# Patient Record
Sex: FEMALE | Race: WHITE | NOT HISPANIC OR LATINO | Employment: OTHER | URBAN - METROPOLITAN AREA
[De-identification: names, ages, dates, MRNs, and addresses within clinical notes are randomized per-mention and may not be internally consistent; named-entity substitution may affect disease eponyms.]

---

## 2021-12-10 ENCOUNTER — HOSPITAL ENCOUNTER (EMERGENCY)
Facility: HOSPITAL | Age: 76
Discharge: HOME | End: 2021-12-10
Attending: EMERGENCY MEDICINE | Admitting: EMERGENCY MEDICINE
Payer: COMMERCIAL

## 2021-12-10 ENCOUNTER — APPOINTMENT (EMERGENCY)
Dept: RADIOLOGY | Facility: HOSPITAL | Age: 76
End: 2021-12-10
Payer: COMMERCIAL

## 2021-12-10 ENCOUNTER — APPOINTMENT (EMERGENCY)
Dept: CT IMAGING | Facility: HOSPITAL | Age: 76
End: 2021-12-10
Payer: COMMERCIAL

## 2021-12-10 VITALS
RESPIRATION RATE: 20 BRPM | HEART RATE: 76 BPM | DIASTOLIC BLOOD PRESSURE: 60 MMHG | TEMPERATURE: 98.4 F | SYSTOLIC BLOOD PRESSURE: 124 MMHG | WEIGHT: 182.98 LBS | OXYGEN SATURATION: 96 %

## 2021-12-10 DIAGNOSIS — R29.6 MULTIPLE FALLS: Primary | ICD-10-CM

## 2021-12-10 LAB
ALBUMIN SERPL BCP-MCNC: 3.8 G/DL (ref 3.5–5)
ALP SERPL-CCNC: 94 U/L (ref 34–104)
ALT SERPL W P-5'-P-CCNC: 25 U/L (ref 7–52)
ANION GAP SERPL CALCULATED.3IONS-SCNC: 6 MMOL/L (ref 4–13)
APTT PPP: 40 SECONDS (ref 23–37)
AST SERPL W P-5'-P-CCNC: 37 U/L (ref 13–39)
BASOPHILS # BLD AUTO: 0.02 THOUSANDS/ΜL (ref 0–0.1)
BASOPHILS NFR BLD AUTO: 0 % (ref 0–1)
BILIRUB SERPL-MCNC: 0.85 MG/DL (ref 0.2–1)
BUN SERPL-MCNC: 12 MG/DL (ref 5–25)
CALCIUM SERPL-MCNC: 9.2 MG/DL (ref 8.4–10.2)
CHLORIDE SERPL-SCNC: 105 MMOL/L (ref 96–108)
CO2 SERPL-SCNC: 28 MMOL/L (ref 21–32)
CREAT SERPL-MCNC: 1.31 MG/DL (ref 0.6–1.3)
EOSINOPHIL # BLD AUTO: 0.1 THOUSAND/ΜL (ref 0–0.61)
EOSINOPHIL NFR BLD AUTO: 2 % (ref 0–6)
ERYTHROCYTE [DISTWIDTH] IN BLOOD BY AUTOMATED COUNT: 15.1 % (ref 11.6–15.1)
GFR SERPL CREATININE-BSD FRML MDRD: 40 ML/MIN/1.73SQ M
GLUCOSE SERPL-MCNC: 101 MG/DL (ref 65–140)
HCT VFR BLD AUTO: 39.3 % (ref 34.8–46.1)
HGB BLD-MCNC: 12 G/DL (ref 11.5–15.4)
IMM GRANULOCYTES # BLD AUTO: 0.04 THOUSAND/UL (ref 0–0.2)
IMM GRANULOCYTES NFR BLD AUTO: 1 % (ref 0–2)
INR PPP: 1.33 (ref 0.84–1.19)
LYMPHOCYTES # BLD AUTO: 0.72 THOUSANDS/ΜL (ref 0.6–4.47)
LYMPHOCYTES NFR BLD AUTO: 12 % (ref 14–44)
MCH RBC QN AUTO: 31.3 PG (ref 26.8–34.3)
MCHC RBC AUTO-ENTMCNC: 30.5 G/DL (ref 31.4–37.4)
MCV RBC AUTO: 103 FL (ref 82–98)
MONOCYTES # BLD AUTO: 0.51 THOUSAND/ΜL (ref 0.17–1.22)
MONOCYTES NFR BLD AUTO: 8 % (ref 4–12)
NEUTROPHILS # BLD AUTO: 4.76 THOUSANDS/ΜL (ref 1.85–7.62)
NEUTS SEG NFR BLD AUTO: 77 % (ref 43–75)
NRBC BLD AUTO-RTO: 0 /100 WBCS
PLATELET # BLD AUTO: 200 THOUSANDS/UL (ref 149–390)
PMV BLD AUTO: 9.3 FL (ref 8.9–12.7)
POTASSIUM SERPL-SCNC: 5.2 MMOL/L (ref 3.5–5.3)
PROT SERPL-MCNC: 6.8 G/DL (ref 6.4–8.4)
PROTHROMBIN TIME: 16.3 SECONDS (ref 11.6–14.5)
RBC # BLD AUTO: 3.83 MILLION/UL (ref 3.81–5.12)
SODIUM SERPL-SCNC: 139 MMOL/L (ref 135–147)
WBC # BLD AUTO: 6.15 THOUSAND/UL (ref 4.31–10.16)

## 2021-12-10 PROCEDURE — 85025 COMPLETE CBC W/AUTO DIFF WBC: CPT | Performed by: EMERGENCY MEDICINE

## 2021-12-10 PROCEDURE — 97163 PT EVAL HIGH COMPLEX 45 MIN: CPT

## 2021-12-10 PROCEDURE — 71045 X-RAY EXAM CHEST 1 VIEW: CPT

## 2021-12-10 PROCEDURE — 85610 PROTHROMBIN TIME: CPT | Performed by: EMERGENCY MEDICINE

## 2021-12-10 PROCEDURE — 99282 EMERGENCY DEPT VISIT SF MDM: CPT | Performed by: EMERGENCY MEDICINE

## 2021-12-10 PROCEDURE — 36415 COLL VENOUS BLD VENIPUNCTURE: CPT | Performed by: EMERGENCY MEDICINE

## 2021-12-10 PROCEDURE — 70450 CT HEAD/BRAIN W/O DYE: CPT

## 2021-12-10 PROCEDURE — 80053 COMPREHEN METABOLIC PANEL: CPT | Performed by: EMERGENCY MEDICINE

## 2021-12-10 PROCEDURE — 85730 THROMBOPLASTIN TIME PARTIAL: CPT | Performed by: EMERGENCY MEDICINE

## 2021-12-10 PROCEDURE — 99285 EMERGENCY DEPT VISIT HI MDM: CPT

## 2021-12-10 PROCEDURE — 97166 OT EVAL MOD COMPLEX 45 MIN: CPT

## 2021-12-22 ENCOUNTER — APPOINTMENT (EMERGENCY)
Dept: CT IMAGING | Facility: HOSPITAL | Age: 76
DRG: 196 | End: 2021-12-22
Payer: COMMERCIAL

## 2021-12-22 ENCOUNTER — HOSPITAL ENCOUNTER (INPATIENT)
Facility: HOSPITAL | Age: 76
LOS: 5 days | Discharge: NURSING, REHAB OR LONG-TERM CARE | DRG: 196 | End: 2021-12-28
Attending: EMERGENCY MEDICINE | Admitting: INTERNAL MEDICINE
Payer: COMMERCIAL

## 2021-12-22 ENCOUNTER — APPOINTMENT (EMERGENCY)
Dept: RADIOLOGY | Facility: HOSPITAL | Age: 76
DRG: 196 | End: 2021-12-22
Payer: COMMERCIAL

## 2021-12-22 DIAGNOSIS — R09.02 HYPOXIA: ICD-10-CM

## 2021-12-22 DIAGNOSIS — R29.6 FREQUENT FALLS: Primary | ICD-10-CM

## 2021-12-22 PROBLEM — M20.42 ACQUIRED BILATERAL HAMMER TOES: Status: ACTIVE | Noted: 2018-07-27

## 2021-12-22 PROBLEM — I50.32 CHRONIC DIASTOLIC CHF (CONGESTIVE HEART FAILURE) (HCC): Status: ACTIVE | Noted: 2017-01-13

## 2021-12-22 PROBLEM — M20.41 ACQUIRED BILATERAL HAMMER TOES: Status: ACTIVE | Noted: 2018-07-27

## 2021-12-22 PROBLEM — E53.8 FOLATE DEFICIENCY: Status: ACTIVE | Noted: 2017-01-13

## 2021-12-22 PROBLEM — I34.0 NONRHEUMATIC MITRAL VALVE REGURGITATION: Status: ACTIVE | Noted: 2018-05-31

## 2021-12-22 PROBLEM — I87.2 VENOUS INSUFFICIENCY OF BOTH LOWER EXTREMITIES: Status: ACTIVE | Noted: 2021-12-17

## 2021-12-22 PROBLEM — E53.8 B12 DEFICIENCY: Status: ACTIVE | Noted: 2017-01-13

## 2021-12-22 PROBLEM — I48.20 ATRIAL FIBRILLATION, CHRONIC (HCC): Status: ACTIVE | Noted: 2018-05-01

## 2021-12-22 PROBLEM — I89.0 LYMPHEDEMA OF BOTH LOWER EXTREMITIES: Status: ACTIVE | Noted: 2018-07-02

## 2021-12-22 LAB
2HR DELTA HS TROPONIN: -1 NG/L
4HR DELTA HS TROPONIN: -1 NG/L
ANION GAP SERPL CALCULATED.3IONS-SCNC: 9 MMOL/L (ref 4–13)
APTT PPP: 36 SECONDS (ref 23–37)
BACTERIA UR QL AUTO: NORMAL /HPF
BASOPHILS # BLD AUTO: 0.04 THOUSANDS/ΜL (ref 0–0.1)
BASOPHILS NFR BLD AUTO: 1 % (ref 0–1)
BILIRUB UR QL STRIP: NEGATIVE
BNP SERPL-MCNC: 383 PG/ML (ref 1–100)
BUN SERPL-MCNC: 18 MG/DL (ref 5–25)
CALCIUM SERPL-MCNC: 9.6 MG/DL (ref 8.4–10.2)
CARDIAC TROPONIN I PNL SERPL HS: 4 NG/L
CARDIAC TROPONIN I PNL SERPL HS: 4 NG/L
CARDIAC TROPONIN I PNL SERPL HS: 5 NG/L
CHLORIDE SERPL-SCNC: 100 MMOL/L (ref 96–108)
CK SERPL-CCNC: 88 U/L (ref 26–192)
CLARITY UR: CLEAR
CO2 SERPL-SCNC: 28 MMOL/L (ref 21–32)
COLOR UR: YELLOW
CREAT SERPL-MCNC: 1.42 MG/DL (ref 0.6–1.3)
DIGOXIN SERPL-MCNC: 1.4 NG/ML (ref 0.8–2)
EOSINOPHIL # BLD AUTO: 0.16 THOUSAND/ΜL (ref 0–0.61)
EOSINOPHIL NFR BLD AUTO: 2 % (ref 0–6)
ERYTHROCYTE [DISTWIDTH] IN BLOOD BY AUTOMATED COUNT: 14.3 % (ref 11.6–15.1)
FLUAV RNA RESP QL NAA+PROBE: NEGATIVE
FLUBV RNA RESP QL NAA+PROBE: NEGATIVE
GFR SERPL CREATININE-BSD FRML MDRD: 35 ML/MIN/1.73SQ M
GLUCOSE SERPL-MCNC: 79 MG/DL (ref 65–140)
GLUCOSE UR STRIP-MCNC: NEGATIVE MG/DL
HCT VFR BLD AUTO: 37.8 % (ref 34.8–46.1)
HGB BLD-MCNC: 11.5 G/DL (ref 11.5–15.4)
HGB UR QL STRIP.AUTO: ABNORMAL
IMM GRANULOCYTES # BLD AUTO: 0.02 THOUSAND/UL (ref 0–0.2)
IMM GRANULOCYTES NFR BLD AUTO: 0 % (ref 0–2)
KETONES UR STRIP-MCNC: NEGATIVE MG/DL
LEUKOCYTE ESTERASE UR QL STRIP: NEGATIVE
LYMPHOCYTES # BLD AUTO: 0.88 THOUSANDS/ΜL (ref 0.6–4.47)
LYMPHOCYTES NFR BLD AUTO: 10 % (ref 14–44)
MAGNESIUM SERPL-MCNC: 2.6 MG/DL (ref 1.9–2.7)
MCH RBC QN AUTO: 30.8 PG (ref 26.8–34.3)
MCHC RBC AUTO-ENTMCNC: 30.4 G/DL (ref 31.4–37.4)
MCV RBC AUTO: 101 FL (ref 82–98)
MONOCYTES # BLD AUTO: 0.85 THOUSAND/ΜL (ref 0.17–1.22)
MONOCYTES NFR BLD AUTO: 10 % (ref 4–12)
NEUTROPHILS # BLD AUTO: 6.74 THOUSANDS/ΜL (ref 1.85–7.62)
NEUTS SEG NFR BLD AUTO: 77 % (ref 43–75)
NITRITE UR QL STRIP: NEGATIVE
NON-SQ EPI CELLS URNS QL MICRO: NORMAL /HPF
NRBC BLD AUTO-RTO: 0 /100 WBCS
PH UR STRIP.AUTO: 7 [PH]
PLATELET # BLD AUTO: 217 THOUSANDS/UL (ref 149–390)
PMV BLD AUTO: 9.5 FL (ref 8.9–12.7)
POTASSIUM SERPL-SCNC: 4.1 MMOL/L (ref 3.5–5.3)
PROT UR STRIP-MCNC: NEGATIVE MG/DL
RBC # BLD AUTO: 3.73 MILLION/UL (ref 3.81–5.12)
RBC #/AREA URNS AUTO: NORMAL /HPF
RSV RNA RESP QL NAA+PROBE: NEGATIVE
SARS-COV-2 RNA RESP QL NAA+PROBE: NEGATIVE
SODIUM SERPL-SCNC: 137 MMOL/L (ref 135–147)
SP GR UR STRIP.AUTO: <=1.005 (ref 1–1.03)
TSH SERPL DL<=0.05 MIU/L-ACNC: 3.1 UIU/ML (ref 0.45–5.33)
UROBILINOGEN UR QL STRIP.AUTO: 0.2 E.U./DL
WBC # BLD AUTO: 8.69 THOUSAND/UL (ref 4.31–10.16)
WBC #/AREA URNS AUTO: NORMAL /HPF

## 2021-12-22 PROCEDURE — 85730 THROMBOPLASTIN TIME PARTIAL: CPT | Performed by: EMERGENCY MEDICINE

## 2021-12-22 PROCEDURE — 99285 EMERGENCY DEPT VISIT HI MDM: CPT | Performed by: EMERGENCY MEDICINE

## 2021-12-22 PROCEDURE — 72170 X-RAY EXAM OF PELVIS: CPT

## 2021-12-22 PROCEDURE — 83880 ASSAY OF NATRIURETIC PEPTIDE: CPT | Performed by: EMERGENCY MEDICINE

## 2021-12-22 PROCEDURE — 85379 FIBRIN DEGRADATION QUANT: CPT | Performed by: PHYSICIAN ASSISTANT

## 2021-12-22 PROCEDURE — 85025 COMPLETE CBC W/AUTO DIFF WBC: CPT | Performed by: EMERGENCY MEDICINE

## 2021-12-22 PROCEDURE — 70450 CT HEAD/BRAIN W/O DYE: CPT

## 2021-12-22 PROCEDURE — 82550 ASSAY OF CK (CPK): CPT | Performed by: EMERGENCY MEDICINE

## 2021-12-22 PROCEDURE — 84443 ASSAY THYROID STIM HORMONE: CPT | Performed by: EMERGENCY MEDICINE

## 2021-12-22 PROCEDURE — 99285 EMERGENCY DEPT VISIT HI MDM: CPT

## 2021-12-22 PROCEDURE — 0241U HB NFCT DS VIR RESP RNA 4 TRGT: CPT | Performed by: EMERGENCY MEDICINE

## 2021-12-22 PROCEDURE — 80162 ASSAY OF DIGOXIN TOTAL: CPT | Performed by: EMERGENCY MEDICINE

## 2021-12-22 PROCEDURE — 81001 URINALYSIS AUTO W/SCOPE: CPT | Performed by: EMERGENCY MEDICINE

## 2021-12-22 PROCEDURE — 83735 ASSAY OF MAGNESIUM: CPT | Performed by: EMERGENCY MEDICINE

## 2021-12-22 PROCEDURE — 72125 CT NECK SPINE W/O DYE: CPT

## 2021-12-22 PROCEDURE — 84484 ASSAY OF TROPONIN QUANT: CPT | Performed by: EMERGENCY MEDICINE

## 2021-12-22 PROCEDURE — 71045 X-RAY EXAM CHEST 1 VIEW: CPT

## 2021-12-22 PROCEDURE — 71260 CT THORAX DX C+: CPT

## 2021-12-22 PROCEDURE — 36415 COLL VENOUS BLD VENIPUNCTURE: CPT | Performed by: EMERGENCY MEDICINE

## 2021-12-22 PROCEDURE — 74177 CT ABD & PELVIS W/CONTRAST: CPT

## 2021-12-22 PROCEDURE — 80048 BASIC METABOLIC PNL TOTAL CA: CPT | Performed by: EMERGENCY MEDICINE

## 2021-12-22 RX ORDER — TORSEMIDE 20 MG/1
TABLET ORAL
Status: ON HOLD | COMMUNITY
Start: 2021-11-18 | End: 2022-06-09 | Stop reason: SDUPTHER

## 2021-12-22 RX ORDER — LEVOTHYROXINE SODIUM 0.07 MG/1
150 TABLET ORAL
Status: DISCONTINUED | OUTPATIENT
Start: 2021-12-23 | End: 2021-12-28 | Stop reason: HOSPADM

## 2021-12-22 RX ORDER — FOLIC ACID 1 MG/1
1 TABLET ORAL DAILY
COMMUNITY
Start: 2021-12-21

## 2021-12-22 RX ORDER — SERTRALINE HYDROCHLORIDE 100 MG/1
1 TABLET, FILM COATED ORAL DAILY
COMMUNITY
Start: 2021-12-16

## 2021-12-22 RX ORDER — METOPROLOL TARTRATE 50 MG/1
50 TABLET, FILM COATED ORAL 2 TIMES DAILY
Status: DISCONTINUED | OUTPATIENT
Start: 2021-12-22 | End: 2021-12-28 | Stop reason: HOSPADM

## 2021-12-22 RX ORDER — TORSEMIDE 20 MG/1
40 TABLET ORAL 2 TIMES DAILY
Status: DISCONTINUED | OUTPATIENT
Start: 2021-12-22 | End: 2021-12-27

## 2021-12-22 RX ORDER — POTASSIUM CHLORIDE 20 MEQ/1
2 TABLET, EXTENDED RELEASE ORAL DAILY
COMMUNITY
Start: 2021-12-16

## 2021-12-22 RX ORDER — CYANOCOBALAMIN 1000 UG/ML
1 INJECTION, SOLUTION INTRAMUSCULAR; SUBCUTANEOUS
COMMUNITY
Start: 2021-12-07

## 2021-12-22 RX ORDER — MAGNESIUM GLUCONATE 30 MG(550)
30 TABLET ORAL 2 TIMES DAILY
COMMUNITY

## 2021-12-22 RX ORDER — ACETAMINOPHEN 325 MG/1
650 TABLET ORAL EVERY 6 HOURS PRN
Status: DISCONTINUED | OUTPATIENT
Start: 2021-12-22 | End: 2021-12-28 | Stop reason: HOSPADM

## 2021-12-22 RX ORDER — POTASSIUM CHLORIDE 20 MEQ/1
40 TABLET, EXTENDED RELEASE ORAL DAILY
Status: DISCONTINUED | OUTPATIENT
Start: 2021-12-23 | End: 2021-12-28 | Stop reason: HOSPADM

## 2021-12-22 RX ORDER — LEVOTHYROXINE SODIUM 0.15 MG/1
137 TABLET ORAL DAILY
COMMUNITY
Start: 2021-12-07 | End: 2022-10-12

## 2021-12-22 RX ORDER — METOPROLOL TARTRATE 50 MG/1
50 TABLET, FILM COATED ORAL 2 TIMES DAILY
COMMUNITY
Start: 2021-12-17 | End: 2022-06-09

## 2021-12-22 RX ORDER — ONDANSETRON 2 MG/ML
4 INJECTION INTRAMUSCULAR; INTRAVENOUS EVERY 6 HOURS PRN
Status: DISCONTINUED | OUTPATIENT
Start: 2021-12-22 | End: 2021-12-28 | Stop reason: HOSPADM

## 2021-12-22 RX ORDER — SERTRALINE HYDROCHLORIDE 100 MG/1
100 TABLET, FILM COATED ORAL DAILY
Status: DISCONTINUED | OUTPATIENT
Start: 2021-12-23 | End: 2021-12-28 | Stop reason: HOSPADM

## 2021-12-22 RX ORDER — FOLIC ACID 1 MG/1
1000 TABLET ORAL DAILY
Status: DISCONTINUED | OUTPATIENT
Start: 2021-12-23 | End: 2021-12-28 | Stop reason: HOSPADM

## 2021-12-22 RX ORDER — UREA 10 %
500 LOTION (ML) TOPICAL 2 TIMES DAILY
Status: DISCONTINUED | OUTPATIENT
Start: 2021-12-22 | End: 2021-12-28 | Stop reason: HOSPADM

## 2021-12-22 RX ADMIN — TORSEMIDE 40 MG: 20 TABLET ORAL at 20:48

## 2021-12-22 RX ADMIN — APIXABAN 5 MG: 5 TABLET, FILM COATED ORAL at 20:48

## 2021-12-22 RX ADMIN — IOHEXOL 100 ML: 350 INJECTION, SOLUTION INTRAVENOUS at 16:30

## 2021-12-22 RX ADMIN — Medication 500 MG: at 22:21

## 2021-12-22 NOTE — ED PROVIDER NOTES
History  Chief Complaint   Patient presents with    Multiple Falls     According to the patient she had akash falls last thursday and friday     HPI    This is a very pleasant, nontoxic, 71-year-old female presents the emergency department via [facility] to EMS secondary to a chief complaint of generalized weakness and orthostatic hypotension  There is been reports that the patient has fallen at least 3-4 times in the last calendar month with no falls in the last calendar week  Patient is on aspirin and Eliquis  Pre-hospital trauma evaluation was requested due to fall on anticoagulation medication  According to the  patient may have mixed up her medications and took too much of her Zoloft  Patient denies any chest pain any nausea, shortness of breath, abdominal pain, back pain, headache  Patient reports that her  is having significant amount of trouble year up after she falls  Prior to Admission Medications   Prescriptions Last Dose Informant Patient Reported? Taking? Magnesium Gluconate 550 MG TABS   Yes No   Sig: Take 30 mg by mouth 2 (two) times a day   apixaban (ELIQUIS) 5 mg   Yes Yes   Sig: Take 5 mg by mouth 2 (two) times a day   cyanocobalamin 1,000 mcg/mL   Yes Yes   Si mL   folic acid (FOLVITE) 1 mg tablet   Yes Yes   Sig: Take 1 tablet by mouth daily   levothyroxine 150 mcg tablet   Yes Yes   Sig: Take 150 mcg by mouth daily   metoprolol tartrate (LOPRESSOR) 50 mg tablet   Yes Yes   Sig: Take 50 mg by mouth 2 (two) times a day   potassium chloride (K-DUR,KLOR-CON) 20 mEq tablet   Yes Yes   Sig: Take 2 tablets by mouth daily   sertraline (ZOLOFT) 100 mg tablet   Yes Yes   Sig: Take 1 tablet by mouth daily   torsemide (DEMADEX) 20 mg tablet   Yes Yes   Sig: TAKE TWO TABLETS BY MOUTH TWICE A DAY (MORNING AND EVENING)   CAN TAKE ADDITIONAL ONE TABLET DAILY AS NEEDED FOR SWELLING      Facility-Administered Medications: None       Past Medical History:   Diagnosis Date    Disease of thyroid gland        Past Surgical History:   Procedure Laterality Date    HYSTERECTOMY         History reviewed  No pertinent family history  I have reviewed and agree with the history as documented  E-Cigarette/Vaping    E-Cigarette Use Never User      E-Cigarette/Vaping Substances     Social History     Tobacco Use    Smoking status: Never Smoker    Smokeless tobacco: Never Used   Vaping Use    Vaping Use: Never used   Substance Use Topics    Alcohol use: Not Currently     Comment: occassional     Drug use: Never       Review of Systems   Constitutional: Negative  HENT: Negative  Eyes: Negative  Respiratory: Negative  Negative for chest tightness and shortness of breath  Cardiovascular: Negative  Negative for chest pain  Gastrointestinal: Negative  Negative for abdominal pain  Endocrine: Negative  Genitourinary: Negative  Musculoskeletal: Negative  Allergic/Immunologic: Negative  Neurological: Negative  Hematological: Negative  Psychiatric/Behavioral: Negative  Physical Exam  Physical Exam  Vitals and nursing note reviewed  Constitutional:       Appearance: Normal appearance  She is normal weight  HENT:      Head: Normocephalic and atraumatic  Comments: AIRWAY: Patient maintaining airway maintaining secretions  No stridor  No brawniness under the tongue  Uvula midline without edema  Phonation normal, trachea midline, no palpable tenderness Ankur sternocleidomastoid area, no crepitus noted  Right Ear: External ear normal       Left Ear: External ear normal       Nose: Nose normal       Mouth/Throat:      Mouth: Mucous membranes are moist       Pharynx: Oropharynx is clear  Eyes:      Conjunctiva/sclera: Conjunctivae normal       Pupils: Pupils are equal, round, and reactive to light  Cardiovascular:      Rate and Rhythm: Normal rate and regular rhythm  Pulses: Normal pulses  Heart sounds: Normal heart sounds        Comments: CIRCULATION / CARDIOVASCULAR:  Peripheral pulses are noted the upper lower extremities  Pulmonary:      Effort: Tachypnea and respiratory distress present  Breath sounds: Normal breath sounds and air entry  No stridor, decreased air movement or transmitted upper airway sounds  Comments: BREATHING:  No flail segments noted, no tenderness in the anterior chest wall or posterior thoracic area  Patient does have a fungal rash in her along the sternum area in between the medial aspects of bilateral breasts  Abdominal:      General: Abdomen is flat  Bowel sounds are normal    Musculoskeletal:         General: No swelling, tenderness, deformity or signs of injury  Normal range of motion  Cervical back: Normal range of motion  Comments: DEFORMITY:  No obvious bony deformities in the upper and lower extremities  Skin:     General: Skin is warm  Capillary Refill: Capillary refill takes less than 2 seconds  Coloration: Skin is not jaundiced  Neurological:      General: No focal deficit present  Mental Status: She is alert and oriented to person, place, and time  Mental status is at baseline  Comments: EXPOSURE: GCS: 15, RAMIREZ x 3  Psychiatric:         Mood and Affect: Mood normal          Behavior: Behavior normal          Thought Content:  Thought content normal          Judgment: Judgment normal          Vital Signs  ED Triage Vitals   Temp Pulse Respirations Blood Pressure SpO2   -- 12/22/21 1558 12/22/21 1558 12/22/21 1558 12/22/21 1558    75 18 126/58 97 %      Temp src Heart Rate Source Patient Position - Orthostatic VS BP Location FiO2 (%)   -- 12/22/21 1558 12/22/21 1615 12/22/21 1615 --    Monitor Lying Right arm       Pain Score       12/22/21 1647       No Pain           Vitals:    12/22/21 1715 12/22/21 1800 12/22/21 2015 12/22/21 2030   BP: 108/54 116/58 123/62 146/68   Pulse:  74 78 79   Patient Position - Orthostatic VS: Lying Lying Lying Lying         Visual Acuity  Visual Acuity      Most Recent Value   L Pupil Size (mm) 3   R Pupil Size (mm) 3          ED Medications  Medications   apixaban (ELIQUIS) tablet 5 mg (has no administration in time range)   folic acid (FOLVITE) tablet 1,000 mcg (has no administration in time range)   levothyroxine tablet 150 mcg (has no administration in time range)   magnesium gluconate (MAGONATE) tablet 500 mg (has no administration in time range)   metoprolol tartrate (LOPRESSOR) tablet 50 mg (50 mg Oral Not Given 12/22/21 2027)   potassium chloride (K-DUR,KLOR-CON) CR tablet 40 mEq (has no administration in time range)   sertraline (ZOLOFT) tablet 100 mg (has no administration in time range)   torsemide (DEMADEX) tablet 40 mg (has no administration in time range)   acetaminophen (TYLENOL) tablet 650 mg (has no administration in time range)   ondansetron (ZOFRAN) injection 4 mg (has no administration in time range)   iohexol (OMNIPAQUE) 350 MG/ML injection (SINGLE-DOSE) 100 mL (100 mL Intravenous Given 12/22/21 1630)       Diagnostic Studies  Results Reviewed     Procedure Component Value Units Date/Time    COVID/FLU/RSV - 2 hour TAT [[id number]] Collected: 12/22/21 2026    Lab Status: In process Specimen: Nares from Nose Updated: 12/22/21 2041    UA w Reflex to Microscopic w Reflex to Culture [[id number]] Collected: 12/22/21 2032    Lab Status: In process Specimen: Urine, Clean Catch Updated: 12/22/21 2041    HS Troponin I 4hr [[id number]] Collected: 12/22/21 2026    Lab Status:  In process Specimen: Blood from Arm, Right Updated: 12/22/21 2041    HS Troponin I 2hr [[id number]] Collected: 12/22/21 1825    Lab Status: Final result Specimen: Blood from Arm, Right Updated: 12/22/21 1902     hs TnI 2hr 4 ng/L      Delta 2hr hsTnI -1 ng/L     TSH [[id number]]  (Normal) Collected: 12/22/21 1616    Lab Status: Final result Specimen: Blood from Arm, Right Updated: 12/22/21 1658     TSH 3RD GENERATON 3 097 uIU/mL     Narrative:      Patients undergoing fluorescein dye angiography may retain small amounts of fluorescein in the body for 48-72 hours post procedure  Samples containing fluorescein can produce falsely depressed TSH values  If the patient had this procedure,a specimen should be resubmitted post fluorescein clearance        HS Troponin 0hr (reflex protocol) [[id number]]  (Normal) Collected: 12/22/21 1616    Lab Status: Final result Specimen: Blood from Arm, Right Updated: 12/22/21 1652     hs TnI 0hr 5 ng/L     B-Type Natriuretic Peptide(BNP) CA, GH, [facility] Only [[id number]]  (Abnormal) Collected: 12/22/21 1616    Lab Status: Final result Specimen: Blood from Arm, Right Updated: 12/22/21 1652      pg/mL     Magnesium [[id number]]  (Normal) Collected: 12/22/21 1616    Lab Status: Final result Specimen: Blood from Arm, Right Updated: 12/22/21 1644     Magnesium 2 6 mg/dL     CK Total with Reflex CKMB [[id number]]  (Normal) Collected: 12/22/21 1616    Lab Status: Final result Specimen: Blood from Arm, Right Updated: 12/22/21 1644     Total CK 88 U/L     Digoxin level [[id number]]  (Normal) Collected: 12/22/21 1616    Lab Status: Final result Specimen: Blood from Arm, Right Updated: 12/22/21 1644     Digoxin Lvl 1 4 ng/mL     Basic metabolic panel [[id number]]  (Abnormal) Collected: 12/22/21 1616    Lab Status: Final result Specimen: Blood from Arm, Right Updated: 12/22/21 1643     Sodium 137 mmol/L      Potassium 4 1 mmol/L      Chloride 100 mmol/L      CO2 28 mmol/L      ANION GAP 9 mmol/L      BUN 18 mg/dL      Creatinine 1 42 mg/dL      Glucose 79 mg/dL      Calcium 9 6 mg/dL      eGFR 35 ml/min/1 73sq m     Narrative:      [place] guidelines for Chronic Kidney Disease (CKD):     Stage 1 with normal or high GFR (GFR > 90 mL/min/1 73 square meters)    Stage 2 Mild CKD (GFR = 60-89 mL/min/1 73 square meters)    Stage 3A Moderate CKD (GFR = 45-59 mL/min/1 73 square meters)    Stage 3B Moderate CKD (GFR = 30-44 mL/min/1 73 square meters)    Stage 4 Severe CKD (GFR = 15-29 mL/min/1 73 square meters)    Stage 5 End Stage CKD (GFR <15 mL/min/1 73 square meters)  Note: GFR calculation is accurate only with a steady state creatinine    APTT [[id number]]  (Normal) Collected: 12/22/21 1616    Lab Status: Final result Specimen: Blood from Arm, Right Updated: 12/22/21 1635     PTT 36 seconds     CBC and differential [[id number]]  (Abnormal) Collected: 12/22/21 1616    Lab Status: Final result Specimen: Blood from Arm, Right Updated: 12/22/21 1623     WBC 8 69 Thousand/uL      RBC 3 73 Million/uL      Hemoglobin 11 5 g/dL      Hematocrit 37 8 %       fL      MCH 30 8 pg      MCHC 30 4 g/dL      RDW 14 3 %      MPV 9 5 fL      Platelets 219 Thousands/uL      nRBC 0 /100 WBCs      Neutrophils Relative 77 %      Immat GRANS % 0 %      Lymphocytes Relative 10 %      Monocytes Relative 10 %      Eosinophils Relative 2 %      Basophils Relative 1 %      Neutrophils Absolute 6 74 Thousands/µL      Immature Grans Absolute 0 02 Thousand/uL      Lymphocytes Absolute 0 88 Thousands/µL      Monocytes Absolute 0 85 Thousand/µL      Eosinophils Absolute 0 16 Thousand/µL      Basophils Absolute 0 04 Thousands/µL                  TRAUMA - CT chest abdomen pelvis w contrast   Final Result by Don Bustos MD (12/22 1704)   Subcutaneous contusion changes in the left lateral gluteal region  No CT findings of trauma in the chest, abdomen or pelvis  Interstitial lung disease changes in the lung bases predominantly on the right  Scattered lung nodules  Based on current Fleischner Society 2017 Guidelines on incidental pulmonary nodule, no routine follow-up is needed if the patient is considered low    risk for lung cancer  If the patient is considered high risk for lung cancer, 12 month follow-up non-contrast chest CT is recommended              Workstation performed: [id number]         TRAUMA - CT head wo contrast   Final Result by Leda Richards MD (12/22 1704)      No acute intracranial abnormality  Workstation performed: [id number]         TRAUMA - CT spine cervical wo contrast   Final Result by Leda Richards MD (12/22 1704)      No cervical spine fracture or traumatic malalignment  Multilevel degenerative disc changes  Resolving occipital scalp hematoma  Workstation performed: [id number]         XR Trauma chest portable    (Results Pending)   XR Trauma pelvis ap only 1 or 2 vw    (Results Pending)              Procedures  ECG 12 Lead Documentation Only    Date/Time: 12/22/2021 6:30 PM  Performed by: Daisy Baxter DO  Authorized by: Liliana Oates III, DO     Indications / Diagnosis:  Frequent falls  ECG reviewed by me, the ED Provider: yes    Patient location:  ED  Comments:      I personally reviewed this EKG that was performed the patient of December 22, 2021  EKG was completed at 6:30 p m , interpreted by me at 6:30 p m , atrial fibrillation with no acute ST abnormalities, ventricular rate of 87 beats per minute  CriticalCare Time  Performed by: Daisy Baxter DO  Authorized by: Daisy Baxter DO     Critical care provider statement:     Critical care time (minutes):  35    Critical care start time:  12/22/2021 4:49 PM    Critical care end time:  12/22/2021 5:49 PM    Critical care time was exclusive of:  Separately billable procedures and treating other patients    Critical care was necessary to treat or prevent imminent or life-threatening deterioration of the following conditions:  Cardiac failure, circulatory failure, CNS failure or compromise, dehydration and respiratory failure  Comments:      Please see ED course, patient had a good waveform left which showed 8 O2 saturation 86-88%, 2 L nasal cannula applied  Patient's work of breathing decreased and her mental status improved               ED Course  ED Course as of 12/22/21 2046   Wed Dec 22, 2021   1558 TRAUMA EVALUATION REQUESTED    1604 FAST SCAN NEGATIVE  Noted on 12/10/21: Cr: 1 31   1649 Noted pulse ox of 87 %, good wave form pleth, I placed pt on 2 litters of NC, sat pt up: O2 sat: now 96 %  [address] with the  noted that the patient has had a history of hypoxia at home most recently down into the low 70s  Patient has had periods of oxygen dependency which required her to have home O2 and distant past  No hx of COPD, aware that the pt will be admitted to the hospital                                SBIRT 20yo+      Most Recent Value   SBIRT (25 yo +)    In order to provide better care to our patients, we are screening all of our patients for alcohol and drug use  Would it be okay to ask you these screening questions? Yes Filed at: 12/22/2021 1617   Initial Alcohol Screen: US AUDIT-C     1  How often do you have a drink containing alcohol? 0 Filed at: 12/22/2021 1617   2  How many drinks containing alcohol do you have on a typical day you are drinking? 0 Filed at: 12/22/2021 1617   3a  Male UNDER 65: How often do you have five or more drinks on one occasion? 0 Filed at: 12/22/2021 1617   3b  FEMALE Any Age, or MALE 65+: How often do you have 4 or more drinks on one occassion? 0 Filed at: 12/22/2021 1617   Audit-C Score 0 Filed at: 12/22/2021 1617   LUKE: How many times in the past year have you    Used an illegal drug or used a prescription medication for non-medical reasons? Never Filed at: 12/22/2021 1617                    MDM  Number of Diagnoses or Management Options  Frequent falls  Hypoxia  Diagnosis management comments: This is a very pleasant 66-year-old lady presents the emergency department via EMS after mom multiple falls recently,  is concerned that she may have mixed up some her medications specifically her Zoloft    Patient is on digoxin, digoxin level normal, during the ER evaluation of patient's O2 saturation dropped down to 88% with no history of COPD,  reports that this happens on a regular basis, O2 applied, O2 saturation came up to greater than 92 %, will admit the patient for the hypoxia and frequent falls  Portions of the record may have been created with voice recognition software  Occasional wrong word or "sound a like" substitutions may have occurred due to the inherent limitations of voice recognition software  Read the chart carefully and recognize, using context, where substitutions have occurred  Amount and/or Complexity of Data Reviewed  Clinical lab tests: ordered and reviewed  Tests in the radiology section of CPT®: ordered and reviewed  Tests in the medicine section of CPT®: ordered and reviewed        Disposition  Final diagnoses:   Frequent falls   Hypoxia     Time reflects when diagnosis was documented in both MDM as applicable and the Disposition within this note     Time User Action Codes Description Comment    12/22/2021  6:48 PM Kerry Wright [R29 6] Frequent falls     12/22/2021  6:48 PM Tito Vivar [R09 02] Hypoxia       ED Disposition     ED Disposition Condition Date/Time Comment    Admit Stable Wed Dec 22, 2021  6:48 PM Case was discussed with Dr Pamella Cordon and the patient's admission status was agreed to be Admission Status: observation status to the service of Dr Pamella Cordon   Follow-up Information    None         Patient's Medications   Discharge Prescriptions    No medications on file       No discharge procedures on file      PDMP Review     None          ED Provider  Electronically Signed by           Lazaro Viveros III, DO  12/22/21 2046

## 2021-12-23 PROBLEM — R79.89 ELEVATED D-DIMER: Status: ACTIVE | Noted: 2021-12-23

## 2021-12-23 PROBLEM — R29.6 FREQUENT FALLS: Status: ACTIVE | Noted: 2021-12-23

## 2021-12-23 PROBLEM — R09.02 HYPOXIA: Status: ACTIVE | Noted: 2021-12-23

## 2021-12-23 LAB
ANION GAP SERPL CALCULATED.3IONS-SCNC: 10 MMOL/L (ref 4–13)
BUN SERPL-MCNC: 16 MG/DL (ref 5–25)
CALCIUM SERPL-MCNC: 9.5 MG/DL (ref 8.4–10.2)
CHLORIDE SERPL-SCNC: 97 MMOL/L (ref 96–108)
CO2 SERPL-SCNC: 32 MMOL/L (ref 21–32)
CREAT SERPL-MCNC: 1.34 MG/DL (ref 0.6–1.3)
D DIMER PPP FEU-MCNC: 1.9 UG/ML FEU
ERYTHROCYTE [DISTWIDTH] IN BLOOD BY AUTOMATED COUNT: 13.9 % (ref 11.6–15.1)
GFR SERPL CREATININE-BSD FRML MDRD: 38 ML/MIN/1.73SQ M
GLUCOSE P FAST SERPL-MCNC: 135 MG/DL (ref 65–99)
GLUCOSE SERPL-MCNC: 135 MG/DL (ref 65–140)
HCT VFR BLD AUTO: 39.5 % (ref 34.8–46.1)
HGB BLD-MCNC: 12.3 G/DL (ref 11.5–15.4)
MCH RBC QN AUTO: 30.8 PG (ref 26.8–34.3)
MCHC RBC AUTO-ENTMCNC: 31.1 G/DL (ref 31.4–37.4)
MCV RBC AUTO: 99 FL (ref 82–98)
PLATELET # BLD AUTO: 233 THOUSANDS/UL (ref 149–390)
PMV BLD AUTO: 9.5 FL (ref 8.9–12.7)
POTASSIUM SERPL-SCNC: 3.6 MMOL/L (ref 3.5–5.3)
RBC # BLD AUTO: 3.99 MILLION/UL (ref 3.81–5.12)
SODIUM SERPL-SCNC: 139 MMOL/L (ref 135–147)
WBC # BLD AUTO: 11.6 THOUSAND/UL (ref 4.31–10.16)

## 2021-12-23 PROCEDURE — 97167 OT EVAL HIGH COMPLEX 60 MIN: CPT

## 2021-12-23 PROCEDURE — 36415 COLL VENOUS BLD VENIPUNCTURE: CPT | Performed by: PHYSICIAN ASSISTANT

## 2021-12-23 PROCEDURE — 99223 1ST HOSP IP/OBS HIGH 75: CPT | Performed by: INTERNAL MEDICINE

## 2021-12-23 PROCEDURE — 97163 PT EVAL HIGH COMPLEX 45 MIN: CPT

## 2021-12-23 PROCEDURE — 85027 COMPLETE CBC AUTOMATED: CPT | Performed by: PHYSICIAN ASSISTANT

## 2021-12-23 PROCEDURE — 99204 OFFICE O/P NEW MOD 45 MIN: CPT | Performed by: INTERNAL MEDICINE

## 2021-12-23 PROCEDURE — 80048 BASIC METABOLIC PNL TOTAL CA: CPT | Performed by: PHYSICIAN ASSISTANT

## 2021-12-23 RX ORDER — ALBUTEROL SULFATE 90 UG/1
2 AEROSOL, METERED RESPIRATORY (INHALATION) EVERY 4 HOURS PRN
Status: DISCONTINUED | OUTPATIENT
Start: 2021-12-23 | End: 2021-12-28 | Stop reason: HOSPADM

## 2021-12-23 RX ORDER — OLANZAPINE 10 MG/1
2.5 INJECTION, POWDER, LYOPHILIZED, FOR SOLUTION INTRAMUSCULAR ONCE
Status: COMPLETED | OUTPATIENT
Start: 2021-12-23 | End: 2021-12-23

## 2021-12-23 RX ADMIN — ONDANSETRON 4 MG: 2 INJECTION INTRAMUSCULAR; INTRAVENOUS at 00:52

## 2021-12-23 RX ADMIN — OLANZAPINE 2.5 MG: 10 INJECTION, POWDER, FOR SOLUTION INTRAMUSCULAR at 06:04

## 2021-12-23 RX ADMIN — ACETAMINOPHEN 650 MG: 325 TABLET ORAL at 00:52

## 2021-12-23 NOTE — ASSESSMENT & PLAN NOTE
· Placed in observation Medicine  · Unclear etiology  · Patient does have a history of congestive heart failure but does not appear to be in exacerbation at this time  · Interstitial lung disease demonstrated on CT  · Patient is not on any respiratory medications are O2 at home  · Will give albuterol 90 mcg 2 puffs q 6 hours p r n  for shortness of breath or wheeze  · Consult pulmonary in a m  · Patient has elevated D-dimer that was currently anticoagulated, will obtain CT angiogram if able to rule out PE if not able to secondary to renal function then V/Q scan in a m

## 2021-12-23 NOTE — ASSESSMENT & PLAN NOTE
Patient is rate controlled this time, will continue pre-hospital Lopressor 50 mg p o  b i d  and Eliquis 5 mg p o  b i d

## 2021-12-23 NOTE — PHYSICAL THERAPY NOTE
Physical Therapy Evaluation     Patient's Name: Kiersten León    Admitting Diagnosis  No admission diagnoses are documented for this encounter  Problem List  Patient Active Problem List   Diagnosis    Acquired bilateral hammer toes    Atrial fibrillation, chronic (HCC)    B12 deficiency    Chronic diastolic CHF (congestive heart failure) (HCC)    Depression    Essential hypertension    Hypothyroidism    Impaired fasting glucose    Lymphedema of both lower extremities    Mixed hyperlipidemia    Nonrheumatic mitral valve regurgitation    Posttraumatic stress disorder    Osteoarthritis of knee    Venous insufficiency of both lower extremities    Folate deficiency    Vitamin D deficiency    Hypoxia    Frequent falls    Elevated d-dimer       Past Medical History  Past Medical History:   Diagnosis Date    Disease of thyroid gland        Past Surgical History  Past Surgical History:   Procedure Laterality Date    HYSTERECTOMY          12/23/21 0910   PT Last Visit   PT Visit Date 12/23/21   Note Type   Note type Evaluation   Pain Assessment   Pain Assessment Tool FLACC   Pain Rating: FLACC (Rest) - Face 0   Pain Rating: FLACC (Rest) - Legs 1   Pain Rating: FLACC (Rest) - Activity 1   Pain Rating: FLACC (Rest) - Cry 1   Pain Rating: FLACC (Rest) - Consolability 2   Score: FLACC (Rest) 5   Pain Rating: FLACC (Activity) - Face 1   Pain Rating: FLACC (Activity) - Legs 1   Pain Rating: FLACC (Activity) - Activity 1   Pain Rating: FLACC (Activity) - Cry 1   Pain Rating: FLACC (Activity) - Consolability 2   Score: FLACC (Activity) 6   Restrictions/Precautions   Weight Bearing Precautions Per Order No   Other Precautions Combative;Agitated;Cognitive; Bed Alarm;Multiple lines; Fall Risk;Pain;Hard of hearing   Home Living   Type of [address] [place] [address] Two level;Stairs to enter with rails; Performs ADLs on one level; Able to live on main level with bedroom/bathroom  (3 CRISSY,full flight to 2nd floor) Bathroom Shower/Tub Walk-in shower   Bathroom Toilet Raised   Bathroom Equipment Built-in shower seat;Grab bars in shower;Commode   Bathroom Accessibility Accessible   Home Equipment Walker;Cane;Reacher  (baseline RW usage)   Additional Comments PLOF & home environment were obtained via chart review  Pt  could not provide due to cognitive impairment severity & decreased engagement  PT will follow-up w/CM to confirm information  Prior Function   Level of [place] Independent with ADLs and functional mobility; Needs assistance with IADLs   Lives With Spouse   Receives Help From Family   ADL Assistance Independent  (occasional assistance w/lower body)   IADLs Needs assistance   Falls in the last 6 months   (exact amount unknown, frequent falls diagnosis)   Vocational Retired   General   Additional Pertinent History Meggan OT present for co-assessment due to medical complexity including cognitive impairment severity & impulsivity  Family/Caregiver Present No   Cognition   Overall Cognitive Status Impaired   Arousal/Participation Uncooperative   Orientation Level Disoriented X4   Memory Decreased recall of precautions;Decreased recall of recent events;Decreased short term memory;Decreased recall of biographical information   Following Commands Unable to follow one step commands   Comments Pt  required consistent verbal & tactile facilitation for all tasks  Subjective   Subjective "you're the most irritating person I ever met"   RLE Assessment   RLE Assessment X  (3/5 gross musculature)   LLE Assessment   LLE Assessment X  (3/5 gross musculature)   Vision-Basic Assessment   Current Vision Wears glasses all the time   Vestibular   Spontaneous Nystagmus (-) no evidence of nystagmus at rest in room light   Coordination   Movements are Fluid and Coordinated 0   Coordination and Movement Description Impulsive, erratic mobility requiring consistent verbal cues for pace and safety awareness     Bed Mobility   Rolling R 4  Minimal assistance   Additional items Assist x 1;Bedrails; Impulsive;Verbal cues;LE management   Rolling L 4  Minimal assistance   Additional items Assist x 1;Bedrails; Impulsive;Verbal cues;LE management   Supine to Sit 5  Supervision   Additional items Assist x 1; Impulsive;Verbal cues  (repeated impulsive long sitting up in bed x 5)   Additional Comments Further mobility could not be safely assessed due to impulsivity,inability to further engage, and combative nature  Lakhwinder Hanson was appropriately and safely repositioned in bed upon conclusion w/bed alarm engaged  Transfers   Sit to Stand Unable to assess   Ambulation/Elevation   Gait pattern Not tested  (could not safely complete WB tasks at this time)   Balance   Static Sitting Poor   Endurance Deficit   Endurance Deficit Yes   Activity Tolerance   Activity Tolerance Treatment limited secondary to agitation; Other (cognitive impairment severity w/agitation & impulsivity)   Nurse Made Aware Yes, nursing staff informed of assessment outcome  Assessment   Prognosis Guarded   Problem List Decreased strength;Decreased endurance; Impaired balance;Decreased mobility; Decreased coordination;Decreased cognition; Impaired judgement;Decreased safety awareness; Impaired hearing;Pain   Assessment Pt is 68 y o  female seen for PT evaluation s/p admit to [facility] on 12/22/2021 w/ Hypoxia  PT consulted to assess pt's functional mobility and d/c needs  Order placed for PT eval and tx, w/ up and OOB as tolerated order  Comorbidities affecting pt's physical performance at time of assessment include: weakness,hypoxia, chronic diastolic CHF, depression, HTN, frequent falls   PTA, pt was independent w/ all functional mobility w/ AD usage   Personal factors affecting pt at time of IE include: communication issues, inability to ambulate household distances, inability to navigate community distances, unable to perform dynamic tasks in community, decreased cognition, positive fall history, hearing impairments, decreased initiation and engagement, impulsivity, unable to perform physical activity, limited insight into impairments and agitiation  Please find objective findings from PT assessment regarding body systems outlined above with impairments and limitations including weakness, impaired balance, decreased endurance, impaired coordination, pain, decreased activity tolerance, decreased functional mobility tolerance, decreased safety awareness, impaired judgement, fall risk and decreased cognition  From PT/mobility standpoint, recommendation at time of d/c would be post acute rehabilitation services pending progress in order to facilitate return to PLOF  Goals   Patient Goals pt  could not provide a desired goal re:cognitive impairment severity   LTG Expiration Date 01/02/22   Long Term Goal #1 1 )Patient will complete bed mobility with supervision of 1 for decrease need for caregiver assistance, decrease burden of care  2 ) Patient will complete transfers with supervision of 1 to decrease risk of falls, facilitate upright standing posture  3 ) BLE strength to greater than/equal to 4/5 gross musculature to increase ability to safely transfer, control descent to chair  4 ) Patient will exhibit increase static standing balance to Fair 30-45 seconds without LOB min A of 1 to improve activity tolerance  5 ) Patient will exhibit increase dynamic ambulatory balance to Fair min A of 1 w/AD prn 35-50 feet to improve ability to mobilize to toilet, chair and decrease risk for additional medical complications  PT Treatment Day 0   Plan   Treatment/Interventions Functional transfer training;LE strengthening/ROM; Therapeutic exercise; Endurance training;Cognitive reorientation;Patient/family training;Bed mobility;Gait training;Continued evaluation;Spoke to nursing;OT   PT Frequency 3-5x/wk   Recommendation   PT Discharge Recommendation Post acute rehabilitation services   Additional Comments Upon conclusion, pt  was resting safely in bed w/all needs within reach  Additional Comments 2 Pt's raw score on the AM-[facility] Basic Mobility inpatient short form is 12, standardized score is 32 23  Patients at this level are likely to benefit from DC to [address]  However, please refer to therapist recommendation for safe DC planning     AM-[facility] Basic Mobility Inpatient   Turning in Bed Without Bedrails 3   Lying on Back to Sitting on Edge of Flat Bed 3   Moving Bed to Chair 2   Standing Up From Chair 2   Walk in Room 1   Climb 3-5 Stairs 1   Basic Mobility Inpatient Raw Score 12   Basic Mobility Standardized Score 32 23   Highest Level Of Mobility   [facility] Goal 4: Move to chair/commode     History/Personal Factors/Comorbidities: weakness,hypoxia, chronic diastolic CHF, depression, HTN, frequent falls    # of body structures/limitations: muscle weakness, activity intolerance,decreased endurance, impaired balance,impaired coordination, impaired cognition    Clinical presentation: unstable as seen in cognitive impairment severity, impulsivity, agitation, pain, high fall risk with frequent falls    Initial Assessment Time: [id number]    Valerie Galvan, PT

## 2021-12-23 NOTE — PLAN OF CARE
Problem: OCCUPATIONAL THERAPY ADULT  Goal: Performs self-care activities at highest level of function for planned discharge setting  See evaluation for individualized goals  Description: Treatment Interventions: ADL retraining,Functional transfer training,UE strengthening/ROM,Cognitive reorientation,Patient/family training,Equipment evaluation/education,Energy conservation,Activityengagement          See flowsheet documentation for full assessment, interventions and recommendations  12/23/2021 1157 by Brandi Lindquist OT  Note: Limitation: Decreased ADL status,Decreased UE strength,Decreased Safe judgement during ADL,Decreased cognition,Decreased self-care trans,Decreased high-level ADLs  Prognosis: Good  Assessment: Patient is a 68 y o  female seen for OT evaluation at [address] [place] [address] following admission on 12/22/2021  s/p Hypoxia  Comorbidities impacting functional performance include: a-fib, CHF, HTN, Hx of depression, frequent falls, osteoarthritis of knee, and venous insufficiency of BLE  Patient presents with active orders for OT eval and treat and up and OOB as tolerated   Performed at least 2 patient identifiers during session including name and wristband  Patient reports prior level of function as needs assistance  with ADL/IADLs, ambulatory with RW x1 month d/t increased falls and decreased stability and lives with spouse in a 2 story house  with 3 CRISSY  Patient is retired  and does not  drive  Upon initial evaluation, patient requires min assist for UB ADLs, mod assist for LB ADLs, and min A for bed mobility; unable to functional mobility further at this time d/t cognitive status, combative / agitated status, and impulsivity  Based on functional eval, patient presents A&Ox2/4 with impaired  attention, safety awareness, and memory   Occupational performance is affected by the following deficits: endurance , muscular strength , balance , memory , attention , judgement , orientation , awareness , alertness  and (+) pain   Patient would benefit from OT services to maximize independence in self-care and transfers  Personal factors impacting performance include: (+) Hx of falls , CRISSY home , decreased ADL independence , decreased IADL independence and impaired interpersonal skills   Occupational areas to address include:bathing/showering, toileting, dressing , personal hygiene/grooming , bed mobility , functional mobility, activity engagement , safety awareness , fall prevention , energy conservation  and home management   Recommending post-acute rehabilitation  upon D/C; will continue to assess pending cognitive status improvement  OT Discharge Recommendation: Post acute rehabilitation services (will continue to assess following progression in cog status )  OT - OK to Discharge: Yes (once medically clear)    12/23/2021 1157 by Naomi Browning OT  Note: Limitation: Decreased ADL status,Decreased UE strength,Decreased Safe judgement during ADL,Decreased cognition,Decreased self-care trans,Decreased high-level ADLs  Prognosis: Good  Assessment: Patient is a 68 y o  female seen for OT evaluation at [place] [address] following admission on 12/22/2021  s/p Hypoxia  Comorbidities impacting functional performance include: a-fib, CHF, HTN, Hx of depression, frequent falls, osteoarthritis of knee, and venous insufficiency of BLE  Patient presents with active orders for OT eval and treat and up and OOB as tolerated   Performed at least 2 patient identifiers during session including name and wristband  Patient reports prior level of function as needs assistance  with ADL/IADLs, ambulatory with RW x1 month d/t increased falls and decreased stability and lives with spouse in a 2 story house  with 3 CRISSY  Patient is retired  and does not  drive   Upon initial evaluation, patient requires min assist for UB ADLs, mod assist for LB ADLs, and min A for bed mobility; unable to functional mobility further at this time d/t cognitive status, combative / agitated status, and impulsivity  Based on functional eval, patient presents A&Ox2/4 with impaired  attention, safety awareness, and memory  Occupational performance is affected by the following deficits: endurance , muscular strength , balance , memory , attention , judgement , orientation , awareness , alertness  and (+) pain   Patient would benefit from OT services to maximize independence in self-care and transfers  Personal factors impacting performance include: (+) Hx of falls , CRISSY home , decreased ADL independence , decreased IADL independence and impaired interpersonal skills   Occupational areas to address include:bathing/showering, toileting, dressing , personal hygiene/grooming , bed mobility , functional mobility, activity engagement , safety awareness , fall prevention , energy conservation  and home management   Recommending post-acute rehabilitation  upon D/C; will continue to assess pending cognitive status improvement        OT Discharge Recommendation: Post acute rehabilitation services (will continue to assess following progression in cog status )  OT - OK to Discharge: Yes (once medically clear)     Teodora Marcelino OT

## 2021-12-23 NOTE — CONSULTS
Consultation - Pulmonary Medicine   Jim Howell 68 y o  female MRN: [id number]  Unit/Bed#: ED 05 Encounter: [id number]      Physician Requesting Consult: CORWIN Daigle  Reason for Consult: ILD and hypoxia    Assessment/Plan:    1  Hypoxia  · Presented on room air at 96%  · Two episodes of desaturation noted in vital signs, improved with 2 L  · Low lowest recorded saturation was 85% on 2 L  Unclear if while sleeping or inaccurate plethysmography  · Will need formal home oxygen evaluation with ambulatory oximetry prior to discharge  · Patient is chronically anticoagulated on Eliquis  Ventilation perfusion imaging will likely not be beneficial   Low suspicion for pulmonary embolism  2  With abnormal CT scan with interstitial changes  · Minimal right basilar peripheral fibrotic change not clearly IPF nor other classifiable lung disease   · Could be secondary to aspiration or prior pneumonia  · Will likely need follow-up high-resolution CT imaging in 6-12 months and complete pulmonary function testing as an outpatient  3  Tiny pulmonary nodules,   · likely inflammatory and less than 6 mm in size  · No significant risk factors for malignancy, nodules independently did not require interval imaging follow-up however she will need imaging follow-up for the interstitial changes and these can be reassessed at that time    Pulmonary will see again 12/27  if she remains hospitalized  Please call if questions  ______________________________________________________________________    HPI:    Jim Howell is a 68 y o  female who presents with weakness and falls  Had trauma evaluation in the emergency department Patient arousable but would not engage with me for history or or evaluation  History reviewed in chart record        Review of Systems:  Patient not cooperative with attempts at history/ROS    Historical Information   Past Medical History:   Diagnosis Date    Disease of thyroid gland      Past Surgical History:   Procedure Laterality Date    HYSTERECTOMY       Social History   Social History     Substance and Sexual Activity   Alcohol Use Not Currently    Comment: occassional      Social History     Tobacco Use   Smoking Status Never Smoker   Smokeless Tobacco Never Used         Family History:   History reviewed  No pertinent family history  Medications: The patient's active and prehospital medications were reviewed  Current Facility-Administered Medications   Medication Dose Route Frequency Provider Last Rate    acetaminophen  650 mg Oral Q6H PRN Michela Ee, PA-C      albuterol  2 puff Inhalation Q4H PRN Michela Ee, PA-C      apixaban  5 mg Oral BID Michela Ee, PA-C      folic acid  9,277 mcg Oral Daily Michela Ee, PA-C      levothyroxine  150 mcg Oral Early Morning Michela Ee, PA-C      magnesium gluconate  500 mg Oral BID Michela Ee, PA-C      metoprolol tartrate  50 mg Oral BID Michela Ee, PA-C      ondansetron  4 mg Intravenous Q6H PRN Michela Ee, PA-C      potassium chloride  40 mEq Oral Daily Michela Ee, PA-C      sertraline  100 mg Oral Daily Michela Ee, PA-C      torsemide  40 mg Oral BID Michela Ee, PA-C           PhysicalExamination:  Vitals:   Vitals:    12/22/21 2230 12/22/21 2249 12/22/21 2335 12/23/21 0221   BP: 124/60  150/72 116/63   BP Location: Right arm  Right arm Right arm   Pulse: 80  78 82   Resp: 18  18 18   Temp:  98 2 °F (36 8 °C) 97 6 °F (36 4 °C) 97 6 °F (36 4 °C)   TempSrc:  Oral Oral Oral   SpO2: 99%  95% 94%   Weight:         There is no height or weight on file to calculate BMI  Temp  Min: 97 6 °F (36 4 °C)  Max: 98 2 °F (36 8 °C)       SpO2: 94 %,   SpO2 Activity: At Rest,   O2 Device: Nasal cannula    Gen: Sleepy arousable but not engaging for history  HEENT: No icterus  Dry mucus membranes  Neck: no JVD   No adenopathy  Chest: Clear anteriorly  Cardiac: regular  Abd: soft   Ext:no edema  Neuro: Not cooperative for neuro exam  Skin: dry no rash    Diagnostic Data:  CBC:   Results from last 7 days   Lab Units 12/23/21  0536 12/22/21  1616   WBC Thousand/uL 11 60* 8 69   HEMOGLOBIN g/dL 12 3 11 5   HEMATOCRIT % 39 5 37 8   PLATELETS Thousands/uL 233 217       CMP:   Results from last 7 days   Lab Units 12/23/21  0536 12/22/21  1616   SODIUM mmol/L 139 137   POTASSIUM mmol/L 3 6 4 1   CHLORIDE mmol/L 97 100   CO2 mmol/L 32 28   BUN mg/dL 16 18   CREATININE mg/dL 1 34* 1 42*   CALCIUM mg/dL 9 5 9 6         Microbiology:  Results from last 7 days   Lab Units 12/22/21 2026   INFLUENZA A PCR  Negative         Imaging:  CT scan of the chest abdomen and pelvis performed for trauma protocol was reviewed  Lung images specifically reviewed on the [facility] system  There are minor right basilar peripheral fibrotic changes  No traction bronchiectasis  No adenopathy  No ground-glass opacity  There are tiny pulmonary nodules that are scattered  These are 3 mm or less in size    No further imaging follow-up required for the nodules but will need imaging follow-up for interstitial changes      Shelba Meigs, MD

## 2021-12-23 NOTE — UTILIZATION REVIEW
OBSERVATION 12/22/21 @ 1848 CONVERTED TO INPATIENT 12/23/21 @1449 DUE TO HYPOXIA REQUIRING OXYGEN, PULMONARY EVALUATION AND RECHECK OF BLOOD WORK  Initial Clinical Review    Admission: Date/Time/Statement:   12/23/21 1450  Inpatient Admission  Once        Transfer Service: Hospitalist       Question Answer Comment   Level of Care Med Surg    Estimated length of stay More than 2 Midnights    Certification I certify that inpatient services are medically necessary for this patient for a duration of greater than two midnights  See H&P and MD Progress Notes for additional information about the patient's course of treatment  12/23/21 1449         ED Arrival Information     Expected Arrival Acuity    - 12/22/2021 15:49 Urgent         Means of arrival Escorted by Service Admission type    Ambulance [address]  [facility] Hospitalist Emergency         Arrival complaint    fall        Chief Complaint   Patient presents with    Multiple Falls     According to the patient she had mulitples falls last thursday and friday       Initial Presentation: 68year old female to the ED via EMS with complaints of multiple falls recently with generalized weakness  As per her , she may have taken too much of her zoloft  Admitted under observation then converted to inpatient  for hypoxia, elevated d-dimer  Unreliable historian  Arrives with abrasion to left cheek with mild ecchymosis  She is tachypneic, respiratory distress  CT head shows no acute abnormality  GCS 15  D-dimer elevated  Hypoxic with pulse ox 88%  Placed on [facility]  Unclear etiology  H/O CHF but does not appear to be in exacerbation  Update:  Becoming combative with staff, uncooperative  Wean oxygen as able  Continue eliquis  PT/OT eval recommends post acute rehabe  12/23 PUlmonary consult:  Lowest pulse ox recorded was 85% on 2LNC  Will need home o2 eval   Chronically on Eliquis  Low supsicion for pulmonary embolism    INterstitial changes could be due to aspiration or prior pneumonia  ED Triage Vitals   Temperature Pulse Respirations Blood Pressure SpO2   12/22/21 2249 12/22/21 1558 12/22/21 1558 12/22/21 1558 12/22/21 1558   98 2 °F (36 8 °C) 75 18 126/58 97 %      Temp Source Heart Rate Source Patient Position - Orthostatic VS BP Location FiO2 (%)   12/22/21 2249 12/22/21 1558 12/22/21 1615 12/22/21 1615 --   Oral Monitor Lying Right arm       Pain Score       12/22/21 1647       No Pain          Wt Readings from Last 1 Encounters:   12/22/21 82 6 kg (182 lb)     Additional Vital Signs:   Date/Time Temp Pulse Resp BP MAP (mmHg) SpO2 Calculated FIO2 (%) - Nasal Cannula Nasal Cannula O2 Flow Rate (L/min) O2 Device Patient Position - Orthostatic VS   12/23/21 0221 97 6 °F (36 4 °C) 82 18 116/63 81 94 % 28 2 L/min Nasal cannula Lying   12/22/21 2335 97 6 °F (36 4 °C) 78 18 150/72 -- 95 % 28 2 L/min Nasal cannula Lying   12/22/21 2249 98 2 °F (36 8 °C) -- -- -- -- -- -- -- -- --   12/22/21 2230 -- 80 18 124/60 83 99 % 28 2 L/min Nasal cannula Lying   12/22/21 2030 -- 79 18 146/68 88 93 % 28 2 L/min Nasal cannula Lying   12/22/21 2015 -- 78 18 123/62 84 85 % Abnormal   -- -- None (Room air) Lying   12/22/21 1800 -- 74 18 116/58 79 95 % 28 2 L/min Nasal cannula Lying   12/22/21 1715 -- -- 18 108/54 78 93 % 28 2 L/min Nasal cannula Lying   12/22/21 1700 -- 94 18 113/65 83 94 % 28 2 L/min Nasal cannula Lying   12/22/21 1647 -- -- -- -- -- 88 % Abnormal  -- -- -- --   12/22/21 1615 -- 86 18 118/58 80 96 % -- -- None (Room air) Lying   12/22/21 15:58:10 -- 75 18 126/58 -- 97 % -- -- None (Room air) --     Pertinent Labs/Diagnostic Test Results:   12/22  TRAUMA - CT chest abdomen pelvis w contrast   Subcutaneous contusion changes in the left lateral gluteal region  No CT findings of trauma in the chest, abdomen or pelvis        Interstitial lung disease changes in the lung bases predominantly on the right  Scattered lung nodules     TRAUMA - CT head wo contrast     No acute intracranial abnormality  TRAUMA - CT spine cervical wo contrast   No cervical spine fracture or traumatic malalignment  Multilevel degenerative disc changes  Resolving occipital scalp hematoma         Results from last 7 days   Lab Units 12/22/21 2026   SARS-COV-2  Negative     Results from last 7 days   Lab Units 12/23/21  0536 12/22/21  1616   WBC Thousand/uL 11 60* 8 69   HEMOGLOBIN g/dL 12 3 11 5   HEMATOCRIT % 39 5 37 8   PLATELETS Thousands/uL 233 217   NEUTROS ABS Thousands/µL  --  6 74         Results from last 7 days   Lab Units 12/23/21  0536 12/22/21  1616   SODIUM mmol/L 139 137   POTASSIUM mmol/L 3 6 4 1   CHLORIDE mmol/L 97 100   CO2 mmol/L 32 28   ANION GAP mmol/L 10 9   BUN mg/dL 16 18   CREATININE mg/dL 1 34* 1 42*   EGFR ml/min/1 73sq m 38 35   CALCIUM mg/dL 9 5 9 6   MAGNESIUM mg/dL  --  2 6       Results from last 7 days   Lab Units 12/23/21  0536 12/22/21  1616   GLUCOSE RANDOM mg/dL 135 79     Results from last 7 days   Lab Units 12/22/21  1616   CK TOTAL U/L 88     Results from last 7 days   Lab Units 12/22/21 2026 12/22/21  1825 12/22/21  1616   HS TNI 0HR ng/L  --   --  5   HS TNI 2HR ng/L  --  4  --    HSTNI D2 ng/L  --  -1  --    HS TNI 4HR ng/L 4  --   --    HSTNI D4 ng/L -1  --   --      Results from last 7 days   Lab Units 12/22/21  1616   D-DIMER QUANTITATIVE ug/ml FEU 1 90*     Results from last 7 days   Lab Units 12/22/21  1616   PTT seconds 36     Results from last 7 days   Lab Units 12/22/21  1616   TSH 3RD GENERATON uIU/mL 3 097       Results from last 7 days   Lab Units 12/22/21  1616   DIGOXIN LVL ng/mL 1 4     Results from last 7 days   Lab Units 12/22/21  1616   BNP pg/mL 383*     Results from last 7 days   Lab Units 12/22/21 2032   CLARITY UA  Clear   COLOR UA  Yellow   SPEC GRAV UA  <=1 005*   PH UA  7 0   GLUCOSE UA mg/dl Negative   KETONES UA mg/dl Negative   BLOOD UA  Trace-Intact*   PROTEIN UA mg/dl Negative   NITRITE UA  Negative   BILIRUBIN UA Negative   UROBILINOGEN UA E U /dl 0 2   LEUKOCYTES UA  Negative   WBC UA /hpf None Seen   RBC UA /hpf 2-4   BACTERIA UA /hpf Occasional   EPITHELIAL CELLS WET PREP /hpf Occasional     Results from last 7 days   Lab Units 12/22/21 2026   INFLUENZA A PCR  Negative   INFLUENZA B PCR  Negative   RSV PCR  Negative           ED Treatment:   Medication Administration from 12/22/2021 1549 to 12/23/2021 0910       Date/Time Order Dose Route Action     12/22/2021 2048 apixaban (ELIQUIS) tablet 5 mg 5 mg Oral Given     12/22/2021 2221 magnesium gluconate (MAGONATE) tablet 500 mg 500 mg Oral Given     12/22/2021 2048 torsemide (DEMADEX) tablet 40 mg 40 mg Oral Given     12/23/2021 0052 acetaminophen (TYLENOL) tablet 650 mg 650 mg Oral Given     12/23/2021 0052 ondansetron (ZOFRAN) injection 4 mg 4 mg Intravenous Given     12/23/2021 0604 OLANZapine (ZyPREXA) IM injection 2 5 mg 2 5 mg Intramuscular Given        Past Medical History:   Diagnosis Date    Disease of thyroid gland        Admitting Diagnosis: No admission diagnoses are documented for this encounter  Age/Sex: 68 y o  female  Admission Orders:  UP and OOB  Tele  Vitals    1800 ml fluid restriction  Scheduled Medications:  apixaban, 5 mg, Oral, BID  folic acid, 5,588 mcg, Oral, Daily  levothyroxine, 150 mcg, Oral, Early Morning  magnesium gluconate, 500 mg, Oral, BID  metoprolol tartrate, 50 mg, Oral, BID  potassium chloride, 40 mEq, Oral, Daily  sertraline, 100 mg, Oral, Daily  torsemide, 40 mg, Oral, BID      Continuous IV Infusions:     PRN Meds:  acetaminophen, 650 mg, Oral, Q6H PRN  albuterol, 2 puff, Inhalation, Q4H PRN  ondansetron, 4 mg, Intravenous, Q6H PRN        IP CONSULT TO PULMONOLOGY    Network Utilization Review Department  ATTENTION: Please call with any questions or concerns to [phone] and carefully listen to the prompts so that you are directed to the right person   All voicemails are confidential   [facility] all requests for admission clinical reviews, approved or denied determinations and any other requests to dedicated fax number below belonging to the campus where the patient is receiving treatment   List of dedicated fax numbers for the Facilities:  [address] [place] [address] DENIALS (Administrative/Medical Necessity) [phone]   [address] (Maternity/NICU/Pediatrics) [phone] [address] [place] [address] Medical [facility] [address] [address] [place] [address] Akiko Olsen [address] [facility] [place] [address] [phone]

## 2021-12-23 NOTE — ASSESSMENT & PLAN NOTE
· Likely secondary from ecchymosis from fall  · Patient is currently anticoagulated on Eliquis  · Will attempted obtain CT angiogram to rule out PE but if not able to secondary to kidney function will obtain V/Q in a m

## 2021-12-23 NOTE — OCCUPATIONAL THERAPY NOTE
Occupational Therapy Evaluation      Leeantonia Kole    12/23/2021    Patient Active Problem List   Diagnosis    Acquired bilateral hammer toes    Atrial fibrillation, chronic (HCC)    B12 deficiency    Chronic diastolic CHF (congestive heart failure) (HCC)    Depression    Essential hypertension    Hypothyroidism    Impaired fasting glucose    Lymphedema of both lower extremities    Mixed hyperlipidemia    Nonrheumatic mitral valve regurgitation    Posttraumatic stress disorder    Osteoarthritis of knee    Venous insufficiency of both lower extremities    Folate deficiency    Vitamin D deficiency    Hypoxia    Frequent falls    Elevated d-dimer       Past Medical History:   Diagnosis Date    Disease of thyroid gland        Past Surgical History:   Procedure Laterality Date    HYSTERECTOMY          12/23/21 0906   OT Last Visit   OT Visit Date 12/23/21   Note Type   Note type Evaluation   Restrictions/Precautions   Weight Bearing Precautions Per Order No   Other Precautions Combative;Agitated;Cognitive; Bed Alarm;Multiple lines; Fall Risk;Pain;Hard of hearing   Pain Assessment   Pain Assessment Tool FLACC   Pain Rating: FLACC (Rest) - Face 0   Pain Rating: FLACC (Rest) - Legs 1   Pain Rating: FLACC (Rest) - Activity 1   Pain Rating: FLACC (Rest) - Cry 1   Pain Rating: FLACC (Rest) - Consolability 2   Score: FLACC (Rest) 5   Pain Rating: FLACC (Activity) - Face 1   Pain Rating: FLACC (Activity) - Legs 1   Pain Rating: FLACC (Activity) - Activity 1   Pain Rating: FLACC (Activity) - Cry 1   Pain Rating: FLACC (Activity) - Consolability 2   Score: FLACC (Activity) 6   Home Living   Type of Home House   Home Layout Two level;Stairs to enter with rails; Performs ADLs on one level; Able to live on main level with bedroom/bathroom  (3 CRISSY, full flight of steps to 2nd floor )   Bathroom Shower/Tub Walk-in shower   Bathroom Toilet Raised   Bathroom Equipment Built-in shower seat; Shower chair;Commode Bathroom Accessibility Accessible   Home Equipment Walker;Cane;Reacher  (RW usage x 1 month )   Additional Comments Pt unable to provide home set up and PLOF d/t current cognitive status  Information supplimented via 12/10/21 OT eval  Per notes,  present during 12/10/21 eval  Will f/u with CM for confirmation of accuracy     Prior Function   Level of [place] Independent with ADLs and functional mobility; Needs assistance with IADLs   Lives With Spouse   Receives Help From Family   ADL Assistance Independent  (occasional assistance with LB ADLs )   IADLs Needs assistance  (shares IADL roles with  )   Falls in the last 6 months   (exact amount unknown, frequent falls diagnosis  )   Vocational Retired   Comments  Per chart review, pt reporting 3-4 falls in last month   ((-) driving,  assists with transportation )   Lifestyle   Autonomy Per chart review, pt receives occasional assistance with LB ADLs and assistasnce with IADLs  Pt lives c spouse in a 2 story home with 3 CRISSY, ambulates wiuth RW x 1 month d/t recent falls and decreased stability, and does not drive  Reciprocal Relationships     Service to Others retired    Subjective   Subjective "go away, get out of here" Pt agitated throughout session, persistent reorientation and calming methods attempted     ADL   Eating Assistance 4  Minimal Assistance   Grooming Assistance 4  Minimal [address] 4  Minimal Assistance   LB [address] 3  Moderate Assistance   [address] 3  Moderate [address]  3  Moderate Assistance  (pt incontinent with urine upon arrival )   Functional Assistance 4  Minimal Assistance   Additional Comments Pt limited by cognitive status   Bed Mobility   Rolling R 4  Minimal assistance   Additional items Assist x 1;Verbal cues; Impulsive;LE management   Rolling L 4  Minimal assistance   Additional items Assist x 1;Bedrails; Impulsive;Verbal cues;LE management   Supine to Sit 5  Supervision   Additional items Assist x 1; Impulsive;Verbal cues  (repeated impulsive long sitting up in bed x5 )   Additional Comments Further mobility could not be safely evaluated d/t impulsivity and pt's agitated and combative status and decreased ability to follow 1 step cues  Transfers   Sit to Stand Unable to assess  (d/t impulsivity, agitation, cognitive status )   Balance   Static Sitting Poor   Activity Tolerance   Activity Tolerance Treatment limited secondary to agitation; Other (Comment)  (cognitive status )   Medical Staff Made Aware co-eval with PT d/t pt's cognitive status and severity, medical complexity, and high fall risk      Nurse Made Aware Yes, RN staff informed of assessment outcomes    RUE Assessment   RUE Assessment WFL  (Full AROM, MMT ~4/5 based on functional evaluation )   LUE Assessment   LUE Assessment WFL  (Full AROM, MMT ~4/5 based on functional evaluation )   Hand Function   Gross Motor Coordination Functional   Fine Motor Coordination Functional   Sensation   Light Touch Not tested  (pt unable to provide accurate feedback for testing )   Vision-Basic Assessment   Current Vision Wears glasses all the time  (at bedside )   Vision - Complex Assessment   Acuity Able to read clock/calendar on wall without difficulty  (Pt read nurses name on board without glasses donned )   Cognition   Overall Cognitive Status Impaired   Arousal/Participation Uncooperative  (pt initially difficult to arouse, uncooperative once alert )   Attention Difficulty attending to directions  (unable to follow one step commands )   Orientation Level Disoriented X4   Memory Decreased recall of precautions;Decreased recall of recent events;Decreased short term memory;Decreased recall of biographical information   Following Commands Unable to follow one step commands   Comments Pt required consistent verbal and tactile facilitation for all tasks, persistent re-orientation and calming techniques throughout session  Assessment   Limitation Decreased ADL status; Decreased UE strength;Decreased Safe judgement during ADL;Decreased cognition;Decreased self-care trans;Decreased high-level ADLs   Prognosis Good   Assessment Patient is a 68 y o  female seen for OT evaluation at [address] [place] [address] following admission on 12/22/2021  s/p Hypoxia  Comorbidities impacting functional performance include: a-fib, CHF, HTN, Hx of depression, frequent falls, osteoarthritis of knee, and venous insufficiency of BLE  Patient presents with active orders for OT eval and treat and up and OOB as tolerated   Performed at least 2 patient identifiers during session including name and wristband  Patient reports prior level of function as needs assistance  with ADL/IADLs, ambulatory with RW x1 month d/t increased falls and decreased stability and lives with spouse in a 2 story house  with 3 CRISSY  Patient is retired  and does not  drive  Upon initial evaluation, patient requires min assist for UB ADLs, mod assist for LB ADLs, and min A for bed mobility; unable to functional mobility further at this time d/t cognitive status, combative / agitated status, and impulsivity  Based on functional eval, patient presents A&Ox2/4 with impaired  attention, safety awareness, and memory  Occupational performance is affected by the following deficits: endurance , muscular strength , balance , memory , attention , judgement , orientation , awareness , alertness  and (+) pain   Patient would benefit from OT services to maximize independence in self-care and transfers  Personal factors impacting performance include: (+) Hx of falls , CRISSY home , decreased ADL independence , decreased IADL independence and impaired interpersonal skills    Occupational areas to address include:bathing/showering, toileting, dressing , personal hygiene/grooming , bed mobility , functional mobility, activity engagement , safety awareness , fall prevention , energy conservation  and home management   Recommending post-acute rehabilitation  upon D/C; will continue to assess pending cognitive status improvement  Goals   Patient Goals Pt unable to provide during evaluation d/t cognitive status    Plan   Treatment Interventions ADL retraining;Functional transfer training;UE strengthening/ROM; Cognitive reorientation;Patient/family training;Equipment evaluation/education; Energy conservation; Activityengagement   Goal Expiration Date 01/02/22   OT Treatment Day 0   OT Frequency 3-5x/wk   Recommendation   OT Discharge Recommendation Post acute rehabilitation services  (will continue to assess following progression in cog status )   OT - OK to Discharge Yes  (once medically clear)   Additional Comments   Pt resting safely supine in bed at end of session with bed alarm activated, call button in reach    Additional Comments 2 The patient's raw score on the AM-PAC Daily Activity inpatient short form is 15, standardized score is 34 69, less than 39 4  Patients at this level are likely to benefit from discharge to post-acute rehabilitation services  Please refer to the recommendation of the Occupational Therapist for safe discharge planning  AM-PAC Daily Activity Inpatient   Lower Body Dressing 2   Bathing 2   Toileting 2   Upper Body Dressing 3   Grooming 3   Eating 3   Daily Activity Raw Score 15   Daily Activity Standardized Score (Calc for Raw Score >=11) 34 69   AM-PAC Applied Cognition Inpatient   Following a Speech/Presentation 1   Understanding Ordinary Conversation 2   Taking Medications 1   Remembering Where Things Are Placed or Put Away 2   Remembering List of 4-5 Errands 2   Taking Care of Complicated Tasks 1   Applied Cognition Raw Score 9   Applied Cognition Standardized Score 22 48     Pt will complete UB ADLs Supervision with use of AE as needed for increased ADL independence within 10 days       Pt will complete LB ADLs Min A  with use of AE as needed for increased ADL independence within 10 days  Pt will complete toileting Min A  with use of DME for increased ADL independence within 10 days  Pt will demonstrate proper body mechanics to complete transfers and functional mobility with Supervision and use of AD for increased safety and functional mobility within 10 days  Pt will demonstrate standing tolerance of 7 min with Supervision and use of AD for increased endurance and activity tolerance within 10 days  Pt will demonstrate OOB sitting tolerance of 2-4 hr/day for increased activity tolerance and engagement within 10 days  Pt will participate in 10 min of BUE therapeutic exercise to increase strength, ROM, and endurance during ADL/IADLs within 10 days  Pt will receive education on use of compensatory memory strategies for increased safety and independent with IADL tasks  Pt will attend to treatment task or activity for 5 minutes without need for redirection to improve activity engagement within 10 days  Pt will participate in ongoing cognitive assessments to assist with safe D/C planning and recommendations  Pt will demonstrate proper body mechanics and fall prevention strategies during 100% of tx sessions for increased safety awareness during ADL/IADLs    Pt will verbalize and demonstrate understanding of energy conservation/deep breathing techniques for increased activity tolerance and endurance during meaningful activities  Pt will complete Interest Leisure Checklist for improved social participation and activity engagement following D/C     Brenda Bright, OT      Brenda Bright, OT

## 2021-12-23 NOTE — ASSESSMENT & PLAN NOTE
Wt Readings from Last 3 Encounters:   12/22/21 82 6 kg (182 lb)   12/10/21 83 kg (182 lb 15 7 oz)     Place on no added salt diet, obtain daily weights and continue pre-hospital Demadex 40 mg p o  b i d

## 2021-12-23 NOTE — H&P
Lillie U  66   H&P- Marisa Dutton 1945, 68 y o  female MRN: [id number]  Unit/Bed#: ED 05 Encounter: [id number]  Primary Care Provider: Derian Fuller MD   Date and time admitted to hospital: 2021  3:50 PM    * Hypoxia  Assessment & Plan  · Placed in observation Medicine  · Unclear etiology  · Patient does have a history of congestive heart failure but does not appear to be in exacerbation at this time  · Interstitial lung disease demonstrated on CT  · Patient is not on any respiratory medications are O2 at home  · Will give albuterol 90 mcg 2 puffs q 6 hours p r n  for shortness of breath or wheeze  · Consult pulmonary in a m  · Patient has elevated D-dimer that was currently anticoagulated, will obtain CT angiogram if able to rule out PE if not able to secondary to renal function then V/Q scan in a m  Elevated d-dimer  Assessment & Plan  · Likely secondary from ecchymosis from fall  · Patient is currently anticoagulated on Eliquis  · Will attempted obtain CT angiogram to rule out PE but if not able to secondary to kidney function will obtain V/Q in a m  Frequent falls  Assessment & Plan  Will consult PT OT in a m  Hypothyroidism  Assessment & Plan  Continue pre-hospital levothyroxine 150 mcg p o  daily    Essential hypertension  Assessment & Plan  Continue pre-hospital Lopressor 50 mg p o  b i d  and Demadex 40 mg p o  b i d  Depression  Assessment & Plan  Continue pre-hospital Zoloft 100 mg p o  daily    Chronic diastolic CHF (congestive heart failure) (HCC)  Assessment & Plan  Wt Readings from Last 3 Encounters:   21 82 6 kg (182 lb)   12/10/21 83 kg (182 lb 15 7 oz)     Place on no added salt diet, obtain daily weights and continue pre-hospital Demadex 40 mg p o  b i d      Atrial fibrillation, chronic ([facility] [address] )  Assessment & Plan  Patient is rate controlled this time, will continue pre-hospital Lopressor 50 mg p o  b i d  and Eliquis 5 mg p o  b i d     VTE Prophylaxis: Apixaban (Eliquis)  Code Status:  Level 1  POLST: There is no POLST form on file for this patient (pre-hospital)  Discussion with family:  None present at bedside at time of exam    Anticipated Length of Stay:  Patient will be admitted on an Observation basis with an anticipated length of stay of  < 2 midnights  Justification for Hospital Stay:  Hypoxia of unknown etiology requiring O2 support and further evaluation, and elevated D-dimer rule out PE requiring further evaluation    Chief Complaint:   Multiple falls at home    History of Present Illness:    Jhonatan Car is a 68 y o  female who presents with multiple falls at home  Patient is an unreliable historian but states that she has fallen multiple times at home  Initially she states that she did not fall this evening that she lost her balance but was caught prior day falling but then later states that she fell when she lost her balance to turned around to look at her   She states she fell onto the floor striking the back of her head  Patient is currently anticoagulated on aspirin and Eliquis  Patient states that she has fallen multiple times over the preceding month and though she has the assist of a cane, walker and wheelchair at home continues to fall  While in the ER patient was noted to be hypoxic with O2 saturation 85% on room air  Patient denies any known respiratory history and is not on any respiratory medications and does not currently follow with Pulmonary  CT scan showed evidence of interstitial lung disease apparently patient's  had related that she had episodes of decreased O2 saturation in the past though patient seemed unaware of this and it is unclear how they became aware of it  Review of Systems:  Review of Systems   Constitutional: Negative for chills and fever  HENT: Negative for congestion and sore throat  Respiratory: Negative for cough, shortness of breath and wheezing  Cardiovascular: Negative for chest pain and palpitations  Gastrointestinal: Negative for abdominal pain, diarrhea, nausea and vomiting  Genitourinary: Negative for dysuria, frequency, hematuria and urgency  Musculoskeletal: Positive for gait problem  Negative for arthralgias and myalgias  Skin: Negative for wound  Neurological: Negative for dizziness, syncope, light-headedness and headaches  All other systems reviewed and are negative  Past Medical and Surgical History:   Past Medical History:   Diagnosis Date    Disease of thyroid gland        Past Surgical History:   Procedure Laterality Date    HYSTERECTOMY         Meds/Allergies:  Prior to Admission medications    Medication Sig Start Date End Date Taking? Authorizing Provider   apixaban (ELIQUIS) 5 mg Take 5 mg by mouth 2 (two) times a day 12/10/21  Yes Historical Provider, MD   cyanocobalamin 1,000 mcg/mL 1 mL 12/7/21  Yes Historical Provider, MD   folic acid (FOLVITE) 1 mg tablet Take 1 tablet by mouth daily 12/21/21  Yes Historical Provider, MD   levothyroxine 150 mcg tablet Take 150 mcg by mouth daily 12/7/21 12/7/22 Yes Historical Provider, MD   metoprolol tartrate (LOPRESSOR) 50 mg tablet Take 50 mg by mouth 2 (two) times a day 12/17/21 12/17/22 Yes Historical Provider, MD   potassium chloride (K-DUR,KLOR-CON) 20 mEq tablet Take 2 tablets by mouth daily 12/16/21  Yes Historical Provider, MD   sertraline (ZOLOFT) 100 mg tablet Take 1 tablet by mouth daily 12/16/21  Yes Historical Provider, MD   torsemide (DEMADEX) 20 mg tablet TAKE TWO TABLETS BY MOUTH TWICE A DAY (MORNING AND EVENING)  CAN TAKE ADDITIONAL ONE TABLET DAILY AS NEEDED FOR SWELLING 11/18/21  Yes Historical Provider, MD   Magnesium Gluconate 550 MG TABS Take 30 mg by mouth 2 (two) times a day    Historical Provider, MD     I have reviewed home medications using allscripts      Allergies: No Known Allergies    Social History:  Marital Status: /[employer] Occupation:  Retired  Patient Pre-hospital Living Situation:  Resides at home with   Patient Pre-hospital Level of Mobility:  Full with assist of a cane, walker and sometimes wheelchair  Patient Pre-hospital Diet Restrictions:  None      Social History     Substance and Sexual Activity   Alcohol Use Not Currently    Comment: occassional      Social History     Tobacco Use   Smoking Status Never Smoker   Smokeless Tobacco Never Used     Social History     Substance and Sexual Activity   Drug Use Never       Family History:  I have reviewed the patients family history    Physical Exam:   Vitals:   Blood Pressure: 150/72 (12/22/21 2335)  Pulse: 78 (12/22/21 2335)  Temperature: 97 6 °F (36 4 °C) (12/22/21 2335)  Temp Source: Oral (12/22/21 2335)  Respirations: 18 (12/22/21 2335)  Weight - Scale: 82 6 kg (182 lb) (12/22/21 1558)  SpO2: 95 % (12/22/21 2335)    Physical Exam  Vitals and nursing note reviewed  Constitutional:       General: She is not in acute distress  Appearance: Normal appearance  HENT:      Head: Normocephalic  Comments: Abrasion left cheek with mild ecchymosis present upon admission     Right Ear: Tympanic membrane normal       Left Ear: Tympanic membrane normal       Nose: Nose normal       Mouth/Throat:      Mouth: Mucous membranes are moist       Pharynx: Oropharynx is clear  No posterior oropharyngeal erythema  Eyes:      Extraocular Movements: Extraocular movements intact  Conjunctiva/sclera: Conjunctivae normal       Pupils: Pupils are equal, round, and reactive to light  Cardiovascular:      Rate and Rhythm: Normal rate and regular rhythm  Pulses: Normal pulses  Heart sounds: Normal heart sounds  No murmur heard  Pulmonary:      Effort: Pulmonary effort is normal  No respiratory distress  Breath sounds: Normal breath sounds  No rhonchi or rales  Abdominal:      General: Bowel sounds are normal       Palpations: Abdomen is soft  Tenderness: There is no abdominal tenderness  Musculoskeletal:      Cervical back: Normal range of motion and neck supple  No muscular tenderness  Left lower leg: No edema  Skin:     General: Skin is warm and dry  Capillary Refill: Capillary refill takes less than 2 seconds  Neurological:      General: No focal deficit present  Mental Status: She is alert and oriented to person, place, and time  Additional Data:   Lab Results: I have personally reviewed pertinent reports  Results from last 7 days   Lab Units 12/22/21  1616   WBC Thousand/uL 8 69   HEMOGLOBIN g/dL 11 5   HEMATOCRIT % 37 8   PLATELETS Thousands/uL 217   NEUTROS PCT % 77*   LYMPHS PCT % 10*   MONOS PCT % 10   EOS PCT % 2     Results from last 7 days   Lab Units 12/22/21  1616   SODIUM mmol/L 137   POTASSIUM mmol/L 4 1   CHLORIDE mmol/L 100   CO2 mmol/L 28   BUN mg/dL 18   CREATININE mg/dL 1 42*   CALCIUM mg/dL 9 6                   Imaging: I have personally reviewed pertinent reports  TRAUMA - CT chest abdomen pelvis w contrast   Final Result by Mian Alatorre MD (12/22 1704)   Subcutaneous contusion changes in the left lateral gluteal region  No CT findings of trauma in the chest, abdomen or pelvis  Interstitial lung disease changes in the lung bases predominantly on the right  Scattered lung nodules  Based on current Fleischner Society 2017 Guidelines on incidental pulmonary nodule, no routine follow-up is needed if the patient is considered low    risk for lung cancer  If the patient is considered high risk for lung cancer, 12 month follow-up non-contrast chest CT is recommended  Workstation performed: [id number]         TRAUMA - CT head wo contrast   Final Result by Mian Alatorre MD (12/22 1704)      No acute intracranial abnormality                    Workstation performed: [id number]         TRAUMA - CT spine cervical wo contrast   Final Result by Mian Alatorre MD (12/22 1704)      No cervical spine fracture or traumatic malalignment  Multilevel degenerative disc changes  Resolving occipital scalp hematoma  Workstation performed: [id number]         XR Trauma chest portable    (Results Pending)   XR Trauma pelvis ap only 1 or 2 vw    (Results Pending)   CTA chest pe study    (Results Pending)       EKG, Pathology, and Other Studies Reviewed on Admission:   · EKG: N/A    Epic Records Reviewed: Yes     ** Please Note: This note has been constructed using a voice recognition system   **

## 2021-12-23 NOTE — ED NOTES
Patient climbing out of bed stating she "needs to shit" RN offered bedside commode  Patient verbally refusing to cooperate  Patient throwing punches at staff  Attempts to redirect unsuccessful  Patient remains sitting upright  Staff members x3 at bedside to assist  Faith Carcamo PA-C made aware  New orders received  Will carry out  RN remains at bedside        Jyoti Baez RN  12/23/21 2632

## 2021-12-23 NOTE — PLAN OF CARE
Problem: PHYSICAL THERAPY ADULT  Goal: Performs mobility at highest level of function for planned discharge setting  See evaluation for individualized goals  Description: Treatment/Interventions: Functional transfer training,LE strengthening/ROM,Therapeutic exercise,Endurance training,Cognitive reorientation,Patient/family training,Bed mobility,Gait training,Continued evaluation,Spoke to nursing,OT          See flowsheet documentation for full assessment, interventions and recommendations  Note: Prognosis: Guarded  Problem List: Decreased strength,Decreased endurance,Impaired balance,Decreased mobility,Decreased coordination,Decreased cognition,Impaired judgement,Decreased safety awareness,Impaired hearing,Pain  Assessment: Pt is 68 y o  female seen for PT evaluation s/p admit to [facility] on 12/22/2021 w/ Hypoxia  PT consulted to assess pt's functional mobility and d/c needs  Order placed for PT eval and tx, w/ up and OOB as tolerated order  Comorbidities affecting pt's physical performance at time of assessment include: weakness,hypoxia, chronic diastolic CHF, depression, HTN, frequent falls   PTA, pt was independent w/ all functional mobility w/ AD usage  Personal factors affecting pt at time of IE include: communication issues, inability to ambulate household distances, inability to navigate community distances, unable to perform dynamic tasks in community, decreased cognition, positive fall history, hearing impairments, decreased initiation and engagement, impulsivity, unable to perform physical activity, limited insight into impairments and agitiation   Please find objective findings from PT assessment regarding body systems outlined above with impairments and limitations including weakness, impaired balance, decreased endurance, impaired coordination, pain, decreased activity tolerance, decreased functional mobility tolerance, decreased safety awareness, impaired judgement, fall risk and decreased cognition  From PT/mobility standpoint, recommendation at time of d/c would be post acute rehabilitation services pending progress in order to facilitate return to PLOF  PT Discharge Recommendation: (S) Post acute rehabilitation services          See flowsheet documentation for full assessment

## 2021-12-24 LAB
ANION GAP SERPL CALCULATED.3IONS-SCNC: 7 MMOL/L (ref 4–13)
BASOPHILS # BLD AUTO: 0.03 THOUSANDS/ΜL (ref 0–0.1)
BASOPHILS NFR BLD AUTO: 0 % (ref 0–1)
BUN SERPL-MCNC: 18 MG/DL (ref 5–25)
CALCIUM SERPL-MCNC: 9.4 MG/DL (ref 8.4–10.2)
CHLORIDE SERPL-SCNC: 100 MMOL/L (ref 96–108)
CO2 SERPL-SCNC: 36 MMOL/L (ref 21–32)
CREAT SERPL-MCNC: 1.34 MG/DL (ref 0.6–1.3)
D DIMER PPP FEU-MCNC: 2.41 UG/ML FEU
EOSINOPHIL # BLD AUTO: 0.15 THOUSAND/ΜL (ref 0–0.61)
EOSINOPHIL NFR BLD AUTO: 2 % (ref 0–6)
ERYTHROCYTE [DISTWIDTH] IN BLOOD BY AUTOMATED COUNT: 13.9 % (ref 11.6–15.1)
GFR SERPL CREATININE-BSD FRML MDRD: 38 ML/MIN/1.73SQ M
GLUCOSE SERPL-MCNC: 104 MG/DL (ref 65–140)
HCT VFR BLD AUTO: 40.1 % (ref 34.8–46.1)
HGB BLD-MCNC: 12.3 G/DL (ref 11.5–15.4)
IMM GRANULOCYTES # BLD AUTO: 0.03 THOUSAND/UL (ref 0–0.2)
IMM GRANULOCYTES NFR BLD AUTO: 0 % (ref 0–2)
LYMPHOCYTES # BLD AUTO: 0.62 THOUSANDS/ΜL (ref 0.6–4.47)
LYMPHOCYTES NFR BLD AUTO: 7 % (ref 14–44)
MAGNESIUM SERPL-MCNC: 2.6 MG/DL (ref 1.9–2.7)
MCH RBC QN AUTO: 30.6 PG (ref 26.8–34.3)
MCHC RBC AUTO-ENTMCNC: 30.7 G/DL (ref 31.4–37.4)
MCV RBC AUTO: 100 FL (ref 82–98)
MONOCYTES # BLD AUTO: 0.61 THOUSAND/ΜL (ref 0.17–1.22)
MONOCYTES NFR BLD AUTO: 7 % (ref 4–12)
NEUTROPHILS # BLD AUTO: 7 THOUSANDS/ΜL (ref 1.85–7.62)
NEUTS SEG NFR BLD AUTO: 84 % (ref 43–75)
NRBC BLD AUTO-RTO: 0 /100 WBCS
PHOSPHATE SERPL-MCNC: 4.8 MG/DL (ref 2.3–4.1)
PLATELET # BLD AUTO: 195 THOUSANDS/UL (ref 149–390)
PMV BLD AUTO: 9.4 FL (ref 8.9–12.7)
POTASSIUM SERPL-SCNC: 3.6 MMOL/L (ref 3.5–5.3)
RBC # BLD AUTO: 4.02 MILLION/UL (ref 3.81–5.12)
SODIUM SERPL-SCNC: 143 MMOL/L (ref 135–147)
WBC # BLD AUTO: 8.44 THOUSAND/UL (ref 4.31–10.16)

## 2021-12-24 PROCEDURE — 85025 COMPLETE CBC W/AUTO DIFF WBC: CPT | Performed by: INTERNAL MEDICINE

## 2021-12-24 PROCEDURE — 85379 FIBRIN DEGRADATION QUANT: CPT | Performed by: PHYSICIAN ASSISTANT

## 2021-12-24 PROCEDURE — 99232 SBSQ HOSP IP/OBS MODERATE 35: CPT | Performed by: INTERNAL MEDICINE

## 2021-12-24 PROCEDURE — 83735 ASSAY OF MAGNESIUM: CPT | Performed by: INTERNAL MEDICINE

## 2021-12-24 PROCEDURE — 80048 BASIC METABOLIC PNL TOTAL CA: CPT | Performed by: INTERNAL MEDICINE

## 2021-12-24 PROCEDURE — 84100 ASSAY OF PHOSPHORUS: CPT | Performed by: INTERNAL MEDICINE

## 2021-12-24 RX ORDER — ATORVASTATIN CALCIUM 40 MG/1
40 TABLET, FILM COATED ORAL
Status: DISCONTINUED | OUTPATIENT
Start: 2021-12-24 | End: 2021-12-28 | Stop reason: HOSPADM

## 2021-12-24 RX ORDER — DIAZEPAM 5 MG/1
5 TABLET ORAL EVERY 6 HOURS PRN
Status: DISCONTINUED | OUTPATIENT
Start: 2021-12-24 | End: 2021-12-28 | Stop reason: HOSPADM

## 2021-12-24 RX ADMIN — Medication 500 MG: at 09:57

## 2021-12-24 RX ADMIN — FOLIC ACID 1000 MCG: 1 TABLET ORAL at 09:57

## 2021-12-24 RX ADMIN — METOPROLOL TARTRATE 50 MG: 50 TABLET, FILM COATED ORAL at 17:32

## 2021-12-24 RX ADMIN — APIXABAN 5 MG: 5 TABLET, FILM COATED ORAL at 17:32

## 2021-12-24 RX ADMIN — POTASSIUM CHLORIDE 40 MEQ: 1500 TABLET, EXTENDED RELEASE ORAL at 09:56

## 2021-12-24 RX ADMIN — SERTRALINE 100 MG: 100 TABLET, FILM COATED ORAL at 09:56

## 2021-12-24 RX ADMIN — APIXABAN 5 MG: 5 TABLET, FILM COATED ORAL at 09:56

## 2021-12-24 RX ADMIN — Medication 500 MG: at 17:32

## 2021-12-24 RX ADMIN — ATORVASTATIN CALCIUM 40 MG: 40 TABLET, FILM COATED ORAL at 16:17

## 2021-12-24 NOTE — ASSESSMENT & PLAN NOTE
Wt Readings from Last 3 Encounters:   12/22/21 82 6 kg (182 lb)   12/10/21 83 kg (182 lb 15 7 oz)     Place on no added salt diet  Obtain daily weights  Continue pre-hospital Demadex 40 mg p o  b i d

## 2021-12-24 NOTE — PLAN OF CARE
Problem: PAIN - ADULT  Goal: Verbalizes/displays adequate comfort level or baseline comfort level  Description: Interventions:  - Encourage patient to monitor pain and request assistance  - Assess pain using appropriate pain scale  - Administer analgesics based on type and severity of pain and evaluate response  - Implement non-pharmacological measures as appropriate and evaluate response  - Consider cultural and social influences on pain and pain management  - Notify physician/advanced practitioner if interventions unsuccessful or patient reports new pain  12/23/2021 2331 by Madhavi Sanchez RN  Outcome: Progressing  12/23/2021 2330 by Madhavi Sanchez RN  Outcome: Progressing

## 2021-12-24 NOTE — ASSESSMENT & PLAN NOTE
"Called pt to ask why she needs a podiatry referral and she says Dr. Liu told her that her feet \"looked funny\" at her January visit and that she would need surgery.  I don't see anything documented about her feet at her last visit, so told her she would need to bring this up at her July visit, so that there is documentation, before we could do the referral.  She voiced understanding.  " Continue pre-hospital levothyroxine 150 mcg p o  daily

## 2021-12-24 NOTE — PLAN OF CARE
Problem: Potential for Falls  Goal: Patient will remain free of falls  Description: INTERVENTIONS:  - Educate patient/family on patient safety including physical limitations  - Instruct patient to call for assistance with activity   - Consult OT/PT to assist with strengthening/mobility   - Keep Call bell within reach  - Keep bed low and locked with side rails adjusted as appropriate  - Keep care items and personal belongings within reach  - Initiate and maintain comfort rounds  - Make Fall Risk Sign visible to staff  - Offer Toileting every 2 Hours, in advance of need  - Initiate/Maintain bedalarm  - Obtain necessary fall risk management equipment: side rails  - Apply yellow socks and bracelet for high fall risk patients  - Consider moving patient to room near nurses station  Outcome: Progressing

## 2021-12-24 NOTE — PROGRESS NOTES
[address]  Progress Note - Miguelina Dill 1945, 68 y o  female MRN: [id number]  Unit/Bed#: -01 Encounter: [id number]  Primary Care Provider: Tonya Meyers MD   Date and time admitted to hospital: 12/22/2021  3:50 PM    * Frequent falls  Assessment & Plan  PT/OT recommended short-term rehab  Case management consultation  Medically stable for discharge    Hypoxia  Assessment & Plan  Unclear etiology however patient saturates well on room air  Pulmonology following ; recommend follow-up CT chest in 6-12 months    Atrial fibrillation, chronic ([facility] [address] )  Assessment & Plan  Patient is rate controlled this time  Continue pre-hospital Lopressor 50 mg p o  b i d  and Eliquis 5 mg p o  b i d  Hypothyroidism  Assessment & Plan  Continue pre-hospital levothyroxine 150 mcg p o  daily    Essential hypertension  Assessment & Plan  Continue pre-hospital Lopressor 50 mg p o  b i d  and Demadex 40 mg p o  b i d  Depression  Assessment & Plan  Continue pre-hospital Zoloft 100 mg p o  daily    Chronic diastolic CHF (congestive heart failure) (HCC)  Assessment & Plan  Wt Readings from Last 3 Encounters:   12/22/21 82 6 kg (182 lb)   12/10/21 83 kg (182 lb 15 7 oz)     Place on no added salt diet  Obtain daily weights  Continue pre-hospital Demadex 40 mg p o  b i d       VTE Pharmacologic Prophylaxis:   Pharmacologic: Apixaban (Eliquis)  Mechanical VTE Prophylaxis in Place: Yes    Patient Centered Rounds: I have performed bedside rounds with nursing staff today  Discussions with Specialists or Other Care Team Provider:  Case Management    Education and Discussions with Family / Patient:  Spoke with patient's     Time Spent for Care: 45 minutes  More than 50% of total time spent on counseling and coordination of care as described above      Current Length of Stay: 1 day(s)    Current Patient Status: Inpatient   Certification Statement: The patient will continue to require additional inpatient hospital stay due to Medically stable for discharge to short-term rehab    Discharge Plan:  Medically stable for discharge to short-term rehab    Code Status: Level 1 - Full Code      Subjective:   Patient seen and examined at bedside  No acute events overnight  Denies chest pain, SOB, diaphoresis, nausea/vomiting/diarrhea, fevers/chills  Objective:     Vitals:   Temp (24hrs), Av 4 °F (36 9 °C), Min:98 1 °F (36 7 °C), Max:98 6 °F (37 °C)    Temp:  [98 1 °F (36 7 °C)-98 6 °F (37 °C)] 98 1 °F (36 7 °C)  HR:  [82-89] 89  Resp:  [15-20] 18  BP: ()/(55-87) 96/58  SpO2:  [97 %-100 %] 100 %  Body mass index is 32 24 kg/m²  Input and Output Summary (last 24 hours): Intake/Output Summary (Last 24 hours) at 2021 1020  Last data filed at 2021 0757  Gross per 24 hour   Intake --   Output 250 ml   Net -250 ml       Physical Exam:     Physical Exam  Vitals and nursing note reviewed  Constitutional:       General: She is not in acute distress  Appearance: She is well-developed  HENT:      Head: Normocephalic and atraumatic  Eyes:      Conjunctiva/sclera: Conjunctivae normal    Cardiovascular:      Rate and Rhythm: Normal rate and regular rhythm  Heart sounds: No murmur heard  Pulmonary:      Effort: Pulmonary effort is normal  No respiratory distress  Breath sounds: Normal breath sounds  Abdominal:      Palpations: Abdomen is soft  Tenderness: There is no abdominal tenderness  Musculoskeletal:      Cervical back: Neck supple  Skin:     General: Skin is warm and dry  Neurological:      Mental Status: She is alert  Additional Data:     Labs:  I have reviewed pertinent results     Results from last 7 days   Lab Units 21  0554   WBC Thousand/uL 8 44   HEMOGLOBIN g/dL 12 3   HEMATOCRIT % 40 1   PLATELETS Thousands/uL 195   NEUTROS PCT % 84*   LYMPHS PCT % 7*   MONOS PCT % 7   EOS PCT % 2     Results from last 7 days   Lab Units 12/24/21  0554   SODIUM mmol/L 143   POTASSIUM mmol/L 3 6   CHLORIDE mmol/L 100   CO2 mmol/L 36*   BUN mg/dL 18   CREATININE mg/dL 1 34*   ANION GAP mmol/L 7   CALCIUM mg/dL 9 4   GLUCOSE RANDOM mg/dL 104                         Imaging: I have reviewed pertinent imaging       Recent Cultures (last 7 days):           Last 24 Hours Medication List:   Current Facility-Administered Medications   Medication Dose Route Frequency Provider Last Rate    acetaminophen  650 mg Oral Q6H PRN Jose Avila PA-C      albuterol  2 puff Inhalation Q4H PRN Jose Avila PA-C      apixaban  5 mg Oral BID Jose Avila PA-C      folic acid  0,151 mcg Oral Daily Jose Avila PA-C      levothyroxine  150 mcg Oral Early Morning Jose Avila PA-C      magnesium gluconate  500 mg Oral BID Jose Avila PA-C      metoprolol tartrate  50 mg Oral BID Jose Avila PA-C      ondansetron  4 mg Intravenous Q6H PRN Jose Avila PA-C      potassium chloride  40 mEq Oral Daily Jose Avila PA-C      sertraline  100 mg Oral Daily Jose Avila PA-C      torsemide  40 mg Oral BID Jose Avila PA-C          Today, Patient Was Seen By: Jonny Julian DO    ** Please Note: Dictation voice to text software may have been used in the creation of this document   **

## 2021-12-24 NOTE — ASSESSMENT & PLAN NOTE
Patient is rate controlled this time  Continue pre-hospital Lopressor 50 mg p o  b i d  and Eliquis 5 mg p o  b i d

## 2021-12-24 NOTE — NURSING NOTE
Patient refusing to let staff get vital signs and assess patient at this time  Will continue to monitor

## 2021-12-24 NOTE — CASE MANAGEMENT
Case Management Assessment & Discharge Planning Note    Patient name Tim Brooks  Location [facility] [address] [phone] MRN [id number]  : 1945 Date 2021       Current Admission Date: 2021  Current Admission Diagnosis:Hypoxia   Patient Active Problem List    Diagnosis Date Noted    Hypoxia 2021    Frequent falls 2021    Elevated d-dimer 2021    Venous insufficiency of both lower extremities 2021    Acquired bilateral hammer toes 2018    Lymphedema of both lower extremities 2018    Nonrheumatic mitral valve regurgitation 2018    At[facility] fibrillation, chronic ([facility] ) 2018    B12 deficiency 2017    Chronic diastolic CHF (congestive heart failure) ([facility] ) 2017    Folate deficiency 2017    Vitamin D deficiency 2016    Depression 2015    Mixed hyperlipidemia 2015    Osteoarthritis of knee 10/23/2012    Impaired fasting glucose 2012    Essential hypertension 2010    Hypothyroidism 2010    Posttraumatic stress disorder 2010      LOS (days): 1  Geometric Mean LOS (GMLOS) (days):   Days to GMLOS:     OBJECTIVE:    Risk of Unplanned Readmission Score: 12         Current admission status: Inpatient  Referral Reason: Other (Discharge planning)    Preferred Pharmacy:   [facility] [address] [place] [address] [address] [place] [id number]  Phone: [phone] Fax: [phone]    Primary Care Provider: Ramiro Godfrey MD    Primary Insurance: [facility]  Secondary Insurance:     ASSESSMENT:  Active Health Care Agents    There are no active Health Care Agents on file         Advance Directives  Does patient have a [address]?: No  Was patient offered paperwork?: Yes  Does patient currently have a Health Care decision maker?: Yes, please see Health Care Proxy section  Does patient have Advance Directives?: No  Was patient offered paperwork?: Yes  Primary Contact: Wenceslao Vigil - spouse              Patient Information  Admitted from[de-identified] Home  Mental Status: Alert  During Assessment patient was accompanied by: Not accompanied during assessment  Assessment information provided by[de-identified] Patient  Primary Caregiver: Self  Support Systems: Spouse/significant other  [place] of Residence: [address] do you live in?: [address] entry access options   Select all that apply : Stairs  Number of steps to enter home : 3  Type of Current Residence: 2 story home (stays on 1st floor)  Upon entering residence, is there a bedroom on the main floor (no further steps)?: Yes  Upon entering residence, is there a bathroom on the main floor (no further steps)?: Yes  In the last 12 months, was there a time when you were not able to pay the mortgage or rent on time?: No  In the last 12 months, how many places have you lived?: 1  In the last 12 months, was there a time when you did not have a steady place to sleep or slept in a shelter (including now)?: No  Homeless/housing insecurity resource given?: N/A  Living Arrangements: Lives w/ Spouse/significant other  Is patient a ?: No    Activities of Daily Living Prior to Admission  Functional Status: Independent  Completes ADLs independently?: Yes  Ambulates independently?: No  Level of ambulatory dependence: Assistance  Does patient use assisted devices?: Yes  Assisted Devices (DME) used: [employer]  Does patient currently own DME?: Yes  Does patient have a history of Outpatient Therapy (PT/OT)?: No  Does the patient have a history of Short-Term Rehab?: Yes ([facility])  Does patient have a history of HHC?: Yes (pt gauthier snot recall name of agency)  Does patient currently have [address]?: No         Patient Information Continued  Income Source: Pension/correction  Does patient have prescription coverage?: Yes  Within the past 12 months, you worried that your food would run out before you got the money to buy more : Never true  Within the past 12 months, the food you bought just didnt last and you didnt have money to get more : Never true  Food insecurity resource given?: N/A  Does patient receive dialysis treatments?: No  Does patient have a history of substance abuse?: No  Does patient have a history of Mental Health Diagnosis?: No         Means of Transportation  Means of Transport to Appts[de-identified] Family transport (Spouse provides transport)  In the past 12 months, has lack of transportation kept you from medical appointments or from getting medications?: No  In the past 12 months, has lack of transportation kept you from meetings, work, or from getting things needed for daily living?: No  Was application for public transport provided?: N/A        DISCHARGE DETAILS:    Discharge planning discussed with[de-identified] Patient  Freedom of Choice: Yes  Comments - Freedom of Choice: STR recommended, pt aware  Pt very reluctant to agree to STR  CM expressed concerns for pt safety due to recent frequent falls at home  Pt stated she wanted to speak with her spouse before agreeing to STR   CM also left message for pt spouse Qing Hogan [phone] to discuss recommendation                     [facility]         Is the patient interested in [facility] at discharge?: No    DME Referral Provided  Referral made for DME?: No         Would you like to participate in our [address] service program?  : No - Declined    Treatment Team Recommendation: Short Term Rehab

## 2021-12-24 NOTE — ASSESSMENT & PLAN NOTE
Unclear etiology however patient saturates well on room air  Pulmonology following ; recommend follow-up CT chest in 6-12 months

## 2021-12-25 PROCEDURE — 99232 SBSQ HOSP IP/OBS MODERATE 35: CPT | Performed by: INTERNAL MEDICINE

## 2021-12-25 PROCEDURE — 97530 THERAPEUTIC ACTIVITIES: CPT

## 2021-12-25 PROCEDURE — 97116 GAIT TRAINING THERAPY: CPT

## 2021-12-25 RX ADMIN — Medication 500 MG: at 17:19

## 2021-12-25 RX ADMIN — SERTRALINE 100 MG: 100 TABLET, FILM COATED ORAL at 08:29

## 2021-12-25 RX ADMIN — APIXABAN 5 MG: 5 TABLET, FILM COATED ORAL at 17:19

## 2021-12-25 RX ADMIN — APIXABAN 5 MG: 5 TABLET, FILM COATED ORAL at 08:29

## 2021-12-25 RX ADMIN — POTASSIUM CHLORIDE 40 MEQ: 1500 TABLET, EXTENDED RELEASE ORAL at 08:29

## 2021-12-25 RX ADMIN — ATORVASTATIN CALCIUM 40 MG: 40 TABLET, FILM COATED ORAL at 17:19

## 2021-12-25 RX ADMIN — FOLIC ACID 1000 MCG: 1 TABLET ORAL at 08:30

## 2021-12-25 RX ADMIN — Medication 500 MG: at 08:29

## 2021-12-25 NOTE — PROGRESS NOTES
[address]  Progress Note - [employer] 1945, 68 y o  female MRN: [id number]  Unit/Bed#: -01 Encounter: [id number]  Primary Care Provider: Kentrell Yip MD   Date and time admitted to hospital: 12/22/2021  3:50 PM    * Frequent falls  Assessment & Plan  PT/OT recommended short-term rehab  Case management consultation  Medically stable for discharge to [facility]    Hypoxia  Assessment & Plan  Unclear etiology however patient saturates well on room air  Pulmonology following ; recommend follow-up CT chest in 6-12 months    Atrial fibrillation, chronic ([facility] )  Assessment & Plan  Patient is rate controlled this time  Continue pre-hospital Lopressor 50 mg p o  b i d  and Eliquis 5 mg p o  b i d  Hypothyroidism  Assessment & Plan  Continue pre-hospital levothyroxine 150 mcg p o  daily    Essential hypertension  Assessment & Plan  Continue pre-hospital Lopressor 50 mg p o  b i d  and Demadex 40 mg p o  b i d  Depression  Assessment & Plan  Continue pre-hospital Zoloft 100 mg p o  daily    Chronic diastolic CHF (congestive heart failure) (HCC)  Assessment & Plan  Wt Readings from Last 3 Encounters:   12/22/21 82 6 kg (182 lb)   12/10/21 83 kg (182 lb 15 7 oz)     Place on no added salt diet  Obtain daily weights  Continue pre-hospital Demadex 40 mg p o  b i d     VTE Pharmacologic Prophylaxis:   Pharmacologic: Apixaban (Eliquis)  Mechanical VTE Prophylaxis in Place: Yes    Patient Centered Rounds: I have performed bedside rounds with nursing staff today  Discussions with Specialists or Other Care Team Provider:  Case Management    Education and Discussions with Family / Patient:  Spoke with patient's     Time Spent for Care: 45 minutes  More than 50% of total time spent on counseling and coordination of care as described above      Current Length of Stay: 2 day(s)    Current Patient Status: Inpatient   Certification Statement: The patient will continue to require additional inpatient hospital stay due to Medically stable for discharge to short-term rehab    Discharge Plan:  Medically stable for discharge to short-term rehab    Code Status: Level 1 - Full Code      Subjective:   Patient seen and examined at bedside  No acute events overnight  Denies chest pain, SOB, diaphoresis, nausea/vomiting/diarrhea, fevers/chills  Objective:     Vitals:   Temp (24hrs), Av 7 °F (37 1 °C), Min:98 4 °F (36 9 °C), Max:99 1 °F (37 3 °C)    Temp:  [98 4 °F (36 9 °C)-99 1 °F (37 3 °C)] 98 7 °F (37 1 °C)  HR:  [82-90] 82  Resp:  [18-20] 18  BP: ()/(53-62) 101/53  SpO2:  [93 %-97 %] 93 %  Body mass index is 32 24 kg/m²  Input and Output Summary (last 24 hours): Intake/Output Summary (Last 24 hours) at 2021 1030  Last data filed at 2021 0837  Gross per 24 hour   Intake 240 ml   Output 100 ml   Net 140 ml       Physical Exam:     Physical Exam  Vitals and nursing note reviewed  Constitutional:       General: She is not in acute distress  Appearance: She is well-developed  HENT:      Head: Normocephalic and atraumatic  Eyes:      Conjunctiva/sclera: Conjunctivae normal    Cardiovascular:      Rate and Rhythm: Normal rate and regular rhythm  Heart sounds: No murmur heard  Pulmonary:      Effort: Pulmonary effort is normal  No respiratory distress  Breath sounds: Normal breath sounds  Abdominal:      Palpations: Abdomen is soft  Tenderness: There is no abdominal tenderness  Musculoskeletal:      Cervical back: Neck supple  Skin:     General: Skin is warm and dry  Neurological:      Mental Status: She is alert  Additional Data:     Labs:  I have reviewed pertinent results     Results from last 7 days   Lab Units 21  0554   WBC Thousand/uL 8 44   HEMOGLOBIN g/dL 12 3   HEMATOCRIT % 40 1   PLATELETS Thousands/uL 195   NEUTROS PCT % 84*   LYMPHS PCT % 7*   MONOS PCT % 7   EOS PCT % 2     Results from last 7 days   Lab Units 12/24/21  0554   SODIUM mmol/L 143   POTASSIUM mmol/L 3 6   CHLORIDE mmol/L 100   CO2 mmol/L 36*   BUN mg/dL 18   CREATININE mg/dL 1 34*   ANION GAP mmol/L 7   CALCIUM mg/dL 9 4   GLUCOSE RANDOM mg/dL 104                         Imaging: I have reviewed pertinent imaging       Recent Cultures (last 7 days):           Last 24 Hours Medication List:   Current Facility-Administered Medications   Medication Dose Route Frequency Provider Last Rate    acetaminophen  650 mg Oral Q6H PRN Veronica Mood, PA-C      albuterol  2 puff Inhalation Q4H PRN Veronica Mood, PA-C      apixaban  5 mg Oral BID Veronica Mood, PA-C      atorvastatin  40 mg Oral Daily With [employer], DO      diazepam  5 mg Oral Q6H PRN Belgica Addison DO      folic acid  5,685 mcg Oral Daily Veronica Mood, PA-C      levothyroxine  150 mcg Oral Early Morning Veronica Mood, PA-C      magnesium gluconate  500 mg Oral BID Veronica Mood, PA-C      metoprolol tartrate  50 mg Oral BID Veronica Mood, PA-C      ondansetron  4 mg Intravenous Q6H PRN Veronica Mood, PA-C      potassium chloride  40 mEq Oral Daily Veronica Mood, PA-C      sertraline  100 mg Oral Daily Veronica Mood, PA-C      torsemide  40 mg Oral BID Veronica Mood, PA-C          Today, Patient Was Seen By: Belgica Addison DO    ** Please Note: Dictation voice to text software may have been used in the creation of this document   **

## 2021-12-25 NOTE — ASSESSMENT & PLAN NOTE
· Patient is rate controlled this time  · Continue pre-hospital Lopressor 50 mg p o  b i d  and Eliquis 5 mg p o  b i d

## 2021-12-25 NOTE — ASSESSMENT & PLAN NOTE
Wt Readings from Last 3 Encounters:   12/22/21 82 6 kg (182 lb)   12/10/21 83 kg (182 lb 15 7 oz)     · Place on no added salt diet  · Continue pre-hospital Demadex 40 mg p o  b i d

## 2021-12-25 NOTE — ASSESSMENT & PLAN NOTE
· Unclear etiology however patient saturates well on room air  · Pulmonology following ; recommend follow-up CT chest in 6-12 months

## 2021-12-25 NOTE — ASSESSMENT & PLAN NOTE
PT/OT recommended short-term rehab  Case management consultation  Medically stable for discharge to STR

## 2021-12-25 NOTE — PHYSICAL THERAPY NOTE
Physical Therapy Treatment Session Note    Patient's Name: Demarcus Lemon    Admitting Diagnosis  Hypoxia [R09 02]  Frequent falls [R29 6]    Problem List  Patient Active Problem List   Diagnosis    Acquired bilateral hammer toes    Atrial fibrillation, chronic (HCC)    B12 deficiency    Chronic diastolic CH[facility]congestive heart failure) ([facility] )    Depression    Essential hypertension    Hypothyroidism    Impaired fasting glucose    Lymphedema of both lower extremities    Mixed hyperlipidemia    Nonrheumatic mitral valve regurgitation    Posttraumatic stress disorder    Osteoarthritis of knee    Venous insufficiency of both lower extremities    Folate deficiency    Vitamin D deficiency    Hypoxia    Frequent falls    Elevated d-dimer       Past Medical History  Past Medical History:   Diagnosis Date    Disease of thyroid gland        Past Surgical History  Past Surgical History:   Procedure Laterality Date    HYSTERECTOMY          12/25/21 1025   PT Last Visit   PT Visit Date 12/25/21   Note Type   Note Type Treatment   Pain Assessment   Pain Assessment Tool 0-10   Pain Score No Pain  (denies)   Restrictions/Precautions   Weight Bearing Precautions Per Order No   Other Precautions Chair Alarm; Bed Alarm; Fall Risk;O2;Telemetry  (1 L O2 via NC)   General   Chart Reviewed Yes   Response to Previous Treatment Patient unable to report, no changes reported from family or staff   Family/Caregiver Present No   Cognition   Overall Cognitive Status [facility]   Arousal/Participation Alert; Responsive; Cooperative   Attention Attends with cues to redirect   Orientation Level Oriented X4   Memory Decreased recall of precautions   Following Commands Follows all commands and directions without difficulty   Comments Pt  agreeable to PT tx session, meet Cruz Sender cognitive status and engagement is significantly improved vs 12/22 evaluation     Subjective   Subjective "Merry Trina"   Bed Mobility   Supine to Sit 5 Supervision   Additional items Assist x 1;HOB elevated; Bedrails; Increased time required;Verbal cues   Sit to Supine 5  Supervision   Additional items Assist x 1;HOB elevated; Bedrails; Increased time required;Verbal cues   Additional Comments Pt  denied lightheadedness/dizziness with positional changes  Transfers   Sit to Stand 3  Moderate assistance   Additional items Assist x 1;Bedrails; Increased time required;Verbal cues   Stand to Sit 4  Minimal assistance   Additional items Assist x 1;Bedrails; Increased time required;Verbal cues   Stand pivot 3  Moderate assistance   Additional items Assist x 1; Impulsive;Verbal cues   Toilet transfer 3  Moderate assistance   Additional items Assist x 1; Armrests; Increased time required;Verbal cues; Commode   Additional Comments Moderate assistance provided upon commode usage conclusion for POST pericare  Ambulation/Elevation   Gait pattern Improper Weight shift; Forward Flexion;Decreased foot clearance; Inconsistent anthony; Short stride   Gait Assistance 3  Moderate assist   Additional items Assist x 1;Verbal cues; Tactile cues   Assistive Device Rolling walker   Distance 5 feet x 2   Balance   Static Sitting Fair +   Dynamic Sitting Fair   Static Standing Fair   Dynamic Standing Fair -   Ambulatory Fair -   Endurance Deficit   Endurance Deficit Yes   Activity Tolerance   Activity Tolerance Patient limited by fatigue   Nurse Made Aware Yes, Toro Avalos RN aware of tx outcome  Assessment   Prognosis Good   Problem List Decreased strength;Decreased endurance; Impaired balance;Decreased mobility; Decreased coordination;Decreased safety awareness   Assessment Pt seen for PT treatment session this date with interventions consisting of gait training to reduce the risk of medical complications w/ emphasis on improving pt's ability to ambulate level surfaces x 5 feet x 2 with mod A provided by therapist with RW and therapeutic activity to reduce fall risk consisting of training: supine<>sit transfers, sit<>stand transfers, static sitting tolerance at EOB for 10 minutes w/ o UE support, static standing tolerance for 2 minutes w/ B UE support, vc and tactile cues for static sitting posture faciliation, vc and tactile cues for static standing posture faciliation, stand pivot transfers towards B direction and toileting w/increased time provided  Pt agreeable to PT treatment session upon arrival, pt found supine in bed w/ HOB elevated, A&O x 4  In comparison to previous session, pt with significant improvements in cognition, functional task advancement & tolerance  Post session: pt returned BTB, bed alarm engaged, all needs in reach and RN notified of session findings/recommendations  Continue to recommend post acute rehabilitation services at time of d/c in order to maximize pt's functional independence and safety w/ mobility  Pt continues to be functioning below baseline level, and remains limited 2* factors listed above and including weakness, impaired balance, positive fall history, gait deviations  PT will continue to see pt during current hospitalization in order to address the deficits listed above and provide interventions consistent w/ POC in effort to achieve LTGs  Goals   Patient Goals to watch true crime tv  (setup accordingly upon conclusion)   LTG Expiration Date 01/02/22   Long Term Goal #1 LTGs remain appropriate   PT Treatment Day 1   Plan   Treatment/Interventions Functional transfer training;LE strengthening/ROM; Therapeutic exercise; Endurance training;Patient/family training;Equipment eval/education; Bed mobility;Gait training;Spoke to nursing   Progress Progressing toward goals   PT Frequency 3-5x/wk   Recommendation   PT Discharge Recommendation Post acute rehabilitation services  (continued recommendation)   Equipment Recommended [facility] Recommended Wheeled walker   Change/add to [employer]?  No   Additional Comments Upon conclusion, pt  was resting in bed w/bed alarm engaged and all needs within reach  Additional Comments 2 Pt's raw score on the AM-PAC Basic Mobility inpatient short form is 15, standardized score is 36 97  Patients at this level are likely to benefit from DC to [facility]  However, please refer to therapist recommendation for safe DC planning     AM-PAC Basic Mobility Inpatient   Turning in Bed Without Bedrails 3   Lying on Back to Sitting on Edge of Flat Bed 3   Moving Bed to Chair 3   Standing Up From Chair 2   Walk in Room 2   Climb 3-5 Stairs 2   Basic Mobility Inpatient Raw Score 15   Basic Mobility Standardized Score 36 97   Highest Level Of Mobility   -[facility] Goal 4: Move to chair/commode     Treatment Time: [id number]    Ailyn Jalloh, PT

## 2021-12-25 NOTE — PLAN OF CARE
Problem: PAIN - ADULT  Goal: Verbalizes/displays adequate comfort level or baseline comfort level  Description: Interventions:  - Encourage patient to monitor pain and request assistance  - Assess pain using appropriate pain scale  - Administer analgesics based on type and severity of pain and evaluate response  - Implement non-pharmacological measures as appropriate and evaluate response  - Consider cultural and social influences on pain and pain management  - Notify physician/advanced practitioner if interventions unsuccessful or patient reports new pain  12/24/2021 2028 by Sophie Sawant RN  Outcome: Progressing  12/24/2021 2027 by Sophie Sawant RN  Outcome: Progressing

## 2021-12-25 NOTE — PLAN OF CARE
Problem: Potential for Falls  Goal: Patient will remain free of falls  Description: INTERVENTIONS:  - Educate patient/family on patient safety including physical limitations  - Instruct patient to call for assistance with activity   - Consult OT/PT to assist with strengthening/mobility   - Keep Call bell within reach  - Keep bed low and locked with side rails adjusted as appropriate  - Keep care items and personal belongings within reach  - Initiate and maintain comfort rounds  - Make Fall Risk Sign visible to staff  - Offer Toileting every 2 Hours, in advance of need  - Initiate/Maintain bed alarm  - Obtain necessary fall risk management equipment  - Apply yellow socks and bracelet for high fall risk patients  - Consider moving patient to room near nurses station  Outcome: Progressing     Problem: SAFETY ADULT  Goal: Patient will remain free of falls  Description: INTERVENTIONS:  - Educate patient/family on patient safety including physical limitations  - Instruct patient to call for assistance with activity   - Consult OT/PT to assist with strengthening/mobility   - Keep Call bell within reach  - Keep bed low and locked with side rails adjusted as appropriate  - Keep care items and personal belongings within reach  - Initiate and maintain comfort rounds  - Make Fall Risk Sign visible to staff  - Offer Toileting every 2 Hours, in advance of need  - Initiate/Maintain bed alarm  - Obtain necessary fall risk management equipment  - Apply yellow socks and bracelet for high fall risk patients  - Consider moving patient to room near nurses station  Outcome: Progressing

## 2021-12-25 NOTE — PLAN OF CARE
Problem: PHYSICAL THERAPY ADULT  Goal: Performs mobility at highest level of function for planned discharge setting  See evaluation for individualized goals  Description: Treatment/Interventions: Functional transfer training,LE strengthening/ROM,Therapeutic exercise,Endurance training,Patient/family training,Equipment eval/education,Bed mobility,Gait training,Spoke to nursing  Equipment Recommended: Justo Min       See flowsheet documentation for full assessment, interventions and recommendations  Outcome: Progressing  Note: Prognosis: Good  Problem List: Decreased strength,Decreased endurance,Impaired balance,Decreased mobility,Decreased coordination,Decreased safety awareness  Assessment: Pt seen for PT treatment session this date with interventions consisting of gait training to reduce the risk of medical complications w/ emphasis on improving pt's ability to ambulate level surfaces x 5 feet x 2 with mod A provided by therapist with RW and therapeutic activity to reduce fall risk consisting of training: supine<>sit transfers, sit<>stand transfers, static sitting tolerance at EOB for 10 minutes w/ o UE support, static standing tolerance for 2 minutes w/ B UE support, vc and tactile cues for static sitting posture faciliation, vc and tactile cues for static standing posture faciliation, stand pivot transfers towards B direction and toileting w/increased time provided  Pt agreeable to PT treatment session upon arrival, pt found supine in bed w/ HOB elevated, A&O x 4  In comparison to previous session, pt with significant improvements in cognition, functional task advancement & tolerance  Post session: pt returned BTB, bed alarm engaged, all needs in reach and RN notified of session findings/recommendations  Continue to recommend post acute rehabilitation services at time of d/c in order to maximize pt's functional independence and safety w/ mobility   Pt continues to be functioning below baseline level, and remains limited 2* factors listed above and including weakness, impaired balance, positive fall history, gait deviations  PT will continue to see pt during current hospitalization in order to address the deficits listed above and provide interventions consistent w/ POC in effort to achieve LTGs  PT Discharge Recommendation: Post acute rehabilitation services (continued recommendation)          See flowsheet documentation for full assessment

## 2021-12-26 PROCEDURE — 99232 SBSQ HOSP IP/OBS MODERATE 35: CPT | Performed by: INTERNAL MEDICINE

## 2021-12-26 RX ORDER — LIDOCAINE HYDROCHLORIDE 20 MG/ML
15 SOLUTION OROPHARYNGEAL 4 TIMES DAILY PRN
Status: DISCONTINUED | OUTPATIENT
Start: 2021-12-26 | End: 2021-12-28 | Stop reason: HOSPADM

## 2021-12-26 RX ADMIN — APIXABAN 5 MG: 5 TABLET, FILM COATED ORAL at 08:29

## 2021-12-26 RX ADMIN — ATORVASTATIN CALCIUM 40 MG: 40 TABLET, FILM COATED ORAL at 17:02

## 2021-12-26 RX ADMIN — SERTRALINE 100 MG: 100 TABLET, FILM COATED ORAL at 08:29

## 2021-12-26 RX ADMIN — POTASSIUM CHLORIDE 40 MEQ: 1500 TABLET, EXTENDED RELEASE ORAL at 08:29

## 2021-12-26 RX ADMIN — FOLIC ACID 1000 MCG: 1 TABLET ORAL at 08:29

## 2021-12-26 RX ADMIN — Medication 500 MG: at 17:02

## 2021-12-26 RX ADMIN — LEVOTHYROXINE SODIUM 150 MCG: 75 TABLET ORAL at 06:09

## 2021-12-26 RX ADMIN — APIXABAN 5 MG: 5 TABLET, FILM COATED ORAL at 17:02

## 2021-12-26 RX ADMIN — Medication 500 MG: at 08:29

## 2021-12-26 NOTE — UTILIZATION REVIEW
Continued Stay Review    Date: 12/24                       Current Patient Class: INpatient   Current Level of Care: Med/surg    HPI:76 y o  female initially admitted on 12/23     Assessment/Plan:  Afib with good rate control at this time  Needs to speak with spouse before agreeing to STR     Vital Signs:  Date/Time Temp Pulse Resp BP MAP (mmHg) SpO2 Calculated FIO2 (%) - Nasal Cannula Nasal Cannula O2 Flow Rate (L/min) O2 Device Patient Position - Orthostatic VS   12/24/21 22:34:32 99 1 °F (37 3 °C) 84 20 104/62 76 97 % -- -- -- --   12/24/21 15:07:28 98 4 °F (36 9 °C) 90 18 91/54 66 93 % -- -- None (Room air) Sitting   12/24/21 0958 -- -- -- 96/58 -- -- -- -- -- --   12/24/21 0748 -- -- -- -- -- 100 % 28 2 L/min Nasal cannula --   12/24/21 07:43:39 98 1 °F (36 7 °C) 89 18 80/55 Abnormal  63 100 % -- -- -- --   12/24/21 07:17:02 -- 82 15 122/63 83 98 % -- -- -- --       Pertinent Labs/Diagnostic Results:   Results from last 7 days   Lab Units 12/22/21 2026   SARS-COV-2  Negative     Results from last 7 days   Lab Units 12/24/21  0554 12/23/21  0536 12/22/21  1616   WBC Thousand/uL 8 44 11 60* 8 69   HEMOGLOBIN g/dL 12 3 12 3 11 5   HEMATOCRIT % 40 1 39 5 37 8   PLATELETS Thousands/uL 195 233 217   NEUTROS ABS Thousands/µL 7 00  --  6 74         Results from last 7 days   Lab Units 12/24/21  0554 12/23/21  0536 12/22/21  1616   SODIUM mmol/L 143 139 137   POTASSIUM mmol/L 3 6 3 6 4 1   CHLORIDE mmol/L 100 97 100   CO2 mmol/L 36* 32 28   ANION GAP mmol/L 7 10 9   BUN mg/dL 18 16 18   CREATININE mg/dL 1 34* 1 34* 1 42*   EGFR ml/min/1 73sq m 38 38 35   CALCIUM mg/dL 9 4 9 5 9 6   MAGNESIUM mg/dL 2 6  --  2 6   PHOSPHORUS mg/dL 4 8*  --   --              Results from last 7 days   Lab Units 12/24/21  0554 12/23/21  0536 12/22/21  1616   GLUCOSE RANDOM mg/dL 104 135 79     Results from last 7 days   Lab Units 12/22/21  1616   CK TOTAL U/L 88     Results from last 7 days   Lab Units 12/22/21 2026 12/22/21  1827 12/22/21  1616   HS TNI 0HR ng/L  --   --  5   HS TNI 2HR ng/L  --  4  --    HSTNI D2 ng/L  --  -1  --    HS TNI 4HR ng/L 4  --   --    HSTNI D4 ng/L -1  --   --      Results from last 7 days   Lab Units 12/24/21  1050 12/22/21  1616   D-DIMER QUANTITATIVE ug/ml FEU 2 41* 1 90*     Results from last 7 days   Lab Units 12/22/21  1616   PTT seconds 36     Results from last 7 days   Lab Units 12/22/21  1616   TSH 3RD GENERATON uIU/mL 3 097       Results from last 7 days   Lab Units 12/22/21  1616   DIGOXIN LVL ng/mL 1 4     Results from last 7 days   Lab Units 12/22/21  1616   BNP pg/mL 383*       Results from last 7 days   Lab Units 12/22/21 2032   CLARITY UA  Clear   COLOR UA  Yellow   SPEC GRAV UA  <=1 005*   PH UA  7 0   GLUCOSE UA mg/dl Negative   KETONES UA mg/dl Negative   BLOOD UA  Trace-Intact*   PROTEIN UA mg/dl Negative   NITRITE UA  Negative   BILIRUBIN UA  Negative   UROBILINOGEN UA E U /dl 0 2   LEUKOCYTES UA  Negative   WBC UA /hpf None Seen   RBC UA /hpf 2-4   BACTERIA UA /hpf Occasional   EPITHELIAL CELLS WET PREP /hpf Occasional     Results from last 7 days   Lab Units 12/22/21 2026   INFLUENZA A PCR  Negative   INFLUENZA B PCR  Negative   RSV PCR  Negative         Medications:   Scheduled Medications:  apixaban, 5 mg, Oral, BID  atorvastatin, 40 mg, Oral, Daily With Dinner  folic acid, 1,426 mcg, Oral, Daily  levothyroxine, 150 mcg, Oral, Early Morning  magnesium gluconate, 500 mg, Oral, BID  metoprolol tartrate, 50 mg, Oral, BID  potassium chloride, 40 mEq, Oral, Daily  sertraline, 100 mg, Oral, Daily  torsemide, 40 mg, Oral, BID      Continuous IV Infusions:     PRN Meds:  acetaminophen, 650 mg, Oral, Q6H PRN  albuterol, 2 puff, Inhalation, Q4H PRN  diazepam, 5 mg, Oral, Q6H PRN  ondansetron, 4 mg, Intravenous, Q6H PRN        Discharge Plan:D  Network Utilization Review Department  ATTENTION: Please call with any questions or concerns to [phone] and carefully listen to the prompts so that you are directed to the right person  All voicemails are confidential   Deepika Gant all requests for admission clinical reviews, approved or denied determinations and any other requests to dedicated fax number below belonging to the campus where the patient is receiving treatment   List of dedicated fax numbers for the Facilities:  [address] DENIALS (Administrative/Medical Necessity) [phone]   1000  [facility] (Maternity/N[facility]/Pediatrics) [phone]   [address]  [address] [facility] [place] [address] [id number] [address] [facility] [place] [address] [phone]

## 2021-12-26 NOTE — PROGRESS NOTES
[address]  Progress Note - Radha Espinosa 1945, 68 y o  female MRN: [id number]  Unit/Bed#: -01 Encounter: [id number]  Primary Care Provider: Clare Godfrey MD   Date and time admitted to hospital: 12/22/2021  3:50 PM    * Frequent falls  Assessment & Plan  PT/OT recommended short-term rehab  Case management consultation  Medically stable for discharge to [facility]    Hypoxia  Assessment & Plan  · Unclear etiology however patient saturates well on room air  · Pulmonology following ; recommend follow-up CT chest in 6-12 months    Atrial fibrillation, chronic ([facility] [address] )  Assessment & Plan  · Patient is rate controlled this time  · Continue pre-hospital Lopressor 50 mg p o  b i d  and Eliquis 5 mg p o  b i d  Chronic diastolic CHF (congestive heart failure) (HCC)  Assessment & Plan  Wt Readings from Last 3 Encounters:   12/22/21 82 6 kg (182 lb)   12/10/21 83 kg (182 lb 15 7 oz)     · Place on no added salt diet  · Continue pre-hospital Demadex 40 mg p o  b i d  Essential hypertension  Assessment & Plan  · Continue pre-hospital Lopressor 50 mg p o  b i d  and Demadex 40 mg p o  b i d  Hypothyroidism  Assessment & Plan  Continue pre-hospital levothyroxine 150 mcg p o  daily    Depression  Assessment & Plan  · Continue pre-hospital Zoloft 100 mg p o  daily        VTE Pharmacologic Prophylaxis: VTE Score: 7 High Risk (Score >/= 5) - Pharmacological DVT Prophylaxis Ordered: apixaban (Eliquis)  Sequential Compression Devices Ordered  Patient Centered Rounds: d/w nursing in [facility]    Education and Discussions with Family / Patient: Updated  () via phone  Time Spent for Care: 30 minutes  More than 50% of total time spent on counseling and coordination of care as described above      Current Length of Stay: 3 day(s)  Current Patient Status: Inpatient   Certification Statement: The patient will continue to require additional inpatient hospital stay due to need for SNF  Discharge Plan: Anticipate discharge tomorrow to rehab facility  Code Status: Level 1 - Full Code    Subjective:   Comfortable in NAD    Objective:     Vitals:   Temp (24hrs), Av 7 °F (37 1 °C), Min:98 4 °F (36 9 °C), Max:99 2 °F (37 3 °C)    Temp:  [98 4 °F (36 9 °C)-99 2 °F (37 3 °C)] 98 4 °F (36 9 °C)  HR:  [74-89] 74  Resp:  [15-18] 15  BP: (106-114)/(54-66) 108/66  SpO2:  [96 %-99 %] 98 %  Body mass index is 32 24 kg/m²  Input and Output Summary (last 24 hours):   No intake or output data in the 24 hours ending 21 0915    Physical Exam:   Physical Exam  Constitutional:       General: She is not in acute distress  Appearance: She is not diaphoretic  Eyes:      General: No scleral icterus  Cardiovascular:      Rate and Rhythm: Normal rate  Pulses: Normal pulses  Heart sounds: No murmur heard  No friction rub  No gallop  Pulmonary:      Effort: Pulmonary effort is normal  No respiratory distress  Breath sounds: Normal breath sounds  No wheezing, rhonchi or rales  Abdominal:      General: Abdomen is flat  There is no distension  Palpations: Abdomen is soft  Tenderness: There is no abdominal tenderness  There is no guarding or rebound  Musculoskeletal:      Right lower leg: Edema present  Left lower leg: Edema present        Comments: chronic b/l edema with venous stasis changes   Psychiatric:         Behavior: Behavior normal           Additional Data:     Labs:  Results from last 7 days   Lab Units 21  0554   WBC Thousand/uL 8 44   HEMOGLOBIN g/dL 12 3   HEMATOCRIT % 40 1   PLATELETS Thousands/uL 195   NEUTROS PCT % 84*   LYMPHS PCT % 7*   MONOS PCT % 7   EOS PCT % 2     Results from last 7 days   Lab Units 21  0554   SODIUM mmol/L 143   POTASSIUM mmol/L 3 6   CHLORIDE mmol/L 100   CO2 mmol/L 36*   BUN mg/dL 18   CREATININE mg/dL 1 34*   ANION GAP mmol/L 7   CALCIUM mg/dL 9 4   GLUCOSE RANDOM mg/dL 104 Lines/Drains:  Invasive Devices  Report    Peripheral Intravenous Line            Peripheral IV 12/23/21 Right Forearm 3 days                      Imaging: Reviewed radiology reports from this admission including: chest xray, chest CT scan and abdominal/pelvic CT    Recent Cultures (last 7 days):         Last 24 Hours Medication List:   Current Facility-Administered Medications   Medication Dose Route Frequency Provider Last Rate    acetaminophen  650 mg Oral Q6H PRN Dallie Gift, PA-C      albuterol  2 puff Inhalation Q4H PRN Dallie Gift, PA-C      apixaban  5 mg Oral BID Dallie Gift, PA-C      atorvastatin  40 mg Oral Daily With [employer], DO      diazepam  5 mg Oral Q6H PRN Renaldo Broaden, DO      folic acid  6,157 mcg Oral Daily Dallie Gift, PA-C      levothyroxine  150 mcg Oral Early Morning Dallie Gift, PA-C      magnesium gluconate  500 mg Oral BID Dallie Gift, PA-C      metoprolol tartrate  50 mg Oral BID Dallie Gift, PA-C      ondansetron  4 mg Intravenous Q6H PRN Dallie Gift, PA-C      potassium chloride  40 mEq Oral Daily Dallie Gift, PA-C      sertraline  100 mg Oral Daily Dallie Gift, PA-C      torsemide  40 mg Oral BID Dallie Gift, PA-C          Today, Patient Was Seen By: Santhosh Tai MD    **Please Note: This note may have been constructed using a voice recognition system  **

## 2021-12-26 NOTE — PLAN OF CARE
Problem: Potential for Falls  Goal: Patient will remain free of falls  Description: INTERVENTIONS:  - Educate patient/family on patient safety including physical limitations  - Instruct patient to call for assistance with activity   - Consult OT/PT to assist with strengthening/mobility   - Keep Call bell within reach  - Keep bed low and locked with side rails adjusted as appropriate  - Keep care items and personal belongings within reach  - Initiate and maintain comfort rounds  - Make Fall Risk Sign visible to staff  - Offer Toileting every 2 Hours, in advance of need  - Initiate/Maintain bed alarm  - Obtain necessary fall risk management equipment  - Apply yellow socks and bracelet for high fall risk patients  - Consider moving patient to room near nurses station  Outcome: Progressing     Problem: MOBILITY - ADULT  Goal: Maintain or return to baseline ADL function  Description: INTERVENTIONS:  -  Assess patient's ability to carry out ADLs; assess patient's baseline for ADL function and identify physical deficits which impact ability to perform ADLs (bathing, care of mouth/teeth, toileting, grooming, dressing, etc )  - Assess/evaluate cause of self-care deficits   - Assess range of motion  - Assess patient's mobility; develop plan if impaired  - Assess patient's need for assistive devices and provide as appropriate  - Encourage maximum independence but intervene and supervise when necessary  - Involve family in performance of ADLs  - Assess for home care needs following discharge   - Consider OT consult to assist with ADL evaluation and planning for discharge  - Provide patient education as appropriate  Outcome: Progressing  Goal: Maintains/Returns to pre admission functional level  Description: INTERVENTIONS:  - Perform BMAT or MOVE assessment daily    - Set and communicate daily mobility goal to care team and patient/family/caregiver     - Collaborate with rehabilitation services on mobility goals if consulted  - Out of bed for toileting  - Record patient progress and toleration of activity level   Outcome: Progressing     Problem: PAIN - ADULT  Goal: Verbalizes/displays adequate comfort level or baseline comfort level  Description: Interventions:  - Encourage patient to monitor pain and request assistance  - Assess pain using appropriate pain scale  - Administer analgesics based on type and severity of pain and evaluate response  - Implement non-pharmacological measures as appropriate and evaluate response  - Consider cultural and social influences on pain and pain management  - Notify physician/advanced practitioner if interventions unsuccessful or patient reports new pain  Outcome: Progressing     Problem: INFECTION - ADULT  Goal: Absence or prevention of progression during hospitalization  Description: INTERVENTIONS:  - Assess and monitor for signs and symptoms of infection  - Monitor lab/diagnostic results  - Monitor all insertion sites, i e  indwelling lines, tubes, and drains  - Monitor endotracheal if appropriate and nasal secretions for changes in amount and color  - [place] appropriate cooling/warming therapies per order  - Administer medications as ordered  - Instruct and encourage patient and family to use good hand hygiene technique  - Identify and instruct in appropriate isolation precautions for identified infection/condition  Outcome: Progressing  Goal: Absence of fever/infection during neutropenic period  Description: INTERVENTIONS:  - Monitor WBC    Outcome: Progressing     Problem: SAFETY ADULT  Goal: Patient will remain free of falls  Description: INTERVENTIONS:  - Educate patient/family on patient safety including physical limitations  - Instruct patient to call for assistance with activity   - Consult OT/PT to assist with strengthening/mobility   - Keep Call bell within reach  - Keep bed low and locked with side rails adjusted as appropriate  - Keep care items and personal belongings within reach  - Initiate and maintain comfort rounds  - Make Fall Risk Sign visible to staff  - Offer Toileting every 2 Hours, in advance of need  - Initiate/Maintain bed alarm  - Obtain necessary fall risk management equipment  - Apply yellow socks and bracelet for high fall risk patients  - Consider moving patient to room near nurses station  Outcome: Progressing  Goal: Maintain or return to baseline ADL function  Description: INTERVENTIONS:  -  Assess patient's ability to carry out ADLs; assess patient's baseline for ADL function and identify physical deficits which impact ability to perform ADLs (bathing, care of mouth/teeth, toileting, grooming, dressing, etc )  - Assess/evaluate cause of self-care deficits   - Assess range of motion  - Assess patient's mobility; develop plan if impaired  - Assess patient's need for assistive devices and provide as appropriate  - Encourage maximum independence but intervene and supervise when necessary  - Involve family in performance of ADLs  - Assess for home care needs following discharge   - Consider OT consult to assist with ADL evaluation and planning for discharge  - Provide patient education as appropriate  Outcome: Progressing  Goal: Maintains/Returns to pre admission functional level  Description: INTERVENTIONS:  - Perform BMAT or MOVE assessment daily    - Set and communicate daily mobility goal to care team and patient/family/caregiver     - Collaborate with rehabilitation services on mobility goals if consulted  - Record patient progress and toleration of activity level   Outcome: Progressing     Problem: DISCHARGE PLANNING  Goal: Discharge to home or other facility with appropriate resources  Description: INTERVENTIONS:  - Identify barriers to discharge w/patient and caregiver  - Arrange for needed discharge resources and transportation as appropriate  - Identify discharge learning needs (meds, wound care, etc )  - Arrange for interpretive services to assist at discharge as needed  - Refer to Case Management Department for coordinating discharge planning if the patient needs post-hospital services based on physician/advanced practitioner order or complex needs related to functional status, cognitive ability, or social support system  Outcome: Progressing     Problem: Knowledge Deficit  Goal: Patient/family/caregiver demonstrates understanding of disease process, treatment plan, medications, and discharge instructions  Description: Complete learning assessment and assess knowledge base    Interventions:  - Provide teaching at level of understanding  - Provide teaching via preferred learning methods  Outcome: Progressing     Problem: Prexisting or High Potential for Compromised Skin Integrity  Goal: Skin integrity is maintained or improved  Description: INTERVENTIONS:  - Identify patients at risk for skin breakdown  - Assess and monitor skin integrity  - Assess and monitor nutrition and hydration status  - Monitor labs   - Assess for incontinence   - Turn and reposition patient  - Assist with mobility/ambulation  - Relieve pressure over bony prominences  - Avoid friction and shearing  - Provide appropriate hygiene as needed including keeping skin clean and dry  - Evaluate need for skin moisturizer/barrier cream  - Collaborate with interdisciplinary team   - Patient/family teaching  - Consider wound care consult   Outcome: Progressing

## 2021-12-27 PROCEDURE — 97530 THERAPEUTIC ACTIVITIES: CPT

## 2021-12-27 PROCEDURE — 99232 SBSQ HOSP IP/OBS MODERATE 35: CPT | Performed by: INTERNAL MEDICINE

## 2021-12-27 PROCEDURE — 97110 THERAPEUTIC EXERCISES: CPT

## 2021-12-27 PROCEDURE — 97535 SELF CARE MNGMENT TRAINING: CPT

## 2021-12-27 PROCEDURE — 97116 GAIT TRAINING THERAPY: CPT

## 2021-12-27 RX ORDER — ATORVASTATIN CALCIUM 40 MG/1
40 TABLET, FILM COATED ORAL
Qty: 30 TABLET | Refills: 0 | Status: SHIPPED | OUTPATIENT
Start: 2021-12-27

## 2021-12-27 RX ORDER — TORSEMIDE 20 MG/1
40 TABLET ORAL 2 TIMES DAILY
Status: DISCONTINUED | OUTPATIENT
Start: 2021-12-28 | End: 2021-12-28 | Stop reason: HOSPADM

## 2021-12-27 RX ADMIN — Medication 500 MG: at 17:54

## 2021-12-27 RX ADMIN — APIXABAN 5 MG: 5 TABLET, FILM COATED ORAL at 08:31

## 2021-12-27 RX ADMIN — SERTRALINE 100 MG: 100 TABLET, FILM COATED ORAL at 08:31

## 2021-12-27 RX ADMIN — METOPROLOL TARTRATE 50 MG: 50 TABLET, FILM COATED ORAL at 08:30

## 2021-12-27 RX ADMIN — POTASSIUM CHLORIDE 40 MEQ: 1500 TABLET, EXTENDED RELEASE ORAL at 08:31

## 2021-12-27 RX ADMIN — Medication 500 MG: at 08:30

## 2021-12-27 RX ADMIN — FOLIC ACID 1000 MCG: 1 TABLET ORAL at 08:31

## 2021-12-27 RX ADMIN — APIXABAN 5 MG: 5 TABLET, FILM COATED ORAL at 17:54

## 2021-12-27 RX ADMIN — LEVOTHYROXINE SODIUM 150 MCG: 75 TABLET ORAL at 05:57

## 2021-12-27 RX ADMIN — TORSEMIDE 40 MG: 20 TABLET ORAL at 08:31

## 2021-12-27 RX ADMIN — ATORVASTATIN CALCIUM 40 MG: 40 TABLET, FILM COATED ORAL at 16:48

## 2021-12-27 NOTE — ASSESSMENT & PLAN NOTE
· Unclear etiology however patient saturates well on room air  · Pulmonology recommending outpatient follow-up and CT chest in 6-12 months

## 2021-12-27 NOTE — PHYSICAL THERAPY NOTE
12/27/21 0934   PT Last Visit   PT Visit Date 12/27/21   Note Type   Note Type Treatment   Pain Assessment   Pain Assessment Tool 0-10   Pain Score No Pain   Pain Location/Orientation Orientation: Bilateral   Restrictions/Precautions   Weight Bearing Precautions Per Order No   Other Precautions Chair Alarm; Fall Risk   General   Chart Reviewed Yes   Response to Previous Treatment Patient unable to report, no changes reported from family or staff   Family/Caregiver Present No   Cognition   Overall Cognitive Status [facility]   Arousal/Participation Alert; Responsive; Cooperative   Attention Attends with cues to redirect   Orientation Level Oriented X4   Memory Decreased recall of precautions;Decreased recall of recent events;Decreased short term memory   Following Commands Follows one step commands without difficulty   Comments pt agreeable to PT session   Bed Mobility   Supine to Sit 5  Supervision   Additional items Assist x 1; Impulsive;Verbal cues   Additional Comments pt OOB in recliner to end session   Transfers   Sit to Stand 4  Minimal assistance   Additional items Assist x 1; Armrests; Increased time required;Verbal cues   Stand to Sit 4  Minimal assistance   Additional items Assist x 1; Increased time required;Verbal cues   Toilet transfer 4  Minimal assistance   Additional items Assist x 1; Armrests; Increased time required;Verbal cues; Commode   Additional Comments pt utilizes RW for functional transfers   Ambulation/Elevation   Gait pattern Improper Weight shift;Decreased foot clearance; Short stride   Gait Assistance 4  Minimal assist   Additional items Assist x 1;Verbal cues; Tactile cues   Assistive Device Rolling walker   Distance 10 ft with 1 directional turns   Stair Management Assistance Not tested   Balance   Static Sitting Fair +   Dynamic Sitting Fair   Static Standing Fair -   Dynamic Standing Fair -   Ambulatory Poor +   Endurance Deficit   Endurance Deficit Yes   Activity Tolerance   Activity Tolerance Patient limited by fatigue   Nurse Made Aware yes   Exercises   Quad Sets Sitting;15 reps;AROM; Bilateral   Heelslides Sitting;15 reps;AROM; Bilateral   Glute Sets Sitting;15 reps;AROM; Bilateral   Hip Abduction Sitting;15 reps;AROM; Bilateral   Hip Adduction Sitting;15 reps;AROM; Bilateral   Ankle Pumps Sitting;15 reps;AROM; Bilateral   Marching Sitting;15 reps;AROM; Bilateral   Assessment   Prognosis Fair   Problem List Decreased strength;Decreased endurance; Impaired balance;Decreased mobility; Impaired judgement;Decreased safety awareness; Impaired hearing;Pain   Assessment Pt seen for PT treatment session this date with interventions consisting of gait training w/ emphasis on improving pt's ability to ambulate level surfaces x 10 ft with 1 directional turn with min A provided by therapist with RW, Therapeutic exercise consisting of: AROM 15 reps B LE in sitting position and therapeutic activity consisting of training: bed mobility, supine<>sit transfers, sit<>stand transfers, stand pivot transfers towards right direction and commode transfer  Pt agreeable to PT treatment session upon arrival, pt found supine in bed w/ HOB elevated, in no apparent distress and responsive  In comparison to previous session, pt with improvements in distance ambulated  Post session: pt returned back to recliner, chair alarm engaged, all needs in reach and RN notified of session findings/recommendations  Continue to recommend post acute rehabilitation services at time of d/c in order to maximize pt's functional independence and safety w/ mobility  Pt continues to be functioning below baseline level, and remains limited 2* factors listed above and including high risk for falls  PT will continue to see pt during current hospitalization in order to address the deficits listed above and provide interventions consistent w/ POC in effort to achieve STGs  Plan   Treatment/Interventions Functional transfer training;LE strengthening/ROM; Therapeutic exercise; Endurance training;Bed mobility;Gait training   Progress Progressing toward goals   PT Frequency 3-5x/wk   Recommendation   PT Discharge Recommendation Post acute rehabilitation services   Equipment Recommended Walker   Additional Comments upon conclusion, pt OOB in chair with all needs in reach   Jazmin 8 in Bed Without Bedrails 3   Lying on Back to Sitting on Edge of Flat Bed 3   Moving Bed to Chair 3   Standing Up From Chair 3   Walk in Room 3   Climb 3-5 Stairs 2   Basic Mobility Inpatient Raw Score 17   Basic Mobility Standardized Score 39 67   Highest Level Of Mobility   -[facility] Goal 5: Stand one or more mins   End of Consult   Patient Position at End of Consult Bedside chair; All needs within reach;Bed/Chair alarm activated

## 2021-12-27 NOTE — PROGRESS NOTES
[address]  Progress Note - En De Paz 1945, 68 y o  female MRN: [id number]  Unit/Bed#: -01 Encounter: [id number]  Primary Care Provider: Krystle Adan MD   Date and time admitted to hospital: 12/22/2021  3:50 PM    * Frequent falls  Assessment & Plan  · PT/OT recommended short-term rehab  · Patient was initially not agreeable to STR and wanted to go home with [facility], she is agreeable now to SNF placement  · Medically stable for discharge to STR    Hypoxia  Assessment & Plan  · Unclear etiology however patient saturates well on room air  · Pulmonology recommending outpatient follow-up and CT chest in 6-12 months    Atrial fibrillation, chronic ([address] )  Assessment & Plan  · Patient is rate controlled this time  · Continue pre-hospital Lopressor 50 mg p o  b i d  and Eliquis 5 mg p o  b i d  Chronic diastolic CHF (congestive heart failure) (HCC)  Assessment & Plan  Wt Readings from Last 3 Encounters:   12/22/21 82 6 kg (182 lb)   12/10/21 83 kg (182 lb 15 7 oz)     · Place on no added salt diet  · Continue pre-hospital Demadex 40 mg p o  b i d  Essential hypertension  Assessment & Plan  · Continue pre-hospital Lopressor 50 mg p o  b i d  and Demadex 40 mg p o  b i d  Hypothyroidism  Assessment & Plan  · Continue pre-hospital levothyroxine 150 mcg p o  daily    Depression  Assessment & Plan  · Continue pre-hospital Zoloft 100 mg p o  daily      VTE Pharmacologic Prophylaxis: VTE Score: 7 High Risk (Score >/= 5) - Pharmacological DVT Prophylaxis Ordered: apixaban (Eliquis)  Sequential Compression Devices Ordered  Patient Centered Rounds: I performed bedside rounds with nursing staff today  Discussions with Specialists or Other Care Team Provider: PT, CM, Nursing    Education and Discussions with Family / Patient: Updated  () at bedside  Time Spent for Care: 30 minutes   More than 50% of total time spent on counseling and coordination of care as described above  Current Length of Stay: 4 day(s)  Current Patient Status: Inpatient   Certification Statement: The patient will continue to require additional inpatient hospital stay due to awaiting STR placement  Discharge Plan: Anticipate discharge tomorrow to rehab facility  Code Status: Level 1 - Full Code    Subjective:   Patient resting comfortably in bed without any acute complaints  No over night events reported  Objective:     Vitals:   Temp (24hrs), Av 5 °F (36 9 °C), Min:97 8 °F (36 6 °C), Max:99 °F (37 2 °C)    Temp:  [97 8 °F (36 6 °C)-99 °F (37 2 °C)] 97 8 °F (36 6 °C)  HR:  [80-99] 97  Resp:  [18-24] 18  BP: (101-114)/(55-66) 114/65  SpO2:  [90 %-100 %] 90 %  Body mass index is 32 24 kg/m²  Input and Output Summary (last 24 hours):   No intake or output data in the 24 hours ending 21 1324    Physical Exam:   Physical Exam  Vitals and nursing note reviewed  Constitutional:       General: She is not in acute distress  Appearance: She is well-developed  She is obese  HENT:      Head: Normocephalic and atraumatic  Mouth/Throat:      Mouth: Mucous membranes are moist       Pharynx: Oropharynx is clear  Eyes:      Extraocular Movements: Extraocular movements intact  Conjunctiva/sclera: Conjunctivae normal    Cardiovascular:      Rate and Rhythm: Normal rate  Rhythm irregular  Pulses: Normal pulses  Heart sounds: Normal heart sounds  No murmur heard  Pulmonary:      Effort: Pulmonary effort is normal  No respiratory distress  Breath sounds: Normal breath sounds  No wheezing  Abdominal:      General: Bowel sounds are normal  There is no distension  Palpations: Abdomen is soft  Tenderness: There is no abdominal tenderness  Musculoskeletal:         General: Normal range of motion  Cervical back: Neck supple  Skin:     General: Skin is warm and dry  Neurological:      General: No focal deficit present        Mental Status: She is alert and oriented to person, place, and time  Mental status is at baseline     Psychiatric:         Mood and Affect: Mood normal          Behavior: Behavior normal          Judgment: Judgment normal        Labs:  Results from last 7 days   Lab Units 12/24/21  0554   WBC Thousand/uL 8 44   HEMOGLOBIN g/dL 12 3   HEMATOCRIT % 40 1   PLATELETS Thousands/uL 195   NEUTROS PCT % 84*   LYMPHS PCT % 7*   MONOS PCT % 7   EOS PCT % 2     Results from last 7 days   Lab Units 12/24/21  0554   SODIUM mmol/L 143   POTASSIUM mmol/L 3 6   CHLORIDE mmol/L 100   CO2 mmol/L 36*   BUN mg/dL 18   CREATININE mg/dL 1 34*   ANION GAP mmol/L 7   CALCIUM mg/dL 9 4   GLUCOSE RANDOM mg/dL 104                     Lines/Drains:  Invasive Devices  Report    Peripheral Intravenous Line            Peripheral IV 12/23/21 Right Forearm 4 days              Imaging: Reviewed radiology reports from this admission including: chest CT scan and abdominal/pelvic CT    Recent Cultures (last 7 days):         Last 24 Hours Medication List:   Current Facility-Administered Medications   Medication Dose Route Frequency Provider Last Rate    acetaminophen  650 mg Oral Q6H PRN Doraine Handler, PA-C      albuterol  2 puff Inhalation Q4H PRN Doraine Handler, PA-C      apixaban  5 mg Oral BID Doraine Handler, PA-C      atorvastatin  40 mg Oral Daily With [employer], DO      diazepam  5 mg Oral Q6H PRN Shantal Cervantes DO      folic acid  3,282 mcg Oral Daily Doraine Handler, PA-C      levothyroxine  150 mcg Oral Early Morning Doraine Handler, PA-C      Lidocaine Viscous HCl  15 mL Swish & Spit 4x Daily PRN JAQUELINE Aguilar      magnesium gluconate  500 mg Oral BID Doraine Handler, PA-C      metoprolol tartrate  50 mg Oral BID Doraine Handler, PA-C      ondansetron  4 mg Intravenous Q6H PRN Doraine Handler, PA-C      potassium chloride  40 mEq Oral Daily Doraine Handler, PA-C      sertraline  100 mg Oral Daily Doraine Handler, PA-C      torsemide  40 mg Oral BID Lisa Alvarez PA-C          Today, Patient Was Seen By: Purnima Pike, DO    **Please Note: This note may have been constructed using a voice recognition system  **

## 2021-12-27 NOTE — PLAN OF CARE
Problem: OCCUPATIONAL THERAPY ADULT  Goal: Performs self-care activities at highest level of function for planned discharge setting  See evaluation for individualized goals  Description: Treatment Interventions: ADL retraining,Functional transfer training,UE strengthening/ROM,Cognitive reorientation,Patient/family training,Equipment evaluation/education,Energy conservation,Activityengagement          See flowsheet documentation for full assessment, interventions and recommendations  Outcome: Progressing  Note: Limitation: Decreased ADL status,Decreased UE strength,Decreased Safe judgement during ADL,Decreased cognition,Decreased self-care trans,Decreased high-level ADLs  Prognosis: Good  Assessment: Patient participated in Skilled OT session this date with interventions consisting of ADL re training with the use of correct body mechnaics, Energy Conservation techniques, safety awareness and fall prevention techniques, therapeutic exercise to: increase functional use of BUEs, increase BUE muscle strength ,  therapeutic activities to: increase activity tolerance, increase dynamic sit/ stand balance during functional activity  and increase OOB/ sitting tolerance   Patient agreeable to OT treatment session, upon arrival patient was found seated OOB to Recliner-- she needed encouragement (X1) to stay in recliner until after lunch  Patient requiring verbal cues for safety, verbal cues for correct technique, verbal cues for pacing thru activity steps and one step directives and occasional rest periods  Patient continues to be functioning below baseline level, occupational performance remains limited secondary to factors listed above and increased risk for falls and injury  From OT standpoint, recommendation at time of d/c would be post-acute rehabilitation services    Patient to benefit from continued Occupational Therapy treatment while in the hospital to address deficits as defined above and maximize level of functional independence with ADLs and functional mobility        OT Discharge Recommendation: Post acute rehabilitation services (will continue to assess following progression in cog status )  OT - OK to Discharge: Yes (once medically clear)  MATT Haynes/ANAHI

## 2021-12-27 NOTE — ASSESSMENT & PLAN NOTE
· PT/OT recommended short-term rehab  · Patient was initially not agreeable to STR and wanted to go [facility] with [facility], she is agreeable now to SNF placement  · Medically stable for discharge to STR

## 2021-12-27 NOTE — PLAN OF CARE
Problem: PHYSICAL THERAPY ADULT  Goal: Performs mobility at highest level of function for planned discharge setting  See evaluation for individualized goals  Description: Treatment/Interventions: Functional transfer training,LE strengthening/ROM,Therapeutic exercise,Endurance training,Bed mobility,Gait training  Equipment Recommended: Edwin Ribeiro       See flowsheet documentation for full assessment, interventions and recommendations  Outcome: Progressing  Note: Prognosis: Fair  Problem List: Decreased strength,Decreased endurance,Impaired balance,Decreased mobility,Impaired judgement,Decreased safety awareness,Impaired hearing,Pain  Assessment: Pt seen for PT treatment session this date with interventions consisting of gait training w/ emphasis on improving pt's ability to ambulate level surfaces x 10 ft with 1 directional turn with min A provided by therapist with RW, Therapeutic exercise consisting of: AROM 15 reps B LE in sitting position and therapeutic activity consisting of training: bed mobility, supine<>sit transfers, sit<>stand transfers, stand pivot transfers towards right direction and commode transfer  Pt agreeable to PT treatment session upon arrival, pt found supine in bed w/ HOB elevated, in no apparent distress and responsive  In comparison to previous session, pt with improvements in distance ambulated  Post session: pt returned back to recliner, chair alarm engaged, all needs in reach and RN notified of session findings/recommendations  Continue to recommend post acute rehabilitation services at time of d/c in order to maximize pt's functional independence and safety w/ mobility  Pt continues to be functioning below baseline level, and remains limited 2* factors listed above and including high risk for falls  PT will continue to see pt during current hospitalization in order to address the deficits listed above and provide interventions consistent w/ POC in effort to achieve STGs             PT Discharge Recommendation: Post acute rehabilitation services          See flowsheet documentation for full assessment

## 2021-12-27 NOTE — PLAN OF CARE
Problem: PAIN - ADULT  Goal: Verbalizes/displays adequate comfort level or baseline comfort level  Description: Interventions:  - Encourage patient to monitor pain and request assistance  - Assess pain using appropriate pain scale  - Administer analgesics based on type and severity of pain and evaluate response  - Implement non-pharmacological measures as appropriate and evaluate response  - Consider cultural and social influences on pain and pain management  - Notify physician/advanced practitioner if interventions unsuccessful or patient reports new pain  Outcome: Progressing     Problem: INFECTION - ADULT  Goal: Absence or prevention of progression during hospitalization  Description: INTERVENTIONS:  - Assess and monitor for signs and symptoms of infection  - Monitor lab/diagnostic results  - Monitor all insertion sites, i e  indwelling lines, tubes, and drains  - Monitor endotracheal if appropriate and nasal secretions for changes in amount and color  - [place] appropriate cooling/warming therapies per order  - Administer medications as ordered  - Instruct and encourage patient and family to use good hand hygiene technique  - Identify and instruct in appropriate isolation precautions for identified infection/condition  Outcome: Progressing  Goal: Absence of fever/infection during neutropenic period  Description: INTERVENTIONS:  - Monitor WBC    Outcome: Progressing     Problem: SAFETY ADULT  Goal: Patient will remain free of falls  Description: INTERVENTIONS:  - Educate patient/family on patient safety including physical limitations  - Instruct patient to call for assistance with activity   - Consult OT/PT to assist with strengthening/mobility   - Keep Call bell within reach  - Keep bed low and locked with side rails adjusted as appropriate  - Keep care items and personal belongings within reach  - Initiate and maintain comfort rounds  - Make Fall Risk Sign visible to staff  - Offer Toileting every 2 Hours, in advance of need  - Initiate/Maintain bed alarm  - Obtain necessary fall risk management equipment: non slip  - Apply yellow socks and bracelet for high fall risk patients  - Consider moving patient to room near nurses station  Outcome: Progressing  Goal: Maintain or return to baseline ADL function  Description: INTERVENTIONS:  -  Assess patient's ability to carry out ADLs; assess patient's baseline for ADL function and identify physical deficits which impact ability to perform ADLs (bathing, care of mouth/teeth, toileting, grooming, dressing, etc )  - Assess/evaluate cause of self-care deficits   - Assess range of motion  - Assess patient's mobility; develop plan if impaired  - Assess patient's need for assistive devices and provide as appropriate  - Encourage maximum independence but intervene and supervise when necessary  - Involve family in performance of ADLs  - Assess for home care needs following discharge   - Consider OT consult to assist with ADL evaluation and planning for discharge  - Provide patient education as appropriate  Outcome: Progressing  Goal: Maintains/Returns to pre admission functional level  Description: INTERVENTIONS:  - Perform BMAT or MOVE assessment daily    - Set and communicate daily mobility goal to care team and patient/family/caregiver  - Collaborate with rehabilitation services on mobility goals if consulted  - Perform Range of Motion 2 times a day  - Reposition patient every 2 hours    - Dangle patient 2 times a day  - Stand patient 2 times a day  - Ambulate patient 2 times a day  - Out of bed to chair 2 times a day   - Out of bed for meals 2 times a day  - Out of bed for toileting  - Record patient progress and toleration of activity level   Outcome: Progressing     Problem: DISCHARGE PLANNING  Goal: Discharge to home or other facility with appropriate resources  Description: INTERVENTIONS:  - Identify barriers to discharge w/patient and caregiver  - Arrange for needed discharge resources and transportation as appropriate  - Identify discharge learning needs (meds, wound care, etc )  - Arrange for interpretive services to assist at discharge as needed  - Refer to Case Management Department for coordinating discharge planning if the patient needs post-hospital services based on physician/advanced practitioner order or complex needs related to functional status, cognitive ability, or social support system  Outcome: Progressing     Problem: Knowledge Deficit  Goal: Patient/family/caregiver demonstrates understanding of disease process, treatment plan, medications, and discharge instructions  Description: Complete learning assessment and assess knowledge base    Interventions:  - Provide teaching at level of understanding  - Provide teaching via preferred learning methods  Outcome: Progressing     Problem: Prexisting or High Potential for Compromised Skin Integrity  Goal: Skin integrity is maintained or improved  Description: INTERVENTIONS:  - Identify patients at risk for skin breakdown  - Assess and monitor skin integrity  - Assess and monitor nutrition and hydration status  - Monitor labs   - Assess for incontinence   - Turn and reposition patient  - Assist with mobility/ambulation  - Relieve pressure over bony prominences  - Avoid friction and shearing  - Provide appropriate hygiene as needed including keeping skin clean and dry  - Evaluate need for skin moisturizer/barrier cream  - Collaborate with interdisciplinary team   - Patient/family teaching  - Consider wound care consult   Outcome: Progressing

## 2021-12-27 NOTE — OCCUPATIONAL THERAPY NOTE
12/27/21 1048   OT Last Visit   OT Visit Date 12/27/21   Note Type   Note Type Treatment  (completed [id number] )   Restrictions/Precautions   Weight Bearing Precautions Per Order No   Other Precautions Chair Alarm; Fall Risk   General   Response to Previous Treatment Patient with no complaints from previous session   Family/Caregiver Present  entered to visit, approx  5 minutes before end of session    Lifestyle   Intrinsic Gratification enjoys jigsaw puzzles and word/book puzzles  (also does some light housework )   Pain Assessment   Pain Assessment Tool 0-10   Pain Score No Pain   ADL   Where Assessed Chair   Grooming Comments patient declined mouthcare at this time -- she sought out nurse to have "anbesol" ordered for her sore gums   UB Bathing Assistance 4  Minimal Assistance   UB Bathing Deficit Setup;Verbal cueing;Supervision/safety; Increased time to complete   LB Bathing Assistance 3  Moderate Assistance   LB Bathing Deficit Setup;Verbal cueing;Supervision/safety; Increased time to complete;Right lower leg including foot; Left lower leg including foot   UB Dressing Assistance 4  Minimal Assistance   UB Dressing Comments *assisted with unfamiliar aspects of hospital gown change    LB Dressing Comments dependent for doff/don socks today - she reports that at baseline she can do this on occasional days    Toileting Assistance  4  Minimal Assistance   Toileting Deficit Setup;Verbal cueing;Supervison/safety; Increased time to complete; Bedside commode   Toileting Comments *would require some Assist  with pants   Transfers   Sit to Stand 4  Minimal assistance  (made 1-2 attempts each of 2 times before getting up )   Additional items Assist x 1; Armrests; Increased time required;Verbal cues   Stand to Sit 4  Minimal assistance   Additional items Assist x 1;HOB elevated;Armrests; Increased time required;Verbal cues   Stand pivot 3  Moderate assistance   Additional items Assist x 1; Increased time required; Impulsive;Verbal cues   Toilet transfer 4  Minimal assistance   Additional items Assist x 1; Armrests; Increased time required;Verbal cues; Commode   Toilet Transfers   Toilet Transfer From Other (Comment)   Toilet Transfer Type To and from   Toilet Transfer to Standard bedside commode   Toilet Transfer Technique Stand pivot; To right   Toilet Transfers Minimal assistance   Therapeutic Excerise-Strength   UE Strength Yes   Right Upper Extremity- Strength   R Shoulder Flexion; Horizontal ABduction; Other (Comment)  (And pro/retraction )   R Elbow Elbow flexion;Elbow extension  (And supin/pron-ation )   R Weight/Reps/Sets 1 pound weight - 15 reps each   (flex/ext X 2 sets )   Left Upper Extremity-Strength   L Weights/Reps/Sets all exers  same as RUE above   Coordination   Gross Motor appears WFL    Dexterity appears WFL    Cognition   Overall Cognitive Status WFL   Arousal/Participation Alert; Responsive; Cooperative   Attention Attends with cues to redirect   Orientation Level Oriented X4   Following Commands Follows one step commands without difficulty   Comments pt  asked approp  questions of nurse to have needs met    Activity Tolerance   Activity Tolerance Patient limited by fatigue   Assessment   Assessment Patient participated in Skilled OT session this date with interventions consisting of ADL re training with the use of correct body mechnaics, Energy Conservation techniques, safety awareness and fall prevention techniques, therapeutic exercise to: increase functional use of BUEs, increase BUE muscle strength ,  therapeutic activities to: increase activity tolerance, increase dynamic sit/ stand balance during functional activity  and increase OOB/ sitting tolerance   Patient agreeable to OT treatment session, upon arrival patient was found seated OOB to Recliner-- she needed encouragement (X1) to stay in recliner until after lunch   Patient requiring verbal cues for safety, verbal cues for correct technique, verbal cues for pacing thru activity steps and one step directives and occasional rest periods  Patient continues to be functioning below baseline level, occupational performance remains limited secondary to factors listed above and increased risk for falls and injury  From OT standpoint, recommendation at time of d/c would be post-acute rehabilitation services  Patient to benefit from continued Occupational Therapy treatment while in the hospital to address deficits as defined above and maximize level of functional independence with ADLs and functional mobility  Plan   Treatment Interventions ADL retraining;Functional transfer training;UE strengthening/ROM; Patient/family training;Energy conservation; Activityengagement   Goal Expiration Date 01/02/22   OT Treatment Day 1   Recommendation   Additional Comments 2 The patient's raw score on the AM-PAC Daily Activity inpatient short form is 15, standardized score is 34 69, less than 39 4  Patients at this level are likely to benefit from discharge to post-acute rehabilitation services  Please refer to the recommendation of the Occupational Therapist for safe discharge planning     AM-PAC Daily Activity Inpatient   Lower Body Dressing 2   Bathing 2   Toileting 2   Upper Body Dressing 3   Grooming 3   Eating 3   Daily Activity Raw Score 15   Daily Activity Standardized Score (Calc for Raw Score >=11) 34 69   AM-[facility] Applied Cognition Inpatient   Following a Speech/Presentation 2   Understanding Ordinary Conversation 3   Taking Medications 2   Remembering Where Things Are Placed or Put Away 3   Remembering List of 4-5 Errands 2   Taking Care of Complicated Tasks 1   Applied Cognition Raw Score 13   Applied Cognition Standardized Score 30 46   Jairo Show, GUTIERREZ/L

## 2021-12-27 NOTE — PROGRESS NOTES
Progress Note - Pulmonary   Edbelen Oliva 68 y o  female MRN: [id number]  Unit/Bed#: -01 Encounter: [id number]    Assessment:  Acute hypoxemic respiratory failure- resolved  Abnormal CT- possible interstitial lung disease vs aspiration  Multiple small pulmonary nodules    Plan:  Now on room air with no respiratory complaints  Recommend referral to pulmonary as outpatient for PFTs and a repeat CT scan in 6-12 months to review the interstitial changes  The nodules do not specifically need follow-up  I will place our information for [facility] in discharge pathway  She can make an appt with us  We will sign off  Call with questions  Subjective:   Currently no shortness of breath  Minimal cough  On room air  Discharge planning in place    Objective:     Vitals: Blood pressure 114/65, pulse 97, temperature 97 8 °F (36 6 °C), resp  rate 18, height 5' 3" (1 6 m), weight 82 6 kg (182 lb), SpO2 90 %  ,Body mass index is 32 24 kg/m²  Intake/Output Summary (Last 24 hours) at 12/27/2021 1006  Last data filed at 12/26/2021 1300  Gross per 24 hour   Intake 240 ml   Output --   Net 240 ml       Invasive Devices  Report    Peripheral Intravenous Line            Peripheral IV 12/23/21 Right Forearm 4 days                Physical Exam  Vitals reviewed  Constitutional:       General: She is not in acute distress  Appearance: Normal appearance  She is obese  She is not ill-appearing  HENT:      Head: Normocephalic  Nose: Nose normal  No congestion  Mouth/Throat:      Mouth: Mucous membranes are moist       Pharynx: No oropharyngeal exudate  Eyes:      Conjunctiva/sclera: Conjunctivae normal    Cardiovascular:      Rate and Rhythm: Normal rate  Pulses: Normal pulses  Heart sounds: No murmur heard  Pulmonary:      Effort: Pulmonary effort is normal  No respiratory distress  Breath sounds: Normal breath sounds  No wheezing     Abdominal:      General: There is no distension  Palpations: Abdomen is soft  Musculoskeletal:      Right lower leg: Edema present  Left lower leg: Edema present  Skin:     General: Skin is warm and dry  Neurological:      General: No focal deficit present  Mental Status: She is alert  Psychiatric:         Mood and Affect: Mood normal          Behavior: Behavior normal            Labs: I have personally reviewed pertinent lab results  Imaging and other studies: I have personally reviewed pertinent reports  and I have personally reviewed pertinent films in PACS    CT Chest 12/22- Lungs with some interstitial changes at bases and scattered lung nodules  Minor R basilar peripheral fibrotic changes with no bronchiectasis  No GGO    Tiny scattered nodules

## 2021-12-27 NOTE — CASE MANAGEMENT
Case Management Discharge Planning Note    Patient name Eladio Whitehead  Location [facility] 202/-44 MRN [id number]  : 1945 Date 2021       Current Admission Date: 2021  Current Admission Diagnosis:Frequent falls   Patient Active Problem List    Diagnosis Date Noted    Hypoxia 2021    Frequent falls 2021    Elevated d-dimer 2021    Venous insufficiency of both lower extremities 2021    Acquired bilateral hammer toes 2018    Lymphedema of both lower extremities 2018    Nonrheumatic mitral valve regurgitation 2018    Atrial fibrillation, chronic ([facility] ) 2018    B12 deficiency 2017    Chronic diastolic CHF (congestive heart failure) ([facility] ) 2017    Folate deficiency 2017    Vitamin D deficiency 2016    Depression 2015    Mixed hyperlipidemia 2015    Osteoarthritis of knee 10/23/2012    Impaired fasting glucose 2012    Essential hypertension 2010    Hypothyroidism 2010    Posttraumatic stress disorder 2010      LOS (days): 4  Geometric Mean LOS (GMLOS) (days): 4 80  Days to GMLOS:0 9     OBJECTIVE:  Risk of Unplanned Readmission Score: 11         Current admission status: Inpatient   Preferred Pharmacy:   [address] [place] [address]  Via La[facility] [address] [place] [address]  Phone: [phone] Fax: [phone]    Primary Care Provider: Earl Grubbs MD    Primary Insurance: [address] [place] [address]  Secondary Insurance:     DISCHARGE DETAILS:    Discharge planning discussed with[de-identified] pt and spouse Shelbie Jesus at bedside  Freedom of Choice: Yes  Comments - Freedom of Choice: Pt was initially not leonides greement with STR  However, after discussion with spouse and CM pt is now in agreement with STR on discharge  CM discussed blanket referrals to all [facility] facilities in a 30 mile radius  Pt and spouse in agreement  Referrals sent   Pt will need w/c [facility] transport, requested we use [facility] for same  Contacts  Patient Contacts: Ethyl Liter - spouse  Relationship to Patient[de-identified] Family  Contact Method:  In Person  Reason/Outcome: Discharge Planning

## 2021-12-27 NOTE — UTILIZATION REVIEW
Inpatient Admission Authorization Request   NOTIFICATION OF INPATIENT ADMISSION/INPATIENT AUTHORIZATION REQUEST   SERVICING FACILITY:   [place] [address]  [facility] AFFILIATED WITH [facility], [address]  Tax ID: [id number]  NPI: [id number]  Place of Service: Inpatient [facility]  [address]  Place of Service Code: 24     ATTENDING PROVIDER:  Attending Name and NPI#: Xavier Loo Do [[id number]]  Address: [address] [place] [address] WITH [facility], [address]  Phone: [phone]     UTILIZATION REVIEW CONTACT:  Mary Jo Mccord, Utilization Review Supervisor  Network Utilization Review Department  Phone: [phone]  Fax [phone]  Email: [email or web address]     PHYSICIAN ADVISORY SERVICES:  FOR [facility] REVIEW - MEDICAL NECESSITY DENIAL  Phone: [phone]  Fax: [phone]  Email: [email or web address]     TYPE OF REQUEST:  Inpatient Status     ADMISSION INFORMATION:  ADMISSION DATE/TIME: 12/23/21  2:49 PM  PATIENT DIAGNOSIS CODE/DESCRIPTION:  Hypoxia [R09 02]  Frequent falls [R29 6]  DISCHARGE DATE/TIME: No discharge date for patient encounter  DISCHARGE DISPOSITION (IF DISCHARGED): Home/Self Care     IMPORTANT INFORMATION:  Please contact the Mary Jo Mccord directly with any questions or concerns regarding this request  Department voicemails are confidential     Send requests for admission clinical reviews, concurrent reviews, approvals, and administrative denials due to lack of clinical to fax [phone]

## 2021-12-27 NOTE — UTILIZATION REVIEW
Continued Stay Review    Date: 12/27/21                          Current Patient Class: inpatient  Current Level of Care: med surg    HPI:76 y o  female initially admitted on 12/23/21     Assessment/Plan: Pt now agreedable to SNF placement  Medically stable for dc to STR  Continue home meds  CM following and pt awaiting placement      Vital Signs:   Date/Time Temp Pulse Resp BP MAP (mmHg) SpO2 Calculated FIO2 (%) - Nasal Cannula Nasal Cannula O2 Flow Rate (L/min) O2 Device   12/27/21 07:16:13 -- 97 18 114/65 81 90 % -- -- --   12/27/21 05:58:52 97 8 °F (36 6 °C) 87 19 107/64 78 100 % -- -- --   12/26/21 20:11:31 98 7 °F (37 1 °C) 99 21 106/66 79 98 % -- -- --   12/26/21 17:04:24 -- 91 -- 101/55 70 90 % -- -- --   12/26/21 1704 -- 91 -- 101/55 -- -- -- -- --   12/26/21 15:09:46 99 °F (37 2 °C) 80 24 Abnormal  112/63 79 98 % 24 1 L/min Nasal cannula   12/26/21 1220 -- -- -- -- -- 93 % -- -- None (Room air)   12/26/21 0700 98 4 °F (36 9 °C) 74 15 108/66 80 98 % -- -- --   12/25/21 22:42:07 98 5 °F (36 9 °C) 77 18 114/54 74 99 % -- -- --   12/25/21 17:19:08 99 2 °F (37 3 °C) 89 18 106/55 72 96 % -- -- --   12/25/21 07:43:08 98 7 °F (37 1 °C) 82 18 101/53 69 93 % -- -- --       Pertinent Labs/Diagnostic Results:   Results from last 7 days   Lab Units 12/22/21 2026   SARS-COV-2  Negative     Results from last 7 days   Lab Units 12/24/21  0554 12/23/21  0536 12/22/21  1616   WBC Thousand/uL 8 44 11 60* 8 69   HEMOGLOBIN g/dL 12 3 12 3 11 5   HEMATOCRIT % 40 1 39 5 37 8   PLATELETS Thousands/uL 195 233 217   NEUTROS ABS Thousands/µL 7 00  --  6 74         Results from last 7 days   Lab Units 12/24/21  0554 12/23/21  0536 12/22/21  1616   SODIUM mmol/L 143 139 137   POTASSIUM mmol/L 3 6 3 6 4 1   CHLORIDE mmol/L 100 97 100   CO2 mmol/L 36* 32 28   ANION GAP mmol/L 7 10 9   BUN mg/dL 18 16 18   CREATININE mg/dL 1 34* 1 34* 1 42*   EGFR ml/min/1 73sq m 38 38 35   CALCIUM mg/dL 9 4 9 5 9 6   MAGNESIUM mg/dL 2 6  --  2 6 PHOSPHORUS mg/dL 4 8*  --   --      Results from last 7 days   Lab Units 12/24/21  0554 12/23/21  0536 12/22/21  1616   GLUCOSE RANDOM mg/dL 104 135 79     Results from last 7 days   Lab Units 12/22/21  1616   CK TOTAL U/L 88     Results from last 7 days   Lab Units 12/22/21 2026 12/22/21  1825 12/22/21  1616   HS TNI 0HR ng/L  --   --  5   HS TNI 2HR ng/L  --  4  --    HSTNI D2 ng/L  --  -1  --    HS TNI 4HR ng/L 4  --   --    HSTNI D4 ng/L -1  --   --      Results from last 7 days   Lab Units 12/24/21  1050 12/22/21  1616   D-DIMER QUANTITATIVE ug/ml FEU 2 41* 1 90*     Results from last 7 days   Lab Units 12/22/21  1616   PTT seconds 36     Results from last 7 days   Lab Units 12/22/21  1616   TSH 3RD GENERATON uIU/mL 3 097     Results from last 7 days   Lab Units 12/22/21  1616   DIGOXIN LVL ng/mL 1 4     Results from last 7 days   Lab Units 12/22/21  1616   BNP pg/mL 383*     Results from last 7 days   Lab Units 12/22/21 2032   CLARITY UA  Clear   COLOR UA  Yellow   SPEC GRAV UA  <=1 005*   PH UA  7 0   GLUCOSE UA mg/dl Negative   KETONES UA mg/dl Negative   BLOOD UA  Trace-Intact*   PROTEIN UA mg/dl Negative   NITRITE UA  Negative   BILIRUBIN UA  Negative   UROBILINOGEN UA E U /dl 0 2   LEUKOCYTES UA  Negative   WBC UA /hpf None Seen   RBC UA /hpf 2-4   BACTERIA UA /hpf Occasional   EPITHELIAL CELLS WET PREP /hpf Occasional     Results from last 7 days   Lab Units 12/22/21 2026   INFLUENZA A PCR  Negative   INFLUENZA B PCR  Negative   RSV PCR  Negative     Medications:   Scheduled Medications:  apixaban, 5 mg, Oral, BID  atorvastatin, 40 mg, Oral, Daily With Dinner  folic acid, 3,282 mcg, Oral, Daily  levothyroxine, 150 mcg, Oral, Early Morning  magnesium gluconate, 500 mg, Oral, BID  metoprolol tartrate, 50 mg, Oral, BID  potassium chloride, 40 mEq, Oral, Daily  sertraline, 100 mg, Oral, Daily  torsemide, 40 mg, Oral, BID      Continuous IV Infusions: none     PRN Meds:  acetaminophen, 650 mg, Oral, Q6H PRN  albuterol, 2 puff, Inhalation, Q4H PRN  diazepam, 5 mg, Oral, Q6H PRN  Lidocaine Viscous HCl, 15 mL, Swish & Spit, 4x Daily PRN  ondansetron, 4 mg, Intravenous, Q6H PRN        Discharge Plan: SNF    Network Utilization Review Department  ATTENTION: Please call with any questions or concerns to [phone] and carefully listen to the prompts so that you are directed to the right person  All voicemails are confidential   [facility] all requests for admission clinical reviews, approved or denied determinations and any other requests to dedicated fax number below belonging to the campus where the patient is receiving treatment   List of dedicated fax numbers for the Facilities:  [address] DENIALS (Administrative/Medical Necessity) [phone]   [address] (Maternity/NICU/Pediatrics) [phone]   [address] [place] [address]  [address] [address] [place] [address] [address] [phone]

## 2021-12-28 VITALS
SYSTOLIC BLOOD PRESSURE: 119 MMHG | DIASTOLIC BLOOD PRESSURE: 68 MMHG | HEIGHT: 63 IN | OXYGEN SATURATION: 97 % | HEART RATE: 93 BPM | WEIGHT: 182 LBS | TEMPERATURE: 98.8 F | BODY MASS INDEX: 32.25 KG/M2 | RESPIRATION RATE: 18 BRPM

## 2021-12-28 LAB
FLUAV RNA RESP QL NAA+PROBE: NEGATIVE
FLUBV RNA RESP QL NAA+PROBE: NEGATIVE
RSV RNA RESP QL NAA+PROBE: NEGATIVE
SARS-COV-2 RNA RESP QL NAA+PROBE: NEGATIVE

## 2021-12-28 PROCEDURE — 0241U HB NFCT DS VIR RESP RNA 4 TRGT: CPT | Performed by: INTERNAL MEDICINE

## 2021-12-28 RX ADMIN — SERTRALINE 100 MG: 100 TABLET, FILM COATED ORAL at 08:57

## 2021-12-28 RX ADMIN — Medication 500 MG: at 08:57

## 2021-12-28 RX ADMIN — LEVOTHYROXINE SODIUM 150 MCG: 75 TABLET ORAL at 05:00

## 2021-12-28 RX ADMIN — FOLIC ACID 1000 MCG: 1 TABLET ORAL at 08:57

## 2021-12-28 RX ADMIN — APIXABAN 5 MG: 5 TABLET, FILM COATED ORAL at 08:57

## 2021-12-28 RX ADMIN — POTASSIUM CHLORIDE 40 MEQ: 1500 TABLET, EXTENDED RELEASE ORAL at 08:57

## 2021-12-28 NOTE — DISCHARGE SUMMARY
[address]  Discharge- Erroll Stain 1945, 68 y o  female MRN: [id number]  Unit/Bed#: [facility] 202-01 Encounter: [id number]  Primary Care Provider: Paty Lorenzo MD   Date and time admitted to hospital: 12/28/2021  3:50 PM    * Frequent falls  Assessment & Plan  · PT/OT recommended short-term rehab  · Patient was initially not agreeable to STR and wanted to go home with [facility], she is agreeable now to SNF placement  · Medically stable for discharge to STR    Hypoxia  Assessment & Plan  · Unclear etiology however patient saturates well on room air  · Pulmonology recommending outpatient follow-up and CT chest in 6-12 months    Mixed hyperlipidemia  Assessment & Plan  · Stable, continue statin    Hypothyroidism  Assessment & Plan  · Stable, continue Synthroid    Essential hypertension  Assessment & Plan  · BP acceptable, continue pre-hospital Lopressor 50 mg p o  b i d  and Demadex 40 mg p o  b i d  Depression  Assessment & Plan  · Continue pre-hospital Zoloft    Chronic diastolic CHF (congestive heart failure) (HCC)  Assessment & Plan  Wt Readings from Last 3 Encounters:   12/22/21 82 6 kg (182 lb)   12/10/21 83 kg (182 lb 15 7 oz)         · Not in acute exacerbation, continue no added salt diet  · Continue pre-hospital Demadex 40 mg p o  b i d  Atrial fibrillation, chronic (HCC)  Assessment & Plan  · Rate is well controlled  · Continue pre-hospital Lopressor 50 mg p o  b i d  and Eliquis 5 mg p o  b i d  Discharging Physician / Practitioner: Melani Correa MD  PCP: Paty Lorenzo MD  Admission Date:   Discharge Date: 12/28/21    Medical Problems             Resolved Problems  Date Reviewed: 12/28/2021    None                Consultations During Hospital Stay:  Pulmonary    Procedures Performed:   · No    Significant Findings / Test Results:   CT chest abdomen pelvis with contrast showed Subcutaneous contusion changes in the left lateral gluteal region     No CT findings of trauma in the chest, abdomen or pelvis  Interstitial lung disease changes in the lung bases predominantly on the right   Scattered lung nodules  Based on current Fleischner Society 2017 Guidelines on incidental pulmonary nodule, no routine follow-up is needed if the patient is considered low   risk for lung cancer   If the patient is considered high risk for lung cancer, 12 month follow-up non-contrast chest CT is recommended  CT head without contrast showed no acute intracranial abnormality    CT cervical spine without contrast showed no cervical spine fracture or traumatic malalignment  Multilevel degenerative disc changes  Resolving occipital scalp hematoma  Incidental Findings:   · See above     Test Results Pending at Discharge (will require follow up):   · No     Outpatient Tests Requested:  · HRCT in [id number]    Complications:  No    Reason for Admission:  Multiple falls    Hospital Course:     Mila Dyson is a 68 y o  female patient with PMH of AFib on Eliquis, HTN, hypothyroidism, frequent falls who originally presented to the hospital on 12/28/2021 due to multiple falls at home  According to the patient, she had multiple falls over the last few months and she needs to use the assistive device such as cane, walker and wheelchair however she continues to experience falls  At the time of admission, patient was found to be 85% on room air  No known pulmonary disease and currently on home oxygen  Evidence of interstitial lung disease on CT chest   Pulmonary was consulted  Recommended to have a chest CT in 6-12 months  Patient was recommended to rehab by PT and OT during hospitalization  Before discharging from rehab, patient will need home oxygen evaluation  Patient is clinically hemodynamically stable to be discharged today  Please see above list of diagnoses and related plan for additional information       Condition at Discharge: stable     Discharge Day Visit / Exam: Subjective:  Patient was seen and examined at bedside  The patient denies any pain, headache, blurry vision, chest pain, palpitation, shortness of breath, N/V, abd pain  Vitals:    Exam:   Physical Exam  General: Elderly female patient lying in bed, breathing well on room air, no acute distress  HEENT: NC/AT, PERRL, EOM - normal  Neck: Supple  Pulm/Chest: Normal chest wall expansion, clear breath sounds on both side, no wheezing/rhonchi or crackles appreciated  CVS: RRR, normal S1&S2, no murmur appreciated, capillary refill <2s  Abd: soft, non tender, non distended, bowel sounds +  MSK: move all 4 extremities spontaneously  Skin: warm  CNS: no acute focal neuro deficit    Discussion with Family:  Discussed with spouse    Discharge instructions/Information to patient and family:   See after visit summary for information provided to patient and family  Provisions for Follow-Up Care:  See after visit summary for information related to follow-up care and any pertinent home health orders  Disposition:     Acute Rehab at [facility] to [facility]:   · Not Applicable to this Patient - Not Applicable to this Patient    Planned Readmission: No     Discharge Statement:  I spent 45 minutes discharging the patient  This time was spent on the day of discharge  I had direct contact with the patient on the day of discharge  Greater than 50% of the total time was spent examining patient, answering all patient questions, arranging and discussing plan of care with patient as well as directly providing post-discharge instructions  Additional time then spent on discharge activities  Discharge Medications:  See after visit summary for reconciled discharge medications provided to patient and family        ** Please Note: This note has been constructed using a voice recognition system **

## 2021-12-28 NOTE — NURSING NOTE
[facility]  Vss  Pt denies pain ,denies shortness of breath  IV removed without complications and tip intact  All belongings with pt  [facility] given to pineda bowling [facility]  Discharge instructions read and explained to pt an  all questions answered  Pt escorted by wheelchair

## 2021-12-28 NOTE — DISCHARGE INSTRUCTIONS
Discharge instructions from hospitalist  1  Follow-up with your primary care physician in 1 week in regards to recent hospitalization  2  Take medications regularly    Changes in medications    Take atorvastatin 40 mg daily  3  Come back to the ER if symptoms recur or worsen  4  Activity as tolerated  5  Diet :  Heart healthy diet; information packet has more detailed information          Chronic Hypertension   AMBULATORY CARE:   Hypertension  is high blood pressure  Your blood pressure is the force of your blood moving against the walls of your arteries  Hypertension causes your blood pressure to get so high that your heart has to work much harder than normal  This can damage your heart  Even if you have hypertension for years, lifestyle changes, medicines, or both can help bring your blood pressure to normal   Call your local emergency number (911 in the [address] [facility]) or have someone call if:   · You have chest pain  · You have any of the following signs of a heart attack:      ? Squeezing, pressure, or pain in your chest    ? You may  also have any of the following:     § Discomfort or pain in your back, neck, jaw, stomach, or arm    § Shortness of breath    § Nausea or vomiting    § Lightheadedness or a sudden cold sweat    · You become confused or have difficulty speaking  · You suddenly feel lightheaded or have trouble breathing  Seek care immediately if:   · You have a severe headache or vision loss  · You have weakness in an arm or leg  Call your doctor or cardiologist if:   · You feel faint, dizzy, confused, or drowsy  · You have been taking your blood pressure medicine but your pressure is higher than your provider says it should be  · You have questions or concerns about your condition or care  Treatment for chronic hypertension  may include medicine to lower your blood pressure and cholesterol levels   A low cholesterol level helps prevent heart disease and makes it easier to control your blood pressure  Heart disease can make your blood pressure harder to control  You may also need to make lifestyle changes  What you need to know about the stages of hypertension:       · Normal blood pressure is 119/79 or lower   Your healthcare provider may only check your blood pressure each year if it stays at a normal level  · Elevated blood pressure is 120/79 to 129/79   This is sometimes called prehypertension  Your healthcare provider may suggest lifestyle changes to help lower your blood pressure to a normal level  He or she may then check it again in 3 to 6 months  · Stage 1 hypertension is 130/80  to 139/89   Your provider may recommend lifestyle changes, medication, and checks every 3 to 6 months until your blood pressure is controlled  · Stage 2 hypertension is 140/90 or higher   Your provider will recommend lifestyle changes and have you take 2 kinds of hypertension medicines  You will also need to have your blood pressure checked monthly until it is controlled  Manage chronic hypertension:   · Check your blood pressure at home  Avoid smoking, caffeine, and exercise at least 30 minutes before checking your blood pressure  Sit and rest for 5 minutes before you take your blood pressure  Extend your arm and support it on a flat surface  Your arm should be at the same level as your heart  Follow the directions that came with your blood pressure monitor  Check your blood pressure 2 times, 1 minute apart, before you take your medicine in the morning  Also check your blood pressure before your evening meal  Keep a record of your readings and bring it to your follow-up visits  Ask your healthcare provider what your blood pressure should be  · Manage any other health conditions you have  Health conditions such as diabetes can increase your risk for hypertension  Follow your healthcare provider's instructions and take all your medicines as directed   Talk to your healthcare provider about any new health conditions you have recently developed  · Ask about all medicines  Certain medicines can increase your blood pressure  Examples include oral birth control pills, decongestants, herbal supplements, and NSAIDs, such as ibuprofen  Your healthcare provider can tell you which medicines are safe for you to take  This includes prescription and over-the-counter medicines  Lifestyle changes you can make to lower your blood pressure: Your provider may want you to make more lifestyle changes if you are having trouble controlling your blood pressure  This may feel difficult over time, especially if you think you are making good changes but your pressure is still high  It might help to focus on one new change at a time  For example, try to add 1 more day of exercise, or exercise for an extra 10 minutes on 2 days  Small changes can make a big difference  Your healthcare provider can also refer you to specialists such as a dietitian who can help you make small changes  Your family members may be included in helping you learn to create lifestyle changes, such as the following:     · Limit sodium (salt) as directed  Too much sodium can affect your fluid balance  Check labels to find low-sodium or no-salt-added foods  Some low-sodium foods use potassium salts for flavor  Too much potassium can also cause health problems  Your healthcare provider will tell you how much sodium and potassium are safe for you to have in a day  He or she may recommend that you limit sodium to 2,300 mg a day  · Follow the meal plan recommended by your healthcare provider  A dietitian or your provider can give you more information on low-sodium plans or the DASH (Dietary Approaches to Stop Hypertension) eating plan  The DASH plan is low in sodium, processed sugar, unhealthy fats, and total fat  It is high in potassium, calcium, and fiber  These can be found in vegetables, fruit, and whole-grain foods  · Be physically active throughout the day  Physical activity, such as exercise, can help control your blood pressure and your weight  Be physically active for at least 30 minutes per day, on most days of the week  Include aerobic activity, such as walking or riding a bicycle  Also include strength training at least 2 times each week  Your healthcare providers can help you create a physical activity plan  · Decrease stress  This may help lower your blood pressure  Learn ways to relax, such as deep breathing or listening to music  · Limit alcohol as directed  Alcohol can increase your blood pressure  A drink of alcohol is 12 ounces of beer, 5 ounces of wine, or 1½ ounces of liquor  · Do not smoke  Nicotine and other chemicals in cigarettes and cigars can increase your blood pressure and also cause lung damage  Ask your healthcare provider for information if you currently smoke and need help to quit  E-cigarettes or smokeless tobacco still contain nicotine  Talk to your healthcare provider before you use these products  Follow up with your doctor or cardiologist as directed: You will need to return to have your blood pressure checked and to have other lab tests done  Write down your questions so you remember to ask them during your visits  © Copyright [employer] 2021 Information is for End User's use only and may not be sold, redistributed or otherwise used for commercial purposes  All illustrations and images included in CareNotes® are the copyrighted property of [employer]  or Yu Emanuel  The above information is an  only  It is not intended as medical advice for individual conditions or treatments  Talk to your doctor, nurse or pharmacist before following any medical regimen to see if it is safe and effective for you

## 2021-12-28 NOTE — PLAN OF CARE
Problem: Potential for Falls  Goal: Patient will remain free of falls  Description: INTERVENTIONS:  - Educate patient/family on patient safety including physical limitations  - Instruct patient to call for assistance with activity   - Consult OT/PT to assist with strengthening/mobility   - Keep Call bell within reach  - Keep bed low and locked with side rails adjusted as appropriate  - Keep care items and personal belongings within reach  - Initiate and maintain comfort rounds  - Make Fall Risk Sign visible to staff  - Offer Toileting every 2 Hours, in advance of need  - Initiate/Maintain bed alarm  - Obtain necessary fall risk management equipment: bed alarm  - Apply yellow socks and bracelet for high fall risk patients  - Consider moving patient to room near nurses station  Outcome: Progressing     Problem: MOBILITY - ADULT  Goal: Maintain or return to baseline ADL function  Description: INTERVENTIONS:  -  Assess patient's ability to carry out ADLs; assess patient's baseline for ADL function and identify physical deficits which impact ability to perform ADLs (bathing, care of mouth/teeth, toileting, grooming, dressing, etc )  - Assess/evaluate cause of self-care deficits   - Assess range of motion  - Assess patient's mobility; develop plan if impaired  - Assess patient's need for assistive devices and provide as appropriate  - Encourage maximum independence but intervene and supervise when necessary  - Involve family in performance of ADLs  - Assess for home care needs following discharge   - Consider OT consult to assist with ADL evaluation and planning for discharge  - Provide patient education as appropriate  Outcome: Progressing  Goal: Maintains/Returns to pre admission functional level  Description: INTERVENTIONS:  - Perform BMAT or MOVE assessment daily    - Set and communicate daily mobility goal to care team and patient/family/caregiver     - Collaborate with rehabilitation services on mobility goals if consulted  - Perform Range of Motion 3 times a day  - Reposition patient every 2 hours    - Dangle patient 3 times a day  - Stand patient 3 times a day  - Ambulate patient 3 times a day  - Out of bed to chair 3 times a day   - Out of bed for meals 3 times a day  - Out of bed for toileting  - Record patient progress and toleration of activity level   Outcome: Progressing     Problem: PAIN - ADULT  Goal: Verbalizes/displays adequate comfort level or baseline comfort level  Description: Interventions:  - Encourage patient to monitor pain and request assistance  - Assess pain using appropriate pain scale  - Administer analgesics based on type and severity of pain and evaluate response  - Implement non-pharmacological measures as appropriate and evaluate response  - Consider cultural and social influences on pain and pain management  - Notify physician/advanced practitioner if interventions unsuccessful or patient reports new pain  Outcome: Progressing     Problem: INFECTION - ADULT  Goal: Absence or prevention of progression during hospitalization  Description: INTERVENTIONS:  - Assess and monitor for signs and symptoms of infection  - Monitor lab/diagnostic results  - Monitor all insertion sites, i e  indwelling lines, tubes, and drains  - Monitor endotracheal if appropriate and nasal secretions for changes in amount and color  - [place] appropriate cooling/warming therapies per order  - Administer medications as ordered  - Instruct and encourage patient and family to use good hand hygiene technique  - Identify and instruct in appropriate isolation precautions for identified infection/condition  Outcome: Progressing  Goal: Absence of fever/infection during neutropenic period  Description: INTERVENTIONS:  - Monitor WBC    Outcome: Progressing     Problem: SAFETY ADULT  Goal: Patient will remain free of falls  Description: INTERVENTIONS:  - Educate patient/family on patient safety including physical limitations  - Instruct patient to call for assistance with activity   - Consult OT/PT to assist with strengthening/mobility   - Keep Call bell within reach  - Keep bed low and locked with side rails adjusted as appropriate  - Keep care items and personal belongings within reach  - Initiate and maintain comfort rounds  - Make Fall Risk Sign visible to staff  - Offer Toileting every 2 Hours, in advance of need  - Initiate/Maintain bed alarm  - Obtain necessary fall risk management equipment: bed alarm  - Apply yellow socks and bracelet for high fall risk patients  - Consider moving patient to room near nurses station  Outcome: Progressing  Goal: Maintain or return to baseline ADL function  Description: INTERVENTIONS:  -  Assess patient's ability to carry out ADLs; assess patient's baseline for ADL function and identify physical deficits which impact ability to perform ADLs (bathing, care of mouth/teeth, toileting, grooming, dressing, etc )  - Assess/evaluate cause of self-care deficits   - Assess range of motion  - Assess patient's mobility; develop plan if impaired  - Assess patient's need for assistive devices and provide as appropriate  - Encourage maximum independence but intervene and supervise when necessary  - Involve family in performance of ADLs  - Assess for home care needs following discharge   - Consider OT consult to assist with ADL evaluation and planning for discharge  - Provide patient education as appropriate  Outcome: Progressing  Goal: Maintains/Returns to pre admission functional level  Description: INTERVENTIONS:  - Perform BMAT or MOVE assessment daily    - Set and communicate daily mobility goal to care team and patient/family/caregiver  - Collaborate with rehabilitation services on mobility goals if consulted  - Perform Range of Motion 3 times a day  - Reposition patient every 2 hours    - Dangle patient 3 times a day  - Stand patient 3 times a day  - Ambulate patient 3 times a day  - Out of bed to chair 3 times a day   - Out of bed for meals 3 times a day  - Out of bed for toileting  - Record patient progress and toleration of activity level   Outcome: Progressing     Problem: DISCHARGE PLANNING  Goal: Discharge to home or other facility with appropriate resources  Description: INTERVENTIONS:  - Identify barriers to discharge w/patient and caregiver  - Arrange for needed discharge resources and transportation as appropriate  - Identify discharge learning needs (meds, wound care, etc )  - Arrange for interpretive services to assist at discharge as needed  - Refer to Case Management Department for coordinating discharge planning if the patient needs post-hospital services based on physician/advanced practitioner order or complex needs related to functional status, cognitive ability, or social support system  Outcome: Progressing     Problem: Knowledge Deficit  Goal: Patient/family/caregiver demonstrates understanding of disease process, treatment plan, medications, and discharge instructions  Description: Complete learning assessment and assess knowledge base    Interventions:  - Provide teaching at level of understanding  - Provide teaching via preferred learning methods  Outcome: Progressing     Problem: Prexisting or High Potential for Compromised Skin Integrity  Goal: Skin integrity is maintained or improved  Description: INTERVENTIONS:  - Identify patients at risk for skin breakdown  - Assess and monitor skin integrity  - Assess and monitor nutrition and hydration status  - Monitor labs   - Assess for incontinence   - Turn and reposition patient  - Assist with mobility/ambulation  - Relieve pressure over bony prominences  - Avoid friction and shearing  - Provide appropriate hygiene as needed including keeping skin clean and dry  - Evaluate need for skin moisturizer/barrier cream  - Collaborate with interdisciplinary team   - Patient/family teaching  - Consider wound care consult   Outcome: Progressing

## 2021-12-28 NOTE — CASE MANAGEMENT
Case Management Discharge Planning Note    Patient name Geri Stoddard  Location [facility] 202/-66 MRN [id number]  : 1945 Date 2021       Current Admission Date: 2021  Current Admission Diagnosis:Frequent falls   Patient Active Problem List    Diagnosis Date Noted    Hypoxia 2021    Frequent falls 2021    Elevated d-dimer 2021    Venous insufficiency of both lower extremities 2021    Acquired bilateral hammer toes 2018    Lymphedema of both lower extremities 2018    Nonrheumatic mitral valve regurgitation 2018    Atrial fibrillation, chronic ([facility] ) 2018    B12 deficiency 2017    Chronic diastolic CHF (congestive heart failure) ([facility] ) 2017    Folate deficiency 2017    Vitamin D deficiency 2016    Depression 2015    Mixed hyperlipidemia 2015    Osteoarthritis of knee 10/23/2012    Impaired fasting glucose 2012    Essential hypertension 2010    Hypothyroidism 2010    Posttraumatic stress disorder 2010      LOS (days): 5  Geometric Mean LOS (GMLOS) (days): 4 80  Days to GMLOS:-0 1     OBJECTIVE:  Risk of Unplanned Readmission Score: 11         Current admission status: Inpatient   Preferred Pharmacy:   [address] [place] [id number]  Phone: [phone] Fax: [phone]    Primary Care Provider: Joselito Hsu MD    Primary Insurance: [facility]  Secondary Insurance:     DISCHARGE DETAILS:    Other Referral/Resources/Interventions Provided:  Interventions: Short Term Rehab  Referral Meli Valencia Authorization# [id number] Next Review Date: 21 - Fax updated clinicals to [phone]  CM forwarded same to Komal Ding at [facility] admissions  They are able to accept pt today  Transport set with [employer] at [employer]  pt and spouse aware and in agreement   Spouse aware of cost associated with transport and in agreement  Transport at Discharge : [address] by [address] and Unit #):  [address] [facility] - w/c Salvador Linton of Transport (Date): 12/28/21  ETA of Transport (Time): 2116     Transfer Mode: Wheelchair     Accepting Facility Name, [facility] : [address] Acute Rehab Unit  Receiving Facility/Agency Phone Number: [phone]

## 2021-12-28 NOTE — CASE MANAGEMENT
Case Management Discharge Planning Note    Patient name Mitra Bustos  Location [facility] 202/-40 MRN [id number]  : 1945 Date 2021       Current Admission Date: 2021  Current Admission Diagnosis:Frequent falls   Patient Active Problem List    Diagnosis Date Noted    Hypoxia 2021    Frequent falls 2021    Elevated d-dimer 2021    Venous insufficiency of both lower extremities 2021    Acquired bilateral hammer toes 2018    Lymphedema of both lower extremities 2018    Nonrheumatic mitral valve regurgitation 2018    At[facility] fibrillation, chronic ([facility] ) 2018    B12 deficiency 2017    Chronic diastolic CHF (congestive heart failure) ([facility] ) 2017    Folate deficiency 2017    Vitamin D deficiency 2016    Depression 2015    Mixed hyperlipidemia 2015    Osteoarthritis of knee 10/23/2012    Impaired fasting glucose 2012    Essential hypertension 2010    Hypothyroidism 2010    Posttraumatic stress disorder 2010      LOS (days): 5  Geometric Mean LOS (GMLOS) (days): 4 80  Days to GMLOS:-0 1     OBJECTIVE:  Risk of Unplanned Readmission Score: 11         Current admission status: Inpatient   Preferred Pharmacy:   [address] [place] [address]  [address]   [address] [place] [id number]  Phone: [phone] Fax: [phone]    Primary Care Provider: Elle Pate MD    Primary Insurance: [address] [place] [address]:     [address] [place] [address] Number: [address] Petrona Mccarthy Authorization# [id number] Next Review Date: 21 - Fax updated clinicals to [phone]

## 2021-12-28 NOTE — CASE MANAGEMENT
Case Management Discharge Planning Note    Patient name Ja Zhang  Location [facility] 202/-92 MRN [id number]  : 1945 Date 2021       Current Admission Date: 2021  Current Admission Diagnosis:Frequent falls   Patient Active Problem List    Diagnosis Date Noted    Hypoxia 2021    Frequent falls 2021    Elevated d-dimer 2021    Venous insufficiency of both lower extremities 2021    Acquired bilateral hammer toes 2018    Lymphedema of both lower extremities 2018    Nonrheumatic mitral valve regurgitation 2018    Atrial fibrillation, chronic ([facility] ) 2018    B12 deficiency 2017    Chronic diastolic CHF (congestive heart failure) ([facility] ) 2017    Folate deficiency 2017    Vitamin D deficiency 2016    Depression 2015    Mixed hyperlipidemia 2015    Osteoarthritis of knee 10/23/2012    Impaired fasting glucose 2012    Essential hypertension 2010    Hypothyroidism 2010    Posttraumatic stress disorder 2010      LOS (days): 5  Geometric Mean LOS (GMLOS) (days): 4 80  Days to GMLOS:0     OBJECTIVE:  Risk of Unplanned Readmission Score: 11         Current admission status: Inpatient   Preferred Pharmacy:   [address] [place] [address]  [address] [place], [place] [id number]  Phone: [phone] Fax: [phone]    Primary Care Provider: Simona Msat MD    Primary Insurance: [address] [place] [address]  Secondary Insurance:     DISCHARGE DETAILS:       Freedom of Choice: Yes     CM contacted family/caregiver?: Yes  Were Treatment Team discharge recommendations reviewed with patient/caregiver?: Yes  Did patient/caregiver verbalize understanding of patient care needs?: Yes  Were patient/caregiver advised of the risks associated with not following Treatment Team discharge recommendations?: Yes    Contacts  Patient Contacts: Jona Marmolejo - spouse  Relationship to Patient[de-identified] Family  Contact Method: Phone  Reason/Outcome: Discharge Planning              Other Referral/Resources/Interventions Provided:  Interventions: Short Term Rehab  Referral Comments: Per Prieto Gonsalez at [facility]  they are able to accept pt today for STR  They request COVID test and proof of vaccination status  CM uploaded proof of vaccination to [employer] as requested  CM updated pt and spouse and both are in agreement with STR at [address] [place] [address] as it is their first choice   CM tasked [employer] [facility] auth to  support staff, [facility] pending                                             IMM Given (Date):: 12/28/21  IMM Given to[de-identified] Family  Family notified[de-identified] Sujit Madrid

## 2021-12-28 NOTE — CASE MANAGEMENT
Case Management Discharge Planning Note    Patient name Demarcus Lemon  Location [facility] 202/-99 MRN [id number]  : 1945 Date 2021       Current Admission Date: 2021  Current Admission Diagnosis:Frequent falls   Patient Active Problem List    Diagnosis Date Noted    Hypoxia 2021    Frequent falls 2021    Elevated d-dimer 2021    Venous insufficiency of both lower extremities 2021    Acquired bilateral hammer toes 2018    Lymphedema of both lower extremities 2018    Nonrheumatic mitral valve regurgitation 2018    [facility] fibrillation, chronic ([facility] ) 2018    B12 deficiency 2017    Chronic diastolic CHF (congestive heart failure) ([facility] ) 2017    Folate deficiency 2017    Vitamin D deficiency 2016    Depression 2015    Mixed hyperlipidemia 2015    Osteoarthritis of knee 10/23/2012    Impaired fasting glucose 2012    Essential hypertension 2010    Hypothyroidism 2010    Posttraumatic stress disorder 2010      LOS (days): 5  Geometric Mean LOS (GMLOS) (days): 4 80  Days to GMLOS:0     OBJECTIVE:  Risk of Unplanned Readmission Score: 11         Current admission status: Inpatient   Preferred Pharmacy:   [address] [place] [address]  [address] [place] [address]  Phone: [phone] Fax: [phone]    Primary Care Provider: Veronica Cheema MD    Primary Insurance: [address] [place] [address]  Secondary Insurance:     [address] [place] [address] Number: Pending Authorization for [facility] - Request with clinicals faxed to Steven Mccarthy  at [phone]

## 2021-12-31 ENCOUNTER — LAB REQUISITION (OUTPATIENT)
Dept: LAB | Facility: HOSPITAL | Age: 76
End: 2021-12-31

## 2021-12-31 DIAGNOSIS — N17.9 ACUTE KIDNEY FAILURE, UNSPECIFIED (HCC): ICD-10-CM

## 2021-12-31 LAB
ANION GAP SERPL CALCULATED.3IONS-SCNC: 4 MMOL/L (ref 4–13)
BUN SERPL-MCNC: 21 MG/DL (ref 5–25)
CALCIUM SERPL-MCNC: 8.9 MG/DL (ref 8.3–10.1)
CHLORIDE SERPL-SCNC: 102 MMOL/L (ref 100–108)
CO2 SERPL-SCNC: 32 MMOL/L (ref 21–32)
CREAT SERPL-MCNC: 1.36 MG/DL (ref 0.6–1.3)
ERYTHROCYTE [DISTWIDTH] IN BLOOD BY AUTOMATED COUNT: 14.1 % (ref 11.6–15.1)
GFR SERPL CREATININE-BSD FRML MDRD: 37 ML/MIN/1.73SQ M
GLUCOSE SERPL-MCNC: 96 MG/DL (ref 65–140)
HCT VFR BLD AUTO: 31.8 % (ref 34.8–46.1)
HGB BLD-MCNC: 10 G/DL (ref 11.5–15.4)
MCH RBC QN AUTO: 30.7 PG (ref 26.8–34.3)
MCHC RBC AUTO-ENTMCNC: 31.4 G/DL (ref 31.4–37.4)
MCV RBC AUTO: 98 FL (ref 82–98)
PLATELET # BLD AUTO: 224 THOUSANDS/UL (ref 149–390)
PMV BLD AUTO: 9.6 FL (ref 8.9–12.7)
POTASSIUM SERPL-SCNC: 3.2 MMOL/L (ref 3.5–5.3)
RBC # BLD AUTO: 3.26 MILLION/UL (ref 3.81–5.12)
SODIUM SERPL-SCNC: 138 MMOL/L (ref 136–145)
WBC # BLD AUTO: 6.47 THOUSAND/UL (ref 4.31–10.16)

## 2021-12-31 PROCEDURE — 80048 BASIC METABOLIC PNL TOTAL CA: CPT | Performed by: INTERNAL MEDICINE

## 2021-12-31 PROCEDURE — 85027 COMPLETE CBC AUTOMATED: CPT | Performed by: INTERNAL MEDICINE

## 2022-01-05 ENCOUNTER — LAB REQUISITION (OUTPATIENT)
Dept: LAB | Facility: HOSPITAL | Age: 77
End: 2022-01-05

## 2022-01-05 DIAGNOSIS — Z11.59 ENCOUNTER FOR SCREENING FOR OTHER VIRAL DISEASES: ICD-10-CM

## 2022-01-05 PROCEDURE — U0003 INFECTIOUS AGENT DETECTION BY NUCLEIC ACID (DNA OR RNA); SEVERE ACUTE RESPIRATORY SYNDROME CORONAVIRUS 2 (SARS-COV-2) (CORONAVIRUS DISEASE [COVID-19]), AMPLIFIED PROBE TECHNIQUE, MAKING USE OF HIGH THROUGHPUT TECHNOLOGIES AS DESCRIBED BY CMS-2020-01-R: HCPCS | Performed by: INTERNAL MEDICINE

## 2022-01-05 PROCEDURE — U0005 INFEC AGEN DETEC AMPLI PROBE: HCPCS | Performed by: INTERNAL MEDICINE

## 2022-01-06 ENCOUNTER — LAB REQUISITION (OUTPATIENT)
Dept: LAB | Facility: HOSPITAL | Age: 77
End: 2022-01-06

## 2022-01-06 DIAGNOSIS — N17.9 ACUTE KIDNEY FAILURE, UNSPECIFIED (HCC): ICD-10-CM

## 2022-01-06 LAB
ANION GAP SERPL CALCULATED.3IONS-SCNC: 6 MMOL/L (ref 4–13)
BUN SERPL-MCNC: 21 MG/DL (ref 5–25)
CALCIUM SERPL-MCNC: 8.6 MG/DL (ref 8.3–10.1)
CHLORIDE SERPL-SCNC: 104 MMOL/L (ref 100–108)
CO2 SERPL-SCNC: 30 MMOL/L (ref 21–32)
CREAT SERPL-MCNC: 1.48 MG/DL (ref 0.6–1.3)
ERYTHROCYTE [DISTWIDTH] IN BLOOD BY AUTOMATED COUNT: 14.4 % (ref 11.6–15.1)
GFR SERPL CREATININE-BSD FRML MDRD: 34 ML/MIN/1.73SQ M
GLUCOSE SERPL-MCNC: 89 MG/DL (ref 65–140)
HCT VFR BLD AUTO: 33.4 % (ref 34.8–46.1)
HGB BLD-MCNC: 10 G/DL (ref 11.5–15.4)
MCH RBC QN AUTO: 30.2 PG (ref 26.8–34.3)
MCHC RBC AUTO-ENTMCNC: 29.9 G/DL (ref 31.4–37.4)
MCV RBC AUTO: 101 FL (ref 82–98)
PLATELET # BLD AUTO: 287 THOUSANDS/UL (ref 149–390)
PMV BLD AUTO: 9.3 FL (ref 8.9–12.7)
POTASSIUM SERPL-SCNC: 4 MMOL/L (ref 3.5–5.3)
RBC # BLD AUTO: 3.31 MILLION/UL (ref 3.81–5.12)
SARS-COV-2 RNA RESP QL NAA+PROBE: NEGATIVE
SODIUM SERPL-SCNC: 140 MMOL/L (ref 136–145)
WBC # BLD AUTO: 6.54 THOUSAND/UL (ref 4.31–10.16)

## 2022-01-06 PROCEDURE — 85027 COMPLETE CBC AUTOMATED: CPT | Performed by: INTERNAL MEDICINE

## 2022-01-06 PROCEDURE — 80048 BASIC METABOLIC PNL TOTAL CA: CPT | Performed by: INTERNAL MEDICINE

## 2022-06-02 ENCOUNTER — APPOINTMENT (EMERGENCY)
Dept: RADIOLOGY | Facility: HOSPITAL | Age: 77
DRG: 377 | End: 2022-06-02
Payer: COMMERCIAL

## 2022-06-02 ENCOUNTER — APPOINTMENT (INPATIENT)
Dept: NON INVASIVE DIAGNOSTICS | Facility: HOSPITAL | Age: 77
DRG: 377 | End: 2022-06-02
Payer: COMMERCIAL

## 2022-06-02 ENCOUNTER — HOSPITAL ENCOUNTER (INPATIENT)
Facility: HOSPITAL | Age: 77
LOS: 7 days | Discharge: HOME | DRG: 377 | End: 2022-06-09
Attending: EMERGENCY MEDICINE | Admitting: ANESTHESIOLOGY
Payer: COMMERCIAL

## 2022-06-02 DIAGNOSIS — K92.2 GI BLEED: Primary | ICD-10-CM

## 2022-06-02 DIAGNOSIS — I48.20 ATRIAL FIBRILLATION, CHRONIC (HCC): ICD-10-CM

## 2022-06-02 DIAGNOSIS — D64.9 SYMPTOMATIC ANEMIA: ICD-10-CM

## 2022-06-02 DIAGNOSIS — I34.0 NONRHEUMATIC MITRAL VALVE REGURGITATION: ICD-10-CM

## 2022-06-02 DIAGNOSIS — I95.9 HYPOTENSION: ICD-10-CM

## 2022-06-02 DIAGNOSIS — R53.1 WEAKNESS: ICD-10-CM

## 2022-06-02 PROBLEM — A41.9 SEVERE SEPSIS (HCC): Status: ACTIVE | Noted: 2022-06-02

## 2022-06-02 PROBLEM — R65.20 SEVERE SEPSIS (HCC): Status: RESOLVED | Noted: 2022-06-02 | Resolved: 2022-06-02

## 2022-06-02 PROBLEM — A41.9 SEVERE SEPSIS (HCC): Status: RESOLVED | Noted: 2022-06-02 | Resolved: 2022-06-02

## 2022-06-02 PROBLEM — N17.9 ACUTE RENAL FAILURE SUPERIMPOSED ON STAGE 3 CHRONIC KIDNEY DISEASE (HCC): Status: ACTIVE | Noted: 2022-06-02

## 2022-06-02 PROBLEM — R65.20 SEVERE SEPSIS (HCC): Status: ACTIVE | Noted: 2022-06-02

## 2022-06-02 PROBLEM — R79.1 SUPRATHERAPEUTIC INR: Status: ACTIVE | Noted: 2022-06-02

## 2022-06-02 PROBLEM — N18.30 ACUTE RENAL FAILURE SUPERIMPOSED ON STAGE 3 CHRONIC KIDNEY DISEASE (HCC): Status: ACTIVE | Noted: 2022-06-02

## 2022-06-02 PROBLEM — R79.89 ELEVATED TROPONIN: Status: ACTIVE | Noted: 2022-06-02

## 2022-06-02 PROBLEM — R94.31 EKG ABNORMALITIES: Status: ACTIVE | Noted: 2022-06-02

## 2022-06-02 PROBLEM — D62 ABLA (ACUTE BLOOD LOSS ANEMIA): Status: ACTIVE | Noted: 2022-06-02

## 2022-06-02 PROBLEM — R57.9 SHOCK (HCC): Status: ACTIVE | Noted: 2022-06-02

## 2022-06-02 PROBLEM — R77.8 ELEVATED TROPONIN: Status: ACTIVE | Noted: 2022-06-02

## 2022-06-02 LAB
2HR DELTA HS TROPONIN: -2 NG/L
4HR DELTA HS TROPONIN: 9 NG/L
ABO GROUP BLD: NORMAL
ABO GROUP BLD: NORMAL
ALBUMIN SERPL BCP-MCNC: 3.8 G/DL (ref 3.5–5)
ALP SERPL-CCNC: 66 U/L (ref 34–104)
ALT SERPL W P-5'-P-CCNC: 8 U/L (ref 7–52)
ANION GAP SERPL CALCULATED.3IONS-SCNC: 13 MMOL/L (ref 4–13)
AORTIC ROOT: 3.1 CM
APTT PPP: 44 SECONDS (ref 23–37)
AST SERPL W P-5'-P-CCNC: 27 U/L (ref 13–39)
ATRIAL RATE: 76 BPM
BASOPHILS # BLD AUTO: 0.03 THOUSANDS/ΜL (ref 0–0.1)
BASOPHILS NFR BLD AUTO: 0 % (ref 0–1)
BILIRUB SERPL-MCNC: 0.79 MG/DL (ref 0.2–1)
BLD GP AB SCN SERPL QL: NEGATIVE
BNP SERPL-MCNC: 853 PG/ML (ref 0–100)
BUN SERPL-MCNC: 41 MG/DL (ref 5–25)
CALCIUM SERPL-MCNC: 8.5 MG/DL (ref 8.4–10.2)
CARDIAC TROPONIN I PNL SERPL HS: 10 NG/L
CARDIAC TROPONIN I PNL SERPL HS: 19 NG/L
CARDIAC TROPONIN I PNL SERPL HS: 8 NG/L
CHLORIDE SERPL-SCNC: 100 MMOL/L (ref 96–108)
CO2 SERPL-SCNC: 18 MMOL/L (ref 21–32)
CREAT SERPL-MCNC: 2.32 MG/DL (ref 0.6–1.3)
E WAVE DECELERATION TIME: 98 MS
EOSINOPHIL # BLD AUTO: 0.01 THOUSAND/ΜL (ref 0–0.61)
EOSINOPHIL NFR BLD AUTO: 0 % (ref 0–6)
ERYTHROCYTE [DISTWIDTH] IN BLOOD BY AUTOMATED COUNT: 16.9 % (ref 11.6–15.1)
FRACTIONAL SHORTENING: 26 % (ref 28–44)
GFR SERPL CREATININE-BSD FRML MDRD: 19 ML/MIN/1.73SQ M
GLUCOSE SERPL-MCNC: 116 MG/DL (ref 65–140)
HCT VFR BLD AUTO: 18.8 % (ref 34.8–46.1)
HCT VFR BLD AUTO: 24.5 % (ref 34.8–46.1)
HGB BLD-MCNC: 5.4 G/DL (ref 11.5–15.4)
HGB BLD-MCNC: 7.3 G/DL (ref 11.5–15.4)
IMM GRANULOCYTES # BLD AUTO: 0.09 THOUSAND/UL (ref 0–0.2)
IMM GRANULOCYTES NFR BLD AUTO: 1 % (ref 0–2)
INR PPP: 2.64 (ref 0.84–1.19)
INTERVENTRICULAR SEPTUM IN DIASTOLE (PARASTERNAL SHORT AXIS VIEW): 0.9 CM
INTERVENTRICULAR SEPTUM: 0.9 CM (ref 0.6–1.1)
LACTATE SERPL-SCNC: 2 MMOL/L (ref 0.5–2)
LACTATE SERPL-SCNC: 3.4 MMOL/L (ref 0.5–2)
LACTATE SERPL-SCNC: 4 MMOL/L (ref 0.5–2)
LEFT ATRIUM SIZE: 4.8 CM
LEFT INTERNAL DIMENSION IN SYSTOLE: 3.7 CM (ref 2.1–4)
LEFT VENTRICULAR INTERNAL DIMENSION IN DIASTOLE: 5 CM (ref 3.5–6)
LEFT VENTRICULAR POSTERIOR WALL IN END DIASTOLE: 1 CM
LEFT VENTRICULAR STROKE VOLUME: 59 ML
LVSV (TEICH): 59 ML
LYMPHOCYTES # BLD AUTO: 1.08 THOUSANDS/ΜL (ref 0.6–4.47)
LYMPHOCYTES NFR BLD AUTO: 11 % (ref 14–44)
MCH RBC QN AUTO: 29.3 PG (ref 26.8–34.3)
MCHC RBC AUTO-ENTMCNC: 28.7 G/DL (ref 31.4–37.4)
MCV RBC AUTO: 102 FL (ref 82–98)
MONOCYTES # BLD AUTO: 0.92 THOUSAND/ΜL (ref 0.17–1.22)
MONOCYTES NFR BLD AUTO: 9 % (ref 4–12)
MV PEAK A VEL: 0.42 M/S
MV PEAK E VEL: 115 CM/S
MV STENOSIS PRESSURE HALF TIME: 28 MS
MV VALVE AREA P 1/2 METHOD: 7.86 CM2
NEUTROPHILS # BLD AUTO: 7.69 THOUSANDS/ΜL (ref 1.85–7.62)
NEUTS SEG NFR BLD AUTO: 79 % (ref 43–75)
NRBC BLD AUTO-RTO: 1 /100 WBCS
PA SYSTOLIC PRESSURE: 65 MMHG
PLATELET # BLD AUTO: 354 THOUSANDS/UL (ref 149–390)
PMV BLD AUTO: 9.6 FL (ref 8.9–12.7)
POTASSIUM SERPL-SCNC: 5.2 MMOL/L (ref 3.5–5.3)
PROCALCITONIN SERPL-MCNC: 0.08 NG/ML
PROT SERPL-MCNC: 6.4 G/DL (ref 6.4–8.4)
PROTHROMBIN TIME: 27.5 SECONDS (ref 11.6–14.5)
QRS AXIS: 77 DEGREES
QRSD INTERVAL: 98 MS
QT INTERVAL: 342 MS
QTC INTERVAL: 422 MS
RBC # BLD AUTO: 1.84 MILLION/UL (ref 3.81–5.12)
RH BLD: POSITIVE
RH BLD: POSITIVE
SL CV LV EF: 60
SL CV PED ECHO LEFT VENTRICLE DIASTOLIC VOLUME (MOD BIPLANE) 2D: 118 ML
SL CV PED ECHO LEFT VENTRICLE SYSTOLIC VOLUME (MOD BIPLANE) 2D: 59 ML
SODIUM SERPL-SCNC: 131 MMOL/L (ref 135–147)
SPECIMEN EXPIRATION DATE: NORMAL
T WAVE AXIS: 235 DEGREES
TR MAX PG: 49 MMHG
TR PEAK VELOCITY: 3.5 M/S
TRICUSPID VALVE PEAK REGURGITATION VELOCITY: 3.51 M/S
VENTRICULAR RATE: 92 BPM
WBC # BLD AUTO: 9.82 THOUSAND/UL (ref 4.31–10.16)

## 2022-06-02 PROCEDURE — 36415 COLL VENOUS BLD VENIPUNCTURE: CPT | Performed by: EMERGENCY MEDICINE

## 2022-06-02 PROCEDURE — 83880 ASSAY OF NATRIURETIC PEPTIDE: CPT | Performed by: EMERGENCY MEDICINE

## 2022-06-02 PROCEDURE — 84484 ASSAY OF TROPONIN QUANT: CPT | Performed by: EMERGENCY MEDICINE

## 2022-06-02 PROCEDURE — 86920 COMPATIBILITY TEST SPIN: CPT

## 2022-06-02 PROCEDURE — 99291 CRITICAL CARE FIRST HOUR: CPT | Performed by: EMERGENCY MEDICINE

## 2022-06-02 PROCEDURE — 85610 PROTHROMBIN TIME: CPT | Performed by: EMERGENCY MEDICINE

## 2022-06-02 PROCEDURE — 93010 ELECTROCARDIOGRAM REPORT: CPT | Performed by: INTERNAL MEDICINE

## 2022-06-02 PROCEDURE — 80053 COMPREHEN METABOLIC PANEL: CPT | Performed by: EMERGENCY MEDICINE

## 2022-06-02 PROCEDURE — 85018 HEMOGLOBIN: CPT | Performed by: NURSE PRACTITIONER

## 2022-06-02 PROCEDURE — P9016 RBC LEUKOCYTES REDUCED: HCPCS

## 2022-06-02 PROCEDURE — 30233N1 TRANSFUSION OF NONAUTOLOGOUS RED BLOOD CELLS INTO PERIPHERAL VEIN, PERCUTANEOUS APPROACH: ICD-10-PCS | Performed by: ANESTHESIOLOGY

## 2022-06-02 PROCEDURE — 99291 CRITICAL CARE FIRST HOUR: CPT | Performed by: NURSE PRACTITIONER

## 2022-06-02 PROCEDURE — 86850 RBC ANTIBODY SCREEN: CPT | Performed by: EMERGENCY MEDICINE

## 2022-06-02 PROCEDURE — 93005 ELECTROCARDIOGRAM TRACING: CPT

## 2022-06-02 PROCEDURE — 94760 N-INVAS EAR/PLS OXIMETRY 1: CPT

## 2022-06-02 PROCEDURE — 99223 1ST HOSP IP/OBS HIGH 75: CPT | Performed by: INTERNAL MEDICINE

## 2022-06-02 PROCEDURE — 96374 THER/PROPH/DIAG INJ IV PUSH: CPT

## 2022-06-02 PROCEDURE — 85025 COMPLETE CBC W/AUTO DIFF WBC: CPT | Performed by: EMERGENCY MEDICINE

## 2022-06-02 PROCEDURE — 71045 X-RAY EXAM CHEST 1 VIEW: CPT

## 2022-06-02 PROCEDURE — 83605 ASSAY OF LACTIC ACID: CPT | Performed by: NURSE PRACTITIONER

## 2022-06-02 PROCEDURE — 85014 HEMATOCRIT: CPT | Performed by: NURSE PRACTITIONER

## 2022-06-02 PROCEDURE — 87040 BLOOD CULTURE FOR BACTERIA: CPT | Performed by: EMERGENCY MEDICINE

## 2022-06-02 PROCEDURE — 99285 EMERGENCY DEPT VISIT HI MDM: CPT

## 2022-06-02 PROCEDURE — 94002 VENT MGMT INPAT INIT DAY: CPT

## 2022-06-02 PROCEDURE — 99292 CRITICAL CARE ADDL 30 MIN: CPT | Performed by: EMERGENCY MEDICINE

## 2022-06-02 PROCEDURE — 86900 BLOOD TYPING SEROLOGIC ABO: CPT | Performed by: EMERGENCY MEDICINE

## 2022-06-02 PROCEDURE — 86901 BLOOD TYPING SEROLOGIC RH(D): CPT | Performed by: EMERGENCY MEDICINE

## 2022-06-02 PROCEDURE — 93308 TTE F-UP OR LMTD: CPT | Performed by: ANESTHESIOLOGY

## 2022-06-02 PROCEDURE — 93306 TTE W/DOPPLER COMPLETE: CPT

## 2022-06-02 PROCEDURE — 83605 ASSAY OF LACTIC ACID: CPT | Performed by: EMERGENCY MEDICINE

## 2022-06-02 PROCEDURE — 85730 THROMBOPLASTIN TIME PARTIAL: CPT | Performed by: EMERGENCY MEDICINE

## 2022-06-02 PROCEDURE — C9113 INJ PANTOPRAZOLE SODIUM, VIA: HCPCS | Performed by: NURSE PRACTITIONER

## 2022-06-02 PROCEDURE — 99292 CRITICAL CARE ADDL 30 MIN: CPT | Performed by: ANESTHESIOLOGY

## 2022-06-02 PROCEDURE — 84145 PROCALCITONIN (PCT): CPT | Performed by: EMERGENCY MEDICINE

## 2022-06-02 PROCEDURE — 84484 ASSAY OF TROPONIN QUANT: CPT | Performed by: NURSE PRACTITIONER

## 2022-06-02 PROCEDURE — 93306 TTE W/DOPPLER COMPLETE: CPT | Performed by: INTERNAL MEDICINE

## 2022-06-02 RX ADMIN — PANTOPRAZOLE SODIUM 80 MG: 40 INJECTION, POWDER, FOR SOLUTION INTRAVENOUS at 14:37

## 2022-06-02 RX ADMIN — Medication 1500 UNITS: at 12:45

## 2022-06-02 RX ADMIN — SODIUM CHLORIDE 1000 ML: 0.9 INJECTION, SOLUTION INTRAVENOUS at 12:55

## 2022-06-02 RX ADMIN — Medication 8 MG/HR: at 18:47

## 2022-06-02 RX ADMIN — SODIUM CHLORIDE 1000 ML: 0.9 INJECTION, SOLUTION INTRAVENOUS at 11:40

## 2022-06-02 NOTE — CONSULTS
Consultation - [facility]  Sorin  68 y o  female MRN: [id number]  Unit/Bed#: YULISA Encounter: [id number]        Inpatient consult to gastroenterology  Consult performed by: Elizabeth Hurt DO  Consult ordered by: Amparo Higgins DO          Reason for Consult / Principal Problem:     Anemia, black stools      ASSESSMENT AND PLAN:      67 y/o female with A fib on Eliquis, diastolic heart failure, MVR, presenting with shortness of breath and chest pain with one week history of intermittent black stools, found to have Hb of 5 4 with associated hypotension  Patient should certainly have EGD for diagnostic purposes once clinically stable  Given dynamic EKG changes, appropriate resuscitation/evaluation of underlying cardiac abnormalities should be priority prior to EGD and administration of anesthesia  Per ICU, patient to be placed on BIPAP for ongoing shortness of breath and increased work of breathing  She will be medically optimized and will plan for EGD likely tomorrow 6/3/22  The bleeding history seems to be subacute, with intermittent black stools  Broad differential for this including but not limited to PUD, severe esophagitis/gastritis, angioectasias, or much less likely upper GI malignancy or right sided colon lesions  She is receiving 2 units PRBCs, Kcentra, and fluids  Recommendations:  Transfuse for goal Hb >7  Maintain 2 large bore IVs  PPI gtt  NPO  Plan tentatively for EGD tomorrow 6/3/22 after adequate resuscitation and cardiac evaluation    ______________________________________________________________________    HPI:  Ms Tanmay Luke is a 67 y/o female with A fib on Eliquis, CKD III, diastolic heart failure, mitral valve regurgitation, presenting to ED today due to shortness of breath and chest pain  Roughly 1 week ago she noted intermittent dark stools  Her last bowel movement was yesterday   She denies abdominal pain, nausea/vomiting/hematemesis  Upon arrival to ED she was hypotensive  EKG was concerning for dynamic changes concerning for ischemia  Blood work demonstrated anemia with Hb 5 4 (baseline 10 in January 2022)  Creatinine elevated at 2 32, BUN 41  Liver test were within normal limits  INR was 2 64  CXR demonstrated bilateral interstitial infiltrates  She last had an EGD/colonoscopy 10 years ago and does not recall the results  She denies chronic heartburn/regurgitation, dysphagia, diarrhea, constipation  There is no family history of GI disease to her knowledge  REVIEW OF SYSTEMS:    CONSTITUTIONAL: Denies any fever, chills, rigors, and weight loss  HEENT: No earache or tinnitus  Denies hearing loss or visual disturbances  CARDIOVASCULAR: No chest pain or palpitations  RESPIRATORY: Denies any cough, hemoptysis, shortness of breath or dyspnea on exertion  GASTROINTESTINAL: As noted in the History of Present Illness  GENITOURINARY: No problems with urination  Denies any hematuria or dysuria  NEUROLOGIC: No dizziness or vertigo, denies headaches  MUSCULOSKELETAL: Denies any muscle or joint pain  SKIN: Denies skin rashes or itching  ENDOCRINE: Denies excessive thirst  Denies intolerance to heat or cold  PSYCHOSOCIAL: Denies depression or anxiety  Denies any recent memory loss  Historical Information   Past Medical History:   Diagnosis Date    Disease of thyroid gland     Hypotension      Past Surgical History:   Procedure Laterality Date    HYSTERECTOMY       Social History   Social History     Substance and Sexual Activity   Alcohol Use Never    Comment: occassional      Social History     Substance and Sexual Activity   Drug Use Never     Social History     Tobacco Use   Smoking Status Never Smoker   Smokeless Tobacco Never Used     History reviewed  No pertinent family history      Meds/Allergies     (Not in a hospital admission)    Current Facility-Administered Medications Medication Dose Route Frequency    pantoprazole (PROTONIX) 80 mg in sodium chloride 0 9 % 100 mL IVPB  80 mg Intravenous Once       No Known Allergies        Objective     Blood pressure (!) 90/37, pulse 86, temperature (!) 97 4 °F (36 3 °C), resp  rate 18, height 5' 3" (1 6 m), weight 67 1 kg (148 lb), SpO2 98 %  Body mass index is 26 22 kg/m²  Intake/Output Summary (Last 24 hours) at 6/2/2022 1338  Last data filed at 6/2/2022 1317  Gross per 24 hour   Intake 1350 ml   Output --   Net 1350 ml         PHYSICAL EXAM:      General Appearance:   Alert, cooperative, mild distress with breathing   HEENT:   Normocephalic, atraumatic, anicteric      Neck:  Supple, symmetrical, trachea midline   Lungs:   Increased respiratory rate, decreased breath sounds b/l   Heart[de-identified]   Regular rate and rhythm; no murmur, rub, or gallop     Abdomen:   Soft, non-tender, non-distended; normal bowel sounds; no masses, no organomegaly    Genitalia:   Deferred    Rectal:   Deferred    Extremities:  No cyanosis, clubbing or edema    Pulses:  2+ and symmetric all extremities    Skin:  No jaundice, rashes, or lesions    Lymph nodes:  No palpable cervical lymphadenopathy        Lab Results:   Admission on 06/02/2022   Component Date Value    WBC 06/02/2022 9 82     RBC 06/02/2022 1 84 (A)    Hemoglobin 06/02/2022 5 4 (A)    Hematocrit 06/02/2022 18 8 (A)    MCV 06/02/2022 102 (A)    MCH 06/02/2022 29 3     MCHC 06/02/2022 28 7 (A)    RDW 06/02/2022 16 9 (A)    MPV 06/02/2022 9 6     Platelets 46/06/2965 354     nRBC 06/02/2022 1     Neutrophils Relative 06/02/2022 79 (A)    Immat GRANS % 06/02/2022 1     Lymphocytes Relative 06/02/2022 11 (A)    Monocytes Relative 06/02/2022 9     Eosinophils Relative 06/02/2022 0     Basophils Relative 06/02/2022 0     Neutrophils Absolute 06/02/2022 7 69 (A)    Immature Grans Absolute 06/02/2022 0 09     Lymphocytes Absolute 06/02/2022 1 08     Monocytes Absolute 06/02/2022 0 92     Eosinophils Absolute 06/02/2022 0 01     Basophils Absolute 06/02/2022 0 03     Sodium 06/02/2022 131 (A)    Potassium 06/02/2022 5 2     Chloride 06/02/2022 100     CO2 06/02/2022 18 (A)    ANION GAP 06/02/2022 13     BUN 06/02/2022 41 (A)    Creatinine 06/02/2022 2 32 (A)    Glucose 06/02/2022 116     Calcium 06/02/2022 8 5     AST 06/02/2022 27     ALT 06/02/2022 8     Alkaline Phosphatase 06/02/2022 66     Total Protein 06/02/2022 6 4     Albumin 06/02/2022 3 8     Total Bilirubin 06/02/2022 0 79     eGFR 06/02/2022 19     LACTIC ACID 06/02/2022 4 0 (A)    Procalcitonin 06/02/2022 0 08     Protime 06/02/2022 27 5 (A)    INR 06/02/2022 2 64 (A)    PTT 06/02/2022 44 (A)    hs TnI 0hr 06/02/2022 10     BNP 06/02/2022 853 (A)    Ventricular Rate 06/02/2022 92     Atrial Rate 06/02/2022 76     QRSD Interval 06/02/2022 98     QT Interval 06/02/2022 342     QTC Interval 06/02/2022 422     QRS Axis 06/02/2022 77     T Wave Axis 06/02/2022 235     ABO Grouping 06/02/2022 O     Rh Factor 06/02/2022 Positive     Antibody Screen 06/02/2022 Negative     Specimen Expiration Date 06/02/2022 [id number]     Unit Product Code 06/02/2022 [id number]     Unit Number 06/02/2022 [id number]     Unit ABO 06/02/2022 O     Unit RH 06/02/2022 POS     Crossmatch 06/02/2022 Compatible     Unit Dispense Status 06/02/2022 Issued     Unit Product Volume 06/02/2022 350     Unit Product Code 06/02/2022 [id number]     Unit Number 06/02/2022 [id number]     Unit ABO 06/02/2022 O     Unit RH 06/02/2022 POS     Crossmatch 06/02/2022 Compatible     Unit Dispense Status 06/02/2022 Crossmatched     Unit Product Volume 06/02/2022 350     ABO Grouping 06/02/2022 O     Rh Factor 06/02/2022 Positive        Imaging Studies: I have personally reviewed pertinent imaging studies

## 2022-06-02 NOTE — ASSESSMENT & PLAN NOTE
· Presented with hgb 5 4 and complaints of dark tarry stools/generalized weakness over the past couple days  · Given protonix bolus in ED, will start infusion  · 2 u pRBC being given   · Trend hemoglobin at least Q6H  · GI consult pending, appreciate input  · Transfuse for hgb < 7

## 2022-06-02 NOTE — ASSESSMENT & PLAN NOTE
Lab Results   Component Value Date    EGFR 19 06/02/2022    EGFR 34 01/06/2022    EGFR 36 01/03/2022    CREATININE 2 32 (H) 06/02/2022    CREATININE 1 48 (H) 01/06/2022    CREATININE 1 41 (H) 01/03/2022     · History of CKD 3, baseline creatinine appears to be 1 3-1 4, 2 32 on admission  · Likely prerenal in nature  · Continue with resuscitation  · Monitor I & O and renal indices  · Hold nephrotoxic medications

## 2022-06-02 NOTE — RESPIRATORY THERAPY NOTE
06/02/22 1407   Non-Invasive Information   O2 Interface Device Full face mask   Non-Invasive Ventilation Mode BiPAP   $ Continous NIV Initial   SpO2 95 %   $ Pulse Oximetry Spot Check Charge Completed   Non-Invasive Settings   IPAP (cm) 10 cm   EPAP (cm) 5 cm   Rate (Set) 10   FiO2 (%) 45   Pressure Support (cm H2O) 5   Rise Time 2   Inspiratory Time (Set) 1   Non-Invasive Readings   Skin Intervention Skin intact   Total Rate 27   Peak Pressure (Obs) 10   Spontaneous Vt (mL) 479   Leak (lpm) 4   Non-Invasive Alarms   Insp Pressure High (cm H20) 40   Insp Pressure Low (cm H20) 4   Low Insp Pressure Time (sec) 20 sec   MV Low (L/min) 3   Vt High (mL) 1300   Vt Low (mL) 200   High Resp Rate (BPM) 40 BPM   Low Resp Rate (BPM) 4 BPM   Apnea Interval (sec) 20

## 2022-06-02 NOTE — ASSESSMENT & PLAN NOTE
Wt Readings from Last 3 Encounters:   06/02/22 67 1 kg (148 lb)   12/22/21 82 6 kg (182 lb)   12/10/21 83 kg (182 lb 15 7 oz)     · Based on chart review from cardiology notes  · Will assess cardiac function with POC US   · Formal echo pending   · Takes torsemide as outpatient  · Hold diuretics in the setting of hypotension and DIEGO  · Daily weight  · Monitor respiratory status closely with volume resuscitation

## 2022-06-02 NOTE — NURSING NOTE
Patient received to ICU 12 dx acute blood loss anemia  Patient anxious with transfer from litter to bed  Respiratory rate 40's with minimal exertion on 3L nasal cannula O2  SaO2 dropped to 85%  Provider aware (at bedside)  Asked respiratory therapist to place on bipap  IV in right Ac had blood running site infiltrated  Hard stick per ED nurse  Critical care attendings will place ultrasound guided IV

## 2022-06-02 NOTE — ASSESSMENT & PLAN NOTE
· AEB hypotension, lactic acidosis and DIEGO  · Likely related to ABLA  · Lactic 4 on admission, given 2L IV fluid, continue to trend lactic  · No clear source of infection   · Blood cultures pending  · UA pending  · Procal WNL, will repeat in AM  · 2 u PRBC being given  · Continue to monitor hemoglobin and transfuse for hgb < 7  · Trend fever curve and WBC  · Monitor hemodynamics closely

## 2022-06-02 NOTE — ED NOTES
Repeat ECG completed  Patient found reaching and sitting forward  States her neck is uncomfortable  Repositioned  Patient reporting some difficulty breathing, NSS bolus infusing, near completion of first liter       Jonas Taylor RN  06/02/22 [id number]

## 2022-06-02 NOTE — RESPIRATORY THERAPY NOTE
06/02/22 1628   Respiratory Assessment   Resp Comments Bipap placed on stand by as per Critical care   Patient placed on 3 liters O2   Oxygen Therapy/Pulse Ox   O2 Device Nasal cannula   O2 Therapy Oxygen   Nasal Cannula O2 Flow Rate (L/min) 3 L/min   Calculated FIO2 (%) - Nasal Cannula 32   SpO2 95 %   SpO2 Activity At Rest   $ Pulse Oximetry Spot Check Charge Completed   NILDA velazquez

## 2022-06-02 NOTE — ASSESSMENT & PLAN NOTE
· Slightly elevated troponin with EKG changes notes  · Cardiology aware  · Possibly demand related although concerns for RWMA and RV dilation on bedside POCUS  · Will need to manage acute issues of ABLA prior to considering evaluation for ischemic workup  · Continue to trend troponin  · Patient denies CP   · Monitor EKG  · Formal echo pending

## 2022-06-02 NOTE — ASSESSMENT & PLAN NOTE
· History of afib on Eliquis, metoprolol and digoxin as outpatient  · Follows with cardiology at [facility]  · Holding rate control medications in the setting of hypotension   · Hold Eliquis in the setting of ABLA  · Remains rate controlled as of now  · Continue to monitor telemetry

## 2022-06-02 NOTE — ED PROVIDER NOTES
History  Chief Complaint   Patient presents with    Weakness - Generalized    Shortness of Breath     Weakness and SOB increasing over last week  HPI      This is a 70-year-old female presents the emergency department generalized weakness, shortness of breath, and substernal chest pain  Substernal chest pain lasting less than 15 minutes while she was in the emergency department being initially evaluated  Patient has a relevant past medical history of atrial fibrillation, chronic diastolic heart failure, non rheumatic mitral valve regurgitation, history of hypertension, mixed hyperlipidemia and chronic venous insufficiency of the lower extremity  By history patient reports that she has had some bloody bowel movements a week ago  Also has a history of chronic kidney disease stage 3  Followed by [facility]  According to the note as of 2021 from cardiology [facility], there is a 2D echo noted as of 2017 to be normal   Prior to Admission Medications   Prescriptions Last Dose Informant Patient Reported? Taking?    Magnesium Gluconate 550 MG TABS   Yes No   Sig: Take 30 mg by mouth 2 (two) times a day   apixaban (ELIQUIS) 5 mg   Yes No   Sig: Take 5 mg by mouth 2 (two) times a day   atorvastatin (LIPITOR) 40 mg tablet   No No   Sig: Take 1 tablet (40 mg total) by mouth daily with dinner   cyanocobalamin 1,000 mcg/mL   Yes No   Si mL   folic acid (FOLVITE) 1 mg tablet   Yes No   Sig: Take 1 tablet by mouth daily   levothyroxine 150 mcg tablet   Yes No   Sig: Take 150 mcg by mouth daily   metoprolol tartrate (LOPRESSOR) 50 mg tablet   Yes No   Sig: Take 50 mg by mouth 2 (two) times a day   potassium chloride (K-DUR,KLOR-CON) 20 mEq tablet   Yes No   Sig: Take 2 tablets by mouth daily   sertraline (ZOLOFT) 100 mg tablet   Yes No   Sig: Take 1 tablet by mouth daily   torsemide (DEMADEX) 20 mg tablet   Yes No   Sig: TAKE TWO TABLETS BY MOUTH TWICE A DAY (MORNING AND EVENING)  CAN TAKE ADDITIONAL ONE TABLET DAILY AS NEEDED FOR SWELLING      Facility-Administered Medications: None       Past Medical History:   Diagnosis Date    Disease of thyroid gland     Hypotension        Past Surgical History:   Procedure Laterality Date    HYSTERECTOMY         History reviewed  No pertinent family history  I have reviewed and agree with the history as documented  E-Cigarette/Vaping    E-Cigarette Use Never User      E-Cigarette/Vaping Substances    Nicotine No     Flavoring No      Social History     Tobacco Use    Smoking status: Never Smoker    Smokeless tobacco: Never Used   Vaping Use    Vaping Use: Never used   Substance Use Topics    Alcohol use: Never     Comment: occassional     Drug use: Never       Review of Systems   Constitutional: Positive for fatigue  Negative for chills and fever  Eyes: Negative  Respiratory: Positive for chest tightness and shortness of breath  Negative for apnea, cough and wheezing  Cardiovascular: Positive for chest pain and leg swelling  Endocrine: Negative  Genitourinary: Negative  Musculoskeletal: Negative  Skin: Negative  Allergic/Immunologic: Negative  Neurological: Negative  Hematological: Negative  Psychiatric/Behavioral: Negative  Physical Exam  Physical Exam  Vitals and nursing note reviewed  Constitutional:       General: She is in acute distress  Appearance: She is ill-appearing, toxic-appearing and diaphoretic  HENT:      Head: Normocephalic and atraumatic  Mouth/Throat:      Mouth: Mucous membranes are moist       Pharynx: Oropharynx is clear  Eyes:      Extraocular Movements: Extraocular movements intact  Pupils: Pupils are equal, round, and reactive to light  Neck:      Thyroid: No thyromegaly  Cardiovascular:      Rate and Rhythm: Normal rate  Rhythm irregular  Pulmonary:      Effort: Tachypnea, accessory muscle usage and respiratory distress present        Breath sounds: Examination of the right-middle field reveals rales  Examination of the left-middle field reveals rales  Examination of the right-lower field reveals rales  Examination of the left-lower field reveals rales  Rales present  Abdominal:      General: Bowel sounds are normal       Palpations: Abdomen is soft  Genitourinary:     Rectum: Guaiac result positive  Musculoskeletal:      Cervical back: Normal range of motion and neck supple  Right lower leg: Tenderness present  Edema present  Left lower leg: Tenderness present  Edema present  Skin:     General: Skin is warm  Capillary Refill: Capillary refill takes 2 to 3 seconds  Coloration: Skin is cyanotic and pale  Neurological:      General: No focal deficit present  Mental Status: She is alert and oriented to person, place, and time     Psychiatric:         Mood and Affect: Mood normal          Behavior: Behavior normal          Vital Signs  ED Triage Vitals [06/02/22 1046]   Temperature Pulse Respirations Blood Pressure SpO2   (!) 97 4 °F (36 3 °C) 90 20 (!) 83/53 100 %      Temp Source Heart Rate Source Patient Position - Orthostatic VS BP Location FiO2 (%)   Oral Monitor Lying Left arm --      Pain Score       No Pain           Vitals:    06/02/22 1230 06/02/22 1238 06/02/22 1245 06/02/22 1317   BP: (!) 76/52 (!) 85/50 (!) 86/48 (!) 90/37   Pulse: 91 95 93 86   Patient Position - Orthostatic VS:  Lying           Visual Acuity  Visual Acuity    Flowsheet Row Most Recent Value   L Pupil Size (mm) 3   R Pupil Size (mm) 3          ED Medications  Medications   pantoprazole (PROTONIX) 80 mg in sodium chloride 0 9 % 100 mL IVPB (has no administration in time range)   pantoprazole (PROTONIX) 80 mg in sodium chloride 0 9 % 100 mL infusion (has no administration in time range)   sodium chloride 0 9 % bolus 1,000 mL (0 mL Intravenous Stopped 6/2/22 1210)     Followed by   sodium chloride 0 9 % bolus 1,000 mL (1,000 mL Intravenous New Bag 6/2/22 1255)   prothrombin complex conc human (KCENTRA) 1,500 Units (1,500 Units Intravenous Given 6/2/22 1245)       Diagnostic Studies  Results Reviewed     Procedure Component Value Units Date/Time    Procalcitonin [[id number]]  (Normal) Collected: 06/02/22 1138    Lab Status: Final result Specimen: Blood from Arm, Left Updated: 06/02/22 1220     Procalcitonin 0 08 ng/ml     Lactic acid [[id number]]  (Abnormal) Collected: 06/02/22 1138    Lab Status: Final result Specimen: Blood from Arm, Left Updated: 06/02/22 1219     LACTIC ACID 4 0 mmol/L     Narrative:      Result may be elevated if tourniquet was used during collection      Lactic acid 2 Hours [[id number]]     Lab Status: No result Specimen: Blood     B-Type Natriuretic Peptide(BNP), AN, CA, EA Campuses Only [[id number]]  (Abnormal) Collected: 06/02/22 1138    Lab Status: Final result Specimen: Blood from Arm, Left Updated: 06/02/22 1217      pg/mL     HS Troponin I 2hr [[id number]]     Lab Status: No result Specimen: Blood     HS Troponin I 4hr [[id number]]     Lab Status: No result Specimen: Blood     HS Troponin 0hr (reflex protocol) [[id number]]  (Normal) Collected: 06/02/22 1138    Lab Status: Final result Specimen: Blood from Arm, Left Updated: 06/02/22 1217     hs TnI 0hr 10 ng/L     Comprehensive metabolic panel [[id number]]  (Abnormal) Collected: 06/02/22 1138    Lab Status: Final result Specimen: Blood from Arm, Left Updated: 06/02/22 1216     Sodium 131 mmol/L      Potassium 5 2 mmol/L      Chloride 100 mmol/L      CO2 18 mmol/L      ANION GAP 13 mmol/L      BUN 41 mg/dL      Creatinine 2 32 mg/dL      Glucose 116 mg/dL      Calcium 8 5 mg/dL      AST 27 U/L      ALT 8 U/L      Alkaline Phosphatase 66 U/L      Total Protein 6 4 g/dL      Albumin 3 8 g/dL      Total Bilirubin 0 79 mg/dL      eGFR 19 ml/min/1 73sq m     Narrative:      [place] guidelines for Chronic Kidney Disease (CKD):     Stage 1 with normal or high GFR (GFR > 90 mL/min/1 73 square meters)    Stage 2 Mild CKD (GFR = 60-89 mL/min/1 73 square meters)    Stage 3A Moderate CKD (GFR = 45-59 mL/min/1 73 square meters)    Stage 3B Moderate CKD (GFR = 30-44 mL/min/1 73 square meters)    Stage 4 Severe CKD (GFR = 15-29 mL/min/1 73 square meters)    Stage 5 End Stage CKD (GFR <15 mL/min/1 73 square meters)  Note: GFR calculation is accurate only with a steady state creatinine    Protime-INR [[id number]]  (Abnormal) Collected: 06/02/22 1138    Lab Status: Final result Specimen: Blood from Arm, Left Updated: 06/02/22 1214     Protime 27 5 seconds      INR 2 64    APTT [[id number]]  (Abnormal) Collected: 06/02/22 1138    Lab Status: Final result Specimen: Blood from Arm, Left Updated: 06/02/22 1214     PTT 44 seconds     CBC and differential [[id number]]  (Abnormal) Collected: 06/02/22 1138    Lab Status: Final result Specimen: Blood from Arm, Left Updated: 06/02/22 1210     WBC 9 82 Thousand/uL      RBC 1 84 Million/uL      Hemoglobin 5 4 g/dL      Hematocrit 18 8 %       fL      MCH 29 3 pg      MCHC 28 7 g/dL      RDW 16 9 %      MPV 9 6 fL      Platelets 490 Thousands/uL      nRBC 1 /100 WBCs      Neutrophils Relative 79 %      Immat GRANS % 1 %      Lymphocytes Relative 11 %      Monocytes Relative 9 %      Eosinophils Relative 0 %      Basophils Relative 0 %      Neutrophils Absolute 7 69 Thousands/µL      Immature Grans Absolute 0 09 Thousand/uL      Lymphocytes Absolute 1 08 Thousands/µL      Monocytes Absolute 0 92 Thousand/µL      Eosinophils Absolute 0 01 Thousand/µL      Basophils Absolute 0 03 Thousands/µL     Blood culture #2 [[id number]] Collected: 06/02/22 1138    Lab Status: In process Specimen: Blood from Arm, Right Updated: 06/02/22 1145    Blood culture #1 [[id number]] Collected: 06/02/22 1138    Lab Status:  In process Specimen: Blood from Arm, Left Updated: 06/02/22 1145    UA w Reflex to Microscopic w Reflex to Culture [[id number]]     Lab Status: No result Specimen: Urine                  XR chest 1 view portable   Final Result by Zaira Lawler MD (06/02 3459)      Bilateral reticular interstitial infiltrates  Findings could reflect developing interstitial pneumonia or other interstitial lung disease  Correlate with Covid 19 test          The study was marked in EPIC for immediate notification  Workstation performed: [id number]                    Procedures  ECG 12 Lead Documentation Only    Date/Time: 6/2/2022 10:52 AM  Performed by: Seven Salgado DO  Authorized by: Gulf Flash III, DO     Indications / Diagnosis:  Chest pain  ECG reviewed by me, the ED Provider: yes    Patient location:  ED  Comments:      I personally reviewed this EKG that was performed the patient June 2, 2022, EKG was completed at 10:50 a m  and interpreted by me at 10:52 a m , atrial fibrillation with a ventricular rate of 92 beats per minute, it appears the patient does have some flipped T-waves in V2, V3, V4, V5, V6 and lead 2, ST elevation 1 mm in AVR in V1 no prior EKGs to compare to  ECG 12 Lead Documentation Only    Date/Time: 6/2/2022 11:59 AM  Performed by: Seven Salgado DO  Authorized by: Gulf Flash III, DO     Indications / Diagnosis:  Chest pain  ECG reviewed by me, the ED Provider: yes    Patient location:  ED  Comments:      I personally reviewed this EKG is performed the patient June 2, 2022, EKG was completed 11:52 a m  and interpreted by me at 11:59 a m  atrial fibrillation with incomplete right bundle-branch block, patient known to have appears to be ST elevation in AVR and V1 1 mm each with reciprocal flipped T-waves with ST depression in V2, V3, V4, V5, V6, lead 1, lead to, lead 3 and AVF  Change from prior tracing that was done  at 10:50 a m  today    ECG 12 Lead Documentation Only    Date/Time: 6/2/2022 12:42 PM  Performed by: Seven Salgado DO  Authorized by: Sherlie Lesches Sanjeev Dang III, DO     Indications / Diagnosis:  Chest pain  ECG reviewed by me, the ED Provider: yes    Patient location:  ED  Comments:      I personally reviewed this EKG that was performed the patient June 2, 2022, EKG was completed at 12:41 p m , interpreted by me at 12:42 p m     Atrial fibrillation with again 1 mm ST elevation in AVR and V1, reciprocal changes noted in anterior lateral leads this as prior tracings that was done today at 11:59 a m  [facility] CriticalCare Time  Performed by: Amparo Higgins DO  Authorized by: Amparo Higgins DO     Critical care provider statement:     Critical care time (minutes):  120    Critical care start time:  6/2/2022 11:20 AM    Critical care end time:  6/2/2022 1:39 PM    Critical care time was exclusive of:  Separately billable procedures and treating other patients    Critical care was necessary to treat or prevent imminent or life-threatening deterioration of the following conditions:  Cardiac failure, circulatory failure, respiratory failure and CNS failure or compromise             ED Course  ED Course as of 06/02/22 1409   Thu Jun 02, 2022   1119 EKG was sent to Dr Zana Frey, concerning for acute dynamic changes, no prior EKGs to compare to    1124 D/w cards, Dr Benton Luis, reviewed presentation and interpretation of the EKG noted there is some ST elevation in lead V1 and AVR not consider diagnostic for STEMI alert, no prior EKGs to compare to, based on the presentation concerning for volume overload, advised a repeat EKG acute 15 minutes and to run the case by interventionalist    1133 EKG images sent to Dr Una Lamb, interventionalist on call at [facility]     1137 D/w Dr Jacob Vernon, reviewed EKG noted that the patient will require intervention not emergently, concern for ischemia on EKG, will trend out basic labs and serial EKGs   1204 Case reviewed with Dr Janna Locke, noted the repeat EKG showed significant dynamic EKG changes with ST depression anterolateral leads, patient's blood pressure dropped down to 74 systolic, rectal exam showed dark tarry black stools consistent with GI bleed, patient appears to be pale, 2nd IV was advised to be placed by the RN    1209 Noted patient's blood pressure now is 097 systolic, hemoglobin of 5 4, will emergently transfuse the patient  1218 Due the fact the patient is on Eliquis, will start the patient on Kcentra  1223 Consent obtained for blood  1226 Attempted to contact pt : Daniel Trimble @ [phone], no answer, left message  1234 Reviewed goals of care with the patient, noted that patient is on able to make a definitive decision in terms: aggressive management at this point, patient requesting her  VD consulted, I called personally left a message and the glover clerk left a message to attempt to contact him  Next kid her son recently  2 months ago  1236 Critical care consulted  [address] [facility] Dr Ivy Menchaca, tiger texted, will come down to assess, tiger text sent to Dr Usama Lua from GI    1256 Dr Ivy Menchaca at bedside  Initial Sepsis Screening     Row Name 22 1223                Is the patient's history suggestive of a new or worsening infection? No  Concern for GI bleed  -GC        Suspected source of infection --        Are two or more of the following signs & symptoms of infection both present and new to the patient? --        Indicate SIRS criteria --        If the answer is yes to both questions, suspicion of sepsis is present --        If severe sepsis is present AND tissue hypoperfusion perists in the hour after fluid resuscitation or lactate > 4, the patient meets criteria for SEPTIC SHOCK --        Are any of the following organ dysfunction criteria present within 6 hours of suspected infection and SIRS criteria that are NOT considered to be chronic conditions?  --        Organ dysfunction --        Date of presentation of severe sepsis --        Time of presentation of severe sepsis --        Tissue hypoperfusion persists in the hour after crystalloid fluid administration, evidenced, by either: --        Was hypotension present within one hour of the conclusion of crystalloid fluid administration? --        Date of presentation of septic shock --        Time of presentation of septic shock --              User Key  (r) = Recorded By, (t) = Taken By, (c) = Cosigned By    [address] Name Provider Type    [address] [place] [address] III, DO Physician                              MDM  Number of Diagnoses or Management Options  Atrial fibrillation, chronic ([facility] [address] )  GI bleed  Hypotension  Symptomatic anemia  Weakness  Diagnosis management comments: This is a critically ill 66-year-old female presents the emergency department with increased shortness of breath, chest pain, dark tarry stools over the last week, on Eliquis for AFib, presents the emergency department with chief complaint of chest pain  Initial EKG showed the patient had ischemic changes with hypotension of 83 systolic  Patient's blood pressure dropped down to the lowest point which was 74 systolic  Patient appeared to be pale, baseline hemoglobin is 11 8 and hematocrit is 36 4 as of February 2022, rectal heme positive dark black stool  Cardiology was initially consulted due to the shortness of breath and hypotension with abnormal EKG, initial impression was the patient may need to be sent out to [place] for cardiac catheterization, patient then had dynamic EKG changes with hypotension  Once hemoglobin came back below 6 in light of the dark black stool it became apparent that the patient need to be stabilized with blood products, and Neil Toth is the patient is on Eliquis for AFib  Critical care and GI was consulted emergently, will admit to the critical care further stabilization  Portions of the record may have been created with voice recognition software   Occasional wrong word or "sound a like" substitutions may have occurred due to the inherent limitations of voice recognition software  Read the chart carefully and recognize, using context, where substitutions have occurred  Amount and/or Complexity of Data Reviewed  Clinical lab tests: ordered and reviewed  Tests in the radiology section of CPT®: ordered and reviewed  Tests in the medicine section of CPT®: ordered and reviewed  Discussion of test results with the performing providers: yes  Decide to obtain previous medical records or to obtain history from someone other than the patient: yes  Obtain history from someone other than the patient: yes  Review and summarize past medical records: yes  Discuss the patient with other providers: yes        Disposition  Final diagnoses:   Weakness   Symptomatic anemia   Hypotension   GI bleed   Atrial fibrillation, chronic ([facility] [address] )     Time reflects when diagnosis was documented in both MDM as applicable and the Disposition within this note     Time User Action Codes Description Comment    6/2/2022 12:58 PM [place] [address] Add [R53 1] Weakness     6/2/2022 12:58 PM [place] [address] Add [D64 9] Symptomatic anemia     6/2/2022 12:59 PM Domnick Saris [I95 9] Hypotension     6/2/2022 12:59 PM [place] [address] Add [K92 2] GI bleed     6/2/2022 12:59 PM [place] [R53 1] Weakness     6/2/2022 12:59 PM [place] [K92 2] GI bleed     6/2/2022  1:37 PM [address] [address] Add [I48 20] Atrial fibrillation, chronic [facility])       ED Disposition     ED Disposition   Admit    Condition   Stable    Date/Time   Thu Jun 2, 2022 12:58 PM    Comment   Case was discussed with Sue Granda MD and the patient's admission status was agreed to be Admission Status: inpatient status to the service of Dr Sue Granda              Follow-up Information    None         Current Discharge Medication List      CONTINUE these medications which have NOT CHANGED    Details   apixaban (ELIQUIS) 5 mg Take 5 mg by mouth 2 (two) times a day      atorvastatin (LIPITOR) 40 mg tablet Take 1 tablet (40 mg total) by mouth daily with dinner  Qty: 30 tablet, Refills: 0    Associated Diagnoses: Frequent falls      cyanocobalamin 1,000 mcg/mL 1 mL      folic acid (FOLVITE) 1 mg tablet Take 1 tablet by mouth daily      levothyroxine 150 mcg tablet Take 150 mcg by mouth daily      Magnesium Gluconate 550 MG TABS Take 30 mg by mouth 2 (two) times a day      metoprolol tartrate (LOPRESSOR) 50 mg tablet Take 50 mg by mouth 2 (two) times a day      potassium chloride (K-DUR,KLOR-CON) 20 mEq tablet Take 2 tablets by mouth daily      sertraline (ZOLOFT) 100 mg tablet Take 1 tablet by mouth daily      torsemide (DEMADEX) 20 mg tablet TAKE TWO TABLETS BY MOUTH TWICE A DAY (MORNING AND EVENING)  CAN TAKE ADDITIONAL ONE TABLET DAILY AS NEEDED FOR SWELLING             No discharge procedures on file      PDMP Review     None          ED Provider  Electronically Signed by           Stone Garcia III, DO  06/02/22 2211

## 2022-06-02 NOTE — H&P
Isabelkarthik 45  H&P- Reg Phelps 1945, 68 y o  female MRN: [id number]  Unit/Bed#: ICU 12-01 Encounter: [id number]  Primary Care Provider: Radha Godfrey MD   Date and time admitted to hospital: 6/2/2022 10:34 AM    * ABLA (acute blood loss anemia)  Assessment & Plan  · Presented with hgb 5 4 and complaints of dark tarry stools/generalized weakness over the past couple days  · Given protonix bolus in ED, will start infusion  · 2 u pRBC being given   · Trend hemoglobin at least Q6H  · GI consult pending, appreciate input  · Transfuse for hgb < 7    [place]ock ([address] )  Assessment & Plan  · AEB hypotension, lactic acidosis and DIEGO  · Likely related to ABLA  · Lactic 4 on admission, given 2L IV fluid, continue to trend lactic  · No clear source of infection   · Blood cultures pending  · UA pending  · Procal WNL, will repeat in AM  · 2 u PRBC being given  · Continue to monitor hemoglobin and transfuse for hgb < 7  · Trend fever curve and WBC  · Monitor hemodynamics closely    Acute renal failure superimposed on stage 3 chronic kidney disease [facility])  Assessment & Plan  Lab Results   Component Value Date    EGFR 19 06/02/2022    EGFR 34 01/06/2022    EGFR 36 01/03/2022    CREATININE 2 32 (H) 06/02/2022    CREATININE 1 48 (H) 01/06/2022    CREATININE 1 41 (H) 01/03/2022     · History of CKD 3, baseline creatinine appears to be 1 3-1 4, 2 32 on admission  · Likely prerenal in nature  · Continue with resuscitation  · Monitor I & O and renal indices  · Hold nephrotoxic medications    EKG abnormalities  Assessment & Plan  · Slightly elevated troponin with EKG changes notes  · Cardiology aware  · Possibly demand related although concerns for RWMA and RV dilation on bedside POCUS  · Will need to manage acute issues of ABLA prior to considering evaluation for ischemic workup  · Continue to trend troponin  · Patient denies CP   · Monitor EKG  · Formal echo pending    Supratherapeutic INR  Assessment & Plan  · INR 2 64 on admission  · S/p KCentra  · Continue to monitor    Mixed hyperlipidemia  Assessment & Plan  · Resume statin therapy when tolerating PO intake    Hypothyroidism  Assessment & Plan  · Resume home synthroid when tolerating PO intake    Essential hypertension  Assessment & Plan  · Hold home antihypertensives in the setting of hypotension and ABLA    Depression  Assessment & Plan  · Resume home Zoloft when tolerating PO intake    Chronic diastolic CHF (congestive heart failure) (HCC)  Assessment & Plan  Wt Readings from Last 3 Encounters:   06/02/22 67 1 kg (148 lb)   12/22/21 82 6 kg (182 lb)   12/10/21 83 kg (182 lb 15 7 oz)     · Based on chart review from cardiology notes  · Will assess cardiac function with POC US   · Formal echo pending   · Takes torsemide as outpatient  · Hold diuretics in the setting of hypotension and DIEGO  · Daily weight  · Monitor respiratory status closely with volume resuscitation        Atrial fibrillation, chronic (HCC)  Assessment & Plan  · History of afib on Eliquis, metoprolol and digoxin as outpatient  · Follows with [facility]ology at [facility]  · Holding rate control medications in the setting of hypotension   · Hold Eliquis in the setting of ABLA  · Remains rate controlled as of now  · Continue to monitor telemetry     -------------------------------------------------------------------------------------------------------------  Chief Complaint: generalized weakness/dark tarry stools    History of Present Illness   HX and PE limited by: n/a  Sarah Hanley is a 68 y o  female with PMH significant for afib on Eliquis, CKD 3, CHF, hypothryoidism, HLD and depression who presents with complaints of progressive generalized weakness and dark tarry stools for approximately 1 week  Patient's  notes that she has been having to take frequent breaks when ambulating with walker to catch her breath  On presentation patient found to be hypotensive with SBP 70   Lab workup revealed hgb 5 4, lactic 4 and DIEGO with creatinine 2 32  INR also supratherapeutic at 2 6  EKG changes noted, patient with mildly elevated troponin, cardiology aware, formal echo pending  Patient given K Centra and 2 u pRBC  Referred to the ICU for further monitoring and management  History obtained from spouse, chart review and the patient   -------------------------------------------------------------------------------------------------------------  Dispo: Admit to Critical Care     Code Status: Prior  --------------------------------------------------------------------------------------------------------------  Review of Systems   Constitutional: Positive for fatigue  Negative for chills and fever  HENT: Negative  Eyes: Negative  Respiratory: Positive for shortness of breath  Cardiovascular: Positive for leg swelling  Negative for chest pain  Gastrointestinal: Positive for blood in stool  Negative for abdominal pain, nausea and vomiting  Endocrine: Negative  Genitourinary: Negative for difficulty urinating  Musculoskeletal: Negative  Skin: Positive for pallor  Allergic/Immunologic: Negative  Neurological: Negative for dizziness and light-headedness  Psychiatric/Behavioral: Negative  A 12-point, complete review of systems was reviewed and negative except as stated above     Physical Exam  Vitals and nursing note reviewed  Constitutional:       Appearance: She is ill-appearing  HENT:      Head: Normocephalic  Mouth/Throat:      Mouth: Mucous membranes are dry  Pharynx: Oropharynx is clear  Eyes:      Pupils: Pupils are equal, round, and reactive to light  Cardiovascular:      Rate and Rhythm: Normal rate  Rhythm irregular  Pulses: Normal pulses  Heart sounds: Normal heart sounds  Pulmonary:      Effort: Pulmonary effort is normal       Breath sounds: Normal breath sounds     Abdominal:      General: Bowel sounds are normal       Palpations: Abdomen is soft    Musculoskeletal:      Cervical back: Normal range of motion  Right lower leg: Edema present  Left lower leg: Edema present  Skin:     General: Skin is warm and dry  Coloration: Skin is pale  Neurological:      General: No focal deficit present  Mental Status: She is alert and oriented to person, place, and time  --------------------------------------------------------------------------------------------------------------  Vitals:   Vitals:    06/02/22 1239 06/02/22 1245 06/02/22 1317 06/02/22 1346   BP:  (!) 86/48 (!) 90/37 (!) 97/37   BP Location:       Pulse:  93 86 86   Resp:  18 18    Temp:   (!) 97 4 °F (36 3 °C)    TempSrc:       SpO2: 100% 98%     Weight:    67 1 kg (148 lb)   Height:    5' 3" (1 6 m)     Temp  Min: 97 4 °F (36 3 °C)  Max: 97 4 °F (36 3 °C)     Height: 5' 3" (160 cm)  Body mass index is 26 22 kg/m²  Laboratory and Diagnostics:  Results from last 7 days   Lab Units 06/02/22  1138   WBC Thousand/uL 9 82   HEMOGLOBIN g/dL 5 4*   HEMATOCRIT % 18 8*   PLATELETS Thousands/uL 354   NEUTROS PCT % 79*   MONOS PCT % 9     Results from last 7 days   Lab Units 06/02/22  1138   SODIUM mmol/L 131*   POTASSIUM mmol/L 5 2   CHLORIDE mmol/L 100   CO2 mmol/L 18*   ANION GAP mmol/L 13   BUN mg/dL 41*   CREATININE mg/dL 2 32*   CALCIUM mg/dL 8 5   GLUCOSE RANDOM mg/dL 116   ALT U/L 8   AST U/L 27   ALK PHOS U/L 66   ALBUMIN g/dL 3 8   TOTAL BILIRUBIN mg/dL 0 79          Results from last 7 days   Lab Units 06/02/22  1138   INR  2 64*   PTT seconds 44*          Results from last 7 days   Lab Units 06/02/22  1138   LACTIC ACID mmol/L 4 0*     ABG:    VBG:    Results from last 7 days   Lab Units 06/02/22  1138   PROCALCITONIN ng/ml 0 08       Micro:        EKG: atrial fibrillation  Imaging: I have personally reviewed pertinent reports     and I have personally reviewed pertinent films in PACS      Historical Information   Past Medical History:   Diagnosis Date    Disease of thyroid gland     Hypotension      Past Surgical History:   Procedure Laterality Date    HYSTERECTOMY       Social History   Social History     Substance and Sexual Activity   Alcohol Use Never    Comment: occassional      Social History     Substance and Sexual Activity   Drug Use Never     Social History     Tobacco Use   Smoking Status Never Smoker   Smokeless Tobacco Never Used     Exercise History: n/a  Family History:   History reviewed  No pertinent family history  Family history unknown      Medications:  Current Facility-Administered Medications   Medication Dose Route Frequency    pantoprazole (PROTONIX) 80 mg in sodium chloride 0 9 % 100 mL infusion  8 mg/hr Intravenous Continuous    pantoprazole (PROTONIX) 80 mg in sodium chloride 0 9 % 100 mL IVPB  80 mg Intravenous Once     Home medications:  Prior to Admission Medications   Prescriptions Last Dose Informant Patient Reported? Taking? Magnesium Gluconate 550 MG TABS   Yes No   Sig: Take 30 mg by mouth 2 (two) times a day   apixaban (ELIQUIS) 5 mg   Yes No   Sig: Take 5 mg by mouth 2 (two) times a day   atorvastatin (LIPITOR) 40 mg tablet   No No   Sig: Take 1 tablet (40 mg total) by mouth daily with dinner   cyanocobalamin 1,000 mcg/mL   Yes No   Si mL   folic acid (FOLVITE) 1 mg tablet   Yes No   Sig: Take 1 tablet by mouth daily   levothyroxine 150 mcg tablet   Yes No   Sig: Take 150 mcg by mouth daily   metoprolol tartrate (LOPRESSOR) 50 mg tablet   Yes No   Sig: Take 50 mg by mouth 2 (two) times a day   potassium chloride (K-DUR,KLOR-CON) 20 mEq tablet   Yes No   Sig: Take 2 tablets by mouth daily   sertraline (ZOLOFT) 100 mg tablet   Yes No   Sig: Take 1 tablet by mouth daily   torsemide (DEMADEX) 20 mg tablet   Yes No   Sig: TAKE TWO TABLETS BY MOUTH TWICE A DAY (MORNING AND EVENING)   CAN TAKE ADDITIONAL ONE TABLET DAILY AS NEEDED FOR SWELLING      Facility-Administered Medications: None     Allergies:  No Known Allergies    ------------------------------------------------------------------------------------------------------------  Advance Directive and Living Will:      Power of :    POLST:    ------------------------------------------------------------------------------------------------------------  Anticipated Length of Stay is > 2 midnights    Care Time Delivered:   Upon my evaluation, this patient had a high probability of imminent or life-threatening deterioration due to ABLA , which required my direct attention, intervention, and personal management  I have personally provided 35 minutes (1330 to 1405) of critical care time, exclusive of procedures, teaching, family meetings, and any prior time recorded by providers other than myself  JAQUELINE Garduno        Portions of the record may have been created with voice recognition software  Occasional wrong word or "sound a like" substitutions may have occurred due to the inherent limitations of voice recognition software    Read the chart carefully and recognize, using context, where substitutions have occurred

## 2022-06-02 NOTE — ASSESSMENT & PLAN NOTE
· Slightly elevated troponin with EKG changes notes  · Cardiology aware  · Possibly demand related? ?  · Will need to manage acute issues of ABLA prior to considering evaluation for ischemic workup  · Continue to trend troponin  · Patient denies CP   · Monitor EKG

## 2022-06-02 NOTE — PROCEDURES
POC Cardiac US    Date/Time: 6/2/2022 2:00 PM  Performed by: Kristie Orta MD  Authorized by: Kristie Orta MD     Patient location:  ED  Procedure details:     Exam Type:  Diagnostic    Indications: hypotension, suspected volume depletion, chest pain and dyspnea      Assessment / Evaluation for: cardiac function, pericardial effusion and intravascular volume status      Exam Type: initial exam      Image quality: diagnostic      Image availability:  Images available in PACS  Patient Details:     Cardiac Rhythm:  Irregular    Mechanical ventilation: No    Cardiac findings:     Echo technique: limited 2D and M-mode      Views obtained: parasternal long axis, parasternal short axis, subcostal and apical      Pericardial effusion: absent      Tamponade physiology: absent      Wall motion: normal      LV systolic function: normal      RV dilation: mild    Pulmonary findings:     Left Lung Findings: left lung sliding      Right lung findings: right lung sliding      B-lines: no B-lines present    IVC findings:     IVC Size: dilated      IVC Inspiratory Collapse: partial    Interpretation:      Upon my assessment LVEF was about 55-60%, no specific RWMA however IVS dysgenetic likely due to RV pressure/volume overload  RV appeared to have mildly reduced function and dilated  IVS was flat during both systole and diastole  IVC was dilated 2 6 cm with only about 20% collapse suggesting elevated filling pressures, however I suspect there maybe underlying component of RV pressure overload and this likely reflects chronically elevated filling pressure as opposed to actual intravascular fluid status   On lung US she did not have any significant B-lines, respiratory distress likely secondary to lactic acidosis

## 2022-06-03 ENCOUNTER — APPOINTMENT (INPATIENT)
Dept: GASTROENTEROLOGY | Facility: HOSPITAL | Age: 77
DRG: 377 | End: 2022-06-03
Payer: COMMERCIAL

## 2022-06-03 ENCOUNTER — ANESTHESIA (INPATIENT)
Dept: GASTROENTEROLOGY | Facility: HOSPITAL | Age: 77
DRG: 377 | End: 2022-06-03
Payer: COMMERCIAL

## 2022-06-03 ENCOUNTER — ANESTHESIA EVENT (INPATIENT)
Dept: GASTROENTEROLOGY | Facility: HOSPITAL | Age: 77
DRG: 377 | End: 2022-06-03
Payer: COMMERCIAL

## 2022-06-03 LAB
ABO GROUP BLD BPU: NORMAL
ABO GROUP BLD BPU: NORMAL
ALBUMIN SERPL BCP-MCNC: 3.3 G/DL (ref 3.5–5)
ALP SERPL-CCNC: 83 U/L (ref 34–104)
ALT SERPL W P-5'-P-CCNC: 12 U/L (ref 7–52)
ANION GAP SERPL CALCULATED.3IONS-SCNC: 9 MMOL/L (ref 4–13)
AST SERPL W P-5'-P-CCNC: 38 U/L (ref 13–39)
ATRIAL RATE: 86 BPM
ATRIAL RATE: 89 BPM
ATRIAL RATE: 91 BPM
ATRIAL RATE: 98 BPM
BASOPHILS # BLD AUTO: 0.03 THOUSANDS/ΜL (ref 0–0.1)
BASOPHILS NFR BLD AUTO: 0 % (ref 0–1)
BILIRUB SERPL-MCNC: 1.24 MG/DL (ref 0.2–1)
BPU ID: NORMAL
BPU ID: NORMAL
BUN SERPL-MCNC: 41 MG/DL (ref 5–25)
CA-I BLD-SCNC: 0.95 MMOL/L (ref 1.12–1.32)
CALCIUM ALBUM COR SERPL-MCNC: 8 MG/DL (ref 8.3–10.1)
CALCIUM SERPL-MCNC: 7.4 MG/DL (ref 8.4–10.2)
CHLORIDE SERPL-SCNC: 109 MMOL/L (ref 96–108)
CO2 SERPL-SCNC: 19 MMOL/L (ref 21–32)
CREAT SERPL-MCNC: 1.99 MG/DL (ref 0.6–1.3)
CROSSMATCH: NORMAL
CROSSMATCH: NORMAL
EOSINOPHIL # BLD AUTO: 0.01 THOUSAND/ΜL (ref 0–0.61)
EOSINOPHIL NFR BLD AUTO: 0 % (ref 0–6)
ERYTHROCYTE [DISTWIDTH] IN BLOOD BY AUTOMATED COUNT: 16.6 % (ref 11.6–15.1)
GFR SERPL CREATININE-BSD FRML MDRD: 23 ML/MIN/1.73SQ M
GLUCOSE SERPL-MCNC: 82 MG/DL (ref 65–140)
HCT VFR BLD AUTO: 22.2 % (ref 34.8–46.1)
HCT VFR BLD AUTO: 23.8 % (ref 34.8–46.1)
HCT VFR BLD AUTO: 24.4 % (ref 34.8–46.1)
HGB BLD-MCNC: 7 G/DL (ref 11.5–15.4)
HGB BLD-MCNC: 7.5 G/DL (ref 11.5–15.4)
HGB BLD-MCNC: 7.5 G/DL (ref 11.5–15.4)
IMM GRANULOCYTES # BLD AUTO: 0.04 THOUSAND/UL (ref 0–0.2)
IMM GRANULOCYTES NFR BLD AUTO: 0 % (ref 0–2)
INR PPP: 2.19 (ref 0.84–1.19)
LYMPHOCYTES # BLD AUTO: 0.9 THOUSANDS/ΜL (ref 0.6–4.47)
LYMPHOCYTES NFR BLD AUTO: 10 % (ref 14–44)
MCH RBC QN AUTO: 29.3 PG (ref 26.8–34.3)
MCHC RBC AUTO-ENTMCNC: 31.5 G/DL (ref 31.4–37.4)
MCV RBC AUTO: 93 FL (ref 82–98)
MONOCYTES # BLD AUTO: 0.66 THOUSAND/ΜL (ref 0.17–1.22)
MONOCYTES NFR BLD AUTO: 7 % (ref 4–12)
NEUTROPHILS # BLD AUTO: 7.61 THOUSANDS/ΜL (ref 1.85–7.62)
NEUTS SEG NFR BLD AUTO: 83 % (ref 43–75)
NRBC BLD AUTO-RTO: 1 /100 WBCS
PLATELET # BLD AUTO: 243 THOUSANDS/UL (ref 149–390)
PMV BLD AUTO: 9.4 FL (ref 8.9–12.7)
POTASSIUM SERPL-SCNC: 4.3 MMOL/L (ref 3.5–5.3)
PROCALCITONIN SERPL-MCNC: 0.09 NG/ML
PROT SERPL-MCNC: 5.4 G/DL (ref 6.4–8.4)
PROTHROMBIN TIME: 23.8 SECONDS (ref 11.6–14.5)
QRS AXIS: 75 DEGREES
QRS AXIS: 76 DEGREES
QRS AXIS: 86 DEGREES
QRS AXIS: 92 DEGREES
QRSD INTERVAL: 100 MS
QRSD INTERVAL: 104 MS
QRSD INTERVAL: 92 MS
QRSD INTERVAL: 96 MS
QT INTERVAL: 362 MS
QT INTERVAL: 370 MS
QT INTERVAL: 382 MS
QT INTERVAL: 390 MS
QTC INTERVAL: 433 MS
QTC INTERVAL: 448 MS
QTC INTERVAL: 449 MS
QTC INTERVAL: 472 MS
RBC # BLD AUTO: 2.56 MILLION/UL (ref 3.81–5.12)
SODIUM SERPL-SCNC: 137 MMOL/L (ref 135–147)
T WAVE AXIS: 229 DEGREES
T WAVE AXIS: 237 DEGREES
T WAVE AXIS: 243 DEGREES
T WAVE AXIS: 260 DEGREES
UNIT DISPENSE STATUS: NORMAL
UNIT DISPENSE STATUS: NORMAL
UNIT PRODUCT CODE: NORMAL
UNIT PRODUCT CODE: NORMAL
UNIT PRODUCT VOLUME: 350 ML
UNIT PRODUCT VOLUME: 350 ML
UNIT RH: NORMAL
UNIT RH: NORMAL
VENTRICULAR RATE: 80 BPM
VENTRICULAR RATE: 83 BPM
VENTRICULAR RATE: 86 BPM
VENTRICULAR RATE: 98 BPM
WBC # BLD AUTO: 9.25 THOUSAND/UL (ref 4.31–10.16)

## 2022-06-03 PROCEDURE — 85025 COMPLETE CBC W/AUTO DIFF WBC: CPT | Performed by: NURSE PRACTITIONER

## 2022-06-03 PROCEDURE — 85014 HEMATOCRIT: CPT | Performed by: NURSE PRACTITIONER

## 2022-06-03 PROCEDURE — 85014 HEMATOCRIT: CPT | Performed by: ANESTHESIOLOGY

## 2022-06-03 PROCEDURE — C9113 INJ PANTOPRAZOLE SODIUM, VIA: HCPCS | Performed by: NURSE PRACTITIONER

## 2022-06-03 PROCEDURE — 85018 HEMOGLOBIN: CPT | Performed by: NURSE PRACTITIONER

## 2022-06-03 PROCEDURE — 99222 1ST HOSP IP/OBS MODERATE 55: CPT | Performed by: INTERNAL MEDICINE

## 2022-06-03 PROCEDURE — 80053 COMPREHEN METABOLIC PANEL: CPT | Performed by: NURSE PRACTITIONER

## 2022-06-03 PROCEDURE — 93005 ELECTROCARDIOGRAM TRACING: CPT

## 2022-06-03 PROCEDURE — 0DJ08ZZ INSPECTION OF UPPER INTESTINAL TRACT, VIA NATURAL OR ARTIFICIAL OPENING ENDOSCOPIC: ICD-10-PCS | Performed by: INTERNAL MEDICINE

## 2022-06-03 PROCEDURE — 85610 PROTHROMBIN TIME: CPT | Performed by: NURSE PRACTITIONER

## 2022-06-03 PROCEDURE — 82330 ASSAY OF CALCIUM: CPT | Performed by: NURSE PRACTITIONER

## 2022-06-03 PROCEDURE — 84145 PROCALCITONIN (PCT): CPT | Performed by: NURSE PRACTITIONER

## 2022-06-03 PROCEDURE — 93010 ELECTROCARDIOGRAM REPORT: CPT | Performed by: INTERNAL MEDICINE

## 2022-06-03 PROCEDURE — 83036 HEMOGLOBIN GLYCOSYLATED A1C: CPT | Performed by: NURSE PRACTITIONER

## 2022-06-03 PROCEDURE — 99291 CRITICAL CARE FIRST HOUR: CPT | Performed by: NURSE PRACTITIONER

## 2022-06-03 PROCEDURE — 85018 HEMOGLOBIN: CPT | Performed by: ANESTHESIOLOGY

## 2022-06-03 PROCEDURE — 43235 EGD DIAGNOSTIC BRUSH WASH: CPT | Performed by: INTERNAL MEDICINE

## 2022-06-03 RX ORDER — LEVOTHYROXINE SODIUM 0.07 MG/1
150 TABLET ORAL DAILY
Status: DISCONTINUED | OUTPATIENT
Start: 2022-06-04 | End: 2022-06-09 | Stop reason: HOSPADM

## 2022-06-03 RX ORDER — ATORVASTATIN CALCIUM 40 MG/1
40 TABLET, FILM COATED ORAL
Status: DISCONTINUED | OUTPATIENT
Start: 2022-06-04 | End: 2022-06-09 | Stop reason: HOSPADM

## 2022-06-03 RX ORDER — ALBUMIN, HUMAN INJ 5% 5 %
12.5 SOLUTION INTRAVENOUS ONCE
Status: COMPLETED | OUTPATIENT
Start: 2022-06-03 | End: 2022-06-03

## 2022-06-03 RX ORDER — SODIUM CHLORIDE, SODIUM LACTATE, POTASSIUM CHLORIDE, CALCIUM CHLORIDE 600; 310; 30; 20 MG/100ML; MG/100ML; MG/100ML; MG/100ML
INJECTION, SOLUTION INTRAVENOUS CONTINUOUS PRN
Status: DISCONTINUED | OUTPATIENT
Start: 2022-06-03 | End: 2022-06-03

## 2022-06-03 RX ORDER — LIDOCAINE HYDROCHLORIDE 20 MG/ML
INJECTION, SOLUTION EPIDURAL; INFILTRATION; INTRACAUDAL; PERINEURAL AS NEEDED
Status: DISCONTINUED | OUTPATIENT
Start: 2022-06-03 | End: 2022-06-03

## 2022-06-03 RX ORDER — CALCIUM GLUCONATE 20 MG/ML
2 INJECTION, SOLUTION INTRAVENOUS ONCE
Status: COMPLETED | OUTPATIENT
Start: 2022-06-03 | End: 2022-06-03

## 2022-06-03 RX ORDER — FUROSEMIDE 10 MG/ML
40 INJECTION INTRAMUSCULAR; INTRAVENOUS ONCE
Status: COMPLETED | OUTPATIENT
Start: 2022-06-03 | End: 2022-06-03

## 2022-06-03 RX ORDER — PROPOFOL 10 MG/ML
INJECTION, EMULSION INTRAVENOUS AS NEEDED
Status: DISCONTINUED | OUTPATIENT
Start: 2022-06-03 | End: 2022-06-03

## 2022-06-03 RX ADMIN — PROPOFOL 50 MG: 10 INJECTION, EMULSION INTRAVENOUS at 14:56

## 2022-06-03 RX ADMIN — Medication 8 MG/HR: at 05:30

## 2022-06-03 RX ADMIN — LIDOCAINE HYDROCHLORIDE 100 MG: 20 INJECTION, SOLUTION EPIDURAL; INFILTRATION; INTRACAUDAL; PERINEURAL at 14:56

## 2022-06-03 RX ADMIN — PROPOFOL 50 MG: 10 INJECTION, EMULSION INTRAVENOUS at 14:59

## 2022-06-03 RX ADMIN — SODIUM CHLORIDE, SODIUM LACTATE, POTASSIUM CHLORIDE, AND CALCIUM CHLORIDE: .6; .31; .03; .02 INJECTION, SOLUTION INTRAVENOUS at 14:30

## 2022-06-03 RX ADMIN — CALCIUM GLUCONATE 2 G: 20 INJECTION, SOLUTION INTRAVENOUS at 06:37

## 2022-06-03 RX ADMIN — ALBUMIN (HUMAN) 12.5 G: 12.5 INJECTION, SOLUTION INTRAVENOUS at 16:21

## 2022-06-03 RX ADMIN — FUROSEMIDE 40 MG: 10 INJECTION, SOLUTION INTRAMUSCULAR; INTRAVENOUS at 10:02

## 2022-06-03 NOTE — ASSESSMENT & PLAN NOTE
-presented with hemoglobin 5 4 now improved s/p transfusion to 7 5  -plan for EGD for further evaluation  -would continue monitor  -continue to hold oral anticoagulation at this time  -agree with transfusion for hemoglobin less than 7

## 2022-06-03 NOTE — ASSESSMENT & PLAN NOTE
· Presented with hgb 5 4 and complaints of dark tarry stools/generalized weakness over the past couple days  · Continue protonix infusion   · S/p 2 U PRBC  · Trend hemoglobin at least Q6H  · Transfuse for hgb < 7  · Plan for EGD this AM

## 2022-06-03 NOTE — NURSING NOTE
Pt arrived to room via bed  Sedated  Responds but non verbal at this time  No focal deficit  Will monitor  See new orders

## 2022-06-03 NOTE — ASSESSMENT & PLAN NOTE
Wt Readings from Last 3 Encounters:   06/03/22 72 3 kg (159 lb 6 3 oz)   12/22/21 82 6 kg (182 lb)   12/10/21 83 kg (182 lb 15 7 oz)     -medical therapy currently on hold due to hypotension seen in the setting of significant blood loss anemia on admission  -would reinitiate medical therapy once medically stable slowly to avoid significant hypotension in patient  -would initiate diuretic regimen 1st in the setting of significant resuscitation due to hypotension

## 2022-06-03 NOTE — ANESTHESIA POSTPROCEDURE EVALUATION
Post-Op Assessment Note    CV Status:  Stable  Pain Score: 0    Pain management: adequate     Mental Status:  Arousable   Hydration Status:  Stable   PONV Controlled:  None   Airway Patency:  Patent      Post Op Vitals Reviewed: Yes      Staff: CRNA         No complications documented      BP   102/59   Temp      Pulse  110   Resp  19   SpO2   99

## 2022-06-03 NOTE — ASSESSMENT & PLAN NOTE
· Possibly in the setting of demand related to ABLA  · Troponin negative  · Denies CP  · Monitor EKG closely

## 2022-06-03 NOTE — ASSESSMENT & PLAN NOTE
Wt Readings from Last 3 Encounters:   06/02/22 73 4 kg (161 lb 13 1 oz)   12/22/21 82 6 kg (182 lb)   12/10/21 83 kg (182 lb 15 7 oz)     · Based on chart review from cardiology notes  · Follow up on formal ECHO    · Takes torsemide as outpatient  · Hold diuretics in the setting of hypotension and DIEGO, consider diuresis if patient becomes hypoxic   · Daily weight

## 2022-06-03 NOTE — PLAN OF CARE
Problem: MOBILITY - ADULT  Goal: Maintain or return to baseline ADL function  Description: INTERVENTIONS:  -  Assess patient's ability to carry out ADLs; assess patient's baseline for ADL function and identify physical deficits which impact ability to perform ADLs (bathing, care of mouth/teeth, toileting, grooming, dressing, etc )  - Assess/evaluate cause of self-care deficits   - Assess range of motion  - Assess patient's mobility; develop plan if impaired  - Assess patient's need for assistive devices and provide as appropriate  - Encourage maximum independence but intervene and supervise when necessary  - Involve family in performance of ADLs  - Assess for home care needs following discharge   - Consider OT consult to assist with ADL evaluation and planning for discharge  - Provide patient education as appropriate  Outcome: Progressing  Goal: Maintains/Returns to pre admission functional level  Description: INTERVENTIONS:  - Perform BMAT or MOVE assessment daily    - Set and communicate daily mobility goal to care team and patient/family/caregiver  - Collaborate with rehabilitation services on mobility goals if consulted  - Perform Range of Motion 3 times a day  - Reposition patient every 2 hours    - Dangle patient 2 times a day  - Stand patient 2 times a day  - Ambulate patient 2 times a day  - Out of bed to chair 2 times a day   - Out of bed for meals 2 times a day  - Out of bed for toileting  - Record patient progress and toleration of activity level   Outcome: Progressing     Problem: Prexisting or High Potential for Compromised Skin Integrity  Goal: Skin integrity is maintained or improved  Description: INTERVENTIONS:  - Identify patients at risk for skin breakdown  - Assess and monitor skin integrity  - Assess and monitor nutrition and hydration status  - Monitor labs   - Assess for incontinence   - Turn and reposition patient  - Assist with mobility/ambulation  - Relieve pressure over bony prominences  - Avoid friction and shearing  - Provide appropriate hygiene as needed including keeping skin clean and dry  - Evaluate need for skin moisturizer/barrier cream  - Collaborate with interdisciplinary team   - Patient/family teaching  - Consider wound care consult   Outcome: Progressing     Problem: PAIN - ADULT  Goal: Verbalizes/displays adequate comfort level or baseline comfort level  Description: Interventions:  - Encourage patient to monitor pain and request assistance  - Assess pain using appropriate pain scale  - Administer analgesics based on type and severity of pain and evaluate response  - Implement non-pharmacological measures as appropriate and evaluate response  - Consider cultural and social influences on pain and pain management  - Notify physician/advanced practitioner if interventions unsuccessful or patient reports new pain  Outcome: Progressing     Problem: INFECTION - ADULT  Goal: Absence or prevention of progression during hospitalization  Description: INTERVENTIONS:  - Assess and monitor for signs and symptoms of infection  - Monitor lab/diagnostic results  - Monitor all insertion sites, i e  indwelling lines, tubes, and drains  - Monitor endotracheal if appropriate and nasal secretions for changes in amount and color  - [place] appropriate cooling/warming therapies per order  - Administer medications as ordered  - Instruct and encourage patient and family to use good hand hygiene technique  - Identify and instruct in appropriate isolation precautions for identified infection/condition  Outcome: Progressing  Goal: Absence of fever/infection during neutropenic period  Description: INTERVENTIONS:  - Monitor WBC    Outcome: Progressing     Problem: SAFETY ADULT  Goal: Maintain or return to baseline ADL function  Description: INTERVENTIONS:  -  Assess patient's ability to carry out ADLs; assess patient's baseline for ADL function and identify physical deficits which impact ability to perform ADLs (bathing, care of mouth/teeth, toileting, grooming, dressing, etc )  - Assess/evaluate cause of self-care deficits   - Assess range of motion  - Assess patient's mobility; develop plan if impaired  - Assess patient's need for assistive devices and provide as appropriate  - Encourage maximum independence but intervene and supervise when necessary  - Involve family in performance of ADLs  - Assess for home care needs following discharge   - Consider OT consult to assist with ADL evaluation and planning for discharge  - Provide patient education as appropriate  Outcome: Progressing  Goal: Maintains/Returns to pre admission functional level  Description: INTERVENTIONS:  - Perform BMAT or MOVE assessment daily    - Set and communicate daily mobility goal to care team and patient/family/caregiver  - Collaborate with rehabilitation services on mobility goals if consulted  - Perform Range of Motion 3 times a day  - Reposition patient every 2 hours    - Dangle patient 2 times a day  - Stand patient 2 times a day  - Ambulate patient 2 times a day  - Out of bed to chair 2 times a day   - Out of bed for meals 2 times a day  - Out of bed for toileting  - Record patient progress and toleration of activity level   Outcome: Progressing  Goal: Patient will remain free of falls  Description: INTERVENTIONS:  - Educate patient/family on patient safety including physical limitations  - Instruct patient to call for assistance with activity   - Consult OT/PT to assist with strengthening/mobility   - Keep Call bell within reach  - Keep bed low and locked with side rails adjusted as appropriate  - Keep care items and personal belongings within reach  - Initiate and maintain comfort rounds  - Make Fall Risk Sign visible to staff  - Offer Toileting every 2 Hours, in advance of need  - Apply yellow socks and bracelet for high fall risk patients  - Consider moving patient to room near nurses station  Outcome: Progressing Problem: DISCHARGE PLANNING  Goal: Discharge to home or other facility with appropriate resources  Description: INTERVENTIONS:  - Identify barriers to discharge w/patient and caregiver  - Arrange for needed discharge resources and transportation as appropriate  - Identify discharge learning needs (meds, wound care, etc )  - Arrange for interpretive services to assist at discharge as needed  - Refer to Case Management Department for coordinating discharge planning if the patient needs post-hospital services based on physician/advanced practitioner order or complex needs related to functional status, cognitive ability, or social support system  Outcome: Progressing     Problem: Knowledge Deficit  Goal: Patient/family/caregiver demonstrates understanding of disease process, treatment plan, medications, and discharge instructions  Description: Complete learning assessment and assess knowledge base  Interventions:  - Provide teaching at level of understanding  - Provide teaching via preferred learning methods  Outcome: Progressing     Problem: Potential for Falls  Goal: Patient will remain free of falls  Description: INTERVENTIONS:  - Educate patient/family on patient safety including physical limitations  - Instruct patient to call for assistance with activity   - Consult OT/PT to assist with strengthening/mobility   - Keep Call bell within reach  - Keep bed low and locked with side rails adjusted as appropriate  - Keep care items and personal belongings within reach  - Initiate and maintain comfort rounds  - Make Fall Risk Sign visible to staff  - Apply yellow socks and bracelet for high fall risk patients  - Consider moving patient to room near nurses station  Outcome: Progressing     Problem: Nutrition/Hydration-ADULT  Goal: Nutrient/Hydration intake appropriate for improving, restoring or maintaining nutritional needs  Description: Monitor and assess patient's nutrition/hydration status for malnutrition  Collaborate with interdisciplinary team and initiate plan and interventions as ordered  Monitor patient's weight and dietary intake as ordered or per policy  Utilize nutrition screening tool and intervene as necessary  Determine patient's food preferences and provide high-protein, high-caloric foods as appropriate       INTERVENTIONS:  - Monitor oral intake, urinary output, labs, and treatment plans  - Assess nutrition and hydration status and recommend course of action  - Evaluate amount of meals eaten  - Assist patient with eating if necessary   - Allow adequate time for meals  - Recommend/ encourage appropriate diets, oral nutritional supplements, and vitamin/mineral supplements  - Order, calculate, and assess calorie counts as needed  - Recommend, monitor, and adjust tube feedings and TPN/PPN based on assessed needs  - Assess need for intravenous fluids  - Provide specific nutrition/hydration education as appropriate  - Include patient/family/caregiver in decisions related to nutrition  Outcome: Progressing

## 2022-06-03 NOTE — ASSESSMENT & PLAN NOTE
· AEB hypotension, lactic acidosis and DIEGO  · Likely related to ABLA  · S/p IVF and blood resuscitation - will transfuse for Hgb<7  · Lactic cleared  · Continue close HD monitoring, maintain MAP>65

## 2022-06-03 NOTE — UTILIZATION REVIEW
Initial Clinical Review    Admission: Date/Time/Statement:   Admission Orders (From admission, onward)     Ordered        06/02/22 1258  INPATIENT ADMISSION  Once                      Orders Placed This Encounter   Procedures    INPATIENT ADMISSION     Standing Status:   Standing     Number of Occurrences:   1     Order Specific Question:   Level of Care     Answer:   Critical Care [15]     Order Specific Question:   Estimated length of stay     Answer:   More than 2 Midnights     Order Specific Question:   Certification     Answer:   I certify that inpatient services are medically necessary for this patient for a duration of greater than two midnights  See H&P and MD Progress Notes for additional information about the patient's course of treatment  ED Arrival Information     Expected   -    Arrival   6/2/2022 10:34    Acuity   Urgent            Means of arrival   Ambulance    Escorted by   [facility]    Service   Anesthesiology    Admission type   Urgent            Arrival complaint   weakness           Chief Complaint   Patient presents with    Weakness - Generalized    Shortness of Breath     Weakness and SOB increasing over last week  Initial Presentation: 68 y o  female  Presented to ED from home via EMS as inpatient ICU admission   Patient c/o weakness substernal chest pain and SOB  Patient has a relevant past medical history of atrial fibrillation, chronic diastolic heart failure, non rheumatic mitral valve regurgitation, history of hypertension, mixed hyperlipidemia and chronic venous insufficiency of the lower extremity and chronic kidney disease stage 3  She has had some bloody bowel movements a week ago  On exam acute distress  ill-appearing, toxic-appearing and diaphoretic  Tachypnea, accessory muscle usage and respiratory distress rales,Guaiac result positive bilateral legTenderness present  Edema , cyanotic and pale  Patient placed on BiPAP     GI Consult   Patient should certainly have EGD for diagnostic purposes once clinically stable  Given dynamic EKG changes, appropriate resuscitation/evaluation of underlying cardiac abnormalities should be priority prior to EGD and administration of anesthesia  Per ICU, patient to be placed on BIPAP for ongoing shortness of breath and increased work of breathing  She will be medically optimized and will plan for EGD likely tomorrow 6/3/22  The bleeding history seems to be subacute, with intermittent black stools  Broad differential for this including but not limited to PUD, severe esophagitis/gastritis, angioectasias, or much less likely upper GI malignancy or right sided colon lesions  She is receiving 2 units PRBCs, Kcentra, and fluids  Impression:   Acute vs subacute anemia due to GI bleed with melena    Hemorrhagic shock volume responsive    ECG changes initially concerning for ACS, rather reflective of severe anemia    DIEGO on CKD   RV mild dysfunction and mild dilation likely chronic due to underlying at least moderate pulmonary HTN   Supratherapic INR and drug induced coagulopathy eliquis - reversed with Kcentra   HTN - has been holding PTA meds at home due to hypotension   Afib chronic       Plan:   Admit to critical care   Finish 2 u PRBC check endpoints and hbg after - goal Hbg >7   Apply BiPAP for respiratory distress and demands   Check troponin if negative can stop unless ECG changes occur or patient has CP   Patient may require EGD but should be resuscitated first and stabilized from a shock and cardiac perspective, start PPI - I discussed patient with Dr Artur Poe  For afib hold metoprolol, hold digoxin given DIEGO and high normal K  If develops afib RVR would load with amiodarone    Follow up formal TTE eval   Hold all AP/AC   NPO         Date: 06-03-22 patient weaned off BiPAP and placed on 3 L NC  Than 1 L NC stable more that EGD 06-03-22   BP medication on hold due to hypotension, loaded with amiodarone 06-02-22 for a fib Creatinine slowly improving down to 1 99 today  monitor renal function electrolytes along with urine output once patient hemodynamically able to tolerate would initiate diuretics as patient does appear to have some signs of volume overload with JVD and lower extremity edema present  Consult cardiology  dynamic ischemic changes that do appear slightly improved since transfusion  follow-up results of EGD from today  -as patient does appear after significant resuscitation for shock and acute blood loss anemia to be somewhat volume overload on physical exam would reinitiate medical therapy slowly beginning initially with diuretics as patient tolerates to avoid hypotension with than slow re-initiation of AV michael blocking agent to continue rate control strategy for AFib  -can continue to hold oral anticoagulation and can restart once cleared by Gastroenterology if patient is agreeable    ED Triage Vitals   Temperature Pulse Respirations Blood Pressure SpO2   06/02/22 1046 06/02/22 1046 06/02/22 1046 06/02/22 1046 06/02/22 1046   (!) 97 4 °F (36 3 °C) 90 20 (!) 83/53 100 %      Temp Source Heart Rate Source Patient Position - Orthostatic VS BP Location FiO2 (%)   06/02/22 1046 06/02/22 1046 06/02/22 1046 06/02/22 1046 06/02/22 1500   Oral Monitor Lying Left arm 45      Pain Score       06/02/22 1046       No Pain          Wt Readings from Last 1 Encounters:   06/03/22 72 3 kg (159 lb 6 3 oz)     Additional Vital Signs:     Date/Time Temp Pulse Resp BP MAP (mmHg) SpO2 FiO2 (%) Calculated FIO2 (%) - Nasal Cannula Nasal Cannula O2 Flow Rate (L/min) O2 Device O2 Interface Device Patient Position - Orthostatic VS   06/03/22 1300 -- 86 30 Abnormal  108/56 75 100 % -- -- -- -- -- --   06/03/22 1230 -- 91 30 Abnormal  90/65 74 100 % -- -- -- -- -- --   06/03/22 1130 -- 87 34 Abnormal  100/60 73 100 % -- -- -- -- -- --   06/03/22 1100 97 5 °F (36 4 °C) 89 30 Abnormal  111/63 78 99 % -- -- -- -- -- --   06/03/22 1030 -- 86 26 Abnormal  102/58 75 98 % -- -- -- -- -- --   06/03/22 1005 -- 77 34 Abnormal  90/51 65 100 % -- -- -- -- -- --   06/03/22 1000 -- 76 28 Abnormal  86/55 Abnormal  66 100 % -- -- -- -- -- --   06/03/22 0900 -- 82 36 Abnormal  93/55 68 90 % -- -- -- -- -- --   06/03/22 0800 -- 76 29 Abnormal  106/55 78 100 % -- -- -- -- -- --   06/03/22 0700 97 1 °F (36 2 °C) Abnormal  92 30 Abnormal  97/54 70 92 % -- -- -- -- -- --   06/03/22 0600 -- 85 17 94/57 70 98 % -- -- -- -- -- --   06/03/22 0500 -- 84 16 96/52 71 95 % -- -- -- -- -- --   06/03/22 0400 -- 83 20 100/57 76 100 % -- 24 1 L/min Nasal cannula -- Lying   06/03/22 0300 97 4 °F (36 3 °C) Abnormal  82 17 102/55 72 99 % -- -- -- -- -- --   06/03/22 0200 -- 84 20 96/52 71 100 % -- -- -- -- -- --   06/03/22 0100 -- 81 16 110/50 70 93 % -- -- -- -- -- --   06/03/22 0000 -- 76 18 97/52 72 100 % -- 32 3 L/min Nasal cannula -- Lying   06/02/22 2300 97 6 °F (36 4 °C) 79 20 95/54 70 94 % -- -- -- -- -- --   06/02/22 2258 -- -- -- -- -- 98 % -- -- -- Nasal cannula -- --   06/02/22 2200 -- 77 18 86/52 Abnormal  64 98 % -- -- -- -- -- --   06/02/22 2100 -- 76 18 90/51 64 94 % -- -- -- -- -- --   06/02/22 2000 -- 82 20 84/45 Abnormal  60 100 % -- 32 3 L/min Nasal cannula -- Lying   06/02/22 1900 97 7 °F (36 5 °C) 81 20 100/55 67 88 % Abnormal  -- -- -- -- -- --   06/02/22 1830 97 8 °F (36 6 °C) 80 38 Abnormal  103/60 67 98 % -- 32 3 L/min Nasal cannula -- --   06/02/22 1815 -- 81 28 Abnormal  99/64 -- 100 % -- -- -- -- -- --   06/02/22 1800 -- 85 13 94/55 70 100 % -- -- -- -- -- --   06/02/22 1740 -- 80 22 -- -- 100 % -- -- -- -- -- --   06/02/22 1730 -- 79 20 94/65 75 100 % -- -- -- -- -- --   06/02/22 1710 -- 85 32 Abnormal  -- -- 90 % -- -- -- -- -- --   06/02/22 1655 97 °F (36 1 °C) Abnormal  75 28 Abnormal  91/32 Abnormal  -- 100 % -- 32 3 L/min Nasal cannula -- --   06/02/22 1645 -- 73 31 Abnormal  91/32 Abnormal  -- 96 % -- -- -- -- -- --   06/02/22 1630 -- 82 29 Abnormal  82/51 Abnormal  61 94 % -- 32 3 L/min Nasal cannula -- --   06/02/22 1628 97 5 °F (36 4 °C) 84 29 Abnormal  82/51 Abnormal  -- 95 % -- 32 3 L/min Nasal cannula -- --   06/02/22 1545 -- 81 26 Abnormal  81/46 Abnormal  58 92 % 45 -- -- BiPAP -- --   06/02/22 1530 -- 82 26 Abnormal  85/47 Abnormal  57 96 % 45 -- -- BiPAP -- --   06/02/22 1515 -- 88 31 Abnormal  -- -- 85 % Abnormal  45 -- -- BiPAP -- --   06/02/22 1500 -- 84 25 Abnormal  90/54 69 89 % Abnormal  45 -- -- BiPAP -- --   06/02/22 1430 97 4 °F (36 3 °C) Abnormal  90 25 Abnormal  100/54 73 94 % -- 32 3 L/min Nasal cannula -- --   06/02/22 1407 -- -- -- -- -- 95 % -- -- -- -- Full face mask --   06/02/22 1346 -- 86 -- 97/37 Abnormal  -- -- -- -- -- -- -- --   06/02/22 1317 97 4 °F (36 3 °C) Abnormal  86 18 90/37 Abnormal  -- -- -- -- -- -- -- --   06/02/22 1245 -- 93 18 86/48 Abnormal  61 98 % -- -- -- -- -- --   06/02/22 1239 -- -- -- -- -- 100 % -- -- -- None (Room air) -- --   06/02/22 1238 -- 95 16 85/50 Abnormal  -- -- -- -- -- -- -- Lying   06/02/22 1230 -- 91 -- 76/52 Abnormal  60 99 % -- -- -- -- --        Pertinent Labs/Diagnostic Test Results:   XR chest 1 view portable    (06/02 1307)      Bilateral reticular interstitial infiltrates  Findings could reflect developing interstitial pneumonia or other interstitial lung disease    Correlate with Covid 19 test        EKG  06-02-22  @ 10:52  atrial fibrillation with a ventricular rate of 92 beats per minute, it appears the patient does have some flipped T-waves in V2, V3, V4, V5, V6 and lead 2, ST elevation 1 mm in AVR in V1     @ 1159  11:59 a m  atrial fibrillation with incomplete right bundle-branch block, patient known to have appears to be ST elevation in AVR and V1 1 mm each with reciprocal flipped T-waves with ST depression in V2, V3, V4, V5, V6, lead 1, lead to, lead 3 and AVF       @ 1242  Atrial fibrillation with again 1 mm ST elevation in AVR and V1, reciprocal changes noted in anterior lateral leads    US cardiac 06-02-22  LVEF was about 55-60%, no specific RWMA however IVS dysgenetic likely due to RV pressure/volume overload  RV appeared to have mildly reduced function and dilated  IVS was flat during both systole and diastole   IVC was dilated 2 6 cm with only about 20% collapse suggesting elevated filling pressures, however I suspect there maybe underlying component of RV pressure overload and this likely reflects chronically elevated filling pressure as opposed to actual intravascular fluid status    Results from last 7 days   Lab Units 06/03/22  0559 06/03/22  0018 06/02/22  1941 06/02/22  1138   WBC Thousand/uL 9 25  --   --  9 82   HEMOGLOBIN g/dL 7 5* 7 5* 7 3* 5 4*   HEMATOCRIT % 23 8* 24 4* 24 5* 18 8*   PLATELETS Thousands/uL 243  --   --  354   NEUTROS ABS Thousands/µL 7 61  --   --  7 69*         Results from last 7 days   Lab Units 06/03/22  0559 06/02/22  1138   SODIUM mmol/L 137 131*   POTASSIUM mmol/L 4 3 5 2   CHLORIDE mmol/L 109* 100   CO2 mmol/L 19* 18*   ANION GAP mmol/L 9 13   BUN mg/dL 41* 41*   CREATININE mg/dL 1 99* 2 32*   EGFR ml/min/1 73sq m 23 19   CALCIUM mg/dL 7 4* 8 5   CALCIUM, IONIZED mmol/L 0 95*  --      Results from last 7 days   Lab Units 06/03/22  0559 06/02/22  1138   AST U/L 38 27   ALT U/L 12 8   ALK PHOS U/L 83 66   TOTAL PROTEIN g/dL 5 4* 6 4   ALBUMIN g/dL 3 3* 3 8   TOTAL BILIRUBIN mg/dL 1 24* 0 79         Results from last 7 days   Lab Units 06/03/22  0559 06/02/22  1138   GLUCOSE RANDOM mg/dL 82 116       Results from last 7 days   Lab Units 06/02/22  1609 06/02/22  1445 06/02/22  1138   HS TNI 0HR ng/L  --   --  10   HS TNI 2HR ng/L  --  8  --    HSTNI D2 ng/L  --  -2  --    HS TNI 4HR ng/L 19  --   --    HSTNI D4 ng/L 9  --   --          Results from last 7 days   Lab Units 06/03/22  0559 06/02/22  1138   PROTIME seconds 23 8* 27 5*   INR  2 19* 2 64*   PTT seconds  --  44*         Results from last 7 days   Lab Units 06/03/22  0559 06/02/22  1138 PROCALCITONIN ng/ml 0 09 0 08     Results from last 7 days   Lab Units 06/02/22  1941 06/02/22  1607 06/02/22  1138   LACTIC ACID mmol/L 2 0 3 4* 4 0*       Results from last 7 days   Lab Units 06/02/22  1138   BNP pg/mL 853*       Results from last 7 days   Lab Units 06/02/22  1138   BLOOD CULTURE  Received in Microbiology Lab  Culture in Progress  Received in Microbiology Lab  Culture in Progress  ED Treatment:   Medication Administration from 06/02/2022 1034 to 06/02/2022 1351       Date/Time Order Dose Route Action     06/02/2022 1140 sodium chloride 0 9 % bolus 1,000 mL 1,000 mL Intravenous New Bag     06/02/2022 1255 sodium chloride 0 9 % bolus 1,000 mL 1,000 mL Intravenous New Bag     06/02/2022 1245 prothrombin complex conc human (KCENTRA) 1,500 Units 1,500 Units Intravenous Given        Past Medical History:   Diagnosis Date    Disease of thyroid gland     Hypotension      Present on Admission:   Depression   Atrial fibrillation, chronic (HCC)   Chronic diastolic CHF (congestive heart failure) ([facility] [address] )   Essential hypertension   Mixed hyperlipidemia   Hypothyroidism      Admitting Diagnosis: Weakness generalized [R53 1]  Weakness [R53 1]  Hypotension [I95 9]  GI bleed [K92 2]  Atrial fibrillation, chronic (HCC) [I48 20]  Symptomatic anemia [D64 9]  Age/Sex: 68 y o  female  Admission Orders:  Scheduled Medications:     Continuous IV Infusions:  pantoprozole (PROTONIX) infusion (Continuous), 8 mg/hr, Intravenous, Continuous      PRN Meds:       IP CONSULT TO CARDIOLOGY  IP CONSULT TO GASTROENTEROLOGY  IP CONSULT TO CASE MANAGEMENT    Network Utilization Review Department  ATTENTION: Please call with any questions or concerns to [phone] and carefully listen to the prompts so that you are directed to the right person   All voicemails are confidential   Leandro Lo all requests for admission clinical reviews, approved or denied determinations and any other requests to dedicated fax number below belonging to the campus where the patient is receiving treatment   List of dedicated fax numbers for the Facilities:  [address] DENIALS (Administrative/Medical Necessity) [phone]   [address] (Maternity/NICU/Pediatrics) [phone]   [address]  [address] [facility] [place] [address] [phone] [address] [facility] [place] [address] [phone]

## 2022-06-03 NOTE — ASSESSMENT & PLAN NOTE
-with softer blood pressures somewhat improved from significant hypotension present on admission  -once able to tolerate would reinitiate medical therapy beginning with diuretic regimen with slow up titration in guideline directed medical therapy thereafter

## 2022-06-03 NOTE — ASSESSMENT & PLAN NOTE
-currently rate controlled on telemetry  -oral anticoagulation on hold due to acute blood loss anemia  -home medical therapy on hold due to shock/hypotension secondary to acute blood loss anemia   -would reinitiate as patient tolerates slowly to avoid hypotension   -transthoracic echocardiogram demonstrating normal left ventricular function with severe mitral regurgitation, moderate tricuspid regurgitation, mild to moderate RV dysfunction and pulmonary hypertension  -home diuretics were held due to significant hypotension with blood loss an DIEGO on admission  -would recommend reinitiating diuretic regimen once patient more stable from acute blood loss standpoint however will need to be very careful with volume resuscitation for patient in the setting of significant valvular disease, artery dysfunction, pulmonary hypertension  Quita Chaidez

## 2022-06-03 NOTE — ASSESSMENT & PLAN NOTE
-seen in the setting of acute blood loss anemia present on admission  -for continue medical therapy in holding oral anticoagulation at this time

## 2022-06-03 NOTE — ASSESSMENT & PLAN NOTE
-dynamic ischemic changes that do appear slightly improved since transfusion  -patient denies any significant issue at this time  -troponins trended and were relatively unrevealing  -will continue monitor on telemetry at this time

## 2022-06-03 NOTE — PROGRESS NOTES
Tammie 45  Progress Note - Lavern Estrada 1945, 68 y o  female MRN: [id number]  Unit/Bed#: ICU 12-01 Encounter: [id number]  Primary Care Provider: Lino Magana MD   Date and time admitted to hospital: 6/2/2022 10:34 AM    * ABLA (acute blood loss anemia)  Assessment & Plan  · Presented with hgb 5 4 and complaints of dark tarry stools/generalized weakness over the past couple days  · Continue protonix infusion   · S/p 2 U PRBC  · Trend hemoglobin at least Q6H  · Transfuse for hgb < 7  · Plan for EGD this AM     Shock ([address] )  Assessment & Plan  · AEB hypotension, lactic acidosis and DIEGO  · Likely related to ABLA  · S/p IVF and blood resuscitation - will transfuse for Hgb<7  · Lactic cleared  · Continue close HD monitoring, maintain MAP>65    Acute renal failure superimposed on stage 3 chronic kidney disease [facility])  Assessment & Plan  Lab Results   Component Value Date    EGFR 19 06/02/2022    EGFR 34 01/06/2022    EGFR 36 01/03/2022    CREATININE 2 32 (H) 06/02/2022    CREATININE 1 48 (H) 01/06/2022    CREATININE 1 41 (H) 01/03/2022     · History of CKD 3, baseline creatinine appears to be 1 3-1 4, 2 32 on admission  · Likely prerenal in nature  · Ongoing blood resuscitation   · Monitor I & O and renal indices  · Hold nephrotoxic medications    EKG abnormalities  Assessment & Plan  · Possibly in the setting of demand related to ABLA  · Troponin negative  · Denies CP  · Monitor EKG closely    Supratherapeutic INR  Assessment & Plan  · INR 2 64 on admission  · S/p KCentra  · Continue to monitor    Mixed hyperlipidemia  Assessment & Plan  · Resume statin therapy when tolerating PO intake    Hypothyroidism  Assessment & Plan  · Resume home synthroid when tolerating PO intake    Essential hypertension  Assessment & Plan  · Hold home antihypertensives in the setting of hypotension and ABLA    Depression  Assessment & Plan  · Resume home Zoloft when tolerating PO intake    Chronic diastolic CHF (congestive heart failure) ([facility])  Assessment & Plan  Wt Readings from Last 3 Encounters:   06/02/22 73 4 kg (161 lb 13 1 oz)   12/22/21 82 6 kg (182 lb)   12/10/21 83 kg (182 lb 15 7 oz)     · Based on chart review from cardiology notes  · Follow up on formal ECHO    · Takes torsemide as outpatient  · Hold diuretics in the setting of hypotension and DIEGO, consider diuresis if patient becomes hypoxic   · Daily weight        Atrial fibrillation, chronic ([facility])  Assessment & Plan  · History of afib on Eliquis, metoprolol and digoxin as outpatient  · [facility] at [facility]  · Holding rate control medications in the setting of hypotension   · Hold Eliquis in the setting of ABLA  · Remains rate controlled as of now  · Continue to monitor telemetry         ----------------------------------------------------------------------------------------  HPI/24hr events: s/p 2 U PRBC with appropriate response in Hgb  No further melena  No other acute events  Patient appropriate for transfer out of the ICU today?: No  Disposition: Continue Critical Care   Code Status: Level 1 - Full Code  ---------------------------------------------------------------------------------------  SUBJECTIVE  "I feel better"    Review of Systems   Constitutional: Negative  HENT: Negative  Eyes: Negative  Respiratory: Negative  Cardiovascular: Negative  Gastrointestinal: Negative  Endocrine: Negative  Genitourinary: Negative  Musculoskeletal: Negative  Skin: Negative  Allergic/Immunologic: Negative  Neurological: Negative  Hematological: Negative  Psychiatric/Behavioral: Negative        Review of systems was reviewed and negative unless stated above in HPI/24-hour events   ---------------------------------------------------------------------------------------  OBJECTIVE    Vitals   Vitals:    06/02/22 2258 06/02/22 2300 06/03/22 0000 06/03/22 0100   BP:  95/54 97/52 110/50   BP Location:   Left arm Pulse:  79 76 81   Resp:  20 18 16   Temp:  97 6 °F (36 4 °C)     TempSrc:  Temporal     SpO2: 98% 94% 100% 93%   Weight:       Height:         Temp (24hrs), Av 5 °F (36 4 °C), Min:97 °F (36 1 °C), Max:97 8 °F (36 6 °C)  Current: Temperature: 97 6 °F (36 4 °C)          Respiratory:  SpO2: SpO2: 93 %, SpO2 Device: O2 Device: Nasal cannula  Nasal Cannula O2 Flow Rate (L/min): 3 L/min    Invasive/non-invasive ventilation settings   Respiratory  Report   Lab Data (Last 4 hours)    None         O2/Vent Data (Last 4 hours)    None                Physical Exam  Constitutional:       General: She is not in acute distress  Appearance: She is ill-appearing  HENT:      Head: Normocephalic and atraumatic  Mouth/Throat:      Mouth: Mucous membranes are moist    Eyes:      Pupils: Pupils are equal, round, and reactive to light  Cardiovascular:      Rate and Rhythm: Normal rate  Rhythm irregular  Pulses: Normal pulses  Heart sounds: Normal heart sounds  Pulmonary:      Effort: Pulmonary effort is normal  No respiratory distress  Breath sounds: Normal breath sounds  No wheezing, rhonchi or rales  Abdominal:      General: Bowel sounds are normal  There is no distension  Palpations: Abdomen is soft  Tenderness: There is no abdominal tenderness  Musculoskeletal:         General: No swelling  Normal range of motion  Cervical back: Neck supple  Skin:     General: Skin is warm and dry  Capillary Refill: Capillary refill takes less than 2 seconds  Coloration: Skin is pale  Neurological:      General: No focal deficit present  Mental Status: She is alert and oriented to person, place, and time  Cranial Nerves: No cranial nerve deficit  Sensory: No sensory deficit  Motor: No weakness               Laboratory and Diagnostics:  Results from last 7 days   Lab Units 22  0018 22  1941 22  1138   WBC Thousand/uL  --   --  9 82   HEMOGLOBIN g/dL 7 5* 7 3* 5 4*   HEMATOCRIT % 24 4* 24 5* 18 8*   PLATELETS Thousands/uL  --   --  354   NEUTROS PCT %  --   --  79*   MONOS PCT %  --   --  9     Results from last 7 days   Lab Units 06/02/22  1138   SODIUM mmol/L 131*   POTASSIUM mmol/L 5 2   CHLORIDE mmol/L 100   CO2 mmol/L 18*   ANION GAP mmol/L 13   BUN mg/dL 41*   CREATININE mg/dL 2 32*   CALCIUM mg/dL 8 5   GLUCOSE RANDOM mg/dL 116   ALT U/L 8   AST U/L 27   ALK PHOS U/L 66   ALBUMIN g/dL 3 8   TOTAL BILIRUBIN mg/dL 0 79          Results from last 7 days   Lab Units 06/02/22  1138   INR  2 64*   PTT seconds 44*          Results from last 7 days   Lab Units 06/02/22  1941 06/02/22  1607 06/02/22  1138   LACTIC ACID mmol/L 2 0 3 4* 4 0*     ABG:    VBG:    Results from last 7 days   Lab Units 06/02/22  1138   PROCALCITONIN ng/ml 0 08       Micro  Results from last 7 days   Lab Units 06/02/22  1138   BLOOD CULTURE  Received in Microbiology Lab  Culture in Progress  Received in Microbiology Lab  Culture in Progress  EKG: Afib rate 80  Imaging: I have personally reviewed pertinent reports  and I have personally reviewed pertinent films in PACS    Intake and Output  I/O       06/01 0701  06/02 0700 06/02 0701  06/03 0700    I V  (mL/kg)  62 2 (0 8)    Blood  1183 8    IV Piggyback  3160    Total Intake(mL/kg)  4405 9 (60)    Net  +4405 9                Height and Weights   Height: 5' 3" (160 cm)     Body mass index is 28 66 kg/m²  Weight (last 2 days)     Date/Time Weight    06/02/22 1830 73 4 (161 82)    06/02/22 1346 67 1 (148)    06/02/22 1046 67 1 (148)            Nutrition       Diet Orders   (From admission, onward)             Start     Ordered    06/02/22 1435  Diet NPO  Diet effective now        References:    Nutrtion Support Algorithm Enteral vs  Parenteral   Question Answer Comment   Diet Type NPO    RD to adjust diet per protocol?  No        06/02/22 1435                  Active Medications  Scheduled Meds:  Current Facility-Administered Medications   Medication Dose Route Frequency Provider Last Rate    pantoprozole (PROTONIX) infusion (Continuous)  8 mg/hr Intravenous Continuous JAQUELINE Villa 8 mg/hr (06/03/22 0100)     Continuous Infusions:  pantoprozole (PROTONIX) infusion (Continuous), 8 mg/hr, Last Rate: 8 mg/hr (06/03/22 0100)      PRN Meds:        Invasive Devices Review  Invasive Devices  Report    Peripheral Intravenous Line  Duration           Peripheral IV 06/02/22 Distal;Left;Upper;Ventral (anterior) Arm <1 day    Peripheral IV 06/02/22 Dorsal (posterior); Right Hand <1 day          Drain  Duration           External Urinary Catheter <1 day                ---------------------------------------------------------------------------------------  Advance Directive and Living Will:      Power of :    POLST:    ---------------------------------------------------------------------------------------  Care Time Delivered:   Upon my evaluation, this patient had a high probability of imminent or life-threatening deterioration due to ABLA, GIB, which required my direct attention, intervention, and personal management  I have personally provided 32 minutes (0030 to 0102) of critical care time, exclusive of procedures, teaching, family meetings, and any prior time recorded by providers other than myself  JAQUELINE Mcknight      Portions of the record may have been created with voice recognition software  Occasional wrong word or "sound a like" substitutions may have occurred due to the inherent limitations of voice recognition software    Read the chart carefully and recognize, using context, where substitutions have occurred

## 2022-06-03 NOTE — ASSESSMENT & PLAN NOTE
· History of afib on Eliquis, metoprolol and digoxin as outpatient  · Follows with cardiology at [facility]  · Holding rate control medications in the setting of hypotension   · Hold Eliquis in the setting of ABLA  · Remains rate controlled as of now  · Continue to monitor telemetry

## 2022-06-03 NOTE — UTILIZATION REVIEW
Inpatient Admission Authorization Request   NOTIFICATION OF INPATIENT ADMISSION/INPATIENT AUTHORIZATION REQUEST   [facility]AC[facility]   [facility]  [address] [facility], [address]  Tax ID: [id number]  NPI: [id number]  Place of Service: [facility]patient [address] [facility]  [address]  Place of Service Code: 24     ATTENDING PROVIDER:  Attending Name and NPI#: Tess AbranJoel Evertonas Yasir [[id number]]  Address: [address] [place] [address], [address]  Phone: [phone]     UTILIZATION REVIEW CONTACT:  Milana Reyes, Utilization   Network Utilization Review Department  Phone: [phone]  Fax [phone]  Email: [email or web address]     PHYSICIAN ADVISORY SERVICES:  FOR GHXY-UF-OPXA REVIEW - MEDICAL NECESSITY DENIAL  Phone: [phone]  Fax: [phone]  Email: [email or web address]     TYPE OF REQUEST:  Inpatient Status     ADMISSION INFORMATION:  ADMISSION DATE/TIME: 6/2/22 12:58 PM  PATIENT DIAGNOSIS CODE/DESCRIPTION:  Weakness generalized [R53 1]  Weakness [R53 1]  Hypotension [I95 9]  GI bleed [K92 2]  Atrial fibrillation, chronic (HCC) [I48 20]  Symptomatic anemia [D64 9]  DISCHARGE DATE/TIME: No discharge date for patient encounter  IMPORTANT INFORMATION:  Please contact the Milana Reyes directly with any questions or concerns regarding this request  Department voicemails are confidential     Send requests for admission clinical reviews, concurrent reviews, approvals, and administrative denials due to lack of clinical to fax [phone]

## 2022-06-03 NOTE — CASE MANAGEMENT
Case Management Assessment & Discharge Planning Note    Patient name Rafael López  Location ICU 12/ICU  MRN [id number]  : 1945 Date 6/3/2022       Current Admission Date: 2022  Current Admission Diagnosis:ABLA (acute blood loss anemia)   Patient Active Problem List    Diagnosis Date Noted    [facility] on [facility] 2022    ABLA (acute blood loss anemia) 2022    Supratherapeutic INR 2022    Elevated troponin 2022    [facility] 2022    EKG abnormalities 2022    Hypoxia 2021    Frequent falls 2021    Elevated d-dimer 2021    Venous insufficiency of both lower extremities 2021    Acquired bilateral hammer toes 2018    Lymphedema of both lower extremities 2018    Nonrheumatic mitral valve regurgitation 2018    Atrial fibrillation, chronic ([facility] ) 2018    B12 deficiency 2017    Chronic diastolic CHF (congestive heart failure) ([place] [address] ) 2017    Folate deficiency 2017    Vitamin D deficiency 2016    Depression 2015    Mixed hyperlipidemia 2015    Osteoarthritis of knee 10/23/2012    Impaired fasting glucose 2012    Essential hypertension 2010    Hypothyroidism 2010    Posttraumatic stress disorder 2010      LOS (days): 1  Geometric Mean LOS (GMLOS) (days): 4 40  Days to GMLOS:3 3     OBJECTIVE:    Risk of Unplanned Readmission Score: 20 36     Current admission status: Inpatient  Preferred [facility]acy:   [facility] [address] [address]  Phone: [phone] Fax: [phone] - [place] - [address]  Phone: [phone] Fax: [phone]    Primary Care Provider: Romeo Silva MD    Primary Insurance: Monica SOUSA  Secondary Insurance: ASSESSMENT:  Active Health Care Proxies    There are no active Health Care Proxies on file  Advance Directives  Does patient have a [address]?: No  Was patient offered paperwork?: Yes (Papers provided)  Does patient currently have a Health Care decision maker?: Yes, please see Health Care Proxy section  Does patient have Advance Directives?: No  Was patient offered paperwork?: Yes (Papers provided)  Primary Contact: Kirstin Nath spouse  Readmission Root Cause  30 Day Readmission: No    Patient Information  Admitted from[de-identified] Home  Mental Status: Alert  During Assessment patient was accompanied by: Not accompanied during assessment  Assessment information provided by[de-identified] Patient  Primary Caregiver: Self  Support Systems: Spouse/significant other  [place] of Residence: [address] do you live in?: [address] access options   Select all that apply : Stairs  Number of steps to enter home : 3  Do the steps have railings?: Yes  Type of Current Residence: [address] [place] home  Upon entering residence, is there a bedroom on the main floor (no further steps)?: Yes  Upon entering residence, is there a bathroom on the main floor (no further steps)?: Yes  In the last 12 months, was there a time when you were not able to pay the mortgage or rent on time?: No  In the last 12 months, how many places have you lived?: 1  In the last 12 months, was there a time when you did not have a steady place to sleep or slept in a shelter (including now)?: No  Homeless/housing insecurity resource given?: N/A  Living Arrangements: Lives w/ Spouse/significant other  Is patient a ?: No    Activities of Daily Living Prior to Admission  Functional Status: Independent  Completes ADLs independently?: Yes  Ambulates independently?: Yes  Does patient use assisted devices?: Yes  Assisted Devices (DME) used: Straight Cane  Does patient currently own DME?: Yes  What DME does the patient currently own?: Straight Cane  Does patient have a history of Outpatient Therapy (PT/OT)?: No  Does the patient have a history of Short-Term Rehab?: Yes ([facility], [place], [place] [facility])  Does patient have a history of HHC?: Yes  Does patient currently have Alanna ?: No    Patient Information Continued  Income Source: Pension/MC[employer]  Does patient have prescription coverage?: Yes  Within the past 12 months, you worried that your food would run out before you got the money to buy more : Never true  Within the past 12 months, the food you bought just didn't last and you didn't have money to get more : Never true  Food insecurity resource given?: N/A  Does patient receive dialysis treatments?: No  Does patient have a history of substance abuse?: No  Does patient have a history of Mental Health Diagnosis?: No    PHQ 2/9 Screening   Reviewed PHQ 2/9 Depression Screening Score?: No    Means of Transportation  Means of Transport to Appts[de-identified] Family transport  In the past 12 months, has lack of transportation kept you from medical appointments or from getting medications?: No  In the past 12 months, has lack of transportation kept you from meetings, work, or from getting things needed for daily living?: No  Was application for public transport provided?: N/A        DISCHARGE DETAILS:    Discharge planning discussed with[de-identified] Pt  Pt scheduled for endoscopy  Needs PT/OT evaluation

## 2022-06-03 NOTE — ASSESSMENT & PLAN NOTE
Lab Results   Component Value Date    EGFR 19 06/02/2022    EGFR 34 01/06/2022    EGFR 36 01/03/2022    CREATININE 2 32 (H) 06/02/2022    CREATININE 1 48 (H) 01/06/2022    CREATININE 1 41 (H) 01/03/2022     · History of CKD 3, baseline creatinine appears to be 1 3-1 4, 2 32 on admission  · Likely prerenal in nature  · Ongoing blood resuscitation   · Monitor I & O and renal indices  · Hold nephrotoxic medications

## 2022-06-03 NOTE — ANESTHESIA PREPROCEDURE EVALUATION
Procedure:  EGD    Relevant Problems   CARDIO   (+) Atrial fibrillation, chronic (HCC)   (+) Essential hypertension   (+) Mixed hyperlipidemia   (+) Nonrheumatic mitral valve regurgitation      ENDO   (+) Hypothyroidism      /RENAL   (+) Acute renal failure superimposed on stage 3 chronic kidney disease (HCC)      HEMATOLOGY   (+) ABLA (acute blood loss anemia)      NEURO/PSYCH   (+) Depression   (+) Posttraumatic stress disorder      Cardiovascular and Mediastinum   (+) Chronic diastolic CHF (congestive heart failure) ([address] )      Presented yesterday with progressive shortness of breath  Hemoglobin 5 4  Transfused two units with appropriate response  Dynamic ECG changes noted on admission in setting of anemia, RVR and hypotension  Physical Exam    Airway    Mallampati score: II  TM Distance: >3 FB  Neck ROM: full     Dental   Comment: Edentulous upper, poor remaining lower dentition,     Cardiovascular  Rhythm: irregular, Rate: normal,     Pulmonary      Other Findings        6/2/22 TTE:    Left Ventricle: Left ventricular cavity size is normal  There is mild to moderate concentric hypertrophy  Wall motion is normal     IVS: There is  flattening of the interventricular septum consistent with right ventricle overload    Right Ventricle: Right ventricular cavity size is dilated  Systolic function is mild-moderately reduced    Left Atrium: The atrium is dilated    Right Atrium: The atrium is dilated    Aortic Valve: There is aortic sclerosis    Mitral Valve: There is severe regurgitation    Tricuspid Valve: There is moderate regurgitation    Pulmonic Valve: There is mild regurgitation  Anesthesia Plan  ASA Score- 3 Emergent    Anesthesia Type- IV sedation with anesthesia with ASA Monitors           Additional Monitors:   Airway Plan:     Comment: I discussed the risks and benefits of IV sedation anesthesia including the possibility of the need to convert to general anesthesia and the potential risk of awareness  Plan Factors-Exercise tolerance (METS): <4 METS  Chart reviewed  EKG reviewed  Existing labs reviewed  Patient summary reviewed  Patient is not a current smoker  Patient did not smoke on day of surgery  Induction- intravenous  Postoperative Plan-     Informed Consent- Anesthetic plan and risks discussed with patient

## 2022-06-03 NOTE — CONSULTS
[id number] [facility] [address] 1945, 68 y o  female MRN: [id number]  Unit/Bed#: ICU 12-01 Encounter: [id number]  Primary Care Provider: Berhane Gimenez MD   Date and time admitted to hospital: 6/2/2022 10:34 AM    Consult to cardiology  Consult performed by: Geoffrey Rivera DO  Consult ordered by: JAQUELINE Faustin          EKG abnormalities  Assessment & Plan  -dynamic ischemic changes that do appear slightly improved since transfusion  -patient denies any significant issue at this time  -troponins trended and were relatively unrevealing  -will continue monitor on telemetry at this time    [facility])  Assessment & Plan  -seen in the setting of acute blood loss anemia present on admission  -for continue medical therapy in holding oral anticoagulation at this time  Acute renal failure superimposed on stage 3 chronic kidney disease [facility])  Assessment & Plan  Lab Results   Component Value Date    EGFR 23 06/03/2022    EGFR 19 06/02/2022    EGFR 34 01/06/2022    CREATININE 1 99 (H) 06/03/2022    CREATININE 2 32 (H) 06/02/2022    CREATININE 1 48 (H) 01/06/2022     -creatinine slowly improving down to 1 99 today  -would monitor renal function electrolytes along with urine output  -once patient hemodynamically able to tolerate would initiate diuretics as patient does appear to have some signs of volume overload with JVD and lower extremity edema present      Essential hypertension  Assessment & Plan  -with softer blood pressures somewhat improved from significant hypotension present on admission  -once able to tolerate would reinitiate medical therapy beginning with diuretic regimen with slow up titration in guideline directed medical therapy thereafter    Chronic diastolic CHF (congestive heart failure) (HCC)  Assessment & Plan  Wt Readings from Last 3 Encounters:   06/03/22 72 3 kg (159 lb 6 3 oz)   12/22/21 82 6 kg (182 lb)   12/10/21 83 kg (182 lb 15 7 oz)     -medical therapy currently on hold due to hypotension seen in the setting of significant blood loss anemia on admission  -would reinitiate medical therapy once medically stable slowly to avoid significant hypotension in patient  -would initiate diuretic regimen 1st in the setting of significant resuscitation due to hypotension  Atrial fibrillation, chronic (HCC)  Assessment & Plan  -currently rate controlled on telemetry  -oral anticoagulation on hold due to acute blood loss anemia  -home medical therapy on hold due to shock/hypotension secondary to acute blood loss anemia   -would reinitiate as patient tolerates slowly to avoid hypotension   -transthoracic echocardiogram demonstrating normal left ventricular function with severe mitral regurgitation, moderate tricuspid regurgitation, mild to moderate RV dysfunction and pulmonary hypertension  -home diuretics were held due to significant hypotension with blood loss an DIEGO on admission  -would recommend reinitiating diuretic regimen once patient more stable from acute blood loss standpoint however will need to be very careful with volume resuscitation for patient in the setting of significant valvular disease, artery dysfunction, pulmonary hypertension       * ABLA (acute blood loss anemia)  Assessment & Plan  -presented with hemoglobin 5 4 now improved s/p transfusion to 7 5  -plan for EGD for further evaluation  -would continue monitor  -continue to hold oral anticoagulation at this time  -agree with transfusion for hemoglobin less than 7       Other summary comments:   -follow-up results of EGD from today  -as patient does appear after significant resuscitation for shock and acute blood loss anemia to be somewhat volume overload on physical exam would reinitiate medical therapy slowly beginning initially with diuretics as patient tolerates to avoid hypotension with than slow re-initiation of AV michael blocking agent to continue rate control strategy for AFib  -can continue to hold oral anticoagulation and can restart once cleared by Gastroenterology if patient is agreeable  -continue monitor patient on telemetry at this time with monitoring strict I&Os along with daily weights  Outpatient Cardiologist: Dr Juan Vasquez    HPI: Melani Shni is a 68y o  year old female with atrial fibrillation on oral anticoagulation with Eliquis, CKD, CHF, hypothyroidism, hyperlipidemia and depression who initially presented to the hospital 06/02/2022 with complaints of progressive generalized weakness and shortness of along with concern possible chest discomfort   -after discussion with the patient she denies any chest discomfort but states that approximately a week or so ago she had bloody bowel movements that seem to improve from gross blood but did become dark over the remaining time frame  Since that time she had noticed increasing weakness and fatigue along with shortness of breath and worsening lower extremity edema  In that setting she presented to the hospital for further evaluation   -emergency department pain shortness from to have dynamic/ischemic ECG changes and I was contacted along with interventional who agreed with ischemic changes however prior to laboratory studies being performed  Once laboratory workup revealed hemoglobin 5 4 with lactic acid for on admission along with DIEGO recommendations were for ICU management and aggressive treatment of her newly worsening anemia with concern for acute blood loss anemia with possible GI bleed  -currently at this time patient denies chest pain, palpitations, lightheadedness or dizziness, loss of consciousness and notes that her fatigue and shortness of breath do seem to be improving after receiving transfusion          EKG:   Rate controlled atrial issue on telemetry    MOST  RECENT CARDIAC IMAGING:   -transthoracic echocardiogram 06/02/2022 showing left ventricular systolic function normal LVEF 60% with flattening of the interventricular septum and a dilated right ventricle with mild-moderately reduced function with severe mitral regurgitation and moderate tricuspid regurgitation  And pulmonary hypertension pressure in with estimated PASP 65 mmHg however may be underestimated due to RV dysfunction  -documentation on echocardiography [facility] states patient does have significant mitral regurgitation at baseline with recommendations for monitoring conservatively and possible MitraClip in future however I am not able to view overall report echocardiogram       Review of Systems:   Review of Systems   Constitutional: Positive for fatigue  Negative for chills, diaphoresis, fever and unexpected weight change  HENT: Negative for trouble swallowing and voice change  Respiratory: Negative for shortness of breath and wheezing  Cardiovascular: Positive for leg swelling  Negative for chest pain and palpitations  Gastrointestinal: Positive for blood in stool  Negative for constipation, diarrhea, nausea and vomiting  Genitourinary: Negative for dysuria  Musculoskeletal: Positive for arthralgias  Negative for neck pain and neck stiffness  Skin: Negative for rash  Neurological: Negative for dizziness, syncope, light-headedness and headaches  Psychiatric/Behavioral: Negative for agitation and confusion  All other systems reviewed and are negative          Historical Information   Past Medical History:   Diagnosis Date    Disease of thyroid gland     Hypotension      Past Surgical History:   Procedure Laterality Date    HYSTERECTOMY       Social History     Substance and Sexual Activity   Alcohol Use Never    Comment: occassional      Social History     Substance and Sexual Activity   Drug Use Never     Social History     Tobacco Use   Smoking Status Never Smoker   Smokeless Tobacco Never Used       Family History:   Family history significant for father with hyperlipidemia    Meds/Allergies   all current active meds have been reviewed  Medications Prior to Admission   Medication    apixaban (ELIQUIS) 5 mg    atorvastatin (LIPITOR) 40 mg tablet    cyanocobalamin 3,166 mcg/mL    folic acid (FOLVITE) 1 mg tablet    levothyroxine 150 mcg tablet    Magnesium Gluconate 550 MG TABS    metoprolol tartrate (LOPRESSOR) 50 mg tablet    potassium chloride (K-DUR,KLOR-CON) 20 mEq tablet    sertraline (ZOLOFT) 100 mg tablet    torsemide (DEMADEX) 20 mg tablet       No Known Allergies    Objective   Vitals: Blood pressure 106/55, pulse 76, temperature (!) 97 1 °F (36 2 °C), temperature source Tympanic, resp  rate (!) 29, height 5' 3" (1 6 m), weight 72 3 kg (159 lb 6 3 oz), SpO2 100 %  , Body mass index is 28 24 kg/m² ,   Orthostatic Blood Pressures    Flowsheet Row Most Recent Value   Blood Pressure 106/55 filed at 06/03/2022 0800   Patient Position - Orthostatic VS Lying filed at 06/03/2022 3723          Systolic (06NKE), OKT:04 , Min:76 , SIW:891     Diastolic (22HDB), UNB:44, Min:32, Max:65      Physical Exam:  Physical Exam  Vitals reviewed  Constitutional:       General: She is not in acute distress  Appearance: She is not diaphoretic  HENT:      Head: Normocephalic and atraumatic  Comments: Nasal cannula oxygen in place  Eyes:      General:         Right eye: No discharge  Left eye: No discharge  Neck:      Comments: Trachea midline, JVD present  Cardiovascular:      Heart sounds: Murmur heard  No friction rub  Comments: Irregularly irregular rate and rhythm  Pulmonary:      Effort: No respiratory distress  Breath sounds: No wheezing  Comments: Decreased breath sounds bilaterally with slight crackles present  Neurological:      Mental Status: She is alert               Lab Results:     Troponins:    Results from last 7 days   Lab Units 06/02/22  1609 06/02/22  1445 06/02/22  1138   HS TNI 0HR ng/L  --   --  10   HS TNI 2HR ng/L  --  8  --    HSTNI D2 ng/L  --  -2  --    HS TNI 4HR ng/L 19  --   --    HSTNI D4 ng/L 9  --   --      BNP:   Results from last 6 Months   Lab Units 06/02/22  1138 12/22/21  1616   BNP pg/mL 853* 383*       CBC :   Results from last 7 days   Lab Units 06/03/22  0559 06/03/22  0018 06/02/22  1941 06/02/22  1138   WBC Thousand/uL 9 25  --   --  9 82   HEMOGLOBIN g/dL 7 5* 7 5*   < > 5 4*   HEMATOCRIT % 23 8* 24 4*   < > 18 8*   MCV fL 93  --   --  102*   PLATELETS Thousands/uL 243  --   --  354    < > = values in this interval not displayed       TSH:     CMP:   Results from last 7 days   Lab Units 06/03/22  0559 06/02/22  1138   POTASSIUM mmol/L 4 3 5 2   CHLORIDE mmol/L 109* 100   CO2 mmol/L 19* 18*   BUN mg/dL 41* 41*   CREATININE mg/dL 1 99* 2 32*   AST U/L 38 27   ALT U/L 12 8   EGFR ml/min/1 73sq m 23 19     Lipid Profile:     Coags:   Results from last 7 days   Lab Units 06/03/22  0559 06/02/22  1138   INR  2 19* 2 64*

## 2022-06-03 NOTE — ASSESSMENT & PLAN NOTE
Lab Results   Component Value Date    EGFR 23 06/03/2022    EGFR 19 06/02/2022    EGFR 34 01/06/2022    CREATININE 1 99 (H) 06/03/2022    CREATININE 2 32 (H) 06/02/2022    CREATININE 1 48 (H) 01/06/2022     -creatinine slowly improving down to 1 99 today  -would monitor renal function electrolytes along with urine output  -once patient hemodynamically able to tolerate would initiate diuretics as patient does appear to have some signs of volume overload with JVD and lower extremity edema present

## 2022-06-03 NOTE — NURSING NOTE
Pt found to be sleeping and easy to rouse but initially confused to all but self  Now alert and oriented  Will monitor  Moves all extremities  No focal deficit  Heart tones clear with weak pulses  Color is pale  Trace edema to bilateral lower extremities  Lungs are decreased with fine scattered crackles  Respirations are mildly labored at rest with dyspnea on exertion  Abdomen is soft with hypoactive bowel sounds, non tender  No BM  Voided moderate amount of clear deep swain  IV site intact  Bilateral arms are very ecchymotic with hematomas to both anti cubital sites  Open ulceration noted to left cheek with apparent keratosis to adjacent site  Pt states she has had this for years and has been treating it at home  Denies seeing a physician for this problem  Encouraged to seek professional consultation as soon as is convenient  IV site intact

## 2022-06-04 LAB
ANION GAP SERPL CALCULATED.3IONS-SCNC: 13 MMOL/L (ref 4–13)
BUN SERPL-MCNC: 33 MG/DL (ref 5–25)
CA-I BLD-SCNC: 1.06 MMOL/L (ref 1.12–1.32)
CALCIUM SERPL-MCNC: 7.9 MG/DL (ref 8.4–10.2)
CHLORIDE SERPL-SCNC: 107 MMOL/L (ref 96–108)
CO2 SERPL-SCNC: 22 MMOL/L (ref 21–32)
CREAT SERPL-MCNC: 1.62 MG/DL (ref 0.6–1.3)
DIGOXIN SERPL-MCNC: 0.9 NG/ML (ref 0.8–2)
ERYTHROCYTE [DISTWIDTH] IN BLOOD BY AUTOMATED COUNT: 17 % (ref 11.6–15.1)
GFR SERPL CREATININE-BSD FRML MDRD: 30 ML/MIN/1.73SQ M
GLUCOSE SERPL-MCNC: 74 MG/DL (ref 65–140)
HCT VFR BLD AUTO: 19.2 % (ref 34.8–46.1)
HCT VFR BLD AUTO: 24.5 % (ref 34.8–46.1)
HGB BLD-MCNC: 5.9 G/DL (ref 11.5–15.4)
HGB BLD-MCNC: 7.6 G/DL (ref 11.5–15.4)
HGB BLD-MCNC: 7.8 G/DL (ref 11.5–15.4)
HGB BLD-MCNC: 8.2 G/DL (ref 11.5–15.4)
HGB BLD-MCNC: 8.4 G/DL (ref 11.5–15.4)
MCH RBC QN AUTO: 29.8 PG (ref 26.8–34.3)
MCHC RBC AUTO-ENTMCNC: 31 G/DL (ref 31.4–37.4)
MCV RBC AUTO: 96 FL (ref 82–98)
PLATELET # BLD AUTO: 178 THOUSANDS/UL (ref 149–390)
PMV BLD AUTO: 9.5 FL (ref 8.9–12.7)
POTASSIUM SERPL-SCNC: 3.2 MMOL/L (ref 3.5–5.3)
RBC # BLD AUTO: 2.55 MILLION/UL (ref 3.81–5.12)
SODIUM SERPL-SCNC: 142 MMOL/L (ref 135–147)
WBC # BLD AUTO: 6.6 THOUSAND/UL (ref 4.31–10.16)

## 2022-06-04 PROCEDURE — 80048 BASIC METABOLIC PNL TOTAL CA: CPT | Performed by: NURSE PRACTITIONER

## 2022-06-04 PROCEDURE — 85014 HEMATOCRIT: CPT | Performed by: NURSE PRACTITIONER

## 2022-06-04 PROCEDURE — 99232 SBSQ HOSP IP/OBS MODERATE 35: CPT | Performed by: INTERNAL MEDICINE

## 2022-06-04 PROCEDURE — 85027 COMPLETE CBC AUTOMATED: CPT | Performed by: NURSE PRACTITIONER

## 2022-06-04 PROCEDURE — C9113 INJ PANTOPRAZOLE SODIUM, VIA: HCPCS | Performed by: ANESTHESIOLOGY

## 2022-06-04 PROCEDURE — 85018 HEMOGLOBIN: CPT | Performed by: NURSE PRACTITIONER

## 2022-06-04 PROCEDURE — 80162 ASSAY OF DIGOXIN TOTAL: CPT | Performed by: ANESTHESIOLOGY

## 2022-06-04 PROCEDURE — P9016 RBC LEUKOCYTES REDUCED: HCPCS

## 2022-06-04 PROCEDURE — 99233 SBSQ HOSP IP/OBS HIGH 50: CPT | Performed by: ANESTHESIOLOGY

## 2022-06-04 PROCEDURE — 82330 ASSAY OF CALCIUM: CPT | Performed by: NURSE PRACTITIONER

## 2022-06-04 PROCEDURE — 30233N1 TRANSFUSION OF NONAUTOLOGOUS RED BLOOD CELLS INTO PERIPHERAL VEIN, PERCUTANEOUS APPROACH: ICD-10-PCS | Performed by: ANESTHESIOLOGY

## 2022-06-04 RX ORDER — POTASSIUM CHLORIDE 20MEQ/15ML
20 LIQUID (ML) ORAL ONCE
Status: COMPLETED | OUTPATIENT
Start: 2022-06-04 | End: 2022-06-04

## 2022-06-04 RX ORDER — POTASSIUM CHLORIDE 14.9 MG/ML
20 INJECTION INTRAVENOUS EVERY 4 HOURS
Status: DISCONTINUED | OUTPATIENT
Start: 2022-06-04 | End: 2022-06-04

## 2022-06-04 RX ORDER — POTASSIUM CHLORIDE 20 MEQ/1
20 TABLET, EXTENDED RELEASE ORAL ONCE
Status: DISCONTINUED | OUTPATIENT
Start: 2022-06-04 | End: 2022-06-04

## 2022-06-04 RX ORDER — PANTOPRAZOLE SODIUM 40 MG/1
40 INJECTION, POWDER, FOR SOLUTION INTRAVENOUS EVERY 12 HOURS SCHEDULED
Status: DISCONTINUED | OUTPATIENT
Start: 2022-06-04 | End: 2022-06-08

## 2022-06-04 RX ORDER — CALCIUM GLUCONATE 20 MG/ML
1 INJECTION, SOLUTION INTRAVENOUS ONCE
Status: COMPLETED | OUTPATIENT
Start: 2022-06-04 | End: 2022-06-04

## 2022-06-04 RX ORDER — FUROSEMIDE 10 MG/ML
40 INJECTION INTRAMUSCULAR; INTRAVENOUS ONCE
Status: DISCONTINUED | OUTPATIENT
Start: 2022-06-04 | End: 2022-06-04

## 2022-06-04 RX ORDER — SODIUM CHLORIDE, SODIUM GLUCONATE, SODIUM ACETATE, POTASSIUM CHLORIDE, MAGNESIUM CHLORIDE, SODIUM PHOSPHATE, DIBASIC, AND POTASSIUM PHOSPHATE .53; .5; .37; .037; .03; .012; .00082 G/100ML; G/100ML; G/100ML; G/100ML; G/100ML; G/100ML; G/100ML
500 INJECTION, SOLUTION INTRAVENOUS ONCE
Status: COMPLETED | OUTPATIENT
Start: 2022-06-04 | End: 2022-06-04

## 2022-06-04 RX ORDER — DIGOXIN 125 MCG
125 TABLET ORAL EVERY OTHER DAY
Status: DISCONTINUED | OUTPATIENT
Start: 2022-06-06 | End: 2022-06-06

## 2022-06-04 RX ADMIN — LEVOTHYROXINE SODIUM 150 MCG: 75 TABLET ORAL at 06:04

## 2022-06-04 RX ADMIN — SODIUM CHLORIDE, SODIUM GLUCONATE, SODIUM ACETATE, POTASSIUM CHLORIDE, MAGNESIUM CHLORIDE, SODIUM PHOSPHATE, DIBASIC, AND POTASSIUM PHOSPHATE 500 ML: .53; .5; .37; .037; .03; .012; .00082 INJECTION, SOLUTION INTRAVENOUS at 22:54

## 2022-06-04 RX ADMIN — POTASSIUM CHLORIDE 20 MEQ: 14.9 INJECTION, SOLUTION INTRAVENOUS at 07:21

## 2022-06-04 RX ADMIN — POTASSIUM CHLORIDE 20 MEQ: 20 SOLUTION ORAL at 12:58

## 2022-06-04 RX ADMIN — PANTOPRAZOLE SODIUM 40 MG: 40 INJECTION, POWDER, FOR SOLUTION INTRAVENOUS at 13:55

## 2022-06-04 RX ADMIN — PANTOPRAZOLE SODIUM 40 MG: 40 INJECTION, POWDER, FOR SOLUTION INTRAVENOUS at 21:03

## 2022-06-04 RX ADMIN — ATORVASTATIN CALCIUM 40 MG: 40 TABLET, FILM COATED ORAL at 17:30

## 2022-06-04 RX ADMIN — CALCIUM GLUCONATE 1 G: 20 INJECTION, SOLUTION INTRAVENOUS at 06:33

## 2022-06-04 RX ADMIN — SERTRALINE HYDROCHLORIDE 100 MG: 50 TABLET ORAL at 08:39

## 2022-06-04 NOTE — ASSESSMENT & PLAN NOTE
· Presented with hgb 5 4 and complaints of dark tarry stools/generalized weakness over the past couple days  · Continue protonix infusion   · S/p 2 U PRBC on 6/02/2022  · S/P 1 U PRBC for hgb 5 9-followed by 40 mg IV lasix  · Trend hemoglobin at least Q6H  · Transfuse for hgb < 7  · EGD-Normal  · Plan for colonoscopy on Monday for evaluation or earlier if Hgb continues to drop

## 2022-06-04 NOTE — ASSESSMENT & PLAN NOTE
-seen in the setting of acute blood loss anemia present on admission  -patient has now required transfusion on 2 separate occasions despite EGD without revealing source  -plan for colonoscopy Monday  -for now continue medical therapy with holding oral anticoagulation at this time

## 2022-06-04 NOTE — PROGRESS NOTES
Tammie 45  Progress Note - Romina Hough 1945, 68 y o  female MRN: [id number]  Unit/Bed#: ICU 12-01 Encounter: [id number]  Primary Care Provider: Mara Boo MD   Date and time admitted to hospital: 6/2/2022 10:34 AM    * ABLA (acute blood loss anemia)  Assessment & Plan  · Presented with hgb 5 4 and complaints of dark tarry stools/generalized weakness over the past couple days  · Continue protonix infusion   · S/p 2 U PRBC on 6/02/2022  · S/P 1 U PRBC for hgb 5 9-followed by 40 mg IV lasix  · Trend hemoglobin at least Q6H  · Transfuse for hgb < 7  · EGD-Normal  · Plan for colonoscopy on Monday for evaluation or earlier if Hgb continues to drop    [facility] ([facility] [address] )  Assessment & Plan  · AEB hypotension, lactic acidosis and DIEGO  · Likely related to ABLA  · S/p IVF and blood resuscitation - will transfuse for Hgb<7  · Lactic cleared  · Continue close HD monitoring, maintain MAP>65    Acute renal failure superimposed on stage 3 chronic kidney disease [facility])  Assessment & Plan  Lab Results   Component Value Date    EGFR 23 06/03/2022    EGFR 19 06/02/2022    EGFR 34 01/06/2022    CREATININE 1 99 (H) 06/03/2022    CREATININE 2 32 (H) 06/02/2022    CREATININE 1 48 (H) 01/06/2022     · History of CKD 3, baseline creatinine appears to be 1 3-1 4, 2 32 on admission  · Likely prerenal in nature  · Ongoing blood resuscitation   · Monitor I & O and renal indices  · Hold nephrotoxic medications    EKG abnormalities  Assessment & Plan  · Possibly in the setting of demand related to ABLA  · Troponin negative  · Denies CP  · Monitor EKG closely    Supratherapeutic INR  Assessment & Plan  · INR 2 64 on admission  · S/p KCentra  · Continue to monitor    Mixed hyperlipidemia  Assessment & Plan  · Continue home statin therapy     Hypothyroidism  Assessment & Plan  · Continue home synthroid     Essential hypertension  Assessment & Plan  · Hold home antihypertensives in the setting of hypotension and ABLA    Depression  Assessment & Plan  · Continue home Zoloft     Chronic diastolic CHF (congestive heart failure) (HCC)  Assessment & Plan  Wt Readings from Last 3 Encounters:   22 72 3 kg (159 lb 6 3 oz)   21 82 6 kg (182 lb)   12/10/21 83 kg (182 lb 15 7 oz)     · Based on chart review from cardiology notes  · Follow up on formal ECHO    · Takes torsemide as outpatient  · Received Lasix 40 mg x 1 6/3/2022  · Strict I and O  · Daily weight        Atrial fibrillation, chronic (HCC)  Assessment & Plan  · History of afib on Eliquis, metoprolol and digoxin as outpatient  · Follows with cardiology at [facility]  · Holding rate control medications in the setting of hypotension   · Hold Eliquis in the setting of ABLA  · Remains rate controlled as of now  · Continue to monitor telemetry       ----------------------------------------------------------------------------------------  HPI/24hr events: Hgb dropped to 5 9  S/p 1 unit PRBC  No other acute events  Patient appropriate for transfer out of the ICU today?: No  Disposition: Continue Stepdown Level 1 level of care   Code Status: Level 1 - Full Code  ---------------------------------------------------------------------------------------  SUBJECTIVE  " I feel better  I'm thirsty " Denied GISELA CP  Denied N/V/D      Review of Systems  Review of systems was reviewed and negative unless stated above in HPI/24-hour events   ---------------------------------------------------------------------------------------  OBJECTIVE    Vitals   Vitals:    22 0000 22 0135 22 0145 22 0200   BP: 92/54 (!) 83/48 100/50 99/53   BP Location: Left arm   Left arm   Pulse: 84 75 74 86   Resp: (!) 25 (!) 33 (!) 26 (!) 30   Temp:  97 8 °F (36 6 °C)  97 9 °F (36 6 °C)   TempSrc:  Tympanic  Tympanic   SpO2: 96%  (!) 88% 91%   Weight:       Height:         Temp (24hrs), Av 4 °F (36 3 °C), Min:97 1 °F (36 2 °C), Max:97 9 °F (36 6 °C)  Current: Temperature: 97 9 °F (36 6 °C)          Respiratory:  RA    Invasive/non-invasive ventilation settings   Respiratory  Report   Lab Data (Last 4 hours)    None         O2/Vent Data (Last 4 hours)    None                Physical Exam  Constitutional:       Appearance: She is ill-appearing  HENT:      Head: Normocephalic and atraumatic  Mouth/Throat:      Mouth: Mucous membranes are moist    Eyes:      Extraocular Movements: Extraocular movements intact  Conjunctiva/sclera: Conjunctivae normal       Pupils: Pupils are equal, round, and reactive to light  Cardiovascular:      Rate and Rhythm: Rhythm irregularly irregular  Pulses: Normal pulses  Pulmonary:      Effort: Pulmonary effort is normal  No respiratory distress  Breath sounds: Examination of the left-lower field reveals decreased breath sounds  Decreased breath sounds present  No wheezing or rhonchi  Abdominal:      General: Bowel sounds are normal       Palpations: Abdomen is soft  Tenderness: There is no abdominal tenderness  Musculoskeletal:      Cervical back: Normal range of motion  Skin:     General: Skin is warm and dry  Capillary Refill: Capillary refill takes 2 to 3 seconds  Comments: Willian UE Ecchymosis 2/2 blood draws    Neurological:      Mental Status: She is oriented to person, place, and time  Mental status is at baseline  GCS: GCS eye subscore is 4  GCS verbal subscore is 5  GCS motor subscore is 6     Psychiatric:         Speech: Speech normal          Behavior: Behavior normal              Laboratory and Diagnostics:  Results from last 7 days   Lab Units 06/04/22  0031 06/03/22  1533 06/03/22  0559 06/03/22  0018 06/02/22  1941 06/02/22  1138   WBC Thousand/uL  --   --  9 25  --   --  9 82   HEMOGLOBIN g/dL 5 9* 7 0* 7 5* 7 5* 7 3* 5 4*   HEMATOCRIT % 19 2* 22 2* 23 8* 24 4* 24 5* 18 8*   PLATELETS Thousands/uL  --   --  243  --   --  354   NEUTROS PCT %  --   --  83*  --   --  79*   MONOS PCT %  --   --  7  --   -- 9     Results from last 7 days   Lab Units 06/03/22  0559 06/02/22  1138   SODIUM mmol/L 137 131*   POTASSIUM mmol/L 4 3 5 2   CHLORIDE mmol/L 109* 100   CO2 mmol/L 19* 18*   ANION GAP mmol/L 9 13   BUN mg/dL 41* 41*   CREATININE mg/dL 1 99* 2 32*   CALCIUM mg/dL 7 4* 8 5   GLUCOSE RANDOM mg/dL 82 116   ALT U/L 12 8   AST U/L 38 27   ALK PHOS U/L 83 66   ALBUMIN g/dL 3 3* 3 8   TOTAL BILIRUBIN mg/dL 1 24* 0 79          Results from last 7 days   Lab Units 06/03/22  0559 06/02/22  1138   INR  2 19* 2 64*   PTT seconds  --  44*          Results from last 7 days   Lab Units 06/02/22  1941 06/02/22  1607 06/02/22  1138   LACTIC ACID mmol/L 2 0 3 4* 4 0*     ABG:    VBG:    Results from last 7 days   Lab Units 06/03/22  0559 06/02/22  1138   PROCALCITONIN ng/ml 0 09 0 08       Micro  Results from last 7 days   Lab Units 06/02/22  1138   BLOOD CULTURE  No Growth at 24 hrs  No Growth at 24 hrs  EKG: Atrial fib rate 90's  Imaging: I have personally reviewed pertinent reports  and I have personally reviewed pertinent films in PACS    Intake and Output  I/O       06/02 0701  06/03 0700 06/03 0701  06/04 0700    I V  (mL/kg) 112 2 (1 6) 320 (4 4)    Blood 1183 8     IV Piggyback 3160 100    Total Intake(mL/kg) 4455 9 (61 6) 420 (5 8)    Urine (mL/kg/hr) 200 2000 (1 2)    Total Output 200 2000    Net +4255 9 -1580                Height and Weights   Height: 5' 3" (160 cm)     Body mass index is 28 24 kg/m²  Weight (last 2 days)     Date/Time Weight    06/03/22 0535 72 3 (159 39)    06/02/22 1830 73 4 (161 82)    06/02/22 1346 67 1 (148)    06/02/22 1046 67 1 (148)            Nutrition       Diet Orders   (From admission, onward)             Start     Ordered    06/03/22 1739  Diet Clear Liquid  Diet effective now        References:    Nutrtion Support Algorithm Enteral vs  Parenteral   Question Answer Comment   Diet Type Clear Liquid    Special Instructions No Red Liquids/Foods    RD to adjust diet per protocol?  No 06/03/22 1738                  Active Medications  Scheduled Meds:  Current Facility-Administered Medications   Medication Dose Route Frequency Provider Last Rate    atorvastatin  40 mg Oral Daily With Dinner JAQUELINE Rowan      furosemide  40 mg Intravenous Once JAQUELINE Rowan      levothyroxine  150 mcg Oral Daily JAQUELINE Rowan      sertraline  100 mg Oral Daily JAQUELINE Rowan       Continuous Infusions:     PRN Meds:        Invasive Devices Review  Invasive Devices  Report    Peripheral Intravenous Line  Duration           Peripheral IV 06/02/22 Dorsal (posterior); Right Hand 1 day    Peripheral IV 06/03/22 Dorsal (posterior); Right Forearm <1 day          Drain  Duration           External Urinary Catheter 1 day                Rationale for remaining devices: PIV  ---------------------------------------------------------------------------------------  Advance Directive and Living Will:      Power of :    POLST:    ---------------------------------------------------------------------------------------  Care Time Delivered:   No Critical Care time spent       [employer]      Portions of the record may have been created with voice recognition software  Occasional wrong word or "sound a like" substitutions may have occurred due to the inherent limitations of voice recognition software    Read the chart carefully and recognize, using context, where substitutions have occurred

## 2022-06-04 NOTE — ASSESSMENT & PLAN NOTE
Wt Readings from Last 3 Encounters:   06/04/22 69 1 kg (152 lb 5 4 oz)   12/22/21 82 6 kg (182 lb)   12/10/21 83 kg (182 lb 15 7 oz)     -medical therapy currently on hold due to hypotension seen in the setting of significant blood loss anemia on admission which has not required transfusion x2  -would reinitiate medical therapy once medically stable slowly to avoid significant hypotension in patient  -would initiate diuretic regimen first in the setting of significant resuscitation due to hypotension

## 2022-06-04 NOTE — ASSESSMENT & PLAN NOTE
Lab Results   Component Value Date    EGFR 30 06/04/2022    EGFR 23 06/03/2022    EGFR 19 06/02/2022    CREATININE 1 62 (H) 06/04/2022    CREATININE 1 99 (H) 06/03/2022    CREATININE 2 32 (H) 06/02/2022     -creatinine slowly improving down to 1 62 today  -would monitor renal function electrolytes along with urine output  -once patient hemodynamically able to tolerate would initiate diuretics on more scheduled basis as patient does appear to have some signs of volume overload with JVD and lower extremity edema present

## 2022-06-04 NOTE — PLAN OF CARE
Problem: MOBILITY - ADULT  Goal: Maintain or return to baseline ADL function  Description: INTERVENTIONS:  -  Assess patient's ability to carry out ADLs; assess patient's baseline for ADL function and identify physical deficits which impact ability to perform ADLs (bathing, care of mouth/teeth, toileting, grooming, dressing, etc )  - Assess/evaluate cause of self-care deficits   - Assess range of motion  - Assess patient's mobility; develop plan if impaired  - Assess patient's need for assistive devices and provide as appropriate  - Encourage maximum independence but intervene and supervise when necessary  - Involve family in performance of ADLs  - Assess for home care needs following discharge   - Consider OT consult to assist with ADL evaluation and planning for discharge  - Provide patient education as appropriate  Outcome: Progressing  Goal: Maintains/Returns to pre admission functional level  Description: INTERVENTIONS:  - Perform BMAT or MOVE assessment daily    - Set and communicate daily mobility goal to care team and patient/family/caregiver  - Collaborate with rehabilitation services on mobility goals if consulted  - Perform Range of Motion 3 times a day  - Reposition patient every 2 hours    - Dangle patient 2 times a day  - Stand patient 2 times a day  - Out of bed for toileting  - Record patient progress and toleration of activity level   Outcome: Progressing     Problem: Prexisting or High Potential for Compromised Skin Integrity  Goal: Skin integrity is maintained or improved  Description: INTERVENTIONS:  - Identify patients at risk for skin breakdown  - Assess and monitor skin integrity  - Assess and monitor nutrition and hydration status  - Monitor labs   - Assess for incontinence   - Turn and reposition patient  - Assist with mobility/ambulation  - Relieve pressure over bony prominences  - Avoid friction and shearing  - Provide appropriate hygiene as needed including keeping skin clean and dry  - Evaluate need for skin moisturizer/barrier cream  - Collaborate with interdisciplinary team   - Patient/family teaching  - Consider wound care consult   Outcome: Progressing     Problem: PAIN - ADULT  Goal: Verbalizes/displays adequate comfort level or baseline comfort level  Description: Interventions:  - Encourage patient to monitor pain and request assistance  - Assess pain using appropriate pain scale  - Administer analgesics based on type and severity of pain and evaluate response  - Implement non-pharmacological measures as appropriate and evaluate response  - Consider cultural and social influences on pain and pain management  - Notify physician/advanced practitioner if interventions unsuccessful or patient reports new pain  Outcome: Progressing     Problem: INFECTION - ADULT  Goal: Absence or prevention of progression during hospitalization  Description: INTERVENTIONS:  - Assess and monitor for signs and symptoms of infection  - Monitor lab/diagnostic results  - Monitor all insertion sites, i e  indwelling lines, tubes, and drains  - Monitor endotracheal if appropriate and nasal secretions for changes in amount and color  - [place] appropriate cooling/warming therapies per order  - Administer medications as ordered  - Instruct and encourage patient and family to use good hand hygiene technique  - Identify and instruct in appropriate isolation precautions for identified infection/condition  Outcome: Progressing  Goal: Absence of fever/infection during neutropenic period  Description: INTERVENTIONS:  - Monitor WBC    Outcome: Completed     Problem: SAFETY ADULT  Goal: Maintain or return to baseline ADL function  Description: INTERVENTIONS:  -  Assess patient's ability to carry out ADLs; assess patient's baseline for ADL function and identify physical deficits which impact ability to perform ADLs (bathing, care of mouth/teeth, toileting, grooming, dressing, etc )  - Assess/evaluate cause of self-care deficits   - Assess range of motion  - Assess patient's mobility; develop plan if impaired  - Assess patient's need for assistive devices and provide as appropriate  - Encourage maximum independence but intervene and supervise when necessary  - Involve family in performance of ADLs  - Assess for home care needs following discharge   - Consider OT consult to assist with ADL evaluation and planning for discharge  - Provide patient education as appropriate  Outcome: Progressing  Goal: Maintains/Returns to pre admission functional level  Description: INTERVENTIONS:  - Perform BMAT or MOVE assessment daily    - Set and communicate daily mobility goal to care team and patient/family/caregiver  - Collaborate with rehabilitation services on mobility goals if consulted  - Perform Range of Motion 3 times a day  - Reposition patient every 2 hours    - Dangle patient 2 times a day  - Stand patient 2 times a day  - Out of bed for toileting  - Record patient progress and toleration of activity level   Outcome: Progressing  Goal: Patient will remain free of falls  Description: INTERVENTIONS:  - Educate patient/family on patient safety including physical limitations  - Instruct patient to call for assistance with activity   - Consult OT/PT to assist with strengthening/mobility   - Keep Call bell within reach  - Keep bed low and locked with side rails adjusted as appropriate  - Keep care items and personal belongings within reach  - Initiate and maintain comfort rounds  - Make Fall Risk Sign visible to staff  - Offer Toileting every 2 Hours, in advance of need  - Initiate/Maintain bed alarm  - Apply yellow socks and bracelet for high fall risk patients  - Consider moving patient to room near nurses station  Outcome: Progressing     Problem: Potential for Falls  Goal: Patient will remain free of falls  Description: INTERVENTIONS:  - Educate patient/family on patient safety including physical limitations  - Instruct patient to call for assistance with activity   - Consult OT/PT to assist with strengthening/mobility   - Keep Call bell within reach  - Keep bed low and locked with side rails adjusted as appropriate  - Keep care items and personal belongings within reach  - Initiate and maintain comfort rounds  - Make Fall Risk Sign visible to staff  - Offer Toileting every 2 Hours, in advance of need  - Initiate/Maintain bed alarm  - Apply yellow socks and bracelet for high fall risk patients  - Consider moving patient to room near nurses station  Outcome: Progressing     Problem: Nutrition/Hydration-ADULT  Goal: Nutrient/Hydration intake appropriate for improving, restoring or maintaining nutritional needs  Description: Monitor and assess patient's nutrition/hydration status for malnutrition  Collaborate with interdisciplinary team and initiate plan and interventions as ordered  Monitor patient's weight and dietary intake as ordered or per policy  Utilize nutrition screening tool and intervene as necessary  Determine patient's food preferences and provide high-protein, high-caloric foods as appropriate       INTERVENTIONS:  - Monitor oral intake, urinary output, labs, and treatment plans  - Assess nutrition and hydration status and recommend course of action  - Evaluate amount of meals eaten  - Assist patient with eating if necessary   - Allow adequate time for meals  - Recommend/ encourage appropriate diets, oral nutritional supplements, and vitamin/mineral supplements  - Order, calculate, and assess calorie counts as needed  - Assess need for intravenous fluids  - Provide specific nutrition/hydration education as appropriate  - Include patient/family/caregiver in decisions related to nutrition  Outcome: Progressing

## 2022-06-04 NOTE — PROGRESS NOTES
[facility] 45  Progress Note - Ashlee Wyatt 1945, 68 y o  female MRN: [id number]  Unit/Bed#: ICU 12-01 Encounter: [id number]  Primary Care Provider: Deric Abarca MD   Date and time admitted to hospital: 6/2/2022 10:34 AM    EKG abnormalities  Assessment & Plan  -dynamic ischemic changes that do appear slightly improved since transfusion  -patient denies any significant issue at this time  -troponins trended and were relatively unrevealing  -will continue monitor on telemetry at this time  -currently patient denies any significant chest pain  Shock [facility])  Assessment & Plan  -seen in the setting of acute blood loss anemia present on admission  -patient has now required transfusion on 2 separate occasions despite EGD without revealing source  -plan for colonoscopy Monday  -for now continue medical therapy with holding oral anticoagulation at this time  Acute renal failure superimposed on stage 3 chronic kidney disease [facility])  Assessment & Plan  Lab Results   Component Value Date    EGFR 30 06/04/2022    EGFR 23 06/03/2022    EGFR 19 06/02/2022    CREATININE 1 62 (H) 06/04/2022    CREATININE 1 99 (H) 06/03/2022    CREATININE 2 32 (H) 06/02/2022     -creatinine slowly improving down to 1 62 today  -would monitor renal function electrolytes along with urine output  -once patient hemodynamically able to tolerate would initiate diuretics on more scheduled basis as patient does appear to have some signs of volume overload with JVD and lower extremity edema present  Essential hypertension  Assessment & Plan  -patient has issues with hypotension during this hospitalization    -once able to tolerate would reinitiate medical therapy beginning with diuretic regimen with slow up titration in guideline directed medical therapy thereafter    Chronic diastolic CHF (congestive heart failure) (HCC)  Assessment & Plan  Wt Readings from Last 3 Encounters:   06/04/22 69 1 kg (152 lb 5 4 oz) 12/22/21 82 6 kg (182 lb)   12/10/21 83 kg (182 lb 15 7 oz)     -medical therapy currently on hold due to hypotension seen in the setting of significant blood loss anemia on admission which has not required transfusion x2  -would reinitiate medical therapy once medically stable slowly to avoid significant hypotension in patient  -would initiate diuretic regimen first in the setting of significant resuscitation due to hypotension  Atrial fibrillation, chronic (HCC)  Assessment & Plan  -currently rate controlled on telemetry  -oral anticoagulation on hold due to acute blood loss anemia  -home medical therapy on hold due to shock/hypotension secondary to acute blood loss anemia   -would reinitiate AV michael blocking agents as patient tolerates slowly to avoid hypotension once more hemodynamically stable   -transthoracic echocardiogram demonstrating normal left ventricular function with severe mitral regurgitation, moderate tricuspid regurgitation, mild to moderate RV dysfunction and pulmonary hypertension  -home diuretics were held due to significant hypotension with blood loss an DIEGO on admission  -would recommend reinitiating diuretic regimen once patient more stable from acute blood loss standpoint however will need to be very careful with volume resuscitation for patient in the setting of significant valvular disease, artery dysfunction, pulmonary hypertension       * ABLA (acute blood loss anemia)  Assessment & Plan  -again with drop requiring transfusion yesterday evening  -plan for EGD relatively unrevealing with plan for colonoscopy Monday  -would continue monitor  -continue to hold oral anticoagulation at this time  -agree with transfusion for hemoglobin less than 7  Subjective:   Patient seen and examined  Per patient she denies any chest pain, palpitations, lightheadedness dizziness, loss of consciousness or shortness breath    She does note some fatigue and lower extremity edema but overall does feel significantly improved from yesterday  Summary comments:  -continue monitor patient clinically with uptitration in medical therapy as patient tolerates  As patient does appear somewhat volume overload on exam if able and pressure would tolerate would attempt gentle diuresis however will need to be very careful to avoid significant hypotension  -plan for colonoscopy Monday to hopefully determine source of acute blood loss anemia  -continue monitor and hold oral anticoagulation  -transfuse for hemoglobin less than 7  Telemetry/ECG/Cardiac testing:   Currently rate controlled atrial fibrillation on telemetry    Vitals: Blood pressure 109/52, pulse 86, temperature 97 5 °F (36 4 °C), temperature source Temporal, resp  rate (!) 26, height 5' 3" (1 6 m), weight 69 1 kg (152 lb 5 4 oz), SpO2 98 % ,   Orthostatic Blood Pressures    Flowsheet Row Most Recent Value   Blood Pressure 109/52 filed at 06/04/2022 1200   Patient Position - Orthostatic VS Lying filed at 06/04/2022 1200      ,   Weight (last 2 days)     Date/Time Weight    06/04/22 0600 69 1 (152 34)    06/03/22 0535 72 3 (159 39)    06/02/22 1830 73 4 (161 82)    06/02/22 1346 67 1 (148)    06/02/22 1046 67 1 (148)          Physical Exam:  Physical Exam  Vitals reviewed  Constitutional:       General: She is not in acute distress  Appearance: She is not diaphoretic  HENT:      Head: Normocephalic and atraumatic  Eyes:      General:         Right eye: No discharge  Left eye: No discharge  Neck:      Comments: Trachea midline, mildly elevated JVD  Cardiovascular:      Heart sounds: Murmur heard  No friction rub  Comments: Irregularly irregular rate and rhythm  Pulmonary:      Effort: No respiratory distress  Breath sounds: No wheezing  Comments: Decreased breath sounds with slight crackles heard bilaterally  Abdominal:      General: Bowel sounds are normal       Palpations: Abdomen is soft        Tenderness: There is no abdominal tenderness  Musculoskeletal:      Right lower leg: Edema present  Left lower leg: Edema present  Skin:     General: Skin is warm and dry  Findings: Bruising present  Neurological:      Mental Status: She is alert  Comments: Awake, alert, able to answer questions appropriately, able move extremities bilaterally  Psychiatric:         Mood and Affect: Mood normal          Behavior: Behavior normal          Medications:      Current Facility-Administered Medications:     atorvastatin (LIPITOR) tablet 40 mg, 40 mg, Oral, Daily With Dinner, Mariluz A Sedora, CRNP    levothyroxine tablet 150 mcg, 150 mcg, Oral, Daily, Mariluz A Sedora, CRNP, 150 mcg at 06/04/22 0604    potassium chloride oral solution 20 mEq, 20 mEq, Oral, Once, Nonda Grady, CRNP    sertraline (ZOLOFT) tablet 100 mg, 100 mg, Oral, Daily, Mariluz A Sedora, CRNP, 100 mg at 06/04/22 0839     Labs & Results:    Troponins:    Results from last 7 days   Lab Units 06/02/22  1609 06/02/22  1445 06/02/22  1138   HS TNI 0HR ng/L  --   --  10   HS TNI 2HR ng/L  --  8  --    HSTNI D2 ng/L  --  -2  --    HS TNI 4HR ng/L 19  --   --    HSTNI D4 ng/L 9  --   --         BNP:   Results from last 6 Months   Lab Units 06/02/22  1138 12/22/21  1616   BNP pg/mL 853* 383*     CBC with diff:   Results from last 7 days   Lab Units 06/04/22  1143 06/04/22  0508 06/04/22  0031 06/03/22  1533 06/03/22  0559   WBC Thousand/uL  --  6 60  --   --  9 25   HEMOGLOBIN g/dL 8 2* 7 6* 5 9*   < > 7 5*   HEMATOCRIT %  --  24 5* 19 2*   < > 23 8*   MCV fL  --  96  --   --  93   PLATELETS Thousands/uL  --  178  --   --  243    < > = values in this interval not displayed       TSH:     CMP:   Results from last 7 days   Lab Units 06/04/22  0508 06/03/22  0559 06/02/22  1138   POTASSIUM mmol/L 3 2* 4 3 5 2   CHLORIDE mmol/L 107 109* 100   CO2 mmol/L 22 19* 18*   BUN mg/dL 33* 41* 41*   CREATININE mg/dL 1 62* 1 99* 2 32*   AST U/L  --  38 27 ALT U/L  --  12 8   EGFR ml/min/1 73sq m 30 23 19     Lipid Profile:     Coags:   Results from last 7 days   Lab Units 06/03/22  0559 06/02/22  1138   INR  2 19* 2 64*

## 2022-06-04 NOTE — ASSESSMENT & PLAN NOTE
Wt Readings from Last 3 Encounters:   06/03/22 72 3 kg (159 lb 6 3 oz)   12/22/21 82 6 kg (182 lb)   12/10/21 83 kg (182 lb 15 7 oz)     · Based on chart review from cardiology notes  · Follow up on formal ECHO    · Takes torsemide as outpatient  · Received Lasix 40 mg x 1 6/3/2022  · Strict I and O  · Daily weight

## 2022-06-04 NOTE — ASSESSMENT & PLAN NOTE
-dynamic ischemic changes that do appear slightly improved since transfusion  -patient denies any significant issue at this time  -troponins trended and were relatively unrevealing  -will continue monitor on telemetry at this time  -currently patient denies any significant chest pain

## 2022-06-04 NOTE — ASSESSMENT & PLAN NOTE
· History of afib on Eliquis, metoprolol and digoxin as outpatient  · Follows with cardiology at [facility]  · Holding rate control medications in the setting of hypotension   · Hold Eliquis in the setting of ABLA  · Remains rate controlled as of now  · Continue to monitor telemetry

## 2022-06-04 NOTE — ASSESSMENT & PLAN NOTE
-patient has issues with hypotension during this hospitalization    -once able to tolerate would reinitiate medical therapy beginning with diuretic regimen with slow up titration in guideline directed medical therapy thereafter

## 2022-06-04 NOTE — ASSESSMENT & PLAN NOTE
-again with drop requiring transfusion yesterday evening  -plan for EGD relatively unrevealing with plan for colonoscopy Monday  -would continue monitor  -continue to hold oral anticoagulation at this time  -agree with transfusion for hemoglobin less than 7

## 2022-06-04 NOTE — ASSESSMENT & PLAN NOTE
-currently rate controlled on telemetry  -oral anticoagulation on hold due to acute blood loss anemia  -home medical therapy on hold due to shock/hypotension secondary to acute blood loss anemia   -would reinitiate AV michael blocking agents as patient tolerates slowly to avoid hypotension once more hemodynamically stable   -transthoracic echocardiogram demonstrating normal left ventricular function with severe mitral regurgitation, moderate tricuspid regurgitation, mild to moderate RV dysfunction and pulmonary hypertension  -home diuretics were held due to significant hypotension with blood loss an DIEGO on admission  -would recommend reinitiating diuretic regimen once patient more stable from acute blood loss standpoint however will need to be very careful with volume resuscitation for patient in the setting of significant valvular disease, artery dysfunction, pulmonary hypertension  Elizabeth Allison

## 2022-06-04 NOTE — ASSESSMENT & PLAN NOTE
Lab Results   Component Value Date    EGFR 23 06/03/2022    EGFR 19 06/02/2022    EGFR 34 01/06/2022    CREATININE 1 99 (H) 06/03/2022    CREATININE 2 32 (H) 06/02/2022    CREATININE 1 48 (H) 01/06/2022     · History of CKD 3, baseline creatinine appears to be 1 3-1 4, 2 32 on admission  · Likely prerenal in nature  · Ongoing blood resuscitation   · Monitor I & O and renal indices  · Hold nephrotoxic medications

## 2022-06-05 LAB
ANION GAP SERPL CALCULATED.3IONS-SCNC: 10 MMOL/L (ref 4–13)
BACTERIA UR QL AUTO: ABNORMAL /HPF
BILIRUB UR QL STRIP: NEGATIVE
BUN SERPL-MCNC: 21 MG/DL (ref 5–25)
CALCIUM SERPL-MCNC: 9.2 MG/DL (ref 8.4–10.2)
CHLORIDE SERPL-SCNC: 106 MMOL/L (ref 96–108)
CLARITY UR: CLEAR
CO2 SERPL-SCNC: 24 MMOL/L (ref 21–32)
COLOR UR: YELLOW
CREAT SERPL-MCNC: 1.24 MG/DL (ref 0.6–1.3)
ERYTHROCYTE [DISTWIDTH] IN BLOOD BY AUTOMATED COUNT: 17.6 % (ref 11.6–15.1)
GFR SERPL CREATININE-BSD FRML MDRD: 41 ML/MIN/1.73SQ M
GLUCOSE SERPL-MCNC: 131 MG/DL (ref 65–140)
GLUCOSE SERPL-MCNC: 86 MG/DL (ref 65–140)
GLUCOSE UR STRIP-MCNC: NEGATIVE MG/DL
HCT VFR BLD AUTO: 31.5 % (ref 34.8–46.1)
HGB BLD-MCNC: 8.7 G/DL (ref 11.5–15.4)
HGB BLD-MCNC: 9.3 G/DL (ref 11.5–15.4)
HGB UR QL STRIP.AUTO: ABNORMAL
KETONES UR STRIP-MCNC: NEGATIVE MG/DL
LEUKOCYTE ESTERASE UR QL STRIP: NEGATIVE
MAGNESIUM SERPL-MCNC: 2.6 MG/DL (ref 1.9–2.7)
MCH RBC QN AUTO: 28.7 PG (ref 26.8–34.3)
MCHC RBC AUTO-ENTMCNC: 29.5 G/DL (ref 31.4–37.4)
MCV RBC AUTO: 97 FL (ref 82–98)
NITRITE UR QL STRIP: NEGATIVE
NON-SQ EPI CELLS URNS QL MICRO: ABNORMAL /HPF
PH UR STRIP.AUTO: 6 [PH]
PHOSPHATE SERPL-MCNC: 2.5 MG/DL (ref 2.3–4.1)
PLATELET # BLD AUTO: 245 THOUSANDS/UL (ref 149–390)
PMV BLD AUTO: 10 FL (ref 8.9–12.7)
POTASSIUM SERPL-SCNC: 3.5 MMOL/L (ref 3.5–5.3)
PROT UR STRIP-MCNC: NEGATIVE MG/DL
RBC # BLD AUTO: 3.24 MILLION/UL (ref 3.81–5.12)
RBC #/AREA URNS AUTO: ABNORMAL /HPF
SODIUM SERPL-SCNC: 140 MMOL/L (ref 135–147)
SP GR UR STRIP.AUTO: 1.01 (ref 1–1.03)
UROBILINOGEN UR QL STRIP.AUTO: 0.2 E.U./DL
WBC # BLD AUTO: 6.6 THOUSAND/UL (ref 4.31–10.16)
WBC #/AREA URNS AUTO: ABNORMAL /HPF

## 2022-06-05 PROCEDURE — 99232 SBSQ HOSP IP/OBS MODERATE 35: CPT | Performed by: INTERNAL MEDICINE

## 2022-06-05 PROCEDURE — C9113 INJ PANTOPRAZOLE SODIUM, VIA: HCPCS | Performed by: ANESTHESIOLOGY

## 2022-06-05 PROCEDURE — 99233 SBSQ HOSP IP/OBS HIGH 50: CPT | Performed by: ANESTHESIOLOGY

## 2022-06-05 PROCEDURE — 82948 REAGENT STRIP/BLOOD GLUCOSE: CPT

## 2022-06-05 PROCEDURE — 85027 COMPLETE CBC AUTOMATED: CPT | Performed by: NURSE PRACTITIONER

## 2022-06-05 PROCEDURE — 85018 HEMOGLOBIN: CPT | Performed by: NURSE PRACTITIONER

## 2022-06-05 PROCEDURE — 83735 ASSAY OF MAGNESIUM: CPT | Performed by: NURSE PRACTITIONER

## 2022-06-05 PROCEDURE — 84100 ASSAY OF PHOSPHORUS: CPT | Performed by: NURSE PRACTITIONER

## 2022-06-05 PROCEDURE — 80048 BASIC METABOLIC PNL TOTAL CA: CPT | Performed by: NURSE PRACTITIONER

## 2022-06-05 PROCEDURE — 81001 URINALYSIS AUTO W/SCOPE: CPT | Performed by: NURSE PRACTITIONER

## 2022-06-05 RX ORDER — POTASSIUM CHLORIDE 20 MEQ/1
40 TABLET, EXTENDED RELEASE ORAL ONCE
Status: DISCONTINUED | OUTPATIENT
Start: 2022-06-05 | End: 2022-06-05

## 2022-06-05 RX ORDER — POTASSIUM CHLORIDE 20MEQ/15ML
40 LIQUID (ML) ORAL ONCE
Status: COMPLETED | OUTPATIENT
Start: 2022-06-05 | End: 2022-06-05

## 2022-06-05 RX ADMIN — POLYETHYLENE GLYCOL 3350, SODIUM SULFATE ANHYDROUS, SODIUM BICARBONATE, SODIUM CHLORIDE, POTASSIUM CHLORIDE 4000 ML: 236; 22.74; 6.74; 5.86; 2.97 POWDER, FOR SOLUTION ORAL at 17:07

## 2022-06-05 RX ADMIN — ATORVASTATIN CALCIUM 40 MG: 40 TABLET, FILM COATED ORAL at 17:07

## 2022-06-05 RX ADMIN — POTASSIUM CHLORIDE 40 MEQ: 20 SOLUTION ORAL at 09:30

## 2022-06-05 RX ADMIN — SERTRALINE HYDROCHLORIDE 100 MG: 50 TABLET ORAL at 09:30

## 2022-06-05 RX ADMIN — PANTOPRAZOLE SODIUM 40 MG: 40 INJECTION, POWDER, FOR SOLUTION INTRAVENOUS at 09:31

## 2022-06-05 RX ADMIN — PANTOPRAZOLE SODIUM 40 MG: 40 INJECTION, POWDER, FOR SOLUTION INTRAVENOUS at 20:15

## 2022-06-05 RX ADMIN — LEVOTHYROXINE SODIUM 150 MCG: 75 TABLET ORAL at 06:01

## 2022-06-05 NOTE — ASSESSMENT & PLAN NOTE
· Presented with hgb 5 4 and complaints of dark tarry stools/generalized weakness over the past couple days  · Continue protonix infusion   · S/p 2 U PRBC on 6/02/2022  · S/P 1 U PRBC for hgb 5 9-  · Trend hemoglobin at least Q6H  · Transfuse for hgb < 7  · EGD-Normal  · Plan for colonoscopy on Monday for evaluation or earlier if Hgb continues to drop

## 2022-06-05 NOTE — ASSESSMENT & PLAN NOTE
-dynamic ischemic changes that do appear slightly improved since transfusion and is currently rate controlled AFib on telemetry  -patient denies any significant issue at this time  -troponins trended and were relatively unrevealing  -will continue monitor on telemetry at this time  -currently patient denies any significant chest pain

## 2022-06-05 NOTE — ASSESSMENT & PLAN NOTE
Wt Readings from Last 3 Encounters:   06/04/22 69 1 kg (152 lb 5 4 oz)   12/22/21 82 6 kg (182 lb)   12/10/21 83 kg (182 lb 15 7 oz)     · Based on chart review from cardiology notes  · Follow up on formal ECHO    · Takes torsemide as outpatient  · Received Lasix 40 mg x 1 6/3/2022  · Strict I and O  · Daily weight

## 2022-06-05 NOTE — PLAN OF CARE
Problem: MOBILITY - ADULT  Goal: Maintain or return to baseline ADL function  Description: INTERVENTIONS:  -  Assess patient's ability to carry out ADLs; assess patient's baseline for ADL function and identify physical deficits which impact ability to perform ADLs (bathing, care of mouth/teeth, toileting, grooming, dressing, etc )  - Assess/evaluate cause of self-care deficits   - Assess range of motion  - Assess patient's mobility; develop plan if impaired  - Assess patient's need for assistive devices and provide as appropriate  - Encourage maximum independence but intervene and supervise when necessary  - Involve family in performance of ADLs  - Assess for home care needs following discharge   - Consider OT consult to assist with ADL evaluation and planning for discharge  - Provide patient education as appropriate  Outcome: Progressing  Goal: Maintains/Returns to pre admission functional level  Description: INTERVENTIONS:  - Perform BMAT or MOVE assessment daily    - Set and communicate daily mobility goal to care team and patient/family/caregiver  - Collaborate with rehabilitation services on mobility goals if consulted  - Perform Range of Motion 3 times a day  - Reposition patient every 2 hours    - Dangle patient 2 times a day  - Stand patient 2 times a day  - Out of bed for toileting  - Record patient progress and toleration of activity level   Outcome: Progressing     Problem: Prexisting or High Potential for Compromised Skin Integrity  Goal: Skin integrity is maintained or improved  Description: INTERVENTIONS:  - Identify patients at risk for skin breakdown  - Assess and monitor skin integrity  - Assess and monitor nutrition and hydration status  - Monitor labs   - Assess for incontinence   - Turn and reposition patient  - Assist with mobility/ambulation  - Relieve pressure over bony prominences  - Avoid friction and shearing  - Provide appropriate hygiene as needed including keeping skin clean and dry  - Evaluate need for skin moisturizer/barrier cream  - Collaborate with interdisciplinary team   - Patient/family teaching  - Consider wound care consult   Outcome: Progressing     Problem: PAIN - ADULT  Goal: Verbalizes/displays adequate comfort level or baseline comfort level  Description: Interventions:  - Encourage patient to monitor pain and request assistance  - Assess pain using appropriate pain scale  - Administer analgesics based on type and severity of pain and evaluate response  - Implement non-pharmacological measures as appropriate and evaluate response  - Consider cultural and social influences on pain and pain management  - Notify physician/advanced practitioner if interventions unsuccessful or patient reports new pain  Outcome: Progressing     Problem: INFECTION - ADULT  Goal: Absence or prevention of progression during hospitalization  Description: INTERVENTIONS:  - Assess and monitor for signs and symptoms of infection  - Monitor lab/diagnostic results  - Monitor all insertion sites, i e  indwelling lines, tubes, and drains  - Monitor endotracheal if appropriate and nasal secretions for changes in amount and color  - [place] appropriate cooling/warming therapies per order  - Administer medications as ordered  - Instruct and encourage patient and family to use good hand hygiene technique  - Identify and instruct in appropriate isolation precautions for identified infection/condition  Outcome: Progressing     Problem: SAFETY ADULT  Goal: Maintain or return to baseline ADL function  Description: INTERVENTIONS:  -  Assess patient's ability to carry out ADLs; assess patient's baseline for ADL function and identify physical deficits which impact ability to perform ADLs (bathing, care of mouth/teeth, toileting, grooming, dressing, etc )  - Assess/evaluate cause of self-care deficits   - Assess range of motion  - Assess patient's mobility; develop plan if impaired  - Assess patient's need for assistive devices and provide as appropriate  - Encourage maximum independence but intervene and supervise when necessary  - Involve family in performance of ADLs  - Assess for home care needs following discharge   - Consider OT consult to assist with ADL evaluation and planning for discharge  - Provide patient education as appropriate  Outcome: Progressing  Goal: Maintains/Returns to pre admission functional level  Description: INTERVENTIONS:  - Perform BMAT or MOVE assessment daily    - Set and communicate daily mobility goal to care team and patient/family/caregiver  - Collaborate with rehabilitation services on mobility goals if consulted  - Perform Range of Motion 3 times a day  - Reposition patient every 2 hours    - Dangle patient 2 times a day  - Stand patient 2 times a day  - Out of bed for toileting  - Record patient progress and toleration of activity level   Outcome: Progressing  Goal: Patient will remain free of falls  Description: INTERVENTIONS:  - Educate patient/family on patient safety including physical limitations  - Instruct patient to call for assistance with activity   - Consult OT/PT to assist with strengthening/mobility   - Keep Call bell within reach  - Keep bed low and locked with side rails adjusted as appropriate  - Keep care items and personal belongings within reach  - Initiate and maintain comfort rounds  - Make Fall Risk Sign visible to staff  - Offer Toileting every 2 Hours, in advance of need  - Initiate/Maintain bed alarm  - Apply yellow socks and bracelet for high fall risk patients  - Consider moving patient to room near nurses station  Outcome: Progressing     Problem: DISCHARGE PLANNING  Goal: Discharge to home or other facility with appropriate resources  Description: INTERVENTIONS:  - Identify barriers to discharge w/patient and caregiver  - Arrange for needed discharge resources and transportation as appropriate  - Identify discharge learning needs (meds, wound care, etc )  - Arrange for interpretive services to assist at discharge as needed  - Refer to Case Management Department for coordinating discharge planning if the patient needs post-hospital services based on physician/advanced practitioner order or complex needs related to functional status, cognitive ability, or social support system  Outcome: Progressing     Problem: Knowledge Deficit  Goal: Patient/family/caregiver demonstrates understanding of disease process, treatment plan, medications, and discharge instructions  Description: Complete learning assessment and assess knowledge base  Interventions:  - Provide teaching at level of understanding  - Provide teaching via preferred learning methods  Outcome: Progressing     Problem: Potential for Falls  Goal: Patient will remain free of falls  Description: INTERVENTIONS:  - Educate patient/family on patient safety including physical limitations  - Instruct patient to call for assistance with activity   - Consult OT/PT to assist with strengthening/mobility   - Keep Call bell within reach  - Keep bed low and locked with side rails adjusted as appropriate  - Keep care items and personal belongings within reach  - Initiate and maintain comfort rounds  - Make Fall Risk Sign visible to staff  - Offer Toileting every 2 Hours, in advance of need  - Initiate/Maintain bed alarm  - Apply yellow socks and bracelet for high fall risk patients  - Consider moving patient to room near nurses station  Outcome: Progressing     Problem: Nutrition/Hydration-ADULT  Goal: Nutrient/Hydration intake appropriate for improving, restoring or maintaining nutritional needs  Description: Monitor and assess patient's nutrition/hydration status for malnutrition  Collaborate with interdisciplinary team and initiate plan and interventions as ordered  Monitor patient's weight and dietary intake as ordered or per policy  Utilize nutrition screening tool and intervene as necessary   Determine patient's food preferences and provide high-protein, high-caloric foods as appropriate       INTERVENTIONS:  - Monitor oral intake, urinary output, labs, and treatment plans  - Assess nutrition and hydration status and recommend course of action  - Evaluate amount of meals eaten  - Assist patient with eating if necessary   - Allow adequate time for meals  - Recommend/ encourage appropriate diets, oral nutritional supplements, and vitamin/mineral supplements  - Order, calculate, and assess calorie counts as needed  - Assess need for intravenous fluids  - Provide specific nutrition/hydration education as appropriate  - Include patient/family/caregiver in decisions related to nutrition  Outcome: Progressing     Problem: GENITOURINARY - ADULT  Goal: Maintains or returns to baseline urinary function  Description: INTERVENTIONS:  - Assess urinary function  - Encourage oral fluids to ensure adequate hydration if ordered  - Administer IV fluids as ordered to ensure adequate hydration  - Administer ordered medications as needed  - Offer frequent toileting  - Follow urinary retention protocol if ordered  Outcome: Progressing  Goal: Absence of urinary retention  Description: INTERVENTIONS:  - Assess patients ability to void and empty bladder  - Monitor I/O  - Bladder scan as needed  - Discuss with physician/AP medications to alleviate retention as needed  - Discuss catheterization for long term situations as appropriate  Outcome: Progressing  Goal: Urinary catheter remains patent  Description: INTERVENTIONS:  - Assess patency of urinary catheter  - If patient has a chronic tapia, consider changing catheter if non-functioning  - Follow guidelines for intermittent irrigation of non-functioning urinary catheter  Outcome: Progressing     Problem: HEMATOLOGIC - ADULT  Goal: Maintains hematologic stability  Description: INTERVENTIONS  - Assess for signs and symptoms of bleeding or hemorrhage  - Monitor labs  - Administer supportive blood products/factors as ordered and appropriate  Outcome: Progressing

## 2022-06-05 NOTE — ASSESSMENT & PLAN NOTE
Lab Results   Component Value Date    EGFR 30 06/04/2022    EGFR 23 06/03/2022    EGFR 19 06/02/2022    CREATININE 1 62 (H) 06/04/2022    CREATININE 1 99 (H) 06/03/2022    CREATININE 2 32 (H) 06/02/2022     · History of CKD 3, baseline creatinine appears to be 1 3-1 4, 2 32 on admission  · Likely prerenal in nature  · Ongoing blood resuscitation   · Monitor I & O and renal indices  · Hold nephrotoxic medications

## 2022-06-05 NOTE — ASSESSMENT & PLAN NOTE
-hemoglobin improving slowly  -plan for EGD relatively unrevealing with plan for colonoscopy Monday  -would continue monitor  -continue to hold oral anticoagulation at this time  -agree with transfusion for hemoglobin less than 7

## 2022-06-05 NOTE — ASSESSMENT & PLAN NOTE
-patient has issues with hypotension during hospitalization  -once able to tolerate would reinitiate medical therapy beginning with diuretic regimen with slow up titration in guideline directed medical therapy thereafter

## 2022-06-05 NOTE — ASSESSMENT & PLAN NOTE
Wt Readings from Last 3 Encounters:   06/05/22 70 kg (154 lb 5 2 oz)   12/22/21 82 6 kg (182 lb)   12/10/21 83 kg (182 lb 15 7 oz)     -medical therapy currently on hold due to hypotension seen in the setting of significant blood loss anemia on admission which has not required transfusions x2  -would reinitiate medical therapy once medically stable slowly to avoid significant hypotension in patient  -would initiate diuretic regimen first in the setting of significant resuscitation due to hypotension

## 2022-06-05 NOTE — PROGRESS NOTES
[address]  Progress Note - [facility] 1945, 68 y o  female MRN: [id number]  Unit/Bed#: ICU 12-01 Encounter: [id number]  Primary Care Provider: Vanesa Raya MD   Date and time admitted to hospital: 6/2/2022 10:34 AM    * ABLA (acute blood loss anemia)  Assessment & Plan  · Presented with hgb 5 4 and complaints of dark tarry stools/generalized weakness over the past couple days  · Continue protonix infusion   · S/p 2 U PRBC on 6/02/2022  · S/P 1 U PRBC for hgb 5 9-  · Trend hemoglobin at least Q6H  · Transfuse for hgb < 7  · EGD-Normal  · Plan for colonoscopy on Monday for evaluation or earlier if Hgb continues to drop    [facility]ock ([facility] [address] )  Assessment & Plan  · AEB hypotension, lactic acidosis and DIEGO  · Likely related to ABLA  · S/p IVF and blood resuscitation - will transfuse for Hgb<7  · Lactic cleared  · Continue close HD monitoring, maintain MAP>65    Acute renal failure superimposed on stage 3 chronic kidney disease [facility])  Assessment & Plan  Lab Results   Component Value Date    EGFR 30 06/04/2022    EGFR 23 06/03/2022    EGFR 19 06/02/2022    CREATININE 1 62 (H) 06/04/2022    CREATININE 1 99 (H) 06/03/2022    CREATININE 2 32 (H) 06/02/2022     · History of CKD 3, baseline creatinine appears to be 1 3-1 4, 2 32 on admission  · Likely prerenal in nature  · Ongoing blood resuscitation   · Monitor I & O and renal indices  · Hold nephrotoxic medications    EKG abnormalities  Assessment & Plan  · Possibly in the setting of demand related to ABLA  · Troponin negative  · Denies CP  · Monitor EKG closely    Supratherapeutic INR  Assessment & Plan  · INR 2 64 on admission  · S/p KCentra  · Continue to monitor    Mixed hyperlipidemia  Assessment & Plan  · Continue home statin therapy     Hypothyroidism  Assessment & Plan  · Continue home synthroid     Essential hypertension  Assessment & Plan  · Hold home antihypertensives in the setting of hypotension and ABLA    Depression  Assessment & Plan  · Continue home Zoloft     Chronic diastolic CHF (congestive heart failure) (HCC)  Assessment & Plan  Wt Readings from Last 3 Encounters:   22 69 1 kg (152 lb 5 4 oz)   21 82 6 kg (182 lb)   12/10/21 83 kg (182 lb 15 7 oz)     · Based on chart review from cardiology notes  · Follow up on formal ECHO    · Takes torsemide as outpatient  · Received Lasix 40 mg x 1 6/3/2022  · Strict I and O  · Daily weight        Atrial fibrillation, chronic (HCC)  Assessment & Plan  · History of afib on Eliquis, metoprolol and digoxin as outpatient  · Follows with cardiology at [facility]  · Restart Digoxin this am   · Holding rate control medications in the setting of hypotension   · Hold Eliquis in the setting of ABLA  · Remains rate controlled as of now  · Continue to monitor telemetry       ----------------------------------------------------------------------------------------  HPI/24hr events: 500 isolyte bolus for soft BP  Slept well  Patient appropriate for transfer out of the ICU today?: No  Disposition: Continue Stepdown Level 1 level of care   Code Status: Level 1 - Full Code  ---------------------------------------------------------------------------------------  SUBJECTIVE  " I feel good " Denied N/V/D-no jose alberto  Denied shortness of breath      Review of Systems  Review of systems was reviewed and negative unless stated above in HPI/24-hour events   ---------------------------------------------------------------------------------------  OBJECTIVE    Vitals   Vitals:    22 22022 2300   BP: 93/54 106/55 (!) 78/43 93/61   BP Location:       Pulse: 78 85 75 80   Resp: (!) 25 (!) 29 (!) 25 21   Temp: (!) 96 8 °F (36 °C)   97 6 °F (36 4 °C)   TempSrc: Tympanic   Temporal   SpO2: 98% 98% 99% 99%   Weight:       Height:         Temp (24hrs), Av 3 °F (36 3 °C), Min:96 8 °F (36 °C), Max:97 6 °F (36 4 °C)  Current: Temperature: 97 6 °F (36 4 °C) Respiratory:  RA    Invasive/non-invasive ventilation settings   Respiratory  Report   Lab Data (Last 4 hours)    None         O2/Vent Data (Last 4 hours)    None                Physical Exam  Constitutional:       General: She is not in acute distress  HENT:      Mouth/Throat:      Mouth: Mucous membranes are moist    Eyes:      Extraocular Movements: Extraocular movements intact  Conjunctiva/sclera: Conjunctivae normal       Pupils: Pupils are equal, round, and reactive to light  Cardiovascular:      Rate and Rhythm: Rhythm irregularly irregular  Pulses: Normal pulses  Heart sounds: Normal heart sounds, S1 normal and S2 normal  No murmur heard  Pulmonary:      Effort: Pulmonary effort is normal  No respiratory distress  Breath sounds: Normal breath sounds  Abdominal:      General: Bowel sounds are normal  There is no distension  Palpations: Abdomen is soft  Tenderness: There is no abdominal tenderness  Musculoskeletal:         General: Normal range of motion  Skin:     General: Skin is warm and dry  Capillary Refill: Capillary refill takes 2 to 3 seconds  Neurological:      GCS: GCS eye subscore is 4  GCS verbal subscore is 5  GCS motor subscore is 6  Cranial Nerves: Cranial nerves are intact  Sensory: Sensation is intact     Psychiatric:         Mood and Affect: Mood normal          Behavior: Behavior normal              Laboratory and Diagnostics:  Results from last 7 days   Lab Units 06/04/22  2256 06/04/22  1742 06/04/22  1143 06/04/22  0508 06/04/22  0031 06/03/22  1533 06/03/22  0559 06/03/22  0018 06/02/22  1941 06/02/22  1138   WBC Thousand/uL  --   --   --  6 60  --   --  9 25  --   --  9 82   HEMOGLOBIN g/dL 8 4* 7 8* 8 2* 7 6* 5 9* 7 0* 7 5* 7 5* 7 3* 5 4*   HEMATOCRIT %  --   --   --  24 5* 19 2* 22 2* 23 8* 24 4* 24 5* 18 8*   PLATELETS Thousands/uL  --   --   --  178  --   --  243  --   --  354   NEUTROS PCT %  --   --   --   --   --   -- 83*  --   --  79*   MONOS PCT %  --   --   --   --   --   --  7  --   --  9     Results from last 7 days   Lab Units 06/04/22  0508 06/03/22  0559 06/02/22  1138   SODIUM mmol/L 142 137 131*   POTASSIUM mmol/L 3 2* 4 3 5 2   CHLORIDE mmol/L 107 109* 100   CO2 mmol/L 22 19* 18*   ANION GAP mmol/L 13 9 13   BUN mg/dL 33* 41* 41*   CREATININE mg/dL 1 62* 1 99* 2 32*   CALCIUM mg/dL 7 9* 7 4* 8 5   GLUCOSE RANDOM mg/dL 74 82 116   ALT U/L  --  12 8   AST U/L  --  38 27   ALK PHOS U/L  --  83 66   ALBUMIN g/dL  --  3 3* 3 8   TOTAL BILIRUBIN mg/dL  --  1 24* 0 79          Results from last 7 days   Lab Units 06/03/22  0559 06/02/22  1138   INR  2 19* 2 64*   PTT seconds  --  44*          Results from last 7 days   Lab Units 06/02/22  1941 06/02/22  1607 06/02/22  1138   LACTIC ACID mmol/L 2 0 3 4* 4 0*     ABG:    VBG:    Results from last 7 days   Lab Units 06/03/22  0559 06/02/22  1138   PROCALCITONIN ng/ml 0 09 0 08       Micro  Results from last 7 days   Lab Units 06/02/22  1138   BLOOD CULTURE  No Growth at 48 hrs  No Growth at 48 hrs  EKG: Atrial fib  Imaging: I have personally reviewed pertinent reports  and I have personally reviewed pertinent films in PACS    Intake and Output  I/O       06/03 0701  06/04 0700 06/04 0701  06/05 0700    P  O   1080    I V  (mL/kg) 320 (4 6) 580 (8 4)    Blood 362 5     IV Piggyback 100 150    Total Intake(mL/kg) 782 5 (11 3) 1810 (26 2)    Urine (mL/kg/hr) 2500 (1 5) 1249 (0 8)    Stool  0    Total Output 2500 1249    Net -1717 5 +561          Unmeasured Stool Occurrence  0 x          Height and Weights   Height: 5' 3" (160 cm)     Body mass index is 26 99 kg/m²    Weight (last 2 days)     Date/Time Weight    06/04/22 0600 69 1 (152 34)    06/03/22 0535 72 3 (159 39)            Nutrition       Diet Orders   (From admission, onward)             Start     Ordered    06/03/22 1739  Diet Clear Liquid  Diet effective now        References:    Nutrtion Support Algorithm Enteral vs  Parenteral   Question Answer Comment   Diet Type Clear Liquid    Special Instructions No Red Liquids/Foods    RD to adjust diet per protocol? No        06/03/22 1738                  Active Medications  Scheduled Meds:  Current Facility-Administered Medications   Medication Dose Route Frequency Provider Last Rate    atorvastatin  40 mg Oral Daily With Dinner JAQUELINE Rowan      [START ON 6/6/2022] digoxin  125 mcg Oral Every Other Day JAQUELINE Aceves      levothyroxine  150 mcg Oral Daily JAQUELINE Rowan      pantoprazole  40 mg Intravenous Q12H [address] Pasha Negrete MD      sertraline  100 mg Oral Daily JAQUELINE Rowan       Continuous Infusions:     PRN Meds:        Invasive Devices Review  Invasive Devices  Report    Peripheral Intravenous Line  Duration           Peripheral IV 06/02/22 Dorsal (posterior); Right Hand 2 days    Peripheral IV 06/03/22 Dorsal (posterior); Right Forearm 1 day    Peripheral IV 06/04/22 Left;Upper;Ventral (anterior) Arm <1 day          Drain  Duration           External Urinary Catheter 2 days                Rationale for remaining devices: IV access  ---------------------------------------------------------------------------------------  Advance Directive and Living Will:      Power of :    POLST:    ---------------------------------------------------------------------------------------  Care Time Delivered:   No Critical Care time spent       [employer] AssociationJAQUELINE      Portions of the record may have been created with voice recognition software  Occasional wrong word or "sound a like" substitutions may have occurred due to the inherent limitations of voice recognition software    Read the chart carefully and recognize, using context, where substitutions have occurred

## 2022-06-05 NOTE — ASSESSMENT & PLAN NOTE
· History of afib on Eliquis, metoprolol and digoxin as outpatient  · Follows with cardiology at [facility]  · Restart Digoxin this am   · Holding rate control medications in the setting of hypotension   · Hold Eliquis in the setting of ABLA  · Remains rate controlled as of now  · Continue to monitor telemetry

## 2022-06-05 NOTE — ASSESSMENT & PLAN NOTE
Lab Results   Component Value Date    EGFR 41 06/05/2022    EGFR 30 06/04/2022    EGFR 23 06/03/2022    CREATININE 1 24 06/05/2022    CREATININE 1 62 (H) 06/04/2022    CREATININE 1 99 (H) 06/03/2022     -creatinine slowly improving down to 1 24 today  -would monitor renal function electrolytes along with urine output  -once patient hemodynamically able to tolerate would initiate diuretics on more scheduled basis as patient does appear to have some signs of volume overload with JVD and lower extremity edema present

## 2022-06-05 NOTE — ASSESSMENT & PLAN NOTE
-currently rate controlled on telemetry  -oral anticoagulation on hold due to acute blood loss anemia  -home medical therapy on hold due to shock/hypotension secondary to acute blood loss anemia   -would reinitiate AV michael blocking agents as patient tolerates slowly to avoid hypotension once more hemodynamically stable   -transthoracic echocardiogram demonstrating normal left ventricular function with severe mitral regurgitation, moderate tricuspid regurgitation, mild to moderate RV dysfunction and pulmonary hypertension  -home diuretics were held due to significant hypotension with blood loss an DIEGO on admission  -would recommend reinitiating diuretic regimen once patient more stable from acute blood loss standpoint however will need to be very careful with volume resuscitation for patient in the setting of significant valvular disease, artery dysfunction, pulmonary hypertension  Reinier Neal

## 2022-06-05 NOTE — PROGRESS NOTES
[address]  Progress Note - Yesenia Lindsey 1945, 68 y o  female MRN: [id number]  Unit/Bed#: ICU 12-01 Encounter: [id number]  Primary Care Provider: Marleny Mccarty MD   Date and time admitted to hospital: 6/2/2022 10:34 AM    EKG abnormalities  Assessment & Plan  -dynamic ischemic changes that do appear slightly improved since transfusion and is currently rate controlled AFib on telemetry  -patient denies any significant issue at this time  -troponins trended and were relatively unrevealing  -will continue monitor on telemetry at this time  -currently patient denies any significant chest pain  Shock [facility])  Assessment & Plan  -seen in the setting of acute blood loss anemia present on admission  -patient has now required transfusion on 2 separate occasions despite EGD without revealing source  -plan for colonoscopy Monday  -for now continue medical therapy with holding oral anticoagulation at this time  Acute renal failure superimposed on stage 3 chronic kidney disease [facility])  Assessment & Plan  Lab Results   Component Value Date    EGFR 41 06/05/2022    EGFR 30 06/04/2022    EGFR 23 06/03/2022    CREATININE 1 24 06/05/2022    CREATININE 1 62 (H) 06/04/2022    CREATININE 1 99 (H) 06/03/2022     -creatinine slowly improving down to 1 24 today  -would monitor renal function electrolytes along with urine output  -once patient hemodynamically able to tolerate would initiate diuretics on more scheduled basis as patient does appear to have some signs of volume overload with JVD and lower extremity edema present      Essential hypertension  Assessment & Plan  -patient has issues with hypotension during hospitalization  -once able to tolerate would reinitiate medical therapy beginning with diuretic regimen with slow up titration in guideline directed medical therapy thereafter    Chronic diastolic CHF (congestive heart failure) (HCC)  Assessment & Plan  Wt Readings from Last 3 Encounters: 06/05/22 70 kg (154 lb 5 2 oz)   12/22/21 82 6 kg (182 lb)   12/10/21 83 kg (182 lb 15 7 oz)     -medical therapy currently on hold due to hypotension seen in the setting of significant blood loss anemia on admission which has not required transfusions x2  -would reinitiate medical therapy once medically stable slowly to avoid significant hypotension in patient  -would initiate diuretic regimen first in the setting of significant resuscitation due to hypotension  Atrial fibrillation, chronic (HCC)  Assessment & Plan  -currently rate controlled on telemetry  -oral anticoagulation on hold due to acute blood loss anemia  -home medical therapy on hold due to shock/hypotension secondary to acute blood loss anemia   -would reinitiate AV michael blocking agents as patient tolerates slowly to avoid hypotension once more hemodynamically stable   -transthoracic echocardiogram demonstrating normal left ventricular function with severe mitral regurgitation, moderate tricuspid regurgitation, mild to moderate RV dysfunction and pulmonary hypertension  -home diuretics were held due to significant hypotension with blood loss an DIEGO on admission  -would recommend reinitiating diuretic regimen once patient more stable from acute blood loss standpoint however will need to be very careful with volume resuscitation for patient in the setting of significant valvular disease, artery dysfunction, pulmonary hypertension       * ABLA (acute blood loss anemia)  Assessment & Plan  -hemoglobin improving slowly  -plan for EGD relatively unrevealing with plan for colonoscopy Monday  -would continue monitor  -continue to hold oral anticoagulation at this time  -agree with transfusion for hemoglobin less than 7  Subjective:   Patient seen and examined  Per patient she denies any chest pain, palpitations, lightheadedness dizziness, loss of consciousness, shortness of breath or abdominal discomfort    She does note some mild lower extremity edema but overall states she has been feeling somewhat better  Summary comments:  -will continue monitor patient clinically at this time reinitiate guideline directed medical therapy slowly to avoid worsening of hypotension as patient's blood pressures remain soft despite slow improvement in her hemoglobin  -plan for colonoscopy tomorrow can continue to hold anticoagulation   -in the setting of renal dysfunction and re-initiation of digoxin would rule monitor digoxin levels closely in this patient  Telemetry/ECG/Cardiac testing:   Currently rate controlled atrial fibrillation on telemetry heart rates 80s-100s    Vitals: Blood pressure 103/61, pulse 78, temperature (!) 97 4 °F (36 3 °C), temperature source Temporal, resp  rate (!) 23, height 5' 3" (1 6 m), weight 70 kg (154 lb 5 2 oz), SpO2 96 % ,   Orthostatic Blood Pressures    Flowsheet Row Most Recent Value   Blood Pressure 103/61 filed at 06/05/2022 0800   Patient Position - Orthostatic VS Lying filed at 06/05/2022 0800      ,   Weight (last 2 days)     Date/Time Weight    06/05/22 0600 70 (154 32)    06/04/22 0600 69 1 (152 34)    06/03/22 0535 72 3 (159 39)          Physical Exam:  Physical Exam  Vitals reviewed  Constitutional:       General: She is not in acute distress  Appearance: She is not diaphoretic  HENT:      Head: Normocephalic and atraumatic  Eyes:      General:         Right eye: No discharge  Left eye: No discharge  Neck:      Comments: Trachea midline, slightly elevated JVD  Cardiovascular:      Heart sounds: Murmur heard  No friction rub  Comments: Irregularly irregular rate and rhythm  Pulmonary:      Effort: Pulmonary effort is normal  No respiratory distress  Breath sounds: No wheezing  Comments: Decreased breath sounds bilaterally  Abdominal:      General: Bowel sounds are normal       Palpations: Abdomen is soft  Tenderness: There is no abdominal tenderness     Musculoskeletal: Right lower leg: Edema present  Left lower leg: Edema present  Skin:     General: Skin is warm and dry  Findings: Bruising present  Neurological:      Mental Status: She is alert  Comments: Awake, alert, able to answer questions appropriately  Psychiatric:         Mood and Affect: Mood normal          Behavior: Behavior normal          Medications:      Current Facility-Administered Medications:     atorvastatin (LIPITOR) tablet 40 mg, 40 mg, Oral, Daily With Dinner, Mariluz Mcghee, CRNP, 40 mg at 06/04/22 1730    [START ON 6/6/2022] digoxin (LANOXIN) tablet 125 mcg, 125 mcg, Oral, Every Other Day, Yaa Noguera, CRNP    levothyroxine tablet 150 mcg, 150 mcg, Oral, Daily, WILMAN RowanNP, 150 mcg at 06/05/22 0601    pantoprazole (PROTONIX) injection 40 mg, 40 mg, Intravenous, Q12H [facility], Tim Holly MD, 40 mg at 06/05/22 0931    polyethylene glycol (GOLYTELY) bowel prep 4,000 mL, 4,000 mL, Oral, See Admin Instructions, Yvon Beltran DO    sertraline (ZOLOFT) tablet 100 mg, 100 mg, Oral, Daily, Mariluz Mcghee, WILMANNP, 100 mg at 06/05/22 0930     Labs & Results:    Troponins:    Results from last 7 days   Lab Units 06/02/22  1609 06/02/22  1445 06/02/22  1138   HS TNI 0HR ng/L  --   --  10   HS TNI 2HR ng/L  --  8  --    HSTNI D2 ng/L  --  -2  --    HS TNI 4HR ng/L 19  --   --    HSTNI D4 ng/L 9  --   --         BNP:   Results from last 6 Months   Lab Units 06/02/22  1138 12/22/21  1616   BNP pg/mL 853* 383*     CBC with diff:   Results from last 7 days   Lab Units 06/05/22  0601 06/04/22  2256 06/04/22  1143 06/04/22  0508   WBC Thousand/uL 6 60  --   --  6 60   HEMOGLOBIN g/dL 9 3* 8 4*   < > 7 6*   HEMATOCRIT % 31 5*  --   --  24 5*   MCV fL 97  --   --  96   PLATELETS Thousands/uL 245  --   --  178    < > = values in this interval not displayed       TSH:     CMP:   Results from last 7 days   Lab Units 06/05/22  0601 06/04/22  0508 06/03/22  0559 06/02/22  113 POTASSIUM mmol/L 3 5 3 2* 4 3 5 2   CHLORIDE mmol/L 106 107 109* 100   CO2 mmol/L 24 22 19* 18*   BUN mg/dL 21 33* 41* 41*   CREATININE mg/dL 1 24 1 62* 1 99* 2 32*   AST U/L  --   --  38 27   ALT U/L  --   --  12 8   EGFR ml/min/1 73sq m 41 30 23 19     Lipid Profile:     Coags:   Results from last 7 days   Lab Units 06/03/22  0559 06/02/22  1138   INR  2 19* 2 64*

## 2022-06-06 LAB
ABO GROUP BLD BPU: NORMAL
ABO GROUP BLD BPU: NORMAL
ALBUMIN SERPL BCP-MCNC: 3.1 G/DL (ref 3.5–5)
ALP SERPL-CCNC: 74 U/L (ref 34–104)
ALT SERPL W P-5'-P-CCNC: 7 U/L (ref 7–52)
ANION GAP SERPL CALCULATED.3IONS-SCNC: 6 MMOL/L (ref 4–13)
AST SERPL W P-5'-P-CCNC: 18 U/L (ref 13–39)
BILIRUB SERPL-MCNC: 1.17 MG/DL (ref 0.2–1)
BPU ID: NORMAL
BPU ID: NORMAL
BUN SERPL-MCNC: 13 MG/DL (ref 5–25)
CA-I BLD-SCNC: 1.14 MMOL/L (ref 1.12–1.32)
CALCIUM ALBUM COR SERPL-MCNC: 9.2 MG/DL (ref 8.3–10.1)
CALCIUM SERPL-MCNC: 8.5 MG/DL (ref 8.4–10.2)
CHLORIDE SERPL-SCNC: 109 MMOL/L (ref 96–108)
CO2 SERPL-SCNC: 28 MMOL/L (ref 21–32)
CREAT SERPL-MCNC: 1.03 MG/DL (ref 0.6–1.3)
CROSSMATCH: NORMAL
CROSSMATCH: NORMAL
ERYTHROCYTE [DISTWIDTH] IN BLOOD BY AUTOMATED COUNT: 17.1 % (ref 11.6–15.1)
GFR SERPL CREATININE-BSD FRML MDRD: 52 ML/MIN/1.73SQ M
GLUCOSE SERPL-MCNC: 91 MG/DL (ref 65–140)
HCT VFR BLD AUTO: 27.5 % (ref 34.8–46.1)
HGB BLD-MCNC: 8.2 G/DL (ref 11.5–15.4)
HGB BLD-MCNC: 8.3 G/DL (ref 11.5–15.4)
HGB BLD-MCNC: 8.5 G/DL (ref 11.5–15.4)
MCH RBC QN AUTO: 29.1 PG (ref 26.8–34.3)
MCHC RBC AUTO-ENTMCNC: 30.9 G/DL (ref 31.4–37.4)
MCV RBC AUTO: 94 FL (ref 82–98)
PLATELET # BLD AUTO: 184 THOUSANDS/UL (ref 149–390)
PMV BLD AUTO: 9.4 FL (ref 8.9–12.7)
POTASSIUM SERPL-SCNC: 3.9 MMOL/L (ref 3.5–5.3)
PROT SERPL-MCNC: 5.2 G/DL (ref 6.4–8.4)
RBC # BLD AUTO: 2.92 MILLION/UL (ref 3.81–5.12)
SODIUM SERPL-SCNC: 143 MMOL/L (ref 135–147)
UNIT DISPENSE STATUS: NORMAL
UNIT DISPENSE STATUS: NORMAL
UNIT PRODUCT CODE: NORMAL
UNIT PRODUCT CODE: NORMAL
UNIT PRODUCT VOLUME: 350 ML
UNIT PRODUCT VOLUME: 350 ML
UNIT RH: NORMAL
UNIT RH: NORMAL
WBC # BLD AUTO: 6.28 THOUSAND/UL (ref 4.31–10.16)

## 2022-06-06 PROCEDURE — C9113 INJ PANTOPRAZOLE SODIUM, VIA: HCPCS | Performed by: ANESTHESIOLOGY

## 2022-06-06 PROCEDURE — 99232 SBSQ HOSP IP/OBS MODERATE 35: CPT | Performed by: STUDENT IN AN ORGANIZED HEALTH CARE EDUCATION/TRAINING PROGRAM

## 2022-06-06 PROCEDURE — 99233 SBSQ HOSP IP/OBS HIGH 50: CPT | Performed by: INTERNAL MEDICINE

## 2022-06-06 PROCEDURE — 99232 SBSQ HOSP IP/OBS MODERATE 35: CPT | Performed by: INTERNAL MEDICINE

## 2022-06-06 PROCEDURE — 85018 HEMOGLOBIN: CPT | Performed by: NURSE PRACTITIONER

## 2022-06-06 PROCEDURE — 85027 COMPLETE CBC AUTOMATED: CPT | Performed by: NURSE PRACTITIONER

## 2022-06-06 PROCEDURE — 80053 COMPREHEN METABOLIC PANEL: CPT | Performed by: NURSE PRACTITIONER

## 2022-06-06 PROCEDURE — 82330 ASSAY OF CALCIUM: CPT | Performed by: NURSE PRACTITIONER

## 2022-06-06 RX ORDER — POLYETHYLENE GLYCOL 3350 17 G/17G
238 POWDER, FOR SOLUTION ORAL ONCE
Status: DISCONTINUED | OUTPATIENT
Start: 2022-06-06 | End: 2022-06-06 | Stop reason: CLARIF

## 2022-06-06 RX ORDER — NYSTATIN 100000 [USP'U]/G
POWDER TOPICAL 2 TIMES DAILY
Status: DISCONTINUED | OUTPATIENT
Start: 2022-06-06 | End: 2022-06-09 | Stop reason: HOSPADM

## 2022-06-06 RX ORDER — DIGOXIN 0.25 MG/ML
125 INJECTION INTRAMUSCULAR; INTRAVENOUS ONCE
Status: COMPLETED | OUTPATIENT
Start: 2022-06-06 | End: 2022-06-06

## 2022-06-06 RX ORDER — DIGOXIN 125 MCG
TABLET ORAL
COMMUNITY
Start: 2022-04-08 | End: 2022-06-09

## 2022-06-06 RX ORDER — POLYETHYLENE GLYCOL 3350 17 G/17G
238 POWDER, FOR SOLUTION ORAL ONCE
Status: COMPLETED | OUTPATIENT
Start: 2022-06-06 | End: 2022-06-06

## 2022-06-06 RX ORDER — DIGOXIN 125 MCG
125 TABLET ORAL EVERY OTHER DAY
Status: DISCONTINUED | OUTPATIENT
Start: 2022-06-08 | End: 2022-06-09 | Stop reason: HOSPADM

## 2022-06-06 RX ADMIN — Medication 238 G: at 21:45

## 2022-06-06 RX ADMIN — LEVOTHYROXINE SODIUM 150 MCG: 75 TABLET ORAL at 06:25

## 2022-06-06 RX ADMIN — BISACODYL 10 MG: 5 TABLET, COATED ORAL at 20:26

## 2022-06-06 RX ADMIN — SERTRALINE HYDROCHLORIDE 100 MG: 50 TABLET ORAL at 13:35

## 2022-06-06 RX ADMIN — DIGOXIN 125 MCG: 0.25 INJECTION INTRAMUSCULAR; INTRAVENOUS at 12:46

## 2022-06-06 RX ADMIN — PANTOPRAZOLE SODIUM 40 MG: 40 INJECTION, POWDER, FOR SOLUTION INTRAVENOUS at 13:35

## 2022-06-06 RX ADMIN — PANTOPRAZOLE SODIUM 40 MG: 40 INJECTION, POWDER, FOR SOLUTION INTRAVENOUS at 20:26

## 2022-06-06 RX ADMIN — NYSTATIN: 100000 POWDER TOPICAL at 20:00

## 2022-06-06 RX ADMIN — ATORVASTATIN CALCIUM 40 MG: 40 TABLET, FILM COATED ORAL at 17:19

## 2022-06-06 NOTE — WOUND OSTOMY CARE
Consult Note - Wound   Phoenix Mejia 68 y o  female MRN: [id number]  Unit/Bed#: ICU 12-01 Encounter: [id number]      History and Present Illness:  68year old female patient admitted with acute blood loss anemia  Wound care consutled for wound to right buttock  Patient history significant for a-fib, CHF, depression, stage 3 CKD, shock, and HTN  Assessment Findings:   Patient OOB on the commode, was assisted back to bed  She is an assist X2 to provide stability while ambulating  Her buttocks were assessed while standing  Patient states she had an appointment some time ago at the dermatologist but missed the appointment  She states she gets blisters, then they get hard and flaky  Discussed resuming following up with making an appointment with the dermatologist on discharge  She currently has a tapia catheter for urinary management and is in the midst of a bowel prep  She is not incontinent  1  Scaly dried area on the left side of her cheek with a dark purple blister distal to the scaly area  Periwound with very light purple discoloration  No current drainage, no erythema or induration   2  Scaly dried area on the sacrum, blanchable  Right buttock purple blanchable discoloration, no open area  Left buttock blanchable    Skin and Wound Care Plan:   1  Ehob offloading cushion to chair when OOB  2  Elevate heels off the bed with pillows   3  Turn and reposition every two hours  4  Hydraguard to bilateral heels, buttocks and sacrum BID and PRN  5   Skin nourishing cream daily    Wounds:  Wound 06/03/22 Abrasion(s) Face Left (Active)   Wound Image   06/06/22 1234   Wound Description Black;Dry 06/06/22 1600   Yolette-wound Assessment Intact 06/06/22 1600   Wound Length (cm) 1 cm 06/06/22 1234   Wound Width (cm) 2 7 cm 06/06/22 1234   Wound Depth (cm) 0 cm 06/06/22 1234   Wound Surface Area (cm^2) 2 7 cm^2 06/06/22 1234   Wound Volume (cm^3) 0 cm^3 06/06/22 1234   Calculated Wound Volume (cm^3) 0 cm^3 06/06/22 1234 Drainage Amount None 06/06/22 1234   Dressing Open to air 06/06/22 1600   Patient Tolerance Tolerated well 06/06/22 1234       Wound 06/04/22 Other (comment) Abrasion(s) Buttocks Right (Active)   Wound Image   06/06/22 1235   Wound Description Epithelialization 06/06/22 1600   Yolette-wound Assessment Hyperpigmented 06/06/22 1600   Wound Length (cm) 0 2 cm 06/04/22 0800   Wound Width (cm) 0 5 cm 06/04/22 0800   Wound Surface Area (cm^2) 0 1 cm^2 06/04/22 0800   Drainage Amount None 06/06/22 1235   Drainage Description Serosanguineous 06/04/22 0800   Treatments Cleansed 06/06/22 1235   Dressing Open to air 06/06/22 1600   Dressing Changed Changed 06/04/22 0800   Patient Tolerance Tolerated well 06/06/22 1235   Dressing Status Clean;Dry; Intact 06/06/22 0400     Reviewed plan of care with primary RN Alexis Malagon  Recommendations written as orders  Wound care team to sign off  Questions or concerns 1234 [facility] Nurse    Maninder GUPTAN, RN, [facility]

## 2022-06-06 NOTE — PROGRESS NOTES
Progress Note- Laura Marquez 68 y o  female MRN: [id number]    Unit/Bed#: ICU 12-01 Encounter: [id number]      Assessment and Plan:    Patient is a 69 y/o female with PMH significant for hypothyroidism, diastolic CHF, A fib on Eliquis, MVR, and CKD3 admitted on 6/2 for shortness of breath and chest pain with history of one week of intermittent melena  Found to be hypotensive with hgb of 5 4 on admission  EGD on 6/3 notable for normal appearing esophagus, stomach, and duodenum; No evidence of upper GI bleeding  Labs notable for hgb 5 9 on 6/4 believed to be lab error given hgb 9 3 s/p 1 unit PRBCs; Hgb currently 8 5  Also notable for midly elevated T  Bili 1 17, otherwise stable hepatic and renal function  Patient remains hemodynamically stable, noted to be persistently tachypenic  Given reports of few small-volume dark bowel movements over the weekend, initially planned for colonoscopy today to complete evaluation of anemia  However, patient with poor clearance following bowel prep - Continues to have brown opaque stools  Therefore, will plan for clear liquid diet today with split dose MiraLax bowel prep this evening (6/6) to be made NPO at midnight for colonoscopy tomorrow (6/7)  Per nursing, patient and patient's family made aware  Recommend to continue twice daily PPI, monitoring hgb with transfusions as needed, and monitoring for signs of overt GI bleeding    - Clear liquid diet; NPO at midnight (0001 on 6/7)  - Continue twice daily PPI  - Monitor hgb; Transfuse for hgb <7 0   - Monitor for signs of voert GI bleeding  - Split dose MiraLax bowel prep w/ Dulcolax this evening as ordered for colonoscopy tomorrow (6/7)    GI will continue to follow  ______________________________________________________________________    Subjective:     Patient found sleeping comfortably, easily aroused  No acute overnight events   Per nursing, patient completed Golytely bowel prep in its entirety but did not begin having BMs until 7-8 AM this morning  Has passed multiple large-volume hard dark stools with subsequent muddy brown stools  Patient denies nausea/vomiting, abdominal pain, and all other GI-related complaints  Medication Administration - last 24 hours from 06/05/2022 1248 to 06/06/2022 1248       Date/Time Order Dose Route Action Action by     06/05/2022 1707 atorvastatin (LIPITOR) tablet 40 mg 40 mg Oral Given Claudine Mak RN     06/06/2022 5930 levothyroxine tablet 150 mcg 150 mcg Oral Given Desmond Lr RN     06/05/2022 2015 pantoprazole (PROTONIX) injection 40 mg 40 mg Intravenous Given Desmond Lr RN     06/05/2022 1707 polyethylene glycol (GOLYTELY) bowel prep 4,000 mL 4,000 mL Oral Given Claudine Mak RN     06/06/2022 1246 digoxin (LANOXIN) injection 125 mcg 125 mcg Intravenous Given Lesley Rojas RN          Objective:     Vitals: Blood pressure 111/80, pulse (!) 117, temperature (!) 96 7 °F (35 9 °C), temperature source Temporal, resp  rate (!) 44, height 5' 3" (1 6 m), weight 73 1 kg (161 lb 2 5 oz), SpO2 94 %  ,Body mass index is 28 55 kg/m²  Intake/Output Summary (Last 24 hours) at 6/6/2022 1248  Last data filed at 6/6/2022 0601  Gross per 24 hour   Intake 840 ml   Output 575 ml   Net 265 ml       Physical Exam:   General Appearance: Awake and alert, in no acute distress  Abdomen: Soft, non-tender, non-distended; bowel sounds normal; no masses or no organomegaly    Invasive Devices  Report    Peripheral Intravenous Line  Duration           Peripheral IV 06/03/22 Dorsal (posterior); Right Forearm 3 days    Peripheral IV 06/04/22 Left;Upper;Ventral (anterior) Arm 1 day          Drain  Duration           Urethral Catheter 16 Fr  1 day                Lab Results:  No results displayed because visit has over 200 results  Imaging Studies: I have personally reviewed pertinent imaging studies

## 2022-06-06 NOTE — ASSESSMENT & PLAN NOTE
· Presented with hgb 5 4 and complaints of dark tarry stools/generalized weakness over the past couple days  · Continue PPI BID   · S/p 2 U PRBC on 6/02/2022  · S/P 1 U PRBC for hgb 5 9-  · Trend hemoglobin at Q12H  · Transfuse for hgb < 7  · EGD-Normal  · Plan for colonoscopy on later this am/afternoon

## 2022-06-06 NOTE — ASSESSMENT & PLAN NOTE
Wt Readings from Last 3 Encounters:   06/05/22 70 kg (154 lb 5 2 oz)   12/22/21 82 6 kg (182 lb)   12/10/21 83 kg (182 lb 15 7 oz)     · Based on chart review from cardiology notes  · Follow up on formal ECHO    · Takes torsemide as outpatient  · Received Lasix 40 mg x 1 6/3/2022  · Strict I and O  · Daily weight

## 2022-06-06 NOTE — PLAN OF CARE
Problem: MOBILITY - ADULT  Goal: Maintain or return to baseline ADL function  Description: INTERVENTIONS:  -  Assess patient's ability to carry out ADLs; assess patient's baseline for ADL function and identify physical deficits which impact ability to perform ADLs (bathing, care of mouth/teeth, toileting, grooming, dressing, etc )  - Assess/evaluate cause of self-care deficits   - Assess range of motion  - Assess patient's mobility; develop plan if impaired  - Assess patient's need for assistive devices and provide as appropriate  - Encourage maximum independence but intervene and supervise when necessary  - Involve family in performance of ADLs  - Assess for home care needs following discharge   - Consider OT consult to assist with ADL evaluation and planning for discharge  - Provide patient education as appropriate  Outcome: Progressing  Goal: Maintains/Returns to pre admission functional level  Description: INTERVENTIONS:  - Perform BMAT or MOVE assessment daily    - Set and communicate daily mobility goal to care team and patient/family/caregiver  - Collaborate with rehabilitation services on mobility goals if consulted  - Perform Range of Motion 3 times a day  - Reposition patient every 2 hours    - Dangle patient 2 times a day  - Stand patient 2 times a day  - Out of bed for toileting  - Record patient progress and toleration of activity level   Outcome: Progressing     Problem: Prexisting or High Potential for Compromised Skin Integrity  Goal: Skin integrity is maintained or improved  Description: INTERVENTIONS:  - Identify patients at risk for skin breakdown  - Assess and monitor skin integrity  - Assess and monitor nutrition and hydration status  - Monitor labs   - Assess for incontinence   - Turn and reposition patient  - Assist with mobility/ambulation  - Relieve pressure over bony prominences  - Avoid friction and shearing  - Provide appropriate hygiene as needed including keeping skin clean and dry  - Evaluate need for skin moisturizer/barrier cream  - Collaborate with interdisciplinary team   - Patient/family teaching  - Consider wound care consult   Outcome: Progressing     Problem: PAIN - ADULT  Goal: Verbalizes/displays adequate comfort level or baseline comfort level  Description: Interventions:  - Encourage patient to monitor pain and request assistance  - Assess pain using appropriate pain scale  - Administer analgesics based on type and severity of pain and evaluate response  - Implement non-pharmacological measures as appropriate and evaluate response  - Consider cultural and social influences on pain and pain management  - Notify physician/advanced practitioner if interventions unsuccessful or patient reports new pain  Outcome: Progressing     Problem: INFECTION - ADULT  Goal: Absence or prevention of progression during hospitalization  Description: INTERVENTIONS:  - Assess and monitor for signs and symptoms of infection  - Monitor lab/diagnostic results  - Monitor all insertion sites, i e  indwelling lines, tubes, and drains  - Monitor endotracheal if appropriate and nasal secretions for changes in amount and color  - Brant appropriate cooling/warming therapies per order  - Administer medications as ordered  - Instruct and encourage patient and family to use good hand hygiene technique  - Identify and instruct in appropriate isolation precautions for identified infection/condition  Outcome: Progressing     Problem: SAFETY ADULT  Goal: Maintain or return to baseline ADL function  Description: INTERVENTIONS:  -  Assess patient's ability to carry out ADLs; assess patient's baseline for ADL function and identify physical deficits which impact ability to perform ADLs (bathing, care of mouth/teeth, toileting, grooming, dressing, etc )  - Assess/evaluate cause of self-care deficits   - Assess range of motion  - Assess patient's mobility; develop plan if impaired  - Assess patient's need for assistive devices and provide as appropriate  - Encourage maximum independence but intervene and supervise when necessary  - Involve family in performance of ADLs  - Assess for home care needs following discharge   - Consider OT consult to assist with ADL evaluation and planning for discharge  - Provide patient education as appropriate  Outcome: Progressing  Goal: Maintains/Returns to pre admission functional level  Description: INTERVENTIONS:  - Perform BMAT or MOVE assessment daily    - Set and communicate daily mobility goal to care team and patient/family/caregiver  - Collaborate with rehabilitation services on mobility goals if consulted  - Perform Range of Motion 3 times a day  - Reposition patient every 2 hours    - Dangle patient 2 times a day  - Stand patient 2 times a day  - Out of bed for toileting  - Record patient progress and toleration of activity level   Outcome: Progressing  Goal: Patient will remain free of falls  Description: INTERVENTIONS:  - Educate patient/family on patient safety including physical limitations  - Instruct patient to call for assistance with activity   - Consult OT/PT to assist with strengthening/mobility   - Keep Call bell within reach  - Keep bed low and locked with side rails adjusted as appropriate  - Keep care items and personal belongings within reach  - Initiate and maintain comfort rounds  - Make Fall Risk Sign visible to staff  - Offer Toileting every 2 Hours, in advance of need  - Initiate/Maintain bed alarm  - Apply yellow socks and bracelet for high fall risk patients  - Consider moving patient to room near nurses station  Outcome: Progressing     Problem: DISCHARGE PLANNING  Goal: Discharge to home or other facility with appropriate resources  Description: INTERVENTIONS:  - Identify barriers to discharge w/patient and caregiver  - Arrange for needed discharge resources and transportation as appropriate  - Identify discharge learning needs (meds, wound care, etc )  - Arrange for interpretive services to assist at discharge as needed  - Refer to Case Management Department for coordinating discharge planning if the patient needs post-hospital services based on physician/advanced practitioner order or complex needs related to functional status, cognitive ability, or social support system  Outcome: Progressing     Problem: Knowledge Deficit  Goal: Patient/family/caregiver demonstrates understanding of disease process, treatment plan, medications, and discharge instructions  Description: Complete learning assessment and assess knowledge base  Interventions:  - Provide teaching at level of understanding  - Provide teaching via preferred learning methods  Outcome: Progressing     Problem: Potential for Falls  Goal: Patient will remain free of falls  Description: INTERVENTIONS:  - Educate patient/family on patient safety including physical limitations  - Instruct patient to call for assistance with activity   - Consult OT/PT to assist with strengthening/mobility   - Keep Call bell within reach  - Keep bed low and locked with side rails adjusted as appropriate  - Keep care items and personal belongings within reach  - Initiate and maintain comfort rounds  - Make Fall Risk Sign visible to staff  - Offer Toileting every 2 Hours, in advance of need  - Initiate/Maintain bed alarm  - Apply yellow socks and bracelet for high fall risk patients  - Consider moving patient to room near nurses station  Outcome: Progressing     Problem: Nutrition/Hydration-ADULT  Goal: Nutrient/Hydration intake appropriate for improving, restoring or maintaining nutritional needs  Description: Monitor and assess patient's nutrition/hydration status for malnutrition  Collaborate with interdisciplinary team and initiate plan and interventions as ordered  Monitor patient's weight and dietary intake as ordered or per policy  Utilize nutrition screening tool and intervene as necessary   Determine patient's food preferences and provide high-protein, high-caloric foods as appropriate       INTERVENTIONS:  - Monitor oral intake, urinary output, labs, and treatment plans  - Assess nutrition and hydration status and recommend course of action  - Evaluate amount of meals eaten  - Assist patient with eating if necessary   - Allow adequate time for meals  - Recommend/ encourage appropriate diets, oral nutritional supplements, and vitamin/mineral supplements  - Order, calculate, and assess calorie counts as needed  - Assess need for intravenous fluids  - Provide specific nutrition/hydration education as appropriate  - Include patient/family/caregiver in decisions related to nutrition  Outcome: Progressing     Problem: GENITOURINARY - ADULT  Goal: Absence of urinary retention  Description: INTERVENTIONS:  - Assess patients ability to void and empty bladder  - Monitor I/O  - Bladder scan as needed  - Discuss with physician/AP medications to alleviate retention as needed  - Discuss catheterization for long term situations as appropriate  Outcome: Progressing  Goal: Urinary catheter remains patent  Description: INTERVENTIONS:  - Assess patency of urinary catheter  - If patient has a chronic tapia, consider changing catheter if non-functioning  - Follow guidelines for intermittent irrigation of non-functioning urinary catheter  Outcome: Progressing     Problem: HEMATOLOGIC - ADULT  Goal: Maintains hematologic stability  Description: INTERVENTIONS  - Assess for signs and symptoms of bleeding or hemorrhage  - Monitor labs  - Administer supportive blood products/factors as ordered and appropriate  Outcome: Progressing

## 2022-06-06 NOTE — PROGRESS NOTES
[address]  Progress Note - Margie [facility] 1945, 68 y o  female MRN: [id number]  Unit/Bed#: ICU 12-01 Encounter: [id number]  Primary Care Provider: Lakesha Anaya MD   Date and time admitted to hospital: 6/2/2022 10:34 AM    * ABLA (acute blood loss anemia)  Assessment & Plan  · Presented with hgb 5 4 and complaints of dark tarry stools/generalized weakness over the past couple days  · Continue PPI BID   · S/p 2 U PRBC on 6/02/2022  · S/P 1 U PRBC for hgb 5 9-  · Trend hemoglobin at Q12H  · Transfuse for hgb < 7  · EGD-Normal  · Plan for colonoscopy on later this am/afternoon    [facility] ([address] )  Assessment & Plan  · AEB hypotension, lactic acidosis and DIEGO  · Likely related to ABLA  · S/p IVF and blood resuscitation - will transfuse for Hgb<7  · Lactic cleared  · Continue close HD monitoring, maintain MAP>65    Acute renal failure superimposed on stage 3 chronic kidney disease [facility])  Assessment & Plan  Lab Results   Component Value Date    EGFR 41 06/05/2022    EGFR 30 06/04/2022    EGFR 23 06/03/2022    CREATININE 1 24 06/05/2022    CREATININE 1 62 (H) 06/04/2022    CREATININE 1 99 (H) 06/03/2022     · History of CKD 3, baseline creatinine appears to be 1 3-1 4, 2 32 on admission  · Likely prerenal in nature  · Ongoing blood resuscitation   · Monitor I & O and renal indices  · Hold nephrotoxic medications    EKG abnormalities  Assessment & Plan  · Possibly in the setting of demand related to ABLA  · Troponin negative  · Denies CP  · Monitor EKG closely    Supratherapeutic INR  Assessment & Plan  · INR 2 64 on admission  · S/p KCentra  · Continue to monitor    Mixed hyperlipidemia  Assessment & Plan  · Continue home statin therapy     Hypothyroidism  Assessment & Plan  · Continue home synthroid     Essential hypertension  Assessment & Plan  · Hold home antihypertensives in the setting of borderline low SBP and ABLA    Depression  Assessment & Plan  · Continue home Zoloft     Chronic diastolic CHF (congestive heart failure) ([facility])  Assessment & Plan  Wt Readings from Last 3 Encounters:   22 70 kg (154 lb 5 2 oz)   21 82 6 kg (182 lb)   12/10/21 83 kg (182 lb 15 7 oz)     · Based on chart review from cardiology notes  · Follow up on formal ECHO    · Takes torsemide as outpatient  · Received Lasix 40 mg x 1 6/3/2022  · Strict I and O  · Daily weight        Atrial fibrillation, chronic ([facility])  Assessment & Plan  · History of afib on Eliquis, metoprolol and digoxin as outpatient  · Follows with cardiology at [facility]  · Restart Digoxin today  · Holding rate control medications in the setting of hypotension   · Hold Eliquis in the setting of ABLA  · Remains rate controlled as of now  · Continue to monitor telemetry       ----------------------------------------------------------------------------------------  HPI/24hr events: Tolerating prep for colonoscopy today  Slept well  Patient appropriate for transfer out of the ICU today?: No  Disposition: Continue Stepdown Level 1 level of care   Code Status: Level 1 - Full Code  ---------------------------------------------------------------------------------------  SUBJECTIVE  " I feel good " Denied shortness of breath or CP  Denied N/v/d      Review of Systems  Review of systems was reviewed and negative unless stated above in HPI/24-hour events   ---------------------------------------------------------------------------------------  OBJECTIVE    Vitals   Vitals:    22 2200 22 2300 22 0000 22 0100   BP: 97/56 101/58 100/55 104/54   BP Location:   Right arm Right arm   Pulse: 80 100 92 92   Resp: (!) 28 (!) 28 (!) 26 (!) 25   Temp:   98 7 °F (37 1 °C)    TempSrc:   Temporal    SpO2: 94% 95% 94% 93%   Weight:       Height:         Temp (24hrs), Av 9 °F (36 6 °C), Min:97 3 °F (36 3 °C), Max:98 7 °F (37 1 °C)  Current: Temperature: 98 7 °F (37 1 °C)          Respiratory:  RA    Invasive/non-invasive ventilation settings Respiratory  Report   Lab Data (Last 4 hours)    None         O2/Vent Data (Last 4 hours)    None                Physical Exam  HENT:      Head: Normocephalic and atraumatic  Mouth/Throat:      Mouth: Mucous membranes are moist    Eyes:      Conjunctiva/sclera: Conjunctivae normal       Pupils: Pupils are equal, round, and reactive to light  Cardiovascular:      Rate and Rhythm: Rhythm irregularly irregular  Pulses: Normal pulses  Pulmonary:      Effort: Pulmonary effort is normal  No respiratory distress  Comments: Fine rales at right base  Abdominal:      General: Bowel sounds are normal  There is no distension  Palpations: Abdomen is soft  Tenderness: There is no abdominal tenderness  Genitourinary:     Comments: Varghese patent clear yellow urine  Musculoskeletal:         General: Normal range of motion  Skin:     General: Skin is warm and dry  Neurological:      Mental Status: She is oriented to person, place, and time  GCS: GCS eye subscore is 4  GCS verbal subscore is 5  GCS motor subscore is 6  Psychiatric:         Attention and Perception: Attention and perception normal          Mood and Affect: Mood and affect normal          Speech: Speech normal          Behavior: Behavior normal  Behavior is cooperative  Thought Content:  Thought content normal              Laboratory and Diagnostics:  Results from last 7 days   Lab Units 06/05/22  2006 06/05/22  0601 06/04/22  2256 06/04/22  1742 06/04/22  1143 06/04/22  0508 06/04/22  0031 06/03/22  1533 06/03/22  0559 06/03/22  0018 06/02/22  1941 06/02/22  1138   WBC Thousand/uL  --  6 60  --   --   --  6 60  --   --  9 25  --   --  9 82   HEMOGLOBIN g/dL 8 7* 9 3* 8 4* 7 8* 8 2* 7 6* 5 9* 7 0* 7 5* 7 5* 7 3* 5 4*   HEMATOCRIT %  --  31 5*  --   --   --  24 5* 19 2* 22 2* 23 8* 24 4* 24 5* 18 8*   PLATELETS Thousands/uL  --  245  --   --   --  178  --   --  243  --   --  354   NEUTROS PCT %  --   --   --   --   -- --   --   --  83*  --   --  79*   MONOS PCT %  --   --   --   --   --   --   --   --  7  --   --  9     Results from last 7 days   Lab Units 06/05/22  0601 06/04/22  0508 06/03/22  0559 06/02/22  1138   SODIUM mmol/L 140 142 137 131*   POTASSIUM mmol/L 3 5 3 2* 4 3 5 2   CHLORIDE mmol/L 106 107 109* 100   CO2 mmol/L 24 22 19* 18*   ANION GAP mmol/L 10 13 9 13   BUN mg/dL 21 33* 41* 41*   CREATININE mg/dL 1 24 1 62* 1 99* 2 32*   CALCIUM mg/dL 9 2 7 9* 7 4* 8 5   GLUCOSE RANDOM mg/dL 86 74 82 116   ALT U/L  --   --  12 8   AST U/L  --   --  38 27   ALK PHOS U/L  --   --  83 66   ALBUMIN g/dL  --   --  3 3* 3 8   TOTAL BILIRUBIN mg/dL  --   --  1 24* 0 79     Results from last 7 days   Lab Units 06/05/22  0601   MAGNESIUM mg/dL 2 6   PHOSPHORUS mg/dL 2 5      Results from last 7 days   Lab Units 06/03/22  0559 06/02/22  1138   INR  2 19* 2 64*   PTT seconds  --  44*          Results from last 7 days   Lab Units 06/02/22  1941 06/02/22  1607 06/02/22  1138   LACTIC ACID mmol/L 2 0 3 4* 4 0*     ABG:    VBG:    Results from last 7 days   Lab Units 06/03/22  0559 06/02/22  1138   PROCALCITONIN ng/ml 0 09 0 08       Micro  Results from last 7 days   Lab Units 06/02/22  1138   BLOOD CULTURE  No Growth at 72 hrs  No Growth at 72 hrs  EKG: atrial fib  Imaging:no new imaging     Intake and Output  I/O       06/04 0701 06/05 0700 06/05 0701 06/06 0700    P  O  1080 1260    I V  (mL/kg) 580 (8 3)     IV Piggyback 150     Total Intake(mL/kg) 1810 (25 9) 1260 (18)    Urine (mL/kg/hr) 1369 (0 8) 1145 (0 7)    Stool 0 0    Total Output 1369 1145    Net +441 +115          Unmeasured Stool Occurrence 0 x 0 x          Height and Weights   Height: 5' 3" (160 cm)     Body mass index is 27 34 kg/m²    Weight (last 2 days)     Date/Time Weight    06/05/22 0600 70 (154 32)    06/04/22 0600 69 1 (152 34)            Nutrition       Diet Orders   (From admission, onward)             Start     Ordered    06/06/22 0001  Diet NPO  Diet effective midnight        References:    Nutrtion Support Algorithm Enteral vs  Parenteral   Question Answer Comment   Diet Type NPO    RD to adjust diet per protocol? No        06/05/22 0736                  Active Medications  Scheduled Meds:  Current Facility-Administered Medications   Medication Dose Route Frequency Provider Last Rate    atorvastatin  40 mg Oral Daily With Dinner JAQUELINE López      digoxin  125 mcg Oral Every Other Day JAQUELINE Montes      levothyroxine  150 mcg Oral Daily JAQUELINE Gatica      pantoprazole  40 mg Intravenous Q12H [address] Kortney Robles MD      polyethylene glycol  4,000 mL Oral See Admin Instructions Inis Angela Ruby, DO      sertraline  100 mg Oral Daily JAQUELINE Rowan       Continuous Infusions:     PRN Meds:        Invasive Devices Review  Invasive Devices  Report    Peripheral Intravenous Line  Duration           Peripheral IV 06/03/22 Dorsal (posterior); Right Forearm 2 days    Peripheral IV 06/04/22 Left;Upper;Ventral (anterior) Arm 1 day          Drain  Duration           Urethral Catheter 16 Fr  <1 day                Rationale for remaining devices: IV access  ---------------------------------------------------------------------------------------  Advance Directive and Living Will:      Power of :    POLST:    ---------------------------------------------------------------------------------------  Care Time Delivered:   No Critical Care time spent       JAQUELINE López      Portions of the record may have been created with voice recognition software  Occasional wrong word or "sound a like" substitutions may have occurred due to the inherent limitations of voice recognition software    Read the chart carefully and recognize, using context, where substitutions have occurred

## 2022-06-06 NOTE — NURSING NOTE
Pt is alert and oriented  Denies pain at present  Moves fair in bed  OOB to commode with assist of one  Difficulty with postural changes  No other focal neuro deficit  Heart tones clear with weak palpable pulses  +1 to 2 edema to both lower extremities, pitting  Swelling noted to both upper arms due to hematomas  Resolving  Lungs are decreased but clear  Respirations are easy and unlabored  Tolerating room air  Abdomen is softly distended with hyperactive bowel sounds  Passing moderate to large amount of hard black stool with large amount of muddy brown liquid  Continues to take bowel prep  Urinary catheter draining small amounts of clear swain yellow  IV sites intact  allevyn removed while passing bowel prep

## 2022-06-06 NOTE — NURSING NOTE
Pt is still passing muddy brown stools with large amount of solid material   GI is aware  Pt is aware that procedure is differed until tomorrow  Family notified

## 2022-06-06 NOTE — ASSESSMENT & PLAN NOTE
Lab Results   Component Value Date    EGFR 41 06/05/2022    EGFR 30 06/04/2022    EGFR 23 06/03/2022    CREATININE 1 24 06/05/2022    CREATININE 1 62 (H) 06/04/2022    CREATININE 1 99 (H) 06/03/2022     · History of CKD 3, baseline creatinine appears to be 1 3-1 4, 2 32 on admission  · Likely prerenal in nature  · Ongoing blood resuscitation   · Monitor I & O and renal indices  · Hold nephrotoxic medications

## 2022-06-06 NOTE — PROGRESS NOTES
[facility]    Cardiology Progress Note  Sandy Stubbs 68 y o  female   YOB: 1945 MRN: [id number]  Unit/Bed#: ICU 12-01 Encounter: [id number]      Subjective:   No significant events overnight  No current chest pain, shortness of breath  No reported blood in stool overnight  Assessments  Principal Problem:    ABLA (acute blood loss anemia)  Active Problems:    Atrial fibrillation, chronic (HCC)    Chronic diastolic CHF (congestive heart failure) ([facility])    Depression    Essential hypertension    Hypothyroidism    Mixed hyperlipidemia    Acute renal failure superimposed on stage 3 chronic kidney disease ([facility])    Supratherapeutic INR    Shock ([facility] [address] )    EKG abnormalities      Plan  Acute blood loss anemia, Gi bleed  · S/p blood transfusion  · Hemodynamically stable  · Awaiting colonoscopy today    Chronic atrial fibrillation  · Rate controlled on digoxin, HR still slightly elevated at 100-120 this morning  · Can use IV metoprolol 5 mg q6h PRN for rate control  · On eliquis at home, but currently discontinued due to acute blood loss anemia needing blood transfusion  · Will need re-evaluation once bleeding issues are better controlled    Chronic diastolic heart failure  · Home torsemide on hold due to ongoing bleeding concerns/hypotension  · No acute hypoxia  · Monitor  · Likely resume all home torsemide, once bleeding and hypertension issues are resolved    Review of Systems   All other systems reviewed and are negative  Telemetry Review: No significant arrhythmias seen on telemetry review  Objective:   Vitals: Blood pressure 111/80, pulse (!) 117, temperature (!) 96 7 °F (35 9 °C), temperature source Temporal, resp  rate (!) 44, height 5' 3" (1 6 m), weight 73 1 kg (161 lb 2 5 oz), SpO2 94 %  , Body mass index is 28 55 kg/m² ,   Orthostatic Blood Pressures    Flowsheet Row Most Recent Value   Blood Pressure 111/80 filed at 06/06/2022 1020   Patient Position - Orthostatic VS Lying filed at 06/06/2022 4503         Systolic (54PHD), WXR:301 , Min:89 , QJT:936     Diastolic (20DZY), UHC:67, Min:51, Max:80    Wt Readings from Last 5 Encounters:   06/06/22 73 1 kg (161 lb 2 5 oz)   12/22/21 82 6 kg (182 lb)   12/10/21 83 kg (182 lb 15 7 oz)     I/O       06/04 0701 06/05 0700 06/05 0701  06/06 0700 06/06 0701  06/07 0700    P  O  1080 1260     I V  (mL/kg) 580 (8 3)      Blood       IV Piggyback 150      Total Intake(mL/kg) 1810 (25 9) 1260 (17 2)     Urine (mL/kg/hr) 1369 (0 8) 1295 (0 7)     Stool 0 0     Total Output 1369 1295     Net +441 -35            Unmeasured Stool Occurrence 0 x 0 x               Physical Exam  Vitals and nursing note reviewed  Constitutional:       General: She is not in acute distress  Appearance: Normal appearance  She is well-developed  She is not ill-appearing  HENT:      Head: Normocephalic and atraumatic  Nose: No congestion  Eyes:      General: No scleral icterus  Conjunctiva/sclera: Conjunctivae normal    Neck:      Vascular: No carotid bruit or JVD  Cardiovascular:      Rate and Rhythm: Normal rate and regular rhythm  Pulses: Normal pulses  Heart sounds: Normal heart sounds  No murmur heard  No friction rub  No gallop  Pulmonary:      Effort: Pulmonary effort is normal  No respiratory distress  Breath sounds: Normal breath sounds  No rales  Abdominal:      General: There is no distension  Palpations: Abdomen is soft  Tenderness: There is no abdominal tenderness  Musculoskeletal:         General: No swelling or tenderness  Cervical back: Neck supple  Right lower leg: Edema present  Left lower leg: Edema present  Comments: Trace edema   Skin:     General: Skin is warm  Neurological:      General: No focal deficit present  Mental Status: She is alert and oriented to person, place, and time  Mental status is at baseline     Psychiatric:         Mood and Affect: Mood normal  Behavior: Behavior normal          Thought Content: Thought content normal          Laboratory Results: personally reviewed        CBC with diff:   Results from last 7 days   Lab Units 06/06/22  0527 06/05/22 2006 06/05/22  0601 06/04/22  2256 06/04/22  1742 06/04/22  1143 06/04/22  0508 06/04/22  0031 06/03/22  1533 06/03/22  0559 06/03/22  0018 06/02/22  1941 06/02/22  1138   WBC Thousand/uL 6 28  --  6 60  --   --   --  6 60  --   --  9 25  --   --  9 82   HEMOGLOBIN g/dL 8 5* 8 7* 9 3* 8 4* 7 8* 8 2* 7 6* 5 9* 7 0* 7 5* 7 5*   < > 5 4*   HEMATOCRIT % 27 5*  --  31 5*  --   --   --  24 5* 19 2* 22 2* 23 8* 24 4*   < > 18 8*   MCV fL 94  --  97  --   --   --  96  --   --  93  --   --  102*   PLATELETS Thousands/uL 184  --  245  --   --   --  178  --   --  243  --   --  354   MCH pg 29 1  --  28 7  --   --   --  29 8  --   --  29 3  --   --  29 3   MCHC g/dL 30 9*  --  29 5*  --   --   --  31 0*  --   --  31 5  --   --  28 7*   RDW % 17 1*  --  17 6*  --   --   --  17 0*  --   --  16 6*  --   --  16 9*   MPV fL 9 4  --  10 0  --   --   --  9 5  --   --  9 4  --   --  9 6   NRBC AUTO /100 WBCs  --   --   --   --   --   --   --   --   --  1  --   --  1    < > = values in this interval not displayed           CMP:  Results from last 7 days   Lab Units 06/06/22 0527 06/05/22  0601 06/04/22  0508 06/03/22  0559 06/02/22  1138   POTASSIUM mmol/L 3 9 3 5 3 2* 4 3 5 2   CHLORIDE mmol/L 109* 106 107 109* 100   CO2 mmol/L 28 24 22 19* 18*   BUN mg/dL 13 21 33* 41* 41*   CREATININE mg/dL 1 03 1 24 1 62* 1 99* 2 32*   CALCIUM mg/dL 8 5 9 2 7 9* 7 4* 8 5   AST U/L 18  --   --  38 27   ALT U/L 7  --   --  12 8   ALK PHOS U/L 74  --   --  83 66   EGFR ml/min/1 73sq m 52 41 30 23 19         BMP:  Results from last 7 days   Lab Units 06/06/22  0527 06/05/22  0601 06/04/22  0508 06/03/22  0559 06/02/22  1138   POTASSIUM mmol/L 3 9 3 5 3 2* 4 3 5 2   CHLORIDE mmol/L 109* 106 107 109* 100   CO2 mmol/L 28 24 22 19* 18*   BUN mg/dL 13 21 33* 41* 41*   CREATININE mg/dL 1 03 1 24 1 62* 1 99* 2 32*   CALCIUM mg/dL 8 5 9 2 7 9* 7 4* 8 5       BNP: No results for input(s): BNP in the last 72 hours  Magnesium:   Results from last 7 days   Lab Units 06/05/22  0601   MAGNESIUM mg/dL 2 6       Coags:   Results from last 7 days   Lab Units 06/03/22  0559 06/02/22  1138   PTT seconds  --  44*   INR  2 19* 2 64*       TSH:        Hemoglobin A1C       Lipid Profile:       Meds/Allergies   all current active meds have been reviewed and current meds:   Current Facility-Administered Medications   Medication Dose Route Frequency    atorvastatin (LIPITOR) tablet 40 mg  40 mg Oral Daily With Dinner    digoxin (LANOXIN) injection 125 mcg  125 mcg Intravenous Once    [START ON 6/8/2022] digoxin (LANOXIN) tablet 125 mcg  125 mcg Oral Every Other Day    levothyroxine tablet 150 mcg  150 mcg Oral Daily    pantoprazole (PROTONIX) injection 40 mg  40 mg Intravenous Q12H [address]    polyethylene glycol (GOLYTELY) bowel prep 4,000 mL  4,000 mL Oral See Admin Instructions    sertraline (ZOLOFT) tablet 100 mg  100 mg Oral Daily     Medications Prior to Admission   Medication    apixaban (ELIQUIS) 5 mg    atorvastatin (LIPITOR) 40 mg tablet    cyanocobalamin 5,405 mcg/mL    folic acid (FOLVITE) 1 mg tablet    levothyroxine 150 mcg tablet    Magnesium Gluconate 550 MG TABS    metoprolol tartrate (LOPRESSOR) 50 mg tablet    potassium chloride (K-DUR,KLOR-CON) 20 mEq tablet    sertraline (ZOLOFT) 100 mg tablet    torsemide (DEMADEX) 20 mg tablet            Cardiac testing: reviewed  No results found for this or any previous visit  No results found for this or any previous visit  No results found for this or any previous visit  No results found for this or any previous visit

## 2022-06-06 NOTE — ASSESSMENT & PLAN NOTE
· History of afib on Eliquis, metoprolol and digoxin as outpatient  · Follows with cardiology at [facility]  · Restart Digoxin today  · Holding rate control medications in the setting of hypotension   · Hold Eliquis in the setting of ABLA  · Remains rate controlled as of now  · Continue to monitor telemetry

## 2022-06-07 ENCOUNTER — APPOINTMENT (INPATIENT)
Dept: GASTROENTEROLOGY | Facility: HOSPITAL | Age: 77
DRG: 377 | End: 2022-06-07
Payer: COMMERCIAL

## 2022-06-07 ENCOUNTER — ANESTHESIA (INPATIENT)
Dept: GASTROENTEROLOGY | Facility: HOSPITAL | Age: 77
DRG: 377 | End: 2022-06-07
Payer: COMMERCIAL

## 2022-06-07 ENCOUNTER — ANESTHESIA EVENT (INPATIENT)
Dept: GASTROENTEROLOGY | Facility: HOSPITAL | Age: 77
DRG: 377 | End: 2022-06-07
Payer: COMMERCIAL

## 2022-06-07 LAB
ANION GAP SERPL CALCULATED.3IONS-SCNC: 5 MMOL/L (ref 4–13)
BACTERIA BLD CULT: NORMAL
BACTERIA BLD CULT: NORMAL
BASOPHILS # BLD AUTO: 0.02 THOUSANDS/ΜL (ref 0–0.1)
BASOPHILS NFR BLD AUTO: 0 % (ref 0–1)
BUN SERPL-MCNC: 7 MG/DL (ref 5–25)
CA-I BLD-SCNC: 1.13 MMOL/L (ref 1.12–1.32)
CALCIUM SERPL-MCNC: 8 MG/DL (ref 8.4–10.2)
CHLORIDE SERPL-SCNC: 109 MMOL/L (ref 96–108)
CO2 SERPL-SCNC: 27 MMOL/L (ref 21–32)
CREAT SERPL-MCNC: 0.76 MG/DL (ref 0.6–1.3)
EOSINOPHIL # BLD AUTO: 0.13 THOUSAND/ΜL (ref 0–0.61)
EOSINOPHIL NFR BLD AUTO: 2 % (ref 0–6)
ERYTHROCYTE [DISTWIDTH] IN BLOOD BY AUTOMATED COUNT: 17.2 % (ref 11.6–15.1)
GFR SERPL CREATININE-BSD FRML MDRD: 75 ML/MIN/1.73SQ M
GLUCOSE SERPL-MCNC: 91 MG/DL (ref 65–140)
HCT VFR BLD AUTO: 27.7 % (ref 34.8–46.1)
HGB BLD-MCNC: 8.1 G/DL (ref 11.5–15.4)
IMM GRANULOCYTES # BLD AUTO: 0.02 THOUSAND/UL (ref 0–0.2)
IMM GRANULOCYTES NFR BLD AUTO: 0 % (ref 0–2)
LYMPHOCYTES # BLD AUTO: 1.01 THOUSANDS/ΜL (ref 0.6–4.47)
LYMPHOCYTES NFR BLD AUTO: 17 % (ref 14–44)
MAGNESIUM SERPL-MCNC: 2.2 MG/DL (ref 1.9–2.7)
MCH RBC QN AUTO: 29 PG (ref 26.8–34.3)
MCHC RBC AUTO-ENTMCNC: 29.2 G/DL (ref 31.4–37.4)
MCV RBC AUTO: 99 FL (ref 82–98)
MONOCYTES # BLD AUTO: 0.44 THOUSAND/ΜL (ref 0.17–1.22)
MONOCYTES NFR BLD AUTO: 7 % (ref 4–12)
NEUTROPHILS # BLD AUTO: 4.38 THOUSANDS/ΜL (ref 1.85–7.62)
NEUTS SEG NFR BLD AUTO: 74 % (ref 43–75)
NRBC BLD AUTO-RTO: 0 /100 WBCS
PHOSPHATE SERPL-MCNC: 2.5 MG/DL (ref 2.3–4.1)
PLATELET # BLD AUTO: 158 THOUSANDS/UL (ref 149–390)
PMV BLD AUTO: 9.6 FL (ref 8.9–12.7)
POTASSIUM SERPL-SCNC: 3.6 MMOL/L (ref 3.5–5.3)
RBC # BLD AUTO: 2.79 MILLION/UL (ref 3.81–5.12)
SODIUM SERPL-SCNC: 141 MMOL/L (ref 135–147)
WBC # BLD AUTO: 6 THOUSAND/UL (ref 4.31–10.16)

## 2022-06-07 PROCEDURE — 99233 SBSQ HOSP IP/OBS HIGH 50: CPT | Performed by: INTERNAL MEDICINE

## 2022-06-07 PROCEDURE — C9113 INJ PANTOPRAZOLE SODIUM, VIA: HCPCS

## 2022-06-07 PROCEDURE — 88305 TISSUE EXAM BY PATHOLOGIST: CPT | Performed by: PATHOLOGY

## 2022-06-07 PROCEDURE — 45385 COLONOSCOPY W/LESION REMOVAL: CPT | Performed by: STUDENT IN AN ORGANIZED HEALTH CARE EDUCATION/TRAINING PROGRAM

## 2022-06-07 PROCEDURE — 82330 ASSAY OF CALCIUM: CPT

## 2022-06-07 PROCEDURE — 83735 ASSAY OF MAGNESIUM: CPT

## 2022-06-07 PROCEDURE — 99232 SBSQ HOSP IP/OBS MODERATE 35: CPT | Performed by: INTERNAL MEDICINE

## 2022-06-07 PROCEDURE — 45380 COLONOSCOPY AND BIOPSY: CPT | Performed by: STUDENT IN AN ORGANIZED HEALTH CARE EDUCATION/TRAINING PROGRAM

## 2022-06-07 PROCEDURE — C9113 INJ PANTOPRAZOLE SODIUM, VIA: HCPCS | Performed by: ANESTHESIOLOGY

## 2022-06-07 PROCEDURE — 0DBN8ZZ EXCISION OF SIGMOID COLON, VIA NATURAL OR ARTIFICIAL OPENING ENDOSCOPIC: ICD-10-PCS | Performed by: STUDENT IN AN ORGANIZED HEALTH CARE EDUCATION/TRAINING PROGRAM

## 2022-06-07 PROCEDURE — 80048 BASIC METABOLIC PNL TOTAL CA: CPT

## 2022-06-07 PROCEDURE — 85025 COMPLETE CBC W/AUTO DIFF WBC: CPT

## 2022-06-07 PROCEDURE — 0DBL8ZX EXCISION OF TRANSVERSE COLON, VIA NATURAL OR ARTIFICIAL OPENING ENDOSCOPIC, DIAGNOSTIC: ICD-10-PCS | Performed by: STUDENT IN AN ORGANIZED HEALTH CARE EDUCATION/TRAINING PROGRAM

## 2022-06-07 PROCEDURE — 84100 ASSAY OF PHOSPHORUS: CPT

## 2022-06-07 RX ORDER — LIDOCAINE HYDROCHLORIDE 20 MG/ML
INJECTION, SOLUTION EPIDURAL; INFILTRATION; INTRACAUDAL; PERINEURAL AS NEEDED
Status: DISCONTINUED | OUTPATIENT
Start: 2022-06-07 | End: 2022-06-07

## 2022-06-07 RX ORDER — PROPOFOL 10 MG/ML
INJECTION, EMULSION INTRAVENOUS AS NEEDED
Status: DISCONTINUED | OUTPATIENT
Start: 2022-06-07 | End: 2022-06-07

## 2022-06-07 RX ORDER — SODIUM CHLORIDE, SODIUM LACTATE, POTASSIUM CHLORIDE, CALCIUM CHLORIDE 600; 310; 30; 20 MG/100ML; MG/100ML; MG/100ML; MG/100ML
INJECTION, SOLUTION INTRAVENOUS CONTINUOUS PRN
Status: DISCONTINUED | OUTPATIENT
Start: 2022-06-07 | End: 2022-06-07

## 2022-06-07 RX ORDER — DIPHENHYDRAMINE HYDROCHLORIDE 50 MG/ML
12.5 INJECTION INTRAMUSCULAR; INTRAVENOUS ONCE AS NEEDED
Status: CANCELLED | OUTPATIENT
Start: 2022-06-07

## 2022-06-07 RX ORDER — LIDOCAINE HYDROCHLORIDE 10 MG/ML
0.5 INJECTION, SOLUTION EPIDURAL; INFILTRATION; INTRACAUDAL; PERINEURAL ONCE AS NEEDED
Status: CANCELLED | OUTPATIENT
Start: 2022-06-07

## 2022-06-07 RX ORDER — METOCLOPRAMIDE HYDROCHLORIDE 5 MG/ML
10 INJECTION INTRAMUSCULAR; INTRAVENOUS ONCE AS NEEDED
Status: CANCELLED | OUTPATIENT
Start: 2022-06-07

## 2022-06-07 RX ORDER — PROPOFOL 10 MG/ML
INJECTION, EMULSION INTRAVENOUS CONTINUOUS PRN
Status: DISCONTINUED | OUTPATIENT
Start: 2022-06-07 | End: 2022-06-07

## 2022-06-07 RX ORDER — ONDANSETRON 2 MG/ML
4 INJECTION INTRAMUSCULAR; INTRAVENOUS ONCE AS NEEDED
Status: CANCELLED | OUTPATIENT
Start: 2022-06-07

## 2022-06-07 RX ORDER — POTASSIUM CHLORIDE 14.9 MG/ML
20 INJECTION INTRAVENOUS ONCE
Status: COMPLETED | OUTPATIENT
Start: 2022-06-07 | End: 2022-06-07

## 2022-06-07 RX ADMIN — ATORVASTATIN CALCIUM 40 MG: 40 TABLET, FILM COATED ORAL at 17:22

## 2022-06-07 RX ADMIN — SERTRALINE HYDROCHLORIDE 100 MG: 50 TABLET ORAL at 09:18

## 2022-06-07 RX ADMIN — PROPOFOL 50 MG: 10 INJECTION, EMULSION INTRAVENOUS at 13:11

## 2022-06-07 RX ADMIN — SODIUM CHLORIDE, SODIUM LACTATE, POTASSIUM CHLORIDE, AND CALCIUM CHLORIDE: .6; .31; .03; .02 INJECTION, SOLUTION INTRAVENOUS at 13:11

## 2022-06-07 RX ADMIN — PANTOPRAZOLE SODIUM 40 MG: 40 INJECTION, POWDER, FOR SOLUTION INTRAVENOUS at 22:04

## 2022-06-07 RX ADMIN — NYSTATIN: 100000 POWDER TOPICAL at 09:23

## 2022-06-07 RX ADMIN — PROPOFOL 130 MCG/KG/MIN: 10 INJECTION, EMULSION INTRAVENOUS at 13:11

## 2022-06-07 RX ADMIN — PANTOPRAZOLE SODIUM 40 MG: 40 INJECTION, POWDER, FOR SOLUTION INTRAVENOUS at 09:19

## 2022-06-07 RX ADMIN — LIDOCAINE HYDROCHLORIDE 50 MG: 20 INJECTION, SOLUTION EPIDURAL; INFILTRATION; INTRACAUDAL; PERINEURAL at 13:11

## 2022-06-07 RX ADMIN — POTASSIUM CHLORIDE 20 MEQ: 14.9 INJECTION, SOLUTION INTRAVENOUS at 09:18

## 2022-06-07 RX ADMIN — LEVOTHYROXINE SODIUM 150 MCG: 75 TABLET ORAL at 06:20

## 2022-06-07 RX ADMIN — NYSTATIN: 100000 POWDER TOPICAL at 17:23

## 2022-06-07 NOTE — ASSESSMENT & PLAN NOTE
Lab Results   Component Value Date    EGFR 52 06/06/2022    EGFR 41 06/05/2022    EGFR 30 06/04/2022    CREATININE 1 03 06/06/2022    CREATININE 1 24 06/05/2022    CREATININE 1 62 (H) 06/04/2022     · History of CKD 3, baseline creatinine appears to be 1 3-1 4, 2 32 on admission  · Likely prerenal in nature  · Ongoing blood resuscitation   · Monitor I & O and renal indices  · Hold nephrotoxic medications

## 2022-06-07 NOTE — ASSESSMENT & PLAN NOTE
· Presented with hgb 5 4 and complaints of dark tarry stools/generalized weakness over the past couple days  · Continue PPI BID   · S/p 2 U PRBC on 6/02/2022  · S/P 1 U PRBC for hgb 5 9-  · Trend hemoglobin at Q12H  · Transfuse for hgb < 7  · EGD-Normal  · Now s/p bowel prep x2, plan for colonoscopy today

## 2022-06-07 NOTE — ANESTHESIA PREPROCEDURE EVALUATION
Procedure:  COLONOSCOPY    Relevant Problems   ANESTHESIA (within normal limits)      CARDIO   (+) Atrial fibrillation, chronic (HCC)   (+) Essential hypertension   (+) Mixed hyperlipidemia   (+) Nonrheumatic mitral valve regurgitation      ENDO   (+) Hypothyroidism      GI/HEPATIC (within normal limits)      /RENAL   (+) Acute renal failure superimposed on stage 3 chronic kidney disease (HCC)      GYN (within normal limits)      HEMATOLOGY   (+) ABLA (acute blood loss anemia)      MUSCULOSKELETAL   (+) Osteoarthritis of knee      NEURO/PSYCH   (+) Depression   (+) Posttraumatic stress disorder      PULMONARY (within normal limits)      Cardiovascular and Mediastinum   (+) Chronic diastolic CHF (congestive heart failure) (HCC)        Physical Exam    Airway    Mallampati score: II  TM Distance: >3 FB  Neck ROM: full     Dental   No notable dental hx     Cardiovascular  Rhythm: regular, Rate: normal, Cardiovascular exam normal    Pulmonary  Pulmonary exam normal Breath sounds clear to auscultation,     Other Findings        Anesthesia Plan  ASA Score- 3     Anesthesia Type- IV sedation with anesthesia with ASA Monitors  Additional Monitors:   Airway Plan:           Plan Factors-    Chart reviewed  Existing labs reviewed  Patient summary reviewed  Induction- intravenous  Postoperative Plan-     Informed Consent- Anesthetic plan and risks discussed with patient  I personally reviewed this patient with the CRNA  Discussed and agreed on the Anesthesia Plan with the CRNA  Veronica Dang

## 2022-06-07 NOTE — QUICK NOTE
26-year-old female with hypothyroidism, diastolic heart failure, atrial fibrillation on Eliquis, CKD 3, hyperlipidemia, and depression presented for weakness and melena x1 week with hypotension and found to be in hemorrhagic shock  Received 3 units of PRBCs  EGD was negative  Having colonoscopy  Eliquis on hold  Patient downgraded to medical level of service  Please see today's note for ongoing plan of care

## 2022-06-07 NOTE — ASSESSMENT & PLAN NOTE
Wt Readings from Last 3 Encounters:   06/06/22 73 1 kg (161 lb 2 5 oz)   12/22/21 82 6 kg (182 lb)   12/10/21 83 kg (182 lb 15 7 oz)     · Based on chart review from cardiology notes  · Follow up on formal ECHO    · Takes torsemide as outpatient  · Received Lasix 40 mg x 1 6/3/2022  · Strict I and O  · Daily weight

## 2022-06-07 NOTE — PROGRESS NOTES
[facility]  Progress Note - Janice Mendoza 1945, 68 y o  female MRN: [id number]  Unit/Bed#: ICU 12-01 Encounter: [id number]  Primary Care Provider: Pascual Allen MD   Date and time admitted to hospital: 6/2/2022 10:34 AM    * ABLA (acute blood loss anemia)  Assessment & Plan  · Presented with hgb 5 4 and complaints of dark tarry stools/generalized weakness over the past couple days  · Continue PPI BID   · S/p 2 U PRBC on 6/02/2022  · S/P 1 U PRBC for hgb 5 9-  · Trend hemoglobin at Q12H  · Transfuse for hgb < 7  · EGD-Normal  · Now s/p bowel prep x2, plan for colonoscopy today    Shock ([facility] [address] )  Assessment & Plan  · Resolved  · AEB hypotension, lactic acidosis and DIEGO  · Likely related to ABLA  · S/p IVF and blood resuscitation - will transfuse for Hgb<7  · Lactic cleared  · Continue close HD monitoring, maintain MAP>65    Acute renal failure superimposed on stage 3 chronic kidney disease [facility])  Assessment & Plan  Lab Results   Component Value Date    EGFR 52 06/06/2022    EGFR 41 06/05/2022    EGFR 30 06/04/2022    CREATININE 1 03 06/06/2022    CREATININE 1 24 06/05/2022    CREATININE 1 62 (H) 06/04/2022     · History of CKD 3, baseline creatinine appears to be 1 3-1 4, 2 32 on admission  · Likely prerenal in nature  · Ongoing blood resuscitation   · Monitor I & O and renal indices  · Hold nephrotoxic medications    EKG abnormalities  Assessment & Plan  · Likely in the setting of demand related to ABLA  · Troponin negative  · Denies CP  · Monitor EKG closely    Supratherapeutic INR  Assessment & Plan  · INR 2 64 on admission  · S/p KCentra  · Continue to monitor    Mixed hyperlipidemia  Assessment & Plan  · Continue home statin therapy     Hypothyroidism  Assessment & Plan  · Continue home synthroid     Essential hypertension  Assessment & Plan  · Hold home antihypertensives in the setting of borderline low SBP and ABLA    Depression  Assessment & Plan  · Continue home Zoloft Chronic diastolic CHF (congestive heart failure) (HCC)  Assessment & Plan  Wt Readings from Last 3 Encounters:   06/06/22 73 1 kg (161 lb 2 5 oz)   12/22/21 82 6 kg (182 lb)   12/10/21 83 kg (182 lb 15 7 oz)     · Based on chart review from cardiology notes  · Follow up on formal ECHO    · Takes torsemide as outpatient  · Received Lasix 40 mg x 1 6/3/2022  · Strict I and O  · Daily weight    Atrial fibrillation, chronic (HCC)  Assessment & Plan  · History of afib on Eliquis, metoprolol and digoxin as outpatient  · Follows with [facility]ology at [facility]  · Home Digoxin resumed  · Consider resuming low dose beta blocker as BP will tolerate  · Hold Eliquis in the setting of ABLA, await colonoscopy results  · Remains rate controlled as of now  · Continue to monitor telemetry       ----------------------------------------------------------------------------------------  HPI/24hr events: No acute events overnight, hemoglobin remains stable  Patient appropriate for transfer out of the ICU today?: Patient does not meet criteria for referral to the ICU Follow-Up Clinic; referral has not been made  Disposition: Transfer to Med Surg with Telemetry   Code Status: Level 1 - Full Code  ---------------------------------------------------------------------------------------  SUBJECTIVE  Offers no complaints    Review of Systems   Constitutional: Positive for fatigue  HENT: Negative  Respiratory: Negative for shortness of breath  Cardiovascular: Negative for chest pain  Gastrointestinal: Negative for abdominal pain and blood in stool  Genitourinary: Negative  Musculoskeletal: Negative  Neurological: Negative for dizziness and light-headedness  Psychiatric/Behavioral: Negative  All other systems reviewed and are negative      Review of systems was reviewed and negative unless stated above in HPI/24-hour events ---------------------------------------------------------------------------------------  OBJECTIVE    Vitals   Vitals:    22 2100 22 2300 22 0300   BP: 95/51 125/58 116/60 101/60   BP Location: Right arm Right arm Right arm Right arm   Pulse: 95 104 97 98   Resp: (!) 26 (!) 28 (!) 25 (!) 23   Temp:       TempSrc:       SpO2: 92% 95% 95% 99%   Weight:       Height:         Temp (24hrs), Av 9 °F (36 1 °C), Min:96 7 °F (35 9 °C), Max:97 4 °F (36 3 °C)  Current: Temperature: (!) 97 4 °F (36 3 °C)          Respiratory:  SpO2: SpO2: 99 %, SpO2 Activity: SpO2 Activity: At Rest, SpO2 Device: O2 Device: None (Room air)  Nasal Cannula O2 Flow Rate (L/min): 1 L/min    Invasive/non-invasive ventilation settings   Respiratory  Report   Lab Data (Last 4 hours)    None         O2/Vent Data (Last 4 hours)    None                Physical Exam  Vitals and nursing note reviewed  Constitutional:       Appearance: Normal appearance  HENT:      Head: Normocephalic  Mouth/Throat:      Mouth: Mucous membranes are dry  Pharynx: Oropharynx is clear  Eyes:      Pupils: Pupils are equal, round, and reactive to light  Cardiovascular:      Rate and Rhythm: Normal rate  Rhythm irregular  Pulses: Normal pulses  Heart sounds: Normal heart sounds  Pulmonary:      Effort: Pulmonary effort is normal       Breath sounds: Normal breath sounds  Abdominal:      General: Bowel sounds are normal       Palpations: Abdomen is soft  Musculoskeletal:         General: Normal range of motion  Cervical back: Normal range of motion  Skin:     General: Skin is warm and dry  Neurological:      Mental Status: She is alert and oriented to person, place, and time  Mental status is at baseline               Laboratory and Diagnostics:  Results from last 7 days   Lab Units 22  17422  0527 22  0601 22  2256 22  1742 22  1143 06/04/22  0508 06/04/22  0031 06/03/22  1533 06/03/22  0559 06/03/22  0018 06/02/22  1941 06/02/22  1138   WBC Thousand/uL  --   --  6 28  --  6 60  --   --   --  6 60  --   --  9 25  --   --  9 82   HEMOGLOBIN g/dL 8 2* 8 3* 8 5* 8 7* 9 3* 8 4* 7 8*   < > 7 6* 5 9* 7 0* 7 5* 7 5*   < > 5 4*   HEMATOCRIT %  --   --  27 5*  --  31 5*  --   --   --  24 5* 19 2* 22 2* 23 8* 24 4*   < > 18 8*   PLATELETS Thousands/uL  --   --  184  --  245  --   --   --  178  --   --  243  --   --  354   NEUTROS PCT %  --   --   --   --   --   --   --   --   --   --   --  83*  --   --  79*   MONOS PCT %  --   --   --   --   --   --   --   --   --   --   --  7  --   --  9    < > = values in this interval not displayed  Results from last 7 days   Lab Units 06/06/22  0527 06/05/22  0601 06/04/22  0508 06/03/22  0559 06/02/22  1138   SODIUM mmol/L 143 140 142 137 131*   POTASSIUM mmol/L 3 9 3 5 3 2* 4 3 5 2   CHLORIDE mmol/L 109* 106 107 109* 100   CO2 mmol/L 28 24 22 19* 18*   ANION GAP mmol/L 6 10 13 9 13   BUN mg/dL 13 21 33* 41* 41*   CREATININE mg/dL 1 03 1 24 1 62* 1 99* 2 32*   CALCIUM mg/dL 8 5 9 2 7 9* 7 4* 8 5   GLUCOSE RANDOM mg/dL 91 86 74 82 116   ALT U/L 7  --   --  12 8   AST U/L 18  --   --  38 27   ALK PHOS U/L 74  --   --  83 66   ALBUMIN g/dL 3 1*  --   --  3 3* 3 8   TOTAL BILIRUBIN mg/dL 1 17*  --   --  1 24* 0 79     Results from last 7 days   Lab Units 06/05/22  0601   MAGNESIUM mg/dL 2 6   PHOSPHORUS mg/dL 2 5      Results from last 7 days   Lab Units 06/03/22  0559 06/02/22  1138   INR  2 19* 2 64*   PTT seconds  --  44*          Results from last 7 days   Lab Units 06/02/22  1941 06/02/22  1607 06/02/22  1138   LACTIC ACID mmol/L 2 0 3 4* 4 0*     ABG:    VBG:    Results from last 7 days   Lab Units 06/03/22  0559 06/02/22  1138   PROCALCITONIN ng/ml 0 09 0 08       Micro  Results from last 7 days   Lab Units 06/02/22  1138   BLOOD CULTURE  No Growth After 4 Days  No Growth After 4 Days         EKG: atrial fibrillation  Imaging: I have personally reviewed pertinent reports  and I have personally reviewed pertinent films in PACS    Intake and Output  I/O       06/05 0701 06/06 0700 06/06 0701 06/07 0700    P  O  1260     Total Intake(mL/kg) 1260 (17 2)     Urine (mL/kg/hr) 1295 (0 7) 300 (0 2)    Stool 0     Total Output 1295 300    Net -35 -300          Unmeasured Stool Occurrence 0 x           Height and Weights   Height: 5' 3" (160 cm)     Body mass index is 28 55 kg/m²  Weight (last 2 days)     Date/Time Weight    06/06/22 0600 73 1 (161 16)    06/05/22 0600 70 (154 32)            Nutrition       Diet Orders   (From admission, onward)             Start     Ordered    06/07/22 0001  Diet NPO  Diet effective midnight        References:    Nutrtion Support Algorithm Enteral vs  Parenteral   Question Answer Comment   Diet Type NPO    RD to adjust diet per protocol?  No        06/06/22 1322                  Active Medications  Scheduled Meds:  Current Facility-Administered Medications   Medication Dose Route Frequency Provider Last Rate    atorvastatin  40 mg Oral Daily With Dinner [facility]      [START ON 6/8/2022] digoxin  125 mcg Oral Every Other Day JAQUELINE Quintanilla      levothyroxine  150 mcg Oral Daily JAQUELINE Rowan      nystatin   Topical BID JAQUELINE Quintanilla      pantoprazole  40 mg Intravenous Q12H [address] Patric Sofia MD      polyethylene glycol  4,000 mL Oral See Admin Instructions Josesito Kannan Beltran DO      sertraline  100 mg Oral Daily JAQUELINE Rowan       Continuous Infusions:     PRN Meds:        Invasive Devices Review  Invasive Devices  Report    Peripheral Intravenous Line  Duration           Peripheral IV 06/06/22 Proximal;Right;Ventral (anterior) Forearm <1 day          Drain  Duration           Urethral Catheter 16 Fr  1 day                Rationale for remaining devices: accurate I & O ---------------------------------------------------------------------------------------  Advance Directive and Living Will:      Power of :    POLST:    ---------------------------------------------------------------------------------------  Care Time Delivered:   No Critical Care time spent       JAQUELINE Cortes      Portions of the record may have been created with voice recognition software  Occasional wrong word or "sound a like" substitutions may have occurred due to the inherent limitations of voice recognition software    Read the chart carefully and recognize, using context, where substitutions have occurred

## 2022-06-07 NOTE — ANESTHESIA POSTPROCEDURE EVALUATION
Post-Op Assessment Note    CV Status:  Stable  Pain Score: 0    Pain management: adequate     Mental Status:  Arousable   Hydration Status:  Stable   PONV Controlled:  None   Airway Patency:  Patent      Post Op Vitals Reviewed: Yes      Staff: CRNA         No complications documented      BP   110/50   Temp     Pulse  96   Resp  18   SpO2   100

## 2022-06-07 NOTE — ASSESSMENT & PLAN NOTE
· Likely in the setting of demand related to ABLA  · Troponin negative  · Denies CP  · Monitor EKG closely

## 2022-06-07 NOTE — ASSESSMENT & PLAN NOTE
· Resolved  · AEB hypotension, lactic acidosis and DIEGO  · Likely related to ABLA  · S/p IVF and blood resuscitation - will transfuse for Hgb<7  · Lactic cleared  · Continue close HD monitoring, maintain MAP>65

## 2022-06-07 NOTE — ASSESSMENT & PLAN NOTE
· History of afib on Eliquis, metoprolol and digoxin as outpatient  · Follows with cardiology at [facility]  · Home Digoxin resumed  · Consider resuming low dose beta blocker as BP will tolerate  · Hold Eliquis in the setting of ABLA, await colonoscopy results  · Remains rate controlled as of now  · Continue to monitor telemetry

## 2022-06-07 NOTE — PLAN OF CARE
Problem: INFECTION - ADULT  Goal: Absence or prevention of progression during hospitalization  Description: INTERVENTIONS:  - Assess and monitor for signs and symptoms of infection  - Monitor lab/diagnostic results  - Monitor all insertion sites, i e  indwelling lines, tubes, and drains  - Monitor endotracheal if appropriate and nasal secretions for changes in amount and color  - [place] appropriate cooling/warming therapies per order  - Administer medications as ordered  - Instruct and encourage patient and family to use good hand hygiene technique  - Identify and instruct in appropriate isolation precautions for identified infection/condition  Outcome: Progressing     Problem: Knowledge Deficit  Goal: Patient/family/caregiver demonstrates understanding of disease process, treatment plan, medications, and discharge instructions  Description: Complete learning assessment and assess knowledge base    Interventions:  - Provide teaching at level of understanding  - Provide teaching via preferred learning methods  Outcome: Progressing     Problem: GENITOURINARY - ADULT  Goal: Maintains or returns to baseline urinary function  Description: INTERVENTIONS:  - Assess urinary function  - Encourage oral fluids to ensure adequate hydration if ordered  - Administer IV fluids as ordered to ensure adequate hydration  - Administer ordered medications as needed  - Offer frequent toileting  - Follow urinary retention protocol if ordered  Outcome: Progressing  Goal: Absence of urinary retention  Description: INTERVENTIONS:  - Assess patients ability to void and empty bladder  - Monitor I/O  - Bladder scan as needed  - Discuss with physician/AP medications to alleviate retention as needed  - Discuss catheterization for long term situations as appropriate  Outcome: Progressing  Goal: Urinary catheter remains patent  Description: INTERVENTIONS:  - Assess patency of urinary catheter  - If patient has a chronic tapia, consider changing catheter if non-functioning  - Follow guidelines for intermittent irrigation of non-functioning urinary catheter  Outcome: Progressing     Problem: HEMATOLOGIC - ADULT  Goal: Maintains hematologic stability  Description: INTERVENTIONS  - Assess for signs and symptoms of bleeding or hemorrhage  - Monitor labs  - Administer supportive blood products/factors as ordered and appropriate  Outcome: Progressing

## 2022-06-08 LAB
ANION GAP SERPL CALCULATED.3IONS-SCNC: 7 MMOL/L (ref 4–13)
BASOPHILS # BLD AUTO: 0.02 THOUSANDS/ΜL (ref 0–0.1)
BASOPHILS NFR BLD AUTO: 0 % (ref 0–1)
BUN SERPL-MCNC: 7 MG/DL (ref 5–25)
CALCIUM SERPL-MCNC: 8.5 MG/DL (ref 8.4–10.2)
CHLORIDE SERPL-SCNC: 108 MMOL/L (ref 96–108)
CO2 SERPL-SCNC: 25 MMOL/L (ref 21–32)
CREAT SERPL-MCNC: 0.88 MG/DL (ref 0.6–1.3)
EOSINOPHIL # BLD AUTO: 0.17 THOUSAND/ΜL (ref 0–0.61)
EOSINOPHIL NFR BLD AUTO: 3 % (ref 0–6)
ERYTHROCYTE [DISTWIDTH] IN BLOOD BY AUTOMATED COUNT: 16.8 % (ref 11.6–15.1)
EST. AVERAGE GLUCOSE BLD GHB EST-MCNC: 108 MG/DL
GFR SERPL CREATININE-BSD FRML MDRD: 63 ML/MIN/1.73SQ M
GLUCOSE SERPL-MCNC: 92 MG/DL (ref 65–140)
HBA1C MFR BLD: 5.4 %
HCT VFR BLD AUTO: 26.9 % (ref 34.8–46.1)
HGB BLD-MCNC: 7.8 G/DL (ref 11.5–15.4)
IMM GRANULOCYTES # BLD AUTO: 0.02 THOUSAND/UL (ref 0–0.2)
IMM GRANULOCYTES NFR BLD AUTO: 0 % (ref 0–2)
LYMPHOCYTES # BLD AUTO: 0.76 THOUSANDS/ΜL (ref 0.6–4.47)
LYMPHOCYTES NFR BLD AUTO: 14 % (ref 14–44)
MAGNESIUM SERPL-MCNC: 2 MG/DL (ref 1.9–2.7)
MCH RBC QN AUTO: 28.6 PG (ref 26.8–34.3)
MCHC RBC AUTO-ENTMCNC: 29 G/DL (ref 31.4–37.4)
MCV RBC AUTO: 99 FL (ref 82–98)
MONOCYTES # BLD AUTO: 0.36 THOUSAND/ΜL (ref 0.17–1.22)
MONOCYTES NFR BLD AUTO: 7 % (ref 4–12)
NEUTROPHILS # BLD AUTO: 4.1 THOUSANDS/ΜL (ref 1.85–7.62)
NEUTS SEG NFR BLD AUTO: 76 % (ref 43–75)
NRBC BLD AUTO-RTO: 0 /100 WBCS
PHOSPHATE SERPL-MCNC: 2.4 MG/DL (ref 2.3–4.1)
PLATELET # BLD AUTO: 142 THOUSANDS/UL (ref 149–390)
PMV BLD AUTO: 9.4 FL (ref 8.9–12.7)
POTASSIUM SERPL-SCNC: 3.6 MMOL/L (ref 3.5–5.3)
RBC # BLD AUTO: 2.73 MILLION/UL (ref 3.81–5.12)
SODIUM SERPL-SCNC: 140 MMOL/L (ref 135–147)
WBC # BLD AUTO: 5.43 THOUSAND/UL (ref 4.31–10.16)

## 2022-06-08 PROCEDURE — 84100 ASSAY OF PHOSPHORUS: CPT

## 2022-06-08 PROCEDURE — 99232 SBSQ HOSP IP/OBS MODERATE 35: CPT | Performed by: INTERNAL MEDICINE

## 2022-06-08 PROCEDURE — 99232 SBSQ HOSP IP/OBS MODERATE 35: CPT | Performed by: PHYSICIAN ASSISTANT

## 2022-06-08 PROCEDURE — 85025 COMPLETE CBC W/AUTO DIFF WBC: CPT

## 2022-06-08 PROCEDURE — 99232 SBSQ HOSP IP/OBS MODERATE 35: CPT | Performed by: STUDENT IN AN ORGANIZED HEALTH CARE EDUCATION/TRAINING PROGRAM

## 2022-06-08 PROCEDURE — C9113 INJ PANTOPRAZOLE SODIUM, VIA: HCPCS

## 2022-06-08 PROCEDURE — 83735 ASSAY OF MAGNESIUM: CPT

## 2022-06-08 PROCEDURE — 80048 BASIC METABOLIC PNL TOTAL CA: CPT

## 2022-06-08 RX ORDER — PANTOPRAZOLE SODIUM 40 MG/1
40 TABLET, DELAYED RELEASE ORAL
Status: DISCONTINUED | OUTPATIENT
Start: 2022-06-09 | End: 2022-06-09 | Stop reason: HOSPADM

## 2022-06-08 RX ORDER — METOPROLOL TARTRATE 5 MG/5ML
5 INJECTION INTRAVENOUS EVERY 6 HOURS PRN
Status: DISCONTINUED | OUTPATIENT
Start: 2022-06-08 | End: 2022-06-09 | Stop reason: HOSPADM

## 2022-06-08 RX ADMIN — PANTOPRAZOLE SODIUM 40 MG: 40 INJECTION, POWDER, FOR SOLUTION INTRAVENOUS at 09:32

## 2022-06-08 RX ADMIN — NYSTATIN: 100000 POWDER TOPICAL at 09:32

## 2022-06-08 RX ADMIN — SERTRALINE HYDROCHLORIDE 100 MG: 50 TABLET ORAL at 09:32

## 2022-06-08 RX ADMIN — DIGOXIN 125 MCG: 125 TABLET ORAL at 09:32

## 2022-06-08 RX ADMIN — NYSTATIN: 100000 POWDER TOPICAL at 18:37

## 2022-06-08 RX ADMIN — LEVOTHYROXINE SODIUM 150 MCG: 75 TABLET ORAL at 05:08

## 2022-06-08 RX ADMIN — ATORVASTATIN CALCIUM 40 MG: 40 TABLET, FILM COATED ORAL at 17:11

## 2022-06-08 NOTE — ASSESSMENT & PLAN NOTE
Lab Results   Component Value Date    EGFR 63 06/08/2022    EGFR 75 06/07/2022    EGFR 52 06/06/2022    CREATININE 0 88 06/08/2022    CREATININE 0 76 06/07/2022    CREATININE 1 03 06/06/2022   · Creatinine on admission of 2 32  · Baseline creatinine of 1 3-1 4  · Likely prerenal   · Now resolved

## 2022-06-08 NOTE — ASSESSMENT & PLAN NOTE
· Patient presented with hgb of 5 4 and c/o dark tarry stools/ generalized weakness  · S/p 2 units PRBC on 6/2/2022  · S/p 1 unit PRBC on 6/4/22  · EGD 6/3: normal   · Colonoscopy 6/7: possible sigmoid ulcer but no active bleeding or clear bleeding source   · Continue to monitor H&H- stable can restart eliquis  If trends down will need capsule endoscopy     · Continue PO PPI  · CBC in am

## 2022-06-08 NOTE — PLAN OF CARE
Pt  Overall improving  HGB stable, VSS with soft BP  No evidence of bleeding  Pt  OOB to BSC and back to bed with minimal issue  Pt  Denies pain or discomfort  Varghese removed today around 1700 per SBAR  Pt  OOB to attempt to void at about 2300  She was unable to void but she had been incontinent of some urine on the pad  Bladder scan done for 79ml  Pt  Assisted back to bed and purewick applied as pt  Asked for brief    Continue care

## 2022-06-08 NOTE — ASSESSMENT & PLAN NOTE
· Continue home regimen of digoxin  · Hold Lopressor in the setting of low BP  · Patient on eliquis- hold in the setting of ABLA  · Consider resuming low dose BB as BP will tolerate     · Continue to monitor

## 2022-06-08 NOTE — ASSESSMENT & PLAN NOTE
· Likely in the setting of demand related to ABLA  · Troponin's negative  · Patient denies chest pain

## 2022-06-08 NOTE — PROGRESS NOTES
T[facility] 45  Progress Note - Carrie Smith 1945, 68 y o  female MRN: [id number]  Unit/Bed#: ICU 12-01 Encounter: [id number]  Primary Care Provider: Raul Navarro MD   Date and time admitted to hospital: 6/2/2022 10:34 AM    * ABLA (acute blood loss anemia)  Assessment & Plan  · Patient presented with hgb of 5 4 and c/o dark tarry stools/ generalized weakness  · S/p 2 units PRBC on 6/2/2022  · S/p 1 unit PRBC on 6/4/22  · EGD 6/3: normal   · Colonoscopy 6/7: possible sigmoid ulcer but no active bleeding or clear bleeding source   · Continue to monitor H&H- stable can restart eliquis  If trends down will need capsule endoscopy  · Continue PO PPI  · CBC in am    [facility])  Assessment & Plan  · Resolved  · AEB hypotension, lactic acidosis and DIEGO  · Likely related to ABLA  · S/p IV fluids   · S/p 2 units of PRBC's on 6/2/22, 1 unit PRBC's on 6/4/22  · Lactic acidosis resolved  · Continue to monitor patient closely     Acute renal failure superimposed on stage 3 chronic kidney disease [facility])  Assessment & Plan  Lab Results   Component Value Date    EGFR 63 06/08/2022    EGFR 75 06/07/2022    EGFR 52 06/06/2022    CREATININE 0 88 06/08/2022    CREATININE 0 76 06/07/2022    CREATININE 1 03 06/06/2022   · Creatinine on admission of 2 32  · Baseline creatinine of 1 3-1 4  · Likely prerenal   · Now resolved       EKG abnormalities  Assessment & Plan  · Likely in the setting of demand related to ABLA  · Troponin's negative  · Patient denies chest pain    Essential hypertension  Assessment & Plan  · Continue to hold home regimen of Lopressor and Demadex in the setting of low blood pressure and ABLA  · Continue to monitor blood pressure trends    Chronic diastolic CHF (congestive heart failure) (HCC)  Assessment & Plan  Wt Readings from Last 3 Encounters:   06/08/22 70 9 kg (156 lb 4 9 oz)   12/22/21 82 6 kg (182 lb)   12/10/21 83 kg (182 lb 15 7 oz)     · Echo 6/2- LVEF 60-65 %, No wall motion abnormalities, unable to assess diastolic function due to afib  Severe MR, Moderate TR  · Hold home regimen of torsemide in the setting of low BP  · Patient received IV lasix 40 mg x 1 dose on 6/3  · Daily weights, I&Os      Atrial fibrillation, chronic (HCC)  Assessment & Plan  · Continue home regimen of digoxin  · Hold Lopressor in the setting of low BP  · Patient on eliquis- hold in the setting of ABLA  · Consider resuming low dose BB as BP will tolerate  · Continue to monitor         VTE Pharmacologic Prophylaxis: VTE Score: 5 High Risk (Score >/= 5) - Pharmacological DVT Prophylaxis Contraindicated  Sequential Compression Devices Ordered  Patient Centered Rounds: I performed bedside rounds with nursing staff today  Discussions with Specialists or Other Care Team Provider: nursing, GI, CM    Education and Discussions with Family / Patient: Updated  () at bedside  Time Spent for Care: 20 minutes  More than 50% of total time spent on counseling and coordination of care as described above  Current Length of Stay: 6 day(s)  Current Patient Status: Inpatient   Certification Statement: The patient will continue to require additional inpatient hospital stay due to continued monitoring of CBC  Discharge Plan: pending clinical course    Code Status: Level 1 - Full Code    Subjective: The patient was seen and examined  The patient is c/o of swollen and bruised hands/arms  She denies any additional complaints  Objective:     Vitals:   Temp (24hrs), Av 3 °F (36 3 °C), Min:96 8 °F (36 °C), Max:97 7 °F (36 5 °C)    Temp:  [96 8 °F (36 °C)-97 7 °F (36 5 °C)] 97 7 °F (36 5 °C)  HR:  [] 97  Resp:  [24-36] 24  BP: ()/(44-66) 108/58  SpO2:  [91 %-97 %] 91 %  Body mass index is 27 69 kg/m²  Input and Output Summary (last 24 hours):      Intake/Output Summary (Last 24 hours) at 2022 1517  Last data filed at 2022 1301  Gross per 24 hour   Intake 1428 ml   Output 650 ml Net 778 ml       Physical Exam:   Physical Exam  Vitals and nursing note reviewed  Constitutional:       General: She is awake  Appearance: Normal appearance  Cardiovascular:      Rate and Rhythm: Normal rate and regular rhythm  Pulmonary:      Effort: Pulmonary effort is normal       Breath sounds: Normal breath sounds  Abdominal:      Palpations: Abdomen is soft  Tenderness: There is no abdominal tenderness  Musculoskeletal:      Comments: Edema bilateral hands/arms   Skin:     Findings: Ecchymosis (left hand, right arm) present  Neurological:      General: No focal deficit present  Mental Status: She is alert and oriented to person, place, and time  Psychiatric:         Attention and Perception: Attention normal          Mood and Affect: Mood normal          Speech: Speech normal          Behavior: Behavior is cooperative  Additional Data:     Labs:  Results from last 7 days   Lab Units 06/08/22  0453   WBC Thousand/uL 5 43   HEMOGLOBIN g/dL 7 8*   HEMATOCRIT % 26 9*   PLATELETS Thousands/uL 142*   NEUTROS PCT % 76*   LYMPHS PCT % 14   MONOS PCT % 7   EOS PCT % 3     Results from last 7 days   Lab Units 06/08/22  0453 06/07/22  0634 06/06/22  0527   SODIUM mmol/L 140   < > 143   POTASSIUM mmol/L 3 6   < > 3 9   CHLORIDE mmol/L 108   < > 109*   CO2 mmol/L 25   < > 28   BUN mg/dL 7   < > 13   CREATININE mg/dL 0 88   < > 1 03   ANION GAP mmol/L 7   < > 6   CALCIUM mg/dL 8 5   < > 8 5   ALBUMIN g/dL  --   --  3 1*   TOTAL BILIRUBIN mg/dL  --   --  1 17*   ALK PHOS U/L  --   --  74   ALT U/L  --   --  7   AST U/L  --   --  18   GLUCOSE RANDOM mg/dL 92   < > 91    < > = values in this interval not displayed       Results from last 7 days   Lab Units 06/03/22  0559   INR  2 19*     Results from last 7 days   Lab Units 06/05/22  1128   POC GLUCOSE mg/dl 131     Results from last 7 days   Lab Units 06/03/22  0559   HEMOGLOBIN A1C % 5 4     Results from last 7 days   Lab Units 06/03/22  0559 06/02/22  1941 06/02/22  1607 06/02/22  1138   LACTIC ACID mmol/L  --  2 0 3 4* 4 0*   PROCALCITONIN ng/ml 0 09  --   --  0 08       Lines/Drains:  Invasive Devices  Report    Peripheral Intravenous Line  Duration           Peripheral IV 06/06/22 Proximal;Right;Ventral (anterior) Forearm 2 days          Drain  Duration           External Urinary Catheter <1 day                      Imaging: No pertinent imaging reviewed  Recent Cultures (last 7 days):   Results from last 7 days   Lab Units 06/02/22  1138   BLOOD CULTURE  No Growth After 5 Days  No Growth After 5 Days  Last 24 Hours Medication List:   Current Facility-Administered Medications   Medication Dose Route Frequency Provider Last Rate    atorvastatin  40 mg Oral Daily With Dinner JAQUELINE Coronado      digoxin  125 mcg Oral Every Other Day JAQUELINE Coronado      levothyroxine  150 mcg Oral Daily JAQUELINE Coronado      metoprolol  5 mg Intravenous Q6H PRN Enmanuel Richards MD      nystatin   Topical BID JAQUELINE Coronado      [START ON 6/9/2022] pantoprazole  40 mg Oral Early Morning Sylvia Gonzalez MD      sertraline  100 mg Oral Daily JAQUELINE Coronado          Today, Patient Was Seen By: Star Vyas PA-C    **Please Note: This note may have been constructed using a voice recognition system  **

## 2022-06-08 NOTE — PROGRESS NOTES
Pt  Had uneventful night  Pt  Assisted with turns and weight shifted every 2 hours  Wedges used and heels elevated on pillows  Pt  Stuck for labs this am but redraw ordered, discussed need to collect ICAL with A  [facility]  Pt  Is difficult stick and ICAL has been okay so per discussion with her, the lab can be skipped for today  Pt  Did not void overnight  Pad and sara checked this am   Pt  Bladder scanned for 130cc  Pt  Encouraged to drink and try to void  Pt  Stated she thinks it will come soon  Continue care

## 2022-06-08 NOTE — PROGRESS NOTES
Progress Note- Sofía Thacker 68 y o  female MRN: [id number]    Unit/Bed#: ICU 12-01 Encounter: [id number]      Assessment and Plan:    Patient is a 69 y/o female with PMH significant for hypothyroidism, diastolic CHF, A fib on Eliquis, MVR, and CKD3 admitted on 6/2 for shortness of breath and chest pain with history of one week of intermittent melena  Found to be hypotensive with hgb of 5 4 on admission  EGD on 6/3 notable for normal appearing esophagus, stomach, and duodenum; No evidence of upper GI bleeding  Colonoscopy on 06/07 notable for tortuous colon with severe diverticula causing mild luminal narrowing in the sigmoid colon; subtly abnormal fold in the transverse colon s/p biopsy; possible ulcer with a area of yellow eschar in the sigmoid colon; multiple subcentimeter polyps; internal hemorrhoids  Labs notable for slowly down-trending hgb (7 8) and stable hepatic and renal function       Patient remains hemodynamically stable, noted to be persistently tachypenic  Both EGD and colonoscopy without clear source of GI bleeding, possibly due to ulcer of sigmoid colon although low suspicion given left-sided colonic lesions unlikely to cause melena  Recommend to continue monitoring hemoglobin with transfusions as necessary and monitoring stools for signs of overt GI bleeding  If patient's hgb continues to down-trend requiring transfusion, consider further evaluation with capsule endoscopy - However, this would require transfer  - Continue twice daily PPI  - Monitor hgb; Transfuse for hgb <7 0   - Monitor for signs of overt GI bleeding  - Consider capsule endoscopy for persistently down-trending hgb     GI will continue to follow  ______________________________________________________________________    Subjective:     Patient found resting comfortably in her bed  Easily aroused  No acute overnight events  States she feels tired, but otherwise well    Denies having had a bowel movement since her colonoscopy yesterday, no further melena or hematochezia  Medication Administration - last 24 hours from 06/07/2022 0845 to 06/08/2022 0845       Date/Time Order Dose Route Action Action by     06/07/2022 1722 atorvastatin (LIPITOR) tablet 40 mg 40 mg Oral Given Cira Goldstein RN     06/08/2022 9520 levothyroxine tablet 150 mcg 150 mcg Oral Given Fabricio Lopez RN     06/07/2022 2531 sertraline (ZOLOFT) tablet 100 mg 100 mg Oral Given Cira Goldstein RN     06/07/2022 2204 pantoprazole (PROTONIX) injection 40 mg 40 mg Intravenous Given Fabricio Lopez RN     06/07/2022 0919 pantoprazole (PROTONIX) injection 40 mg 40 mg Intravenous Given Cira Goldstein RN     06/07/2022 1723 nystatin (MYCOSTATIN) powder   Topical Given Cira Goldstein RN     06/07/2022 7685 nystatin (MYCOSTATIN) powder   Topical Given Cira Goldstein RN     06/07/2022 8463 potassium chloride 20 mEq IVPB (premix) 20 mEq Intravenous New Bag Cira Goldstein RN          Objective:     Vitals: Blood pressure 108/58, pulse 97, temperature 97 7 °F (36 5 °C), temperature source Tympanic, resp  rate (!) 24, height 5' 3" (1 6 m), weight 70 9 kg (156 lb 4 9 oz), SpO2 91 %  ,Body mass index is 27 69 kg/m²  Intake/Output Summary (Last 24 hours) at 6/8/2022 0845  Last data filed at 6/8/2022 0601  Gross per 24 hour   Intake 1228 ml   Output 350 ml   Net 878 ml       Physical Exam:   General Appearance: Awake and alert, in no acute distress  Abdomen: Soft, non-tender, non-distended; bowel sounds normal; no masses or no organomegaly    Invasive Devices  Report    Peripheral Intravenous Line  Duration           Peripheral IV 06/06/22 Proximal;Right;Ventral (anterior) Forearm 1 day          Drain  Duration           External Urinary Catheter <1 day                Lab Results:  No results displayed because visit has over 200 results  Imaging Studies: I have personally reviewed pertinent imaging studies

## 2022-06-08 NOTE — ASSESSMENT & PLAN NOTE
· Resolved  · AEB hypotension, lactic acidosis and DIEGO  · Likely related to ABLA  · S/p IV fluids   · S/p 2 units of PRBC's on 6/2/22, 1 unit PRBC's on 6/4/22  · Lactic acidosis resolved  · Continue to monitor patient closely

## 2022-06-08 NOTE — ASSESSMENT & PLAN NOTE
· Continue to hold home regimen of Lopressor and Demadex in the setting of low blood pressure and ABLA  · Continue to monitor blood pressure trends

## 2022-06-08 NOTE — PROGRESS NOTES
[facility]    Cardiology Progress Note  Fiorella Aguilar 68 y o  female   YOB: 1945 MRN: [id number]  Unit/Bed#: ICU 12-01 Encounter: [id number]      Subjective:   No acute complains  No active grows bleeding noted over the last 24 hours  She underwent colonoscopy, which revealed large diverticulosis and an ulcer in the sigmoid colon, but did not show any active bleeding      Assessments  Principal Problem:    ABLA (acute blood loss anemia)  Active Problems:    Atrial fibrillation, chronic (HCC)    Chronic diastolic CHF (congestive heart failure) ([facility])    Depression    Essential hypertension    Hypothyroidism    Mixed hyperlipidemia    Acute renal failure superimposed on stage 3 chronic kidney disease ([facility])    Supratherapeutic INR    Shock ([facility] [address] )    EKG abnormalities      Plan  Acute blood loss anemia, Gi bleed  · S/p blood transfusion  · Hemoglobin stable/slightly down : 8 1 --> 7 8  · Colonoscopy yesterday showed diverticulosis, but no active bleed   · Plan per primary service/GI  · With continued borderline hemoglobin, and slight down trend, will hold off on Eliquis for now then can consider resuming outpatient once hemoglobin as being more stable - discussed with patient and her  over the phone    Chronic atrial fibrillation  · HR mostly controlled, but intermittently spikes to 120s with mild activity  · Continue digoxin 0 125 mg every other day  · Home metoprolol has been switched to Digoxin, due to borderline BP  · Can use IV metoprolol 5 mg q6h PRN for rate control  · Consider resuming lower dose of metoprolol, if HR continues to be poorly controlled  · Stroke prophylaxis  · Was previously on eliquis at home, but currently discontinued due to acute blood loss anemia needing blood transfusion  · With borderline/slight downtrending Hb, without any diagnostic bleeding source - can monitor off eliquis for now, and re-evaluate anticoagulation outpatient - once Hb is more stable --> outpatient follow-up with her cardiologist at [facility] Dr Ryan Vincent    Chronic diastolic heart failure  · Home torsemide on hold due to ongoing bleeding concerns/hypotension  · No acute hypoxia  · Has mild peripheral edema, but no hypoxia  · Blood pressure remains stable  · She was previously on torsemide 40 mg b i d , which has been held due to hypotension and GI bleed  · Resume low-dose torsemide 20 mg daily, if acceptable from bleeding standpoint         Review of Systems   All other systems reviewed and are negative  Telemetry Review:  Atrial fibrillation  Heart rate reasonably controlled, mostly in the 90s with intermittent spikes to 120s with activity  Objective:   Vitals: Blood pressure 108/58, pulse 97, temperature 97 7 °F (36 5 °C), temperature source Tympanic, resp  rate (!) 24, height 5' 3" (1 6 m), weight 70 9 kg (156 lb 4 9 oz), SpO2 91 % , Body mass index is 27 69 kg/m² ,   Orthostatic Blood Pressures    Flowsheet Row Most Recent Value   Blood Pressure 108/58 filed at 2022 0825   Patient Position - Orthostatic VS Lying filed at 2022 7945         Systolic (80BYR), EI , Min:94 , QVB:669     Diastolic (33ENF), VES:73, Min:44, Max:91    Wt Readings from Last 5 Encounters:   22 70 9 kg (156 lb 4 9 oz)   21 82 6 kg (182 lb)   12/10/21 83 kg (182 lb 15 7 oz)     I/O       / 0701  06/05 0700 06/05 0701  / 0700 06/06 0701  06/07 0700    P  O  1080 1260     I V  (mL/kg) 580 (8 3)      Blood       IV Piggyback 150      Total Intake(mL/kg) 1810 (25 9) 1260 (17 2)     Urine (mL/kg/hr) 1369 (0 8) 1295 (0 7)     Stool 0 0     Total Output 1369 1295     Net +441 -35            Unmeasured Stool Occurrence 0 x 0 x             Physical Exam  Vitals and nursing note reviewed  Constitutional:       General: She is not in acute distress  Appearance: Normal appearance  She is well-developed  She is not ill-appearing  HENT:      Head: Normocephalic and atraumatic  Nose: No congestion  Eyes:      General: No scleral icterus  Conjunctiva/sclera: Conjunctivae normal    Neck:      Vascular: No carotid bruit or JVD  Cardiovascular:      Rate and Rhythm: Normal rate  Rhythm irregularly irregular  Pulses: Normal pulses  Heart sounds: Normal heart sounds  No murmur heard  No friction rub  No gallop  Pulmonary:      Effort: Pulmonary effort is normal  No respiratory distress  Breath sounds: Normal breath sounds  No rales  Abdominal:      General: There is no distension  Palpations: Abdomen is soft  Tenderness: There is no abdominal tenderness  Musculoskeletal:         General: No swelling or tenderness  Cervical back: Neck supple  Right lower leg: Edema present  Left lower leg: Edema present  Comments: Trace edema   Skin:     General: Skin is warm  Neurological:      General: No focal deficit present  Mental Status: She is alert and oriented to person, place, and time  Mental status is at baseline  Psychiatric:         Mood and Affect: Mood normal          Behavior: Behavior normal          Thought Content:  Thought content normal          Laboratory Results: personally reviewed        CBC with diff:   Results from last 7 days   Lab Units 06/08/22  0453 06/07/22  0634 06/06/22  2045 06/06/22  1740 06/06/22  0527 06/05/22  2006 06/05/22  0601 06/04/22  1143 06/04/22  0508 06/04/22  0031 06/03/22  1533 06/03/22  0559 06/02/22  1941 06/02/22  1138   WBC Thousand/uL 5 43 6 00  --   --  6 28  --  6 60  --  6 60  --   --  9 25  --  9 82   HEMOGLOBIN g/dL 7 8* 8 1* 8 2* 8 3* 8 5* 8 7* 9 3*   < > 7 6* 5 9* 7 0* 7 5*   < > 5 4*   HEMATOCRIT % 26 9* 27 7*  --   --  27 5*  --  31 5*  --  24 5* 19 2* 22 2* 23 8*   < > 18 8*   MCV fL 99* 99*  --   --  94  --  97  --  96  --   --  93  --  102*   PLATELETS Thousands/uL 142* 158  --   --  184  --  245  --  178  --   --  243  --  354   MCH pg 28 6 29 0  --   --  29 1  --  28 7  -- 29  8  --   --  29 3  --  29 3   MCHC g/dL 29 0* 29 2*  --   --  30 9*  --  29 5*  --  31 0*  --   --  31 5  --  28 7*   RDW % 16 8* 17 2*  --   --  17 1*  --  17 6*  --  17 0*  --   --  16 6*  --  16 9*   MPV fL 9 4 9 6  --   --  9 4  --  10 0  --  9 5  --   --  9 4  --  9 6   NRBC AUTO /100 WBCs 0 0  --   --   --   --   --   --   --   --   --  1  --  1    < > = values in this interval not displayed  CMP:  Results from last 7 days   Lab Units 06/08/22 0453 06/07/22  4427 06/06/22  0527 06/05/22  0601 06/04/22  0508 06/03/22  0559 06/02/22  1138   POTASSIUM mmol/L 3 6 3 6 3 9 3 5 3 2* 4 3 5 2   CHLORIDE mmol/L 108 109* 109* 106 107 109* 100   CO2 mmol/L 25 27 28 24 22 19* 18*   BUN mg/dL 7 7 13 21 33* 41* 41*   CREATININE mg/dL 0 88 0 76 1 03 1 24 1 62* 1 99* 2 32*   CALCIUM mg/dL 8 5 8 0* 8 5 9 2 7 9* 7 4* 8 5   AST U/L  --   --  18  --   --  38 27   ALT U/L  --   --  7  --   --  12 8   ALK PHOS U/L  --   --  74  --   --  83 66   EGFR ml/min/1 73sq m 63 75 52 41 30 23 19         BMP:  Results from last 7 days   Lab Units 06/08/22  0453 06/07/22  0634 06/06/22  0527 06/05/22  0601 06/04/22  0508 06/03/22  0559 06/02/22  1138   POTASSIUM mmol/L 3 6 3 6 3 9 3 5 3 2* 4 3 5 2   CHLORIDE mmol/L 108 109* 109* 106 107 109* 100   CO2 mmol/L 25 27 28 24 22 19* 18*   BUN mg/dL 7 7 13 21 33* 41* 41*   CREATININE mg/dL 0 88 0 76 1 03 1 24 1 62* 1 99* 2 32*   CALCIUM mg/dL 8 5 8 0* 8 5 9 2 7 9* 7 4* 8 5       BNP: No results for input(s): BNP in the last 72 hours      Magnesium:   Results from last 7 days   Lab Units 06/08/22  0453 06/07/22  0634 06/05/22  0601   MAGNESIUM mg/dL 2 0 2 2 2 6       Coags:   Results from last 7 days   Lab Units 06/03/22  0559 06/02/22  1138   PTT seconds  --  44*   INR  2 19* 2 64*       TSH:        Hemoglobin A1C       Lipid Profile:       Meds/Allergies   all current active meds have been reviewed and current meds:   Current Facility-Administered Medications   Medication Dose Route Frequency    atorvastatin (LIPITOR) tablet 40 mg  40 mg Oral Daily With Dinner    digoxin (LANOXIN) tablet 125 mcg  125 mcg Oral Every Other Day    levothyroxine tablet 150 mcg  150 mcg Oral Daily    metoprolol (LOPRESSOR) injection 5 mg  5 mg Intravenous Q6H PRN    nystatin (MYCOSTATIN) powder   Topical BID    pantoprazole (PROTONIX) injection 40 mg  40 mg Intravenous Q12H [address]    polyethylene glycol (GOLYTELY) bowel prep 4,000 mL  4,000 mL Oral See Admin Instructions    sertraline (ZOLOFT) tablet 100 mg  100 mg Oral Daily     Medications Prior to Admission   Medication    digoxin (LANOXIN) 0 125 mg tablet    apixaban (ELIQUIS) 5 mg    atorvastatin (LIPITOR) 40 mg tablet    cyanocobalamin 5,886 mcg/mL    folic acid (FOLVITE) 1 mg tablet    levothyroxine 150 mcg tablet    Magnesium Gluconate 550 MG TABS    metoprolol tartrate (LOPRESSOR) 50 mg tablet    potassium chloride (K-DUR,KLOR-CON) 20 mEq tablet    sertraline (ZOLOFT) 100 mg tablet    torsemide (DEMADEX) 20 mg tablet            Cardiac testing: reviewed  No results found for this or any previous visit  No results found for this or any previous visit  No results found for this or any previous visit  No results found for this or any previous visit

## 2022-06-08 NOTE — ASSESSMENT & PLAN NOTE
Wt Readings from Last 3 Encounters:   06/08/22 70 9 kg (156 lb 4 9 oz)   12/22/21 82 6 kg (182 lb)   12/10/21 83 kg (182 lb 15 7 oz)     · Echo 6/2- LVEF 60-65 %, No wall motion abnormalities, unable to assess diastolic function due to afib   Severe MR, Moderate TR  · Hold home regimen of torsemide in the setting of low BP  · Patient received IV lasix 40 mg x 1 dose on 6/3  · Daily weights, I&Os

## 2022-06-09 VITALS
BODY MASS INDEX: 28.79 KG/M2 | TEMPERATURE: 98.9 F | OXYGEN SATURATION: 92 % | RESPIRATION RATE: 25 BRPM | WEIGHT: 162.48 LBS | SYSTOLIC BLOOD PRESSURE: 96 MMHG | HEIGHT: 63 IN | DIASTOLIC BLOOD PRESSURE: 52 MMHG | HEART RATE: 90 BPM

## 2022-06-09 PROBLEM — R79.1 SUPRATHERAPEUTIC INR: Status: RESOLVED | Noted: 2022-06-02 | Resolved: 2022-06-09

## 2022-06-09 LAB
ANION GAP SERPL CALCULATED.3IONS-SCNC: 6 MMOL/L (ref 4–13)
BUN SERPL-MCNC: 6 MG/DL (ref 5–25)
CALCIUM SERPL-MCNC: 8.7 MG/DL (ref 8.4–10.2)
CHLORIDE SERPL-SCNC: 108 MMOL/L (ref 96–108)
CO2 SERPL-SCNC: 25 MMOL/L (ref 21–32)
CREAT SERPL-MCNC: 0.97 MG/DL (ref 0.6–1.3)
ERYTHROCYTE [DISTWIDTH] IN BLOOD BY AUTOMATED COUNT: 16.3 % (ref 11.6–15.1)
GFR SERPL CREATININE-BSD FRML MDRD: 56 ML/MIN/1.73SQ M
GLUCOSE SERPL-MCNC: 91 MG/DL (ref 65–140)
HCT VFR BLD AUTO: 27.4 % (ref 34.8–46.1)
HGB BLD-MCNC: 8.1 G/DL (ref 11.5–15.4)
MCH RBC QN AUTO: 28.8 PG (ref 26.8–34.3)
MCHC RBC AUTO-ENTMCNC: 29.6 G/DL (ref 31.4–37.4)
MCV RBC AUTO: 98 FL (ref 82–98)
PLATELET # BLD AUTO: 144 THOUSANDS/UL (ref 149–390)
PMV BLD AUTO: 9.5 FL (ref 8.9–12.7)
POTASSIUM SERPL-SCNC: 3.7 MMOL/L (ref 3.5–5.3)
RBC # BLD AUTO: 2.81 MILLION/UL (ref 3.81–5.12)
SODIUM SERPL-SCNC: 139 MMOL/L (ref 135–147)
WBC # BLD AUTO: 5.73 THOUSAND/UL (ref 4.31–10.16)

## 2022-06-09 PROCEDURE — 80048 BASIC METABOLIC PNL TOTAL CA: CPT | Performed by: PHYSICIAN ASSISTANT

## 2022-06-09 PROCEDURE — 99239 HOSP IP/OBS DSCHRG MGMT >30: CPT

## 2022-06-09 PROCEDURE — 99232 SBSQ HOSP IP/OBS MODERATE 35: CPT | Performed by: NURSE PRACTITIONER

## 2022-06-09 PROCEDURE — 85027 COMPLETE CBC AUTOMATED: CPT | Performed by: PHYSICIAN ASSISTANT

## 2022-06-09 RX ORDER — DIGOXIN 125 MCG
125 TABLET ORAL EVERY OTHER DAY
Qty: 30 TABLET | Refills: 0 | Status: SHIPPED | OUTPATIENT
Start: 2022-06-10

## 2022-06-09 RX ORDER — PANTOPRAZOLE SODIUM 40 MG/1
40 TABLET, DELAYED RELEASE ORAL 2 TIMES DAILY
Qty: 60 TABLET | Refills: 0 | Status: SHIPPED | OUTPATIENT
Start: 2022-06-09

## 2022-06-09 RX ORDER — TORSEMIDE 20 MG/1
20 TABLET ORAL DAILY
Qty: 30 TABLET | Refills: 0 | Status: SHIPPED | OUTPATIENT
Start: 2022-06-09

## 2022-06-09 RX ADMIN — PANTOPRAZOLE SODIUM 40 MG: 40 TABLET, DELAYED RELEASE ORAL at 05:06

## 2022-06-09 RX ADMIN — SERTRALINE HYDROCHLORIDE 100 MG: 50 TABLET ORAL at 08:18

## 2022-06-09 RX ADMIN — LEVOTHYROXINE SODIUM 150 MCG: 75 TABLET ORAL at 05:06

## 2022-06-09 NOTE — ASSESSMENT & PLAN NOTE
· Lopressor and Demadex held during admission in the setting of low blood pressure and ABLA  · Okay to resume torsemide at decreased dose of 20 mg once daily, per Cardiology recommendation  · Close outpatient Cardiology follow-up

## 2022-06-09 NOTE — PLAN OF CARE
Problem: MOBILITY - ADULT  Goal: Maintain or return to baseline ADL function  Description: INTERVENTIONS:  -  Assess patient's ability to carry out ADLs; assess patient's baseline for ADL function and identify physical deficits which impact ability to perform ADLs (bathing, care of mouth/teeth, toileting, grooming, dressing, etc )  - Assess/evaluate cause of self-care deficits   - Assess range of motion  - Assess patient's mobility; develop plan if impaired  - Assess patient's need for assistive devices and provide as appropriate  - Encourage maximum independence but intervene and supervise when necessary  - Involve family in performance of ADLs  - Assess for home care needs following discharge   - Consider OT consult to assist with ADL evaluation and planning for discharge  - Provide patient education as appropriate  Outcome: Progressing  Goal: Maintains/Returns to pre admission functional level  Description: INTERVENTIONS:  - Perform BMAT or MOVE assessment daily    - Set and communicate daily mobility goal to care team and patient/family/caregiver  - Collaborate with rehabilitation services on mobility goals if consulted  - Perform Range of Motion 3 times a day  - Reposition patient every 2 hours    - Dangle patient 2 times a day  - Stand patient 2 times a day  - Out of bed for toileting  - Record patient progress and toleration of activity level   Outcome: Progressing     Problem: Prexisting or High Potential for Compromised Skin Integrity  Goal: Skin integrity is maintained or improved  Description: INTERVENTIONS:  - Identify patients at risk for skin breakdown  - Assess and monitor skin integrity  - Assess and monitor nutrition and hydration status  - Monitor labs   - Assess for incontinence   - Turn and reposition patient  - Assist with mobility/ambulation  - Relieve pressure over bony prominences  - Avoid friction and shearing  - Provide appropriate hygiene as needed including keeping skin clean and dry  - Evaluate need for skin moisturizer/barrier cream  - Collaborate with interdisciplinary team   - Patient/family teaching  - Consider wound care consult   Outcome: Progressing     Problem: PAIN - ADULT  Goal: Verbalizes/displays adequate comfort level or baseline comfort level  Description: Interventions:  - Encourage patient to monitor pain and request assistance  - Assess pain using appropriate pain scale  - Administer analgesics based on type and severity of pain and evaluate response  - Implement non-pharmacological measures as appropriate and evaluate response  - Consider cultural and social influences on pain and pain management  - Notify physician/advanced practitioner if interventions unsuccessful or patient reports new pain  Outcome: Progressing     Problem: INFECTION - ADULT  Goal: Absence or prevention of progression during hospitalization  Description: INTERVENTIONS:  - Assess and monitor for signs and symptoms of infection  - Monitor lab/diagnostic results  - Monitor all insertion sites, i e  indwelling lines, tubes, and drains  - Monitor endotracheal if appropriate and nasal secretions for changes in amount and color  - [place] appropriate cooling/warming therapies per order  - Administer medications as ordered  - Instruct and encourage patient and family to use good hand hygiene technique  - Identify and instruct in appropriate isolation precautions for identified infection/condition  Outcome: Progressing     Problem: SAFETY ADULT  Goal: Maintain or return to baseline ADL function  Description: INTERVENTIONS:  -  Assess patient's ability to carry out ADLs; assess patient's baseline for ADL function and identify physical deficits which impact ability to perform ADLs (bathing, care of mouth/teeth, toileting, grooming, dressing, etc )  - Assess/evaluate cause of self-care deficits   - Assess range of motion  - Assess patient's mobility; develop plan if impaired  - Assess patient's need for assistive devices and provide as appropriate  - Encourage maximum independence but intervene and supervise when necessary  - Involve family in performance of ADLs  - Assess for home care needs following discharge   - Consider OT consult to assist with ADL evaluation and planning for discharge  - Provide patient education as appropriate  Outcome: Progressing  Goal: Maintains/Returns to pre admission functional level  Description: INTERVENTIONS:  - Perform BMAT or MOVE assessment daily    - Set and communicate daily mobility goal to care team and patient/family/caregiver  - Collaborate with rehabilitation services on mobility goals if consulted  - Perform Range of Motion 3 times a day  - Reposition patient every 2 hours    - Dangle patient 2 times a day  - Stand patient 2 times a day  - Out of bed for toileting  - Record patient progress and toleration of activity level   Outcome: Progressing  Goal: Patient will remain free of falls  Description: INTERVENTIONS:  - Educate patient/family on patient safety including physical limitations  - Instruct patient to call for assistance with activity   - Consult OT/PT to assist with strengthening/mobility   - Keep Call bell within reach  - Keep bed low and locked with side rails adjusted as appropriate  - Keep care items and personal belongings within reach  - Initiate and maintain comfort rounds  - Make Fall Risk Sign visible to staff  - Offer Toileting every 2 Hours, in advance of need  - Initiate/Maintain bed alarm  - Apply yellow socks and bracelet for high fall risk patients  - Consider moving patient to room near nurses station  Outcome: Progressing     Problem: DISCHARGE PLANNING  Goal: Discharge to home or other facility with appropriate resources  Description: INTERVENTIONS:  - Identify barriers to discharge w/patient and caregiver  - Arrange for needed discharge resources and transportation as appropriate  - Identify discharge learning needs (meds, wound care, etc )  - Arrange for interpretive services to assist at discharge as needed  - Refer to Case Management Department for coordinating discharge planning if the patient needs post-hospital services based on physician/advanced practitioner order or complex needs related to functional status, cognitive ability, or social support system  Outcome: Progressing     Problem: Knowledge Deficit  Goal: Patient/family/caregiver demonstrates understanding of disease process, treatment plan, medications, and discharge instructions  Description: Complete learning assessment and assess knowledge base  Interventions:  - Provide teaching at level of understanding  - Provide teaching via preferred learning methods  Outcome: Progressing     Problem: Potential for Falls  Goal: Patient will remain free of falls  Description: INTERVENTIONS:  - Educate patient/family on patient safety including physical limitations  - Instruct patient to call for assistance with activity   - Consult OT/PT to assist with strengthening/mobility   - Keep Call bell within reach  - Keep bed low and locked with side rails adjusted as appropriate  - Keep care items and personal belongings within reach  - Initiate and maintain comfort rounds  - Make Fall Risk Sign visible to staff  - Offer Toileting every 2 Hours, in advance of need  - Initiate/Maintain bed alarm  - Apply yellow socks and bracelet for high fall risk patients  - Consider moving patient to room near nurses station  Outcome: Progressing     Problem: Nutrition/Hydration-ADULT  Goal: Nutrient/Hydration intake appropriate for improving, restoring or maintaining nutritional needs  Description: Monitor and assess patient's nutrition/hydration status for malnutrition  Collaborate with interdisciplinary team and initiate plan and interventions as ordered  Monitor patient's weight and dietary intake as ordered or per policy  Utilize nutrition screening tool and intervene as necessary   Determine patient's food preferences and provide high-protein, high-caloric foods as appropriate       INTERVENTIONS:  - Monitor oral intake, urinary output, labs, and treatment plans  - Assess nutrition and hydration status and recommend course of action  - Evaluate amount of meals eaten  - Assist patient with eating if necessary   - Allow adequate time for meals  - Recommend/ encourage appropriate diets, oral nutritional supplements, and vitamin/mineral supplements  - Order, calculate, and assess calorie counts as needed  - Assess need for intravenous fluids  - Provide specific nutrition/hydration education as appropriate  - Include patient/family/caregiver in decisions related to nutrition  Outcome: Progressing     Problem: GENITOURINARY - ADULT  Goal: Maintains or returns to baseline urinary function  Description: INTERVENTIONS:  - Assess urinary function  - Encourage oral fluids to ensure adequate hydration if ordered  - Administer IV fluids as ordered to ensure adequate hydration  - Administer ordered medications as needed  - Offer frequent toileting  - Follow urinary retention protocol if ordered  Outcome: Progressing  Goal: Absence of urinary retention  Description: INTERVENTIONS:  - Assess patients ability to void and empty bladder  - Monitor I/O  - Bladder scan as needed  - Discuss with physician/AP medications to alleviate retention as needed  - Discuss catheterization for long term situations as appropriate  Outcome: Progressing  Goal: Urinary catheter remains patent  Description: INTERVENTIONS:  - Assess patency of urinary catheter  - If patient has a chronic tapia, consider changing catheter if non-functioning  - Follow guidelines for intermittent irrigation of non-functioning urinary catheter  Outcome: Progressing     Problem: HEMATOLOGIC - ADULT  Goal: Maintains hematologic stability  Description: INTERVENTIONS  - Assess for signs and symptoms of bleeding or hemorrhage  - Monitor labs  - Administer supportive blood products/factors as ordered and appropriate  Outcome: Progressing

## 2022-06-09 NOTE — ASSESSMENT & PLAN NOTE
· Patient presented with hgb of 5 4 and c/o dark tarry stools/ generalized weakness  · S/p 2 units PRBC on 6/2/2022  · S/p 1 unit PRBC on 6/4/22  · EGD 6/3: normal   · Colonoscopy 6/7: possible sigmoid ulcer but no active bleeding or clear bleeding source   · Hemoglobin has remained stable  · Cleared from GI standpoint for discharge with outpatient follow-up  · Discharge on PO Protonix b i d   · Outpatient PCP follow-up for repeat lab work

## 2022-06-09 NOTE — DISCHARGE INSTR - AVS FIRST PAGE
Dear Justino Gomez,     It was our pleasure to care for you here at [address]  It is our hope that we were always able to exceed the expected standards for your care during your stay  You were hospitalized due to ***  You were cared for on the *** floor by Magdalena Underwood PA-C under the service of Tera Toth, * with the [facility] who covers for your primary care physician (PCP), Sonja Long MD, while you were hospitalized  If you have any questions or concerns related to this hospitalization, you may contact us at [phone]  For follow up as well as any medication refills, we recommend that you follow up with your primary care physician  A registered nurse will reach out to you by phone within a few days after your discharge to answer any additional questions that you may have after going home  However, at this time we provide for you here, the most important instructions / recommendations at discharge:     Notable Medication Adjustments -   Please discontinue Eliquis until follow-up with Dr Cale Cash   Please discontinue metoprolol  Start digoxin (Lanoxin) 0 125 mg every other day  Start torsemide (Demadex) 20 mg once daily   Start pantoprazole (Protonix) 40 mg twice daily until follow-up with outpatient Gastroenterology office  Testing Required after Discharge -   None  Important follow up information -   Please schedule close follow-up with your regular outpatient cardiologist Dr Cale Cash   Please follow-up with outpatient Gastroenterology office  Other Instructions -   Please schedule close follow-up with your primary care provider for repeat lab work   Please review this entire after visit summary as additional general instructions including medication list, appointments, activity, diet, any pertinent wound care, and other additional recommendations from your care team that may be provided for you        Sincerely,     Marielle Gray FATOUMATA Levi

## 2022-06-09 NOTE — ASSESSMENT & PLAN NOTE
· Likely in the setting of demand related to ABLA  · Troponin's negative  · Patient denies chest pain  · Outpatient Cardiology follow-up

## 2022-06-09 NOTE — DISCHARGE SUMMARY
[address]  Discharge- Melquiades Franks 1945, 68 y o  female MRN: [id number]  Unit/Bed#: ICU 12-01 Encounter: [id number]  Primary Care Provider: Berhane Gimenez MD   Date and time admitted to hospital: 6/2/2022 10:34 AM    * ABLA (acute blood loss anemia)  Assessment & Plan  · Patient presented with hgb of 5 4 and c/o dark tarry stools/ generalized weakness  · S/p 2 units PRBC on 6/2/2022  · S/p 1 unit PRBC on 6/4/22  · EGD 6/3: normal   · Colonoscopy 6/7: possible sigmoid ulcer but no active bleeding or clear bleeding source   · Hemoglobin has remained stable  · Cleared from GI standpoint for discharge with outpatient follow-up  · Discharge on PO Protonix b i d   · Outpatient PCP follow-up for repeat lab work    [facility])  Assessment & Plan  · AEB hypotension, lactic acidosis and DIEGO  · Likely related to ABLA  · S/p IV fluids   · S/p 2 units of PRBC's on 6/2/22, 1 unit PRBC's on 6/4/22  · Lactic acidosis resolved  · Resolved    Acute renal failure superimposed on stage 3 chronic kidney disease [facility])  Assessment & Plan  Lab Results   Component Value Date    EGFR 56 06/09/2022    EGFR 63 06/08/2022    EGFR 75 06/07/2022    CREATININE 0 97 06/09/2022    CREATININE 0 88 06/08/2022    CREATININE 0 76 06/07/2022   · Creatinine on admission of 2 32  · Baseline creatinine of 1 3-1 4  · Likely prerenal   · Now resolved  Follow-up with PCP for routine lab work      EKG abnormalities  Assessment & Plan  · Likely in the setting of demand related to ABLA  · Troponin's negative  · Patient denies chest pain  · Outpatient Cardiology follow-up    Essential hypertension  Assessment & Plan  · Lopressor and Demadex held during admission in the setting of low blood pressure and ABLA  · Okay to resume torsemide at decreased dose of 20 mg once daily, per Cardiology recommendation  · Close outpatient Cardiology follow-up    Depression  Assessment & Plan  · Discharge on pre-admission Zoloft    Chronic diastolic CHF (congestive heart failure) (HCC)  Assessment & Plan  Wt Readings from Last 3 Encounters:   06/09/22 73 7 kg (162 lb 7 7 oz)   12/22/21 82 6 kg (182 lb)   12/10/21 83 kg (182 lb 15 7 oz)     · Echo 6/2- LVEF 60-65 %, No wall motion abnormalities, unable to assess diastolic function due to afib  Severe MR, Moderate TR  · Patient received IV lasix 40 mg x 1 dose on 6/3  · Okay to resume p o  Torsemide 20 mg once daily per Cardiology  · Outpatient Cardiology follow-up with Dr Karo Lindsey     Atrial fibrillation, chronic [facility])  Assessment & Plan  · Continue home regimen of digoxin  · Lopressor on hold during admission in the setting of low BP  · Patient previously on eliquis, was on hold in the setting of ABLA  · Discharge on digoxin 0 125 mcg every other day, and continue to hold Eliquis and metoprolol as patient is rate controlled and blood pressure well-controlled at this time  · Close follow-up with regular outpatient cardiologist Dr Patel Shoulder             Resolved Problems  Date Reviewed: 6/9/2022          Resolved    Supratherapeutic INR 6/9/2022     Resolved by  Yolanda Gimenez PA-C    Severe sepsis ([address] ) 6/2/2022     Resolved by  Yuniel Gibson, [address] Emanuel              Discharging Physician / Practitioner: Yolanda Gimenez PA-C  PCP: Lawyer Sandra MD  Admission Date:   Admission Orders (From admission, onward)     Ordered        06/02/22 1258  1 [facility] [facility] [place]  Once                      Discharge Date: 06/09/22    Consultations During Hospital Stay:  · Gastroenterology  · Cardiology    Procedures Performed:   · EGD  · Colonoscopy    Significant Findings / Test Results:   · Chest x-ray:  Bilateral reticular interstitial infiltrates  Findings could reflect developing interstitial pneumonia or other interstitial lung disease  Correlate with COVID-19 test   Follow-up COVID-19 test negative    · EGD:  Mild resistance and cervical esophagus, may represent cervical bar in absence of discrete stricture  Otherwise normal appearing esophagus, stomach, small bowel  No evidence of GI bleeding  · Colonoscopy:  · No active bleeding seen  Unclear if possible ulcerated area in sigmoid was source of bleeding as would not expect left sided lesion to cause melena  · Tortuous colon with severe diverticula and angulations requiring pediatric colonoscope  · 6 mm sessile polyp in the sigmoid colon; completely removed en bloc by cold snare and retrieved specimen  · 2 mm sessile polyp in the transverse colon; performed cold forceps biopsy  · Subtly abnormal fold in the transverse colon  Biopsied  · Possible ulcer with area with yellow eschar in the sigmoid colon at 25 cm between two diverticula with impacted stool ball  Could not fully clear eschar despite irrigation and cold forceps  Small adenomatous-appearing polyp superior to eschar that was removed with cold forcep polypectomy  · Severe diverticula causing mild luminal narrowing in the sigmoid colon  · Internal hemorrhoids      Incidental Findings:   Chest x-ray: Bilateral reticular interstitial infiltrates  Findings could reflect developing interstitial pneumonia or other interstitial lung disease  Correlate with Covid 19 test   · Incidental findings note completed    Test Results Pending at Discharge (will require follow up): · None     Outpatient Tests Requested:  · None    Complications:  None    Reason for Admission:  Acute blood loss anemia    Hospital Course:   Piter Rowe is a 68 y o  female patient with past medical history significant for AFib on Eliquis, CKD stage 3, CHF, hypothyroidism, hyperlipidemia, and depression who originally presented to the hospital on 6/2/2022 due to generalized weakness and dark tardy stools x1 week  Please refer to the initial history and physical as outlined by Justin Flores on 06/02/2022 for additional presenting features    In brief, patient was initially admitted to critical care service, and was found to have acute blood loss anemia and be in shock as evidenced by DIEGO, hypotension, and lactic acidosis, secondary to GI bleed  Patient received a total of 3 units PRBCs and was started on b i d  PPI  Patient was also seen by Gastroenterology team, and patient subsequently underwent upper endoscopy and colonoscopy  Colonoscopy revealed possible sigmoid ulcer but no active bleeding  Patient was also seen by Cardiology, and due to hypotension patient's home metoprolol was held, and digoxin was increased to 0 125 mg every other day  Of note, patient has DIEGO resolved prior to discharge, and pre-admission torsemide was decreased from 40 mg once daily to 20 mg once daily  Patient's hemoglobin remained stable, and patient was medically stable for discharge on 06/09/2022 on p o  40 mg Protonix b i d , and outpatient Gastroenterology follow-up     Patient was advised to continue to hold Eliquis and metoprolol, until close follow-up with her regular outpatient [facility] cardiologist  Patient was also advised to follow up closely with your PCP for repeat lab work to check hemoglobin  This is a brief discharge summary; please refer to the above assessment/plan as well as the remainder of the patient's medical history for further details  Please see above list of diagnoses and related plan for additional information  Condition at Discharge: good    Discharge Day Visit / Exam:   Subjective:  Patient seen and examined resting comfortably in bed, in no apparent distress,  present at the time of the encounter  Discussed discharge plan of care, including medications and outpatient follow-up    Vitals: Blood Pressure: 96/52 (06/09/22 0800)  Pulse: 90 (06/09/22 0800)  Temperature: 98 9 °F (37 2 °C) (06/09/22 0800)  Temp Source: Tympanic (06/09/22 0800)  Respirations: (!) 25 (06/09/22 0800)  Height: 5' 3" (160 cm) (06/02/22 1830)  Weight - Scale: 73 7 kg (162 lb 7 7 oz) (06/09/22 0527)  SpO2: 92 % (06/09/22 0800)  Exam: Physical Exam  Vitals and nursing note reviewed  Constitutional:       General: She is not in acute distress  Appearance: She is normal weight  She is not toxic-appearing  HENT:      Head: Normocephalic and atraumatic  Mouth/Throat:      Mouth: Mucous membranes are moist    Eyes:      Conjunctiva/sclera: Conjunctivae normal    Cardiovascular:      Rate and Rhythm: Normal rate  Rhythm irregular  Pulses: Normal pulses  Heart sounds: Normal heart sounds  Pulmonary:      Effort: Pulmonary effort is normal  No respiratory distress  Breath sounds: Normal breath sounds  No wheezing, rhonchi or rales  Abdominal:      General: Bowel sounds are normal  There is no distension  Palpations: Abdomen is soft  Tenderness: There is no abdominal tenderness  Musculoskeletal:      Cervical back: Neck supple  Right lower leg: No edema  Left lower leg: No edema  Skin:     General: Skin is warm and dry  Neurological:      Mental Status: She is alert and oriented to person, place, and time  Cranial Nerves: No cranial nerve deficit  Sensory: No sensory deficit  Motor: No weakness  Coordination: Coordination normal    Psychiatric:         Mood and Affect: Mood normal          Behavior: Behavior normal          Thought Content: Thought content normal           Discussion with Family: Updated  () at bedside  Discharge instructions/Information to patient and family:   See after visit summary for information provided to patient and family  Provisions for Follow-Up Care:  See after visit summary for information related to follow-up care and any pertinent home health orders  Disposition:   Home    Planned Readmission:  None     Discharge Statement:  I spent 70 minutes discharging the patient  This time was spent on the day of discharge  I had direct contact with the patient on the day of discharge   Greater than 50% of the total time was spent examining patient, answering all patient questions, arranging and discussing plan of care with patient as well as directly providing post-discharge instructions  Additional time then spent on discharge activities  Discharge Medications:  See after visit summary for reconciled discharge medications provided to patient and/or family        **Please Note: This note may have been constructed using a voice recognition system**

## 2022-06-09 NOTE — ASSESSMENT & PLAN NOTE
Lab Results   Component Value Date    EGFR 56 06/09/2022    EGFR 63 06/08/2022    EGFR 75 06/07/2022    CREATININE 0 97 06/09/2022    CREATININE 0 88 06/08/2022    CREATININE 0 76 06/07/2022   · Creatinine on admission of 2 32  · Baseline creatinine of 1 3-1 4  · Likely prerenal   · Now resolved  Follow-up with PCP for routine lab work

## 2022-06-09 NOTE — ASSESSMENT & PLAN NOTE
Lab Results   Component Value Date    EGFR 56 06/09/2022    EGFR 63 06/08/2022    EGFR 75 06/07/2022    CREATININE 0 97 06/09/2022    CREATININE 0 88 06/08/2022    CREATININE 0 76 06/07/2022     Continue to monitor renal function

## 2022-06-09 NOTE — ASSESSMENT & PLAN NOTE
Wt Readings from Last 3 Encounters:   06/09/22 73 7 kg (162 lb 7 7 oz)   12/22/21 82 6 kg (182 lb)   12/10/21 83 kg (182 lb 15 7 oz)     · Echo 6/2- LVEF 60-65 %, No wall motion abnormalities, unable to assess diastolic function due to afib  Severe MR, Moderate TR  · Patient received IV lasix 40 mg x 1 dose on 6/3  · Okay to resume p o   Torsemide 20 mg once daily per Cardiology  · Outpatient Cardiology follow-up with Dr Rossy Oro

## 2022-06-09 NOTE — ASSESSMENT & PLAN NOTE
Wt Readings from Last 3 Encounters:   06/09/22 73 7 kg (162 lb 7 7 oz)   12/22/21 82 6 kg (182 lb)   12/10/21 83 kg (182 lb 15 7 oz)     Continue torsemide 20 mg daily prn for weight gain or edema given soft BP's

## 2022-06-09 NOTE — ASSESSMENT & PLAN NOTE
Refill request for diclofenac gel 1%, last refill 5/8/20.   Last office visit 6/20/20, has 9/8/20 follow up.  Refill forwarded to Dr Junior for approval   · Continue home regimen of digoxin  · Lopressor on hold during admission in the setting of low BP  · Patient previously on eliquis, was on hold in the setting of ABLA  · Discharge on digoxin 0 125 mcg every other day, and continue to hold Eliquis and metoprolol as patient is rate controlled and blood pressure well-controlled at this time     · Close follow-up with regular outpatient cardiologist Dr Selma Willams

## 2022-06-09 NOTE — PLAN OF CARE
Problem: MOBILITY - ADULT  Goal: Maintain or return to baseline ADL function  Description: INTERVENTIONS:  -  Assess patient's ability to carry out ADLs; assess patient's baseline for ADL function and identify physical deficits which impact ability to perform ADLs (bathing, care of mouth/teeth, toileting, grooming, dressing, etc )  - Assess/evaluate cause of self-care deficits   - Assess range of motion  - Assess patient's mobility; develop plan if impaired  - Assess patient's need for assistive devices and provide as appropriate  - Encourage maximum independence but intervene and supervise when necessary  - Involve family in performance of ADLs  - Assess for home care needs following discharge   - Consider OT consult to assist with ADL evaluation and planning for discharge  - Provide patient education as appropriate  Outcome: Progressing     Problem: PAIN - ADULT  Goal: Verbalizes/displays adequate comfort level or baseline comfort level  Description: Interventions:  - Encourage patient to monitor pain and request assistance  - Assess pain using appropriate pain scale  - Administer analgesics based on type and severity of pain and evaluate response  - Implement non-pharmacological measures as appropriate and evaluate response  - Consider cultural and social influences on pain and pain management  - Notify physician/advanced practitioner if interventions unsuccessful or patient reports new pain  Outcome: Progressing     Problem: GENITOURINARY - ADULT  Goal: Maintains or returns to baseline urinary function  Description: INTERVENTIONS:  - Assess urinary function  - Encourage oral fluids to ensure adequate hydration if ordered  - Administer IV fluids as ordered to ensure adequate hydration  - Administer ordered medications as needed  - Offer frequent toileting  - Follow urinary retention protocol if ordered  Outcome: Progressing     Problem: HEMATOLOGIC - ADULT  Goal: Maintains hematologic stability  Description: INTERVENTIONS  - Assess for signs and symptoms of bleeding or hemorrhage  - Monitor labs  - Administer supportive blood products/factors as ordered and appropriate  Outcome: Progressing

## 2022-06-09 NOTE — ASSESSMENT & PLAN NOTE
· AEB hypotension, lactic acidosis and DIEGO  · Likely related to ABLA  · S/p IV fluids   · S/p 2 units of PRBC's on 6/2/22, 1 unit PRBC's on 6/4/22  · Lactic acidosis resolved  · Resolved

## 2022-06-10 NOTE — UTILIZATION REVIEW
Notification of Discharge   This is a Notification of Discharge from our facility [address]  Please be advised that this patient has been discharge from our facility  Below you will find the admission and discharge date and time including the patients disposition  UTILIZATION REVIEW CONTACT:  Lincoln Matos  Utilization   Network Utilization Review Department  Phone: [phone] carefully listen to the prompts  All voicemails are confidential   Email: [email or web address]     PHYSICIAN ADVISORY SERVICES:  FOR ONGR-VR-[id number] REVIEW - MEDICAL NECESSITY DENIAL  Phone: [phone]  Fax: [phone]  Email: [email or web address]     PRESENTATION DATE: 6/2/2022 10:34 AM  OBERVATION ADMISSION DATE:   INPATIENT ADMISSION DATE: 6/2/22 12:58 PM   DISCHARGE DATE: 6/9/2022  1:39 PM  DISPOSITION: Home/Self Care Home/Self Care      IMPORTANT INFORMATION:  Send all requests for admission clinical reviews, approved or denied determinations and any other requests to dedicated fax number below belonging to the campus where the patient is receiving treatment   List of dedicated fax numbers:  [address] DENIALS (Administrative/Medical Necessity) [phone]   [address] (Maternity/NICU/Pediatrics) [phone]   [facility] [phone]   [address] [facility] [phone]   [address] [place] [address] [phone]   [address] [phone]   [facility]  [phone]   [address] [place] [address], [facility]  [address] [phone]

## 2022-06-12 ENCOUNTER — TELEPHONE (OUTPATIENT)
Dept: PULMONOLOGY | Facility: CLINIC | Age: 77
End: 2022-06-12

## 2022-07-18 ENCOUNTER — OFFICE VISIT (OUTPATIENT)
Dept: PULMONOLOGY | Facility: CLINIC | Age: 77
End: 2022-07-18
Payer: COMMERCIAL

## 2022-07-18 VITALS
WEIGHT: 134.2 LBS | BODY MASS INDEX: 23.78 KG/M2 | HEART RATE: 101 BPM | HEIGHT: 63 IN | OXYGEN SATURATION: 96 % | DIASTOLIC BLOOD PRESSURE: 60 MMHG | SYSTOLIC BLOOD PRESSURE: 104 MMHG | RESPIRATION RATE: 18 BRPM | TEMPERATURE: 97.9 F

## 2022-07-18 DIAGNOSIS — J84.9 ILD (INTERSTITIAL LUNG DISEASE) (HCC): Primary | ICD-10-CM

## 2022-07-18 DIAGNOSIS — J96.11 CHRONIC RESPIRATORY FAILURE WITH HYPOXIA (HCC): ICD-10-CM

## 2022-07-18 DIAGNOSIS — T17.208A OROPHARYNGEAL ASPIRATION, INITIAL ENCOUNTER: ICD-10-CM

## 2022-07-18 PROCEDURE — 99215 OFFICE O/P EST HI 40 MIN: CPT | Performed by: INTERNAL MEDICINE

## 2022-07-18 PROCEDURE — 94618 PULMONARY STRESS TESTING: CPT | Performed by: INTERNAL MEDICINE

## 2022-07-18 NOTE — PROGRESS NOTES
Pulmonary Follow Up Note   Yamile Li 68 y o  female MRN: [id number]  7/18/2022    Assessment:  Interstitial lung disease   · Mild/interstitial/reticulations/some GGOs noted more on the right base on CT chest in 12/2021 done during admission  · DDx aspiration/reflux pneumonitis (given the known history of recurrent choking/aspiration and dysphagia) CTD ILD (noted post inflammatory arthritic changes/deformities in the hands)    Plan:   · 6 minute walk test today showed exertional hypoxemia after 2 minutes, required 2 LPM  · Ordered home oxygen/portability   · Discussed extensively with the patient and her  about potential etiologies, needs further workup  · For now, they would like to have minimal investigations/workup since she and her  has a lot of doctor appointments  · We will start with HRCT, then consider PFT based on the findings    Multiple pulmonary nodules   · Likely inflammatory   · All less than 6 mm noted on CT chest in 12/2021  · Does not appear to be high risk for malignancy   · Repeat CT chest as above    Chronic hypoxemic respiratory failure   · Likely mixed etiology, myocardial dysfunction/CHF, possible ILD/chronic bronchitis/pneumonitis and contribution from severe musculoskeletal deconditioning  · Use supplemental oxygen with exertion at 2 LPM        Return in about 2 months (around 9/18/2022)  History of Present Illness     Follow up for: Concern for ILD    Background:  68 y o  female with a h/o osteoarthritis, lymphedema, hypothyroidism, HTN, depression, PTSD, vitamin-D deficiency, CHF/diastolic, AFib, CKD 3  Hospitalized in 06/2022 for hemorrhagic shock, presented with melena/weakness of 1 week was SBP in the 70s, lactic acidosis, Venkatesh I  Suspected GI bleed, received 2 unit of PRBCs  EGD showed no signs of active bleeding  On a prior hospitalization, chest CT showed minimal right basilar peripheral reticulations/fibrotic changes    Thought possible aspiration/prior pneumonia related  Interval History  Presented today accompanied by her   Reports a chronic dry/minimally productive cough for several years  Reports no history of asthma, COPD or ILD  Only reports bronchitis episodes once a year, described as heavy cough with copious sputum production  At this point, in addition to the cough noted oropharyngeal dysphagia  At baseline, very limited exercise capacity to about 100 ft within the house, using a walker  This is mainly due to musculoskeletal deconditioning, joint pain  Six reported recurrent knee arthritis, underwent injection into the right knee before  In addition to inflammatory arthritic/deformities at the DIP of index fingers bilaterally  No formal diagnosis of rheumatologic disorders in the past, no skin changes, rash, dry eyes, or dry mouth  Negative family history of CT D or interstitial lung disease  Social/exposure history   Lives in [facility], originally from [place] area moved here in the 19 60s  Smoked from [address], only few cigarettes per day  Used to work at the [employer] no significant environmental/occupational hazards exposure        Review of Systems  As per hpi, all other systems reviewed and were negative    Studies:    Imaging and other studies: I have personally reviewed pertinent films in PACS      CT chest 12/22/2021-right lung base subpleural interstitial changes  Scattered <3 mm bilateral nodules  Pulmonary function testing:   Minute walk test-baseline SpO2 96 percent on room air, heart rate 101 able to walk only for 2 minutes, SpO2 drop to 87 percent    EKG, Pathology, and Other Studies: I have personally reviewed pertinent reports  Past medical, surgical, social and family histories reviewed  Medications/Allergies: Reviewed      Vitals: Blood pressure 104/60, pulse 101, temperature 97 9 °F (36 6 °C), temperature source Temporal, resp   rate 18, height 5' 3" (1 6 m), weight 60 9 kg (134 lb 3 2 oz), SpO2 96 %  Body mass index is 23 77 kg/m²  Oxygen Therapy  SpO2: 96 %  Oxygen Therapy: None (Room air)      Physical Exam  Body mass index is 23 77 kg/m²  Gen: not in acute distress,   Neck/Eyes: supple, PERRL  Ear: normal appearance, + moderate/severe hearing impairment  Nose:  normal nasal mucosa, no drainage  Mouth:  unremarkable/normal appearance of lips, teeth and gums  Oropharynx: mucosa is moist, no focal lesions or erythema  Salivary glands: soft nontender  Chest: normal respiratory efforts, moderate air movement, fine crackles at the right base/mid lung fields  CV:  Irregular rhythm, no murmurs appreciated, 1+ pitting below knee edema  Abdomen: soft, non tender  Extremities:  + deformities/inflammatory without tenderness at the DIP of index fingers bilaterally  Skin: unremarkable  Neuro: AAO X3, no focal motor deficit        Labs:  Lab Results   Component Value Date    WBC 5 73 06/09/2022    HGB 8 1 (L) 06/09/2022    HCT 27 4 (L) 06/09/2022    MCV 98 06/09/2022     (L) 06/09/2022     Lab Results   Component Value Date    CALCIUM 8 7 06/09/2022    K 3 7 06/09/2022    CO2 25 06/09/2022     06/09/2022    BUN 6 06/09/2022    CREATININE 0 97 06/09/2022     No results found for: IGE  Lab Results   Component Value Date    ALT 7 06/06/2022    AST 18 06/06/2022    ALKPHOS 74 06/06/2022           Portions of the record may have been created with voice recognition software  Occasional wrong word or "sound a like" substitutions may have occurred due to the inherent limitations of voice recognition software  Read the chart carefully and recognize, using context, where substitutions have occurred    EDA Rapp    [facility]

## 2022-07-20 ENCOUNTER — TELEPHONE (OUTPATIENT)
Dept: PULMONOLOGY | Facility: CLINIC | Age: 77
End: 2022-07-20

## 2022-07-20 NOTE — TELEPHONE ENCOUNTER
Patients  is calling in regards to Marsha's POC order  When he spoke with the [employer] company they told him they did not receive an order for a POC and they are only bringing Delia  He is hoping this can be resolved   Please advise

## 2022-07-21 LAB

## 2022-07-21 NOTE — TELEPHONE ENCOUNTER
Tomorrow [facility] is now calling asking if they can receive a script for the POC sent to them as the patient has contacted them for it   Please advise    Fax #[phone]

## 2022-07-21 NOTE — TELEPHONE ENCOUNTER
Patients  calling back concerned regarding [employer], states they had never showed up and had given him a hard time over the phone and what they were ordering does not seem to match the drs orders  States they did take his CC information and is concerned they got scammed   Please advise [phone]

## 2022-07-21 NOTE — PROGRESS NOTES
Order for oxygen verified with Morgan Fuentes at [address] [facility]  Delivery set uo for today July 21, 2022 between 1300 to 1700

## 2022-08-11 ENCOUNTER — HOSPITAL ENCOUNTER (INPATIENT)
Facility: HOSPITAL | Age: 77
LOS: 1 days | Discharge: HOME HEALTH | DRG: 394 | End: 2022-08-12
Attending: EMERGENCY MEDICINE | Admitting: HOSPITALIST
Payer: COMMERCIAL

## 2022-08-11 ENCOUNTER — APPOINTMENT (EMERGENCY)
Dept: CT IMAGING | Facility: HOSPITAL | Age: 77
DRG: 394 | End: 2022-08-11
Payer: COMMERCIAL

## 2022-08-11 DIAGNOSIS — I34.0 NONRHEUMATIC MITRAL VALVE REGURGITATION: ICD-10-CM

## 2022-08-11 DIAGNOSIS — K62.89 PROCTITIS: ICD-10-CM

## 2022-08-11 DIAGNOSIS — R33.9 URINARY RETENTION: ICD-10-CM

## 2022-08-11 DIAGNOSIS — I48.91 ATRIAL FIBRILLATION WITH RAPID VENTRICULAR RESPONSE (HCC): ICD-10-CM
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
## 2022-08-11 DIAGNOSIS — R53.1 GENERALIZED WEAKNESS: Primary | ICD-10-CM

## 2022-08-11 LAB
ALBUMIN SERPL BCP-MCNC: 3.4 G/DL (ref 3.5–5)
ALP SERPL-CCNC: 76 U/L (ref 34–104)
ALT SERPL W P-5'-P-CCNC: 11 U/L (ref 7–52)
ANION GAP SERPL CALCULATED.3IONS-SCNC: 16 MMOL/L (ref 4–13)
AST SERPL W P-5'-P-CCNC: 36 U/L (ref 13–39)
BASOPHILS # BLD AUTO: 0.02 THOUSANDS/ΜL (ref 0–0.1)
BASOPHILS NFR BLD AUTO: 0 % (ref 0–1)
BILIRUB SERPL-MCNC: 0.77 MG/DL (ref 0.2–1)
BNP SERPL-MCNC: 120 PG/ML (ref 0–100)
BUN SERPL-MCNC: 19 MG/DL (ref 5–25)
CALCIUM ALBUM COR SERPL-MCNC: 9.7 MG/DL (ref 8.3–10.1)
CALCIUM SERPL-MCNC: 9.2 MG/DL (ref 8.4–10.2)
CARDIAC TROPONIN I PNL SERPL HS: 30 NG/L
CHLORIDE SERPL-SCNC: 98 MMOL/L (ref 96–108)
CO2 SERPL-SCNC: 24 MMOL/L (ref 21–32)
CREAT SERPL-MCNC: 1.2 MG/DL (ref 0.6–1.3)
EOSINOPHIL # BLD AUTO: 0.01 THOUSAND/ΜL (ref 0–0.61)
EOSINOPHIL NFR BLD AUTO: 0 % (ref 0–6)
ERYTHROCYTE [DISTWIDTH] IN BLOOD BY AUTOMATED COUNT: 15.6 % (ref 11.6–15.1)
GFR SERPL CREATININE-BSD FRML MDRD: 43 ML/MIN/1.73SQ M
GLUCOSE SERPL-MCNC: 84 MG/DL (ref 65–140)
HCT VFR BLD AUTO: 32.8 % (ref 34.8–46.1)
HGB BLD-MCNC: 10.1 G/DL (ref 11.5–15.4)
IMM GRANULOCYTES # BLD AUTO: 0.02 THOUSAND/UL (ref 0–0.2)
IMM GRANULOCYTES NFR BLD AUTO: 0 % (ref 0–2)
LACTATE SERPL-SCNC: 1.5 MMOL/L (ref 0.5–2)
LIPASE SERPL-CCNC: 9 U/L (ref 11–82)
LYMPHOCYTES # BLD AUTO: 0.56 THOUSANDS/ΜL (ref 0.6–4.47)
LYMPHOCYTES NFR BLD AUTO: 5 % (ref 14–44)
MAGNESIUM SERPL-MCNC: 2.3 MG/DL (ref 1.9–2.7)
MCH RBC QN AUTO: 28.6 PG (ref 26.8–34.3)
MCHC RBC AUTO-ENTMCNC: 30.8 G/DL (ref 31.4–37.4)
MCV RBC AUTO: 93 FL (ref 82–98)
MONOCYTES # BLD AUTO: 0.91 THOUSAND/ΜL (ref 0.17–1.22)
MONOCYTES NFR BLD AUTO: 9 % (ref 4–12)
NEUTROPHILS # BLD AUTO: 9.09 THOUSANDS/ΜL (ref 1.85–7.62)
NEUTS SEG NFR BLD AUTO: 86 % (ref 43–75)
NRBC BLD AUTO-RTO: 0 /100 WBCS
PLATELET # BLD AUTO: 228 THOUSANDS/UL (ref 149–390)
PMV BLD AUTO: 9.3 FL (ref 8.9–12.7)
POTASSIUM SERPL-SCNC: 3.4 MMOL/L (ref 3.5–5.3)
PROT SERPL-MCNC: 6.2 G/DL (ref 6.4–8.4)
RBC # BLD AUTO: 3.53 MILLION/UL (ref 3.81–5.12)
SODIUM SERPL-SCNC: 138 MMOL/L (ref 135–147)
WBC # BLD AUTO: 10.61 THOUSAND/UL (ref 4.31–10.16)

## 2022-08-11 PROCEDURE — 71250 CT THORAX DX C-: CPT

## 2022-08-11 PROCEDURE — 96361 HYDRATE IV INFUSION ADD-ON: CPT

## 2022-08-11 PROCEDURE — 93005 ELECTROCARDIOGRAM TRACING: CPT

## 2022-08-11 PROCEDURE — 84484 ASSAY OF TROPONIN QUANT: CPT | Performed by: EMERGENCY MEDICINE

## 2022-08-11 PROCEDURE — G1004 CDSM NDSC: HCPCS

## 2022-08-11 PROCEDURE — 96376 TX/PRO/DX INJ SAME DRUG ADON: CPT

## 2022-08-11 PROCEDURE — 99291 CRITICAL CARE FIRST HOUR: CPT | Performed by: EMERGENCY MEDICINE

## 2022-08-11 PROCEDURE — 83690 ASSAY OF LIPASE: CPT | Performed by: EMERGENCY MEDICINE

## 2022-08-11 PROCEDURE — 70450 CT HEAD/BRAIN W/O DYE: CPT

## 2022-08-11 PROCEDURE — 83735 ASSAY OF MAGNESIUM: CPT | Performed by: EMERGENCY MEDICINE

## 2022-08-11 PROCEDURE — 80053 COMPREHEN METABOLIC PANEL: CPT | Performed by: EMERGENCY MEDICINE

## 2022-08-11 PROCEDURE — 72125 CT NECK SPINE W/O DYE: CPT

## 2022-08-11 PROCEDURE — 85025 COMPLETE CBC W/AUTO DIFF WBC: CPT | Performed by: EMERGENCY MEDICINE

## 2022-08-11 PROCEDURE — 99285 EMERGENCY DEPT VISIT HI MDM: CPT

## 2022-08-11 PROCEDURE — 83605 ASSAY OF LACTIC ACID: CPT | Performed by: EMERGENCY MEDICINE

## 2022-08-11 PROCEDURE — 74176 CT ABD & PELVIS W/O CONTRAST: CPT

## 2022-08-11 PROCEDURE — 83880 ASSAY OF NATRIURETIC PEPTIDE: CPT | Performed by: EMERGENCY MEDICINE

## 2022-08-11 PROCEDURE — 36415 COLL VENOUS BLD VENIPUNCTURE: CPT | Performed by: EMERGENCY MEDICINE

## 2022-08-11 RX ORDER — DILTIAZEM HYDROCHLORIDE 5 MG/ML
15 INJECTION INTRAVENOUS ONCE
Status: COMPLETED | OUTPATIENT
Start: 2022-08-11 | End: 2022-08-11

## 2022-08-11 RX ADMIN — SODIUM CHLORIDE 500 ML: 0.9 INJECTION, SOLUTION INTRAVENOUS at 22:23

## 2022-08-11 RX ADMIN — DILTIAZEM HYDROCHLORIDE 15 MG: 5 INJECTION, SOLUTION INTRAVENOUS at 22:21

## 2022-08-12 ENCOUNTER — APPOINTMENT (INPATIENT)
Dept: RADIOLOGY | Facility: HOSPITAL | Age: 77
DRG: 394 | End: 2022-08-12
Payer: COMMERCIAL

## 2022-08-12 ENCOUNTER — HOME HEALTH ADMISSION (OUTPATIENT)
Dept: HOME HEALTH SERVICES | Facility: HOME HEALTHCARE | Age: 77
End: 2022-08-12
Payer: COMMERCIAL

## 2022-08-12 VITALS
SYSTOLIC BLOOD PRESSURE: 121 MMHG | DIASTOLIC BLOOD PRESSURE: 61 MMHG | OXYGEN SATURATION: 99 % | RESPIRATION RATE: 18 BRPM | TEMPERATURE: 98 F | HEART RATE: 94 BPM

## 2022-08-12 PROBLEM — N18.30 CKD (CHRONIC KIDNEY DISEASE) STAGE 3, GFR 30-59 ML/MIN (HCC): Status: ACTIVE | Noted: 2022-08-12

## 2022-08-12 PROBLEM — N13.30 BILATERAL HYDRONEPHROSIS: Status: ACTIVE | Noted: 2022-08-12

## 2022-08-12 PROBLEM — K62.89 PROCTITIS: Status: ACTIVE | Noted: 2022-08-12

## 2022-08-12 PROBLEM — R53.1 GENERALIZED WEAKNESS: Status: ACTIVE | Noted: 2022-08-12

## 2022-08-12 PROBLEM — E87.6 HYPOKALEMIA: Status: ACTIVE | Noted: 2022-08-12
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
## 2022-08-12 PROBLEM — I48.91 ATRIAL FIBRILLATION WITH RVR (HCC): Status: ACTIVE | Noted: 2022-08-12

## 2022-08-12 LAB
2HR DELTA HS TROPONIN: -2 NG/L
4HR DELTA HS TROPONIN: -2 NG/L
ALBUMIN SERPL BCP-MCNC: 3.2 G/DL (ref 3.5–5)
ALP SERPL-CCNC: 65 U/L (ref 34–104)
ALT SERPL W P-5'-P-CCNC: 12 U/L (ref 7–52)
ANION GAP SERPL CALCULATED.3IONS-SCNC: 13 MMOL/L (ref 4–13)
AST SERPL W P-5'-P-CCNC: 37 U/L (ref 13–39)
ATRIAL RATE: 125 BPM
ATRIAL RATE: 80 BPM
ATRIAL RATE: 88 BPM
BILIRUB SERPL-MCNC: 0.65 MG/DL (ref 0.2–1)
BILIRUB UR QL STRIP: NEGATIVE
BUN SERPL-MCNC: 18 MG/DL (ref 5–25)
CALCIUM ALBUM COR SERPL-MCNC: 9.2 MG/DL (ref 8.3–10.1)
CALCIUM SERPL-MCNC: 8.6 MG/DL (ref 8.4–10.2)
CARDIAC TROPONIN I PNL SERPL HS: 28 NG/L
CARDIAC TROPONIN I PNL SERPL HS: 28 NG/L
CHLORIDE SERPL-SCNC: 100 MMOL/L (ref 96–108)
CLARITY UR: CLEAR
CO2 SERPL-SCNC: 26 MMOL/L (ref 21–32)
COLOR UR: ABNORMAL
CREAT SERPL-MCNC: 1.03 MG/DL (ref 0.6–1.3)
DIGOXIN SERPL-MCNC: 1 NG/ML (ref 0.8–2)
ERYTHROCYTE [DISTWIDTH] IN BLOOD BY AUTOMATED COUNT: 15.8 % (ref 11.6–15.1)
GFR SERPL CREATININE-BSD FRML MDRD: 52 ML/MIN/1.73SQ M
GLUCOSE SERPL-MCNC: 59 MG/DL (ref 65–140)
GLUCOSE UR STRIP-MCNC: NEGATIVE MG/DL
HCT VFR BLD AUTO: 30.5 % (ref 34.8–46.1)
HGB BLD-MCNC: 9.3 G/DL (ref 11.5–15.4)
HGB UR QL STRIP.AUTO: NEGATIVE
KETONES UR STRIP-MCNC: ABNORMAL MG/DL
LEUKOCYTE ESTERASE UR QL STRIP: NEGATIVE
MAGNESIUM SERPL-MCNC: 2.4 MG/DL (ref 1.9–2.7)
MCH RBC QN AUTO: 28.7 PG (ref 26.8–34.3)
MCHC RBC AUTO-ENTMCNC: 30.5 G/DL (ref 31.4–37.4)
MCV RBC AUTO: 94 FL (ref 82–98)
NITRITE UR QL STRIP: NEGATIVE
PH UR STRIP.AUTO: 7 [PH]
PLATELET # BLD AUTO: 188 THOUSANDS/UL (ref 149–390)
PMV BLD AUTO: 9.9 FL (ref 8.9–12.7)
POTASSIUM SERPL-SCNC: 3.9 MMOL/L (ref 3.5–5.3)
PROT SERPL-MCNC: 5.7 G/DL (ref 6.4–8.4)
PROT UR STRIP-MCNC: NEGATIVE MG/DL
QRS AXIS: 74 DEGREES
QRS AXIS: 77 DEGREES
QRS AXIS: 78 DEGREES
QRSD INTERVAL: 84 MS
QRSD INTERVAL: 86 MS
QRSD INTERVAL: 92 MS
QT INTERVAL: 328 MS
QT INTERVAL: 338 MS
QT INTERVAL: 410 MS
QTC INTERVAL: 475 MS
QTC INTERVAL: 483 MS
QTC INTERVAL: 515 MS
RBC # BLD AUTO: 3.24 MILLION/UL (ref 3.81–5.12)
SALICYLATES SERPL-MCNC: <5 MG/DL (ref 3–20)
SODIUM SERPL-SCNC: 139 MMOL/L (ref 135–147)
SP GR UR STRIP.AUTO: 1.01 (ref 1–1.03)
T WAVE AXIS: -47 DEGREES
T WAVE AXIS: -71 DEGREES
T WAVE AXIS: -76 DEGREES
UROBILINOGEN UR QL STRIP.AUTO: 0.2 E.U./DL
VENTRICULAR RATE: 123 BPM
VENTRICULAR RATE: 126 BPM
VENTRICULAR RATE: 95 BPM
WBC # BLD AUTO: 8.6 THOUSAND/UL (ref 4.31–10.16)

## 2022-08-12 PROCEDURE — 99236 HOSP IP/OBS SAME DATE HI 85: CPT | Performed by: INTERNAL MEDICINE

## 2022-08-12 PROCEDURE — 93005 ELECTROCARDIOGRAM TRACING: CPT

## 2022-08-12 PROCEDURE — 97530 THERAPEUTIC ACTIVITIES: CPT

## 2022-08-12 PROCEDURE — 84484 ASSAY OF TROPONIN QUANT: CPT | Performed by: EMERGENCY MEDICINE

## 2022-08-12 PROCEDURE — 80179 DRUG ASSAY SALICYLATE: CPT | Performed by: EMERGENCY MEDICINE

## 2022-08-12 PROCEDURE — 80162 ASSAY OF DIGOXIN TOTAL: CPT | Performed by: EMERGENCY MEDICINE

## 2022-08-12 PROCEDURE — 99223 1ST HOSP IP/OBS HIGH 75: CPT | Performed by: INTERNAL MEDICINE

## 2022-08-12 PROCEDURE — 85027 COMPLETE CBC AUTOMATED: CPT | Performed by: INTERNAL MEDICINE

## 2022-08-12 PROCEDURE — 93010 ELECTROCARDIOGRAM REPORT: CPT | Performed by: INTERNAL MEDICINE

## 2022-08-12 PROCEDURE — 81003 URINALYSIS AUTO W/O SCOPE: CPT | Performed by: EMERGENCY MEDICINE

## 2022-08-12 PROCEDURE — 96365 THER/PROPH/DIAG IV INF INIT: CPT

## 2022-08-12 PROCEDURE — 83735 ASSAY OF MAGNESIUM: CPT | Performed by: INTERNAL MEDICINE

## 2022-08-12 PROCEDURE — 96376 TX/PRO/DX INJ SAME DRUG ADON: CPT

## 2022-08-12 PROCEDURE — 99222 1ST HOSP IP/OBS MODERATE 55: CPT | Performed by: STUDENT IN AN ORGANIZED HEALTH CARE EDUCATION/TRAINING PROGRAM

## 2022-08-12 PROCEDURE — 74018 RADEX ABDOMEN 1 VIEW: CPT

## 2022-08-12 PROCEDURE — 97163 PT EVAL HIGH COMPLEX 45 MIN: CPT

## 2022-08-12 PROCEDURE — 97167 OT EVAL HIGH COMPLEX 60 MIN: CPT

## 2022-08-12 PROCEDURE — 36415 COLL VENOUS BLD VENIPUNCTURE: CPT | Performed by: EMERGENCY MEDICINE

## 2022-08-12 PROCEDURE — 99222 1ST HOSP IP/OBS MODERATE 55: CPT | Performed by: INTERNAL MEDICINE

## 2022-08-12 PROCEDURE — 80053 COMPREHEN METABOLIC PANEL: CPT | Performed by: INTERNAL MEDICINE

## 2022-08-12 RX ORDER — POTASSIUM CHLORIDE 14.9 MG/ML
20 INJECTION INTRAVENOUS ONCE
Status: DISCONTINUED | OUTPATIENT
Start: 2022-08-12 | End: 2022-08-12

## 2022-08-12 RX ORDER — DILTIAZEM HYDROCHLORIDE 60 MG/1
60 TABLET, FILM COATED ORAL EVERY 6 HOURS SCHEDULED
Status: DISCONTINUED | OUTPATIENT
Start: 2022-08-12 | End: 2022-08-12 | Stop reason: HOSPADM

## 2022-08-12 RX ORDER — DIGOXIN 0.25 MG/ML
250 INJECTION INTRAMUSCULAR; INTRAVENOUS ONCE
Status: COMPLETED | OUTPATIENT
Start: 2022-08-12 | End: 2022-08-12

## 2022-08-12 RX ORDER — POTASSIUM CHLORIDE 20 MEQ/1
40 TABLET, EXTENDED RELEASE ORAL ONCE
Status: COMPLETED | OUTPATIENT
Start: 2022-08-12 | End: 2022-08-12

## 2022-08-12 RX ORDER — TORSEMIDE 20 MG/1
20 TABLET ORAL DAILY PRN
Qty: 30 TABLET | Refills: 0 | Status: SHIPPED | OUTPATIENT
Start: 2022-08-12

## 2022-08-12 RX ORDER — BISACODYL 10 MG
10 SUPPOSITORY, RECTAL RECTAL DAILY PRN
Status: DISCONTINUED | OUTPATIENT
Start: 2022-08-12 | End: 2022-08-12 | Stop reason: HOSPADM

## 2022-08-12 RX ORDER — DILTIAZEM HYDROCHLORIDE 60 MG/1
60 TABLET, FILM COATED ORAL EVERY 6 HOURS SCHEDULED
Status: DISCONTINUED | OUTPATIENT
Start: 2022-08-12 | End: 2022-08-12

## 2022-08-12 RX ORDER — TORSEMIDE 20 MG/1
40 TABLET ORAL DAILY
Status: DISCONTINUED | OUTPATIENT
Start: 2022-08-12 | End: 2022-08-12

## 2022-08-12 RX ORDER — DILTIAZEM HYDROCHLORIDE 5 MG/ML
10 INJECTION INTRAVENOUS ONCE
Status: COMPLETED | OUTPATIENT
Start: 2022-08-12 | End: 2022-08-12

## 2022-08-12 RX ORDER — ALBUMIN, HUMAN INJ 5% 5 %
12.5 SOLUTION INTRAVENOUS ONCE
Status: COMPLETED | OUTPATIENT
Start: 2022-08-12 | End: 2022-08-12

## 2022-08-12 RX ORDER — ENOXAPARIN SODIUM 100 MG/ML
40 INJECTION SUBCUTANEOUS DAILY
Status: DISCONTINUED | OUTPATIENT
Start: 2022-08-12 | End: 2022-08-12 | Stop reason: HOSPADM

## 2022-08-12 RX ORDER — DILTIAZEM HYDROCHLORIDE 5 MG/ML
INJECTION INTRAVENOUS
Status: DISPENSED
Start: 2022-08-12 | End: 2022-08-12

## 2022-08-12 RX ORDER — DILTIAZEM HYDROCHLORIDE 180 MG/1
180 CAPSULE, COATED, EXTENDED RELEASE ORAL DAILY
Qty: 30 CAPSULE | Refills: 2 | Status: SHIPPED | OUTPATIENT
Start: 2022-08-13

## 2022-08-12 RX ORDER — ACETAMINOPHEN 325 MG/1
650 TABLET ORAL EVERY 6 HOURS PRN
Status: DISCONTINUED | OUTPATIENT
Start: 2022-08-12 | End: 2022-08-12 | Stop reason: HOSPADM

## 2022-08-12 RX ORDER — ASPIRIN 81 MG/1
81 TABLET ORAL DAILY
Status: DISCONTINUED | OUTPATIENT
Start: 2022-08-12 | End: 2022-08-12 | Stop reason: HOSPADM

## 2022-08-12 RX ORDER — POLYETHYLENE GLYCOL 3350 17 G/17G
17 POWDER, FOR SOLUTION ORAL 2 TIMES DAILY
Status: DISCONTINUED | OUTPATIENT
Start: 2022-08-12 | End: 2022-08-12 | Stop reason: HOSPADM

## 2022-08-12 RX ORDER — DIGOXIN 250 MCG
125 TABLET ORAL EVERY OTHER DAY
Status: DISCONTINUED | OUTPATIENT
Start: 2022-08-12 | End: 2022-08-12 | Stop reason: HOSPADM

## 2022-08-12 RX ORDER — DILTIAZEM HYDROCHLORIDE 180 MG/1
180 CAPSULE, COATED, EXTENDED RELEASE ORAL DAILY
Status: DISCONTINUED | OUTPATIENT
Start: 2022-08-13 | End: 2022-08-12 | Stop reason: HOSPADM

## 2022-08-12 RX ORDER — FOLIC ACID 1 MG/1
1000 TABLET ORAL DAILY
Status: DISCONTINUED | OUTPATIENT
Start: 2022-08-12 | End: 2022-08-12 | Stop reason: HOSPADM

## 2022-08-12 RX ORDER — LEVOTHYROXINE SODIUM 0.07 MG/1
150 TABLET ORAL
Status: DISCONTINUED | OUTPATIENT
Start: 2022-08-12 | End: 2022-08-12 | Stop reason: HOSPADM

## 2022-08-12 RX ORDER — BISACODYL 10 MG
10 SUPPOSITORY, RECTAL RECTAL DAILY PRN
Qty: 12 SUPPOSITORY | Refills: 0 | Status: SHIPPED | OUTPATIENT
Start: 2022-08-12 | End: 2022-10-12

## 2022-08-12 RX ORDER — ATORVASTATIN CALCIUM 40 MG/1
40 TABLET, FILM COATED ORAL
Status: DISCONTINUED | OUTPATIENT
Start: 2022-08-12 | End: 2022-08-12 | Stop reason: HOSPADM

## 2022-08-12 RX ORDER — TORSEMIDE 20 MG/1
20 TABLET ORAL DAILY PRN
Status: DISCONTINUED | OUTPATIENT
Start: 2022-08-12 | End: 2022-08-12 | Stop reason: HOSPADM

## 2022-08-12 RX ORDER — POLYETHYLENE GLYCOL 3350 17 G/17G
17 POWDER, FOR SOLUTION ORAL DAILY PRN
Status: DISCONTINUED | OUTPATIENT
Start: 2022-08-12 | End: 2022-08-12 | Stop reason: HOSPADM

## 2022-08-12 RX ORDER — ALBUMIN (HUMAN) 12.5 G/50ML
SOLUTION INTRAVENOUS
Status: DISCONTINUED
Start: 2022-08-12 | End: 2022-08-12 | Stop reason: WASHOUT

## 2022-08-12 RX ORDER — AMOXICILLIN 250 MG
1 CAPSULE ORAL
Status: DISCONTINUED | OUTPATIENT
Start: 2022-08-12 | End: 2022-08-12 | Stop reason: HOSPADM

## 2022-08-12 RX ORDER — DILTIAZEM HYDROCHLORIDE 5 MG/ML
15 INJECTION INTRAVENOUS ONCE
Status: DISCONTINUED | OUTPATIENT
Start: 2022-08-12 | End: 2022-08-12

## 2022-08-12 RX ADMIN — DILTIAZEM HYDROCHLORIDE 10 MG: 5 INJECTION, SOLUTION INTRAVENOUS at 00:23

## 2022-08-12 RX ADMIN — DIGOXIN 250 MCG: 0.25 INJECTION INTRAMUSCULAR; INTRAVENOUS at 06:10

## 2022-08-12 RX ADMIN — DILTIAZEM HYDROCHLORIDE 60 MG: 60 TABLET, FILM COATED ORAL at 13:47

## 2022-08-12 RX ADMIN — LEVOTHYROXINE SODIUM 150 MCG: 75 TABLET ORAL at 06:03

## 2022-08-12 RX ADMIN — SODIUM CHLORIDE 250 ML: 0.9 INJECTION, SOLUTION INTRAVENOUS at 06:15

## 2022-08-12 RX ADMIN — Medication 10 MG/HR: at 01:21

## 2022-08-12 RX ADMIN — ALBUMIN (HUMAN) 12.5 G: 12.5 INJECTION, SOLUTION INTRAVENOUS at 03:26

## 2022-08-12 RX ADMIN — POTASSIUM CHLORIDE 40 MEQ: 1500 TABLET, EXTENDED RELEASE ORAL at 03:42

## 2022-08-12 RX ADMIN — SODIUM CHLORIDE 500 ML: 0.9 INJECTION, SOLUTION INTRAVENOUS at 02:49

## 2022-08-12 NOTE — QUICK NOTE
Notified by nursing that patient with softer BP's this morning  Patient is asymptomatic  She is currently on cardizem drip at 10mg/hr for atrial fibrillation with rvr  Patient has received 500mL bolus NS and 12 5g IV albumin without much improvement in BP  Most recent BP 89/51 (MAP 67)  HR more controlled over the last 2 hours   Will avoid further fluids given history of CHF  Will reach out to on-call Cardiology for recommendations regarding drip titration/discontinuation and the addition of oral medications  Cardiology recommendations: Give additional 250mL NS bolus, digoxin IV 0 25mg, and start cardizem 60mg PO Q6H now  Following cardizem tablet, can start to down titrate the cardizem drip   Discussed changes with nursing team

## 2022-08-12 NOTE — ASSESSMENT & PLAN NOTE
Wt Readings from Last 3 Encounters:   07/18/22 60 9 kg (134 lb 3 2 oz)   06/09/22 73 7 kg (162 lb 7 7 oz)   12/22/21 82 6 kg (182 lb)     · Patient is currently euvolemic on examination  · Echo (6/2/22): EF 60-65%  · Continue pre-hospital lasix  · Daily weights, I&Os  · Outpatient follow up with Cardiology

## 2022-08-12 NOTE — ASSESSMENT & PLAN NOTE
· Patient presenting with Afib RVR (-130s)  · Denies any chest pain or SOB at this time  · Troponin level: 0hr 30, 2hr 28, 4hr pending  · BB discontinued last hospitalization secondary to low BP   Digoxin 0 125mg daily started, will continue  · Digoxin level checked and was wnl (1 0)  · Started on Cardizem drip by the ED, will continue for now  · [facility] discontinued after last admission secondary to GIB  · Monitor on telemetry  · Consult to Cardiology, recommendations are appreciated

## 2022-08-12 NOTE — ASSESSMENT & PLAN NOTE
· BP well controlled at home  · BB discontinued secondary to low BP during last hospitalization  · Continue home torsemide with hold parameters  · Monitor for hypotension while on Cardizem drip

## 2022-08-12 NOTE — CONSULTS
[address] 1945, 68 y o  female MRN: [id number]  Unit/Bed#: ED 20 Encounter: [id number]  Primary Care Provider: Sheryl Florez MD   Date and time admitted to hospital: 8/11/2022  9:36 PM    Inpatient consult to Cardiology  Consult performed by: Jd Garcia MD  Consult ordered by: Darryl Gonzalez PA-C          Fall  Assessment & Plan  See comments under atrial fibrillation  * Atrial fibrillation with RVR (HCC)  Assessment & Plan  Rate now better controlled  I am going to add routine diltiazem  With multiple falls she is not a good candidate for full anticoagulation  Other summary comments:   Changes I am making include adding diltiazem and also making her torsemide p r n  weight greater than 140  We will see her again in the hospital as needed  Main issue for discharge is just to make sure all is safe  Outpatient Cardiologist: Yun Mills, [facility]    HPI: Rajesh Duggan is a 68y o  year old female who presented with a fall  She was likely returning from the bathroom  She had felt significant constipation  This is not the 1st fall  Patient has had somewhat difficult to control atrial fibrillation in fact when she arrived here her heart rate was fast   She was given IV diltiazem overnight and heart rate now is terrific  She carries a diagnosis of diastolic heart failure probably more accurately fluid retention as ejection fraction has been normal and she is not short of breath  EKG:   Afib with controlled rate this am     MOST  RECENT CARDIAC IMAGING:   Echo 06/02/2022 EF 60-65%   Severe MR  Moderate TR, mild PI        Review of Systems: a 10 point review of systems was conducted and is negative except for as mentioned in the HPI or as below          Historical Information   Past Medical History:   Diagnosis Date    Disease of thyroid gland     Hypotension     Supratherapeutic INR 6/2/2022     Past Surgical History:   Procedure Laterality Date    HYSTERECTOMY       Social History     Substance and Sexual Activity   Alcohol Use Never    Comment: occassional      Social History     Substance and Sexual Activity   Drug Use Never     Social History     Tobacco Use   Smoking Status Never Smoker   Smokeless Tobacco Never Used       Family History:   No longer relevant  Meds/Allergies   all current active meds have been reviewed  (Not in a hospital admission)      No Known Allergies    Objective   Vitals: Blood pressure 105/51, pulse 94, temperature 98 °F (36 7 °C), temperature source Tympanic, resp  rate 18, SpO2 99 %  , There is no height or weight on file to calculate BMI ,   Orthostatic Blood Pressures    Flowsheet Row Most Recent Value   Blood Pressure 105/51 filed at 08/12/2022 0700   Patient Position - Orthostatic VS Lying filed at 08/11/2022 8158          Systolic (24NFE), OFV:725 , Min:85 , STJ:230     Diastolic (32OGI), PXQ:15, Min:49, Max:93              Physical Exam:    General:  Normal appearance in no distress  Eyes:  Anicteric  Oral mucosa:  Moist   Neck:  No JVD  Carotid upstrokes are brisk without bruits  No masses  Chest:  Clear to auscultation  Cardiac:  Irregularly irregular  No palpable PMI  Normal S1 and S2   Grade 2 systolic murmur at the apex  No  gallop or rub  Abdomen:  Soft and nontender  No palpable organomegaly or aortic enlargement  Extremities:  Trace peripheral edema  Musculoskeletal:  Symmetric  Vascular:  Femoral pulses are brisk without bruits  Popliteal  pulses are intact bilaterally  Pedal pulses are intact  Neuro:  Grossly symmetric  Psych:  Alert and oriented x3          Lab Results:     Troponins:    Results from last 7 days   Lab Units 08/12/22  0308 08/12/22  0018 08/11/22  2203   HS TNI 0HR ng/L  --   --  30   HS TNI 2HR ng/L  --  28  --    HSTNI D2 ng/L  --  -2  --    HS TNI 4HR ng/L 28  --   --    HSTNI D4 ng/L -2  --   --      BNP:   Results from last 6 Months   Lab Units 08/11/22  2248 06/02/22  1138   BNP pg/mL 120* 853*       CBC :   Results from last 7 days   Lab Units 08/12/22  0649 08/11/22  2252   WBC Thousand/uL 8 60 10 61*   HEMOGLOBIN g/dL 9 3* 10 1*   HEMATOCRIT % 30 5* 32 8*   MCV fL 94 93   PLATELETS Thousands/uL 188 228     TSH:     CMP:   Results from last 7 days   Lab Units 08/12/22  0649 08/11/22  2248   POTASSIUM mmol/L 3 9 3 4*   CHLORIDE mmol/L 100 98   CO2 mmol/L 26 24   BUN mg/dL 18 19   CREATININE mg/dL 1 03 1 20   AST U/L 37 36   ALT U/L 12 11   EGFR ml/min/1 73sq m 52 43     Lipid Profile:     Coags:

## 2022-08-12 NOTE — ASSESSMENT & PLAN NOTE
· Patient with primary complaint of generalized weakness and multiple falls in her home  Spoke with patient's  and he noted that this morning he heard a thud and came downstairs to his wife saying she bent down to get something off the floor and had fallen  Unclear if she hit her head, but there is a bruise on her left cheek   Denies any dizziness, chest pain, SOB prior to the fall  · CT head and cervical spine unremarkable  · Fall precautions  · PT/OT evaluation  · Case management    PT/OT recommends home health care  Case management following

## 2022-08-12 NOTE — ASSESSMENT & PLAN NOTE
Lab Results   Component Value Date    EGFR 43 08/11/2022    EGFR 56 06/09/2022    EGFR 63 06/08/2022    CREATININE 1 20 08/11/2022    CREATININE 0 97 06/09/2022    CREATININE 0 88 06/08/2022     · Creatine 1 20 on admission (baseline 1 0-1 3)  · Avoid hypotension and nephrotoxic agents  · Monitor BMP as needed

## 2022-08-12 NOTE — PLAN OF CARE
Problem: OCCUPATIONAL THERAPY ADULT  Goal: Performs self-care activities at highest level of function for planned discharge setting  See evaluation for individualized goals  Description: Treatment Interventions: ADL retraining, Functional transfer training, UE strengthening/ROM, Endurance training, Cognitive reorientation, Equipment evaluation/education, Compensatory technique education, Energy conservation, Activityengagement     See flowsheet documentation for full assessment, interventions and recommendations  Note: Limitation: Decreased ADL status, Decreased UE strength, Decreased Safe judgement during ADL, Decreased cognition, Decreased endurance, Decreased self-care trans, Decreased high-level ADLs  Prognosis: Good  Assessment: Pt is a 68 y o  female seen for OT evaluation s/p admit to [address] [place] [address] on 8/11/2022 w/ Atrial fibrillation with RVR ([facility] [address] )  Comorbidities affecting pt's functional performance at time of assessment include: Hypotension  Personal factors affecting pt at time of IE include:steps to enter environment, limited home support and difficulty performing ADLS  Prior to admission, pt was Mod I with ADLs  Upon evaluation: the following deficits impact occupational performance: decreased strength, decreased balance, decreased tolerance, impaired memory, impaired sequencing, impaired problem solving and decreased safety awareness  Pt to benefit from continued skilled OT tx while in the hospital to address deficits as defined above and maximize level of functional independence w ADL's and functional mobility  Occupational Performance areas to address include: bathing/shower, toilet hygiene, dressing, functional mobility and clothing management  From OT standpoint, recommendation at time of d/c would be Home OT       OT Discharge Recommendation: Home with home health rehabilitation     [address] [place] [address], MS, OTR/L

## 2022-08-12 NOTE — UTILIZATION REVIEW
Initial Clinical Review    Admission: Date/Time/Statement:   Admission Orders (From admission, onward)     Ordered        08/12/22 0209  INPATIENT ADMISSION  Once                      Orders Placed This Encounter   Procedures    INPATIENT ADMISSION     Standing Status:   Standing     Number of Occurrences:   1     Order Specific Question:   Level of Care     Answer:   Level 2 Stepdown / HOT [14]     Order Specific Question:   Estimated length of stay     Answer:   More than 2 Midnights     Order Specific Question:   Certification     Answer:   I certify that inpatient services are medically necessary for this patient for a duration of greater than two midnights  See H&P and MD Progress Notes for additional information about the patient's course of treatment  ED Arrival Information     Expected   -    Arrival   8/11/2022 21:28    Acuity   Emergent            Means of arrival   Ambulance    Escorted by   [facility]    Admission type   Emergency            Arrival complaint   weakness           Chief Complaint   Patient presents with    Atrial Fibrillation    Weakness - Generalized       Initial Presentation: 68 y o  female W/PMHX: HTN, AFIB, CKD, Hypothyroidism,  Depression  to ED from home via EMS, admitted as Inpatient SD2, due to Afib with RVR  Presented with generalized weakness, several falls onto her buttocks, denies head strike or LOC, Exam: Unremarkable at this time  ED work up reveals: Afib with RVR - 130's Cardizem drip started,  CT C/A/P: Markedly distended rectum containing a large volume of stool consistent with severe fecal impaction   There is mild rectal wall thickening with surrounding fat stranding which could indicate a component of stercoral proctitis  Severely distended urinary bladder with associated bilateral hydronephrosis   The bladder is displaced anteriorly 2/2 severe fecal impaction    BB and AC d/c last admission d/t hypotension and GIB,  Large BM in ED,  Plan: NPO, stool regimen, stool chart at bedside, regina, C/W IV Cardizem drip, titrate drip down for b/p,  transition to PO when rate controlled,  IV dig, GI consult  Trend serial labs with repletion as needed, DVT PPX        8/12/22 IM note: low BP's this morning  Patient is asymptomatic  cardizem drip at 10mg/hr for atrial fibrillation with rvr  Patient has received 500mL bolus NS and 12 5g IV albumin without much improvement in BP  Most recent BP 89/51 (MAP 67)  HR more controlled over the last 2 hours   Will avoid further fluids given history of CHF  Cardiology recommendations: Give additional 250mL NS bolus, digoxin IV 0 25mg, and start cardizem 60mg PO Q6H now  Following cardizem tablet, can start to down titrate the cardizem drip  Cardiology Consult: Atrial fibrillation with RVR: Assessment & Plan: Rate now better controlled  Add po diltiazem to med regimen  With multiple falls she is not a good candidate for full anticoagulation      D/C summary:    Afib with RVR:   Patient's blood pressure and heart rates have improved with initiation of diltiazem  Diltiazem 180 mg daily added to patient's regimen  Continue digoxin as previously prescribed  No indication for anticoagulation the setting of numerous falls  Outpatient follow-up with Cardiology   Falls:   PT/OT recommends home health care  Case management following   Patient had large bowel movement in the ED  B/L hydronephritis: Constipation:   KUB with no signs of stool burden or obstruction  Patient has MiraLax at home  Will add Dulcolax suppository  Secondary to severe constipation which has since resolved as patient had a large bowel movement  Varghese catheter has been discontinued  Creatinine has remained stable  Continue management of constipation  Encouraged ambulation  The patient was strongly encouraged to resume digoxin as previously prescribed and to also add Cardizem as prescribed by myself to her current atrial fibrillation regimen  She was also encouraged to follow-up with Cardiology within 5 days    ED Triage Vitals   Temperature Pulse Respirations Blood Pressure SpO2   08/11/22 2152 08/11/22 2152 08/11/22 2152 08/11/22 2152 08/11/22 2152   98 °F (36 7 °C) (!) 135 18 127/78 96 %      Temp Source Heart Rate Source Patient Position - Orthostatic VS BP Location FiO2 (%)   08/11/22 2152 08/12/22 0207 08/11/22 2152 08/11/22 2152 --   Tympanic Monitor Lying Left arm       Pain Score       08/12/22 0745       No Pain          Wt Readings from Last 1 Encounters:   07/18/22 60 9 kg (134 lb 3 2 oz)     Additional Vital Signs:   Date/Time Temp Pulse Resp BP MAP (mmHg) SpO2 Calculated FIO2 (%) - Nasal Cannula Nasal Cannula O2 Flow Rate (L/min) O2 Device Patient Position - Orthostatic VS   08/12/22 1347 -- -- -- 121/61 -- -- -- -- -- --   08/12/22 0700 -- 94 -- 105/51 74 99 % 28 2 L/min Nasal cannula --   08/12/22 0600 -- 95 -- 98/55 73 97 % 28 2 L/min Nasal cannula --   08/12/22 0526 -- 100 -- 96/54 68 98 % 28 2 L/min Nasal cannula --   08/12/22 0500 -- 99 -- 89/51 Abnormal  67 99 % 28 2 L/min Nasal cannula --   08/12/22 0337 -- 104 -- 95/53 72 98 % -- -- Nasal cannula --   08/12/22 0300 -- 98 -- 85/49 Abnormal  63 Abnormal  100 % -- -- Nasal cannula --   08/12/22 0207 -- 103 -- 98/55 75 100 % 28 2 L/min Nasal cannula --   08/12/22 0146 -- 112 Abnormal  -- 109/56 -- 100 % 28 2 L/min Nasal cannula --   08/12/22 0142 -- 110 Abnormal  -- 99/56 73 99 % 28 2 L/min Nasal cannula --   08/12/22 0115 -- 143 Abnormal  -- 117/69 86 95 % -- -- -- --   08/12/22 0100 -- 124 Abnormal  -- 119/66 87 -- -- -- -- --   08/12/22 0006 -- 133 Abnormal  -- 143/93 -- -- -- -- -- --   08/11/22 2300 -- 118 Abnormal  -- 134/70 95 97 % -- -- -- --   08/11/22 2235 -- 105 -- 92/52 70 94 % -- -- None (Room air) --   08/11/22 2229 -- 131 Abnormal  18 121/76 90 94 % -- -- None (Room air) --     Pertinent Labs/Diagnostic Test Results:   8/12/22 EKG: Atrial fibrillation  Incomplete right bundle branch block  Nonspecific ST-t wave changes  When compared with ECG of 12-AUG-2022 00:21, (unconfirmed)  No significant change since prior tracing  8/12/222 EKG: Atrial fibrillation with rapid ventricular response  Nonspecific ST-t wave changes  Abnormal ECG  When compared with ECG of 11-AUG-2022 21:48, (unconfirmed)  No significant change was found  8/11/22 EKG Atrial fibrillation with rapid ventricular response  Nonspecific ST-t wave changes  Abnormal ECG  When compared with ECG of 03-JUN-2022 08:15,  Vent  rate has increased BY  46 BPM  Nonspecific T wave abnormality has replaced inverted T waves in Lateral leads    XR abdomen 1 view kub   Final Result by Justino Gastelum MD (08/12 1006)      Stool within the rectum though without appreciable distention to correspond to that evident on prior CT  No significant stool within the remainder of the colon  No bowel obstruction  Workstation performed: [id number]         CT chest abdomen pelvis wo contrast   Final Result by Angela Healy MD (08/11 0267)      1  No evidence of traumatic injury  2   Markedly distended rectum containing a large volume of stool consistent with severe fecal impaction  There is mild rectal wall thickening with surrounding fat stranding which could indicate a component of stercoral proctitis  3   Severely distended urinary bladder with associated bilateral hydronephrosis  The bladder is displaced anteriorly likely due to mass effect from rectal distention  4   Redemonstration of peripheral interstitial thickening and subpleural reticulation in the lung bases with bronchiectasis which may be related to chronic interstitial lung disease  The study was marked in [facility] for immediate notification  Workstation performed: [id number]         CT head without contrast   Final Result by Angela Healy MD (08/11 6036)      No acute intracranial abnormality  Workstation performed: [id number]         CT spine cervical without contrast   Final Result by Constanza Birmingham MD (08/11 2256)      No cervical spine fracture or traumatic malalignment                     Workstation performed: [id number]               Results from last 7 days   Lab Units 08/12/22  0649 08/11/22  2252   WBC Thousand/uL 8 60 10 61*   HEMOGLOBIN g/dL 9 3* 10 1*   HEMATOCRIT % 30 5* 32 8*   PLATELETS Thousands/uL 188 228   NEUTROS ABS Thousands/µL  --  9 09*         Results from last 7 days   Lab Units 08/12/22  0649 08/11/22  2248   SODIUM mmol/L 139 138   POTASSIUM mmol/L 3 9 3 4*   CHLORIDE mmol/L 100 98   CO2 mmol/L 26 24   ANION GAP mmol/L 13 16*   BUN mg/dL 18 19   CREATININE mg/dL 1 03 1 20   EGFR ml/min/1 73sq m 52 43   CALCIUM mg/dL 8 6 9 2   MAGNESIUM mg/dL 2 4 2 3     Results from last 7 days   Lab Units 08/12/22  0649 08/11/22  2248   AST U/L 37 36   ALT U/L 12 11   ALK PHOS U/L 65 76   TOTAL PROTEIN g/dL 5 7* 6 2*   ALBUMIN g/dL 3 2* 3 4*   TOTAL BILIRUBIN mg/dL 0 65 0 77         Results from last 7 days   Lab Units 08/12/22  0649 08/11/22  2248   GLUCOSE RANDOM mg/dL 59* 84         Results from last 7 days   Lab Units 08/12/22  0308 08/12/22  0018 08/11/22  2203   HS TNI 0HR ng/L  --   --  30   HS TNI 2HR ng/L  --  28  --    HSTNI D2 ng/L  --  -2  --    HS TNI 4HR ng/L 28  --   --    HSTNI D4 ng/L -2  --   --            Results from last 7 days   Lab Units 08/11/22  2201   LACTIC ACID mmol/L 1 5         Results from last 7 days   Lab Units 08/12/22  0018   DIGOXIN LVL ng/mL 1 0     Results from last 7 days   Lab Units 08/11/22  2248   BNP pg/mL 120*             Results from last 7 days   Lab Units 08/11/22  2248   LIPASE u/L 9*                 Results from last 7 days   Lab Units 08/12/22  0631   CLARITY UA  Clear   COLOR UA  Straw   SPEC GRAV UA  1 010   PH UA  7 0   GLUCOSE UA mg/dl Negative   KETONES UA mg/dl Trace*   BLOOD UA  Negative   PROTEIN UA mg/dl Negative   NITRITE UA  Negative   BILIRUBIN UA  Negative   UROBILINOGEN UA E U /dl 0 2   LEUKOCYTES UA  Negative       Results from last 7 days   Lab Units 05/15/50  0989   SALICYLATE LVL mg/dL <5     ED Treatment:   Medication Administration from 08/11/2022 2128 to 08/12/2022 1745       Date/Time Order Dose Route Action     08/11/2022 2223 sodium chloride 0 9 % bolus 500 mL 500 mL Intravenous New Bag     08/11/2022 2221 diltiazem (CARDIZEM) injection 15 mg 15 mg Intravenous Given     08/12/2022 0121 diltiazem (CARDIZEM) 125 mg in sodium chloride 0 9 % 125 mL infusion 10 mg/hr Intravenous New Bag     08/12/2022 0023 diltiazem (CARDIZEM) injection 10 mg 10 mg Intravenous Given     08/12/2022 0326 albumin human (FLEXBUMIN) 5 % injection 12 5 g 12 5 g Intravenous New Bag     08/12/2022 0249 sodium chloride 0 9 % bolus 500 mL 500 mL Intravenous New Bag     08/12/2022 0603 levothyroxine tablet 150 mcg 150 mcg Oral Given     08/12/2022 5484 diltiazem (CARDIZEM) 125 mg in sodium chloride 0 9 % 125 mL infusion 2 5 mg/hr Intravenous Rate/Dose Change     08/12/2022 0618 diltiazem (CARDIZEM) 125 mg in sodium chloride 0 9 % 125 mL infusion 5 mg/hr Intravenous Rate/Dose Change     08/12/2022 0606 diltiazem (CARDIZEM) 125 mg in sodium chloride 0 9 % 125 mL infusion 6 mg/hr Intravenous Rate/Dose Change     08/12/2022 0347 diltiazem (CARDIZEM) 125 mg in sodium chloride 0 9 % 125 mL infusion 1 mg/hr Intravenous Continue to Inpatient Floor     08/12/2022 0342 potassium chloride (K-DUR,KLOR-CON) CR tablet 40 mEq 40 mEq Oral Given     08/12/2022 0610 digoxin (LANOXIN) injection 250 mcg 250 mcg Intravenous Given     08/12/2022 0615 sodium chloride 0 9 % bolus 250 mL 250 mL Intravenous New Bag     08/12/2022 1347 diltiazem (CARDIZEM) tablet 60 mg 60 mg Oral Given        Past Medical History:   Diagnosis Date    Disease of thyroid gland     Hypotension     Supratherapeutic INR 6/2/2022     Present on Admission:   Atrial fibrillation with RVR ([address] )   Chronic diastolic CHF (congestive heart failure) (HCC)   Essential hypertension   CKD (chronic kidney disease) stage 3, GFR 30-59 ml/min (HCC)   Bilateral hydronephrosis   Proctitis   Fall   Hypothyroidism   Hypokalemia      Admitting Diagnosis: Weakness [R53 1]  Age/Sex: 68 y o  female  Admission Orders:  Scheduled Medications:  Demadex 40 mg od po  Zoloft 100 mg po OD  Levothyroxine 150 mcg PO OD        Continuous IV Infusions:  diltiazem (CARDIZEM) 125 mg in sodium chloride 0 9 % 125 mL infusion  Rate: 1-15 mL/hr Dose: 1-15 mg/hr  Freq: Titrated Route: IV  Last Dose: Stopped (08/12/22 0649)  Start: 08/12/22 0300 End: 08/12/22 0841         PRN Meds:  Tylenol 650 mg q 6h po prn   Dulcolax 10 mg rectal supp prn constipation  Miralax 17 gm po prn constipation  Demadex 20 mg po  Prn       IP CONSULT TO CARDIOLOGY  IP CONSULT TO GASTROENTEROLOGY  IP CONSULT TO CASE MANAGEMENT    Network Utilization Review Department  ATTENTION: Please call with any questions or concerns to [phone] and carefully listen to the prompts so that you are directed to the right person  All voicemails are confidential   Sahu Carly all requests for admission clinical reviews, approved or denied determinations and any other requests to dedicated fax number below belonging to the campus where the patient is receiving treatment   List of dedicated fax numbers for the Facilities:  [address] DENIALS (Administrative/Medical Necessity) [phone]   [address] (Maternity/NICU/Pediatrics) [phone]   [address] [address] [address] [address] [facility] [address] [phone]

## 2022-08-12 NOTE — ASSESSMENT & PLAN NOTE
· Patient presenting with Afib RVR (-130s)  · Denies any chest pain or SOB at this time  · Troponin level: 0hr 30, 2hr 28, 4hr pending  · BB discontinued last hospitalization secondary to low BP   Digoxin 0 125mg daily started, will continue  · Digoxin level checked and was wnl (1 0)  · Started on Cardizem drip by the ED, will continue for now  · [facility] discontinued after last admission secondary to GIB  · Monitor on telemetry  · SD2 level of care  · Consult to Cardiology, recommendations are appreciated    Patient's blood pressure and heart rates have improved with initiation of diltiazem  Diltiazem 180 mg daily added to patient's regimen  Continue digoxin as previously prescribed  No indication for anticoagulation the setting of numerous falls  Outpatient follow-up with Cardiology

## 2022-08-12 NOTE — OCCUPATIONAL THERAPY NOTE
Occupational Therapy Evaluation      Irma Dates    8/12/2022    Principal Problem:    Atrial fibrillation with RVR ([address] )  Active Problems:    Chronic diastolic CHF (congestive heart failure) ([facility])    Essential hypertension    Hypothyroidism    CKD (chronic kidney disease) stage 3, GFR 30-59 ml/min ([facility])    Bilateral hydronephrosis    Proctitis    Fall    Hypokalemia      Past Medical History:   Diagnosis Date    Disease of thyroid gland     Hypotension     Supratherapeutic INR 6/2/2022       Past Surgical History:   Procedure Laterality Date    HYSTERECTOMY          08/12/22 2829   OT Last Visit   OT Visit Date 08/12/22   Note Type   Note type Evaluation   Restrictions/Precautions   Weight Bearing Precautions Per Order No   Other Precautions O2;Telemetry;Multiple lines; Fall Risk  (3L O2 via NC)   Pain Assessment   Pain Assessment Tool 0-10   Pain Score No Pain   Home Living   Type of Home House   Home Layout Two level;Bed/bath upstairs;Stairs to enter with rails  (~3 vs 5 CRISSY)   Bathroom Shower/Tub Tub/shower unit   [employer]; Shower chair;Grab bars in shower   [address] Walker;Cane  (pt uses both AD as needed)   Additional Comments pt reports wearing O2 at home at baseline prn daytime, wears at nighttime   Prior Function   Level of [place] Independent with ADLs and functional mobility; Needs assistance with IADLs   Lives With Spouse   Receives Help From Family   ADL Assistance Independent   IADLs Needs assistance   Falls in the last 6 months 1 to 4  (2 falls right PTA)   Comments pt's  performs driving   ADL   UB Bathing Assistance 5  Supervision/Setup   LB Bathing Assistance 4  Minimal Assistance   [address]  3  Moderate Assistance   Toileting Deficit Setup;Verbal cueing;Supervison/safety; Increased time to complete; Bedside commode;Clothing management up;Clothing management down;Perineal hygiene   Bed Mobility   Supine to Sit 5  Supervision   Additional items Assist x 1; Increased time required;Verbal cues   Sit to Supine 4  Minimal assistance   Additional items Assist x 1; Increased time required;Verbal cues;LE management   Transfers   Sit to Stand   (SBA)   Additional items Assist x 1; Increased time required;Verbal cues   Stand to Sit   (SBA)   Additional items Assist x 1; Increased time required;Verbal cues   Stand pivot   (SBA)   Additional items Assist x 1; Increased time required;Verbal cues  (RW)   Balance   Static Sitting Good   Dynamic Sitting Fair +   Static Standing Fair   Dynamic Standing Fair -   Ambulatory Fair -   Activity Tolerance   Activity Tolerance Patient limited by fatigue   RUE Assessment   RUE Assessment X  (AROM WFL)   RUE Strength   RUE Overall Strength Deficits  (3+/5)   LUE Assessment   LUE Assessment X  (AROM WFL)   LUE Strength   LUE Overall Strength Deficits  (3+/5)   Vision-Basic Assessment   Current Vision Wears glasses all the time   Cognition   Overall Cognitive Status Impaired   Arousal/Participation Alert; Cooperative   Attention Attends with cues to redirect   Orientation Level Oriented X4   Memory Decreased recall of recent events;Decreased recall of precautions   Following Commands Follows one step commands without difficulty   Assessment   Limitation Decreased ADL status; Decreased UE strength;Decreased Safe judgement during ADL;Decreased cognition;Decreased endurance;Decreased self-care trans;Decreased high-level ADLs   Prognosis Good   Assessment Pt is a 68 y o  female seen for OT evaluation s/p [facility] on 8[facility]11/2022 w/ Atrial fibrillation with RVR ([facility] [address] )  Comorbidities affecting pt's functional performance at time of assessment include: Hypotension  Personal factors affecting pt at time of IE include:steps to enter environment, limited home support and difficulty performing ADLS   Prior to admission, pt was Mod I with ADLs  Upon evaluation: the following deficits impact occupational performance: decreased strength, decreased balance, decreased tolerance, impaired memory, impaired sequencing, impaired problem solving and decreased safety awareness  Pt to benefit from continued skilled OT tx while in the hospital to address deficits as defined above and maximize level of functional independence w ADL's and functional mobility  Occupational Performance areas to address include: bathing/shower, toilet hygiene, dressing, functional mobility and clothing management  From OT standpoint, recommendation at time of d/c would be Home OT  Goals   Patient Goals To feel better   Plan   Treatment Interventions ADL retraining;Functional transfer training;UE strengthening/ROM; Endurance training;Cognitive reorientation;Equipment evaluation/education; Compensatory technique education; Energy conservation; Activityengagement   Goal Expiration Date 08/22/22   OT Treatment Day 0   OT Frequency 3-5x/wk   Recommendation   OT Discharge Recommendation Home with home health rehabilitation   Additional Comments  Pt seen as a co-eval with PT due to the patient's co-morbidities, clinically unstable presentation, and present impairments which are a regression from the patient's baseline  Additional Comments 2 The patient's raw score on the AM-PAC Daily Activity inpatient short form is 19, standardized score is 40 22, greater than 39 4  Patients at this level are likely to benefit from discharge to home  Please refer to the recommendation of the Occupational Therapist for safe discharge planning     AM-PAC Daily Activity Inpatient   Lower Body Dressing 3   Bathing 3   Toileting 2   Upper Body Dressing 3   Grooming 4   Eating 4   Daily Activity Raw Score 19   Daily Activity Standardized Score (Calc for Raw Score >=11) 40 22   AM-PAC Applied Cognition Inpatient   Following a Speech/Presentation 4   Understanding Ordinary Conversation 4   Taking Medications 3   Remembering Where Things Are Placed or Put Away 3   Remembering List of 4-5 Errands 3   Taking Care of Complicated Tasks 3   Applied Cognition Raw Score 20   Applied Cognition Standardized Score 41 76     GOALS:    Pt will achieve the following within specified time frame: STG  Pt will achieve the following goals within 5 days    *ADL transfers with (S) for inc'd independence with ADLs/purposeful tasks    *UB ADL with (S) for inc'd independence with self cares    *LB ADL with CGA using AE prn for inc'd independence with self cares    *Toileting with Min (A) for clothing management and hygiene for return to PLOF with personal care    *Increase static stand balance to F+ and dyn stand balance to F for inc'd safety with standing purposeful tasks    *Increase stand tolerance x3 m for inc'd tolerance with standing purposeful tasks    *Participate in 10m UE therex to increase overall stamina/activity tolerance for purposeful tasks    *Bed mobility- (S) for inc'd independence to manage own comfort and initiate EOB & OOB purposeful tasks    Pt will achieve the following within specified time frame: LTG  Pt will achieve the following goals within 10 days    *ADL transfers with (I) for inc'd independence with ADLs/purposeful tasks    *UB ADL with (I) for inc'd independence with self cares    *LB ADL with (S) using AE prn for inc'd independence with self cares    *Toileting with CGA for clothing management and hygiene for return to PLOF with personal care    *Increase static stand balance to G- and dyn stand balance to F+ for inc'd safety with standing purposeful tasks    *Increase stand tolerance x5 m for inc'd tolerance with standing purposeful tasks    *Bed mobility- (I) for inc'd independence to manage own comfort and initiate EOB & OOB purposeful tasks      Lidya Lamas, MS, OTR/L

## 2022-08-12 NOTE — ED PROCEDURE NOTE
PROCEDURE  CriticalCare Time  Performed by: Maria Eugenia Preston MD  Authorized by: Maria Eugenia Preston MD     Critical care provider statement:     Critical care time (minutes):  65    Critical care start time:  8/12/2022 9:40 PM    Critical care end time:  8/12/2022 3:30 AM    Critical care was necessary to treat or prevent imminent or life-threatening deterioration of the following conditions: afib w/ rvr     Critical care was time spent personally by me on the following activities:  Examination of patient, evaluation of patient's response to treatment, discussions with primary provider, development of treatment plan with patient or surrogate, review of old charts, re-evaluation of patient's condition, ordering and review of radiographic studies, ordering and review of laboratory studies and ordering and performing treatments and interventions         Maria Eugenia Preston MD  08/12/22 5402

## 2022-08-12 NOTE — CONSULTS
Consultation - [facility]  Oscarmarie Brooks 68 y o  female MRN: [id number]  Unit/Bed#: ED 20 Encounter: [id number]    Inpatient consult to gastroenterology  Consult performed by: Keven Schmidt PA-C  Consult ordered by: Juan Carlos Roberson PA-C        Reason for Consult / Principal Problem:     "proctitis"     ASSESSMENT AND PLAN:      69 y/o F with pmhx sig for A Fib on eliquis, CHF, HTN, CKD3, hypothyroidism, who presented to ER with generalized weakness and recurrent falls  Found to be in a fib with RVR  Noted to be constipated, admission CT albeit without contrast, demonstrated fecal impaction and possible stercoral proctitis  Also noted urinary retention and hydronephrosis  Hb on admission 10 6, which is improved from pt's last hospitalization in 06/2022  She recently underwent colonoscopy in 06/2022 for ABLA and no obvious rectal abnormalities were noted  Tachycardia and hypotension has been noted since admission without overt GI bleeding  Constipation/fecal impaction   Abnormal CT scan of A/P     · During hospitalization, recommend continuing evacuation of stool burden from below with scheduled suppositories/tap water enemas (given CKD)  If she does not have significant bowel movement, recommend manual disimpaction  · Once above is completed, patient will need to be on a more robust regimen in the outpatient setting to prevent recurrent constipation, and would recommend if she is tolerating PO intake, to start on  MiraLax b i d , with titration of cathartic regimen if needed  · Findings on CT scan are likely secondary to the large stool burden in the rectal vault, and she is up-to-date on colonoscopy which was last performed in 06/2022  At this time, there is no indication to repeat endoscopic evaluation  · Pt is afebrile, no abdominal pain or diarrhea, nml lactic acid, no indication for abx from GI standpoint     · Okay for diet as tolerated from GI standpoint now that she has started moving bowels and is not having any abd pain/distention  History of acute blood loss anemia     · Primary problem that necessitated last admission, during which time she underwent bidirectional endoscopic evaluation  · Patient's hemoglobin has improved since discharge in June, from 8 1 (06/09/22) -> 10 1 (08/11/22)  · Continue to monitor Hb, transfuse per protocol or for hemodynamic instability  · If recurrent melena or anemia, patient would need capsule endoscopy in future  GI can continue to follow peripherally at this time  She should also establish with GI group in the outpatient setting to manage chronic constipation  ______________________________________________________________________    HPI: Patient is a 68 y o  female with pmhx sig for atrial fibrillation on Eliquis, hypothyroidism, HTN, CKD 3  Patient presented to the emergency department with generalized weakness and recurrent falls  During the ER evaluation, she was found to be in AFib with RVR  ER workup included CT C/A/P WO contrast which demonstrated a markedly distended rectum containing a large volume of stool consistent with severe fecal impaction  Additionally, there was mild rectal wall thickening with surrounding fat stranding  Additional incidental findings commented on a severely distended urinary bladder and bilateral hydronephrosis, as well as peripheral interstitial thickening in the lung bases  Admission serologies note Hb 10 1 (improved from last lab check on 06/09/2022 with Hb of 8 1), Hct 32 8, MCV 93, WBC 10 61, , K 3 4, BUN 19, Cr 1 20, AST 36, ALT 11, ALP 76, albumin 3 4, T bili 0 77, lipase 9, lactic acid 1 5  Pt initially tachycardic on admission, with several recorded episodes of hypotension  Primary team has made note and consulted Cardiology  She was started on a bowel regimen (administered enemas) in the emergency department and had a large bowel movement   Repeat plain film on 8/12/22 after administration of rectal cathartics demonstrated stool within colon though without appreciable distention to correspond to that evident on prior CT  At bedside, pt is quite somnolent  She denies any significant heartburn, indigestion, nausea, emesis, dysphagia  No hematemesis  She endorses some intermittent constipation, rare diarrhea at home  Endorses straining to have BM at times, with scant amt of BRBPR on wipe  She denies any melenic stools  She states at baseline she is having BM every day to every other day  She denies any significant abdominal pain  She was last hospitalized in 06/2022 for acute blood loss anemia and melena, and she had bidirectional endoscopic evaluation completed at that time  She denies any regular use of NSAIDs  On eliquis in outpt setting  Endoscopic history:  EGD:  06/02/2022:  Mild resistance in cervical esophagus, may represent cervical bar in absence of discrete stricture; otherwise normal upper endoscopy  Colonoscopy:  06/07/2022: Tortuous colon with severe diverticula causing mild luminal narrowing in the sigmoid colon; subtly abnormal fold in the transverse colon s/p biopsies; possible ulcer with yellow eschar in the sigmoid colon, small adenomatous appearing polyp superior to eschar; 2 mm sessile polyp in transverse colon, 6 mm sessile polyp in sigmoid colon; internal hemorrhoids  Pathology:   A  Sigmoid polyp: Tubular adenoma, no high grade dysplasia or malignancy   B  Transverse colon biopsy:  Benign colonic mucosa without abnormality; no epithelial dysplasia and no malignancy  C  Transverse polyp:  Tubular adenoma, no high-grade dysplasia or malignancy  D  Sigmoid colon ulceration:  Colonic mucosa with reactive/hyperplastic changes, no evidence of dysplasia or malignancy    Review of Systems   Constitutional: Positive for activity change and fever  Negative for appetite change, chills and unexpected weight change     Respiratory: Negative for chest tightness and shortness of breath  Gastrointestinal: Positive for constipation  Negative for abdominal pain, diarrhea, nausea, rectal pain and vomiting  Musculoskeletal: Positive for gait problem  Neurological: Negative for seizures and syncope  All other systems reviewed and are negative  Historical Information   Past Medical History:   Diagnosis Date    Disease of thyroid gland     Hypotension     Supratherapeutic INR 6/2/2022     Past Surgical History:   Procedure Laterality Date    HYSTERECTOMY       Social History   Social History     Substance and Sexual Activity   Alcohol Use Never    Comment: occassional      Social History     Substance and Sexual Activity   Drug Use Never     Social History     Tobacco Use   Smoking Status Never Smoker   Smokeless Tobacco Never Used     History reviewed  No pertinent family history      Meds/Allergies     (Not in a hospital admission)    Current Facility-Administered Medications   Medication Dose Route Frequency    acetaminophen (TYLENOL) tablet 650 mg  650 mg Oral Q6H PRN    aspirin (ECOTRIN LOW STRENGTH) EC tablet 81 mg  81 mg Oral Daily    atorvastatin (LIPITOR) tablet 40 mg  40 mg Oral Daily With Dinner    bisacodyl (DULCOLAX) rectal suppository 10 mg  10 mg Rectal Daily PRN    digoxin (LANOXIN) tablet 125 mcg  125 mcg Oral Every Other Day    diltiazem (CARDIZEM) 125 mg in sodium chloride 0 9 % 125 mL infusion  1-15 mg/hr Intravenous Titrated    diltiazem (CARDIZEM) 125 mg/25 mL injection **ADS Override Pull**        diltiazem (CARDIZEM) tablet 60 mg  60 mg Oral Q6H [facility]    enoxaparin (LOVENOX) subcutaneous injection 40 mg  40 mg Subcutaneous Daily    folic acid (FOLVITE) tablet 1,000 mcg  1,000 mcg Oral Daily    levothyroxine tablet 150 mcg  150 mcg Oral Early Morning    polyethylene glycol (MIRALAX) packet 17 g  17 g Oral Daily PRN    senna-docusate sodium (SENOKOT S) 8 6-50 mg per tablet 1 tablet  1 tablet Oral HS    sertraline (ZOLOFT) tablet 100 mg  100 mg Oral Daily    torsemide (DEMADEX) tablet 40 mg  40 mg Oral Daily     No Known Allergies    Objective     Blood pressure 105/51, pulse 94, temperature 98 °F (36 7 °C), temperature source Tympanic, resp  rate 18, SpO2 99 %  There is no height or weight on file to calculate BMI  Intake/Output Summary (Last 24 hours) at 8/12/2022 0838  Last data filed at 8/12/2022 0730  Gross per 24 hour   Intake 1250 ml   Output 1301 ml   Net -51 ml     Physical Exam  Vitals and nursing note reviewed  Constitutional:       General: She is sleeping  Appearance: She is not ill-appearing or toxic-appearing  HENT:      Head: Normocephalic and atraumatic  Cardiovascular:      Rate and Rhythm: Normal rate  Pulses: Normal pulses  Abdominal:      General: Abdomen is flat  Bowel sounds are normal  There is no distension  Palpations: Abdomen is soft  There is no mass  Tenderness: There is no abdominal tenderness  There is no guarding  Musculoskeletal:         General: Swelling present  Skin:     General: Skin is warm and dry  Findings: Bruising present  Comments: Left cheek ecchymosis   Neurological:      General: No focal deficit present  Mental Status: She is lethargic          Lab Results:   Admission on 08/11/2022   Component Date Value    WBC 08/11/2022      RBC 08/11/2022      Hemoglobin 08/11/2022      Hematocrit 08/11/2022      MCV 08/11/2022      MCH 08/11/2022      MCHC 08/11/2022      RDW 08/11/2022      MPV 08/11/2022      Platelets 10/90/3778      Sodium 08/11/2022 138     Potassium 08/11/2022 3 4 (A)    Chloride 08/11/2022 98     CO2 08/11/2022 24     ANION GAP 08/11/2022 16 (A)    BUN 08/11/2022 19     Creatinine 08/11/2022 1 20     Glucose 08/11/2022 84     Calcium 08/11/2022 9 2     Corrected Calcium 08/11/2022 9 7     AST 08/11/2022 36     ALT 08/11/2022 11     Alkaline Phosphatase 08/11/2022 76     Total Protein 08/11/2022 6 2 (A)    Albumin 08/11/2022 3 4 (A)    Total Bilirubin 08/11/2022 0 77     eGFR 08/11/2022 43     Lipase 08/11/2022 9 (A)    LACTIC ACID 08/11/2022 1 5     Magnesium 08/11/2022 2 3     BNP 08/11/2022 120 (A)    Color, UA 08/12/2022 Straw     Clarity, UA 08/12/2022 Clear     Specific Gravity, UA 08/12/2022 1 010     pH, UA 08/12/2022 7 0     Leukocytes, UA 08/12/2022 Negative     Nitrite, UA 08/12/2022 Negative     Protein, UA 08/12/2022 Negative     Glucose, UA 08/12/2022 Negative     Ketones, UA 08/12/2022 Trace (A)    Urobilinogen, UA 08/12/2022 0 2     Bilirubin, UA 08/12/2022 Negative     Occult Blood, UA 08/12/2022 Negative     hs TnI 0hr 08/11/2022 30     hs TnI 2hr 08/12/2022 28     Delta 2hr hsTnI 08/12/2022 -2     WBC 08/11/2022 10 61 (A)    RBC 08/11/2022 3 53 (A)    Hemoglobin 08/11/2022 10 1 (A)    Hematocrit 08/11/2022 32 8 (A)    MCV 08/11/2022 93     MCH 08/11/2022 28 6     MCHC 08/11/2022 30 8 (A)    RDW 08/11/2022 15 6 (A)    MPV 08/11/2022 9 3     Platelets 47/84/6055 228     nRBC 08/11/2022 0     Neutrophils Relative 08/11/2022 86 (A)    Immat GRANS % 08/11/2022 0     Lymphocytes Relative 08/11/2022 5 (A)    Monocytes Relative 08/11/2022 9     Eosinophils Relative 08/11/2022 0     Basophils Relative 08/11/2022 0     Neutrophils Absolute 08/11/2022 9 09 (A)    Immature Grans Absolute 08/11/2022 0 02     Lymphocytes Absolute 08/11/2022 0 56 (A)    Monocytes Absolute 08/11/2022 0 91     Eosinophils Absolute 08/11/2022 0 01     Basophils Absolute 89/23/7014 2 50     Salicylate Lvl 78/63/9646 <5     hs TnI 4hr 08/12/2022 28     Delta 4hr hsTnI 08/12/2022 -2     Digoxin Lvl 08/12/2022 1 0     Sodium 08/12/2022 139     Potassium 08/12/2022 3 9     Chloride 08/12/2022 100     CO2 08/12/2022 26     ANION GAP 08/12/2022 13     BUN 08/12/2022 18     Creatinine 08/12/2022 1 03     Glucose 08/12/2022 59 (A)    Calcium 08/12/2022 8 6     Corrected Calcium 08/12/2022 9 2     AST 08/12/2022 37     ALT 08/12/2022 12     Alkaline Phosphatase 08/12/2022 65     Total Protein 08/12/2022 5 7 (A)    Albumin 08/12/2022 3 2 (A)    Total Bilirubin 08/12/2022 0 65     eGFR 08/12/2022 52     Magnesium 08/12/2022 2 4     WBC 08/12/2022 8 60     RBC 08/12/2022 3 24 (A)    Hemoglobin 08/12/2022 9 3 (A)    Hematocrit 08/12/2022 30 5 (A)    MCV 08/12/2022 94     MCH 08/12/2022 28 7     MCHC 08/12/2022 30 5 (A)    RDW 08/12/2022 15 8 (A)    Platelets 72/03/4721 188     MPV 08/12/2022 9 9     Ventricular Rate 08/12/2022 95     Atrial Rate 08/12/2022 88     QRSD Interval 08/12/2022 92     QT Interval 08/12/2022 410     QTC Interval 08/12/2022 515     QRS Axis 08/12/2022 78     T Wave Axis 08/12/2022 -47     Ventricular Rate 08/11/2022 126     Atrial Rate 08/11/2022 80     QRSD Interval 08/11/2022 84     QT Interval 08/11/2022 328     QTC Interval 08/11/2022 475     QRS Axis 08/11/2022 77     T Wave Axis 08/11/2022 -71      Imaging Studies: I have personally reviewed pertinent imaging studies  Doni Krishnamurthy PA-C    **Please note:  Dictation voice to text software may have been used in the creation of this record  Occasional wrong word or sound alike substitutions may have occurred due to the inherent limitations of voice recognition software  Read the chart carefully and recognize, using context, where substitutions have occurred  **

## 2022-08-12 NOTE — ASSESSMENT & PLAN NOTE
· CT C/A/P: Severely distended urinary bladder with associated bilateral hydronephrosis  The bladder is displaced anteriorly    · Caused by bladder outlet obstruction secondary to severe fecal impaction  · Creatinine stable at baseline  · See treatment above under proctitis  · Varghese catheter in place  · Monitor I&O    Secondary to severe constipation which has since resolved as patient had a large bowel movement  Varghese catheter has been discontinued  Creatinine has remained stable  Continue management of constipation

## 2022-08-12 NOTE — ASSESSMENT & PLAN NOTE
· CT C/A/P: Severely distended urinary bladder with associated bilateral hydronephrosis  The bladder is displaced anteriorly    · Caused by bladder outlet obstruction secondary to severe fecal impaction  · Creatinine stable at baseline  · See treatment above under proctitis  · Varghese catheter in place  · Monitor I&O

## 2022-08-12 NOTE — ASSESSMENT & PLAN NOTE
· Patient with primary complaint of generalized weakness and multiple falls in her home  Spoke with patient's  and he noted that this morning he heard a thud and came downstairs to his wife saying she bent down to get something off the floor and had fallen  Unclear if she hit her head, but there is a bruise on her left cheek   Denies any dizziness, chest pain, SOB prior to the fall  · CT head and cervical spine unremarkable  · Fall precautions  · PT/OT evaluation  · Case management

## 2022-08-12 NOTE — ED PROCEDURE NOTE
PROCEDURE  ECG 12 Lead Documentation Only    Date/Time: 8/12/2022 3:09 AM  Performed by: Brennen Watkins MD  Authorized by: Brennen Watkins MD     Indications / Diagnosis:  Afib with RVR, weak   Patient location:  ED  Interpretation:     Interpretation: abnormal    Rate:     ECG rate:  95  Rhythm:     Rhythm: atrial fibrillation    Ectopy:     Ectopy: none    QRS:     QRS axis:  Normal  Conduction:     Conduction: normal    ST segments:     ST segments:  Non-specific  T waves:     T waves: non-specific           Brennen Watkins MD  08/12/22 4491

## 2022-08-12 NOTE — PHYSICAL THERAPY NOTE
Physical Therapy Evaluation   Time in: 0743  Time out: 0758  Total evaluation time: 15 minutes    Patient's Name: Irma Dates    Admitting Diagnosis  Weakness [R53 1]    Problem List  Patient Active Problem List   Diagnosis    Acquired bilateral hammer toes    Atrial fibrillation, chronic ([facility])    B12 deficiency    Chronic diastolic CHF (congestive heart failure) ([facility] [address] )    Depression    Essential hypertension    Hypothyroidism    Impaired fasting glucose    Lymphedema of both lower extremities    Mixed hyperlipidemia    Nonrheumatic mitral valve regurgitation    Posttraumatic stress disorder    Osteoarthritis of knee    Venous insufficiency of both lower extremities    Folate deficiency    Vitamin D deficiency    Hypoxia    Frequent falls    Elevated d-dimer    Acute renal failure superimposed on stage 3 chronic kidney disease (HCC)    ABLA (acute blood loss anemia)    Elevated troponin    Shock ([facility])    EKG abnormalities    ILD (interstitial lung disease) ([facility])    Oropharyngeal aspiration    Atrial fibrillation with RVR ([facility])    CKD (chronic kidney disease) stage 3, GFR 30-59 ml/min ([facility])    Bilateral hydronephrosis    Proctitis    Fall    Hypokalemia       Past Medical History  Past Medical History:   Diagnosis Date    Disease of thyroid gland     Hypotension     Supratherapeutic INR 6/2/2022       Past Surgical History  Past Surgical History:   Procedure Laterality Date    HYSTERECTOMY         PT performed at least 2 patient identifiers during session: Name and wristband  08/12/22 0745   PT Last Visit   PT Visit Date 08/12/22   Note Type   Note type Evaluation   Pain Assessment   Pain Assessment Tool 0-10   Pain Score No Pain   Restrictions/Precautions   Weight Bearing Precautions Per Order No   Other Precautions O2;Telemetry;Multiple lines; Fall Risk  (3 L O2 via NC - doffed at end of session as SpO2 on RA = 96%)   Home Living   Type of [address] [place] [address] Two level;Bed/bath upstairs;Stairs to enter with rails  (~3 vs 5 CRISSY)   Bathroom Shower/Tub Tub/shower unit   [address]; Shower chair;Grab bars in shower   [address] Walker;Cane  (pt uses both AD as needed)   Additional Comments pt reports wearing O2 at home at baseline prn daytime, wears at nighttime   Prior Function   Level of [place] Independent with ADLs and functional mobility; Needs assistance with IADLs   Lives With Spouse   Receives Help From Family   ADL Assistance Independent   IADLs Needs assistance   Falls in the last 6 months 1 to 4  (2 falls right PTA)   Comments pt's  performs driving   General   Family/Caregiver Present No   Cognition   Arousal/Participation Alert   Attention Attends with cues to redirect   Orientation Level Oriented X4   Memory Decreased recall of recent events;Decreased recall of precautions   Following Commands Follows one step commands without difficulty   Comments pt agreeable to PT session, pleasant   Subjective   Subjective "I need to use the bathroom"   RLE Assessment   RLE Assessment X   Strength RLE   RLE Overall Strength 3+/5   LLE Assessment   LLE Assessment X   Strength LLE   LLE Overall Strength 3+/5   Vision-Basic Assessment   Current Vision Wears glasses all the time   Coordination   Movements are Fluid and Coordinated 0   Sensation WFL  (pt denies numbness/tingling)   Bed Mobility   Supine to Sit 5  Supervision   Additional items Assist x 1; Increased time required;Verbal cues   Sit to Supine 4  Minimal assistance   Additional items Assist x 1; Increased time required;Verbal cues;LE management   Additional Comments VCs for body mechanics and postioning; BP pre mobility = 93/53, BP sitting prior to OOB activity = 111/58   Transfers   Sit to Stand   (SBA)   Additional items Assist x 1; Increased time required   Stand to Sit   (SBA)   Additional items Assist x 1; Armrests; Increased time required   Additional Comments SpO2 on RA post session = 96%; HR post mobility = 118 bpm   Ambulation/Elevation   Gait pattern Improper Weight shift;Decreased foot clearance; Short stride; Excessively slow   Gait Assistance   (SBA)   Additional items Assist x 1   Assistive Device Rolling walker   Distance 5 ft x 2   Stair Management Assistance Not tested   Balance   Static Sitting Good   Dynamic Sitting Fair +   Static Standing Fair   Dynamic Standing Fair -   Ambulatory Fair -   Endurance Deficit   Endurance Deficit Yes   Activity Tolerance   Activity Tolerance Patient limited by fatigue   Medical Staff Made Aware coordination of care provided with OT Lidya; CAMERON Irma made aware of outcomes/recs   Nurse Made Aware Yes, RN made aware of outcomes/recs   Assessment   Prognosis Good   Problem List Decreased strength;Decreased endurance; Impaired balance;Decreased mobility; Impaired judgement;Decreased safety awareness   Assessment Pt is 68 y o  female seen for high-complexity PT evaluation on 8/12/2022 s/p admit to [place] [id number] on 8/11/2022 w/ Atrial fibrillation with RVR ([facility] [address] ); pt presented to ED with generalized weakness and recurrent falls  PT was consulted to assess pt's functional mobility and d/c needs  Order placed for PT eval and tx, w/ up w/ A order  PTA, pt lives c spouse in 2 SH c 3-5 CRISSY, amb c RW vs SPC at baseline, (I) ADLs  At time of eval, pt performs OOB activity at SBA with tolerance to amb 5 ft x 2  Upon evaluation, pt presenting with impaired functional mobility d/t decreased strength, decreased endurance, impaired balance, decreased mobility, impaired judgement and decreased safety awareness  Pertinent PMHx and current co-morbidities affecting pt's physical performance at time of assessment include: AFib, CHF, HTN, CKD stage 3, hypothyroidism  Personal factors affecting pt at time of eval include: positive fall history and increased age  The following objective measures performed on IE also reveal limitations: AM-PAC 6-Clicks: 09/24   Pt's clinical presentation is currently unstable/unpredictable seen in pt's presentation of tachycardia, abnormal lab value(s) and ongoing medical assessment  Overall, pt's rehab potential and prognosis to return to PLOF is good as impacted by objective findings, warranting pt to receive further skilled PT interventions to address identified impairments, activity limitation(s), and participation restriction(s)  Goal for patient is to feel better  Pt to benefit from continued PT tx to address deficits as defined above and maximize level of functional independent mobility and consistency in order for pt to improve tolerance to activity in order to ensure safe d/c home  From PT/mobility standpoint, recommendation at time of d/c would be home with home health rehabilitation pending progress in order to facilitate return to PLOF  Barriers to Discharge Inaccessible home environment   Goals   Patient Goals "to feel better"   [facility] Expiration Date 08/22/22   Short Term Goal #1 In 7-10 days: Increase bilateral LE strength 1/2 grade to facilitate independent mobility, Perform all bed mobility tasks modified independent to decrease caregiver burden, Perform all transfers modified independent to improve independence, Ambulate > 150 ft  with least restrictive assistive device modified independent w/o LOB and w/ normalized gait pattern 100% of the time, Navigate 5 stairs modified independent with unilateral handrail to facilitate return to previous living environment, Increase all balance 1/2 grade to decrease risk for falls and Complete exercise program independently   PT Treatment Day 1   Plan   Treatment/Interventions Functional transfer training;LE strengthening/ROM; Therapeutic exercise; Endurance training;Elevations; Patient/family training;Equipment eval/education; Bed mobility;Gait training;Spoke to nursing   PT Frequency 3-5x/wk   Recommendation   PT Discharge Recommendation Home with home health rehabilitation  (anticipated, pending progress)   Equipment Recommended Walker   Additional Comments Pt's raw score on the [address] [place] [address] Mobility inpatient short form is 18, standardized score is 41 05  Patients at this level are likely to benefit from DC to home with home care, however, please refer to therapist recommendation for safe DC planning  AM-PAC Basic Mobility Inpatient   Turning in Bed Without Bedrails 4   Lying on Back to Sitting on Edge of Flat Bed 3   Moving Bed to Chair 3   Standing Up From Chair 3   Walk in Room 3   Climb 3-5 Stairs 2   Basic Mobility Inpatient Raw Score 18   Basic Mobility Standardized Score 41 05   Highest Level Of Mobility   -HLM Goal 6: Walk 10 steps or more   JH-HLM Achieved 5: Stand (1 or more minutes)   Additional Treatment Session   Start Time 0758   End Time 0813  (total treatment time = 15 minutes)   Treatment Assessment Pt seen for PT treatment session this date, consisting of ther act focused on functional transfers from various surfaces including BSC and static standing x 2-3 min intervals  Pertinent barriers during this session include awareness and fatigue  Current goals and POC remain appropriate, pt continues to have rehab potential and is making progress towards STGs  Pt prognosis for achieving goals is good, pending pt progress with hospitalization/medical status improvements, and indicated by orientation, ability to follow directions, learning potential and recent onset  Pt limited d/t inability to concentrate under maximum structure and avoidance behaviors  PT recommends home with home health rehabilitation upon discharge  Pt continues to be functioning below baseline level, and remains limited 2* factors listed above  PT will continue to see pt during current hospitalization in order to address the deficits listed above and provide interventions consistent w/ POC in effort to achieve STGs     Equipment Use RW   Additional Treatment Day 1   Exercises   Balance training  pt transfers from [facility] and EOB with SBA  Toileting task with SBA during static standing x 2-3 min intervals   End of Consult   Patient Position at End of Consult Supine; All needs within reach       Noni Castaneda, PT, DPT

## 2022-08-12 NOTE — ED PROVIDER NOTES
History  No chief complaint on file  67-YEAR-OLD FEMALE      CCl:   HERE FOR GENERALIZED WEAKNESS, Rapid Afib, several falls to her buttocks due to weakness      MODE OF TRANSPORT  EMS        PATIENT HAS HAD NO COMPLAINTS OF CHEST PAIN OR SHORTNESS OF BREATH  NO  COMPLAINTS OF DIAPHORESIS    SHE DENIES ANY PAIN TO THE JAW NECK OR THE BACK  SHE DENIES PAIN WITH DEEP BREATH    NO SYNCOPE OR NEAR SYNCOPE   NO PALPITATIONS      INTERVENTIONS: NONE      TRAUMA:  NONE  PATIENT DID NOT HIT HER HEAD WHEN SHE FELL  SHE HAS NO INJURY TO ARMS OR LEGS  SHE IS MOVING ALL EXTREMITIES WITH EQUAL  STRENGTH AND 5/5 STRENGTH THROUGHOUT  INFECTIOUS SYMPTOMS:   PATIENT DENIES FEVERS, REPORTS CHILLS  NO SINUS SYMPTOMS  NO HEADACHE OR NECK PAIN, NO DIZZINESS  HE DENIES SORE THROAT, DENIES SINUS CONGESTION      OTHER ASSOCIATED SYMPTOMS:  NO ABDOMINAL PAIN  NO ACUTE BACK PAIN  NO NAUSEA OR VOMITING  SHE REPORTS "SPECKS OF BLOOD" IN HER STOOL  SHE HAD GIB SEVERAL MONTHS AGO  SHE IS ON ELIQUIS   NO STOOL CHANGES  NO URINARY COMPLAINTS: NO DYSURIA, NO HEMATURIA, NO FREQUENCY        History provided by:  Patient  Fatigue  Severity:  Moderate  Onset quality:  Gradual  Chronicity:  New  Relieved by:  Nothing  Worsened by:  Nothing  Ineffective treatments:  None tried  Associated symptoms: no abdominal pain, no anorexia, no aphasia, no arthralgias, no ataxia, no chest pain, no cough, no diarrhea, no difficulty walking, no dizziness, no dysuria, no numbness in extremities, no falls, no fever, no foul-smelling urine, no frequency, no headaches, no hematochezia, no lethargy, no loss of consciousness, no melena, no myalgias, no nausea, no near-syncope, no seizures, no sensory-motor deficit, no shortness of breath, no stroke symptoms, no syncope, no vision change and no vomiting        Cannot display prior to admission medications because the patient has not been admitted in this contact         Past Medical History: Diagnosis Date    Disease of thyroid gland     Hypotension     Supratherapeutic INR 6/2/2022       Past Surgical History:   Procedure Laterality Date    HYSTERECTOMY         No family history on file  I have reviewed and agree with the history as documented  E-Cigarette/Vaping    E-Cigarette Use Never User      E-Cigarette/Vaping Substances    Nicotine No     THC No     CBD No     Flavoring No     Other No     Unknown No      Social History     Tobacco Use    Smoking status: Never Smoker    Smokeless tobacco: Never Used   Vaping Use    Vaping Use: Never used   Substance Use Topics    Alcohol use: Never     Comment: occassional     Drug use: Never       Review of Systems   Constitutional: Positive for fatigue  Negative for chills, diaphoresis and fever  HENT: Negative for postnasal drip, rhinorrhea, sinus pressure, sinus pain, sneezing, sore throat, trouble swallowing and voice change  Respiratory: Negative for cough, shortness of breath, wheezing and stridor  Cardiovascular: Negative for chest pain, palpitations, leg swelling, syncope and near-syncope  Gastrointestinal: Negative for abdominal pain, anorexia, blood in stool, constipation, diarrhea, hematochezia, melena, nausea and vomiting  Genitourinary: Negative for difficulty urinating, dysuria, flank pain, frequency, hematuria, pelvic pain, vaginal bleeding, vaginal discharge and vaginal pain  Musculoskeletal: Negative for arthralgias, back pain, falls, gait problem, joint swelling, myalgias, neck pain and neck stiffness  Skin: Negative for wound  Neurological: Positive for weakness (GENERALIZED)  Negative for dizziness, tremors, seizures, loss of consciousness, syncope, facial asymmetry, speech difficulty, light-headedness, numbness and headaches  All other systems reviewed and are negative  Physical Exam  Physical Exam  Constitutional:       General: She is not in acute distress  Appearance: Normal appearance  She is well-developed  She is not ill-appearing, toxic-appearing or diaphoretic  HENT:      Head: Normocephalic and atraumatic  Right Ear: External ear normal       Left Ear: External ear normal       Nose: Nose normal    Eyes:      General: No scleral icterus  Right eye: No discharge  Left eye: No discharge  Extraocular Movements: Extraocular movements intact  Conjunctiva/sclera: Conjunctivae normal       Pupils: Pupils are equal, round, and reactive to light  Neck:      Vascular: No JVD  Trachea: No tracheal deviation  Cardiovascular:      Rate and Rhythm: Tachycardia present  Rhythm irregular  Pulses: Normal pulses  Heart sounds: Normal heart sounds  No murmur heard  No friction rub  No gallop  Comments: AFIB WITH RVR  Pulmonary:      Effort: Pulmonary effort is normal  No respiratory distress  Breath sounds: Normal breath sounds  No stridor  No wheezing, rhonchi or rales  Chest:      Chest wall: No tenderness  Abdominal:      General: Bowel sounds are normal  There is no distension  Palpations: Abdomen is soft  There is no mass  Tenderness: There is no abdominal tenderness  There is no right CVA tenderness, left CVA tenderness, guarding or rebound  Hernia: No hernia is present  Musculoskeletal:         General: No swelling, tenderness, deformity or signs of injury  Normal range of motion  Cervical back: Normal range of motion and neck supple  No rigidity or tenderness  Right lower leg: No edema  Left lower leg: No edema  Comments: NO SIGNS OF TRAUMA  NO MIDLINE TENDERNESS THROUGHOUT THE SPINE   Lymphadenopathy:      Cervical: No cervical adenopathy  Skin:     General: Skin is warm  Capillary Refill: Capillary refill takes less than 2 seconds  Coloration: Skin is not jaundiced or pale  Findings: No bruising, erythema, lesion or rash        Comments: NO SIGNS OF TRAUMA   Neurological: General: No focal deficit present  Mental Status: She is alert and oriented to person, place, and time  Cranial Nerves: No cranial nerve deficit  Sensory: No sensory deficit  Motor: No weakness or abnormal muscle tone  Coordination: Coordination normal    Psychiatric:         Behavior: Behavior normal          Thought Content: Thought content normal          Judgment: Judgment normal       Comments: WEEK AFFECT         Vital Signs  ED Triage Vitals   Temp Pulse Resp BP SpO2   -- -- -- -- --      Temp src Heart Rate Source Patient Position - Orthostatic VS BP Location FiO2 (%)   -- -- -- -- --      Pain Score       --           There were no vitals filed for this visit  Visual Acuity      ED Medications  Medications - No data to display    Diagnostic Studies  Results Reviewed     None                 No orders to display              Procedures  Procedures         ED Course  ED Course as of 08/12/22 0551   Thu Aug 11, 2022   2134 Pt seen and evaluated  Here for weakness  Several falls to her buttocks    Here w/ Afib w/ RVR in 130's   2200 Pt stable  She is going to CT now    2246 LACTIC ACID: 1 5   2247 hs TnI 0hr: 30   2337 CBC and differential(!)   2338 Lipase(!): 9   2338 BNP(!): 120   2338 Magnesium: 2 3   2341 CT BRAIN - WITHOUT CONTRAST     INDICATION:   pain      COMPARISON:  CT head on 12/22/2021      TECHNIQUE:  CT examination of the brain was performed  In addition to axial images, sagittal and coronal 2D reformatted images were created and submitted for interpretation      Radiation dose length product (DLP) for this visit:  826 75 mGy-cm     This examination, like all CT scans performed in the [facility], was performed utilizing techniques to minimize radiation dose exposure, including the use of iterative   reconstruction and automated exposure control        IMAGE QUALITY:  Diagnostic      FINDINGS:     PARENCHYMA: Decreased attenuation is noted in periventricular and subcortical white matter demonstrating an appearance that is statistically most likely to represent mild microangiopathic change; this appearance is similar when compared to most recent   prior examination      No CT signs of acute infarction  No intracranial mass, mass effect or midline shift  No acute parenchymal hemorrhage      VENTRICLES AND EXTRA-AXIAL SPACES:  Normal for the patient's age      VISUALIZED ORBITS AND PARANASAL SINUSES:  Bilateral lens implants      CALVARIUM AND EXTRACRANIAL SOFT TISSUES:  Normal      IMPRESSION:     No acute intracranial abnormality   2341 CT CERVICAL SPINE - WITHOUT CONTRAST     INDICATION:   fall      COMPARISON:  CT cervical spine on 12/22/2021      TECHNIQUE:  CT examination of the cervical spine was performed without intravenous contrast   Contiguous axial images were obtained  Sagittal and coronal reconstructions were performed        Radiation dose length product (DLP) for this visit:  406 89 mGy-cm   This examination, like all CT scans performed in the [facility], was performed utilizing techniques to minimize radiation dose exposure, including the use of iterative   reconstruction and automated exposure control        IMAGE QUALITY:  Diagnostic      FINDINGS:     ALIGNMENT:  Normal alignment of the cervical spine  No subluxation      VERTEBRAL BODIES:  No fracture      DEGENERATIVE CHANGES:  Moderate multilevel cervical degenerative changes are noted   No critical central canal stenosis       PREVERTEBRAL AND PARASPINAL SOFT TISSUES:  Unremarkable      THORACIC INLET:  Please refer to the concurrent chest, abdomen, and pelvic CT report for description of the thoracic inlet findings      IMPRESSION:     No cervical spine fracture or traumatic malalignment          2341 CT CHEST, ABDOMEN AND PELVIS WITHOUT IV CONTRAST      LUNGS:  Redemonstration of peripheral coarse interstitial thickening and subpleural reticulation in the lung bases, right greater than left  Grossly stable small pulmonary nodules scattered throughout the lungs  For example, 2 mm right upper lobe   nodule (3/41) and 2 mm left upper lobe nodule (3/43)  Mild bronchiectasis in the lower lobes  There is no tracheal or endobronchial lesion      PLEURA:  Unremarkable      HEART/GREAT VESSELS: Cardiomegaly  No thoracic aortic aneurysm  Small pericardial effusion      LIVER/BILIARY TREE:  Unremarkable      GALLBLADDER:  No gallstones or pericholecystic inflammatory change      PANCREAS/SPLEEN:  Unremarkable      KIDNEYS/URETERS:Moderate right hydronephrosis and dilatation of extrarenal pelvis  Mild left hydronephrosis      STOMACH AND BOWEL:  Rectum is markedly distended with large volume of stool  Mild rectal wall thickening with surrounding fat stranding is seen  Colonic diverticulosis without acute diverticulitis  No obstruction      ABDOMINOPELVIC CAVITY:  Small amount of presacral fluid  No pneumoperitoneum  No lymphadenopathy      VESSELS:  Unremarkable for patient's age      REPRODUCTIVE ORGANS:  Surgical changes of prior hysterectomy      URINARY BLADDER:  Severely distended   mass effect from rectal distention      IMPRESSION:   1   No evidence of traumatic injury    2   Markedly distended rectum containing a large volume of stool consistent with severe fecal impaction  There is mild rectal wall thickening with surrounding fat stranding which could indicate a component of stercoral proctitis    3   Severely distended urinary bladder with associated bilateral hydronephrosis  The bladder is displaced anteriorly likely due to mass effect from rectal distention    4   Redemonstration of peripheral interstitial thickening and subpleural reticulation in the lung bases with bronchiectasis which may be related to chronic interstitial lung disease     Fri Aug 12, 2022   0004 Patient is back in AFib rapid rate at 134 bpm     Will repeat bolus and start drip    She is unable to give urine, will order V[place]se for retention   0131 Patient is much improved  Heart rate 115 on Cardizem drip  She desaturates when she sleeps  Nasal cannula oxygen ordered    I tiger text Saima keller for admission to step down 2   0135 Benson Cade w/ [facility] wants me to d/w Surgery recarding the stercoral proctitis    Harmony text to Dr Zully Frost Discussed with Dr Severa Mart  States that patient has no urgent surgical needs at this time  She had a good rectal exam                                             MDM    Disposition  Final diagnoses:   None     ED Disposition     None      Follow-up Information    None         Patient's Medications   Discharge Prescriptions    No medications on file       No discharge procedures on file      PDMP Review     None          ED Provider  Electronically Signed by           Tori Crowe MD  08/12/22 7131

## 2022-08-12 NOTE — ED PROCEDURE NOTE
PROCEDURE  ECG 12 Lead Documentation Only    Date/Time: 8/12/2022 12:21 AM  Performed by: Penny Cochran MD  Authorized by: Penny Cochran MD     Indications / Diagnosis:  Afib with RVR, weak   ECG reviewed by me, the ED Provider: yes    Patient location:  ED  Interpretation:     Interpretation: abnormal    Rate:     ECG rate:  123    ECG rate assessment: tachycardic    Rhythm:     Rhythm: atrial fibrillation    Ectopy:     Ectopy: none    QRS:     QRS axis:  Normal  Conduction:     Conduction: normal    ST segments:     ST segments:  Non-specific  T waves:     T waves: non-specific           Penny Cochran MD  08/12/22 5644

## 2022-08-12 NOTE — ASSESSMENT & PLAN NOTE
Wt Readings from Last 3 Encounters:   07/18/22 60 9 kg (134 lb 3 2 oz)   06/09/22 73 7 kg (162 lb 7 7 oz)   12/22/21 82 6 kg (182 lb)     · Patient is currently euvolemic on examination  · Echo (6/2/22): EF 60-65%  · Continue pre-hospital torsemide  · Daily weights, I&Os  · Outpatient follow up with Cardiology    Torsemide can be taken on an as-needed basis in the setting of weight gain or worsening lower extremity edema

## 2022-08-12 NOTE — CASE MANAGEMENT
Case Management Assessment & Discharge Planning Note    Patient name Christina Polo  Location ED 20/ED 20 MRN [id number]  : 1945 Date 2022       Current Admission Date: 2022  Current Admission Diagnosis:Atrial fibrillation with RVR [facility])   Patient Active Problem List    Diagnosis Date Noted    Atrial fibrillation with RVR ([place] [address] ) 2022    CKD (chronic kidney disease) stage 3, GFR 30-59 ml/min ([place] [address] ) 2022    Bilateral hydronephrosis 2022    Proctitis 2022    Fall 2022    Hypokalemia 2022    ILD (interstitial lung disease) ([place] [address] ) 2022    Oropharyngeal aspiration 2022    Acute renal failure superimposed on stage 3 chronic kidney disease ([place] [address] ) 2022    ABLA (acute blood loss anemia) 2022    Elevated troponin 2022    Shock ([place] [address] ) 2022    EKG abnormalities 2022    Hypoxia 2021    Frequent falls 2021    Elevated d-dimer 2021    Venous insufficiency of both lower extremities 2021    Acquired bilateral hammer toes 2018    Lymphedema of both lower extremities 2018    Nonrheumatic mitral valve regurgitation 2018    Atrial fibrillation, chronic ([place] [address] ) 2018    B12 deficiency 2017    Chronic diastolic CHF (congestive heart failure) ([place] [address] ) 2017    Folate deficiency 2017    Vitamin D deficiency 2016    Depression 2015    Mixed hyperlipidemia 2015    Osteoarthritis of knee 10/23/2012    Impaired fasting glucose 2012    Essential hypertension 2010    Hypothyroidism 2010    Posttraumatic stress disorder 2010      LOS (days): 0  Geometric Mean LOS (GMLOS) (days): 3 00  Days to GMLOS:2 6     OBJECTIVE:    Risk of Unplanned Readmission Score: 15 78      Current admission status: Inpatient  Referral Reason: VNA    Preferred Pharmacy:   [address] [place] [address] [id number] CORWIN [facility]on - [address]  [address] [place] [address]  Phone: [phone] Fax: [phone]    [id number] [id number]  Phone: [phone] Fax: [phone]    Primary Care Provider: Kandis Mohs, MD    Primary Insurance: [facility] [place] [facility] [place]  Secondary Insurance:     ASSESSMENT:  Na 26 Proxies    There are no active Health Care Proxies on file  Advance Directives  Does patient have a [address]?: No  Was patient offered paperwork?: Yes (Has paperwork)  Does patient currently have a Health Care decision maker?: No  Does patient have Advance Directives?: No  Was patient offered paperwork?: Yes (HaS paperwork)  Primary Contact: chris vega (Spouse)   [phone] (Home Phone)    Readmission Root Cause  30 Day Readmission: No    Patient Information  Admitted from[de-identified] Home  Mental Status: Alert  During Assessment patient was accompanied by: Spouse  Assessment information provided by[de-identified] Spouse  Primary Caregiver: Self  Support Systems: Spouse/significant other  [place] of Residence: [address] do you live in?: [address] entry access options   Select all that apply : Stairs  Number of steps to enter home : 3  Do the steps have railings?: Yes  Type of Current Residence: [address] [place] home  Upon entering residence, is there a bedroom on the main floor (no further steps)?: Yes  Upon entering residence, is there a bathroom on the main floor (no further steps)?: Yes  In the last 12 months, was there a time when you were not able to pay the mortgage or rent on time?: No  In the last 12 months, how many places have you lived?: 1  In the last 12 months, was there a time when you did not have a steady place to sleep or slept in a shelter (including now)?: No  Homeless/housing insecurity resource given?: N/A  Living Arrangements: Lives w/ Spouse/significant other  Is patient a ?: No    Activities of Daily Living Prior to Admission  Functional Status: Independent  Completes ADLs independently?: Yes  Ambulates independently?: Yes  Does patient use assisted devices?: Yes  Assisted Devices (DME) used: Straight Cane  Does patient currently own DME?: Yes  What DME does the patient currently own?: Straight Cane  Does the patient have a history of Short-Term Rehab?: Yes ([facility])  Does patient have a history of HHC?: Yes ([address])  Does patient currently have [facility]?: No    Patient Information Continued  Income Source: Pension/halfway  Does patient have prescription coverage?: Yes  Within the past 12 months, you worried that your food would run out before you got the money to buy more : Never true  Within the past 12 months, the food you bought just didn't last and you didn't have money to get more : Never true  Food insecurity resource given?: N/A  Does patient receive dialysis treatments?: No  Does patient have a history of substance abuse?: No    Means of Transportation  Means of Transport to Appts[de-identified] Family transport  In the past 12 months, has lack of transportation kept you from medical appointments or from getting medications?: No  In the past 12 months, has lack of transportation kept you from meetings, work, or from getting things needed for daily living?: No  Was application for public transport provided?: N/A    DISCHARGE DETAILS:    Discharge planning discussed with[de-identified] Patient and spouse        CM contacted family/caregiver?: Yes  Were Treatment Team discharge recommendations reviewed with patient/caregiver?: Yes  Did patient/caregiver verbalize understanding of patient care needs?: Yes  Were patient/caregiver advised of the risks associated with not following Treatment Team discharge recommendations?: Yes    Contacts  Patient Contacts: Jona Marmolejo - spouse  Relationship to Patient[de-identified] Family  Contact Method: Phone, In Person  Phone Number: [address] (Spouse)   [phone] (Home Phone)  Reason/Outcome: Discharge Planning, Emergency Contact, Continuity of [facility]         Is the patient interested in [facility] at discharge?: Yes  [address] requested[de-identified] Nursing, Occupational Therapy, Physical [address] Name[de-identified] [facility] Provider[de-identified] PCP  Home Health Services Needed[de-identified] Evaluate Functional Status and Safety, Gait/ADL Training, Strengthening/Theraputic Exercises to Improve Function, Other (comment) (Disease mgt/Med instruction)  Homebound Criteria Met[de-identified] Uses an Assist Device (i e  cane, walker, etc)  Supporting Clincal Findings[de-identified] Fatigues Eas[place] in [employer], Limited Endurance    DME Referral Provided  Referral made for DME?: No    Other Referral/Resources/Interventions Provided:  Interventions: [facility]    Treatment Team Recommendation: Home with [address]  Discharge Destination Plan[de-identified] Home with [address]     Additional Comments: Met with patient and spouse at bedside  Discussed therapy evals and recommendations for [facility]  [facility]    Referral made and [facility] can accept

## 2022-08-12 NOTE — H&P
Ashleesimona 45  H&P- Riluisa Giulia 1945, 68 y o  female MRN: [id number]  Unit/Bed#: ED 20 Encounter: [id number]  Primary Care Provider: Arleth Vaughan MD   Date and time admitted to hospital: 8/11/2022  9:36 PM    * Atrial fibrillation with RVR ([address] )  Assessment & Plan  · Patient presenting with Afib RVR (-130s)  · Denies any chest pain or SOB at this time  · Troponin level: 0hr 30, 2hr 28, 4hr pending  · BB discontinued last hospitalization secondary to low BP  Digoxin 0 125mg daily started, will continue  · Digoxin level checked and was wnl (1 0)  · Started on Cardizem drip by the ED, will continue for now  · [facility] discontinued after last admission secondary to GIB  · Monitor on telemetry  · SD2 level of care  · Consult to Cardiology, recommendations are appreciated    Proctitis  Assessment & Plan  · CT C/A/P:Markedly distended rectum containing a large volume of stool consistent with severe fecal impaction  There is mild rectal wall thickening with surrounding fat stranding which could indicate a component of stercoral proctitis  · Secondary to severe fecal impaction  · General surgery notified, suggesting digital disimpaction and no indication for surgery at this time  · Nursing reported that patient had large BM while in the ED  · Add stool regimen  · Mild leukocytosis (WBC 10 61)  Afebrile, negative lactic and does meet sepsis, will hold off on antibiotics for now  · Keep NPO for overnight  · Stool record at bedside  · Consult to GI, recommendations are appreciated    Bilateral hydronephrosis  Assessment & Plan  · CT C/A/P: Severely distended urinary bladder with associated bilateral hydronephrosis  The bladder is displaced anteriorly    · Caused by bladder outlet obstruction secondary to severe fecal impaction  · Creatinine stable at baseline  · See treatment above under proctitis  · Varghese catheter in place  · Monitor I&O    [address]  · Patient with primary complaint of generalized weakness and multiple falls in her home  Spoke with patient's  and he noted that this morning he heard a thud and came downstairs to his wife saying she bent down to get something off the floor and had fallen  Unclear if she hit her head, but there is a bruise on her left cheek   Denies any dizziness, chest pain, SOB prior to the fall  · CT head and cervical spine unremarkable  · Fall precautions  · PT/OT evaluation  · Case management    Hypothyroidism  Assessment & Plan  · Check TSH level  · Continue levothyroxine daily    Hypokalemia  Assessment & Plan  · Potassium 3 4 on admission  · Supplementation with 40meq oral potassium  · Recheck with morning labs    CKD (chronic kidney disease) stage 3, GFR 30-59 ml/min [facility])  Assessment & Plan  Lab Results   Component Value Date    EGFR 43 08/11/2022    EGFR 56 06/09/2022    EGFR 63 06/08/2022    CREATININE 1 20 08/11/2022    CREATININE 0 97 06/09/2022    CREATININE 0 88 06/08/2022     · Creatine 1 20 on admission (baseline 1 0-1 3)  · Avoid hypotension and nephrotoxic agents  · Monitor BMP as needed    Essential hypertension  Assessment & Plan  · BP well controlled at home  · BB discontinued secondary to low BP during last hospitalization  · Continue home torsemide with hold parameters  · Monitor for hypotension while on Cardizem drip    Chronic diastolic CHF (congestive heart failure) ([facility] [address] )  Assessment & Plan  Wt Readings from Last 3 Encounters:   07/18/22 60 9 kg (134 lb 3 2 oz)   06/09/22 73 7 kg (162 lb 7 7 oz)   12/22/21 82 6 kg (182 lb)     · Patient is currently euvolemic on examination  · Echo (6/2/22): EF 60-65%  · Continue pre-hospital torsemide  · Daily weights, I&Os  · Outpatient follow up with Cardiology    VTE Prophylaxis: Enoxaparin (Lovenox)  / sequential compression device   Code Status: Level 1 code  Discussion with family: All questions answered to the best of my ability    Anticipated Length of Stay:  Patient will be admitted on an Inpatient basis with an anticipated length of stay of  More than 2 midnights  Justification for Hospital Stay: Afib RVR    Total Time for Visit, including Counseling / Coordination of Care: 60 minutes  Greater than 50% of this total time spent on direct patient counseling and coordination of care  Chief Complaint:   Weakness    History of Present Illness:    Linda Morales is a 68 y o  female who has a past medical history significant for hypertension, atrial fibrillation, CHF, CKD, hypothyroidism, depression  Patient presenting to the ED for generalized weakness with several false to her buttocks  Denies hitting her head or losing consciousness due to the fall  Patient found to have atrial fibrillation with rapid ventricular response in the ED  She denies any associated dizziness, chest pain, shortness of breath  Patient also found to have severe constipation with proctitis  She denies any abdominal pain at this time  Denies all other medical complaints at this time  Patient requiring medical admission for the above-listed  All patient questions answered to the best my ability  Review of Systems:    Review of Systems   Constitutional: Negative for chills and fever  HENT: Negative for congestion, ear pain, rhinorrhea and sore throat  Eyes: Negative for pain and visual disturbance  Respiratory: Negative for cough and shortness of breath  Cardiovascular: Negative for chest pain and palpitations  Gastrointestinal: Negative for abdominal pain, constipation, diarrhea, nausea and vomiting  Genitourinary: Negative for dysuria, hematuria and urgency  Musculoskeletal: Negative for arthralgias, back pain and myalgias  Skin: Negative for color change and rash  Neurological: Positive for weakness  Negative for dizziness, seizures, syncope and headaches  All other systems reviewed and are negative        Past Medical and Surgical History:     Past Medical History: Diagnosis Date    Disease of thyroid gland     Hypotension     Supratherapeutic INR 6/2/2022       Past Surgical History:   Procedure Laterality Date    HYSTERECTOMY         Meds/Allergies:    Prior to Admission medications    Medication Sig Start Date End Date Taking? Authorizing Provider   atorvastatin (LIPITOR) 40 mg tablet Take 1 tablet (40 mg total) by mouth daily with dinner 12/27/21   Winfred Holter Ward, DO   cyanocobalamin 1,000 mcg/mL 1 mL 12/7/21   Historical Provider, MD   digoxin (LANOXIN) 0 125 mg tablet Take 1 tablet (125 mcg total) by mouth every other day 6/10/22   Gene Marquez PA-C   folic acid (FOLVITE) 1 mg tablet Take 1 tablet by mouth daily 12/21/21   Historical Provider, MD   levothyroxine 150 mcg tablet Take 150 mcg by mouth daily 12/7/21 12/7/22  Historical Provider, MD   Magnesium Gluconate 550 MG TABS Take 30 mg by mouth 2 (two) times a day    Historical Provider, MD   pantoprazole (PROTONIX) 40 mg tablet Take 1 tablet (40 mg total) by mouth 2 (two) times a day  Patient not taking: Reported on 7/18/2022 6/9/22   Gene Marquez PA-C   potassium chloride (K-DUR,KLOR-CON) 20 mEq tablet Take 2 tablets by mouth daily 12/16/21   Historical Provider, MD   sertraline (ZOLOFT) 100 mg tablet Take 1 tablet by mouth daily 12/16/21   Historical Provider, MD   torsemide (DEMADEX) 20 mg tablet Take 1 tablet (20 mg total) by mouth daily 6/9/22   Gene Marquez PA-C     I have reviewed home medications using allscripts      Allergies: No Known Allergies    Social History:     Marital Status: /Civil Union   Occupation: NA  Patient Pre-hospital Living Situation: Home  Patient Pre-hospital Level of Mobility: Walks  Patient Pre-hospital Diet Restrictions: None  Substance Use History:   Social History     Substance and Sexual Activity   Alcohol Use Never    Comment: occassional      Social History     Tobacco Use   Smoking Status Never Smoker   Smokeless Tobacco Never Used     Social History Substance and Sexual Activity   Drug Use Never       Family History:    History reviewed  No pertinent family history  Physical Exam:     Vitals:   Blood Pressure: 98/55 (08/12/22 0207)  Pulse: 103 (08/12/22 0207)  Temperature: 98 °F (36 7 °C) (08/11/22 2152)  Temp Source: Tympanic (08/11/22 2152)  Respirations: 18 (08/11/22 2229)  SpO2: 100 % (08/12/22 0207)    Physical Exam  Vitals and nursing note reviewed  Constitutional:       General: She is not in acute distress  Appearance: She is well-developed  HENT:      Head: Normocephalic and atraumatic  Mouth/Throat:      Pharynx: Oropharynx is clear  Eyes:      Conjunctiva/sclera: Conjunctivae normal    Cardiovascular:      Rate and Rhythm: Tachycardia present  Rhythm irregular  Pulses: Normal pulses  Heart sounds: No murmur heard  Pulmonary:      Effort: Pulmonary effort is normal  No respiratory distress  Breath sounds: Normal breath sounds  Abdominal:      General: Bowel sounds are normal       Palpations: Abdomen is soft  Tenderness: There is no abdominal tenderness  Musculoskeletal:      Cervical back: Neck supple  Right lower leg: No edema  Left lower leg: No edema  Skin:     General: Skin is warm and dry  Neurological:      Mental Status: She is alert and oriented to person, place, and time  Additional Data:     Lab Results: I have personally reviewed pertinent reports        Results from last 7 days   Lab Units 08/11/22  2252   WBC Thousand/uL 10 61*   HEMOGLOBIN g/dL 10 1*   HEMATOCRIT % 32 8*   PLATELETS Thousands/uL 228   NEUTROS PCT % 86*   LYMPHS PCT % 5*   MONOS PCT % 9   EOS PCT % 0     Results from last 7 days   Lab Units 08/11/22  2248   SODIUM mmol/L 138   POTASSIUM mmol/L 3 4*   CHLORIDE mmol/L 98   CO2 mmol/L 24   BUN mg/dL 19   CREATININE mg/dL 1 20   ANION GAP mmol/L 16*   CALCIUM mg/dL 9 2   ALBUMIN g/dL 3 4*   TOTAL BILIRUBIN mg/dL 0 77   ALK PHOS U/L 76   ALT U/L 11   AST U/L 36 GLUCOSE RANDOM mg/dL 84                 Results from last 7 days   Lab Units 08/11/22  2201   LACTIC ACID mmol/L 1 5       Imaging: I have personally reviewed pertinent reports  CT chest abdomen pelvis wo contrast   Final Result by Diaz Byrnes MD (08/11 0770)      1  No evidence of traumatic injury  2   Markedly distended rectum containing a large volume of stool consistent with severe fecal impaction  There is mild rectal wall thickening with surrounding fat stranding which could indicate a component of stercoral proctitis  3   Severely distended urinary bladder with associated bilateral hydronephrosis  The bladder is displaced anteriorly likely due to mass effect from rectal distention  4   Redemonstration of peripheral interstitial thickening and subpleural reticulation in the lung bases with bronchiectasis which may be related to chronic interstitial lung disease  The study was marked in [facility] for immediate notification  Workstation performed: [id number]         CT head without contrast   Final Result by Diaz Byrnes MD (08/11 2043)      No acute intracranial abnormality  Workstation performed: [id number]         CT spine cervical without contrast   Final Result by Diaz Byrnes MD (08/11 5340)      No cervical spine fracture or traumatic malalignment  Workstation performed: [id number]             EKG, Pathology, and Other Studies Reviewed on Admission:   · EKG: Reviewed    Allscripts / Epic Records Reviewed: Yes     ** Please Note: This note has been constructed using a voice recognition system   **

## 2022-08-12 NOTE — DISCHARGE SUMMARY
[address]  Discharge- Freedom Moreno 1945, 68 y o  female MRN: [id number]  Unit/Bed#: ED 20 Encounter: [id number]  Primary Care Provider: Gus Guzman MD   Date and time admitted to hospital: 8/11/2022  9:36 PM    * Atrial fibrillation with RVR ([address] )  Assessment & Plan  · Patient presenting with Afib RVR (-130s)  · Denies any chest pain or SOB at this time  · Troponin level: 0hr 30, 2hr 28, 4hr pending  · BB discontinued last hospitalization secondary to low BP  Digoxin 0 125mg daily started, will continue  · Digoxin level checked and was wnl (1 0)  · Started on Cardizem drip by the ED, will continue for now  · [facility] discontinued after last admission secondary to GIB  · Monitor on telemetry  · SD2 level of care  · Consult to Cardiology, recommendations are appreciated    Patient's blood pressure and heart rates have improved with initiation of diltiazem  Diltiazem 180 mg daily added to patient's regimen  Continue digoxin as previously prescribed  No indication for anticoagulation the setting of numerous falls  Outpatient follow-up with Cardiology    Hypokalemia  Assessment & Plan  · Potassium 3 4 on admission  · Supplementation with 40meq oral potassium  · Recheck with morning labs    Fall  Assessment & Plan  · Patient with primary complaint of generalized weakness and multiple falls in her home  Spoke with patient's  and he noted that this morning he heard a thud and came downstairs to his wife saying she bent down to get something off the floor and had fallen  Unclear if she hit her head, but there is a bruise on her left cheek   Denies any dizziness, chest pain, SOB prior to the fall  · CT head and cervical spine unremarkable  · Fall precautions  · PT/OT evaluation  · Case management    PT/OT recommends home health care  Case management following    Proctitis  Assessment & Plan  · CT C/A/P:Markedly distended rectum containing a large volume of stool consistent with severe fecal impaction  There is mild rectal wall thickening with surrounding fat stranding which could indicate a component of stercoral proctitis  · Secondary to severe fecal impaction  · General surgery notified, suggesting digital disimpaction and no indication for surgery at this time  · Nursing reported that patient had large BM while in the ED  · Add stool regimen  · Mild leukocytosis (WBC 10 61)  Afebrile, negative lactic and does meet sepsis, will hold off on antibiotics for now  · Keep NPO for overnight  · Stool record at bedside  · Consult to GI, recommendations are appreciated    Patient had large bowel movement in the ED  KUB with no signs of stool burden or obstruction  Patient has MiraLax at home  Will add Dulcolax suppository  Encouraged ambulation    Bilateral hydronephrosis  Assessment & Plan  · CT C/A/P: Severely distended urinary bladder with associated bilateral hydronephrosis  The bladder is displaced anteriorly    · Caused by bladder outlet obstruction secondary to severe fecal impaction  · Creatinine stable at baseline  · See treatment above under proctitis  · Varghese catheter in place  · Monitor I&O    Secondary to severe constipation which has since resolved as patient had a large bowel movement  Varghese catheter has been discontinued  Creatinine has remained stable  Continue management of constipation    CKD (chronic kidney disease) stage 3, GFR 30-59 ml/min [facility])  Assessment & Plan  Lab Results   Component Value Date    EGFR 43 08/11/2022    EGFR 56 06/09/2022    EGFR 63 06/08/2022    CREATININE 1 20 08/11/2022    CREATININE 0 97 06/09/2022    CREATININE 0 88 06/08/2022     · Creatine 1 20 on admission (baseline 1 0-1 3)  · Avoid hypotension and nephrotoxic agents  · Monitor BMP as needed    Hypothyroidism  Assessment & Plan  · Check TSH level  · Continue levothyroxine daily    Essential hypertension  Assessment & Plan  · BP well controlled at home  · BB discontinued secondary to low BP during last hospitalization  · Continue home torsemide with hold parameters  · Monitor for hypotension while on Cardizem drip    Chronic diastolic CHF (congestive heart failure) (HCC)  Assessment & Plan  Wt Readings from Last 3 Encounters:   07/18/22 60 9 kg (134 lb 3 2 oz)   06/09/22 73 7 kg (162 lb 7 7 oz)   12/22/21 82 6 kg (182 lb)     · Patient is currently euvolemic on examination  · Echo (6/2/22): EF 60-65%  · Continue pre-hospital torsemide  · Daily weights, I&Os  · Outpatient follow up with Cardiology    Torsemide can be taken on an as-needed basis in the setting of weight gain or worsening lower extremity edema      Discharging Physician / Practitioner: Colette Wright DO  PCP: Elle Pate MD  Admission Date:   Admission Orders (From admission, onward)     Ordered        08/12/22 0209  INPATIENT ADMISSION  Once                      Discharge Date: 08/12/22    Medical Problems             Resolved Problems  Date Reviewed: 8/12/2022   None                 Consultations During Hospital Stay:  Cardiology, Gastroenterology    Procedures Performed:  Not applicable    Significant Findings / Test Results:     CT chest abdomen pelvis wo contrast    Result Date: 8/11/2022  Impression: 1  No evidence of traumatic injury  2   Markedly distended rectum containing a large volume of stool consistent with severe fecal impaction  There is mild rectal wall thickening with surrounding fat stranding which could indicate a component of stercoral proctitis  3   Severely distended urinary bladder with associated bilateral hydronephrosis  The bladder is displaced anteriorly likely due to mass effect from rectal distention  4   Redemonstration of peripheral interstitial thickening and subpleural reticulation in the lung bases with bronchiectasis which may be related to chronic interstitial lung disease  The study was marked in [facility] for immediate notification   Workstation performed: [id number]     XR abdomen 1 view kub    Result Date: 8/12/2022  Impression: Stool within the rectum though without appreciable distention to correspond to that evident on prior CT  No significant stool within the remainder of the colon  No bowel obstruction  Workstation performed: [id number]     CT head without contrast    Result Date: 8/11/2022  Impression: No acute intracranial abnormality  Workstation performed: [id number]     CT spine cervical without contrast    Result Date: 8/11/2022  Impression: No cervical spine fracture or traumatic malalignment  Workstation performed: [id number]       Incidental Findings:  Not applicable    Test Results Pending at Discharge (will require follow up): Not applicable     Outpatient Tests Requested: Follow-up CBC/BMP    Reason for Admission:  Generalized weakness    Hospital Course:     Demarcus Lemon is a 68 y o  female who has a past medical history significant for hypertension, atrial fibrillation, CHF, CKD, hypothyroidism, depression  Patient presenting to the ED for generalized weakness with several false to her buttocks  Denies hitting her head or losing consciousness due to the fall  Patient found to have atrial fibrillation with rapid ventricular response in the ED  She denies any associated dizziness, chest pain, shortness of breath  Patient also found to have severe constipation with proctitis  She denies any abdominal pain at this time  Denies all other medical complaints at this time  Patient requiring medical admission for the above-listed  All patient questions answered to the best my ability  The patient was initiated on a Cardizem infusion in the setting of atrial fibrillation with rapid ventricular response which was down titrated in the setting of hypotension  Oral Cardizem was added to the patient's regimen with improvement of heart rates and blood pressure  Anticoagulation will not be initiated in the setting of frequent falls    The patient has remained hemodynamically stable and saturating well on room air throughout her hospital course  PT/OT recommends home health care  Case management following  The patient was strongly encouraged to resume digoxin as previously prescribed and to also add Cardizem as prescribed by myself to her current atrial fibrillation regimen  She was also encouraged to follow-up with Cardiology within 5 days  Condition at Discharge: stable     Discharge Day Visit / Exam:     Subjective: Patient seen and examined at bedside  No acute events overnight  Denies chest pain, SOB, diaphoresis, nausea/vomiting/diarrhea, fevers/chills  Vitals: Blood Pressure: 105/51 (08/12/22 0700)  Pulse: 94 (08/12/22 0700)  Temperature: 98 °F (36 7 °C) (08/11/22 2152)  Temp Source: Tympanic (08/11/22 2152)  Respirations: 18 (08/11/22 2229)  SpO2: 99 % (08/12/22 0700)     Exam:   Physical Exam  Vitals and nursing note reviewed  Constitutional:       General: She is not in acute distress  Appearance: She is well-developed  HENT:      Head: Normocephalic and atraumatic  Eyes:      Conjunctiva/sclera: Conjunctivae normal    Cardiovascular:      Rate and Rhythm: Normal rate and regular rhythm  Heart sounds: No murmur heard  Pulmonary:      Effort: Pulmonary effort is normal  No respiratory distress  Breath sounds: Normal breath sounds  Abdominal:      Palpations: Abdomen is soft  Tenderness: There is no abdominal tenderness  Musculoskeletal:      Cervical back: Neck supple  Skin:     General: Skin is warm and dry  Neurological:      Mental Status: She is alert  Discharge instructions/Information to patient and family:   See after visit summary for information provided to patient and family  Provisions for Follow-Up Care:  See after visit summary for information related to follow-up care and any pertinent home health orders        Disposition:     Home healthcare  Start Diltiazem 180 mg PO daily  Outpatient follow-up with Cardiology     Discharge Statement:  I spent 60 minutes discharging the patient  This time was spent on the day of discharge  I had direct contact with the patient on the day of discharge  Greater than 50% of the total time was spent examining patient, answering all patient questions, arranging and discussing plan of care with patient as well as directly providing post-discharge instructions  Additional time then spent on discharge activities  Discharge Medications:  See after visit summary for reconciled discharge medications provided to patient and family        ** Please Note: This note has been constructed using a voice recognition system **

## 2022-08-12 NOTE — PLAN OF CARE
Problem: PHYSICAL THERAPY ADULT  Goal: Performs mobility at highest level of function for planned discharge setting  See evaluation for individualized goals  Description: Treatment/Interventions: Functional transfer training, LE strengthening/ROM, Therapeutic exercise, Endurance training, Elevations, Patient/family training, Equipment eval/education, Bed mobility, Gait training, Spoke to nursing  Equipment Recommended: Jose Eduardo Adams       See flowsheet documentation for full assessment, interventions and recommendations  Note: Prognosis: Good  Problem List: Decreased strength, Decreased endurance, Impaired balance, Decreased mobility, Impaired judgement, Decreased safety awareness  Assessment: Pt is 68 y o  female seen for high-complexity PT evaluation on 8/12/2022 s/p admit to [facility] on 8/11/2022 w/ Atrial fibrillation with RVR ([address] ); pt presented to ED with generalized weakness and recurrent falls  PT was consulted to assess pt's functional mobility and d/c needs  Order placed for PT eval and tx, w/ up w/ A order  PTA, pt lives c spouse in 2 [place] c 3-5 [place]ISSY, amb c RW vs SPC at baseline, (I) ADLs  At time of eval, pt performs OOB activity at SBA with tolerance to amb 5 ft x 2  Upon evaluation, pt presenting with impaired functional mobility d/t decreased strength, decreased endurance, impaired balance, decreased mobility, impaired judgement and decreased safety awareness  Pertinent PMHx and current co-morbidities affecting pt's physical performance at time of assessment include: AFib, CHF, HTN, CKD stage 3, hypothyroidism  Personal factors affecting pt at time of eval include: positive fall history and increased age  The following objective measures performed on IE also reveal limitations: AM-PAC 6-Clicks: 16/40  Pt's clinical presentation is currently unstable/unpredictable seen in pt's presentation of tachycardia, abnormal lab value(s) and ongoing medical assessment   Overall, pt's rehab potential and prognosis to return to PLOF is good as impacted by objective findings, warranting pt to receive further skilled PT interventions to address identified impairments, activity limitation(s), and participation restriction(s)  Goal for patient is to feel better  Pt to benefit from continued PT tx to address deficits as defined above and maximize level of functional independent mobility and consistency in order for pt to improve tolerance to activity in order to ensure safe d/c home  From PT/mobility standpoint, recommendation at time of d/c would be home with home health rehabilitation pending progress in order to facilitate return to PLOF  Barriers to Discharge: Inaccessible home environment     PT Discharge Recommendation: Home with home health rehabilitation (anticipated, pending progress)    See flowsheet documentation for full assessment

## 2022-08-12 NOTE — ASSESSMENT & PLAN NOTE
· CT C/A/P:Markedly distended rectum containing a large volume of stool consistent with severe fecal impaction  There is mild rectal wall thickening with surrounding fat stranding which could indicate a component of stercoral proctitis  · Secondary to severe fecal impaction  · Digital disimpaction attempted in the ED  · General surgery notified, suggesting digital disimpaction and no indication for surgery at this time  · Add stool regimen  · Mild leukocytosis (WBC 10 61)   Afebrile, negative lactic and does meet sepsis, will hold off on antibiotics for now  · Will keep NPO for now  · Stool record at bedside  · Consult to GI, recommendations are appreciated

## 2022-08-12 NOTE — ASSESSMENT & PLAN NOTE
· Potassium 3 4 on admission  · Supplementation with 40meq oral potassium  · Recheck with morning labs

## 2022-08-12 NOTE — UTILIZATION REVIEW
Inpatient Admission Authorization Request   NOTIFICATION OF INPATIENT ADMISSION/INPATIENT AUTHORIZATION REQUEST   SERVICING FACILITY:   [address] AFFILIATED WITH [facility], [address]  Tax ID: [id number]  NPI: [id number]  Place of Service: Inpatient [facility]  [address]  Place of Service Code: 24     ATTENDING PROVIDER:  Attending Name and NPI#: Ganeshrashard Tucker [[id number]]  Address: [address] [place] [address] WITH [facility], [address]  Phone: [phone]     UTILIZATION REVIEW CONTACT:  Genevieve Roy Utilization   Network Utilization Review Department  Phone: [phone]  Fax [phone]  Email: [email or web address]     PHYSICIAN ADVISORY SERVICES:  FOR [id number]-KN-HAQE REVIEW - MEDICAL NECESSITY DENIAL  Phone: [phone]  Fax: [phone]  Email: [email or web address]     TYPE OF REQUEST:  Inpatient Status     ADMISSION INFORMATION:  ADMISSION DATE/TIME: 8/12/22  2:09 AM  PATIENT DIAGNOSIS CODE/DESCRIPTION:  Weakness [R53 1]  DISCHARGE DATE/TIME: No discharge date for patient encounter  IMPORTANT INFORMATION:  Please contact the Genevieve Roy directly with any questions or concerns regarding this request  Department voicemails are confidential     Send requests for admission clinical reviews, concurrent reviews, approvals, and administrative denials due to lack of clinical to fax [phone]

## 2022-08-12 NOTE — ASSESSMENT & PLAN NOTE
Rate now better controlled  I am going to add routine diltiazem  With multiple falls she is not a good candidate for full anticoagulation

## 2022-08-12 NOTE — ASSESSMENT & PLAN NOTE
· CT C/A/P:Markedly distended rectum containing a large volume of stool consistent with severe fecal impaction  There is mild rectal wall thickening with surrounding fat stranding which could indicate a component of stercoral proctitis  · Secondary to severe fecal impaction  · General surgery notified, suggesting digital disimpaction and no indication for surgery at this time  · Nursing reported that patient had large BM while in the ED  · Add stool regimen  · Mild leukocytosis (WBC 10 61)   Afebrile, negative lactic and does meet sepsis, will hold off on antibiotics for now  · Keep NPO for overnight  · Stool record at bedside  · Consult to GI, recommendations are appreciated    Patient had large bowel movement in the ED  KUB with no signs of stool burden or obstruction  Patient has MiraLax at home  Will add Dulcolax suppository  Encouraged ambulation

## 2022-08-12 NOTE — ED PROCEDURE NOTE
PROCEDURE  ECG 12 Lead Documentation Only    Date/Time: 8/11/2022 9:48 PM  Performed by: Brennen Watkins MD  Authorized by: Brennen Watkins MD     Indications / Diagnosis:  Afib with RVR, weak   ECG reviewed by me, the ED Provider: yes    Previous ECG:     Comparison to cardiac monitor: Yes    Interpretation:     Interpretation: abnormal    Rate:     ECG rate:  126    ECG rate assessment: tachycardic    Rhythm:     Rhythm: atrial fibrillation    Ectopy:     Ectopy: none    QRS:     QRS axis:  Normal    QRS intervals:  Normal  ST segments:     ST segments:  Non-specific  T waves:     T waves: non-specific           Brennen Watkins MD  08/12/22 [id number]

## 2022-08-14 ENCOUNTER — HOME CARE VISIT (OUTPATIENT)
Dept: HOME HEALTH SERVICES | Facility: HOME HEALTHCARE | Age: 77
End: 2022-08-14
Payer: COMMERCIAL

## 2022-08-14 VITALS
SYSTOLIC BLOOD PRESSURE: 106 MMHG | RESPIRATION RATE: 18 BRPM | OXYGEN SATURATION: 99 % | DIASTOLIC BLOOD PRESSURE: 60 MMHG | TEMPERATURE: 97.8 F | HEART RATE: 80 BPM

## 2022-08-14 PROCEDURE — 400013 VN SOC

## 2022-08-14 PROCEDURE — G0299 HHS/HOSPICE OF RN EA 15 MIN: HCPCS

## 2022-08-15 ENCOUNTER — HOME CARE VISIT (OUTPATIENT)
Dept: HOME HEALTH SERVICES | Facility: HOME HEALTHCARE | Age: 77
End: 2022-08-15
Payer: COMMERCIAL

## 2022-08-15 VITALS
DIASTOLIC BLOOD PRESSURE: 60 MMHG | RESPIRATION RATE: 18 BRPM | TEMPERATURE: 98.1 F | HEART RATE: 83 BPM | SYSTOLIC BLOOD PRESSURE: 102 MMHG | OXYGEN SATURATION: 93 %

## 2022-08-15 VITALS
DIASTOLIC BLOOD PRESSURE: 60 MMHG | TEMPERATURE: 97.1 F | HEART RATE: 85 BPM | OXYGEN SATURATION: 96 % | SYSTOLIC BLOOD PRESSURE: 100 MMHG

## 2022-08-15 PROCEDURE — G0151 HHCP-SERV OF PT,EA 15 MIN: HCPCS

## 2022-08-15 PROCEDURE — G0152 HHCP-SERV OF OT,EA 15 MIN: HCPCS

## 2022-08-15 NOTE — UTILIZATION REVIEW
Notification of Discharge   This is a Notification of Discharge from our facility [address]  Please be advised that this patient has been discharge from our facility  Below you will find the admission and discharge date and time including the patients disposition  UTILIZATION REVIEW CONTACT:  Abelardo Perez  Utilization   Network Utilization Review Department  Phone: [phone] carefully listen to the prompts  All voicemails are confidential   Email: [email or web address]     PHYSICIAN ADVISORY SERVICES:  FOR LPMY-YM-HDNS REVIEW - MEDICAL NECESSITY DENIAL  Phone: [phone]  Fax: [phone]  Email: [email or web address]     PRESENTATION DATE: 8/11/2022  9:36 PM  OBERVATION ADMISSION DATE:   INPATIENT ADMISSION DATE: 8/12/22  2:09 AM   DISCHARGE DATE: 8/12/2022  2:20 PM  DISPOSITION: Home with [place] with [address] [place] [address] Road INFORMATION:  Send all requests for admission clinical reviews, approved or denied determinations and any other requests to dedicated fax number below belonging to the campus where the patient is receiving treatment   List of dedicated fax numbers:  [address] DENIALS (Administrative/Medical Necessity) [phone]   [address] (Maternity/NICU/Pediatrics) [phone]   Patti Maciel [phone]   H[place]el Bl[place] [phone]   [place]  [phone]   [address] [place] [address] [address] [place] [address],[address] [phone]   [facility] [phone]   [address] [facility] [address]  [facility] [address] [facility] [phone]

## 2022-08-15 NOTE — CASE COMMUNICATION
Please order a commode for pt to use  Pt has difficulty ambulating to bathroom especiallly after in bed

## 2022-08-15 NOTE — CASE COMMUNICATION
Primary focus of home health care Cardiac  Patient stated goals of care gain strength   Anticipated visit pattern and next visit date 2x week x 6 weeks next 8 17 22  Significant clinical findingsna  Potential barriers to goal achievement forgetfulness  Other pertinent information pt lives with elderly    is CG    Thank you for allowing us to participate in the care of your patient

## 2022-08-16 NOTE — CASE COMMUNICATION
PT selvin on 8/15/22  PT POC for 2wk4 for gait/transfers/balance training, LE ther ex, fall prevention edu, edu, HEP, DME recommendations/use  NSG: Pt reports taking miralax, metamucil, & colace- do not see on med list, please assess/discuss with pt      Thank you,  Jeneal Parcel Joaquim Kehr PT

## 2022-08-17 ENCOUNTER — HOME CARE VISIT (OUTPATIENT)
Dept: HOME HEALTH SERVICES | Facility: HOME HEALTHCARE | Age: 77
End: 2022-08-17
Payer: COMMERCIAL

## 2022-08-17 VITALS
RESPIRATION RATE: 20 BRPM | DIASTOLIC BLOOD PRESSURE: 60 MMHG | WEIGHT: 130 LBS | SYSTOLIC BLOOD PRESSURE: 92 MMHG | TEMPERATURE: 97 F | HEART RATE: 85 BPM | OXYGEN SATURATION: 97 % | BODY MASS INDEX: 23.03 KG/M2

## 2022-08-17 VITALS
HEART RATE: 73 BPM | RESPIRATION RATE: 18 BRPM | TEMPERATURE: 98.8 F | DIASTOLIC BLOOD PRESSURE: 52 MMHG | SYSTOLIC BLOOD PRESSURE: 105 MMHG | OXYGEN SATURATION: 98 %

## 2022-08-17 PROCEDURE — G0299 HHS/HOSPICE OF RN EA 15 MIN: HCPCS

## 2022-08-17 PROCEDURE — G0152 HHCP-SERV OF OT,EA 15 MIN: HCPCS

## 2022-08-17 PROCEDURE — G0151 HHCP-SERV OF PT,EA 15 MIN: HCPCS

## 2022-08-18 VITALS — DIASTOLIC BLOOD PRESSURE: 64 MMHG | HEART RATE: 64 BPM | SYSTOLIC BLOOD PRESSURE: 104 MMHG | OXYGEN SATURATION: 100 %

## 2022-08-18 NOTE — HOME HEALTH
Deep breathing exercises  trapeze in bed to allow sheets off of foot  trasfer slowly due to low BP, light headedness  Smaller more frequent portions to maintain adequate nutrition

## 2022-08-21 PROCEDURE — G0180 MD CERTIFICATION HHA PATIENT: HCPCS | Performed by: INTERNAL MEDICINE

## 2022-08-22 ENCOUNTER — HOME CARE VISIT (OUTPATIENT)
Dept: HOME HEALTH SERVICES | Facility: HOME HEALTHCARE | Age: 77
End: 2022-08-22
Payer: COMMERCIAL

## 2022-08-22 VITALS
OXYGEN SATURATION: 98 % | RESPIRATION RATE: 18 BRPM | SYSTOLIC BLOOD PRESSURE: 95 MMHG | HEART RATE: 91 BPM | TEMPERATURE: 98.1 F | DIASTOLIC BLOOD PRESSURE: 50 MMHG

## 2022-08-22 VITALS
DIASTOLIC BLOOD PRESSURE: 52 MMHG | BODY MASS INDEX: 23.05 KG/M2 | SYSTOLIC BLOOD PRESSURE: 112 MMHG | WEIGHT: 130.13 LBS | OXYGEN SATURATION: 95 % | TEMPERATURE: 97.2 F | HEART RATE: 83 BPM | RESPIRATION RATE: 20 BRPM

## 2022-08-22 PROCEDURE — G0152 HHCP-SERV OF OT,EA 15 MIN: HCPCS

## 2022-08-22 PROCEDURE — G0299 HHS/HOSPICE OF RN EA 15 MIN: HCPCS

## 2022-08-23 ENCOUNTER — HOME CARE VISIT (OUTPATIENT)
Dept: HOME HEALTH SERVICES | Facility: HOME HEALTHCARE | Age: 77
End: 2022-08-23
Payer: COMMERCIAL

## 2022-08-23 VITALS — OXYGEN SATURATION: 98 % | DIASTOLIC BLOOD PRESSURE: 70 MMHG | SYSTOLIC BLOOD PRESSURE: 122 MMHG | HEART RATE: 104 BPM

## 2022-08-23 LAB
DME PARACHUTE DELIVERY DATE ACTUAL: NORMAL
DME PARACHUTE DELIVERY DATE EXPECTED: NORMAL
DME PARACHUTE DELIVERY DATE REQUESTED: NORMAL
DME PARACHUTE ITEM DESCRIPTION: NORMAL
DME PARACHUTE ORDER STATUS: NORMAL
DME PARACHUTE SUPPLIER NAME: NORMAL
DME PARACHUTE SUPPLIER PHONE: NORMAL

## 2022-08-23 PROCEDURE — G0151 HHCP-SERV OF PT,EA 15 MIN: HCPCS

## 2022-08-23 NOTE — CASE COMMUNICATION
Patient with fluctuating HR as high as 120 during session at times today  Resting levels typically 106 to 108BPM  Post walking activity HR reduced to 90's  No distress noted   to monitor

## 2022-08-24 ENCOUNTER — HOME CARE VISIT (OUTPATIENT)
Dept: HOME HEALTH SERVICES | Facility: HOME HEALTHCARE | Age: 77
End: 2022-08-24
Payer: COMMERCIAL

## 2022-08-24 VITALS
RESPIRATION RATE: 18 BRPM | DIASTOLIC BLOOD PRESSURE: 65 MMHG | HEART RATE: 104 BPM | SYSTOLIC BLOOD PRESSURE: 100 MMHG | TEMPERATURE: 98.7 F | OXYGEN SATURATION: 94 %

## 2022-08-24 PROCEDURE — G0152 HHCP-SERV OF OT,EA 15 MIN: HCPCS

## 2022-08-25 ENCOUNTER — HOSPITAL ENCOUNTER (OUTPATIENT)
Dept: RADIOLOGY | Facility: HOSPITAL | Age: 77
End: 2022-08-25
Attending: INTERNAL MEDICINE
Payer: COMMERCIAL

## 2022-08-25 ENCOUNTER — HOSPITAL ENCOUNTER (OUTPATIENT)
Dept: CT IMAGING | Facility: HOSPITAL | Age: 77
End: 2022-08-25
Attending: INTERNAL MEDICINE
Payer: COMMERCIAL

## 2022-08-25 ENCOUNTER — HOME CARE VISIT (OUTPATIENT)
Dept: HOME HEALTH SERVICES | Facility: HOME HEALTHCARE | Age: 77
End: 2022-08-25
Payer: COMMERCIAL

## 2022-08-25 DIAGNOSIS — T17.208A OROPHARYNGEAL ASPIRATION, INITIAL ENCOUNTER: ICD-10-CM

## 2022-08-25 DIAGNOSIS — J84.9 ILD (INTERSTITIAL LUNG DISEASE) (HCC): ICD-10-CM

## 2022-08-25 PROCEDURE — 92611 MOTION FLUOROSCOPY/SWALLOW: CPT

## 2022-08-25 PROCEDURE — 74230 X-RAY XM SWLNG FUNCJ C+: CPT

## 2022-08-25 PROCEDURE — G0151 HHCP-SERV OF PT,EA 15 MIN: HCPCS

## 2022-08-25 PROCEDURE — G1004 CDSM NDSC: HCPCS

## 2022-08-25 PROCEDURE — 71250 CT THORAX DX C-: CPT

## 2022-08-25 NOTE — PROCEDURES
Speech Pathology Videofluoroscopic Swallow Study (VFSS/VBSS/MBSS)    Patient Name: Artis Arce  IGEVT'G Date: 8/25/2022     Problem List  Active Problems:    * No active hospital problems  *    Past Medical History  Past Medical History:   Diagnosis Date    Disease of thyroid gland     Hypotension     Supratherapeutic INR 6/2/2022     Past Surgical History  Past Surgical History:   Procedure Laterality Date    HYSTERECTOMY         General Information:  Pt is a 68 y o  female with a PMH remarkable for disease of thyroid gland and current work up for cardiac procedure that pt describes as cardiac cath  Current concerns for dysphagia include overt choking episodes across the past few months including pills and breakfast sausage  On imaging pt with significant curvature of cervical spine that does not improve with posture correction  Pt reporting most recently that it's "gotten better" but that she is fearful to eat certain foods  She has full upper plate but her own dentition on lower  A VFSS was recommended to assess oropharyngeal stage swallowing skills at this time  Pt was viewed in lateral position and assessed with thin liquid, puree, and solid food (brennan) as well as a 13mm pill with thin liquid  Oral stage:  Pt presented with mild oral stage dysphagia  Mastication was timely and grossly effective with materials administered today  Bolus formation and transfer were functional on most trials, however, on dense solids pt required multiple swallows to clear oral cavity  Oral control appeared adequate with no gross premature spillage over the base of tongue  Pharyngeal stage:  Pt presented with moderate pharyngeal dysphagia  Velar elevation noted though incomplete during some swallowing trials  Swallowing initiation was prompt  Laryngeal rise and anterior hyoid excursion appeared adequate across most trials with decreased epiglottic inversion x1 mid-study    Airway entrance closure appeared adequate  Tongue base retraction appeared weak resulting in BOT and vallecular residue in moderate-severe amounts  Pharyngeal constriction weak with residue throughout pharynx on all trials including BOT, valleculae, pyriform sinus and posterior pharyngeal wall  On trial of 13mm pill with thin liquid pt demonstrates pill suspension in pyriform due to decreased UES opening  Pt able to clear with second thin liquid wash, deep penetration of thin liquids x1  Strategies and Efficacy:  Pt benefited from effortful swallows and liquid washes  Multiple swallows did not make significant improves for dense solids  Aspiration Response and Efficacy:  Patient did not demonstrate any deep penetration or aspiration during this study, but residue likely leaves pt at very high risk for both throughout the course of a meal     Esophageal stage:  Brief view of esophagus was completed, proximal esophagus with trace-mild retention at level of UES to C5-C6 influenced by curvature of cervical spine  Residue is further complicated by UES opening is incomplete and proximal esophageal narrowing    Assessment Summary:  Pt presents with moderate oropharyngeal dysphagia characterized by mild decrease in epiglottic inversion, decreased pharyngeal squeeze and UES dysfunction resulting in mild-severe residue throughout pharynx including the valleculae, BOT, posterior pharyngeal wall and the pyriform sinus  She did well with thin liquid wash to clear residue  Pt demonstrating difficulty with dense solids and 13mm pill suspended in pyriform due to UES dysfunction  Pt is likely a high penetration and aspiration risk due to residue and pharyngeal weakness  Patient would benefit from dysphagia therapy to strengthen BOT, pharyngeal squeeze and UES opening for a more complete swallow  Patient also benefits from liquid wash to clear residue as multiple swallows are ineffective    Recommending GI consultation for s/s of GERD/LPR and esophageal narrowing near C4 that could be spine curvature related  Note: Images are available for review in PACS as desired  Recommendations:   Recommended Diet:  mechanically altered/level 2 diet and thin liquids   Recommended Form of Medications: crushed with puree   Aspiration precautions and compensatory swallowing strategies: upright posture, slow rate of feeding, small bites/sips, effortful swallow, quiet environment (tv off, limit talking, door closed, etc ), alternating bites and sips and liquid wash  Consider referral to:  GI for GERD/LPR work up given symptoms, concern for esophageal narrowing in setting of cervical spine curvature vs stricture  SLP Dysphagia therapy recommended:  Yes, working toward pharyngeal squeeze strengthening including BOT and epiglottic inversion    Results Reviewed with: patient   Pt/Family Education: initiated  Pt and caregivers would benefit from/require continued education

## 2022-08-26 ENCOUNTER — TELEPHONE (OUTPATIENT)
Dept: LAB | Facility: HOSPITAL | Age: 77
End: 2022-08-26

## 2022-08-26 ENCOUNTER — HOME CARE VISIT (OUTPATIENT)
Dept: HOME HEALTH SERVICES | Facility: HOME HEALTHCARE | Age: 77
End: 2022-08-26
Payer: COMMERCIAL

## 2022-08-26 VITALS
HEART RATE: 88 BPM | SYSTOLIC BLOOD PRESSURE: 106 MMHG | RESPIRATION RATE: 16 BRPM | TEMPERATURE: 97.7 F | DIASTOLIC BLOOD PRESSURE: 62 MMHG | OXYGEN SATURATION: 95 %

## 2022-08-26 PROCEDURE — G0300 HHS/HOSPICE OF LPN EA 15 MIN: HCPCS

## 2022-08-28 VITALS — SYSTOLIC BLOOD PRESSURE: 120 MMHG | DIASTOLIC BLOOD PRESSURE: 64 MMHG | OXYGEN SATURATION: 95 % | HEART RATE: 86 BPM

## 2022-08-30 ENCOUNTER — HOME CARE VISIT (OUTPATIENT)
Dept: HOME HEALTH SERVICES | Facility: HOME HEALTHCARE | Age: 77
End: 2022-08-30
Payer: COMMERCIAL

## 2022-08-30 PROCEDURE — G0299 HHS/HOSPICE OF RN EA 15 MIN: HCPCS

## 2022-08-31 ENCOUNTER — HOME CARE VISIT (OUTPATIENT)
Dept: HOME HEALTH SERVICES | Facility: HOME HEALTHCARE | Age: 77
End: 2022-08-31
Payer: COMMERCIAL

## 2022-08-31 VITALS — SYSTOLIC BLOOD PRESSURE: 112 MMHG | DIASTOLIC BLOOD PRESSURE: 70 MMHG

## 2022-08-31 VITALS
TEMPERATURE: 97.4 F | RESPIRATION RATE: 20 BRPM | OXYGEN SATURATION: 97 % | WEIGHT: 129.38 LBS | SYSTOLIC BLOOD PRESSURE: 112 MMHG | HEART RATE: 88 BPM | DIASTOLIC BLOOD PRESSURE: 60 MMHG | BODY MASS INDEX: 22.92 KG/M2

## 2022-08-31 PROCEDURE — G0152 HHCP-SERV OF OT,EA 15 MIN: HCPCS

## 2022-08-31 PROCEDURE — G0151 HHCP-SERV OF PT,EA 15 MIN: HCPCS

## 2022-08-31 NOTE — CASE COMMUNICATION
SN visits decreased to 1x weekly due to patient's health status stabilizing and sn visits not needed as often

## 2022-08-31 NOTE — CASE COMMUNICATION
Patient has met all OT goals  Patient will be discharged to home with her   Patient is independent for dressing, toilet and bed transfers  Good safety awareness to assist patients  in the kitchen and for light cleaning of dishes and countertops while using walker  Patient agrees with discharge from OT

## 2022-09-02 ENCOUNTER — HOME CARE VISIT (OUTPATIENT)
Dept: HOME HEALTH SERVICES | Facility: HOME HEALTHCARE | Age: 77
End: 2022-09-02
Payer: COMMERCIAL

## 2022-09-02 VITALS — SYSTOLIC BLOOD PRESSURE: 104 MMHG | HEART RATE: 98 BPM | OXYGEN SATURATION: 100 % | DIASTOLIC BLOOD PRESSURE: 56 MMHG

## 2022-09-02 PROCEDURE — G0151 HHCP-SERV OF PT,EA 15 MIN: HCPCS

## 2022-09-06 ENCOUNTER — HOME CARE VISIT (OUTPATIENT)
Dept: HOME HEALTH SERVICES | Facility: HOME HEALTHCARE | Age: 77
End: 2022-09-06
Payer: COMMERCIAL

## 2022-09-06 PROCEDURE — G0151 HHCP-SERV OF PT,EA 15 MIN: HCPCS

## 2022-09-07 ENCOUNTER — HOME CARE VISIT (OUTPATIENT)
Dept: HOME HEALTH SERVICES | Facility: HOME HEALTHCARE | Age: 77
End: 2022-09-07
Payer: COMMERCIAL

## 2022-09-07 VITALS
TEMPERATURE: 95.6 F | WEIGHT: 135 LBS | HEART RATE: 102 BPM | BODY MASS INDEX: 23.91 KG/M2 | DIASTOLIC BLOOD PRESSURE: 52 MMHG | OXYGEN SATURATION: 96 % | SYSTOLIC BLOOD PRESSURE: 102 MMHG

## 2022-09-07 VITALS — DIASTOLIC BLOOD PRESSURE: 56 MMHG | SYSTOLIC BLOOD PRESSURE: 108 MMHG | OXYGEN SATURATION: 94 %

## 2022-09-07 PROCEDURE — G0299 HHS/HOSPICE OF RN EA 15 MIN: HCPCS

## 2022-09-14 ENCOUNTER — HOME CARE VISIT (OUTPATIENT)
Dept: HOME HEALTH SERVICES | Facility: HOME HEALTHCARE | Age: 77
End: 2022-09-14
Payer: COMMERCIAL

## 2022-09-14 PROCEDURE — G0299 HHS/HOSPICE OF RN EA 15 MIN: HCPCS

## 2022-09-14 PROCEDURE — 400013 VN SOC

## 2022-09-15 NOTE — HOME HEALTH
Pt started on Ciprofloxaci due to  recent UTI  Symptoms mild  Pt discharged from homecare services  SN  left message with Dr Elsy Clifton regarding pt discharged   Pt does remain with chronic crackles to R base, elevated heart rate 114,  normally over 100  Pt in agreement that sn services no longer needed

## 2022-09-16 VITALS
DIASTOLIC BLOOD PRESSURE: 62 MMHG | HEART RATE: 114 BPM | WEIGHT: 131.25 LBS | BODY MASS INDEX: 23.25 KG/M2 | SYSTOLIC BLOOD PRESSURE: 102 MMHG | TEMPERATURE: 46.2 F | RESPIRATION RATE: 20 BRPM | OXYGEN SATURATION: 95 %

## 2022-09-19 ENCOUNTER — TELEPHONE (OUTPATIENT)
Dept: PULMONOLOGY | Facility: CLINIC | Age: 77
End: 2022-09-19

## 2022-09-19 ENCOUNTER — OFFICE VISIT (OUTPATIENT)
Dept: PULMONOLOGY | Facility: CLINIC | Age: 77
End: 2022-09-19
Payer: COMMERCIAL

## 2022-09-19 VITALS
RESPIRATION RATE: 18 BRPM | DIASTOLIC BLOOD PRESSURE: 70 MMHG | TEMPERATURE: 98.1 F | WEIGHT: 131.8 LBS | SYSTOLIC BLOOD PRESSURE: 110 MMHG | BODY MASS INDEX: 23.35 KG/M2 | HEART RATE: 100 BPM | OXYGEN SATURATION: 97 % | HEIGHT: 63 IN

## 2022-09-19 DIAGNOSIS — J84.9 ILD (INTERSTITIAL LUNG DISEASE) (HCC): Primary | ICD-10-CM

## 2022-09-19 DIAGNOSIS — R29.6 FREQUENT FALLS: ICD-10-CM

## 2022-09-19 DIAGNOSIS — T17.208A OROPHARYNGEAL ASPIRATION, INITIAL ENCOUNTER: ICD-10-CM

## 2022-09-19 PROCEDURE — 99214 OFFICE O/P EST MOD 30 MIN: CPT | Performed by: INTERNAL MEDICINE

## 2022-09-19 RX ORDER — CEPHALEXIN 500 MG/1
CAPSULE ORAL
COMMUNITY
Start: 2022-06-19

## 2022-09-19 RX ORDER — LEVOTHYROXINE SODIUM 137 UG/1
137 TABLET ORAL DAILY
COMMUNITY
Start: 2022-09-13

## 2022-09-19 NOTE — TELEPHONE ENCOUNTER
Called and left message for patient Dr Cara Navarro would like her to schedule and appointment with Gastroenterology

## 2022-09-19 NOTE — PROGRESS NOTES
Pulmonary Follow Up Note   Mitra Bustos 68 y o  female MRN: [id number]  9/19/2022    Assessment:  Interstitial lung disease   · Unclear etiology, possible CT D/ILD given the inflammatory arthritic changes/deformities of the hands vs slow aspiration/reflux pneumonitis given the speech/pathology eval that showed high risk penetration/aspiration  · Repeat HRCT showing no significant changes mild interstitial/reticulation with mild traction bronchiectasis at the bases more on the right  · Exertional hypoxemia on 6 minute walk test at last visit    Plan:   · Discussed the results today, need further workup to check for CT D   · For now she is agreeable only for minimal/non invasive workup  · Ordered rheumatologic serology  · Check complete pulmonary function test  · Counseled about aspiration precautions/schedule GI eval    Multiple pulmonary nodules   · Tree-in-bud nodularities, unchanged from before  · Likely from mild retained secretions/aspiration    Chronic hypoxemic respiratory failure   · Likely mixed etiology, myocardial dysfunction/CHF, possible ILD/chronic bronchitis/pneumonitis also likely contribution from severe musculoskeletal deconditioning  · Use supplemental oxygen with exertion at 2 LPM    Oropharyngeal dysphagia   · Based on the results from the fluoroscopy/barium swallow   · Will schedule GI follow-up   · Speech/pathology referral for speech exercises as recommended by the study      Return in about 3 months (around 12/19/2022)  History of Present Illness  Follow up for: Concern for ILD     Background:  68 y o  female with a h/o osteoarthritis, lymphedema, hypothyroidism, HTN, depression, PTSD, vitamin-D deficiency, CHF/diastolic, AFib, CKD 3      Hospitalized in 06/2022 for hemorrhagic shock, presented with melena/weakness of 1 week was SBP in the 70s, lactic acidosis, Venkatesh I  Suspected GI bleed, received 2 unit of PRBCs    EGD showed no signs of active bleeding      On a prior hospitalization, chest CT showed minimal right basilar peripheral reticulations/fibrotic changes  Thought possible aspiration/prior pneumonia related  Reports a chronic dry/minimally productive cough for several years  Reports no history of asthma, COPD or ILD      Only reports bronchitis episodes once a year, described as heavy cough with copious sputum production, in addition to the cough noted oropharyngeal dysphagia  At baseline, very limited exercise capacity to about 100 ft within the house, using a walker  This is mainly due to musculoskeletal deconditioning, joint pain      Social/exposure history   [place] [facility] [place] [facility] [place] moved here in the 19 60s  Smoked from [address] [facility] to [address] , only few cigarettes per day  Used to work at the [employer] no significant environmental/occupational hazards exposure        07/18/2022 visit-6 minute walk test showed exertional hypoxemia ordered supplemental oxygen  HRCT still showing unclassified pattern of subpleural reticulation/minimal traction bronchiectasis  Barium swallow/speech eval showed high risk for penetration/aspiration  Interval History  Since last seen, no major events or illness  Continued to have severe/limited exercise capacity due to musculoskeletal deconditioning/osteoarthritis  Admits to not using the supplemental oxygen with exertion  Only minimal a m  cough, improving from before with production of small amount of sputum, clear  Review of Systems  As per hpi, all other systems reviewed and were negative    Studies:    Imaging and other studies: I have personally reviewed pertinent films in PACS     CT chest 12/22/2021-right lung base subpleural interstitial changes  Scattered <3 mm bilateral nodules  HRCT 08/25/2022-lower lobes reticulation/minimal traction bronchiectasis more on the right  Mild mosaic attenuation/mild air trapping on expiratory films  Mild multifocal tree-in-bud nodularity    Unclassified pattern of ILD/enlarged pulmonary artery    The fluoroscopy/barium swallow 08/25/2022-difficulty with solid food/pills high risk for penetration/aspiration due to pharyngeal weakness  Recommended dysphagia therapy, GI consultation      Pulmonary function testing:   Minute walk test-baseline SpO2 96 percent on room air, heart rate 101 able to walk only for 2 minutes, SpO2 drop to 87 percent     EKG, Pathology, and Other Studies: I have personally reviewed pertinent reports           Past medical, surgical, social and family histories reviewed  Medications/Allergies: Reviewed      Vitals: Blood pressure 110/70, pulse 100, temperature 98 1 °F (36 7 °C), resp  rate 18, height 5' 3" (1 6 m), weight 59 8 kg (131 lb 12 8 oz), SpO2 97 %  Body mass index is 23 35 kg/m²  Oxygen Therapy  SpO2: 97 %  Oxygen Therapy: None (Room air)      Physical Exam  Body mass index is 23 35 kg/m²     Gen: not in acute distress,   Neck/Eyes: supple, PERRL  Ear: normal appearance, mild to moderate hearing impairment  Oropharynx: mucosa is moist, no focal lesions or erythema  Salivary glands: soft nontender  Chest: normal respiratory efforts, clear breath sounds bilaterally  CV:  Irregular rhythm, no murmurs appreciated, trace pitting edema up to the knees  Abdomen: soft, non tender  Extremities: + swelling at the D IP of the index fingers bilaterally/arthritic deformities, mild ulnar deviation  Skin: unremarkable  Neuro: AAO X3, no focal motor deficit        Labs:  Lab Results   Component Value Date    WBC 8 60 08/12/2022    HGB 9 3 (L) 08/12/2022    HCT 30 5 (L) 08/12/2022    MCV 94 08/12/2022     08/12/2022     Lab Results   Component Value Date    CALCIUM 8 6 08/12/2022    K 3 9 08/12/2022    CO2 26 08/12/2022     08/12/2022    BUN 18 08/12/2022    CREATININE 1 03 08/12/2022     No results found for: IGE  Lab Results   Component Value Date    ALT 12 08/12/2022    AST 37 08/12/2022    ALKPHOS 65 08/12/2022           Portions of the record may have been created with voice recognition software  Occasional wrong word or "sound a like" substitutions may have occurred due to the inherent limitations of voice recognition software  Read the chart carefully and recognize, using context, where substitutions have occurred    [facility]

## 2022-09-21 NOTE — TELEPHONE ENCOUNTER
Patient scheduled for Gastroenterology  We also sent a referral to [facility] for speech therapy  They have not received a call from them yet  Called and spoke with jaxson  They don't have coverage in the area but Colby Lundberg from [place] is going to make a few phone calls and give us a call back with options

## 2022-09-21 NOTE — TELEPHONE ENCOUNTER
Annette Cristobal from [facility] returning Laura's call regarding [facility]  Please call back at his direct number [phone]

## 2022-09-22 NOTE — TELEPHONE ENCOUNTER
Called and spoke with Maurisio  He said they don't have a speech therapist in the area right now she is out on extended medical leave  Will call and speak with patient to see if they would like to go to [facility] for speech therapy for now till they have a speech therapist at [place] again  Da doesn't know of anyone else in the area currently  Spoke with patient  They would like to just wait for [address]  I told them I will also call some other placed  Called and spoke with [employer] and they do not have coverage in patients area for speech therapy  Will call [facility]

## 2022-10-10 NOTE — PROGRESS NOTES
[facility] - Outpatient Follow-up Note  Naya Mike 68 y o  female MRN: [id number]  Encounter: [id number]    ASSESSMENT AND PLAN:      1  Oropharyngeal dysphagia    Patient is here today to follow-up for her chronic GI issues, 1 of which includes dysphagia  She did have a SLP evaluation with VSS, and this demonstrated moderate oropharyngeal dysphagia  She had decreased epiglottic inversion, decreased pharyngeal squeeze and UES dysfunction, and this resulted in mild to severe residue throughout the pharynx  She was noted to be at high risk for penetration and aspiration  Speech therapy was recommended  - Encouraged patient once again to follow through with speech therapy for her oropharyngeal dysphagia  - dysphagia precautions were once again reviewed today  - Patient is not having any typical symptoms of GERD/dyspepsia, thus no strong indication for PPI  - she had no significant esophageal abnormalities on her EGD from 06/2022, though there was some mild resistance in the cervical esophagus, which may represent a cervical bar in the absence of a discrete stricture  If her dysphagia persists despite speech therapy and there is concern for esophageal component, could consider repeating the EGD with dilatation  2  GI bleed  3  Acute blood loss anemia    Patient did have a history of melena and acute blood loss anemia requiring transfusion  At time of her initial presentation she was on anticoagulation, which has since been discontinued  She had bidirectional endoscopic evaluation, no concerning findings on upper endoscopy  She did several incidental findings on her colonoscopy, including an ulcerated area in the sigmoid colon, though it was uncertain as to whether that was the source of bleeding  She ultimately had her anticoagulation discontinued, and she has not had any recurrence of melena and no hematochezia    She had blood work completed in 09/2022 which shows improvement in anemia  At this time, given age and comorbidities, would simply continue to monitor her hemoglobin and for any overt GI bleeding  If she once again experiences a decline in hemoglobin, would consider wireless capsule endoscopy  Plan to recheck a CBC in approximately 3 months  4  Constipation, unspecified constipation type  5  Proctitis    Patient does have a history of constipation, she was found to have a large stool burden and fecal impaction during her last hospitalization in 08/2022  CT findings questioned stercoral proctitis, though patient did not have any significant rectal pain or rectal bleeding at that time  Her symptoms resolved with enemas and a robust bowel regimen  She states that she is currently well controlled with twice daily stool softeners  I encouraged her to trial addition of osmotic laxative if she needs, as this is incredibly safe and can be used chronically  Something like miralax can also be titrated to effect  She has been have taken note of this  6  Personal history of colon polyps     Patient did have tubular adenomas noted on her last colonoscopy earlier in 2022, though given age and comorbidities, she does not need to continue with surveillance colonoscopies in the absence of GI symptoms  We will follow-up in approximately 3 months to review her dysphagia after having speech therapy, as well as to recheck her blood counts to ensure anemia is stable/ continuing to improve   ______________________________________________________________________    SUBJECTIVE: Patient is a 68 y o  female who presents today for follow-up regarding blood loss anemia  Pmhx sig for atrial fibrillation, chronic diastolic CHF, interstitial lung disease, hypothyroidism, HTN, CKD3  Patient is here today with her   She is known to the GI service as she has been evaluated twice while she was hospitalized in the past year    Initial GI evaluation occurred in 06/2022 when she presented with anemia and black colored stools  Her Hb was found to be 5 4 on admission with hypotension  She underwent bidirectional endoscopic evaluation during that admission, and upper endoscopy did not demonstrate any evidence of bleeding, colonoscopy had several abnormalities, including severe diverticular disease, abnormal fold in the transverse colon, possible ulcer in the sigmoid colon, numerous polyps and internal hemorrhoids  She was resuscitated and monitor during that admission and discharged in stable condition  It was ultimately recommended that she discontinue her anticoagulation  She was subsequently re-evaluated by GI in 08/2022 when she was hospitalized for weakness and falls  She had a CT completed which demonstrated fecal impaction and possible stercoral proctitis  She was not having any hematochezia at that time, though did endorse constipation  She was started on an aggressive bowel regimen  She recently underwent an evaluation by speech pathology given episodes of choking and dysphagia  VSS was completed in 08/2022 and this demonstrated moderate oropharyngeal dysphagia  10/11/22:     Patient is here today with her spouse  She is feeling well overall  She does have infrequent episodes of dysphagia to solids, feels as though things get caught up in the back of her throat  She senses that this frequency is decreasing  She did not follow through with this speech therapy recommended for her or pharyngeal dysphagia  She denies coughing or choking with oral intake  She denies any heartburn, indigestion, nausea, vomiting  She denies any regurgitation or postprandial epigastric pain  Weight has been stable  She is continuing to work on her bowel movements  She is currently taking stool softeners twice daily  She feels that this is working well  She has minimal straining  No BRBPR or melena  No abdominal pain related to defecating      08/2022: CT A/P: Markedly distended rectum containing a large volume of stool consistent with severe fecal impaction  There is mild rectal wall thickening with surrounding fat stranding which could indicate a component of stercoral proctitis  09/2022:  HB 10 6, platelets 163, MCV 88, BUN 12, creatinine 1 15, , albumin 2 8, T bili 0 6, AST 49, ALT 21, A1c 5 1, ferritin 93, TSH    Endoscopic history:  EGD: 06/2022: mild resistance in cervical esophagus, stomach and small bowel   Colon: 06/2022: tortuous colon with severe diverticula and angulations; 6 mm sessile polyp in sigmoid, 2mm sessile polyp in transverse colon; subtly abnormal fold in transverse colon; possibl eulcer with area with yellow eschar in sigmoid colon at 25 cm between 2 diverticula with impacted stool ball; small adenomatous-appearing polyp superior ot eschar; severe diverticula cuasing mild luminal narrowing in sigmoid; internal hemorrhoids      Review of Systems   Per HPI    Historical Information   Past Medical History:   Diagnosis Date   • Disease of thyroid gland    • Hypotension    • Supratherapeutic INR 6/2/2022     Past Surgical History:   Procedure Laterality Date   • HYSTERECTOMY       Social History   Social History     Substance and Sexual Activity   Alcohol Use Never    Comment: occassional      Social History     Substance and Sexual Activity   Drug Use Never     Social History     Tobacco Use   Smoking Status Never Smoker   Smokeless Tobacco Never Used     No family history on file      Meds/Allergies       Current Outpatient Medications:   •  Aspirin 81 MG CAPS  •  atorvastatin (LIPITOR) 40 mg tablet  •  bisacodyl (DULCOLAX) 10 mg suppository  •  cephalexin (KEFLEX) 500 mg capsule  •  ciprofloxacin (CIPRO) 500 mg tablet  •  cyanocobalamin (VITAMIN B-12) 1000 MCG tablet  •  cyanocobalamin 1,000 mcg/mL  •  digoxin (LANOXIN) 0 125 mg tablet  •  diltiazem (CARDIZEM CD) 180 mg 24 hr capsule  •  ergocalciferol (VITAMIN D2) 31,482 units  •  folic acid (FOLVITE) 1 mg tablet  •  levothyroxine 137 mcg tablet  •  levothyroxine 150 mcg tablet  •  Magnesium Gluconate 550 MG TABS  •  potassium chloride (K-DUR,KLOR-CON) 20 mEq tablet  •  sertraline (ZOLOFT) 100 mg tablet  •  torsemide (DEMADEX) 20 mg tablet    No Known Allergies    Objective     There were no vitals taken for this visit  There is no height or weight on file to calculate BMI  Physical Exam  Vitals and nursing note reviewed  Constitutional:       Appearance: Normal appearance  HENT:      Head: Normocephalic and atraumatic  Eyes:      General: No scleral icterus  Conjunctiva/sclera: Conjunctivae normal    Cardiovascular:      Rate and Rhythm: Rhythm irregular  Pulmonary:      Effort: Pulmonary effort is normal       Breath sounds: Normal breath sounds  Abdominal:      General: Abdomen is flat  Bowel sounds are normal  There is no distension  Palpations: Abdomen is soft  There is no mass  Tenderness: There is no abdominal tenderness  There is no guarding or rebound  Skin:     General: Skin is warm and dry  Coloration: Skin is not jaundiced  Neurological:      General: No focal deficit present  Mental Status: She is alert and oriented to person, place, and time  Psychiatric:         Mood and Affect: Mood normal          Behavior: Behavior normal        Lab Results:   No visits with results within 1 Day(s) from this visit     Latest known visit with results is:   Admission on 08/11/2022, Discharged on 08/12/2022   Component Date Value   • WBC 08/11/2022     • RBC 08/11/2022     • Hemoglobin 08/11/2022     • Hematocrit 08/11/2022     • MCV 08/11/2022     • MCH 08/11/2022     • MCHC 08/11/2022     • RDW 08/11/2022     • MPV 08/11/2022     • Platelets 66/01/2581     • Sodium 08/11/2022 138    • Potassium 08/11/2022 3 4 (A)   • Chloride 08/11/2022 98    • CO2 08/11/2022 24    • ANION GAP 08/11/2022 16 (A)   • BUN 08/11/2022 19    • Creatinine 08/11/2022 1 20    • Glucose 08/11/2022 84    • Calcium 08/11/2022 9 2    • Corrected Calcium 08/11/2022 9 7    • AST 08/11/2022 36    • ALT 08/11/2022 11    • Alkaline Phosphatase 08/11/2022 76    • Total Protein 08/11/2022 6 2 (A)   • Albumin 08/11/2022 3 4 (A)   • Total Bilirubin 08/11/2022 0 77    • eGFR 08/11/2022 43    • Lipase 08/11/2022 9 (A)   • LACTIC ACID 08/11/2022 1 5    • Magnesium 08/11/2022 2 3    • BNP 08/11/2022 120 (A)   • Color, UA 08/12/2022 Straw    • Clarity, UA 08/12/2022 Clear    • Specific Gravity, UA 08/12/2022 1 010    • pH, UA 08/12/2022 7 0    • Leukocytes, UA 08/12/2022 Negative    • Nitrite, UA 08/12/2022 Negative    • Protein, UA 08/12/2022 Negative    • Glucose, UA 08/12/2022 Negative    • Ketones, UA 08/12/2022 Trace (A)   • Urobilinogen, UA 08/12/2022 0 2    • Bilirubin, UA 08/12/2022 Negative    • Occult Blood, UA 08/12/2022 Negative    • hs TnI 0hr 08/11/2022 30    • hs TnI 2hr 08/12/2022 28    • Delta 2hr hsTnI 08/12/2022 -2    • WBC 08/11/2022 10 61 (A)   • RBC 08/11/2022 3 53 (A)   • Hemoglobin 08/11/2022 10 1 (A)   • Hematocrit 08/11/2022 32 8 (A)   • MCV 08/11/2022 93    • MCH 08/11/2022 28 6    • MCHC 08/11/2022 30 8 (A)   • RDW 08/11/2022 15 6 (A)   • MPV 08/11/2022 9 3    • Platelets 67/90/8850 228    • nRBC 08/11/2022 0    • Neutrophils Relative 08/11/2022 86 (A)   • Immat GRANS % 08/11/2022 0    • Lymphocytes Relative 08/11/2022 5 (A)   • Monocytes Relative 08/11/2022 9    • Eosinophils Relative 08/11/2022 0    • Basophils Relative 08/11/2022 0    • Neutrophils Absolute 08/11/2022 9 09 (A)   • Immature Grans Absolute 08/11/2022 0 02    • Lymphocytes Absolute 08/11/2022 0 56 (A)   • Monocytes Absolute 08/11/2022 0 91    • Eosinophils Absolute 08/11/2022 0 01    • Basophils Absolute 61/40/7016 8 28    • Salicylate Lvl 19/00/4802 <5    • hs TnI 4hr 08/12/2022 28    • Delta 4hr hsTnI 08/12/2022 -2    • Digoxin Lvl 08/12/2022 1 0    • Sodium 08/12/2022 139    • Potassium 08/12/2022 3 9    • Chloride 08/12/2022 100    • CO2 08/12/2022 26    • ANION GAP 08/12/2022 13    • BUN 08/12/2022 18    • Creatinine 08/12/2022 1 03    • Glucose 08/12/2022 59 (A)   • Calcium 08/12/2022 8 6    • Corrected Calcium 08/12/2022 9 2    • AST 08/12/2022 37    • ALT 08/12/2022 12    • Alkaline Phosphatase 08/12/2022 65    • Total Protein 08/12/2022 5 7 (A)   • Albumin 08/12/2022 3 2 (A)   • Total Bilirubin 08/12/2022 0 65    • eGFR 08/12/2022 52    • Magnesium 08/12/2022 2 4    • WBC 08/12/2022 8 60    • RBC 08/12/2022 3 24 (A)   • Hemoglobin 08/12/2022 9 3 (A)   • Hematocrit 08/12/2022 30 5 (A)   • MCV 08/12/2022 94    • MCH 08/12/2022 28 7    • MCHC 08/12/2022 30 5 (A)   • RDW 08/12/2022 15 8 (A)   • Platelets 86/71/4109 188    • MPV 08/12/2022 9 9    • Ventricular Rate 08/12/2022 95    • Atrial Rate 08/12/2022 88    • QRSD Interval 08/12/2022 92    • QT Interval 08/12/2022 410    • QTC Interval 08/12/2022 515    • QRS Axis 08/12/2022 78    • T Wave Axis 08/12/2022 -47    • Ventricular Rate 08/11/2022 126    • Atrial Rate 08/11/2022 80    • QRSD Interval 08/11/2022 84    • QT Interval 08/11/2022 328    • QTC Interval 08/11/2022 475    • QRS Axis 08/11/2022 77    • T Wave Axis 08/11/2022 -71    • Ventricular Rate 08/12/2022 123    • Atrial Rate 08/12/2022 125    • QRSD Interval 08/12/2022 86    • QT Interval 08/12/2022 338    • QTC Interval 08/12/2022 483    • QRS Axis 08/12/2022 74    • T Wave Axis 08/12/2022 -68          Radiology Results:   No results found  Marny Redder    **Please note:  Dictation voice to text software may have been used in the creation of this record  Occasional wrong word or “sound alike” substitutions may have occurred due to the inherent limitations of voice recognition software  Read the chart carefully and recognize, using context, where substitutions have occurred  **

## 2022-10-11 ENCOUNTER — CONSULT (OUTPATIENT)
Dept: GASTROENTEROLOGY | Facility: CLINIC | Age: 77
End: 2022-10-11
Payer: COMMERCIAL

## 2022-10-11 VITALS
WEIGHT: 133 LBS | SYSTOLIC BLOOD PRESSURE: 104 MMHG | RESPIRATION RATE: 16 BRPM | TEMPERATURE: 98.1 F | DIASTOLIC BLOOD PRESSURE: 64 MMHG | OXYGEN SATURATION: 98 % | HEART RATE: 100 BPM | HEIGHT: 63 IN | BODY MASS INDEX: 23.57 KG/M2

## 2022-10-11 DIAGNOSIS — K92.2 GI BLEED: ICD-10-CM

## 2022-10-11 DIAGNOSIS — K59.00 CONSTIPATION, UNSPECIFIED CONSTIPATION TYPE: ICD-10-CM

## 2022-10-11 DIAGNOSIS — Z86.010 PERSONAL HISTORY OF COLONIC POLYPS: ICD-10-CM

## 2022-10-11 DIAGNOSIS — K62.89 PROCTITIS: ICD-10-CM

## 2022-10-11 DIAGNOSIS — D62 ACUTE BLOOD LOSS ANEMIA: ICD-10-CM

## 2022-10-11 DIAGNOSIS — R13.12 OROPHARYNGEAL DYSPHAGIA: Primary | ICD-10-CM

## 2022-10-11 PROCEDURE — 99214 OFFICE O/P EST MOD 30 MIN: CPT | Performed by: PHYSICIAN ASSISTANT

## 2022-10-11 RX ORDER — NEOMYCIN SULFATE, POLYMYXIN B SULFATE, AND DEXAMETHASONE 3.5; 10000; 1 MG/G; [USP'U]/G; MG/G
OINTMENT OPHTHALMIC
COMMUNITY
Start: 2022-10-08

## 2022-10-11 RX ORDER — DILTIAZEM HYDROCHLORIDE 180 MG/1
180 CAPSULE, EXTENDED RELEASE ORAL DAILY
COMMUNITY
Start: 2022-09-14

## 2022-10-11 NOTE — PATIENT INSTRUCTIONS
Please follow up with speech pathology given they will be able to help with your swallowing issues  Continue with sitting upright, chewing thoroughly, alternating between solids and liquids, not reclining after immediately after swallowing  For the constipation:   - stay on stool softener for now as it seems to be working for you    - if you need to add something into regimen, you can start on miralax daily

## 2022-10-12 ENCOUNTER — HOSPITAL ENCOUNTER (OUTPATIENT)
Dept: PULMONOLOGY | Facility: HOSPITAL | Age: 77
Discharge: HOME | End: 2022-10-12
Attending: INTERNAL MEDICINE
Payer: COMMERCIAL

## 2022-10-12 ENCOUNTER — APPOINTMENT (OUTPATIENT)
Dept: LAB | Facility: HOSPITAL | Age: 77
End: 2022-10-12
Attending: INTERNAL MEDICINE
Payer: COMMERCIAL

## 2022-10-12 DIAGNOSIS — J84.9 ILD (INTERSTITIAL LUNG DISEASE) (HCC): ICD-10-CM

## 2022-10-12 PROCEDURE — 86430 RHEUMATOID FACTOR TEST QUAL: CPT

## 2022-10-12 PROCEDURE — 94060 EVALUATION OF WHEEZING: CPT

## 2022-10-12 PROCEDURE — 86235 NUCLEAR ANTIGEN ANTIBODY: CPT

## 2022-10-12 PROCEDURE — 94729 DIFFUSING CAPACITY: CPT | Performed by: INTERNAL MEDICINE

## 2022-10-12 PROCEDURE — 94760 N-INVAS EAR/PLS OXIMETRY 1: CPT

## 2022-10-12 PROCEDURE — 86431 RHEUMATOID FACTOR QUANT: CPT

## 2022-10-12 PROCEDURE — 94729 DIFFUSING CAPACITY: CPT

## 2022-10-12 PROCEDURE — 36415 COLL VENOUS BLD VENIPUNCTURE: CPT

## 2022-10-12 PROCEDURE — 86225 DNA ANTIBODY NATIVE: CPT

## 2022-10-12 PROCEDURE — 94726 PLETHYSMOGRAPHY LUNG VOLUMES: CPT

## 2022-10-12 PROCEDURE — 94060 EVALUATION OF WHEEZING: CPT | Performed by: INTERNAL MEDICINE

## 2022-10-12 PROCEDURE — 94726 PLETHYSMOGRAPHY LUNG VOLUMES: CPT | Performed by: INTERNAL MEDICINE

## 2022-10-12 RX ORDER — ALBUTEROL SULFATE 2.5 MG/3ML
2.5 SOLUTION RESPIRATORY (INHALATION) ONCE AS NEEDED
Status: COMPLETED | OUTPATIENT
Start: 2022-10-12 | End: 2022-10-12

## 2022-10-12 RX ADMIN — ALBUTEROL SULFATE 2.5 MG: 2.5 SOLUTION RESPIRATORY (INHALATION) at 12:50

## 2022-10-13 LAB
CRYOGLOB RF SER-ACNC: ABNORMAL [IU]/ML
RHEUMATOID FACT SER QL LA: POSITIVE

## 2022-11-08 NOTE — ASSESSMENT & PLAN NOTE
Rate controlled with addition of digoxin  Will need dig level drawn before dose on 6/15  Metoprolol has been discontinued due to low BP    Continue to hold given soft BP's  Continue to hold Eliquis given GIB until hgb is stable English

## 2023-01-12 ENCOUNTER — OFFICE VISIT (OUTPATIENT)
Dept: GASTROENTEROLOGY | Facility: CLINIC | Age: 78
End: 2023-01-12

## 2023-01-12 VITALS
DIASTOLIC BLOOD PRESSURE: 70 MMHG | HEART RATE: 92 BPM | BODY MASS INDEX: 23.21 KG/M2 | SYSTOLIC BLOOD PRESSURE: 120 MMHG | RESPIRATION RATE: 18 BRPM | OXYGEN SATURATION: 97 % | HEIGHT: 63 IN | WEIGHT: 131 LBS

## 2023-01-12 DIAGNOSIS — K62.89 PROCTITIS: ICD-10-CM

## 2023-01-12 DIAGNOSIS — K92.2 GASTROINTESTINAL HEMORRHAGE, UNSPECIFIED GASTROINTESTINAL HEMORRHAGE TYPE: ICD-10-CM

## 2023-01-12 DIAGNOSIS — K59.09 CHRONIC CONSTIPATION: ICD-10-CM

## 2023-01-12 DIAGNOSIS — R13.12 OROPHARYNGEAL DYSPHAGIA: Primary | ICD-10-CM

## 2023-01-12 DIAGNOSIS — D50.0 BLOOD LOSS ANEMIA: ICD-10-CM

## 2023-01-12 NOTE — PROGRESS NOTES
[facility] - Outpatient Follow-up Note  Jeff Barry 68 y o  female MRN: [id number]  Encounter: [id number]    ASSESSMENT AND PLAN:      1  Oropharyngeal dysphagia    Patient previously had a video swallow study done which demonstrated moderate oropharyngeal dysphagia, and a high risk for penetration and aspiration  We reviewed these findings once again and given she is still having some abnormalities related to her swallow noted by her spouse, I encouraged her to follow through with the speech therapy recommendation  A new referral was placed today  If possible, it would be preferable to have home health option with this, given patient's difficulty with ambulation  Continue with dysphagia and aspiration precautions  She currently has no additional upper GI symptoms including that of dyspepsia or heartburn, thus we will continue to monitor at this time  There is no strong indication for EGD at present  (We previously discussed that given the mild resistance at the cervical esophagus, there may be a cervical bar in the absence of a discrete stricture, though defer procedures at this time and follow-up after speech therapy treatments)    - Ambulatory Referral to Speech Therapy; Future  - CBC and differential; Future  - Comprehensive metabolic panel; Future    2  Gastrointestinal hemorrhage, unspecified gastrointestinal hemorrhage type  3  Blood loss anemia    Previous hospitalization 06/2022 for melena and acute blood loss anemia requiring transfusion  At that time she was anticoagulated, though this has been discontinued  She has no longer noted any melena and has had no episodes of hematochezia  She is due to have her blood counts rechecked to ensure stability/improvement in her hemoglobin  This has been ordered today  She should contact office for any recurrence of GI bleeding    If her hemoglobin downtrends, consider WCE for evaluation of the small bowel and occult bleeding  4  Proctitis  5  Chronic constipation    Patient is currently doing well on daily MiraLAX  We discussed that she can titrate this if needed  We discussed that there is lack of efficacy for stool softeners so she can discontinue if she does not find them helpful  She is up-to-date on colonoscopy and given age and comorbidities, would not recommend additional screening colonoscopies in the absence of alarm features  Plan to follow-up in approximately 3 to reassess symptoms  ______________________________________________________________________    SUBJECTIVE: Patient is a 68 y o  female who presents today for follow-up regarding dysphagia  Pmhx sig for atrial fibrillation, chronic diastolic CHF, interstitial lung disease, hypothyroidism, HTN, CKD3  She was last evaluated in 10/2022 for intermittent dysphagia to solids and for hospital follow-up  She was evaluated earlier in 06/2022 during her hospitalization at which time she had anemia and melena  She had an EGD and colonoscopy completed at that time, which did not reveal an upper GI source of bleeding the colonoscopy demonstrated severe diverticular disease and a possible ulcer and polyps in the colon  She had a video swallow study done and this demonstrated moderate oropharyngeal dysphagia  Speech therapy was recommended  01/12/23:    Patient is here today with her   She is feeling well  She was never able to partake in speech therapy, she was requesting at home evaluation and treatment  She herself denies any significant heartburn, indigestion, nausea, vomiting, or difficulty swallowing solids or liquids  Her  shares that he notes her clearing her throat and coughing with oral intake  No nausea or vomiting  No sensation of choking  She is started on daily MiraLAX and feels that this has been very beneficial for her bowel habits    She denies any significant straining, abdominal pain or rectal pain related to defecation  No BRBPR or melena  Wonders if she should still be taking stool softeners  08/2022: CT A/P: Markedly distended rectum containing a large volume of stool consistent with severe fecal impaction   There is mild rectal wall thickening with surrounding fat stranding which could indicate a component of stercoral proctitis    09/2022:  HB 10 6, platelets 471, MCV 88, BUN 12, creatinine 1 15, , albumin 2 8, T bili 0 6, AST 49, ALT 21, A1c 5 1, ferritin 93, TSH    Endoscopic history:  EGD: 06/2022: mild resistance in cervical esophagus, stomach and small bowel   Colon: 06/2022: tortuous colon with severe diverticula and angulations; 6 mm sessile polyp in sigmoid, 2mm sessile polyp in transverse colon; subtly abnormal fold in transverse colon; possible ulcer with area with yellow eschar in sigmoid colon at 25 cm between 2 diverticula with impacted stool ball; small adenomatous-appearing polyp superior ot eschar; severe diverticula cuasing mild luminal narrowing in sigmoid; internal hemorrhoids       Review of Systems   Per HPI    Historical Information   Past Medical History:   Diagnosis Date   • Disease of thyroid gland    • Hypotension    • Supratherapeutic INR 6/2/2022     Past Surgical History:   Procedure Laterality Date   • HYSTERECTOMY       Social History   Social History     Substance and Sexual Activity   Alcohol Use Never    Comment: occassional      Social History     Substance and Sexual Activity   Drug Use Never     Social History     Tobacco Use   Smoking Status Never   Smokeless Tobacco Never     No family history on file      Meds/Allergies       Current Outpatient Medications:   •  Aspirin 81 MG CAPS  •  atorvastatin (LIPITOR) 40 mg tablet  •  cephalexin (KEFLEX) 500 mg capsule  •  cyanocobalamin (VITAMIN B-12) 1000 MCG tablet  •  cyanocobalamin 1,000 mcg/mL  •  digoxin (LANOXIN) 0 125 mg tablet  •  diltiazem (CARDIZEM CD) 180 mg 24 hr capsule  •  diltiazem (TIAZAC) 180 MG 24 hr capsule  •  ergocalciferol (VITAMIN D2) 32,966 units  •  folic acid (FOLVITE) 1 mg tablet  •  levothyroxine 137 mcg tablet  •  Magnesium Gluconate 550 MG TABS  •  neomycin-polymyxin-dexamethasone (MAXITROL) 0 35%-10,000 units/g-0 1%  •  potassium chloride (K-DUR,KLOR-CON) 20 mEq tablet  •  sertraline (ZOLOFT) 100 mg tablet  •  torsemide (DEMADEX) 20 mg tablet    No Known Allergies    Objective     There were no vitals taken for this visit  There is no height or weight on file to calculate BMI  Physical Exam  Vitals and nursing note reviewed  Constitutional:       Appearance: Normal appearance  HENT:      Head: Normocephalic and atraumatic  Eyes:      General: No scleral icterus  Cardiovascular:      Rate and Rhythm: Normal rate  Pulmonary:      Effort: Pulmonary effort is normal    Abdominal:      General: Abdomen is flat  Bowel sounds are normal  There is no distension  Palpations: Abdomen is soft  Tenderness: There is no abdominal tenderness  There is no guarding or rebound  Skin:     General: Skin is warm and dry  Neurological:      General: No focal deficit present  Mental Status: She is alert and oriented to person, place, and time  Psychiatric:         Mood and Affect: Mood normal          Behavior: Behavior normal        Lab Results:   No visits with results within 1 Day(s) from this visit  Latest known visit with results is:   Appointment on 10/12/2022   Component Date Value   • Cyclic Citrullinated Pep* 10/12/2022 1 1    • C-ANCA 10/12/2022 <1:20    • Atypical pANCA 10/12/2022 <1:20    • MPO AB 10/12/2022 <0 2    • LA-3 AB 10/12/2022 <0 2    • P-ANCA 10/12/2022 <1:20    • Miscellaneous Lab Test R* 10/12/2022 SEE WRITTEN REPORT    • Thermoact  S[place]ch[place]i 10/12/2022 Negative    • [place] Serum Abs 10/12/2022 Negative    • A  [place] Abs 10/12/2022 Negative    • A  Fumigatus #1 Abs 10/12/2022 Negative    • M  FAENI ABS 10/12/2022 Negative    • T VULGARIS#1 10/12/2022 Negative Radiology Results:   No results found  Natan Bustillos PA-C    **Please note:  Dictation voice to text software may have been used in the creation of this record  Occasional wrong word or “sound alike” substitutions may have occurred due to the inherent limitations of voice recognition software  Read the chart carefully and recognize, using context, where substitutions have occurred  **

## 2023-07-26 NOTE — PROGRESS NOTES
[facility]    Cardiology Progress Note  Yesenia Lindsey 68 y o  female   YOB: 1945 MRN: [id number]  Unit/Bed#: ICU 12-01 Encounter: [id number]      Subjective:   No significant events overnight  No acute complaints  Due to poor colon prep yesterday, colonoscopy was postponed and is currently scheduled for later today  No ongoing gross bleeding at present, and hemoglobin remains stable  Assessments  Principal Problem:    ABLA (acute blood loss anemia)  Active Problems:    Atrial fibrillation, chronic (HCC)    Chronic diastolic CHF (congestive heart failure) ([facility])    Depression    Essential hypertension    Hypothyroidism    Mixed hyperlipidemia    Acute renal failure superimposed on stage 3 chronic kidney disease ([facility])    Supratherapeutic INR    Shock ([facility] [address] )    EKG abnormalities      Plan  Acute blood loss anemia, Gi bleed  · S/p blood transfusion  · Hemoglobin remained stable but low at 8 1  · Colonoscopy was deferred yesterday due to poor bowel prep, and now awaiting colonoscopy today    Chronic atrial fibrillation  · Heart rate better controlled, and 80s to 110 most of the time, was briefly elevated in the 110s yesterday afternoon  · Continue digoxin 0 125 mg every other day - got a dose IV yesterday instead, due to NPO status  · Can use IV metoprolol 5 mg q6h PRN for rate control  · Was previously on eliquis at home, but currently discontinued due to acute blood loss anemia needing blood transfusion  · Will need re-evaluation once bleeding issues are better controlled    Chronic diastolic heart failure  · Home torsemide on hold due to ongoing bleeding concerns/hypotension  · No acute hypoxia  · Monitor for development of fluid overload while being off diuretics  · Likely resume home torsemide, once bleeding and hypertension issues are resolved      Review of Systems   All other systems reviewed and are negative  Telemetry Review:  Atrial fibrillation    Heart rate remains well controlled, mostly in the 90s    Objective:   Vitals: Blood pressure 104/68, pulse 92, temperature (!) 97 2 °F (36 2 °C), temperature source Tympanic, resp  rate (!) 32, height 5' 3" (1 6 m), weight 72 9 kg (160 lb 11 5 oz), SpO2 97 %  , Body mass index is 28 47 kg/m² ,   Orthostatic Blood Pressures    Flowsheet Row Most Recent Value   Blood Pressure 104/68 filed at 06/07/2022 0800   Patient Position - Orthostatic VS Lying filed at 06/07/2022 4866         Systolic (84ZBM), TVR:373 , Min:95 , ATJ:030     Diastolic (66OAJ), ZTS:14, Min:51, Max:80    Wt Readings from Last 5 Encounters:   06/07/22 72 9 kg (160 lb 11 5 oz)   12/22/21 82 6 kg (182 lb)   12/10/21 83 kg (182 lb 15 7 oz)     I/O       06/04 0701  06/05 0700 06/05 0701  06/06 0700 06/06 0701  06/07 0700    P  O  1080 1260     I V  (mL/kg) 580 (8 3)      Blood       IV Piggyback 150      Total Intake(mL/kg) 1810 (25 9) 1260 (17 2)     Urine (mL/kg/hr) 1369 (0 8) 1295 (0 7)     Stool 0 0     Total Output 1369 1295     Net +441 -35            Unmeasured Stool Occurrence 0 x 0 x             Physical Exam  Vitals and nursing note reviewed  Constitutional:       General: She is not in acute distress  Appearance: Normal appearance  She is well-developed  She is not ill-appearing  HENT:      Head: Normocephalic and atraumatic  Nose: No congestion  Eyes:      General: No scleral icterus  Conjunctiva/sclera: Conjunctivae normal    Neck:      Vascular: No carotid bruit or JVD  Cardiovascular:      Rate and Rhythm: Normal rate  Rhythm irregularly irregular  Pulses: Normal pulses  Heart sounds: Normal heart sounds  No murmur heard  No friction rub  No gallop  Pulmonary:      Effort: Pulmonary effort is normal  No respiratory distress  Breath sounds: Normal breath sounds  No rales  Abdominal:      General: There is no distension  Palpations: Abdomen is soft  Tenderness: There is no abdominal tenderness  Musculoskeletal:         General: No swelling or tenderness  Cervical back: Neck supple  Right lower leg: Edema present  Left lower leg: Edema present  Comments: Trace edema   Skin:     General: Skin is warm  Neurological:      General: No focal deficit present  Mental Status: She is alert and oriented to person, place, and time  Mental status is at baseline  Psychiatric:         Mood and Affect: Mood normal          Behavior: Behavior normal          Thought Content: Thought content normal          Laboratory Results: personally reviewed        CBC with diff:   Results from last 7 days   Lab Units 06/07/22  0634 06/06/22  2045 06/06/22  1740 06/06/22  0527 06/05/22  2006 06/05/22  0601 06/04/22  2256 06/04/22  1143 06/04/22  0508 06/04/22  0031 06/03/22  1533 06/03/22  0559 06/02/22  1941 06/02/22  1138   WBC Thousand/uL 6 00  --   --  6 28  --  6 60  --   --  6 60  --   --  9 25  --  9 82   HEMOGLOBIN g/dL 8 1* 8 2* 8 3* 8 5* 8 7* 9 3* 8 4*   < > 7 6* 5 9* 7 0* 7 5*   < > 5 4*   HEMATOCRIT % 27 7*  --   --  27 5*  --  31 5*  --   --  24 5* 19 2* 22 2* 23 8*   < > 18 8*   MCV fL 99*  --   --  94  --  97  --   --  96  --   --  93  --  102*   PLATELETS Thousands/uL 158  --   --  184  --  245  --   --  178  --   --  243  --  354   MCH pg 29 0  --   --  29 1  --  28 7  --   --  29 8  --   --  29 3  --  29 3   MCHC g/dL 29 2*  --   --  30 9*  --  29 5*  --   --  31 0*  --   --  31 5  --  28 7*   RDW % 17 2*  --   --  17 1*  --  17 6*  --   --  17 0*  --   --  16 6*  --  16 9*   MPV fL 9 6  --   --  9 4  --  10 0  --   --  9 5  --   --  9 4  --  9 6   NRBC AUTO /100 WBCs 0  --   --   --   --   --   --   --   --   --   --  1  --  1    < > = values in this interval not displayed           CMP:  Results from last 7 days   Lab Units 06/07/22  0634 06/06/22  0527 06/05/22  0601 06/04/22  0508 06/03/22  0559 06/02/22  1138   POTASSIUM mmol/L 3 6 3 9 3 5 3 2* 4 3 5 2   CHLORIDE mmol/L 109* 109* 106 107 109* 100   CO2 mmol/L 27 28 24 22 19* 18*   BUN mg/dL 7 13 21 33* 41* 41*   CREATININE mg/dL 0 76 1 03 1 24 1 62* 1 99* 2 32*   CALCIUM mg/dL 8 0* 8 5 9 2 7 9* 7 4* 8 5   AST U/L  --  18  --   --  38 27   ALT U/L  --  7  --   --  12 8   ALK PHOS U/L  --  74  --   --  83 66   EGFR ml/min/1 73sq m 75 52 41 30 23 19         BMP:  Results from last 7 days   Lab Units 06/07/22  0634 06/06/22  0527 06/05/22  0601 06/04/22  0508 06/03/22  0559 06/02/22  1138   POTASSIUM mmol/L 3 6 3 9 3 5 3 2* 4 3 5 2   CHLORIDE mmol/L 109* 109* 106 107 109* 100   CO2 mmol/L 27 28 24 22 19* 18*   BUN mg/dL 7 13 21 33* 41* 41*   CREATININE mg/dL 0 76 1 03 1 24 1 62* 1 99* 2 32*   CALCIUM mg/dL 8 0* 8 5 9 2 7 9* 7 4* 8 5       BNP: No results for input(s): BNP in the last 72 hours      Magnesium:   Results from last 7 days   Lab Units 06/07/22  0634 06/05/22  0601   MAGNESIUM mg/dL 2 2 2 6       Coags:   Results from last 7 days   Lab Units 06/03/22  0559 06/02/22  1138   PTT seconds  --  44*   INR  2 19* 2 64*       TSH:        Hemoglobin A1C       Lipid Profile:       Meds/Allergies   all current active meds have been reviewed and current meds:   Current Facility-Administered Medications   Medication Dose Route Frequency    atorvastatin (LIPITOR) tablet 40 mg  40 mg Oral Daily With Dinner    [START ON 6/8/2022] digoxin (LANOXIN) tablet 125 mcg  125 mcg Oral Every Other Day    levothyroxine tablet 150 mcg  150 mcg Oral Daily    nystatin (MYCOSTATIN) powder   Topical BID    pantoprazole (PROTONIX) injection 40 mg  40 mg Intravenous Q12H [address]    polyethylene glycol (GOLYTELY) bowel prep 4,000 mL  4,000 mL Oral See Admin Instructions    potassium chloride 20 mEq IVPB (premix)  20 mEq Intravenous Once    sertraline (ZOLOFT) tablet 100 mg  100 mg Oral Daily     Medications Prior to Admission   Medication    digoxin (LANOXIN) 0 125 mg tablet    apixaban (ELIQUIS) 5 mg    atorvastatin (LIPITOR) 40 mg tablet    cyanocobalamin 1,000 mcg/mL    folic acid (FOLVITE) 1 mg tablet    levothyroxine 150 mcg tablet    Magnesium Gluconate 550 MG TABS    metoprolol tartrate (LOPRESSOR) 50 mg tablet    potassium chloride (K-DUR,KLOR-CON) 20 mEq tablet    sertraline (ZOLOFT) 100 mg tablet    torsemide (DEMADEX) 20 mg tablet            Cardiac testing: reviewed  No results found for this or any previous visit  No results found for this or any previous visit  No results found for this or any previous visit  No results found for this or any previous visit  No

## 2023-12-23 ENCOUNTER — HOSPITAL ENCOUNTER (INPATIENT)
Facility: HOSPITAL | Age: 78
LOS: 2 days | Discharge: HOME HEALTH | DRG: 603 | End: 2023-12-25
Attending: EMERGENCY MEDICINE | Admitting: INTERNAL MEDICINE
Payer: COMMERCIAL

## 2023-12-23 DIAGNOSIS — L03.90 CELLULITIS: ICD-10-CM

## 2023-12-23 DIAGNOSIS — L98.499 SKIN ULCER (HCC): Primary | ICD-10-CM

## 2023-12-23 PROBLEM — N17.9 ACUTE RENAL FAILURE SUPERIMPOSED ON STAGE 3 CHRONIC KIDNEY DISEASE (HCC): Status: RESOLVED | Noted: 2022-06-02 | Resolved: 2023-12-23

## 2023-12-23 PROBLEM — M62.81 GENERALIZED MUSCLE WEAKNESS: Status: ACTIVE | Noted: 2021-12-28

## 2023-12-23 PROBLEM — I51.7 CARDIOMEGALY: Status: ACTIVE | Noted: 2018-05-13

## 2023-12-23 PROBLEM — R48.2 APRAXIA: Status: ACTIVE | Noted: 2021-12-28

## 2023-12-23 PROBLEM — G31.84 MILD COGNITIVE IMPAIRMENT OF UNCERTAIN OR UNKNOWN ETIOLOGY: Status: ACTIVE | Noted: 2022-01-20

## 2023-12-23 PROBLEM — Z87.891 PERSONAL HISTORY OF NICOTINE DEPENDENCE: Status: ACTIVE | Noted: 2018-05-13

## 2023-12-23 PROBLEM — I48.91 ATRIAL FIBRILLATION WITH RVR (HCC): Status: RESOLVED | Noted: 2022-08-12 | Resolved: 2023-12-23

## 2023-12-23 PROBLEM — L03.115 CELLULITIS OF RIGHT LOWER LIMB: Status: ACTIVE | Noted: 2018-05-13

## 2023-12-23 PROBLEM — J90 PLEURAL EFFUSION, NOT ELSEWHERE CLASSIFIED: Status: ACTIVE | Noted: 2018-05-13

## 2023-12-23 PROBLEM — D64.9 ANEMIA: Status: ACTIVE | Noted: 2018-05-13

## 2023-12-23 PROBLEM — R29.6 REPEATED FALLS: Status: ACTIVE | Noted: 2021-12-28

## 2023-12-23 PROBLEM — R26.2 DIFFICULTY IN WALKING, NOT ELSEWHERE CLASSIFIED: Status: ACTIVE | Noted: 2021-12-28

## 2023-12-23 PROBLEM — I11.0 HYPERTENSIVE HEART DISEASE WITH HEART FAILURE (HCC): Status: ACTIVE | Noted: 2018-05-13

## 2023-12-23 PROBLEM — L84 CORN OR CALLUS: Status: ACTIVE | Noted: 2021-12-28

## 2023-12-23 PROBLEM — J96.11 CHRONIC RESPIRATORY FAILURE WITH HYPOXIA (HCC): Status: ACTIVE | Noted: 2023-03-09

## 2023-12-23 PROBLEM — N18.30 ACUTE RENAL FAILURE SUPERIMPOSED ON STAGE 3 CHRONIC KIDNEY DISEASE (HCC): Status: RESOLVED | Noted: 2022-06-02 | Resolved: 2023-12-23

## 2023-12-23 PROBLEM — I89.0 LYMPHEDEMA, NOT ELSEWHERE CLASSIFIED: Status: ACTIVE | Noted: 2021-12-28

## 2023-12-23 PROBLEM — Z79.890 HORMONE REPLACEMENT THERAPY: Status: ACTIVE | Noted: 2022-01-05

## 2023-12-23 PROBLEM — K59.00 CONSTIPATION, UNSPECIFIED: Status: ACTIVE | Noted: 2021-12-28

## 2023-12-23 PROBLEM — R91.1 SOLITARY PULMONARY NODULE: Status: ACTIVE | Noted: 2018-05-13

## 2023-12-23 PROBLEM — F32.9 MAJOR DEPRESSIVE DISORDER, SINGLE EPISODE, UNSPECIFIED: Status: ACTIVE | Noted: 2018-05-13

## 2023-12-23 LAB
ALBUMIN SERPL BCP-MCNC: 4.1 G/DL (ref 3.5–5)
ALP SERPL-CCNC: 92 U/L (ref 34–104)
ALT SERPL W P-5'-P-CCNC: 9 U/L (ref 7–52)
ANION GAP SERPL CALCULATED.3IONS-SCNC: 7 MMOL/L
APTT PPP: 41 SECONDS (ref 23–37)
AST SERPL W P-5'-P-CCNC: 21 U/L (ref 13–39)
ATRIAL RATE: 119 BPM
BASOPHILS # BLD AUTO: 0.01 THOUSANDS/ÂΜL (ref 0–0.1)
BASOPHILS NFR BLD AUTO: 0 % (ref 0–1)
BILIRUB SERPL-MCNC: 0.71 MG/DL (ref 0.2–1)
BUN SERPL-MCNC: 11 MG/DL (ref 5–25)
CALCIUM SERPL-MCNC: 9.1 MG/DL (ref 8.4–10.2)
CHLORIDE SERPL-SCNC: 101 MMOL/L (ref 96–108)
CO2 SERPL-SCNC: 29 MMOL/L (ref 21–32)
CREAT SERPL-MCNC: 0.93 MG/DL (ref 0.6–1.3)
EOSINOPHIL # BLD AUTO: 0.02 THOUSAND/ÂΜL (ref 0–0.61)
EOSINOPHIL NFR BLD AUTO: 0 % (ref 0–6)
ERYTHROCYTE [DISTWIDTH] IN BLOOD BY AUTOMATED COUNT: 13.9 % (ref 11.6–15.1)
GFR SERPL CREATININE-BSD FRML MDRD: 59 ML/MIN/1.73SQ M
GLUCOSE SERPL-MCNC: 97 MG/DL (ref 65–140)
HCT VFR BLD AUTO: 41.1 % (ref 34.8–46.1)
HGB BLD-MCNC: 12.6 G/DL (ref 11.5–15.4)
IMM GRANULOCYTES # BLD AUTO: 0.02 THOUSAND/UL (ref 0–0.2)
IMM GRANULOCYTES NFR BLD AUTO: 0 % (ref 0–2)
INR PPP: 1.06 (ref 0.84–1.19)
LACTATE SERPL-SCNC: 0.9 MMOL/L (ref 0.5–2)
LYMPHOCYTES # BLD AUTO: 0.68 THOUSANDS/ÂΜL (ref 0.6–4.47)
LYMPHOCYTES NFR BLD AUTO: 9 % (ref 14–44)
MCH RBC QN AUTO: 31.7 PG (ref 26.8–34.3)
MCHC RBC AUTO-ENTMCNC: 30.7 G/DL (ref 31.4–37.4)
MCV RBC AUTO: 104 FL (ref 82–98)
MONOCYTES # BLD AUTO: 0.66 THOUSAND/ÂΜL (ref 0.17–1.22)
MONOCYTES NFR BLD AUTO: 9 % (ref 4–12)
NEUTROPHILS # BLD AUTO: 6.41 THOUSANDS/ÂΜL (ref 1.85–7.62)
NEUTS SEG NFR BLD AUTO: 82 % (ref 43–75)
NRBC BLD AUTO-RTO: 0 /100 WBCS
PLATELET # BLD AUTO: 161 THOUSANDS/UL (ref 149–390)
PMV BLD AUTO: 9 FL (ref 8.9–12.7)
POTASSIUM SERPL-SCNC: 4.7 MMOL/L (ref 3.5–5.3)
PROCALCITONIN SERPL-MCNC: <0.05 NG/ML
PROT SERPL-MCNC: 6.9 G/DL (ref 6.4–8.4)
PROTHROMBIN TIME: 14 SECONDS (ref 11.6–14.5)
QRS AXIS: 99 DEGREES
QRSD INTERVAL: 82 MS
QT INTERVAL: 344 MS
QTC INTERVAL: 460 MS
RBC # BLD AUTO: 3.97 MILLION/UL (ref 3.81–5.12)
SODIUM SERPL-SCNC: 137 MMOL/L (ref 135–147)
T WAVE AXIS: -48 DEGREES
VENTRICULAR RATE: 108 BPM
WBC # BLD AUTO: 7.8 THOUSAND/UL (ref 4.31–10.16)

## 2023-12-23 PROCEDURE — 85025 COMPLETE CBC W/AUTO DIFF WBC: CPT | Performed by: EMERGENCY MEDICINE

## 2023-12-23 PROCEDURE — 85610 PROTHROMBIN TIME: CPT | Performed by: EMERGENCY MEDICINE

## 2023-12-23 PROCEDURE — 36415 COLL VENOUS BLD VENIPUNCTURE: CPT | Performed by: EMERGENCY MEDICINE

## 2023-12-23 PROCEDURE — 1124F ACP DISCUSS-NO DSCNMKR DOCD: CPT | Performed by: EMERGENCY MEDICINE

## 2023-12-23 PROCEDURE — 99285 EMERGENCY DEPT VISIT HI MDM: CPT | Performed by: EMERGENCY MEDICINE

## 2023-12-23 PROCEDURE — 99285 EMERGENCY DEPT VISIT HI MDM: CPT

## 2023-12-23 PROCEDURE — 83605 ASSAY OF LACTIC ACID: CPT | Performed by: EMERGENCY MEDICINE

## 2023-12-23 PROCEDURE — 93005 ELECTROCARDIOGRAM TRACING: CPT

## 2023-12-23 PROCEDURE — 84145 PROCALCITONIN (PCT): CPT | Performed by: EMERGENCY MEDICINE

## 2023-12-23 PROCEDURE — 80053 COMPREHEN METABOLIC PANEL: CPT | Performed by: EMERGENCY MEDICINE

## 2023-12-23 PROCEDURE — 87040 BLOOD CULTURE FOR BACTERIA: CPT | Performed by: EMERGENCY MEDICINE

## 2023-12-23 PROCEDURE — 85730 THROMBOPLASTIN TIME PARTIAL: CPT | Performed by: EMERGENCY MEDICINE

## 2023-12-23 RX ORDER — POTASSIUM CHLORIDE 20 MEQ/1
40 TABLET, EXTENDED RELEASE ORAL DAILY
Status: DISCONTINUED | OUTPATIENT
Start: 2023-12-24 | End: 2023-12-25 | Stop reason: HOSPADM

## 2023-12-23 RX ORDER — DILTIAZEM HYDROCHLORIDE 180 MG/1
180 CAPSULE, COATED, EXTENDED RELEASE ORAL DAILY
Status: DISCONTINUED | OUTPATIENT
Start: 2023-12-24 | End: 2023-12-25 | Stop reason: HOSPADM

## 2023-12-23 RX ORDER — ONDANSETRON 2 MG/ML
4 INJECTION INTRAMUSCULAR; INTRAVENOUS EVERY 6 HOURS PRN
Status: DISCONTINUED | OUTPATIENT
Start: 2023-12-23 | End: 2023-12-25 | Stop reason: HOSPADM

## 2023-12-23 RX ORDER — NEOMYCIN SULFATE, POLYMYXIN B SULFATE, AND DEXAMETHASONE 3.5; 10000; 1 MG/G; [USP'U]/G; MG/G
OINTMENT OPHTHALMIC 3 TIMES DAILY
Status: DISCONTINUED | OUTPATIENT
Start: 2023-12-23 | End: 2023-12-25 | Stop reason: HOSPADM

## 2023-12-23 RX ORDER — HEPARIN SODIUM 5000 [USP'U]/ML
5000 INJECTION, SOLUTION INTRAVENOUS; SUBCUTANEOUS EVERY 8 HOURS SCHEDULED
Status: DISCONTINUED | OUTPATIENT
Start: 2023-12-23 | End: 2023-12-25 | Stop reason: HOSPADM

## 2023-12-23 RX ORDER — SERTRALINE HYDROCHLORIDE 100 MG/1
100 TABLET, FILM COATED ORAL DAILY
Status: DISCONTINUED | OUTPATIENT
Start: 2023-12-24 | End: 2023-12-25 | Stop reason: HOSPADM

## 2023-12-23 RX ORDER — ATORVASTATIN CALCIUM 40 MG/1
40 TABLET, FILM COATED ORAL
Status: DISCONTINUED | OUTPATIENT
Start: 2023-12-24 | End: 2023-12-25 | Stop reason: HOSPADM

## 2023-12-23 RX ORDER — DIGOXIN 125 MCG
125 TABLET ORAL EVERY OTHER DAY
Status: DISCONTINUED | OUTPATIENT
Start: 2023-12-24 | End: 2023-12-25 | Stop reason: HOSPADM

## 2023-12-23 RX ORDER — TORSEMIDE 20 MG/1
20 TABLET ORAL DAILY PRN
Status: DISCONTINUED | OUTPATIENT
Start: 2023-12-23 | End: 2023-12-25 | Stop reason: HOSPADM

## 2023-12-23 RX ORDER — CEFAZOLIN SODIUM 2 G/50ML
2000 SOLUTION INTRAVENOUS EVERY 8 HOURS
Status: DISCONTINUED | OUTPATIENT
Start: 2023-12-23 | End: 2023-12-25

## 2023-12-23 RX ORDER — ACETAMINOPHEN 325 MG/1
650 TABLET ORAL EVERY 6 HOURS PRN
Status: DISCONTINUED | OUTPATIENT
Start: 2023-12-23 | End: 2023-12-25 | Stop reason: HOSPADM

## 2023-12-23 RX ORDER — UREA 10 %
500 LOTION (ML) TOPICAL 2 TIMES DAILY
Status: DISCONTINUED | OUTPATIENT
Start: 2023-12-24 | End: 2023-12-25 | Stop reason: HOSPADM

## 2023-12-23 RX ORDER — FOLIC ACID 1 MG/1
1000 TABLET ORAL DAILY
Status: DISCONTINUED | OUTPATIENT
Start: 2023-12-24 | End: 2023-12-25 | Stop reason: HOSPADM

## 2023-12-23 RX ADMIN — CEFAZOLIN SODIUM 2000 MG: 2 SOLUTION INTRAVENOUS at 23:48

## 2023-12-23 RX ADMIN — SODIUM CHLORIDE 1000 ML: 0.9 INJECTION, SOLUTION INTRAVENOUS at 21:22

## 2023-12-23 RX ADMIN — SODIUM CHLORIDE 1000 ML: 0.9 INJECTION, SOLUTION INTRAVENOUS at 21:24

## 2023-12-24 PROBLEM — D62 ABLA (ACUTE BLOOD LOSS ANEMIA): Status: RESOLVED | Noted: 2022-06-02 | Resolved: 2023-12-24

## 2023-12-24 PROCEDURE — 99223 1ST HOSP IP/OBS HIGH 75: CPT | Performed by: INTERNAL MEDICINE

## 2023-12-24 PROCEDURE — 87186 SC STD MICRODIL/AGAR DIL: CPT | Performed by: INTERNAL MEDICINE

## 2023-12-24 PROCEDURE — 87147 CULTURE TYPE IMMUNOLOGIC: CPT | Performed by: INTERNAL MEDICINE

## 2023-12-24 PROCEDURE — 99222 1ST HOSP IP/OBS MODERATE 55: CPT | Performed by: SURGERY

## 2023-12-24 PROCEDURE — 87070 CULTURE OTHR SPECIMN AEROBIC: CPT | Performed by: INTERNAL MEDICINE

## 2023-12-24 PROCEDURE — G0008 ADMIN INFLUENZA VIRUS VAC: HCPCS | Performed by: INTERNAL MEDICINE

## 2023-12-24 PROCEDURE — 90662 IIV NO PRSV INCREASED AG IM: CPT | Performed by: INTERNAL MEDICINE

## 2023-12-24 PROCEDURE — 87205 SMEAR GRAM STAIN: CPT | Performed by: INTERNAL MEDICINE

## 2023-12-24 RX ORDER — LANOLIN ALCOHOL/MO/W.PET/CERES
6 CREAM (GRAM) TOPICAL
Status: DISCONTINUED | OUTPATIENT
Start: 2023-12-24 | End: 2023-12-25 | Stop reason: HOSPADM

## 2023-12-24 RX ORDER — HYDROCODONE BITARTRATE AND ACETAMINOPHEN 5; 325 MG/1; MG/1
1 TABLET ORAL EVERY 6 HOURS PRN
Status: DISCONTINUED | OUTPATIENT
Start: 2023-12-24 | End: 2023-12-25 | Stop reason: HOSPADM

## 2023-12-24 RX ORDER — GINSENG 100 MG
1 CAPSULE ORAL DAILY
Status: DISCONTINUED | OUTPATIENT
Start: 2023-12-25 | End: 2023-12-25 | Stop reason: HOSPADM

## 2023-12-24 RX ORDER — GINSENG 100 MG
1 CAPSULE ORAL 2 TIMES DAILY
Status: DISCONTINUED | OUTPATIENT
Start: 2023-12-24 | End: 2023-12-24

## 2023-12-24 RX ADMIN — Medication 6 MG: at 22:21

## 2023-12-24 RX ADMIN — Medication 500 MG: at 17:01

## 2023-12-24 RX ADMIN — Medication 500 MG: at 08:59

## 2023-12-24 RX ADMIN — NEOMYCIN SULFATE, POLYMYXIN B SULFATE, AND DEXAMETHASONE: 3.5; 10000; 1 OINTMENT OPHTHALMIC at 09:00

## 2023-12-24 RX ADMIN — POTASSIUM CHLORIDE 40 MEQ: 1500 TABLET, EXTENDED RELEASE ORAL at 08:59

## 2023-12-24 RX ADMIN — NEOMYCIN SULFATE, POLYMYXIN B SULFATE, AND DEXAMETHASONE: 3.5; 10000; 1 OINTMENT OPHTHALMIC at 17:02

## 2023-12-24 RX ADMIN — CEFAZOLIN SODIUM 2000 MG: 2 SOLUTION INTRAVENOUS at 15:01

## 2023-12-24 RX ADMIN — SERTRALINE 100 MG: 100 TABLET, FILM COATED ORAL at 08:59

## 2023-12-24 RX ADMIN — DIGOXIN 125 MCG: 125 TABLET ORAL at 08:58

## 2023-12-24 RX ADMIN — ATORVASTATIN CALCIUM 40 MG: 40 TABLET, FILM COATED ORAL at 17:01

## 2023-12-24 RX ADMIN — CEFAZOLIN SODIUM 2000 MG: 2 SOLUTION INTRAVENOUS at 06:08

## 2023-12-24 RX ADMIN — BACITRACIN ZINC 1 SMALL APPLICATION: 500 OINTMENT TOPICAL at 17:01

## 2023-12-24 RX ADMIN — HEPARIN SODIUM 5000 UNITS: 5000 INJECTION INTRAVENOUS; SUBCUTANEOUS at 22:21

## 2023-12-24 RX ADMIN — DILTIAZEM HYDROCHLORIDE 180 MG: 180 CAPSULE, COATED, EXTENDED RELEASE ORAL at 09:00

## 2023-12-24 RX ADMIN — CEFAZOLIN SODIUM 2000 MG: 2 SOLUTION INTRAVENOUS at 22:21

## 2023-12-24 RX ADMIN — HEPARIN SODIUM 5000 UNITS: 5000 INJECTION INTRAVENOUS; SUBCUTANEOUS at 15:01

## 2023-12-24 RX ADMIN — HYDROCODONE BITARTRATE AND ACETAMINOPHEN 1 TABLET: 5; 325 TABLET ORAL at 00:48

## 2023-12-24 RX ADMIN — LEVOTHYROXINE SODIUM 137 MCG: 25 TABLET ORAL at 06:01

## 2023-12-24 RX ADMIN — NEOMYCIN SULFATE, POLYMYXIN B SULFATE, AND DEXAMETHASONE: 3.5; 10000; 1 OINTMENT OPHTHALMIC at 21:30

## 2023-12-24 RX ADMIN — FOLIC ACID 1000 MCG: 1 TABLET ORAL at 08:59

## 2023-12-24 RX ADMIN — CYANOCOBALAMIN TAB 500 MCG 1000 MCG: 500 TAB at 08:59

## 2023-12-24 RX ADMIN — ASPIRIN 81 MG: 81 TABLET, COATED ORAL at 08:59

## 2023-12-24 RX ADMIN — INFLUENZA A VIRUS A/VICTORIA/4897/2022 IVR-238 (H1N1) ANTIGEN (FORMALDEHYDE INACTIVATED), INFLUENZA A VIRUS A/DARWIN/9/2021 SAN-010 (H3N2) ANTIGEN (FORMALDEHYDE INACTIVATED), INFLUENZA B VIRUS B/PHUKET/3073/2013 ANTIGEN (FORMALDEHYDE INACTIVATED), AND INFLUENZA B VIRUS B/MICHIGAN/01/2021 ANTIGEN (FORMALDEHYDE INACTIVATED) 0.7 ML: 60; 60; 60; 60 INJECTION, SUSPENSION INTRAMUSCULAR at 08:59

## 2023-12-24 NOTE — ED NOTES
Pt states she intermittently wears oxygen at home when she feels like she needs it     Arelis Darby RN  12/23/23 2030

## 2023-12-24 NOTE — ASSESSMENT & PLAN NOTE
Patient is rate controlled at this time  We will continue prehospital digoxin 125 mcg every 48 hours and Cardizem  mg p.o. daily

## 2023-12-24 NOTE — ED PROVIDER NOTES
History  Chief Complaint   Patient presents with    Extremity Laceration     Hit leg on recliner about a week ago; new redness and warmth     States there is some mild bleeding initially which has persisted.  Since that time she has been washing it with alcohol and peroxide.  Area is now extremely erythematous and painful and mild oozing is persisting.  Reports she otherwise feels well.  No fevers, chills, nausea, vomiting or malaise.        Prior to Admission Medications   Prescriptions Last Dose Informant Patient Reported? Taking?   Aspirin 81 MG CAPS 2023  Yes Yes   Sig: Take 81 mg by mouth in the morning. Indications: Disease involving Lipid Deposits in the Arteries   Magnesium Gluconate 550 MG TABS 2023 Spouse/Significant Other Yes Yes   Sig: Take 30 mg by mouth 2 (two) times a day   atorvastatin (LIPITOR) 40 mg tablet 2023 Spouse/Significant Other No Yes   Sig: Take 1 tablet (40 mg total) by mouth daily with dinner   cephalexin (KEFLEX) 500 mg capsule Not Taking  Yes No   Sig: TAKE 1 CAPSULE BY MOUTH THREE TIMES A DAY FOR 10 DAYS   Patient not taking: Reported on 2023   cyanocobalamin (VITAMIN B-12) 1000 MCG tablet Past Week  Yes Yes   Sig: Take 1,000 mcg by mouth daily. Indications: Inadequate Vitamin B12   cyanocobalamin 1,000 mcg/mL Past Week Spouse/Significant Other Yes Yes   Si mL   digoxin (LANOXIN) 0.125 mg tablet 2023 Spouse/Significant Other No Yes   Sig: Take 1 tablet (125 mcg total) by mouth every other day   diltiazem (CARDIZEM CD) 180 mg 24 hr capsule 2023  No Yes   Sig: Take 1 capsule (180 mg total) by mouth daily   diltiazem (TIAZAC) 180 MG 24 hr capsule Not Taking  Yes No   Sig: Take 180 mg by mouth daily   Patient not taking: Reported on 2023   ergocalciferol (VITAMIN D2) 50,000 units Not Taking  Yes No   Sig: Take 50,000 Units by mouth once a week. Indications: Osteoporosis   Patient not taking: Reported on 2023   folic acid (FOLVITE) 1 mg  tablet 12/23/2023 Spouse/Significant Other Yes Yes   Sig: Take 1 tablet by mouth daily   levothyroxine 137 mcg tablet 12/23/2023  Yes Yes   Sig: Take 137 mcg by mouth daily   neomycin-polymyxin-dexamethasone (MAXITROL) 0.35%-10,000 units/g-0.1% Not Taking  Yes No   Sig: APPLY A SMALL AMOUNT ON EYELID 3 TIMES DAILY   Patient not taking: Reported on 12/23/2023   potassium chloride (K-DUR,KLOR-CON) 20 mEq tablet Not Taking Spouse/Significant Other Yes No   Sig: Take 2 tablets by mouth daily   Patient not taking: Reported on 12/23/2023   sertraline (ZOLOFT) 100 mg tablet 12/23/2023 Spouse/Significant Other Yes Yes   Sig: Take 1 tablet by mouth daily   torsemide (DEMADEX) 20 mg tablet 12/22/2023  No Yes   Sig: Take 1 tablet (20 mg total) by mouth daily as needed (weight gain / leg swelling)      Facility-Administered Medications: None       Past Medical History:   Diagnosis Date    Disease of thyroid gland     Hypotension     Supratherapeutic INR 6/2/2022       Past Surgical History:   Procedure Laterality Date    HYSTERECTOMY         History reviewed. No pertinent family history.  I have reviewed and agree with the history as documented.    E-Cigarette/Vaping    E-Cigarette Use Never User      E-Cigarette/Vaping Substances    Nicotine No     THC No     CBD No     Flavoring No     Other No     Unknown No      Social History     Tobacco Use    Smoking status: Never    Smokeless tobacco: Never   Vaping Use    Vaping status: Never Used   Substance Use Topics    Alcohol use: Never     Comment: occassional.    Drug use: Never       Review of Systems   Constitutional:  Negative for activity change, chills, fatigue and fever.   HENT:  Negative for congestion.    Eyes:  Negative for visual disturbance.   Respiratory:  Negative for cough, chest tightness and shortness of breath.    Cardiovascular:  Negative for chest pain.   Gastrointestinal:  Negative for abdominal pain, diarrhea and vomiting.   Genitourinary:  Negative for  dysuria.   Neurological:  Negative for dizziness, weakness and numbness.       Physical Exam  Physical Exam  Constitutional:       Appearance: She is well-developed.   HENT:      Head: Normocephalic and atraumatic.      Right Ear: External ear normal.      Left Ear: External ear normal.   Eyes:      Conjunctiva/sclera: Conjunctivae normal.      Pupils: Pupils are equal, round, and reactive to light.   Cardiovascular:      Rate and Rhythm: Normal rate and regular rhythm.      Heart sounds: Normal heart sounds.   Pulmonary:      Effort: Pulmonary effort is normal. No respiratory distress.      Comments: Patient wearing nasal cannula  Abdominal:      General: Bowel sounds are normal.      Palpations: Abdomen is soft.   Musculoskeletal:         General: Normal range of motion.      Cervical back: Normal range of motion and neck supple.   Skin:     General: Skin is warm.      Capillary Refill: Capillary refill takes less than 2 seconds.      Comments: Deep ulceration on the right shin with some active oozing of blood, surrounding erythema consistent with cellulitis, surrounding that is some area of bruising/ecchymosis over the knee area   Neurological:      Mental Status: She is alert and oriented to person, place, and time.   Psychiatric:         Behavior: Behavior normal.         Vital Signs  ED Triage Vitals [12/23/23 2029]   Temperature Pulse Respirations Blood Pressure SpO2   99.7 °F (37.6 °C) 105 18 125/60 (!) 85 %      Temp src Heart Rate Source Patient Position - Orthostatic VS BP Location FiO2 (%)   -- Monitor Sitting Left arm --      Pain Score       No Pain           Vitals:    12/23/23 2029 12/23/23 2233   BP: 125/60 138/79   Pulse: 105 103   Patient Position - Orthostatic VS: Sitting          Visual Acuity      ED Medications  Medications   ceFAZolin (ANCEF) IVPB (premix in dextrose) 2,000 mg 50 mL (2,000 mg Intravenous New Bag 12/23/23 0091)   aspirin (ECOTRIN LOW STRENGTH) EC tablet 81 mg (has no  administration in time range)   atorvastatin (LIPITOR) tablet 40 mg (has no administration in time range)   cyanocobalamin (VITAMIN B-12) tablet 1,000 mcg (has no administration in time range)   digoxin (LANOXIN) tablet 125 mcg (has no administration in time range)   diltiazem (CARDIZEM CD) 24 hr capsule 180 mg (has no administration in time range)   folic acid (FOLVITE) tablet 1,000 mcg (has no administration in time range)   levothyroxine tablet 137 mcg (has no administration in time range)   magnesium gluconate (MAGONATE) tablet 500 mg (has no administration in time range)   sertraline (ZOLOFT) tablet 100 mg (has no administration in time range)   potassium chloride (K-DUR,KLOR-CON) CR tablet 40 mEq (has no administration in time range)   torsemide (DEMADEX) tablet 20 mg (has no administration in time range)   neomycin-polymyxin-dexamethasone (MAXITROL) 0.35%-10,000 units/g-0.1% ophthalmic ointment ( Both Eyes Not Given 12/23/23 2322)   acetaminophen (TYLENOL) tablet 650 mg (has no administration in time range)   ondansetron (ZOFRAN) injection 4 mg (has no administration in time range)   heparin (porcine) subcutaneous injection 5,000 Units (5,000 Units Subcutaneous Not Given 12/23/23 2348)   sodium chloride 0.9 % bolus 1,000 mL (1,000 mL Intravenous New Bag 12/23/23 2122)     Followed by   sodium chloride 0.9 % bolus 1,000 mL (1,000 mL Intravenous New Bag 12/23/23 2124)       Diagnostic Studies  Results Reviewed       Procedure Component Value Units Date/Time    Procalcitonin [[id number]]  (Normal) Collected: 12/23/23 2117    Lab Status: Final result Specimen: Blood from Arm, Left Updated: 12/23/23 2155     Procalcitonin <0.05 ng/ml     Comprehensive metabolic panel [[id number]] Collected: 12/23/23 2117    Lab Status: Final result Specimen: Blood from Arm, Left Updated: 12/23/23 2144     Sodium 137 mmol/L      Potassium 4.7 mmol/L      Chloride 101 mmol/L      CO2 29 mmol/L      ANION GAP 7 mmol/L      BUN 11 mg/dL       Creatinine 0.93 mg/dL      Glucose 97 mg/dL      Calcium 9.1 mg/dL      AST 21 U/L      ALT 9 U/L      Alkaline Phosphatase 92 U/L      Total Protein 6.9 g/dL      Albumin 4.1 g/dL      Total Bilirubin 0.71 mg/dL      eGFR 59 ml/min/1.73sq m     Narrative:      National Kid[employer] Foundation guidelines for Chronic Kidney Disease (CKD):     Stage 1 with normal or high GFR (GFR > 90 mL/min/1.73 square meters)    Stage 2 Mild CKD (GFR = 60-89 mL/min/1.73 square meters)    Stage 3A Moderate CKD (GFR = 45-59 mL/min/1.73 square meters)    Stage 3B Moderate CKD (GFR = 30-44 mL/min/1.73 square meters)    Stage 4 Severe CKD (GFR = 15-29 mL/min/1.73 square meters)    Stage 5 End Stage CKD (GFR <15 mL/min/1.73 square meters)  Note: GFR calculation is accurate only with a steady state creatinine    Lactic acid [[id number]]  (Normal) Collected: 12/23/23 2117    Lab Status: Final result Specimen: Blood from Arm, Left Updated: 12/23/23 2143     LACTIC ACID 0.9 mmol/L     Narrative:      Result may be elevated if tourniquet was used during collection.    Protime-INR [[id number]]  (Normal) Collected: 12/23/23 2117    Lab Status: Final result Specimen: Blood from Arm, Left Updated: 12/23/23 2143     Protime 14.0 seconds      INR 1.06    APTT [[id number]]  (Abnormal) Collected: 12/23/23 2117    Lab Status: Final result Specimen: Blood from Arm, Left Updated: 12/23/23 2143     PTT 41 seconds     CBC and differential [[id number]]  (Abnormal) Collected: 12/23/23 2117    Lab Status: Final result Specimen: Blood from Arm, Left Updated: 12/23/23 2128     WBC 7.80 Thousand/uL      RBC 3.97 Million/uL      Hemoglobin 12.6 g/dL      Hematocrit 41.1 %       fL      MCH 31.7 pg      MCHC 30.7 g/dL      RDW 13.9 %      MPV 9.0 fL      Platelets 161 Thousands/uL      nRBC 0 /100 WBCs      Neutrophils Relative 82 %      Immat GRANS % 0 %      Lymphocytes Relative 9 %      Monocytes Relative 9 %      Eosinophils Relative 0 %       Basophils Relative 0 %      Neutrophils Absolute 6.41 Thousands/µL      Immature Grans Absolute 0.02 Thousand/uL      Lymphocytes Absolute 0.68 Thousands/µL      Monocytes Absolute 0.66 Thousand/µL      Eosinophils Absolute 0.02 Thousand/µL      Basophils Absolute 0.01 Thousands/µL     Blood culture #2 [[id number]] Collected: 12/23/23 2117    Lab Status: In process Specimen: Blood from Hand, Right Updated: 12/23/23 2122    Blood culture #1 [[id number]] Collected: 12/23/23 2117    Lab Status: In process Specimen: Blood from Arm, Left Updated: 12/23/23 2122                   No orders to display              Procedures  Procedures         ED Course                                             Medical Decision Making  78-year-old female with cellulitis surrounding an ulcer secondary to mild trauma.  Skin looks too friable to close at the bedside.  May need significant undermining and exploration as well as a debridement.  Patient started on antibiotics for cellulitis.  Patient stable at the time of admission.  States understanding agreement with plan.    Problems Addressed:  Cellulitis: acute illness or injury  Skin ulcer (HCC): acute illness or injury    Amount and/or Complexity of Data Reviewed  Labs: ordered.  Discussion of management or test interpretation with external provider(s): Case discussed with Dr. Bravo of general surgery who states they will be the consult service and the patient should be admitted to our facility under medicine.    Risk  Prescription drug management.  Decision regarding hospitalization.             Disposition  Final diagnoses:   Skin ulcer (HCC)   Cellulitis     Time reflects when diagnosis was documented in both MDM as applicable and the Disposition within this note       Time User Action Codes Description Comment    12/23/2023  9:44 PM Fernando Coffey Add [L98.499] Skin ulcer (HCC)     12/23/2023  9:45 PM Fernando Coffey [L03.90] Cellulitis           ED Disposition       ED  Disposition   Admit    Condition   Stable    Date/Time   Sat Dec 23, 2023  9:44 PM    Comment   Case was discussed with mike and the patient's admission status was agreed to be Admission Status: inpatient status to the service of Dr. matson .               Follow-up Information    None         Current Discharge Medication List        CONTINUE these medications which have NOT CHANGED    Details   Aspirin 81 MG CAPS Take 81 mg by mouth in the morning. Indications: Disease involving Lipid Deposits in the Arteries      atorvastatin (LIPITOR) 40 mg tablet Take 1 tablet (40 mg total) by mouth daily with dinner  Qty: 30 tablet, Refills: 0    Associated Diagnoses: Frequent falls      cyanocobalamin (VITAMIN B-12) 1000 MCG tablet Take 1,000 mcg by mouth daily. Indications: Inadequate Vitamin B12      cyanocobalamin 1,000 mcg/mL 1 mL      digoxin (LANOXIN) 0.125 mg tablet Take 1 tablet (125 mcg total) by mouth every other day  Qty: 30 tablet, Refills: 0    Associated Diagnoses: Atrial fibrillation, chronic (HCC)      diltiazem (CARDIZEM CD) 180 mg 24 hr capsule Take 1 capsule (180 mg total) by mouth daily  Qty: 30 capsule, Refills: 2    Associated Diagnoses: Atrial fibrillation with rapid ventricular response (HCC)      folic acid (FOLVITE) 1 mg tablet Take 1 tablet by mouth daily      levothyroxine 137 mcg tablet Take 137 mcg by mouth daily      Magnesium Gluconate 550 MG TABS Take 30 mg by mouth 2 (two) times a day      sertraline (ZOLOFT) 100 mg tablet Take 1 tablet by mouth daily      torsemide (DEMADEX) 20 mg tablet Take 1 tablet (20 mg total) by mouth daily as needed (weight gain / leg swelling)  Qty: 30 tablet, Refills: 0    Associated Diagnoses: Nonrheumatic mitral valve regurgitation      cephalexin (KEFLEX) 500 mg capsule TAKE 1 CAPSULE BY MOUTH THREE TIMES A DAY FOR 10 DAYS      diltiazem (TIAZAC) 180 MG 24 hr capsule Take 180 mg by mouth daily      ergocalciferol (VITAMIN D2) 50,000 units Take 50,000 Units by  mouth once a week. Indications: Osteoporosis      neomycin-polymyxin-dexamethasone (MAXITROL) 0.35%-10,000 units/g-0.1% APPLY A SMALL AMOUNT ON EYELID 3 TIMES DAILY      potassium chloride (K-DUR,KLOR-CON) 20 mEq tablet Take 2 tablets by mouth daily             No discharge procedures on file.    PDMP Review       None            ED Provider  Electronically Signed by             Fernando Coffey MD  12/24/23 0012

## 2023-12-24 NOTE — CONSULTS
Consultation - General Surgery   Marsha Oliva 78 y.o. female MRN: [id number]  Unit/Bed#: -01 Encounter: [id number]    ASSESSMENT:  78F w/ PMH hypothyroidism, CRF on 2LO2 at baseline, CKD3, anemia, Hld, HTN, depression, CHF, Afib not AC only on aspirin presenting with traumatic RLE wound and surrounding cellulitis.  Gen surg consulted for possible wound debridement.     RLE wound  RLE cellulitis   -On exam: 8 x 6 area of epidermolysis with a 3 x 1 cm open wound in the center. Upon probing there was sanguinous fluid and some clot. Cellulitic changes of the surrounding skin. No purulence.   -The open wound was probed and flushed with sterile water.      PLAN:   -Recommend local wound care with application of bacitracin ointment and covering with nonocculusive dressing, 4x4, wrapped with kerlix.  -SLIM planning to obtain wound cx, FU results and adjust abx per c/s  -IV abx  -Daily site checks    SUBJECTIVE:    Chief Complaint: RLE wound     HPI:  Marsha Oliva is a 78 y.o. female with PMH hypothyroidism, CRF on 2LO2 at baseline, CKD3, anemia, Hld, HTN, depression, CHF, Afib not AC only on aspirin presenting with traumatic RLE wound. She says the wound happened a few days ago when she fell against her couch furniture. Says her  convinced her to seek medical treatment. She does have some pain at times at the area of the wound, worse with ambulating, but stable and tolerable at rest. Denies F/C, N/V, low appetite.     ROS:  Review of Systems   Constitutional:  Negative for chills and fever.   HENT: Negative.     Eyes: Negative.    Respiratory: Negative.     Cardiovascular: Negative.    Gastrointestinal: Negative.    Genitourinary: Negative.    Musculoskeletal: Negative.    Skin:  Positive for color change and wound.   Neurological: Negative.    Hematological: Negative.    Psychiatric/Behavioral: Negative.         Historical Information   Past Medical History:   Diagnosis Date    Disease of thyroid gland   "   Hypotension     Supratherapeutic INR 6/2/2022     Past Surgical History:   Procedure Laterality Date    HYSTERECTOMY       Social History   Social History     Substance and Sexual Activity   Alcohol Use Never    Comment: occassional.     Social History     Substance and Sexual Activity   Drug Use Never     Social History     Tobacco Use   Smoking Status Never   Smokeless Tobacco Never       Family History:   History reviewed. No pertinent family history.    Meds/Allergies   all medications and allergies reviewed  No Known Allergies      OBJECTIVE:  First Vitals:   Blood Pressure: 125/60 (12/23/23 2029)  Pulse: 105 (12/23/23 2029)  Temperature: 99.7 °F (37.6 °C) (12/23/23 2029)  Respirations: 18 (12/23/23 2029)  Height: 5' 3\" (160 cm) (12/23/23 2233)  Weight - Scale: 61.1 kg (134 lb 11.2 oz) (12/23/23 2233)  SpO2: (!) 85 % (12/23/23 2029) Body mass index is 24.17 kg/m².  Current Vitals:   Blood Pressure: 123/82 (12/24/23 0731)  Pulse: 101 (12/24/23 0731)  Temperature: 98 °F (36.7 °C) (12/24/23 0731)  Respirations: 18 (12/24/23 0731)  Height: 5' 3\" (160 cm) (12/23/23 2233)  Weight - Scale: 61.9 kg (136 lb 7.4 oz) (12/24/23 0600)  SpO2: 97 % (12/24/23 0731)   I/Os:    Intake/Output Summary (Last 24 hours) at 12/24/2023 1203  Last data filed at 12/24/2023 0900  Gross per 24 hour   Intake 0 ml   Output --   Net 0 ml     Lines/Drains:  Invasive Devices       Peripheral Intravenous Line  Duration             Peripheral IV 12/23/23 Right Wrist <1 day              Drain  Duration             Urethral Catheter Latex 16 Fr. 499 days                    Physical Exam  Constitutional:       General: She is not in acute distress.  Cardiovascular:      Rate and Rhythm: Normal rate. Rhythm irregular.   Pulmonary:      Effort: Pulmonary effort is normal.      Breath sounds: Normal breath sounds.   Skin:     Comments: 8 x 6 area of epidermolysis with a 3 x 1 cm open wound in the center. Upon probing there was sanguinous fluid and " some clot. Erythema, warmth, induration of the surrounding skin. No purulence.    Neurological:      Mental Status: She is alert.           Lab Results: I have personally reviewed pertinent lab results.    Recent Results (from the past 36 hour(s))   CBC and differential    Collection Time: 12/23/23  9:17 PM   Result Value Ref Range    WBC 7.80 4.31 - 10.16 Thousand/uL    RBC 3.97 3.81 - 5.12 Million/uL    Hemoglobin 12.6 11.5 - 15.4 g/dL    Hematocrit 41.1 34.8 - 46.1 %     (H) 82 - 98 fL    MCH 31.7 26.8 - 34.3 pg    MCHC 30.7 (L) 31.4 - 37.4 g/dL    RDW 13.9 11.6 - 15.1 %    MPV 9.0 8.9 - 12.7 fL    Platelets 161 149 - 390 Thousands/uL    nRBC 0 /100 WBCs    Neutrophils Relative 82 (H) 43 - 75 %    Immat GRANS % 0 0 - 2 %    Lymphocytes Relative 9 (L) 14 - 44 %    Monocytes Relative 9 4 - 12 %    Eosinophils Relative 0 0 - 6 %    Basophils Relative 0 0 - 1 %    Neutrophils Absolute 6.41 1.85 - 7.62 Thousands/µL    Immature Grans Absolute 0.02 0.00 - 0.20 Thousand/uL    Lymphocytes Absolute 0.68 0.60 - 4.47 Thousands/µL    Monocytes Absolute 0.66 0.17 - 1.22 Thousand/µL    Eosinophils Absolute 0.02 0.00 - 0.61 Thousand/µL    Basophils Absolute 0.01 0.00 - 0.10 Thousands/µL   Comprehensive metabolic panel    Collection Time: 12/23/23  9:17 PM   Result Value Ref Range    Sodium 137 135 - 147 mmol/L    Potassium 4.7 3.5 - 5.3 mmol/L    Chloride 101 96 - 108 mmol/L    CO2 29 21 - 32 mmol/L    ANION GAP 7 mmol/L    BUN 11 5 - 25 mg/dL    Creatinine 0.93 0.60 - 1.30 mg/dL    Glucose 97 65 - 140 mg/dL    Calcium 9.1 8.4 - 10.2 mg/dL    AST 21 13 - 39 U/L    ALT 9 7 - 52 U/L    Alkaline Phosphatase 92 34 - 104 U/L    Total Protein 6.9 6.4 - 8.4 g/dL    Albumin 4.1 3.5 - 5.0 g/dL    Total Bilirubin 0.71 0.20 - 1.00 mg/dL    eGFR 59 ml/min/1.73sq m   Lactic acid    Collection Time: 12/23/23  9:17 PM   Result Value Ref Range    LACTIC ACID 0.9 0.5 - 2.0 mmol/L   Procalcitonin    Collection Time: 12/23/23  9:17 PM    Result Value Ref Range    Procalcitonin <0.05 <=0.25 ng/ml   Protime-INR    Collection Time: 12/23/23  9:17 PM   Result Value Ref Range    Protime 14.0 11.6 - 14.5 seconds    INR 1.06 0.84 - 1.19   APTT    Collection Time: 12/23/23  9:17 PM   Result Value Ref Range    PTT 41 (H) 23 - 37 seconds   ECG 12 lead    Collection Time: 12/23/23  9:28 PM   Result Value Ref Range    Ventricular Rate 108 BPM    Atrial Rate 119 BPM    CO Interval  ms    QRSD Interval 82 ms    QT Interval 344 ms    QTC Interval 460 ms    P Axis  degrees    QRS Axis 99 degrees    T Wave Axis -48 degrees     Imaging: I have personally reviewed pertinent reports.    No results found.  EKG, Pathology, and Other Studies: I have personally reviewed pertinent reports.        Marjorie Varner PA-C  12/24/2023

## 2023-12-24 NOTE — ASSESSMENT & PLAN NOTE
Placed in observation medicine  Blood cultures are currently pending  We will give Ancef 2 g IV every 8 hours  Consult general surgery in a.m. for possible debridement  Consult wound care

## 2023-12-24 NOTE — ASSESSMENT & PLAN NOTE
Lab Results   Component Value Date    EGFR 59 12/23/2023    EGFR 52 08/12/2022    EGFR 43 08/11/2022    CREATININE 0.93 12/23/2023    CREATININE 1.03 08/12/2022    CREATININE 1.20 08/11/2022     Creatinine appears to be approximately baseline, will continue to monitor with repeat labs in a.m.

## 2023-12-24 NOTE — H&P
[facility]  H&P  Name: Marsha Oliva 78 y.o. female I MRN: [id number]  Unit/Bed#: -01 I Date of Admission: 12/23/2023   Date of Service: 12/24/2023 I Hospital Day: 1      Assessment/Plan   * Cellulitis of right lower limb  Assessment & Plan  Placed in observation medicine  Blood cultures are currently pending  We will give Ancef 2 g IV every 8 hours  Consult general surgery in a.m. for possible debridement  Consult wound care    Chronic respiratory failure with hypoxia (HCC)  Assessment & Plan  Patient normally wears O2 2 L nasal cannula at home, will place on O2 protocol    Anemia  Assessment & Plan  Hemoglobin appears to be better than baseline, will continue to monitor with repeat labs in a.m.    CKD (chronic kidney disease) stage 3, GFR 30-59 ml/min ([facility])  Assessment & Plan  Lab Results   Component Value Date    EGFR 59 12/23/2023    EGFR 52 08/12/2022    EGFR 43 08/11/2022    CREATININE 0.93 12/23/2023    CREATININE 1.03 08/12/2022    CREATININE 1.20 08/11/2022     Creatinine appears to be approximately baseline, will continue to monitor with repeat labs in a.m.    Mixed hyperlipidemia  Assessment & Plan  Continue prehospital Lipitor 40 mg p.o. daily    Hypothyroidism  Assessment & Plan  Continue prehospital levothyroxine 137 mcg p.o. daily    Essential hypertension  Assessment & Plan  Continue prehospital Cardizem  mg p.o. daily and Demadex 20 mg p.o. daily as needed    Depression  Assessment & Plan  Continue prehospital Zoloft 100 mg p.o. daily    Chronic diastolic CHF (congestive heart failure) ([facility])  Assessment & Plan  Wt Readings from Last 3 Encounters:   12/23/23 61.1 kg (134 lb 11.2 oz)   01/12/23 59.4 kg (131 lb)   10/11/22 60.3 kg (133 lb)     Not in exacerbation at this time  Obtain daily weights  Placed on no added salt diet  Continue prehospital digoxin 125 mcg p.o. daily and Cardizem  mg p.o. daily  Continue prehospital Demadex 20 mg p.o. daily as  needed    Atrial fibrillation, chronic (HCC)  Assessment & Plan  Patient is rate controlled at this time  We will continue prehospital digoxin 125 mcg every 48 hours and Cardizem  mg p.o. daily         VTE Prophylaxis: Heparin  Code Status: Level 1  Discussion with family: None present at bedside at time of exam    Anticipated Length of Stay:  Patient will be admitted on an Inpatient basis with an anticipated length of stay of  > 2 midnights.   Justification for Hospital Stay: Right lower extremity cellulitis requiring IV antibiotics, surgical evaluation and wound care    Total Time for Visit, including Counseling / Coordination of Care: 1 hour.  Greater than 50% of this total time spent on direct patient counseling and coordination of care.    Chief Complaint:   Right lower extremity wound x 1 week    History of Present Illness:    Marsha Oliva is a 78 y.o. female who presents with right lower extremity wound x 1 week.  Patient presents ER for further evaluation and treatment of 1 week history of right lower extremity wound that began after she hit the arm on a recliner.  Patient states since that time she has been cleaning it with rubbing alcohol as well as peroxide at home and has not had continued bleeding from the site and is on aspirin 81 mg p.o. daily at home.    Reports increased redness, swelling and pain patient denies any systemic symptoms such as fever or chills, she is not diabetic and is immunocompetent.    Review of Systems:    Review of Systems   Constitutional:  Negative for chills and fever.   HENT:  Negative for congestion and sore throat.    Respiratory:  Negative for cough, shortness of breath and wheezing.    Cardiovascular:  Negative for chest pain and palpitations.   Gastrointestinal:  Negative for abdominal pain, diarrhea, nausea and vomiting.   Genitourinary:  Negative for dysuria, frequency, hematuria and urgency.   Musculoskeletal:  Negative for arthralgias and myalgias.   Skin:   Positive for color change and wound.   Neurological:  Negative for dizziness, syncope, light-headedness and headaches.   All other systems reviewed and are negative.      Past Medical and Surgical History:     Past Medical History:   Diagnosis Date    Disease of thyroid gland     Hypotension     Supratherapeutic INR 6/2/2022       Past Surgical History:   Procedure Laterality Date    HYSTERECTOMY         Meds/Allergies:    Prior to Admission medications    Medication Sig Start Date End Date Taking? Authorizing Provider   Aspirin 81 MG CAPS Take 81 mg by mouth in the morning. Indications: Disease involving Lipid Deposits in the Arteries   Yes Historical Provider, MD   atorvastatin (LIPITOR) 40 mg tablet Take 1 tablet (40 mg total) by mouth daily with dinner 12/27/21  Yes Daniela Amezcua,    cyanocobalamin (VITAMIN B-12) 1000 MCG tablet Take 1,000 mcg by mouth daily. Indications: Inadequate Vitamin B12   Yes Historical Provider, MD   cyanocobalamin 1,000 mcg/mL 1 mL 12/7/21  Yes Historical Provider, MD   digoxin (LANOXIN) 0.125 mg tablet Take 1 tablet (125 mcg total) by mouth every other day 6/10/22  Yes Claudia Levi PA-C   diltiazem (CARDIZEM CD) 180 mg 24 hr capsule Take 1 capsule (180 mg total) by mouth daily 8/13/22  Yes Jesus Alberto Page DO   folic acid (FOLVITE) 1 mg tablet Take 1 tablet by mouth daily 12/21/21  Yes Historical Provider, MD   levothyroxine 137 mcg tablet Take 137 mcg by mouth daily 9/13/22  Yes Historical Provider, MD   Magnesium Gluconate 550 MG TABS Take 30 mg by mouth 2 (two) times a day   Yes Historical Provider, MD   sertraline (ZOLOFT) 100 mg tablet Take 1 tablet by mouth daily 12/16/21  Yes Historical Provider, MD   torsemide (DEMADEX) 20 mg tablet Take 1 tablet (20 mg total) by mouth daily as needed (weight gain / leg swelling) 8/12/22  Yes Jesus Alberto Page DO   cephalexin (KEFLEX) 500 mg capsule TAKE 1 CAPSULE BY MOUTH THREE TIMES A DAY FOR 10 DAYS  Patient not taking: Reported on  "12/23/2023 6/19/22   Historical Provider, MD   diltiazem (TIAZAC) 180 MG 24 hr capsule Take 180 mg by mouth daily  Patient not taking: Reported on 12/23/2023 9/14/22   Historical Provider, MD   ergocalciferol (VITAMIN D2) 50,000 units Take 50,000 Units by mouth once a week. Indications: Osteoporosis  Patient not taking: Reported on 12/23/2023    Historical Provider, MD   neomycin-polymyxin-dexamethasone (MAXITROL) 0.35%-10,000 units/g-0.1% APPLY A SMALL AMOUNT ON EYELID 3 TIMES DAILY  Patient not taking: Reported on 12/23/2023 10/8/22   Historical Provider, MD   potassium chloride (K-DUR,KLOR-CON) 20 mEq tablet Take 2 tablets by mouth daily  Patient not taking: Reported on 12/23/2023 12/16/21   Historical Provider, MD   pantoprazole (PROTONIX) 40 mg tablet Take 1 tablet (40 mg total) by mouth 2 (two) times a day  Patient not taking: Reported on 7/18/2022 6/9/22 8/12/22  Claudia Levi PA-C     all medications and allergies reviewed    Allergies: No Known Allergies    Social History:     Marital Status: /Civil Union   Occupation: Retired  Patient Pre-hospital Living Situation: Resides at home with   Patient Pre-hospital Level of Mobility: Full without assist  Patient Pre-hospital Diet Restrictions: None    Social History     Substance and Sexual Activity   Alcohol Use Never    Comment: occassional.     Social History     Tobacco Use   Smoking Status Never   Smokeless Tobacco Never     Social History     Substance and Sexual Activity   Drug Use Never       Family History:  I have reviewed the patient's family history    Physical Exam:     Vitals:   Blood Pressure: 138/79 (12/23/23 2233)  Pulse: 103 (12/23/23 2233)  Temperature: 98.2 °F (36.8 °C) (12/23/23 2233)  Respirations: 18 (12/23/23 2029)  Height: 5' 3\" (160 cm) (12/23/23 2233)  Weight - Scale: 61.1 kg (134 lb 11.2 oz) (12/23/23 2233)  SpO2: 97 % (12/23/23 2233)    Physical Exam  Vitals and nursing note reviewed.   Constitutional:       General: She " is not in acute distress.     Appearance: Normal appearance.   HENT:      Head: Normocephalic and atraumatic.      Right Ear: Tympanic membrane normal.      Left Ear: Tympanic membrane normal.      Nose: Nose normal.      Mouth/Throat:      Mouth: Mucous membranes are moist.      Pharynx: Oropharynx is clear. No posterior oropharyngeal erythema.   Eyes:      Extraocular Movements: Extraocular movements intact.      Conjunctiva/sclera: Conjunctivae normal.      Pupils: Pupils are equal, round, and reactive to light.   Cardiovascular:      Rate and Rhythm: Normal rate. Rhythm irregularly irregular.      Pulses: Normal pulses.      Heart sounds: Normal heart sounds. No murmur heard.  Pulmonary:      Effort: Pulmonary effort is normal. No respiratory distress.      Breath sounds: Normal breath sounds. No rhonchi or rales.   Abdominal:      General: Bowel sounds are normal.      Palpations: Abdomen is soft.      Tenderness: There is no abdominal tenderness.   Musculoskeletal:      Cervical back: Normal range of motion and neck supple. No muscular tenderness.      Left lower leg: No edema.   Skin:     General: Skin is warm and dry.      Capillary Refill: Capillary refill takes less than 2 seconds.      Findings: Erythema and wound present.      Comments: Please see attached media for further details   Neurological:      General: No focal deficit present.      Mental Status: She is alert and oriented to person, place, and time.         Additional Data:     Lab Results: I have personally reviewed pertinent reports.      Results from last 7 days   Lab Units 12/23/23  2117   WBC Thousand/uL 7.80   HEMOGLOBIN g/dL 12.6   HEMATOCRIT % 41.1   PLATELETS Thousands/uL 161   NEUTROS PCT % 82*   LYMPHS PCT % 9*   MONOS PCT % 9   EOS PCT % 0     Results from last 7 days   Lab Units 12/23/23  2117   SODIUM mmol/L 137   POTASSIUM mmol/L 4.7   CHLORIDE mmol/L 101   CO2 mmol/L 29   BUN mg/dL 11   CREATININE mg/dL 0.93   ANION GAP mmol/L 7    CALCIUM mg/dL 9.1   ALBUMIN g/dL 4.1   TOTAL BILIRUBIN mg/dL 0.71   ALK PHOS U/L 92   ALT U/L 9   AST U/L 21   GLUCOSE RANDOM mg/dL 97     Results from last 7 days   Lab Units 12/23/23  2117   INR  1.06             Results from last 7 days   Lab Units 12/23/23 2117   LACTIC ACID mmol/L 0.9   PROCALCITONIN ng/ml <0.05       Imaging: I have personally reviewed pertinent reports.    No orders to display         EKG, Pathology, and Other Studies Reviewed on Admission:   EKG: N/A    Epic / Care Everywhere Records Reviewed: Yes    ** Please Note: This note has been constructed using a voice recognition system. **

## 2023-12-24 NOTE — PLAN OF CARE
Problem: PAIN - ADULT  Goal: Verbalizes/displays adequate comfort level or baseline comfort level  Description: Interventions:  - Encourage patient to monitor pain and request assistance  - Assess pain using appropriate pain scale  - Administer analgesics based on type and severity of pain and evaluate response  - Implement non-pharmacological measures as appropriate and evaluate response  - Consider cultural and social influences on pain and pain management  - Notify physician/advanced practitioner if interventions unsuccessful or patient reports new pain  Outcome: Progressing     Problem: INFECTION - ADULT  Goal: Absence or prevention of progression during hospitalization  Description: INTERVENTIONS:  - Assess and monitor for signs and symptoms of infection  - Monitor lab/diagnostic results  - Monitor all insertion sites, i.e. indwelling lines, tubes, and drains  - Monitor endotracheal if appropriate and nasal secretions for changes in amount and color  - [place] appropriate cooling/warming therapies per order  - Administer medications as ordered  - Instruct and encourage patient and family to use good hand hygiene technique  - Identify and instruct in appropriate isolation precautions for identified infection/condition  Outcome: Progressing  Goal: Absence of fever/infection during neutropenic period  Description: INTERVENTIONS:  - Monitor WBC    Outcome: Progressing     Problem: SAFETY ADULT  Goal: Patient will remain free of falls  Description: INTERVENTIONS:  - Educate patient/family on patient safety including physical limitations  - Instruct patient to call for assistance with activity   - Consult OT/PT to assist with strengthening/mobility   - Keep Call bell within reach  - Keep bed low and locked with side rails adjusted as appropriate  - Keep care items and personal belongings within reach  - Initiate and maintain comfort rounds  - Make Fall Risk Sign visible to staff  - Offer Toileting every 2 Hours,  in advance of need  - Initiate/Maintain bed alarm  - Apply yellow socks and bracelet for high fall risk patients  - Consider moving patient to room near nurses station  Outcome: Progressing  Goal: Maintain or return to baseline ADL function  Description: INTERVENTIONS:  -  Assess patient's ability to carry out ADLs; assess patient's baseline for ADL function and identify physical deficits which impact ability to perform ADLs (bathing, care of mouth/teeth, toileting, grooming, dressing, etc.)  - Assess/evaluate cause of self-care deficits   - Assess range of motion  - Assess patient's mobility; develop plan if impaired  - Assess patient's need for assistive devices and provide as appropriate  - Encourage maximum independence but intervene and supervise when necessary  - Involve family in performance of ADLs  - Assess for home care needs following discharge   - Consider OT consult to assist with ADL evaluation and planning for discharge  - Provide patient education as appropriate  Outcome: Progressing  Goal: Maintains/Returns to pre admission functional level  Description: INTERVENTIONS:  - Perform AM-PAC 6 Click Basic Mobility/ Daily Activity assessment daily.  - Set and communicate daily mobility goal to care team and patient/family/caregiver.   - Collaborate with rehabilitation services on mobility goals if consulted  - Out of bed for toileting  - Record patient progress and toleration of activity level   Outcome: Progressing     Problem: DISCHARGE PLANNING  Goal: Discharge to home or other facility with appropriate resources  Description: INTERVENTIONS:  - Identify barriers to discharge w/patient and caregiver  - Arrange for needed discharge resources and transportation as appropriate  - Identify discharge learning needs (meds, wound care, etc.)  - Arrange for interpretive services to assist at discharge as needed  - Refer to Case Management Department for coordinating discharge planning if the patient needs  post-hospital services based on physician/advanced practitioner order or complex needs related to functional status, cognitive ability, or social support system  Outcome: Progressing     Problem: Knowledge Deficit  Goal: Patient/family/caregiver demonstrates understanding of disease process, treatment plan, medications, and discharge instructions  Description: Complete learning assessment and assess knowledge base.  Interventions:  - Provide teaching at level of understanding  - Provide teaching via preferred learning methods  Outcome: Progressing     Problem: Prexisting or High Potential for Compromised Skin Integrity  Goal: Skin integrity is maintained or improved  Description: INTERVENTIONS:  - Identify patients at risk for skin breakdown  - Assess and monitor skin integrity  - Assess and monitor nutrition and hydration status  - Monitor labs   - Assess for incontinence   - Turn and reposition patient  - Assist with mobility/ambulation  - Relieve pressure over bony prominences  - Avoid friction and shearing  - Provide appropriate hygiene as needed including keeping skin clean and dry  - Evaluate need for skin moisturizer/barrier cream  - Collaborate with interdisciplinary team   - Patient/family teaching  - Consider wound care consult   Outcome: Progressing

## 2023-12-24 NOTE — UTILIZATION REVIEW
Date: 12/25    Day 3: Has surpassed a 2nd midnight with active treatments and services, which include RLE cellulitis and draining hematoma, surgery consult, IV antibiotics, wound care, follow, wound and blood cultures.    Initial Clinical Review    Admission: Date/Time/Statement:   Admission Orders (From admission, onward)       Ordered        12/23/23 2145  INPATIENT ADMISSION  Once                          Orders Placed This Encounter   Procedures    INPATIENT ADMISSION     Standing Status:   Standing     Number of Occurrences:   1     Order Specific Question:   Level of Care     Answer:   Med Surg [16]     Order Specific Question:   Estimated length of stay     Answer:   More than 2 Midnights     Order Specific Question:   Certification     Answer:   I certify that inpatient services are medically necessary for this patient for a duration of greater than two midnights. See H&P and MD Progress Notes for additional information about the patient's course of treatment.     ED Arrival Information       Expected   -    Arrival   12/23/2023 20:23    Acuity   Urgent              Means of arrival   Ambulance    Escorted by   [facility]    Service   Hospitalist    Admission type   Emergency              Arrival complaint   bleeding             Chief Complaint   Patient presents with    Extremity Laceration     Hit leg on recliner about a week ago; new redness and warmth       Initial Presentation: 78 y.o. female presents to the ED via EMS from home with c/o RLE wound x 1 wk.  She hit RLL on chair.  Was cleaning it daily, now has increased redness, edema, pain.  PMH: chronic A fib on Dig, Cardizem, chronic dHF, HTN, Hypothyroidism, HLD, CKD 3, chronic hypoxic resp failure on home oxygen 2L NC.  In the ED pt is hypoxic 85%, placed on oxygen 2L NC.  Labs - WNL.  Imaging - none completed.  Treated with IV fluids.  ECG - Afib w/ RVR.  On exam irreg, irreg rhythm, erythema and wound RLE.  She is admitted to  INPATIENT status with Cellulitis RLE - blood cultures, IV antibiotics, Surgery consult, wound care.  Chronic hypoxic resp failure - home oxygen dose 2L NC.          Date: 12/24   Day 2:   Remains on High dose IV Cefazolin.  Dressing C/D/I.      12/24 Surgery Consult - RLE cellulitis and draining RLE hematoma, wound interrogated and dressed, IV antibiotics, wound culture, daily site checks w/ dressing changes.  On exam -  8 x 6 area of epidermolysis with a 3 x 1 cm open wound in the center. Upon probing there was sanguinous fluid and some clot. Erythema, warmth, induration of the surrounding skin. No purulence.       ED Triage Vitals [12/23/23 2029]   Temperature Pulse Respirations Blood Pressure SpO2   99.7 °F (37.6 °C) 105 18 125/60 (!) 85 %      Temp src Heart Rate Source Patient Position - Orthostatic VS BP Location FiO2 (%)   -- Monitor Sitting Left arm --      Pain Score       No Pain          Wt Readings from Last 1 Encounters:   12/24/23 61.9 kg (136 lb 7.4 oz)     Additional Vital Signs:   Date/Time Temp Pulse Resp BP MAP (mmHg) SpO2 Calculated FIO2 (%) - Nasal Cannula Nasal Cannula O2 Flow Rate (L/min) O2 Device Patient Position - Orthostatic VS   12/24/23 0859 -- -- -- -- -- -- 28 2 L/min Nasal cannula --   12/24/23 07:31:54 98 °F (36.7 °C) 101 18 123/82 96 97 % -- -- -- --   12/23/23 22:33:10 98.2 °F (36.8 °C) 103 20 138/79 99 97 % -- -- -- --   12/23/23 2030 -- -- -- -- -- 90 % 28 2 L/min Nasal cannula --       Pertinent Labs/Diagnostic Test Results:     12/23 ECG - Atrial fibrillation with rapid ventricular response  Rightward axis  Nonspecific ST-t wave changes    Results from last 7 days   Lab Units 12/23/23 2117   WBC Thousand/uL 7.80   HEMOGLOBIN g/dL 12.6   HEMATOCRIT % 41.1   PLATELETS Thousands/uL 161   NEUTROS ABS Thousands/µL 6.41         Results from last 7 days   Lab Units 12/23/23 2117   SODIUM mmol/L 137   POTASSIUM mmol/L 4.7   CHLORIDE mmol/L 101   CO2 mmol/L 29   ANION GAP mmol/L 7    BUN mg/dL 11   CREATININE mg/dL 0.93   EGFR ml/min/1.73sq m 59   CALCIUM mg/dL 9.1     Results from last 7 days   Lab Units 12/23/23 2117   AST U/L 21   ALT U/L 9   ALK PHOS U/L 92   TOTAL PROTEIN g/dL 6.9   ALBUMIN g/dL 4.1   TOTAL BILIRUBIN mg/dL 0.71         Results from last 7 days   Lab Units 12/23/23 2117   GLUCOSE RANDOM mg/dL 97     Results from last 7 days   Lab Units 12/23/23 2117   PROTIME seconds 14.0   INR  1.06   PTT seconds 41*         Results from last 7 days   Lab Units 12/23/23 2117   PROCALCITONIN ng/ml <0.05     Results from last 7 days   Lab Units 12/23/23 2117   LACTIC ACID mmol/L 0.9     ED Treatment:   Medication Administration from 12/23/2023 2023 to 12/23/2023 2218         Date/Time Order Dose Route Action     12/23/2023 2122 EST sodium chloride 0.9 % bolus 1,000 mL 1,000 mL Intravenous New Bag     12/23/2023 2124 EST sodium chloride 0.9 % bolus 1,000 mL 1,000 mL Intravenous New Bag          Past Medical History:   Diagnosis Date    Disease of thyroid gland     Hypotension     Supratherapeutic INR 6/2/2022     Present on Admission:   Anemia   Atrial fibrillation, chronic (HCC)   Cellulitis of right lower limb   Chronic diastolic CHF (congestive heart failure) (HCC)   Depression   CKD (chronic kidney disease) stage 3, GFR 30-59 ml/min ([facility])   Chronic respiratory failure with hypoxia ([facility])   Essential hypertension   Hypothyroidism   Mixed hyperlipidemia      Admitting Diagnosis: Cellulitis [L03.90]  Skin ulcer (HCC) [L98.499]  Leg laceration [S81.819A]  Age/Sex: 78 y.o. female  Admission Orders:  Scheduled Medications:  aspirin, 81 mg, Oral, Daily  atorvastatin, 40 mg, Oral, Daily With Dinner  bacitracin, 1 small application, Topical, BID  cefazolin, 2,000 mg, Intravenous, Q8H  cyanocobalamin, 1,000 mcg, Oral, Daily  digoxin, 125 mcg, Oral, Every Other Day  diltiazem, 180 mg, Oral, Daily  folic acid, 1,000 mcg, Oral, Daily  heparin (porcine), 5,000 Units, Subcutaneous, Q8H  CHRIS  levothyroxine, 137 mcg, Oral, Early Morning  magnesium gluconate, 500 mg, Oral, BID  melatonin, 6 mg, Oral, HS  neomycin-polymyxin-dexamethasone, , Both Eyes, TID  potassium chloride, 40 mEq, Oral, Daily  sertraline, 100 mg, Oral, Daily      Continuous IV Infusions:     PRN Meds:  acetaminophen, 650 mg, Oral, Q6H PRN  HYDROcodone-acetaminophen, 1 tablet, Oral, Q6H PRN - x 1 12/24  morphine injection, 2 mg, Intravenous, Q4H PRN  ondansetron, 4 mg, Intravenous, Q6H PRN  torsemide, 20 mg, Oral, Daily PRN    Blood and wound cultures  Daily wt  Wound care consult  Daily wound care   IP CONSULT TO ACUTE CARE SURGERY    Network Utilization Review Department  ATTENTION: Please call with any questions or concerns to [phone] and carefully listen to the prompts so that you are directed to the right person. All voicemails are confidential.   For Discharge needs, contact Care Management DC Support Team at [phone] opt. 2  Send all requests for admission clinical reviews, approved or denied determinations and any other requests to dedicated fax number below belonging to the campus where the patient is receiving treatment. List of dedicated fax numbers for the Facilities:  FACILITY NAME UR FAX NUMBER   ADMISSION DENIALS (Administrative/Medical Necessity) [phone]   DISCHARGE SUPPORT TEAM ([facility]) [phone]   PARENT CHILD HEALTH (Maternity/NICU/Pediatrics) [phone]   [facility] [phone]   [facility] [phone]   [facility] [phone]   [facility] [phone]   [facility] [phone]   [facility] [phone]   [facility] [phone]   [facility] [phone]   [facility] [phone]   [facility] [phone]   [facility]  [facility] [phone]

## 2023-12-24 NOTE — NURSING NOTE
Pt admitted to room 314 from OR. Alert and oriented, pleasant and cooperative.  at bedside. Heart is regular, edema to RLE where wound is. Lungs are diminished, clear on 2L, has prn O2 at home. Large wound on outer RLE, actively bleeding, wet to dry dressing applied per order. Call bell within reach, magdi monitor in place and operational, bed alarm active.

## 2023-12-24 NOTE — ASSESSMENT & PLAN NOTE
Wt Readings from Last 3 Encounters:   12/23/23 61.1 kg (134 lb 11.2 oz)   01/12/23 59.4 kg (131 lb)   10/11/22 60.3 kg (133 lb)     Not in exacerbation at this time  Obtain daily weights  Placed on no added salt diet  Continue prehospital digoxin 125 mcg p.o. daily and Cardizem  mg p.o. daily  Continue prehospital Demadex 20 mg p.o. daily as needed

## 2023-12-24 NOTE — NURSING NOTE
Norco given for pain per order. Pt does not want melatonin at this time, she will notify staff if she is not asleep in 30-45 min.

## 2023-12-24 NOTE — PLAN OF CARE
Problem: PAIN - ADULT  Goal: Verbalizes/displays adequate comfort level or baseline comfort level  Description: Interventions:  - Encourage patient to monitor pain and request assistance  - Assess pain using appropriate pain scale  - Administer analgesics based on type and severity of pain and evaluate response  - Implement non-pharmacological measures as appropriate and evaluate response  - Consider cultural and social influences on pain and pain management  - Notify physician/advanced practitioner if interventions unsuccessful or patient reports new pain  Outcome: Progressing     Problem: INFECTION - ADULT  Goal: Absence or prevention of progression during hospitalization  Description: INTERVENTIONS:  - Assess and monitor for signs and symptoms of infection  - Monitor lab/diagnostic results  - Monitor all insertion sites, i.e. indwelling lines, tubes, and drains  - Monitor endotracheal if appropriate and nasal secretions for changes in amount and color  - [place] appropriate cooling/warming therapies per order  - Administer medications as ordered  - Instruct and encourage patient and family to use good hand hygiene technique  - Identify and instruct in appropriate isolation precautions for identified infection/condition  Outcome: Progressing  Goal: Absence of fever/infection during neutropenic period  Description: INTERVENTIONS:  - Monitor WBC    Outcome: Progressing     Problem: SAFETY ADULT  Goal: Patient will remain free of falls  Description: INTERVENTIONS:  - Educate patient/family on patient safety including physical limitations  - Instruct patient to call for assistance with activity   - Consult OT/PT to assist with strengthening/mobility   - Keep Call bell within reach  - Keep bed low and locked with side rails adjusted as appropriate  - Keep care items and personal belongings within reach  - Initiate and maintain comfort rounds  - Make Fall Risk Sign visible to staff  - Offer Toileting every 2 Hours,  in advance of need  - Initiate/Maintain bed alarm  - Obtain necessary fall risk management equipment:   - Apply yellow socks and bracelet for high fall risk patients  - Consider moving patient to room near nurses station  Outcome: Progressing  Goal: Maintain or return to baseline ADL function  Description: INTERVENTIONS:  -  Assess patient's ability to carry out ADLs; assess patient's baseline for ADL function and identify physical deficits which impact ability to perform ADLs (bathing, care of mouth/teeth, toileting, grooming, dressing, etc.)  - Assess/evaluate cause of self-care deficits   - Assess range of motion  - Assess patient's mobility; develop plan if impaired  - Assess patient's need for assistive devices and provide as appropriate  - Encourage maximum independence but intervene and supervise when necessary  - Involve family in performance of ADLs  - Assess for home care needs following discharge   - Consider OT consult to assist with ADL evaluation and planning for discharge  - Provide patient education as appropriate  Outcome: Progressing  Goal: Maintains/Returns to pre admission functional level  Description: INTERVENTIONS:  - Perform AM-PAC 6 Click Basic Mobility/ Daily Activity assessment daily.  - Set and communicate daily mobility goal to care team and patient/family/caregiver.   - Collaborate with rehabilitation services on mobility goals if consulted  - Perform Range of Motion 2 times a day.  - Reposition patient every 2 hours.  - Dangle patient 2 times a day  - Stand patient 2 times a day  - Ambulate patient 2 times a day  - Out of bed to chair 2 times a day   - Out of bed for meals 2 times a day  - Out of bed for toileting  - Record patient progress and toleration of activity level   Outcome: Progressing     Problem: DISCHARGE PLANNING  Goal: Discharge to home or other facility with appropriate resources  Description: INTERVENTIONS:  - Identify barriers to discharge w/patient and caregiver  -  Arrange for needed discharge resources and transportation as appropriate  - Identify discharge learning needs (meds, wound care, etc.)  - Arrange for interpretive services to assist at discharge as needed  - Refer to Case Management Department for coordinating discharge planning if the patient needs post-hospital services based on physician/advanced practitioner order or complex needs related to functional status, cognitive ability, or social support system  Outcome: Progressing     Problem: Knowledge Deficit  Goal: Patient/family/caregiver demonstrates understanding of disease process, treatment plan, medications, and discharge instructions  Description: Complete learning assessment and assess knowledge base.  Interventions:  - Provide teaching at level of understanding  - Provide teaching via preferred learning methods  Outcome: Progressing

## 2023-12-25 VITALS
SYSTOLIC BLOOD PRESSURE: 147 MMHG | OXYGEN SATURATION: 97 % | HEIGHT: 63 IN | WEIGHT: 136.47 LBS | TEMPERATURE: 98.2 F | HEART RATE: 104 BPM | RESPIRATION RATE: 17 BRPM | DIASTOLIC BLOOD PRESSURE: 85 MMHG | BODY MASS INDEX: 24.18 KG/M2

## 2023-12-25 PROBLEM — N18.31 STAGE 3A CHRONIC KIDNEY DISEASE (HCC): Status: ACTIVE | Noted: 2022-08-12

## 2023-12-25 LAB
ANION GAP SERPL CALCULATED.3IONS-SCNC: 9 MMOL/L
BASOPHILS # BLD AUTO: 0.02 THOUSANDS/ÂΜL (ref 0–0.1)
BASOPHILS NFR BLD AUTO: 0 % (ref 0–1)
BUN SERPL-MCNC: 11 MG/DL (ref 5–25)
CALCIUM SERPL-MCNC: 8.9 MG/DL (ref 8.4–10.2)
CHLORIDE SERPL-SCNC: 104 MMOL/L (ref 96–108)
CO2 SERPL-SCNC: 24 MMOL/L (ref 21–32)
CREAT SERPL-MCNC: 0.86 MG/DL (ref 0.6–1.3)
EOSINOPHIL # BLD AUTO: 0.07 THOUSAND/ÂΜL (ref 0–0.61)
EOSINOPHIL NFR BLD AUTO: 1 % (ref 0–6)
ERYTHROCYTE [DISTWIDTH] IN BLOOD BY AUTOMATED COUNT: 13.8 % (ref 11.6–15.1)
GFR SERPL CREATININE-BSD FRML MDRD: 64 ML/MIN/1.73SQ M
GLUCOSE SERPL-MCNC: 105 MG/DL (ref 65–140)
HCT VFR BLD AUTO: 37.4 % (ref 34.8–46.1)
HGB BLD-MCNC: 11.7 G/DL (ref 11.5–15.4)
IMM GRANULOCYTES # BLD AUTO: 0.02 THOUSAND/UL (ref 0–0.2)
IMM GRANULOCYTES NFR BLD AUTO: 0 % (ref 0–2)
LYMPHOCYTES # BLD AUTO: 0.54 THOUSANDS/ÂΜL (ref 0.6–4.47)
LYMPHOCYTES NFR BLD AUTO: 7 % (ref 14–44)
MCH RBC QN AUTO: 32.1 PG (ref 26.8–34.3)
MCHC RBC AUTO-ENTMCNC: 31.3 G/DL (ref 31.4–37.4)
MCV RBC AUTO: 103 FL (ref 82–98)
MONOCYTES # BLD AUTO: 0.56 THOUSAND/ÂΜL (ref 0.17–1.22)
MONOCYTES NFR BLD AUTO: 8 % (ref 4–12)
NEUTROPHILS # BLD AUTO: 6.3 THOUSANDS/ÂΜL (ref 1.85–7.62)
NEUTS SEG NFR BLD AUTO: 84 % (ref 43–75)
NRBC BLD AUTO-RTO: 0 /100 WBCS
PLATELET # BLD AUTO: 150 THOUSANDS/UL (ref 149–390)
PMV BLD AUTO: 9 FL (ref 8.9–12.7)
POTASSIUM SERPL-SCNC: 4 MMOL/L (ref 3.5–5.3)
RBC # BLD AUTO: 3.64 MILLION/UL (ref 3.81–5.12)
SODIUM SERPL-SCNC: 137 MMOL/L (ref 135–147)
WBC # BLD AUTO: 7.51 THOUSAND/UL (ref 4.31–10.16)

## 2023-12-25 PROCEDURE — 85025 COMPLETE CBC W/AUTO DIFF WBC: CPT | Performed by: INTERNAL MEDICINE

## 2023-12-25 PROCEDURE — 80048 BASIC METABOLIC PNL TOTAL CA: CPT | Performed by: INTERNAL MEDICINE

## 2023-12-25 PROCEDURE — 99239 HOSP IP/OBS DSCHRG MGMT >30: CPT | Performed by: NURSE PRACTITIONER

## 2023-12-25 PROCEDURE — 99232 SBSQ HOSP IP/OBS MODERATE 35: CPT | Performed by: SURGERY

## 2023-12-25 RX ORDER — CEPHALEXIN 500 MG/1
500 CAPSULE ORAL EVERY 6 HOURS SCHEDULED
Qty: 36 CAPSULE | Refills: 0 | Status: SHIPPED | OUTPATIENT
Start: 2023-12-25 | End: 2023-12-27 | Stop reason: ALTCHOICE

## 2023-12-25 RX ORDER — CEPHALEXIN 500 MG/1
500 CAPSULE ORAL EVERY 6 HOURS SCHEDULED
Status: DISCONTINUED | OUTPATIENT
Start: 2023-12-25 | End: 2023-12-25 | Stop reason: HOSPADM

## 2023-12-25 RX ORDER — POLYETHYLENE GLYCOL 3350 17 G/17G
17 POWDER, FOR SOLUTION ORAL DAILY
Status: DISCONTINUED | OUTPATIENT
Start: 2023-12-25 | End: 2023-12-25 | Stop reason: HOSPADM

## 2023-12-25 RX ADMIN — BACITRACIN ZINC 1 SMALL APPLICATION: 500 OINTMENT TOPICAL at 09:30

## 2023-12-25 RX ADMIN — HEPARIN SODIUM 5000 UNITS: 5000 INJECTION INTRAVENOUS; SUBCUTANEOUS at 07:20

## 2023-12-25 RX ADMIN — DILTIAZEM HYDROCHLORIDE 180 MG: 180 CAPSULE, COATED, EXTENDED RELEASE ORAL at 09:30

## 2023-12-25 RX ADMIN — CEFAZOLIN SODIUM 2000 MG: 2 SOLUTION INTRAVENOUS at 07:11

## 2023-12-25 RX ADMIN — CYANOCOBALAMIN TAB 500 MCG 1000 MCG: 500 TAB at 09:30

## 2023-12-25 RX ADMIN — POTASSIUM CHLORIDE 40 MEQ: 1500 TABLET, EXTENDED RELEASE ORAL at 09:30

## 2023-12-25 RX ADMIN — NEOMYCIN SULFATE, POLYMYXIN B SULFATE, AND DEXAMETHASONE: 3.5; 10000; 1 OINTMENT OPHTHALMIC at 09:31

## 2023-12-25 RX ADMIN — Medication 500 MG: at 09:30

## 2023-12-25 RX ADMIN — SERTRALINE 100 MG: 100 TABLET, FILM COATED ORAL at 09:30

## 2023-12-25 RX ADMIN — CEPHALEXIN 500 MG: 500 CAPSULE ORAL at 11:36

## 2023-12-25 RX ADMIN — LEVOTHYROXINE SODIUM 137 MCG: 25 TABLET ORAL at 07:21

## 2023-12-25 RX ADMIN — FOLIC ACID 1000 MCG: 1 TABLET ORAL at 09:30

## 2023-12-25 RX ADMIN — POLYETHYLENE GLYCOL 3350 17 G: 17 POWDER, FOR SOLUTION ORAL at 09:30

## 2023-12-25 RX ADMIN — ASPIRIN 81 MG: 81 TABLET, COATED ORAL at 09:30

## 2023-12-25 NOTE — DISCHARGE INSTR - AVS FIRST PAGE
Dear Marsha Oliva,     It was our pleasure to care for you here at [facility].  It is our hope that we were always able to exceed the expected standards for your care during your stay.  You were hospitalized due to cellulitis of right leg with open wound.  You were cared for on the second floor by JAQUELINE Amos with the [facility] who covers for your primary care physician (PCP), Dianne Salmon MD, while you were hospitalized.  If you have any questions or concerns related to this hospitalization, you may contact us at .  For follow up as well as any medication refills, we recommend that you follow up with your primary care physician.  A registered nurse will reach out to you by phone within a few days after your discharge to answer any additional questions that you may have after going home.  However, at this time we provide for you here, the most important instructions / recommendations at discharge:     Notable Medication Adjustments -   Keflex 500 mg every 6 hours-antibiotics  These obtain bacitracin ointment over-the-counter and apply to your wound once a day during dressing change  Testing Required after Discharge -   None  Important follow up information -   Follow-up with PCP and general surgery to follow-up with your right leg wound   Other Instructions -   Please refer to discharge instructions  Please review this entire after visit summary as additional general instructions including medication list, appointments, activity, diet, any pertinent wound care, and other additional recommendations from your care team that may be provided for you.      Sincerely,     JAQUELINE Amos

## 2023-12-25 NOTE — NURSING NOTE
Discharge instructions reviewed with patient and patient's spouse.    Wound care supplies and instructions reviewed with patient and patient's spouse.    Patient discharged home with spouse.

## 2023-12-25 NOTE — ASSESSMENT & PLAN NOTE
Patient is rate controlled at this time  We will continue prehospital digoxin 125 mcg every 48 hours and Cardizem  mg p.o. daily   Not on anticoagulation due to history of recurrent GI bleed   English

## 2023-12-25 NOTE — ASSESSMENT & PLAN NOTE
Lab Results   Component Value Date    EGFR 64 12/25/2023    EGFR 59 12/23/2023    EGFR 52 08/12/2022    CREATININE 0.86 12/25/2023    CREATININE 0.93 12/23/2023    CREATININE 1.03 08/12/2022     Creatinine appears to be approximately baseline  Continue to monitor blood work in an outpatient setting

## 2023-12-25 NOTE — UTILIZATION REVIEW
"Date: 12/25/2023.    Hospital Day 3: Has surpassed a 2nd midnight with active treatments and services, which include:  Please include initial clinical review by:  Romi Ignacio RN  Registered Nurse  Specialty: Medical Surgical   Utilization Review      Date of Service: 12/24/2023  1:38 PM  DX:    Cellulitis Right Lower Limb.  /85   Pulse 104   Temp 98.2 °F (36.8 °C)   Resp 17   Ht 5' 3\" (1.6 m)   Wt 61.9 kg (136 lb 7.4 oz)   SpO2 97%   BMI 24.17 kg/m²   Continue ASA, Lipitor.  SQ Heparin.   O2 @ 2L via NC.  Local wound care.  Continue IV abx: Cefazolin.  Follow up blood cultures.  Follow up wound culture.    "

## 2023-12-25 NOTE — UTILIZATION REVIEW
NOTIFICATION OF INPATIENT ADMISSION   AUTHORIZATION REQUEST   SERVICING FACILITY:   [place] [address]  Tax ID: [id number]  NPI: [id number]   ATTENDING PROVIDER:  Attending Name and NPI#: Jesus Alberto Page Do [[id number]]  Address: [address] [place] [address]  Phone: [phone]     ADMISSION INFORMATION:  Place of Service: Inpatient Acute [facility]  Place of Service Code: 21  Inpatient Admission Date/Time: 12/23/23  9:45 PM  Discharge Date/Time: 12/25/2023 12:27 PM  Admitting Diagnosis Code/Description:  Cellulitis [L03.90]  Skin ulcer (HCC) [L98.499]  Leg laceration [S81.819A]     UTILIZATION REVIEW CONTACT:  Gema Lechuga Utilization   Network Utilization Review Department  Phone: [phone]  Fax [phone]  Email: [email or web address]  Contact for approvals/pending authorizations, clinical reviews, and discharge.     PHYSICIAN ADVISORY SERVICES:  Medical Necessity Denial & Aoyv-ro-Oxwz Review  Phone: [phone]  Fax: [phone]  Email: [email or web address]     DISCHARGE SUPPORT TEAM:  For Patients Discharge Needs & Updates  Phone: [phone] opt. 2 Fax: [phone]  Email: [email or web address]

## 2023-12-25 NOTE — PLAN OF CARE
Problem: PAIN - ADULT  Goal: Verbalizes/displays adequate comfort level or baseline comfort level  Description: Interventions:  - Encourage patient to monitor pain and request assistance  - Assess pain using appropriate pain scale  - Administer analgesics based on type and severity of pain and evaluate response  - Implement non-pharmacological measures as appropriate and evaluate response  - Consider cultural and social influences on pain and pain management  - Notify physician/advanced practitioner if interventions unsuccessful or patient reports new pain  Outcome: Adequate for Discharge     Problem: INFECTION - ADULT  Goal: Absence or prevention of progression during hospitalization  Description: INTERVENTIONS:  - Assess and monitor for signs and symptoms of infection  - Monitor lab/diagnostic results  - Monitor all insertion sites, i.e. indwelling lines, tubes, and drains  - Monitor endotracheal if appropriate and nasal secretions for changes in amount and color  - [place] appropriate cooling/warming therapies per order  - Administer medications as ordered  - Instruct and encourage patient and family to use good hand hygiene technique  - Identify and instruct in appropriate isolation precautions for identified infection/condition  Outcome: Adequate for Discharge  Goal: Absence of fever/infection during neutropenic period  Description: INTERVENTIONS:  - Monitor WBC    Outcome: Adequate for Discharge     Problem: SAFETY ADULT  Goal: Patient will remain free of falls  Description: INTERVENTIONS:  - Educate patient/family on patient safety including physical limitations  - Instruct patient to call for assistance with activity   - Consult OT/PT to assist with strengthening/mobility   - Keep Call bell within reach  - Keep bed low and locked with side rails adjusted as appropriate  - Keep care items and personal belongings within reach  - Initiate and maintain comfort rounds  - Make Fall Risk Sign visible to  staff  - Offer Toileting every 2 Hours, in advance of need  - Initiate/Maintain bed alarm  - Apply yellow socks and bracelet for high fall risk patients  - Consider moving patient to room near nurses station  Outcome: Adequate for Discharge  Goal: Maintain or return to baseline ADL function  Description: INTERVENTIONS:  -  Assess patient's ability to carry out ADLs; assess patient's baseline for ADL function and identify physical deficits which impact ability to perform ADLs (bathing, care of mouth/teeth, toileting, grooming, dressing, etc.)  - Assess/evaluate cause of self-care deficits   - Assess range of motion  - Assess patient's mobility; develop plan if impaired  - Assess patient's need for assistive devices and provide as appropriate  - Encourage maximum independence but intervene and supervise when necessary  - Involve family in performance of ADLs  - Assess for home care needs following discharge   - Consider OT consult to assist with ADL evaluation and planning for discharge  - Provide patient education as appropriate  Outcome: Adequate for Discharge  Goal: Maintains/Returns to pre admission functional level  Description: INTERVENTIONS:  - Perform AM-PAC 6 Click Basic Mobility/ Daily Activity assessment daily.  - Set and communicate daily mobility goal to care team and patient/family/caregiver.   - Collaborate with rehabilitation services on mobility goals if consulted  - Out of bed for toileting  - Record patient progress and toleration of activity level   Outcome: Adequate for Discharge     Problem: DISCHARGE PLANNING  Goal: Discharge to home or other facility with appropriate resources  Description: INTERVENTIONS:  - Identify barriers to discharge w/patient and caregiver  - Arrange for needed discharge resources and transportation as appropriate  - Identify discharge learning needs (meds, wound care, etc.)  - Refer to Case Management Department for coordinating discharge planning if the patient needs  post-hospital services based on physician/advanced practitioner order or complex needs related to functional status, cognitive ability, or social support system  Outcome: Adequate for Discharge     Problem: Knowledge Deficit  Goal: Patient/family/caregiver demonstrates understanding of disease process, treatment plan, medications, and discharge instructions  Description: Complete learning assessment and assess knowledge base.  Interventions:  - Provide teaching at level of understanding  - Provide teaching via preferred learning methods  Outcome: Adequate for Discharge     Problem: Prexisting or High Potential for Compromised Skin Integrity  Goal: Skin integrity is maintained or improved  Description: INTERVENTIONS:  - Identify patients at risk for skin breakdown  - Assess and monitor skin integrity  - Assess and monitor nutrition and hydration status  - Monitor labs   - Assess for incontinence   - Turn and reposition patient  - Assist with mobility/ambulation  - Relieve pressure over bony prominences  - Avoid friction and shearing  - Provide appropriate hygiene as needed including keeping skin clean and dry  - Evaluate need for skin moisturizer/barrier cream  - Collaborate with interdisciplinary team   - Patient/family teaching  - Consider wound care consult   Outcome: Adequate for Discharge     Problem: SKIN/TISSUE INTEGRITY - ADULT  Goal: Wounds healing without S/S of infection  Description: INTERVENTIONS  - Assess and document dressing, incision, and surrounding tissue  - Provide patient and family education  - Perform skin care/dressing changes per order  Outcome: Adequate for Discharge

## 2023-12-25 NOTE — ASSESSMENT & PLAN NOTE
With an open wound   Started on ancef while in the hospital   Blood cultures and wound cultures- currently pending  General surgery input appreciated  Recommend to continue with local wound care with bacitracin, cover with nonocclusive dressing, 4 x 4, and wrap with Kerlix  Upon discharge recommend to continue with cephalexin 500 mg every 6 hours for 9 more days to complete 10-day treatment   Outpatient follow-up with general surgery

## 2023-12-25 NOTE — ASSESSMENT & PLAN NOTE
Wt Readings from Last 3 Encounters:   12/25/23 61.9 kg (136 lb 7.4 oz)   01/12/23 59.4 kg (131 lb)   10/11/22 60.3 kg (133 lb)     Not in exacerbation at this time  Continue prehospital digoxin 125 mcg p.o. daily and Cardizem  mg p.o. daily  Continue prehospital Demadex 20 mg p.o. daily as needed

## 2023-12-25 NOTE — PROGRESS NOTES
"Progress Note - General Surgery   Marsha Oliva 78 y.o. female MRN: [id number]  Unit/Bed#: -01 Encounter: [id number]    ASSESSMENT:  78F w/ PMH hypothyroidism, CRF on 2LO2 at baseline, CKD3, anemia, Hld, HTN, depression, CHF, Afib not AC only on aspirin presenting with traumatic RLE wound and surrounding cellulitis.  Gen surg consulted for possible wound debridement. HD#2, re-evaluating today.      RLE wound  RLE cellulitis   -Surrounding cellulitic area looks to be improved today  -Local wound care provided, bacitracin applied       PLAN:   -Continue local wound care with application of bacitracin ointment and covering with nonocculusive dressing, 4x4, wrapped with kerlix.  -IV abx, FU C/S  -Discharge planning per SLIM. Stable from a surgical standpoint.     SUBJECTIVE:  Feeling well no acute complaints no events overnight no acute changes.     OBJECTIVE:   Vitals:  Blood pressure 147/85, pulse 104, temperature 98.2 °F (36.8 °C), resp. rate 17, height 5' 3\" (1.6 m), weight 61.9 kg (136 lb 7.4 oz), SpO2 97%.,Body mass index is 24.17 kg/m².    I/Os:    Intake/Output Summary (Last 24 hours) at 12/25/2023 0800  Last data filed at 12/24/2023 1745  Gross per 24 hour   Intake 60 ml   Output --   Net 60 ml       Lines/Drains:  Invasive Devices       Peripheral Intravenous Line  Duration             Peripheral IV 12/23/23 Right Wrist 1 day              Drain  Duration             Urethral Catheter Latex 16 Fr. 500 days                    Physical Exam  Constitutional:       General: She is not in acute distress.  Cardiovascular:      Rate and Rhythm: Normal rate.  Pulmonary:      Effort: Pulmonary effort is normal.      Breath sounds: Normal breath sounds.   Skin:     Comments: 8 x 6 area of epidermolysis with a 3 x 1 cm open wound in the center. Upon probing there was sanguinous fluid and some clot. Erythema, warmth, induration of the surrounding skin (IMPROVED). No purulent drainage.   Neurological:      Mental " Status: She is alert.     Diagnostics:  I have personally reviewed pertinent lab results.     No results found.  Recent Results (from the past 36 hour(s))   CBC and differential    Collection Time: 12/23/23  9:17 PM   Result Value Ref Range    WBC 7.80 4.31 - 10.16 Thousand/uL    RBC 3.97 3.81 - 5.12 Million/uL    Hemoglobin 12.6 11.5 - 15.4 g/dL    Hematocrit 41.1 34.8 - 46.1 %     (H) 82 - 98 fL    MCH 31.7 26.8 - 34.3 pg    MCHC 30.7 (L) 31.4 - 37.4 g/dL    RDW 13.9 11.6 - 15.1 %    MPV 9.0 8.9 - 12.7 fL    Platelets 161 149 - 390 Thousands/uL    nRBC 0 /100 WBCs    Neutrophils Relative 82 (H) 43 - 75 %    Immat GRANS % 0 0 - 2 %    Lymphocytes Relative 9 (L) 14 - 44 %    Monocytes Relative 9 4 - 12 %    Eosinophils Relative 0 0 - 6 %    Basophils Relative 0 0 - 1 %    Neutrophils Absolute 6.41 1.85 - 7.62 Thousands/µL    Immature Grans Absolute 0.02 0.00 - 0.20 Thousand/uL    Lymphocytes Absolute 0.68 0.60 - 4.47 Thousands/µL    Monocytes Absolute 0.66 0.17 - 1.22 Thousand/µL    Eosinophils Absolute 0.02 0.00 - 0.61 Thousand/µL    Basophils Absolute 0.01 0.00 - 0.10 Thousands/µL   Comprehensive metabolic panel    Collection Time: 12/23/23  9:17 PM   Result Value Ref Range    Sodium 137 135 - 147 mmol/L    Potassium 4.7 3.5 - 5.3 mmol/L    Chloride 101 96 - 108 mmol/L    CO2 29 21 - 32 mmol/L    ANION GAP 7 mmol/L    BUN 11 5 - 25 mg/dL    Creatinine 0.93 0.60 - 1.30 mg/dL    Glucose 97 65 - 140 mg/dL    Calcium 9.1 8.4 - 10.2 mg/dL    AST 21 13 - 39 U/L    ALT 9 7 - 52 U/L    Alkaline Phosphatase 92 34 - 104 U/L    Total Protein 6.9 6.4 - 8.4 g/dL    Albumin 4.1 3.5 - 5.0 g/dL    Total Bilirubin 0.71 0.20 - 1.00 mg/dL    eGFR 59 ml/min/1.73sq m   Lactic acid    Collection Time: 12/23/23  9:17 PM   Result Value Ref Range    LACTIC ACID 0.9 0.5 - 2.0 mmol/L   Procalcitonin    Collection Time: 12/23/23  9:17 PM   Result Value Ref Range    Procalcitonin <0.05 <=0.25 ng/ml   Protime-INR    Collection Time:  12/23/23  9:17 PM   Result Value Ref Range    Protime 14.0 11.6 - 14.5 seconds    INR 1.06 0.84 - 1.19   APTT    Collection Time: 12/23/23  9:17 PM   Result Value Ref Range    PTT 41 (H) 23 - 37 seconds   Blood culture #1    Collection Time: 12/23/23  9:17 PM    Specimen: Arm, Left; Blood   Result Value Ref Range    Blood Culture Received in Microbiology Lab. Culture in Progress.    Blood culture #2    Collection Time: 12/23/23  9:17 PM    Specimen: Hand, Right; Blood   Result Value Ref Range    Blood Culture Received in Microbiology Lab. Culture in Progress.    ECG 12 lead    Collection Time: 12/23/23  9:28 PM   Result Value Ref Range    Ventricular Rate 108 BPM    Atrial Rate 119 BPM    NH Interval  ms    QRSD Interval 82 ms    QT Interval 344 ms    QTC Interval 460 ms    P Axis  degrees    QRS Axis 99 degrees    T Wave Axis -48 degrees   CBC and differential    Collection Time: 12/25/23  5:18 AM   Result Value Ref Range    WBC 7.51 4.31 - 10.16 Thousand/uL    RBC 3.64 (L) 3.81 - 5.12 Million/uL    Hemoglobin 11.7 11.5 - 15.4 g/dL    Hematocrit 37.4 34.8 - 46.1 %     (H) 82 - 98 fL    MCH 32.1 26.8 - 34.3 pg    MCHC 31.3 (L) 31.4 - 37.4 g/dL    RDW 13.8 11.6 - 15.1 %    MPV 9.0 8.9 - 12.7 fL    Platelets 150 149 - 390 Thousands/uL    nRBC 0 /100 WBCs    Neutrophils Relative 84 (H) 43 - 75 %    Immat GRANS % 0 0 - 2 %    Lymphocytes Relative 7 (L) 14 - 44 %    Monocytes Relative 8 4 - 12 %    Eosinophils Relative 1 0 - 6 %    Basophils Relative 0 0 - 1 %    Neutrophils Absolute 6.30 1.85 - 7.62 Thousands/µL    Immature Grans Absolute 0.02 0.00 - 0.20 Thousand/uL    Lymphocytes Absolute 0.54 (L) 0.60 - 4.47 Thousands/µL    Monocytes Absolute 0.56 0.17 - 1.22 Thousand/µL    Eosinophils Absolute 0.07 0.00 - 0.61 Thousand/µL    Basophils Absolute 0.02 0.00 - 0.10 Thousands/µL   Basic metabolic panel    Collection Time: 12/25/23  5:18 AM   Result Value Ref Range    Sodium 137 135 - 147 mmol/L    Potassium 4.0 3.5  - 5.3 mmol/L    Chloride 104 96 - 108 mmol/L    CO2 24 21 - 32 mmol/L    ANION GAP 9 mmol/L    BUN 11 5 - 25 mg/dL    Creatinine 0.86 0.60 - 1.30 mg/dL    Glucose 105 65 - 140 mg/dL    Calcium 8.9 8.4 - 10.2 mg/dL    eGFR 64 ml/min/1.73sq m       Current Medications:  Scheduled Meds:  Current Facility-Administered Medications   Medication Dose Route Frequency Provider Last Rate    acetaminophen  650 mg Oral Q6H PRN Jermaine Pederson PA-C      aspirin  81 mg Oral Daily Jermaine Pederson PA-C      atorvastatin  40 mg Oral Daily With Dinner Jermaine Pederson PA-C      bacitracin  1 small application Topical Daily Marjorie Varner PA-C      cefazolin  2,000 mg Intravenous Q8H Jermaine Pederson PA-C 2,000 mg (12/25/23 0711)    cyanocobalamin  1,000 mcg Oral Daily Jermaine Pederson PA-C      digoxin  125 mcg Oral Every Other Day Jermaine Pederson PA-C      diltiazem  180 mg Oral Daily Jermaine Pederson PA-C      folic acid  1,000 mcg Oral Daily Jermaine Pederson PA-C      heparin (porcine)  5,000 Units Subcutaneous Q8H CHRIS Jermaine Pederson PA-C      HYDROcodone-acetaminophen  1 tablet Oral Q6H PRN Jermaine Pederson PA-C      levothyroxine  137 mcg Oral Early Morning Jermaine Pederson PA-C      magnesium gluconate  500 mg Oral BID Jermaine Pederson PA-C      melatonin  6 mg Oral HS Jermaine Pederson PA-C      morphine injection  2 mg Intravenous Q4H PRN Jermaine Pederson PA-C      neomycin-polymyxin-dexamethasone   Both Eyes TID Jermaine Pederson PA-C      ondansetron  4 mg Intravenous Q6H PRN Jermaine Pederson PA-C      polyethylene glycol  17 g Oral Daily Marjorie Varner PA-C      potassium chloride  40 mEq Oral Daily Jermaine Pederson PA-C      sertraline  100 mg Oral Daily Jermaine Pederson PA-C      torsemide  20 mg Oral Daily PRN Jermaine Pederson PA-C       Continuous Infusions:   PRN Meds:    acetaminophen    HYDROcodone-acetaminophen    morphine injection    ondansetron    torsemide      Marjorie Varner PA-C  12/25/2023

## 2023-12-25 NOTE — DISCHARGE SUMMARY
[facility]  Discharge- Marsha Oliva 1945, 78 y.o. female MRN: [id number]  Unit/Bed#: -[facility] Encounter: [id number]  Primary Care Provider: Dianne Salmon MD   Date and time admitted to hospital: 12/23/2023  8:24 PM    * Cellulitis of right lower limb  Assessment & Plan  With an open wound   Started on ancef while in the hospital   Blood cultures and wound cultures- currently pending  General surgery input appreciated  Recommend to continue with local wound care with bacitracin, cover with nonocclusive dressing, 4 x 4, and wrap with Kerlix  Upon discharge recommend to continue with cephalexin 500 mg every 6 hours for 9 more days to complete 10-day treatment   Outpatient follow-up with general surgery       Chronic respiratory failure with hypoxia (HCC)  Assessment & Plan  Patient normally wears O2 2 L nasal cannula at home    Stage 3a chronic kidney disease (HCC)  Assessment & Plan  Lab Results   Component Value Date    EGFR 64 12/25/2023    EGFR 59 12/23/2023    EGFR 52 08/12/2022    CREATININE 0.86 12/25/2023    CREATININE 0.93 12/23/2023    CREATININE 1.03 08/12/2022     Creatinine appears to be approximately baseline  Continue to monitor blood work in an outpatient setting    Mixed hyperlipidemia  Assessment & Plan  Continue prehospital Lipitor 40 mg p.o. daily    Essential hypertension  Assessment & Plan  Continue prehospital Cardizem  mg p.o. daily and Demadex 20 mg p.o. daily as needed    Chronic diastolic CHF (congestive heart failure) (HCC)  Assessment & Plan  Wt Readings from Last 3 Encounters:   12/25/23 61.9 kg (136 lb 7.4 oz)   01/12/23 59.4 kg (131 lb)   10/11/22 60.3 kg (133 lb)     Not in exacerbation at this time  Continue prehospital digoxin 125 mcg p.o. daily and Cardizem  mg p.o. daily  Continue prehospital Demadex 20 mg p.o. daily as needed    Atrial fibrillation, chronic (HCC)  Assessment & Plan  Patient is rate controlled at this time  We will  continue prehospital digoxin 125 mcg every 48 hours and Cardizem  mg p.o. daily   Not on anticoagulation due to history of recurrent GI bleed        Medical Problems       Resolved Problems  Date Reviewed: 12/25/2023   None       Discharging Physician / Practitioner: JAQUELINE Amos  PCP: Dianne Salmon MD  Admission Date:   Admission Orders (From admission, onward)       Ordered        12/23/23 2145  INPATIENT ADMISSION  Once                          Discharge Date: 12/25/23    Consultations During Hospital Stay:  Surgery     Procedures Performed:   None     Significant Findings / Test Results:   None     Incidental Findings:   None      Test Results Pending at Discharge (will require follow up):   Final blood and wound cultures     Outpatient Tests Requested:  With PCP and surgery    Complications: None    Reason for Admission: Right lower extremity wound    Hospital Course:   Marsha Oliva is a 78 y.o. female patient who originally presented to the hospital on 12/23/2023 due to worsening right lower extremity wound and cellulitis.  The patient came in after she bumped her right leg to the handle of her recliner proximately 1 week prior to admission.  This has been progressively worse and the patient was subsequently admitted.  She was treated with IV antibiotics as Ancef.  General surgery evaluation done.  At this time there is no surgically intervention recommended and to continue with local wound care and follow-up with surgery outpatient.  On discharge, the patient will continue with cephalexin 500 mg every 6 hours for 9 more days to complete total of 10 days course.  She is aware that currently her blood cultures and wound culture are pending.  The patient would like to go home as it is [place].  The patient and her  are also aware that home care that they are requesting will not be arranged until case management returns tomorrow.    Please see above list of diagnoses and related plan  "for additional information.     Condition at Discharge: fair    Discharge Day Visit / Exam:   Subjective:  feeling well and would like to go home.   Vitals: Blood Pressure: 147/85 (12/25/23 0753)  Pulse: 104 (12/25/23 0753)  Temperature: 98.2 °F (36.8 °C) (12/25/23 0753)  Respirations: 17 (12/24/23 2203)  Height: 5' 3\" (160 cm) (12/23/23 2233)  Weight - Scale: 61.9 kg (136 lb 7.4 oz) (12/25/23 0600)  SpO2: 97 % (12/25/23 0753)  Exam:   Physical Exam  Vitals and nursing note reviewed.   Constitutional:       General: She is not in acute distress.     Appearance: Normal appearance.   HENT:      Head: Normocephalic and atraumatic.      Right Ear: External ear normal.      Left Ear: External ear normal.      Nose: Nose normal. No rhinorrhea.      Mouth/Throat:      Mouth: Mucous membranes are moist.      Pharynx: Oropharynx is clear.   Eyes:      General:         Right eye: No discharge.         Left eye: No discharge.      Pupils: Pupils are equal, round, and reactive to light.   Cardiovascular:      Rate and Rhythm: Normal rate and regular rhythm.      Pulses: Normal pulses.      Heart sounds: Normal heart sounds. No murmur heard.  Pulmonary:      Effort: Pulmonary effort is normal. No respiratory distress.      Breath sounds: Normal breath sounds.   Abdominal:      General: Bowel sounds are normal. There is no distension.      Palpations: Abdomen is soft. There is no mass.      Tenderness: There is no abdominal tenderness.   Musculoskeletal:         General: No swelling or tenderness. Normal range of motion.      Cervical back: Normal range of motion and neck supple. No muscular tenderness.      Right lower leg: Edema present.      Left lower leg: Edema present.   Skin:     General: Skin is warm and dry.      Capillary Refill: Capillary refill takes less than 2 seconds.      Findings: Erythema present. No rash.      Comments: Open wound on right lower extremity with area of epidermolysis surrounding    Neurological: "      General: No focal deficit present.      Mental Status: She is alert and oriented to person, place, and time. Mental status is at baseline.      Comments: With some forgetfulness   Psychiatric:         Mood and Affect: Mood normal.         Behavior: Behavior normal.         Thought Content: Thought content normal.         Discussion with Family:  .     Discharge instructions/Information to patient and family:   See after visit summary for information provided to patient and family.      Provisions for Follow-Up Care:  See after visit summary for information related to follow-up care and any pertinent home health orders.      Mobility at time of Discharge:   Basic Mobility Inpatient Raw Score: 18  JH-HLM Goal: 6: Walk 10 steps or more  JH-HLM Achieved: 6: Walk 10 steps or more  HLM Goal achieved. Continue to encourage appropriate mobility.     Disposition:   Home with VNA Services (Reminder: Complete face to face encounter)    Planned Readmission: no      Discharge Statement:  I spent 39 minutes discharging the patient. This time was spent on the day of discharge. I had direct contact with the patient on the day of discharge. Greater than 50% of the total time was spent examining patient, answering all patient questions, arranging and discussing plan of care with patient as well as directly providing post-discharge instructions.  Additional time then spent on discharge activities.    Discharge Medications:  See after visit summary for reconciled discharge medications provided to patient and/or family.      **Please Note: This note may have been constructed using a voice recognition system**

## 2023-12-25 NOTE — PLAN OF CARE
Problem: PAIN - ADULT  Goal: Verbalizes/displays adequate comfort level or baseline comfort level  Description: Interventions:  - Encourage patient to monitor pain and request assistance  - Assess pain using appropriate pain scale  - Administer analgesics based on type and severity of pain and evaluate response  - Implement non-pharmacological measures as appropriate and evaluate response  - Consider cultural and social influences on pain and pain management  - Notify physician/advanced practitioner if interventions unsuccessful or patient reports new pain  Outcome: Progressing     Problem: INFECTION - ADULT  Goal: Absence or prevention of progression during hospitalization  Description: INTERVENTIONS:  - Assess and monitor for signs and symptoms of infection  - Monitor lab/diagnostic results  - Monitor all insertion sites, i.e. indwelling lines, tubes, and drains  - Monitor endotracheal if appropriate and nasal secretions for changes in amount and color  - [place] appropriate cooling/warming therapies per order  - Administer medications as ordered  - Instruct and encourage patient and family to use good hand hygiene technique  - Identify and instruct in appropriate isolation precautions for identified infection/condition  Outcome: Progressing  Goal: Absence of fever/infection during neutropenic period  Description: INTERVENTIONS:  - Monitor WBC    Outcome: Progressing     Problem: SAFETY ADULT  Goal: Patient will remain free of falls  Description: INTERVENTIONS:  - Educate patient/family on patient safety including physical limitations  - Instruct patient to call for assistance with activity   - Consult OT/PT to assist with strengthening/mobility   - Keep Call bell within reach  - Keep bed low and locked with side rails adjusted as appropriate  - Keep care items and personal belongings within reach  - Initiate and maintain comfort rounds  - Make Fall Risk Sign visible to staff  - Offer Toileting every 2 Hours,  in advance of need  - Initiate/Maintain bed alarm  - Apply yellow socks and bracelet for high fall risk patients  - Consider moving patient to room near nurses station  Outcome: Progressing  Goal: Maintain or return to baseline ADL function  Description: INTERVENTIONS:  -  Assess patient's ability to carry out ADLs; assess patient's baseline for ADL function and identify physical deficits which impact ability to perform ADLs (bathing, care of mouth/teeth, toileting, grooming, dressing, etc.)  - Assess/evaluate cause of self-care deficits   - Assess range of motion  - Assess patient's mobility; develop plan if impaired  - Assess patient's need for assistive devices and provide as appropriate  - Encourage maximum independence but intervene and supervise when necessary  - Involve family in performance of ADLs  - Assess for home care needs following discharge   - Consider OT consult to assist with ADL evaluation and planning for discharge  - Provide patient education as appropriate  Outcome: Progressing  Goal: Maintains/Returns to pre admission functional level  Description: INTERVENTIONS:  - Perform AM-PAC 6 Click Basic Mobility/ Daily Activity assessment daily.  - Set and communicate daily mobility goal to care team and patient/family/caregiver.   - Collaborate with rehabilitation services on mobility goals if consulted  - Out of bed for toileting  - Record patient progress and toleration of activity level   Outcome: Progressing     Problem: DISCHARGE PLANNING  Goal: Discharge to home or other facility with appropriate resources  Description: INTERVENTIONS:  - Identify barriers to discharge w/patient and caregiver  - Arrange for needed discharge resources and transportation as appropriate  - Identify discharge learning needs (meds, wound care, etc.)  - Refer to Case Management Department for coordinating discharge planning if the patient needs post-hospital services based on physician/advanced practitioner order or  complex needs related to functional status, cognitive ability, or social support system  Outcome: Progressing     Problem: Knowledge Deficit  Goal: Patient/family/caregiver demonstrates understanding of disease process, treatment plan, medications, and discharge instructions  Description: Complete learning assessment and assess knowledge base.  Interventions:  - Provide teaching at level of understanding  - Provide teaching via preferred learning methods  Outcome: Progressing     Problem: Prexisting or High Potential for Compromised Skin Integrity  Goal: Skin integrity is maintained or improved  Description: INTERVENTIONS:  - Identify patients at risk for skin breakdown  - Assess and monitor skin integrity  - Assess and monitor nutrition and hydration status  - Monitor labs   - Assess for incontinence   - Turn and reposition patient  - Assist with mobility/ambulation  - Relieve pressure over bony prominences  - Avoid friction and shearing  - Provide appropriate hygiene as needed including keeping skin clean and dry  - Evaluate need for skin moisturizer/barrier cream  - Collaborate with interdisciplinary team   - Patient/family teaching  - Consider wound care consult   Outcome: Progressing     Problem: SKIN/TISSUE INTEGRITY - ADULT  Goal: Wounds healing without S/S of infection  Description: INTERVENTIONS  - Assess and document dressing, incision, and surrounding tissue  - Provide patient and family education  - Perform skin care/dressing changes per order  Outcome: Progressing

## 2023-12-26 ENCOUNTER — HOME HEALTH ADMISSION (OUTPATIENT)
Dept: HOME HEALTH SERVICES | Facility: HOME HEALTHCARE | Age: 78
End: 2023-12-26
Payer: COMMERCIAL

## 2023-12-26 NOTE — CASE MANAGEMENT
Case Management Progress Note    Patient name Marsha Oliva  Location /-01 MRN [id number]  : 1945 Date 2023       LOS (days): 2  Geometric Mean LOS (GMLOS) (days):   Days to GMLOS:        OBJECTIVE:        Current admission status: Inpatient  [facility]erred Pharmacy:   [id number] [place] [facility] [address] [address]  Phone: [phone] Fax: [phone]    [facility] - [place] - [address]  [address] [facility] [address]  Phone: [phone] Fax: [phone]    Primary Care Provider: Dianne Salmon MD    Primary Insurance: [facility] RE[facility]  Secondary Insurance:     PROGRESS NOTE:  Cm was made aware the pt was d/c yesterday and she needed hhc- referral was sent to [facility] as requested- pt was accepted - cm called Marsha at 3:06 pm and cm made her aware she would receive a call from the home care with the time the nurse will arrive.  Pt was given the phone number of [facility].

## 2023-12-26 NOTE — PROGRESS NOTES
Patient:    MRN:  [id number]    Aidin Request ID:  [id number]    Level of care reserved:  Home Health Agency    Partner Reserved:  [facility], [place] [address] [phone]    Clinical needs requested:    Geography searched:  [address]    Start of Service:    Request sent:  2:19pm EST on 12/26/2023 by Marsha Bennett    Partner reserved:  3:04pm EST on 12/26/2023 by Marsha Bennett    Choice list shared:

## 2023-12-26 NOTE — PROGRESS NOTES
Patient:    MRN:  [id number]    Aidin Request ID:  [id number]    Level of care reserved:  Home Health Agency    Partner Reserved:  [facility], [place] [address] [phone]    Clinical needs requested:    Geography searched:  [address]    Start of Service:    Request sent:  2:19pm EST on 12/26/2023 by Marsha Bennett    Partner reserved:  3:04pm EST on 12/26/2023 by Marsha Bennett    Choice list shared:

## 2023-12-26 NOTE — UTILIZATION REVIEW
NOTIFICATION OF ADMISSION DISCHARGE   This is a Notification of Discharge from [facility]. Please be advised that this patient has been discharge from our facility. Below you will find the admission and discharge date and time including the patient’s disposition.   UTILIZATION REVIEW CONTACT:  Suzan Cohen  Utilization   Network Utilization Review Department  Phone: [phone] x carefully listen to the prompts. All voicemails are confidential.  Email: [email or web address]     ADMISSION INFORMATION  PRESENTATION DATE: 12/23/2023  8:24 PM  OBERVATION ADMISSION DATE:   INPATIENT ADMISSION DATE: 12/23/23  9:45 PM   DISCHARGE DATE: 12/25/2023 12:27 PM   DISPOSITION:Home with Home Health Care    Network Utilization Review Department  ATTENTION: Please call with any questions or concerns to [phone] and carefully listen to the prompts so that you are directed to the right person. All voicemails are confidential.   For Discharge needs, contact Care Management DC Support Team at [phone] opt. 2  Send all requests for admission clinical reviews, approved or denied determinations and any other requests to dedicated fax number below belonging to the campus where the patient is receiving treatment. List of dedicated fax numbers for the Facilities:  FACILITY NAME UR FAX NUMBER   ADMISSION DENIALS (Administrative/Medical Necessity) [phone]   DISCHARGE SUPPORT TEAM ([facility]) [phone]   PARENT CHILD HEALTH (Maternity/NICU/Pediatrics) [phone]   [facility] [phone]   [facility] [phone]   [facility] [phone]   [facility] [phone]   Count includes the [facility] [phone]   [facility] [phone]   [facility] [phone]   [facility]  [facility] [phone]   [facility] [phone]   [facility] [phone]   [facility] [phone]

## 2023-12-27 ENCOUNTER — TELEPHONE (OUTPATIENT)
Dept: SURGERY | Facility: CLINIC | Age: 78
End: 2023-12-27

## 2023-12-27 DIAGNOSIS — A49.02 INFECTION OF WOUND DUE TO METHICILLIN RESISTANT STAPHYLOCOCCUS AUREUS (MRSA): Primary | ICD-10-CM

## 2023-12-27 LAB
BACTERIA WND AEROBE CULT: ABNORMAL
GRAM STN SPEC: ABNORMAL
GRAM STN SPEC: ABNORMAL

## 2023-12-27 RX ORDER — SULFAMETHOXAZOLE AND TRIMETHOPRIM 800; 160 MG/1; MG/1
1 TABLET ORAL EVERY 12 HOURS SCHEDULED
Qty: 14 TABLET | Refills: 0 | Status: SHIPPED | OUTPATIENT
Start: 2023-12-27 | End: 2024-01-03

## 2023-12-27 RX ORDER — CHLORHEXIDINE GLUCONATE 4 G/100ML
1 SOLUTION TOPICAL DAILY PRN
Qty: 236 ML | Refills: 1 | Status: SHIPPED | OUTPATIENT
Start: 2023-12-27

## 2023-12-27 RX ORDER — DOXYCYCLINE 100 MG/1
100 CAPSULE ORAL 2 TIMES DAILY
Qty: 20 CAPSULE | Refills: 0 | Status: SHIPPED | OUTPATIENT
Start: 2023-12-27 | End: 2024-01-06

## 2023-12-27 NOTE — TELEPHONE ENCOUNTER
Patient's  calling to review culture data showing MRSA. I reviewed the culture over the phone. Advised switching to different oral antibiotic and contact precautions for anyone providing wound care. Will send Rx to pharmacy for Bactrim DS PO and chlorhexidine solution for hand washing. Has scheduled follow-up Friday.

## 2023-12-27 NOTE — QUICK NOTE
Wound culture shows MRSA. She was discharged on keflex. Spoke with  and the patient on the phone.  reports that he had called Dr. Bravo earlier in the day. I spoke with surgery team and suggest doxycycline 100 mg BID x 10 days given her GFR of 64. Script send to her pharmacy. Advised to stop taking Keflex.

## 2023-12-28 RX ORDER — CEPHALEXIN 500 MG/1
CAPSULE ORAL
COMMUNITY
Start: 2023-12-25 | End: 2024-01-03 | Stop reason: ALTCHOICE

## 2023-12-28 NOTE — PROGRESS NOTES
Progress Note - General Surgery   Marsha Oliva 78 y.o. female MRN: [id number]  Encounter: [id number]    Assessment/Plan    Cellulitis of right lower limb  Presents for follow-up wound check after hospitalization for right lower extremity wound and cellulitis following injury to the leg.  On examination there is mild persistent erythema and edema to the right lower extremity surrounding the wound.  There is a large circular area consistent with superficial skin loss and a 2 cm full-thickness defect affecting the upper outer portion of this area.  Probing this area reveals no deeper tunneling or sinus.  Overall appears to be healing well with mild residual cellulitis, no abscess or necrotic tissue requiring debridement.  Advise she continue oral antibiotics, these have been switched to doxycycline per culture results, and notify our office if the cellulitis does not resolve at the completion of this therapy.  Contact precautions reviewed with the  over the phone previously.  Wound care with daily dressing changes, soap scrub with chlorhexidine or Dial soap, topical antibiotic ointment to the wound, nonadherent dressing such as Xeroform or Telfa, cover with ABD and secure with Jaye wrap or stockinet. Has VNA services in contact, but not yet come to the house. Will reach out to facilitate this so that wound care supplies can be ordered for them, some supplies provided today. If able to manage at home with her  and VNA services will just plan to see back in 2-3 weeks to follow progress, sooner as needed.  Diagnoses and all orders for this visit:    Cellulitis of right lower limb    Other orders  -     cephalexin (KEFLEX) 500 mg capsule; Take 1 capsule (500 mg total) by mouth every 6 (six) hours for 36 doses        Subjective       Chief Complaint   Patient presents with    Follow-up     Dressing change, wound check hosp d/c      Patient is coming in the office for Dressing change, wound check hosp d/c  12/23/23 No fever/chills.LILLIAN Curtis         Review of Systems   All other systems reviewed and are negative.      The following portions of the patient's history were reviewed and updated as appropriate: allergies, current medications, past family history, past medical history, past social history, past surgical history, and problem list.    Objective      Blood pressure 104/62, pulse 70, temperature 97.9 °F (36.6 °C), temperature source Temporal, resp. rate 16, weight 61.7 kg (136 lb), SpO2 90%.   Physical Exam  Vitals and nursing note reviewed.   Constitutional:       General: She is not in acute distress.     Appearance: She is well-developed. She is not diaphoretic.   HENT:      Head: Normocephalic and atraumatic.   Eyes:      Conjunctiva/sclera: Conjunctivae normal.      Pupils: Pupils are equal, round, and reactive to light.   Pulmonary:      Effort: No respiratory distress.   Musculoskeletal:         General: Normal range of motion.      Cervical back: Normal range of motion.   Skin:     General: Skin is warm and dry.      Capillary Refill: Capillary refill takes less than 2 seconds.      Comments: Right lower extremity cellulitis, mild with associated edema surrounding the anterior tibial wound.  Wound approximately 7 cm and circular, appears consistent with superficial skin necrosis, full-thickness defect noted superior and lateral aspect of the circular area without deeper tunneling or penetration.  No signs of purulent discharge or residual abscess/hematoma.  Wound scrubbed with chlorhexidine and then dressed with Neosporin, Xeroform gauze, ABD, Jaye wrap.   Neurological:      Mental Status: She is alert and oriented to person, place, and time.   Psychiatric:         Behavior: Behavior normal.         Signature:  Jeremias Powell PA-C  Date: 12/29/2023 Time: 1:34 PM

## 2023-12-29 ENCOUNTER — OFFICE VISIT (OUTPATIENT)
Dept: SURGERY | Facility: CLINIC | Age: 78
End: 2023-12-29
Payer: MEDICARE

## 2023-12-29 VITALS
SYSTOLIC BLOOD PRESSURE: 104 MMHG | BODY MASS INDEX: 24.09 KG/M2 | DIASTOLIC BLOOD PRESSURE: 62 MMHG | RESPIRATION RATE: 16 BRPM | OXYGEN SATURATION: 90 % | TEMPERATURE: 97.9 F | WEIGHT: 136 LBS | HEART RATE: 70 BPM

## 2023-12-29 DIAGNOSIS — L03.115 CELLULITIS OF RIGHT LOWER LIMB: Primary | ICD-10-CM

## 2023-12-29 LAB
BACTERIA BLD CULT: NORMAL
BACTERIA BLD CULT: NORMAL

## 2023-12-29 PROCEDURE — 99243 OFF/OP CNSLTJ NEW/EST LOW 30: CPT | Performed by: PHYSICIAN ASSISTANT

## 2023-12-29 NOTE — ASSESSMENT & PLAN NOTE
Presents for follow-up wound check after hospitalization for right lower extremity wound and cellulitis following injury to the leg.  On examination there is mild persistent erythema and edema to the right lower extremity surrounding the wound.  There is a large circular area consistent with superficial skin loss and a 2 cm full-thickness defect affecting the upper outer portion of this area.  Probing this area reveals no deeper tunneling or sinus.  Overall appears to be healing well with mild residual cellulitis, no abscess or necrotic tissue requiring debridement.  Advise she continue oral antibiotics, these have been switched to doxycycline per culture results, and notify our office if the cellulitis does not resolve at the completion of this therapy.  Contact precautions reviewed with the  over the phone previously.  Wound care with daily dressing changes, soap scrub with chlorhexidine or Dial soap, topical antibiotic ointment to the wound, nonadherent dressing such as Xeroform or Telfa, cover with ABD and secure with Jaye wrap or stockinet. Has VNA services in contact, but not yet come to the house. Will reach out to facilitate this so that wound care supplies can be ordered for them, some supplies provided today. If able to manage at home with her  and VNA services will just plan to see back in 2-3 weeks to follow progress, sooner as needed.

## 2023-12-30 ENCOUNTER — HOME CARE VISIT (OUTPATIENT)
Dept: HOME HEALTH SERVICES | Facility: HOME HEALTHCARE | Age: 78
End: 2023-12-30

## 2023-12-30 NOTE — CASE COMMUNICATION
SN called pt to schedule SOC visit time for 12.31.23 and pt refused visit tomorrow. She would like her SOC visit next week. I informed pt it would probably be mid week.

## 2023-12-31 ENCOUNTER — HOME CARE VISIT (OUTPATIENT)
Dept: HOME HEALTH SERVICES | Facility: HOME HEALTHCARE | Age: 78
End: 2023-12-31

## 2024-01-03 ENCOUNTER — HOME CARE VISIT (OUTPATIENT)
Dept: HOME HEALTH SERVICES | Facility: HOME HEALTHCARE | Age: 79
End: 2024-01-03
Payer: COMMERCIAL

## 2024-01-03 PROCEDURE — G0299 HHS/HOSPICE OF RN EA 15 MIN: HCPCS

## 2024-01-03 PROCEDURE — 400013 VN SOC

## 2024-01-04 VITALS
RESPIRATION RATE: 20 BRPM | TEMPERATURE: 97.3 F | HEART RATE: 75 BPM | SYSTOLIC BLOOD PRESSURE: 118 MMHG | OXYGEN SATURATION: 91 % | DIASTOLIC BLOOD PRESSURE: 68 MMHG

## 2024-01-05 NOTE — CASE COMMUNICATION
[facility] has admitted your patient to Home Health service with the following disciplines:      SN  This report is informational only, no response is needed  Primary focus of home health care Integumentary. wound care  Patient stated goals of care  Wound to heal  Anticipated visit pattern and next visit date 1 week 1, then 2x weekly.  Next sn visit. Monday 1/8/24  See medication list -All medications reviewed and reconciled  Signif icant clinical findings Pt forgetful. Pt has productive moist cough. Crackles to the right lung base. SOB with moderate exertion. Pt has oxygen 2 liters that she uses as needed only.   Potential barriers to goal achievement  forgetful, weakness, SOB with moderate exertions  Other pertinent information  NA    Thank you for allowing us to participate in the care of your patient.      Lynsey Birmingham RN

## 2024-01-08 ENCOUNTER — HOME CARE VISIT (OUTPATIENT)
Dept: HOME HEALTH SERVICES | Facility: HOME HEALTHCARE | Age: 79
End: 2024-01-08
Payer: COMMERCIAL

## 2024-01-08 VITALS
RESPIRATION RATE: 20 BRPM | OXYGEN SATURATION: 96 % | TEMPERATURE: 95.3 F | DIASTOLIC BLOOD PRESSURE: 70 MMHG | HEART RATE: 77 BPM | SYSTOLIC BLOOD PRESSURE: 130 MMHG

## 2024-01-08 PROCEDURE — G0180 MD CERTIFICATION HHA PATIENT: HCPCS | Performed by: INTERNAL MEDICINE

## 2024-01-08 PROCEDURE — 10330064 BANDAGE, CNFRM 4"X4.1YDS N/S LF (12RL/BG

## 2024-01-08 PROCEDURE — 10330064 SPONGE, GAUZE 8PLY N/S 4"X4" (200/PK 20P

## 2024-01-08 PROCEDURE — 10330064 TAPE, ADHSV TRANSPORE WHT 2" (6RL/BX 10B

## 2024-01-08 PROCEDURE — G0299 HHS/HOSPICE OF RN EA 15 MIN: HCPCS

## 2024-01-08 PROCEDURE — 10330064 DRESSING, XEROFORM STR 5"X9" (50/BX)

## 2024-01-09 NOTE — CASE COMMUNICATION
Ship to  Pt. Home    Ordering MD Dr Rebecca Saini    Wound 1  RLE     Full  X            Gauze 4x4 N/S 200 4x4s per unit  [id number]    1     Gauze Jaye stretch roll 4inch n/s 12 rolls per unit  [id number]     2      Transpore white  2in [id number]       2  Gauze Xeroform 5x9 [id number]       8

## 2024-01-11 ENCOUNTER — HOME CARE VISIT (OUTPATIENT)
Dept: HOME HEALTH SERVICES | Facility: HOME HEALTHCARE | Age: 79
End: 2024-01-11
Payer: COMMERCIAL

## 2024-01-11 VITALS
RESPIRATION RATE: 20 BRPM | OXYGEN SATURATION: 95 % | SYSTOLIC BLOOD PRESSURE: 126 MMHG | TEMPERATURE: 97.2 F | HEART RATE: 94 BPM | DIASTOLIC BLOOD PRESSURE: 72 MMHG

## 2024-01-11 PROCEDURE — G0299 HHS/HOSPICE OF RN EA 15 MIN: HCPCS

## 2024-01-15 ENCOUNTER — HOME CARE VISIT (OUTPATIENT)
Dept: HOME HEALTH SERVICES | Facility: HOME HEALTHCARE | Age: 79
End: 2024-01-15
Payer: COMMERCIAL

## 2024-01-15 VITALS
HEART RATE: 91 BPM | SYSTOLIC BLOOD PRESSURE: 126 MMHG | TEMPERATURE: 95.7 F | OXYGEN SATURATION: 93 % | RESPIRATION RATE: 20 BRPM | DIASTOLIC BLOOD PRESSURE: 78 MMHG

## 2024-01-15 PROCEDURE — G0299 HHS/HOSPICE OF RN EA 15 MIN: HCPCS

## 2024-01-16 RX ORDER — ATORVASTATIN CALCIUM 20 MG/1
TABLET, FILM COATED ORAL
COMMUNITY
Start: 2023-12-05

## 2024-01-16 NOTE — PROGRESS NOTES
"Progress Note - General Surgery   Marsha Oliva 79 y.o. female MRN: [id number]  Encounter: [id number]    Assessment/Plan    Wound of right leg  Continues to show progress with wound healing.  On examination today the right lower extremity has mild edema, however no active cellulitis.  The majority of the wound is beginning to epithelialize and there is only a small residual deeper component.  The wound itself is clean and responding well to wound care efforts by her  and visiting nurses.  Advised she continue current wound care plan for daily dressing changes and follow-up in additional 3 weeks to monitor progress.  Questions answered, agreeable to the plan.  Diagnoses and all orders for this visit:    Wound of right lower extremity, subsequent encounter    Other orders  -     atorvastatin (LIPITOR) 20 mg tablet        Subjective       Chief Complaint   Patient presents with    Follow-up     wound check      Patient is here for a wound check on right lower leg.  states the wound has improved a lot since her injury a week before sofiya. Patient and her  denies pain, foul smell, drainage or fevers/chills. Last dressing change was yesterday by the . Nurses come Mon and Thursdays. NORMAN Lal         Review of Systems   All other systems reviewed and are negative.      The following portions of the patient's history were reviewed and updated as appropriate: allergies, current medications, past family history, past medical history, past social history, past surgical history, and problem list.    Objective      Blood pressure 122/64, pulse 67, temperature (!) 97.1 °F (36.2 °C), temperature source Temporal, resp. rate 18, height 5' 3\" (1.6 m), weight 57.6 kg (127 lb), SpO2 94%.   Physical Exam  Vitals and nursing note reviewed.   Constitutional:       General: She is not in acute distress.     Appearance: She is well-developed. She is not diaphoretic.   HENT:      Head: Normocephalic and " atraumatic.   Eyes:      Conjunctiva/sclera: Conjunctivae normal.      Pupils: Pupils are equal, round, and reactive to light.   Pulmonary:      Effort: No respiratory distress.   Musculoskeletal:         General: Normal range of motion.      Cervical back: Normal range of motion.   Skin:     General: Skin is warm and dry.      Capillary Refill: Capillary refill takes less than 2 seconds.      Comments: Wound to the right lower extremity is clean and dry.  No active purulence or cellulitis.  Epithelialization forming circumferentially.  Only 1 small area with slough covering deeper area in the upper outer portion.  This was gently debrided and cleaned and then new dressing applied.   Neurological:      Mental Status: She is alert and oriented to person, place, and time.   Psychiatric:         Behavior: Behavior normal.         Signature:  Jeremias Powell PA-C  Date: 1/20/2024 Time: 10:25 AM

## 2024-01-17 ENCOUNTER — OFFICE VISIT (OUTPATIENT)
Dept: SURGERY | Facility: CLINIC | Age: 79
End: 2024-01-17
Payer: COMMERCIAL

## 2024-01-17 VITALS
TEMPERATURE: 97.1 F | HEIGHT: 63 IN | BODY MASS INDEX: 22.5 KG/M2 | WEIGHT: 127 LBS | RESPIRATION RATE: 18 BRPM | SYSTOLIC BLOOD PRESSURE: 122 MMHG | OXYGEN SATURATION: 94 % | DIASTOLIC BLOOD PRESSURE: 64 MMHG | HEART RATE: 67 BPM

## 2024-01-17 DIAGNOSIS — S81.801D WOUND OF RIGHT LOWER EXTREMITY, SUBSEQUENT ENCOUNTER: Primary | ICD-10-CM

## 2024-01-17 PROCEDURE — 99213 OFFICE O/P EST LOW 20 MIN: CPT | Performed by: PHYSICIAN ASSISTANT

## 2024-01-18 ENCOUNTER — HOME CARE VISIT (OUTPATIENT)
Dept: HOME HEALTH SERVICES | Facility: HOME HEALTHCARE | Age: 79
End: 2024-01-18
Payer: COMMERCIAL

## 2024-01-18 VITALS
DIASTOLIC BLOOD PRESSURE: 80 MMHG | SYSTOLIC BLOOD PRESSURE: 124 MMHG | OXYGEN SATURATION: 94 % | HEART RATE: 76 BPM | RESPIRATION RATE: 18 BRPM | TEMPERATURE: 97.1 F

## 2024-01-18 PROCEDURE — G0300 HHS/HOSPICE OF LPN EA 15 MIN: HCPCS

## 2024-01-20 PROBLEM — S81.801A WOUND OF RIGHT LEG: Status: ACTIVE | Noted: 2018-05-13

## 2024-01-20 NOTE — ASSESSMENT & PLAN NOTE
Continues to show progress with wound healing.  On examination today the right lower extremity has mild edema, however no active cellulitis.  The majority of the wound is beginning to epithelialize and there is only a small residual deeper component.  The wound itself is clean and responding well to wound care efforts by her  and visiting nurses.  Advised she continue current wound care plan for daily dressing changes and follow-up in additional 3 weeks to monitor progress.  Questions answered, agreeable to the plan.

## 2024-01-22 ENCOUNTER — HOME CARE VISIT (OUTPATIENT)
Dept: HOME HEALTH SERVICES | Facility: HOME HEALTHCARE | Age: 79
End: 2024-01-22
Payer: COMMERCIAL

## 2024-01-22 PROCEDURE — G0299 HHS/HOSPICE OF RN EA 15 MIN: HCPCS

## 2024-01-23 VITALS
RESPIRATION RATE: 20 BRPM | TEMPERATURE: 95.9 F | DIASTOLIC BLOOD PRESSURE: 70 MMHG | HEART RATE: 80 BPM | OXYGEN SATURATION: 93 % | SYSTOLIC BLOOD PRESSURE: 118 MMHG

## 2024-01-25 ENCOUNTER — HOME CARE VISIT (OUTPATIENT)
Dept: HOME HEALTH SERVICES | Facility: HOME HEALTHCARE | Age: 79
End: 2024-01-25
Payer: COMMERCIAL

## 2024-01-25 PROCEDURE — G0299 HHS/HOSPICE OF RN EA 15 MIN: HCPCS

## 2024-01-26 VITALS
SYSTOLIC BLOOD PRESSURE: 101 MMHG | OXYGEN SATURATION: 93 % | TEMPERATURE: 43 F | RESPIRATION RATE: 20 BRPM | DIASTOLIC BLOOD PRESSURE: 60 MMHG | HEART RATE: 68 BPM

## 2024-01-31 NOTE — NURSING NOTE
Patient discharged to home  Patient VSS  IV removed with catheter tip intact, DSD and pressure applied  Patient verbalized understanding of discharge instructions  All belongings returned to patient upon discharge  Quality 130: Documentation Of Current Medications In The Medical Record: Current Medications Documented Quality 111:Pneumonia Vaccination Status For Older Adults: Patient received any pneumococcal conjugate or polysaccharide vaccine on or after their 60th birthday and before the end of the measurement period Quality 226: Preventive Care And Screening: Tobacco Use: Screening And Cessation Intervention: Patient screened for tobacco use and is an ex/non-smoker Detail Level: Detailed

## 2024-02-01 ENCOUNTER — HOME CARE VISIT (OUTPATIENT)
Dept: HOME HEALTH SERVICES | Facility: HOME HEALTHCARE | Age: 79
End: 2024-02-01
Payer: COMMERCIAL

## 2024-02-01 PROCEDURE — G0299 HHS/HOSPICE OF RN EA 15 MIN: HCPCS

## 2024-02-04 VITALS
HEART RATE: 83 BPM | TEMPERATURE: 96.6 F | RESPIRATION RATE: 20 BRPM | OXYGEN SATURATION: 92 % | SYSTOLIC BLOOD PRESSURE: 138 MMHG | DIASTOLIC BLOOD PRESSURE: 72 MMHG

## 2024-02-06 ENCOUNTER — HOME CARE VISIT (OUTPATIENT)
Dept: HOME HEALTH SERVICES | Facility: HOME HEALTHCARE | Age: 79
End: 2024-02-06
Payer: COMMERCIAL

## 2024-02-06 ENCOUNTER — APPOINTMENT (OUTPATIENT)
Dept: LAB | Facility: CLINIC | Age: 79
End: 2024-02-06
Payer: COMMERCIAL

## 2024-02-06 VITALS
DIASTOLIC BLOOD PRESSURE: 80 MMHG | SYSTOLIC BLOOD PRESSURE: 128 MMHG | OXYGEN SATURATION: 93 % | TEMPERATURE: 95.9 F | HEART RATE: 89 BPM | RESPIRATION RATE: 20 BRPM

## 2024-02-06 PROCEDURE — G0299 HHS/HOSPICE OF RN EA 15 MIN: HCPCS

## 2024-02-21 ENCOUNTER — OFFICE VISIT (OUTPATIENT)
Dept: PODIATRY | Facility: CLINIC | Age: 79
End: 2024-02-21
Payer: COMMERCIAL

## 2024-02-21 VITALS
SYSTOLIC BLOOD PRESSURE: 116 MMHG | HEART RATE: 86 BPM | DIASTOLIC BLOOD PRESSURE: 69 MMHG | HEIGHT: 63 IN | WEIGHT: 127 LBS | BODY MASS INDEX: 22.5 KG/M2

## 2024-02-21 DIAGNOSIS — M20.42 HAMMER TOES OF BOTH FEET: Primary | ICD-10-CM

## 2024-02-21 DIAGNOSIS — I87.2 VENOUS INSUFFICIENCY OF BOTH LOWER EXTREMITIES: ICD-10-CM

## 2024-02-21 DIAGNOSIS — B35.1 ONYCHOMYCOSIS: ICD-10-CM

## 2024-02-21 DIAGNOSIS — L84 FOOT CALLUS: ICD-10-CM

## 2024-02-21 DIAGNOSIS — M20.41 HAMMER TOES OF BOTH FEET: Primary | ICD-10-CM

## 2024-02-21 PROCEDURE — 11721 DEBRIDE NAIL 6 OR MORE: CPT | Performed by: STUDENT IN AN ORGANIZED HEALTH CARE EDUCATION/TRAINING PROGRAM

## 2024-02-21 PROCEDURE — 99203 OFFICE O/P NEW LOW 30 MIN: CPT | Performed by: STUDENT IN AN ORGANIZED HEALTH CARE EDUCATION/TRAINING PROGRAM

## 2024-02-21 PROCEDURE — 11056 PARNG/CUTG B9 HYPRKR LES 2-4: CPT | Performed by: STUDENT IN AN ORGANIZED HEALTH CARE EDUCATION/TRAINING PROGRAM

## 2024-02-21 NOTE — PROGRESS NOTES
Assessment/Plan:    No problem-specific Assessment & Plan notes found for this encounter.       Diagnoses and all orders for this visit:    Hammer toes of both feet    Onychomycosis    Foot callus  -     Lesion Destruction    Venous insufficiency of both lower extremities  -     Lesion Destruction      Plan:     1. A thorough neurovascular exam was performed. Patient presents for at-risk foot care.  Patient has no acute concerns today.  Patient has significant lower extremity risk due to neuropathy, parasthesia, edema, and trophic skin changes to the lower extremity. Patient has Q9  findings and is recommended for at risk foot care every 3 months.    2. All 10 toenails were debridement as follow: using nail nipper, wild, and curette, nails were sharply debrided, reduced in thickness and length. Devitalized nail tissue and fungal debris excised and removed. Patient tolerated well.      3. Hyperkeratotic lesions were sharply trimmed to normal epithelium with a 15 blade without incident. Patient was instructed to use OTC lotion or prescription to their feet.     4. Patient was educated on importance of daily foot assessment and proper shoe gear.  We discussed etiologies of callus formation secondary to hammertoe deformity as well as metatarsalgia.  At this time I recommended patient to continue wearing toe spacers in between the toe supportive shoe gear with wider toebox.  I do not recommend surgical treatments at this time.  Recommended to wear over-the-counter compression stockings 20 to 30 mmHg pressure for edema control leg elevation.    4. Call if any questions or occurrence of any foot and ankle related complications.  Recent blood work reviewed.    5. Return in 10-12 weeks.     Lab Results   Component Value Date    HGBA1C 5.1 09/12/2022             Subjective:      Patient ID: Marsha Oliva is a 79 y.o. female.    HPI:  Marsha Oliva is a 79 y.o. female who presents with painful, elongated toenails and  calluses. They have difficulty applying their socks and shoes due to the elongation of the nails. The pressure within their shoe gear is painful and they have been unable to cut their nails adequately. Patient states pain is 1/10 in shoe gear. Pain with pressure. Requires at risk foot care.  Accompanied by  who used to trim her toenails however due to arthritis in his hands he is unable to do them as patient's toenails are thickened and long.  Patient denies any other complaints at this visit.          The following portions of the patient's history were reviewed and updated as appropriate: She  has a past medical history of Disease of thyroid gland, Hypotension, and Supratherapeutic INR (6/2/2022).  She   Patient Active Problem List    Diagnosis Date Noted    Chronic respiratory failure with hypoxia ([facility]) 03/09/2023    Stage 3a chronic kidney disease ([facility]) 08/12/2022    Bilateral hydronephrosis 08/12/2022    Proctitis 08/12/2022    Fall 08/12/2022    Hypokalemia 08/12/2022    ILD (interstitial lung disease) ([facility]) 07/18/2022    Oropharyngeal aspiration 07/18/2022    Elevated troponin 06/02/2022    EKG abnormalities 06/02/2022    Mild cognitive impairment of uncertain or unknown etiology 01/20/2022    Hormone replacement therapy 01/05/2022    Apraxia 12/28/2021    Constipation, unspecified 12/28/2021    Corn or callus 12/28/2021    Difficulty in walking, not elsewhere classified 12/28/2021    Generalized muscle weakness 12/28/2021    Lymphedema, not elsewhere classified 12/28/2021    Repeated falls 12/28/2021    Hypoxia 12/23/2021    Frequent falls 12/23/2021    Elevated d-dimer 12/23/2021    Venous insufficiency of both lower extremities 12/17/2021    Acquired bilateral hammer toes 07/27/2018    Lymphedema of both lower extremities 07/02/2018    Nonrheumatic mitral valve regurgitation 05/31/2018    Anemia 05/13/2018    Cardiomegaly 05/13/2018    Wound of right leg 05/13/2018    Hypertensive heart disease with  heart failure ([facility]) 05/13/2018    Major depressive disorder, single episode, unspecified 05/13/2018    Personal history of nicotine dependence 05/13/2018    Pleural effusion, not elsewhere classified 05/13/2018    Solitary pulmonary nodule 05/13/2018    Atrial fibrillation, chronic ([facility]) 05/01/2018    B12 deficiency 01/13/2017    Chronic diastolic CHF (congestive heart failure) ([facility]) 01/13/2017    Folate deficiency 01/13/2017    Vitamin D deficiency 06/03/2016    Depression 08/03/2015    Mixed hyperlipidemia 08/03/2015    Osteoarthritis of knee 10/23/2012    Impaired fasting glucose 05/31/2012    Essential hypertension 03/23/2010    Hypothyroidism 03/23/2010    Posttraumatic stress disorder 03/23/2010     She  has a past surgical history that includes Hysterectomy.  Current Outpatient Medications   Medication Sig Dispense Refill    Aspirin 81 MG CAPS Take 81 mg by mouth in the morning. Indications: Disease involving Lipid Deposits in the Arteries      atorvastatin (LIPITOR) 20 mg tablet       atorvastatin (LIPITOR) 40 mg tablet Take 1 tablet (40 mg total) by mouth daily with dinner (Patient taking differently: Take 40 mg by mouth daily with dinner Pt taking 20mg daily) 30 tablet 0    chlorhexidine (HIBICLENS) 4 % external liquid Apply 1 Application topically daily as needed for wound care For hand washing with wound care for MRSA positive wound. 236 mL 1    cyanocobalamin (VITAMIN B-12) 1000 MCG tablet Take 1,000 mcg by mouth once a week. Indications: Inadequate Vitamin B12      cyanocobalamin 1,000 mcg/mL 1 mL jPt is not taking      digoxin (LANOXIN) 0.125 mg tablet Take 1 tablet (125 mcg total) by mouth every other day 30 tablet 0    diltiazem (CARDIZEM CD) 180 mg 24 hr capsule Take 1 capsule (180 mg total) by mouth daily 30 capsule 2    folic acid (FOLVITE) 1 mg tablet Take 1 tablet by mouth daily      levothyroxine 137 mcg tablet Take 137 mcg by mouth daily      Magnesium Gluconate 550 MG TABS Take 30 mg by  "mouth 2 (two) times a day Pt taking 250mg daily      oxygen gas Inhale 2 L/min if needed (shortness of breath). Indications: Shortness of breath      potassium chloride (K-DUR,KLOR-CON) 20 mEq tablet Take 2 tablets by mouth daily Pt is not  taking      sertraline (ZOLOFT) 100 mg tablet Take 1 tablet by mouth daily      torsemide (DEMADEX) 20 mg tablet Take 1 tablet (20 mg total) by mouth daily as needed (weight gain / leg swelling) 30 tablet 0     No current facility-administered medications for this visit.   .    Review of Systems   All other systems reviewed and are negative.        Objective:      /69   Pulse 86   Ht 5' 3\" (1.6 m)   Wt 57.6 kg (127 lb)   BMI 22.50 kg/m²          Physical Exam  Vitals reviewed.   Cardiovascular:      Pulses:           Dorsalis pedis pulses are 0 on the right side and 0 on the left side.        Posterior tibial pulses are 0 on the right side and 0 on the left side.   Musculoskeletal:      Right lower le+ Pitting Edema present.      Left lower le+ Pitting Edema present.      Right foot: Deformity and bunion present.      Left foot: Deformity and bunion present.   Feet:      Right foot:      Protective Sensation: 10 sites tested.  8 sites sensed.      Skin integrity: Callus and dry skin present.      Toenail Condition: Right toenails are abnormally thick and long. Fungal disease present.     Left foot:      Protective Sensation: 10 sites tested.  8 sites sensed.      Skin integrity: Callus and dry skin present.      Toenail Condition: Left toenails are abnormally thick and long. Fungal disease present.     Comments: On exam patient has thickened, hypertrophic, discolored, brittle toenails with subungual debris and tenderness x10  Callus: Medial first metatarsal phalangeal joint as well as second toe.  Patient has lower extremity edema. Patients skin is atrophic, thickened nails, and decreased pedal hair. Patient has decreased pinprick and vibratory sensation to his " "feet and parasthesia.         Lesion Destruction    Date/Time: 2/21/2024 1:30 PM    Performed by: Lucy Mohr DPM  Authorized by: Lucy Mohr DPM  Universal Protocol:  Consent: Verbal consent obtained.  Risks and benefits: risks, benefits and alternatives were discussed  Consent given by: patient  Time out: Immediately prior to procedure a \"time out\" was called to verify the correct patient, procedure, equipment, support staff and site/side marked as required.  Patient understanding: patient states understanding of the procedure being performed  Patient identity confirmed: verbally with patient    Procedure Details - Lesion Destruction:     Number of Lesions:  3  Lesion 1:     Body area:  Lower extremity    Lower extremity location:  R second toe    Malignancy: benign hyperkeratotic lesion      Destruction method: scissors used for extraction    Lesion 2:     Body area:  Lower extremity    Lower extremity location:  R foot    Malignancy: benign hyperkeratotic lesion      Destruction method: scissors used for extraction    Lesion 3:     Body area:  Lower extremity    Lower extremity location:  L foot    Malignancy: benign hyperkeratotic lesion      Destruction method: scissors used for extraction          "

## 2024-05-22 ENCOUNTER — OFFICE VISIT (OUTPATIENT)
Dept: PODIATRY | Facility: CLINIC | Age: 79
End: 2024-05-22
Payer: COMMERCIAL

## 2024-05-22 VITALS
SYSTOLIC BLOOD PRESSURE: 119 MMHG | HEART RATE: 84 BPM | BODY MASS INDEX: 22.5 KG/M2 | WEIGHT: 127 LBS | HEIGHT: 63 IN | DIASTOLIC BLOOD PRESSURE: 69 MMHG

## 2024-05-22 DIAGNOSIS — L84 FOOT CALLUS: ICD-10-CM

## 2024-05-22 DIAGNOSIS — I87.2 VENOUS INSUFFICIENCY OF BOTH LOWER EXTREMITIES: ICD-10-CM

## 2024-05-22 DIAGNOSIS — B35.1 ONYCHOMYCOSIS: Primary | ICD-10-CM

## 2024-05-22 PROCEDURE — 11056 PARNG/CUTG B9 HYPRKR LES 2-4: CPT | Performed by: STUDENT IN AN ORGANIZED HEALTH CARE EDUCATION/TRAINING PROGRAM

## 2024-05-22 PROCEDURE — 11721 DEBRIDE NAIL 6 OR MORE: CPT | Performed by: STUDENT IN AN ORGANIZED HEALTH CARE EDUCATION/TRAINING PROGRAM

## 2024-05-22 NOTE — PROGRESS NOTES
Ambulatory Visit  Name: Marsha Oliva      : 1945      MRN: [id number]  Encounter Provider: Lucy Mohr DPM  Encounter Date: 2024   Encounter department: [facility] [place]    Assessment & Plan   1. Onychomycosis  2. Foot callus  3. Venous insufficiency of both lower extremities    Plan:      1. A thorough neurovascular exam was performed. Patient presents for at-risk foot care.  Patient has no acute concerns today.  Patient has significant lower extremity risk due to neuropathy, parasthesia, edema, and trophic skin changes to the lower extremity. Patient has Q9  findings and is recommended for at risk foot care every 3 months.     2. All 10 toenails were debridement as follow: using nail nipper, wild, and curette, nails were sharply debrided, reduced in thickness and length. Devitalized nail tissue and fungal debris excised and removed. Patient tolerated well.       3. Hyperkeratotic lesions were sharply trimmed to normal epithelium with a 15 blade without incident. Patient was instructed to use OTC lotion or prescription to their feet.      4. Patient was educated on importance of daily foot assessment and proper shoe gear.  We discussed etiologies of callus formation secondary to hammertoe deformity as well as metatarsalgia.  At this time I recommended patient to continue wearing toe spacers in between the toe supportive shoe gear with wider toebox such as New balance.  Recommended to wear over-the-counter compression stockings 20 to 30 mmHg pressure for edema control leg elevation.     5. Call if any questions or occurrence of any foot and ankle related complications.       6. Return in 10-12 weeks.     History of Present Illness   HPI:  Marsha Oliva is a 79 y.o. female who presents with painful, elongated toenails and callus. They have difficulty applying their socks and shoes due to the elongation of the nails. The pressure within their shoe gear is painful and they have been  unable to cut their nails adequately. Patient states pain is 1/10 in shoe gear. Pain with pressure. Requires at risk foot care.       Review of Systems   All other systems reviewed and are negative.    Medical History Reviewed by provider this encounter:       Past Medical History   Past Medical History:   Diagnosis Date    Disease of thyroid gland     Hypotension     Supratherapeutic INR 6/2/2022     Past Surgical History:   Procedure Laterality Date    HYSTERECTOMY       History reviewed. No pertinent family history.  Current Outpatient Medications on File Prior to Visit   Medication Sig Dispense Refill    Aspirin 81 MG CAPS Take 81 mg by mouth in the morning. Indications: Disease involving Lipid Deposits in the Arteries      atorvastatin (LIPITOR) 20 mg tablet       chlorhexidine (HIBICLENS) 4 % external liquid Apply 1 Application topically daily as needed for wound care For hand washing with wound care for MRSA positive wound. 236 mL 1    cyanocobalamin (VITAMIN B-12) 1000 MCG tablet Take 1,000 mcg by mouth once a week. Indications: Inadequate Vitamin B12      cyanocobalamin 1,000 mcg/mL 1 mL jPt is not taking      digoxin (LANOXIN) 0.125 mg tablet Take 1 tablet (125 mcg total) by mouth every other day 30 tablet 0    diltiazem (CARDIZEM CD) 180 mg 24 hr capsule Take 1 capsule (180 mg total) by mouth daily 30 capsule 2    folic acid (FOLVITE) 1 mg tablet Take 1 tablet by mouth daily      levothyroxine 137 mcg tablet Take 137 mcg by mouth daily      Magnesium Gluconate 550 MG TABS Take 30 mg by mouth 2 (two) times a day Pt taking 250mg daily      oxygen gas Inhale 2 L/min if needed (shortness of breath). Indications: Shortness of breath      potassium chloride (K-DUR,KLOR-CON) 20 mEq tablet Take 2 tablets by mouth daily Pt is not  taking      sertraline (ZOLOFT) 100 mg tablet Take 1 tablet by mouth daily      torsemide (DEMADEX) 20 mg tablet Take 1 tablet (20 mg total) by mouth daily as needed (weight gain / leg  swelling) 30 tablet 0    atorvastatin (LIPITOR) 40 mg tablet Take 1 tablet (40 mg total) by mouth daily with dinner (Patient taking differently: Take 40 mg by mouth daily with dinner Pt taking 20mg daily) 30 tablet 0    [DISCONTINUED] pantoprazole (PROTONIX) 40 mg tablet Take 1 tablet (40 mg total) by mouth 2 (two) times a day (Patient not taking: Reported on 7/18/2022) 60 tablet 0     No current facility-administered medications on file prior to visit.   No Known Allergies   Current Outpatient Medications on File Prior to Visit   Medication Sig Dispense Refill    Aspirin 81 MG CAPS Take 81 mg by mouth in the morning. Indications: Disease involving Lipid Deposits in the Arteries      atorvastatin (LIPITOR) 20 mg tablet       chlorhexidine (HIBICLENS) 4 % external liquid Apply 1 Application topically daily as needed for wound care For hand washing with wound care for MRSA positive wound. 236 mL 1    cyanocobalamin (VITAMIN B-12) 1000 MCG tablet Take 1,000 mcg by mouth once a week. Indications: Inadequate Vitamin B12      cyanocobalamin 1,000 mcg/mL 1 mL jPt is not taking      digoxin (LANOXIN) 0.125 mg tablet Take 1 tablet (125 mcg total) by mouth every other day 30 tablet 0    diltiazem (CARDIZEM CD) 180 mg 24 hr capsule Take 1 capsule (180 mg total) by mouth daily 30 capsule 2    folic acid (FOLVITE) 1 mg tablet Take 1 tablet by mouth daily      levothyroxine 137 mcg tablet Take 137 mcg by mouth daily      Magnesium Gluconate 550 MG TABS Take 30 mg by mouth 2 (two) times a day Pt taking 250mg daily      oxygen gas Inhale 2 L/min if needed (shortness of breath). Indications: Shortness of breath      potassium chloride (K-DUR,KLOR-CON) 20 mEq tablet Take 2 tablets by mouth daily Pt is not  taking      sertraline (ZOLOFT) 100 mg tablet Take 1 tablet by mouth daily      torsemide (DEMADEX) 20 mg tablet Take 1 tablet (20 mg total) by mouth daily as needed (weight gain / leg swelling) 30 tablet 0    atorvastatin  "(LIPITOR) 40 mg tablet Take 1 tablet (40 mg total) by mouth daily with dinner (Patient taking differently: Take 40 mg by mouth daily with dinner Pt taking 20mg daily) 30 tablet 0    [DISCONTINUED] pantoprazole (PROTONIX) 40 mg tablet Take 1 tablet (40 mg total) by mouth 2 (two) times a day (Patient not taking: Reported on 7/18/2022) 60 tablet 0     No current facility-administered medications on file prior to visit.      Objective     /69 (BP Location: Left arm, Patient Position: Sitting, Cuff Size: Large)   Pulse 84   Ht 5' 3\" (1.6 m)   Wt 57.6 kg (127 lb)   BMI 22.50 kg/m²     Physical Exam  Vitals reviewed.   Feet:      Comments: On exam patient has thickened, hypertrophic, discolored, brittle toenails with subungual debris and tenderness x10  Callus: Medial first metatarsal phalangeal joint as well as second toe right  Patient has lower extremity edema. Patients skin is atrophic, thickened nails, and decreased pedal hair. Patient has decreased pinprick and vibratory sensation to his feet and parasthesia.         Lesion Destruction    Date/Time: 5/22/2024 1:15 PM    Performed by: Lucy Mohr DPM  Authorized by: Lucy Mohr DPM  Universal Protocol:  Consent: Verbal consent obtained.  Risks and benefits: risks, benefits and alternatives were discussed  Consent given by: patient  Time out: Immediately prior to procedure a \"time out\" was called to verify the correct patient, procedure, equipment, support staff and site/side marked as required.  Patient understanding: patient states understanding of the procedure being performed  Patient identity confirmed: verbally with patient    Procedure Details - Lesion Destruction:     Number of Lesions:  2  Lesion 1:     Body area:  Lower extremity    Lower extremity location:  R foot    Malignancy: benign hyperkeratotic lesion      Destruction method: scissors used for extraction    Lesion 2:     Body area:  Lower extremity    Lower extremity location:  R second " toe    Malignancy: benign hyperkeratotic lesion      Destruction method: scissors used for extraction

## 2024-09-04 ENCOUNTER — APPOINTMENT (EMERGENCY)
Dept: CT IMAGING | Facility: HOSPITAL | Age: 79
End: 2024-09-04
Payer: COMMERCIAL

## 2024-09-04 ENCOUNTER — HOSPITAL ENCOUNTER (INPATIENT)
Facility: HOSPITAL | Age: 79
LOS: 7 days | DRG: 472 | End: 2024-09-11
Attending: EMERGENCY MEDICINE | Admitting: SURGERY
Payer: COMMERCIAL

## 2024-09-04 ENCOUNTER — HOSPITAL ENCOUNTER (EMERGENCY)
Facility: HOSPITAL | Age: 79
End: 2024-09-04
Attending: EMERGENCY MEDICINE
Payer: COMMERCIAL

## 2024-09-04 ENCOUNTER — APPOINTMENT (EMERGENCY)
Dept: RADIOLOGY | Facility: HOSPITAL | Age: 79
DRG: 472 | End: 2024-09-04
Payer: COMMERCIAL

## 2024-09-04 ENCOUNTER — ANESTHESIA EVENT (INPATIENT)
Dept: PERIOP | Facility: HOSPITAL | Age: 79
DRG: 472 | End: 2024-09-04
Payer: COMMERCIAL

## 2024-09-04 ENCOUNTER — APPOINTMENT (OUTPATIENT)
Dept: RADIOLOGY | Facility: HOSPITAL | Age: 79
DRG: 472 | End: 2024-09-04
Payer: COMMERCIAL

## 2024-09-04 VITALS
WEIGHT: 118 LBS | HEART RATE: 101 BPM | SYSTOLIC BLOOD PRESSURE: 138 MMHG | TEMPERATURE: 97.5 F | RESPIRATION RATE: 22 BRPM | BODY MASS INDEX: 20.9 KG/M2 | OXYGEN SATURATION: 95 % | DIASTOLIC BLOOD PRESSURE: 71 MMHG

## 2024-09-04 DIAGNOSIS — S12.111A CLOSED ODONTOID FRACTURE WITH TYPE II MORPHOLOGY AND POSTERIOR DISPLACEMENT, INITIAL ENCOUNTER (HCC): Primary | ICD-10-CM

## 2024-09-04 DIAGNOSIS — I50.32 CHRONIC HEART FAILURE WITH PRESERVED EJECTION FRACTION (HCC): ICD-10-CM

## 2024-09-04 DIAGNOSIS — G95.20 SPINAL CORD COMPRESSION (HCC): ICD-10-CM

## 2024-09-04 DIAGNOSIS — S12.000A C1 CERVICAL FRACTURE (HCC): ICD-10-CM

## 2024-09-04 LAB
ABO GROUP BLD: NORMAL
ALBUMIN SERPL BCG-MCNC: 4.4 G/DL (ref 3.5–5)
ALP SERPL-CCNC: 71 U/L (ref 34–104)
ALT SERPL W P-5'-P-CCNC: 12 U/L (ref 7–52)
ANION GAP SERPL CALCULATED.3IONS-SCNC: 12 MMOL/L (ref 4–13)
APTT PPP: 33 SECONDS (ref 23–34)
AST SERPL W P-5'-P-CCNC: 32 U/L (ref 13–39)
ATRIAL RATE: 93 BPM
BASOPHILS # BLD AUTO: 0.02 THOUSANDS/ÂΜL (ref 0–0.1)
BASOPHILS NFR BLD AUTO: 0 % (ref 0–1)
BILIRUB SERPL-MCNC: 0.74 MG/DL (ref 0.2–1)
BLD GP AB SCN SERPL QL: NEGATIVE
BUN SERPL-MCNC: 13 MG/DL (ref 5–25)
CALCIUM SERPL-MCNC: 9.9 MG/DL (ref 8.4–10.2)
CHLORIDE SERPL-SCNC: 101 MMOL/L (ref 96–108)
CO2 SERPL-SCNC: 27 MMOL/L (ref 21–32)
CREAT SERPL-MCNC: 0.83 MG/DL (ref 0.6–1.3)
EOSINOPHIL # BLD AUTO: 0.06 THOUSAND/ÂΜL (ref 0–0.61)
EOSINOPHIL NFR BLD AUTO: 1 % (ref 0–6)
ERYTHROCYTE [DISTWIDTH] IN BLOOD BY AUTOMATED COUNT: 13.8 % (ref 11.6–15.1)
EST. AVERAGE GLUCOSE BLD GHB EST-MCNC: 105 MG/DL
GFR SERPL CREATININE-BSD FRML MDRD: 67 ML/MIN/1.73SQ M
GLUCOSE SERPL-MCNC: 98 MG/DL (ref 65–140)
HBA1C MFR BLD: 5.3 %
HCT VFR BLD AUTO: 49.9 % (ref 34.8–46.1)
HGB BLD-MCNC: 15.4 G/DL (ref 11.5–15.4)
IMM GRANULOCYTES # BLD AUTO: 0.02 THOUSAND/UL (ref 0–0.2)
IMM GRANULOCYTES NFR BLD AUTO: 0 % (ref 0–2)
INR PPP: 0.98 (ref 0.85–1.19)
LYMPHOCYTES # BLD AUTO: 0.89 THOUSANDS/ÂΜL (ref 0.6–4.47)
LYMPHOCYTES NFR BLD AUTO: 17 % (ref 14–44)
MCH RBC QN AUTO: 30.7 PG (ref 26.8–34.3)
MCHC RBC AUTO-ENTMCNC: 30.9 G/DL (ref 31.4–37.4)
MCV RBC AUTO: 100 FL (ref 82–98)
MONOCYTES # BLD AUTO: 0.47 THOUSAND/ÂΜL (ref 0.17–1.22)
MONOCYTES NFR BLD AUTO: 9 % (ref 4–12)
NEUTROPHILS # BLD AUTO: 3.67 THOUSANDS/ÂΜL (ref 1.85–7.62)
NEUTS SEG NFR BLD AUTO: 73 % (ref 43–75)
NRBC BLD AUTO-RTO: 0 /100 WBCS
PLATELET # BLD AUTO: 156 THOUSANDS/UL (ref 149–390)
PMV BLD AUTO: 9.7 FL (ref 8.9–12.7)
POTASSIUM SERPL-SCNC: 4.7 MMOL/L (ref 3.5–5.3)
PROT SERPL-MCNC: 7.1 G/DL (ref 6.4–8.4)
PROTHROMBIN TIME: 13.5 SECONDS (ref 12.3–15)
QRS AXIS: 77 DEGREES
QRSD INTERVAL: 84 MS
QT INTERVAL: 366 MS
QTC INTERVAL: 479 MS
RBC # BLD AUTO: 5.01 MILLION/UL (ref 3.81–5.12)
RH BLD: POSITIVE
SODIUM SERPL-SCNC: 140 MMOL/L (ref 135–147)
SPECIMEN EXPIRATION DATE: NORMAL
T WAVE AXIS: -74 DEGREES
VENTRICULAR RATE: 103 BPM
WBC # BLD AUTO: 5.13 THOUSAND/UL (ref 4.31–10.16)

## 2024-09-04 PROCEDURE — 72125 CT NECK SPINE W/O DYE: CPT

## 2024-09-04 PROCEDURE — 99285 EMERGENCY DEPT VISIT HI MDM: CPT

## 2024-09-04 PROCEDURE — 85730 THROMBOPLASTIN TIME PARTIAL: CPT | Performed by: EMERGENCY MEDICINE

## 2024-09-04 PROCEDURE — 86900 BLOOD TYPING SEROLOGIC ABO: CPT | Performed by: EMERGENCY MEDICINE

## 2024-09-04 PROCEDURE — 85610 PROTHROMBIN TIME: CPT | Performed by: EMERGENCY MEDICINE

## 2024-09-04 PROCEDURE — 99285 EMERGENCY DEPT VISIT HI MDM: CPT | Performed by: EMERGENCY MEDICINE

## 2024-09-04 PROCEDURE — 96365 THER/PROPH/DIAG IV INF INIT: CPT

## 2024-09-04 PROCEDURE — 99291 CRITICAL CARE FIRST HOUR: CPT | Performed by: EMERGENCY MEDICINE

## 2024-09-04 PROCEDURE — 86850 RBC ANTIBODY SCREEN: CPT | Performed by: EMERGENCY MEDICINE

## 2024-09-04 PROCEDURE — 96375 TX/PRO/DX INJ NEW DRUG ADDON: CPT

## 2024-09-04 PROCEDURE — 85025 COMPLETE CBC W/AUTO DIFF WBC: CPT | Performed by: EMERGENCY MEDICINE

## 2024-09-04 PROCEDURE — 86901 BLOOD TYPING SEROLOGIC RH(D): CPT | Performed by: EMERGENCY MEDICINE

## 2024-09-04 PROCEDURE — 93010 ELECTROCARDIOGRAM REPORT: CPT | Performed by: INTERNAL MEDICINE

## 2024-09-04 PROCEDURE — 99205 OFFICE O/P NEW HI 60 MIN: CPT | Performed by: NEUROLOGICAL SURGERY

## 2024-09-04 PROCEDURE — 83036 HEMOGLOBIN GLYCOSYLATED A1C: CPT | Performed by: PHYSICIAN ASSISTANT

## 2024-09-04 PROCEDURE — 93005 ELECTROCARDIOGRAM TRACING: CPT

## 2024-09-04 PROCEDURE — 96366 THER/PROPH/DIAG IV INF ADDON: CPT

## 2024-09-04 PROCEDURE — 36415 COLL VENOUS BLD VENIPUNCTURE: CPT | Performed by: EMERGENCY MEDICINE

## 2024-09-04 PROCEDURE — 99222 1ST HOSP IP/OBS MODERATE 55: CPT | Performed by: SURGERY

## 2024-09-04 PROCEDURE — 70498 CT ANGIOGRAPHY NECK: CPT

## 2024-09-04 PROCEDURE — 70450 CT HEAD/BRAIN W/O DYE: CPT

## 2024-09-04 PROCEDURE — 70496 CT ANGIOGRAPHY HEAD: CPT

## 2024-09-04 PROCEDURE — 80053 COMPREHEN METABOLIC PANEL: CPT | Performed by: EMERGENCY MEDICINE

## 2024-09-04 PROCEDURE — 72141 MRI NECK SPINE W/O DYE: CPT

## 2024-09-04 RX ORDER — SODIUM CHLORIDE, SODIUM GLUCONATE, SODIUM ACETATE, POTASSIUM CHLORIDE, MAGNESIUM CHLORIDE, SODIUM PHOSPHATE, DIBASIC, AND POTASSIUM PHOSPHATE .53; .5; .37; .037; .03; .012; .00082 G/100ML; G/100ML; G/100ML; G/100ML; G/100ML; G/100ML; G/100ML
75 INJECTION, SOLUTION INTRAVENOUS CONTINUOUS
Status: DISCONTINUED | OUTPATIENT
Start: 2024-09-04 | End: 2024-09-04

## 2024-09-04 RX ORDER — CHLORHEXIDINE GLUCONATE ORAL RINSE 1.2 MG/ML
15 SOLUTION DENTAL ONCE
Status: DISCONTINUED | OUTPATIENT
Start: 2024-09-04 | End: 2024-09-06

## 2024-09-04 RX ORDER — DILTIAZEM HYDROCHLORIDE 180 MG/1
180 CAPSULE, COATED, EXTENDED RELEASE ORAL DAILY
Status: DISCONTINUED | OUTPATIENT
Start: 2024-09-05 | End: 2024-09-11 | Stop reason: HOSPADM

## 2024-09-04 RX ORDER — SODIUM CHLORIDE 9 MG/ML
125 INJECTION, SOLUTION INTRAVENOUS CONTINUOUS
Status: DISCONTINUED | OUTPATIENT
Start: 2024-09-04 | End: 2024-09-04

## 2024-09-04 RX ORDER — CHLORHEXIDINE GLUCONATE ORAL RINSE 1.2 MG/ML
15 SOLUTION DENTAL EVERY 12 HOURS SCHEDULED
Status: DISCONTINUED | OUTPATIENT
Start: 2024-09-04 | End: 2024-09-11 | Stop reason: HOSPADM

## 2024-09-04 RX ORDER — ATORVASTATIN CALCIUM 20 MG/1
20 TABLET, FILM COATED ORAL
Status: DISCONTINUED | OUTPATIENT
Start: 2024-09-05 | End: 2024-09-11 | Stop reason: HOSPADM

## 2024-09-04 RX ORDER — SODIUM CHLORIDE, SODIUM GLUCONATE, SODIUM ACETATE, POTASSIUM CHLORIDE, MAGNESIUM CHLORIDE, SODIUM PHOSPHATE, DIBASIC, AND POTASSIUM PHOSPHATE .53; .5; .37; .037; .03; .012; .00082 G/100ML; G/100ML; G/100ML; G/100ML; G/100ML; G/100ML; G/100ML
100 INJECTION, SOLUTION INTRAVENOUS CONTINUOUS
Status: DISCONTINUED | OUTPATIENT
Start: 2024-09-04 | End: 2024-09-06

## 2024-09-04 RX ORDER — CEFAZOLIN SODIUM 2 G/50ML
2000 SOLUTION INTRAVENOUS ONCE
Status: COMPLETED | OUTPATIENT
Start: 2024-09-05 | End: 2024-09-05

## 2024-09-04 RX ORDER — SERTRALINE HYDROCHLORIDE 100 MG/1
100 TABLET, FILM COATED ORAL DAILY
Status: DISCONTINUED | OUTPATIENT
Start: 2024-09-05 | End: 2024-09-11 | Stop reason: HOSPADM

## 2024-09-04 RX ORDER — LORAZEPAM 2 MG/ML
0.5 INJECTION INTRAMUSCULAR ONCE
Status: COMPLETED | OUTPATIENT
Start: 2024-09-04 | End: 2024-09-04

## 2024-09-04 RX ORDER — LORAZEPAM 2 MG/ML
INJECTION INTRAMUSCULAR
Status: COMPLETED
Start: 2024-09-04 | End: 2024-09-04

## 2024-09-04 RX ADMIN — SODIUM CHLORIDE, SODIUM GLUCONATE, SODIUM ACETATE, POTASSIUM CHLORIDE, MAGNESIUM CHLORIDE, SODIUM PHOSPHATE, DIBASIC, AND POTASSIUM PHOSPHATE 75 ML/HR: .53; .5; .37; .037; .03; .012; .00082 INJECTION, SOLUTION INTRAVENOUS at 16:44

## 2024-09-04 RX ADMIN — LORAZEPAM 0.5 MG: 2 INJECTION INTRAMUSCULAR at 17:43

## 2024-09-04 RX ADMIN — SODIUM CHLORIDE, SODIUM GLUCONATE, SODIUM ACETATE, POTASSIUM CHLORIDE, MAGNESIUM CHLORIDE, SODIUM PHOSPHATE, DIBASIC, AND POTASSIUM PHOSPHATE 100 ML/HR: .53; .5; .37; .037; .03; .012; .00082 INJECTION, SOLUTION INTRAVENOUS at 20:21

## 2024-09-04 RX ADMIN — CHLORHEXIDINE GLUCONATE 0.12% ORAL RINSE 15 ML: 1.2 LIQUID ORAL at 22:40

## 2024-09-04 RX ADMIN — IOHEXOL 85 ML: 350 INJECTION, SOLUTION INTRAVENOUS at 20:15

## 2024-09-04 RX ADMIN — LORAZEPAM 0.5 MG: 2 INJECTION INTRAMUSCULAR; INTRAVENOUS at 17:43

## 2024-09-04 NOTE — ASSESSMENT & PLAN NOTE
Wt Readings from Last 3 Encounters:   09/04/24 53.5 kg (118 lb)   05/22/24 57.6 kg (127 lb)   02/21/24 57.6 kg (127 lb)     Patient on outpatient torsemide as needed for weight gain and reportedly takes it about once a week  Daily weights  Monitor for signs/symptoms of respiratory distress and fluid overload

## 2024-09-04 NOTE — EMTALA/ACUTE CARE TRANSFER
[facility] EMERGENCY DEPARTMENT  [address] [place] [address]  Dept: [phone]      EMTALA TRANSFER CONSENT    NAME Marsha Oliva                                         1945                              MRN [id number]    I have been informed of my rights regarding examination, treatment, and transfer   by Dr. Jey Banuelos DO    Benefits: Specialized equipment and/or services available at the receiving facility (Include comment)________________________    Risks: Potential for delay in receiving treatment, Potential deterioration of medical condition, Loss of IV, Increased discomfort during transfer, Possible worsening of condition or death during transfer      Consent for Transfer:  I acknowledge that my medical condition has been evaluated and explained to me by the emergency department physician or other qualified medical person and/or my attending physician, who has recommended that I be transferred to the service of  Accepting Physician: Dr. Pretty at Accepting Facility Name, City & State : [facility]. The above potential benefits of such transfer, the potential risks associated with such transfer, and the probable risks of not being transferred have been explained to me, and I fully understand them.  The doctor has explained that, in my case, the benefits of transfer outweigh the risks.  I agree to be transferred.    I authorize the performance of emergency medical procedures and treatments upon me in both transit and upon arrival at the receiving facility.  Additionally, I authorize the release of any and all medical records to the receiving facility and request they be transported with me, if possible.  I understand that the safest mode of transportation during a medical emergency is an ambulance and that the Hospital advocates the use of this mode of transport. Risks of traveling to the receiving facility by car, including absence of medical control, life  sustaining equipment, such as oxygen, and medical personnel has been explained to me and I fully understand them.    (BONILLA CORRECT BOX BELOW)  [  ]  I consent to the stated transfer and to be transported by ambulance/helicopter.  [  ]  I consent to the stated transfer, but refuse transportation by ambulance and accept full responsibility for my transportation by car.  I understand the risks of non-ambulance transfers and I exonerate the Hospital and its staff from any deterioration in my condition that results from this refusal.    X___________________________________________    DATE  24  TIME________  Signature of patient or legally responsible individual signing on patient behalf           RELATIONSHIP TO PATIENT_________________________          Provider Certification    NAME Marsha Oliva                                        [facility] 1945                              MRN [id number]    A medical screening exam was performed on the above named patient.  Based on the examination:    Condition Necessitating Transfer The primary encounter diagnosis was Closed odontoid fracture with type II morphology and posterior displacement, initial encounter (HCC). A diagnosis of C1 cervical fracture ([facility]) was also pertinent to this visit.    Patient Condition: The patient has been stabilized such that within reasonable medical probability, no material deterioration of the patient condition or the condition of the unborn child(dwaine) is likely to result from the transfer    Reason for Transfer: Level of Care needed not available at this facility    Transfer Requirements: Facility [facility]   Space available and qualified personnel available for treatment as acknowledged by    Agreed to accept transfer and to provide appropriate medical treatment as acknowledged by       Dr. Pretty  Appropriate medical records of the examination and treatment of the patient are provided at the time of transfer   STAFF INITIAL WHEN  COMPLETED _______  Transfer will be performed by qualified personnel from    and appropriate transfer equipment as required, including the use of necessary and appropriate life support measures.    Provider Certification: I have examined the patient and explained the following risks and benefits of being transferred/refusing transfer to the patient/family:  General risk, such as traffic hazards, adverse weather conditions, rough terrain or turbulence, possible failure of equipment (including vehicle or aircraft), or consequences of actions of persons outside the control of the transport personnel, Unanticipated needs of medical equipment and personnel during transport, Risk of worsening condition      Based on these reasonable risks and benefits to the patient and/or the unborn child(dwaine), and based upon the information available at the time of the patient’s examination, I certify that the medical benefits reasonably to be expected from the provision of appropriate medical treatments at another medical facility outweigh the increasing risks, if any, to the individual’s medical condition, and in the case of labor to the unborn child, from effecting the transfer.    X____________________________________________ DATE 09/04/24        TIME_______      ORIGINAL - SEND TO MEDICAL RECORDS   COPY - SEND WITH PATIENT DURING TRANSFER

## 2024-09-04 NOTE — ASSESSMENT & PLAN NOTE
"Patient uses assistive devices including walker, cane, \"anything within reach\" to walk at baseline  PT/OT once cleared by neurosurgery  Geriatrics consult  "

## 2024-09-04 NOTE — CONSULTS
[facility]  Consult  Name: Marsha Oliva 79 y.o. female I MRN: [id number]  Unit/Bed#: ED 23 I Date of Admission: 9/4/2024   Date of Service: 9/4/2024 I Hospital Day: 0    Inpatient consult to Neurosurgery  Consult performed by: Wei Duran PA-C  Consult ordered by: Malcolm Pretty MD      Assessment & Plan   Closed odontoid fracture with type II morphology and posterior displacement (HCC)  Assessment & Plan  Patient presented with increased SOB and neck pain with ambulation   Reports fall from bed about 6 weeks ago with onset of neck pain and extremity dysfunction       Imaging:   CT cervical spine 9/4/2024: Type II odontoid fracture with posterior displacement.  Posterior displacement of C1 with respect to C2 vertebral body causing component of cord compression of the medullary cervical spinal cord.    Plan:   ongoing frequent neurological checks.   Recommend MRI cervical spine- Trauma discussed with radiology and scheduled for 1700  CTA head and neck ordered   Will need to hold all AC/AP medication until after surgical intervention completed  DVT ppx: SCD's only  Hold all AC/AP medication at this time  Vista collar at all times  Ongoing medical management and pain control per primary team   CM following for dispo planning  Recommend maintaining NPO status should patient have deterioration overnight  Tentative plan for OR tomorrow with Dr. Simpson for likely occipital to cervical fixation  Will discuss further with patient and  tomorrow after additional imaging is completed  Should monitor overnight in the ICU given risk for respiratory compromise with high cervical spinal cord compression  If patient does not go for surgical fixation then she will likely require a cervical collar for the remainder of her life.   Neurosurgery will continue to follow. Call with questions or concerns.       Spinal cord compression (HCC)  Assessment & Plan  Suspected based on CT  imaging given alignment abnormalities  Pending MRI brain       History of Present Illness   HPI: Marsha Oliva is a 79 y.o. female with PMH including short-term memory impairment, thyroid disease who presents with complaint of neck pain and some increased shortness of breath with ambulation per patient's .  Patient has show short-term cognitive impairment and is not the most reliable historian.  She reports she fell out of bed approximately 6 weeks ago.  Since that time she has been having some fluctuations in her cervical neck pain as well as some mild difficulties with swallowing and subjectively difficulty with using her hands.  She did endorse frequently dropping items and some ambulatory dysfunction.  It is difficult to determine whether or not her ambulatory dysfunction predated her fall or has been more of a symptom afterward.  She states she uses a cane or a walker when she is out of the house.  Her pain is primarily in the back half of her neck on both sides and along the midline.  She has pain with slight mobility movement in the room.  She is currently managed in the collar.  She denies any bowel or bladder dysfunction.    Review of Systems   Constitutional:  Positive for activity change and fatigue.   HENT: Negative.     Eyes:  Negative for visual disturbance.   Respiratory:  Positive for shortness of breath (with activity).    Gastrointestinal: Negative.    Genitourinary:  Negative for difficulty urinating.   Musculoskeletal:  Positive for neck pain and neck stiffness. Negative for arthralgias and gait problem.   Neurological:  Positive for weakness (BLE). Negative for dizziness, speech difficulty, light-headedness and headaches.   Psychiatric/Behavioral:  Positive for confusion.        Historical Information   Past Medical History:   Diagnosis Date    Disease of thyroid gland     Hypotension     Supratherapeutic INR 6/2/2022     Past Surgical History:   Procedure Laterality Date     HYSTERECTOMY       Social History     Substance and Sexual Activity   Alcohol Use Never    Comment: occassional.     Social History     Substance and Sexual Activity   Drug Use Never     Social History     Tobacco Use   Smoking Status Former    Types: Cigarettes   Smokeless Tobacco Never     No family history on file.    Meds/Allergies   all current active meds have been reviewed, current meds:   Current Facility-Administered Medications   Medication Dose Route Frequency    multi-electrolyte (PLASMALYTE-A/ISOLYTE-S PH 7.4) IV solution  75 mL/hr Intravenous Continuous   , and PTA meds:   Prior to Admission Medications   Prescriptions Last Dose Informant Patient Reported? Taking?   Aspirin 81 MG CAPS  Spouse/Significant Other, Self Yes No   Sig: Take 81 mg by mouth in the morning. Indications: Disease involving Lipid Deposits in the Arteries   Magnesium Gluconate 550 MG TABS  Spouse/Significant Other, Self Yes No   Sig: Take 30 mg by mouth 2 (two) times a day Pt taking 250mg daily   atorvastatin (LIPITOR) 20 mg tablet  Spouse/Significant Other, Self Yes No   atorvastatin (LIPITOR) 40 mg tablet  Spouse/Significant Other, Self No No   Sig: Take 1 tablet (40 mg total) by mouth daily with dinner   Patient taking differently: Take 40 mg by mouth daily with dinner Pt taking 20mg daily   chlorhexidine (HIBICLENS) 4 % external liquid  Spouse/Significant Other, Self No No   Sig: Apply 1 Application topically daily as needed for wound care For hand washing with wound care for MRSA positive wound.   cyanocobalamin (VITAMIN B-12) 1000 MCG tablet  Spouse/Significant Other, Self Yes No   Sig: Take 1,000 mcg by mouth once a week. Indications: Inadequate Vitamin B12   cyanocobalamin 1,000 mcg/mL  Spouse/Significant Other, Self Yes No   Si mL jPt is not taking   digoxin (LANOXIN) 0.125 mg tablet  Spouse/Significant Other, Self No No   Sig: Take 1 tablet (125 mcg total) by mouth every other day   diltiazem (CARDIZEM CD) 180 mg 24  hr capsule  Spouse/Significant Other, Self No No   Sig: Take 1 capsule (180 mg total) by mouth daily   folic acid (FOLVITE) 1 mg tablet  Spouse/Significant Other, Self Yes No   Sig: Take 1 tablet by mouth daily   levothyroxine 137 mcg tablet  Spouse/Significant Other, Self Yes No   Sig: Take 137 mcg by mouth daily   oxygen gas  Spouse/Significant Other, Self Yes No   Sig: Inhale 2 L/min if needed (shortness of breath). Indications: Shortness of breath   potassium chloride (K-DUR,KLOR-CON) 20 mEq tablet  Spouse/Significant Other, Self Yes No   Sig: Take 2 tablets by mouth daily Pt is not  taking   sertraline (ZOLOFT) 100 mg tablet  Spouse/Significant Other, Self Yes No   Sig: Take 1 tablet by mouth daily   torsemide (DEMADEX) 20 mg tablet  Spouse/Significant Other, Self No No   Sig: Take 1 tablet (20 mg total) by mouth daily as needed (weight gain / leg swelling)      Facility-Administered Medications: None     No Known Allergies    Objective   I/O       None            Physical Exam  Constitutional:       Appearance: Normal appearance. She is well-developed.   HENT:      Head: Normocephalic and atraumatic.   Eyes:      Extraocular Movements: Extraocular movements intact and EOM normal.   Neck:      Vascular: No JVD.      Trachea: No tracheal deviation.      Comments: Cervical collar in place  Cardiovascular:      Rate and Rhythm: Normal rate.   Pulmonary:      Effort: Pulmonary effort is normal. No respiratory distress.   Musculoskeletal:         General: No deformity. Normal range of motion.      Cervical back: Tenderness (mild tenderness to palpation posterior neck.) present.   Skin:     General: Skin is warm and dry.   Neurological:      Mental Status: She is alert and oriented to person, place, and time.      Cranial Nerves: No cranial nerve deficit.      Sensory: No sensory deficit.      Motor: Weakness present.      Deep Tendon Reflexes:      Reflex Scores:       Bicep reflexes are 3+ on the right side and 3+  on the left side.       Brachioradialis reflexes are 3+ on the right side and 3+ on the left side.       Patellar reflexes are 1+ on the right side and 1+ on the left side.  Psychiatric:         Speech: Speech normal.         Behavior: Behavior normal.         Thought Content: Thought content normal.       Neurologic Exam     Mental Status   Oriented to person, place, and time.   Oriented to year and month.   Attention: decreased. Concentration: decreased.   Speech: speech is normal   Level of consciousness: alert  Knowledge: good.   Normal comprehension.     Cranial Nerves     CN III, IV, VI   Extraocular motions are normal.   Upgaze: normal  Downgaze: normal    CN V   Facial sensation intact.     CN VII   Facial expression full, symmetric.     CN VIII   CN VIII normal.   Hearing: intact    CN XII   CN XII normal.   Tongue deviation: none    Motor Exam   Muscle bulk: normal  Right arm tone: normal  Left arm tone: normal  Right leg tone: normal  Left leg tone: normal5/5 in the BUE   4/5 in the BLE. Some resistance to examiner but unable to maintain for duration      Sensory Exam   Light touch normal.     Gait, Coordination, and Reflexes     Tremor   Resting tremor: absent  Action tremor: absent    Reflexes   Right brachioradialis: 3+  Left brachioradialis: 3+  Right biceps: 3+  Left biceps: 3+  Right patellar: 1+  Left patellar: 1+  Right Mcgrath: present  Left Mcgrath: present  Right ankle clonus: absent  Left ankle clonus: absent    Vitals:Blood pressure 148/70, pulse 94, temperature 97.7 °F (36.5 °C), temperature source Oral, resp. rate (!) 26, SpO2 97%.,There is no height or weight on file to calculate BMI.     Lab Results:   Results from last 7 days   Lab Units 09/04/24  1036   WBC Thousand/uL 5.13   HEMOGLOBIN g/dL 15.4   HEMATOCRIT % 49.9*   PLATELETS Thousands/uL 156   SEGS PCT % 73   MONO PCT % 9   EOS PCT % 1     Results from last 7 days   Lab Units 09/04/24  1123   SODIUM mmol/L 140   POTASSIUM mmol/L 4.7  "  CHLORIDE mmol/L 101   CO2 mmol/L 27   BUN mg/dL 13   CREATININE mg/dL 0.83   CALCIUM mg/dL 9.9   ALK PHOS U/L 71   ALT U/L 12   AST U/L 32             Results from last 7 days   Lab Units 09/04/24  1119   INR  0.98   PTT seconds 33     No results found for: \"TROPONINT\"  ABG:No results found for: \"PHART\", \"HVF3OEC\", \"PO2ART\", \"PRC5NGB\", \"B7PCOQMZ\", \"BEART\", \"SOURCE\"    Imaging Studies: I have personally reviewed pertinent reports.   and I have personally reviewed pertinent films in PACS    TRAUMA - CT spine cervical wo contrast    Result Date: 9/4/2024  Impression: Type II odontoid fracture with posterior displacement including prominent posterior displacement of C1 with respect to the C2 vertebral body causing a component of cord compression of the medullary cervical spinal cord. Further evaluation with MRI is advised. I personally discussed this study with NEENA JIM on 9/4/2024 11:12 AM. Workstation performed: [id number]     TRAUMA - CT head wo contrast    Result Date: 9/4/2024  Impression: No acute intracranial abnormality. Mild to moderate chronic microvascular ischemic changes. Workstation performed: [id number]       EKG, Pathology, and Other Studies: I have personally reviewed pertinent reports.      VTE Prophylaxis: Sequential compression device (Venodyne)     Code Status: Prior  Advance Directive and Living Will:      Power of :    POLST:      Counseling / Coordination of Care  I spent 30 minutes with the patient.        "

## 2024-09-04 NOTE — H&P
"[facility]  H&P  Name: Marsha Oliva 79 y.o. female I MRN: [id number]  Unit/Bed#: ED 23 I Date of Admission: 9/4/2024   Date of Service: 9/4/2024 I Hospital Day: 0      Assessment & Plan   Spinal cord compression (HCC)  Assessment & Plan  Closely monitor neurologic exam  See plan for odontoid fracture above    Closed odontoid fracture with type II morphology and posterior displacement (HCC)  Assessment & Plan  Get CT head and neck   consider MRI cervical spine  Neurosurgery consult    Hypertensive heart disease with heart failure (HCC)  Assessment & Plan  Wt Readings from Last 3 Encounters:   09/04/24 53.5 kg (118 lb)   05/22/24 57.6 kg (127 lb)   02/21/24 57.6 kg (127 lb)     Patient on outpatient torsemide as needed for weight gain and reportedly takes it about once a week  Daily weights  Monitor for signs/symptoms of respiratory distress and fluid overload            Fall  Assessment & Plan  Patient uses assistive devices including walker, cane, \"anything within reach\" to walk at baseline  PT/OT once cleared by neurosurgery  Geriatrics consult    Mixed hyperlipidemia  Assessment & Plan  Continue home Lipitor    Hypothyroidism  Assessment & Plan  Patient's levothyroxine dose recently decreased to 125 mcg from 137 mcg daily  Continue home levothyroxine    Essential hypertension  Assessment & Plan  Monitor vital signs per unit protocol      B12 deficiency  Assessment & Plan  Continue home B12 supplementation    Atrial fibrillation, chronic (HCC)  Assessment & Plan  On digoxin, diltiazem, aspirin at home  Not on anticoagulation due to history of bleed         Trauma Alert: Transfer from [place]  Model of Arrival: Ambulance  Trauma Team: Attending Maria De Jesus, Residents Sheba, and Fellow Richard  Consultants:     Neurosurgery:   - STAT consult; notified at 1504 via text;    Trauma Active Problems: Type II odontoid fracture with posterior displacement and spinal cord " compression    Trauma Plan: See above; admit to critical care vs stepdown 2 pending results of MRI    Chief Complaint: Dry mouth, neck pain    History of Present Illness   HPI:  Marsha Oliva is a 79 y.o. female with history of A-fib, hypothyroidism, HFpEF, hypertension, hyperlipidemia who initially presented to [facility] for increasing neck pain.  She states she had a fall about 1-1/2 months ago where she rolled off the side of her bed and may have hit her head on a nightstand on the way down.  She did not lose consciousness but she did require help to get back up.  She did not seek medical attention at that time because she had no apparent injuries.  She states that since then she has had gradually increasing neck pain and today was not so much pain that any movement caused her significant pain.  She went to [facility] and was found to have the aforementioned injury.  She does endorse some neck pain and bilateral knee pain.  She denies headache, dizziness, lightheadedness, double vision, blurry vision, chest pain, shortness of breath, abdominal pain, nausea, vomiting, focal numbness or weakness.  Mechanism: See HPI    Review of Systems  See HPI    Historical Information       Past Medical History:   Past Medical History:   Diagnosis Date    Disease of thyroid gland     Hypotension     Supratherapeutic INR 6/2/2022       Past Surgical History:   Past Surgical History:   Procedure Laterality Date    HYSTERECTOMY         Social History:  Alcohol Use:   Social History     Substance and Sexual Activity   Alcohol Use Never    Comment: occassional.     Drug Use:   Social History     Substance and Sexual Activity   Drug Use Never     Tobacco Use:   Social History     Tobacco Use   Smoking Status Former    Types: Cigarettes   Smokeless Tobacco Never       Immunizations:   Immunization History   Administered Date(s) Administered    COVID-19 PFIZER VACCINE 0.3 ML IM 03/09/2021, 03/30/2021, 11/17/2021     COVID-19 Pfizer Vac BIVALENT Titus-sucrose 12 Yr+ IM 03/09/2023    INFLUENZA 10/02/2009, 10/06/2010, 10/21/2011, 10/20/2012, 10/16/2013, 09/23/2014, 11/16/2015, 09/30/2016, 10/19/2017, 09/19/2018, 10/02/2019, 10/16/2020, 10/27/2021, 09/13/2022, 12/24/2023    Influenza, Seasonal Vaccine, Quadrivalent, Adjuvanted, .5e 10/16/2020, 10/27/2021, 09/13/2022    Influenza, high dose seasonal 0.7 mL 12/24/2023    Pneumococcal Conjugate 13-Valent 08/03/2015    Pneumococcal Polysaccharide PPV23 10/23/2012    Tdap 05/12/2014    Tuberculin Skin Test 12/28/2021, 01/06/2022    Tuberculin Skin Test-PPD Intradermal 12/28/2021, 01/06/2022    Zoster 11/18/2014    Zoster Vaccine Recombinant 09/19/2018         Family History: non-contributory      Meds/Allergies   all current active meds have been reviewed     No Known Allergies    PHYSICAL EXAM      Objective   Vitals:   First set: Temperature: 97.7 °F (36.5 °C) (09/04/24 1347)  Pulse: 91 (09/04/24 1345)  Respirations: 20 (09/04/24 1345)  Blood Pressure: 160/79 (09/04/24 1345)    Primary Survey:   (A) Airway: Patent  (B) Breathing: Bilateral breath sounds  (C) Circulation: Pulses:   normal  (D) Disabliity:  GCS Total:  15  (E) Expose:  Completed    Secondary Survey: (Click on Physical Exam tab above)    Physical Exam  Constitutional:       General: She is not in acute distress.     Appearance: She is not ill-appearing.   HENT:      Head: Normocephalic and atraumatic.      Mouth/Throat:      Mouth: Mucous membranes are moist.      Pharynx: Oropharynx is clear.   Eyes:      Extraocular Movements: Extraocular movements intact.      Conjunctiva/sclera: Conjunctivae normal.      Pupils: Pupils are equal, round, and reactive to light.   Neck:      Comments: Vista collar in place  Cardiovascular:      Rate and Rhythm: Normal rate and regular rhythm.      Pulses: Normal pulses.      Heart sounds: Normal heart sounds.   Pulmonary:      Effort: Pulmonary effort is normal.      Breath sounds:  Normal breath sounds.   Abdominal:      General: There is no distension.      Palpations: Abdomen is soft.      Tenderness: There is no abdominal tenderness.   Musculoskeletal:      Cervical back: Tenderness present.      Right lower leg: No edema.      Left lower leg: No edema.   Skin:     General: Skin is warm and dry.   Neurological:      General: No focal deficit present.      Mental Status: She is alert and oriented to person, place, and time.      Cranial Nerves: No cranial nerve deficit.      Sensory: No sensory deficit.      Motor: No weakness.         Invasive Devices       None                   Lab Results: Results: I have personally reviewed all pertinent laboratory/tests results  Imaging/EKG Studies: positive for acute findings: Type II odontoid fracture with posterior displacement of C1 causing spinal cord compression    Code Status: Prior  Advance Directive and Living Will:      Power of :    POLST:

## 2024-09-04 NOTE — ASSESSMENT & PLAN NOTE
Get CT head and neck   Get MRI cervical spine  Neurosurgery consult  C-spine precautions  Continue Vista collar

## 2024-09-04 NOTE — ASSESSMENT & PLAN NOTE
Patient presented with increased SOB and neck pain with ambulation   Reports fall from bed about 6 weeks ago with onset of neck pain and extremity dysfunction       Imaging:   CT cervical spine 9/4/2024: Type II odontoid fracture with posterior displacement.  Posterior displacement of C1 with respect to C2 vertebral body causing component of cord compression of the medullary cervical spinal cord.    Plan:   ongoing frequent neurological checks.   Recommend MRI cervical spine- Trauma discussed with radiology and scheduled for 1700  CTA head and neck ordered   Will need to hold all AC/AP medication until after surgical intervention completed  DVT ppx: SCD's only  Hold all AC/AP medication at this time  Vista collar at all times  Ongoing medical management and pain control per primary team   CM following for dispo planning  Recommend maintaining NPO status should patient have deterioration overnight  Tentative plan for OR tomorrow with Dr. Simpson for likely occipital to cervical fixation  Will discuss further with patient and  tomorrow after additional imaging is completed  Should monitor overnight in the ICU given risk for respiratory compromise with high cervical spinal cord compression  If patient does not go for surgical fixation then she will likely require a cervical collar for the remainder of her life.   Neurosurgery will continue to follow. Call with questions or concerns.

## 2024-09-04 NOTE — ASSESSMENT & PLAN NOTE
Patient's levothyroxine dose recently decreased to 125 mcg from 137 mcg daily  Continue home levothyroxine

## 2024-09-04 NOTE — EMTALA/ACUTE CARE TRANSFER
[facility] EMERGENCY DEPARTMENT  [address] [place] [address]  Dept: [phone]      EMTALA TRANSFER CONSENT    NAME Marsha Oliva                                         1945                              MRN [id number]    I have been informed of my rights regarding examination, treatment, and transfer   by Dr. Jey Banuelos,     Benefits:      Risks:        Consent for Transfer:  I acknowledge that my medical condition has been evaluated and explained to me by the emergency department physician or other qualified medical person and/or my attending physician, who has recommended that I be transferred to the service of    at  . The above potential benefits of such transfer, the potential risks associated with such transfer, and the probable risks of not being transferred have been explained to me, and I fully understand them.  The doctor has explained that, in my case, the benefits of transfer outweigh the risks.  I agree to be transferred.    I authorize the performance of emergency medical procedures and treatments upon me in both transit and upon arrival at the receiving facility.  Additionally, I authorize the release of any and all medical records to the receiving facility and request they be transported with me, if possible.  I understand that the safest mode of transportation during a medical emergency is an ambulance and that the Hospital advocates the use of this mode of transport. Risks of traveling to the receiving facility by car, including absence of medical control, life sustaining equipment, such as oxygen, and medical personnel has been explained to me and I fully understand them.    (BONILLA CORRECT BOX BELOW)  [  ]  I consent to the stated transfer and to be transported by ambulance/helicopter.  [  ]  I consent to the stated transfer, but refuse transportation by ambulance and accept full responsibility for my transportation by car.  I understand the risks of  non-ambulance transfers and I exonerate the Hospital and its staff from any deterioration in my condition that results from this refusal.    X___________________________________________    DATE  24  TIME________  Signature of patient or legally responsible individual signing on patient behalf           RELATIONSHIP TO PATIENT_________________________          Provider Certification    NAME Marsha Oliva                                         1945                              MRN [id number]    A medical screening exam was performed on the above named patient.  Based on the examination:    Condition Necessitating Transfer The primary encounter diagnosis was Closed odontoid fracture with type II morphology and posterior displacement, initial encounter ([facility]). A diagnosis of C1 cervical fracture ([facility]) was also pertinent to this visit.    Patient Condition:      Reason for Transfer:      Transfer Requirements: Facility     Space available and qualified personnel available for treatment as acknowledged by    Agreed to accept transfer and to provide appropriate medical treatment as acknowledged by          Appropriate medical records of the examination and treatment of the patient are provided at the time of transfer   STAFF INITIAL WHEN COMPLETED _______  Transfer will be performed by qualified personnel from    and appropriate transfer equipment as required, including the use of necessary and appropriate life support measures.    Provider Certification: I have examined the patient and explained the following risks and benefits of being transferred/refusing transfer to the patient/family:         Based on these reasonable risks and benefits to the patient and/or the unborn child(dwaine), and based upon the information available at the time of the patient’s examination, I certify that the medical benefits reasonably to be expected from the provision of appropriate medical treatments at another medical facility  outweigh the increasing risks, if any, to the individual’s medical condition, and in the case of labor to the unborn child, from effecting the transfer.    X____________________________________________ DATE 09/04/24        TIME_______      ORIGINAL - SEND TO MEDICAL RECORDS   COPY - SEND WITH PATIENT DURING TRANSFER

## 2024-09-05 ENCOUNTER — ANESTHESIA (INPATIENT)
Dept: PERIOP | Facility: HOSPITAL | Age: 79
DRG: 472 | End: 2024-09-05
Payer: COMMERCIAL

## 2024-09-05 ENCOUNTER — APPOINTMENT (INPATIENT)
Dept: RADIOLOGY | Facility: HOSPITAL | Age: 79
DRG: 472 | End: 2024-09-05
Payer: COMMERCIAL

## 2024-09-05 LAB
ANION GAP SERPL CALCULATED.3IONS-SCNC: 14 MMOL/L (ref 4–13)
BASE EXCESS BLDA CALC-SCNC: -2 MMOL/L (ref -2–3)
BASE EXCESS BLDA CALC-SCNC: -3 MMOL/L (ref -2–3)
BASOPHILS # BLD AUTO: 0.01 THOUSANDS/ÂΜL (ref 0–0.1)
BASOPHILS NFR BLD AUTO: 0 % (ref 0–1)
BUN SERPL-MCNC: 12 MG/DL (ref 5–25)
CA-I BLD-SCNC: 1.14 MMOL/L (ref 1.12–1.32)
CA-I BLD-SCNC: 1.18 MMOL/L (ref 1.12–1.32)
CA-I BLD-SCNC: 1.21 MMOL/L (ref 1.12–1.32)
CALCIUM SERPL-MCNC: 9.7 MG/DL (ref 8.4–10.2)
CHLORIDE SERPL-SCNC: 100 MMOL/L (ref 96–108)
CO2 SERPL-SCNC: 26 MMOL/L (ref 21–32)
CREAT SERPL-MCNC: 0.78 MG/DL (ref 0.6–1.3)
EOSINOPHIL # BLD AUTO: 0.04 THOUSAND/ÂΜL (ref 0–0.61)
EOSINOPHIL NFR BLD AUTO: 1 % (ref 0–6)
ERYTHROCYTE [DISTWIDTH] IN BLOOD BY AUTOMATED COUNT: 13.7 % (ref 11.6–15.1)
GFR SERPL CREATININE-BSD FRML MDRD: 72 ML/MIN/1.73SQ M
GLUCOSE SERPL-MCNC: 105 MG/DL (ref 65–140)
GLUCOSE SERPL-MCNC: 71 MG/DL (ref 65–140)
GLUCOSE SERPL-MCNC: 87 MG/DL (ref 65–140)
HCO3 BLDA-SCNC: 20.3 MMOL/L (ref 22–28)
HCO3 BLDA-SCNC: 25.1 MMOL/L (ref 22–28)
HCT VFR BLD AUTO: 46.7 % (ref 34.8–46.1)
HCT VFR BLD CALC: 39 % (ref 34.8–46.1)
HCT VFR BLD CALC: 39 % (ref 34.8–46.1)
HGB BLD-MCNC: 14.7 G/DL (ref 11.5–15.4)
HGB BLDA-MCNC: 13.3 G/DL (ref 11.5–15.4)
HGB BLDA-MCNC: 13.3 G/DL (ref 11.5–15.4)
IMM GRANULOCYTES # BLD AUTO: 0.01 THOUSAND/UL (ref 0–0.2)
IMM GRANULOCYTES NFR BLD AUTO: 0 % (ref 0–2)
LYMPHOCYTES # BLD AUTO: 0.68 THOUSANDS/ÂΜL (ref 0.6–4.47)
LYMPHOCYTES NFR BLD AUTO: 14 % (ref 14–44)
MAGNESIUM SERPL-MCNC: 2.3 MG/DL (ref 1.9–2.7)
MCH RBC QN AUTO: 31.3 PG (ref 26.8–34.3)
MCHC RBC AUTO-ENTMCNC: 31.5 G/DL (ref 31.4–37.4)
MCV RBC AUTO: 100 FL (ref 82–98)
MONOCYTES # BLD AUTO: 0.42 THOUSAND/ÂΜL (ref 0.17–1.22)
MONOCYTES NFR BLD AUTO: 9 % (ref 4–12)
NEUTROPHILS # BLD AUTO: 3.71 THOUSANDS/ÂΜL (ref 1.85–7.62)
NEUTS SEG NFR BLD AUTO: 76 % (ref 43–75)
NRBC BLD AUTO-RTO: 0 /100 WBCS
PCO2 BLD: 21 MMOL/L (ref 21–32)
PCO2 BLD: 27 MMOL/L (ref 21–32)
PCO2 BLD: 28.1 MM HG (ref 36–44)
PCO2 BLD: 58.3 MM HG (ref 36–44)
PH BLD: 7.24 [PH] (ref 7.35–7.45)
PH BLD: 7.47 [PH] (ref 7.35–7.45)
PHOSPHATE SERPL-MCNC: 3.8 MG/DL (ref 2.3–4.1)
PLATELET # BLD AUTO: 146 THOUSANDS/UL (ref 149–390)
PLATELET # BLD AUTO: 151 THOUSANDS/UL (ref 149–390)
PMV BLD AUTO: 9.2 FL (ref 8.9–12.7)
PMV BLD AUTO: 9.4 FL (ref 8.9–12.7)
PO2 BLD: 201 MM HG (ref 75–129)
PO2 BLD: >400 MM HG (ref 75–129)
POTASSIUM BLD-SCNC: 4 MMOL/L (ref 3.5–5.3)
POTASSIUM BLD-SCNC: 4 MMOL/L (ref 3.5–5.3)
POTASSIUM SERPL-SCNC: 4 MMOL/L (ref 3.5–5.3)
RBC # BLD AUTO: 4.69 MILLION/UL (ref 3.81–5.12)
SAO2 % BLD FROM PO2: 100 % (ref 60–85)
SODIUM BLD-SCNC: 137 MMOL/L (ref 136–145)
SODIUM BLD-SCNC: 139 MMOL/L (ref 136–145)
SODIUM SERPL-SCNC: 140 MMOL/L (ref 135–147)
SPECIMEN SOURCE: ABNORMAL
SPECIMEN SOURCE: ABNORMAL
WBC # BLD AUTO: 4.87 THOUSAND/UL (ref 4.31–10.16)

## 2024-09-05 PROCEDURE — 94760 N-INVAS EAR/PLS OXIMETRY 1: CPT

## 2024-09-05 PROCEDURE — 0RG20J1 FUSION OF 2 OR MORE CERVICAL VERTEBRAL JOINTS WITH SYNTHETIC SUBSTITUTE, POSTERIOR APPROACH, POSTERIOR COLUMN, OPEN APPROACH: ICD-10-PCS | Performed by: STUDENT IN AN ORGANIZED HEALTH CARE EDUCATION/TRAINING PROGRAM

## 2024-09-05 PROCEDURE — 61783 SCAN PROC SPINAL: CPT | Performed by: STUDENT IN AN ORGANIZED HEALTH CARE EDUCATION/TRAINING PROGRAM

## 2024-09-05 PROCEDURE — 20936 SP BONE AGRFT LOCAL ADD-ON: CPT | Performed by: PHYSICIAN ASSISTANT

## 2024-09-05 PROCEDURE — 82330 ASSAY OF CALCIUM: CPT

## 2024-09-05 PROCEDURE — 20930 SP BONE ALGRFT MORSEL ADD-ON: CPT | Performed by: STUDENT IN AN ORGANIZED HEALTH CARE EDUCATION/TRAINING PROGRAM

## 2024-09-05 PROCEDURE — 22842 INSERT SPINE FIXATION DEVICE: CPT | Performed by: STUDENT IN AN ORGANIZED HEALTH CARE EDUCATION/TRAINING PROGRAM

## 2024-09-05 PROCEDURE — 84132 ASSAY OF SERUM POTASSIUM: CPT

## 2024-09-05 PROCEDURE — 85014 HEMATOCRIT: CPT

## 2024-09-05 PROCEDURE — C1713 ANCHOR/SCREW BN/BN,TIS/BN: HCPCS | Performed by: STUDENT IN AN ORGANIZED HEALTH CARE EDUCATION/TRAINING PROGRAM

## 2024-09-05 PROCEDURE — 94002 VENT MGMT INPAT INIT DAY: CPT

## 2024-09-05 PROCEDURE — 20930 SP BONE ALGRFT MORSEL ADD-ON: CPT | Performed by: PHYSICIAN ASSISTANT

## 2024-09-05 PROCEDURE — 82947 ASSAY GLUCOSE BLOOD QUANT: CPT

## 2024-09-05 PROCEDURE — C1781 MESH (IMPLANTABLE): HCPCS | Performed by: STUDENT IN AN ORGANIZED HEALTH CARE EDUCATION/TRAINING PROGRAM

## 2024-09-05 PROCEDURE — 80048 BASIC METABOLIC PNL TOTAL CA: CPT

## 2024-09-05 PROCEDURE — 20936 SP BONE AGRFT LOCAL ADD-ON: CPT | Performed by: STUDENT IN AN ORGANIZED HEALTH CARE EDUCATION/TRAINING PROGRAM

## 2024-09-05 PROCEDURE — 22595 ARTHRD PST TQ ATLAS-AXIS: CPT | Performed by: PHYSICIAN ASSISTANT

## 2024-09-05 PROCEDURE — 72040 X-RAY EXAM NECK SPINE 2-3 VW: CPT

## 2024-09-05 PROCEDURE — 22842 INSERT SPINE FIXATION DEVICE: CPT | Performed by: PHYSICIAN ASSISTANT

## 2024-09-05 PROCEDURE — 85049 AUTOMATED PLATELET COUNT: CPT | Performed by: PHYSICIAN ASSISTANT

## 2024-09-05 PROCEDURE — 84100 ASSAY OF PHOSPHORUS: CPT

## 2024-09-05 PROCEDURE — 8E09XBZ COMPUTER ASSISTED PROCEDURE OF HEAD AND NECK REGION: ICD-10-PCS | Performed by: STUDENT IN AN ORGANIZED HEALTH CARE EDUCATION/TRAINING PROGRAM

## 2024-09-05 PROCEDURE — 61783 SCAN PROC SPINAL: CPT | Performed by: PHYSICIAN ASSISTANT

## 2024-09-05 PROCEDURE — 82803 BLOOD GASES ANY COMBINATION: CPT

## 2024-09-05 PROCEDURE — NC001 PR NO CHARGE: Performed by: STUDENT IN AN ORGANIZED HEALTH CARE EDUCATION/TRAINING PROGRAM

## 2024-09-05 PROCEDURE — 22595 ARTHRD PST TQ ATLAS-AXIS: CPT | Performed by: STUDENT IN AN ORGANIZED HEALTH CARE EDUCATION/TRAINING PROGRAM

## 2024-09-05 PROCEDURE — 84295 ASSAY OF SERUM SODIUM: CPT

## 2024-09-05 PROCEDURE — 99232 SBSQ HOSP IP/OBS MODERATE 35: CPT | Performed by: SURGERY

## 2024-09-05 PROCEDURE — 85025 COMPLETE CBC W/AUTO DIFF WBC: CPT

## 2024-09-05 PROCEDURE — 22600 ARTHRD PST TQ 1NTRSPC CRV: CPT | Performed by: PHYSICIAN ASSISTANT

## 2024-09-05 PROCEDURE — 83735 ASSAY OF MAGNESIUM: CPT

## 2024-09-05 PROCEDURE — 22600 ARTHRD PST TQ 1NTRSPC CRV: CPT | Performed by: STUDENT IN AN ORGANIZED HEALTH CARE EDUCATION/TRAINING PROGRAM

## 2024-09-05 DEVICE — CONNECTOR 3601513 LATERAL CLOSED 13MM
Type: IMPLANTABLE DEVICE | Site: POSTERIOR CERVICAL | Status: FUNCTIONAL
Brand: INFINITY™ OCCIPITOCERVICAL UPPER THORACIC SYSTEM

## 2024-09-05 DEVICE — DBM T45010 10CC PASTE GRAFTON PLUS
Type: IMPLANTABLE DEVICE | Site: POSTERIOR CERVICAL | Status: FUNCTIONAL
Brand: GRAFTON®AND GRAFTON PLUS®DEMINERALIZED BONE MATRIX (DBM)

## 2024-09-05 RX ORDER — HYDROMORPHONE HCL IN WATER/PF 6 MG/30 ML
0.2 PATIENT CONTROLLED ANALGESIA SYRINGE INTRAVENOUS EVERY 2 HOUR PRN
Status: DISCONTINUED | OUTPATIENT
Start: 2024-09-05 | End: 2024-09-09

## 2024-09-05 RX ORDER — SODIUM CHLORIDE 9 MG/ML
INJECTION, SOLUTION INTRAVENOUS CONTINUOUS PRN
Status: DISCONTINUED | OUTPATIENT
Start: 2024-09-05 | End: 2024-09-05

## 2024-09-05 RX ORDER — ONDANSETRON 2 MG/ML
4 INJECTION INTRAMUSCULAR; INTRAVENOUS EVERY 6 HOURS PRN
Status: DISCONTINUED | OUTPATIENT
Start: 2024-09-05 | End: 2024-09-11 | Stop reason: HOSPADM

## 2024-09-05 RX ORDER — CEFAZOLIN SODIUM 1 G/50ML
1000 SOLUTION INTRAVENOUS EVERY 8 HOURS
Status: COMPLETED | OUTPATIENT
Start: 2024-09-05 | End: 2024-09-06

## 2024-09-05 RX ORDER — ACETAMINOPHEN 325 MG/1
975 TABLET ORAL EVERY 8 HOURS SCHEDULED
Status: DISCONTINUED | OUTPATIENT
Start: 2024-09-05 | End: 2024-09-11 | Stop reason: HOSPADM

## 2024-09-05 RX ORDER — CALCIUM CARBONATE 500 MG/1
1000 TABLET, CHEWABLE ORAL DAILY PRN
Status: DISCONTINUED | OUTPATIENT
Start: 2024-09-05 | End: 2024-09-11 | Stop reason: HOSPADM

## 2024-09-05 RX ORDER — ONDANSETRON 2 MG/ML
4 INJECTION INTRAMUSCULAR; INTRAVENOUS ONCE AS NEEDED
Status: DISCONTINUED | OUTPATIENT
Start: 2024-09-05 | End: 2024-09-05 | Stop reason: HOSPADM

## 2024-09-05 RX ORDER — ESMOLOL HYDROCHLORIDE 10 MG/ML
INJECTION INTRAVENOUS AS NEEDED
Status: DISCONTINUED | OUTPATIENT
Start: 2024-09-05 | End: 2024-09-05

## 2024-09-05 RX ORDER — PROPOFOL 10 MG/ML
INJECTION, EMULSION INTRAVENOUS AS NEEDED
Status: DISCONTINUED | OUTPATIENT
Start: 2024-09-05 | End: 2024-09-05

## 2024-09-05 RX ORDER — SODIUM CHLORIDE, SODIUM LACTATE, POTASSIUM CHLORIDE, CALCIUM CHLORIDE 600; 310; 30; 20 MG/100ML; MG/100ML; MG/100ML; MG/100ML
INJECTION, SOLUTION INTRAVENOUS CONTINUOUS PRN
Status: DISCONTINUED | OUTPATIENT
Start: 2024-09-05 | End: 2024-09-05

## 2024-09-05 RX ORDER — PROPOFOL 10 MG/ML
INJECTION, EMULSION INTRAVENOUS CONTINUOUS PRN
Status: DISCONTINUED | OUTPATIENT
Start: 2024-09-05 | End: 2024-09-05

## 2024-09-05 RX ORDER — SODIUM CHLORIDE 9 MG/ML
125 INJECTION, SOLUTION INTRAVENOUS CONTINUOUS
Status: DISCONTINUED | OUTPATIENT
Start: 2024-09-05 | End: 2024-09-05

## 2024-09-05 RX ORDER — HEPARIN SODIUM 5000 [USP'U]/ML
5000 INJECTION, SOLUTION INTRAVENOUS; SUBCUTANEOUS EVERY 8 HOURS SCHEDULED
Status: DISCONTINUED | OUTPATIENT
Start: 2024-09-06 | End: 2024-09-08

## 2024-09-05 RX ORDER — ONDANSETRON 2 MG/ML
INJECTION INTRAMUSCULAR; INTRAVENOUS AS NEEDED
Status: DISCONTINUED | OUTPATIENT
Start: 2024-09-05 | End: 2024-09-05

## 2024-09-05 RX ORDER — LIDOCAINE HYDROCHLORIDE AND EPINEPHRINE 10; 10 MG/ML; UG/ML
INJECTION, SOLUTION INFILTRATION; PERINEURAL AS NEEDED
Status: DISCONTINUED | OUTPATIENT
Start: 2024-09-05 | End: 2024-09-05 | Stop reason: HOSPADM

## 2024-09-05 RX ORDER — ROCURONIUM BROMIDE 10 MG/ML
INJECTION, SOLUTION INTRAVENOUS AS NEEDED
Status: DISCONTINUED | OUTPATIENT
Start: 2024-09-05 | End: 2024-09-05

## 2024-09-05 RX ORDER — BUPIVACAINE HYDROCHLORIDE 2.5 MG/ML
INJECTION, SOLUTION EPIDURAL; INFILTRATION; INTRACAUDAL AS NEEDED
Status: DISCONTINUED | OUTPATIENT
Start: 2024-09-05 | End: 2024-09-05 | Stop reason: HOSPADM

## 2024-09-05 RX ORDER — SUCCINYLCHOLINE/SOD CL,ISO/PF 100 MG/5ML
SYRINGE (ML) INTRAVENOUS AS NEEDED
Status: DISCONTINUED | OUTPATIENT
Start: 2024-09-05 | End: 2024-09-05

## 2024-09-05 RX ORDER — GINSENG 100 MG
CAPSULE ORAL AS NEEDED
Status: DISCONTINUED | OUTPATIENT
Start: 2024-09-05 | End: 2024-09-05 | Stop reason: HOSPADM

## 2024-09-05 RX ORDER — BISACODYL 10 MG
10 SUPPOSITORY, RECTAL RECTAL DAILY PRN
Status: DISCONTINUED | OUTPATIENT
Start: 2024-09-07 | End: 2024-09-09

## 2024-09-05 RX ORDER — FENTANYL CITRATE 50 UG/ML
INJECTION, SOLUTION INTRAMUSCULAR; INTRAVENOUS AS NEEDED
Status: DISCONTINUED | OUTPATIENT
Start: 2024-09-05 | End: 2024-09-05

## 2024-09-05 RX ORDER — ACETAMINOPHEN 10 MG/ML
1000 INJECTION, SOLUTION INTRAVENOUS ONCE
Status: COMPLETED | OUTPATIENT
Start: 2024-09-05 | End: 2024-09-05

## 2024-09-05 RX ORDER — LIDOCAINE HYDROCHLORIDE 10 MG/ML
INJECTION, SOLUTION EPIDURAL; INFILTRATION; INTRACAUDAL; PERINEURAL AS NEEDED
Status: DISCONTINUED | OUTPATIENT
Start: 2024-09-05 | End: 2024-09-05

## 2024-09-05 RX ORDER — SENNOSIDES 8.6 MG
1 TABLET ORAL DAILY
Status: DISCONTINUED | OUTPATIENT
Start: 2024-09-06 | End: 2024-09-06

## 2024-09-05 RX ORDER — DOCUSATE SODIUM 100 MG/1
100 CAPSULE, LIQUID FILLED ORAL 2 TIMES DAILY
Status: DISCONTINUED | OUTPATIENT
Start: 2024-09-06 | End: 2024-09-07

## 2024-09-05 RX ORDER — FENTANYL CITRATE/PF 50 MCG/ML
12.5 SYRINGE (ML) INJECTION
Status: DISCONTINUED | OUTPATIENT
Start: 2024-09-05 | End: 2024-09-05 | Stop reason: HOSPADM

## 2024-09-05 RX ORDER — DIGOXIN 125 MCG
125 TABLET ORAL EVERY OTHER DAY
Status: DISCONTINUED | OUTPATIENT
Start: 2024-09-06 | End: 2024-09-11 | Stop reason: HOSPADM

## 2024-09-05 RX ORDER — OXYCODONE HYDROCHLORIDE 5 MG/1
5 TABLET ORAL EVERY 4 HOURS PRN
Status: DISCONTINUED | OUTPATIENT
Start: 2024-09-05 | End: 2024-09-11 | Stop reason: HOSPADM

## 2024-09-05 RX ADMIN — SODIUM CHLORIDE: 0.9 INJECTION, SOLUTION INTRAVENOUS at 12:38

## 2024-09-05 RX ADMIN — ROCURONIUM BROMIDE 10 MG: 10 INJECTION, SOLUTION INTRAVENOUS at 14:05

## 2024-09-05 RX ADMIN — FENTANYL CITRATE 75 MCG: 50 INJECTION INTRAMUSCULAR; INTRAVENOUS at 12:28

## 2024-09-05 RX ADMIN — DEXMEDETOMIDINE HYDROCHLORIDE 8 MCG: 100 INJECTION, SOLUTION INTRAVENOUS at 16:36

## 2024-09-05 RX ADMIN — PROPOFOL 100 MCG/KG/MIN: 10 INJECTION, EMULSION INTRAVENOUS at 12:38

## 2024-09-05 RX ADMIN — SERTRALINE HYDROCHLORIDE 100 MG: 100 TABLET ORAL at 08:47

## 2024-09-05 RX ADMIN — DILTIAZEM HYDROCHLORIDE 180 MG: 180 CAPSULE, COATED, EXTENDED RELEASE ORAL at 08:47

## 2024-09-05 RX ADMIN — SODIUM CHLORIDE, SODIUM GLUCONATE, SODIUM ACETATE, POTASSIUM CHLORIDE, MAGNESIUM CHLORIDE, SODIUM PHOSPHATE, DIBASIC, AND POTASSIUM PHOSPHATE 100 ML/HR: .53; .5; .37; .037; .03; .012; .00082 INJECTION, SOLUTION INTRAVENOUS at 08:48

## 2024-09-05 RX ADMIN — FENTANYL CITRATE 25 MCG: 50 INJECTION INTRAMUSCULAR; INTRAVENOUS at 12:16

## 2024-09-05 RX ADMIN — SODIUM CHLORIDE 0.05 MCG/KG/MIN: 900 INJECTION INTRAVENOUS at 12:42

## 2024-09-05 RX ADMIN — HYDROMORPHONE HYDROCHLORIDE 0.2 MG: 0.2 INJECTION, SOLUTION INTRAMUSCULAR; INTRAVENOUS; SUBCUTANEOUS at 21:19

## 2024-09-05 RX ADMIN — ESMOLOL HYDROCHLORIDE 30 MG: 100 INJECTION, SOLUTION INTRAVENOUS at 13:31

## 2024-09-05 RX ADMIN — SUGAMMADEX 200 MG: 100 INJECTION, SOLUTION INTRAVENOUS at 14:39

## 2024-09-05 RX ADMIN — ESMOLOL HYDROCHLORIDE 30 MG: 100 INJECTION, SOLUTION INTRAVENOUS at 13:41

## 2024-09-05 RX ADMIN — Medication 100 MG: at 12:28

## 2024-09-05 RX ADMIN — ROCURONIUM BROMIDE 20 MG: 10 INJECTION, SOLUTION INTRAVENOUS at 13:56

## 2024-09-05 RX ADMIN — CEFAZOLIN SODIUM 2000 MG: 2 SOLUTION INTRAVENOUS at 13:29

## 2024-09-05 RX ADMIN — NOREPINEPHRINE BITARTRATE 8 MCG: 1 INJECTION, SOLUTION, CONCENTRATE INTRAVENOUS at 14:33

## 2024-09-05 RX ADMIN — PROPOFOL 80 MG: 10 INJECTION, EMULSION INTRAVENOUS at 12:28

## 2024-09-05 RX ADMIN — PHENYLEPHRINE HYDROCHLORIDE 40 MCG/MIN: 10 INJECTION INTRAVENOUS at 13:50

## 2024-09-05 RX ADMIN — CEFAZOLIN SODIUM 1000 MG: 1 SOLUTION INTRAVENOUS at 21:18

## 2024-09-05 RX ADMIN — ONDANSETRON 4 MG: 2 INJECTION INTRAMUSCULAR; INTRAVENOUS at 16:29

## 2024-09-05 RX ADMIN — CHLORHEXIDINE GLUCONATE 0.12% ORAL RINSE 15 ML: 1.2 LIQUID ORAL at 08:47

## 2024-09-05 RX ADMIN — DEXMEDETOMIDINE HYDROCHLORIDE 4 MCG: 100 INJECTION, SOLUTION INTRAVENOUS at 16:34

## 2024-09-05 RX ADMIN — ACETAMINOPHEN 1000 MG: 10 INJECTION INTRAVENOUS at 19:29

## 2024-09-05 RX ADMIN — SODIUM CHLORIDE 125 ML/HR: 0.9 INJECTION, SOLUTION INTRAVENOUS at 21:18

## 2024-09-05 RX ADMIN — FENTANYL CITRATE 12.5 MCG: 50 INJECTION INTRAMUSCULAR; INTRAVENOUS at 19:33

## 2024-09-05 RX ADMIN — SODIUM CHLORIDE, SODIUM LACTATE, POTASSIUM CHLORIDE, AND CALCIUM CHLORIDE: .6; .31; .03; .02 INJECTION, SOLUTION INTRAVENOUS at 12:12

## 2024-09-05 RX ADMIN — NOREPINEPHRINE BITARTRATE 2 MCG/MIN: 1 INJECTION, SOLUTION, CONCENTRATE INTRAVENOUS at 13:05

## 2024-09-05 RX ADMIN — LIDOCAINE HYDROCHLORIDE 50 MG: 10 INJECTION, SOLUTION EPIDURAL; INFILTRATION; INTRACAUDAL; PERINEURAL at 12:28

## 2024-09-05 RX ADMIN — SODIUM CHLORIDE, SODIUM LACTATE, POTASSIUM CHLORIDE, AND CALCIUM CHLORIDE: .6; .31; .03; .02 INJECTION, SOLUTION INTRAVENOUS at 17:27

## 2024-09-05 NOTE — UTILIZATION REVIEW
Initial Clinical Review    Admission: Date/Time/Statement:   Admission Orders (From admission, onward)       Ordered        09/04/24 2001  Inpatient Admission  Once                          Orders Placed This Encounter   Procedures    Inpatient Admission     Standing Status:   Standing     Number of Occurrences:   1     Order Specific Question:   Level of Care     Answer:   Level 1 Stepdown [13]     Order Specific Question:   Estimated length of stay     Answer:   More than 2 Midnights     Order Specific Question:   Certification     Answer:   I certify that inpatient services are medically necessary for this patient for a duration of greater than two midnights. See H&P and MD Progress Notes for additional information about the patient's course of treatment.     ED Arrival Information       Expected   9/4/2024     Arrival   9/4/2024 13:37    Acuity   Emergent              Means of arrival   Ambulance    Escorted by   [facility]    Service   Trauma    Admission type   Emergency              Arrival complaint   txfr from [place] - fall, odontoid fx, C1 fx - pickup [address]             Chief Complaint   Patient presents with    Trauma     Trauma transfer from [place], see trauma narrator        Initial Presentation: 79 y.o. female with PMHx includes A-fib, hypothyroidism, HFpEF, hypertension, hyperlipidemia, who presented initially to [facility] then transferred to [facility], admitted Inpatient status dt Odontoid fracture and Spinal cord compression.  Presented due to increasing neck pain. She states she had a fall about 1-1/2 months ago where she rolled off the side of her bed and may have hit her head on a nightstand on the way down. She did not seek medical attention at that time because she had no apparent injuries. She states that since then she has had gradually increasing neck pain. At [facility] ED, she was found to have the spinal cord compression. Also states of bilateral knee  pain. Transferred for further eval by Neurosurgery.     Plan:  Admit to [facility]:  Neurosurgery consult, monitor neuro exam, consider MRI Cspine, order CT head and neck. Monitor volume status due to hx of heart failure, daily wts. PT OT eval, Geriatrics and CM consults.     9/4 Per Neurosurgery: Closed type II odontoid fracture with posterior displacement: CT scan of the cervical spine demonstrates type II odontoid fracture with posterior displacement and angulation and likely compression of the underlying spinal cord.  Await results of MRI of cervical spine and CTA.  Suspect this is an stable fracture given mechanical neck pain though it is unclear if gait issues are related.    Tentative plan for surgical fixation tomorrow.  Patient to remain n.p.o. in the interim.  In the interim, maintain strict C-spine precautions with every 4 neuro checks.  Surgical optimization as per surgical critical care/trauma.    9/4 Per General Sx:  [place] collar, freq neuro checks, pain control, monitor UOP.     Date: 9/5   Day 2: No acute events overnight, no complaints, NPO for surgery today. Continue neurochecks qh, bowel regimen, hold home ASA. PT OT when able.    9/5 OP Note:  Procedure(s):  1) Bilateral C1 and C3 lateral mass screw placement  2) Bilateral C2 pedicle screw placement  3) Arthrodesis C1-3 with autologous bone graft and [place] putty  4) Use of neuronavigation, [employer] Stealth  5) Use of neuromonitoring  6) Use of fluoroscopy and O-arm  7) Use of [place] headholder  Anesthesia Type: General  Operative Findings:C2 fracture with rotation of C1     Date: 9/6  Day 3: Has surpassed a 2nd midnight with active treatments and services. Overnight pt discontinued arterial line. This AM complains of spasm neck and head pain. DC IVF and V[place]se today. Maintain C Collar and strict C Spine precautions, Neurosurgery following. Continue cardizem/digoxin, consider lasix. Continue bowel regimen, PT OT.     ED Triage Vitals   Temperature  Pulse Respirations Blood Pressure SpO2 Pain Score   09/04/24 1347 09/04/24 1345 09/04/24 1345 09/04/24 1345 09/04/24 1345 09/04/24 2312   97.7 °F (36.5 °C) 91 20 160/79 95 % No Pain     Weight (last 2 days)       Date/Time Weight    09/06/24 0548 56.2 (123.9)    09/06/24 0340 56.2 (123.9)    09/05/24 2042 53.1 (117.06)    09/05/24 0538 52.7 (116.18)            Vital Signs (last 3 days)       Date/Time Temp Pulse Resp BP MAP (mmHg) Arterial Line BP MAP SpO2 FiO2 (%) Calculated FIO2 (%) - Nasal Cannula Nasal Cannula O2 Flow Rate (L/min) O2 Device Cardiac (WDL) Patient Position - Orthostatic VS Ford Coma Scale Score Pain    09/06/24 0725 97.8 °F (36.6 °C) -- -- 145/84 -- -- -- -- -- -- -- -- -- Lying -- --    09/06/24 0515 -- -- -- -- -- -- -- -- -- -- -- -- -- -- 15 --    09/06/24 0415 -- -- -- -- -- -- -- -- -- -- -- -- -- -- 15 --    09/06/24 0340 97.5 °F (36.4 °C) 94 17 139/81 106 -- -- 100 % -- -- -- -- -- -- -- --    09/06/24 0315 -- -- -- -- -- -- -- -- -- -- -- -- -- -- 15 --    09/06/24 0215 -- -- -- -- -- -- -- -- -- -- -- -- -- -- 15 --    09/06/24 0200 -- 94 19 -- -- 126/62 86 mmHg 98 % -- -- -- -- -- -- -- --    09/06/24 0115 -- 90 18 -- -- 124/60 84 mmHg 100 % -- -- -- -- -- -- 15 --    09/06/24 0100 -- 92 19 -- -- 122/60 84 mmHg 100 % -- -- -- -- -- -- -- --    09/06/24 0030 -- 96 17 -- -- 116/58 80 mmHg 97 % -- -- -- -- -- -- -- --    09/06/24 0015 -- 94 14 134/64 86 114/56 78 mmHg 93 % -- -- -- -- -- -- 15 --    09/06/24 0000 -- 92 16 -- -- 114/58 78 mmHg 96 % -- -- -- -- -- -- -- --    09/05/24 2330 -- 92 14 -- -- 116/58 80 mmHg 97 % -- -- -- -- -- -- -- --    09/05/24 2300 97.7 °F (36.5 °C) 92 20 -- -- 112/58 78 mmHg 98 % -- -- -- -- -- -- 15 --    09/05/24 2230 -- 92 19 -- -- 118/62 84 mmHg 100 % -- -- -- -- -- -- -- --    09/05/24 2200 -- 94 20 -- -- 120/62 84 mmHg 100 % -- -- -- -- -- -- 15 --    09/05/24 2130 -- 92 23 -- -- 118/64 86 mmHg 91 % -- -- -- -- -- -- -- --    09/05/24 2118 -- -- --  -- -- -- -- -- -- -- -- -- -- -- -- 8    09/05/24 2100 -- 96 15 -- -- 118/62 84 mmHg 100 % -- -- -- -- -- -- -- --    09/05/24 2042 97.9 °F (36.6 °C) 98 -- 142/78 104 -- -- -- -- -- -- -- -- -- 15 --    09/05/24 2000 -- 102 20 130/65 87 102/55 74 mmHg 99 % -- 28 2 L/min -- -- Lying -- --    09/1945 -- 101 19 152/74 106 -- -- 100 % -- 28 2 L/min Nasal cannula -- Lying -- --    09/05/24 1933 -- 104 11 150/68 98 -- -- 99 % -- 36 4 L/min Nasal cannula -- Lying -- 10 - Worst Possible Pain    09/05/24 1930 -- 99 18 150/68 98 -- -- 98 % -- 44 6 L/min Nasal cannula -- Lying 14 No Pain    09/05/24 1918 -- 111 25 150/82 110 -- -- 100 % -- 44 6 L/min Nasal cannula WDL Lying 13 10 - Worst Possible Pain    09/05/24 1900 -- 113 33 146/72 104 -- -- 92 % 40 -- -- Ventilator -- Lying 10 --    09/05/24 1845 -- 111 28 155/75 105 -- -- 99 % 40 -- -- Ventilator -- Lying 10 --    09/05/24 1830 -- 112 36 158/73 94 -- -- 99 % 40 -- -- Ventilator -- Lying 10 --    09/05/24 1815 -- 100 24 137/72 92 -- -- 100 % 40 -- -- Ventilator -- Lying 10 --    09/05/24 1801 -- -- -- -- -- -- -- -- 40 -- -- Ventilator -- -- -- --    09/05/24 1800 -- 102 17 124/68 88 -- -- 100 % 40 -- -- -- -- Lying -- --    09/05/24 1758 98.6 °F (37 °C) 104 18 109/92 86 -- -- 100 % 40 -- -- Ventilator WDL Lying -- --    09/05/24 1155 96.2 °F (35.7 °C) -- -- -- -- -- -- -- -- 28 2 L/min Nasal cannula -- -- -- 8    09/05/24 0830 98 °F (36.7 °C) -- -- -- -- -- -- -- -- -- -- -- -- -- -- --    09/05/24 0700 -- -- -- -- -- -- -- -- -- -- -- -- -- -- 14 --    09/05/24 0600 -- -- -- -- -- -- -- -- -- -- -- -- -- -- 14 --    09/05/24 0500 -- -- -- -- -- -- -- -- -- -- -- -- -- -- 14 --    09/05/24 0400 -- -- -- -- -- -- -- -- -- -- -- -- -- -- 14 --    09/05/24 0325 97.4 °F (36.3 °C) 96 20 144/77 105 -- -- 100 % -- -- -- -- -- -- -- --    09/05/24 0300 -- -- -- -- -- -- -- -- -- -- -- -- -- -- 15 --    09/05/24 0200 -- -- -- -- -- -- -- -- -- -- -- -- -- -- 14 --     09/05/24 0100 -- -- -- -- -- -- -- -- -- -- -- -- -- -- 15 --    09/05/24 0000 -- -- -- -- -- -- -- -- -- -- -- -- -- -- 15 --    09/04/24 2312 -- -- -- -- -- -- -- -- -- -- -- -- -- -- -- No Pain    09/04/24 2300 -- -- -- -- -- -- -- -- -- -- -- -- -- -- 15 --    09/04/24 2235 97.9 °F (36.6 °C) -- -- 137/88 105 -- -- -- -- -- -- -- -- -- -- --    09/04/24 2200 -- 96 20 151/92 -- -- -- 95 % -- -- -- None (Room air) -- -- 15 --    09/04/24 2100 -- 101 18 148/93 -- -- -- 97 % -- -- -- None (Room air) -- -- 15 --    09/04/24 2053 -- -- -- -- -- -- -- -- -- -- -- -- -- -- 15 --    09/04/24 2000 -- 99 18 153/67 -- -- -- 96 % -- -- -- None (Room air) -- -- 15 --    09/04/24 1905 -- 102 -- -- -- -- -- 94 % -- -- -- None (Room air) -- -- -- --    09/04/24 19:00:26 -- 102 18 159/62 -- -- -- 95 % -- -- -- None (Room air) -- -- -- --    09/04/24 1900 -- -- -- -- -- -- -- -- -- -- -- -- -- -- 15 --    09/04/24 1846 -- -- -- -- -- -- -- -- -- -- -- None (Room air) -- -- -- --    09/04/24 1600 -- 102 21 144/74 -- -- -- 95 % -- -- -- None (Room air) -- Lying 15 --    09/04/24 1500 -- 94 26 148/70 -- -- -- 97 % -- -- -- None (Room air) -- Lying 15 --    09/04/24 1400 -- 98 20 149/85 -- -- -- 97 % -- -- -- None (Room air) -- Lying 15 --    09/04/24 1347 97.7 °F (36.5 °C) -- -- -- -- -- -- -- -- -- -- -- -- -- -- --    09/04/24 13:45:48 -- 91 20 160/79 -- -- -- 95 % -- -- -- None (Room air) -- Lying 15 --              Pertinent Labs/Diagnostic Test Results:   Radiology:  XR spine cervical 2 or 3 vw injury   Final Interpretation by Jermaine Ferro MD (09/06 0943)      Fluoroscopy provided for procedure guidance.      Please refer to the separate procedure note for additional details.                  Workstation performed: [id number]         CTA head and neck with and without contrast   Final Interpretation by [place] MD (09/04 2121)      CT Brain:  No acute intracranial abnormality.      CT Angiography:  Unremarkable CTA neck  and brain.                  Workstation performed: [id number]         MRI cervical spine wo contrast   Final Interpretation by Dano Chung MD (09/04 1911)      1. Displaced type II dens fracture with severe posterior displacement of the dens and C1 vertebral body relative to the C2 vertebral body. There is resultant mass effect on the cord which appears relatively mild given the degree of displacement. No gross    cord edema is seen, however the exam is motion degraded.   2. Discontinuity of the ALL and irregularity of the PLL. There is also likely interspinous ligament injury at C1-C2.   3. Facet edema at C2-C3 may reflect facet capsular injury.   4. Prominent edema in the C1 and C2 vertebral bodies compatible with known C2 fracture. There is also paravertebral edema.   5. Limited evaluation of the flow voids given motion artifact. Consider CT angiogram of the neck to evaluate the vertebral arteries given the significant distraction at C1-C2.      The study was marked in EPIC for immediate notification.         Workstation performed: [id number]         XR cervical spine 2 or 3 views    (Results Pending)     Cardiology:  No orders to display     GI:  No orders to display           Results from last 7 days   Lab Units 09/06/24 0620 09/05/24 2325 09/05/24 1906 09/05/24 1744 09/05/24 0538 09/04/24  1036   WBC Thousand/uL 6.55  --   --   --  4.87 5.13   HEMOGLOBIN g/dL 14.1  --   --   --  14.7 15.4   I STAT HEMOGLOBIN g/dl  --   --  13.3 13.3  --   --    HEMATOCRIT % 44.8  --   --   --  46.7* 49.9*   HEMATOCRIT, ISTAT %  --   --  39 39  --   --    PLATELETS Thousands/uL 149 146*  --   --  151 156   TOTAL NEUT ABS Thousands/µL  --   --   --   --  3.71 3.67         Results from last 7 days   Lab Units 09/06/24 0620 09/05/24 1906 09/05/24 1744 09/05/24 0538 09/04/24  1123   SODIUM mmol/L 140  --   --  140 140   POTASSIUM mmol/L 4.8  --   --  4.0 4.7   CHLORIDE mmol/L 101  --   --  100 101   CO2 mmol/L 24   --   --  26 27   CO2, I-STAT mmol/L  --  21 27  --   --    ANION GAP mmol/L 15*  --   --  14* 12   BUN mg/dL 15  --   --  12 13   CREATININE mg/dL 0.78  --   --  0.78 0.83   EGFR ml/min/1.73sq m 72  --   --  72 67   CALCIUM mg/dL 9.1  --   --  9.7 9.9   CALCIUM, IONIZED mmol/L  --   --   --  1.18  --    CALCIUM, IONIZED, ISTAT mmol/L  --  1.14 1.21  --   --    MAGNESIUM mg/dL 2.2  --   --  2.3  --    PHOSPHORUS mg/dL 5.0*  --   --  3.8  --      Results from last 7 days   Lab Units 09/04/24  1123   AST U/L 32   ALT U/L 12   ALK PHOS U/L 71   TOTAL PROTEIN g/dL 7.1   ALBUMIN g/dL 4.4   TOTAL BILIRUBIN mg/dL 0.74         Results from last 7 days   Lab Units 09/06/24  0620 09/05/24  0538 09/04/24  1123   GLUCOSE RANDOM mg/dL 101 71 98         Results from last 7 days   Lab Units 09/04/24  1837   HEMOGLOBIN A1C % 5.3   EAG mg/dl 105     Results from last 7 days   Lab Units 09/05/24  1906 09/05/24  1744   I STAT BASE EXC mmol/L -2 -3*   I STAT O2 SAT % 100*  --    ISTAT PH ART  7.468* 7.242*   I STAT ART PCO2 mm HG 28.1* 58.3*   I STAT ART PO2 mm .0* >400.0*   I STAT ART HCO3 mmol/L 20.3* 25.1     Results from last 7 days   Lab Units 09/06/24  0620 09/04/24  1119   PROTIME seconds 12.8 13.5   INR  0.93 0.98   PTT seconds 27 33     ED Treatment-Medication Administration from 09/04/2024 1147 to 09/04/2024 2235         Date/Time Order Dose Route Action     09/04/2024 1644 multi-electrolyte (PLASMALYTE-A/ISOLYTE-S PH 7.4) IV solution 75 mL/hr Intravenous New Bag     09/04/2024 1743 LORazepam (ATIVAN) injection 0.5 mg 0.5 mg Intravenous Given     09/04/2024 2021 multi-electrolyte (PLASMALYTE-A/ISOLYTE-S PH 7.4) IV solution 100 mL/hr Intravenous New Bag     09/04/2024 2015 iohexol (OMNIPAQUE) 350 MG/ML injection (MULTI-DOSE) 85 mL 85 mL Intravenous Given            Past Medical History:   Diagnosis Date    Disease of thyroid gland     Hypotension     Supratherapeutic INR 6/2/2022     Present on Admission:   Closed  odontoid fracture with type II morphology and posterior displacement ([facility])   Spinal cord compression ([facility])   Fall   Atrial fibrillation, chronic ([facility])   B12 deficiency   Hypothyroidism   Essential hypertension   Mixed hyperlipidemia   Hypertensive heart disease with heart failure ([facility])   Depression with anxiety   Mild cognitive impairment of uncertain or unknown etiology   Severe mitral regurgitation      Admitting Diagnosis: Multiple fractures [T07.XXXA]  Spinal cord compression (HCC) [G95.20]  Closed odontoid fracture with type II morphology and posterior displacement, initial encounter ([facility]) [S12.111A]  Age/Sex: 79 y.o. female  Admission Orders:  Scheduled Medications:  acetaminophen, 975 mg, Oral, Q8H CHRIS  atorvastatin, 20 mg, Oral, Daily With Dinner  cefazolin, 1,000 mg, Intravenous, Q8H  chlorhexidine, 15 mL, Mouth/Throat, Q12H CHRIS  digoxin, 125 mcg, Oral, Every Other Day  diltiazem, 180 mg, Oral, Daily  docusate sodium, 100 mg, Oral, BID  heparin (porcine), 5,000 Units, Subcutaneous, Q8H CHRIS  levothyroxine, 137 mcg, Oral, Early Morning  senna, 1 tablet, Oral, Daily  sertraline, 100 mg, Oral, Daily      Continuous IV Infusions:   multi-electrolyte (PLASMALYTE-A/ISOLYTE-S PH 7.4) IV solution  Rate: 100 mL/hr Dose: 100 mL/hr  Freq: Continuous Route: IV  Indications of Use: IV Hydration  Last Dose: 100 mL/hr (09/05/24 2305)  Start: 09/04/24 2000 End: 09/06/24 0701    norepinephrine (LEVOPHED) 4 mg (STANDARD CONCENTRATION) IV in sodium chloride 0.9% 250 mL  Freq: Continuous PRN Route: IV  Last Dose: 0 mcg/min (09/05/24 1350)  Start: 09/05/24 1305 End: 09/05/24 1754    phenylephrine (YUMIKO-SYNEPHRINE) 50 mg (STANDARD CONCENTRATION) in sodium chloride 0.9% 250 mL  Freq: Continuous PRN Route: IV  Last Dose: Stopped (09/05/24 1657)  Start: 09/05/24 1350 End: 09/05/24 1754    propofol (DIPRIVAN) 1000 mg in 100 mL infusion (premix)  Freq: Continuous PRN Route: IV  Last Dose: Stopped (09/05/24 1654)  Start: 09/05/24 1238  End: 09/05/24 1754       PRN Meds:  [START ON 9/7/2024] bisacodyl, 10 mg, Rectal, Daily PRN  calcium carbonate, 1,000 mg, Oral, Daily PRN  HYDROmorphone, 0.2 mg, Intravenous, Q2H PRN x1  ondansetron, 4 mg, Intravenous, Q6H PRN  oxyCODONE, 2.5 mg, Oral, Q4H PRN   Or  oxyCODONE, 5 mg, Oral, Q4H PRN        IP CONSULT TO NEUROSURGERY  IP CONSULT TO CASE MANAGEMENT  IP CONSULT TO SURGICAL CRITICAL CARE  IP CONSULT TO CASE MANAGEMENT  IP CONSULT TO GERONTOLOGY    Network Utilization Review Department  ATTENTION: Please call with any questions or concerns to [phone] and carefully listen to the prompts so that you are directed to the right person. All voicemails are confidential.   For Discharge needs, contact Care Management DC Support Team at [phone] opt. 2  Send all requests for admission clinical reviews, approved or denied determinations and any other requests to dedicated fax number below belonging to the campus where the patient is receiving treatment. List of dedicated fax numbers for the Facilities:  FACILITY NAME UR FAX NUMBER   ADMISSION DENIALS (Administrative/Medical Necessity) [phone]   DISCHARGE SUPPORT TEAM (NETWORK) [phone]   PARENT CHILD HEALTH (Maternity/NICU/Pediatrics) [phone]   [facility] [phone]   [facility] [phone]   Critical access hospital [phone]   [facility] [phone]   [facility] [phone]   [facility] [phone]   [facility] [phone]   [facility] [phone]   [facility] [phone]   [facility] [phone]   [facility] [phone]   [facility] [phone]

## 2024-09-05 NOTE — UTILIZATION REVIEW
NOTIFICATION OF INPATIENT ADMISSION   AUTHORIZATION REQUEST   SERVICING FACILITY:   [facility]  Address: [address] [place] [address]  Tax ID: [id number]  NPI: [id number] ATTENDING PROVIDER:  Attending Name and NPI#: Pramod Cordoba Do [[id number]]  Address: [address] [place] [address]  Phone: [phone]   ADMISSION INFORMATION:  Place of Service: Inpatient [facility]  Place of Service Code: 21  Inpatient Admission Date/Time: 9/4/24  8:01 PM  Discharge Date/Time: No discharge date for patient encounter.  Admitting Diagnosis Code/Description:  Multiple fractures [T07.XXXA]  Spinal cord compression (HCC) [G95.20]  Closed odontoid fracture with type II morphology and posterior displacement, initial encounter ([facility]) [S12.111A]     UTILIZATION REVIEW CONTACT:  Gibran Sanchez Utilization   Network Utilization Review Department  Phone: [phone]  Fax: [phone]  Email: [email or web address]  Contact for approvals/pending authorizations, clinical reviews, and discharge.     PHYSICIAN ADVISORY SERVICES:  Medical Necessity Denial & Fuaj-si-Hrmi Review  Phone: [phone]  Fax: [phone]  Email: [email or web address]     DISCHARGE SUPPORT TEAM:  For Patients Discharge Needs & Updates  Phone: [phone] opt. 2 Fax: [phone]  Email: [email or web address]

## 2024-09-05 NOTE — OCCUPATIONAL THERAPY NOTE
Occupational Therapy Cancel Note        Patient Name: Marsha Oliva  Today's Date: 9/5/2024 09/05/24 1215   OT Last Visit   OT Visit Date 09/05/24   Note Type   Note type Cancelled Session   Cancel Reasons Patient to operating room   Additional Comments OT consult received, chart reviewed. Pt to the OR at this time. Will hold and continue to follow as appropriate post-op.       Enma Carmichael, ARYA, OTR/L

## 2024-09-05 NOTE — PHYSICAL THERAPY NOTE
Physical Therapy Cancellation Note         09/05/24 1305   PT Last Visit   PT Visit Date 09/05/24   Note Type   Note type Evaluation;Cancelled Session   Cancel Reasons Patient to operating room

## 2024-09-05 NOTE — PROGRESS NOTES
[facility]  Progress Note: Critical Care  Name: Marsha Oliva 79 y.o. female I MRN: [id number]  Unit/Bed#: ICCU 209-01 I Date of Admission: 9/4/2024   Date of Service: 9/5/2024 I Hospital Day: 1    Assessment & Plan   Neuro:   Closed type II odontoid fracture with posterior displacement  After recent mechanical fall, increasing neck pain ultimately now coming into the ED for evaluation, transfer from [facility]  9/4 CT C-spine: Type II odontoid fracture with posterior displacement including prominent posterior displacement of C1  9/4 CTh: Negative  9/4 MRI C-spine: Displaced type II dens fracture with with severe posterior displacement of the dens and C1 vertebral body, resultant mass effect on the cord appears relatively mild without gross cord edema, discontinuity of ALL and irregularity of PLL, likely interspinous ligament injury at C1-C2, facet edema at C2-C3 possible facet capsular injury, prominent edema in C1 and C2 vertebral bodies, paravertebral edema  9/4 CTA head/neck: Negative, unremarkable CTA  Neurosurgery consulted, appreciate recommendations, anticipate surgical intervention today 9/5 will discuss w teams  [place]en collar  Holding chemical DVT PPx and ASA  Q1h neurochecks  C-spine precautions, aspiration precautions  Depression/anxiety  Continue home sertraline  Dementia  Continue to monitor  Likely consult geriatrics team postoperatively/when on floor  Analgesia  Multimodal as tolerated  We will address postsurgical pain postoperatively  Delirium precautions  Regulate sleep-wake cycle  CAM-ICU     CV:   HTN  Continue home diltiazem and digoxin  Holding ASA  A-fib not on AC  Continue home diltiazem and digoxin  CHF  Holding home torsemide  Last TTE in June 2022 with LVEF 65%  HLD  Continue home atorvastatin     Pulm:  No active issues  Respiratory protocol  Goal SpO2 greater than 92%  Incentive spirometry post operatively     GI:   Bowel regimen  As needed     :    No active issues  Voiding overnight  Cr 0.78     F/E/N:   Fluids  [id number] while n.p.o.  Electrolytes  Replace for K>4, Phos>3, Mg>2   Nutrition  N.p.o. for surgical intervention today 9/5  Aspiration precautions given high C-spine fracture  Will have speech evaluation prior to initiation of diet     Heme/Onc:   DVT prophylaxis  Holding given planned surgical intervention until cleared by neurosurgery  Holding home ASA     Endo:   Hypothyroidism  Continue home levothyroxine     ID:   No active issues  WBC 4.9 from 5.1  Monitor for fevers, afebrile     MSK/Skin:   Mechanical fall  Fall as above, injuries as above  C-spine precautions  Early mobilization  Out of bed as able  PT/OT when able  Pressure prophylaxis  Frequent turns and repositioning    Disposition: Stepdown Level 1    ICU Core Measures     A: Assess, Prevent, and Manage Pain Has pain been assessed? Yes  Need for changes to pain regimen? No   B: Both SAT/SAT  N/A   C: Choice of Sedation RASS Goal: 0 Alert and Calm  Need for changes to sedation or analgesia regimen? NA   D: Delirium CAM-ICU: Negative   E: Early Mobility  Plan for early mobility? Yes   F: Family Engagement Plan for family engagement today? Yes       Antibiotic Review: surgical ppx      Prophylaxis:  VTE Contraindicated secondary to: planned surgery   Stress Ulcer  not orderedcovered bypantoprazole (PROTONIX) 40 mg tablet [[id number]] (Long-Term Med)        Significant 24hr Events     24hr events: No acute events overnight, no complaints, NPO for surgery today.     Subjective     Review of Systems: Review of Systems   Unable to perform ROS: Dementia        Objective                            Vitals I/O      Most Recent Min/Max in 24hrs   Temp (!) 97.4 °F (36.3 °C) Temp  Min: 97.4 °F (36.3 °C)  Max: 97.9 °F (36.6 °C)   Pulse 96 Pulse  Min: 91  Max: 107   Resp 20 Resp  Min: 18  Max: 26   /77 BP  Min: 137/88  Max: 168/65   O2 Sat 100 % SpO2  Min: 94 %  Max: 100 %      Intake/Output  Summary (Last 24 hours) at 9/5/2024 0806  Last data filed at 9/5/2024 0501  Gross per 24 hour   Intake 866.67 ml   Output 0 ml   Net 866.67 ml       Diet NPO; Sips with meds    Invasive Monitoring           Physical Exam   Physical Exam  Vitals reviewed.   Eyes:      Extraocular Movements: Extraocular movements intact.   Skin:     General: Skin is warm.      Coloration: Skin is not jaundiced or pale.   HENT:      Head: Normocephalic.      Mouth/Throat:      Mouth: Mucous membranes are moist.   Neck:   midline tenderness Cardiovascular:      Rate and Rhythm: Tachycardia present. Rhythm irregular.      Pulses: Normal pulses.   Musculoskeletal:         General: Normal range of motion.   Abdominal: General: There is no distension.      Palpations: Abdomen is soft.      Tenderness: There is no abdominal tenderness.   Constitutional:       General: She is not in acute distress.     Appearance: She is not toxic-appearing.      Interventions: Cervical collar in place.   Pulmonary:      Effort: Pulmonary effort is normal. No respiratory distress.   Neurological:      Mental Status: She is alert. She is CAM ICU negative.      Motor: Strength full and intact in all extremities. No motor deficit.            Diagnostic Studies      EKG:    Imaging:   I have personally reviewed pertinent reports.       Medications:  Scheduled PRN   atorvastatin, 20 mg, Daily With Dinner  cefazolin, 2,000 mg, Once  chlorhexidine, 15 mL, Once  chlorhexidine, 15 mL, Q12H CHRIS  diltiazem, 180 mg, Daily  levothyroxine, 137 mcg, Early Morning  sertraline, 100 mg, Daily          Continuous    multi-electrolyte, 100 mL/hr, Last Rate: 100 mL/hr (09/04/24 2021)         Labs:    CBC    Recent Labs     09/04/24  1036 09/05/24  0538   WBC 5.13 4.87   HGB 15.4 14.7   HCT 49.9* 46.7*    151     BMP    Recent Labs     09/04/24  1123 09/05/24  0538   SODIUM 140 140   K 4.7 4.0    100   CO2 27 26   AGAP 12 14*   BUN 13 12   CREATININE 0.83 0.78    CALCIUM 9.9 9.7       Coags    Recent Labs     09/04/24  1119   INR 0.98   PTT 33        Additional Electrolytes  Recent Labs     09/05/24  0538   MG 2.3   PHOS 3.8   CAIONIZED 1.18          Blood Gas    No recent results  No recent results LFTs  Recent Labs     09/04/24  1123   ALT 12   AST 32   ALKPHOS 71   ALB 4.4   TBILI 0.74       Infectious  No recent results  Glucose  Recent Labs     09/04/24  1123 09/05/24  0538   GLUC 98 71               John Whittaker MD

## 2024-09-05 NOTE — QUICK NOTE
Event-IV contrast infiltration 70 cc.  Patient examined without neurovascular deficit. 2+ pulses, intact sensation, intact strength.  CT placed a cold compress

## 2024-09-05 NOTE — CONSULTS
[facility]  Consult: Critical Care  Name: Marsha Oliva 79 y.o. female I MRN: [id number]  Unit/Bed#: ED 23 I Date of Admission: 9/4/2024   Date of Service: 9/4/2024 I Hospital Day: 0      Inpatient consult to Surgical Critical Care  Consult performed by: John Whittaker MD  Consult ordered by: John Whittaker MD        Assessment & Plan   Neuro:   Closed type II odontoid fracture with posterior displacement  After recent mechanical fall, increasing neck pain ultimately now coming into the ED for evaluation, transfer from [facility]  9/4 CT C-spine: Type II odontoid fracture with posterior displacement including prominent posterior displacement of C1  9/4 CTh: Negative  9/4 MRI C-spine: Displaced type II dens fracture with with severe posterior displacement of the dens and C1 vertebral body, resultant mass effect on the cord appears relatively mild without gross cord edema, discontinuity of ALL and irregularity of PLL, likely interspinous ligament injury at C1-C2, facet edema at C2-C3 possible facet capsular injury, prominent edema in C1 and C2 vertebral bodies, paravertebral edema  9/4 CTA head/neck: Negative, unremarkable CTA  Neurosurgery consulted, appreciate recommendations, possible surgical intervention tomorrow 9/5  [place] collar  Holding chemical DVT PPx and ASA  Every hour neurochecks  C-spine precautions, aspiration precautions  Depression/anxiety  Continue home sertraline  Dementia  Continue to monitor  Likely consult geriatrics team postoperatively/when on floor  Analgesia  Multimodal as tolerated  We will address postsurgical pain postoperatively  Delirium precautions  Regulate sleep-wake cycle  CAM-ICU    CV:   HTN  Continue home diltiazem and digoxin  Holding ASA  A-fib not on AC  Continue home diltiazem and digoxin  CHF  Holding home torsemide  Last TTE in June 2022 with LVEF 65%  HLD  Continue home atorvastatin    Pulm:  No active issues  Respiratory  protocol  Goal SpO2 greater than 92%    GI:   Bowel regimen  As needed    :   No active issues  Monitor UOP    F/E/N:   Fluids  [id number] while n.p.o.  Electrolytes  Replace for K>4, Phos>3, Mg>2   Nutrition  N.p.o. for possible surgical intervention tomorrow 9/5  Aspiration precautions given high C-spine fracture  Will have speech evaluation prior to initiation of diet    Heme/Onc:   DVT prophylaxis  Holding given planned surgical intervention until cleared by neurosurgery  Holding home ASA    Endo:   Hypothyroidism  Continue home levothyroxine    ID:   No active issues  Monitor WBC  Monitor for fevers    MSK/Skin:   Mechanical fall  Fall as above, injuries as above  C-spine precautions  Early mobilization  Out of bed as able  PT/OT when able  Pressure prophylaxis  Frequent turns and repositioning    Disposition: Stepdown Level 1     History of Present Illness     HPI: Marsha Oliva is a 79 y.o. w PMH of HTN, A-fib not on AC, CHF on torsemide, HLD, hypothyroidism, depression/anxiety, presenting as a trauma transfer from [facility] to [facility] ED on 9/4.  Patient had a recent fall within the last month or so and recently started to have neck pain, no loss of consciousness, does take ASA at home.  Patient initially did not seek medical therapy after her fall as she was having no pain at that time, otherwise no recent history of falls, however of note patient does have dementia which has apparently worsened within the last year or so.  On workup patient was found to have type II odontoid fracture with posterior displacement and so was transferred to [facility] for further workup and management including neurosurgical evaluation. Reports some neck pain on evaluation, denies any numbness or tingling in the upper extremities.    History obtained from chart review and the patient.  Review of Systems: Review of Systems   Constitutional:  Negative for appetite change, chills and fever.   Respiratory:  Negative for shortness of  breath.    Cardiovascular:  Negative for chest pain.   Gastrointestinal:  Negative for abdominal distention, abdominal pain, constipation, diarrhea, nausea and vomiting.   Genitourinary:  Negative for difficulty urinating.   Musculoskeletal:  Positive for neck pain and neck stiffness. Negative for back pain.   Skin:  Negative for color change.   Neurological:  Negative for dizziness, weakness, light-headedness, numbness and headaches.   Psychiatric/Behavioral:  Positive for confusion and decreased concentration. Negative for agitation. The patient is not nervous/anxious.    All other systems reviewed and are negative.    Historical Information   Past Medical History:  No date: Disease of thyroid gland  No date: Hypotension  6/2/2022: Supratherapeutic INR Past Surgical History:  No date: HYSTERECTOMY   Current Outpatient Medications   Medication Instructions    Aspirin 81 mg, Oral, Daily    atorvastatin (LIPITOR) 20 mg tablet     atorvastatin (LIPITOR) 40 mg, Oral, Daily with dinner    chlorhexidine (HIBICLENS) 4 % external liquid 1 Application, Topical, Daily PRN, For hand washing with wound care for MRSA positive wound.    cyanocobalamin (VITAMIN B-12) 1,000 mcg, Oral, Weekly    cyanocobalamin 1,000 mcg/mL 1 mL, jPt is not taking    digoxin (LANOXIN) 125 mcg, Oral, Every other day    diltiazem (CARDIZEM CD) 180 mg, Oral, Daily    folic acid (FOLVITE) 1 mg tablet 1 tablet, Oral, Daily    levothyroxine 137 mcg, Oral, Daily    Magnesium Gluconate 30 mg, Oral, 2 times daily, Pt taking 250mg daily    oxygen 2 L/min, Inhalation, As needed    potassium chloride (K-DUR,KLOR-CON) 20 mEq tablet 2 tablets, Oral, Daily, Pt is not  taking    sertraline (ZOLOFT) 100 mg tablet 1 tablet, Oral, Daily    torsemide (DEMADEX) 20 mg, Oral, Daily PRN    No Known Allergies   Social History     Tobacco Use    Smoking status: Former     Types: Cigarettes    Smokeless tobacco: Never   Vaping Use    Vaping status: Never Used   Substance Use  Topics    Alcohol use: Never     Comment: occassional.    Drug use: Never    No family history on file.       Objective                            Vitals I/O      Most Recent Min/Max in 24hrs   Temp 97.7 °F (36.5 °C) Temp  Min: 97.5 °F (36.4 °C)  Max: 97.7 °F (36.5 °C)   Pulse 102 Pulse  Min: 91  Max: 107   Resp 18 Resp  Min: 18  Max: 26   /62 BP  Min: 138/71  Max: 168/65   O2 Sat 94 % SpO2  Min: 94 %  Max: 97 %    No intake or output data in the 24 hours ending 09/04/24 2002    Diet NPO; Sips with meds    Invasive Monitoring           Physical Exam   Physical Exam  Vitals reviewed.   Eyes:      Extraocular Movements: Extraocular movements intact.   Skin:     General: Skin is warm.      Coloration: Skin is not jaundiced or pale.   HENT:      Head: Normocephalic and atraumatic.      Mouth/Throat:      Mouth: Mucous membranes are moist.   Neck:   midline tenderness Cardiovascular:      Rate and Rhythm: Normal rate and regular rhythm.      Pulses: Normal pulses.   Musculoskeletal:         General: Normal range of motion.   Abdominal: General: There is no distension.      Palpations: Abdomen is soft.      Tenderness: There is no abdominal tenderness.   Constitutional:       General: She is not in acute distress.     Appearance: She is not ill-appearing or toxic-appearing.      Interventions: Cervical collar in place.   Pulmonary:      Effort: Pulmonary effort is normal. No respiratory distress.   Neurological:      General: No focal deficit present.      Mental Status: She is alert. She is not agitated.      Motor: Strength full and intact in all extremities.            Diagnostic Studies      EKG:    Imaging:   I have personally reviewed pertinent reports.       Medications:  Scheduled PRN   [START ON 9/5/2024] atorvastatin, 20 mg, Daily With Dinner  [START ON 9/5/2024] cefazolin, 2,000 mg, Once  chlorhexidine, 15 mL, Once  chlorhexidine, 15 mL, Q12H CHRIS  [START ON 9/5/2024] diltiazem, 180 mg, Daily  [START ON  9/5/2024] levothyroxine, 137 mcg, Daily  [START ON 9/5/2024] sertraline, 100 mg, Daily          Continuous    multi-electrolyte, 100 mL/hr         Labs:    CBC    Recent Labs     09/04/24  1036   WBC 5.13   HGB 15.4   HCT 49.9*        BMP    Recent Labs     09/04/24  1123   SODIUM 140   K 4.7      CO2 27   AGAP 12   BUN 13   CREATININE 0.83   CALCIUM 9.9       Coags    Recent Labs     09/04/24  1119   INR 0.98   PTT 33        Additional Electrolytes  No recent results       Blood Gas    No recent results  No recent results LFTs  Recent Labs     09/04/24  1123   ALT 12   AST 32   ALKPHOS 71   ALB 4.4   TBILI 0.74       Infectious  No recent results  Glucose  Recent Labs     09/04/24  1123   GLUC 98                John Whittaker MD

## 2024-09-05 NOTE — CASE MANAGEMENT
Case Management Assessment & Discharge Planning Note    Patient name Marsha Oliva  Location OR POOL/OR POOL MRN [id number]  : 1945 Date 2024       Current Admission Date: 2024  Current Admission Diagnosis:Closed odontoid fracture with type II morphology and posterior displacement (HCC)   Patient Active Problem List    Diagnosis Date Noted Date Diagnosed    Closed odontoid fracture with type II morphology and posterior displacement ([facility]) 2024     Spinal cord compression ([facility]) 2024     Chronic respiratory failure with hypoxia ([facility]) 2023     Stage 3a chronic kidney disease (HCC) 2022     Bilateral hydronephrosis 2022     Proctitis 2022     Fall 2022     Hypokalemia 2022     ILD (interstitial lung disease) ([facility]) 2022     Oropharyngeal aspiration 2022     Elevated troponin 2022     EKG abnormalities 2022     Mild cognitive impairment of uncertain or unknown etiology 2022     Hormone replacement therapy 2022     Apraxia 2021     Constipation, unspecified 2021     Corn or callus 2021     Difficulty in walking, not elsewhere classified 2021     Generalized muscle weakness 2021     Lymphedema, not elsewhere classified 2021     Repeated falls 2021     Hypoxia 2021     Frequent falls 2021     Elevated d-dimer 2021     Venous insufficiency of both lower extremities 2021     Acquired bilateral hammer toes 2018     Lymphedema of both lower extremities 2018     Nonrheumatic mitral valve regurgitation 2018     Anemia 2018     Cardiomegaly 2018     Wound of right leg 2018     Hypertensive heart disease with heart failure (HCC) 2018     Major depressive disorder, single episode, unspecified 2018     Personal history of nicotine dependence 2018     Pleural effusion, not elsewhere classified 2018      Solitary pulmonary nodule 05/13/2018     Atrial fibrillation, chronic (HCC) 05/01/2018     B12 deficiency 01/13/2017     Chronic diastolic CHF (congestive heart failure) (HCC) 01/13/2017     Folate deficiency 01/13/2017     Vitamin D deficiency 06/03/2016     Depression 08/03/2015     Mixed hyperlipidemia 08/03/2015     Osteoarthritis of knee 10/23/2012     Impaired fasting glucose 05/31/2012     Essential hypertension 03/23/2010     Hypothyroidism 03/23/2010     Posttraumatic stress disorder 03/23/2010       LOS (days): 1  Geometric Mean LOS (GMLOS) (days): 2.9  Days to GMLOS:2.2     OBJECTIVE:    Risk of Unplanned Readmission Score: 13.46         Current admission status: Inpatient       Preferred Pharmacy:   [facility] - [place] - [address]  [address] [facility] [address]  Phone: [phone] Fax: [phone]    [id number] - [place] - [address] [facility] [address]  Phone: [phone] Fax: [phone]    Primary Care Provider: Rebecca Solis    Primary Insurance: [facility] REP  Secondary Insurance:     ASSESSMENT:  Active Health Care Proxies    There are no active Health Care Proxies on file.       Advance Directives  Does patient have a Health Care POA?: No  Was patient offered paperwork?: Yes  Does patient currently have a Health Care decision maker?: Yes, please see Health Care Proxy section  Does patient have Advance Directives?: No  Was patient offered paperwork?: Yes  Primary Contact: Tomas Oliva (Spouse) [phone] (H) [phone]    Readmission Root Cause  30 Day Readmission: No    Patient Information  Admitted from:: Home  Mental Status: Alert  During Assessment patient was accompanied by: Not accompanied during assessment  Assessment information provided by:: Spouse  Support Systems: Self, Spouse/significant other, Family members  Home entry access options. Select all that apply.:  Stairs  Number of steps to enter home.: 3  Do the steps have railings?: Yes  Type of Current Residence: 2 story home  Upon entering residence, is there a bedroom on the main floor (no further steps)?: No  A bedroom is located on the following floor levels of residence (select all that apply):: 2nd Floor  Upon entering residence, is there a bathroom on the main floor (no further steps)?: Yes  Number of steps to 2nd floor from main floor: One Flight  Living Arrangements: Lives w/ Spouse/significant other  Is patient a ?: No    Activities of Daily Living Prior to Admission  Functional Status: Independent  Completes ADLs independently?: Yes  Ambulates independently?: Yes  Does patient use assisted devices?: Yes  Assisted Devices (DME) used: Walker, Straight Cane, Shower Chair  Does patient currently own DME?: Yes  What DME does the patient currently own?: Shower Chair, Straight Cane, Walker, Wheelchair  Does patient have a history of Outpatient Therapy (PT/OT)?: Yes    Patient Information Continued  Income Source: Pension/snf  Does patient have prescription coverage?: Yes  Does patient receive dialysis treatments?: No  Does patient have a history of substance abuse?: No  Does patient have a history of Mental Health Diagnosis?: Yes (Major Depressive Disorder)  Is patient receiving treatment for mental health?: No. Patient declined treatment information.  Has patient received inpatient treatment related to mental health in the last 2 years?: No    Means of Transportation  [facility]    Social Determinants of Health (SDOH)      Flowsheet Row Most Recent Value   Housing Stability    In the last 12 months, was there a time when you were not able to pay the mortgage or rent on time? N   In the past 12 months, how many times have you moved where you were living? 0   At any time in the past 12 months, were you homeless or living in a shelter (including now)? N   Transportation  Needs    In the past 12 months, has lack of transportation kept you from medical appointments or from getting medications? no   In the past 12 months, has lack of transportation kept you from meetings, work, or from getting things needed for daily living? No   Food Insecurity    Within the past 12 months, you worried that your food would run out before you got the money to buy more. Never true   Within the past 12 months, the food you bought just didn't last and you didn't have money to get more. Never true   Utilities    In the past 12 months has the electric, gas, oil, or water company threatened to shut off services in your home? No          DISCHARGE DETAILS:    Discharge planning discussed with:: Tomas Oliva (Spouse) [phone] (h) [phone]  Freedom of Choice: Yes     CM contacted family/caregiver?: Yes  Were Treatment Team discharge recommendations reviewed with patient/caregiver?: Yes  Did patient/caregiver verbalize understanding of patient care needs?: N/A- going to facility  Were patient/caregiver advised of the risks associated with not following Treatment Team discharge recommendations?: Yes    Contacts  Patient Contacts: Tomas Oliva (Spouse)  [phone] () [phone]  Relationship to Patient:: Family  Contact Method: Phone  Phone Number: [phone] (h) [phone]  Reason/Outcome: Discharge Planning, Emergency Contact, Continuity of Care    Requested Home Health Care         Is the patient interested in HHC at discharge?: No    DME Referral Provided  Referral made for DME?: No    CM spoke to pt's spouse, as the pt is currently in the OR with N[facility].  Pt lives with her spouse in a 2 story home which has 3STE and 12 steps inside. Pt has a 1st floor bathroom (walk-in shower with grab bars and shower chair).   Pt doesn't drive. Pt is retired. Pt is independent for all ADL/iADLs. Pt ambulates with either a walker or SPC. Pt also has access to a wheelchair. Pt's had 1 fall. Pt enjoys  crossword puzzles. Pt uses [facility].  Pt's been to [facility] in the past.  CM will follow for needs       CM reviewed d/c planning process including the following: identifying help at home, patient preference for d/c planning needs, Discharge Lounge, [facility] Meds to Bed program, availability of treatment team to discuss questions or concerns patient and/or family may have regarding understanding medications and recognizing signs and symptoms once discharged.  CM also encouraged patient to follow up with all recommended appointments after discharge. Patient advised of importance for patient and family to participate in managing patient’s medical well being.

## 2024-09-05 NOTE — PROGRESS NOTES
Progress Note - Neurosurgery   Marsha Oliva 79 y.o. female MRN: [id number]  Unit/Bed#: OR POOL Encounter: [id number]    Assessment:  This is a 79-year-old female presenting with neck pain and gait disturbance.  CT cervical spine demonstrates type II dens fracture with posterior displacement with narrowing of the spinal canal.  MRI demonstrates the fracture with stenosis at this level.    I met with the patient's  and discussed her condition with him.  We reviewed her imaging.  We discussed treatment options including conservative management versus surgery.  After going over the risks and benefits involved with both options, the patient's  elected for surgical fixation of her spine.  He decided this in order to prevent future neurologic injury if her fracture was to further displace.  Our plan is to perform a C1-3 posterior cervical decompression and fusion with the possibility of going occiput to C4.     Risks of the procedure include but are not limited to bleeding, infection, nerve injury which can lead to extremity weakness, numbness, tingling, paralysis, and/or bowel/bladder dysfunction. There is also risk of CSF leak which may require additional procedures to repair. There is risk of injury to the vertebral artery which may require vascular neurosurgery intervention. Major procedure related risks include adjacent segment disease, hardware failure, and the need for further future surgeries. There is also anesthesia related risks which include blood clots in the legs/lungs, heart attack, stroke, coma and death.     The patient's  provided verbal consent to the surgical procedure and signed the consent form.       VTE Prophylaxis: Sequential compression device (Venodyne)     Subjective/Objective   Chief Complaint: C2 fracture    Vitals: Blood pressure 144/77, pulse 96, temperature (!) 96.2 °F (35.7 °C), temperature source Tympanic, resp. rate 20, weight 52.7 kg (116 lb 2.9 oz), SpO2  100%.,Body mass index is 20.58 kg/m².    Physical Exam:   Neurologic Exam:  Awake and alert  Oriented x3  Speech clear and fluent  NGUYỄN with very good strength throughout  Sensation to light touch and pin prick intact throughout  No more's  No clonus  2+ patellar reflexes       Intake/Output                   09/05/24 0701 - 09/06/24 0700     [id number] 2584-0514 Total              Intake    Total Intake -- -- --       Output    Urine  400  -- 400    Urine 400 -- 400    Total Output 400 -- 400       Net I/O     -400 -- -400          Invasive Devices       Peripheral Intravenous Line  Duration             Peripheral IV 09/04/24 Proximal;Right;Ventral (anterior) Forearm <1 day                          Lab Results: I have personally reviewed pertinent results.    Lab Results   Component Value Date    WBC 4.87 09/05/2024    HGB 14.7 09/05/2024    HCT 46.7 (H) 09/05/2024     (H) 09/05/2024     09/05/2024    RBC 4.69 09/05/2024    MCH 31.3 09/05/2024    MCHC 31.5 09/05/2024    RDW 13.7 09/05/2024    MPV 9.4 09/05/2024    NRBC 0 09/05/2024    SODIUM 140 09/05/2024     09/05/2024    CO2 26 09/05/2024    BUN 12 09/05/2024    CREATININE 0.78 09/05/2024    CALCIUM 9.7 09/05/2024    EGFR 72 09/05/2024    ABO O 09/04/2024       Imaging Studies: I have personally reviewed pertinent reports.   and I have personally reviewed pertinent films in PACS

## 2024-09-05 NOTE — ANESTHESIA PREPROCEDURE EVALUATION
Procedure:  nevigated posterior occipital- fusion O-C4 (Bilateral: Spine Cervical)    Relevant Problems   CARDIO   (+) Atrial fibrillation, chronic (HCC)   (+) Essential hypertension   (+) Mixed hyperlipidemia   (+) Nonrheumatic mitral valve regurgitation      ENDO   (+) Hypothyroidism      /RENAL   (+) Bilateral hydronephrosis   (+) Stage 3a chronic kidney disease (HCC)      HEMATOLOGY   (+) Anemia      MUSCULOSKELETAL   (+) Osteoarthritis of knee      NEURO/PSYCH   (+) Depression   (+) Major depressive disorder, single episode, unspecified   (+) Posttraumatic stress disorder   (+) Spinal cord compression (HCC)      PULMONARY   (+) Chronic respiratory failure with hypoxia (HCC)   (+) Pleural effusion, not elsewhere classified        Physical Exam    Airway    Mallampati score: II  TM Distance: >3 FB  Neck ROM: full     Dental   Comment: Lower teeth with caries upper dentures    Cardiovascular  Rhythm: irregular, Rate: abnormal    Pulmonary   Breath sounds clear to auscultation    Other Findings  post-pubertal.      Anesthesia Plan  ASA Score- 4     Anesthesia Type- general with ASA Monitors.         Additional Monitors: arterial line.    Airway Plan: ETT.           Plan Factors-Exercise tolerance (METS): <4 METS.    Chart reviewed. EKG reviewed. Imaging results reviewed. Existing labs reviewed. Patient summary reviewed.    Patient is not a current smoker.              Induction- intravenous.    Postoperative Plan- . Planned trial extubation    Perioperative Resuscitation Plan - Level 1 - Full Code.       Informed Consent- Anesthetic plan and risks discussed with patient.  I personally reviewed this patient with the CRNA. Discussed and agreed on the Anesthesia Plan with the CRNA..

## 2024-09-06 ENCOUNTER — APPOINTMENT (INPATIENT)
Dept: RADIOLOGY | Facility: HOSPITAL | Age: 79
DRG: 472 | End: 2024-09-06
Payer: COMMERCIAL

## 2024-09-06 PROBLEM — I50.30 HEART FAILURE WITH PRESERVED EJECTION FRACTION (HCC): Status: ACTIVE | Noted: 2024-09-06

## 2024-09-06 PROBLEM — Z91.89 AT RISK FOR DELIRIUM: Status: ACTIVE | Noted: 2024-09-06

## 2024-09-06 PROBLEM — G89.11 ACUTE PAIN DUE TO TRAUMA: Status: ACTIVE | Noted: 2024-09-06

## 2024-09-06 LAB
ANION GAP SERPL CALCULATED.3IONS-SCNC: 15 MMOL/L (ref 4–13)
APTT PPP: 27 SECONDS (ref 23–34)
BUN SERPL-MCNC: 15 MG/DL (ref 5–25)
CALCIUM SERPL-MCNC: 9.1 MG/DL (ref 8.4–10.2)
CHLORIDE SERPL-SCNC: 101 MMOL/L (ref 96–108)
CO2 SERPL-SCNC: 24 MMOL/L (ref 21–32)
CREAT SERPL-MCNC: 0.78 MG/DL (ref 0.6–1.3)
ERYTHROCYTE [DISTWIDTH] IN BLOOD BY AUTOMATED COUNT: 13.7 % (ref 11.6–15.1)
GFR SERPL CREATININE-BSD FRML MDRD: 72 ML/MIN/1.73SQ M
GLUCOSE SERPL-MCNC: 101 MG/DL (ref 65–140)
HCT VFR BLD AUTO: 44.8 % (ref 34.8–46.1)
HGB BLD-MCNC: 14.1 G/DL (ref 11.5–15.4)
INR PPP: 0.93 (ref 0.85–1.19)
MAGNESIUM SERPL-MCNC: 2.2 MG/DL (ref 1.9–2.7)
MCH RBC QN AUTO: 31.1 PG (ref 26.8–34.3)
MCHC RBC AUTO-ENTMCNC: 31.5 G/DL (ref 31.4–37.4)
MCV RBC AUTO: 99 FL (ref 82–98)
PHOSPHATE SERPL-MCNC: 5 MG/DL (ref 2.3–4.1)
PLATELET # BLD AUTO: 149 THOUSANDS/UL (ref 149–390)
PMV BLD AUTO: 9.4 FL (ref 8.9–12.7)
POTASSIUM SERPL-SCNC: 4.8 MMOL/L (ref 3.5–5.3)
PROTHROMBIN TIME: 12.8 SECONDS (ref 12.3–15)
RBC # BLD AUTO: 4.53 MILLION/UL (ref 3.81–5.12)
SODIUM SERPL-SCNC: 140 MMOL/L (ref 135–147)
WBC # BLD AUTO: 6.55 THOUSAND/UL (ref 4.31–10.16)

## 2024-09-06 PROCEDURE — 84100 ASSAY OF PHOSPHORUS: CPT | Performed by: PHYSICIAN ASSISTANT

## 2024-09-06 PROCEDURE — 85610 PROTHROMBIN TIME: CPT | Performed by: PHYSICIAN ASSISTANT

## 2024-09-06 PROCEDURE — 99232 SBSQ HOSP IP/OBS MODERATE 35: CPT | Performed by: SURGERY

## 2024-09-06 PROCEDURE — 92610 EVALUATE SWALLOWING FUNCTION: CPT

## 2024-09-06 PROCEDURE — 99024 POSTOP FOLLOW-UP VISIT: CPT | Performed by: PHYSICIAN ASSISTANT

## 2024-09-06 PROCEDURE — 83735 ASSAY OF MAGNESIUM: CPT | Performed by: PHYSICIAN ASSISTANT

## 2024-09-06 PROCEDURE — 85730 THROMBOPLASTIN TIME PARTIAL: CPT | Performed by: PHYSICIAN ASSISTANT

## 2024-09-06 PROCEDURE — 80048 BASIC METABOLIC PNL TOTAL CA: CPT | Performed by: PHYSICIAN ASSISTANT

## 2024-09-06 PROCEDURE — 99223 1ST HOSP IP/OBS HIGH 75: CPT | Performed by: NURSE PRACTITIONER

## 2024-09-06 PROCEDURE — 85027 COMPLETE CBC AUTOMATED: CPT | Performed by: PHYSICIAN ASSISTANT

## 2024-09-06 PROCEDURE — 97167 OT EVAL HIGH COMPLEX 60 MIN: CPT

## 2024-09-06 PROCEDURE — 72040 X-RAY EXAM NECK SPINE 2-3 VW: CPT

## 2024-09-06 PROCEDURE — 97163 PT EVAL HIGH COMPLEX 45 MIN: CPT

## 2024-09-06 RX ORDER — SENNOSIDES 8.6 MG
1 TABLET ORAL 2 TIMES DAILY
Status: DISCONTINUED | OUTPATIENT
Start: 2024-09-06 | End: 2024-09-07

## 2024-09-06 RX ORDER — POLYETHYLENE GLYCOL 3350 17 G/17G
17 POWDER, FOR SOLUTION ORAL DAILY
Status: DISCONTINUED | OUTPATIENT
Start: 2024-09-06 | End: 2024-09-06

## 2024-09-06 RX ORDER — LEVOTHYROXINE SODIUM 125 UG/1
125 TABLET ORAL
Status: DISCONTINUED | OUTPATIENT
Start: 2024-09-07 | End: 2024-09-11 | Stop reason: HOSPADM

## 2024-09-06 RX ADMIN — CHLORHEXIDINE GLUCONATE 0.12% ORAL RINSE 15 ML: 1.2 LIQUID ORAL at 22:03

## 2024-09-06 RX ADMIN — ACETAMINOPHEN 975 MG: 325 TABLET ORAL at 22:02

## 2024-09-06 RX ADMIN — CHLORHEXIDINE GLUCONATE 0.12% ORAL RINSE 15 ML: 1.2 LIQUID ORAL at 12:50

## 2024-09-06 RX ADMIN — ATORVASTATIN CALCIUM 20 MG: 20 TABLET, FILM COATED ORAL at 18:01

## 2024-09-06 RX ADMIN — ACETAMINOPHEN 975 MG: 325 TABLET ORAL at 06:28

## 2024-09-06 RX ADMIN — CEFAZOLIN SODIUM 1000 MG: 1 SOLUTION INTRAVENOUS at 06:29

## 2024-09-06 RX ADMIN — DOCUSATE SODIUM 100 MG: 100 CAPSULE, LIQUID FILLED ORAL at 18:01

## 2024-09-06 RX ADMIN — ACETAMINOPHEN 975 MG: 325 TABLET ORAL at 13:10

## 2024-09-06 RX ADMIN — SENNOSIDES 8.6 MG: 8.6 TABLET, FILM COATED ORAL at 12:49

## 2024-09-06 RX ADMIN — HEPARIN SODIUM 5000 UNITS: 5000 INJECTION INTRAVENOUS; SUBCUTANEOUS at 18:01

## 2024-09-06 RX ADMIN — CEFAZOLIN SODIUM 1000 MG: 1 SOLUTION INTRAVENOUS at 13:11

## 2024-09-06 RX ADMIN — DILTIAZEM HYDROCHLORIDE 180 MG: 180 CAPSULE, COATED, EXTENDED RELEASE ORAL at 12:49

## 2024-09-06 RX ADMIN — DOCUSATE SODIUM 100 MG: 100 CAPSULE, LIQUID FILLED ORAL at 12:49

## 2024-09-06 RX ADMIN — DIGOXIN 125 MCG: 125 TABLET ORAL at 12:49

## 2024-09-06 RX ADMIN — LEVOTHYROXINE SODIUM 137 MCG: 25 TABLET ORAL at 06:28

## 2024-09-06 RX ADMIN — SERTRALINE HYDROCHLORIDE 100 MG: 100 TABLET ORAL at 12:49

## 2024-09-06 NOTE — ASSESSMENT & PLAN NOTE
S/p C1 and C3 lateral mass screw placement, C2 Pedicle screw placement, arthrodesis of C1-3  See below

## 2024-09-06 NOTE — ASSESSMENT & PLAN NOTE
Wt Readings from Last 3 Encounters:   09/06/24 56.2 kg (123 lb 14.4 oz)   09/04/24 53.5 kg (118 lb)   05/22/24 57.6 kg (127 lb)     ECHO 6/2022: LV size normal, mild to moderate concentric hypertrophy, RV overload, RV dilation, mild to moderate reduction in RV function, bilatrial dilation, severe MR, Moderate TR, Mild LA  Appears euvolemic, however weight is up    Plan:  D/C fluids  Continue cardizem/digoxin  Consider lasix

## 2024-09-06 NOTE — SPEECH THERAPY NOTE
Speech-Language Pathology Bedside Swallow Evaluation      Patient Name: Marsha Oliva    Today's Date: 9/6/2024     Problem List  Principal Problem:    Closed odontoid fracture with type II morphology and posterior displacement (HCC)  Active Problems:    Atrial fibrillation, chronic (HCC)    B12 deficiency    Depression with anxiety    Essential hypertension    Hypothyroidism    Mixed hyperlipidemia    Severe mitral regurgitation    Fall    Hypertensive heart disease with heart failure (HCC)    Mild cognitive impairment of uncertain or unknown etiology    Spinal cord compression (HCC)    Acute pain due to trauma    At risk for delirium    Heart failure with preserved ejection fraction (HCC)    Past Medical History  Past Medical History:   Diagnosis Date    Disease of thyroid gland     Hypotension     Supratherapeutic INR 6/2/2022     Past Surgical History  Past Surgical History:   Procedure Laterality Date    HYSTERECTOMY         Summary  Pt seen for dysphagia eval, she is s/p bilateral C1-C3 screw placements 9/5, now in C-collar. Upper dentures cleaned and in place. Pt presented with s/s suggestive of mild oropharyngeal dysphagia. Mastication was slightly restricted d/t cervical collar, however overall weakness likely also contributed to reduced mastication of solids. Swallow initiation was delayed and effortful with thin liquids. Moderate coughing spell noted x1 after thin via straw. Pt reported she feels like there is a lump in her throat, possibly d/t edema. She tolerated trials of NTL and puree/mech soft without significant difficulty and with no s/s aspiration. Rec initiation of conservative diet now, f/u next week for potential to resume thin liquids vs need for MBS.     Risk/s for Aspiration: mild    Recommended Diet: mechanically altered/level 2 diet and nectar thick liquids   Recommended Form of Meds: crushed with puree   Aspiration precautions and swallowing strategies: upright posture, only feed when  fully alert, slow rate of feeding, and small bites/sips  Other Recommendations: Continue frequent oral care      Current Medical Status per neurosurgery progress note 9/6/24  * Closed odontoid fracture with type II morphology and posterior displacement (HCC)  Assessment & Plan  1 Day Post-Op Procedure(s):  navigated posterior fusion C1-C3 (Dr. Simpson, 9/5/2024)  Patient presented with increased SOB and neck pain with ambulation   Reports fall from bed about 6 weeks ago with onset of neck pain and extremity dysfunction       Special Studies:  CT head and neck 9/4/24 IMPRESSION:  CT Brain:  No acute intracranial abnormality.  CT Angiography:  Unremarkable CTA neck and brain.    Social/Education/Vocational Hx:  Pt lives  with her     Swallow Information   Current Risks for Dysphagia & Aspiration: recent surgery  Current Symptoms/Concerns: coughing with po  Current Diet: NPO   Baseline Diet: regular diet and thin liquids      Baseline Assessment   Behavior/Cognition: alert and lethargic  Speech/Language Status: able to participate in basic conversation and able to follow commands  Patient Positioning: upright in bed  Pain Status/Interventions/Response to Interventions: No report of or nonverbal indications of pain.       Swallow Mechanism Exam  Facial: symmetrical  Labial: decreased strength  Lingual: decreased coordination  Velum: symmetrical  Mandible:  decreased ROM  Dentition: upper dentures  Vocal quality:weak   Volitional Cough: weak   Respiratory Status: on 2L O2     Consistencies Assessed and Performance   Consistencies Administered: thin liquids, nectar thick, puree, and soft solids    Oral Stage: mild, decreased mastication, decreased bolus formation, and suspected decreased control of thin liquids     Pharyngeal Stage: mild, suspected pharyngeal swallow delay, suspected decreased hyolaryngeal elevation upon palpation, and effortful swallow    Esophageal Concerns: none reported      Summary and  Recommendations (see above)    Results Reviewed with: patient, RN, and MD     Treatment Recommended: yes     Frequency of treatment: 2-3x/week    Dysphagia LTG  -Patient will demonstrate safe and effective oral intake (without overt s/s significant oral/pharyngeal dysphagia including s/s penetration or aspiration) for the highest appropriate diet level.     Short Term Goals:  -Pt will tolerate Dysphagia 2/mechanical soft diet and nectar thick liquid with no significant s/s oral or pharyngeal dysphagia across 1-3 diagnostic sessions.    -Patient will tolerate trials of upgraded food and/or liquid texture with no significant s/s of oral or pharyngeal dysphagia including aspiration across 1-3 diagnostic sessions.    -If needed, patient will comply with a Video/Modified Barium Swallow study for more complete assessment of swallowing anatomy/physiology/aspiration risk and to assess efficacy of treatment techniques so as to best guide treatment plan.

## 2024-09-06 NOTE — ASSESSMENT & PLAN NOTE
"S/p Fall within the last month  CT C spine:  \"Type II odontoid fracture with posterior displacement including prominent posterior displacement of C1 with respect to the C2 vertebral body causing a component of cord compression of the medullary cervical spinal cord\"  MRI  \"Displaced type II dens fracture with severe posterior displacement of the dens and C1 vertebral body relative to the C2 vertebral body. There is resultant mass effect on the cord which appears relatively mild given the degree of displacement. No gross cord edema is seen\"  CTA without vascular injury  Went to OR yesterday for PCDF C1-3  Remains in cervical collar    Plan:  Maintain C Collar and strict C Spine precautions  Upright XR per neurosurgery  Pain control as below  Appreciate neurosurgery recs    "

## 2024-09-06 NOTE — CONSULTS
"Consultation - Geriatric Medicine   Marsha Oliva 79 y.o. female MRN: [id number]  Unit/Bed#: [facility] 209-01 Encounter: [id number]      Assessment & Plan   Fall  [place] fall precautions   Assess patient frequently for physical needs, encourage use of assistant devices as needed and directed by PT/OT  Identify cognitive and physical deficits and behaviors that affect risk of falls  Consider moving patient closer to nursing station to monitor more closely for impulsive behavior which may increase risk of falls  Educate patient/family on patient safety including physical limitations and importance of using call bell for assistance   Modify environment to reduce risk of injury including disconnecting from pole when not in use, ensuring adequate lighting in room and restroom, ensuring that path to restroom is clear and free of trip hazards  PT/OT consulted to assist with strengthening/mobility and assist with discharge planning to appropriate level of care    Close odontoid fracture with type II morphology and posterior displacement  S/p fall about 1 month ago   CT C spine\"Type II odontoid fracture with posterior displacement including prominent posterior displacement of C1 with respect to the C2 vertebral body causing a component of cord compression of the medullary cervical spinal cord\"  MRI-\"Displaced type II dens fracture with severe posterior displacement of the dens and C1 vertebral body relative to the C2 vertebral body. There is resultant mass effect on the cord which appears relatively mild given the degree of displacement. No gross cord edema is seen\"  Went to the OR on 9/5/2024 for posterior fusion C1-C3  Collar to be on at all times x 6 weeks  Multimodal pain regimen  Management per neurosurgery    A-fib  Currently rate controlled and asymptomatic  On heparin currently for anticoagulation  Other medications include digoxin and diltiazem  Management per primary    CHF  Weight today 56.2 kg  Currently not on " diuretic-at home patient takes torsemide  Monitor I's and O's  Encouraged cardiac low-sodium diet  Management per primary    Severe mitral regurgitation  Echo from June 2020 with severe MR, moderate TR, mild SD  IV fluids discontinued  Trend per primary    Hypothyroidism  Most recent TSH 0.48 from 5/8/2024  On levothyroxine 125 mcg daily at home, on 137 mcg daily here  Continue outpatient follow-up    Hypertension  Most recent blood pressure 145/84  Blood pressures have trended between 109/92 to 160/79 over the last 2 days  On Cardizem and digoxin for A-fib  Avoid hypotension  Management per primary    Hyperlipidemia  Most recent lipid panel cholesterol 169, triglycerides 115, HDL 76, LDL 70  Takes atorvastatin 20 mg daily  Continue outpatient follow-up    Constipation  Patient complains of constipation  Last BM prior to hospitalization   Is currently on colace 100mg bid and senna daily-will increase senna to twice daily  Call pharmacy to discuss adding MiraLAX with the nectar thick liquids, she reports that that should not be used together-we will hold on adding MiraLAX  Encourage adequate p.o. Hydration  Encourage prune juice as tolerated     Anxiety/depression  Patient denies on exam  Other does take sertraline 100 mg daily  Continue to provide emotional support    Mild cognitive impairment  Patient is alert oriented to person, place, situation and disoriented to time  No cognitive testing on file  Although patient does appear to have a diagnosis of mild cognitive impairment from 2022  Most recent TSH 0.48 on levothyroxine 125 mcg daily  Recommend checking vitamin B12 level  At risk for delirium due to medication, anesthesia, acute pain, medications, sleep disturbance  Recommend delirium precautions  Maintain sleep-wake cycle, avoid nighttime interruptions  minimize overnight interruptions, group overnight vitals/labs/nursing checks as possible  dim lights, close blinds and turn off tv to minimize stimulation and  encourage sleep environment in evenings  Provide adequate pain control  Avoid urinary retention and constipation  Provide frequent and early mobilization  Provide frequent redirection and reorientation as needed  Avoid medications that may worsen or precipitate delirium such as tramadol, benzodiazepines, anticholinergics, and Benadryl  Redirect unwanted behaviors as first-line therapy, avoid physical restraints as able to  Continue to monitor    Vision impairment  Patient does have vision impairment  Wears glasses at baseline   Recommend use of corrective lenses at all appropriate times  Encourage adequate lighting and encourage use of assistance with ambulation  Keep personal belongings close to avoid reaching  Encourage appropriate footwear at all times  Recommend large font for printed materials provided to patient    Appetite/dentition  Wears upper dentures  Does have a bit of a decreased appetite    Frailty  Clinical Frail Scale: 6  Total protein 7.1 and albumin 4.4  Encourage adequate hydration and nutrition, including protein in meals  OOB as tolerated  PT/OT consulted  Encourage early and frequent mobilization    Ambulatory dysfunction  At a baseline ambulates with walker or cane  PT/OT following  Fall precautions  Out of bed as tolerated  Encourage early and frequent mobilization  Encourage adequate hydration and nutrition  Provide adequate pain management   Goal is undetermined  Continue with PT/OT for continued strength and balance training       Home medication review  Atorvastatin 20mg daily  Sertraline 100mg daily  Diltiazem hcl er 180mg daily  Folic acid 1mg daily  Digoxin 125mcg take 1 tablet three times per week  Levothyroxine 125mcg daily   Torsemide 20mg tablets take 2 tablets daily, if edema or weight gain take additional 1 tablet in afternoon- per  not taking anymore   Aspirin 81mg daily - per   Vitamin b12 1000mcg weekly- per    Magnesium 250mg daily  MiraLAX  daily  Personally confirmed with [employer] [employer] order pharmacy    History of Present Illness   Physician Requesting Consult: Pramod Cordoba,*  Reason for Consult / Principal Problem: Close odontoid fracture with type II morphology and posterior displacement  Hx and PE limited by: MCI  Additional history obtained from: Chart review and patient evaluation      HPI: Marsha Oliva is a 79 y.o. year old female who presents with history of A-fib, hypothyroidism, CHF, hypertension, hyperlipidemia, cognitive pain, and ambulatory dysfunction.  She presented to [facility] for increasing neck pain, had a fall about 1 to 1-1/2 months ago.  She reports since then she had increasingly worse neck pain which prompted her to go to [facility] for evaluation.  Patient was then transferred to [facility] for neurosurgery evaluation.    Marsha lives at home with her  in a 4 story home.  She can stay all on 1 floor as there is a bedroom and bathroom on the main living area.  She uses a rolling walker or cane for ambulation.  She is generally independent for bathing and dressing, although sometimes needs assistance.  There are grab bars and shower chairs in the bathroom.  Although she reports she mainly washes in the sink.  She is occasional urinary incontinence.  Her  is on the cooking, cleaning, medications, and finances.  She denies any insomnia, anxiety, or depression.  Although she is on sertraline.  She no longer drives, her  gets her to and from appointments.  Patient does report some mild memory loss,  reports her short-term memory is not good.  History abided by both patient and her  who is bedside during examination    Upon exam patient was adamantly chair, eating lunch.  She appeared comfortable and was in no acute distress.  She denied any significant pain.  She slept well overnight.  Appetite is stable.  She has not yet moved her bowels.  Per nursing no acute concerns  or issues at this time.    Inpatient consult to Gerontology  Consult performed by: JAQUELINE Caballero  Consult ordered by: Jamir Strauss PA-C        Review of Systems   Constitutional:  Negative for activity change, appetite change, chills and fever.   HENT:  Negative for trouble swallowing.    Eyes:  Negative for pain and visual disturbance.   Respiratory:  Negative for cough and shortness of breath.    Cardiovascular:  Negative for chest pain and palpitations.   Gastrointestinal:  Positive for constipation. Negative for abdominal pain, diarrhea, nausea and vomiting.   Genitourinary:  Negative for difficulty urinating, dysuria and hematuria.   Musculoskeletal:  Positive for arthralgias and neck pain. Negative for back pain.   Skin:  Negative for color change and rash.   Neurological:  Positive for weakness. Negative for dizziness, light-headedness and headaches.   Psychiatric/Behavioral:  Positive for confusion. Negative for hallucinations and sleep disturbance. The patient is not nervous/anxious.        Historical Information   Past Medical History:   Diagnosis Date    Disease of thyroid gland     Hypotension     Supratherapeutic INR 6/2/2022     Past Surgical History:   Procedure Laterality Date    HYSTERECTOMY       Social History   Social History     Substance and Sexual Activity   Alcohol Use Never    Comment: occassional.     Social History     Substance and Sexual Activity   Drug Use Never     Social History     Tobacco Use   Smoking Status Former    Types: Cigarettes   Smokeless Tobacco Never     Family History: History reviewed. No pertinent family history.    Meds/Allergies   all current active meds have been reviewed    No Known Allergies    Objective     Intake/Output Summary (Last 24 hours) at 9/6/2024 1004  Last data filed at 9/6/2024 0701  Gross per 24 hour   Intake 3639.16 ml   Output 1200 ml   Net 2439.16 ml     Invasive Devices       Peripheral Intravenous Line  Duration             Peripheral  IV 09/04/24 Proximal;Right;Ventral (anterior) Forearm 1 day    Peripheral IV 09/05/24 Right;Ventral (anterior) Forearm <1 day              Drain  Duration             Closed/Suction Drain Left;Posterior Neck Accordion 10 Fr. <1 day    Urethral Catheter Latex 16 Fr. <1 day                    Physical Exam  Vitals reviewed.   Constitutional:       General: She is not in acute distress.     Appearance: She is well-developed. She is not ill-appearing.      Interventions: Cervical collar in place.      Comments: Frail-appearing   Cardiovascular:      Rate and Rhythm: Normal rate and regular rhythm.      Heart sounds: Murmur heard.   Pulmonary:      Effort: Pulmonary effort is normal.      Breath sounds: Normal breath sounds.   Abdominal:      General: Bowel sounds are normal. There is no distension.      Palpations: Abdomen is soft.      Tenderness: There is no abdominal tenderness.   Musculoskeletal:      Right lower leg: No edema.      Left lower leg: No edema.   Skin:     General: Skin is warm and dry.   Neurological:      General: No focal deficit present.      Mental Status: She is alert. Mental status is at baseline.      Cranial Nerves: No cranial nerve deficit.      Motor: Weakness present.      Gait: Gait abnormal.      Comments: Alert to person, place, situation disoriented to time   Psychiatric:         Mood and Affect: Mood normal.         Lab Results:   I have personally reviewed pertinent lab results including the following:  -CBC, BMP, magnesium, phosphorus    I have personally reviewed the following imaging study reports in PACS:  -CT spine cervical, CT head, MRI cervical spine, CTA head and neck, x-ray spine      Therapies:   PT: consulted  OT: consulted    VTE Prophylaxis: Sequential compression device (Venodyne)  and Heparin    Advance care planning, CODE STATUS  Code Status: Level 1 - Full Code  Advance Directive and Living Will:    no  Power of :  no  POLST:  no    Family and Social Support:  "  Living Arrangements: Lives w/ Spouse/significant other  Support Systems: Self; Spouse/significant other; Family members  Type of Current Residence: 2 story home  Discharge planning discussed with:: Tomas Oliva (Spouse) [phone] (H) [phone]  Freedom of Choice: Yes      Goals of Care: Undetermined at this time    Prior to hospitalizationPlease note:  Voice-recognition software may have been used in the preparation of this document.  Occasional wrong word or \"sound-alike\" substitutions may have occurred due to the inherent limitations of voice recognition software.  Interpretation should be guided by context.    " Abdomen soft, nontender, nondistended, bowel sounds present in all 4 quadrants.

## 2024-09-06 NOTE — ASSESSMENT & PLAN NOTE
Wt Readings from Last 3 Encounters:   09/06/24 56.2 kg (123 lb 14.4 oz)   09/04/24 53.5 kg (118 lb)   05/22/24 57.6 kg (127 lb)         See below

## 2024-09-06 NOTE — ASSESSMENT & PLAN NOTE
Geriatrics consult  Strict environmental controls:   OOB for all meals   Lights on and blinds up during day.   Frequent reorientation   Lights and TV off and blinds down at night   Minimize interuptions at night as clincially indicated

## 2024-09-06 NOTE — PROGRESS NOTES
[facility]  Progress Note  Name: Marsha Oliva I  MRN: [id number]  Unit/Bed#: ICCU 209-01 I Date of Admission: 9/4/2024   Date of Service: 9/6/2024 I Hospital Day: 2    Assessment & Plan   * Closed odontoid fracture with type II morphology and posterior displacement (HCC)  Assessment & Plan  1 Day Post-Op Procedure(s):  navigated posterior fusion C1-C3 (Dr. Simpson, 9/5/2024)  Patient presented with increased SOB and neck pain with ambulation   Reports fall from bed about 6 weeks ago with onset of neck pain and extremity dysfunction     Imaging:   CT cervical spine 9/4/2024: Type II odontoid fracture with posterior displacement.  Posterior displacement of C1 with respect to C2 vertebral body causing component of cord compression of the medullary cervical spinal cord.  CTA without vascular injury  Postop Xrays ordered and pending    Plan:   Ongoing frequent neurological checks.   Collar to be on at all times x 6 weeks. Can transition to VISTA collar and not TX if better fit. Shonda collar for showering.   Pain control as needed. Transition to oral regimen given limited complaints of neck pain postop  Hemovac Drain - 50ml since surgery. Will maintain today. Trend output.   Postop Xrays ordered and pending  Mobilize PT/OT  Speech eval today. If collar felt to be reason for swallowing difficulties, can discuss consideration for collar off with meals.   Ongoing medical management and pain control per primary team   DVT PPX: SCDs. Okay for SQH today.     Neurosurgery will continue to follow. Call with questions or concerns.       Spinal cord compression (HCC)  Assessment & Plan  See plan as above       Subjective/Objective   Chief Complaint: I'm hungry    Subjective: Patient sleeping but arousable. No complaints offered.  Denies any neck pain at this time.  Able to verbalize that she is hungry.  Follows commands     Objective: Sleeping in bed.  NAD.    I/O         09/04 0701 09/05  "0700 09/05 0701 09/06 0700 09/06 0701 09/07 0700    I.V. (mL/kg) 866.7 (16.4) 3535.8 (62.9)     IV Piggyback  50 53.3    Total Intake(mL/kg) 866.7 (16.4) 3585.8 (63.8) 53.3 (0.9)    Urine (mL/kg/hr) 0 1100 (0.8)     Drains  50     Blood  50     Total Output 0 1200     Net +866.7 +2385.8 +53.3                   Invasive Devices       Peripheral Intravenous Line  Duration             Peripheral IV 09/04/24 Proximal;Right;Ventral (anterior) Forearm 1 day    Peripheral IV 09/05/24 Right;Ventral (anterior) Forearm <1 day              Drain  Duration             Closed/Suction Drain Left;Posterior Neck Accordion 10 Fr. <1 day    Urethral Catheter Latex 16 Fr. <1 day                    Physical Exam:  Vitals: Blood pressure 145/84, pulse 94, temperature 97.8 °F (36.6 °C), temperature source Oral, resp. rate 17, height 5' 3\" (1.6 m), weight 56.2 kg (123 lb 14.4 oz), SpO2 100%.,Body mass index is 21.95 kg/m².    Hemodynamic Monitoring: MAP: Arterial Line MAP (mmHg): 86 mmHg    General appearance: Arousable and will open eyes to command, appears stated age, cooperative and no distress  Head: Normocephalic, without obvious abnormality, atraumatic  Eyes: Conjugate gaze  Neck: Collar in place.  Dressing in place.  Mild bloody output around drain site.  Lungs: non labored breathing  Heart: regular heart rate  Neurologic:   Mental status: Arousable.  Follows commands x 4.  Cranial nerves: grossly intact   Sensory: Appears intact to crude touch x 4  Motor: moving all extremities.  Mild proximal weakness with lifting legs off the bed.  As well as shoulder abduction.    Lab Results:  Results from last 7 days   Lab Units 09/06/24  0620 09/05/24  2325 09/05/24  1906 09/05/24  1744 09/05/24  0538 09/04/24  1036   WBC Thousand/uL 6.55  --   --   --  4.87 5.13   HEMOGLOBIN g/dL 14.1  --   --   --  14.7 15.4   I STAT HEMOGLOBIN g/dl  --   --  13.3 13.3  --   --    HEMATOCRIT % 44.8  --   --   --  46.7* 49.9*   HEMATOCRIT, ISTAT %  --   " "--  39 39  --   --    PLATELETS Thousands/uL 149 146*  --   --  151 156   SEGS PCT %  --   --   --   --  76* 73   MONO PCT %  --   --   --   --  9 9   EOS PCT %  --   --   --   --  1 1     Results from last 7 days   Lab Units 09/06/24  0620 09/05/24  1906 09/05/24  1744 09/05/24  0538 09/04/24  1123   SODIUM mmol/L 140  --   --  140 140   POTASSIUM mmol/L 4.8  --   --  4.0 4.7   CHLORIDE mmol/L 101  --   --  100 101   CO2 mmol/L 24  --   --  26 27   CO2, I-STAT mmol/L  --  21 27  --   --    BUN mg/dL 15  --   --  12 13   CREATININE mg/dL 0.78  --   --  0.78 0.83   CALCIUM mg/dL 9.1  --   --  9.7 9.9   ALK PHOS U/L  --   --   --   --  71   ALT U/L  --   --   --   --  12   AST U/L  --   --   --   --  32   GLUCOSE, ISTAT mg/dl  --  105 87  --   --      Results from last 7 days   Lab Units 09/06/24  0620 09/05/24  0538   MAGNESIUM mg/dL 2.2 2.3     Results from last 7 days   Lab Units 09/06/24  0620 09/05/24  0538   PHOSPHORUS mg/dL 5.0* 3.8     Results from last 7 days   Lab Units 09/06/24  0620 09/04/24  1119   INR  0.93 0.98   PTT seconds 27 33     No results found for: \"TROPONINT\"  ABG:No results found for: \"PHART\", \"LXC8HQL\", \"PO2ART\", \"WJL0MBJ\", \"V1FBTIKN\", \"BEART\", \"SOURCE\"    Imaging Studies: I have personally reviewed pertinent reports.   and I have personally reviewed pertinent films in PACS    XR spine cervical 2 or 3 vw injury    Result Date: 9/6/2024  Impression: Fluoroscopy provided for procedure guidance. Please refer to the separate procedure note for additional details. Workstation performed: [id number]     CTA head and neck with and without contrast    Result Date: 9/4/2024  Impression: CT Brain:  No acute intracranial abnormality. CT Angiography:  Unremarkable CTA neck and brain. Workstation performed: [id number]     MRI cervical spine wo contrast    Result Date: 9/4/2024  Impression: 1. Displaced type II dens fracture with severe posterior displacement of the dens and C1 vertebral body relative to " the C2 vertebral body. There is resultant mass effect on the cord which appears relatively mild given the degree of displacement. No gross  cord edema is seen, however the exam is motion degraded. 2. Discontinuity of the ALL and irregularity of the PLL. There is also likely interspinous ligament injury at C1-C2. 3. Facet edema at C2-C3 may reflect facet capsular injury. 4. Prominent edema in the C1 and C2 vertebral bodies compatible with known C2 fracture. There is also paravertebral edema. 5. Limited evaluation of the flow voids given motion artifact. Consider CT angiogram of the neck to evaluate the vertebral arteries given the significant distraction at C1-C2. The study was marked in EPIC for immediate notification. Workstation performed: [id number]     TRAUMA - CT spine cervical wo contrast    Result Date: 9/4/2024  Impression: Type II odontoid fracture with posterior displacement including prominent posterior displacement of C1 with respect to the C2 vertebral body causing a component of cord compression of the medullary cervical spinal cord. Further evaluation with MRI is advised. I personally discussed this study with NEENA JIM on 9/4/2024 11:12 AM. Workstation performed: [id number]     TRAUMA - CT head wo contrast    Result Date: 9/4/2024  Impression: No acute intracranial abnormality. Mild to moderate chronic microvascular ischemic changes. Workstation performed: [id number]     VTE Pharmacologic Prophylaxis: Heparin    VTE Mechanical Prophylaxis: sequential compression device

## 2024-09-06 NOTE — OCCUPATIONAL THERAPY NOTE
Occupational Therapy Evaluation     Patient Name: Marsha Oliva  Today's Date: 9/6/2024  Problem List  Principal Problem:    Closed odontoid fracture with type II morphology and posterior displacement (HCC)  Active Problems:    Atrial fibrillation, chronic (HCC)    B12 deficiency    Depression with anxiety    Essential hypertension    Hypothyroidism    Mixed hyperlipidemia    Severe mitral regurgitation    Fall    Hypertensive heart disease with heart failure (HCC)    Mild cognitive impairment of uncertain or unknown etiology    Spinal cord compression (HCC)    Acute pain due to trauma    At risk for delirium    Heart failure with preserved ejection fraction (HCC)    Past Medical History  Past Medical History:   Diagnosis Date    Disease of thyroid gland     Hypotension     Supratherapeutic INR 6/2/2022     Past Surgical History  Past Surgical History:   Procedure Laterality Date    HYSTERECTOMY           09/06/24 1150   OT Last Visit   OT Visit Date 09/06/24   Note Type   Note type Evaluation   Pain Assessment   Pain Assessment Tool 0-10   Pain Score 5   Pain Location/Orientation Location: [facility] Pain Intervention(s) Repositioned;Ambulation/increased activity;Emotional support;Relaxation technique   Restrictions/Precautions   Weight Bearing Precautions Per Order No   Braces or Orthoses (S)  C/S Collar   Other Precautions Chair Alarm;Bed Alarm;Multiple lines;O2;Fall Risk;Pain;Spinal precautions   Home Living   Type of Home House   Home Layout Two level   Home Equipment Walker;Cane   Prior Function   Level of [place] Independent with ADLs;Independent with functional mobility;Needs assistance with IADLS   Lives With Spouse   Receives Help From Family   IADLs Independent with meal prep;Independent with medication management;Family/Friend/Other provides transportation   Falls in the last 6 months 1 to 4   Vocational Retired   Lifestyle   Autonomy I adls and mobility - admits to increased difficulty  in recent weeks 2* weakness - spouse manages iadls   Reciprocal Relationships supportive family - spouse is able to assist PRN   Service to Others retired   Intrinsic Gratification sedentary   Subjective   Subjective offers no c/o   ADL   Eating Assistance 3  Moderate Assistance   Grooming Assistance 3  Moderate Assistance   UB Bathing Assistance 3  Moderate Assistance   LB Bathing Assistance 2  Maximal Assistance   UB Dressing Assistance 3  Moderate Assistance   LB Dressing Assistance 2  Maximal Assistance   Toileting Assistance  2  Maximal Assistance   Bed Mobility   Supine to Sit 3  Moderate assistance   Transfers   Sit to Stand 3  Moderate assistance   Additional items Assist x 2   Stand to Sit 3  Moderate assistance   Additional items Assist x 2   Stand pivot 3  Moderate assistance   Additional items Assist x 2   Balance   Static Sitting Fair   Dynamic Sitting Fair -   Static Standing Poor +   Dynamic Standing Poor   Ambulatory Poor   Activity Tolerance   Activity Tolerance Patient limited by fatigue;Patient limited by pain   Medical Staff Made Aware PT present for co-eval 2* medical complexity, comorbidities and limited overall tolerance to activities   RUE Assessment   RUE Assessment WFL  (proximal weakness noted)   LUE Assessment   LUE Assessment WFL  (proximal weakness noted)   Hand Function   Gross Motor Coordination Functional   Fine Motor Coordination Impaired   Cognition   Overall Cognitive Status WFL   Assessment   Limitation Decreased ADL status;Decreased UE ROM;Decreased UE strength;Decreased endurance;Decreased sensation;Decreased fine motor control;Decreased self-care trans;Decreased high-level ADLs   Prognosis Good   Assessment Pt is a 79 y.o. female who was [facility] to [facility] on [facility]/4/2024 with Closed odontoid fracture with type II morphology and posterior displacement (HCC) s/p navigated posterior fusion C1-3. Patient  has a past medical history of Disease of thyroid gland,  Hypotension, and Supratherapeutic INR.   At baseline pt was completing adls and mobility independently - recent assist from spouse 2* pain/weakness. Pt lives with spouse in multilevel home with FF PRN. Currently pt requires mod to max assist for overall ADLS and mod a x 2  for functional mobility/transfers. Pt currently presents with impairments in the following categories -steps to enter environment, difficulty performing ADLS, difficulty performing IADLS , and environment activity tolerance, endurance, standing balance/tolerance, sitting balance/tolerance, UE strength, and FMC. These impairments, as well as pt's fatigue, pain, spinal precautions, risk for falls, and home environment  limit pt's ability to safely engage in all baseline areas of occupation, includingeating, grooming, bathing, dressing, toileting, functional mobility/transfers, community mobility, laundry , house maintenance, medication management, meal prep, cleaning, social participation , and leisure activities  From OT standpoint, recommend Level II resources upon D/C. OT will continue to follow to address the below stated goals.   Goals   Patient Goals get stronger   Long Term Goal #1 1) Min a UB/LB adls after setup with use of LHAE PRN  2)  Min a toileting and clothing management  3) SBA bed mobility  4) Min a functional mob/transfers to and from all surfaces with fair to fair+ balance/safety   5) Increase activity tolerance to 30-35min for participation in adls and enjoyable activities  6) Assess DME needs   7) Demonstrate good carryover with safe use of AD during functional tasks   8) Assess DME needs 9) Increase BUE AROM to WFL with at least 4/5 strength and fair+ FMC for functional use with adls  10) Assist with safe d/c recommendations   Plan   Treatment Interventions ADL retraining;Functional transfer training;UE strengthening/ROM;Endurance training;Patient/family training;Equipment evaluation/education;Fine motor coordination  activities;Compensatory technique education;Activityengagement   Goal Expiration Date 09/20/24   OT Frequency 2-3x/wk   Discharge Recommendation   Rehab Resource Intensity Level, OT II (Moderate Resource Intensity)   AM-PAC Daily Activity Inpatient   Lower Body Dressing 2   Bathing 2   Toileting 2   Upper Body Dressing 2   Grooming 3   Eating 3   Daily Activity Raw Score 14   Daily Activity Standardized Score (Calc for Raw Score >=11) 33.39   AM-PAC Applied Cognition Inpatient   Following a Speech/Presentation 4   Understanding Ordinary Conversation 4   Taking Medications 4   Remembering Where Things Are Placed or Put Away 4   Remembering List of 4-5 Errands 3   Taking Care of Complicated Tasks 3   Applied Cognition Raw Score 22   Applied Cognition Standardized Score 47.83   End of Consult   Education Provided Yes   Patient Position at End of Consult Bedside chair;Bed/Chair alarm activated;All needs within reach   Nurse Communication Nurse aware of consult       The patient's raw score on the AM-PAC Daily Activity Inpatient Short Form is 14. A raw score of less than 19 suggests the patient may benefit from discharge to post-acute rehabilitation services. Please refer to the recommendation of the Occupational Therapist for safe discharge planning.        Documentation Completed By:    BETZAIDA Amin/L  [employer] Certified - [id number]

## 2024-09-06 NOTE — PLAN OF CARE
Problem: PHYSICAL THERAPY ADULT  Goal: Performs mobility at highest level of function for planned discharge setting.  See evaluation for individualized goals.  Description: Treatment/Interventions: ADL retraining, Functional transfer training, LE strengthening/ROM, Elevations, Endurance training, Therapeutic exercise, Bed mobility, Gait training, OT, Spoke to nursing          See flowsheet documentation for full assessment, interventions and recommendations.  Note: Prognosis: Fair  Problem List: Decreased strength, Decreased endurance, Impaired balance, Decreased mobility, Orthopedic restrictions, Pain  Assessment: Pt is a 78 yo female post cervical fusion after a fall six weeks ago. Patient has a history of hypothyroidism, hyperlipidemia and chronic A-fib. Patient is currently in a cervical collar and under cervical precautions which were reviewed with the patient. Patient presented with generalized lower extremity weakness requiring moderate assistance of 2 during all components of going from supine in bed to seated in bedside recliner. PT discussed with pt and spouse about going to an inpatient rehab facility for a short term after stay in hospital in order to allow patient to return to prior level of function with ADLs and IADLs. Pt would benefit from continued PT in order to regain ambulatory status and start to rebuild muscular endurance to allow for participation in IPR.        Rehab Resource Intensity Level, PT: II (Moderate Resource Intensity)    See flowsheet documentation for full assessment.

## 2024-09-06 NOTE — QUICK NOTE
Critical Care Post Op Check Note    ID: 80yo s/p recent fall with odontoid fracture now POD#0 s/p fixation    S: Patient reports that pain is well controlled. She denies any n/v. She denies CP or SOB. Notes that she has some slight numbness and tingling in her toes but this is long standing for years and unchanged    O: Temp:  [96.2 °F (35.7 °C)-98.6 °F (37 °C)] 97.9 °F (36.6 °C)  HR:  [] 102  Resp:  [11-36] 20  BP: (109-158)/(65-92) 142/78  FiO2 (%):  [40] 40    Intake/Output Summary (Last 24 hours) at 9/5/2024 2152  Last data filed at 9/5/2024 2022  Gross per 24 hour   Intake 2866.67 ml   Output 1150 ml   Net 1716.67 ml     Gen: AO x 3, NAD  Cv: Regular rate  Lungs: nonlabored breathing  Abd: deferred  Ext: no edema  Neuro: BLE and BUE motor and sensation intact    A/P: 79 y.o. with odontoid fracture now POD#0 s/p fixation recovering appropriately post operatively. VSS.     - Regular diet   - Plasmalyte at 100cc/hr  - PO oxycodone and IV dilaudid for pain  - Zofran/Tums prn   - Bowel regimen with senna and prn suppository    - Labs and XR c-spine in the AM  - Varghese in place  - SCDs/SQH TID     Keri Beyer

## 2024-09-06 NOTE — PLAN OF CARE
Problem: OCCUPATIONAL THERAPY ADULT  Goal: Performs self-care activities at highest level of function for planned discharge setting.  See evaluation for individualized goals.  Description: Treatment Interventions: ADL retraining, Functional transfer training, UE strengthening/ROM, Endurance training, Patient/family training, Equipment evaluation/education, Fine motor coordination activities, Compensatory technique education, Activityengagement          See flowsheet documentation for full assessment, interventions and recommendations.   Note: Limitation: Decreased ADL status, Decreased UE ROM, Decreased UE strength, Decreased endurance, Decreased sensation, Decreased fine motor control, Decreased self-care trans, Decreased high-level ADLs  Prognosis: Good  Assessment: Pt is a 79 y.o. female who was admitted to [facility] on 9/4/2024 with Closed odontoid fracture with type II morphology and posterior displacement (HCC) s/p navigated posterior fusion C1-3. Patient  has a past medical history of Disease of thyroid gland, Hypotension, and Supratherapeutic INR.   At baseline pt was completing adls and mobility independently - recent assist from spouse 2* pain/weakness. Pt lives with spouse in multilevel home with [facility] PRN. Currently pt requires mod to max assist for overall ADLS and mod a x 2  for functional mobility/transfers. Pt currently presents with impairments in the following categories -steps to enter environment, difficulty performing ADLS, difficulty performing IADLS , and environment activity tolerance, endurance, standing balance/tolerance, sitting balance/tolerance, UE strength, and FMC. These impairments, as well as pt's fatigue, pain, spinal precautions, risk for falls, and home environment  limit pt's ability to safely engage in all baseline areas of occupation, includingeating, grooming, bathing, dressing, toileting, functional mobility/transfers, community mobility, laundry , house  maintenance, medication management, meal prep, cleaning, social participation , and leisure activities  From OT standpoint, recommend Level II resources upon D/C. OT will continue to follow to address the below stated goals.     Rehab Resource Intensity Level, OT: II (Moderate Resource Intensity)

## 2024-09-06 NOTE — OP NOTE
OPERATIVE REPORT  PATIENT NAME: Marsha Oliva    :  1945  MRN: [id number]  Pt Location:  OR ROOM 17    SURGERY DATE: 2024    Surgeons and Role:     * Eduardo Dewitt MD - Primary     * Jade Kohler PA-C - Assisting    Preop Diagnosis:  Closed odontoid fracture with type II morphology and posterior displacement, initial encounter (HCC) [S12.111A]  Spinal cord compression (HCC) [G95.20]    Post-Op Diagnosis Codes:     * Closed odontoid fracture with type II morphology and posterior displacement, initial encounter (HCC) [S12.111A]     * Spinal cord compression (HCC) [G95.20]    Procedure(s):  1) Bilateral C1 and C3 lateral mass screw placement  2) Bilateral C2 pedicle screw placement  3) Arthrodesis C1-3 with autologous bone graft and Nuckolls putty  4) Use of neuronavigation, [employer]  5) Use of neuromonitoring  6) Use of fluoroscopy and O-arm  7) Use of [place] headholder    Specimen(s):  * No specimens in log *    Estimated Blood Loss:   Minimal    Drains:  Closed/Suction Drain Left;Posterior Neck Accordion 10 Fr. (Active)   Number of days: 0       Urethral Catheter Latex 16 Fr. (Active)   Output (mL) 200 mL 24   Number of days: 0       Anesthesia Type:   General    Operative Indications:  Closed odontoid fracture with type II morphology and posterior displacement, initial encounter (HCC) [S12.111A]  Spinal cord compression (HCC) [G95.20]    Operative Findings:  C2 fracture with rotation of C1    Complications:   None    Procedure and Technique:    After obtaining written informed consent, the patient was brought to the operating room.  General endotracheal anesthesia was induced.  Neuro monitoring was then placed as was an arterial line and Varghese catheter. Baseline pre flip MEPs and SSEPs were obtained. The patient was then placed in a [place] head briscoe with pins. Patient was then flipped prone onto gel rolls in position and the head was secured exposing the  posterior cervical region..      The patient was then given Ancef 2g as perioperative antibiotic.       The patient was prepped and draped in the usual sterile fashion.  Lidocaine with epinephrine was injected in the surgical site. Surgical time-out was performed.       A ten blade was then used to open a midline cervical incision. Monopolar cautery was then used to dissect down the nuchal ligament to expose the occiput and C1-3 spinous processes, lamina, and lateral masses in a subperiosteal fashion. Due to the fracture at C2 and the rotational deformity at C1, the anatomy was difficult to discern. A spinous process clamp was placed on suspected C2 and we performed an intraoperative O-arm spin. Neuro navigation was then registered and confirmed to be accurate. Image guidance from the [employer] was used throughout the duration of the case. Images were loaded into the system and used for preoperative planning as well as intraoperative guidance. It was critically important through the duration of the case for navigation and avoidance of critical structures.      Further dissection was necessary to fully exposed C1. Using navigation guidance, the entry site for the C2 pedicle was identified. A 2mm wild was used to decorticate the entry site. A navigated hand drill was then used to create the screw path followed by a navigated tap. A Stephens ball tip probe was used and no breech in the pedicle was noted. We delayed placement of C2 screws so as to not obscure our view of C1 lateral mass.     Using similar steps with the navigated wild, hand drill and tap, screws were placed in the bilateral lateral masses of C3. Another O-arm spin was obtained.     Attention was then turned to C1. Using the Midas drill with a matchstick attachment, the C1 lateral masses were  decorticated. A hand drill was used make the  hole followed by lag screws. The C2 screws were then placed.      Two lordotic rods were measured, bent  and cut to size. The rods were placed in the screw heads. Another O-arm spin was performed which demonstrated satisfactory screw placement. Set caps were then placed and final tightened.  The wound was then copiously irrigated with antibiotic irrigation.  A 3 mm cutting bit was used to decorticate the facets and lamina of C1, C2 and C3.  Chay mixed with autologous bone graft was then placed over the facet joints and around the screws for arthrodesis.      A 10F hemovac drain was then placed in the subfascial space.     Next the muscle and fascia were closed with 0 Vicryl. A #1 stratafix was used to further support the fascia in a running fashion. The deep dermal layer was closed with an inverted 2 0 Vicryl.  The skin was closed with a 4-0 stratafix suture in a subcuticular fashion. 2-0 prolenes were then placed in a vertical mattress fashion. Bacitracin ointment was placed over the wound and  sterile dressing was applied.       Surgical sign-out was performed.    No qualified resident was available. Jade Kohler PA-C was present for the procedure, and provided essential assistance with proper exposure, retraction, hemostasis, instrumentation and wound closure, which was necessary secondary to the complex nature of this case.      I was present for the entire procedure.    Patient Disposition:  Critical Care Unit             SIGNATURE: Eduardo Dewitt MD  DATE: September 5, 2024  TIME: 9:12 PM

## 2024-09-06 NOTE — RESTORATIVE TECHNICIAN NOTE
Restorative Technician Note      Patient Name: Marsha Oliva     Restorative Tech Visit Date: 09/06/24  Note Type: Mobility & Bracing, Initial consult  Patient Position Upon Consult: Bedside chair  Activity Performed: Ambulated; Transferred; Stood  Assistive Device: Other (Comment) (HHA X 2)  Brace Applied: [place] Collar Set (Chin plate level 1)  Additional Brace Ordered: No  Patient Position When Brace Applied: Seated  Bracing Recommendations: None  Education Provided: Yes; Family or social support of family present for education by provider  Patient Position at End of Consult: Supine; All needs within reach; Bed/Chair alarm activated  Nurse Communication: Nurse aware of consult, application of brace    Shonda shower collar, replacement pads, and educational handout reviewed and left at bedside.    Please contact [facility] Restorative tech on Epic Secure Chat  in regards to bracing instruction and/or adjustment.    Francia Garza Restorative Tech

## 2024-09-06 NOTE — ASSESSMENT & PLAN NOTE
1 Day Post-Op Procedure(s):  navigated posterior fusion C1-C3 (Dr. Simpson, 9/5/2024)  Patient presented with increased SOB and neck pain with ambulation   Reports fall from bed about 6 weeks ago with onset of neck pain and extremity dysfunction     Imaging:   CT cervical spine 9/4/2024: Type II odontoid fracture with posterior displacement.  Posterior displacement of C1 with respect to C2 vertebral body causing component of cord compression of the medullary cervical spinal cord.  CTA without vascular injury  Postop Xrays ordered and pending    Plan:   Ongoing frequent neurological checks.   Collar to be on at all times x 6 weeks. Can transition to [place]IST[place] collar and not TX if better fit. Shonda collar for showering.   Pain control as needed. Transition to oral regimen given limited complaints of neck pain postop  Hemovac Drain - 50ml since surgery. Will maintain today. Trend output.   Postop Xrays ordered and pending  Mobilize PT/OT  Speech eval today. If collar felt to be reason for swallowing difficulties, can discuss consideration for collar off with meals.   Ongoing medical management and pain control per primary team.   BP acceptable. Can decrease level of care from nsx standpoint.    DVT PPX: SCDs. Okay for [facility] today.     Neurosurgery will continue to follow. Call with questions or concerns.

## 2024-09-06 NOTE — PLAN OF CARE
Problem: Potential for Falls  Goal: Patient will remain free of falls  Description: INTERVENTIONS:  - Educate patient/family on patient safety including physical limitations  - Instruct patient to call for assistance with activity   - Consult OT/PT to assist with strengthening/mobility   - Keep Call bell within reach  - Keep bed low and locked with side rails adjusted as appropriate  - Keep care items and personal belongings within reach  - Initiate and maintain comfort rounds  - Make Fall Risk Sign visible to staff  - Offer Toileting every  Hours, in advance of need  - Initiate/Maintain alarm  - Obtain necessary fall risk management equipment  - Apply yellow socks and bracelet for high fall risk patients  - Consider moving patient to room near nurses station  Outcome: Progressing     Problem: Prexisting or High Potential for Compromised Skin Integrity  Goal: Skin integrity is maintained or improved  Description: INTERVENTIONS:  - Identify patients at risk for skin breakdown  - Assess and monitor skin integrity  - Assess and monitor nutrition and hydration status  - Monitor labs   - Assess for incontinence   - Turn and reposition patient  - Assist with mobility/ambulation  - Relieve pressure over bony prominences  - Avoid friction and shearing  - Provide appropriate hygiene as needed including keeping skin clean and dry  - Evaluate need for skin moisturizer/barrier cream  - Collaborate with interdisciplinary team   - Patient/family teaching  - Consider wound care consult   Outcome: Progressing     Problem: PAIN - ADULT  Goal: Verbalizes/displays adequate comfort level or baseline comfort level  Description: Interventions:  - Encourage patient to monitor pain and request assistance  - Assess pain using appropriate pain scale  - Administer analgesics based on type and severity of pain and evaluate response  - Implement non-pharmacological measures as appropriate and evaluate response  - Consider cultural and social  influences on pain and pain management  - Notify physician/advanced practitioner if interventions unsuccessful or patient reports new pain  Outcome: Progressing     Problem: DISCHARGE PLANNING  Goal: Discharge to home or other facility with appropriate resources  Description: INTERVENTIONS:  - Identify barriers to discharge w/patient and caregiver  - Arrange for needed discharge resources and transportation as appropriate  - Identify discharge learning needs (meds, wound care, etc.)  - Arrange for interpretive services to assist at discharge as needed  - Refer to Case Management Department for coordinating discharge planning if the patient needs post-hospital services based on physician/advanced practitioner order or complex needs related to functional status, cognitive ability, or social support system  Outcome: Progressing     Problem: Knowledge Deficit  Goal: Patient/family/caregiver demonstrates understanding of disease process, treatment plan, medications, and discharge instructions  Description: Complete learning assessment and assess knowledge base.  Interventions:  - Provide teaching at level of understanding  - Provide teaching via preferred learning methods  Outcome: Progressing     Problem: MUSCULOSKELETAL - ADULT  Goal: Maintain or return mobility to safest level of function  Description: INTERVENTIONS:  - Assess patient's ability to carry out ADLs; assess patient's baseline for ADL function and identify physical deficits which impact ability to perform ADLs (bathing, care of mouth/teeth, toileting, grooming, dressing, etc.)  - Assess/evaluate cause of self-care deficits   - Assess range of motion  - Assess patient's mobility  - Assess patient's need for assistive devices and provide as appropriate  - Encourage maximum independence but intervene and supervise when necessary  - Involve family in performance of ADLs  - Assess for home care needs following discharge   - Consider OT consult to assist with  ADL evaluation and planning for discharge  - Provide patient education as appropriate  Outcome: Progressing  Goal: Maintain proper alignment of affected body part  Description: INTERVENTIONS:  - Support, maintain and protect limb and body alignment  - Provide patient/ family with appropriate education  Outcome: Progressing

## 2024-09-06 NOTE — ED PROVIDER NOTES
Emergency Department Trauma Note  Marsha Oliva 79 y.o. female MRN: [id number]  Unit/Bed#: ED 24/ED 24 Encounter: [id number]      Trauma Alert: Trauma Acuity: Trauma Evaluation  Model of Arrival: Mode of Arrival: Other (Comment) (pov) via    Trauma Team: Current Providers  Attending Provider: Jey Banuelos DO  Registered Nurse: Enma Leary RN  Consultants:     None      History of Present Illness     Chief Complaint:   Chief Complaint   Patient presents with    Neck Pain     Fall x 1 month causing neck pain     Weakness - Generalized     HPI:  Marsha Oliva is a 79 y.o. female who presents with .  Mechanism:Details of Incident: fall on asprin Injury Date: 09/04/24 Injury Time: 1200      Is a 79-year-old female presents for evaluation of generalized weakness, neck pain.  Had a fall 4 to 6 weeks ago where she hit the back of her head.  She says that her legs been feeling weaker, no numbness or tingling in the legs, no bowel or bladder continence, urinary tension.  Denies any numbness or ting or weakness in the upper extremities.  Denies any chest pain, shortness of breath or abdominal pain.  The patient is on aspirin.  She denies headache implants, dizziness, nausea or vomiting      Review of Systems   Constitutional:  Negative for fever and unexpected weight change.   HENT:  Negative for congestion, ear pain, sore throat and trouble swallowing.    Eyes:  Negative for pain and redness.   Respiratory:  Negative for cough, chest tightness and shortness of breath.    Cardiovascular:  Negative for chest pain and leg swelling.   Gastrointestinal:  Negative for abdominal distention, abdominal pain, diarrhea and vomiting.   Endocrine: Negative for polyuria.   Genitourinary:  Negative for dysuria, hematuria, pelvic pain and vaginal bleeding.   Musculoskeletal:  Positive for neck pain. Negative for back pain and myalgias.   Skin:  Negative for rash.   Neurological:  Negative for dizziness, syncope, weakness,  light-headedness and headaches.       Historical Information     Immunizations:   Immunization History   Administered Date(s) Administered    COVID-19 PFIZER VACCINE 0.3 ML IM 03/09/2021, 03/30/2021, 11/17/2021    COVID-19 Pfizer Vac BIVALENT Titus-sucrose 12 Yr+ IM 03/09/2023    INFLUENZA 10/02/2009, 10/06/2010, 10/21/2011, 10/20/2012, 10/16/2013, 09/23/2014, 11/16/2015, 09/30/2016, 10/19/2017, 09/19/2018, 10/02/2019, 10/16/2020, 10/27/2021, 09/13/2022, 12/24/2023    Influenza, Seasonal Vaccine, Quadrivalent, Adjuvanted, .5e 10/16/2020, 10/27/2021, 09/13/2022    Influenza, high dose seasonal 0.7 mL 12/24/2023    Pneumococcal Conjugate 13-Valent 08/03/2015    Pneumococcal Polysaccharide PPV23 10/23/2012    Tdap 05/12/2014    Tuberculin Skin Test 12/28/2021, 01/06/2022    Tuberculin Skin Test-PPD Intradermal 12/28/2021, 01/06/2022    Zoster 11/18/2014    Zoster Vaccine Recombinant 09/19/2018       Past Medical History:   Diagnosis Date    Disease of thyroid gland     Hypotension     Supratherapeutic INR 6/2/2022     History reviewed. No pertinent family history.  Past Surgical History:   Procedure Laterality Date    HYSTERECTOMY       Social History     Tobacco Use    Smoking status: Former     Types: Cigarettes    Smokeless tobacco: Never   Vaping Use    Vaping status: Never Used   Substance Use Topics    Alcohol use: Never     Comment: occassional.    Drug use: Never     E-Cigarette/Vaping    E-Cigarette Use Never User      E-Cigarette/Vaping Substances    Nicotine No     THC No     CBD No     Flavoring No     Other No     Unknown No        Family History: non-contributory    Meds/Allergies   Prior to Admission Medications   Prescriptions Last Dose Informant Patient Reported? Taking?   Aspirin 81 MG CAPS  Spouse/Significant Other, Self Yes No   Sig: Take 81 mg by mouth in the morning. Indications: Disease involving Lipid Deposits in the Arteries   Magnesium Gluconate 550 MG TABS  Spouse/Significant Other, Self Yes  No   Sig: Take 30 mg by mouth 2 (two) times a day Pt taking 250mg daily   atorvastatin (LIPITOR) 20 mg tablet  Spouse/Significant Other, Self Yes No   atorvastatin (LIPITOR) 40 mg tablet  Spouse/Significant Other, Self No No   Sig: Take 1 tablet (40 mg total) by mouth daily with dinner   Patient taking differently: Take 40 mg by mouth daily with dinner Pt taking 20mg daily   chlorhexidine (HIBICLENS) 4 % external liquid  Spouse/Significant Other, Self No No   Sig: Apply 1 Application topically daily as needed for wound care For hand washing with wound care for MRSA positive wound.   cyanocobalamin (VITAMIN B-12) 1000 MCG tablet  Spouse/Significant Other, Self Yes No   Sig: Take 1,000 mcg by mouth once a week. Indications: Inadequate Vitamin B12   cyanocobalamin 1,000 mcg/mL  Spouse/Significant Other, Self Yes No   Si mL jPt is not taking   digoxin (LANOXIN) 0.125 mg tablet  Spouse/Significant Other, Self No No   Sig: Take 1 tablet (125 mcg total) by mouth every other day   diltiazem (CARDIZEM CD) 180 mg 24 hr capsule  Spouse/Significant Other, Self No No   Sig: Take 1 capsule (180 mg total) by mouth daily   folic acid (FOLVITE) 1 mg tablet  Spouse/Significant Other, Self Yes No   Sig: Take 1 tablet by mouth daily   levothyroxine 137 mcg tablet  Spouse/Significant Other, Self Yes No   Sig: Take 137 mcg by mouth daily   oxygen gas  Spouse/Significant Other, Self Yes No   Sig: Inhale 2 L/min if needed (shortness of breath). Indications: Shortness of breath   potassium chloride (K-DUR,KLOR-CON) 20 mEq tablet  Spouse/Significant Other, Self Yes No   Sig: Take 2 tablets by mouth daily Pt is not  taking   sertraline (ZOLOFT) 100 mg tablet  Spouse/Significant Other, Self Yes No   Sig: Take 1 tablet by mouth daily   torsemide (DEMADEX) 20 mg tablet  Spouse/Significant Other, Self No No   Sig: Take 1 tablet (20 mg total) by mouth daily as needed (weight gain / leg swelling)      Facility-Administered Medications: None        No Known Allergies    PHYSICAL EXAM    PE limited by:     Objective   Vitals:   First set: Temperature: 97.5 °F (36.4 °C) (09/04/24 0952)  Pulse: (!) 107 (09/04/24 0943)  Respirations: 22 (09/04/24 0943)  Blood Pressure: 150/97 (09/04/24 0943)  SpO2: 95 % (09/04/24 0943)    Primary Survey:   (A) Airway: intact  (B) Breathing: CTA b/l  (C) Circulation: Pulses:   normal  (D) Disabliity:  GCS Total:  15  (E) Expose:  Completed    Secondary Survey: (Click on Physical Exam tab above)  Physical Exam  Vitals and nursing note reviewed.   Constitutional:       General: She is not in acute distress.     Appearance: She is well-developed.   HENT:      Head: Normocephalic and atraumatic.      Right Ear: External ear normal.      Left Ear: External ear normal.      Nose: Nose normal.      Mouth/Throat:      Mouth: Mucous membranes are moist.      Pharynx: No oropharyngeal exudate.   Eyes:      Conjunctiva/sclera: Conjunctivae normal.      Pupils: Pupils are equal, round, and reactive to light.   Cardiovascular:      Rate and Rhythm: Normal rate and regular rhythm.      Heart sounds: Normal heart sounds. No murmur heard.     No friction rub. No gallop.   Pulmonary:      Effort: Pulmonary effort is normal. No respiratory distress.      Breath sounds: Normal breath sounds. No wheezing or rales.   Abdominal:      General: There is no distension.      Palpations: Abdomen is soft.      Tenderness: There is no abdominal tenderness. There is no guarding.   Musculoskeletal:         General: No swelling or deformity. Normal range of motion.      Cervical back: Normal range of motion and neck supple. Tenderness (C spine) present.   Lymphadenopathy:      Cervical: No cervical adenopathy.   Skin:     General: Skin is warm and dry.   Neurological:      General: No focal deficit present.      Mental Status: She is alert and oriented to person, place, and time. Mental status is at baseline.      Cranial Nerves: No cranial nerve deficit.       Sensory: No sensory deficit.      Motor: No weakness or abnormal muscle tone.      Coordination: Coordination normal.         Cervical spine cleared by clinical criteria? No (imaging required)      Invasive Devices       None                   Lab Results:   Results Reviewed       Procedure Component Value Units Date/Time    Comprehensive metabolic panel [[id number]] Collected: 09/04/24 1123    Lab Status: Final result Specimen: Blood from Arm, Left Updated: 09/04/24 1143     Sodium 140 mmol/L      Potassium 4.7 mmol/L      Chloride 101 mmol/L      CO2 27 mmol/L      ANION GAP 12 mmol/L      BUN 13 mg/dL      Creatinine 0.83 mg/dL      Glucose 98 mg/dL      Calcium 9.9 mg/dL      AST 32 U/L      ALT 12 U/L      Alkaline Phosphatase 71 U/L      Total Protein 7.1 g/dL      Albumin 4.4 g/dL      Total Bilirubin 0.74 mg/dL      eGFR 67 ml/min/1.73sq m     Narrative:      National Kid[employer] Disease Foundation guidelines for Chronic Kidney Disease (CKD):     Stage 1 with normal or high GFR (GFR > 90 mL/min/1.73 square meters)    Stage 2 Mild CKD (GFR = 60-89 mL/min/1.73 square meters)    Stage 3A Moderate CKD (GFR = 45-59 mL/min/1.73 square meters)    Stage 3B Moderate CKD (GFR = 30-44 mL/min/1.73 square meters)    Stage 4 Severe CKD (GFR = 15-29 mL/min/1.73 square meters)    Stage 5 End Stage CKD (GFR <15 mL/min/1.73 square meters)  Note: GFR calculation is accurate only with a steady state creatinine    Protime-INR [[id number]]  (Normal) Collected: 09/04/24 1119    Lab Status: Final result Specimen: Blood from Arm, Right Updated: 09/04/24 1136     Protime 13.5 seconds      INR 0.98    Narrative:      INR Therapeutic Range    Indication                                             INR Range      Atrial Fibrillation                                               2.0-3.0  Hypercoagulable State                                    2.0.2.3  Left Ventricular Asist Device                            2.0-3.0  Mechanical Heart Valve                                   -    Aortic(with afib, MI, embolism, HF, LA enlargement,    and/or coagulopathy)                                     2.0-3.0 (2.5-3.5)     Mitral                                                             2.5-3.5  Prosthetic/Bioprosthetic Heart Valve               2.0-3.0  Venous thromboembolism (VTE: VT, PE        2.0-3.0    APTT [[id number]]  (Normal) Collected: 09/04/24 1119    Lab Status: Final result Specimen: Blood from Arm, Right Updated: 09/04/24 1136     PTT 33 seconds     CBC and differential [[id number]]  (Abnormal) Collected: 09/04/24 1036    Lab Status: Final result Specimen: Blood from Arm, Right Updated: 09/04/24 1101     WBC 5.13 Thousand/uL      RBC 5.01 Million/uL      Hemoglobin 15.4 g/dL      Hematocrit 49.9 %       fL      MCH 30.7 pg      MCHC 30.9 g/dL      RDW 13.8 %      MPV 9.7 fL      Platelets 156 Thousands/uL      nRBC 0 /100 WBCs      Segmented % 73 %      Immature Grans % 0 %      Lymphocytes % 17 %      Monocytes % 9 %      Eosinophils Relative 1 %      Basophils Relative 0 %      Absolute Neutrophils 3.67 Thousands/µL      Absolute Immature Grans 0.02 Thousand/uL      Absolute Lymphocytes 0.89 Thousands/µL      Absolute Monocytes 0.47 Thousand/µL      Eosinophils Absolute 0.06 Thousand/µL      Basophils Absolute 0.02 Thousands/µL                    Imaging Studies:   Direct to CT: No  TRAUMA - CT head wo contrast   Final Result by Brad Ferro MD (09/04 1058)      No acute intracranial abnormality. Mild to moderate chronic microvascular ischemic changes.                  Workstation performed: [id number]         TRAUMA - CT spine cervical wo contrast   Final Result by Brad Ferro MD (09/04 1112)      Type II odontoid fracture with posterior displacement including prominent posterior displacement of C1 with respect to the C2 vertebral body causing a component of cord compression of the medullary cervical spinal cord. Further evaluation with  MRI is    advised.         I personally discussed this study with NEENA JIM on 9/4/2024 11:12 AM.                  Workstation performed: [id number]               Procedures  Procedures         ED Course  ED Course as of 09/06/24 1735   Wed Sep 04, 2024   1111 Type II odontoid fracutre displaced w/ C1, 1cm psoteriorly. Causng some cord compression at spinal cord. No nuero deficits on exam   1146 I spoke with Dr. Pretty of trauma surgery who accept the patient to his service           Medical Decision Making  79-year-old female presenting for evaluation of generalized weakness, neck pain.  Symptoms ongoing for the last 4 to 6 weeks after a fall.  No paresthesias.  No neurodeficits on exam.\  Trauma eval was made given aspirin use, headache and generalized weakness.  Bed side fast was negative, no abdominal tenderness, vitals within normal limits did not believe further imaging of the abdomen pelvis was required  Will obtain CT head, C-spine, chest x-ray  CT head normal, CT C-spine shows C1 and C2 fractures with posterior displacement.  On reevaluation, patient continues to have no neurologic deficits.  Patient was placed in an Julian collar.  I did speak with trauma surgery who accepted their service.    Problems Addressed:  C1 cervical fracture (HCC): acute illness or injury  Closed odontoid fracture with type II morphology and posterior displacement, initial encounter ([facility]): acute illness or injury    Amount and/or Complexity of Data Reviewed  Labs: ordered.  Radiology: ordered.                Disposition  Priority One Transfer: No  Final diagnoses:   Closed odontoid fracture with type II morphology and posterior displacement, initial encounter ([facility])   C1 cervical fracture (HCC)     Time reflects when diagnosis was documented in both MDM as applicable and the Disposition within this note       Time User Action Codes Description Comment    9/4/2024 11:48 AM Neena Jim Add [S12.111A] Closed odontoid fracture  with type II morphology and posterior displacement, initial encounter (HCC)     9/4/2024 11:49 AM Jey Banuelos Add [S12.000A] C1 cervical fracture (HCC)           ED Disposition       ED Disposition   Transfer to Another Facility-In Network    Condition   --    Date/Time   Wed Sep 4, 2024 11:46 AM    Comment   Marsha Oliva should be transferred out to [facility].               MD Documentation      Flowsheet Row Most Recent Value   Patient Condition The patient has been stabilized such that within reasonable medical probability, no material deterioration of the patient condition or the condition of the unborn child(dwaine) is likely to result from the transfer   Reason for Transfer Level of Care needed not available at this facility   Benefits of Transfer Specialized equipment and/or services available at the receiving facility (Include comment)________________________   Risks of Transfer Potential for delay in receiving treatment, Potential deterioration of medical condition, Loss of IV, Increased discomfort during transfer, Possible worsening of condition or death during transfer   Accepting Physician Dr. Pretty   Accepting Facility Name, [facility]   Sending MD Dr. Banuelos   Provider Certification General risk, such as traffic hazards, adverse weather conditions, rough terrain or turbulence, possible failure of equipment (including vehicle or aircraft), or consequences of actions of persons outside the control of the transport personnel, Unanticipated needs of medical equipment and personnel during transport, Risk of worsening condition          RN Documentation      Flowsheet Row Most Recent Value   Accepting Facility Name, [facility]          Follow-up Information    None       Discharge Medication List as of 9/4/2024 12:57 PM        CONTINUE these medications which have NOT CHANGED    Details   Aspirin 81 MG CAPS Take 81 mg by mouth in the morning. Indications:  Disease involving Lipid Deposits in the Arteries, Historical Med      !! atorvastatin (LIPITOR) 20 mg tablet Historical Med      !! atorvastatin (LIPITOR) 40 mg tablet Take 1 tablet (40 mg total) by mouth daily with dinner, Starting Mon 12/27/2021, Normal      chlorhexidine (HIBICLENS) 4 % external liquid Apply 1 Application topically daily as needed for wound care For hand washing with wound care for MRSA positive wound., Starting Wed 12/27/2023, Normal      cyanocobalamin (VITAMIN B-12) 1000 MCG tablet Take 1,000 mcg by mouth once a week. Indications: Inadequate Vitamin B12, Historical Med      cyanocobalamin 1,000 mcg/mL 1 mL jPt is not taking, Starting Tue 12/7/2021, Historical Med      digoxin (LANOXIN) 0.125 mg tablet Take 1 tablet (125 mcg total) by mouth every other day, Starting Fri 6/10/2022, Normal      diltiazem (CARDIZEM CD) 180 mg 24 hr capsule Take 1 capsule (180 mg total) by mouth daily, Starting Sat 8/13/2022, Normal      folic acid (FOLVITE) 1 mg tablet Take 1 tablet by mouth daily, Starting Tue 12/21/2021, Historical Med      levothyroxine 137 mcg tablet Take 137 mcg by mouth daily, Starting Tue 9/13/2022, Historical Med      Magnesium Gluconate 550 MG TABS Take 30 mg by mouth 2 (two) times a day Pt taking 250mg daily, Historical Med      oxygen gas Inhale 2 L/min if needed (shortness of breath). Indications: Shortness of breath, Historical Med      potassium chloride (K-DUR,KLOR-CON) 20 mEq tablet Take 2 tablets by mouth daily Pt is not  taking, Starting Thu 12/16/2021, Historical Med      sertraline (ZOLOFT) 100 mg tablet Take 1 tablet by mouth daily, Starting Thu 12/16/2021, Historical Med      torsemide (DEMADEX) 20 mg tablet Take 1 tablet (20 mg total) by mouth daily as needed (weight gain / leg swelling), Starting Fri 8/12/2022, Normal       !! - Potential duplicate medications found. Please discuss with provider.        No discharge procedures on file.    PDMP Review       None             ED Provider  Electronically Signed by           Jey Banuelos DO  09/06/24 1732

## 2024-09-06 NOTE — PHYSICAL THERAPY NOTE
PHYSICAL THERAPY NOTE          Patient Name: Marsha Oliva  Today's Date: 9/6/2024 09/06/24 1200   PT Last Visit   PT Visit Date 09/06/24   Note Type   Note type Evaluation   End of Consult   Patient Position at End of Consult Bedside chair;Bed/Chair alarm activated;All needs within reach   Pain Assessment   Pain Assessment Tool 0-10   Pain Score 5   Pain Location/Orientation Location: [facility]   Hospital Pain Intervention(s) Repositioned   Restrictions/Precautions   Weight Bearing Precautions Per Order No   Braces or Orthoses C/S Collar   Other Precautions Chair Alarm;Multiple lines;Fall Risk;Spinal precautions;Pain;O2;Bed Alarm   Home Living   Type of Home House   Home Layout Two level   Home Equipment Walker;Cane   Additional Comments Has a bed and bath on first floor   Prior Function   Level of [place] Independent with ADLs   Lives With Spouse   Receives Help From Family   IADLs Independent with meal prep;Independent with medication management;Family/Friend/Other provides transportation   Falls in the last 6 months 1 to 4   General   Family/Caregiver Present Yes   Cognition   Overall Cognitive Status WFL   Orientation Level Oriented to person;Oriented to situation   Subjective   Subjective Pt is agreeable to therapy and wants to do whatever we need to get better   RLE Assessment   RLE Assessment X   Strength RLE   R Knee Extension 3/5   LLE Assessment   LLE Assessment X   Strength LLE   L Knee Extension 3/5   Coordination   Movements are Fluid and Coordinated 0   Coordination and Movement Description mildly ataxic, limited balance   Sensation WFL   Bed Mobility   Supine to Sit 3  Moderate assistance   Additional items Assist x 2;Increased time required   Transfers   Sit to Stand 3  Moderate assistance   Additional items Assist x 2   Stand to Sit 3  Moderate assistance   Additional items Assist x 2   Stand pivot 3  Moderate  assistance   Additional items Assist x 2   Ambulation/Elevation   Gait pattern Shuffling;Inconsistent anthony;Decreased R stance;Decreased L stance;Step to   Gait Assistance 3  Moderate assist   Additional items Assist x 2   Assistive Device Other (Comment)  (HHA)   Distance 3ft   Balance   Static Sitting Fair   Dynamic Sitting Fair -   Static Standing Poor +   Dynamic Standing Poor   Ambulatory Poor   Activity Tolerance   Activity Tolerance Patient limited by fatigue;Patient limited by pain   Medical Staff Made Aware OT present for co-evaluation   Assessment   Prognosis Fair   Problem List Decreased strength;Decreased endurance;Impaired balance;Decreased mobility;Orthopedic restrictions;Pain   Assessment Pt is a 78 yo female post cervical fusion after a fall six weeks ago. Patient has a history of hypothyroidism, hyperlipidemia and chronic A-fib. Patient is currently in a cervical collar and under cervical precautions which were reviewed with the patient. Patient presented with generalized lower extremity weakness requiring moderate assistance of 2 during all components of going from supine in bed to seated in bedside recliner. PT discussed with pt and spouse about going to an inpatient rehab facility for a short term after stay in hospital in order to allow patient to return to prior level of function with ADLs and IADLs. Pt would benefit from continued PT in order to regain ambulatory status and start to rebuild muscular endurance to allow for participation in IPR.   Goals   Patient Goals Regain strength and ability   STG Expiration Date 09/11/24   Short Term Goal #1 1)Pt able to ambulate with wheeled walker and mod assist x1 for 25 feet in order to allow for increased mobility in the home 2) Pt min assist x 1 for sit to stand and transfers to chair to allow pt to participate in ADLs , 3)Pt will progess to at least a 4/5 strength in lower extremities to allow for increased activity tolerance and endurance to  facilitate return to ADLs, 4) Pt will progress to a balance level of fair or better to allow pt to ambulate and complete ADLs and IADLs with minimal risk for falls.   LTG Expiration Date 09/16/24   Long Term Goal #1 1)Pt able to ambulate greater than 150 feet using wheeled walker with supervision in order to allow pt greater ability to participate in ADLs and community, 2) Pt able to complete all bed mobility and transfers with supervision to allow for greater independence and functioning at home, 3) Pt will regain LE strength to WNL to allow for better participation in ADLs and IADLs without the need for assistance, 4) Pt's balance will progress to fair+ or better to facilitate safe ambulation and participation in ADLs.   PT Treatment Day 1   Plan   Treatment/Interventions ADL retraining;Functional transfer training;LE strengthening/ROM;Elevations;Endurance training;Therapeutic exercise;Bed mobility;Gait training;OT;Spoke to nursing   PT Frequency 2-3x/wk   Discharge Recommendation   Rehab Resource Intensity Level, PT II (Moderate Resource Intensity)   AM-PAC Basic Mobility Inpatient   Turning in Flat Bed Without Bedrails 2   Lying on Back to Sitting on Edge of Flat Bed Without Bedrails 2   Moving Bed to Chair 2   Standing Up From Chair Using Arms 2   Walk in Room 2   Climb 3-5 Stairs With Railing 2   Basic Mobility Inpatient Raw Score 12   Basic Mobility Standardized Score 32.23   [facility] Highest Level Of Mobility   -HLM Goal 4: Move to chair/commode   -HLM Achieved 4: Move to chair/commode   End of Consult   Patient Position at End of Consult Bedside chair;Bed/Chair alarm activated;All needs within reach   Lalo ESPITIA

## 2024-09-06 NOTE — ASSESSMENT & PLAN NOTE
Not chronically on AC due to history of bleeding    Plan:  Continue daily cardizem  Continue Q48H digoxin

## 2024-09-06 NOTE — ASSESSMENT & PLAN NOTE
Scheduled Medications:  Tylenol 975 mg Q8 hours  Infusions/PCA  None  PRN:  Oxycodone 2.5 mg Q4 PRN moderate pain: 0 doses  Oxycodone 5 mg Q4 PRN severe pain: 0 doses  Bowel Reg  Senna 1 tablet daily  Last bowel movement: Prior to admission    Plan:  Change to senokot BID  Geriatrics consult  Consider addition of muscle relaxant as her pain appears more spastic/tension headache type pain

## 2024-09-06 NOTE — PLAN OF CARE
Problem: Potential for Falls  Goal: Patient will remain free of falls  Description: INTERVENTIONS:  - Educate patient/family on patient safety including physical limitations  - Instruct patient to call for assistance with activity   - Consult OT/PT to assist with strengthening/mobility   - Keep Call bell within reach  - Keep bed low and locked with side rails adjusted as appropriate  - Keep care items and personal belongings within reach  - Initiate and maintain comfort rounds  - Make Fall Risk Sign visible to staff  - Offer Toileting every 2 Hours, in advance of need  - Initiate/Maintain bed alarm  - Apply yellow socks and bracelet for high fall risk patients  - Consider moving patient to room near nurses station  Outcome: Progressing     Problem: PAIN - ADULT  Goal: Verbalizes/displays adequate comfort level or baseline comfort level  Description: Interventions:  - Encourage patient to monitor pain and request assistance  - Assess pain using appropriate pain scale  - Administer analgesics based on type and severity of pain and evaluate response  - Implement non-pharmacological measures as appropriate and evaluate response  - Consider cultural and social influences on pain and pain management  - Notify physician/advanced practitioner if interventions unsuccessful or patient reports new pain  Outcome: Progressing     Problem: MUSCULOSKELETAL - ADULT  Goal: Maintain proper alignment of affected body part  Description: INTERVENTIONS:  - Support, maintain and protect limb and body alignment  - Provide patient/ family with appropriate education  Outcome: Progressing

## 2024-09-06 NOTE — ASSESSMENT & PLAN NOTE
ECHO in 6/2022 with severe MR, Moderate TR, Mild CO  Will have to watch fluid status closely  D/C IVF   DISPLAY PLAN FREE TEXT

## 2024-09-06 NOTE — PROGRESS NOTES
"[facility]  Progress Note/Tertiary Trauma Survey  Name: Marsha Oliva I  MRN: [id number]  Unit/Bed#: ICCU 209-01 I Date of Admission: 9/4/2024   Date of Service: 9/6/2024 I Hospital Day: 2    Assessment & Plan   * Closed odontoid fracture with type II morphology and posterior displacement (HCC)  Assessment & Plan  S/p Fall within the last month  CT C spine:  \"Type II odontoid fracture with posterior displacement including prominent posterior displacement of C1 with respect to the C2 vertebral body causing a component of cord compression of the medullary cervical spinal cord\"  MRI  \"Displaced type II dens fracture with severe posterior displacement of the dens and C1 vertebral body relative to the C2 vertebral body. There is resultant mass effect on the cord which appears relatively mild given the degree of displacement. No gross cord edema is seen\"  CTA without vascular injury  Went to OR yesterday for PCDF C1-3  Remains in cervical collar    Plan:  Maintain C Collar and strict C Spine precautions  Upright XR per neurosurgery  Pain control as below  Appreciate neurosurgery recs      Heart failure with preserved ejection fraction (HCC)  Assessment & Plan  Wt Readings from Last 3 Encounters:   09/06/24 56.2 kg (123 lb 14.4 oz)   09/04/24 53.5 kg (118 lb)   05/22/24 57.6 kg (127 lb)     ECHO 6/2022: LV size normal, mild to moderate concentric hypertrophy, RV overload, RV dilation, mild to moderate reduction in RV function, bilatrial dilation, severe MR, Moderate TR, Mild GA  Appears euvolemic, however weight is up    Plan:  D/C fluids  Continue cardizem/digoxin  Consider lasix          Acute pain due to trauma  Assessment & Plan  Scheduled Medications:  Tylenol 975 mg Q8 hours  Infusions/PCA  None  PRN:  Oxycodone 2.5 mg Q4 PRN moderate pain: 0 doses  Oxycodone 5 mg Q4 PRN severe pain: 0 doses  Bowel Reg  Senna 1 tablet daily  Last bowel movement: Prior to " admission    Plan:  Change to senokot BID  Geriatrics consult  Consider addition of muscle relaxant as her pain appears more spastic/tension headache type pain      Fall  Assessment & Plan  PT/OT    Severe mitral regurgitation  Assessment & Plan  ECHO in 6/2022 with severe MR, Moderate TR, Mild MI  Will have to watch fluid status closely  D/C IVF    Atrial fibrillation, chronic (HCC)  Assessment & Plan  Not chronically on AC due to history of bleeding    Plan:  Continue daily cardizem  Continue Q48H digoxin    At risk for delirium  Assessment & Plan  Geriatrics consult  Strict environmental controls:   OOB for all meals   Lights on and blinds up during day.   Frequent reorientation   Lights and TV off and blinds down at night   Minimize interuptions at night as clincially indicated     Mild cognitive impairment of uncertain or unknown etiology  Assessment & Plan  Geriatrics consult   Strict environmental controls:   OOB for all meals   Lights on and blinds up during day.   Frequent reorientation   Lights and TV off and blinds down at night   Minimize interuptions at night as clincially indicated     Mixed hyperlipidemia  Assessment & Plan  Continue statin    Hypothyroidism  Assessment & Plan  Continue synthroid    Essential hypertension  Assessment & Plan  Continue diltizem    Depression with anxiety  Assessment & Plan  Continue zoloft    Spinal cord compression (HCC)  Assessment & Plan  S/p C1 and C3 lateral mass screw placement, C2 Pedicle screw placement, arthrodesis of C1-3  See below    Hypertensive heart disease with heart failure (HCC)  Assessment & Plan  Wt Readings from Last 3 Encounters:   09/06/24 56.2 kg (123 lb 14.4 oz)   09/04/24 53.5 kg (118 lb)   05/22/24 57.6 kg (127 lb)         See below               Disposition: Med Surg    ICU Core Measures     A: Assess, Prevent, and Manage Pain Has pain been assessed? Yes  Need for changes to pain regimen? Yes   B: Both SAT/SAT  N/A   C: Choice of Sedation  RASS Goal: N/A patient not on sedation  Need for changes to sedation or analgesia regimen? NA   D: Delirium CAM-ICU: Negative   E: Early Mobility  Plan for early mobility? Yes   F: Family Engagement Plan for family engagement today? Yes       Antibiotic Review: Post op requirements     Review of Invasive Devices:    Varghese Plan: Varghese to be removed. Order has been placed    Annabelle Plan: Discontinue arterial line    Prophylaxis:  VTE VTE covered by:  heparin (porcine), Subcutaneous       Stress Ulcer  not orderedcovered bypantoprazole (PROTONIX) 40 mg tablet [[id number]] (Long-Term Med)           1. Before the illness or injury that brought you to the Emergency, did you need someone to help you on a regular basis? 1=Yes   2. Since the illness or injury that brought you to the Emergency, have you needed more help than usual to take care of yourself? 1=Yes   3. Have you been hospitalized for one or more nights during the past 6 months (excluding a stay in the Emergency Department)? 0=No   4. In general, do you see well? 1=No   5. In general, do you have serious problems with your memory? 1=Yes   6. Do you take more than three different medications everyday? 1=Yes   TOTAL   5     Did you order a geriatric consult if the score was 2 or greater?: yes    HPI:  79 year old female with PMH of HFpEF, severe mitral regurg, AFib not on AC, hypothyroidism who presented in a delayed fashion after a fall and was found to have a type II dens fracture and was transferred to [facility] on 9/5    Significant 24hr Events     24hr events:   S/p C1-3 PCDF  Self discontinued arterial line  This AM complains of spasm neck and head pain     Subjective   Review of Systems: Review of Systems   Musculoskeletal:  Positive for neck pain.   Neurological:  Positive for headaches.   All other systems reviewed and are negative.       Objective                            Vitals I/O      Most Recent Min/Max in 24hrs   Temp 97.5 °F (36.4 °C) Temp  Min: 96.2 °F  (35.7 °C)  Max: 98.6 °F (37 °C)   Pulse 94 Pulse  Min: 90  Max: 113   Resp 17 Resp  Min: 11  Max: 36   /81 BP  Min: 109/92  Max: 158/73   O2 Sat 100 % SpO2  Min: 91 %  Max: 100 %      Intake/Output Summary (Last 24 hours) at 9/6/2024 0721  Last data filed at 9/6/2024 0101  Gross per 24 hour   Intake 3054.16 ml   Output 1200 ml   Net 1854.16 ml       Diet Cardiovascular; Cardiac    Invasive Monitoring   Arterial Line  Annabelle /62  Arterial Line BP  Min: 102/55  Max: 126/62   MAP 86 mmHg  Arterial Line MAP (mmHg)  Min: 74 mmHg  Max: 86 mmHg           Physical Exam   Physical Exam  Vitals and nursing note reviewed.   Eyes:      Conjunctiva/sclera: Conjunctivae normal.   Skin:     General: Skin is warm and dry.      Capillary Refill: Capillary refill takes less than 2 seconds.   HENT:      Head: Normocephalic and atraumatic.      Mouth/Throat:      Mouth: Mucous membranes are dry.   Cardiovascular:      Rate and Rhythm: Normal rate. Rhythm irregular.      Pulses: Normal pulses.      Heart sounds: Murmur (4/6 Holosystolic) heard.   Musculoskeletal:         General: Normal range of motion.   Abdominal: General: Bowel sounds are normal.      Palpations: Abdomen is soft.   Constitutional:       Appearance: She is well-developed and well-nourished.      Interventions: Cervical collar in place.   Pulmonary:      Effort: Pulmonary effort is normal.      Breath sounds: No wheezing or rales.   Neurological:      General: No focal deficit present.      Mental Status: She is alert and oriented to person, place and time. Mental status is at baseline. She is CAM ICU negative.      Motor: Strength full and intact in all extremities.            Diagnostic Studies      EKG:   Imaging:  I have personally reviewed pertinent films in PACS     Medications:  Scheduled PRN   acetaminophen, 975 mg, Q8H CHRIS  atorvastatin, 20 mg, Daily With Dinner  cefazolin, 1,000 mg, Q8H  chlorhexidine, 15 mL, Q12H CHRIS  digoxin, 125 mcg, Every Other  Day  diltiazem, 180 mg, Daily  docusate sodium, 100 mg, BID  heparin (porcine), 5,000 Units, Q8H CHRIS  levothyroxine, 137 mcg, Early Morning  senna, 1 tablet, Daily  sertraline, 100 mg, Daily      [START ON 9/7/2024] bisacodyl, 10 mg, Daily PRN  calcium carbonate, 1,000 mg, Daily PRN  HYDROmorphone, 0.2 mg, Q2H PRN  ondansetron, 4 mg, Q6H PRN  oxyCODONE, 2.5 mg, Q4H PRN   Or  oxyCODONE, 5 mg, Q4H PRN       Continuous            Labs:    CBC    Recent Labs     09/05/24  0538 09/05/24 1744 09/05/24 1906 09/05/24  2325 09/06/24  0620   WBC 4.87  --   --   --  6.55   HGB 14.7   < > 13.3  --  14.1   HCT 46.7*   < > 39  --  44.8     --   --  146* 149    < > = values in this interval not displayed.     BMP    Recent Labs     09/05/24  0538 09/05/24 1744 09/05/24 1906 09/06/24  0620   SODIUM 140  --   --  140   K 4.0  --   --  4.8     --   --  101   CO2 26   < > 21 24   AGAP 14*  --   --  15*   BUN 12  --   --  15   CREATININE 0.78  --   --  0.78   CALCIUM 9.7  --   --  9.1    < > = values in this interval not displayed.       Coags    Recent Labs     09/04/24  1119 09/06/24  0620   INR 0.98 0.93   PTT 33 27        Additional Electrolytes  Recent Labs     09/05/24  0538 09/05/24  1744 09/05/24 1906 09/06/24  0620   MG 2.3  --   --  2.2   PHOS 3.8  --   --  5.0*   CAIONIZED 1.18 1.21 1.14  --           Blood Gas    No recent results  No recent results LFTs  Recent Labs     09/04/24  1123   ALT 12   AST 32   ALKPHOS 71   ALB 4.4   TBILI 0.74       Infectious  No recent results  Glucose  Recent Labs     09/04/24  1123 09/05/24  0538 09/06/24  0620   GLUC 98 71 101               Jamir Strauss PA-C

## 2024-09-07 LAB
ANION GAP SERPL CALCULATED.3IONS-SCNC: 11 MMOL/L (ref 4–13)
BUN SERPL-MCNC: 13 MG/DL (ref 5–25)
CALCIUM SERPL-MCNC: 9 MG/DL (ref 8.4–10.2)
CHLORIDE SERPL-SCNC: 103 MMOL/L (ref 96–108)
CO2 SERPL-SCNC: 24 MMOL/L (ref 21–32)
CREAT SERPL-MCNC: 0.72 MG/DL (ref 0.6–1.3)
ERYTHROCYTE [DISTWIDTH] IN BLOOD BY AUTOMATED COUNT: 13.9 % (ref 11.6–15.1)
GFR SERPL CREATININE-BSD FRML MDRD: 79 ML/MIN/1.73SQ M
GLUCOSE SERPL-MCNC: 86 MG/DL (ref 65–140)
HCT VFR BLD AUTO: 41.4 % (ref 34.8–46.1)
HGB BLD-MCNC: 13.1 G/DL (ref 11.5–15.4)
MCH RBC QN AUTO: 31.2 PG (ref 26.8–34.3)
MCHC RBC AUTO-ENTMCNC: 31.6 G/DL (ref 31.4–37.4)
MCV RBC AUTO: 99 FL (ref 82–98)
PLATELET # BLD AUTO: 163 THOUSANDS/UL (ref 149–390)
PMV BLD AUTO: 9 FL (ref 8.9–12.7)
POTASSIUM SERPL-SCNC: 4 MMOL/L (ref 3.5–5.3)
RBC # BLD AUTO: 4.2 MILLION/UL (ref 3.81–5.12)
SODIUM SERPL-SCNC: 138 MMOL/L (ref 135–147)
WBC # BLD AUTO: 9.03 THOUSAND/UL (ref 4.31–10.16)

## 2024-09-07 PROCEDURE — 99024 POSTOP FOLLOW-UP VISIT: CPT | Performed by: PHYSICIAN ASSISTANT

## 2024-09-07 PROCEDURE — 80048 BASIC METABOLIC PNL TOTAL CA: CPT

## 2024-09-07 PROCEDURE — 87081 CULTURE SCREEN ONLY: CPT | Performed by: INTERNAL MEDICINE

## 2024-09-07 PROCEDURE — 99232 SBSQ HOSP IP/OBS MODERATE 35: CPT | Performed by: SURGERY

## 2024-09-07 PROCEDURE — 85027 COMPLETE CBC AUTOMATED: CPT

## 2024-09-07 RX ORDER — SENNOSIDES 8.8 MG/5ML
8.8 LIQUID ORAL 2 TIMES DAILY
Status: DISCONTINUED | OUTPATIENT
Start: 2024-09-07 | End: 2024-09-11 | Stop reason: HOSPADM

## 2024-09-07 RX ADMIN — ACETAMINOPHEN 975 MG: 325 TABLET ORAL at 21:10

## 2024-09-07 RX ADMIN — HEPARIN SODIUM 5000 UNITS: 5000 INJECTION INTRAVENOUS; SUBCUTANEOUS at 13:05

## 2024-09-07 RX ADMIN — HEPARIN SODIUM 5000 UNITS: 5000 INJECTION INTRAVENOUS; SUBCUTANEOUS at 00:36

## 2024-09-07 RX ADMIN — OXYCODONE HYDROCHLORIDE 5 MG: 5 TABLET ORAL at 17:11

## 2024-09-07 RX ADMIN — HYDROMORPHONE HYDROCHLORIDE 0.2 MG: 0.2 INJECTION, SOLUTION INTRAMUSCULAR; INTRAVENOUS; SUBCUTANEOUS at 21:10

## 2024-09-07 RX ADMIN — DILTIAZEM HYDROCHLORIDE 180 MG: 180 CAPSULE, COATED, EXTENDED RELEASE ORAL at 09:25

## 2024-09-07 RX ADMIN — SERTRALINE HYDROCHLORIDE 100 MG: 100 TABLET ORAL at 09:25

## 2024-09-07 RX ADMIN — LEVOTHYROXINE SODIUM 125 MCG: 125 TABLET ORAL at 05:52

## 2024-09-07 RX ADMIN — ACETAMINOPHEN 975 MG: 325 TABLET ORAL at 05:52

## 2024-09-07 RX ADMIN — HEPARIN SODIUM 5000 UNITS: 5000 INJECTION INTRAVENOUS; SUBCUTANEOUS at 05:52

## 2024-09-07 RX ADMIN — SENNOSIDES 8.6 MG: 8.6 TABLET, FILM COATED ORAL at 09:25

## 2024-09-07 RX ADMIN — DOCUSATE SODIUM 100 MG: 100 CAPSULE, LIQUID FILLED ORAL at 09:25

## 2024-09-07 RX ADMIN — ATORVASTATIN CALCIUM 20 MG: 20 TABLET, FILM COATED ORAL at 17:11

## 2024-09-07 RX ADMIN — CHLORHEXIDINE GLUCONATE 0.12% ORAL RINSE 15 ML: 1.2 LIQUID ORAL at 09:25

## 2024-09-07 RX ADMIN — CHLORHEXIDINE GLUCONATE 0.12% ORAL RINSE 15 ML: 1.2 LIQUID ORAL at 21:10

## 2024-09-07 RX ADMIN — HEPARIN SODIUM 5000 UNITS: 5000 INJECTION INTRAVENOUS; SUBCUTANEOUS at 21:10

## 2024-09-07 RX ADMIN — ACETAMINOPHEN 975 MG: 325 TABLET ORAL at 13:05

## 2024-09-07 NOTE — PLAN OF CARE
Problem: Potential for Falls  Goal: Patient will remain free of falls  Description: INTERVENTIONS:  - Educate patient/family on patient safety including physical limitations  - Instruct patient to call for assistance with activity   - Consult OT/PT to assist with strengthening/mobility   - Keep Call bell within reach  - Keep bed low and locked with side rails adjusted as appropriate  - Keep care items and personal belongings within reach  - Initiate and maintain comfort rounds  - Make Fall Risk Sign visible to staff  - Offer Toileting every  Hours, in advance of need  - Initiate/Maintain alarm  - Obtain necessary fall risk management equipment:   - Apply yellow socks and bracelet for high fall risk patients  - Consider moving patient to room near nurses station  Outcome: Progressing     Problem: Prexisting or High Potential for Compromised Skin Integrity  Goal: Skin integrity is maintained or improved  Description: INTERVENTIONS:  - Identify patients at risk for skin breakdown  - Assess and monitor skin integrity  - Assess and monitor nutrition and hydration status  - Monitor labs   - Assess for incontinence   - Turn and reposition patient  - Assist with mobility/ambulation  - Relieve pressure over bony prominences  - Avoid friction and shearing  - Provide appropriate hygiene as needed including keeping skin clean and dry  - Evaluate need for skin moisturizer/barrier cream  - Collaborate with interdisciplinary team   - Patient/family teaching  - Consider wound care consult   Outcome: Progressing     Problem: PAIN - ADULT  Goal: Verbalizes/displays adequate comfort level or baseline comfort level  Description: Interventions:  - Encourage patient to monitor pain and request assistance  - Assess pain using appropriate pain scale  - Administer analgesics based on type and severity of pain and evaluate response  - Implement non-pharmacological measures as appropriate and evaluate response  - Consider cultural and  social influences on pain and pain management  - Notify physician/advanced practitioner if interventions unsuccessful or patient reports new pain  Outcome: Progressing     Problem: DISCHARGE PLANNING  Goal: Discharge to home or other facility with appropriate resources  Description: INTERVENTIONS:  - Identify barriers to discharge w/patient and caregiver  - Arrange for needed discharge resources and transportation as appropriate  - Identify discharge learning needs (meds, wound care, etc.)  - Arrange for interpretive services to assist at discharge as needed  - Refer to Case Management Department for coordinating discharge planning if the patient needs post-hospital services based on physician/advanced practitioner order or complex needs related to functional status, cognitive ability, or social support system  Outcome: Progressing     Problem: Knowledge Deficit  Goal: Patient/family/caregiver demonstrates understanding of disease process, treatment plan, medications, and discharge instructions  Description: Complete learning assessment and assess knowledge base.  Interventions:  - Provide teaching at level of understanding  - Provide teaching via preferred learning methods  Outcome: Progressing     Problem: MUSCULOSKELETAL - ADULT  Goal: Maintain or return mobility to safest level of function  Description: INTERVENTIONS:  - Assess patient's ability to carry out ADLs; assess patient's baseline for ADL function and identify physical deficits which impact ability to perform ADLs (bathing, care of mouth/teeth, toileting, grooming, dressing, etc.)  - Assess/evaluate cause of self-care deficits   - Assess range of motion  - Assess patient's mobility  - Assess patient's need for assistive devices and provide as appropriate  - Encourage maximum independence but intervene and supervise when necessary  - Involve family in performance of ADLs  - Assess for home care needs following discharge   - Consider OT consult to assist  with ADL evaluation and planning for discharge  - Provide patient education as appropriate  Outcome: Progressing  Goal: Maintain proper alignment of affected body part  Description: INTERVENTIONS:  - Support, maintain and protect limb and body alignment  - Provide patient/ family with appropriate education  Outcome: Progressing     Problem: Nutrition/Hydration-ADULT  Goal: Nutrient/Hydration intake appropriate for improving, restoring or maintaining nutritional needs  Description: Monitor and assess patient's nutrition/hydration status for malnutrition. Collaborate with interdisciplinary team and initiate plan and interventions as ordered.  Monitor patient's weight and dietary intake as ordered or per policy. Utilize nutrition screening tool and intervene as necessary. Determine patient's food preferences and provide high-protein, high-caloric foods as appropriate.     INTERVENTIONS:  - Monitor oral intake, urinary output, labs, and treatment plans  - Assess nutrition and hydration status and recommend course of action  - Evaluate amount of meals eaten  - Assist patient with eating if necessary   - Allow adequate time for meals  - Recommend/ encourage appropriate diets, oral nutritional supplements, and vitamin/mineral supplements  - Order, calculate, and assess calorie counts as needed  - Recommend, monitor, and adjust tube feedings and TPN/PPN based on assessed needs  - Assess need for intravenous fluids  - Provide specific nutrition/hydration education as appropriate  - Include patient/family/caregiver in decisions related to nutrition  Outcome: Progressing

## 2024-09-07 NOTE — PLAN OF CARE
Problem: Potential for Falls  Goal: Patient will remain free of falls  Description: INTERVENTIONS:  - Educate patient/family on patient safety including physical limitations  - Instruct patient to call for assistance with activity   - Consult OT/PT to assist with strengthening/mobility   - Keep Call bell within reach  - Keep bed low and locked with side rails adjusted as appropriate  - Keep care items and personal belongings within reach  - Initiate and maintain comfort rounds  - Make Fall Risk Sign visible to staff  - Offer Toileting every  Hours, in advance of need  - Initiate/Maintain alarm  - Obtain necessary fall risk management equipment:   Problem: Prexisting or High Potential for Compromised Skin Integrity  Goal: Skin integrity is maintained or improved  Description: INTERVENTIONS:  - Identify patients at risk for skin breakdown  - Assess and monitor skin integrity  - Assess and monitor nutrition and hydration status  - Monitor labs   - Assess for incontinence   - Turn and reposition patient  - Assist with mobility/ambulation  - Relieve pressure over bony prominences  - Avoid friction and shearing  - Provide appropriate hygiene as needed including keeping skin clean and dry  - Evaluate need for skin moisturizer/barrier cream  - Collaborate with interdisciplinary team   - Patient/family teaching  - Consider wound care consult   Outcome: Progressing     - Apply yellow socks and bracelet for high fall risk patients  - Consider moving patient to room near nurses station  Outcome: Progressing

## 2024-09-07 NOTE — PROGRESS NOTES
[facility]  Progress Note  Name: Marsha Oliva I  MRN: [id number]  Unit/Bed#: [facility] 209-01 I Date of Admission: 9/4/2024   Date of Service: 9/7/2024 I Hospital Day: 3    Assessment & Plan   * Closed odontoid fracture with type II morphology and posterior displacement (HCC)  Assessment & Plan  2 Days Post-Op Procedure(s):  navigated posterior fusion C1-C3 (Dr. Simpson, 9/5/2024)  Patient presented with increased SOB and neck pain with ambulation   Reports fall from bed about 6 weeks ago with onset of neck pain and extremity dysfunction     Imaging:   CT cervical spine 9/4/2024: Type II odontoid fracture with posterior displacement.  Posterior displacement of C1 with respect to C2 vertebral body causing component of cord compression of the medullary cervical spinal cord.  CTA without vascular injury  Cervical postop XR good hardware placement and alignment.     Plan:   Ongoing frequent neurological checks.   Collar to be on at all times x 6 weeks. Shonda collar for showering.   Hemovac Drain - no documented output in chart. Hemovac at least 1/2 full.  Will maintain today. Trend output. Likely dc tomorrow  Mobilize PT/OT - anticipate rehab  Speech following -  If collar felt to be reason for swallowing difficulties, can loosen when eating.  Ongoing medical management and pain control per primary team.   DVT PPX: SCDs. [facility]    Neurosurgery will continue to follow. Call with questions or concerns.                Subjective/Objective   Chief Complaint: I'm lowsy    Subjective: Patient endorses some neck pain and headache. No arm complaints. Tolerating PO.     Objective: Sitting up in bed. NAD    I/O         09/05 0701 09/06 0700 09/06 0701 09/07 0700 09/07 0701 09/08 0700    P.O.  220     I.V. (mL/kg) 3535.8 (62.9)      IV Piggyback 50 53.3     Total Intake(mL/kg) 3585.8 (63.8) 273.3 (4.9)     Urine (mL/kg/hr) 1100 (0.8) 1150 (0.9)     Drains 50      Blood 50      Total Output 1200  "1150     Net +2385.8 -876.7                    Invasive Devices       Peripheral Intravenous Line  Duration             Peripheral IV 09/04/24 Proximal;Right;Ventral (anterior) Forearm 2 days    Peripheral IV 09/05/24 Right;Ventral (anterior) Forearm 1 day              Drain  Duration             Closed/Suction Drain Left;Posterior Neck Accordion 10 Fr. 1 day                    Physical Exam:  Vitals: Blood pressure 114/65, pulse (!) 106, temperature 97.7 °F (36.5 °C), temperature source Oral, resp. rate (!) 23, height 5' 3\" (1.6 m), weight 56.2 kg (123 lb 14.4 oz), SpO2 99%.,Body mass index is 21.95 kg/m².    General appearance: alert, appears stated age, cooperative and no distress  Head: Normocephalic, without obvious abnormality  Eyes: conjugate gaze and tracks appropriately in room  Neck: incision CDI. Drain with bloody output  Lungs: non labored breathing  Heart: tachycardia  Neurologic:   Mental status: Alert, follows commands  Cranial nerves: grossly intact   Sensory: grossly intact to crude touch  Motor: moving all extremities without focal weakness     Lab Results:  Results from last 7 days   Lab Units 09/07/24 0917 09/06/24 0620 09/05/24 2325 09/05/24 1906 09/05/24 1744 09/05/24  0538 09/04/24  1036   WBC Thousand/uL 9.03 6.55  --   --   --  4.87 5.13   HEMOGLOBIN g/dL 13.1 14.1  --   --   --  14.7 15.4   I STAT HEMOGLOBIN g/dl  --   --   --  13.3   < >  --   --    HEMATOCRIT % 41.4 44.8  --   --   --  46.7* 49.9*   HEMATOCRIT, ISTAT %  --   --   --  39   < >  --   --    PLATELETS Thousands/uL 163 149 146*  --   --  151 156   SEGS PCT %  --   --   --   --   --  76* 73   MONO PCT %  --   --   --   --   --  9 9   EOS PCT %  --   --   --   --   --  1 1    < > = values in this interval not displayed.     Results from last 7 days   Lab Units 09/07/24 0917 09/06/24 0620 09/05/24 1906 09/05/24 1744 09/05/24  1744 09/05/24  0538 09/04/24  1123   SODIUM mmol/L 138 140  --   --   --  140 140   POTASSIUM " "mmol/L 4.0 4.8  --   --   --  4.0 4.7   CHLORIDE mmol/L 103 101  --   --   --  100 101   CO2 mmol/L 24 24  --   --   --  26 27   CO2, I-STAT mmol/L  --   --  21   < > 27  --   --    BUN mg/dL 13 15  --   --   --  12 13   CREATININE mg/dL 0.72 0.78  --   --   --  0.78 0.83   CALCIUM mg/dL 9.0 9.1  --   --   --  9.7 9.9   ALK PHOS U/L  --   --   --   --   --   --  71   ALT U/L  --   --   --   --   --   --  12   AST U/L  --   --   --   --   --   --  32   GLUCOSE, ISTAT mg/dl  --   --  105  --  87  --   --     < > = values in this interval not displayed.     Results from last 7 days   Lab Units 09/06/24  0620 09/05/24  0538   MAGNESIUM mg/dL 2.2 2.3     Results from last 7 days   Lab Units 09/06/24  0620 09/05/24  0538   PHOSPHORUS mg/dL 5.0* 3.8     Results from last 7 days   Lab Units 09/06/24  0620 09/04/24  1119   INR  0.93 0.98   PTT seconds 27 33     No results found for: \"TROPONINT\"  ABG:No results found for: \"PHART\", \"WCU7MXN\", \"PO2ART\", \"YBV6DUA\", \"V5SKAQFH\", \"BEART\", \"SOURCE\"    Imaging Studies: I have personally reviewed pertinent reports.   and I have personally reviewed pertinent films in PACS    XR cervical spine 2 or 3 views    Result Date: 9/7/2024  Impression: There is bilateral pedicle screw and stabilizing safia fixation at the C1, C2, C3 levels .  No hardware complication.  Stable  alignment compared to intraoperative images. Workstation performed: [id number]     XR spine cervical 2 or 3 vw injury    Result Date: 9/6/2024  Impression: Fluoroscopy provided for procedure guidance. Please refer to the separate procedure note for additional details. Workstation performed: [id number]     CTA head and neck with and without contrast    Result Date: 9/4/2024  Impression: CT Brain:  No acute intracranial abnormality. CT Angiography:  Unremarkable CTA neck and brain. Workstation performed: [id number]     MRI cervical spine wo contrast    Result Date: 9/4/2024  Impression: 1. Displaced type II dens fracture with " severe posterior displacement of the dens and C1 vertebral body relative to the C2 vertebral body. There is resultant mass effect on the cord which appears relatively mild given the degree of displacement. No gross  cord edema is seen, however the exam is motion degraded. 2. Discontinuity of the ALL and irregularity of the PLL. There is also likely interspinous ligament injury at C1-C2. 3. Facet edema at C2-C3 may reflect facet capsular injury. 4. Prominent edema in the C1 and C2 vertebral bodies compatible with known C2 fracture. There is also paravertebral edema. 5. Limited evaluation of the flow voids given motion artifact. Consider CT angiogram of the neck to evaluate the vertebral arteries given the significant distraction at C1-C2. The study was marked in EPIC for immediate notification. Workstation performed: [id number]     TRAUMA - CT spine cervical wo contrast    Result Date: 9/4/2024  Impression: Type II odontoid fracture with posterior displacement including prominent posterior displacement of C1 with respect to the C2 vertebral body causing a component of cord compression of the medullary cervical spinal cord. Further evaluation with MRI is advised. I personally discussed this study with NEENA JIM on 9/4/2024 11:12 AM. Workstation performed: [id number]     TRAUMA - CT head wo contrast    Result Date: 9/4/2024  Impression: No acute intracranial abnormality. Mild to moderate chronic microvascular ischemic changes. Workstation performed: [id number]     VTE Pharmacologic Prophylaxis: Heparin    VTE Mechanical Prophylaxis: sequential compression device

## 2024-09-07 NOTE — PROGRESS NOTES
[facility]  Progress Note: Critical Care  Name: Marsha Oliva 79 y.o. female I MRN: [id number]  Unit/Bed#: [facility]U 209-01 I Date of Admission: 9/4/2024   Date of Service: 9/7/2024 I Hospital Day: 3    Assessment & Plan   Neuro:   Closed type II odontoid fracture with posterior displacement  After recent mechanical fall, increasing neck pain ultimately now coming into the ED for evaluation, transfer from [facility]  9/4 CT C-spine: Type II odontoid fracture with posterior displacement including prominent posterior displacement of C1  9/4 CTh: Negative  9/4 MRI C-spine: Displaced type II dens fracture with with severe posterior displacement of the dens and C1 vertebral body, resultant mass effect on the cord appears relatively mild without gross cord edema, discontinuity of ALL and irregularity of PLL, likely interspinous ligament injury at C1-C2, facet edema at C2-C3 possible facet capsular injury, prominent edema in C1 and C2 vertebral bodies, paravertebral edema  9/4 CTA head/neck: Negative, unremarkable CTA  Now s/p 9/5 b/l C1 and C3 lateral mass screw placement, b/l C2 pedicle screw placement, arthrodesis of C1-3  Surgical drain per neurosurgery,   Cervical collar will remain for 6 weeks  Appreciate neurosurg recs, will need OP follow up  Depression/anxiety  Continue home sertraline  Dementia  Continue to monitor  Geriatrics team consulted and appreciate recs  Analgesia  Multimodal as tolerated  Delirium precautions  Regulate sleep-wake cycle  CAM-ICU     CV:   HTN  Continue home diltiazem and digoxin  Holding ASA  A-fib not on AC  Continue home diltiazem and digoxin  CHF  Holding home torsemide  Last TTE in June 2022 with LVEF 65%  HLD  Continue home atorvastatin     Pulm:  No active issues  Respiratory protocol  Goal SpO2 greater than 92%  Incentive spirometry     GI:   Bowel regimen  Senokot, colace, can add miralax     :   No active issues  UOP 1150     F/E/N:    Fluids  N/a  Electrolytes  Replace for K>4, Phos>3, Mg>2   Nutrition  Speech on board, appreciate recs, cleared for dysphagia 2 and nectar thick liquids  Continued eval     Heme/Onc:   DVT prophylaxis  SQH  Holding home ASA     Endo:   Hypothyroidism  Continue home levothyroxine     ID:   No active issues  Afebrile     MSK/Skin:   Mechanical fall  Fall as above, injuries as above  C-spine precautions, c collar  Early mobilization  Out of bed as able  PT/OT rec level 2 disposition  Pressure prophylaxis  Frequent turns and repositioning    Disposition: Stepdown Level 2    ICU Core Measures     A: Assess, Prevent, and Manage Pain Has pain been assessed? Yes  Need for changes to pain regimen? No   B: Both SAT/SAT  N/A   C: Choice of Sedation RASS Goal: 0 Alert and Calm  Need for changes to sedation or analgesia regimen? No   D: Delirium CAM-ICU: Negative   E: Early Mobility  Plan for early mobility? Yes   F: Family Engagement Plan for family engagement today? Yes       Review of Invasive Devices:            Prophylaxis:  VTE VTE covered by:  heparin (porcine), Subcutaneous, 5,000 Units at 09/07/24 0552       Stress Ulcer  not orderedcovered bypantoprazole (PROTONIX) 40 mg tablet [[id number]] (Long-Term Med)        Significant 24hr Events     24hr events: Awaiting transfer out to trauma service as Sd2, no acute events     Subjective     Review of Systems: Review of Systems   Unable to perform ROS: Dementia        Objective                            Vitals I/O      Most Recent Min/Max in 24hrs   Temp 97.9 °F (36.6 °C) Temp  Min: 97.7 °F (36.5 °C)  Max: 98.7 °F (37.1 °C)   Pulse (!) 114 Pulse  Min: 110  Max: 114   Resp 18 Resp  Min: 18  Max: 24   /84 BP  Min: 133/81  Max: 151/74   O2 Sat 98 % SpO2  Min: 97 %  Max: 98 %      Intake/Output Summary (Last 24 hours) at 9/7/2024 0703  Last data filed at 9/6/2024 1920  Gross per 24 hour   Intake 220 ml   Output 1150 ml   Net -930 ml       Diet Dysphagia/Modified  Consistency; Dysphagia 2-Mechanical Soft; Nectar Thick Liquid    Invasive Monitoring           Physical Exam   Physical Exam  Vitals reviewed.   Eyes:      Extraocular Movements: Extraocular movements intact.   Skin:     General: Skin is warm.      Capillary Refill: Capillary refill takes less than 2 seconds.      Coloration: Skin is not jaundiced or pale.   HENT:      Head: Normocephalic.      Mouth/Throat:      Mouth: Mucous membranes are moist.   Cardiovascular:      Rate and Rhythm: Tachycardia present. Rhythm irregular.      Pulses: Normal pulses.   Musculoskeletal:         General: Normal range of motion.   Abdominal: General: There is no distension.      Palpations: Abdomen is soft.      Tenderness: There is no abdominal tenderness.   Constitutional:       General: She is not in acute distress.     Appearance: She is not toxic-appearing.      Interventions: Cervical collar in place.   Pulmonary:      Effort: Pulmonary effort is normal. No respiratory distress.   Neurological:      Mental Status: She is alert.      Motor: Strength full and intact in all extremities.            Diagnostic Studies      EKG:    Imaging:   I have personally reviewed pertinent reports.       Medications:  Scheduled PRN   acetaminophen, 975 mg, Q8H CHRIS  atorvastatin, 20 mg, Daily With Dinner  chlorhexidine, 15 mL, Q12H CHRIS  digoxin, 125 mcg, Every Other Day  diltiazem, 180 mg, Daily  docusate sodium, 100 mg, BID  heparin (porcine), 5,000 Units, Q8H CHRIS  levothyroxine, 125 mcg, Early Morning  senna, 1 tablet, BID  sertraline, 100 mg, Daily      bisacodyl, 10 mg, Daily PRN  calcium carbonate, 1,000 mg, Daily PRN  HYDROmorphone, 0.2 mg, Q2H PRN  ondansetron, 4 mg, Q6H PRN  oxyCODONE, 2.5 mg, Q4H PRN   Or  oxyCODONE, 5 mg, Q4H PRN       Continuous          Labs:    CBC    Recent Labs     09/05/24  1906 09/05/24  2325 09/06/24  0620   WBC  --   --  6.55   HGB 13.3  --  14.1   HCT 39  --  44.8   PLT  --  146* 149     BMP    Recent Labs      09/05/24 1906 09/06/24  0620   SODIUM  --  140   K  --  4.8   CL  --  101   CO2 21 24   AGAP  --  15*   BUN  --  15   CREATININE  --  0.78   CALCIUM  --  9.1       Coags    Recent Labs     09/06/24  0620   INR 0.93   PTT 27        Additional Electrolytes  Recent Labs     09/05/24  1744 09/05/24 1906 09/06/24  0620   MG  --   --  2.2   PHOS  --   --  5.0*   CAIONIZED 1.21 1.14  --           Blood Gas    No recent results  No recent results LFTs  No recent results    Infectious  No recent results  Glucose  Recent Labs     09/06/24  0620   GLUC 101               John Whittaker MD

## 2024-09-07 NOTE — ASSESSMENT & PLAN NOTE
2 Days Post-Op Procedure(s):  navigated posterior fusion C1-C3 (Dr. Simpson, 9/5/2024)  Patient presented with increased SOB and neck pain with ambulation   Reports fall from bed about 6 weeks ago with onset of neck pain and extremity dysfunction     Imaging:   CT cervical spine 9/4/2024: Type II odontoid fracture with posterior displacement.  Posterior displacement of C1 with respect to C2 vertebral body causing component of cord compression of the medullary cervical spinal cord.  CTA without vascular injury  Cervical postop XR good hardware placement and alignment.     Plan:   Ongoing frequent neurological checks.   Collar to be on at all times x 6 weeks. Shonda collar for showering.   Hemovac Drain - no documented output in chart. Hemovac at least 1/2 full.  Will maintain today. Trend output. Likely dc tomorrow  Mobilize PT/OT - anticipate rehab  Speech following -  If collar felt to be reason for swallowing difficulties, can loosen when eating.  Ongoing medical management and pain control per primary team.   DVT PPX: SCDs. [facility]    Neurosurgery will continue to follow. Call with questions or concerns.

## 2024-09-08 LAB
ANION GAP SERPL CALCULATED.3IONS-SCNC: 9 MMOL/L (ref 4–13)
BASOPHILS # BLD AUTO: 0.02 THOUSANDS/ÂΜL (ref 0–0.1)
BASOPHILS NFR BLD AUTO: 0 % (ref 0–1)
BUN SERPL-MCNC: 14 MG/DL (ref 5–25)
CALCIUM SERPL-MCNC: 9 MG/DL (ref 8.4–10.2)
CHLORIDE SERPL-SCNC: 104 MMOL/L (ref 96–108)
CO2 SERPL-SCNC: 28 MMOL/L (ref 21–32)
CREAT SERPL-MCNC: 0.62 MG/DL (ref 0.6–1.3)
EOSINOPHIL # BLD AUTO: 0.02 THOUSAND/ÂΜL (ref 0–0.61)
EOSINOPHIL NFR BLD AUTO: 0 % (ref 0–6)
ERYTHROCYTE [DISTWIDTH] IN BLOOD BY AUTOMATED COUNT: 14 % (ref 11.6–15.1)
GFR SERPL CREATININE-BSD FRML MDRD: 86 ML/MIN/1.73SQ M
GLUCOSE SERPL-MCNC: 88 MG/DL (ref 65–140)
HCT VFR BLD AUTO: 39.7 % (ref 34.8–46.1)
HGB BLD-MCNC: 12.3 G/DL (ref 11.5–15.4)
IMM GRANULOCYTES # BLD AUTO: 0.04 THOUSAND/UL (ref 0–0.2)
IMM GRANULOCYTES NFR BLD AUTO: 1 % (ref 0–2)
LYMPHOCYTES # BLD AUTO: 1.03 THOUSANDS/ÂΜL (ref 0.6–4.47)
LYMPHOCYTES NFR BLD AUTO: 13 % (ref 14–44)
MAGNESIUM SERPL-MCNC: 2.2 MG/DL (ref 1.9–2.7)
MCH RBC QN AUTO: 31 PG (ref 26.8–34.3)
MCHC RBC AUTO-ENTMCNC: 31 G/DL (ref 31.4–37.4)
MCV RBC AUTO: 100 FL (ref 82–98)
MONOCYTES # BLD AUTO: 0.9 THOUSAND/ÂΜL (ref 0.17–1.22)
MONOCYTES NFR BLD AUTO: 11 % (ref 4–12)
MRSA NOSE QL CULT: NORMAL
NEUTROPHILS # BLD AUTO: 6.06 THOUSANDS/ÂΜL (ref 1.85–7.62)
NEUTS SEG NFR BLD AUTO: 75 % (ref 43–75)
NRBC BLD AUTO-RTO: 0 /100 WBCS
PHOSPHATE SERPL-MCNC: 2.7 MG/DL (ref 2.3–4.1)
PLATELET # BLD AUTO: 141 THOUSANDS/UL (ref 149–390)
PMV BLD AUTO: 9.3 FL (ref 8.9–12.7)
POTASSIUM SERPL-SCNC: 4.1 MMOL/L (ref 3.5–5.3)
RBC # BLD AUTO: 3.97 MILLION/UL (ref 3.81–5.12)
SODIUM SERPL-SCNC: 141 MMOL/L (ref 135–147)
WBC # BLD AUTO: 8.07 THOUSAND/UL (ref 4.31–10.16)

## 2024-09-08 PROCEDURE — 83735 ASSAY OF MAGNESIUM: CPT

## 2024-09-08 PROCEDURE — 84100 ASSAY OF PHOSPHORUS: CPT

## 2024-09-08 PROCEDURE — 99024 POSTOP FOLLOW-UP VISIT: CPT | Performed by: PHYSICIAN ASSISTANT

## 2024-09-08 PROCEDURE — 80048 BASIC METABOLIC PNL TOTAL CA: CPT

## 2024-09-08 PROCEDURE — 85025 COMPLETE CBC W/AUTO DIFF WBC: CPT

## 2024-09-08 PROCEDURE — 99232 SBSQ HOSP IP/OBS MODERATE 35: CPT | Performed by: SURGERY

## 2024-09-08 RX ORDER — ENOXAPARIN SODIUM 100 MG/ML
30 INJECTION SUBCUTANEOUS EVERY 12 HOURS SCHEDULED
Status: DISCONTINUED | OUTPATIENT
Start: 2024-09-08 | End: 2024-09-11 | Stop reason: HOSPADM

## 2024-09-08 RX ADMIN — DIGOXIN 125 MCG: 125 TABLET ORAL at 08:09

## 2024-09-08 RX ADMIN — OXYCODONE HYDROCHLORIDE 5 MG: 5 TABLET ORAL at 20:30

## 2024-09-08 RX ADMIN — ACETAMINOPHEN 975 MG: 325 TABLET ORAL at 05:24

## 2024-09-08 RX ADMIN — OXYCODONE HYDROCHLORIDE 5 MG: 5 TABLET ORAL at 05:25

## 2024-09-08 RX ADMIN — ATORVASTATIN CALCIUM 20 MG: 20 TABLET, FILM COATED ORAL at 17:19

## 2024-09-08 RX ADMIN — HEPARIN SODIUM 5000 UNITS: 5000 INJECTION INTRAVENOUS; SUBCUTANEOUS at 05:25

## 2024-09-08 RX ADMIN — CHLORHEXIDINE GLUCONATE 0.12% ORAL RINSE 15 ML: 1.2 LIQUID ORAL at 20:30

## 2024-09-08 RX ADMIN — SERTRALINE HYDROCHLORIDE 100 MG: 100 TABLET ORAL at 08:09

## 2024-09-08 RX ADMIN — SENNOSIDES 8.8 MG: 8.8 LIQUID ORAL at 08:09

## 2024-09-08 RX ADMIN — CHLORHEXIDINE GLUCONATE 0.12% ORAL RINSE 15 ML: 1.2 LIQUID ORAL at 08:08

## 2024-09-08 RX ADMIN — DOCUSATE SODIUM LIQUID 100 MG: 50 LIQUID ORAL at 17:00

## 2024-09-08 RX ADMIN — LEVOTHYROXINE SODIUM 125 MCG: 125 TABLET ORAL at 05:25

## 2024-09-08 RX ADMIN — ENOXAPARIN SODIUM 30 MG: 30 INJECTION SUBCUTANEOUS at 20:30

## 2024-09-08 RX ADMIN — DILTIAZEM HYDROCHLORIDE 180 MG: 180 CAPSULE, COATED, EXTENDED RELEASE ORAL at 08:09

## 2024-09-08 RX ADMIN — SENNOSIDES 8.8 MG: 8.8 LIQUID ORAL at 17:00

## 2024-09-08 RX ADMIN — ACETAMINOPHEN 975 MG: 325 TABLET ORAL at 13:15

## 2024-09-08 RX ADMIN — HEPARIN SODIUM 5000 UNITS: 5000 INJECTION INTRAVENOUS; SUBCUTANEOUS at 13:15

## 2024-09-08 RX ADMIN — ACETAMINOPHEN 975 MG: 325 TABLET ORAL at 21:20

## 2024-09-08 NOTE — ASSESSMENT & PLAN NOTE
3 Days Post-Op Procedure(s):  navigated posterior fusion C1-C3 (Dr. Simpson, 9/5/2024)  Patient presented with increased SOB and neck pain with ambulation   Reports fall from bed about 6 weeks ago with onset of neck pain and extremity dysfunction     Imaging:   CT cervical spine 9/4/2024: Type II odontoid fracture with posterior displacement.  Posterior displacement of C1 with respect to C2 vertebral body causing component of cord compression of the medullary cervical spinal cord.  CTA without vascular injury  Cervical postop XR good hardware placement and alignment.     Plan:   Ongoing frequent neurological checks.   Collar to be on at all times x 6 weeks. Shonda collar for showering.   Hemovac Drain -150 mL over 24 hours. Will maintain today. Trend output.   Ongoing medical management and pain control per primary team  Mobilize PT/OT - anticipate rehab  Speech following -  If collar felt to be reason for swallowing difficulties, can loosen when eating.  Ongoing medical management and pain control per primary team.   DVT PPX: SCDs. SQH -okay to change to Lovenox from neurosurgical standpoint    Neurosurgery will continue to follow for drain output. Call with questions or concerns.

## 2024-09-08 NOTE — ASSESSMENT & PLAN NOTE
POD 4.  Status post spinal fusion on 9/5.  Potential DC of her neck Hemovac drain today.  Pain well controlled.  Doing well    C-spine precautions  Continue [place] collar for 6 weeks.  Gibran collar for showering  Hemovac drain per neurosurgery, trend output, likely DC today 9/9  PT/OT-dispo anticipate rehab  Speech following, can loosen collar for eating if needed  DVT PPx: SCDs.  SQH

## 2024-09-08 NOTE — PROGRESS NOTES
"[facility]  Progress Note  Name: Marsha Oliva I  MRN: [id number]  [facility] Date of Admission: 9/4/2024   Date of Service: 9/8/2024 I Hospital Day: 4    Assessment & Plan   * Closed odontoid fracture with type II morphology and posterior displacement (HCC)  Assessment & Plan  POD 3.  Status post spinal fusion on 9/5    C-spine precautions  Continue [place] collar for 6 weeks.  Gibran collar for showering  Hemovac drain per neurosurgery, trend output, likely DC today 9/8  PT/OT-dispo anticipate rehab  Speech following, can loosen collar for eating if needed  DVT PPx: SCDs.  SQH    Heart failure with preserved ejection fraction (HCC)  Assessment & Plan  Wt Readings from Last 3 Encounters:   09/08/24 57.1 kg (125 lb 14.1 oz)   09/04/24 53.5 kg (118 lb)   05/22/24 57.6 kg (127 lb)     On digoxin, diltiazem, aspirin at home  Not on anticoagulation due to history of bleed  65% EF on 2022    Atrial fibrillation, chronic (HCC)  Assessment & Plan  On digoxin, diltiazem, aspirin at home  Not on anticoagulation due to history of bleed           Subjective/Objective   NAOE. Feeling okay, pain controlled. Tolerating diet, w/o N/V.  No bowel function.  Voiding. No SOB, chest pain, fevers. No dizziness, blurry vision, light headedness.    /71   Pulse 92   Temp (!) 97.3 °F (36.3 °C)   Resp 16   Ht 5' 3\" (1.6 m)   Wt 57.1 kg (125 lb 14.1 oz)   SpO2 95%   BMI 22.30 kg/m²     Physical Exam:  General: NAD  Neuro: AO3. No focal deficit.  Neck drain in place.  Neck: In c-collar  CV: nl rate  Lungs: nl wob. No resp distress.  Chest: no lesions  ABD: Soft, ND, NT  Extrem: No CCE    Recent Labs     09/06/24  0620 09/07/24  0917 09/08/24  0604   WBC 6.55 9.03 8.07   HGB 14.1 13.1 12.3    163 141*   SODIUM 140 138 141   K 4.8 4.0 4.1    103 104   CO2 24 24 28   BUN 15 13 14   CREATININE 0.78 0.72 0.62   GLUC 101 86 88   CALCIUM 9.1 9.0 9.0   MG 2.2  --  2.2   PHOS " 5.0*  --  2.7   EGFR 72 79 86   PTT 27  --   --    INR 0.93  --   --             Diet Orders   (From admission, onward)                 Start     Ordered    09/07/24 0953  Dietary nutrition supplements  Once        Question Answer Comment   Select Supplement: Magic Cup Chocolate    Frequency Breakfast        09/07/24 0952    09/06/24 1952  Dietary nutrition supplements  Once        Question Answer Comment   Select Supplement: Magic Cup Vanilla    Frequency Lunch        09/06/24 1952 09/06/24 1023  Diet Dysphagia/Modified Consistency; Dysphagia 2-Mechanical Soft; Nectar Thick Liquid  Diet effective now        References:    Adult Nutrition Support Algorithm    RD Therapeutic Diet Order Protocol   Question Answer Comment   Diet Type Dysphagia/Modified Consistency    Dysphagia/Modified Consistency Dysphagia 2-Mechanical Soft    Liquid Modifier Nectar Thick Liquid    RD to adjust diet per protocol? Yes        09/06/24 1022                    Lines/Drains/Airways       Active Status       Name Placement date Placement time Site Days    Closed/Suction Drain Left;Posterior Neck Accordion 10 Fr. 09/05/24  1614  Neck  2    External Urinary Catheter 09/07/24 2111  -- less than 1                  VTE covered by:  heparin (porcine), Subcutaneous, 5,000 Units at 09/08/24 0540

## 2024-09-08 NOTE — CASE MANAGEMENT
Case Management Discharge Planning Note    Patient name Marsha Oliva  Location [facility] MRN [id number]  : 1945 Date 2024       Current Admission Date: 2024  Current Admission Diagnosis:Closed odontoid fracture with type II morphology and posterior displacement (HCC)   Patient Active Problem List    Diagnosis Date Noted Date Diagnosed    Acute pain due to trauma 2024     At risk for delirium 2024     Heart failure with preserved ejection fraction (HCC) 2024     Closed odontoid fracture with type II morphology and posterior displacement (HCC) 2024     Spinal cord compression (HCC) 2024     Chronic respiratory failure with hypoxia ([facility]) 2023     Stage 3a chronic kidney disease (HCC) 2022     Bilateral hydronephrosis 2022     Proctitis 2022     Fall 2022     Hypokalemia 2022     ILD (interstitial lung disease) ([facility]) 2022     Oropharyngeal aspiration 2022     Elevated troponin 2022     EKG abnormalities 2022     Mild cognitive impairment of uncertain or unknown etiology 2022     Hormone replacement therapy 2022     Apraxia 2021     Constipation, unspecified 2021     Corn or callus 2021     Difficulty in walking, not elsewhere classified 2021     Generalized muscle weakness 2021     Lymphedema, not elsewhere classified 2021     Repeated falls 2021     Hypoxia 2021     Frequent falls 2021     Elevated d-dimer 2021     Venous insufficiency of both lower extremities 2021     Acquired bilateral hammer toes 2018     Lymphedema of both lower extremities 2018     Severe mitral regurgitation 2018     Anemia 2018     Cardiomegaly 2018     Wound of right leg 2018     Hypertensive heart disease with heart failure (HCC) 2018     Major depressive disorder, single episode, unspecified 2018      Personal history of nicotine dependence 05/13/2018     Pleural effusion, not elsewhere classified 05/13/2018     Solitary pulmonary nodule 05/13/2018     Atrial fibrillation, chronic (HCC) 05/01/2018     B12 deficiency 01/13/2017     Chronic diastolic CHF (congestive heart failure) (HCC) 01/13/2017     Folate deficiency 01/13/2017     Vitamin D deficiency 06/03/2016     Depression with anxiety 08/03/2015     Mixed hyperlipidemia 08/03/2015     Osteoarthritis of knee 10/23/2012     Impaired fasting glucose 05/31/2012     Essential hypertension 03/23/2010     Hypothyroidism 03/23/2010     Posttraumatic stress disorder 03/23/2010       LOS (days): 4  Geometric Mean LOS (GMLOS) (days): 2.6  Days to GMLOS:-1     OBJECTIVE:  Risk of Unplanned Readmission Score: 15.85         Current admission status: Inpatient   Preferred Pharmacy:   [facility] - [place] - [address]  [address] [facility] [address]  Phone: [phone] Fax: [phone]    [id number] - [place], [place] - [address] [address]  Phone: [phone] Fax: [phone]    Primary Care Provider: Rebecca Solis    Primary Insurance: [facility] RE[facility]  Secondary Insurance:     DISCHARGE DETAILS:    CM met with pt to discuss d/c planning  Pt rec for IP rehab. Pt amenable to referrals.   Referral to  [place], [place], [facility], and [place].

## 2024-09-08 NOTE — ASSESSMENT & PLAN NOTE
Wt Readings from Last 3 Encounters:   09/08/24 57.1 kg (125 lb 14.1 oz)   09/04/24 53.5 kg (118 lb)   05/22/24 57.6 kg (127 lb)     On digoxin, diltiazem, aspirin at home  Not on anticoagulation due to history of bleed  65% EF on 2022

## 2024-09-08 NOTE — PROGRESS NOTES
[facility]  Progress Note  Name: Marsha Oliva I  MRN: [id number]  Unit/Bed#: PPHP 605-01 I Date of Admission: 9/4/2024   Date of Service: 9/8/2024 I Hospital Day: 4    Assessment & Plan   * Closed odontoid fracture with type II morphology and posterior displacement (HCC)  Assessment & Plan  3 Days Post-Op Procedure(s):  navigated posterior fusion C1-C3 (Dr. Simpson, 9/5/2024)  Patient presented with increased SOB and neck pain with ambulation   Reports fall from bed about 6 weeks ago with onset of neck pain and extremity dysfunction     Imaging:   CT cervical spine 9/4/2024: Type II odontoid fracture with posterior displacement.  Posterior displacement of C1 with respect to C2 vertebral body causing component of cord compression of the medullary cervical spinal cord.  CTA without vascular injury  Cervical postop XR good hardware placement and alignment.     Plan:   Ongoing frequent neurological checks.   Collar to be on at all times x 6 weeks. Shonda collar for showering.   Hemovac Drain -150 mL over 24 hours. Will maintain today. Trend output.   Ongoing medical management and pain control per primary team  Mobilize PT/OT - anticipate rehab  Speech following -  If collar felt to be reason for swallowing difficulties, can loosen when eating.  Ongoing medical management and pain control per primary team.   DVT PPX: SCDs. SQH -okay to change to Lovenox from neurosurgical standpoint    Neurosurgery will continue to follow for drain output. Call with questions or concerns.       Spinal cord compression (HCC)  Assessment & Plan  See plan as above       Subjective/Objective   Chief Complaint: I am hanging in there    Subjective: Patient states her neck pain feels okay at this time.  She did require pain medications overnight.  Denies any arm or leg pain, numbness, tingling and/or weakness.     Objective: Sitting up in bed.  NAD.  Collar in place.    I/O         09/06 0701 09/07 0700  "09/07 0701 09/08 0700 09/08 0701 09/09 0700    P.O. 220      I.V. (mL/kg)       IV Piggyback 53.3      Total Intake(mL/kg) 273.3 (4.9)      Urine (mL/kg/hr) 1150 (0.9) 0 (0)     Drains  150     Blood       Total Output 1150 150     Net -876.7 -150            Unmeasured Urine Occurrence  2 x             Invasive Devices       Peripheral Intravenous Line  Duration             Peripheral IV 09/04/24 Proximal;Right;Ventral (anterior) Forearm 3 days    Peripheral IV 09/05/24 Right Wrist 2 days    Peripheral IV 09/05/24 Right;Ventral (anterior) Forearm 2 days              Drain  Duration             Closed/Suction Drain Left;Posterior Neck Accordion 10 Fr. 2 days    External Urinary Catheter <1 day                    Physical Exam:  Vitals: Blood pressure 127/71, pulse 92, temperature (!) 97.3 °F (36.3 °C), resp. rate 16, height 5' 3\" (1.6 m), weight 57.1 kg (125 lb 14.1 oz), SpO2 95%.,Body mass index is 22.3 kg/m².    General appearance: Arousable to voice, appears stated age, cooperative and no distress  Head: Normocephalic, without obvious abnormality  Eyes: Opens eyes to voice.  Conjugate gaze.  Tracks appropriately in room.  Neck: Collar in place.  Hemovac drain with minimal output.  Lungs: non labored breathing  Heart: regular heart rate  Neurologic:   Mental status: Alert, oriented, thought content appropriate  Cranial nerves: grossly intact  Sensory: normal to crude touch x 4  Motor: moving all extremities without focal weakness    Lab Results:  Results from last 7 days   Lab Units 09/08/24  0604 09/07/24  0917 09/06/24  0620 09/05/24  1744 09/05/24  0538 09/04/24  1036   WBC Thousand/uL 8.07 9.03 6.55  --  4.87 5.13   HEMOGLOBIN g/dL 12.3 13.1 14.1  --  14.7 15.4   I STAT HEMOGLOBIN   --   --   --    < >  --   --    HEMATOCRIT % 39.7 41.4 44.8  --  46.7* 49.9*   HEMATOCRIT, ISTAT   --   --   --    < >  --   --    PLATELETS Thousands/uL 141* 163 149   < > 151 156   SEGS PCT % 75  --   --   --  76* 73   MONO PCT " "% 11  --   --   --  9 9   EOS PCT % 0  --   --   --  1 1    < > = values in this interval not displayed.     Results from last 7 days   Lab Units 09/08/24  0604 09/07/24  0917 09/06/24 0620 09/05/24  1906 09/05/24  1744 09/05/24  0538 09/04/24  1123   SODIUM mmol/L 141 138 140  --   --    < > 140   POTASSIUM mmol/L 4.1 4.0 4.8  --   --    < > 4.7   CHLORIDE mmol/L 104 103 101  --   --    < > 101   CO2 mmol/L 28 24 24  --   --    < > 27   CO2, I-STAT mmol/L  --   --   --  21 27   < >  --    BUN mg/dL 14 13 15  --   --    < > 13   CREATININE mg/dL 0.62 0.72 0.78  --   --    < > 0.83   CALCIUM mg/dL 9.0 9.0 9.1  --   --    < > 9.9   ALK PHOS U/L  --   --   --   --   --   --  71   ALT U/L  --   --   --   --   --   --  12   AST U/L  --   --   --   --   --   --  32   GLUCOSE, ISTAT mg/dl  --   --   --  105 87  --   --     < > = values in this interval not displayed.     Results from last 7 days   Lab Units 09/08/24 0604 09/06/24 0620 09/05/24  0538   MAGNESIUM mg/dL 2.2 2.2 2.3     Results from last 7 days   Lab Units 09/08/24 0604 09/06/24  0620 09/05/24  0538   PHOSPHORUS mg/dL 2.7 5.0* 3.8     Results from last 7 days   Lab Units 09/06/24 0620 09/04/24  1119   INR  0.93 0.98   PTT seconds 27 33     No results found for: \"TROPONINT\"  ABG:No results found for: \"PHART\", \"RYS9MEQ\", \"PO2ART\", \"RDT4OMO\", \"P8TFILTR\", \"BEART\", \"SOURCE\"    Imaging Studies: I have personally reviewed pertinent reports.   and I have personally reviewed pertinent films in PACS    XR cervical spine 2 or 3 views    Result Date: 9/7/2024  Impression: There is bilateral pedicle screw and stabilizing safia fixation at the C1, C2, C3 levels .  No hardware complication.  Stable  alignment compared to intraoperative images. Workstation performed: [id number]     XR spine cervical 2 or 3 vw injury    Result Date: 9/6/2024  Impression: Fluoroscopy provided for procedure guidance. Please refer to the separate procedure note for additional details. " Workstation performed: [id number]     CTA head and neck with and without contrast    Result Date: 9/4/2024  Impression: CT Brain:  No acute intracranial abnormality. CT Angiography:  Unremarkable CTA neck and brain. Workstation performed: [id number]     MRI cervical spine wo contrast    Result Date: 9/4/2024  Impression: 1. Displaced type II dens fracture with severe posterior displacement of the dens and C1 vertebral body relative to the C2 vertebral body. There is resultant mass effect on the cord which appears relatively mild given the degree of displacement. No gross  cord edema is seen, however the exam is motion degraded. 2. Discontinuity of the ALL and irregularity of the PLL. There is also likely interspinous ligament injury at C1-C2. 3. Facet edema at C2-C3 may reflect facet capsular injury. 4. Prominent edema in the C1 and C2 vertebral bodies compatible with known C2 fracture. There is also paravertebral edema. 5. Limited evaluation of the flow voids given motion artifact. Consider CT angiogram of the neck to evaluate the vertebral arteries given the significant distraction at C1-C2. The study was marked in EPIC for immediate notification. Workstation performed: [id number]     TRAUMA - CT spine cervical wo contrast    Result Date: 9/4/2024  Impression: Type II odontoid fracture with posterior displacement including prominent posterior displacement of C1 with respect to the C2 vertebral body causing a component of cord compression of the medullary cervical spinal cord. Further evaluation with MRI is advised. I personally discussed this study with NEENA JIM on 9/4/2024 11:12 AM. Workstation performed: [id number]     TRAUMA - CT head wo contrast    Result Date: 9/4/2024  Impression: No acute intracranial abnormality. Mild to moderate chronic microvascular ischemic changes. Workstation performed: [id number]       VTE Pharmacologic Prophylaxis: Heparin    VTE Mechanical Prophylaxis: sequential  compression device

## 2024-09-08 NOTE — ASSESSMENT & PLAN NOTE
9/9 currently on DVT PPx with Lovenox  On digoxin, diltiazem, aspirin at home  Not on anticoagulation due to history of bleed

## 2024-09-08 NOTE — ASSESSMENT & PLAN NOTE
POD 3.  Status post spinal fusion on 9/5    C-spine precautions  Continue Sandy collar for 6 weeks.  Gibran collar for showering  Hemovac drain per neurosurgery, trend output, likely DC today 9/8  PT/OT-dispo anticipate rehab  Speech following, can loosen collar for eating if needed  DVT PPx: SCDs.  SQH

## 2024-09-08 NOTE — PLAN OF CARE
Problem: Potential for Falls  Goal: Patient will remain free of falls  Description: INTERVENTIONS:  - Educate patient/family on patient safety including physical limitations  - Instruct patient to call for assistance with activity   - Consult OT/PT to assist with strengthening/mobility   - Keep Call bell within reach  - Keep bed low and locked with side rails adjusted as appropriate  - Keep care items and personal belongings within reach  - Initiate and maintain comfort rounds  - Make Fall Risk Sign visible to staff  - Offer Toileting every  Hours, in advance of need  - Initiate/Maintain alarm  - Obtain necessary fall risk management equipmen  - Apply yellow socks and bracelet for high fall risk patients  - Consider moving patient to room near nurses station  Outcome: Progressing     Problem: PAIN - ADULT  Goal: Verbalizes/displays adequate comfort level or baseline comfort level  Description: Interventions:  - Encourage patient to monitor pain and request assistance  - Assess pain using appropriate pain scale  - Administer analgesics based on type and severity of pain and evaluate response  - Implement non-pharmacological measures as appropriate and evaluate response  - Consider cultural and social influences on pain and pain management  - Notify physician/advanced practitioner if interventions unsuccessful or patient reports new pain  Outcome: Progressing     Problem: DISCHARGE PLANNING  Goal: Discharge to home or other facility with appropriate resources  Description: INTERVENTIONS:  - Identify barriers to discharge w/patient and caregiver  - Arrange for needed discharge resources and transportation as appropriate  - Identify discharge learning needs (meds, wound care, etc.)  - Arrange for interpretive services to assist at discharge as needed  - Refer to Case Management Department for coordinating discharge planning if the patient needs post-hospital services based on physician/advanced practitioner order or  complex needs related to functional status, cognitive ability, or social support system  Outcome: Progressing     Problem: Knowledge Deficit  Goal: Patient/family/caregiver demonstrates understanding of disease process, treatment plan, medications, and discharge instructions  Description: Complete learning assessment and assess knowledge base.  Interventions:  - Provide teaching at level of understanding  - Provide teaching via preferred learning methods  Outcome: Progressing     Problem: Nutrition/Hydration-ADULT  Goal: Nutrient/Hydration intake appropriate for improving, restoring or maintaining nutritional needs  Description: Monitor and assess patient's nutrition/hydration status for malnutrition. Collaborate with interdisciplinary team and initiate plan and interventions as ordered.  Monitor patient's weight and dietary intake as ordered or per policy. Utilize nutrition screening tool and intervene as necessary. Determine patient's food preferences and provide high-protein, high-caloric foods as appropriate.     INTERVENTIONS:  - Monitor oral intake, urinary output, labs, and treatment plans  - Assess nutrition and hydration status and recommend course of action  - Evaluate amount of meals eaten  - Assist patient with eating if necessary   - Allow adequate time for meals  - Recommend/ encourage appropriate diets, oral nutritional supplements, and vitamin/mineral supplements  - Order, calculate, and assess calorie counts as needed  - Recommend, monitor, and adjust tube feedings and TPN/PPN based on assessed needs  - Assess need for intravenous fluids  - Provide specific nutrition/hydration education as appropriate  - Include patient/family/caregiver in decisions related to nutrition  Outcome: Progressing     Problem: MUSCULOSKELETAL - ADULT  Goal: Maintain or return mobility to safest level of function  Description: INTERVENTIONS:  - Assess patient's ability to carry out ADLs; assess patient's baseline for ADL  function and identify physical deficits which impact ability to perform ADLs (bathing, care of mouth/teeth, toileting, grooming, dressing, etc.)  - Assess/evaluate cause of self-care deficits   - Assess range of motion  - Assess patient's mobility  - Assess patient's need for assistive devices and provide as appropriate  - Encourage maximum independence but intervene and supervise when necessary  - Involve family in performance of ADLs  - Assess for home care needs following discharge   - Consider OT consult to assist with ADL evaluation and planning for discharge  - Provide patient education as appropriate  Outcome: Progressing  Goal: Maintain proper alignment of affected body part  Description: INTERVENTIONS:  - Support, maintain and protect limb and body alignment  - Provide patient/ family with appropriate education  Outcome: Progressing

## 2024-09-08 NOTE — PROGRESS NOTES
"[facility]  Progress Note  Name: Marsha Oliva I  MRN: [id number]  Unit/Bed#: PPHP 605-01 I Date of Admission: 9/4/2024   Date of Service: 9/9/2024 I Hospital Day: 5    Assessment & Plan   * Closed odontoid fracture with type II morphology and posterior displacement (HCC)  Assessment & Plan  POD 4.  Status post spinal fusion on 9/5.  Potential DC of her neck Hemovac drain today.  Continues to have pain requiring as needed meds.  Otherwise doing well    C-spine precautions  Continue Fairchance collar for 6 weeks.  Gibran collar for showering  Hemovac drain per neurosurgery, trend output, likely DC today 9/9  PT/OT-dispo anticipate rehab  Speech following, can loosen collar for eating if needed  DVT PPx: SCDs.  SQH    Heart failure with preserved ejection fraction (HCC)  Assessment & Plan  9/9 does not report any SOB, new leg swelling, weight changes    Wt Readings from Last 3 Encounters:   09/08/24 57.1 kg (125 lb 14.1 oz)   09/04/24 53.5 kg (118 lb)   05/22/24 57.6 kg (127 lb)     On digoxin, diltiazem, aspirin at home  Not on anticoagulation due to history of bleed  65% EF on 2022    Atrial fibrillation, chronic (HCC)  Assessment & Plan  9/9 currently on DVT PPx with Lovenox  On digoxin, diltiazem, aspirin at home  Not on anticoagulation due to history of bleed         Subjective/Objective   NAOE. Feeling well, pain controlled. Tolerating diet, w/o N/V. No bowel function. No numbness, tingling, weakness. No SOB, chest pain, fevers. No dizziness, blurry vision, light headedness.    /67 (BP Location: Left arm)   Pulse 85   Temp 98.3 °F (36.8 °C) (Oral)   Resp 18   Ht 5' 3\" (1.6 m)   Wt 57.1 kg (125 lb 14.1 oz)   SpO2 94%   BMI 22.30 kg/m²     Physical Exam:  General: NAD  Neuro: AO3. No focal deficit. Strength, sensation in tact. Drain with limited serous and sanguinous output.  CV: nl rate  Lungs: nl wob. No resp distress.  Chest: no lesions  ABD: Soft, ND, NT  : " rambo urine for antonio  Extrem: No CCE    Recent Labs     09/06/24  0620 09/07/24  0917 09/08/24  0604   WBC 6.55 9.03 8.07   HGB 14.1 13.1 12.3    163 141*   SODIUM 140 138 141   K 4.8 4.0 4.1    103 104   CO2 24 24 28   BUN 15 13 14   CREATININE 0.78 0.72 0.62   GLUC 101 86 88   CALCIUM 9.1 9.0 9.0   MG 2.2  --  2.2   PHOS 5.0*  --  2.7   EGFR 72 79 86   PTT 27  --   --    INR 0.93  --   --             Diet Orders   (From admission, onward)                 Start     Ordered    09/07/24 0953  Dietary nutrition supplements  Once        Question Answer Comment   Select Supplement: Magic Cup Chocolate    Frequency Breakfast        09/07/24 0952    09/06/24 1952  Dietary nutrition supplements  Once        Question Answer Comment   Select Supplement: Magic Cup Vanilla    Frequency Lunch        09/06/24 1952 09/06/24 1023  Diet Dysphagia/Modified Consistency; Dysphagia 2-Mechanical Soft; Nectar Thick Liquid  Diet effective now        References:    Adult Nutrition Support Algorithm    RD Therapeutic Diet Order Protocol   Question Answer Comment   Diet Type Dysphagia/Modified Consistency    Dysphagia/Modified Consistency Dysphagia 2-Mechanical Soft    Liquid Modifier Nectar Thick Liquid    RD to adjust diet per protocol? Yes        09/06/24 1022                    Lines/Drains/Airways       Active Status       Name Placement date Placement time Site Days    Closed/Suction Drain Left;Posterior Neck Accordion 10 Fr. 09/05/24  1614  Neck  3    External Urinary Catheter 09/07/24 2111  -- 1

## 2024-09-08 NOTE — ASSESSMENT & PLAN NOTE
9/9 does not report any SOB, new leg swelling, weight changes    Wt Readings from Last 3 Encounters:   09/08/24 57.1 kg (125 lb 14.1 oz)   09/04/24 53.5 kg (118 lb)   05/22/24 57.6 kg (127 lb)     On digoxin, diltiazem, aspirin at home  Not on anticoagulation due to history of bleed  65% EF on 2022

## 2024-09-09 ENCOUNTER — TELEPHONE (OUTPATIENT)
Dept: NEUROSURGERY | Facility: CLINIC | Age: 79
End: 2024-09-09

## 2024-09-09 LAB
ANION GAP SERPL CALCULATED.3IONS-SCNC: 9 MMOL/L (ref 4–13)
BUN SERPL-MCNC: 14 MG/DL (ref 5–25)
CALCIUM SERPL-MCNC: 8.3 MG/DL (ref 8.4–10.2)
CHLORIDE SERPL-SCNC: 104 MMOL/L (ref 96–108)
CO2 SERPL-SCNC: 27 MMOL/L (ref 21–32)
CREAT SERPL-MCNC: 0.45 MG/DL (ref 0.6–1.3)
GFR SERPL CREATININE-BSD FRML MDRD: 95 ML/MIN/1.73SQ M
GLUCOSE SERPL-MCNC: 77 MG/DL (ref 65–140)
POTASSIUM SERPL-SCNC: 3.9 MMOL/L (ref 3.5–5.3)
SODIUM SERPL-SCNC: 140 MMOL/L (ref 135–147)

## 2024-09-09 PROCEDURE — 99024 POSTOP FOLLOW-UP VISIT: CPT | Performed by: PHYSICIAN ASSISTANT

## 2024-09-09 PROCEDURE — 80048 BASIC METABOLIC PNL TOTAL CA: CPT

## 2024-09-09 PROCEDURE — 99232 SBSQ HOSP IP/OBS MODERATE 35: CPT | Performed by: STUDENT IN AN ORGANIZED HEALTH CARE EDUCATION/TRAINING PROGRAM

## 2024-09-09 PROCEDURE — 92526 ORAL FUNCTION THERAPY: CPT

## 2024-09-09 RX ORDER — BISACODYL 10 MG
10 SUPPOSITORY, RECTAL RECTAL DAILY
Status: DISCONTINUED | OUTPATIENT
Start: 2024-09-09 | End: 2024-09-11 | Stop reason: HOSPADM

## 2024-09-09 RX ORDER — POTASSIUM CHLORIDE 1500 MG/1
20 TABLET, EXTENDED RELEASE ORAL ONCE
Status: COMPLETED | OUTPATIENT
Start: 2024-09-09 | End: 2024-09-09

## 2024-09-09 RX ADMIN — ACETAMINOPHEN 975 MG: 325 TABLET ORAL at 14:02

## 2024-09-09 RX ADMIN — SENNOSIDES 8.8 MG: 8.8 LIQUID ORAL at 08:43

## 2024-09-09 RX ADMIN — DOCUSATE SODIUM LIQUID 100 MG: 50 LIQUID ORAL at 17:00

## 2024-09-09 RX ADMIN — DOCUSATE SODIUM LIQUID 100 MG: 50 LIQUID ORAL at 08:43

## 2024-09-09 RX ADMIN — BISACODYL 10 MG: 10 SUPPOSITORY RECTAL at 14:02

## 2024-09-09 RX ADMIN — ACETAMINOPHEN 975 MG: 325 TABLET ORAL at 05:03

## 2024-09-09 RX ADMIN — CHLORHEXIDINE GLUCONATE 0.12% ORAL RINSE 15 ML: 1.2 LIQUID ORAL at 08:37

## 2024-09-09 RX ADMIN — SERTRALINE HYDROCHLORIDE 100 MG: 100 TABLET ORAL at 08:38

## 2024-09-09 RX ADMIN — ATORVASTATIN CALCIUM 20 MG: 20 TABLET, FILM COATED ORAL at 16:59

## 2024-09-09 RX ADMIN — ENOXAPARIN SODIUM 30 MG: 30 INJECTION SUBCUTANEOUS at 08:37

## 2024-09-09 RX ADMIN — SENNOSIDES 8.8 MG: 8.8 LIQUID ORAL at 17:00

## 2024-09-09 RX ADMIN — ENOXAPARIN SODIUM 30 MG: 30 INJECTION SUBCUTANEOUS at 21:37

## 2024-09-09 RX ADMIN — POTASSIUM CHLORIDE 20 MEQ: 1500 TABLET, EXTENDED RELEASE ORAL at 06:32

## 2024-09-09 RX ADMIN — CHLORHEXIDINE GLUCONATE 0.12% ORAL RINSE 15 ML: 1.2 LIQUID ORAL at 21:37

## 2024-09-09 RX ADMIN — LEVOTHYROXINE SODIUM 125 MCG: 125 TABLET ORAL at 05:03

## 2024-09-09 NOTE — PROGRESS NOTES
Patient:  MELE BOWDEN    MRN:  [id number]    Aidin Request ID:  [id number]    Level of care reserved:  Inpatient Rehab Facility    Partner Reserved:  [facility] - ([place]), [place] [place] [id number] [phone]    Clinical needs requested:    Geography searched:  10 miles around [address]    Start of Service:    Request sent:  9:36am EDT on 9/8/2024 by Nahun Wren    Partner reserved:  10:06am EDT on 9/9/2024 by Nahun Wren    Choice list shared:  10:06am EDT on 9/9/2024 by Nahun Wren

## 2024-09-09 NOTE — TELEPHONE ENCOUNTER
9/9/24 - PT IN [facility]  9/18/24 2 WK POV W/NURSE *MARY*  10/24/24 6 WK POV W/CSPINE XR W/FATOUMATA Mccoy [place] [employer]  Status post PCF C1-C3 with Dr. Simpson on 9/5/24    2-week POV incision check with RN  6-week POV with Dr. Simpson with cervical spine x-rays

## 2024-09-09 NOTE — ARC ADMISSION
[facility]  contacted the patient: introduced self, explained role, reviewed ARC program, services offered, acute rehab criteria, approval process, insurance authorization process, ARC locations and preferences. Patient / family preferred ARC location is [place]. Patient / family made aware ARC Reviewer will keep their Care Manager updated regarding referral status. Will confirm with the patient's  per her request. ARC folder provided to the patient with contact information. All questions answered.     Thank you,  Estefania Saldana  [facility] Admissions

## 2024-09-09 NOTE — PLAN OF CARE
Problem: Potential for Falls  Goal: Patient will remain free of falls  Description: INTERVENTIONS:  - Educate patient/family on patient safety including physical limitations  - Instruct patient to call for assistance with activity   - Consult OT/PT to assist with strengthening/mobility   - Keep Call bell within reach  - Keep bed low and locked with side rails adjusted as appropriate  - Keep care items and personal belongings within reach  - Initiate and maintain comfort rounds  - Make Fall Risk Sign visible to staff  - Offer Toileting every  Hours, in advance of need  - Initiate/Maintain alarm  - Obtain necessary fall risk management equipment  - Apply yellow socks and bracelet for high fall risk patients  - Consider moving patient to room near nurses station  Outcome: Progressing     Problem: Prexisting or High Potential for Compromised Skin Integrity  Goal: Skin integrity is maintained or improved  Description: INTERVENTIONS:  - Identify patients at risk for skin breakdown  - Assess and monitor skin integrity  - Assess and monitor nutrition and hydration status  - Monitor labs   - Assess for incontinence   - Turn and reposition patient  - Assist with mobility/ambulation  - Relieve pressure over bony prominences  - Avoid friction and shearing  - Provide appropriate hygiene as needed including keeping skin clean and dry  - Evaluate need for skin moisturizer/barrier cream  - Collaborate with interdisciplinary team   - Patient/family teaching  - Consider wound care consult   Outcome: Progressing     Problem: PAIN - ADULT  Goal: Verbalizes/displays adequate comfort level or baseline comfort level  Description: Interventions:  - Encourage patient to monitor pain and request assistance  - Assess pain using appropriate pain scale  - Administer analgesics based on type and severity of pain and evaluate response  - Implement non-pharmacological measures as appropriate and evaluate response  - Consider cultural and social  influences on pain and pain management  - Notify physician/advanced practitioner if interventions unsuccessful or patient reports new pain  Outcome: Progressing     Problem: DISCHARGE PLANNING  Goal: Discharge to home or other facility with appropriate resources  Description: INTERVENTIONS:  - Identify barriers to discharge w/patient and caregiver  - Arrange for needed discharge resources and transportation as appropriate  - Identify discharge learning needs (meds, wound care, etc.)  - Arrange for interpretive services to assist at discharge as needed  - Refer to Case Management Department for coordinating discharge planning if the patient needs post-hospital services based on physician/advanced practitioner order or complex needs related to functional status, cognitive ability, or social support system  Outcome: Progressing     Problem: Knowledge Deficit  Goal: Patient/family/caregiver demonstrates understanding of disease process, treatment plan, medications, and discharge instructions  Description: Complete learning assessment and assess knowledge base.  Interventions:  - Provide teaching at level of understanding  - Provide teaching via preferred learning methods  Outcome: Progressing     Problem: MUSCULOSKELETAL - ADULT  Goal: Maintain or return mobility to safest level of function  Description: INTERVENTIONS:  - Assess patient's ability to carry out ADLs; assess patient's baseline for ADL function and identify physical deficits which impact ability to perform ADLs (bathing, care of mouth/teeth, toileting, grooming, dressing, etc.)  - Assess/evaluate cause of self-care deficits   - Assess range of motion  - Assess patient's mobility  - Assess patient's need for assistive devices and provide as appropriate  - Encourage maximum independence but intervene and supervise when necessary  - Involve family in performance of ADLs  - Assess for home care needs following discharge   - Consider OT consult to assist with  ADL evaluation and planning for discharge  - Provide patient education as appropriate  Outcome: Progressing  Goal: Maintain proper alignment of affected body part  Description: INTERVENTIONS:  - Support, maintain and protect limb and body alignment  - Provide patient/ family with appropriate education  Outcome: Progressing     Problem: Nutrition/Hydration-ADULT  Goal: Nutrient/Hydration intake appropriate for improving, restoring or maintaining nutritional needs  Description: Monitor and assess patient's nutrition/hydration status for malnutrition. Collaborate with interdisciplinary team and initiate plan and interventions as ordered.  Monitor patient's weight and dietary intake as ordered or per policy. Utilize nutrition screening tool and intervene as necessary. Determine patient's food preferences and provide high-protein, high-caloric foods as appropriate.     INTERVENTIONS:  - Monitor oral intake, urinary output, labs, and treatment plans  - Assess nutrition and hydration status and recommend course of action  - Evaluate amount of meals eaten  - Assist patient with eating if necessary   - Allow adequate time for meals  - Recommend/ encourage appropriate diets, oral nutritional supplements, and vitamin/mineral supplements  - Order, calculate, and assess calorie counts as needed  - Recommend, monitor, and adjust tube feedings and TPN/PPN based on assessed needs  - Assess need for intravenous fluids  - Provide specific nutrition/hydration education as appropriate  - Include patient/family/caregiver in decisions related to nutrition  Outcome: Progressing

## 2024-09-09 NOTE — ASSESSMENT & PLAN NOTE
4 Days Post-Op Procedure(s):  navigated posterior fusion C1-C3 (Dr. Simpson, 9/5/2024)  Patient presented with increased SOB and neck pain with ambulation   Reports fall from bed about 6 weeks ago with onset of neck pain and extremity dysfunction     Imaging:   CT cervical spine 9/4/2024: Type II odontoid fracture with posterior displacement.  Posterior displacement of C1 with respect to C2 vertebral body causing component of cord compression of the medullary cervical spinal cord.  CTA without vascular injury  Cervical postop XR good hardware placement and alignment.     Plan:   Ongoing frequent neurological checks.   Collar to be on at all times x 6 weeks. Shonda collar for showering.   Hemovac Drain -50 mL over 24 hours.  Drain removed.  Suture tied.  Site dry.  Incision healing well.  Ongoing medical management and pain control per primary team  Mobilize PT/OT - anticipate rehab  Speech following -  If collar felt to be reason for swallowing difficulties, can loosen when eating.  Ongoing medical management and pain control per primary team.   DVT PPX: SCDs.  [facility]    Neurosurgery will sign off.  Plan for outpatient follow-up for 2-week and 6-week postoperative visits. Call with questions or concerns.

## 2024-09-09 NOTE — DISCHARGE INSTR - OTHER ORDERS
Cleanse b/l sacro-buttocks with soap and water, pat dry, and apply calazime cream TID and PRN    Apply 3m no sting skin prep to L cheek area of scabbing daily.    Cleanse left anterior lower extremity wound with NSS, pat dry, and apply silicone bordered foam dressing. Change every other day and as needed for soilage/dislodgement.

## 2024-09-09 NOTE — SPEECH THERAPY NOTE
"Speech-Language Pathology Progress Note    Patient Name: Marsha Oliva    Today's Date: 9/9/2024      Subjective:  Pt was awake and alert. She was repositioned for optimal PO intake safety. C collar in place.    Objective:  Pt was seen today for dysphagia therapy. Current diet is dysphagia 2 mechanical soft with nectar thick liquids. Pt was on room air. Focus of today's session was determine potential for liquid consistency advancement and potential need for MBS . Textures offered today included thin liquid via straw and nectar thick liquid via straw.  Swallow function:   Bolus retrieval and control were adequate. AP transfer and clearance were  were WFL. Pharyngeal swallow initiation was min delayed. Hyolaryngeal excursion was adequate. Throat clearing occurred with thin liquid via straw, however no overt coughing noted today. NTLs tolerated without s/s aspiration.     Assessment:  Swallow function appears to be improving. She does still report slight feeling of \"lump\" in her throat. Plan to trial thin liquids during a meal for further assessment prior to advancing diet.     Plan:  Continue dysphagia 2 mechanical soft and nectar thick liquids for now. Continue ST follow up. Subsequent sessions to focus on determining potential for diet texture advancement, determining potential for liquid consistency advancement, and consideration for MBS as needed/appropriate .    "

## 2024-09-09 NOTE — CONSULTS
Consultation - Wound Care   Marsha Oliva 79 y.o. female MRN: [id number]  Unit/Bed#: [facility] 605-01 Encounter: [id number]    Assessment:  Pressure injury in the sacral region, stage I  Skin tear of left lower extremity, initial encounter  Abrasion  Ambulatory dysfunction  Urinary incontinence      Plan:  Left facial abrasion suspect related to patient's frequent falling.  Okay to keep open to air and apply Skin-Prep daily.  Stage I pressure injury to sacrum/buttocks-hospital-acquired.  Due to incontinence will recommend Calazime cream 3 times daily and as needed. Recommend to place patient on tortoise for offloading.  Skin tear to left lower extremity, suspect related to patient's frequent falling. Suspect element of underlying venous stasis disease. Will recommend wound management of silicone bordered foam dressing every other day and as needed.  No clinical s/s of infection present to the above wounds  Will recommend preventative nursing skin care measures, including foam dressings to the b/l heels  Pressure relief- offloading of pressure with turning/repositioning as patient medically tolerates, heel elevation, foam wedges for offloading/repositioning, and waffle cushion to chair.  Nutrition is following  Patient verbalized understanding of plan of care.  Epic secure chat wound care team with questions or concerns.   Routine wound care follow-up while admitted. AVS updated.       History of Present Illness:  Patient is a 79-year-old female who was admitted to the hospital with closed odontoid fracture.  Patient has a history of falls, hypertension, heart failure, hyperlipidemia, hypothyroidism, hypertension, and atrial fibrillation.  Wound care consulted for facial abrasion, and skin tear to left lower extremity.  Patient cooperative and agreeable for the assessment.  Patient states that she sustained the wounds to her left cheek and left lower extremity from her falls.  Foam dressing in place to left lower  extremity at time of the exam. Denies fever, chills, or increased pain related to the wounds.    Subjective:    Review of Systems   Constitutional:  Positive for fatigue. Negative for chills and fever.   HENT:  Negative for congestion and sneezing.    Respiratory:  Negative for cough and shortness of breath.    Musculoskeletal:  Positive for gait problem.   Skin:  Positive for wound.   Hematological:  Bruises/bleeds easily.   Psychiatric/Behavioral:  Negative for agitation.        Historical Information   Past Medical History:   Diagnosis Date    Disease of thyroid gland     Hypotension     Supratherapeutic INR 6/2/2022     Past Surgical History:   Procedure Laterality Date    HYSTERECTOMY       Social History   Social History     Substance and Sexual Activity   Alcohol Use Never    Comment: occassional.     Social History     Substance and Sexual Activity   Drug Use Never     E-Cigarette/Vaping    E-Cigarette Use Never User      E-Cigarette/Vaping Substances    Nicotine No     THC No     CBD No     Flavoring No     Other No     Unknown No      Social History     Tobacco Use   Smoking Status Former    Types: Cigarettes   Smokeless Tobacco Never     Family History: History reviewed. No pertinent family history.    Meds/Allergies   current meds:   Current Facility-Administered Medications   Medication Dose Route Frequency    acetaminophen (TYLENOL) tablet 975 mg  975 mg Oral Q8H [facility]    atorvastatin (LIPITOR) tablet 20 mg  20 mg Oral Daily With Dinner    bisacodyl (DULCOLAX) rectal suppository 10 mg  10 mg Rectal Daily    calcium carbonate (TUMS) chewable tablet 1,000 mg  1,000 mg Oral Daily PRN    chlorhexidine (PERIDEX) 0.12 % oral rinse 15 mL  15 mL Mouth/Throat Q12H [facility]    digoxin (LANOXIN) tablet 125 mcg  125 mcg Oral Every Other Day    diltiazem (CARDIZEM CD) 24 hr capsule 180 mg  180 mg Oral Daily    docusate (COLACE) oral liquid 100 mg  100 mg Oral BID    enoxaparin (LOVENOX) subcutaneous injection 30 mg  30 mg  "Subcutaneous Q12H [facility]    levothyroxine tablet 125 mcg  125 mcg Oral Early Morning    ondansetron (ZOFRAN) injection 4 mg  4 mg Intravenous Q6H PRN    oxyCODONE (ROXICODONE) split tablet 2.5 mg  2.5 mg Oral Q4H PRN    Or    oxyCODONE (ROXICODONE) IR tablet 5 mg  5 mg Oral Q4H PRN    senna oral syrup 8.8 mg  8.8 mg Oral BID    sertraline (ZOLOFT) tablet 100 mg  100 mg Oral Daily     No Known Allergies    Objective   Vitals: Blood pressure 104/64, pulse 97, temperature 97.8 °F (36.6 °C), resp. rate 12, height 5' 3\" (1.6 m), weight 57.1 kg (125 lb 14.1 oz), SpO2 95%.     Wounds:   B/l heels are dry and intact without redness or wounds.   L facial/cheek with area of dry scabbing/abrasion. No open aspect, redness or drainage.   B/l sacro-buttocks with stage 1 pressure injury. Presents as well-defined aspects of very slow to blanchable tender to palpation red colored dry and intact skin. Measured together: 7.0x10.0x0cm. no open aspect or drainage. Yolette-wound is dry and intact with blanchable pink/hyperpigmented skin.  Left lower extremity skin tear. Wound is irregular shaped, partial thickness, with 100% moist red colored tissue.  Wound is producing small amount of serosanguineous drainage.  Edges fragile attached with maceration.  Periwound is dry and intact with hemosiderin staining present.      No induration, fluctuance, odor, warmth/temperature differences, redness, or purulence noted to the above mentioned wounds and skin areas assessed. New dressings applied as noted above. Patient tolerated assessment well- denies pain and no s/s of non-verbal pain or discomfort observed during the encounter.        Wound 09/05/24 Head Bilateral (Active)   Wound Image   09/09/24 1007   Wound Length (cm) 0 cm 09/09/24 1007   Wound Width (cm) 0 cm 09/09/24 1007   Wound Depth (cm) 0 cm 09/09/24 1007   Wound Surface Area (cm^2) 0 cm^2 09/09/24 1007   Wound Volume (cm^3) 0 cm^3 09/09/24 1007   Calculated Wound Volume (cm^3) 0 cm^3 " 09/09/24 1007   Wound 09/09/24 Skin Tear Pretibial Left (Active)   Wound Image   09/09/24 1004   Wound Length (cm) 0.8 cm 09/09/24 1004   Wound Width (cm) 0.5 cm 09/09/24 1004   Wound Depth (cm) 0.1 cm 09/09/24 1004   Wound Surface Area (cm^2) 0.4 cm^2 09/09/24 1004   Wound Volume (cm^3) 0.04 cm^3 09/09/24 1004   Calculated Wound Volume (cm^3) 0.04 cm^3 09/09/24 1004               Physical Exam  Constitutional:       General: She is awake. She is not in acute distress.     Appearance: She is not diaphoretic.      Interventions: Cervical collar in place.   HENT:      Head: Normocephalic and atraumatic.      Right Ear: External ear normal.      Left Ear: External ear normal.   Eyes:      Conjunctiva/sclera: Conjunctivae normal.   Pulmonary:      Effort: Pulmonary effort is normal. No respiratory distress.   Genitourinary:     Comments: Incontinent of urine, pure wick catheter in place for urinary management.  No fecal incontinence at time of the exam.  Musculoskeletal:      Right lower leg: Edema present.      Comments: Patient seen in bed, dependent for care, max assist of 1 with turning for the assessment.   Skin:     General: Skin is warm and dry.      Findings: Wound present.      Comments: Refer to wound section for assessment details.      Neurological:      Mental Status: She is alert.      Gait: Gait abnormal.   Psychiatric:         Behavior: Behavior is cooperative.           Lab, Imaging and other studies: I have personally reviewed pertinent reports.      Code Status: Level 1 - Full Code      Counseling / Coordination of Care  Total time spent today:    Total time (face-to-face and non-face-to-face) spent on today's visit was 22 minutes. This includes preparation for the visits (H&P on 9/4/24) performance of a medically appropriate history and examination, and orders for medications/treatments or testing.  Discussed assessment findings, and plan of care/recommendations with patients VAMSHI Taylor  "JAQUELINE Govea, FNP-C, CWON      Portions of the record may have been created with voice recognition software.  Occasional wrong word or \"sound a like\" substitutions may have occurred due to the inherent limitations of voice recognition software.  Read the chart carefully and recognize, using context, where substitutions have occurred      "

## 2024-09-09 NOTE — CASE MANAGEMENT
Case Management Discharge Planning Note    Patient name Marsha Oliva  Location [facility] 605/[facility] 605-01 MRN [id number]  : 1945 Date 2024       Current Admission Date: 2024  Current Admission Diagnosis:Closed odontoid fracture with type II morphology and posterior displacement (HCC)   Patient Active Problem List    Diagnosis Date Noted Date Diagnosed    Acute pain due to trauma 2024     At risk for delirium 2024     Heart failure with preserved ejection fraction (HCC) 2024     Closed odontoid fracture with type II morphology and posterior displacement (HCC) 2024     Spinal cord compression (HCC) 2024     Chronic respiratory failure with hypoxia ([facility]) 2023     Stage 3a chronic kidney disease (HCC) 2022     Bilateral hydronephrosis 2022     Proctitis 2022     Fall 2022     Hypokalemia 2022     ILD (interstitial lung disease) ([facility]) 2022     Oropharyngeal aspiration 2022     Elevated troponin 2022     EKG abnormalities 2022     Mild cognitive impairment of uncertain or unknown etiology 2022     Hormone replacement therapy 2022     Apraxia 2021     Constipation, unspecified 2021     Corn or callus 2021     Difficulty in walking, not elsewhere classified 2021     Generalized muscle weakness 2021     Lymphedema, not elsewhere classified 2021     Repeated falls 2021     Hypoxia 2021     Frequent falls 2021     Elevated d-dimer 2021     Venous insufficiency of both lower extremities 2021     Acquired bilateral hammer toes 2018     Lymphedema of both lower extremities 2018     Severe mitral regurgitation 2018     Anemia 2018     Cardiomegaly 2018     Wound of right leg 2018     Hypertensive heart disease with heart failure (HCC) 2018     Major depressive disorder, single episode, unspecified 2018      Personal history of nicotine dependence 05/13/2018     Pleural effusion, not elsewhere classified 05/13/2018     Solitary pulmonary nodule 05/13/2018     Atrial fibrillation, chronic (HCC) 05/01/2018     B12 deficiency 01/13/2017     Chronic diastolic CHF (congestive heart failure) (HCC) 01/13/2017     Folate deficiency 01/13/2017     Vitamin D deficiency 06/03/2016     Depression with anxiety 08/03/2015     Mixed hyperlipidemia 08/03/2015     Osteoarthritis of knee 10/23/2012     Impaired fasting glucose 05/31/2012     Essential hypertension 03/23/2010     Hypothyroidism 03/23/2010     Posttraumatic stress disorder 03/23/2010       LOS (days): 5  Geometric Mean LOS (GMLOS) (days): 2.6  Days to GMLOS:-2     OBJECTIVE:  Risk of Unplanned Readmission Score: 18.46         Current admission status: Inpatient   Preferred Pharmacy:   [facility] - [place] - [address]  [address] [facility] [address]  Phone: [phone] Fax: [phone]    [id number] [address] [facility] [address]  Phone: [phone] Fax: [phone]    Primary Care Provider: Rebecca Solis    Primary Insurance: [employer]ALLAS  Secondary Insurance:     DISCHARGE DETAILS:    CM accepted to [facility]

## 2024-09-09 NOTE — PROGRESS NOTES
[facility]  Progress Note  Name: Marsha Oliva I  MRN: [id number]  Unit/Bed#: [facility] 605-01 I Date of Admission: 9/4/2024   Date of Service: 9/9/2024 I Hospital Day: 5    Assessment & Plan   * Closed odontoid fracture with type II morphology and posterior displacement (HCC)  Assessment & Plan  4 Days Post-Op Procedure(s):  navigated posterior fusion C1-C3 (Dr. Simpson, 9/5/2024)  Patient presented with increased SOB and neck pain with ambulation   Reports fall from bed about 6 weeks ago with onset of neck pain and extremity dysfunction     Imaging:   CT cervical spine 9/4/2024: Type II odontoid fracture with posterior displacement.  Posterior displacement of C1 with respect to C2 vertebral body causing component of cord compression of the medullary cervical spinal cord.  CTA without vascular injury  Cervical postop XR good hardware placement and alignment.     Plan:   Ongoing frequent neurological checks.   Collar to be on at all times x 6 weeks. Shonda collar for showering.   Hemovac Drain -50 mL over 24 hours.  Drain removed.  Suture tied.  Site dry.  Incision healing well.  Ongoing medical management and pain control per primary team  Mobilize PT/OT - anticipate rehab  Speech following -  If collar felt to be reason for swallowing difficulties, can loosen when eating.  Ongoing medical management and pain control per primary team.   DVT PPX: SCDs.  [facility]    Neurosurgery will sign off.  Plan for outpatient follow-up for 2-week and 6-week postoperative visits. Call with questions or concerns.       Spinal cord compression (HCC)  Assessment & Plan  See plan as above             Subjective/Objective   Chief Complaint: I cannot swallow well    Subjective: Patient states neck pain at this time is doing pretty well.  Denies any radicular pain, scum tingling and/or weakness.  Does continue with some difficulties with swallowing.  She finds the collar to be difficult to  "tolerate.    Objective: Sitting up in bed.  NAD.  Collar in place.    I/O         09/07 0701 09/08 0700 09/08 0701 09/09 0700 09/09 0701  09/10 0700    P.O.  240     IV Piggyback       Total Intake(mL/kg)  240 (4.2)     Urine (mL/kg/hr) 0 (0) 250 (0.2) 200 (1.1)    Drains 150 50     Total Output 150 300 200    Net -150 -60 -200           Unmeasured Urine Occurrence 2 x 3 x             Invasive Devices       Peripheral Intravenous Line  Duration             Peripheral IV 09/09/24 Dorsal (posterior);Left Hand <1 day              Drain  Duration             Closed/Suction Drain Left;Posterior Neck Accordion 10 Fr. 3 days    External Urinary Catheter 1 day                    Physical Exam:  Vitals: Blood pressure 104/64, pulse 97, temperature 97.8 °F (36.6 °C), resp. rate 12, height 5' 3\" (1.6 m), weight 57.1 kg (125 lb 14.1 oz), SpO2 95%.,Body mass index is 22.3 kg/m².    General appearance: alert, appears stated age, cooperative and no distress  Head: Normocephalic, without obvious abnormality  Eyes: Conjugate and tracks properly in room  Neck: Incision CDI.  Collar in place.  Lungs: non labored breathing  Heart: regular heart rate  Neurologic:   Mental status: Alert, oriented, thought content appropriate  Cranial nerves: grossly intact   Sensory: normal to crude touch x 4  Motor: moving all extremities without focal weakness     Lab Results:  Results from last 7 days   Lab Units 09/08/24  0604 09/07/24  0917 09/06/24  0620 09/05/24  1744 09/05/24  0538 09/04/24  1036   WBC Thousand/uL 8.07 9.03 6.55  --  4.87 5.13   HEMOGLOBIN g/dL 12.3 13.1 14.1  --  14.7 15.4   I STAT HEMOGLOBIN   --   --   --    < >  --   --    HEMATOCRIT % 39.7 41.4 44.8  --  46.7* 49.9*   HEMATOCRIT, ISTAT   --   --   --    < >  --   --    PLATELETS Thousands/uL 141* 163 149   < > 151 156   SEGS PCT % 75  --   --   --  76* 73   MONO PCT % 11  --   --   --  9 9   EOS PCT % 0  --   --   --  1 1    < > = values in this interval not displayed. " "    Results from last 7 days   Lab Units 09/09/24  0525 09/08/24  0604 09/07/24  0917 09/06/24  0620 09/05/24  1906 09/05/24  1744 09/05/24  0538 09/04/24  1123   SODIUM mmol/L 140 141 138   < >  --   --    < > 140   POTASSIUM mmol/L 3.9 4.1 4.0   < >  --   --    < > 4.7   CHLORIDE mmol/L 104 104 103   < >  --   --    < > 101   CO2 mmol/L 27 28 24   < >  --   --    < > 27   CO2, I-STAT mmol/L  --   --   --   --  21 27   < >  --    BUN mg/dL 14 14 13   < >  --   --    < > 13   CREATININE mg/dL 0.45* 0.62 0.72   < >  --   --    < > 0.83   CALCIUM mg/dL 8.3* 9.0 9.0   < >  --   --    < > 9.9   ALK PHOS U/L  --   --   --   --   --   --   --  71   ALT U/L  --   --   --   --   --   --   --  12   AST U/L  --   --   --   --   --   --   --  32   GLUCOSE, ISTAT mg/dl  --   --   --   --  105 87  --   --     < > = values in this interval not displayed.     Results from last 7 days   Lab Units 09/08/24  0604 09/06/24 0620 09/05/24  0538   MAGNESIUM mg/dL 2.2 2.2 2.3     Results from last 7 days   Lab Units 09/08/24 0604 09/06/24  0620 09/05/24  0538   PHOSPHORUS mg/dL 2.7 5.0* 3.8     Results from last 7 days   Lab Units 09/06/24  0620 09/04/24  1119   INR  0.93 0.98   PTT seconds 27 33     No results found for: \"TROPONINT\"  ABG:No results found for: \"PHART\", \"VQJ5JIV\", \"PO2ART\", \"IGZ8THO\", \"D9IOHPYJ\", \"BEART\", \"SOURCE\"    Imaging Studies: I have personally reviewed pertinent reports.   and I have personally reviewed pertinent films in PACS    XR cervical spine 2 or 3 views    Result Date: 9/7/2024  Impression: There is bilateral pedicle screw and stabilizing safia fixation at the C1, C2, C3 levels .  No hardware complication.  Stable  alignment compared to intraoperative images. Workstation performed: [id number]     XR spine cervical 2 or 3 vw injury    Result Date: 9/6/2024  Impression: Fluoroscopy provided for procedure guidance. Please refer to the separate procedure note for additional details. Workstation performed: " [id number]     CTA head and neck with and without contrast    Result Date: 9/4/2024  Impression: CT Brain:  No acute intracranial abnormality. CT Angiography:  Unremarkable CTA neck and brain. Workstation performed: [id number]     MRI cervical spine wo contrast    Result Date: 9/4/2024  Impression: 1. Displaced type II dens fracture with severe posterior displacement of the dens and C1 vertebral body relative to the C2 vertebral body. There is resultant mass effect on the cord which appears relatively mild given the degree of displacement. No gross  cord edema is seen, however the exam is motion degraded. 2. Discontinuity of the ALL and irregularity of the PLL. There is also likely interspinous ligament injury at C1-C2. 3. Facet edema at C2-C3 may reflect facet capsular injury. 4. Prominent edema in the C1 and C2 vertebral bodies compatible with known C2 fracture. There is also paravertebral edema. 5. Limited evaluation of the flow voids given motion artifact. Consider CT angiogram of the neck to evaluate the vertebral arteries given the significant distraction at C1-C2. The study was marked in EPIC for immediate notification. Workstation performed: [id number]     TRAUMA - CT spine cervical wo contrast    Result Date: 9/4/2024  Impression: Type II odontoid fracture with posterior displacement including prominent posterior displacement of C1 with respect to the C2 vertebral body causing a component of cord compression of the medullary cervical spinal cord. Further evaluation with MRI is advised. I personally discussed this study with NEENA JIM on 9/4/2024 11:12 AM. Workstation performed: [id number]     TRAUMA - CT head wo contrast    Result Date: 9/4/2024  Impression: No acute intracranial abnormality. Mild to moderate chronic microvascular ischemic changes. Workstation performed: [id number]       VTE Pharmacologic Prophylaxis: Enoxaparin (Lovenox)    VTE Mechanical Prophylaxis: sequential compression  device

## 2024-09-09 NOTE — DISCHARGE INSTR - AVS FIRST PAGE
Discharge Instructions  Posterior cervical fixation/fusion      Surgical incisional care:  Keep incision clean and dry. Avoid applying creams, lotion or antiseptic to incision area.  Check the wound daily. If the incision becomes red, swollen, tender, warm, or has increased drainage please notify physician immediately.  Okay to apply a padded dressing over incision while wearing VISTA collar in order to reduce risk of irritation.  Shower 3 days after surgery, but do not soak in a tub and no swimming.  Use mild antimicrobial soap and water. Pat incision dry after showering.   Cervical VISTA collar to be worn at all times except for showering. Change from VISTA (grey) collar to the Shonda (peach) collar prior to showering. Incision may be cleaned with water and a mild antimicrobial soap using a clean washcloth. Incision is to be gently patted dry with a clean towel. Once dry, collar should be changed back to a VISTA (grey) collar with clean pads in place.  Hand wash collar pads with mild soap and water. They are to be laid flat to dry on a clean towel. Recommend changing every 1-2 days.   Please refer to VISTA collar instructions for further details.    Activity Restrictions:  No heavy lifting greater than 5 - 10lbs. No strenuous activities.  May walk as tolerated. Encourage at least 4 short walks per day.   No driving while requiring cervical collar, anticipated six weeks.  No significant neck movement.    Postoperative medication:  Please take pain medications to relieve incision pain, and muscle relaxants to prevent spasms as directed. Please see after visit summary (AVS) for details.   Please take over the counter stool softeners such as colace or senna-s to avoid constipation while on narcotics.  Do not take ibuprofen, Naproxen/Aleve or any NSAID until cleared by surgeon. May take Tylenol instead.  If taking Coumadin, Aspirin, or Plavix, you may resume these medications when cleared by Neurosurgery.    Follow-up  as scheduled for a 2 week incision check. Follow-up 6 weeks after surgery with a repeat cervical spine upright x-rays to be completed prior to visit.    **Please notify MD immediately if you experience a fever of 101.F, have increased neck or arm pain, new numbness and/or weakness in your arms, difficulty swallowing or breathing especially while lying down, numbness or weakness in arms or legs.**

## 2024-09-09 NOTE — ANESTHESIA POSTPROCEDURE EVALUATION
Post-Op Assessment Note    CV Status:  Stable  Pain Score: 0    Pain management: adequate       Mental Status:  Awake and sleepy   Hydration Status:  Stable   PONV Controlled:  None   Airway Patency:  Patent     Post Op Vitals Reviewed: Yes    No anethesia notable event occurred.    Staff: Anesthesiologist           /72   Temp 98.6   Pulse 100   Resp 24   SpO2 100

## 2024-09-09 NOTE — ARC ADMISSION
Reviewed patient's case - patient is preapproved for [facility] pending medical stability, functional progress/tolerance, insurance authorization and bed availability. CM has been updated. Will continue to follow at this time.

## 2024-09-09 NOTE — PLAN OF CARE
Problem: PAIN - ADULT  Goal: Verbalizes/displays adequate comfort level or baseline comfort level  Description: Interventions:  - Encourage patient to monitor pain and request assistance  - Assess pain using appropriate pain scale  - Administer analgesics based on type and severity of pain and evaluate response  - Implement non-pharmacological measures as appropriate and evaluate response  - Consider cultural and social influences on pain and pain management  - Notify physician/advanced practitioner if interventions unsuccessful or patient reports new pain  Outcome: Progressing     Problem: DISCHARGE PLANNING  Goal: Discharge to home or other facility with appropriate resources  Description: INTERVENTIONS:  - Identify barriers to discharge w/patient and caregiver  - Arrange for needed discharge resources and transportation as appropriate  - Identify discharge learning needs (meds, wound care, etc.)  - Arrange for interpretive services to assist at discharge as needed  - Refer to Case Management Department for coordinating discharge planning if the patient needs post-hospital services based on physician/advanced practitioner order or complex needs related to functional status, cognitive ability, or social support system  Outcome: Progressing     Problem: MUSCULOSKELETAL - ADULT  Goal: Maintain or return mobility to safest level of function  Description: INTERVENTIONS:  - Assess patient's ability to carry out ADLs; assess patient's baseline for ADL function and identify physical deficits which impact ability to perform ADLs (bathing, care of mouth/teeth, toileting, grooming, dressing, etc.)  - Assess/evaluate cause of self-care deficits   - Assess range of motion  - Assess patient's mobility  - Assess patient's need for assistive devices and provide as appropriate  - Encourage maximum independence but intervene and supervise when necessary  - Involve family in performance of ADLs  - Assess for home care needs  following discharge   - Consider OT consult to assist with ADL evaluation and planning for discharge  - Provide patient education as appropriate  Outcome: Progressing     Problem: MUSCULOSKELETAL - ADULT  Goal: Maintain proper alignment of affected body part  Description: INTERVENTIONS:  - Support, maintain and protect limb and body alignment  - Provide patient/ family with appropriate education  Outcome: Progressing

## 2024-09-10 ENCOUNTER — APPOINTMENT (INPATIENT)
Dept: RADIOLOGY | Facility: HOSPITAL | Age: 79
DRG: 472 | End: 2024-09-10
Payer: COMMERCIAL

## 2024-09-10 LAB
ANION GAP SERPL CALCULATED.3IONS-SCNC: 9 MMOL/L (ref 4–13)
BUN SERPL-MCNC: 12 MG/DL (ref 5–25)
CALCIUM SERPL-MCNC: 9.2 MG/DL (ref 8.4–10.2)
CHLORIDE SERPL-SCNC: 100 MMOL/L (ref 96–108)
CO2 SERPL-SCNC: 30 MMOL/L (ref 21–32)
CREAT SERPL-MCNC: 0.54 MG/DL (ref 0.6–1.3)
GFR SERPL CREATININE-BSD FRML MDRD: 90 ML/MIN/1.73SQ M
GLUCOSE SERPL-MCNC: 90 MG/DL (ref 65–140)
POTASSIUM SERPL-SCNC: 4 MMOL/L (ref 3.5–5.3)
SODIUM SERPL-SCNC: 139 MMOL/L (ref 135–147)

## 2024-09-10 PROCEDURE — 74230 X-RAY XM SWLNG FUNCJ C+: CPT

## 2024-09-10 PROCEDURE — 92611 MOTION FLUOROSCOPY/SWALLOW: CPT

## 2024-09-10 PROCEDURE — 92526 ORAL FUNCTION THERAPY: CPT

## 2024-09-10 PROCEDURE — 80048 BASIC METABOLIC PNL TOTAL CA: CPT

## 2024-09-10 PROCEDURE — 97530 THERAPEUTIC ACTIVITIES: CPT

## 2024-09-10 PROCEDURE — 99232 SBSQ HOSP IP/OBS MODERATE 35: CPT | Performed by: STUDENT IN AN ORGANIZED HEALTH CARE EDUCATION/TRAINING PROGRAM

## 2024-09-10 PROCEDURE — 97116 GAIT TRAINING THERAPY: CPT

## 2024-09-10 PROCEDURE — 97535 SELF CARE MNGMENT TRAINING: CPT

## 2024-09-10 RX ADMIN — DOCUSATE SODIUM LIQUID 100 MG: 50 LIQUID ORAL at 09:06

## 2024-09-10 RX ADMIN — SENNOSIDES 8.8 MG: 8.8 LIQUID ORAL at 17:15

## 2024-09-10 RX ADMIN — ACETAMINOPHEN 975 MG: 325 TABLET ORAL at 05:32

## 2024-09-10 RX ADMIN — DIGOXIN 125 MCG: 125 TABLET ORAL at 09:06

## 2024-09-10 RX ADMIN — ENOXAPARIN SODIUM 30 MG: 30 INJECTION SUBCUTANEOUS at 20:42

## 2024-09-10 RX ADMIN — SENNOSIDES 8.8 MG: 8.8 LIQUID ORAL at 09:06

## 2024-09-10 RX ADMIN — CHLORHEXIDINE GLUCONATE 0.12% ORAL RINSE 15 ML: 1.2 LIQUID ORAL at 09:05

## 2024-09-10 RX ADMIN — ENOXAPARIN SODIUM 30 MG: 30 INJECTION SUBCUTANEOUS at 09:07

## 2024-09-10 RX ADMIN — ACETAMINOPHEN 975 MG: 325 TABLET ORAL at 21:00

## 2024-09-10 RX ADMIN — ACETAMINOPHEN 975 MG: 325 TABLET ORAL at 13:23

## 2024-09-10 RX ADMIN — SERTRALINE HYDROCHLORIDE 100 MG: 100 TABLET ORAL at 09:05

## 2024-09-10 RX ADMIN — DILTIAZEM HYDROCHLORIDE 180 MG: 180 CAPSULE, COATED, EXTENDED RELEASE ORAL at 09:04

## 2024-09-10 RX ADMIN — CHLORHEXIDINE GLUCONATE 0.12% ORAL RINSE 15 ML: 1.2 LIQUID ORAL at 20:42

## 2024-09-10 RX ADMIN — LEVOTHYROXINE SODIUM 125 MCG: 125 TABLET ORAL at 05:47

## 2024-09-10 RX ADMIN — ATORVASTATIN CALCIUM 20 MG: 20 TABLET, FILM COATED ORAL at 17:15

## 2024-09-10 RX ADMIN — DOCUSATE SODIUM LIQUID 100 MG: 50 LIQUID ORAL at 17:15

## 2024-09-10 NOTE — ASSESSMENT & PLAN NOTE
- Patient with spinal cord compression related to traumatic injury, present on presentation.  - Management as outlined under closed odontoid fracture.

## 2024-09-10 NOTE — CASE MANAGEMENT
Case Management Discharge Planning Note    Patient name Marsha Oliva  Location [facility] 605/[facility] 605-01 MRN [id number]  : 1945 Date 9/10/2024       Current Admission Date: 2024  Current Admission Diagnosis:Closed odontoid fracture with type II morphology and posterior displacement (HCC)   Patient Active Problem List    Diagnosis Date Noted Date Diagnosed    Acute pain due to trauma 2024     At risk for delirium 2024     Heart failure with preserved ejection fraction (HCC) 2024     Closed odontoid fracture with type II morphology and posterior displacement (HCC) 2024     Spinal cord compression (HCC) 2024     Chronic respiratory failure with hypoxia ([facility]) 2023     Stage 3a chronic kidney disease (HCC) 2022     Bilateral hydronephrosis 2022     Proctitis 2022     Fall 2022     Hypokalemia 2022     ILD (interstitial lung disease) ([facility]) 2022     Oropharyngeal aspiration 2022     Elevated troponin 2022     EKG abnormalities 2022     Mild cognitive impairment of uncertain or unknown etiology 2022     Hormone replacement therapy 2022     Apraxia 2021     Constipation, unspecified 2021     Corn or callus 2021     Difficulty in walking, not elsewhere classified 2021     Generalized muscle weakness 2021     Lymphedema, not elsewhere classified 2021     Repeated falls 2021     Hypoxia 2021     Frequent falls 2021     Elevated d-dimer 2021     Venous insufficiency of both lower extremities 2021     Acquired bilateral hammer toes 2018     Lymphedema of both lower extremities 2018     Severe mitral regurgitation 2018     Anemia 2018     Cardiomegaly 2018     Wound of right leg 2018     Hypertensive heart disease with heart failure (HCC) 2018     Major depressive disorder, single episode, unspecified 2018      Personal history of nicotine dependence 05/13/2018     Pleural effusion, not elsewhere classified 05/13/2018     Solitary pulmonary nodule 05/13/2018     Atrial fibrillation, chronic (HCC) 05/01/2018     B12 deficiency 01/13/2017     Chronic diastolic CHF (congestive heart failure) (HCC) 01/13/2017     Folate deficiency 01/13/2017     Vitamin D deficiency 06/03/2016     Depression with anxiety 08/03/2015     Mixed hyperlipidemia 08/03/2015     Osteoarthritis of knee 10/23/2012     Impaired fasting glucose 05/31/2012     Essential hypertension 03/23/2010     Hypothyroidism 03/23/2010     Posttraumatic stress disorder 03/23/2010       LOS (days): 6  Geometric Mean LOS (GMLOS) (days): 2.6  Days to GMLOS:-3.2     OBJECTIVE:  Risk of Unplanned Readmission Score: 15.72         Current admission status: Inpatient   Preferred Pharmacy:   [facility] - [place] - [email or web address] [facility]  [email or web address] [facility] [address]  Phone: [phone] Fax: [phone]    [id number] - [place] - [address] [address]  Phone: [phone] Fax: [phone]    Primary Care Provider: Rebecca Solis    Primary Insurance: [employer]  Secondary Insurance:     DISCHARGE DETAILS:       Pt clinically accepted to [facility]. Will submit for auth

## 2024-09-10 NOTE — ASSESSMENT & PLAN NOTE
- C current medication regimen and resume home medication regimen on discharge as appropriate.  - Outpatient follow-up per routine.

## 2024-09-10 NOTE — RESTORATIVE TECHNICIAN NOTE
Restorative Technician Note      Patient Name: Marsha Oliva     Restorative Tech Visit Date: 09/10/24  Note Type: Bracing, Follow-up fitting  Patient Position Upon Consult: Bedside chair  Brace Applied: [place] Collar Set  Patient Position When Brace Applied: Seated  Education Provided: Yes  Patient Position at End of Consult: Bedside chair; All needs within reach; Bed/Chair alarm activated  Nurse Communication: Nurse aware of consult, application of brace    Cervical collar adjusted for proper fitment; Chin plate at lvl 1. New replacement pads, Shonda shower collar and cervical handout given.     Please contact [facility] PT Restorative tech on Epic Secure Chat  in regards to bracing instruction and/or adjustment.    Jey Lane, Restorative Technician

## 2024-09-10 NOTE — ASSESSMENT & PLAN NOTE
- Acute pain secondary to traumatic injuries.  - Continue multimodal analgesic regimen.  - Bowel regimen as long as using opioids.  - Continue to monitor pain and adjust regimen as indicated.

## 2024-09-10 NOTE — OCCUPATIONAL THERAPY NOTE
Occupational Therapy Treatment Note:      09/10/24 0935   OT Last Visit   OT Visit Date 09/10/24   Note Type   Note Type Treatment   Pain Assessment   Pain Assessment Tool 0-10   Pain Score No Pain   Restrictions/Precautions   Braces or Orthoses C/S Collar   Other Precautions Chair Alarm;Bed Alarm;Cognitive;Fall Risk;Spinal precautions   ADL   Where Assessed Edge of bed   Grooming Assistance 3  Moderate Assistance   Grooming Deficit   (hair combing)   Grooming Comments limited by weakness. asst [id number] for back of hair and thoroughnes   UB Dressing Assistance 3  Moderate Assistance   UB Dressing Comments to kaden around the back robe   LB Dressing Assistance 3  Moderate Assistance   Toileting Assistance  2  Maximal Assistance   Toileting Deficit   (reports incontinence at home pta)   Functional Standing Tolerance   Time 30 seconds during clothing management and adl task   Bed Mobility   Supine to Sit 3  Moderate assistance   Additional items Assist x 2  (poor initiation despite tactile cues)   Transfers   Sit to Stand 3  Moderate assistance   Additional items Assist x 2   Stand to Sit 3  Moderate assistance   Additional items Assist x 2   Stand pivot 3  Moderate assistance   Additional items Assist x 1;Assist x 2  (rw)   Functional Mobility   Functional Mobility 3  Moderate assistance  (mod to max asst x 2, + fall risk, decreased activity tolerence)   Additional Comments short distance, pt walks with b knees flexed  (rw)   Cognition   Comments pt was slow to motor plann nor initiatie getting oob. she requires mod vc's for sit ti stand technique   Activity Tolerance   Activity Tolerance Patient limited by fatigue   Assessment   Assessment pt participated in am ot session and was seen focusing on adl tasks, bed mobility, activity tolerence sit to from stand muiltimle times and spt / short distance functional mobility with rw. pt with flexed knees bilaterally with standing and walking. pt demosntrates poor +  balance, f- activity tolerence during adl tasks and mobility trials. pt is motivated and is cooperative this session for oob. pt using pure wick for urine management. she reports incontinence at home pta. pt was able to lift barms to reach face and head. will follow focusing on goals from ie.   Plan   Treatment Interventions ADL retraining;Functional transfer training;Endurance training;Patient/family training;Equipment evaluation/education;Activityengagement   Goal Expiration Date 09/20/24   OT Treatment Day 1   OT Frequency 2-3x/wk   Discharge Recommendation   Rehab Resource Intensity Level, OT II (Moderate Resource Intensity)     April A Storm

## 2024-09-10 NOTE — ASSESSMENT & PLAN NOTE
- Continue current medication regimen and resume home medication regimen on discharge as appropriate.  - Outpatient follow-up per routine.

## 2024-09-10 NOTE — SPEECH THERAPY NOTE
Speech-Language Pathology Progress Note    Patient Name: Marsha Oliva    Today's Date: 9/10/2024    Subjective:  Pt was awake and alert. She was sitting upright in bed. PCA was assisting pt with eating her breakfast. C collar in place.    Objective:  Pt was seen today for dysphagia therapy. Current diet is dysphagia 2 mechanical soft with nectar thick liquids. She was seen while eating breakfast which today included mostly pureed items. Pt was on room air.   Swallow function:   Bolus retrieval and manipulation were adequate. Suspect mildly reduced control, liquids>puree. Pharyngeal swallow initiation was min delayed. Hyolaryngeal excursion was  mildly reduced, likely in part due to C spine collar . Mild cough and throat clearing noted intermittently with both thin liquid trials and nectar thick liquid.    Assessment:  Pt is showing s/s aspiration with liquids, both thin and nectar thick during breakfast this morning. She appears to be tolerating puree well.     Plan:  Orders obtained for MBS, to be completed 1030, additional recs to follow.

## 2024-09-10 NOTE — ASSESSMENT & PLAN NOTE
- Continue current medication regimen and resume home medication therapy on discharge as appropriate.  - Monitor for continued adequate blood pressure control.  - Outpatient follow-up routine.

## 2024-09-10 NOTE — CASE MANAGEMENT
[facility] received request for authorization from Care Manager.  Authorization request submitted for: Acute Rehab  Facility Name: [facility]   NPI:[id number]  Facility MD:   Ashley DePadua  NPI: [id number]  Authorization initiated by contacting insurance:  alicia   Via: [employer]   Clinicals submitted via Portal attachment   Pending Reference #:[id number]     Care Manager notified: doris orosco     Updates to authorization status will be noted in chart. Please reach out to CM for updates on any clinical information.

## 2024-09-10 NOTE — ASSESSMENT & PLAN NOTE
- Neurosurgery evaluation and recommendations appreciated.   - Status post bilateral C1 and C3 lateral mass screw placement, bilateral C2 pedicle screw placement, arthrodesis C1-C3 with autologous bone graft and [place] putty with use of neuronavigation and neuromonitoring on 9/5/2024.   - Hemovac drain discontinued on 9/9/2024.  - Bracing: Maintain cervical collar at all times.  - Spine precautions.  - Maintain aspiration precautions; speech therapy following with plan for VBS on 9/10/2024.  - Monitor neurovascular exam.  - Multimodal analgesic regimen as needed.  - Continue chemical DVT prophylaxis during inpatient encounter.  - Upright spine x-rays from 9/6/2024 reviewed.  - PT and OT evaluation and treatment as indicated.  - Outpatient follow up with Neurosurgery for re-evaluation.\

## 2024-09-10 NOTE — ASSESSMENT & PLAN NOTE
- Continue current medication regimen including digoxin and diltiazem.  - Monitor for continued adequate heart rate control.  - Resume home medication regimen on discharge as appropriate.  - Outpatient follow-up per routine.

## 2024-09-10 NOTE — PROGRESS NOTES
Progress Note - Trauma   Name: Marsha Oliva 79 y.o. female I MRN: [id number]  [facility] I Date of Admission: 9/4/2024   Date of Service: 9/10/2024 I Hospital Day: 6    Assessment & Plan  Closed odontoid fracture with type II morphology and posterior displacement (HCC)  - Neurosurgery evaluation and recommendations appreciated.   - Status post bilateral C1 and C3 lateral mass screw placement, bilateral C2 pedicle screw placement, arthrodesis C1-C3 with autologous bone graft and [place] putty with use of neuronavigation and neuromonitoring on 9/5/2024.   - Hemovac drain discontinued on 9/9/2024.  - Bracing: Maintain cervical collar at all times.  - Spine precautions.  - Maintain aspiration precautions; speech therapy following with plan for VBS on 9/10/2024.  - Monitor neurovascular exam.  - Multimodal analgesic regimen as needed.  - Continue chemical DVT prophylaxis during inpatient encounter.  - Upright spine x-rays from 9/6/2024 reviewed.  - PT and OT evaluation and treatment as indicated.  - Outpatient follow up with Neurosurgery for re-evaluation.\  Atrial fibrillation, chronic (HCC)  - Continue current medication regimen including digoxin and diltiazem.  - Monitor for continued adequate heart rate control.  - Resume home medication regimen on discharge as appropriate.  - Outpatient follow-up per routine.    B12 deficiency  - Continue home B12 supplementation.  - Outpatient follow-up routine.  Depression with anxiety  - Continue current medication regimen and resume home medication regimen on discharge as appropriate.  - Outpatient follow-up per routine.  Essential hypertension  - Continue current medication regimen and resume home medication therapy on discharge as appropriate.  - Monitor for continued adequate blood pressure control.  - Outpatient follow-up routine.    Hypothyroidism  - Continue current medication regimen, including levothyroxine.   - Levothyroxine dose recently decreased to 125  "mcg daily from 137 mcg daily; continue new dose.  - Outpatient follow-up per routine.  Mixed hyperlipidemia  - C current medication regimen and resume home medication regimen on discharge as appropriate.  - Outpatient follow-up per routine.  Fall  Patient uses assistive devices including walker, cane, \"anything within reach\" to walk at baseline  PT/OT once cleared by neurosurgery  Geriatrics consult  Hypertensive heart disease with heart failure (HCC)  Wt Readings from Last 3 Encounters:   09/10/24 56.7 kg (125 lb)   09/04/24 53.5 kg (118 lb)   05/22/24 57.6 kg (127 lb)     - Patient with chronic history of hypertensive heart disease with history of heart failure without evidence of decompensated heart failure or significant volume overload at this time.  - Monitor volume status with daily weights.  - Continue current medication regimen.  - Patient uses torsemide as needed for weight gain and reportedly takes it approximately once weekly as an outpatient.  - Outpatient follow-up per routine.        Mild cognitive impairment of uncertain or unknown etiology  - Patient with chronic mild cognitive impairment.  - Maintain delirium precautions.  - Geriatric medicine evaluation, recommendations and assistance with management appreciated.  Spinal cord compression (HCC)  - Patient with spinal cord compression related to traumatic injury, present on presentation.  - Management as outlined under closed odontoid fracture.  Acute pain due to trauma  - Acute pain secondary to traumatic injuries.  - Continue multimodal analgesic regimen.  - Bowel regimen as long as using opioids.  - Continue to monitor pain and adjust regimen as indicated.    Bowel Regimen: Dulcolax, Colace and senna.  VTE Prophylaxis:VTE covered by:  enoxaparin, Subcutaneous, 30 mg at 09/10/24 0907     and Enoxaparin (Lovenox)     Disposition: Continue current level of care.  Anticipate discharge to postacute care facility for rehab.  Continue therapy evaluation " "and treatment as indicated.  Case management following for disposition planning.    History of Present Illness   \"I feel pretty well this morning.\"    24 Hour Events : Patient notes she is doing well this morning.  She was able to get sleep overnight.  She currently has no pain and notes that her pain has been well-controlled.  She is tolerating oral intake without nausea, vomiting or difficulty swallowing.  She denies any numbness/weakness/tingling in her extremities.    Objective      Temp:  [97.5 °F (36.4 °C)-98.7 °F (37.1 °C)] 97.5 °F (36.4 °C)  HR:  [] 89  Resp:  [16] 16  BP: (125-146)/(69-87) 146/87  O2 Device: None (Room air)          I/O         09/08 0701 09/09 0700 09/09 0701  09/10 0700 09/10 0701 09/11 0700    P.O. 240 420 25    NG/GT  0     Total Intake(mL/kg) 240 (4.2) 420 (7.4) 25 (0.4)    Urine (mL/kg/hr) 250 (0.2) 1300 (1)     Drains 50      Stool  0     Total Output 300 1300     Net -60 -880 +25           Unmeasured Urine Occurrence 3 x 2 x     Unmeasured Stool Occurrence  2 x           Lines/Drains/Airways       Active Status       Name Placement date Placement time Site Days    External Urinary Catheter 09/07/24 2111  -- 2                  Physical Exam   GENERAL APPEARANCE: Patient in no acute distress.  HEENT: NCAT; EOMs intact; Mucous membranes moist  NECK / BACK: Cervical collar in place without any midline cervical spine tenderness this morning.  CV: Irregularly irregular rhythm with normal rate; no murmur/gallops/rubs appreciated.  CHEST / LUNGS: Clear to auscultation; no wheezes/rales/rhonci.  ABD: NABS; soft; non-distended; non-tender.  : Voiding spontaneously.  EXT: +2 pulses bilaterally upper & lower extremities; no edema.  NEURO: GCS 15; no focal neurologic deficits; neurovascularly intact.  SKIN: Warm, dry and well perfused; no rash; no jaundice.         Lab Results: I have reviewed the following results: CBC/BMP:   .     09/10/24  0601   SODIUM 139   K 4.0      CO2 " 30   BUN 12   CREATININE 0.54*   GLUC 90      Recent Labs     09/08/24  0604 09/09/24  0525 09/10/24  0601   WBC 8.07  --   --    HGB 12.3  --   --    HCT 39.7  --   --    *  --   --    SODIUM 141   < > 139   K 4.1   < > 4.0      < > 100   CO2 28   < > 30   BUN 14   < > 12   CREATININE 0.62   < > 0.54*   GLUC 88   < > 90   MG 2.2  --   --    PHOS 2.7  --   --     < > = values in this interval not displayed.       Imaging Review: Reviewed radiology reports from this admission including: CT head, CT C-spine, CT Angio of head & neck, MRI spine, and xray(s).  Other Studies: No additional pertinent studies reviewed.

## 2024-09-10 NOTE — PROCEDURES
Speech Pathology - Modified Barium Swallow Study    Patient Name: Marsha Oliva    Today's Date: 9/10/2024     Problem List  Principal Problem:    Closed odontoid fracture with type II morphology and posterior displacement (HCC)  Active Problems:    Atrial fibrillation, chronic (HCC)    B12 deficiency    Depression with anxiety    Essential hypertension    Hypothyroidism    Mixed hyperlipidemia    Severe mitral regurgitation    Fall    Hypertensive heart disease with heart failure (HCC)    Mild cognitive impairment of uncertain or unknown etiology    Spinal cord compression (HCC)    Acute pain due to trauma    At risk for delirium    Heart failure with preserved ejection fraction (HCC)    Past Medical History  Past Medical History:   Diagnosis Date    Disease of thyroid gland     Hypotension     Supratherapeutic INR 6/2/2022     Past Surgical History  Past Surgical History:   Procedure Laterality Date    CERVICAL FUSION Bilateral 9/5/2024    Procedure: navigated posterior fusion C1-C3;  Surgeon: Eduardo Dewitt MD;  Location: [facility];  Service: Neurosurgery    HYSTERECTOMY         Assessment Summary:    Pt presents with mild oral and moderate pharyngeal dysphagia characterized by reduced bolus control and transfer with premature spillage, delayed swallow initiation, reduced hyolaryngeal rise, reduced pharyngeal stripping wave and base of tongue retraction. Reduced muscle function resulted in significant pharyngeal/pyriform retention, leading to aspiration after swallow and during repeat swallows. Epiglottic inversion was absent and airway closure was incomplete. Aspiration occurred with thin and nectar thick liquid, with inconsistent cough response, often silent. Suspect dysphagia is exacerbated by C-spine collar and reduced ROM of neck. Note: Images are available for review in PACS as desired.    Recommendations:   Recommended Diet: puree/level 1 diet and honey thick liquids   Recommended Form of  Medications:  as tolerated    Aspiration precautions and compensatory swallowing strategies: upright posture, only feed when fully alert, slow rate of feeding, and small bites/sips  SLP Dysphagia therapy recommended: yes    Results Reviewed with: patient and MD         General Information;  Pt is a 79 y.o. female with a PMH remarkable for fall Status post bilateral C1 and C3 lateral mass screw placement, bilateral C2 pedicle screw placement, arthrodesis C1-C3 with autologous bone graft and Chay putty with use of neuronavigation and neuromonitoring on 9/5/2024. Current concerns for dysphagia include s/s aspiration at bedside. MBS was recommended to assess oropharyngeal stage swallowing skills at this time.       Pt was viewed sitting upright in the lateral and AP positions. Due to concerns for patient safety / patient refusal, trials provided deviated from the MBSImP Validated Protocol. Pt was given 5-mL thin liquid x2, 20-mL cup sip thin, 5-mL nectar thick, 20-mL cup sip nectar thick, 5-mL honey thick, 5-mL pudding, ½ cookie coated with 3-mL pudding, 5-mL nectar thick in the AP position, and 5-mL pudding in the AP position. Pt was also given HTL via cup and barium tablet with honey thick liquid.     Initial view observations/comments: Clear view of the upper airway and Hardware      8-Point Penetration-Aspiration Scale   Thin liquid 8 - Material enters the airway, passes below the vocal folds, and no effort is made to eject     Nectar thick liquid 8 - Material enters the airway, passes below the vocal folds, and no effort is made to eject     Honey thick liquid 2 - Material enters the airway, remains above the vocal folds, and is ejected  from the airway   Puree (pudding) 1 - Material does not enter the airway   Solid 1 - Material does not enter the airway     Strategies and Efficacy: C collar preventing postural strategies    Aspiration Response and Efficacy: Inconsistent, however silent aspiration did occur  with thin and nectar thick liquids    MBS IMP Rating    ORAL Impairment  Compinent 1--Lip Closure  Judged at any point during the swallow.  1 - Interlabial escape; no progression to anterior lip    Component 2--Tongue Control During Bolus Hold  Judged on held liquid boluses only and prior to productive tongue movement.   3 - Posterior escape of greater than half of bolus    Component 3--Bolus Preparation/Mastication  Judged only during presentation of 1/2 shortbread cookie coated in pudding.   1 - Slow prolonged chewing/mashing with complete re-collection    Component 4--Bolus Transport/Lingual Motion  Judged after first productive tongue movement for oral bolus transport.  1 - Delayed initiation of tongue motion    Component 5--Oral Residue  Judged after first swallow or after the last swallow of the sequential swallow task.  2 - Residue collection on oral structures   Location   A - Floor of Mouth, B - Palate, and C - Tongue    Component 6--Initiation of Pharyngeal Swallow  Judged at first movement of the brisk superior-anterior hyoid trajectory.  3 - Bolus head in pyriforms      PHARYNGEAL Impairment  Component 7--Soft Palate Elevation  Judged during maximum displacement of soft palate.  2 - Escape to the nasopharynx    Component 8--Laryngeal Elevation  Judged when epiglottis is in its most horizontal position.  1 - Partial superior movement of thyroid cartilage/partial approximation of arytenoids to epiglottic petiole    Component 9--Anterior Hyoid Excursion  Judged at height of swallow/maximal anterior hyoid displacement.  1 - Partial anterior movement    Component 10--Epiglottic Movement  Judged at height of swallow/maximal anterior hyoid displacement.  0 - Complete inversion    Component 11--Laryngeal Vestibular Closure  Judged at height of swallow/maximal anterior hyoid displacement.  1 - Incomplete; narrow column air/contrast in laryngeal vestibule    Component 12--Pharyngeal Stripping Wave  Judged during  the full duration of the pharyngeal swallow.  1 - Present - diminished    Component 13--Pharyngeal Contraction  Judged in AP view at rest and throughout maximum movement of structures.  0 - Complete    Component 14--Pharyngoesophageal Segment Opening  Judged during maximum distension of PES and throughout opening and closure.  1 - Partial distension/partial duration; partial obstruction to flow    Component 15--Tongue Base (TB) Retraction  Judged during maximum retraction of the tongue base.  3 - Wide column of contrast or air between TB and PW    Component 16--Pharyngeal Residue  Judged after first swallow or after the last swallow of the sequential swallow task.  2 - Collection of residue within or on pharyngeal structures   Location   F - Diffuse (>3 areas)      ESOPHAGEAL Impairment  Component 17--Esophageal Clearance Upright Position  Judged in AP view during bolus transit through the oral cavity to the LES  0 - Complete clearance; esophageal coating

## 2024-09-10 NOTE — PLAN OF CARE
Problem: PHYSICAL THERAPY ADULT  Goal: Performs mobility at highest level of function for planned discharge setting.  See evaluation for individualized goals.  Description: Treatment/Interventions: ADL retraining, Functional transfer training, LE strengthening/ROM, Elevations, Endurance training, Therapeutic exercise, Bed mobility, Gait training, OT, Spoke to nursing          See flowsheet documentation for full assessment, interventions and recommendations.  Outcome: Progressing  Note: Prognosis: Fair  Problem List: Decreased strength, Decreased endurance, Impaired balance, Decreased mobility, Orthopedic restrictions, Pain  Assessment: The patient continues to require assistance for all mobility, but she is very motivated to work with therapy. She does have a retropulsive gait with moderate knee flexion throughout gait. She had no overt loss of balance, but continued assistance is necessary for posture and balance. Onset of fatigue is rapid with an inability to ambulate beyond a few feet. She does recover with rests throughout the session. She will benefit from continued therapies in order to maximize her functional mobility and safety.  Barriers to Discharge: Inaccessible home environment, Decreased caregiver support     Rehab Resource Intensity Level, PT: II (Moderate Resource Intensity)    See flowsheet documentation for full assessment.

## 2024-09-10 NOTE — PLAN OF CARE
Problem: Potential for Falls  Goal: Patient will remain free of falls  Description: INTERVENTIONS:  - Educate patient/family on patient safety including physical limitations  - Instruct patient to call for assistance with activity   - Consult OT/PT to assist with strengthening/mobility   - Keep Call bell within reach  - Keep bed low and locked with side rails adjusted as appropriate  - Keep care items and personal belongings within reach  - Initiate and maintain comfort rounds  - Make Fall Risk Sign visible to staff  - Offer Toileting every 2 Hours, in advance of need  - Initiate/Maintain alarm  - Obtain necessary fall risk management equipment:   - Apply yellow socks and bracelet for high fall risk patients  - Consider moving patient to room near nurses station  Outcome: Progressing     Problem: Prexisting or High Potential for Compromised Skin Integrity  Goal: Skin integrity is maintained or improved  Description: INTERVENTIONS:  - Identify patients at risk for skin breakdown  - Assess and monitor skin integrity  - Assess and monitor nutrition and hydration status  - Monitor labs   - Assess for incontinence   - Turn and reposition patient  - Assist with mobility/ambulation  - Relieve pressure over bony prominences  - Avoid friction and shearing  - Provide appropriate hygiene as needed including keeping skin clean and dry  - Evaluate need for skin moisturizer/barrier cream  - Collaborate with interdisciplinary team   - Patient/family teaching  - Consider wound care consult   Outcome: Progressing     Problem: PAIN - ADULT  Goal: Verbalizes/displays adequate comfort level or baseline comfort level  Description: Interventions:  - Encourage patient to monitor pain and request assistance  - Assess pain using appropriate pain scale  - Administer analgesics based on type and severity of pain and evaluate response  - Implement non-pharmacological measures as appropriate and evaluate response  - Consider cultural and  social influences on pain and pain management  - Notify physician/advanced practitioner if interventions unsuccessful or patient reports new pain  Outcome: Progressing     Problem: DISCHARGE PLANNING  Goal: Discharge to home or other facility with appropriate resources  Description: INTERVENTIONS:  - Identify barriers to discharge w/patient and caregiver  - Arrange for needed discharge resources and transportation as appropriate  - Identify discharge learning needs (meds, wound care, etc.)  - Arrange for interpretive services to assist at discharge as needed  - Refer to Case Management Department for coordinating discharge planning if the patient needs post-hospital services based on physician/advanced practitioner order or complex needs related to functional status, cognitive ability, or social support system  Outcome: Progressing     Problem: Knowledge Deficit  Goal: Patient/family/caregiver demonstrates understanding of disease process, treatment plan, medications, and discharge instructions  Description: Complete learning assessment and assess knowledge base.  Interventions:  - Provide teaching at level of understanding  - Provide teaching via preferred learning methods  Outcome: Progressing     Problem: MUSCULOSKELETAL - ADULT  Goal: Maintain or return mobility to safest level of function  Description: INTERVENTIONS:  - Assess patient's ability to carry out ADLs; assess patient's baseline for ADL function and identify physical deficits which impact ability to perform ADLs (bathing, care of mouth/teeth, toileting, grooming, dressing, etc.)  - Assess/evaluate cause of self-care deficits   - Assess range of motion  - Assess patient's mobility  - Assess patient's need for assistive devices and provide as appropriate  - Encourage maximum independence but intervene and supervise when necessary  - Involve family in performance of ADLs  - Assess for home care needs following discharge   - Consider OT consult to assist  with ADL evaluation and planning for discharge  - Provide patient education as appropriate  Outcome: Progressing  Goal: Maintain proper alignment of affected body part  Description: INTERVENTIONS:  - Support, maintain and protect limb and body alignment  - Provide patient/ family with appropriate education  Outcome: Progressing     Problem: Nutrition/Hydration-ADULT  Goal: Nutrient/Hydration intake appropriate for improving, restoring or maintaining nutritional needs  Description: Monitor and assess patient's nutrition/hydration status for malnutrition. Collaborate with interdisciplinary team and initiate plan and interventions as ordered.  Monitor patient's weight and dietary intake as ordered or per policy. Utilize nutrition screening tool and intervene as necessary. Determine patient's food preferences and provide high-protein, high-caloric foods as appropriate.     INTERVENTIONS:  - Monitor oral intake, urinary output, labs, and treatment plans  - Assess nutrition and hydration status and recommend course of action  - Evaluate amount of meals eaten  - Assist patient with eating if necessary   - Allow adequate time for meals  - Recommend/ encourage appropriate diets, oral nutritional supplements, and vitamin/mineral supplements  - Order, calculate, and assess calorie counts as needed  - Recommend, monitor, and adjust tube feedings and TPN/PPN based on assessed needs  - Assess need for intravenous fluids  - Provide specific nutrition/hydration education as appropriate  - Include patient/family/caregiver in decisions related to nutrition  Outcome: Progressing

## 2024-09-10 NOTE — PLAN OF CARE
Problem: Potential for Falls  Goal: Patient will remain free of falls  Description: INTERVENTIONS:  - Educate patient/family on patient safety including physical limitations  - Instruct patient to call for assistance with activity   - Consult OT/PT to assist with strengthening/mobility   - Keep Call bell within reach  - Keep bed low and locked with side rails adjusted as appropriate  - Keep care items and personal belongings within reach  - Initiate and maintain comfort rounds  - Make Fall Risk Sign visible to staff  - Offer Toileting every 2 Hours, in advance of need  - Initiate/Maintain on alarm  - Obtain necessary fall risk management equipment:   - Apply yellow socks and bracelet for high fall risk patients  - Consider moving patient to room near nurses station  Outcome: Progressing     Problem: Prexisting or High Potential for Compromised Skin Integrity  Goal: Skin integrity is maintained or improved  Description: INTERVENTIONS:  - Identify patients at risk for skin breakdown  - Assess and monitor skin integrity  - Assess and monitor nutrition and hydration status  - Monitor labs   - Assess for incontinence   - Turn and reposition patient  - Assist with mobility/ambulation  - Relieve pressure over bony prominences  - Avoid friction and shearing  - Provide appropriate hygiene as needed including keeping skin clean and dry  - Evaluate need for skin moisturizer/barrier cream  - Collaborate with interdisciplinary team   - Patient/family teaching  - Consider wound care consult   Outcome: Progressing

## 2024-09-10 NOTE — ASSESSMENT & PLAN NOTE
Wt Readings from Last 3 Encounters:   09/10/24 56.7 kg (125 lb)   09/04/24 53.5 kg (118 lb)   05/22/24 57.6 kg (127 lb)

## 2024-09-10 NOTE — ARC ADMISSION
Reviewed updates - patient remains acute rehab appropriate, and is medically cleared for discharge pending insurance authorization and bed availability.    Insurance authorization to be initiated for inpatient acute rehab by [facility], and we await their determination at this time. CM has been updated. Will continue to follow patient's case.

## 2024-09-10 NOTE — PHYSICAL THERAPY NOTE
Physical Therapy Progress Note     09/10/24 0930   PT Last Visit   PT Visit Date 09/10/24   Note Type   Note Type Treatment   Pain Assessment   Pain Assessment Tool 0-10   Pain Score 3   Pain Location/Orientation Location: Neck   Hospital Pain Intervention(s) Repositioned;Ambulation/increased activity;Emotional support   Restrictions/Precautions   Braces or Orthoses C/S Collar   Other Precautions Chair Alarm;Bed Alarm;Spinal precautions;Pain;Fall Risk  (Alarm active post session.)   Subjective   Subjective The patient states that she continues to have difficulty walking, but she is eager to get moving.   Bed Mobility   Supine to Sit 3  Moderate assistance   Additional items Assist x 2;Increased time required;LE management;Verbal cues   Transfers   Sit to Stand 3  Moderate assistance  (Min-mod assist x 2 for second trial.)   Additional items Assist x 2;Increased time required;Verbal cues   Stand to Sit 3  Moderate assistance   Additional items Assist x 1;Increased time required;Verbal cues   Ambulation/Elevation   Gait pattern Knees flexed;Excessively slow;Step to;Short stride;Inconsistent anthony;Shuffling;Decreased foot clearance;Retropulsion;Improper Weight shift   Gait Assistance 3  Moderate assist   Additional items Assist x 1;Assist x 2;Verbal cues;Tactile cues   Assistive Device Rolling walker   Distance 12 feet.   Balance   Static Sitting Fair +   Dynamic Sitting Fair   Static Standing Poor +   Dynamic Standing Poor   Ambulatory Poor -   Activity Tolerance   Activity Tolerance Patient tolerated treatment well;Patient limited by fatigue;Patient limited by pain   Medical Staff Made Aware NILDA Mclaughlin; Anne GUTIERREZ/L.   Exercises   Knee AROM Long Arc Quad Sitting;5 reps;Bilateral;AROM  (x2 sets.)   Assessment   Prognosis Fair   Problem List Decreased strength;Decreased endurance;Impaired balance;Decreased mobility;Orthopedic restrictions;Pain   Assessment The patient continues to require assistance for all  mobility, but she is very motivated to work with therapy. She does have a retropulsive gait with moderate knee flexion throughout gait. She had no overt loss of balance, but continued assistance is necessary for posture and balance. Onset of fatigue is rapid with an inability to ambulate beyond a few feet. She does recover with rests throughout the session. She will benefit from continued therapies in order to maximize her functional mobility and safety.   Barriers to Discharge Inaccessible home environment;Decreased caregiver support   Goals   Patient Goals To get stronger.   LTG Expiration Date 09/16/24   PT Treatment Day 2   Plan   Treatment/Interventions Functional transfer training;LE strengthening/ROM;Therapeutic exercise;Endurance training;Patient/family training;Bed mobility;Gait training   Progress Progressing toward goals   PT Frequency 2-3x/wk   Discharge Recommendation   Rehab Resource Intensity Level, PT II (Moderate Resource Intensity)   AM-PAC Basic Mobility Inpatient   Turning in Flat Bed Without Bedrails 3   Lying on Back to Sitting on Edge of Flat Bed Without Bedrails 2   Moving Bed to Chair 2   Standing Up From Chair Using Arms 2   Walk in Room 1   Climb 3-5 Stairs With Railing 1   Basic Mobility Inpatient Raw Score 11   Basic Mobility Standardized Score 30.25   [facility] Highest Level Of Mobility   -[facility] Goal 4: Move to chair/commode   -HLM Achieved 6: Walk 10 steps or more         An AM-PAC Basic Mobility raw score less than 16 suggests the patient may benefit from discharge to post-acute rehab services.    Av Antonio, PTA

## 2024-09-10 NOTE — ASSESSMENT & PLAN NOTE
Wt Readings from Last 3 Encounters:   09/10/24 56.7 kg (125 lb)   09/04/24 53.5 kg (118 lb)   05/22/24 57.6 kg (127 lb)     - Patient with chronic history of hypertensive heart disease with history of heart failure without evidence of decompensated heart failure or significant volume overload at this time.  - Monitor volume status with daily weights.  - Continue current medication regimen.  - Patient uses torsemide as needed for weight gain and reportedly takes it approximately once weekly as an outpatient.  - Outpatient follow-up per routine.

## 2024-09-10 NOTE — ASSESSMENT & PLAN NOTE
- Continue current medication regimen, including levothyroxine.   - Levothyroxine dose recently decreased to 125 mcg daily from 137 mcg daily; continue new dose.  - Outpatient follow-up per routine.

## 2024-09-10 NOTE — ASSESSMENT & PLAN NOTE
- Patient with chronic mild cognitive impairment.  - Maintain delirium precautions.  - Geriatric medicine evaluation, recommendations and assistance with management appreciated.

## 2024-09-10 NOTE — RESTORATIVE TECHNICIAN NOTE
Restorative Technician Note      Patient Name: Marsha Oliva     Restorative Tech Visit Date: 09/10/24  Note Type: Mobility  Patient Position Upon Consult: Bedside chair  Activity Performed: Ambulated (to stretcher for transport)  Assistive Device: Roller walker (Ax2)  Brace Applied: Aspen Stewart Collar Set  Patient Position When Brace Applied: Seated  Education Provided: Yes  Patient Position at End of Consult: Supine; All needs within reach (Left in the care of central transport)  Nurse Communication: Nurse aware of consult, application of brace    CANDY Whelan

## 2024-09-11 ENCOUNTER — HOSPITAL ENCOUNTER (INPATIENT)
Facility: HOSPITAL | Age: 79
LOS: 26 days | Discharge: HOME HEALTH | DRG: 559 | End: 2024-10-07
Attending: FAMILY MEDICINE | Admitting: FAMILY MEDICINE
Payer: COMMERCIAL

## 2024-09-11 VITALS
SYSTOLIC BLOOD PRESSURE: 136 MMHG | OXYGEN SATURATION: 94 % | WEIGHT: 118.61 LBS | HEIGHT: 63 IN | TEMPERATURE: 98.4 F | HEART RATE: 92 BPM | BODY MASS INDEX: 21.02 KG/M2 | RESPIRATION RATE: 16 BRPM | DIASTOLIC BLOOD PRESSURE: 84 MMHG

## 2024-09-11 DIAGNOSIS — I48.91 ATRIAL FIBRILLATION WITH RAPID VENTRICULAR RESPONSE (HCC): ICD-10-CM

## 2024-09-11 DIAGNOSIS — K13.79 SORE MOUTH: ICD-10-CM

## 2024-09-11 DIAGNOSIS — Z51.89 ENCOUNTER FOR REHABILITATION: ICD-10-CM

## 2024-09-11 DIAGNOSIS — E53.8 FOLATE DEFICIENCY: ICD-10-CM

## 2024-09-11 DIAGNOSIS — Z91.89 AT RISK FOR ALTERED BOWEL ELIMINATION: ICD-10-CM

## 2024-09-11 DIAGNOSIS — J30.9 ALLERGIC RHINITIS: ICD-10-CM

## 2024-09-11 DIAGNOSIS — L84 CALLUS BETWEEN TOES: ICD-10-CM

## 2024-09-11 DIAGNOSIS — I48.20 ATRIAL FIBRILLATION, CHRONIC (HCC): ICD-10-CM

## 2024-09-11 DIAGNOSIS — Z78.9 IMPAIRED MOBILITY AND ACTIVITIES OF DAILY LIVING: ICD-10-CM

## 2024-09-11 DIAGNOSIS — S12.111A CLOSED ODONTOID FRACTURE WITH TYPE II MORPHOLOGY AND POSTERIOR DISPLACEMENT, INITIAL ENCOUNTER (HCC): ICD-10-CM

## 2024-09-11 DIAGNOSIS — E83.42 HYPOMAGNESEMIA: ICD-10-CM

## 2024-09-11 DIAGNOSIS — E53.8 B12 DEFICIENCY: ICD-10-CM

## 2024-09-11 DIAGNOSIS — S12.111K: ICD-10-CM

## 2024-09-11 DIAGNOSIS — E03.9 HYPOTHYROIDISM, UNSPECIFIED TYPE: ICD-10-CM

## 2024-09-11 DIAGNOSIS — E78.2 MIXED HYPERLIPIDEMIA: ICD-10-CM

## 2024-09-11 DIAGNOSIS — J06.9 UPPER RESPIRATORY INFECTION WITH COUGH AND CONGESTION: ICD-10-CM

## 2024-09-11 DIAGNOSIS — I10 ESSENTIAL HYPERTENSION: ICD-10-CM

## 2024-09-11 DIAGNOSIS — Z98.1 S/P CERVICAL SPINAL FUSION: ICD-10-CM

## 2024-09-11 DIAGNOSIS — R13.12 OROPHARYNGEAL DYSPHAGIA: ICD-10-CM

## 2024-09-11 DIAGNOSIS — Z74.09 IMPAIRED MOBILITY AND ACTIVITIES OF DAILY LIVING: ICD-10-CM

## 2024-09-11 DIAGNOSIS — G95.20 SPINAL CORD COMPRESSION (HCC): Primary | ICD-10-CM

## 2024-09-11 DIAGNOSIS — I34.0 NONRHEUMATIC MITRAL VALVE REGURGITATION: ICD-10-CM

## 2024-09-11 DIAGNOSIS — F41.8 DEPRESSION WITH ANXIETY: ICD-10-CM

## 2024-09-11 LAB
PLATELET # BLD AUTO: 196 THOUSANDS/UL (ref 149–390)
PMV BLD AUTO: 9.1 FL (ref 8.9–12.7)

## 2024-09-11 PROCEDURE — 92526 ORAL FUNCTION THERAPY: CPT

## 2024-09-11 PROCEDURE — 99238 HOSP IP/OBS DSCHRG MGMT 30/<: CPT | Performed by: PHYSICIAN ASSISTANT

## 2024-09-11 PROCEDURE — 85049 AUTOMATED PLATELET COUNT: CPT

## 2024-09-11 PROCEDURE — NC001 PR NO CHARGE: Performed by: PHYSICAL MEDICINE & REHABILITATION

## 2024-09-11 PROCEDURE — 87081 CULTURE SCREEN ONLY: CPT | Performed by: FAMILY MEDICINE

## 2024-09-11 PROCEDURE — NC001 PR NO CHARGE: Performed by: STUDENT IN AN ORGANIZED HEALTH CARE EDUCATION/TRAINING PROGRAM

## 2024-09-11 RX ORDER — CALCIUM CARBONATE 500 MG/1
1000 TABLET, CHEWABLE ORAL DAILY PRN
Status: DISCONTINUED | OUTPATIENT
Start: 2024-09-11 | End: 2024-10-07 | Stop reason: HOSPADM

## 2024-09-11 RX ORDER — OXYCODONE HYDROCHLORIDE 5 MG/1
5 TABLET ORAL EVERY 4 HOURS PRN
Status: DISCONTINUED | OUTPATIENT
Start: 2024-09-11 | End: 2024-10-07 | Stop reason: HOSPADM

## 2024-09-11 RX ORDER — CHLORHEXIDINE GLUCONATE ORAL RINSE 1.2 MG/ML
15 SOLUTION DENTAL EVERY 12 HOURS SCHEDULED
Status: DISCONTINUED | OUTPATIENT
Start: 2024-09-11 | End: 2024-09-23

## 2024-09-11 RX ORDER — ONDANSETRON 4 MG/1
4 TABLET, ORALLY DISINTEGRATING ORAL EVERY 6 HOURS PRN
Status: DISCONTINUED | OUTPATIENT
Start: 2024-09-11 | End: 2024-10-07 | Stop reason: HOSPADM

## 2024-09-11 RX ORDER — DIGOXIN 125 MCG
125 TABLET ORAL EVERY OTHER DAY
Status: DISCONTINUED | OUTPATIENT
Start: 2024-09-11 | End: 2024-10-07 | Stop reason: HOSPADM

## 2024-09-11 RX ORDER — ACETAMINOPHEN 325 MG/1
975 TABLET ORAL EVERY 8 HOURS SCHEDULED
Status: DISCONTINUED | OUTPATIENT
Start: 2024-09-11 | End: 2024-10-07 | Stop reason: HOSPADM

## 2024-09-11 RX ORDER — ATORVASTATIN CALCIUM 20 MG/1
20 TABLET, FILM COATED ORAL
Status: DISCONTINUED | OUTPATIENT
Start: 2024-09-11 | End: 2024-10-07 | Stop reason: HOSPADM

## 2024-09-11 RX ORDER — POLYETHYLENE GLYCOL 3350 17 G/17G
17 POWDER, FOR SOLUTION ORAL DAILY PRN
Status: DISCONTINUED | OUTPATIENT
Start: 2024-09-11 | End: 2024-09-16

## 2024-09-11 RX ORDER — TORSEMIDE 20 MG/1
20 TABLET ORAL DAILY PRN
Status: DISCONTINUED | OUTPATIENT
Start: 2024-09-11 | End: 2024-09-25

## 2024-09-11 RX ORDER — CALCIUM CARBONATE 500 MG/1
1000 TABLET, CHEWABLE ORAL DAILY PRN
Status: ON HOLD
Start: 2024-09-11

## 2024-09-11 RX ORDER — FOLIC ACID 1 MG/1
1000 TABLET ORAL DAILY
Status: DISCONTINUED | OUTPATIENT
Start: 2024-09-11 | End: 2024-10-07 | Stop reason: HOSPADM

## 2024-09-11 RX ORDER — BISACODYL 10 MG
10 SUPPOSITORY, RECTAL RECTAL DAILY PRN
Status: DISCONTINUED | OUTPATIENT
Start: 2024-09-11 | End: 2024-09-12

## 2024-09-11 RX ORDER — ACETAMINOPHEN 325 MG/1
975 TABLET ORAL EVERY 8 HOURS SCHEDULED
Status: ON HOLD
Start: 2024-09-11

## 2024-09-11 RX ORDER — BISACODYL 10 MG
10 SUPPOSITORY, RECTAL RECTAL DAILY PRN
Status: ON HOLD
Start: 2024-09-11

## 2024-09-11 RX ORDER — UREA 10 %
250 LOTION (ML) TOPICAL 2 TIMES DAILY
Status: DISCONTINUED | OUTPATIENT
Start: 2024-09-11 | End: 2024-10-07 | Stop reason: HOSPADM

## 2024-09-11 RX ORDER — SENNOSIDES 8.8 MG/5ML
8.8 LIQUID ORAL 2 TIMES DAILY
Status: ON HOLD
Start: 2024-09-11

## 2024-09-11 RX ORDER — SERTRALINE HYDROCHLORIDE 100 MG/1
100 TABLET, FILM COATED ORAL DAILY
Status: DISCONTINUED | OUTPATIENT
Start: 2024-09-12 | End: 2024-10-07 | Stop reason: HOSPADM

## 2024-09-11 RX ORDER — SENNOSIDES 8.8 MG/5ML
8.8 LIQUID ORAL 2 TIMES DAILY
Status: DISCONTINUED | OUTPATIENT
Start: 2024-09-11 | End: 2024-09-12

## 2024-09-11 RX ORDER — ENOXAPARIN SODIUM 100 MG/ML
30 INJECTION SUBCUTANEOUS EVERY 12 HOURS SCHEDULED
Status: DISCONTINUED | OUTPATIENT
Start: 2024-09-11 | End: 2024-09-12

## 2024-09-11 RX ORDER — DILTIAZEM HYDROCHLORIDE 180 MG/1
180 CAPSULE, COATED, EXTENDED RELEASE ORAL DAILY
Status: DISCONTINUED | OUTPATIENT
Start: 2024-09-12 | End: 2024-10-07 | Stop reason: HOSPADM

## 2024-09-11 RX ADMIN — LEVOTHYROXINE SODIUM 125 MCG: 125 TABLET ORAL at 09:36

## 2024-09-11 RX ADMIN — DOCUSATE SODIUM LIQUID 100 MG: 50 LIQUID ORAL at 09:37

## 2024-09-11 RX ADMIN — ENOXAPARIN SODIUM 30 MG: 30 INJECTION SUBCUTANEOUS at 20:43

## 2024-09-11 RX ADMIN — SENNOSIDES 8.8 MG: 8.8 LIQUID ORAL at 09:37

## 2024-09-11 RX ADMIN — Medication 250 MG: at 17:47

## 2024-09-11 RX ADMIN — ATORVASTATIN CALCIUM 20 MG: 20 TABLET, FILM COATED ORAL at 17:50

## 2024-09-11 RX ADMIN — ENOXAPARIN SODIUM 30 MG: 30 INJECTION SUBCUTANEOUS at 09:36

## 2024-09-11 RX ADMIN — FOLIC ACID 1000 MCG: 1 TABLET ORAL at 17:47

## 2024-09-11 RX ADMIN — SERTRALINE HYDROCHLORIDE 100 MG: 100 TABLET ORAL at 09:35

## 2024-09-11 RX ADMIN — ACETAMINOPHEN 975 MG: 325 TABLET ORAL at 09:35

## 2024-09-11 RX ADMIN — CYANOCOBALAMIN TAB 500 MCG 1000 MCG: 500 TAB at 17:47

## 2024-09-11 RX ADMIN — CHLORHEXIDINE GLUCONATE 0.12% ORAL RINSE 15 ML: 1.2 LIQUID ORAL at 09:36

## 2024-09-11 RX ADMIN — ACETAMINOPHEN 975 MG: 325 TABLET ORAL at 17:47

## 2024-09-11 RX ADMIN — DIGOXIN 125 MCG: 125 TABLET ORAL at 17:47

## 2024-09-11 RX ADMIN — SENNOSIDES 8.8 MG: 8.8 LIQUID ORAL at 17:48

## 2024-09-11 RX ADMIN — DILTIAZEM HYDROCHLORIDE 180 MG: 180 CAPSULE, COATED, EXTENDED RELEASE ORAL at 09:36

## 2024-09-11 RX ADMIN — CHLORHEXIDINE GLUCONATE 15 ML: 1.2 RINSE ORAL at 20:43

## 2024-09-11 NOTE — PLAN OF CARE
Problem: Potential for Falls  Goal: Patient will remain free of falls  Description: INTERVENTIONS:  - Educate patient/family on patient safety including physical limitations  - Instruct patient to call for assistance with activity   - Consult OT/PT to assist with strengthening/mobility   - Keep Call bell within reach  - Keep bed low and locked with side rails adjusted as appropriate  - Keep care items and personal belongings within reach  - Initiate and maintain comfort rounds  - Make Fall Risk Sign visible to staff  - Offer Toileting every  Hours, in advance of need  - Initiate/Maintain alarm  - Obtain necessary fall risk management equipment:   Problem: PAIN - ADULT  Goal: Verbalizes/displays adequate comfort level or baseline comfort level  Description: Interventions:  - Encourage patient to monitor pain and request assistance  - Assess pain using appropriate pain scale  - Administer analgesics based on type and severity of pain and evaluate response  - Implement non-pharmacological measures as appropriate and evaluate response  - Consider cultural and social influences on pain and pain management  - Notify physician/advanced practitioner if interventions unsuccessful or patient reports new pain  Outcome: Progressing     - Apply yellow socks and bracelet for high fall risk patients  - Consider moving patient to room near nurses station  Outcome: Progressing

## 2024-09-11 NOTE — H&P
ANAHI Oliva#  :1945 F  MRN:[id number]    CSN:[id number]  Adm Date: 2024 1531  3:31 PM   ATT PHY: Justus Brown Md  [facility]         Chief Complaint:  Cervical spinal cord compression      History of Presenting Illness: Marsha Oliva is a(n) 79 y.o. year old female who is admitted to [facility].  Patient originally presented to [facility] ED  with chief complaint of weakness and neck pain s/p fall 4-6 weeks prior. CT C-spine showed Type II odontoid fracture with posterior displacement including prominent posterior displacement of C1 with respect to the C2 vertebral body causing a component of cord compression of the medullary cervical spinal cord.  CT head negative.  Patient transferred to [facility] for neurosurgery consult.  MRI confirmed need for OR.  S/p  B/L C1 and C3 lateral mass screw placement, B/L C2 pedicle screw placement, arthrodesis C1-3 with autogolous bone graft and [place] putty.  Postop x-ray showed good hardware placement and alignment. Hemovac was discontinued .  Vista collar at all times x 6 weeks.  Patient evaluated by SLP for dysphagia.  Patient on dysphagia 1 pureed diet with honey thick liquids.  No other complications.  PT OT consulted and recommended patient for acute inpatient rehab.    Patient examined at bedside.  Patient is doing well.  Denies current pain.  States it comes and goes.  Continues to have weakness in lower extremities.  Denies dizziness, shortness of breath, chest pain, palpitations.  Last BM two days ago.  Denies constipation, diarrhea, or urinary symptoms.  Does wear briefs in case of accidents.     No Known Allergies    Current Facility-Administered Medications on File Prior to Encounter   Medication Dose Route Frequency Provider Last Rate Last Admin    [DISCONTINUED] acetaminophen (TYLENOL) tablet 975 mg  975 mg Oral Q8H [facility] Jamir Strauss PA-C   975 mg at 24 0907     [DISCONTINUED] atorvastatin (LIPITOR) tablet 20 mg  20 mg Oral Daily With Dinner Jamir Strauss PA-C   20 mg at 09/10/24 1715    [DISCONTINUED] bisacodyl (DULCOLAX) rectal suppository 10 mg  10 mg Rectal Daily Ana Chau PA-C   10 mg at 09/09/24 1402    [DISCONTINUED] calcium carbonate (TUMS) chewable tablet 1,000 mg  1,000 mg Oral Daily PRN Jamir Strauss PA-C        [DISCONTINUED] chlorhexidine (PERIDEX) 0.12 % oral rinse 15 mL  15 mL Mouth/Throat Q12H [facility] Jamir Strauss PA-C   15 mL at 09/11/24 0936    [DISCONTINUED] digoxin (LANOXIN) tablet 125 mcg  125 mcg Oral Every Other Day Jamir Strauss PA-C   125 mcg at 09/10/24 0906    [DISCONTINUED] diltiazem (CARDIZEM CD) 24 hr capsule 180 mg  180 mg Oral Daily Jamir Strauss PA-C   180 mg at 09/11/24 0936    [DISCONTINUED] docusate (COLACE) oral liquid 100 mg  100 mg Oral BID JAQUELINE Cartagena   100 mg at 09/11/24 0937    [DISCONTINUED] enoxaparin (LOVENOX) subcutaneous injection 30 mg  30 mg Subcutaneous Q12H [facility] Ana Chau PA-C   30 mg at 09/11/24 0936    [DISCONTINUED] levothyroxine tablet 125 mcg  125 mcg Oral Early Morning JAQUELINE Caballero   125 mcg at 09/11/24 0936    [DISCONTINUED] ondansetron (ZOFRAN) injection 4 mg  4 mg Intravenous Q6H PRN Jamir Strauss PA-C        [DISCONTINUED] oxyCODONE (ROXICODONE) IR tablet 5 mg  5 mg Oral Q4H PRN Jamir Strauss PA-C   5 mg at 09/08/24 2030    [DISCONTINUED] oxyCODONE (ROXICODONE) split tablet 2.5 mg  2.5 mg Oral Q4H PRN Jamir Strauss PA-C        [DISCONTINUED] senna oral syrup 8.8 mg  8.8 mg Oral BID JAQUELINE Cartagena   8.8 mg at 09/11/24 0937    [DISCONTINUED] sertraline (ZOLOFT) tablet 100 mg  100 mg Oral Daily Jamir Strauss PA-C   100 mg at 09/11/24 0959     Current Outpatient Medications on File Prior to Encounter   Medication Sig Dispense Refill    acetaminophen (TYLENOL) 325 mg tablet Take 3 tablets (975 mg total) by mouth every 8 (eight) hours      atorvastatin (LIPITOR) 20 mg  tablet       atorvastatin (LIPITOR) 40 mg tablet Take 1 tablet (40 mg total) by mouth daily with dinner (Patient taking differently: Take 40 mg by mouth daily with dinner Pt taking 20mg daily) 30 tablet 0    bisacodyl (DULCOLAX) 10 mg suppository Insert 1 suppository (10 mg total) into the rectum daily as needed for constipation      calcium carbonate (TUMS) 500 mg chewable tablet Chew 2 tablets (1,000 mg total) daily as needed for indigestion or heartburn      chlorhexidine (HIBICLENS) 4 % external liquid Apply 1 Application topically daily as needed for wound care For hand washing with wound care for MRSA positive wound. 236 mL 1    cyanocobalamin (VITAMIN B-12) 1000 MCG tablet Take 1,000 mcg by mouth once a week. Indications: Inadequate Vitamin B12      cyanocobalamin 1,000 mcg/mL 1 mL jPt is not taking      digoxin (LANOXIN) 0.125 mg tablet Take 1 tablet (125 mcg total) by mouth every other day 30 tablet 0    diltiazem (CARDIZEM CD) 180 mg 24 hr capsule Take 1 capsule (180 mg total) by mouth daily 30 capsule 2    docusate (COLACE) 50 mg/5 mL liquid Take 10 mL (100 mg total) by mouth 2 (two) times a day      folic acid (FOLVITE) 1 mg tablet Take 1 tablet by mouth daily      levothyroxine 137 mcg tablet Take 137 mcg by mouth daily      Magnesium Gluconate 550 MG TABS Take 30 mg by mouth 2 (two) times a day Pt taking 250mg daily      oxygen gas Inhale 2 L/min if needed (shortness of breath). Indications: Shortness of breath      potassium chloride (K-DUR,KLOR-CON) 20 mEq tablet Take 2 tablets by mouth daily Pt is not  taking      Sennosides (senna) 8.8 mg/5 mL oral syrup Take 5 mL (8.8 mg total) by mouth 2 (two) times a day      sertraline (ZOLOFT) 100 mg tablet Take 1 tablet by mouth daily      torsemide (DEMADEX) 20 mg tablet Take 1 tablet (20 mg total) by mouth daily as needed (weight gain / leg swelling) 30 tablet 0    [DISCONTINUED] Aspirin 81 MG CAPS Take 81 mg by mouth in the morning. Indications: Disease  involving Lipid Deposits in the Arteries      [DISCONTINUED] pantoprazole (PROTONIX) 40 mg tablet Take 1 tablet (40 mg total) by mouth 2 (two) times a day (Patient not taking: Reported on 7/18/2022) 60 tablet 0       Active Ambulatory Problems     Diagnosis Date Noted    Acquired bilateral hammer toes 07/27/2018    Atrial fibrillation, chronic ([facility]) 05/01/2018    B12 deficiency 01/13/2017    Chronic diastolic CHF (congestive heart failure) ([facility]) 01/13/2017    Depression with anxiety 08/03/2015    Essential hypertension 03/23/2010    Hypothyroidism 03/23/2010    Impaired fasting glucose 05/31/2012    Lymphedema of both lower extremities 07/02/2018    Mixed hyperlipidemia 08/03/2015    Severe mitral regurgitation 05/31/2018    Posttraumatic stress disorder 03/23/2010    Osteoarthritis of knee 10/23/2012    Venous insufficiency of both lower extremities 12/17/2021    Folate deficiency 01/13/2017    Vitamin D deficiency 06/03/2016    Hypoxia 12/23/2021    Frequent falls 12/23/2021    Elevated d-dimer 12/23/2021    Elevated troponin 06/02/2022    EKG abnormalities 06/02/2022    ILD (interstitial lung disease) ([facility]) 07/18/2022    Oropharyngeal aspiration 07/18/2022    Stage 3a chronic kidney disease ([facility]) 08/12/2022    Bilateral hydronephrosis 08/12/2022    Proctitis 08/12/2022    Fall 08/12/2022    Hypokalemia 08/12/2022    Anemia 05/13/2018    Apraxia 12/28/2021    Cardiomegaly 05/13/2018    Wound of right leg 05/13/2018    Chronic respiratory failure with hypoxia ([facility]) 03/09/2023    Constipation, unspecified 12/28/2021    Corn or callus 12/28/2021    Difficulty in walking, not elsewhere classified 12/28/2021    Hormone replacement therapy 01/05/2022    Generalized muscle weakness 12/28/2021    Hypertensive heart disease with heart failure ([facility]) 05/13/2018    Lymphedema, not elsewhere classified 12/28/2021    Major depressive disorder, single episode, unspecified 05/13/2018    Mild cognitive impairment of uncertain or  unknown etiology 01/20/2022    Personal history of nicotine dependence 05/13/2018    Pleural effusion, not elsewhere classified 05/13/2018    Repeated falls 12/28/2021    Solitary pulmonary nodule 05/13/2018    Closed odontoid fracture with type II morphology and posterior displacement ([facility]) 09/04/2024    Spinal cord compression ([facility]) 09/04/2024    Acute pain due to trauma 09/06/2024    At risk for delirium 09/06/2024    Heart failure with preserved ejection fraction (HCC) 09/06/2024     Resolved Ambulatory Problems     Diagnosis Date Noted    Acute renal failure superimposed on stage 3 chronic kidney disease (HCC) 06/02/2022    ABLA (acute blood loss anemia) 06/02/2022    Supratherapeutic INR 06/02/2022    Severe sepsis ([facility]) 06/02/2022    Atrial fibrillation with RVR ([facility]) 08/12/2022     Past Medical History:   Diagnosis Date    Disease of thyroid gland     Hypotension        Past Surgical History:   Procedure Laterality Date    CERVICAL FUSION Bilateral 9/5/2024    Procedure: navigated posterior fusion C1-C3;  Surgeon: Eduardo Dewitt MD;  Location: [facility];  Service: Neurosurgery    HYSTERECTOMY         Social History:   Social History     Socioeconomic History    Marital status: /Civil Union     Spouse name: Not on file    Number of children: Not on file    Years of education: Not on file    Highest education level: Not on file   Occupational History    Not on file   Tobacco Use    Smoking status: Former     Types: Cigarettes    Smokeless tobacco: Never   Vaping Use    Vaping status: Never Used   Substance and Sexual Activity    Alcohol use: Never     Comment: occassional.    Drug use: Never    Sexual activity: Never   Other Topics Concern    Not on file   Social History Narrative    Not on file     Social Determinants of Health     Financial Resource Strain: Not on file   Food Insecurity: No Food Insecurity (9/5/2024)    Hunger Vital Sign     Worried About Running Out of Food in the Last Year:  Never true     Ran Out of Food in the Last Year: Never true   Transportation Needs: No Transportation Needs (9/5/2024)    PRAPARE - Transportation     Lack of Transportation (Medical): No     Lack of Transportation (Non-Medical): No   Physical Activity: Not on file   Stress: Not on file   Social Connections: Not on file   Intimate Partner Violence: Not on file   Housing Stability: Low Risk  (9/5/2024)    Housing Stability Vital Sign     Unable to Pay for Housing in the Last Year: No     Number of Times Moved in the Last Year: 0     Homeless in the Last Year: No       Family History: No family history on file.    Review of Systems   Constitutional:  Negative for chills, fatigue and fever.   HENT:  Positive for congestion.    Eyes: Negative.    Respiratory:  Negative for shortness of breath.    Cardiovascular:  Negative for chest pain and leg swelling.   Gastrointestinal:  Negative for abdominal pain, constipation, diarrhea and nausea.   Genitourinary: Negative.    Musculoskeletal: Negative.  Negative for arthralgias.   Skin: Negative.    Neurological:  Positive for weakness. Negative for dizziness, light-headedness, numbness and headaches.   Psychiatric/Behavioral: Negative.         Physical Exam   Vitals: There were no vitals taken for this visit.,There is no height or weight on file to calculate BMI.  Constitutional: Awake, alert, in no acute distress.  Head: Normocephalic and atraumatic.   Mouth/Throat: Oropharynx is clear and moist.    Eyes: Conjunctivae and EOM are normal.   Neck: Neck supple. No thyromegaly present.   Cardiovascular: Normal rate, regular rhythm and normal heart sounds.    Pulmonary/Chest: Effort normal and breath sounds normal.   Abdominal: Soft. Bowel sounds are normal. There is no tenderness. There is no rebound and no guarding.   Neurological: No focal deficits. Lower extremity weakness.   Musculoskeletal:  Nontender spine.  Skin: Skin is warm and dry. No edema.     Assessment     Marsha  Pj is a(n) 79 y.o. female with Cervical spinal cord compression    Cardiac hx w/ HTN, HLD, chronic diastolic heart failure, chronic Afib.  Continue digoxin 125 mcg every other day, diltiazem 180 mg daily, atorvastatin 40 mg daily.  Weekly weights.  Torsemide 20 mg daily as needed.   Hypothyroidism.  Continue levothyroxine 125 mcg daily (decr from 137 mcg 9/6).  B12 deficiency.  Patient on B12 1,000 mcg weekly.   Folate deficiency.  Patient on folic acid 1,000 mcg daily.   Hypomagnesemia.  Patient on magnesium gluconate 250 mg twice daily.   Depression and anxiety.   Patient on Zoloft 100 mg daily.  Declines neuropsych at this time.   Pain and bowel regimen.  As per physiatry.   Cervical spinal cord compression.  Vista collar at all times x 6 weeks.  Continue SQ Lovenox.  Patient receiving intensive PT OT ST as per physiatry.     Prognosis: Fair.    Discharge Plan: In progress.    Advanced Directives: I have discussed in detail with the patient the advanced directives. The patient does not have an appointed POA or living will. Emergency contact is spouse, Tomas, [phone].  When discussing cardiac and pulmonary resuscitation efforts with the patient, the patient wishes to be  FULL CODE.    I have spent more than 50 minutes gathering data, doing physical examination, and discussing the advanced directives, which was witnessed by caring staff.    The patient was discussed with Dr. Brown and he is in agreement with the above note.

## 2024-09-11 NOTE — ARC ADMISSION
Noted per DSC and CM that patient has been approved by insurance for inpatient acute rehab, with auth #: [id number].    Reviewed updates - patient remains acute rehab appropriate and medically stable for discharge to [facility]. She will admit to [facility] with transport time TBD per CM. Report can be called to (P): [phone]. CM has been updated.

## 2024-09-11 NOTE — CASE MANAGEMENT
[facility] has received APPROVED authorization.  Insurance:   alicia   Called in by Rep:parviz RODGERS#  [phone]  Authorization received for: Acute Rehab  Facility: [facility]   Authorization #:[id number]   Start of Care:9/11  Next Review Date: 2 business days   Continued Stay Care Coordinator:  n/a   Submit next review to: [phone]     Care Manager notified:will ana luisa     Please reach out to  for updates on any clinical information.

## 2024-09-11 NOTE — ASSESSMENT & PLAN NOTE
- Neurosurgery evaluation and recommendations appreciated.   - Status post bilateral C1 and C3 lateral mass screw placement, bilateral C2 pedicle screw placement, arthrodesis C1-C3 with autologous bone graft and Chay putty with use of neuronavigation and neuromonitoring on 9/5/2024.   - Hemovac drain discontinued on 9/9/2024.  - Bracing: Maintain cervical collar at all times.  - Spine precautions.  - Maintain aspiration precautions; speech therapy following for continued evaluation and treatment as indicated.  - VBS completed on 9/10/2024 with patient now recommended level 1 dysphagia diet/puréed diet with honey thickened liquids.  - Monitor neurovascular exam.  - Multimodal analgesic regimen as needed.  - Continue chemical DVT prophylaxis during inpatient encounter.  - Upright spine x-rays from 9/6/2024 reviewed.  - PT and OT evaluation and treatment as indicated.  - Outpatient follow up with Neurosurgery for re-evaluation.

## 2024-09-11 NOTE — CASE MANAGEMENT
Received call from mounika cuadra @ [facility] requesting a call back confirming that she would have overall support even if she were to go home on a wheelchair level . Mounika p:[phone]. Cm notified will ana luisa

## 2024-09-11 NOTE — PLAN OF CARE
Problem: Prexisting or High Potential for Compromised Skin Integrity  Goal: Skin integrity is maintained or improved  Description: INTERVENTIONS:  - Identify patients at risk for skin breakdown  - Assess and monitor skin integrity  - Assess and monitor nutrition and hydration status  - Monitor labs   - Assess for incontinence   - Turn and reposition patient  - Assist with mobility/ambulation  - Relieve pressure over bony prominences  - Avoid friction and shearing  - Provide appropriate hygiene as needed including keeping skin clean and dry  - Evaluate need for skin moisturizer/barrier cream  - Collaborate with interdisciplinary team   - Patient/family teaching  - Consider wound care consult   Outcome: Progressing     Problem: PAIN - ADULT  Goal: Verbalizes/displays adequate comfort level or baseline comfort level  Description: Interventions:  - Encourage patient to monitor pain and request assistance  - Assess pain using appropriate pain scale  - Administer analgesics based on type and severity of pain and evaluate response  - Implement non-pharmacological measures as appropriate and evaluate response  - Consider cultural and social influences on pain and pain management  - Notify physician/advanced practitioner if interventions unsuccessful or patient reports new pain  Outcome: Progressing     Problem: INFECTION - ADULT  Goal: Absence or prevention of progression during hospitalization  Description: INTERVENTIONS:  - Assess and monitor for signs and symptoms of infection  - Monitor lab/diagnostic results  - Monitor all insertion sites, i.e. indwelling lines, tubes, and drains  - Monitor endotracheal if appropriate and nasal secretions for changes in amount and color  - [place] appropriate cooling/warming therapies per order  - Administer medications as ordered  - Instruct and encourage patient and family to use good hand hygiene technique  - Identify and instruct in appropriate isolation precautions for  identified infection/condition  Outcome: Progressing     Problem: SAFETY ADULT  Goal: Patient will remain free of falls  Description: INTERVENTIONS:  - Educate patient/family on patient safety including physical limitations  - Instruct patient to call for assistance with activity   - Consult OT/PT to assist with strengthening/mobility   - Keep Call bell within reach  - Keep bed low and locked with side rails adjusted as appropriate  - Keep care items and personal belongings within reach  - Initiate and maintain comfort rounds  - Make Fall Risk Sign visible to staff  - Offer Toileting every 2 Hours, in advance of need  - Initiate/Maintain bed/chair alarm  - Apply yellow socks and bracelet for high fall risk patients  - Consider moving patient to room near nurses station  Outcome: Progressing     Problem: NEUROSENSORY - ADULT  Goal: Achieves stable or improved neurological status  Description: INTERVENTIONS  - Monitor and report changes in neurological status  - Monitor vital signs such as temperature, blood pressure, glucose, and any other labs ordered   - Initiate measures to prevent increased intracranial pressure  - Monitor for seizure activity and implement precautions if appropriate      Outcome: Progressing     Problem: CARDIOVASCULAR - ADULT  Goal: Maintains optimal cardiac output and hemodynamic stability  Description: INTERVENTIONS:  - Monitor I/O, vital signs and rhythm  - Monitor for S/S and trends of decreased cardiac output  - Administer and titrate ordered vasoactive medications to optimize hemodynamic stability  - Assess quality of pulses, skin color and temperature  - Assess for signs of decreased coronary artery perfusion  - Instruct patient to report change in severity of symptoms  Outcome: Progressing     Problem: GASTROINTESTINAL - ADULT  Goal: Minimal or absence of nausea and/or vomiting  Description: INTERVENTIONS:  - Administer IV fluids if ordered to ensure adequate hydration  - Maintain NPO  status until nausea and vomiting are resolved  - Nasogastric tube if ordered  - Administer ordered antiemetic medications as needed  - Provide nonpharmacologic comfort measures as appropriate  - Advance diet as tolerated, if ordered  - Consider nutrition services referral to assist patient with adequate nutrition and appropriate food choices  Outcome: Progressing     Problem: SKIN/TISSUE INTEGRITY - ADULT  Goal: Skin Integrity remains intact(Skin Breakdown Prevention)  Description: Assess:  -Perform Grady assessment every shift  -Clean and moisturize skin every day  -Inspect skin when repositioning, toileting, and assisting with ADLS  -Assess under medical devices such as aspen collar every shift  -Assess extremities for adequate circulation and sensation     Bed Management:  -Have minimal linens on bed & keep smooth, unwrinkled  -Change linens as needed when moist or perspiring  -Avoid sitting or lying in one position for more than 2 hours while in bed  -Keep HOB at 30 degrees     Toileting:  -Offer bedside commode  -Assess for incontinence every 2 hours  -Use incontinent care products after each incontinent episode such as foam cleanser    Activity:  -Mobilize patient 3 times a day  -Encourage activity and walks on unit  -Encourage or provide ROM exercises   -Turn and reposition patient every 2 Hours  -Use appropriate equipment to lift or move patient in bed  -Instruct/ Assist with weight shifting every hour when out of bed in chair  -Consider limitation of chair time 3 hour intervals    Skin Care:  -Avoid use of baby powder, tape, friction and shearing, hot water or constrictive clothing  -Relieve pressure over bony prominences using Mepilex foam dressing   -Do not massage red bony areas    Next Steps:  -Consider consults to  interdisciplinary teams such as wound care  Outcome: Progressing     Problem: MUSCULOSKELETAL - ADULT  Goal: Maintain or return mobility to safest level of function  Description:  INTERVENTIONS:  - Assess patient's ability to carry out ADLs; assess patient's baseline for ADL function and identify physical deficits which impact ability to perform ADLs (bathing, care of mouth/teeth, toileting, grooming, dressing, etc.)  - Assess/evaluate cause of self-care deficits   - Assess range of motion  - Assess patient's mobility  - Assess patient's need for assistive devices and provide as appropriate  - Encourage maximum independence but intervene and supervise when necessary  - Involve family in performance of ADLs  - Assess for home care needs following discharge   - Consider OT consult to assist with ADL evaluation and planning for discharge  - Provide patient education as appropriate  Outcome: Progressing

## 2024-09-11 NOTE — DISCHARGE SUMMARY
"Discharge Summary - Trauma   Name: Marsha Oliva 79 y.o. female I MRN: [id number]  Unit/Bed#: [facility] 605-01 I Date of Admission: 9/4/2024   Date of Service: 9/11/2024 I Hospital Day: 7       Fall  Assessment & Plan  Patient uses assistive devices including walker, cane, \"anything within reach\" to walk at baseline  PT/OT once cleared by neurosurgery  Geriatrics consult    * Closed odontoid fracture with type II morphology and posterior displacement (HCC)  Assessment & Plan  - Neurosurgery evaluation and recommendations appreciated.   - Status post bilateral C1 and C3 lateral mass screw placement, bilateral C2 pedicle screw placement, arthrodesis C1-C3 with autologous bone graft and [place] putty with use of neuronavigation and neuromonitoring on 9/5/2024.   - Hemovac drain discontinued on 9/9/2024.  - Bracing: Maintain cervical collar at all times.  - Spine precautions.  - Maintain aspiration precautions; speech therapy following for continued evaluation and treatment as indicated.  - VBS completed on 9/10/2024 with patient now recommended level 1 dysphagia diet/puréed diet with honey thickened liquids.  - Monitor neurovascular exam.  - Multimodal analgesic regimen as needed.  - Continue chemical DVT prophylaxis during inpatient encounter.  - Upright spine x-rays from 9/6/2024 reviewed.  - PT and OT evaluation and treatment as indicated.  - Outpatient follow up with Neurosurgery for re-evaluation.    Spinal cord compression (HCC)  Assessment & Plan  - Patient with spinal cord compression related to traumatic injury, present on presentation.  - Management as outlined under closed odontoid fracture.    Acute pain due to trauma  Assessment & Plan  - Acute pain secondary to traumatic injuries.  - Continue multimodal analgesic regimen.  - Bowel regimen as long as using opioids.  - Continue to monitor pain and adjust regimen as indicated.    Atrial fibrillation, chronic (HCC)  Assessment & Plan  - Continue current " medication regimen including digoxin and diltiazem.  - Monitor for continued adequate heart rate control.  - Resume home medication regimen on discharge as appropriate.  - Outpatient follow-up per routine.      Hypertensive heart disease with heart failure (HCC)  Assessment & Plan  Wt Readings from Last 3 Encounters:   09/11/24 53.8 kg (118 lb 9.7 oz)   09/04/24 53.5 kg (118 lb)   05/22/24 57.6 kg (127 lb)     - Patient with chronic history of hypertensive heart disease with history of heart failure without evidence of decompensated heart failure or significant volume overload at this time.  - Monitor volume status with daily weights.  - Continue current medication regimen.  - Patient uses torsemide as needed for weight gain and reportedly takes it approximately once weekly as an outpatient.  - Outpatient follow-up per routine.          Depression with anxiety  Assessment & Plan  - Continue current medication regimen and resume home medication regimen on discharge as appropriate.  - Outpatient follow-up per routine.    Essential hypertension  Assessment & Plan  - Continue current medication regimen and resume home medication therapy on discharge as appropriate.  - Monitor for continued adequate blood pressure control.  - Outpatient follow-up routine.      Hypothyroidism  Assessment & Plan  - Continue current medication regimen, including levothyroxine.   - Levothyroxine dose recently decreased to 125 mcg daily from 137 mcg daily; continue new dose.  - Outpatient follow-up per routine.    Mixed hyperlipidemia  Assessment & Plan  - Continue current medication regimen and resume home medication regimen on discharge as appropriate.  - Outpatient follow-up per routine.    Mild cognitive impairment of uncertain or unknown etiology  Assessment & Plan  - Patient with chronic mild cognitive impairment.  - Maintain delirium precautions.  - Geriatric medicine evaluation, recommendations and assistance with management  appreciated.    B12 deficiency  Assessment & Plan  - Continue home B12 supplementation.  - Outpatient follow-up routine.      Admission Date: 9/4/2024     Discharge Date: 9/11/2024    Admitting Diagnosis: Multiple fractures [T07.XXXA]  Spinal cord compression (HCC) [G95.20]  Closed odontoid fracture with type II morphology and posterior displacement, initial encounter ([facility]) [S12.111A]    Discharge Diagnosis: See above.    Attending and Service: Dr. Ullrich, Acute Care Surgical Services.    Consulting Physician(s):     Neurosurgery.  Geriatric medicine.  Inpatient wound care service.    Imaging and Procedures Performed:     XR cervical spine 2 or 3 views    Result Date: 9/7/2024  Impression: There is bilateral pedicle screw and stabilizing safia fixation at the C1, C2, C3 levels .  No hardware complication.  Stable  alignment compared to intraoperative images. Workstation performed: [id number]     XR spine cervical 2 or 3 vw injury    Result Date: 9/6/2024  Impression: Fluoroscopy provided for procedure guidance. Please refer to the separate procedure note for additional details. Workstation performed: [id number]     CTA head and neck with and without contrast    Result Date: 9/4/2024  Impression: CT Brain:  No acute intracranial abnormality. CT Angiography:  Unremarkable CTA neck and brain. Workstation performed: [id number]     MRI cervical spine wo contrast    Result Date: 9/4/2024  Impression: 1. Displaced type II dens fracture with severe posterior displacement of the dens and C1 vertebral body relative to the C2 vertebral body. There is resultant mass effect on the cord which appears relatively mild given the degree of displacement. No gross  cord edema is seen, however the exam is motion degraded. 2. Discontinuity of the ALL and irregularity of the PLL. There is also likely interspinous ligament injury at C1-C2. 3. Facet edema at C2-C3 may reflect facet capsular injury. 4. Prominent edema in the C1 and C2  vertebral bodies compatible with known C2 fracture. There is also paravertebral edema. 5. Limited evaluation of the flow voids given motion artifact. Consider CT angiogram of the neck to evaluate the vertebral arteries given the significant distraction at C1-C2. The study was marked in EPIC for immediate notification. Workstation performed: [id number]     TRAUMA - CT spine cervical wo contrast    Result Date: 9/4/2024  Impression: Type II odontoid fracture with posterior displacement including prominent posterior displacement of C1 with respect to the C2 vertebral body causing a component of cord compression of the medullary cervical spinal cord. Further evaluation with MRI is advised. I personally discussed this study with NEENA JIM on 9/4/2024 11:12 AM. Workstation performed: [id number]     TRAUMA - CT head wo contrast    Result Date: 9/4/2024  Impression: No acute intracranial abnormality. Mild to moderate chronic microvascular ischemic changes. Workstation performed: [id number]     Bilateral C1 and C3 lateral mass screw placement, bilateral C2 pedicle screw placement, arthrodesis C1-C3 with autologous bone graft and [place] putty with use of neuronavigation and neuromonitoring on 9/5/2024.    Hospital Course: Marsha Oliva is a 79-year-old female who initially presented to [facility] emergency department before being transferred to [facility] for trauma evaluation and management after being found to have traumatic injuries after a fall 4 to 6 weeks prior.  On her current presentation, she noted the fall about 4 to 6 weeks ago and did not seek medical attention at that time as she did not feel like she had any injuries.  In the interim, she has developed gradually worsening neck pain to the point where any movement caused severe significant pain.  In addition to the neck pain, she also endorsed bilateral knee pain.  On her initial evaluation by the trauma service at [facility]  [place], her primary survey was unremarkable.  On secondary survey, she was hypertensive with normal vital signs otherwise; a cervical collar was in place with cervical spine tenderness noted; the remainder of her exam was unremarkable.  Her initial workup included labs and the above-noted imaging studies.    She was admitted to the trauma service status post prior fall with now diagnosed type II odontoid fracture with significant displacement and suspected associated spinal cord compression as well as acute pain due to trauma and multiple medical comorbidities including hypertensive heart disease with history of heart failure, hyperlipidemia, hypothyroidism, hypertension, B12 deficiency and chronic atrial fibrillation.  Neurosurgery was consulted and the patient underwent additional imaging studies and workup with close neurologic monitoring.  Ultimately operative intervention was recommended and the patient agreed to proceed with surgery.  She underwent bilateral C1 and C3 lateral mass screw placement, bilateral C2 pedicle screw placement, arthrodesis C1-C3 with autologous bone graft and [place] putty with use of neuronavigation and neuromonitoring on 9/5/2024.  Perioperatively, she was managed in the ICU before being downgraded once appropriate in the postoperative setting.  Geriatric medicine was consulted and assisted with her medication review and medical management during her hospital encounter.  PT and OT were consulted and recommended rehab when appropriate.  Speech therapy was consulted and assisted with dysphagia evaluation and management throughout her hospital encounter.  The inpatient wound care service was following her throughout her hospitalization and assisted with her care.  Case management assisted with disposition planning, the patient was deemed stable for discharge to postacute care facility on 9/11/2024.  For further details of her hospital encounter, please see her complete medical  records.    On discharge, the patient is instructed to follow-up with the patient's primary care provider to review the events of the patient's recent hospitalization. The patient is instructed to follow-up in the Trauma Clinic as needed.  The patient is instructed to follow-up with neurosurgery as scheduled on 9/18/2024 at 1:30 PM for postoperative reevaluation.  The patient should follow the provided discharge instructions.    Condition at Discharge: stable     Discharge instructions/Information to patient and family:   See after visit summary for information provided to patient and family.      Provisions for Follow-Up Care:  See after visit summary for information related to follow-up care and any pertinent home health orders.      Disposition: See After Visit Summary for discharge disposition information.    Planned Readmission: No    Discharge Statement   I spent 30 minutes discharging the patient. This time was spent on the day of discharge. I had direct contact with the patient on the day of discharge. Additional documentation is required if more than 30 minutes were spent on discharge.     Discharge Medications:  See after visit summary for reconciled discharge medications provided to patient and family.      Jabari Middleton PA-C  9/11/2024  12:34 PM

## 2024-09-11 NOTE — SPEECH THERAPY NOTE
"Speech-Language Pathology Progress Note    Patient Name: Marsha Oliva    Today's Date: 9/11/2024    Subjective:  Pt was awake and alert. She was sitting upright in bed with C-collar in place. Patient stated \"I want to go home\" but is aware she needs to go to rehab.    Objective:  Pt was seen today for dysphagia therapy. Current diet is puree with honey thick liquids. Pt was on room air. Oral care had already been completed. Focus of today's session was to maximize PO intake safety and to educate pt/family on results of MBS and reasoning for diet modification. Textures offered today included puree and honey thick liquid via cup.  Swallow function:   Bolus retrieval was reduced due to C collar, some anterior spillage with cup sips. Suspect reduced oral control. Pharyngeal swallow initiation was delayed. Hyolaryngeal excursion was reduced, again may be d/t C collar. Cough occurred with honey thick liquid via cup x2.    Assessment:  Reviewed results on MBS with pt, including risk of aspiration and pneumonia, to reinforce need for modified diet. Pt verbalized understanding.    Plan:  Continue puree and honey thick liquids. Continue ST follow up at next level of care, pt is planned for d/c to rehab today.      "

## 2024-09-11 NOTE — ASSESSMENT & PLAN NOTE
Wt Readings from Last 3 Encounters:   09/11/24 53.8 kg (118 lb 9.7 oz)   09/04/24 53.5 kg (118 lb)   05/22/24 57.6 kg (127 lb)     - Patient with chronic history of hypertensive heart disease with history of heart failure without evidence of decompensated heart failure or significant volume overload at this time.  - Monitor volume status with daily weights.  - Continue current medication regimen.  - Patient uses torsemide as needed for weight gain and reportedly takes it approximately once weekly as an outpatient.  - Outpatient follow-up per routine.

## 2024-09-11 NOTE — CASE MANAGEMENT
Case Management Discharge Planning Note    Patient name Marsha Oliva  Location [facility] 605/[facility] 605-01 MRN [id number]  : 1945 Date 2024       Current Admission Date: 2024  Current Admission Diagnosis:Closed odontoid fracture with type II morphology and posterior displacement (HCC)   Patient Active Problem List    Diagnosis Date Noted Date Diagnosed    Acute pain due to trauma 2024     At risk for delirium 2024     Heart failure with preserved ejection fraction (HCC) 2024     Closed odontoid fracture with type II morphology and posterior displacement (HCC) 2024     Spinal cord compression (HCC) 2024     Chronic respiratory failure with hypoxia ([facility]) 2023     Stage 3a chronic kidney disease (HCC) 2022     Bilateral hydronephrosis 2022     Proctitis 2022     Fall 2022     Hypokalemia 2022     ILD (interstitial lung disease) ([facility]) 2022     Oropharyngeal aspiration 2022     Elevated troponin 2022     EKG abnormalities 2022     Mild cognitive impairment of uncertain or unknown etiology 2022     Hormone replacement therapy 2022     Apraxia 2021     Constipation, unspecified 2021     Corn or callus 2021     Difficulty in walking, not elsewhere classified 2021     Generalized muscle weakness 2021     Lymphedema, not elsewhere classified 2021     Repeated falls 2021     Hypoxia 2021     Frequent falls 2021     Elevated d-dimer 2021     Venous insufficiency of both lower extremities 2021     Acquired bilateral hammer toes 2018     Lymphedema of both lower extremities 2018     Severe mitral regurgitation 2018     Anemia 2018     Cardiomegaly 2018     Wound of right leg 2018     Hypertensive heart disease with heart failure (HCC) 2018     Major depressive disorder, single episode, unspecified 2018      Personal history of nicotine dependence 05/13/2018     Pleural effusion, not elsewhere classified 05/13/2018     Solitary pulmonary nodule 05/13/2018     Atrial fibrillation, chronic (HCC) 05/01/2018     B12 deficiency 01/13/2017     Chronic diastolic CHF (congestive heart failure) (HCC) 01/13/2017     Folate deficiency 01/13/2017     Vitamin D deficiency 06/03/2016     Depression with anxiety 08/03/2015     Mixed hyperlipidemia 08/03/2015     Osteoarthritis of knee 10/23/2012     Impaired fasting glucose 05/31/2012     Essential hypertension 03/23/2010     Hypothyroidism 03/23/2010     Posttraumatic stress disorder 03/23/2010       LOS (days): 7  Geometric Mean LOS (GMLOS) (days): 2.6  Days to GMLOS:-4     OBJECTIVE:  Risk of Unplanned Readmission Score: 14.68         Current admission status: Inpatient   Preferred Pharmacy:   [facility] - [place], [place] - [address]  [address] [facility] [address]  Phone: [phone] Fax: [phone]    [id number] [place] [address] [address]  Phone: [phone] Fax: [phone]    Primary Care Provider: Rebecca Solis    Primary Insurance: [facility] REP  Secondary Insurance:     DISCHARGE DETAILS:                                                                                                               Facility Insurance Auth Number: [id number]

## 2024-09-11 NOTE — PROGRESS NOTES
"Progress Note - Trauma   Name: Marsha Oliva 79 y.o. female I MRN: [id number]  Unit/Bed#: [facility] 605-01 I Date of Admission: 9/4/2024   Date of Service: 9/11/2024 I Hospital Day: 7    Assessment & Plan  Fall  Patient uses assistive devices including walker, cane, \"anything within reach\" to walk at baseline  PT/OT once cleared by neurosurgery  Geriatrics consult  Closed odontoid fracture with type II morphology and posterior displacement (HCC)  - Neurosurgery evaluation and recommendations appreciated.   - Status post bilateral C1 and C3 lateral mass screw placement, bilateral C2 pedicle screw placement, arthrodesis C1-C3 with autologous bone graft and [place] putty with use of neuronavigation and neuromonitoring on 9/5/2024.   - Hemovac drain discontinued on 9/9/2024.  - Bracing: Maintain cervical collar at all times.  - Spine precautions.  - Maintain aspiration precautions; speech therapy following for continued evaluation and treatment as indicated.  - VBS completed on 9/10/2024 with patient now recommended level 1 dysphagia diet/puréed diet with honey thickened liquids.  - Monitor neurovascular exam.  - Multimodal analgesic regimen as needed.  - Continue chemical DVT prophylaxis during inpatient encounter.  - Upright spine x-rays from 9/6/2024 reviewed.  - PT and OT evaluation and treatment as indicated.  - Outpatient follow up with Neurosurgery for re-evaluation.  Atrial fibrillation, chronic (HCC)  - Continue current medication regimen including digoxin and diltiazem.  - Monitor for continued adequate heart rate control.  - Resume home medication regimen on discharge as appropriate.  - Outpatient follow-up per routine.    B12 deficiency  - Continue home B12 supplementation.  - Outpatient follow-up routine.  Depression with anxiety  - Continue current medication regimen and resume home medication regimen on discharge as appropriate.  - Outpatient follow-up per routine.  Essential hypertension  - Continue current " medication regimen and resume home medication therapy on discharge as appropriate.  - Monitor for continued adequate blood pressure control.  - Outpatient follow-up routine.    Hypothyroidism  - Continue current medication regimen, including levothyroxine.   - Levothyroxine dose recently decreased to 125 mcg daily from 137 mcg daily; continue new dose.  - Outpatient follow-up per routine.  Mixed hyperlipidemia  - C current medication regimen and resume home medication regimen on discharge as appropriate.  - Outpatient follow-up per routine.  Hypertensive heart disease with heart failure (HCC)  Wt Readings from Last 3 Encounters:   09/11/24 53.8 kg (118 lb 9.7 oz)   09/04/24 53.5 kg (118 lb)   05/22/24 57.6 kg (127 lb)     - Patient with chronic history of hypertensive heart disease with history of heart failure without evidence of decompensated heart failure or significant volume overload at this time.  - Monitor volume status with daily weights.  - Continue current medication regimen.  - Patient uses torsemide as needed for weight gain and reportedly takes it approximately once weekly as an outpatient.  - Outpatient follow-up per routine.        Mild cognitive impairment of uncertain or unknown etiology  - Patient with chronic mild cognitive impairment.  - Maintain delirium precautions.  - Geriatric medicine evaluation, recommendations and assistance with management appreciated.  Spinal cord compression (HCC)  - Patient with spinal cord compression related to traumatic injury, present on presentation.  - Management as outlined under closed odontoid fracture.  Acute pain due to trauma  - Acute pain secondary to traumatic injuries.  - Continue multimodal analgesic regimen.  - Bowel regimen as long as using opioids.  - Continue to monitor pain and adjust regimen as indicated.    Bowel Regimen: On Colace, Dulcolax and senna.  VTE Prophylaxis:VTE covered by:  enoxaparin, Subcutaneous, 30 mg at 09/10/24 2042    and  "Sequential compression device (Venodyne)      Disposition: Continue current level of care.  Anticipate discharge to postacute care facility for rehab.  Continue therapy evaluation and treatment as indicated.  Case management following for disposition planning.   Please contact the SecureChat role for the Trauma service with any questions/concerns.    History of Present Illness   \"I feel okay.\"    24 Hour Events : Patient is doing well this morning.  She notes she had another good night with no issues.  She continues to tolerate oral diet.  She notes minimal neck pain and denies any numbness/weakness/tingling.  She has no new complaints this morning.    Objective      Temp:  [98.3 °F (36.8 °C)-99.3 °F (37.4 °C)] 98.4 °F (36.9 °C)  HR:  [92-98] 92  Resp:  [16-18] 16  BP: (130-136)/(72-84) 136/84  O2 Device: None (Room air)          I/O         09/09 0701  09/10 0700 09/10 0701  09/11 0700 09/11 0701  09/12 0700    P.O. 420 170     NG/GT 0      Total Intake(mL/kg) 420 (7.4) 170 (3.2)     Urine (mL/kg/hr) 1300 (1) 500 (0.4)     Drains       Stool 0      Total Output 1300 500     Net -880 -330            Unmeasured Urine Occurrence 2 x 1 x     Unmeasured Stool Occurrence 2 x            Lines/Drains/Airways       Active Status       Name Placement date Placement time Site Days    External Urinary Catheter 09/07/24 2111  -- 3                  Physical Exam   GENERAL APPEARANCE: Patient in no acute distress.  HEENT: NCAT; EOMs intact; Mucous membranes moist  NECK / BACK: Cervical collar in place with minimal cervical spine tenderness.  Surgical site is unremarkable.  CV: Irregularly irregular rhythm with normal rate; no murmur/gallops/rubs appreciated.  CHEST / LUNGS: Clear to auscultation; no wheezes/rales/rhonci.  ABD: NABS; soft; non-distended; non-tender.  : Voiding spontaneously.  EXT: +2 pulses bilaterally upper & lower extremities; no edema.  NEURO: GCS 15; no focal neurologic deficits; neurovascularly " intact.  SKIN: Warm, dry and well perfused; no rash; no jaundice.       Lab Results: I have reviewed the following results: CBC/BMP: No new results in last 24 hours.   Recent Labs     09/10/24  0601   SODIUM 139   K 4.0      CO2 30   BUN 12   CREATININE 0.54*   GLUC 90       Imaging Review: Reviewed radiology reports from this admission including: xray(s).  Other Studies: No additional pertinent studies reviewed.

## 2024-09-11 NOTE — CASE MANAGEMENT
Case Management Discharge Planning Note    Patient name Marsha Oliva  Location [facility] 605/[facility] 605-01 MRN [id number]  : 1945 Date 2024       Current Admission Date: 2024  Current Admission Diagnosis:Closed odontoid fracture with type II morphology and posterior displacement (HCC)   Patient Active Problem List    Diagnosis Date Noted Date Diagnosed    Acute pain due to trauma 2024     At risk for delirium 2024     Heart failure with preserved ejection fraction (HCC) 2024     Closed odontoid fracture with type II morphology and posterior displacement (HCC) 2024     Spinal cord compression (HCC) 2024     Chronic respiratory failure with hypoxia ([facility]) 2023     Stage 3a chronic kidney disease (HCC) 2022     Bilateral hydronephrosis 2022     Proctitis 2022     Fall 2022     Hypokalemia 2022     ILD (interstitial lung disease) ([facility]) 2022     Oropharyngeal aspiration 2022     Elevated troponin 2022     EKG abnormalities 2022     Mild cognitive impairment of uncertain or unknown etiology 2022     Hormone replacement therapy 2022     Apraxia 2021     Constipation, unspecified 2021     Corn or callus 2021     Difficulty in walking, not elsewhere classified 2021     Generalized muscle weakness 2021     Lymphedema, not elsewhere classified 2021     Repeated falls 2021     Hypoxia 2021     Frequent falls 2021     Elevated d-dimer 2021     Venous insufficiency of both lower extremities 2021     Acquired bilateral hammer toes 2018     Lymphedema of both lower extremities 2018     Severe mitral regurgitation 2018     Anemia 2018     Cardiomegaly 2018     Wound of right leg 2018     Hypertensive heart disease with heart failure (HCC) 2018     Major depressive disorder, single episode, unspecified 2018      Personal history of nicotine dependence 05/13/2018     Pleural effusion, not elsewhere classified 05/13/2018     Solitary pulmonary nodule 05/13/2018     Atrial fibrillation, chronic (HCC) 05/01/2018     B12 deficiency 01/13/2017     Chronic diastolic CHF (congestive heart failure) (HCC) 01/13/2017     Folate deficiency 01/13/2017     Vitamin D deficiency 06/03/2016     Depression with anxiety 08/03/2015     Mixed hyperlipidemia 08/03/2015     Osteoarthritis of knee 10/23/2012     Impaired fasting glucose 05/31/2012     Essential hypertension 03/23/2010     Hypothyroidism 03/23/2010     Posttraumatic stress disorder 03/23/2010       LOS (days): 7  Geometric Mean LOS (GMLOS) (days): 2.6  Days to GMLOS:-4.1     OBJECTIVE:  Risk of Unplanned Readmission Score: 14.72         Current admission status: Inpatient   Preferred Pharmacy:   [facility] - [place] - [address]  [address] [facility] [address]  Phone: [phone] Fax: [phone]    [id number] - [place], [place] - [address] [address]  Phone: [phone] Fax: [phone]    Primary Care Provider: Rebecca Solis    Primary Insurance: [employer]  Secondary Insurance:     DISCHARGE DETAILS:    Pt will d/c to [facility]. CM submitted for S transport after 1330. Pt's peripheral IV is to remain in, as per [facility]

## 2024-09-11 NOTE — PROGRESS NOTES
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 SS#   Marital Status: /Civil Union  Ethnicity:   Employment Status: retired  Extended Emergency Contact Information  Primary Emergency Contact: chris vega  Home Phone: [phone]  Mobile Phone: [phone]  Relation: Spouse  Advance Directive: Level 1 Full Code - unknown advanced directive    INSURANCE/COVERAGE:     Primary Payor: [employer] REP / Plan: [employer]  REP / Product Type: Medicare PPO /   Secondary Payer: Private Pay   Payer Contact: None Provided Payer Contact:   Contact Fax: [phone] Contact Phone:     Authorization #: [id number]   Coverage Dates: 9/11 - 9/13 (2 business days)  LCD: 9/13 with review  MEDICARE #: [id number]     Medical Record #: [id number]    **Confirmed patient's benefits for inpatient acute rehab as follows: copay  $325 per day up to $975 max, coinsurance 0%.    REFERRAL SOURCE:   Referring provider: Pramod Cordoba,*  Referring facility: [facility]  Room: [facility] 605/[facility] 605-01  PCP: Rebecca Solis PCP phone number: [phone]    MEDICAL INFORMATION  HPI:  Patient is a 79 year old female that presented to [facility] on 9/4/2024 as Trauma evaluation with c/o generalized weakness and neck pain s/p fall 4-6 weeks prior. She admitted to headstrike and ASA use. Patient states legs have been feeling weaker. CT cspine showed type 2 odontoid fracture with posterior displacement including prominent posterior displacement of C1 with respect to C2 vertebral body causing a component of cord compression of medullary cervical spinal cord. CT head was negative. Patient was transferred to [facility] for Trauma/Neurosurgery evaluation. Neurosurgery was consulted, with recommendations to hold all AC/AP, vista collar at all times, and plans for tentative OR pending MRI results. MRI cspine showed displaced type 2 dens fracture with severe posterior displacement of dens and C1 vertebral body, resultant mass effect on cord appears relatively mild without gross cord edema; discontinuity of ALL and irregularity of PLL, likely interspinous ligament injury at C1-C2, facet edema at C2-C3 possible facet capsular injury, prominent edema in C1 and C2 vertebral bodies, paravertebral edema. CTA H/N was negative. On 9/5, patient went to the OR with Neurosurgery for B/L C1 and C3 lateral mass screw placement, B/L C2 pedicle screw placement, arthrodesis C1-3 with autogolous bone graft and [place] putty; EBL minimal, hemovac drain intact. Per Neurosurgery, patient is to maintain vista collar at all times for 6 weeks, with cervical spinal precautions in place. She was originally initiated on SQ Heparin for DVT prophylaxis, however was transitioned to SQ Lovenox on 9/8. Patient was evaluated by SLP, with noted mild oropharyngeal dysphagia, and she was recommended for dysphagia 2 diet with nectar thick liquids. Postop x-ray showed good hardware placement and alignment. Hemovac was D/C'd on 9/9, per Neurosurgery. MBS was completed with SLP on 9/10, which showed mild oral and moderate pharyngeal  dysphagia. Patient was recommended for dysphagia 1 pureed diet with honey thick liquids, aspiration precautions in place, and is tolerating well. Patient is overall hemodynamically stable and medically cleared for discharge to [facility]. PT/OT/ST therapies were consulted, as well as patient's case reviewed by [facility] physician, and they are recommending patient for inpatient Acute Rehab. She has demonstrated that she can tolerate and participate in 3 hours of therapy per day.    Fully COVID vaccinated - Pfizer.     Past Medical History:   Past Surgical History:   Allergies:     Past Medical History:   Diagnosis Date    Disease of thyroid gland     Hypotension     Supratherapeutic INR 6/2/2022    Past Surgical History:   Procedure Laterality Date    CERVICAL FUSION Bilateral 9/5/2024    Procedure: navigated posterior fusion C1-C3;  Surgeon: Eduardo Dewitt MD;  Location: [facility];  Service: Neurosurgery    HYSTERECTOMY       No Known Allergies      Medical/functional conditions requiring inpatient rehabilitation: type 2 odontoid fracture with cervical spinal cord compression s/p fall; s/p B/L C1/C3 lateral mass screw placement, B/L C2 pedicle screw placement, arthrodesis C1-3 with autogolous bone graft; vista collar at all times with cervical spinal precautions, acute pain, ALL/PLL injury, dysphagia, ataxia, ABLA, impaired mobility and self care, impaired cognition, decreased strength/endurance, impaired balance    Risk for medical/clinical complications: risk for falls, risk for uncontrolled pain, risk for skin breakdown, risk for infection, risk for aspiration, risk for DVT/PE, risk for cardiac event, risk for hypo/hypertensive episodes    Comorbidities/Surgeries in the last 100 days:  hypothyroid, Afib, vitamin B12 deficiency, HTN, HLD, CHF, depression, anxiety    9/5/2024 Bilateral C1 and C3 lateral mass screw placement, Bilateral C2 pedicle screw placement, Arthrodesis C1-3 with autologous bone graft and [place]  putty, Use of neuronavigation, Medtronic Stealth, Use of neuromonitoring, Use of fluoroscopy and O-arm, Use of Arnold headholder    CURRENT VITAL SIGNS:   Temp:  [98.3 °F (36.8 °C)-99.3 °F (37.4 °C)] 98.4 °F (36.9 °C)  HR:  [92-98] 92  Resp:  [16-18] 16  BP: (130-136)/(72-84) 136/84   Intake/Output Summary (Last 24 hours) at 9/11/2024 1236  Last data filed at 9/10/2024 1834  Gross per 24 hour   Intake 120 ml   Output 500 ml   Net -380 ml        LABORATORY RESULTS:      Lab Results   Component Value Date    HGB 12.3 09/08/2024    HCT 39.7 09/08/2024    WBC 8.07 09/08/2024     Lab Results   Component Value Date    BUN 12 09/10/2024    BUN 16 06/19/2023    K 4.0 09/10/2024    K 5.0 06/19/2023     09/10/2024     06/19/2023    GLUCOSE 105 09/05/2024    CREATININE 0.54 (L) 09/10/2024    CREATININE 1.16 (H) 06/19/2023     Lab Results   Component Value Date    PROTIME 12.8 09/06/2024    INR 0.93 09/06/2024        DIAGNOSTIC STUDIES:  XR cervical spine 2 or 3 views    Result Date: 9/7/2024  Impression: There is bilateral pedicle screw and stabilizing safia fixation at the C1, C2, C3 levels .  No hardware complication.  Stable  alignment compared to intraoperative images. Workstation performed: [id number]     XR spine cervical 2 or 3 vw injury    Result Date: 9/6/2024  Impression: Fluoroscopy provided for procedure guidance. Please refer to the separate procedure note for additional details. Workstation performed: [id number]     CTA head and neck with and without contrast    Result Date: 9/4/2024  Impression: CT Brain:  No acute intracranial abnormality. CT Angiography:  Unremarkable CTA neck and brain. Workstation performed: [id number]     MRI cervical spine wo contrast    Result Date: 9/4/2024  Impression: 1. Displaced type II dens fracture with severe posterior displacement of the dens and C1 vertebral body relative to the C2 vertebral body. There is resultant mass effect on the cord which appears relatively  mild given the degree of displacement. No gross  cord edema is seen, however the exam is motion degraded. 2. Discontinuity of the ALL and irregularity of the PLL. There is also likely interspinous ligament injury at C1-C2. 3. Facet edema at C2-C3 may reflect facet capsular injury. 4. Prominent edema in the C1 and C2 vertebral bodies compatible with known C2 fracture. There is also paravertebral edema. 5. Limited evaluation of the flow voids given motion artifact. Consider CT angiogram of the neck to evaluate the vertebral arteries given the significant distraction at C1-C2. The study was marked in EPIC for immediate notification. Workstation performed: [id number]     TRAUMA - CT spine cervical wo contrast    Result Date: 9/4/2024  Impression: Type II odontoid fracture with posterior displacement including prominent posterior displacement of C1 with respect to the C2 vertebral body causing a component of cord compression of the medullary cervical spinal cord. Further evaluation with MRI is advised. I personally discussed this study with NEENA JIM on 9/4/2024 11:12 AM. Workstation performed: [id number]     TRAUMA - CT head wo contrast    Result Date: 9/4/2024  Impression: No acute intracranial abnormality. Mild to moderate chronic microvascular ischemic changes. Workstation performed: [id number]       PRECAUTIONS/SPECIAL NEEDS:  Tobacco:   Social History     Tobacco Use   Smoking Status Former    Types: Cigarettes   Smokeless Tobacco Never   , Alcohol:    Social History     Substance and Sexual Activity   Alcohol Use Never    Comment: occassional.   , Splints/Braces: vista collar at all times, Anticoagulation:   Lovenox SQ , Edema Management, Safety Concerns, Pain Management, IV: Type: Peripheral Location: Left Hand Reason: Medications/IV Fluids, Aspiration Risk/Precautions, Dietary Restrictions: Dysphagia 1 Pureed diet with Honey thick liquids, Visually Impaired, Language Preference: English, Cervical Spinal  Precautions and Fall Precautions.    MEDICATIONS:     Current Facility-Administered Medications:     acetaminophen (TYLENOL) tablet 975 mg, 975 mg, Oral, Q8H CHRIS, Jamir Strauss PA-C, 975 mg at 09/11/24 0935    atorvastatin (LIPITOR) tablet 20 mg, 20 mg, Oral, Daily With Dinner, Jamir Strauss PA-C, 20 mg at 09/10/24 1715    bisacodyl (DULCOLAX) rectal suppository 10 mg, 10 mg, Rectal, Daily, Ana Chau PA-C, 10 mg at 09/09/24 1402    calcium carbonate (TUMS) chewable tablet 1,000 mg, 1,000 mg, Oral, Daily PRN, Jamir Strauss PA-C    chlorhexidine (PERIDEX) 0.12 % oral rinse 15 mL, 15 mL, Mouth/Throat, Q12H CHRIS, Jamir Strauss PA-C, 15 mL at 09/11/24 0936    digoxin (LANOXIN) tablet 125 mcg, 125 mcg, Oral, Every Other Day, Jamir Strauss PA-C, 125 mcg at 09/10/24 0906    diltiazem (CARDIZEM CD) 24 hr capsule 180 mg, 180 mg, Oral, Daily, Jamir Strauss PA-C, 180 mg at 09/11/24 0936    docusate (COLACE) oral liquid 100 mg, 100 mg, Oral, BID, JAQUELINE Cartagena, 100 mg at 09/11/24 0937    enoxaparin (LOVENOX) subcutaneous injection 30 mg, 30 mg, Subcutaneous, Q12H CHRIS, Ana Chau PA-C, 30 mg at 09/11/24 0936    levothyroxine tablet 125 mcg, 125 mcg, Oral, Early Morning, JAQUELINE Caballero, 125 mcg at 09/11/24 0936    ondansetron (ZOFRAN) injection 4 mg, 4 mg, Intravenous, Q6H PRN, Jamir Strauss PA-C    oxyCODONE (ROXICODONE) split tablet 2.5 mg, 2.5 mg, Oral, Q4H PRN **OR** oxyCODONE (ROXICODONE) IR tablet 5 mg, 5 mg, Oral, Q4H PRN, Jamir Strauss PA-C, 5 mg at 09/08/24 2030    senna oral syrup 8.8 mg, 8.8 mg, Oral, BID, JAQUELINE Cartagena, 8.8 mg at 09/11/24 0937    sertraline (ZOLOFT) tablet 100 mg, 100 mg, Oral, Daily, Jamir Strauss PA-C, 100 mg at 09/11/24 0935    SKIN INTEGRITY:   erythema - sacrum, B/L buttocks scabbed areas, left cheek wound DOE, right arm skin tear DOE, left shin abrasion with Allevyn foam dressing, Incision: healing well, no significant drainage, no dehiscence, no  significant erythema, B/L head incisions DOE, posterior neck incision with sutures DOE    PRIOR LEVEL OF FUNCTION:  She lives in a(n) single family home  Marsha Oliva is  and lives with their spouse.  Self Care: Independent, Indoor Mobility: Modified Independent, Stairs (in/outdoor): Modified Independent, and Cognition: Independent  Prior to patient's fall/admission, patient was fully Independent with ADLs and received assistance with IADLs. Family provides transportation. She was Modified Independent with cecelia of RW vs SPC for mobility. Since her fall, patient was requiring assistance with ADLs due to weakness.    FALLS IN THE LAST 6 MONTHS: 1 to 4    HOME ENVIRONMENT:  The living area: can live on one level  There are 3 steps to enter the home, with full flight of stairs to 2nd floor of home.    The patient will have 24 hour supervision/physical assistance available upon discharge.  Patient's spouse is supportive and able to assist as needed.    PREVIOUS DME:  Equipment in home (previous DME): Shower Chair, Grab Bars, Rolling Walker, Manual Wheelchair, and Single Point Cane    FUNCTIONAL STATUS:  Physical Therapy Occupational Therapy Speech Therapy   9/10/2024, per PTA    Subjective   Subjective The patient states that she continues to have difficulty walking, but she is eager to get moving.   Bed Mobility   Supine to Sit 3  Moderate assistance   Additional items Assist x 2;Increased time required;LE management;Verbal cues   Transfers   Sit to Stand 3  Moderate assistance  (Min-mod assist x 2 for second trial.)   Additional items Assist x 2;Increased time required;Verbal cues   Stand to Sit 3  Moderate assistance   Additional items Assist x 1;Increased time required;Verbal cues   Ambulation/Elevation   Gait pattern Knees flexed;Excessively slow;Step to;Short stride;Inconsistent anthony;Shuffling;Decreased foot clearance;Retropulsion;Improper Weight shift   Gait Assistance 3  Moderate assist   Additional  items Assist x 1;Assist x 2;Verbal cues;Tactile cues   Assistive Device Rolling walker   Distance 12 feet.   Balance   Static Sitting Fair +   Dynamic Sitting Fair   Static Standing Poor +   Dynamic Standing Poor   Ambulatory Poor -   Activity Tolerance   Activity Tolerance Patient tolerated treatment well;Patient limited by fatigue;Patient limited by pain   Medical Staff Made Aware NILDA Mclaughlin; April S. GUTIERREZ/L.   Exercises   Knee AROM Long Arc Quad Sitting;5 reps;Bilateral;AROM  (x2 sets.)   Assessment   Prognosis Fair   Problem List Decreased strength;Decreased endurance;Impaired balance;Decreased mobility;Orthopedic restrictions;Pain   Assessment The patient continues to require assistance for all mobility, but she is very motivated to work with therapy. She does have a retropulsive gait with moderate knee flexion throughout gait. She had no overt loss of balance, but continued assistance is necessary for posture and balance. Onset of fatigue is rapid with an inability to ambulate beyond a few feet. She does recover with rests throughout the session. She will benefit from continued therapies in order to maximize her functional mobility and safety.    9/10/2024, per GUTIERRZE    ADL   Where Assessed Edge of bed   Grooming Assistance 3  Moderate Assistance   Grooming Deficit    (hair combing)   Grooming Comments limited by weakness. asst [id number] for back of hair and thoroughnes   UB Dressing Assistance 3  Moderate Assistance   UB Dressing Comments to kaden around the back robe   LB Dressing Assistance 3  Moderate Assistance   Toileting Assistance  2  Maximal Assistance   Toileting Deficit    (reports incontinence at home pta)   Functional Standing Tolerance   Time 30 seconds during clothing management and adl task   Bed Mobility   Supine to Sit 3  Moderate assistance   Additional items Assist x 2  (poor initiation despite tactile cues)   Transfers   Sit to Stand 3  Moderate assistance   Additional items Assist x 2    Stand to Sit 3  Moderate assistance   Additional items Assist x 2   Stand pivot 3  Moderate assistance   Additional items Assist x 1;Assist x 2  (rw)   Functional Mobility   Functional Mobility 3  Moderate assistance  (mod to max asst x 2, + fall risk, decreased activity tolerence)   Additional Comments short distance, pt walks with b knees flexed  (rw)   Cognition   Comments pt was slow to motor plann nor initiatie getting oob. she requires mod vc's for sit ti stand technique   Activity Tolerance   Activity Tolerance Patient limited by fatigue   Assessment   Assessment pt participated in am ot session and was seen focusing on adl tasks, bed mobility, activity tolerence sit to from stand muiltimle times and spt / short distance functional mobility with rw. pt with flexed knees bilaterally with standing and walking. pt demosntrates poor + balance, f- activity tolerence during adl tasks and mobility trials. pt is motivated and is cooperative this session for oob. pt using pure wick for urine management. she reports incontinence at home pta. pt was able to lift barms to reach face and head. will follow focusing on goals from ie.    9/10/2024, per SLP    Subjective:  Pt was awake and alert. She was sitting upright in bed. PCA was assisting pt with eating her breakfast. C collar in place.     Objective:  Pt was seen today for dysphagia therapy. Current diet is dysphagia 2 mechanical soft with nectar thick liquids. She was seen while eating breakfast which today included mostly pureed items. Pt was on room air.   Swallow function:   Bolus retrieval and manipulation were adequate. Suspect mildly reduced control, liquids>puree. Pharyngeal swallow initiation was min delayed. Hyolaryngeal excursion was  mildly reduced, likely in part due to C spine collar . Mild cough and throat clearing noted intermittently with both thin liquid trials and nectar thick liquid.     Assessment:  Pt is showing s/s aspiration with liquids,  both thin and nectar thick during breakfast this morning. She appears to be tolerating puree well.      Plan:  Orders obtained for MBS, to be completed 1030, additional recs to follow.     Assessment Summary:    Pt presents with mild oral and moderate pharyngeal dysphagia characterized by reduced bolus control and transfer with premature spillage, delayed swallow initiation, reduced hyolaryngeal rise, reduced pharyngeal stripping wave and base of tongue retraction. Reduced muscle function resulted in significant pharyngeal/pyriform retention, leading to aspiration after swallow and during repeat swallows. Epiglottic inversion was absent and airway closure was incomplete. Aspiration occurred with thin and nectar thick liquid, with inconsistent cough response, often silent. Suspect dysphagia is exacerbated by C-spine collar and reduced ROM of neck. Note: Images are available for review in PACS as desired.     Recommendations:   Recommended Diet: puree/level 1 diet and honey thick liquids   Recommended Form of Medications:  as tolerated    Aspiration precautions and compensatory swallowing strategies: upright posture, only feed when fully alert, slow rate of feeding, and small bites/sips  SLP Dysphagia therapy recommended: yes     Results Reviewed with: patient and MD            General Information;  Pt is a 79 y.o. female with a PMH remarkable for fall Status post bilateral C1 and C3 lateral mass screw placement, bilateral C2 pedicle screw placement, arthrodesis C1-C3 with autologous bone graft and [place] putty with use of neuronavigation and neuromonitoring on 9/5/2024. Current concerns for dysphagia include s/s aspiration at bedside. MBS was recommended to assess oropharyngeal stage swallowing skills at this time.         Pt was viewed sitting upright in the lateral and AP positions. Due to concerns for patient safety / patient refusal, trials provided deviated from the MBSImP Validated Protocol. Pt was given 5-mL  thin liquid x2, 20-mL cup sip thin, 5-mL nectar thick, 20-mL cup sip nectar thick, 5-mL honey thick, 5-mL pudding, ½ cookie coated with 3-mL pudding, 5-mL nectar thick in the AP position, and 5-mL pudding in the AP position. Pt was also given HTL via cup and barium tablet with honey thick liquid.      Initial view observations/comments: Clear view of the upper airway and Hardware            8-Point Penetration-Aspiration Scale   Thin liquid 8 - Material enters the airway, passes below the vocal folds, and no effort is made to eject     Nectar thick liquid 8 - Material enters the airway, passes below the vocal folds, and no effort is made to eject     Honey thick liquid 2 - Material enters the airway, remains above the vocal folds, and is ejected  from the airway   Puree (pudding) 1 - Material does not enter the airway   Solid 1 - Material does not enter the airway      Strategies and Efficacy: C collar preventing postural strategies     Aspiration Response and Efficacy: Inconsistent, however silent aspiration did occur with thin and nectar thick liquids     MBS IMP Rating     ORAL Impairment  Compinent 1--Lip Closure  Judged at any point during the swallow.  1 - Interlabial escape; no progression to anterior lip     Component 2--Tongue Control During Bolus Hold  Judged on held liquid boluses only and prior to productive tongue movement.   3 - Posterior escape of greater than half of bolus     Component 3--Bolus Preparation/Mastication  Judged only during presentation of 1/2 shortbread cookie coated in pudding.   1 - Slow prolonged chewing/mashing with complete re-collection     Component 4--Bolus Transport/Lingual Motion  Judged after first productive tongue movement for oral bolus transport.  1 - Delayed initiation of tongue motion     Component 5--Oral Residue  Judged after first swallow or after the last swallow of the sequential swallow task.  2 - Residue collection on oral structures              Location               A - Floor of Mouth, B - Palate, and C - Tongue     Component 6--Initiation of Pharyngeal Swallow  Judged at first movement of the brisk superior-anterior hyoid trajectory.  3 - Bolus head in pyriforms        PHARYNGEAL Impairment  Component 7--Soft Palate Elevation  Judged during maximum displacement of soft palate.  2 - Escape to the nasopharynx     Component 8--Laryngeal Elevation  Judged when epiglottis is in its most horizontal position.  1 - Partial superior movement of thyroid cartilage/partial approximation of arytenoids to epiglottic petiole     Component 9--Anterior Hyoid Excursion  Judged at height of swallow/maximal anterior hyoid displacement.  1 - Partial anterior movement     Component 10--Epiglottic Movement  Judged at height of swallow/maximal anterior hyoid displacement.  0 - Complete inversion     Component 11--Laryngeal Vestibular Closure  Judged at height of swallow/maximal anterior hyoid displacement.  1 - Incomplete; narrow column air/contrast in laryngeal vestibule     Component 12--Pharyngeal Stripping Wave  Judged during the full duration of the pharyngeal swallow.  1 - Present - diminished     Component 13--Pharyngeal Contraction  Judged in AP view at rest and throughout maximum movement of structures.  0 - Complete     Component 14--Pharyngoesophageal Segment Opening  Judged during maximum distension of PES and throughout opening and closure.  1 - Partial distension/partial duration; partial obstruction to flow     Component 15--Tongue Base (TB) Retraction  Judged during maximum retraction of the tongue base.  3 - Wide column of contrast or air between TB and PW     Component 16--Pharyngeal Residue  Judged after first swallow or after the last swallow of the sequential swallow task.  2 - Collection of residue within or on pharyngeal structures              Location              F - Diffuse (>3 areas)        ESOPHAGEAL Impairment  Component 17--Esophageal Clearance Upright  Position  Judged in AP view during bolus transit through the oral cavity to the LES  0 - Complete clearance; esophageal coating     CARE SCORES:  Self Care:  Eatin: Setup or clean-up assistance  Oral hygiene: 03: Partial/moderate assistance  Toilet hygiene: 02: Substantial/maximal assistance  Shower/bathing self: 09: Not applicable  Upper body dressin: Partial/moderate assistance  Lower body dressin: Partial/moderate assistance  Putting on/taking off footwear: 09: Not applicable  Transfers:  Roll left and right: 09: Not applicable  Sit to lyin: Not applicable  Lying to sitting on side of bed: 02: Substantial/maximal assistance  Sit to stand: 02: Substantial/maximal assistance  Chair/bed to chair transfer: 02: Substantial/maximal assistance  Toilet transfer: 09: Not applicable  Mobility:  Walk 10 ft: 02: Substantial/maximal assistance  Walk 50 ft with two turns: 10: Not attempted due to environmental limitations  Walk 150ft: 88: Not attempted due to medical conditions or safety concerns    CURRENT GAP IN FUNCTION  Prior to Admission: Functional Status: Patient was independent with mobility/ambulation, transfers, ADL's, IADL's.    Expected functional outcomes: It is expected that with skilled acute rehabilitation services the patient will progress to Supervision for self care and Supervision for mobility     Estimated length of stay: 2 - 3 weeks    Anticipated Post-Discharge Disposition/Treatment  Disposition: Return to previous home/apartment.  Outpatient Services: Physical Therapy (PT), Occupational Therapy (OT), and Speech Therapy    BARRIERS TO DISCHARGE  Lovenox, Weakness, Pain, Diminished cognition/Mentation change, Balance Difficulty, Fatigue, Home Accessibility, Caregiver Accessibility, Financial Resources, Equipment Needs, and Resource Availability    INTERVENTIONS FOR DISCHARGE  Adaptive equipment, Patient/Family/Caregiver Education, Community Resources, Financial Assistance, Arrange DME  needs, Medication Changes as per MD recommendations, Therapy exercises, Center of balance support , and Energy conservation education     REQUIRED THERAPY:  Patient will require PT, OT and ST 60 minutes each per day, five days per week to achieve rehab goals.     REQUIRED FUNCTIONAL AND MEDICAL MANAGEMENT FOR INPATIENT REHABILITATION:  Skin:  There are no pressure sores currently, B/L buttocks scabbed areas, sacral erythema, left cheek wound, right arm skin tear, left shin abrasion with Allevyn foam dressing, B/L head incisions DOE, posterior neck incision with sutures DOE;, Pain Management: Overall pain is moderately controlled, Deep Vein Thrombosis (DVT) Prophylaxis:  Lovenox SQ, further IM management of additional medical conditions while on ARC, she needs PT/OT/ST intervention, patient/family education and training, possible Neuropsych and Neurosurgery consults with any other needed consults prn, nursing medication review and management of bowel/bladder function.    RECOMMENDED LEVEL OF CARE:   Patient is a 79 year old female that presented to [facility] on 9/4/2024 as Trauma evaluation with c/o generalized weakness and neck pain s/p fall 4-6 weeks prior. She admitted to headstrike and ASA use. Patient states legs have been feeling weaker. CT cspine showed type 2 odontoid fracture with posterior displacement including prominent posterior displacement of C1 with respect to C2 vertebral body causing a component of cord compression of medullary cervical spinal cord. CT head was negative. Patient was transferred to [facility] for Trauma/Neurosurgery evaluation. Neurosurgery was consulted, with recommendations to hold all AC/AP, vista collar at all times, and plans for tentative OR pending MRI results. MRI cspine showed displaced type 2 dens fracture with severe posterior displacement of dens and C1 vertebral body, resultant mass effect on cord appears relatively mild without gross cord edema; discontinuity of  ALL and irregularity of PLL, likely interspinous ligament injury at C1-C2, facet edema at C2-C3 possible facet capsular injury, prominent edema in C1 and C2 vertebral bodies, paravertebral edema. CTA H/N was negative. On 9/5, patient went to the OR with Neurosurgery for B/L C1 and C3 lateral mass screw placement, B/L C2 pedicle screw placement, arthrodesis C1-3 with autogolous bone graft and Scurry putty; EBL minimal, hemovac drain intact. Per Neurosurgery, patient is to maintain vista collar at all times for 6 weeks, with cervical spinal precautions in place. She was originally initiated on SQ Heparin for DVT prophylaxis, however was transitioned to SQ Lovenox on 9/8. Patient was evaluated by SLP, with noted mild oropharyngeal dysphagia, and she was recommended for dysphagia 2 diet with nectar thick liquids. Postop x-ray showed good hardware placement and alignment. Hemovac was D/C'd on 9/9, per Neurosurgery. MBS was completed with SLP on 9/10, which showed mild oral and moderate pharyngeal dysphagia. Patient was recommended for dysphagia 1 pureed diet with honey thick liquids, aspiration precautions in place, and is tolerating well. Prior to patient's fall/admission, patient was fully Independent with ADLs and received assistance with IADLs. Family provides transportation. She was Modified Independent with cecelia of RW vs SPC for mobility. Since her fall, patient was requiring assistance with ADLs due to weakness. Currently, patient is Mod assist of 1-2 with use of RW for gait and transfers, and Mod assist for both UB and LB ADLs. Close medical management and PM&R management is recommended at this time while patient is on the ARC. Inpatient acute rehab is recommended for patient to maximize overall strength and mobility upon discharge to home with support of family.

## 2024-09-11 NOTE — CASE MANAGEMENT
Case Management Discharge Planning Note    Patient name Marsha Oliva  Location [facility] 605/[facility] 605-01 MRN [id number]  : 1945 Date 2024       Current Admission Date: 2024  Current Admission Diagnosis:Closed odontoid fracture with type II morphology and posterior displacement (HCC)   Patient Active Problem List    Diagnosis Date Noted Date Diagnosed    Acute pain due to trauma 2024     At risk for delirium 2024     Heart failure with preserved ejection fraction (HCC) 2024     Closed odontoid fracture with type II morphology and posterior displacement (HCC) 2024     Spinal cord compression (HCC) 2024     Chronic respiratory failure with hypoxia ([facility]) 2023     Stage 3a chronic kidney disease (HCC) 2022     Bilateral hydronephrosis 2022     Proctitis 2022     Fall 2022     Hypokalemia 2022     ILD (interstitial lung disease) ([facility]) 2022     Oropharyngeal aspiration 2022     Elevated troponin 2022     EKG abnormalities 2022     Mild cognitive impairment of uncertain or unknown etiology 2022     Hormone replacement therapy 2022     Apraxia 2021     Constipation, unspecified 2021     Corn or callus 2021     Difficulty in walking, not elsewhere classified 2021     Generalized muscle weakness 2021     Lymphedema, not elsewhere classified 2021     Repeated falls 2021     Hypoxia 2021     Frequent falls 2021     Elevated d-dimer 2021     Venous insufficiency of both lower extremities 2021     Acquired bilateral hammer toes 2018     Lymphedema of both lower extremities 2018     Severe mitral regurgitation 2018     Anemia 2018     Cardiomegaly 2018     Wound of right leg 2018     Hypertensive heart disease with heart failure (HCC) 2018     Major depressive disorder, single episode, unspecified 2018      Personal history of nicotine dependence 05/13/2018     Pleural effusion, not elsewhere classified 05/13/2018     Solitary pulmonary nodule 05/13/2018     Atrial fibrillation, chronic (HCC) 05/01/2018     B12 deficiency 01/13/2017     Chronic diastolic CHF (congestive heart failure) (HCC) 01/13/2017     Folate deficiency 01/13/2017     Vitamin D deficiency 06/03/2016     Depression with anxiety 08/03/2015     Mixed hyperlipidemia 08/03/2015     Osteoarthritis of knee 10/23/2012     Impaired fasting glucose 05/31/2012     Essential hypertension 03/23/2010     Hypothyroidism 03/23/2010     Posttraumatic stress disorder 03/23/2010       LOS (days): 7  Geometric Mean LOS (GMLOS) (days): 2.6  Days to GMLOS:-4     OBJECTIVE:  Risk of Unplanned Readmission Score: 14.68         Current admission status: Inpatient   Preferred Pharmacy:   [facility] - [place] - [address]  [address] [facility] [address]  Phone: [phone] Fax: [phone]    [id number] - [place] - [address] [address]  Phone: [phone] Fax: [phone]    Primary Care Provider: Rebecca Solis    Primary Insurance: [employer]  REP  Secondary Insurance:     DISCHARGE DETAILS:    CM spoke to Mounika p:[phone] of [address]   She will authorize ARC      Auth # [id number]  Updates 2 days     CM will inform ARC

## 2024-09-11 NOTE — CASE MANAGEMENT
Case Management Discharge Planning Note    Patient name Marsha Oliva  Location [facility] 605/[facility] 605-01 MRN [id number]  : 1945 Date 2024       Current Admission Date: 2024  Current Admission Diagnosis:Closed odontoid fracture with type II morphology and posterior displacement (HCC)   Patient Active Problem List    Diagnosis Date Noted Date Diagnosed    Acute pain due to trauma 2024     At risk for delirium 2024     Heart failure with preserved ejection fraction (HCC) 2024     Closed odontoid fracture with type II morphology and posterior displacement (HCC) 2024     Spinal cord compression (HCC) 2024     Chronic respiratory failure with hypoxia ([facility]) 2023     Stage 3a chronic kidney disease (HCC) 2022     Bilateral hydronephrosis 2022     Proctitis 2022     Fall 2022     Hypokalemia 2022     ILD (interstitial lung disease) ([facility]) 2022     Oropharyngeal aspiration 2022     Elevated troponin 2022     EKG abnormalities 2022     Mild cognitive impairment of uncertain or unknown etiology 2022     Hormone replacement therapy 2022     Apraxia 2021     Constipation, unspecified 2021     Corn or callus 2021     Difficulty in walking, not elsewhere classified 2021     Generalized muscle weakness 2021     Lymphedema, not elsewhere classified 2021     Repeated falls 2021     Hypoxia 2021     Frequent falls 2021     Elevated d-dimer 2021     Venous insufficiency of both lower extremities 2021     Acquired bilateral hammer toes 2018     Lymphedema of both lower extremities 2018     Severe mitral regurgitation 2018     Anemia 2018     Cardiomegaly 2018     Wound of right leg 2018     Hypertensive heart disease with heart failure (HCC) 2018     Major depressive disorder, single episode, unspecified 2018      Personal history of nicotine dependence 05/13/2018     Pleural effusion, not elsewhere classified 05/13/2018     Solitary pulmonary nodule 05/13/2018     Atrial fibrillation, chronic (HCC) 05/01/2018     B12 deficiency 01/13/2017     Chronic diastolic CHF (congestive heart failure) (HCC) 01/13/2017     Folate deficiency 01/13/2017     Vitamin D deficiency 06/03/2016     Depression with anxiety 08/03/2015     Mixed hyperlipidemia 08/03/2015     Osteoarthritis of knee 10/23/2012     Impaired fasting glucose 05/31/2012     Essential hypertension 03/23/2010     Hypothyroidism 03/23/2010     Posttraumatic stress disorder 03/23/2010       LOS (days): 7  Geometric Mean LOS (GMLOS) (days): 2.6  Days to GMLOS:-4.1     OBJECTIVE:  Risk of Unplanned Readmission Score: 14.72         Current admission status: Inpatient   Preferred Pharmacy:   [facility] - [place] - [address]  [address] [facility] [address]  Phone: [phone] Fax: [phone]    [id number] - [place] - [address] [address]  Phone: [phone] Fax: [phone]    Primary Care Provider: Rebecca Solis    Primary Insurance: [employer]  REP  Secondary Insurance:     DISCHARGE DETAILS:    Pt and  made aware of the pt's d/c to [facility] @1330 via [facility]

## 2024-09-12 PROBLEM — R13.12 OROPHARYNGEAL DYSPHAGIA: Status: ACTIVE | Noted: 2024-09-12

## 2024-09-12 PROBLEM — R13.10 DYSPHAGIA: Status: ACTIVE | Noted: 2024-09-12

## 2024-09-12 PROBLEM — L98.9 SKIN ABNORMALITIES: Status: ACTIVE | Noted: 2024-09-12

## 2024-09-12 PROCEDURE — 97530 THERAPEUTIC ACTIVITIES: CPT | Performed by: PHYSICAL THERAPIST

## 2024-09-12 PROCEDURE — 97167 OT EVAL HIGH COMPLEX 60 MIN: CPT

## 2024-09-12 PROCEDURE — 97112 NEUROMUSCULAR REEDUCATION: CPT | Performed by: PHYSICAL THERAPIST

## 2024-09-12 PROCEDURE — 92610 EVALUATE SWALLOWING FUNCTION: CPT

## 2024-09-12 PROCEDURE — 99223 1ST HOSP IP/OBS HIGH 75: CPT | Performed by: PHYSICAL MEDICINE & REHABILITATION

## 2024-09-12 PROCEDURE — 97535 SELF CARE MNGMENT TRAINING: CPT

## 2024-09-12 PROCEDURE — 97530 THERAPEUTIC ACTIVITIES: CPT

## 2024-09-12 PROCEDURE — 97542 WHEELCHAIR MNGMENT TRAINING: CPT | Performed by: PHYSICAL THERAPIST

## 2024-09-12 PROCEDURE — 97163 PT EVAL HIGH COMPLEX 45 MIN: CPT | Performed by: PHYSICAL THERAPIST

## 2024-09-12 RX ORDER — ENOXAPARIN SODIUM 100 MG/ML
40 INJECTION SUBCUTANEOUS
Status: DISCONTINUED | OUTPATIENT
Start: 2024-09-13 | End: 2024-10-07 | Stop reason: HOSPADM

## 2024-09-12 RX ORDER — NYSTATIN AND TRIAMCINOLONE ACETONIDE 100000; 1 [USP'U]/G; MG/G
CREAM TOPICAL 2 TIMES DAILY
Status: DISCONTINUED | OUTPATIENT
Start: 2024-09-12 | End: 2024-09-15

## 2024-09-12 RX ORDER — LACTULOSE 10 G/15ML
20 SOLUTION ORAL ONCE
Status: COMPLETED | OUTPATIENT
Start: 2024-09-12 | End: 2024-09-12

## 2024-09-12 RX ORDER — BISACODYL 10 MG
10 SUPPOSITORY, RECTAL RECTAL ONCE
Status: DISCONTINUED | OUTPATIENT
Start: 2024-09-12 | End: 2024-10-07 | Stop reason: HOSPADM

## 2024-09-12 RX ORDER — DOCUSATE SODIUM 100 MG/1
100 CAPSULE, LIQUID FILLED ORAL 2 TIMES DAILY
Status: DISCONTINUED | OUTPATIENT
Start: 2024-09-12 | End: 2024-10-07 | Stop reason: HOSPADM

## 2024-09-12 RX ORDER — BISACODYL 10 MG
10 SUPPOSITORY, RECTAL RECTAL DAILY PRN
Status: DISCONTINUED | OUTPATIENT
Start: 2024-09-12 | End: 2024-10-07 | Stop reason: HOSPADM

## 2024-09-12 RX ORDER — BISACODYL 5 MG/1
5 TABLET, DELAYED RELEASE ORAL
Status: DISCONTINUED | OUTPATIENT
Start: 2024-09-12 | End: 2024-09-12

## 2024-09-12 RX ORDER — SENNOSIDES 8.6 MG
2 TABLET ORAL
Status: DISCONTINUED | OUTPATIENT
Start: 2024-09-12 | End: 2024-10-07 | Stop reason: HOSPADM

## 2024-09-12 RX ADMIN — Medication 250 MG: at 17:49

## 2024-09-12 RX ADMIN — ENOXAPARIN SODIUM 30 MG: 30 INJECTION SUBCUTANEOUS at 09:37

## 2024-09-12 RX ADMIN — SENNOSIDES 8.8 MG: 8.8 LIQUID ORAL at 09:37

## 2024-09-12 RX ADMIN — SENNOSIDES 17.2 MG: 8.6 TABLET, FILM COATED ORAL at 21:28

## 2024-09-12 RX ADMIN — ACETAMINOPHEN 975 MG: 325 TABLET ORAL at 05:30

## 2024-09-12 RX ADMIN — OXYCODONE HYDROCHLORIDE 5 MG: 5 TABLET ORAL at 14:09

## 2024-09-12 RX ADMIN — LEVOTHYROXINE SODIUM 125 MCG: 25 TABLET ORAL at 05:30

## 2024-09-12 RX ADMIN — ATORVASTATIN CALCIUM 20 MG: 20 TABLET, FILM COATED ORAL at 17:49

## 2024-09-12 RX ADMIN — Medication 2.5 MG: at 07:34

## 2024-09-12 RX ADMIN — ACETAMINOPHEN 975 MG: 325 TABLET ORAL at 21:29

## 2024-09-12 RX ADMIN — LACTULOSE 20 G: 20 SOLUTION ORAL at 12:14

## 2024-09-12 RX ADMIN — DILTIAZEM HYDROCHLORIDE 180 MG: 180 CAPSULE, COATED, EXTENDED RELEASE ORAL at 09:37

## 2024-09-12 RX ADMIN — NYSTATIN, TRIAMCINOLONE ACETONIDE: 100000; 1 CREAM TOPICAL at 17:49

## 2024-09-12 RX ADMIN — ACETAMINOPHEN 975 MG: 325 TABLET ORAL at 14:09

## 2024-09-12 RX ADMIN — CHLORHEXIDINE GLUCONATE 15 ML: 1.2 RINSE ORAL at 21:29

## 2024-09-12 RX ADMIN — FOLIC ACID 1000 MCG: 1 TABLET ORAL at 09:37

## 2024-09-12 RX ADMIN — CHLORHEXIDINE GLUCONATE 15 ML: 1.2 RINSE ORAL at 09:37

## 2024-09-12 RX ADMIN — SERTRALINE 100 MG: 100 TABLET, FILM COATED ORAL at 09:37

## 2024-09-12 RX ADMIN — Medication 250 MG: at 09:37

## 2024-09-12 RX ADMIN — DOCUSATE SODIUM 100 MG: 50 LIQUID ORAL at 09:37

## 2024-09-12 NOTE — PROGRESS NOTES
09/12/24 1130   Pain Assessment   Pain Assessment Tool 0-10   Pain Score 8   Pain Location/Orientation Location: Neck   Patient's Stated Pain Goal No pain   Hospital Pain Intervention(s) Medication (See MAR)   Multiple Pain Sites No   Restrictions/Precautions   Precautions Aspiration;Bed/chair alarms;Cognitive;Fall Risk;Pain;Spinal precautions;Supervision on toilet/commode   Braces or Orthoses C/S Collar   Expression   Verbal Complex   Intelligibility Sentence   QI: Expression 3. Exhibits some difficulty with expressing needs and ideas (e.g., some words or finishing thoughts) or speech is not clear   Expression (FIM) 5 - Needs help/cues only RARELY (< 10% of the time)   Memory Skills   Orientation Level Oriented to person;Oriented to situation   Swallow Information   Current Risks for Dysphagia & Aspiration General debilitation;Cervical spine injury/surgery;Significant ridgity;Head support required   Current Symptoms/Concerns Cough;Clear throat;With food;With liquids;During meals;Aftermeals;Difficulty chewing;HX Dysphagia;Pain with swallowing   Current Diet Dysphagia pureed;Honey thick liquids   Baseline Diet Regular;Thin liquids   Recommendations   Risk for Aspiration Present   Recommendations Continue swallow eval process;Dysphagia treatment   Diet Solid Recommendation Level 1 Dysphagia/pureed   Diet Liquid Recommendation Honey thick liquid   Recommended Form of Meds Crushed;With thick liquid;With puree   General Precautions Aspiration precautions;Minimize distractions;Upright as possible for all oral intake;Remain upright for 45 mins after meals   Compensatory Swallowing Strategies Alternate solids and liquids;Swallow 2 times per bite/sip   Results Reviewed with PT/Family/Caregiver;RN;MD   Eating   Type of Assistance Needed Set-up / clean-up   Physical Assistance Level No physical assistance   Eating CARE Score 5   Swallow Assessment   Swallow Treatment Assessment Patient with strong dysphagia history  "documented as far back as August 2022 when patient had VBS completed at [facility] for \"coughing\" and \"lump sensation.\"  Patient was recommended at that time for thin liquids, mechanical soft solids and medications crushed in puree.  S/p fall and subsequent cervical surgery and C collar placement.  Overt coughing at bedside prompted additional video swallow study completed 9/10/24 with findings of overt and silent aspiration of thin/nectar and recommendations of puree/HTL.  Patient overall with decreased ROM, BOT weakness, no epiglottic inversion and no airway closure.  Aspiration occurs during and after the swallow.  She endorses pain when swallowing and lump sensation persists.   Swallow Assessment Prognosis   Prognosis Fair   Prognosis Considerations Co-morbidities;Medical diagnosis;Potential;Severity of impairments   SLP Therapy Minutes   SLP Time In 1130   SLP Time Out 1215   SLP Total Time (minutes) 45   SLP Mode of treatment - Individual (minutes) 45   SLP Mode of treatment - Concurrent (minutes) 0   SLP Mode of treatment - Group (minutes) 0   SLP Mode of treatment - Co-treat (minutes) 0   SLP Mode of Treatment - Total time(minutes) 45 minutes   SLP Cumulative Minutes 45       "

## 2024-09-12 NOTE — PROGRESS NOTES
09/12/24 1233   Patient Data   Rehab Impairment Impairment of mobility, safety, Activities of Daily Living (ADLs), and cognitive/communication skills due to Spinal Cord Dysfunction:  Traumatic:  04.2211  Quadriplegia, Incomplete C1-4   Etiologic Diagnosis Cervical Spinal Cord Compression; presented to [facility] on 9/4/2024 as Trauma evaluation with c/o generalized weakness and neck pain s/p fall 4-6 weeks prior. She admitted to headstrike and ASA use. Patient states legs have been feeling weaker. ; PMH: HTN, A Fib, CHF, HLD, Hypothyroidism   Date of Onset 09/04/24   Support System   Name Tomas Seay    Home Setup   Type of Home Multi Level   Method of Entry Stairs;Hand Rail Left   Number of Stairs 3   First Floor Bathroom Full  (garden tub that patient doesnt access)   Second Floor Bathroom Full;Combo  (Lift for tub transfer)   First Floor Setup Available Yes   Available Equipment Wheelchair;Roller Walker;Single Point Cane   Baseline Information   Transportation Family/friends drive   Prior Device(s) Used Roller Walker   Prior IADL Participation   Money Management   ( completes)   Meal Preparation Partial Participation   Laundry Partial Participation   Home Cleaning Partial Participation   Prior Level of Function   Self-Care 3. Independent - Patient completed the activities by him/herself, with or without an assistive device, with no assistance from a helper.   Functional Cognition 2. Needed Some Help - Patient needed a partial assistance from another person to complete activities.   Prior Assistance Needed for Driving;Shopping;Money Management   Prior Device Used D. Walker   Falls in the Last Year   Number of falls in the past 12 months 2   Type of Injury Associated with Fall Major injury   Patient Preference   Patient Normally Wakes at 0800   Restrictions/Precautions   Precautions Aspiration;Bed/chair alarms;Fall Risk;Pain;Spinal precautions;Supervision on  toilet/commode  (pureed diet HTL)   ROM Restrictions   (spinal prec)   Braces or Orthoses C/S Collar   Pain Assessment   Pain Assessment Tool 0-10   Pain Score 5   Pain Location/Orientation Location: Neck   Oral Hygiene   Type of Assistance Needed Physical assistance   Physical Assistance Level 25% or less   Oral Hygiene CARE Score 3   Grooming   Able To Initiate Tasks;Comb/Brush Hair;Wash/Dry Face;Brush/Clean Teeth;Wash/Dry Hands   Limitation Noted In Safety;Strength;Sequencing   Findings min A seated w/c level   Tub/Shower Transfer   Reason Not Assessed Sponge Bath   Shower/Bathe Self   Type of Assistance Needed Physical assistance   Physical Assistance Level 51%-75%   Shower/Bathe Self CARE Score 2   Bathing   Assessed Bath Style Sponge Bath   Anticipated D/C Bath Style Shower;Sponge Bath   Able to Gather/Transport No   Able to Adjust Water Temperature No   Able to Wash/Rinse/Dry (body part) Left Arm;Right Arm;L Upper Leg;R Upper Leg;Chest;Abdomen   Limitations Noted in Balance;Endurance;Problem Solving;ROM;Safety;Strength;Sequencing  (spinal prec with c collar)   Positioning Seated;Supine   Dressing/Undressing Clothing   Remove UB Clothes Pullover Shirt   Don UB Clothes Pullover Shirt   Type of Assistance Needed Physical assistance   Physical Assistance Level 51%-75%   Upper Body Dressing CARE Score 2   Remove LB Clothes Pants;Socks;Shoes   Don LB Clothes Pants;Undergarment;Socks;Shoes   Type of Assistance Needed Physical assistance   Physical Assistance Level 76% or more   Lower Body Dressing CARE Score 2   Limitations Noted In Balance;Endurance;Problem Solving;Safety;Strength;ROM  (spinal prec with c collar)   Positioning Supported Sit;Standing   Putting On/Taking Off Footwear   Type of Assistance Needed Physical assistance   Physical Assistance Level 76% or more   Putting On/Taking Off Footwear CARE Score 2   Toileting Hygiene   Type of Assistance Needed Physical assistance   Physical Assistance Level 76% or  more   Toileting Hygiene CARE Score 2   Toilet Transfer   Surface Assessed Raised Toilet   Transfer Technique Standard   Limitations Noted In Balance;Endurance;Problem Solving;ROM;Safety;LE Strength   Adaptive Equipment Grab Bar   Type of Assistance Needed Physical assistance   Physical Assistance Level 51%-75%   Toilet Transfer CARE Score 2   Toileting   Able to Pull Clothing down no, up no.   Manage Hygiene Bladder   Limitations Noted In Balance;Problem Solving;ROM;Safety;LE Strength   Sit to Lying   Type of Assistance Needed Physical assistance   Physical Assistance Level 51%-75%   Sit to Lying CARE Score 2   Sit to Stand   Type of Assistance Needed Physical assistance   Physical Assistance Level 51%-75%   Sit to Stand CARE Score 2   Picking Up Object   Reason if not Attempted Safety concerns   Picking Up Object CARE Score 88   Comprehension   Comprehension (FIM) 4 - Understands basic info/conversation 75-90% of time   Expression   Expression (FIM) 5 - Needs help/cues only RARELY (< 10% of the time)   Social Interaction   Social Interaction (FIM) 5 - Interacts appropriately with others 90% of time   Problem Solving   Problem solving (FIM) 3 - Solves basic problmes 50-74% of time   Memory   Memory (FIM) 4 - Recognizes/recalls/performs 75-89%   RUE Assessment   RUE Assessment X  (AROM WFL with sh flexion to 90* only, MMT 3+/5 grossly  UE demosntrated with functional activity)   LUE Assessment   LUE Assessment X  (AROM WFL with sh flexion to 90* only, MMT 3+/5 grossly UE demosntrated with functional activity)   Coordination   Movements are Fluid and Coordinated 0   Coordination and Movement Description gross motor and fine coordination limited BUE   Sensation   Light Touch No apparent deficits   Propioception No apparent deficits   Cognition   Arousal/Participation Alert;Responsive;Cooperative   Attention Attends with cues to redirect   Orientation Level Oriented to person;Oriented to place;Oriented to time;Oriented  "to situation   Memory Decreased short term memory;Decreased recall of recent events;Decreased recall of precautions   Following Commands Follows one step commands with increased time or repetition   Discharge Information   Vocational Plan Retired/not working  (enjoys doing [employer]/ [employer] puzzles)   Patient's Discharge Plan home with    Patient's Rehab Expectations \"Do better.\"   Impressions Pt presents following hospitalization due to Cervical Spinal Cord Compression; presented to [facility] on 9/4/2024 as Trauma evaluation with c/o generalized weakness and neck pain s/p fall 4-6 weeks prior. She admitted to headstrike and ASA use. Patient states legs have been feeling weaker. ; PMH: HTN, A Fib, CHF, HLD, Hypothyroidism. Pt requries assist due to decreased  balance, safety, endurance, cognition and stregnth/ ROM/ coordination with cervical collar and spinal precautions. Pt will benefit from skilled OT services to increase independence with daily tasks.   OT Therapy Minutes   OT Time In 1233   OT Time Out 1405   OT Total Time (minutes) 92   OT Mode of treatment - Individual (minutes) 92   Addendum: Pt requires supervision/ min A for meal completion with cues for increased intake benjamin of drink mixture and thickened liquids. Pt prefers to defer hot meal due to discomfort in mouth.  "

## 2024-09-12 NOTE — ASSESSMENT & PLAN NOTE
Wound Care team following in ARC:  POA    1)Sacrum red linear tissue,slow to alayna, staged as stage one POA to sacrum.Mixed etiology of pressure and moisture.Hydraguard applied no foam dressing .  2)Bilateral buttocks, pink intact erythema. Tissue appears darker in photo in EMR than when assessed. Tissue is pink intact and all areas of both buttocks are blanchable. Hydraguard applied, no foam dressing was applied over hydraguard as this will trap moisture against skin.Area of erythema is decreasing in size form previous photo at [facility].  3)Abrasion to left cheek is open to air.   4)Right arm skin tear beefy red  5)Bilateral heels pink , blanchable. Heel foams on a preventative measure. Right heel outer aspect has area of light purple appears to be ischemic.Heels offloaded.  5)Dry soft intact brown callous on second toe of right foot. Pt states this is known to podiatry, Dr Mohr, with pt having visits to trim callous. Currently open to air.  6)ITD of breast folds, has nystatin powder  7)Left pretibial skin tear resolved. Open to air      Skin care plans:  1-Apply hydroguard cream to sacrum and bilateralbuttocks to hydrate dry skin and protective barrier Bid and Prn  2-Elevate heels to offload pressure.  3-Ehob cushion in chair when out of bed.  4-Moisturize skin daily with skin nourishing cream.  5-Turn/reposition q2h or when medically stable for pressure re-distribution on skin.   6-Monitor callous of Right foot second toe for changes , open to air.  7-Cleanse right arm skin tear with NSS. Apply Dermagran to wound bed only. Cover with DSD change every other day and prn soil or dislodgment   8-Right foot second toe callous open to air, has podiatry consulted.

## 2024-09-12 NOTE — ASSESSMENT & PLAN NOTE
Presented  to acute care on 9/4/24 with progressive neck pain and weakness.    She reports 4-6 weeks prior she sustained a fall off her bed, head hitting her nightstand.    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   MRI C spine showing displaced type 2 dens fracture with mild mass effect on cord, ALL and PLL injuries.    CTA head and neck negative for vascular injuries.    Patient seen by Neurosurgery and deemed an operative candidate.    Patient taken to the OR by Dr. Dewitt for a bilateral C1 and C3 lateral mass screw placement, bialteral C2 pedicle screw palcement, C1-C3 fusion.    Post operatively, placed in a Aspen C Collar at all times, except showering with Shonda collar.    Post op X-rays with stable alignment    Plan in ARC:  Begin ARC program with PT, OT, SLP  Monitor incision  Monitor pain  Monitor neuro exam  Follow-up with Neurosurgery in 2 weeks  POD 14 = 9/19/24

## 2024-09-12 NOTE — NUTRITION
09/12/24 1342   Biochemical Data,Medical Tests, and Procedures   Biochemical Data/Medical Tests/Procedures Lab values reviewed;Meds reviewed   Labs (Comment) 9/10/2024 creatinine 0.54   Meds (Comment) Tylenol, atorvastatin, Dulcolax, vitamin B12, digoxin, Cardizem, folic acid, oxine, Zoloft   Nutrition-Focused Physical Exam   Nutrition-Focused Physical Exam Findings RN skin assessment reviewed;No edema documented;Wound  (Wound at right lower posterior arm, wound at sacrum, wound at left sternum, wound at right anterior second toe, wound at posterior cervical neck, wound at bilateral head, c-collar present)   Medical-Related Concerns vitamin B12 deficiency, CHF, hypertension, hypothyroidism, mixed hyperlipidemia, vitamin D deficiency, interstitial lung disease, stage III CKD, oropharyngeal aspiration, anemia, mild cognitive impairment   Adequacy of Intake   Nutrition Modality PO   Feeding Route   PO With assistance   Current PO Intake   Current Diet Order Puréed diet, honey thick liquids   Current Meal Intake 50-75%  (Patient reports consuming 50% at breakfast this morning)   Estimated calorie intake compared to estimated need Anticipate nutrient needs are not met   PES Statement   Problem Intake   Oral or Nutritional Support Intake (2) Inadequate oral intake NI-2.1   Related to Swallowing difficulty;Decreased appetite   As evidenced by: Per patient/family interview;Intake < estimated needs;Weight loss   Recommendations/Interventions   Malnutrition/BMI Present No  (Does not meet 2 criteria at this time)   Summary Wound care RN consulted; ST consulted; weight loss, poor p.o.-MST 3; muscle/adipose loss.  Traumatic injuries status post fall 4-6 weeks prior to admission.  Past medical history significant for vitamin B12 deficiency, CHF, hypertension, hypothyroidism, mixed hyperlipidemia, vitamin D deficiency, interstitial lung disease, stage III CKD, oropharyngeal aspiration, anemia, mild cognitive impairment.  Weight  "history reviewed.  Noted significant 2# loss in 1 week (1.6%).  No edema.  Skin integrity as above.  C-collar in place.  Prescribed a puréed diet with honey thick liquids.  Unknown meal completions as of yet.  Reports her appetite is \"starting to pick back up\" in the past 2 days.  Usually consumes coffee and a doughnut for breakfast.  Usually consumes a cooked meal or takeout for dinner.  A snack of potato chips or crackers often had after dinner.  Follows no special diet.  NKF A.  Denies difficulty chewing.  Endorses difficulty swallowing, states she has \"heavy mucus.\"  ST following.  Patient reports being lost to follow-up with ST in the past, \"I wanted to relax.\"  Adds \"a little bit\" of salt to food.  Her  cooks and grocery shops.  Did not observe areas of obvious muscle/adipose loss.  Discussed diet as prescribed.  Discussed nutrition supplements in setting of decreased appetite.  Agreeable to mightyshake twice daily.  RD to monitor acceptance at review/follow-up.   Interventions/Recommendations Continue current diet order;Supplement initiate;Monitor I & O's   Education Assessment   Education Education not indicated at this time   Patient Nutrition Goals   Goal Meet PO needs;Avoid weight loss   Goal Status Initiated   Timeframe to complete goal by next f/u   Nutrition Complexity Risk   Nutrition complexity level Moderate risk   Nutrition review: 09/13/24  (her thoughts on Mightyshake?)   Follow up date 09/18/24       "

## 2024-09-12 NOTE — CONSULTS
PHYSICAL MEDICINE AND REHABILITATION   CONSULT NOTE  Marsha Oliva 79 y.o. female MRN: [id number]  Unit/Bed#: [facility] 221-01 Encounter: [id number]     Rehab Diagnosis: Impairment of mobility, safety and Activities of Daily Living (ADLs) due to Spinal Cord Dysfunction:  Traumatic:  04.2211  Quadriplegia, Incomplete C1-4  Etiologic Diagnosis: Fall with displaced type 2 dens fracture with spinal cord injury and compression    History of Present Illness:   Marsha Oliva is a 79 y.o. female with HTN, A Fib, CHF, HLD, Hypothyroidism [facility] with progressive neck pain and weakness.  She reports 4-6 weeks prior she sustained a fall off her bed, head hitting her nightstand.  She has been having increasing neck pain since.  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 MRI C spine showing displaced type 2 dens fracture with mild mass effect on cord, ALL and PLL injuries.  CTA head and neck negative for vascular injuries.  Patient seen by Neurosurgery and deemed an operative candidate.  Patient taken to the OR by Dr. Dewitt for a bilateral C1 and C3 lateral mass screw placement, bialteral C2 pedicle screw palcement, C1-C3 fusion.  Post operatively, placed in a Aspen C Collar at all times, except showering with Shonda collar.  Post op X-rays with stable alignment  Medical course complicated by acute dysphagia.  VFSS study completed on 9/10/24 and patient found to have mild oral and moderate phayngeal dysphagia.  Cleared for a pureed diet with honey thick liquids.  Patient seen by wound care team for abrasions and stage I pressure injury to sacrum.  Patient started on Tylenol and PRN oxycodone for acute pain.    After medical stabilization, patient found to have acute functional deficits from his baseline, therefore, admitted to [facility] for acute inpatient  rehabilitation.        Plan:     Rehabilitation  Functional deficits: cervical collar on at all times, cervical spinal precautions, impaired mobility, self care  Begin PT/OT/SLP.  Rehabilitation goals are to achieve a Mod I level with mobility and self care, swallow.  Prognosis is fair.  ELOS is 2 weeks.  Estimated discharge is home.     DVT prophylaxis  Continue Lovenox 40 mg daily    Bladder plan  Continent    Bowel plan  Constipation  Last BM 9/9/24  Increased bowel regimen    Code Status  Full Code       Spinal cord compression (HCC)  Assessment & Plan  Presented  to acute care on 9/4/24 with progressive neck pain and weakness.    She reports 4-6 weeks prior she sustained a fall off her bed, head hitting her nightstand.    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   MRI C spine showing displaced type 2 dens fracture with mild mass effect on cord, ALL and PLL injuries.    CTA head and neck negative for vascular injuries.    Patient seen by Neurosurgery and deemed an operative candidate.    Patient taken to the OR by Dr. Dewitt for a bilateral C1 and C3 lateral mass screw placement, bialteral C2 pedicle screw palcement, C1-C3 fusion.    Post operatively, placed in a Aspen C Collar at all times, except showering with Shonda collar.    Post op X-rays with stable alignment    Plan in ARC:  Begin ARC program with PT, OT, SLP  Monitor incision  Monitor pain  Monitor neuro exam  Follow-up with Neurosurgery in 2 weeks  POD 14 = 9/19/24    Oropharyngeal dysphagia  Assessment & Plan  New issues  VFSS study completed on 9/10/24 and patient found to have mild oral and moderate phayngeal dysphagia.    Cleared for a pureed diet with honey thick liquids.    SLP to follow  Continue aspiration precautions     Skin abnormalities  Assessment & Plan  Wound Care team following in ARC:  POA    1)Sacrum red linear tissue,slow to alayna,  staged as stage one POA to sacrum.Mixed etiology of pressure and moisture.Hydraguard applied no foam dressing .  2)Bilateral buttocks, pink intact erythema. Tissue appears darker in photo in EMR than when assessed. Tissue is pink intact and all areas of both buttocks are blanchable. Hydraguard applied, no foam dressing was applied over hydraguard as this will trap moisture against skin.Area of erythema is decreasing in size form previous photo at [facility].  3)Abrasion to left cheek is open to air.   4)Right arm skin tear beefy red  5)Bilateral heels pink , blanchable. Heel foams on a preventative measure. Right heel outer aspect has area of light purple appears to be ischemic.Heels offloaded.  5)Dry soft intact brown callous on second toe of right foot. Pt states this is known to podiatry, Dr Mohr, with pt having visits to trim callous. Currently open to air.  6)ITD of breast folds, has nystatin powder  7)Left pretibial skin tear resolved. Open to air      Skin care plans:  1-Apply hydroguard cream to sacrum and bilateralbuttocks to hydrate dry skin and protective barrier Bid and Prn  2-Elevate heels to offload pressure.  3-Ehob cushion in chair when out of bed.  4-Moisturize skin daily with skin nourishing cream.  5-Turn/reposition q2h or when medically stable for pressure re-distribution on skin.   6-Monitor callous of Right foot second toe for changes , open to air.  7-Cleanse right arm skin tear with NSS. Apply Dermagran to wound bed only. Cover with DSD change every other day and prn soil or dislodgment   8-Right foot second toe callous open to air, has podiatry consulted.    Acute pain due to trauma  Assessment & Plan  Located in neck  Continue Tylenol 975 mg TID  Oxycodone IR 5-10 mg Q 4 hours PRN for moderate-severe pain  Topical ICE    Folate deficiency  Assessment & Plan  Continue supplementation   IM managing    Mixed hyperlipidemia  Assessment & Plan  Continue Atorvastain 20 mg  daily    Hypothyroidism  Assessment & Plan  Continue Synthroid 125 mcg daily    Essential hypertension  Assessment & Plan  Continue Cardizem  mg Daily  Monitor BP and HR with rest and activity  IM managing    Depression with anxiety  Assessment & Plan  Mood is stable  Continue Zoloft 100 mg daily    Chronic diastolic CHF (congestive heart failure) (HCC)  Assessment & Plan  Wt Readings from Last 3 Encounters:   09/11/24 52.8 kg (116 lb 6.5 oz)   09/11/24 53.8 kg (118 lb 9.7 oz)   09/04/24 53.5 kg (118 lb)     Weight stable  Patient takes Demedex 20 mg daily PRN for weight gain  Monitor daily weights  IM managing        B12 deficiency  Assessment & Plan  Continue supplementation   IM managing    Atrial fibrillation, chronic (HCC)  Assessment & Plan  Chronic A. Fib  Rate/rhythm controlled on digoxin 125 mcg every other day, Cardizem  mg daily  No AC, but was taking Aspirin 81 mg daily (restart when able).  IM managing    Acquired bilateral hammer toes  Assessment & Plan  Keratosis of toe  May need shaving of this lesion  Consult placed to Podiatry        Subjective:   Patient seen face to face.  Pleasant.  Reporting mild-moderate pain in her neck.  No good BM.  Urinating without issues per report.  She states she feels weak all over.      Review of Systems   Constitutional: Negative.    HENT: Negative.     Eyes: Negative.    Respiratory: Negative.     Cardiovascular: Negative.    Gastrointestinal: Negative.    Endocrine: Negative.    Genitourinary: Negative.    Musculoskeletal: Negative.    Skin: Negative.    Allergic/Immunologic: Negative.    Neurological: Negative.    Hematological: Negative.    Psychiatric/Behavioral: Negative.           Function:  Prior level of function and living situation:  PRIOR LEVEL OF FUNCTION:  She lives in a(n) single family home  Marsha Oliva is  and lives with their spouse.  Self Care: Independent, Indoor Mobility: Modified Independent, Stairs (in/outdoor): Modified  "Independent, and Cognition: Independent  Prior to patient's fall/admission, patient was fully Independent with ADLs and received assistance with IADLs. Family provides transportation. She was Modified Independent with cecelia of RW vs SPC for mobility. Since her fall, patient was requiring assistance with ADLs due to weakness.     HOME ENVIRONMENT:  The living area: can live on one level  There are 3 steps to enter the home, with full flight of stairs to 2nd floor of home.    The patient will have 24 hour supervision/physical assistance available upon discharge.  Patient's spouse is supportive and able to assist as needed.    Current level of function:  Physical Therapy: Mod-Max A for bed mobility, transfers, ambulation 12 feet with RW  Occupational Therapy: Mod A with grooming, Mod A with UB ADLs, Mod-Max A for LB ADLs, Max A Toileting  Speech Therapy: mild oral and moderate pharyngeal dysphagia - currently on Dysphagia 1 diet with HTL    Physical Exam:  /80 (BP Location: Left arm)   Pulse 81   Temp (!) 97 °F (36.1 °C) (Temporal)   Resp 16   Ht 5' 3\" (1.6 m)   Wt 52.8 kg (116 lb 6.5 oz)   SpO2 94%   BMI 20.62 kg/m²      No intake or output data in the 24 hours ending 09/12/24 1305    Body mass index is 20.62 kg/m².      Physical Exam  Vitals and nursing note reviewed.   Constitutional:       General: She is not in acute distress.  HENT:      Head: Normocephalic and atraumatic.      Nose: Nose normal.      Mouth/Throat:      Mouth: Mucous membranes are moist.   Eyes:      Conjunctiva/sclera: Conjunctivae normal.   Neck:      Comments:  C collar in place  Cardiovascular:      Rate and Rhythm: Normal rate and regular rhythm.      Pulses: Normal pulses.      Heart sounds: Normal heart sounds.   Pulmonary:      Effort: Pulmonary effort is normal.      Breath sounds: No wheezing or rales.      Comments: Decreased BS at bases  Abdominal:      General: Bowel sounds are normal. There is no distension.      " Palpations: Abdomen is soft.      Tenderness: There is no abdominal tenderness.   Musculoskeletal:         General: No swelling.      Cervical back: Neck supple.   Skin:     General: Skin is warm.      Findings: Bruising, lesion and rash (under breasts bilaterally) present.      Comments: Buttocks bruising DTI and abrasion  Hammer toe with lesion present  Abrasion on arm and cheek   Neurological:      Mental Status: She is alert and oriented to person, place, and time.      Motor: Weakness present.      Comments: Weakness in all 4 extremities: Graded as 2/5 proximally and 3/5 distally x 4   Psychiatric:         Mood and Affect: Mood normal.            Labs, medications, and imaging personally reviewed.    Laboratory:    Lab Results   Component Value Date    SODIUM 139 09/10/2024    K 4.0 09/10/2024     09/10/2024    CO2 30 09/10/2024    BUN 12 09/10/2024    CREATININE 0.54 (L) 09/10/2024    GLUC 90 09/10/2024    CALCIUM 9.2 09/10/2024     Lab Results   Component Value Date    WBC 8.07 09/08/2024    HGB 12.3 09/08/2024    HCT 39.7 09/08/2024     (H) 09/08/2024     09/11/2024     Lab Results   Component Value Date    INR 0.93 09/06/2024    INR 0.98 09/04/2024    INR 1.06 12/23/2023    PROTIME 12.8 09/06/2024    PROTIME 13.5 09/04/2024    PROTIME 14.0 12/23/2023         Current Facility-Administered Medications:     acetaminophen (TYLENOL) tablet 975 mg, 975 mg, Oral, Q8H CHRIS, Sharmin Waggoner PA-C, 975 mg at 09/12/24 0530    atorvastatin (LIPITOR) tablet 20 mg, 20 mg, Oral, Daily With Dinner, Sharmin Waggoner PA-C, 20 mg at 09/11/24 1750    bisacodyl (DULCOLAX) rectal suppository 10 mg, 10 mg, Rectal, Daily PRN, Sharmin Waggoner PA-C    bisacodyl (DULCOLAX) rectal suppository 10 mg, 10 mg, Rectal, Once, Corine Park MD    calcium carbonate (TUMS) chewable tablet 1,000 mg, 1,000 mg, Oral, Daily PRN, Sharmin Waggoner PA-C    chlorhexidine (PERIDEX) 0.12 % oral rinse  15 mL, 15 mL, Mouth/Throat, Q12H CHRIS, Sharmin Waggoner PA-C, 15 mL at 09/12/24 0937    cyanocobalamin (VITAMIN B-12) tablet 1,000 mcg, 1,000 mcg, Oral, Weekly, Sharmin Waggoner PA-C, 1,000 mcg at 09/11/24 1747    digoxin (LANOXIN) tablet 125 mcg, 125 mcg, Oral, Every Other Day, Sharmin Waggoner PA-C, 125 mcg at 09/11/24 1747    diltiazem (CARDIZEM CD) 24 hr capsule 180 mg, 180 mg, Oral, Daily, Sharmin Waggoner PA-C, 180 mg at 09/12/24 0937    docusate sodium (COLACE) capsule 100 mg, 100 mg, Oral, BID, Corine Park MD    [START ON 9/13/2024] enoxaparin (LOVENOX) subcutaneous injection 40 mg, 40 mg, Subcutaneous, Q24H CHRIS, Corine Park MD    folic acid (FOLVITE) tablet 1,000 mcg, 1,000 mcg, Oral, Daily, Sharmin Waggoner PA-C, 1,000 mcg at 09/12/24 0937    levothyroxine tablet 125 mcg, 125 mcg, Oral, Early Morning, Sharmni Waggoner PA-C, 125 mcg at 09/12/24 0530    magnesium gluconate (MAGONATE) tablet 250 mg, 250 mg, Oral, BID, Sharmin Waggoner PA-C, 250 mg at 09/12/24 0937    nystatin-triamcinolone (MYCOLOG-II) cream, , Topical, BID, Corine Park MD    ondansetron (ZOFRAN-ODT) dispersible tablet 4 mg, 4 mg, Oral, Q6H PRN, Sharmin Waggoner PA-C    oxyCODONE (ROXICODONE) split tablet 2.5 mg, 2.5 mg, Oral, Q4H PRN, 2.5 mg at 09/12/24 0734 **OR** oxyCODONE (ROXICODONE) IR tablet 5 mg, 5 mg, Oral, Q4H PRN, Sharmin Waggoner PA-C    phenol (CHLORASEPTIC) 1.4 % mucosal liquid 1 spray, 1 spray, Mouth/Throat, Q2H PRN, Sharmin Waggoner PA-C    polyethylene glycol (MIRALAX) packet 17 g, 17 g, Oral, Daily PRN, Corine Park MD    senna (SENOKOT) tablet 17.2 mg, 2 tablet, Oral, HS, Corine Park MD    sertraline (ZOLOFT) tablet 100 mg, 100 mg, Oral, Daily, Sharmin Waggoner PA-C, 100 mg at 09/12/24 0937    torsemide (DEMADEX) tablet 20 mg, 20 mg, Oral, Daily PRN, Sharmin Waggoner PA-C    No Known Allergies      Patient Active Problem List    Diagnosis Date Noted    Spinal cord compression (HCC) 09/04/2024    Oropharyngeal dysphagia 09/12/2024    Skin abnormalities 09/12/2024    Acute pain due to trauma 09/06/2024    At risk for delirium 09/06/2024    Heart failure with preserved ejection fraction ([facility]) 09/06/2024    Closed odontoid fracture with type II morphology and posterior displacement ([facility]) 09/04/2024    Chronic respiratory failure with hypoxia ([facility]) 03/09/2023    Stage 3a chronic kidney disease ([facility]) 08/12/2022    Bilateral hydronephrosis 08/12/2022    Proctitis 08/12/2022    Fall 08/12/2022    Hypokalemia 08/12/2022    ILD (interstitial lung disease) ([facility]) 07/18/2022    Oropharyngeal aspiration 07/18/2022    Elevated troponin 06/02/2022    EKG abnormalities 06/02/2022    Mild cognitive impairment of uncertain or unknown etiology 01/20/2022    Hormone replacement therapy 01/05/2022    Apraxia 12/28/2021    Constipation, unspecified 12/28/2021    Corn or callus 12/28/2021    Difficulty in walking, not elsewhere classified 12/28/2021    Generalized muscle weakness 12/28/2021    Lymphedema, not elsewhere classified 12/28/2021    Repeated falls 12/28/2021    Hypoxia 12/23/2021    Frequent falls 12/23/2021    Elevated d-dimer 12/23/2021    Venous insufficiency of both lower extremities 12/17/2021    Acquired bilateral hammer toes 07/27/2018    Lymphedema of both lower extremities 07/02/2018    Severe mitral regurgitation 05/31/2018    Anemia 05/13/2018    Cardiomegaly 05/13/2018    Wound of right leg 05/13/2018    Hypertensive heart disease with heart failure (HCC) 05/13/2018    Major depressive disorder, single episode, unspecified 05/13/2018    Personal history of nicotine dependence 05/13/2018    Pleural effusion, not elsewhere classified 05/13/2018    Solitary pulmonary nodule 05/13/2018    Atrial fibrillation, chronic ([facility]) 05/01/2018    B12 deficiency 01/13/2017    Chronic diastolic CHF (congestive heart  failure) ([facility]) 01/13/2017    Folate deficiency 01/13/2017    Vitamin D deficiency 06/03/2016    Depression with anxiety 08/03/2015    Mixed hyperlipidemia 08/03/2015    Osteoarthritis of knee 10/23/2012    Impaired fasting glucose 05/31/2012    Essential hypertension 03/23/2010    Hypothyroidism 03/23/2010    Posttraumatic stress disorder 03/23/2010     Past Medical History:   Diagnosis Date    Disease of thyroid gland     Hypotension     Supratherapeutic INR 6/2/2022     Past Surgical History:   Procedure Laterality Date    CERVICAL FUSION Bilateral 9/5/2024    Procedure: navigated posterior fusion C1-C3;  Surgeon: Eduardo Dewitt MD;  Location: [facility] MAIN OR;  Service: Neurosurgery    HYSTERECTOMY       Social History     Socioeconomic History    Marital status: /Civil Union     Spouse name: Not on file    Number of children: Not on file    Years of education: Not on file    Highest education level: Not on file   Occupational History    Not on file   Tobacco Use    Smoking status: Former     Types: Cigarettes    Smokeless tobacco: Never   Vaping Use    Vaping status: Never Used   Substance and Sexual Activity    Alcohol use: Never     Comment: occassional.    Drug use: Never    Sexual activity: Never   Other Topics Concern    Not on file   Social History Narrative    Not on file     Social Determinants of Health     Financial Resource Strain: Not on file   Food Insecurity: No Food Insecurity (9/11/2024)    Hunger Vital Sign     Worried About Running Out of Food in the Last Year: Never true     Ran Out of Food in the Last Year: Never true   Transportation Needs: No Transportation Needs (9/11/2024)    PRAPARE - Transportation     Lack of Transportation (Medical): No     Lack of Transportation (Non-Medical): No   Physical Activity: Not on file   Stress: Not on file   Social Connections: Not on file   Intimate Partner Violence: Not on file   Housing Stability: Low Risk  (9/11/2024)    Housing Stability Vital  Sign     Unable to Pay for Housing in the Last Year: No     Number of Times Moved in the Last Year: 0     Homeless in the Last Year: No     Social History     Tobacco Use   Smoking Status Former    Types: Cigarettes   Smokeless Tobacco Never     Social History     Substance and Sexual Activity   Alcohol Use Never    Comment: occassional.     History reviewed. No pertinent family history.      Medical Necessity Criteria for [facility] Admission: Anemia, with the following plan: monitor H/H, Hypertension, Bowel/Bladder Management, Incision/Wound care, Pressure sore/Skin Tear, and acute pain, depression, A. Fib, hypothyroidism, vitamin deficiiencies . In addition, the preadmission screen, post-admission physical evaluation, overall plan of care and admissions order demonstrate a reasonable expectation that the following criteria were met at the time of admission to the [facility].  The patient requires active and ongoing therapeutic intervention of multiple therapy disciplines (physical therapy, occupational therapy, speech-language pathology, or prosthetics/orthotics), one of which is physical or occupational therapy.    Patient requires an intensive rehabilitation therapy program, as defined in Chapter 1, section 110.2.2 of the CMS Medicare Policy Manual. This intensive rehabilitation therapy program will consist of at least 3 hours of therapy per day at least 5 days per week or at least 15 hours of intensive rehabilitation therapy within a 7 consecutive day period, beginning with the date of admission to the [facility].    The patient is reasonably expected to actively participate in, and benefit significantly from, the intensive rehabilitation therapy program as defined in Chapter 1, section 110.2.2 of the CMS Medicare Policy Manual at this time of admission to the [facility]. She can reasonably be expected to make measurable improvement (that will be of practical value to improve the patient’s functional capacity or adaptation to  impairments) as a result of the rehabilitation treatment, as defined in section 110.3, and such improvement can be expected to be made within the prescribed period of time. As noted in the CMS Medicare Policy Manual, the patient need not be expected to achieve complete independence in the domain of self-care nor be expected to return to his or her prior level of functioning in order to meet this standard.  The patient must require physician supervision by a rehabilitation physician. As such, a rehabilitation physician will conduct face-to-face visits with the patient at least 3 days per week throughout the patient’s stay in the [facility] to assess the patient both medically and functionally, as well as to modify the course of treatment as needed to maximize the patient’s capacity to benefit from the rehabilitation process.  The patient requires an intensive and coordinated interdisciplinary approach to providing rehabilitation, as defined in Chapter 1, section 110.2.5 of the CMS Medicare Policy Manual. This will be achieved through periodic team conferences, conducted at least once in a 7-day period, and comprising of an interdisciplinary team of medical professionals consisting of: a rehabilitation physician, registered nurse,  and/or , and a licensed/certified therapist from each therapy discipline involved in treating the patient.     Changes Since Pre-admission Assessment: None -This patient's participation in rehab continues to be reasonable, necessary and appropriate.    CMS Required Post-Admission Physician Evaluation Elements  History and Physical, including medical history, functional history and active comorbidities as in above text.       Post-Admission Physician Evaluation:  The patient has the potential to make improvement and is in need of physical, occupational, and/or therapy services. The patient may also need nutritional services. Given the patient's complex medical condition and  risk of further medical complications, rehabilitative services cannot be safely provided at a lower level of care, such as a skilled nursing facility. I have reviewed the patient's functional and medical status at the time of the preadmission screening and they are the same as on the day of this admission. I acknowledge that I have personally performed a full physical examination on this patient within 24 hours of admission. The patient and/or family demonstrated understanding the rehabilitation program and the discharge process after we discussed them.     Agree in entirety: yes  Minor adaptions: none    Major changes: none    Corine Park MD    ** Please Note: Fluency Direct voice to text software may have been used in the creation of this document. **      I have spent a total time of 85 minutes on 09/12/24 in caring for this patient including Diagnostic results, Prognosis, Risks and benefits of tx options, Instructions for management, Patient and family education, Importance of tx compliance, Risk factor reductions, Impressions, Counseling / Coordination of care, Documenting in the medical record, Reviewing / ordering tests, medicine, procedures  , Obtaining or reviewing history  , and Communicating with other healthcare professionals .

## 2024-09-12 NOTE — ASSESSMENT & PLAN NOTE
Wt Readings from Last 3 Encounters:   09/14/24 51.4 kg (113 lb 5.1 oz)   09/11/24 53.8 kg (118 lb 9.7 oz)   09/04/24 53.5 kg (118 lb)     Weight stable  Patient takes Demedex 20 mg daily PRN for weight gain  Monitor daily weights  IM managing

## 2024-09-12 NOTE — PROGRESS NOTES
09/12/24 0655   Patient Data   Rehab Impairment Projected IGC and Rehabilitation Diagnoses:  Impairment of mobility, safety, Activities of Daily Living (ADLs), and cognitive/communication skills due to Spinal Cord Dysfunction:  Traumatic:  04.2211  Quadriplegia, Incomplete C1-4  Etiologic Dx: Cervical Spinal Cord Compression  Date of Onset: 9/4/2024   Date of surgery: 9/5/2024 Bilateral C1 and C3 lateral mass screw placement, Bilateral C2 pedicle screw placement, Arthrodesis C1-3 with autologous bone graft and [place] putty, Use of neuronavigation, Medtronic Stealth, Use of neuromonitoring, Use of fluoroscopy and O-arm, Use of Dawson headholder   Etiologic Diagnosis Per EMR:  Patient is a 79 year old female that presented to [facility] on 9/4/2024 as Trauma evaluation with c/o generalized weakness and neck pain s/p fall 4-6 weeks prior. She admitted to headstrike and ASA use. Patient states legs have been feeling weaker. CT cspine showed type 2 odontoid fracture with posterior displacement including prominent posterior displacement of C1 with respect to C2 vertebral body causing a component of cord compression of medullary cervical spinal cord. CT head was negative. Patient was transferred to [facility] for Trauma/Neurosurgery evaluation. Neurosurgery was consulted, with recommendations to hold all AC/AP, vista collar at all times, and plans for tentative OR pending MRI results. MRI cspine showed displaced type 2 dens fracture with severe posterior displacement of dens and C1 vertebral body, resultant mass effect on cord appears relatively mild without gross cord edema; discontinuity of ALL and irregularity of PLL, likely interspinous ligament injury at C1-C2, facet edema at C2-C3 possible facet capsular injury, prominent edema in C1 and C2 vertebral bodies, paravertebral edema. CTA H/N was negative. On 9/5, patient went to the OR with Neurosurgery for B/L C1 and C3 lateral mass screw placement, B/L C2  pedicle screw placement, arthrodesis C1-3 with autogolous bone graft and [place] putty; EBL minimal, hemovac drain intact. Per Neurosurgery, patient is to maintain vista collar at all times for 6 weeks, with cervical spinal precautions in place. She was originally initiated on SQ Heparin for DVT prophylaxis, however was transitioned to SQ Lovenox on 9/8. Patient was evaluated by SLP, with noted mild oropharyngeal dysphagia, and she was recommended for dysphagia 2 diet with nectar thick liquids. Postop x-ray showed good hardware placement and alignment. Hemovac was D/C'd on 9/9, per Neurosurgery. MBS was completed with SLP on 9/10, which showed mild oral and moderate pharyngeal dysphagia. Patient was recommended for dysphagia 1 pureed diet with honey thick liquids, aspiration precautions in place, and is tolerating well.   Support System   Name Tomas   Relationship    Home Setup   Home Modification Comment Lives at home with , Tomas.  Lives in a [facility] with 4 wooden CRISSY with HR on right.  Pt with twin bed on the first floor.  This is the bed she rolled out of when she hurt her neck.  Started to have difficulty with negotiation of stairs, maybe 2-3 years and has been sleeping on first floor since.  Has a [employer] driveway, patient’s  does all the driving.  Pt has mostly carpeted on her first floor, tile in bathroom.  Carpeted stairs to the 2nd floor.  Has full bathroom on first floor with grab bar outside the shower, seat in the walk-in shower.  Pt has a RW, SPC, wheelchair, electric bed (not hospital bed).  Typically does not use wheelchair inside, typically uses the RW.  The RW does not fit into the bathroom.  Patient furniture walks into the bathroom.  Has commode over her toilet.  Patient has pneumatic compression pumps at home for lymphedema.   Available Equipment Wheelchair;Roller Walker;Single Point Cane;Shower Chair   Baseline Information   Transportation Family/friends drive   Prior  Device(s) Used Roller Walker   Prior Level of Function   Indoor-Mobility (Ambulation) 3. Independent - Patient completed the activities by him/herself, with or without an assistive device, with no assistance from a helper.   Stairs 2. Needed Some Help - Patient needed a partial assistance from another person to complete activities.   Prior Device Used D. Walker   Falls in the Last Year   Number of falls in the past 12 months 2   Type of Injury Associated with Fall Major injury   Psychosocial   Psychosocial (WDL) WDL   Restrictions/Precautions   Precautions Aspiration;Bed/chair alarms;Cognitive;Fall Risk;Pain;Spinal precautions;Supervision on toilet/commode   Braces or Orthoses C/S Collar   Pain Assessment   Pain Assessment Tool 0-10   Pain Score 5   Pain Location/Orientation Location: Neck   Toileting Hygiene   Type of Assistance Needed Physical assistance   Physical Assistance Level Total assistance   Comment min-mod A x 2, RW, grab bars   Toileting Hygiene CARE Score 1   Toilet Transfer   Surface Assessed Raised Toilet   Transfer Technique Stand Pivot   Limitations Noted In Balance;Confidence;Endurance;Problem Solving;Safety;UE Strength;LE Strength   Adaptive Equipment Grab Bar;Walker   Positioning Concerns Safety   Type of Assistance Needed Physical assistance   Physical Assistance Level 51%-75%   Comment totamod A x 2   Toilet Transfer CARE Score 2   Transfer Bed/Chair/Wheelchair   Positioning Concerns Skin Integrity   Limitations Noted In Balance;Confidence;Endurance;Pain Management;UE Strength;LE Strength;Sequencing   Adaptive Equipment Roller Walker   Type of Assistance Needed Physical assistance   Physical Assistance Level Total assistance   Comment min-mod A x 2, RW   Chair/Bed-to-Chair Transfer CARE Score 1   Roll Left and Right   Type of Assistance Needed Physical assistance   Physical Assistance Level 76% or more   Comment max A, side rails in use   Roll Left and Right CARE Score 2   Sit to Lying    Comment Pt remains OOB   Lying to Sitting on Side of Bed   Type of Assistance Needed Physical assistance   Physical Assistance Level 76% or more   Comment max A, side rails, log roll   Lying to Sitting on Side of Bed CARE Score 2   Sit to Stand   Type of Assistance Needed Physical assistance   Physical Assistance Level Total assistance   Comment min-mod A x 2, RW   Sit to Stand CARE Score 1   Picking Up Object   Reason if not Attempted Safety concerns   Picking Up Object CARE Score 88   Car Transfer   Comment Typically rides in an [employer]   Reason if not Attempted Environmental limitations   Car Transfer CARE Score 10   Ambulation   Primary Mode of Locomotion Prior to Admission Walk   Distance Walked (feet) 15 ft   Assist Device Roller Walker   Gait Pattern Antalgic;Inconsistant Vaishnavi;Slow Vaishnavi;Decreased foot clearance;Forward Flexion;Narrow FLORENTINO;Poor UE WB;Shuffle;Improper weight shift   Limitations Noted In Balance;Coordination;Device Management;Endurance;Midline Orientation;Posture;Safety;Speed;Strength   Provided Assistance with: Balance;Limb Stabilization;Trunk Support;Weight Shift   Walk Assist Level Moderate Assist  (A x 2)   Findings C-collar in place at all times, fearful of falling   Walk 10 Feet   Type of Assistance Needed Physical assistance   Physical Assistance Level Total assistance   Comment mod A x 2, RW   Walk 10 Feet CARE Score 1   Walk 50 Feet with Two Turns   Reason if not Attempted Safety concerns   Walk 50 Feet with Two Turns CARE Score 88   Walk 150 Feet   Reason if not Attempted Safety concerns   Walk 150 Feet CARE Score 88   Walking 10 Feet on Uneven Surfaces   Reason if not Attempted Safety concerns   Walking 10 Feet on Uneven Surfaces CARE Score 88   Wheelchair mobility   Type of Wheelchair Used 1. Manual   Method Right upper extremity;Left upper extremity;Right lower extremity;Left lower extremity   Assistance Provided For Curbs;Obstacles;Remove armrests;Replace armrests;Locking  Brakes   Distance Level Surface (feet) 2 ft   Distance Wheeled 3% Grade 0 ft   Findings Unable to perform   Wheel 50 Feet with Two Turns   Type of Assistance Needed Physical assistance   Physical Assistance Level Total assistance   Wheel 50 Feet with Two Turns CARE Score 1   Curb or Single Stair   Reason if not Attempted Safety concerns   1 Step (Curb) CARE Score 88   4 Steps   Reason if not Attempted Safety concerns   4 Steps CARE Score 88   12 Steps   Reason if not Attempted Safety concerns   12 Steps CARE Score 88   Strength RLE   R Knee Extension 3/5   R Hip Flexion 3/5   R Hip Extension 3/5   R Hip ABduction 3/5   R Hip ADduction 3/5   R Knee Flexion 3/5   R Ankle Dorsiflexion 3/5   R Ankle Plantar Flexion 3/5   Tone RLE   RLE Tone Hypotonic   Strength LLE   L Knee Extension 3/5   L Hip Flexion 3/5   L Hip Extension 3/5   L Hip ABduction 3/5   L Hip ADduction 3/5   L Knee Flexion 3/5   L Ankle Dorsiflexion 3/5   L Ankle Plantar Flexion 3/5   Tone LLE   LLE Tone Hypotonic   Coordination   Movements are Fluid and Coordinated 0   Coordination and Movement Description Decreased ANTHONY, decreased LE coordination, mildly ataxic   Sensation   Light Touch No apparent deficits   Propioception No apparent deficits   Cognition   Overall Cognitive Status WFL   Orientation Level Oriented to person;Oriented to situation  (Unsure of date/month; unsure of location)   Therapeutic Exercise   Therapeutic Exercise/Activity Seated LE TE   Discharge Information   Vocational Plan Retired/not working   Patient's Discharge Plan To return home with  and first floor set up   Patient's Rehab Expectations To be more independent   Impressions Pt is 79 year old female seen for PT evaluation on 9/12/24 s/p admit to [facility] on 9/1/24 w/ cervical fx.  Orders placed at admission.  Performed at least 2 patient identifiers during session: Name and wristband. Comorbidities affecting pt's physical performance at time of assessment  include: A-fib, CHF, depression, anxiety, HTN, hypothyroidism, lymphedema, mitral valve regurgitation, PTSD, OA of knee, venous insufficiency of B/L LEs, frequent falls, anemia, apraxia, cardiomegaly, chronic respiratory failure, HRT. LOF prior to admission was supervision with first floor set up,  assist. Personal factors affecting pt at time of IE include: weakness, limited flexibility, decreased endurance, decreased balance, increased pain, anxiety. Please find objective findings from PT assessment regarding body systems outlined above with impairments and limitations including weakness, impaired balance, decreased endurance, pain, decreased activity tolerance and fall risk, as well as mobility assessment.  Pt's clinical presentation warrants participation in multidisciplinary acute rehab program at 3 or greater hours of therapy per day. Pt to benefit from continued PT tx to address deficits as defined above and maximize level of functional independent mobility and consistency.  PT for improved safe return home with maximized functional mobility.   PT Therapy Minutes   PT Time In 0655   PT Time Out 0826   PT Total Time (minutes) 91   PT Mode of treatment - Individual (minutes) 91   PT Mode of treatment - Concurrent (minutes) 0   PT Mode of treatment - Group (minutes) 0   PT Mode of treatment - Co-treat (minutes) 0   PT Mode of Treatment - Total time(minutes) 91 minutes   PT Cumulative Minutes 91   Cumulative Minutes   Cumulative therapy minutes 91     Patient remains OOB in chair, all needs in reach.  Set up for meal.  Alarm in place and activated.  Encouraged use of call bell, patient verbalizes understanding.

## 2024-09-12 NOTE — TREATMENT PLAN
Individualized Plan of Care - [facility]  Marsha Oliva 79 y.o. female MRN: [id number]  Unit/Bed#: [facility] 221-01 Encounter: [id number]     PATIENT INFORMATION  ADMISSION DATE: 9/11/2024  3:31 PM OBED CATEGORY: Cervical spinal cord injury    ADMISSION DIAGNOSIS: Posterior displaced type ii dens fracture, initial encounter for closed fracture [S12.111A]  EXPECTED LOS: 2 weeks     MEDICAL/FUNCTIONAL PROGNOSIS  Based on my assessment of the patient's medical conditions and current functional status, the prognosis for attaining medical and functional goals or the IRF stay is:  Fair    Medical Goals: Patient will be medically stable for discharge to [facility] upon completion of rehab program and Patient will be able to manage medical conditions and comorbid conditions with medications and follow up upon completion of rehab program    ANTICIPATED DISCHARGE DISPOSITION AND SERVICES  COMMUNITY SETTING: Home - Assistance    ANTICIPATED FOLLOW-UP SERVICE:   Home Health Services: PT, OT, SLP, and Nursing    DISCIPLINE SPECIFIC PLANS:  Required Disciplines & Services: Rehabillitation Nursing, Case Management, and Dietay/Nutrition    REQUIRED THERAPY:  Therapy Hours per Day Days per Week Total Days   Physical Therapy 1 5-6 7   Occupational Therapy 1 5-6 7   Speech/Language Therapy 1 5-6 7   NOTE: Additional therapy time(s) may be added as appropriate to meet patient needs and to achieve functional goals.      ANTICIPATED FUNCTIONAL OUTCOMES:  ADL:  Mod I with LRAD   Bladder/Bowel:  Mod I with LRAD   Transfers:  Mod I with LRAD   Locomotion:  Mod I with LRAD   Cognitive:       DISCHARGE PLANNING NEEDS  Equipment needs: Discharge needs to be reviewed with team      REHAB ANTICIPATED PARTICIPATION RESTRICTIONS:  None

## 2024-09-12 NOTE — PROGRESS NOTES
"Speech Pathology Evaluation and Intake     09/12/24 3211   Patient Data   Rehab Impairment Marsha Oliva is a(n) 79 y.o. year old female who is admitted to [facility].  Patient originally presented to [facility] ED 9/4 with chief complaint of weakness and neck pain s/p fall 4-6 weeks prior. CT C-spine showed Type II odontoid fracture with posterior displacement including prominent posterior displacement of C1 with respect to the C2 vertebral body causing a component of cord compression of the medullary cervical spinal cord.  CT head negative.  Patient transferred to [facility] for neurosurgery consult.  MRI confirmed need for OR.  S/p 9/5 B/L C1 and C3 lateral mass screw placement, B/L C2 pedicle screw placement, arthrodesis C1-3 with autogolous bone graft and [place] putty.  Postop x-ray showed good hardware placement and alignment. Hemovac was discontinued 9/9.  Vista collar at all times x 6 weeks.  Patient evaluated by SLP for dysphagia.  Patient on dysphagia 1 pureed diet with honey thick liquids.  No other complications.  PT OT consulted and recommended patient for acute inpatient rehab.   Support System   Name Tomas Seay    Baseline Information   Vocation   (Retired from factory work, [employer])   Transportation Family/friends drive   Prior IADL Participation   Money Management   ( manages checkbook, patient has tried and \"can't\")   Meal Preparation Partial Participation;Microwave   Laundry Partial Participation   Home Cleaning Partial Participation   Prior Level of Function   Indoor-Mobility (Ambulation) 3. Independent - Patient completed the activities by him/herself, with or without an assistive device, with no assistance from a helper.   Stairs 2. Needed Some Help - Patient needed a partial assistance from another person to complete activities.   Functional Cognition   ( has been doing medication management >1 year, pt previously very inaccurate) "   Psychosocial   Psychosocial (WDL) WDL   Patient Behaviors/Mood Brightens with approach   Needs Expressed Denies   Ability to Express Feelings Able to express   Ability to Express Needs Needs assistance   Ability to Express Thoughts Needs assistance   Ability to Understand Others Usually understands   Restrictions/Precautions   Precautions Aspiration;Bed/chair alarms;Cognitive;Fall Risk;Pain;Spinal precautions;Supervision on toilet/commode   Braces or Orthoses C/S Collar   Pain Assessment   Pain Assessment Tool 0-10   Pain Score 8   Pain Location/Orientation Location: Neck   Patient's Stated Pain Goal No pain   Hospital Pain Intervention(s) Medication (See MAR)   Multiple Pain Sites No   Eating Assessment   Swallow Precautions Yes   Bedside Swallow Results Yes   VBS Study Results Yes   Food To Mouth Yes   Able To Cut Yes   Positioning Upright;Out of Bed   Meal Assessed Lunch   QI: Swallowing/Nutritional Status Therapeutic Diet   Current Diet Honey Thick;Dysphia I   Intake Mode PO   Dentures Upper   Finishes Timely Yes   Opens Packages Yes   Type of Assistance Needed Set-up / clean-up   Physical Assistance Level No physical assistance   Eating CARE Score 5   Expression   Verbal Complex   Intelligibility Sentence   QI: Expression 3. Exhibits some difficulty with expressing needs and ideas (e.g., some words or finishing thoughts) or speech is not clear   Expression (FIM) 5 - Needs help/cues only RARELY (< 10% of the time)   Sensation   Light Touch No apparent deficits   Propioception No apparent deficits   Cognition   Overall Cognitive Status WFL   Orientation Level Oriented to person;Oriented to situation   Vision   Vision Comments Glasses, very recent prescription   Discharge Information   Vocational Plan Retired/not working   Patient's Discharge Plan To return home with  and first floor set up   Patient's Rehab Expectations To be more independent   SLP Therapy Minutes   SLP Time In 1130   SLP Time Out 1216    SLP Total Time (minutes) 45   SLP Mode of treatment - Individual (minutes) 45   SLP Mode of treatment - Concurrent (minutes) 0   SLP Mode of treatment - Group (minutes) 0   SLP Mode of treatment - Co-treat (minutes) 0   SLP Mode of Treatment - Total time(minutes) 45 minutes   SLP Cumulative Minutes 45   Cumulative Minutes   Cumulative therapy minutes 136

## 2024-09-12 NOTE — ASSESSMENT & PLAN NOTE
Chronic A. Fib  Rate/rhythm controlled on digoxin 125 mcg every other day, Cardizem  mg daily  No AC, but was taking Aspirin 81 mg daily (restart when able).  IM managing

## 2024-09-12 NOTE — UTILIZATION REVIEW
NOTIFICATION OF ADMISSION DISCHARGE   This is a Notification of Discharge from [facility]. Please be advised that this patient has been discharge from our facility. Below you will find the admission and discharge date and time including the patient’s disposition.   UTILIZATION REVIEW CONTACT:  Gibran Sanchez  Utilization   Network Utilization Review Department  Phone: [phone] x carefully listen to the prompts. All voicemails are confidential.  Email: [email or web address]     ADMISSION INFORMATION  PRESENTATION DATE: 9/4/2024  1:38 PM  OBERVATION ADMISSION DATE: N/A  INPATIENT ADMISSION DATE: 9/4/24  8:01 PM   DISCHARGE DATE: 9/11/2024  2:28 PM   DISPOSITION:[facility]    Network Utilization Review Department  ATTENTION: Please call with any questions or concerns to [phone] and carefully listen to the prompts so that you are directed to the right person. All voicemails are confidential.   For Discharge needs, contact Care Management DC Support Team at [phone] opt. 2  Send all requests for admission clinical reviews, approved or denied determinations and any other requests to dedicated fax number below belonging to the campus where the patient is receiving treatment. List of dedicated fax numbers for the Facilities:  FACILITY NAME UR FAX NUMBER   ADMISSION DENIALS (Administrative/Medical Necessity) [phone]   DISCHARGE SUPPORT TEAM ([facility]) [phone]   PARENT CHILD HEALTH (Maternity/NICU/Pediatrics) [phone]   [facility] [phone]   [facility] [phone]   [facility] [phone]   [facility] [phone]   [facility] [phone]   [facility] [phone]   [facility] [phone]   [facility] [phone]   [facility]  [facility] [phone]   [facility] [phone]   [facility] [phone]   [facility] [phone]

## 2024-09-12 NOTE — NURSING NOTE
Patient resting in bed at this time.  No signs of distress noted.  Bed alarm in place to promote patient safety.  Patient assisted to reposition frequently overnight to promote skin integrity.  Heels elevated off of bed on a pillow.  SCDs as ordered for DVT prevention.  Cervical collar in place at all times as ordered.  Call bell within reach.  Plan of care ongoing.

## 2024-09-12 NOTE — PROGRESS NOTES
"Progress Note - Marsha Oliva 79 y.o. female MRN: [id number]    Unit/Bed#: [facility] 221-01 Encounter: [id number]        Subjective:   Patient seen and examined at bedside after reviewing the chart and discussing the case with the caring staff.      Patient examined at bedside.  Patient is complaining of burning when she swallows.  She is requesting something for this.  Patient's  also concerned about mole on face.  Reports it was ripped off during surgery.  Wound care following while here.      Physical Exam   Vitals: Blood pressure 125/80, pulse 81, temperature (!) 97 °F (36.1 °C), temperature source Temporal, resp. rate 16, height 5' 3\" (1.6 m), weight 52.8 kg (116 lb 6.5 oz), SpO2 94%.,Body mass index is 20.62 kg/m².  Constitutional: Patient in no acute distress.  HEENT: PERR, EOMI, MMM.  Cardiovascular: Normal rate and regular rhythm.    Pulmonary/Chest: Effort normal and breath sounds normal.   Abdomen: Soft, + BS, NT.    Assessment/Plan:  Marsha Oliva is a(n) 79 y.o. female with Cervical spinal cord compression     Cardiac hx w/ HTN, HLD, chronic diastolic heart failure, chronic Afib.  Continue digoxin 125 mcg every other day, diltiazem 180 mg daily, atorvastatin 40 mg daily.  Weekly weights.  Torsemide 20 mg daily as needed.  Home ASA currently on hold.   Hypothyroidism.  Continue levothyroxine 125 mcg daily (decr from 137 mcg 9/6).  B12 deficiency.  Patient on B12 1,000 mcg weekly.   Folate deficiency.  Patient on folic acid 1,000 mcg daily.   Hypomagnesemia.  Patient on magnesium gluconate 250 mg twice daily.   Depression and anxiety.   Patient on Zoloft 100 mg daily.  Declines neuropsych at this time.   Multiple hyperkeratotic lesions.  Patient follows with Podiatry outpatient.  Wound care consult.   Sore throat.  Chloraseptic throat spray as needed.   Pain and bowel regimen.  As per physiatry.   Cervical spinal cord compression.  Vista collar at all times x 6 weeks.  Continue SQ Lovenox.  Patient " receiving intensive PT OT ST as per physiatry.      Prognosis: Fair.     Discharge Plan: In progress.    The patient was discussed with Dr. Brown and he is in agreement with the above note.

## 2024-09-12 NOTE — WOUND OSTOMY CARE
Consult Note - Wound   Marsha Oliva 79 y.o. female MRN: [id number]  Unit/Bed#: [facility] 221-01 Encounter: [id number]      History and Present Illness:  Admitted to [facility] 9/11/24.Admitted to hospitals on 9/4/24 after presented to ED complaining of weakness. She had a fall out of bed at her home,6 weeks prior. The fall was due to her attempting to reposition herself, Her  was present. Admitted to hospitals :Type ll ondontoid fracture with underlying compression of spinal cord.  S/P bilateral C1-C3 lateral screw placement,bilateral C2 pedicle screw placement arthrodesis C1 -C3 with autologous bone graft    Assessment Findings:   Alert and oriented x4. Transferred into bed from / with assist of therapist. Mohawk collar intact. Has a modified consistency diet, Dysphagia and HTL. Encouraged to position off of sacrum and buttocks when in bed.  1)Sacrum red linear tissue,slow to alayna, staged as stage one POA to sacrum.Mixed etiology of pressure and moisture.Hydraguard applied no foam dressing .  2)Bilateral buttocks, pink intact erythema. Tissue appears darker in photo in EMR than when assessed. Tissue is pink intact and all areas of both buttocks are blanchable. Hydraguard applied, no foam dressing was applied over hydraguard as this will trap moisture against skin.Area of erythema is decreasing in size form previous photo at hospitals [place].  3)Abrasion to left cheek is open to air.   4)Right arm skin tear beefy red  5)Bilateral heels pink , blanchable. Heel foams on a preventative measure. Right heel outer aspect has area of light purple appears to be ischemic.Heels offloaded.  5)Dry soft intact brown callous on second toe of right foot. Pt states this is known to podiatry, Dr Mohr, with pt having visits to trim callous. Currently open to air.  6)ITD of breast folds, has nystatin powder  7)Left pretibial skin tear resolved. Open to air    Skin care plans:  1-Apply hydroguard cream to sacrum and bilateralbuttocks to hydrate  dry skin and protective barrier Bid and Prn  2-Elevate heels to offload pressure.  3-Ehob cushion in chair when out of bed.  4-Moisturize skin daily with skin nourishing cream.  5-Turn/reposition q2h or when medically stable for pressure re-distribution on skin.   6-Monitor callous of Right foot second toe for changes , open to air.  7-Cleanse right arm skin tear with NSS. Apply Dermagran to wound bed only. Cover with DSD change every other day and prn soil or dislodgment   8-Right foot second toe callous open to air, has podiatry consulted.            Wound 09/11/24 Arm Distal;Posterior;Right;Lower (Active)   Wound Image   09/12/24 1038   Wound Description Beefy red 09/12/24 1038   Yolette-wound Assessment Intact;Dry;Fragile;Hyperpigmented 09/12/24 1038   Wound Length (cm) 0.4 cm 09/12/24 1038   Wound Width (cm) 0.7 cm 09/12/24 1038   Wound Depth (cm) 0.1 cm 09/12/24 1038   Wound Surface Area (cm^2) 0.28 cm^2 09/12/24 1038   Wound Volume (cm^3) 0.028 cm^3 09/12/24 1038   Calculated Wound Volume (cm^3) 0.03 cm^3 09/12/24 1038   Drainage Amount Scant 09/12/24 1038   Drainage Description Bloody 09/12/24 1038   Non-staged Wound Description Partial thickness 09/12/24 1038   Treatments Cleansed;Site care 09/12/24 1038   Dressing Dermagran gauze;Dry dressing 09/12/24 1038   Dressing Changed Changed 09/12/24 1038   Patient Tolerance Tolerated well 09/12/24 1038   Dressing Status Clean;Dry;Intact 09/12/24 1038       Wound 09/11/24 Pressure Injury Sacrum (Active)   Wound Image   09/12/24 1034   Wound Description Intact;Dry;Pink 09/12/24 1034   Pressure Injury Stage 1 09/12/24 1034   Yolette-wound Assessment Dry;Intact 09/12/24 1034   Wound Length (cm) 8 cm 09/12/24 1034   Wound Width (cm) 14 cm 09/12/24 1034   Wound Depth (cm) 0.1 cm 09/12/24 1034   Wound Surface Area (cm^2) 112 cm^2 09/12/24 1034   Wound Volume (cm^3) 11.2 cm^3 09/12/24 1034   Calculated Wound Volume (cm^3) 11.2 cm^3 09/12/24 1034   Treatments Cleansed;Site care  09/11/24 1700   Dressing Foam, Silicon (eg. Allevyn, etc) 09/11/24 1906   Dressing Changed New 09/11/24 1700   Patient Tolerance Tolerated well 09/11/24 1700   Dressing Status Clean;Dry;Intact 09/11/24 1906       Wound 09/11/24 Toe D2, second Anterior;Right (Active)   Wound Image   09/11/24 1628   Wound Description Brown;Intact;Dry 09/12/24 1034   Yolette-wound Assessment Callus 09/12/24 1034   Drainage Amount None 09/12/24 1034   Dressing Open to air 09/12/24 1034       Call or Secure Chat  with any questions  Wound Care will continue to follow weekly    Sandie Calvin BSN RN CWON

## 2024-09-12 NOTE — ASSESSMENT & PLAN NOTE
Located in neck  Continue Tylenol 975 mg TID  Oxycodone IR 5-10 mg Q 4 hours PRN for moderate-severe pain  Topical ICE

## 2024-09-12 NOTE — NURSING NOTE
Patient is awake and alert. Pleasant with interactions.  Forgetful at times.  Cervical neck collar in place and proper alignment.  Medicated x 2 for complaints of neck pain with adequate relief obtained. Spouse supportive at bedside. Safety maintained. Call bell in reach at bedside.

## 2024-09-12 NOTE — ASSESSMENT & PLAN NOTE
New issues  VFSS study completed on 9/10/24 and patient found to have mild oral and moderate phayngeal dysphagia.    Cleared for a pureed diet with honey thick liquids.    SLP to follow  Continue aspiration precautions

## 2024-09-13 PROBLEM — I89.0 LYMPHEDEMA: Status: ACTIVE | Noted: 2024-09-13

## 2024-09-13 LAB — MRSA NOSE QL CULT: NORMAL

## 2024-09-13 PROCEDURE — 97110 THERAPEUTIC EXERCISES: CPT

## 2024-09-13 PROCEDURE — 97530 THERAPEUTIC ACTIVITIES: CPT

## 2024-09-13 PROCEDURE — 97535 SELF CARE MNGMENT TRAINING: CPT

## 2024-09-13 PROCEDURE — 99232 SBSQ HOSP IP/OBS MODERATE 35: CPT | Performed by: PHYSICAL MEDICINE & REHABILITATION

## 2024-09-13 PROCEDURE — 97116 GAIT TRAINING THERAPY: CPT

## 2024-09-13 PROCEDURE — 92526 ORAL FUNCTION THERAPY: CPT | Performed by: NURSE PRACTITIONER

## 2024-09-13 RX ORDER — GINSENG 100 MG
1 CAPSULE ORAL 2 TIMES DAILY
Status: DISCONTINUED | OUTPATIENT
Start: 2024-09-13 | End: 2024-09-20

## 2024-09-13 RX ADMIN — SERTRALINE 100 MG: 100 TABLET, FILM COATED ORAL at 09:08

## 2024-09-13 RX ADMIN — CHLORHEXIDINE GLUCONATE 15 ML: 1.2 RINSE ORAL at 21:32

## 2024-09-13 RX ADMIN — DOCUSATE SODIUM 100 MG: 100 CAPSULE, LIQUID FILLED ORAL at 09:15

## 2024-09-13 RX ADMIN — DIGOXIN 125 MCG: 125 TABLET ORAL at 09:08

## 2024-09-13 RX ADMIN — NYSTATIN, TRIAMCINOLONE ACETONIDE 1 APPLICATION: 100000; 1 CREAM TOPICAL at 17:35

## 2024-09-13 RX ADMIN — OXYCODONE HYDROCHLORIDE 5 MG: 5 TABLET ORAL at 12:13

## 2024-09-13 RX ADMIN — Medication 250 MG: at 09:07

## 2024-09-13 RX ADMIN — CHLORHEXIDINE GLUCONATE 15 ML: 1.2 RINSE ORAL at 09:08

## 2024-09-13 RX ADMIN — NYSTATIN, TRIAMCINOLONE ACETONIDE 1 APPLICATION: 100000; 1 CREAM TOPICAL at 09:08

## 2024-09-13 RX ADMIN — FOLIC ACID 1000 MCG: 1 TABLET ORAL at 09:09

## 2024-09-13 RX ADMIN — BACITRACIN ZINC 1 SMALL APPLICATION: 500 OINTMENT TOPICAL at 17:36

## 2024-09-13 RX ADMIN — Medication 250 MG: at 17:36

## 2024-09-13 RX ADMIN — SENNOSIDES 17.2 MG: 8.6 TABLET, FILM COATED ORAL at 21:32

## 2024-09-13 RX ADMIN — ACETAMINOPHEN 975 MG: 325 TABLET ORAL at 21:32

## 2024-09-13 RX ADMIN — ACETAMINOPHEN 975 MG: 325 TABLET ORAL at 06:20

## 2024-09-13 RX ADMIN — LEVOTHYROXINE SODIUM 125 MCG: 25 TABLET ORAL at 06:20

## 2024-09-13 RX ADMIN — ENOXAPARIN SODIUM 40 MG: 40 INJECTION SUBCUTANEOUS at 09:10

## 2024-09-13 RX ADMIN — ATORVASTATIN CALCIUM 20 MG: 20 TABLET, FILM COATED ORAL at 17:36

## 2024-09-13 NOTE — PROGRESS NOTES
"Progress Note - Marsha Oliva 79 y.o. female MRN: [id number]    Unit/Bed#: [facility] 221-01 Encounter: [id number]        Subjective:   Patient seen and examined at bedside after reviewing the chart and discussing the case with the caring staff.      Patient examined at bedside.  Patient states the burning sensation when she swallows is slightly better today.  Denies any acute symptoms.     Physical Exam   Vitals: Blood pressure 105/63, pulse 72, temperature (!) 97.1 °F (36.2 °C), temperature source Temporal, resp. rate 18, height 5' 3\" (1.6 m), weight 52.8 kg (116 lb 6.5 oz), SpO2 99%.,Body mass index is 20.62 kg/m².  Constitutional: Patient in no acute distress.  HEENT: PERR, EOMI, MMM.  Cardiovascular: Normal rate and regular rhythm.    Pulmonary/Chest: Effort normal and breath sounds normal.   Abdomen: Soft, + BS, NT.    Assessment/Plan:  Marsha Oliva is a(n) 79 y.o. female with cervical spinal cord compression.     Cardiac hx w/ HTN, HLD, chronic diastolic heart failure, chronic Afib.  Continue digoxin 125 mcg every other day, diltiazem 180 mg daily, atorvastatin 40 mg daily.  Weekly weights.  Torsemide 20 mg daily as needed.  Home ASA currently on hold.   Hypothyroidism.  Continue levothyroxine 125 mcg daily (decr from 137 mcg 9/6).  B12 deficiency.  Patient on B12 1,000 mcg weekly.   Folate deficiency.  Patient on folic acid 1,000 mcg daily.   Hypomagnesemia.  Patient on magnesium gluconate 250 mg twice daily.   Depression and anxiety.   Patient on Zoloft 100 mg daily.  Declines neuropsych at this time.   Multiple hyperkeratotic lesions.  Patient follows with Podiatry outpatient.  Wound care consult.   Sore throat.  Chloraseptic throat spray as needed.   Pain and bowel regimen.  As per physiatry.   Cervical spinal cord compression.  Vista collar at all times x 6 weeks.  Continue SQ Lovenox.  Patient receiving intensive PT OT ST as per physiatry.      Anticipated discharge date:  TBD    The patient was discussed " with Dr. Brown and he is in agreement with the above note.

## 2024-09-13 NOTE — NURSING NOTE
Patient resting in bed at this time. No signs of distress noted. Bed alarm in place to promote patient safety. Patient assisted to reposition frequently overnight to promote skin integrity. Heels elevated off of bed on a pillow. SCDs as ordered for DVT prevention. Cervical collar in place at all times as ordered. Patient was able to stand pivot transfer with two assist.  Patient declined to bathe at this time states she would prefer to do so later in the day.  Call bell within reach. Plan of care ongoing.

## 2024-09-13 NOTE — CASE MANAGEMENT
CM met with patient, explained rehab routine and CM role with introduction. Patient reports she lives with   in [place] with 3STE, full flight to second floor. Has bedroom  and full bath with walk in shower on the first floor. Patient independent PTA, using walker or cane for ambulation. Patient also has a shower chair and wheelchair. Patient has no prior outpt therapy, does have experience at [facility] with [facility]. Patient has prescription coverage uses [employer] in [facility] for meds needed right away, and [facility] for maintenance medications. CM confirmed patient's address, emergency contact and insurance coverage with patient. CM explained team meeting process and insurance reviews for length of stay Patient stated  is retired, she is uncertain if he will be able to help physically. Patient stated they do have supportive neighbors, no family members who can assist. CM will follow for discharge needs.

## 2024-09-13 NOTE — PROGRESS NOTES
PM&R PROGRESS NOTE:  Marsha Oliva 79 y.o. female MRN: [id number]  Unit/Bed#: [facility] 221-01 Encounter: [id number]        Rehab Diagnosis: Impairment of mobility, safety and Activities of Daily Living (ADLs) due to Spinal Cord Dysfunction:  Traumatic:  04.2211  Quadriplegia, Incomplete C1-4  Etiologic Diagnosis: Fall with displaced type 2 dens fracture with spinal cord injury and compression    HPI: Marsha Oliva is a 79 y.o. female with HTN, A Fib, CHF, HLD, Hypothyroidism who presented to the [facility] on on 9/4/24 with progressive neck pain and weakness.  She reports 4-6 weeks prior she sustained a fall off her bed, head hitting her nightstand.  She has been having increasing neck pain since.  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 MRI C spine showing displaced type 2 dens fracture with mild mass effect on cord, ALL and PLL injuries.  CTA head and neck negative for vascular injuries.  Patient seen by Neurosurgery and deemed an operative candidate.  Patient taken to the OR by Dr. Dewitt for a bilateral C1 and C3 lateral mass screw placement, bialteral C2 pedicle screw palcement, C1-C3 fusion.  Post operatively, placed in a Aspen C Collar at all times, except showering with Shonda collar.  Post op X-rays with stable alignment  Medical course complicated by acute dysphagia.  VFSS study completed on 9/10/24 and patient found to have mild oral and moderate phayngeal dysphagia.  Cleared for a pureed diet with honey thick liquids.  Patient seen by wound care team for abrasions and stage I pressure injury to sacrum.  Patient started on Tylenol and PRN oxycodone for acute pain.    After medical stabilization, patient found to have acute functional deficits from his baseline, therefore, admitted to [facility] for acute inpatient rehabilitation.        SUBJECTIVE: Patient seen face to face.   Having a good first day.   is here visiting.  Reports mild neck pain.  No acute other issues.      ASSESSMENT: Stable, progressing      PLAN:    Rehabilitation  Functional deficits:  cervical collar on at all times, cervical spinal precautions, impaired mobility, self care   Continue current rehabilitation plan of care to maximize function.    Functional update:   Evals in progress  Estimated Discharge: TBD    DVT prophylaxis  Continue Lovenox 40 mg daily     Bladder plan  Continent mostly    Bowel plan  Continent  BM 9/12/24      * Spinal cord compression (HCC)  Assessment & Plan  Presented  to acute care on 9/4/24 with progressive neck pain and weakness.    She reports 4-6 weeks prior she sustained a fall off her bed, head hitting her nightstand.    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   MRI C spine showing displaced type 2 dens fracture with mild mass effect on cord, ALL and PLL injuries.    CTA head and neck negative for vascular injuries.    Patient seen by Neurosurgery and deemed an operative candidate.    Patient taken to the OR by Dr. Dewitt for a bilateral C1 and C3 lateral mass screw placement, bialteral C2 pedicle screw palcement, C1-C3 fusion.    Post operatively, placed in a Aspen C Collar at all times, except showering with Shonda collar.    Post op X-rays with stable alignment    Plan in ARC:  Begin ARC program with PT, OT, SLP  Monitor incision  Monitor pain  Monitor neuro exam  Follow-up with Neurosurgery in 2 weeks  POD 14 = 9/19/24    Oropharyngeal dysphagia  Assessment & Plan  New issues  VFSS study completed on 9/10/24 and patient found to have mild oral and moderate phayngeal dysphagia.    Cleared for a pureed diet with honey thick liquids.    SLP to follow  Continue aspiration precautions     Lymphedema  Assessment & Plan  Pumps brought in by  - ok to use PRN as tolerated    Skin  abnormalities  Assessment & Plan  Wound Care team following in ARC:  POA    1)Sacrum red linear tissue,slow to alayna, staged as stage one POA to sacrum.Mixed etiology of pressure and moisture.Hydraguard applied no foam dressing .  2)Bilateral buttocks, pink intact erythema. Tissue appears darker in photo in EMR than when assessed. Tissue is pink intact and all areas of both buttocks are blanchable. Hydraguard applied, no foam dressing was applied over hydraguard as this will trap moisture against skin.Area of erythema is decreasing in size form previous photo at [facility].  3)Abrasion to left cheek is open to air.   4)Right arm skin tear beefy red  5)Bilateral heels pink , blanchable. Heel foams on a preventative measure. Right heel outer aspect has area of light purple appears to be ischemic.Heels offloaded.  5)Dry soft intact brown callous on second toe of right foot. Pt states this is known to podiatry, Dr Mohr, with pt having visits to trim callous. Currently open to air.  6)ITD of breast folds, has nystatin powder  7)Left pretibial skin tear resolved. Open to air      Skin care plans:  1-Apply hydroguard cream to sacrum and bilateralbuttocks to hydrate dry skin and protective barrier Bid and Prn  2-Elevate heels to offload pressure.  3-Ehob cushion in chair when out of bed.  4-Moisturize skin daily with skin nourishing cream.  5-Turn/reposition q2h or when medically stable for pressure re-distribution on skin.   6-Monitor callous of Right foot second toe for changes , open to air.  7-Cleanse right arm skin tear with NSS. Apply Dermagran to wound bed only. Cover with DSD change every other day and prn soil or dislodgment   8-Right foot second toe callous open to air, has podiatry consulted.    Acute pain due to trauma  Assessment & Plan  Located in neck  Continue Tylenol 975 mg TID  Oxycodone IR 5-10 mg Q 4 hours PRN for moderate-severe pain  Topical ICE    Folate deficiency  Assessment & Plan  Continue  "supplementation   IM managing    Mixed hyperlipidemia  Assessment & Plan  Continue Atorvastain 20 mg daily    Hypothyroidism  Assessment & Plan  Continue Synthroid 125 mcg daily    Essential hypertension  Assessment & Plan  Continue Cardizem  mg Daily  Monitor BP and HR with rest and activity  IM managing    Depression with anxiety  Assessment & Plan  Mood is stable  Continue Zoloft 100 mg daily    Chronic diastolic CHF (congestive heart failure) (HCC)  Assessment & Plan  Wt Readings from Last 3 Encounters:   09/11/24 52.8 kg (116 lb 6.5 oz)   09/11/24 53.8 kg (118 lb 9.7 oz)   09/04/24 53.5 kg (118 lb)     Weight stable  Patient takes Demedex 20 mg daily PRN for weight gain  Monitor daily weights  IM managing        B12 deficiency  Assessment & Plan  Continue supplementation   IM managing    Atrial fibrillation, chronic (HCC)  Assessment & Plan  Chronic A. Fib  Rate/rhythm controlled on digoxin 125 mcg every other day, Cardizem  mg daily  No AC, but was taking Aspirin 81 mg daily (restart when able).  IM managing    Acquired bilateral hammer toes  Assessment & Plan  Keratosis of toe  May need shaving of this lesion  Consult placed to Podiatry          Labs, medications, and imaging personally reviewed 09/13/24.      ROS:  A ten point review of systems was completed on 09/13/24  and pertinent positives are listed in subjective section. All other systems reviewed were negative.     OBJECTIVE:   /63 (BP Location: Right arm)   Pulse 72   Temp (!) 97.1 °F (36.2 °C) (Temporal)   Resp 18   Ht 5' 3\" (1.6 m)   Wt 52.8 kg (116 lb 6.5 oz)   SpO2 99%   BMI 20.62 kg/m²       Physical Exam  Vitals and nursing note reviewed.   Constitutional:       General: She is not in acute distress.  HENT:      Head: Normocephalic and atraumatic.      Nose: Nose normal.      Mouth/Throat:      Mouth: Mucous membranes are moist.   Eyes:      Conjunctiva/sclera: Conjunctivae normal.   Neck:      Comments: C collar in " place  Cardiovascular:      Rate and Rhythm: Normal rate and regular rhythm.      Pulses: Normal pulses.   Pulmonary:      Effort: Pulmonary effort is normal.      Breath sounds: No wheezing or rales.      Comments: Decreased BS at bases   Abdominal:      General: Bowel sounds are normal. There is no distension.      Palpations: Abdomen is soft.      Tenderness: There is no abdominal tenderness.   Musculoskeletal:         General: No swelling.      Cervical back: Neck supple.   Skin:     General: Skin is warm.      Findings: Bruising (buttocks) and rash (under breast folds) present.   Neurological:      Mental Status: She is alert and oriented to person, place, and time.      Motor: Weakness present.   Psychiatric:         Mood and Affect: Mood normal.          Lab Results   Component Value Date    WBC 8.07 09/08/2024    HGB 12.3 09/08/2024    HCT 39.7 09/08/2024     (H) 09/08/2024     09/11/2024     Lab Results   Component Value Date    SODIUM 139 09/10/2024    K 4.0 09/10/2024     09/10/2024    CO2 30 09/10/2024    BUN 12 09/10/2024    CREATININE 0.54 (L) 09/10/2024    GLUC 90 09/10/2024    CALCIUM 9.2 09/10/2024     Lab Results   Component Value Date    INR 0.93 09/06/2024    INR 0.98 09/04/2024    INR 1.06 12/23/2023    PROTIME 12.8 09/06/2024    PROTIME 13.5 09/04/2024    PROTIME 14.0 12/23/2023           Current Facility-Administered Medications:     acetaminophen (TYLENOL) tablet 975 mg, 975 mg, Oral, Q8H CHRIS, Sharmin Waggoner PA-C, 975 mg at 09/13/24 0620    atorvastatin (LIPITOR) tablet 20 mg, 20 mg, Oral, Daily With Dinner, Sharmin Waggoner PA-C, 20 mg at 09/12/24 4766    bacitracin topical ointment 1 small application, 1 small application, Topical, BID, Meggan Jones PA-C    bisacodyl (DULCOLAX) rectal suppository 10 mg, 10 mg, Rectal, Daily PRN, Sharmin Waggoner PA-C    bisacodyl (DULCOLAX) rectal suppository 10 mg, 10 mg, Rectal, Once, Corine Park,  MD    calcium carbonate (TUMS) chewable tablet 1,000 mg, 1,000 mg, Oral, Daily PRN, Sharmin Waggoner PA-C    chlorhexidine (PERIDEX) 0.12 % oral rinse 15 mL, 15 mL, Mouth/Throat, Q12H CHRIS, Sharmin Waggoner PA-C, 15 mL at 09/13/24 0908    cyanocobalamin (VITAMIN B-12) tablet 1,000 mcg, 1,000 mcg, Oral, Weekly, Sharmin Waggoner PA-C, 1,000 mcg at 09/11/24 1747    digoxin (LANOXIN) tablet 125 mcg, 125 mcg, Oral, Every Other Day, Sharmin Waggoner PA-C, 125 mcg at 09/13/24 0908    diltiazem (CARDIZEM CD) 24 hr capsule 180 mg, 180 mg, Oral, Daily, Sharmin Waggoner PA-C, 180 mg at 09/12/24 0937    docusate sodium (COLACE) capsule 100 mg, 100 mg, Oral, BID, Corine Park MD, 100 mg at 09/13/24 0915    enoxaparin (LOVENOX) subcutaneous injection 40 mg, 40 mg, Subcutaneous, Q24H CHRIS, Corine Park MD, 40 mg at 09/13/24 0910    folic acid (FOLVITE) tablet 1,000 mcg, 1,000 mcg, Oral, Daily, Sharmin Waggoner PA-C, 1,000 mcg at 09/13/24 0909    levothyroxine tablet 125 mcg, 125 mcg, Oral, Early Morning, Sharmin Waggoner PA-C, 125 mcg at 09/13/24 0620    magnesium gluconate (MAGONATE) tablet 250 mg, 250 mg, Oral, BID, Sharmin Waggoner PA-C, 250 mg at 09/13/24 0907    nystatin-triamcinolone (MYCOLOG-II) cream, , Topical, BID, Corine Park MD, 1 Application at 09/13/24 0908    ondansetron (ZOFRAN-ODT) dispersible tablet 4 mg, 4 mg, Oral, Q6H PRN, Sharmin Waggoner PA-C    oxyCODONE (ROXICODONE) split tablet 2.5 mg, 2.5 mg, Oral, Q4H PRN, 2.5 mg at 09/12/24 0734 **OR** oxyCODONE (ROXICODONE) IR tablet 5 mg, 5 mg, Oral, Q4H PRN, Sharmin Waggoner PA-C, 5 mg at 09/13/24 1213    phenol (CHLORASEPTIC) 1.4 % mucosal liquid 1 spray, 1 spray, Mouth/Throat, Q2H PRN, Sharmin Waggoner PA-C    polyethylene glycol (MIRALAX) packet 17 g, 17 g, Oral, Daily PRN, Corine Park MD    senna (SENOKOT) tablet 17.2 mg, 2 tablet, Oral, HS, Corine Coreas  MD Xavier, 17.2 mg at 09/12/24 2128    sertraline (ZOLOFT) tablet 100 mg, 100 mg, Oral, Daily, Sharmin Waggoner PA-C, 100 mg at 09/13/24 0908    torsemide (DEMADEX) tablet 20 mg, 20 mg, Oral, Daily PRN, Sharmin Waggoner PA-C    Past Medical History:   Diagnosis Date    Disease of thyroid gland     Hypotension     Supratherapeutic INR 6/2/2022       Patient Active Problem List    Diagnosis Date Noted    Spinal cord compression (HCC) 09/04/2024    Oropharyngeal dysphagia 09/12/2024    Lymphedema 09/13/2024    Skin abnormalities 09/12/2024    Acute pain due to trauma 09/06/2024    At risk for delirium 09/06/2024    Heart failure with preserved ejection fraction (HCC) 09/06/2024    Closed odontoid fracture with type II morphology and posterior displacement (HCC) 09/04/2024    Chronic respiratory failure with hypoxia ([facility]) 03/09/2023    Stage 3a chronic kidney disease (HCC) 08/12/2022    Bilateral hydronephrosis 08/12/2022    Proctitis 08/12/2022    Fall 08/12/2022    Hypokalemia 08/12/2022    ILD (interstitial lung disease) ([facility]) 07/18/2022    Oropharyngeal aspiration 07/18/2022    Elevated troponin 06/02/2022    EKG abnormalities 06/02/2022    Mild cognitive impairment of uncertain or unknown etiology 01/20/2022    Hormone replacement therapy 01/05/2022    Apraxia 12/28/2021    Constipation, unspecified 12/28/2021    Corn or callus 12/28/2021    Difficulty in walking, not elsewhere classified 12/28/2021    Generalized muscle weakness 12/28/2021    Lymphedema, not elsewhere classified 12/28/2021    Repeated falls 12/28/2021    Hypoxia 12/23/2021    Frequent falls 12/23/2021    Elevated d-dimer 12/23/2021    Venous insufficiency of both lower extremities 12/17/2021    Acquired bilateral hammer toes 07/27/2018    Lymphedema of both lower extremities 07/02/2018    Severe mitral regurgitation 05/31/2018    Anemia 05/13/2018    Cardiomegaly 05/13/2018    Wound of right leg 05/13/2018    Hypertensive heart  disease with heart failure (HCC) 05/13/2018    Major depressive disorder, single episode, unspecified 05/13/2018    Personal history of nicotine dependence 05/13/2018    Pleural effusion, not elsewhere classified 05/13/2018    Solitary pulmonary nodule 05/13/2018    Atrial fibrillation, chronic (HCC) 05/01/2018    B12 deficiency 01/13/2017    Chronic diastolic CHF (congestive heart failure) ([facility]) 01/13/2017    Folate deficiency 01/13/2017    Vitamin D deficiency 06/03/2016    Depression with anxiety 08/03/2015    Mixed hyperlipidemia 08/03/2015    Osteoarthritis of knee 10/23/2012    Impaired fasting glucose 05/31/2012    Essential hypertension 03/23/2010    Hypothyroidism 03/23/2010    Posttraumatic stress disorder 03/23/2010          Corine Park MD    I have spent a total time of 35 minutes on 09/13/24 in caring for this patient including Impressions, Counseling / Coordination of care, Documenting in the medical record, Reviewing / ordering tests, medicine, procedures  , and Communicating with other healthcare professionals .      ** Please Note:  voice to text software may have been used in the creation of this document. Although proof errors in transcription or interpretation are a potential of such software**

## 2024-09-13 NOTE — TELEPHONE ENCOUNTER
9/18/24 - PT [facility]  [facility]    9/17/24 - PT [facility]    9/16/24 - PT IN  [facility]. CALLED [facility] -[phone] AND SPOKE TO ASHELY CONFIRMING VIRTUAL ROUNDING. ASKED TO ADD NEUROSX CONSULT MORNING OF VR   9/18/24 2 WK POV W/NURSE *MARY*    9/13/24 - PT STILL IN HOSPITAL    09/11/2024- PT STILL IN HOSPITAL

## 2024-09-13 NOTE — PROGRESS NOTES
"   09/13/24 0648   Pain Assessment   Pain Assessment Tool 0-10   Pain Score No Pain   Restrictions/Precautions   Precautions Aspiration;Bed/chair alarms;Cognitive;Fall Risk;Pain;Spinal precautions;Supervision on toilet/commode   ROM Restrictions   (CS precautions)   Cognition   Overall Cognitive Status WFL   Arousal/Participation Alert;Responsive;Cooperative   Attention Attends with cues to redirect   Orientation Level Oriented to person;Oriented to situation;Oriented to place   Memory Decreased short term memory;Decreased recall of recent events;Decreased recall of precautions   Following Commands Follows one step commands with increased time or repetition   Subjective   Subjective Pt reports her legs feel \"boggy and juicy\"   Roll Left and Right   Type of Assistance Needed Incidental touching   Physical Assistance Level No physical assistance   Comment CG to initiate roll   Roll Left and Right CARE Score 4   Sit to Lying   Comment Pt remained OOB   Lying to Sitting on Side of Bed   Type of Assistance Needed Incidental touching   Physical Assistance Level No physical assistance   Comment CG to initiate movement   Lying to Sitting on Side of Bed CARE Score 4   Sit to Stand   Type of Assistance Needed Physical assistance   Physical Assistance Level Total assistance   Comment CG x 2 with RW   Sit to Stand CARE Score 1   Bed-Chair Transfer   Type of Assistance Needed Physical assistance   Physical Assistance Level Total assistance   Comment CG x 2 with RW   Chair/Bed-to-Chair Transfer CARE Score 1   Car Transfer   Reason if not Attempted Environmental limitations   Car Transfer CARE Score 10   Walk 10 Feet   Type of Assistance Needed Physical assistance   Physical Assistance Level Total assistance   Comment Mod A x 1 with CG of another with WC follow. Pt fearful of falling, C-coller in place at all times.   Walk 10 Feet CARE Score 1   Walk 50 Feet with Two Turns   Reason if not Attempted Safety concerns   Walk 50 Feet " with Two Turns CARE Score 88   Walk 150 Feet   Reason if not Attempted Safety concerns   Walk 150 Feet CARE Score 88   Walking 10 Feet on Uneven Surfaces   Reason if not Attempted Safety concerns   Walking 10 Feet on Uneven Surfaces CARE Score 88   Ambulation   Primary Mode of Locomotion Prior to Admission Walk   Distance Walked (feet) 11 ft  (11, 8 x 2 ft)   Assist Device Roller Walker   Gait Pattern Antalgic;Inconsistant Vaishnavi;Slow Vaishnavi;Decreased foot clearance;Forward Flexion;Narrow FLORENTINO;Poor UE WB;Shuffle;Improper weight shift   Limitations Noted In Balance;Coordination;Device Management;Endurance;Midline Orientation;Posture;Safety;Speed;Strength   Provided Assistance with: Balance;Limb Stabilization;Trunk Support;Weight Shift   Walk Assist Level Moderate Assist  (Mod A x 1 with CG of another with WC follow)   Findings Mod A x 1 with CG of another with WC follow. Pt fearful of falling, C-coller in place at all times.   Does the patient walk? 2. Yes   Wheelchair mobility   Does the patient use a wheelchair? 1. Yes   Type of Wheelchair Used 1. Manual   Method Right upper extremity;Left upper extremity   Assistance Provided For Replace Leg Rest;Remove Leg Rest;Locking Brakes;Obstacles   Distance Level Surface (feet) 5 ft   Distance Wheeled 3% Grade 0 ft   Findings CG with assist for direction and turning   Curb or Single Stair   Reason if not Attempted Safety concerns   1 Step (Curb) CARE Score 88   4 Steps   Reason if not Attempted Safety concerns   4 Steps CARE Score 88   12 Steps   Reason if not Attempted Safety concerns   12 Steps CARE Score 88   Picking Up Object   Reason if not Attempted Safety concerns   Picking Up Object CARE Score 88   Toilet Transfer   Comment not needed during session   Therapeutic Interventions   Strengthening Supine and seated ther ex   Balance Gait and transfer training   Assessment   Treatment Assessment Pt agreeable to PT session this morning. No pain reported throughout  "session. CG for bed mobility to initiate movement. CG x 2 for transfers with RW. CG x1 and mod A x 1 for ambulation with RW with WC follow. Fatigue and \"wobbly legs\" reported throughout ambulation requiring frequent rest breaks and a max distance of 11 ft. Ther ex for general LE strengthening. Gait and transfer training for increased balance, safety, and independence with functional mobility. C collar remained in place throughout entire session and all precautions followed. Pt returned to room in  for breakfast with alarms on and all needs within reach.   Problem List Decreased strength;Decreased range of motion;Decreased endurance;Impaired balance;Decreased mobility;Decreased safety awareness;Pain;Orthopedic restrictions   Barriers to Discharge Inaccessible home environment;Decreased caregiver support   PT Barriers   Physical Impairment Decreased strength;Decreased range of motion;Decreased endurance;Impaired balance;Decreased mobility;Decreased safety awareness;Pain;Orthopedic restrictions   Functional Limitation Car transfers;Ramp negotiation;Stair negotiation;Standing;Transfers;Walking;Wheelchair management   Plan   Treatment/Interventions Functional transfer training;LE strengthening/ROM;Elevations;Therapeutic exercise;Endurance training;Bed mobility;Gait training   Progress Progressing toward goals   PT Therapy Minutes   PT Time In 0648   PT Time Out 0818   PT Total Time (minutes) 90   PT Mode of treatment - Individual (minutes) 90   PT Mode of treatment - Concurrent (minutes) 0   PT Mode of treatment - Group (minutes) 0   PT Mode of treatment - Co-treat (minutes) 0   PT Mode of Treatment - Total time(minutes) 90 minutes   PT Cumulative Minutes 181   Therapy Time missed   Time missed? No     "

## 2024-09-13 NOTE — PROGRESS NOTES
09/13/24 1110   Pain Assessment   Pain Assessment Tool 0-10   Pain Score 6   Pain Location/Orientation Orientation: Left;Location: Head   Restrictions/Precautions   Precautions Aspiration;Bed/chair alarms;Cognitive;Fall Risk;Pain;Spinal precautions;Supervision on toilet/commode   ROM Restrictions   (spinal prec)   Oral Hygiene   Type of Assistance Needed Supervision   Oral Hygiene CARE Score 4   Grooming   Able To Initiate Tasks;Comb/Brush Hair;Wash/Dry Face;Brush/Clean Teeth;Wash/Dry Hands   Limitation Noted In Safety;Sequencing;Strength;Problem Solving   Findings S/ Jasper seated at the sink   Lying to Sitting on Side of Bed   Type of Assistance Needed Physical assistance   Physical Assistance Level 25% or less   Lying to Sitting on Side of Bed CARE Score 3   Sit to Stand   Type of Assistance Needed Physical assistance   Physical Assistance Level 26%-50%   Sit to Stand CARE Score 3   Bed-Chair Transfer   Type of Assistance Needed Physical assistance   Physical Assistance Level 26%-50%   Comment min/ mod A with the RW   Chair/Bed-to-Chair Transfer CARE Score 3   Toileting Hygiene   Type of Assistance Needed Physical assistance   Physical Assistance Level 51%-75%   Toileting Hygiene CARE Score 2   Toileting   Able to Pull Clothing down no, up no.   Manage Hygiene Bladder;Bowel   Limitations Noted In Balance;Problem Solving;ROM;Safety;LE Strength;UE Strength  (spinal prec)   Adaptive Equipment Grab Bar   Toilet Transfer   Type of Assistance Needed Physical assistance   Physical Assistance Level 26%-50%   Toilet Transfer CARE Score 3   Toilet Transfer   Surface Assessed Raised Toilet   Transfer Technique Standard   Limitations Noted In Balance;Endurance;Problem Solving;ROM;Safety;LE Strength;UE Strength  (spinal prec)   Adaptive Equipment Grab Bar;Walker   Exercise Tools   Exercise Tools Yes   Other Exercise Tool 1 card match reaching with BUE while seated   Other Exercise Tool 2 fxl  reacher use for retrieval of  pegs from the floor using the reacher in the RUE   Cognition   Arousal/Participation Alert;Responsive;Cooperative   Attention Attends with cues to redirect   Orientation Level Oriented to person;Oriented to place;Oriented to time;Oriented to situation   Memory Decreased short term memory;Decreased recall of recent events;Decreased recall of precautions   Following Commands Follows one step commands with increased time or repetition   Additional Activities   Additional Activities Other (Comment)   Additional Activities Comments fxl mobility to and from the BR with RW and min/mod A   Assessment   Treatment Assessment Pt presents supine requesting use of the BR and agreeable to OT Session including toileting, grooming, trnasfers/ mobility and BUE therex/ theract. Pt requires assist due to decreased balance, safety, enduracne, strength/ROM with spinal prec and c collar. Pt will benefit from conitnued skilled OT services to increase independence with daily tasks.   Problem List Decreased strength;Decreased endurance;Decreased range of motion;Impaired balance;Decreased safety awareness;Decreased cognition;Decreased coordination;Decreased skin integrity;Orthopedic restrictions;Pain  (spinal prec)   Plan   Treatment/Interventions Functional transfer training;ADL retraining;Therapeutic exercise;Endurance training;Patient/family training;Equipment eval/education;Compensatory technique education   Progress Progressing toward goals   OT Therapy Minutes   OT Time In 1110   OT Time Out 1213   OT Total Time (minutes) 63   OT Mode of treatment - Individual (minutes) 63   Therapy Time missed   Time missed? No
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
## 2024-09-14 PROBLEM — Z91.89 AT RISK FOR DEEP VENOUS THROMBOSIS: Status: ACTIVE | Noted: 2024-09-14

## 2024-09-14 PROBLEM — Z91.89 AT RISK FOR ALTERED URINARY ELIMINATION: Status: ACTIVE | Noted: 2024-09-14

## 2024-09-14 PROBLEM — Z91.89 AT RISK FOR ALTERED BOWEL ELIMINATION: Status: ACTIVE | Noted: 2024-09-14

## 2024-09-14 PROBLEM — Z51.89 ENCOUNTER FOR REHABILITATION: Status: ACTIVE | Noted: 2024-09-14

## 2024-09-14 PROCEDURE — 97116 GAIT TRAINING THERAPY: CPT

## 2024-09-14 PROCEDURE — 99233 SBSQ HOSP IP/OBS HIGH 50: CPT | Performed by: PHYSICAL MEDICINE & REHABILITATION

## 2024-09-14 PROCEDURE — 97110 THERAPEUTIC EXERCISES: CPT

## 2024-09-14 PROCEDURE — 97530 THERAPEUTIC ACTIVITIES: CPT

## 2024-09-14 PROCEDURE — 97535 SELF CARE MNGMENT TRAINING: CPT

## 2024-09-14 PROCEDURE — 97112 NEUROMUSCULAR REEDUCATION: CPT

## 2024-09-14 RX ADMIN — Medication 250 MG: at 17:33

## 2024-09-14 RX ADMIN — SERTRALINE 100 MG: 100 TABLET, FILM COATED ORAL at 08:45

## 2024-09-14 RX ADMIN — ACETAMINOPHEN 975 MG: 325 TABLET ORAL at 05:00

## 2024-09-14 RX ADMIN — Medication 250 MG: at 08:45

## 2024-09-14 RX ADMIN — NYSTATIN, TRIAMCINOLONE ACETONIDE: 100000; 1 CREAM TOPICAL at 17:35

## 2024-09-14 RX ADMIN — CHLORHEXIDINE GLUCONATE 15 ML: 1.2 RINSE ORAL at 21:21

## 2024-09-14 RX ADMIN — ACETAMINOPHEN 975 MG: 325 TABLET ORAL at 21:21

## 2024-09-14 RX ADMIN — ATORVASTATIN CALCIUM 20 MG: 20 TABLET, FILM COATED ORAL at 17:33

## 2024-09-14 RX ADMIN — DOCUSATE SODIUM 100 MG: 100 CAPSULE, LIQUID FILLED ORAL at 08:45

## 2024-09-14 RX ADMIN — LEVOTHYROXINE SODIUM 125 MCG: 25 TABLET ORAL at 05:01

## 2024-09-14 RX ADMIN — SENNOSIDES 17.2 MG: 8.6 TABLET, FILM COATED ORAL at 21:21

## 2024-09-14 RX ADMIN — BACITRACIN ZINC 1 SMALL APPLICATION: 500 OINTMENT TOPICAL at 17:33

## 2024-09-14 RX ADMIN — NYSTATIN, TRIAMCINOLONE ACETONIDE: 100000; 1 CREAM TOPICAL at 08:45

## 2024-09-14 RX ADMIN — ENOXAPARIN SODIUM 40 MG: 40 INJECTION SUBCUTANEOUS at 08:45

## 2024-09-14 RX ADMIN — ACETAMINOPHEN 975 MG: 325 TABLET ORAL at 14:30

## 2024-09-14 RX ADMIN — DOCUSATE SODIUM 100 MG: 100 CAPSULE, LIQUID FILLED ORAL at 17:33

## 2024-09-14 RX ADMIN — BACITRACIN ZINC 1 SMALL APPLICATION: 500 OINTMENT TOPICAL at 08:45

## 2024-09-14 RX ADMIN — CHLORHEXIDINE GLUCONATE 15 ML: 1.2 RINSE ORAL at 08:48

## 2024-09-14 RX ADMIN — FOLIC ACID 1000 MCG: 1 TABLET ORAL at 08:45

## 2024-09-14 NOTE — PROGRESS NOTES
Progress Note - PMR   Name: Marsha Oliva 79 y.o. female I MRN: [id number]  Unit/Bed#: [facility] 221-01 I Date of Admission: 9/11/2024   Date of Service: 9/14/2024 I Hospital Day: 3     Assessment & Plan  Spinal cord compression (HCC)  Presented  to acute care on 9/4/24 with progressive neck pain and weakness.    She reports 4-6 weeks prior she sustained a fall off her bed, head hitting her nightstand.    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   MRI C spine showing displaced type 2 dens fracture with mild mass effect on cord, ALL and PLL injuries.    CTA head and neck negative for vascular injuries.    Patient seen by Neurosurgery and deemed an operative candidate.    Patient taken to the OR by Dr. Dewitt for a bilateral C1 and C3 lateral mass screw placement, bialteral C2 pedicle screw palcement, C1-C3 fusion.    Post operatively, placed in a Aspen C Collar at all times, except showering with Shonda collar.    Post op X-rays with stable alignment    Plan in ARC:  Begin ARC program with PT, OT, SLP  Monitor incision  Monitor pain  Monitor neuro exam  Follow-up with Neurosurgery in 2 weeks  POD 14 = 9/19/24  Oropharyngeal dysphagia  New issues  VFSS study completed on 9/10/24 and patient found to have mild oral and moderate phayngeal dysphagia.    Cleared for a pureed diet with honey thick liquids.    SLP to follow  Continue aspiration precautions   Acquired bilateral hammer toes  Keratosis of toe  May need shaving of this lesion  Consult placed to Podiatry  Atrial fibrillation, chronic (HCC)  Chronic A. Fib  Rate/rhythm controlled on digoxin 125 mcg every other day, Cardizem  mg daily  No AC, but was taking Aspirin 81 mg daily (restart when able).  IM managing  B12 deficiency  Continue supplementation   IM managing  Chronic diastolic CHF (congestive heart failure) (HCC)  Wt Readings from Last 3 Encounters:   09/14/24  51.4 kg (113 lb 5.1 oz)   09/11/24 53.8 kg (118 lb 9.7 oz)   09/04/24 53.5 kg (118 lb)     Weight stable  Patient takes Demedex 20 mg daily PRN for weight gain  Monitor daily weights  IM managing      Depression with anxiety  Mood is stable  Continue Zoloft 100 mg daily  Essential hypertension  Continue Cardizem  mg Daily  Monitor BP and HR with rest and activity  IM managing  Hypothyroidism  Continue Synthroid 125 mcg daily  Mixed hyperlipidemia  Continue Atorvastain 20 mg daily  Folate deficiency  Continue supplementation   IM managing  Acute pain due to trauma  Located in neck  Continue Tylenol 975 mg TID  Oxycodone IR 5-10 mg Q 4 hours PRN for moderate-severe pain  Topical ICE  Skin abnormalities  Wound Care team following in ARC:  POA    1)Sacrum red linear tissue,slow to alayna, staged as stage one POA to sacrum.Mixed etiology of pressure and moisture.Hydraguard applied no foam dressing .  2)Bilateral buttocks, pink intact erythema. Tissue appears darker in photo in EMR than when assessed. Tissue is pink intact and all areas of both buttocks are blanchable. Hydraguard applied, no foam dressing was applied over hydraguard as this will trap moisture against skin.Area of erythema is decreasing in size form previous photo at [facility].  3)Abrasion to left cheek is open to air.   4)Right arm skin tear beefy red  5)Bilateral heels pink , blanchable. Heel foams on a preventative measure. Right heel outer aspect has area of light purple appears to be ischemic.Heels offloaded.  5)Dry soft intact brown callous on second toe of right foot. Pt states this is known to podiatry, Dr Mohr, with pt having visits to trim callous. Currently open to air.  6)ITD of breast folds, has nystatin powder  7)Left pretibial skin tear resolved. Open to air      Skin care plans:  1-Apply hydroguard cream to sacrum and bilateralbuttocks to hydrate dry skin and protective barrier Bid and Prn  2-Elevate heels to offload  pressure.  3-Ehob cushion in chair when out of bed.  4-Moisturize skin daily with skin nourishing cream.  5-Turn/reposition q2h or when medically stable for pressure re-distribution on skin.   6-Monitor callous of Right foot second toe for changes , open to air.  7-Cleanse right arm skin tear with NSS. Apply Dermagran to wound bed only. Cover with DSD change every other day and prn soil or dislodgment   8-Right foot second toe callous open to air, has podiatry consulted.  Lymphedema  Pumps brought in by  - ok to use PRN as tolerated  At risk for altered bowel elimination  Continent   At risk for altered urinary elimination  Continent   At risk for deep venous thrombosis  Continue Lovenox 40 mg daily   Encounter for rehabilitation  Functional deficits:  cervical collar on at all times, cervical spinal precautions, impaired mobility, self care   Continue current rehabilitation plan of care to maximize function.     History of Present Illness   Rehab Diagnosis: Impairment of mobility, safety and Activities of Daily Living (ADLs) due to Spinal Cord Dysfunction:  Traumatic:  04.2211  Quadriplegia, Incomplete C1-4  Etiologic Diagnosis: Fall with displaced type 2 dens fracture with spinal cord injury and compression     HPI: Marsha Oliva is a 79 y.o. female with HTN, A Fib, CHF, HLD, Hypothyroidism who presented to the [facility] on on 9/4/24 with progressive neck pain and weakness.  She reports 4-6 weeks prior she sustained a fall off her bed, head hitting her nightstand.  She has been having increasing neck pain since.  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 MRI C spine showing displaced type 2 dens fracture with mild mass effect on cord, ALL and PLL injuries.  CTA head and neck negative for vascular injuries.  Patient seen by Neurosurgery and deemed an operative candidate.  Patient taken to  the OR by Dr. Dewitt for a bilateral C1 and C3 lateral mass screw placement, bialteral C2 pedicle screw palcement, C1-C3 fusion.  Post operatively, placed in a Aspen C Collar at all times, except showering with Shonda collar.  Post op X-rays with stable alignment  Medical course complicated by acute dysphagia.  VFSS study completed on 9/10/24 and patient found to have mild oral and moderate phayngeal dysphagia.  Cleared for a pureed diet with honey thick liquids.  Patient seen by wound care team for abrasions and stage I pressure injury to sacrum.  Patient started on Tylenol and PRN oxycodone for acute pain.    After medical stabilization, patient found to have acute functional deficits from his baseline, therefore, admitted to [facility] for acute inpatient rehabilitation.      Chief Complaint:  weak in legs    Interval: Patient seen face to face.  Feeling weak in her legs today.  Relayed to nursing, patient     Objective     Functional Update:  Evals in progress      Temp:  [97.3 °F (36.3 °C)-98 °F (36.7 °C)] 98 °F (36.7 °C)  HR:  [77-85] 85  Resp:  [17-18] 17  BP: ()/(58-68) 81/58  SpO2:  [96 %-97 %] 96 %  Physical Exam  Vitals and nursing note reviewed.   Constitutional:       General: She is not in acute distress.  HENT:      Head: Normocephalic and atraumatic.      Nose: Nose normal.      Mouth/Throat:      Mouth: Mucous membranes are moist.   Eyes:      Conjunctiva/sclera: Conjunctivae normal.   Neck:      Comments:  C collar in place  Cardiovascular:      Rate and Rhythm: Normal rate and regular rhythm.      Pulses: Normal pulses.   Pulmonary:      Effort: Pulmonary effort is normal.      Breath sounds: Normal breath sounds. No wheezing or rales.   Abdominal:      General: Bowel sounds are normal. There is no distension.      Palpations: Abdomen is soft.      Tenderness: There is no abdominal tenderness.   Musculoskeletal:         General: No swelling.      Cervical back: Neck supple.    Skin:     General: Skin is warm.      Comments: Cervical incision clean and intact with sutures   Neurological:      Mental Status: She is alert and oriented to person, place, and time.      Motor: Weakness present.   Psychiatric:         Mood and Affect: Mood normal.     Scheduled Meds:  Current Facility-Administered Medications   Medication Dose Route Frequency Provider Last Rate    acetaminophen  975 mg Oral Q8H CHRIS Sharmin Waggoner PA-C      atorvastatin  20 mg Oral Daily With Dinner Sharmin Waggoner PA-C      bacitracin  1 small application Topical BID Megganjose r Jones PA-C      bisacodyl  10 mg Rectal Daily PRN Sharmin Waggoner PA-C      bisacodyl  10 mg Rectal Once Corine Park MD      calcium carbonate  1,000 mg Oral Daily PRN Sharmin Waggoner PA-C      chlorhexidine  15 mL Mouth/Throat Q12H [facility] Sharmin Waggoner PA-C      cyanocobalamin  1,000 mcg Oral Weekly Sharmin Waggoner PA-C      digoxin  125 mcg Oral Every Other Day Sharmin Waggoner PA-C      diltiazem  180 mg Oral Daily Sharmin Waggoner PA-C      docusate sodium  100 mg Oral BID Corine Park MD      enoxaparin  40 mg Subcutaneous Q24H [facility] Corine Park MD      folic acid  1,000 mcg Oral Daily Sharmin Waggoner PA-C      levothyroxine  125 mcg Oral Early Morning Sharmin Waggoner PA-C      magnesium gluconate  250 mg Oral BID Sharmin Waggoner PA-C      nystatin-triamcinolone   Topical BID Corine Park MD      ondansetron  4 mg Oral Q6H PRN Sharmin Waggoner PA-C      oxyCODONE  2.5 mg Oral Q4H PRN Sharmin Waggoner PA-C      Or    oxyCODONE  5 mg Oral Q4H PRN Sharmin Waggoner PA-C      phenol  1 spray Mouth/Throat Q2H PRN Sharmin Waggoner PA-C      polyethylene glycol  17 g Oral Daily PRN Corine Park MD      senna  2 tablet Oral HS Corine Park MD      sertraline  100 mg Oral Daily Sharmin Waggoner  FATOUMATA      torsemide  20 mg Oral Daily AUGUSTINEN Sharmin Waggoner PA-C           Lab Results: I have reviewed the following results: none  Results from last 7 days   Lab Units 09/11/24  1719 09/08/24  0604   HEMOGLOBIN g/dL  --  12.3   HEMATOCRIT %  --  39.7   WBC Thousand/uL  --  8.07   PLATELETS Thousands/uL 196 141*     Results from last 7 days   Lab Units 09/10/24  0601 09/09/24  0525 09/08/24  0604   BUN mg/dL 12 14 14   SODIUM mmol/L 139 140 141   POTASSIUM mmol/L 4.0 3.9 4.1   CHLORIDE mmol/L 100 104 104   CREATININE mg/dL 0.54* 0.45* 0.62              I have spent a total time of 45 minutes in caring for this patient on the day of the visit/encounter including Instructions for management, Patient and family education, Risk factor reductions, Impressions, Counseling / Coordination of care, Documenting in the medical record, Obtaining or reviewing history  , and Communicating with other healthcare professionals .

## 2024-09-14 NOTE — PLAN OF CARE
Problem: PAIN - ADULT  Goal: Verbalizes/displays adequate comfort level or baseline comfort level  Description: Interventions:  - Encourage patient to monitor pain and request assistance  - Assess pain using appropriate pain scale  - Administer analgesics based on type and severity of pain and evaluate response  - Implement non-pharmacological measures as appropriate and evaluate response  - Consider cultural and social influences on pain and pain management  - Notify physician/advanced practitioner if interventions unsuccessful or patient reports new pain  Outcome: Progressing     Problem: INFECTION - ADULT  Goal: Absence or prevention of progression during hospitalization  Description: INTERVENTIONS:  - Assess and monitor for signs and symptoms of infection  - Monitor lab/diagnostic results  - Monitor all insertion sites, i.e. indwelling lines, tubes, and drains  - Monitor endotracheal if appropriate and nasal secretions for changes in amount and color  - [place] appropriate cooling/warming therapies per order  - Administer medications as ordered  - Instruct and encourage patient and family to use good hand hygiene technique  - Identify and instruct in appropriate isolation precautions for identified infection/condition  Outcome: Progressing     Problem: SAFETY ADULT  Goal: Maintain or return to baseline ADL function  Description: INTERVENTIONS:  -  Assess patient's ability to carry out ADLs; assess patient's baseline for ADL function and identify physical deficits which impact ability to perform ADLs (bathing, care of mouth/teeth, toileting, grooming, dressing, etc.)  - Assess/evaluate cause of self-care deficits   - Assess range of motion  - Assess patient's mobility; develop plan if impaired  - Assess patient's need for assistive devices and provide as appropriate  - Encourage maximum independence but intervene and supervise when necessary  - Involve family in performance of ADLs  - Assess for home care  needs following discharge   - Consider OT consult to assist with ADL evaluation and planning for discharge  - Provide patient education as appropriate  Outcome: Progressing

## 2024-09-14 NOTE — PROGRESS NOTES
"Progress Note - Marsha Oliva 79 y.o. female MRN: [id number]    Unit/Bed#: [facility] 221-01 Encounter: [id number]        Subjective:   Patient seen and examined at bedside after reviewing the chart and discussing the case with the caring staff.      Patient examined at bedside.  Patient denies any acute symptoms.     Physical Exam   Vitals: Blood pressure 104/68, pulse 77, temperature (!) 97.3 °F (36.3 °C), temperature source Temporal, resp. rate 18, height 5' 3\" (1.6 m), weight 51.4 kg (113 lb 5.1 oz), SpO2 97%.,Body mass index is 20.07 kg/m².  Constitutional: Patient in no acute distress.  HEENT: PERR, EOMI, MMM.  Cardiovascular: Normal rate and regular rhythm.    Pulmonary/Chest: Effort normal and breath sounds normal.   Abdomen: Soft, + BS, NT.    Assessment/Plan:  Marsha Oliva is a(n) 79 y.o. female with cervical spinal cord compression.     Cardiac hx w/ HTN, HLD, chronic diastolic heart failure, chronic Afib.  Continue digoxin 125 mcg every other day, diltiazem 180 mg daily, atorvastatin 40 mg daily.  Weekly weights.  Torsemide 20 mg daily as needed.  Home ASA currently on hold.   Hypothyroidism.  Continue levothyroxine 125 mcg daily (decr from 137 mcg 9/6).  B12 deficiency.  Patient on B12 1,000 mcg weekly.   Folate deficiency.  Patient on folic acid 1,000 mcg daily.   Hypomagnesemia.  Patient on magnesium gluconate 250 mg twice daily.   Depression and anxiety.   Patient on Zoloft 100 mg daily.  Declines neuropsych at this time.   Multiple hyperkeratotic lesions.  Patient follows with Podiatry outpatient.  Wound care consult.   Sore throat.  Chloraseptic throat spray as needed.   Pain and bowel regimen.  As per physiatry.   Cervical spinal cord compression.  Vista collar at all times x 6 weeks.  Continue SQ Lovenox.  Patient receiving intensive PT OT ST as per physiatry.      Anticipated discharge date:  TBD  "

## 2024-09-14 NOTE — PROGRESS NOTES
09/14/24 0829   Pain Assessment   Pain Assessment Tool 0-10   Pain Score No Pain   Restrictions/Precautions   Precautions Aspiration;Bed/chair alarms;Cognitive;Fall Risk;Spinal precautions;Supervision on toilet/commode   ROM Restrictions   (spinal precautions)   Braces or Orthoses C/S Collar   Oral Hygiene   Type of Assistance Needed Supervision   Comment Seated at sink   Oral Hygiene CARE Score 4   Grooming   Able To Initiate Tasks;Comb/Brush Hair;Wash/Dry Face;Brush/Clean Teeth;Wash/Dry Hands   Limitation Noted In Problem Solving;Safety;Sequencing;Strength   Shower/Bathe Self   Type of Assistance Needed Physical assistance   Physical Assistance Level 51%-75%   Shower/Bathe Self CARE Score 2   Bathing   Assessed Bath Style Sponge Bath   Anticipated D/C Bath Style Shower;Sponge Bath   Able to Gather/Transport No   Able to Wash/Rinse/Dry (body part) Left Arm;Right Arm;L Upper Leg;R Upper Leg;Chest;Abdomen   Limitations Noted in Balance;Endurance;Problem Solving;ROM;Safety;Sequencing;Strength  (c collar; spinal precautions)   Positioning Seated;Standing   Tub/Shower Transfer   Reason Not Assessed Sponge Bath   Upper Body Dressing   Type of Assistance Needed Physical assistance   Physical Assistance Level 51%-75%   Upper Body Dressing CARE Score 2   Lower Body Dressing   Type of Assistance Needed Physical assistance   Physical Assistance Level 76% or more   Lower Body Dressing CARE Score 2   Putting On/Taking Off Footwear   Type of Assistance Needed Physical assistance   Physical Assistance Level 76% or more   Putting On/Taking Off Footwear CARE Score 2   Dressing/Undressing Clothing   Remove UB Clothes Pullover Shirt   Don UB Clothes Button Shirt   Remove LB Clothes Pants;Undergarment;Socks   Don LB Clothes Pants;Undergarment;Shoes   Limitations Noted In Balance;Endurance;Problem Solving;Safety;Sequencing;Strength;ROM  (c collar with spinal precautions)   Positioning Supported Sit;Standing   Sit to Stand   Type of  Assistance Needed Physical assistance   Physical Assistance Level 26%-50%   Comment Min A   Sit to Stand CARE Score 3   Toileting Hygiene   Type of Assistance Needed Physical assistance   Physical Assistance Level 26%-50%   Comment Completed 2x   Toileting Hygiene CARE Score 3   Toileting   Able to Pull Clothing down no, up no.   Manage Hygiene Bladder;Bowel   Limitations Noted In Balance;Problem Solving;ROM;Safety;UE Strength;LE Strength   Adaptive Equipment Grab Bar   Toilet Transfer   Type of Assistance Needed Physical assistance   Physical Assistance Level 26%-50%   Toilet Transfer CARE Score 3   Toilet Transfer   Surface Assessed Raised Toilet   Transfer Technique Standard  (RW)   Limitations Noted In Balance;Endurance;Problem Solving;ROM;Safety;Sequencing;UE Strength;LE Strength  (Spinal precautions)   Adaptive Equipment Grab Bar;Walker   Positioning Concerns Safety   Exercise Tools   Other Exercise Tool 1 Pt utilized B/L hands to untie knots from [employer]   Other Exercise Tool 2 R and L hand gross grasp/release with 5# digiflex, 30x   Additional Activities   Additional Activities Other (Comment)   Additional Activities Comments Pt completed sock match while seated   Assessment   Treatment Assessment Pt received sitting on toilet; assist to compelte toilet hygiene throughly and with spinal precautions. Focus with self care routine with up to Mod A with UE dressing and Max A with LE dressing; c collar in place and maintained spinal precautions. Pt with noted fatiuge throughout session and benefited from rest breaks. Pt would benefit from continued particiaption OT services to support increased strength, balance and activity tolerence and address barriers to help support increased safety and indpdence with ADL routines and functional transfers and to help facilitate progress towards safe discharge.   Plan   Treatment/Interventions ADL retraining;Functional transfer training;Therapeutic  exercise;Endurance training;Patient/family training;Equipment eval/education;Bed mobility;Compensatory technique education   Progress Progressing toward goals   OT Therapy Minutes   OT Time In 0829   OT Time Out 1029   OT Total Time (minutes) 120   OT Mode of treatment - Individual (minutes) 120

## 2024-09-14 NOTE — PROGRESS NOTES
09/14/24 1245   Pain Assessment   Pain Assessment Tool 0-10   Pain Score 2   Pain Location/Orientation Location: Neck   Restrictions/Precautions   Precautions Aspiration;Bed/chair alarms   Cognition   Overall Cognitive Status WFL   Arousal/Participation Alert;Responsive;Cooperative   Attention Attends with cues to redirect   Orientation Level Oriented X4   Memory Decreased short term memory;Decreased recall of recent events;Decreased recall of precautions   Following Commands Follows one step commands with increased time or repetition   Sit to Stand   Type of Assistance Needed Physical assistance   Physical Assistance Level 26%-50%   Sit to Stand CARE Score 3   Bed-Chair Transfer   Type of Assistance Needed Physical assistance   Physical Assistance Level 26%-50%   Chair/Bed-to-Chair Transfer CARE Score 3   Transfer Bed/Chair/Wheelchair   Stand Pivot Minimal Assist   Sit to Stand Minimal Assist   Stand to Sit Minimal Assist   Walk 10 Feet   Type of Assistance Needed Physical assistance   Physical Assistance Level 26%-50%   Walk 10 Feet CARE Score 3   Walk 50 Feet with Two Turns   Reason if not Attempted Safety concerns   Walk 50 Feet with Two Turns CARE Score 88   Walk 150 Feet   Reason if not Attempted Safety concerns   Walk 150 Feet CARE Score 88   Walking 10 Feet on Uneven Surfaces   Reason if not Attempted Safety concerns   Walking 10 Feet on Uneven Surfaces CARE Score 88   Ambulation   Primary Mode of Locomotion Prior to Admission Walk   Distance Walked (feet)   (21, 36, 61)   Assist Device Roller Walker   Gait Pattern Decreased foot clearance;Inconsistant Vaishnavi   Limitations Noted In Balance;Endurance;Strength   Does the patient walk? 2. Yes   Curb or Single Stair   Reason if not Attempted Safety concerns   1 Step (Curb) CARE Score 88   4 Steps   Reason if not Attempted Safety concerns   4 Steps CARE Score 88   12 Steps   Reason if not Attempted Safety concerns   12 Steps CARE Score 88   Therapeutic  Interventions   Strengthening seated ther ex   Balance Gait and transfer training   Assessment   Treatment Assessment Patient tolerated the treatment well today. Patient was already sitting in the chair upon start of session. Patient required moderate cues for proper hand placement and body mechanics with transfers. Patient was able to ambulate today with RW with cues for posture, RW management and safety. Patient's mobility is limited due to decreased endurance and balance. Patient will continue to benefit from skilled therapy services to maximize her LOF   Problem List Decreased strength;Decreased endurance;Decreased range of motion;Impaired balance;Decreased safety awareness;Decreased cognition;Decreased coordination;Decreased skin integrity;Orthopedic restrictions;Pain   Barriers to Discharge Inaccessible home environment;Decreased caregiver support   PT Barriers   Physical Impairment Decreased strength;Decreased range of motion;Decreased endurance;Impaired balance;Decreased mobility;Decreased safety awareness;Pain;Orthopedic restrictions   Functional Limitation Car transfers;Ramp negotiation;Stair negotiation;Standing;Transfers;Walking;Wheelchair management   Plan   Treatment/Interventions Functional transfer training;LE strengthening/ROM;Elevations;Therapeutic exercise;Endurance training;Cognitive reorientation;Patient/family training;Equipment eval/education;Bed mobility;Gait training;Compensatory technique education;Continued evaluation   Progress Progressing toward goals   PT Therapy Minutes   PT Time In 1245   PT Time Out 1345   PT Total Time (minutes) 60   PT Mode of treatment - Individual (minutes) 60   Therapy Time missed   Time missed? No

## 2024-09-14 NOTE — NURSING NOTE
Patient transfers and ambulates assist x 1. Needs cueing and encouragement to complete task. States legs weak when walking back from bathroom once with staff. Encouraged to stand upright until got to chair. Reports minimal pain and medicated with routine tylenol. Incision clean and intact to back of neck. Cervical collar in place at all times. Appetite poor for all meals. Patient dislikes liquids and is not drinking. Fluids encouraged. No coughing noted during meals. Will continue to monitor and follow plan of care.

## 2024-09-15 PROCEDURE — 97530 THERAPEUTIC ACTIVITIES: CPT

## 2024-09-15 PROCEDURE — 97535 SELF CARE MNGMENT TRAINING: CPT

## 2024-09-15 RX ORDER — NYSTATIN 100000 [USP'U]/G
POWDER TOPICAL 2 TIMES DAILY
Status: DISCONTINUED | OUTPATIENT
Start: 2024-09-15 | End: 2024-09-30

## 2024-09-15 RX ADMIN — BACITRACIN ZINC 1 SMALL APPLICATION: 500 OINTMENT TOPICAL at 17:05

## 2024-09-15 RX ADMIN — ATORVASTATIN CALCIUM 20 MG: 20 TABLET, FILM COATED ORAL at 17:05

## 2024-09-15 RX ADMIN — CHLORHEXIDINE GLUCONATE 15 ML: 1.2 RINSE ORAL at 21:12

## 2024-09-15 RX ADMIN — LEVOTHYROXINE SODIUM 125 MCG: 25 TABLET ORAL at 05:56

## 2024-09-15 RX ADMIN — FOLIC ACID 1000 MCG: 1 TABLET ORAL at 08:56

## 2024-09-15 RX ADMIN — Medication 250 MG: at 08:56

## 2024-09-15 RX ADMIN — DIGOXIN 125 MCG: 125 TABLET ORAL at 08:56

## 2024-09-15 RX ADMIN — SERTRALINE 100 MG: 100 TABLET, FILM COATED ORAL at 08:57

## 2024-09-15 RX ADMIN — DOCUSATE SODIUM 100 MG: 100 CAPSULE, LIQUID FILLED ORAL at 17:05

## 2024-09-15 RX ADMIN — ENOXAPARIN SODIUM 40 MG: 40 INJECTION SUBCUTANEOUS at 08:56

## 2024-09-15 RX ADMIN — BACITRACIN ZINC 1 SMALL APPLICATION: 500 OINTMENT TOPICAL at 08:56

## 2024-09-15 RX ADMIN — DOCUSATE SODIUM 100 MG: 100 CAPSULE, LIQUID FILLED ORAL at 08:56

## 2024-09-15 RX ADMIN — NYSTATIN: 100000 POWDER TOPICAL at 17:05

## 2024-09-15 RX ADMIN — NYSTATIN, TRIAMCINOLONE ACETONIDE: 100000; 1 CREAM TOPICAL at 08:57

## 2024-09-15 RX ADMIN — Medication 250 MG: at 17:05

## 2024-09-15 RX ADMIN — CHLORHEXIDINE GLUCONATE 15 ML: 1.2 RINSE ORAL at 08:56

## 2024-09-15 RX ADMIN — ACETAMINOPHEN 975 MG: 325 TABLET ORAL at 05:56

## 2024-09-15 RX ADMIN — ACETAMINOPHEN 975 MG: 325 TABLET ORAL at 14:12

## 2024-09-15 RX ADMIN — ACETAMINOPHEN 975 MG: 325 TABLET ORAL at 21:13

## 2024-09-15 RX ADMIN — SENNOSIDES 17.2 MG: 8.6 TABLET, FILM COATED ORAL at 21:13

## 2024-09-15 NOTE — NURSING NOTE
Patient ambulates assist x 1 with walker. Needs encouragement to ambulate to bathroom. Reports minimal pain. Cervical collar in place at all times. Incision sutured and clean, dry, and intact. Appetite very poor for meals. Fluids encouraged. Moist cough noted. Sitting upright in chair for all meals. Remains continant of bowel and bladder. Educated on importance of repositioning. Will continue to monitor and follow plan of care.

## 2024-09-15 NOTE — PLAN OF CARE
Problem: PAIN - ADULT  Goal: Verbalizes/displays adequate comfort level or baseline comfort level  Description: Interventions:  - Encourage patient to monitor pain and request assistance  - Assess pain using appropriate pain scale  - Administer analgesics based on type and severity of pain and evaluate response  - Implement non-pharmacological measures as appropriate and evaluate response  - Consider cultural and social influences on pain and pain management  - Notify physician/advanced practitioner if interventions unsuccessful or patient reports new pain  Outcome: Progressing     Problem: INFECTION - ADULT  Goal: Absence or prevention of progression during hospitalization  Description: INTERVENTIONS:  - Assess and monitor for signs and symptoms of infection  - Monitor lab/diagnostic results  - Monitor all insertion sites, i.e. indwelling lines, tubes, and drains  - Monitor endotracheal if appropriate and nasal secretions for changes in amount and color  - [place] appropriate cooling/warming therapies per order  - Administer medications as ordered  - Instruct and encourage patient and family to use good hand hygiene technique  - Identify and instruct in appropriate isolation precautions for identified infection/condition  Outcome: Progressing     Problem: SAFETY ADULT  Goal: Patient will remain free of falls  Description: INTERVENTIONS:  - Educate patient/family on patient safety including physical limitations  - Instruct patient to call for assistance with activity   - Consult OT/PT to assist with strengthening/mobility   - Keep Call bell within reach  - Keep bed low and locked with side rails adjusted as appropriate  - Keep care items and personal belongings within reach  - Initiate and maintain comfort rounds  - Make Fall Risk Sign visible to staff  - Offer Toileting every 2 Hours, in advance of need  - Initiate/Maintain bed and chair alarm  - Obtain necessary fall risk management equipment: alarms  - Apply  yellow socks and bracelet for high fall risk patients  - Consider moving patient to room near nurses station  Outcome: Progressing

## 2024-09-15 NOTE — PROGRESS NOTES
"Progress Note - Marsha Oliva 79 y.o. female MRN: [id number]    Unit/Bed#: [facility] 221-01 Encounter: [id number]        Subjective:   Patient seen and examined at bedside after reviewing the chart and discussing the case with the caring staff.      Patient examined at bedside.  Patient denies any acute symptoms.     Physical Exam   Vitals: Blood pressure 100/60, pulse 89, temperature 97.6 °F (36.4 °C), temperature source Temporal, resp. rate 15, height 5' 3\" (1.6 m), weight 52.3 kg (115 lb 4.8 oz), SpO2 95%.,Body mass index is 20.42 kg/m².  Constitutional: Patient in no acute distress.  HEENT: PERR, EOMI, MMM.  Cardiovascular: Normal rate and regular rhythm.    Pulmonary/Chest: Effort normal and breath sounds normal.   Abdomen: Soft, + BS, NT.    Assessment/Plan:  Marsha Oliva is a(n) 79 y.o. female with cervical spinal cord compression.     Cardiac hx w/ HTN, HLD, chronic diastolic heart failure, chronic Afib.  Continue digoxin 125 mcg every other day, diltiazem 180 mg daily, atorvastatin 40 mg daily.  Weekly weights.  Torsemide 20 mg daily as needed.  Home ASA currently on hold.   Hypothyroidism.  Continue levothyroxine 125 mcg daily (decr from 137 mcg 9/6).  B12 deficiency.  Patient on B12 1,000 mcg weekly.   Folate deficiency.  Patient on folic acid 1,000 mcg daily.   Hypomagnesemia.  Patient on magnesium gluconate 250 mg twice daily.   Depression and anxiety.   Patient on Zoloft 100 mg daily.  Declines neuropsych at this time.   Multiple hyperkeratotic lesions.  Patient follows with Podiatry outpatient.  Wound care consult.   Sore throat.  Chloraseptic throat spray as needed.   Pain and bowel regimen.  As per physiatry.   Cervical spinal cord compression.  Vista collar at all times x 6 weeks.  Continue SQ Lovenox.  Patient receiving intensive PT OT ST as per physiatry.      Anticipated discharge date:  TBD  "

## 2024-09-15 NOTE — ASSESSMENT & PLAN NOTE
Functional deficits:  cervical collar on at all times, cervical spinal precautions, impaired mobility, self care   Continue current rehabilitation plan of care to maximize function.

## 2024-09-15 NOTE — PROGRESS NOTES
09/15/24 0930   Pain Assessment   Pain Assessment Tool 0-10   Pain Score 3   Pain Location/Orientation Location: Neck   Restrictions/Precautions   Precautions Aspiration;Bed/chair alarms;Cognitive;Fall Risk;Pain;Spinal precautions;Supervision on toilet/commode   Weight Bearing Restrictions No   ROM Restrictions   (spinal precautions)   Braces or Orthoses C/S Collar   Grooming   Findings [place] to comb back of hair   Shower/Bathe Self   Type of Assistance Needed Physical assistance   Physical Assistance Level 25% or less   Comment assist to wash buttocks, pt declined washing chest and abdomen as RN had recently applied medicated cream and also bilat lower LE as she had just changed clothing   Shower/Bathe Self CARE Score 3   Bathing   Assessed Bath Style Sponge Bath   Anticipated D/C Bath Style Sponge Bath;Shower   Able to Gather/Transport No   Able to Wash/Rinse/Dry (body part) Left Arm;Right Arm;L Upper Leg;R Upper Leg;Perineal Area   Limitations Noted in Balance;Endurance;Problem Solving;ROM;Safety;Strength   Positioning Seated;Standing   Upper Body Dressing   Comment pt declined changing UB clothing, moved shirt around while on abdomen to wash bilat underarms and back   Lower Body Dressing   Type of Assistance Needed Physical assistance   Physical Assistance Level 76% or more   Comment pt able to assist in pulling clothing up and down over hips on one side   Lower Body Dressing CARE Score 2   Putting On/Taking Off Footwear   Type of Assistance Needed Physical assistance   Physical Assistance Level 76% or more   Comment able to present bilat feet to OT to doff/don shoes   Putting On/Taking Off Footwear CARE Score 2   Dressing/Undressing Clothing   Remove LB Clothes Pants;Undergarment;Shoes   Don LB Clothes Pants;Undergarment;Shoes   Limitations Noted In Balance;Endurance;Safety;Problem Solving;Strength;ROM   Positioning Supported Sit   Sit to Stand   Type of Assistance Needed Physical assistance   Physical  Assistance Level 25% or less   Comment RW   Sit to Stand CARE Score 3   Toileting Hygiene   Type of Assistance Needed Physical assistance   Physical Assistance Level 76% or more   Comment able to assist with pulling clothing down when standing   Toileting Hygiene CARE Score 2   Toileting   Able to Pull Clothing down yes, up no.   Manage Hygiene Bladder;Bowel   Limitations Noted In Balance;Problem Solving;ROM;Safety;UE Strength;LE Strength   Adaptive Equipment   (RW)   Toilet Transfer   Type of Assistance Needed Physical assistance   Physical Assistance Level 25% or less   Comment stand pivot to commode from bedside   Toilet Transfer CARE Score 3   Toilet Transfer   Surface Assessed Standard Commode   Transfer Technique Stand Pivot   Limitations Noted In Balance;Endurance;Problem Solving;ROM;Safety;LE Strength;UE Strength   Adaptive Equipment Walker   Coordination   Fine Motor used clothespin to remove foam block cubes from container and place on pile, alternated hands to complete, pt reported R hand stronger than L hand and did report mild fatigue by end of activity   Cognition   Overall Cognitive Status WFL   Arousal/Participation Alert;Responsive;Cooperative   Attention Attends with cues to redirect   Orientation Level Oriented X4   Memory Decreased short term memory;Decreased recall of recent events;Decreased recall of precautions   Following Commands Follows one step commands without difficulty   Assessment   Treatment Assessment Pt agreeable to OT session this AM. Received sitting upright in w/c. ADL session completed; current LOF and details listed in respective sections. Pt is overall maxA LB dressing and toileting, [place] bathing and grooming; fxl transfers overall Jasper using RW and VC's for hand placement. Pt reported mild pain in posterior neck during session that did not increase during session; c-collar maintained during entire session. Pt completed intrinsic hand strength/coordination activity after ADL  with good tolerance. Rest breaks taken as needed during session to alleviate generalized fatigue. Pt's barriers to d/c include decreased strength throughout, decreased ROM, decreased balance, impaired cognition including problem solving and sequencing, decreased activity tolerance, impaired coordination bilat hands, decreased safety awareness, and spinal precautions with c-collar ; all affect independence in self care and fxl transfers. Pt would benefit from continued skilled OT services in order to address listed barriers and prepare for safe d/c.   Prognosis Good   Problem List Decreased strength;Decreased endurance;Decreased range of motion;Impaired balance;Decreased coordination;Decreased cognition;Impaired judgement;Decreased safety awareness;Orthopedic restrictions   Plan   Treatment/Interventions ADL retraining;Functional transfer training;LE strengthening/ROM;Endurance training;Therapeutic exercise;Patient/family training;Cognitive reorientation;Equipment eval/education;Compensatory technique education;OT   Progress Progressing toward goals   OT Therapy Minutes   OT Time In 0930   OT Time Out 1030   OT Total Time (minutes) 60   OT Mode of treatment - Individual (minutes) 60

## 2024-09-16 PROBLEM — Z98.1 S/P CERVICAL SPINAL FUSION: Status: ACTIVE | Noted: 2024-09-16

## 2024-09-16 LAB
ALBUMIN SERPL BCG-MCNC: 3.5 G/DL (ref 3.5–5)
ALP SERPL-CCNC: 72 U/L (ref 34–104)
ALT SERPL W P-5'-P-CCNC: 36 U/L (ref 7–52)
ANION GAP SERPL CALCULATED.3IONS-SCNC: 6 MMOL/L (ref 4–13)
AST SERPL W P-5'-P-CCNC: 66 U/L (ref 13–39)
BASOPHILS # BLD AUTO: 0.02 THOUSANDS/ΜL (ref 0–0.1)
BASOPHILS NFR BLD AUTO: 0 % (ref 0–1)
BILIRUB SERPL-MCNC: 0.52 MG/DL (ref 0.2–1)
BUN SERPL-MCNC: 18 MG/DL (ref 5–25)
CALCIUM SERPL-MCNC: 9.1 MG/DL (ref 8.4–10.2)
CHLORIDE SERPL-SCNC: 104 MMOL/L (ref 96–108)
CO2 SERPL-SCNC: 30 MMOL/L (ref 21–32)
CREAT SERPL-MCNC: 0.72 MG/DL (ref 0.6–1.3)
EOSINOPHIL # BLD AUTO: 0.09 THOUSAND/ΜL (ref 0–0.61)
EOSINOPHIL NFR BLD AUTO: 2 % (ref 0–6)
ERYTHROCYTE [DISTWIDTH] IN BLOOD BY AUTOMATED COUNT: 13.5 % (ref 11.6–15.1)
GFR SERPL CREATININE-BSD FRML MDRD: 79 ML/MIN/1.73SQ M
GLUCOSE P FAST SERPL-MCNC: 89 MG/DL (ref 65–99)
GLUCOSE SERPL-MCNC: 89 MG/DL (ref 65–140)
HCT VFR BLD AUTO: 40.3 % (ref 34.8–46.1)
HGB BLD-MCNC: 12.3 G/DL (ref 11.5–15.4)
IMM GRANULOCYTES # BLD AUTO: 0.04 THOUSAND/UL (ref 0–0.2)
IMM GRANULOCYTES NFR BLD AUTO: 1 % (ref 0–2)
LYMPHOCYTES # BLD AUTO: 0.84 THOUSANDS/ΜL (ref 0.6–4.47)
LYMPHOCYTES NFR BLD AUTO: 17 % (ref 14–44)
MCH RBC QN AUTO: 30.8 PG (ref 26.8–34.3)
MCHC RBC AUTO-ENTMCNC: 30.5 G/DL (ref 31.4–37.4)
MCV RBC AUTO: 101 FL (ref 82–98)
MONOCYTES # BLD AUTO: 0.39 THOUSAND/ΜL (ref 0.17–1.22)
MONOCYTES NFR BLD AUTO: 8 % (ref 4–12)
NEUTROPHILS # BLD AUTO: 3.44 THOUSANDS/ΜL (ref 1.85–7.62)
NEUTS SEG NFR BLD AUTO: 72 % (ref 43–75)
NRBC BLD AUTO-RTO: 0 /100 WBCS
PLATELET # BLD AUTO: 187 THOUSANDS/UL (ref 149–390)
PMV BLD AUTO: 8.8 FL (ref 8.9–12.7)
POTASSIUM SERPL-SCNC: 3.9 MMOL/L (ref 3.5–5.3)
PROT SERPL-MCNC: 6.1 G/DL (ref 6.4–8.4)
RBC # BLD AUTO: 4 MILLION/UL (ref 3.81–5.12)
SODIUM SERPL-SCNC: 140 MMOL/L (ref 135–147)
WBC # BLD AUTO: 4.82 THOUSAND/UL (ref 4.31–10.16)

## 2024-09-16 PROCEDURE — 97110 THERAPEUTIC EXERCISES: CPT

## 2024-09-16 PROCEDURE — 85025 COMPLETE CBC W/AUTO DIFF WBC: CPT

## 2024-09-16 PROCEDURE — 97530 THERAPEUTIC ACTIVITIES: CPT

## 2024-09-16 PROCEDURE — 92523 SPEECH SOUND LANG COMPREHEN: CPT | Performed by: NURSE PRACTITIONER

## 2024-09-16 PROCEDURE — 97116 GAIT TRAINING THERAPY: CPT

## 2024-09-16 PROCEDURE — 97535 SELF CARE MNGMENT TRAINING: CPT

## 2024-09-16 PROCEDURE — 80053 COMPREHEN METABOLIC PANEL: CPT

## 2024-09-16 PROCEDURE — 99232 SBSQ HOSP IP/OBS MODERATE 35: CPT | Performed by: INTERNAL MEDICINE

## 2024-09-16 PROCEDURE — 92526 ORAL FUNCTION THERAPY: CPT | Performed by: NURSE PRACTITIONER

## 2024-09-16 RX ORDER — POLYETHYLENE GLYCOL 3350 17 G/17G
17 POWDER, FOR SOLUTION ORAL DAILY
Status: DISCONTINUED | OUTPATIENT
Start: 2024-09-16 | End: 2024-09-23

## 2024-09-16 RX ADMIN — BACITRACIN ZINC 1 SMALL APPLICATION: 500 OINTMENT TOPICAL at 17:45

## 2024-09-16 RX ADMIN — LEVOTHYROXINE SODIUM 125 MCG: 25 TABLET ORAL at 05:10

## 2024-09-16 RX ADMIN — DOCUSATE SODIUM 100 MG: 100 CAPSULE, LIQUID FILLED ORAL at 17:45

## 2024-09-16 RX ADMIN — NYSTATIN: 100000 POWDER TOPICAL at 09:20

## 2024-09-16 RX ADMIN — DOCUSATE SODIUM 100 MG: 100 CAPSULE, LIQUID FILLED ORAL at 09:18

## 2024-09-16 RX ADMIN — Medication 250 MG: at 17:45

## 2024-09-16 RX ADMIN — FOLIC ACID 1000 MCG: 1 TABLET ORAL at 09:18

## 2024-09-16 RX ADMIN — ACETAMINOPHEN 975 MG: 325 TABLET ORAL at 13:08

## 2024-09-16 RX ADMIN — ATORVASTATIN CALCIUM 20 MG: 20 TABLET, FILM COATED ORAL at 17:45

## 2024-09-16 RX ADMIN — ACETAMINOPHEN 975 MG: 325 TABLET ORAL at 05:10

## 2024-09-16 RX ADMIN — CHLORHEXIDINE GLUCONATE 15 ML: 1.2 RINSE ORAL at 21:18

## 2024-09-16 RX ADMIN — POLYETHYLENE GLYCOL 3350 17 G: 17 POWDER, FOR SOLUTION ORAL at 13:08

## 2024-09-16 RX ADMIN — NYSTATIN: 100000 POWDER TOPICAL at 17:45

## 2024-09-16 RX ADMIN — BACITRACIN ZINC 1 SMALL APPLICATION: 500 OINTMENT TOPICAL at 09:18

## 2024-09-16 RX ADMIN — SENNOSIDES 17.2 MG: 8.6 TABLET, FILM COATED ORAL at 21:18

## 2024-09-16 RX ADMIN — ACETAMINOPHEN 975 MG: 325 TABLET ORAL at 21:18

## 2024-09-16 RX ADMIN — Medication 250 MG: at 09:18

## 2024-09-16 RX ADMIN — SERTRALINE 100 MG: 100 TABLET, FILM COATED ORAL at 09:18

## 2024-09-16 RX ADMIN — CHLORHEXIDINE GLUCONATE 15 ML: 1.2 RINSE ORAL at 09:18

## 2024-09-16 RX ADMIN — ENOXAPARIN SODIUM 40 MG: 40 INJECTION SUBCUTANEOUS at 09:18

## 2024-09-16 NOTE — ASSESSMENT & PLAN NOTE
Presented  to acute care on 9/4/24 with progressive neck pain and weakness.    She reports 4-6 weeks prior she sustained a fall off her bed, head hitting her nightstand.    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   MRI C spine showing displaced type 2 dens fracture with mild mass effect on cord, ALL and PLL injuries.    CTA head and neck negative for vascular injuries.    Patient seen by Neurosurgery and deemed an operative candidate.    Patient taken to the OR by Dr. Dewitt for a bilateral C1 and C3 lateral mass screw placement, bialteral C2 pedicle screw palcement, C1-C3 fusion.    Post operatively, placed in a Aspen C Collar at all times, except showering with Shonda collar.    Post op X-rays with stable alignment    Plan in ARC:  Begin ARC program with PT, OT, SLP  Monitor incision  Monitor pain  Monitor neuro exam  Follow-up with Neurosurgery in 2 weeks  POD 14 = 9/18/24 1:00p virtual visit

## 2024-09-16 NOTE — ASSESSMENT & PLAN NOTE
Located in neck  Continue Tylenol 975 mg TID  Oxycodone IR 5-10 mg Q 4 hours PRN for moderate-severe pain: has not required since 9/13  Topical ICE

## 2024-09-16 NOTE — ASSESSMENT & PLAN NOTE
Wound Care team following in [facility]:  POA    1)Sacrum red linear tissue,slow to alayna, staged as stage one POA to sacrum.Mixed etiology of pressure and moisture.Hydraguard applied no foam dressing .  2)Bilateral buttocks, pink intact erythema. Tissue appears darker in photo in EMR than when assessed. Tissue is pink intact and all areas of both buttocks are blanchable. Hydraguard applied, no foam dressing was applied over hydraguard as this will trap moisture against skin.Area of erythema is decreasing in size form previous photo at [facility].  3)Abrasion to left cheek is open to air.   4)Right arm skin tear beefy red  5)Bilateral heels pink , blanchable. Heel foams on a preventative measure. Right heel outer aspect has area of light purple appears to be ischemic.Heels offloaded.  5)Dry soft intact brown callous on second toe of right foot. Pt states this is known to podiatry, Dr Mohr, with pt having visits to trim callous. Currently open to air.  6)ITD of breast folds, has nystatin powder  7)Left pretibial skin tear resolved. Open to air      Skin care plans:  1-Apply hydroguard cream to sacrum and bilateralbuttocks to hydrate dry skin and protective barrier Bid and Prn  2-Elevate heels to offload pressure.  3-Ehob cushion in chair when out of bed.  4-Moisturize skin daily with skin nourishing cream.  5-Turn/reposition q2h or when medically stable for pressure re-distribution on skin.   6-Monitor callous of Right foot second toe for changes , open to air.  7-Cleanse right arm skin tear with NSS. Apply Dermagran to wound bed only. Cover with DSD change every other day and prn soil or dislodgment   8-Right foot second toe callous open to air, has podiatry consulted.

## 2024-09-16 NOTE — ASSESSMENT & PLAN NOTE
Wt Readings from Last 3 Encounters:   09/16/24 52.6 kg (115 lb 15.4 oz)   09/11/24 53.8 kg (118 lb 9.7 oz)   09/04/24 53.5 kg (118 lb)     Weight stable  Patient takes Demedex 20 mg daily PRN for weight gain  Monitor daily weights  IM managing

## 2024-09-16 NOTE — PROGRESS NOTES
"Progress Note - Marsha Oliva 79 y.o. female MRN: [id number]    Unit/Bed#: [facility] 221-01 Encounter: [id number]        Subjective:   Patient seen and examined at bedside after reviewing the chart and discussing the case with the caring staff.      Patient examined at bedside.  Patient denies any acute symptoms.     Patient's labs were reviewed from 9/16/2024.  Patient's CBC and CMP were found to be within normal range.    Physical Exam   Vitals: Blood pressure 90/62, pulse 57, temperature (!) 97.4 °F (36.3 °C), temperature source Temporal, resp. rate 17, height 5' 3\" (1.6 m), weight 52.6 kg (115 lb 15.4 oz), SpO2 100%.,Body mass index is 20.54 kg/m².  Constitutional: Patient in no acute distress.  HEENT: PERR, EOMI, MMM.  Cardiovascular: Normal rate and regular rhythm.    Pulmonary/Chest: Effort normal and breath sounds normal.   Abdomen: Soft, + BS, NT.    Assessment/Plan:  Marsha Oliva is a(n) 79 y.o. female with cervical spinal cord compression.     Cardiac hx w/ HTN, HLD, chronic diastolic heart failure, chronic Afib.  Continue digoxin 125 mcg every other day, diltiazem 180 mg daily, atorvastatin 40 mg daily.  Weekly weights.  Torsemide 20 mg daily as needed.  Home ASA currently on hold.   Hypothyroidism.  Continue levothyroxine 125 mcg daily (decr from 137 mcg 9/6).  B12 deficiency.  Patient on B12 1,000 mcg weekly.   Folate deficiency.  Patient on folic acid 1,000 mcg daily.   Hypomagnesemia.  Patient on magnesium gluconate 250 mg twice daily.   Depression and anxiety.   Patient on Zoloft 100 mg daily.  Declines neuropsych at this time.   Multiple hyperkeratotic lesions.  Patient follows with Podiatry outpatient.  Wound care consult.   Sore throat.  Chloraseptic throat spray as needed.   Pain and bowel regimen.  As per physiatry.   Cervical spinal cord compression.  Vista collar at all times x 6 weeks.  Continue SQ Lovenox.  Patient receiving intensive PT OT ST as per physiatry.      Anticipated discharge " date:  TBD

## 2024-09-16 NOTE — ASSESSMENT & PLAN NOTE
Bloodwork orders placed to [address] [place] [address] lab for upcoming physical per protocol. Mood is stable  Continue Zoloft 100 mg daily

## 2024-09-16 NOTE — PROGRESS NOTES
09/16/24 0650   Pain Assessment   Pain Assessment Tool 0-10   Pain Score No Pain   Restrictions/Precautions   Precautions Aspiration;Bed/chair alarms;Cognitive;Fall Risk;Pain;Spinal precautions;Supervision on toilet/commode   Weight Bearing Restrictions No   ROM Restrictions   (Spinal precautions)   Braces or Orthoses C/S Collar   Cognition   Overall Cognitive Status WFL   Arousal/Participation Alert;Responsive;Cooperative   Attention Attends with cues to redirect   Orientation Level Oriented X4   Memory Decreased short term memory;Decreased recall of recent events;Decreased recall of precautions   Following Commands Follows one step commands with increased time or repetition   Subjective   Subjective Pt reports she feels more steady today   Roll Left and Right   Type of Assistance Needed Supervision   Physical Assistance Level No physical assistance   Roll Left and Right CARE Score 4   Sit to Lying   Comment Pt remained OOB   Lying to Sitting on Side of Bed   Type of Assistance Needed Supervision   Physical Assistance Level No physical assistance   Comment bed rails   Lying to Sitting on Side of Bed CARE Score 4   Sit to Stand   Type of Assistance Needed Incidental touching   Physical Assistance Level No physical assistance   Comment with RW, multiple surfaces   Sit to Stand CARE Score 4   Bed-Chair Transfer   Type of Assistance Needed Incidental touching   Physical Assistance Level No physical assistance   Comment with RW, multiple surfaces   Chair/Bed-to-Chair Transfer CARE Score 4   Car Transfer   Reason if not Attempted Environmental limitations   Car Transfer CARE Score 10   Walk 10 Feet   Type of Assistance Needed Incidental touching   Physical Assistance Level No physical assistance   Comment CG with RW on level and unlevel surfaces, fear of falling decreased but still present.   Walk 10 Feet CARE Score 4   Walk 50 Feet with Two Turns   Type of Assistance Needed Incidental touching   Physical Assistance  Level No physical assistance   Comment CG with RW on level and unlevel surfaces, fear of falling decreased but still present.   Walk 50 Feet with Two Turns CARE Score 4   Walking 10 Feet on Uneven Surfaces   Type of Assistance Needed Incidental touching   Physical Assistance Level No physical assistance   Comment CG with RW on level and unlevel surfaces, fear of falling decreased but still present.   Walking 10 Feet on Uneven Surfaces CARE Score 4   Ambulation   Primary Mode of Locomotion Prior to Admission Walk   Distance Walked (feet) 71 ft  (37, 49, 71, 56, 36, 45 ft)   Assist Device Roller Walker   Gait Pattern Antalgic;Inconsistant Vaishnavi;Slow Vaishnavi;Decreased foot clearance;Forward Flexion;Narrow FLORENTINO;Poor UE WB;Shuffle;Improper weight shift   Limitations Noted In Balance;Coordination;Device Management;Endurance;Midline Orientation;Posture;Safety;Speed;Strength   Provided Assistance with: Balance;Limb Stabilization;Trunk Support;Weight Shift   Walk Assist Level Contact Guard   Findings CG with RW on level and unlevel surfaces, fear of falling decreased but still present.   Does the patient walk? 2. Yes   Wheelchair mobility   Does the patient use a wheelchair? 1. Yes   Type of Wheelchair Used 1. Manual   Method Right upper extremity;Left upper extremity;Right lower extremity;Left lower extremity   Assistance Provided For Locking Brakes;Obstacles   Distance Level Surface (feet) 20 ft   Distance Wheeled 3% Grade 0 ft   Findings CG with max cues for hand placement and technique   Curb or Single Stair   Style negotiated Single stair   Type of Assistance Needed Physical assistance   Physical Assistance Level Total assistance   Comment Min-mod A x 2 at end of ascend and CG x 2 for descend with charly HR assist. Required rest break at top of steps.   1 Step (Curb) CARE Score 1   4 Steps   Type of Assistance Needed Physical assistance   Physical Assistance Level 26%-50%   Comment Min-mod A x 2 at end of ascend and CG x  2 for descend with charly HR assist. Required rest break at top of steps.   4 Steps CARE Score 3   Stairs   Type Stairs   # of Steps 4   Assist Devices Bilateral Rail   Findings Min-mod A x 2 at end of ascend and CG x 2 for descend with charly HR assist. Required rest break at top of steps.   Toilet Transfer   Comment not needed during session   Therapeutic Interventions   Strengthening Supine ther ex   Balance Gait and transfer training   Other stair negotiation   Assessment   Treatment Assessment Pt agreeable to PT session this morning. No pain reported throughout the session. Supervision for bed mobility. CG for transfers with RW. CG for ambulation with RW on level and unlevel surfaces for max distance of 71 ft. CG with max cues for WC mobility. Min-mod A x2 for ascend of stairs with CG assist x 2 for descend with charly HR use. Pt required rest break at top of stairs. Ther ex for general LE strengthening. Gait and transfer training for increased balance, safety, and independence with functional mobility. C-collar in place at all times. Pt retured to room in  with alarms on and all needs within reach.   Problem List Decreased strength;Decreased endurance;Decreased range of motion;Impaired balance;Decreased coordination;Decreased cognition;Impaired judgement;Decreased safety awareness;Orthopedic restrictions   Barriers to Discharge Inaccessible home environment;Decreased caregiver support   PT Barriers   Physical Impairment Decreased strength;Decreased range of motion;Decreased endurance;Impaired balance;Decreased mobility;Decreased safety awareness;Pain;Orthopedic restrictions   Functional Limitation Car transfers;Ramp negotiation;Stair negotiation;Standing;Transfers;Walking;Wheelchair management   Plan   Treatment/Interventions Functional transfer training;LE strengthening/ROM;Elevations;Therapeutic exercise;Endurance training;Bed mobility;Gait training   Progress Progressing toward goals   PT Therapy Minutes   PT Time  In 0650   PT Time Out 0822   PT Total Time (minutes) 92   PT Mode of treatment - Individual (minutes) 92   PT Mode of treatment - Concurrent (minutes) 0   PT Mode of treatment - Group (minutes) 0   PT Mode of treatment - Co-treat (minutes) 0   PT Mode of Treatment - Total time(minutes) 92 minutes   PT Cumulative Minutes 273   Therapy Time missed   Time missed? No

## 2024-09-16 NOTE — NURSING NOTE
Patient is awake and alert. Pleasant with interactions. Aspen Collar in place and in proper alignment.  Offers complaints of mild head/neck discomfort adequately controlled on routine pain medication regiment.  Did not request use of PRN pain medication for same. Participated in therapy sessions and tolerated same well.  Safety maintained. Call bell in reach at bedside.

## 2024-09-16 NOTE — PCC OCCUPATIONAL THERAPY
9/16/24: Pt participates in ADLs, toileting, transfers/ standing and BUE therex/ theract. Pt is making gains towards goals with current LOF as listed above. Pt requires assist due to limited balance, safety, endurance and strength/ ROM with spinal prec and c collar. Pt will benefit from continued skilled OT services to increase independence with daily tasks.     9/23/24: Pt participates in ADLs, toileting, transfers/ standing and BUE therex/ theract. Pt is making gains towards goals with current LOF as listed above and use of A/E for LB ADLs. Pt requires assist due to limited balance, safety, endurance and strength/ ROM with spinal prec and c collar. Pt will benefit from continued skilled OT services to increase independence with daily tasks.     9/30/24: Pt participates in ADLs, toileting, transfers/ standing and BUE therex/ theract. Pt is making gains towards goals with current LOF as listed above and use of A/E for LB ADLs with improving attention to task and task completion. Pt requires assist due to limited balance, safety, endurance and strength/ ROM with spinal prec and c collar. Pt will benefit from continued skilled OT services to increase independence with daily tasks with FT recommended with  to prepare for discharge to home.

## 2024-09-16 NOTE — PCC PHYSICAL THERAPY
09/16/2024: Patient participating in therapy and making positive gains. Pt supervision for bed mobility, CG/min A with RW for transfers, CGA/min-mod A for ambulation with RW on level and unlevel surfaces for max of 71 ft., CG - total A for WC mobility with max cues, min-mod A x 2 on stairs with charly HR assist. Ramp not assessed. Pt limited by decreased strength, decreased ROM, decreased endurance, impaired balance, decreased mobility, and cervical spine precautions. Pt may benefit from continued [facility] PT for increased function, safety, and independence in prep for safe d/c home with continued PT services as needed.     09/24/2024: Patient participating in therapy and making positive gains. Pt independent for bed mobility, supervision with RW for transfers, CG for ambulation with RW on level and unlevel surfaces for max distance of 122 ft, supervision for WC mobility, CG on stairs with charly HR assist, and CG on ramp with RW. Pt limited by decreased strength, decreased ROM, decreased endurance, impaired balance, decreased mobility, and cervical spine precautions. Pt may benefit from continued [facility] PT for increased function, safety, and independence in prep for safe d/c home with continued PT services as needed.     10/01/2024: Patient participating in therapy and making positive gains. Pt independent for bed mobility, supervision with RW for transfers, CG for ambulation with RW on level and unlevel surfaces for max distance of 165 ft, supervision for WC mobility with cues for direction, CG on stairs with charly HR assist, CG on ramp with RW. Pt limited by decreased strength, decreased ROM, decreased endurance, impaired balance, decreased mobility, and cervical spine precautions. Pt may benefit from continued [facility] PT for increased function, safety, and independence in prep for safe d/c to home vs alternate placement with continued PT services as needed.

## 2024-09-16 NOTE — ASSESSMENT & PLAN NOTE
Continent  -reported feeling constipated last BM 9/15 small  -at home takes daily miralax; rescheduled PRN to daily

## 2024-09-16 NOTE — NURSING NOTE
BP 90/62 this AM.  No complaints of dizziness . No SOB, No cough.   OOB in wc at bedside.  Did participate in therapy without difficulty.  Sharmin OLIVIA informed of same.

## 2024-09-16 NOTE — PROGRESS NOTES
09/16/24 1330   Pain Assessment   Pain Assessment Tool 0-10   Pain Score 5   Pain Location/Orientation Location: Head   Restrictions/Precautions   Precautions Aspiration;Bed/chair alarms;Cognitive;Fall Risk;Pain;Spinal precautions;Supervision on toilet/commode   Weight Bearing Restrictions No   ROM Restrictions   (spinal prec)   Braces or Orthoses C/S Collar   Sit to Stand   Type of Assistance Needed Incidental touching   Sit to Stand CARE Score 4   Bed-Chair Transfer   Type of Assistance Needed Incidental touching   Chair/Bed-to-Chair Transfer CARE Score 4   Toileting Hygiene   Type of Assistance Needed Physical assistance   Physical Assistance Level 51%-75%   Comment assist with clothing management and thoroughness of hygiene following BM for 2 trials of toileting   Toileting Hygiene CARE Score 2   Toileting   Able to Pull Clothing down yes, up no.   Manage Hygiene Bladder;Bowel   Limitations Noted In Balance;Problem Solving;ROM;Safety;LE Strength  (spinal precautions)   Adaptive Equipment Grab Bar   Toilet Transfer   Type of Assistance Needed Incidental touching   Comment 2 trials during OT Session   Toilet Transfer CARE Score 4   Toilet Transfer   Surface Assessed Raised Toilet   Transfer Technique Stand Pivot   Limitations Noted In Balance;Endurance;Problem Solving;ROM;Safety;LE Strength  (spinal precautions)   Adaptive Equipment Grab Bar   Exercise Tools   Hand Gripper gripper with pegs B hands with increased difficulty noted with LUE> RUE   Other Exercise Tool 1 knots out of tubing using B hands   Other Exercise Tool 2 1 hand of RUMMY using B hands with occ reminders for game sequence   Other Exercise Tool 3 card match reaching with BUE while seated   Cognition   Orientation Level Oriented X4   Other Comments   Assessment Pt participates in 65 minutes concurrent treatment focusing on BUE therex/ theract with similar goals as another patient to increase strength/ ROM   Assessment   Treatment Assessment Pt  presents seated in w/c finsihing with ST session and agreeable to OT session including toileting, transfers/ standing and BUE therex/ theract before return to bed to rest as requested. Pt tolerates session without complaints and is making gains towards goals, however, limited due to decreased balance, safety, endurance and strength/ ROM with spinal prec and c collar. Pt reports feeling warm and tired at end of session with RN notified along with 2 episodes of BM during OT Session. Pt will benefit from conitnued skilled OT services to increase independence with daily tasks.   Problem List Decreased strength;Decreased range of motion;Decreased endurance;Impaired balance;Decreased safety awareness;Orthopedic restrictions;Pain  (spinal precautions)   Plan   Treatment/Interventions ADL retraining;Functional transfer training;Therapeutic exercise;Endurance training;Patient/family training;Equipment eval/education;Compensatory technique education   Progress Progressing toward goals   OT Therapy Minutes   OT Time In 1330   OT Time Out 1523   OT Total Time (minutes) 113   OT Mode of treatment - Individual (minutes) 48   OT Mode of treatment - Concurrent (minutes) 65   Therapy Time missed   Time missed? No

## 2024-09-16 NOTE — PROGRESS NOTES
Progress Note - PMR   Name: Marsha Oliva 79 y.o. female I MRN: [id number]  Unit/Bed#: [facility] 221-01 I Date of Admission: 9/11/2024   Date of Service: 9/16/2024 I Hospital Day: 5     Assessment & Plan  Spinal cord compression (HCC)  Presented  to acute care on 9/4/24 with progressive neck pain and weakness.    She reports 4-6 weeks prior she sustained a fall off her bed, head hitting her nightstand.    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   MRI C spine showing displaced type 2 dens fracture with mild mass effect on cord, ALL and PLL injuries.    CTA head and neck negative for vascular injuries.    Patient seen by Neurosurgery and deemed an operative candidate.    Patient taken to the OR by Dr. Dewitt for a bilateral C1 and C3 lateral mass screw placement, bialteral C2 pedicle screw palcement, C1-C3 fusion.    Post operatively, placed in a Aspen C Collar at all times, except showering with Shonda collar.    Post op X-rays with stable alignment    Plan in ARC:  Begin ARC program with PT, OT, SLP  Monitor incision  Monitor pain  Monitor neuro exam  Follow-up with Neurosurgery in 2 weeks  POD 14 = 9/18/24 1:00p virtual visit   Oropharyngeal dysphagia  New issues  VFSS study completed on 9/10/24 and patient found to have mild oral and moderate phayngeal dysphagia.    Cleared for a pureed diet with honey thick liquids.    SLP to follow  Continue aspiration precautions   Acquired bilateral hammer toes  Keratosis of toe  May need shaving of this lesion  Consult placed to Podiatry  Atrial fibrillation, chronic (HCC)  Chronic A. Fib  Rate/rhythm controlled on digoxin 125 mcg every other day, Cardizem  mg daily  No AC, but was taking Aspirin 81 mg daily (restart when able).  IM managing  B12 deficiency  Continue supplementation   IM managing  Chronic diastolic CHF (congestive heart failure) (HCC)  Wt Readings from Last 3  Encounters:   09/16/24 52.6 kg (115 lb 15.4 oz)   09/11/24 53.8 kg (118 lb 9.7 oz)   09/04/24 53.5 kg (118 lb)     Weight stable  Patient takes Demedex 20 mg daily PRN for weight gain  Monitor daily weights  IM managing      Depression with anxiety  Mood is stable  Continue Zoloft 100 mg daily  Essential hypertension  Continue Cardizem  mg Daily  Monitor BP and HR with rest and activity  IM managing  Hypothyroidism  Continue Synthroid 125 mcg daily  Mixed hyperlipidemia  Continue Atorvastain 20 mg daily  Folate deficiency  Continue supplementation   IM managing  Acute pain due to trauma  Located in neck  Continue Tylenol 975 mg TID  Oxycodone IR 5-10 mg Q 4 hours PRN for moderate-severe pain: has not required since 9/13  Topical ICE  Skin abnormalities  Wound Care team following in ARC:  POA    1)Sacrum red linear tissue,slow to alayna, staged as stage one POA to sacrum.Mixed etiology of pressure and moisture.Hydraguard applied no foam dressing .  2)Bilateral buttocks, pink intact erythema. Tissue appears darker in photo in EMR than when assessed. Tissue is pink intact and all areas of both buttocks are blanchable. Hydraguard applied, no foam dressing was applied over hydraguard as this will trap moisture against skin.Area of erythema is decreasing in size form previous photo at [facility].  3)Abrasion to left cheek is open to air.   4)Right arm skin tear beefy red  5)Bilateral heels pink , blanchable. Heel foams on a preventative measure. Right heel outer aspect has area of light purple appears to be ischemic.Heels offloaded.  5)Dry soft intact brown callous on second toe of right foot. Pt states this is known to podiatry, Dr Mohr, with pt having visits to trim callous. Currently open to air.  6)ITD of breast folds, has nystatin powder  7)Left pretibial skin tear resolved. Open to air      Skin care plans:  1-Apply hydroguard cream to sacrum and bilateralbuttocks to hydrate dry skin and protective barrier  Bid and Prn  2-Elevate heels to offload pressure.  3-Ehob cushion in chair when out of bed.  4-Moisturize skin daily with skin nourishing cream.  5-Turn/reposition q2h or when medically stable for pressure re-distribution on skin.   6-Monitor callous of Right foot second toe for changes , open to air.  7-Cleanse right arm skin tear with NSS. Apply Dermagran to wound bed only. Cover with DSD change every other day and prn soil or dislodgment   8-Right foot second toe callous open to air, has podiatry consulted.  Lymphedema  Pumps brought in by  - ok to use PRN as tolerated  At risk for altered bowel elimination  Continent  -reported feeling constipated last BM 9/15 small  -at home takes daily miralax; rescheduled PRN to daily  At risk for altered urinary elimination  Continent   At risk for deep venous thrombosis  Continue Lovenox 40 mg daily   Encounter for rehabilitation  Functional deficits:  cervical collar on at all times, cervical spinal precautions, impaired mobility, self care   Continue current rehabilitation plan of care to maximize function.     History of Present Illness   Rehab Diagnosis: Impairment of mobility, safety and Activities of Daily Living (ADLs) due to Spinal Cord Dysfunction:  Traumatic:  04.2211  Quadriplegia, Incomplete C1-4  Etiologic Diagnosis: Fall with displaced type 2 dens fracture with spinal cord injury and compression     HPI: Marsha Oliva is a 79 y.o. female with HTN, A Fib, CHF, HLD, Hypothyroidism who presented to the [facility] on on 9/4/24 with progressive neck pain and weakness.  She reports 4-6 weeks prior she sustained a fall off her bed, head hitting her nightstand.  She has been having increasing neck pain since.  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 MRI C spine showing displaced type 2 dens fracture with mild mass effect on cord, ALL and PLL  injuries.  CTA head and neck negative for vascular injuries.  Patient seen by Neurosurgery and deemed an operative candidate.  Patient taken to the OR by Dr. Dewitt for a bilateral C1 and C3 lateral mass screw placement, bialteral C2 pedicle screw palcement, C1-C3 fusion.  Post operatively, placed in a Aspen C Collar at all times, except showering with Shonda collar.  Post op X-rays with stable alignment  Medical course complicated by acute dysphagia.  VFSS study completed on 9/10/24 and patient found to have mild oral and moderate phayngeal dysphagia.  Cleared for a pureed diet with honey thick liquids.  Patient seen by wound care team for abrasions and stage I pressure injury to sacrum.  Patient started on Tylenol and PRN oxycodone for acute pain.    After medical stabilization, patient found to have acute functional deficits from his baseline, therefore, admitted to [facility] for acute inpatient rehabilitation.      Chief Complaint:  weak in legs    Interval: Patient seen face to face.  Per patient was able to descend the stairs in therapy and did 3 laps around the unit so legs were feeling sore. Patient otherwise feeling well. Pain under control.     Objective     Functional Update:  Evals in progress      Temp:  [97.4 °F (36.3 °C)-97.6 °F (36.4 °C)] 97.4 °F (36.3 °C)  HR:  [57-65] 57  Resp:  [16-17] 17  BP: ()/(62) 90/62  SpO2:  [100 %] 100 %  Physical Exam  Vitals and nursing note reviewed.   Constitutional:       General: She is not in acute distress.  HENT:      Head: Normocephalic and atraumatic.      Nose: Nose normal.      Mouth/Throat:      Mouth: Mucous membranes are moist.   Eyes:      Conjunctiva/sclera: Conjunctivae normal.   Neck:      Comments:  C collar in place  Cardiovascular:      Rate and Rhythm: Normal rate and regular rhythm.      Pulses: Normal pulses.   Pulmonary:      Effort: Pulmonary effort is normal.      Breath sounds: Normal breath sounds. No wheezing or rales.    Abdominal:      General: Bowel sounds are normal. There is no distension.      Palpations: Abdomen is soft.      Tenderness: There is no abdominal tenderness.   Musculoskeletal:         General: No swelling.      Cervical back: Neck supple.   Skin:     General: Skin is warm.      Comments: Cervical incision clean and intact with sutures   Neurological:      Mental Status: She is alert and oriented to person, place, and time.      Motor: Weakness present.   Psychiatric:         Mood and Affect: Mood normal.     Scheduled Meds:  Current Facility-Administered Medications   Medication Dose Route Frequency Provider Last Rate    acetaminophen  975 mg Oral Q8H CHRIS Sharmin Waggoner PA-C      atorvastatin  20 mg Oral Daily With Dinner Sharmin Waggoner PA-C      bacitracin  1 small application Topical BID Meggan ANAHI Jones PA-C      bisacodyl  10 mg Rectal Daily PRN Sharmin Waggoner PA-C      bisacodyl  10 mg Rectal Once Corine Park MD      calcium carbonate  1,000 mg Oral Daily PRN Sharmin Waggoner PA-C      chlorhexidine  15 mL Mouth/Throat Q12H [facility] Sharmin Waggoner PA-C      cyanocobalamin  1,000 mcg Oral Weekly Sharmin Waggoner PA-C      digoxin  125 mcg Oral Every Other Day Sharmin Waggoner PA-C      diltiazem  180 mg Oral Daily Sharmin Waggoner PA-C      docusate sodium  100 mg Oral BID Corine Park MD      enoxaparin  40 mg Subcutaneous Q24H [facility] Corine Park MD      folic acid  1,000 mcg Oral Daily Sharmin Waggoner PA-C      levothyroxine  125 mcg Oral Early Morning Sharmin Waggoner PA-C      magnesium gluconate  250 mg Oral BID Sharmin Waggoner PA-C      nystatin   Topical BID Corine Park MD      ondansetron  4 mg Oral Q6H PRN Sharmin Waggoner PA-C      oxyCODONE  2.5 mg Oral Q4H PRN Sharmin Waggoner PA-C      Or    oxyCODONE  5 mg Oral Q4H PRN Sharmin Waggoner PA-C      phenol  1 spray  Mouth/Throat Q2H PRN Sharmin Waggoner PA-C      polyethylene glycol  17 g Oral Daily PRN Corine Park MD      senna  2 tablet Oral HS Corine Park MD      sertraline  100 mg Oral Daily Sharmin Waggoner PA-C      torsemide  20 mg Oral Daily PRN Sharmin Waggoner PA-C           Lab Results: I have reviewed the following results: none  Results from last 7 days   Lab Units 09/16/24  0546 09/11/24  1719   HEMOGLOBIN g/dL 12.3  --    HEMATOCRIT % 40.3  --    WBC Thousand/uL 4.82  --    PLATELETS Thousands/uL 187 196     Results from last 7 days   Lab Units 09/16/24  0546 09/10/24  0601   BUN mg/dL 18 12   SODIUM mmol/L 140 139   POTASSIUM mmol/L 3.9 4.0   CHLORIDE mmol/L 104 100   CREATININE mg/dL 0.72 0.54*   AST U/L 66*  --    ALT U/L 36  --               I have spent a total time of 35 minutes in caring for this patient on the day of the visit/encounter including Instructions for management, Patient and family education, Risk factor reductions, Impressions, Counseling / Coordination of care, Documenting in the medical record, Obtaining or reviewing history  , and Communicating with other healthcare professionals .

## 2024-09-17 PROCEDURE — 97110 THERAPEUTIC EXERCISES: CPT

## 2024-09-17 PROCEDURE — 97110 THERAPEUTIC EXERCISES: CPT | Performed by: PHYSICAL THERAPIST

## 2024-09-17 PROCEDURE — 97542 WHEELCHAIR MNGMENT TRAINING: CPT | Performed by: PHYSICAL THERAPIST

## 2024-09-17 PROCEDURE — 97530 THERAPEUTIC ACTIVITIES: CPT | Performed by: PHYSICAL THERAPIST

## 2024-09-17 PROCEDURE — 97116 GAIT TRAINING THERAPY: CPT | Performed by: PHYSICAL THERAPIST

## 2024-09-17 PROCEDURE — 99233 SBSQ HOSP IP/OBS HIGH 50: CPT | Performed by: INTERNAL MEDICINE

## 2024-09-17 PROCEDURE — 92507 TX SP LANG VOICE COMM INDIV: CPT | Performed by: NURSE PRACTITIONER

## 2024-09-17 PROCEDURE — 97530 THERAPEUTIC ACTIVITIES: CPT

## 2024-09-17 PROCEDURE — 97535 SELF CARE MNGMENT TRAINING: CPT

## 2024-09-17 RX ADMIN — ACETAMINOPHEN 975 MG: 325 TABLET ORAL at 21:14

## 2024-09-17 RX ADMIN — ENOXAPARIN SODIUM 40 MG: 40 INJECTION SUBCUTANEOUS at 09:03

## 2024-09-17 RX ADMIN — CHLORHEXIDINE GLUCONATE 15 ML: 1.2 RINSE ORAL at 21:14

## 2024-09-17 RX ADMIN — SERTRALINE 100 MG: 100 TABLET, FILM COATED ORAL at 09:04

## 2024-09-17 RX ADMIN — NYSTATIN: 100000 POWDER TOPICAL at 17:33

## 2024-09-17 RX ADMIN — ATORVASTATIN CALCIUM 20 MG: 20 TABLET, FILM COATED ORAL at 17:33

## 2024-09-17 RX ADMIN — DOCUSATE SODIUM 100 MG: 100 CAPSULE, LIQUID FILLED ORAL at 09:04

## 2024-09-17 RX ADMIN — CHLORHEXIDINE GLUCONATE 15 ML: 1.2 RINSE ORAL at 09:03

## 2024-09-17 RX ADMIN — BACITRACIN ZINC 1 SMALL APPLICATION: 500 OINTMENT TOPICAL at 17:33

## 2024-09-17 RX ADMIN — ACETAMINOPHEN 975 MG: 325 TABLET ORAL at 14:40

## 2024-09-17 RX ADMIN — BACITRACIN ZINC 1 SMALL APPLICATION: 500 OINTMENT TOPICAL at 09:03

## 2024-09-17 RX ADMIN — LEVOTHYROXINE SODIUM 125 MCG: 25 TABLET ORAL at 05:51

## 2024-09-17 RX ADMIN — DILTIAZEM HYDROCHLORIDE 180 MG: 180 CAPSULE, COATED, EXTENDED RELEASE ORAL at 09:04

## 2024-09-17 RX ADMIN — FOLIC ACID 1000 MCG: 1 TABLET ORAL at 09:04

## 2024-09-17 RX ADMIN — ACETAMINOPHEN 975 MG: 325 TABLET ORAL at 05:51

## 2024-09-17 RX ADMIN — POLYETHYLENE GLYCOL 3350 17 G: 17 POWDER, FOR SOLUTION ORAL at 09:03

## 2024-09-17 RX ADMIN — NYSTATIN: 100000 POWDER TOPICAL at 09:09

## 2024-09-17 RX ADMIN — Medication 250 MG: at 17:33

## 2024-09-17 RX ADMIN — SENNOSIDES 17.2 MG: 8.6 TABLET, FILM COATED ORAL at 21:14

## 2024-09-17 RX ADMIN — DIGOXIN 125 MCG: 125 TABLET ORAL at 09:04

## 2024-09-17 RX ADMIN — Medication 250 MG: at 09:04

## 2024-09-17 RX ADMIN — DOCUSATE SODIUM 100 MG: 100 CAPSULE, LIQUID FILLED ORAL at 17:33

## 2024-09-17 NOTE — ASSESSMENT & PLAN NOTE
Functional deficits:  cervical collar on at all times, cervical spinal precautions, impaired mobility, self care   Continue current rehabilitation plan of care to maximize function.   This patient was discussed by the Interdisciplinary Team in weekly case conference today. The care of the patient was extensively discussed with all care providers and an appropriate rehabilitation plan was formulated unique for this patient. Barriers were identified preventing progression of therapy and appropriate interventions were discussed with each discipline. Please see the team note for input from all disciplines regarding barriers, intervention, and discharge planning.  Anticipated DC 10/3   Home therapies

## 2024-09-17 NOTE — PROGRESS NOTES
09/17/24 1400   Pain Assessment   Pain Assessment Tool 0-10   Pain Score No Pain   Restrictions/Precautions   Precautions Aspiration;Bed/chair alarms;Cognitive;Fall Risk;Pain;Spinal precautions;Supervision on toilet/commode   Weight Bearing Restrictions No   ROM Restrictions   (spinal  prec)   Braces or Orthoses C/S Collar   Lying to Sitting on Side of Bed   Type of Assistance Needed Physical assistance   Physical Assistance Level 25% or less   Lying to Sitting on Side of Bed CARE Score 3   Sit to Stand   Type of Assistance Needed Physical assistance   Physical Assistance Level 25% or less   Sit to Stand CARE Score 3   Bed-Chair Transfer   Type of Assistance Needed Physical assistance   Physical Assistance Level 25% or less   Chair/Bed-to-Chair Transfer CARE Score 3   Toileting Hygiene   Type of Assistance Needed Physical assistance   Physical Assistance Level 51%-75%   Comment mod A for clothing management to get onto toilet; pt supervised by nursing to complete toileting when session ended   Toileting Hygiene CARE Score 2   Toileting   Able to Pull Clothing down no, up no.   Limitations Noted In Balance;Problem Solving;ROM;Safety;LE Strength  (spinal prec with c collar)   Adaptive Equipment Grab Bar   Toilet Transfer   Type of Assistance Needed Physical assistance   Physical Assistance Level 25% or less   Toilet Transfer CARE Score 3   Toilet Transfer   Surface Assessed Raised Toilet   Transfer Technique Stand Pivot   Limitations Noted In Balance;Endurance;Problem Solving;ROM;Safety;LE Strength  (spinal prec with c collar)   Adaptive Equipment Grab Bar   Exercise Tools   Other Exercise Tool 1 gripper with pegs B hands following retrieval of pegs from  the floor using the reacher in the RUE   Other Exercise Tool 2 knots out of tubing using B hands   Cognition   Arousal/Participation Alert;Responsive;Cooperative   Attention Attends with cues to redirect   Orientation Level Oriented X4   Memory Decreased recall of  recent events;Decreased short term memory;Decreased recall of precautions   Following Commands Follows one step commands without difficulty   Assessment   Treatment Assessment Pt presents supine agreeable to OT session although preferring to remain in room due to waiting for a phone call from hospitalized . Pt requires assist due to decreased balance, safety, endurance and strength/ ROM with spinal prec and  c collar. Pt will beenfit from continued skilled OT services to increase indepenence with daily tasks,   Problem List Decreased strength;Decreased range of motion;Impaired balance;Decreased endurance;Decreased cognition;Decreased safety awareness;Decreased skin integrity;Orthopedic restrictions;Pain  (spinal prec with c collar)   Plan   Treatment/Interventions ADL retraining;Functional transfer training;Therapeutic exercise;Endurance training;Cognitive reorientation;Patient/family training;Bed mobility;Equipment eval/education;Compensatory technique education   Progress Progressing toward goals   OT Therapy Minutes   OT Time In 1400   OT Time Out 1458   OT Total Time (minutes) 58   OT Mode of treatment - Individual (minutes) 58   Therapy Time missed   Time missed? No

## 2024-09-17 NOTE — DISCHARGE INSTR - OTHER ORDERS
Skin Care orders:  1-Hydraguard to sacrum, buttocks and heels BID and PRN  2-EHOB cushion when out of bed in chair.  3-Moisturize skin daily with skin nourishing cream  4-Elevate heels to offload pressure.  5-Turn/reposition q2h for pressure re-distribution on skin  6. Monitor right 2nd toe growth area - leave open to air podiatry consulted.  7 . Right and left breast folds - cleanse with soap and water pat dry dust lightly with nystatin powder bid and prn

## 2024-09-17 NOTE — PROGRESS NOTES
"   09/17/24 1300   Pain Assessment   Pain Assessment Tool 0-10   Pain Score No Pain   Speech/Language/Cognition Assessmetn   Treatment Assessment Pt reporting her  is in the ED but she is unsure why. Reports she didnt eat lunch \"I'm not in the mood\" she is feeling nervous about her 's health at the moment. Reviewed MoCA results with patient as well as POC. Teachback internal memory strategies ( verbalization, visualization, writing down). Current strategies: lists, calendar dayplanner that she keeps in her purse for appointments).   SLP Therapy Minutes   SLP Time In 1300   SLP Time Out 1330   SLP Total Time (minutes) 30   SLP Mode of treatment - Individual (minutes) 30   SLP Mode of treatment - Concurrent (minutes) 0   SLP Mode of treatment - Group (minutes) 0   SLP Mode of treatment - Co-treat (minutes) 0   SLP Mode of Treatment - Total time(minutes) 30 minutes   SLP Cumulative Minutes 165       "

## 2024-09-17 NOTE — PCC CARE MANAGEMENT
Patient admitted to [facility] on 9/11, after inpatient hospital stay for surgical repair of cervical spinal cord compression.Patient lives with spouse in 2SH with 3STE full flight of stairs to second floor. Patient has a suite on the first floor, with bedroom and full bath with walk in shower. Patient independent PTA , ambulating with walker or cane.  may not be able to physically assist patient,he is older than her. D/c date 10/3,  unable to investigate HHA agencies. [employer] can pay for HHA, however only after VNA has been arranged, and visited by the nurse. [facility] has assisted to arranging [facility] to visit patient and  Tuesday 10/1, to discuss home health needs. [facility] reserved for RN/PT/OT/ST/HHA/SW. Clinical review update to be sent on 10/2, if patient needing to stay longer at [facility], in order to arrange HHA.

## 2024-09-17 NOTE — PROGRESS NOTES
Progress Note - PMR   Name: Marsha Oliva 79 y.o. female I MRN: [id number]  Unit/Bed#: [facility] 221-01 I Date of Admission: 9/11/2024   Date of Service: 9/17/2024 I Hospital Day: 6     Assessment & Plan  Spinal cord compression (HCC)  Presented  to acute care on 9/4/24 with progressive neck pain and weakness.    She reports 4-6 weeks prior she sustained a fall off her bed, head hitting her nightstand.    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   MRI C spine showing displaced type 2 dens fracture with mild mass effect on cord, ALL and PLL injuries.    CTA head and neck negative for vascular injuries.    Patient seen by Neurosurgery and deemed an operative candidate.    Patient taken to the OR by Dr. Dewitt for a bilateral C1 and C3 lateral mass screw placement, bialteral C2 pedicle screw palcement, C1-C3 fusion.    Post operatively, placed in a Aspen C Collar at all times, except showering with Shonda collar.    Post op X-rays with stable alignment    Plan in ARC:  Begin ARC program with PT, OT, SLP  Monitor incision  Monitor pain  Monitor neuro exam  Follow-up with Neurosurgery in 2 weeks  POD 14 =tomorrow 9/18/24 1:00p virtual visit   Oropharyngeal dysphagia  New issues  VFSS study completed on 9/10/24 and patient found to have mild oral and moderate phayngeal dysphagia.    Cleared for a pureed diet with honey thick liquids.    SLP to follow  Continue aspiration precautions   Acquired bilateral hammer toes  Keratosis of toe  May need shaving of this lesion  Consult placed to Podiatry  Atrial fibrillation, chronic (HCC)  Chronic A. Fib  Rate/rhythm controlled on digoxin 125 mcg every other day, Cardizem  mg daily  No AC, but was taking Aspirin 81 mg daily (restart when able).  -will discuss with NSGY during her apt tomorrow  IM managing  B12 deficiency  Continue supplementation   IM managing  Chronic diastolic CHF  (congestive heart failure) (HCC)  Wt Readings from Last 3 Encounters:   09/17/24 52.1 kg (114 lb 13.8 oz)   09/11/24 53.8 kg (118 lb 9.7 oz)   09/04/24 53.5 kg (118 lb)     Weight stable  Patient takes Demedex 20 mg daily PRN for weight gain  Monitor daily weights  IM managing    Depression with anxiety  Mood is stable  Continue Zoloft 100 mg daily  Essential hypertension  Continue Cardizem  mg Daily  Monitor BP and HR with rest and activity  IM managing  Hypothyroidism  Continue Synthroid 125 mcg daily  Mixed hyperlipidemia  Continue Atorvastain 20 mg daily  Folate deficiency  Continue supplementation   IM managing  Acute pain due to trauma  Located in neck  Continue Tylenol 975 mg TID  Oxycodone IR 5-10 mg Q 4 hours PRN for moderate-severe pain:   Topical ICE  -improved has not required oxy since 9/13  Skin abnormalities  Wound Care team following in ARC:  POA    1)Sacrum red linear tissue,slow to alayna, staged as stage one POA to sacrum.Mixed etiology of pressure and moisture.Hydraguard applied no foam dressing .  2)Bilateral buttocks, pink intact erythema. Tissue appears darker in photo in EMR than when assessed. Tissue is pink intact and all areas of both buttocks are blanchable. Hydraguard applied, no foam dressing was applied over hydraguard as this will trap moisture against skin.Area of erythema is decreasing in size form previous photo at [facility].  3)Abrasion to left cheek is open to air.   4)Right arm skin tear beefy red  5)Bilateral heels pink , blanchable. Heel foams on a preventative measure. Right heel outer aspect has area of light purple appears to be ischemic.Heels offloaded.  5)Dry soft intact brown callous on second toe of right foot. Pt states this is known to podiatry, Dr Mohr, with pt having visits to trim callous. Currently open to air.  6)ITD of breast folds, has nystatin powder  7)Left pretibial skin tear resolved. Open to air      Skin care plans:  1-Apply hydroguard cream to  sacrum and bilateralbuttocks to hydrate dry skin and protective barrier Bid and Prn  2-Elevate heels to offload pressure.  3-Ehob cushion in chair when out of bed.  4-Moisturize skin daily with skin nourishing cream.  5-Turn/reposition q2h or when medically stable for pressure re-distribution on skin.   6-Monitor callous of Right foot second toe for changes , open to air.  7-Cleanse right arm skin tear with NSS. Apply Dermagran to wound bed only. Cover with DSD change every other day and prn soil or dislodgment   8-Right foot second toe callous open to air, has podiatry consulted.  Lymphedema  Pumps brought in by  - ok to use PRN as tolerated  At risk for altered bowel elimination  Continent  -BM 9/16   -at home takes daily miralax; rescheduled PRN to daily  At risk for altered urinary elimination  Continent   At risk for deep venous thrombosis  Continue Lovenox 40 mg daily   Encounter for rehabilitation  Functional deficits:  cervical collar on at all times, cervical spinal precautions, impaired mobility, self care   Continue current rehabilitation plan of care to maximize function.   This patient was discussed by the Interdisciplinary Team in weekly case conference today. The care of the patient was extensively discussed with all care providers and an appropriate rehabilitation plan was formulated unique for this patient. Barriers were identified preventing progression of therapy and appropriate interventions were discussed with each discipline. Please see the team note for input from all disciplines regarding barriers, intervention, and discharge planning.  Anticipated DC 10/3   Home therapies     History of Present Illness   Rehab Diagnosis: Impairment of mobility, safety and Activities of Daily Living (ADLs) due to Spinal Cord Dysfunction:  Traumatic:  04.2211  Quadriplegia, Incomplete C1-4  Etiologic Diagnosis: Fall with displaced type 2 dens fracture with spinal cord injury and compression     HPI:  Marsha Oliva is a 79 y.o. female with HTN, A Fib, CHF, HLD, Hypothyroidism who presented to the [facility] on on 9/4/24 with progressive neck pain and weakness.  She reports 4-6 weeks prior she sustained a fall off her bed, head hitting her nightstand.  She has been having increasing neck pain since.  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 MRI C spine showing displaced type 2 dens fracture with mild mass effect on cord, ALL and PLL injuries.  CTA head and neck negative for vascular injuries.  Patient seen by Neurosurgery and deemed an operative candidate.  Patient taken to the OR by Dr. Dewitt for a bilateral C1 and C3 lateral mass screw placement, bialteral C2 pedicle screw palcement, C1-C3 fusion.  Post operatively, placed in a Aspen C Collar at all times, except showering with Shonda collar.  Post op X-rays with stable alignment  Medical course complicated by acute dysphagia.  VFSS study completed on 9/10/24 and patient found to have mild oral and moderate phayngeal dysphagia.  Cleared for a pureed diet with honey thick liquids.  Patient seen by wound care team for abrasions and stage I pressure injury to sacrum.  Patient started on Tylenol and PRN oxycodone for acute pain.    After medical stabilization, patient found to have acute functional deficits from his baseline, therefore, admitted to [facility] for acute inpatient rehabilitation.      Chief Complaint:  weak in legs    Interval: Patient seen face to face while in OT. Patient reports that her legs were feeling sore today . Patient otherwise feeling well. Pain under control. Had BM yesterday.     Objective     Functional Update:  Evals in progress      Temp:  [96.8 °F (36 °C)-97.6 °F (36.4 °C)] 96.8 °F (36 °C)  HR:  [81-94] 81  Resp:  [17] 17  BP: (116-118)/(65-79) 116/76  SpO2:  [95 %-96 %] 95 %  Physical Exam  Vitals and  nursing note reviewed.   HENT:      Mouth/Throat:      Mouth: Mucous membranes are moist.   Eyes:      Conjunctiva/sclera: Conjunctivae normal.   Neck:      Comments: C collar in place   Cardiovascular:      Rate and Rhythm: Normal rate and regular rhythm.   Pulmonary:      Effort: Pulmonary effort is normal.      Breath sounds: No wheezing.   Musculoskeletal:         General: No swelling.   Skin:     General: Skin is warm.   Neurological:      Mental Status: She is alert and oriented to person, place, and time.      Motor: Weakness present.   Psychiatric:         Mood and Affect: Mood normal.         Behavior: Behavior normal.     Scheduled Meds:  Current Facility-Administered Medications   Medication Dose Route Frequency Provider Last Rate    acetaminophen  975 mg Oral Q8H CHRIS Sharmin Waggoner PA-C      atorvastatin  20 mg Oral Daily With Dinner Sharmin Waggoner PA-C      bacitracin  1 small application Topical BID Meggan ANAHI DagFATOUMATA cowart      bisacodyl  10 mg Rectal Daily PRN Sharmin Waggoner PA-C      bisacodyl  10 mg Rectal Once Corine Park MD      calcium carbonate  1,000 mg Oral Daily PRN Sharmin Waggoner PA-C      chlorhexidine  15 mL Mouth/Throat Q12H CHRIS Sharmin Waggoner PA-C      cyanocobalamin  1,000 mcg Oral Weekly Sharmin Waggoner PA-C      digoxin  125 mcg Oral Every Other Day Sharmin Waggoner PA-C      diltiazem  180 mg Oral Daily Sharmin Waggoner PA-C      docusate sodium  100 mg Oral BID Corine Park MD      enoxaparin  40 mg Subcutaneous Q24H CHRIS Corine Park MD      folic acid  1,000 mcg Oral Daily Sharmin Waggoner PA-C      levothyroxine  125 mcg Oral Early Morning Sharmin Waggoner PA-C      magnesium gluconate  250 mg Oral BID Sharmin Waggoner PA-C      nystatin   Topical BID Corine Park MD      ondansetron  4 mg Oral Q6H PRN Sharmin Waggoner PA-C      oxyCODONE  2.5 mg Oral Q4H PRN  Sharmin Waggoner PA-C      Or    oxyCODONE  5 mg Oral Q4H PRN Sharmin Waggoner PA-C      phenol  1 spray Mouth/Throat Q2H PRN Sharmin Waggoner PA-C      polyethylene glycol  17 g Oral Daily Marlee Berkowitz MD      senna  2 tablet Oral HS Corine Park MD      sertraline  100 mg Oral Daily Sharmin Waggoner PA-C      torsemide  20 mg Oral Daily PRN Sharmin Waggoner PA-C           Lab Results: I have reviewed the following results: none  Results from last 7 days   Lab Units 09/16/24  0546 09/11/24  1719   HEMOGLOBIN g/dL 12.3  --    HEMATOCRIT % 40.3  --    WBC Thousand/uL 4.82  --    PLATELETS Thousands/uL 187 196     Results from last 7 days   Lab Units 09/16/24  0546   BUN mg/dL 18   SODIUM mmol/L 140   POTASSIUM mmol/L 3.9   CHLORIDE mmol/L 104   CREATININE mg/dL 0.72   AST U/L 66*   ALT U/L 36              Total time spent:  55 minutes, with more than 50% spent counseling/coordinating care. Counseling includes discussion with patient re: progress in therapies, functional issues observed by therapy staff, and discussion with patient his/her current medical state/wellbeing. Coordination of patient's care was performed in conjunction with Internal Medicine service to monitor patient's labs, vitals, and management of their comorbidities. In addition, I lead the interdisciplinary team in weekly case conference today and concur with plan as per team meeting. The care of the patient was extensively discussed with all care providers and an appropriate rehabilitation plan was formulated unique for this patient. Barriers were identified preventing progression of therapy and appropriate interventions were discussed with each discipline. Please see the team note for input from all disciplines regarding barriers, intervention, and discharge planning.

## 2024-09-17 NOTE — PROGRESS NOTES
"   09/16/24 1230   Pain Assessment   Pain Assessment Tool 0-10   Pain Score No Pain   Restrictions/Precautions   Precautions Aspiration;Bed/chair alarms;Cognitive;Fall Risk;Pain;Spinal precautions;Supervision on toilet/commode   Cognitive Linguisitic Assessments   MoCA MoCA completed this date 9/16/24 - scored 16/30 ( see note for details)    Visuospatial/executive 2/5  Naming 2/3  Attention 3/6  Language 2/3  Abstraction 1/2  Delayed recall 0/5  Orientation 6/6   Comprehension   Comprehension (FIM) 4 - Understands basic info/conversation 75-90% of time   Expression   Expression (FIM) 5 - Needs help/cues only RARELY (< 10% of the time)   Social Interaction   Social Interaction (FIM) 5 - Interacts appropriately with others 90% of time   Problem Solving   Problem solving (FIM) 3 - Solves basic problmes 50-74% of time   Memory   Memory (FIM) 4 - Recognizes/recalls/performs 75-89%   Speech/Language/Cognition Assessmetn   Treatment Assessment Pt without any recall of swallow strategies discussed during last session. With cues of the PACS images she did have some recollection but no recall as to swallow deficits previously discussed.   Swallow Assessment   Swallow Treatment Assessment Pt reported a \"burning sensation in her throat a few days ago\" she reported letting her food cool off and reports this sensation has resolved\" overthe past 3 days. Reports sensation of overall decreased pharyngeal residue through her meals. Worked through strategies of alternating bites/sips, slow rate, small bites and effortful swallows. Min-mod cue for alternating bites/sips and effortful swallows but independently completing slow rate and small bites. Reorts \"it goes right down\" when completing an effortful swallow. She tolerated her lunch tray without any clinical overt s.s of asiration.   SLP Therapy Minutes   SLP Time In 1230   SLP Time Out 1330   SLP Total Time (minutes) 60   SLP Mode of treatment - Individual (minutes) 60   SLP Mode " of treatment - Concurrent (minutes) 0   SLP Mode of treatment - Group (minutes) 0   SLP Mode of treatment - Co-treat (minutes) 0   SLP Mode of Treatment - Total time(minutes) 60 minutes   SLP Cumulative Minutes 135

## 2024-09-17 NOTE — ASSESSMENT & PLAN NOTE
Wt Readings from Last 3 Encounters:   09/17/24 52.1 kg (114 lb 13.8 oz)   09/11/24 53.8 kg (118 lb 9.7 oz)   09/04/24 53.5 kg (118 lb)     Weight stable  Patient takes Demedex 20 mg daily PRN for weight gain  Monitor daily weights  IM managing

## 2024-09-17 NOTE — PROGRESS NOTES
09/17/24 0650   Pain Assessment   Pain Assessment Tool 0-10   Pain Score 4   Pain Location/Orientation Location: Head   Restrictions/Precautions   Precautions Aspiration;Bed/chair alarms;Cognitive;Fall Risk;Pain;Spinal precautions;Supervision on toilet/commode   Weight Bearing Restrictions No   ROM Restrictions   (spinal precautions)   Braces or Orthoses C/S Collar   Cognition   Overall Cognitive Status WFL   Arousal/Participation Alert;Responsive;Cooperative   Attention Attends with cues to redirect   Orientation Level Oriented X4   Memory Decreased short term memory;Decreased recall of recent events;Decreased recall of precautions   Following Commands Follows one step commands with increased time or repetition   Subjective   Subjective Pt reports pain in her head and some soreness in her legs   Roll Left and Right   Type of Assistance Needed Verbal cues   Physical Assistance Level No physical assistance   Comment Cues for hand placement   Roll Left and Right CARE Score 4   Sit to Lying   Comment Remained OOB   Lying to Sitting on Side of Bed   Type of Assistance Needed Verbal cues   Physical Assistance Level No physical assistance   Comment Cues for hand placement   Lying to Sitting on Side of Bed CARE Score 4   Sit to Stand   Type of Assistance Needed Verbal cues;Supervision   Physical Assistance Level No physical assistance   Comment cues to push up from WC with hands, with RW   Sit to Stand CARE Score 4   Bed-Chair Transfer   Type of Assistance Needed Verbal cues;Supervision   Physical Assistance Level No physical assistance   Comment cues to push up from WC with hands, with RW   Chair/Bed-to-Chair Transfer CARE Score 4   Car Transfer   Reason if not Attempted Environmental limitations   Car Transfer CARE Score 10   Walk 10 Feet   Type of Assistance Needed Incidental touching   Physical Assistance Level No physical assistance   Comment CG assist with RW on level and unlevel surfaces. Increased fatigue noted  today.   Walk 10 Feet CARE Score 4   Walk 50 Feet with Two Turns   Reason if not Attempted Safety concerns   Walk 50 Feet with Two Turns CARE Score 88   Walk 150 Feet   Reason if not Attempted Safety concerns   Walk 150 Feet CARE Score 88   Walking 10 Feet on Uneven Surfaces   Type of Assistance Needed Incidental touching   Physical Assistance Level No physical assistance   Comment CG assist with RW on level and unlevel surfaces. Increased fatigue noted today.   Walking 10 Feet on Uneven Surfaces CARE Score 4   Ambulation   Primary Mode of Locomotion Prior to Admission Walk   Distance Walked (feet) 37 ft  (27, 25, 37 ft)   Assist Device Roller Walker   Gait Pattern Antalgic;Inconsistant Vaishnavi;Slow Vaishnavi;Decreased foot clearance;Forward Flexion;Narrow FLORENTINO;Poor UE WB;Shuffle;Improper weight shift   Limitations Noted In Balance;Coordination;Device Management;Endurance;Midline Orientation;Posture;Safety;Speed;Strength   Provided Assistance with: Balance;Direction;Weight Shift   Walk Assist Level Contact Guard   Findings CG assist with RW on level and unlevel surfaces. Increased fatigue noted today.   Does the patient walk? 2. Yes   Wheel 50 Feet with Two Turns   Comment Total assist, unable to perform   Wheelchair mobility   Does the patient use a wheelchair? 1. Yes   Type of Wheelchair Used 1. Manual   Method Right upper extremity;Left upper extremity;Right lower extremity;Left lower extremity   Assistance Provided For Locking Brakes   Distance Level Surface (feet) 20 ft  (20 ft x 2)   Distance Wheeled 3% Grade 12 ft   Findings Assistance for direction and turning   Curb or Single Stair   Comment Unable to do normal stairs due to increased fatigue, trialed step ups on 6 in step with charly UE support with fatigue noted   Reason if not Attempted Safety concerns   1 Step (Curb) CARE Score 88   4 Steps   Reason if not Attempted Safety concerns   4 Steps CARE Score 88   12 Steps   Reason if not Attempted Safety concerns    12 Steps CARE Score 88   Stairs   Type Ramp   Assist Devices Roller Walker   Findings CG assist on ramp with RW, fearful of falling   Toilet Transfer   Comment not needed during session   Therapeutic Interventions   Strengthening supine and seated ther ex with 1# and red TB, step ups on 6 in step   Balance Gait and transfer training   Other ramp negotiation and WC mobility   Assessment   Treatment Assessment Pt agreeable to PT session this morning. Pain 4/10 in head at beginning of session. Required cues for hand placement during bed mobility. Supervision for transfers with RW and cues for hand placement. CG for ambulation with RW on level and unlevel surfaces for a max of 37 ft. Increased fatigue noted following yesterday's session limiting distance walked today. Required assistance for direction and technique with WC mobility. CG assist on ramp with RW and notes fear of falling. Ther ex for general LE strengthening. Gait and transfer training for improved balance, safety, and independence with functional mobility. Pt continues to require cues for preparation for tasks and demonstrates decreased safety recall and precautions related to spinal precautions. Pt refers to  for most questions/information, even basic information about medications, home environment, etc. Pt returned to room in  with alarms on and all needs within reach.   Problem List Decreased strength;Decreased range of motion;Decreased endurance;Impaired balance;Decreased safety awareness;Orthopedic restrictions;Pain  (spinal precautions)   Barriers to Discharge Inaccessible home environment;Decreased caregiver support   PT Barriers   Physical Impairment Decreased strength;Decreased range of motion;Decreased endurance;Impaired balance;Decreased mobility;Decreased safety awareness;Pain;Orthopedic restrictions   Functional Limitation Car transfers;Ramp negotiation;Stair negotiation;Standing;Transfers;Walking;Wheelchair management   Plan    Treatment/Interventions Functional transfer training;LE strengthening/ROM;Elevations;Therapeutic exercise;Endurance training;Bed mobility;Gait training   Progress Progressing toward goals   PT Therapy Minutes   PT Time In 0650   PT Time Out 0820   PT Total Time (minutes) 90   PT Mode of treatment - Individual (minutes) 90   PT Mode of treatment - Concurrent (minutes) 0   PT Mode of treatment - Group (minutes) 0   PT Mode of treatment - Co-treat (minutes) 0   PT Mode of Treatment - Total time(minutes) 90 minutes   PT Cumulative Minutes 363   Therapy Time missed   Time missed? No

## 2024-09-17 NOTE — TEAM CONFERENCE
Acute RehabilitationTeam Conference Note  Date: 9/17/2024   Time: 11:52 AM       Patient Name:  Marsha Oliva       Medical Record Number: [id number]   YOB: 1945  Sex: Female          Room/Bed:  [facility] 221/[facility] 221-01  Payor Info:  [facility] / Plan: [facility] [employer]  REP / Product Type: Medicare PPO /      Admitting Diagnosis: Posterior displaced type ii dens fracture, initial encounter for closed fracture [S12.111A]   Admit Date/Time:  9/11/2024  3:31 PM  Admission Comments: No comment available     Primary Diagnosis:  Spinal cord compression (HCC)  Principal Problem: Spinal cord compression (HCC)    Patient Active Problem List    Diagnosis Date Noted    S/P cervical spinal fusion 09/16/2024    At risk for altered bowel elimination 09/14/2024    At risk for altered urinary elimination 09/14/2024    At risk for deep venous thrombosis 09/14/2024    Encounter for rehabilitation 09/14/2024    Lymphedema 09/13/2024    Oropharyngeal dysphagia 09/12/2024    Skin abnormalities 09/12/2024    Acute pain due to trauma 09/06/2024    At risk for delirium 09/06/2024    Heart failure with preserved ejection fraction (HCC) 09/06/2024    Closed odontoid fracture with type II morphology and posterior displacement ([facility]) 09/04/2024    Spinal cord compression (HCC) 09/04/2024    Chronic respiratory failure with hypoxia ([facility]) 03/09/2023    Stage 3a chronic kidney disease ([facility]) 08/12/2022    Bilateral hydronephrosis 08/12/2022    Proctitis 08/12/2022    Fall 08/12/2022    Hypokalemia 08/12/2022    ILD (interstitial lung disease) ([facility]) 07/18/2022    Oropharyngeal aspiration 07/18/2022    Elevated troponin 06/02/2022    EKG abnormalities 06/02/2022    Mild cognitive impairment of uncertain or unknown etiology 01/20/2022    Hormone replacement therapy 01/05/2022    Apraxia 12/28/2021    Constipation, unspecified 12/28/2021    Corn or callus 12/28/2021    Difficulty in walking, not elsewhere classified  12/28/2021    Generalized muscle weakness 12/28/2021    Lymphedema, not elsewhere classified 12/28/2021    Repeated falls 12/28/2021    Hypoxia 12/23/2021    Frequent falls 12/23/2021    Elevated d-dimer 12/23/2021    Venous insufficiency of both lower extremities 12/17/2021    Acquired bilateral hammer toes 07/27/2018    Lymphedema of both lower extremities 07/02/2018    Severe mitral regurgitation 05/31/2018    Anemia 05/13/2018    Cardiomegaly 05/13/2018    Wound of right leg 05/13/2018    Hypertensive heart disease with heart failure (HCC) 05/13/2018    Major depressive disorder, single episode, unspecified 05/13/2018    Personal history of nicotine dependence 05/13/2018    Pleural effusion, not elsewhere classified 05/13/2018    Solitary pulmonary nodule 05/13/2018    Atrial fibrillation, chronic ([facility]) 05/01/2018    B12 deficiency 01/13/2017    Chronic diastolic CHF (congestive heart failure) ([facility]) 01/13/2017    Folate deficiency 01/13/2017    Vitamin D deficiency 06/03/2016    Depression with anxiety 08/03/2015    Mixed hyperlipidemia 08/03/2015    Osteoarthritis of knee 10/23/2012    Impaired fasting glucose 05/31/2012    Essential hypertension 03/23/2010    Hypothyroidism 03/23/2010    Posttraumatic stress disorder 03/23/2010       Physical Therapy:    Weight Bearing Status: Full Weight Bearing  Transfers: Incidental Touching  Bed Mobility: Supervision  Amulation Distance (ft): 71 feet  Ambulation: Incidental Touching  Assistive Device for Ambulation: Roller Walker  Wheelchair Mobility Distance: 20 ft  Wheelchair Mobility: Incidental Touching  Number of Stairs: 4  Assistive Device for Stairs: Bilateral Hand Rails  Stair Assistance: Moderate Assistance (min-mod x 2)  Ramp:  (TBA as able)  Discharge Recommendations: Home with:  DC Home with:: Family Support, First Floor Setup, Home Physical Therapy, Outpatient Physical Therapy    09/16/2024: Patient participating in therapy and making positive gains. Pt  supervision for bed mobility, CG with RW for transfers, Cg for ambulation with RW on level and unlevel surfaces for max of 71 ft., CG for WC mobility with max cues, min-mod A x 2 on stairs with charly HR assist. Ramp not assessed. Pt limited by decreased strength, decreased ROM, decreased endurance, impaired balance, decreased mobility, and cervical spine precautions. Pt may benefit from continued ARC PT for increased function, safety, and independence in prep for safe d/c home with continued PT services as needed.     Occupational Therapy:  Eating: Supervision  Grooming: Supervision, Minimal Assistance  Bathing: Minimal Assistance, Moderate Assistance  Bathing: Minimal Assistance, Moderate Assistance  Upper Body Dressing: Minimal Assistance, Moderate Assistance  Lower Body Dressing: Moderate Assistance, Maximum Assistance  Toileting: Moderate Assistance  Toilet Transfer: Minimal Assistance  Cognition: Exceptions to WNL  Cognition: Decreased Memory, Decreased Attention, Decreased Safety  Orientation: Person, Place, Time, Situation  Discharge Recommendations: Home with:  DC Home with:: Family Support, First Floor Setup, Home Occupational Therapy       9/16/24: Pt participates in ADLs, toileting, transfers/ standing and BUE therex/ theract. Pt is making gains towards goals with current LOF as listed above. Pt requires assist due to limited balance, safety, endurance and strength/ ROM with spinal prec and c collar. Pt will benefit from continued skilled OT services to increase independence with daily tasks.     Speech Therapy:  Mode of Communication: Verbal  Cognition: Exceptions to WNL  Cognition: Decreased Memory, Decreased Executive Functions, Decreased Attention, Decreased Comprehension  Orientation: Person, Place, Time, Situation  Swallowing: Exceptions to WNL  Swallowing: Oral Dysphagia, Pharyngeal Dysphagia  Diet Recommendations: Level 1/Puree, Honey Thick  Discharge Recommendations: Home with:  DC Home with::  Family Support, Home Speech Therapy (close supervision 2' cognitive deficits)  9/17  current diet: puree/HTL    Cognitive Linguisitic Assessments   MoCA MoCA completed this date 9/16/24 - scored 16/30 ( see note for details)     Visuospatial/executive 2/5  Naming 2/3  Attention 3/6  Language 2/3  Abstraction 1/2  Delayed recall 0/5  Orientation 6/6   Comprehension   Comprehension (FIM) 4 - Understands basic info/conversation 75-90% of time   Expression   Expression (FIM) 5 - Needs help/cues only RARELY (< 10% of the time)   Social Interaction   Social Interaction (FIM) 5 - Interacts appropriately with others 90% of time   Problem Solving   Problem solving (FIM) 3 - Solves basic problmes 50-74% of time   Memory   Memory (FIM) 4 - Recognizes/recalls/performs 75-89%           Nursing Notes:  Appetite: Fair  Diet Type: Dysphagia I, Honey thick liquids                      Diet Patient/Family Education Complete: Yes    Type of Wound (LDA): Wound                    Type of Wound Patient/Family Education: Yes  Bladder: 3 - Moderate Assistance     Bladder Patient/Family Education: Yes  Bowel: 3 - Moderate Assistance     Bowel Patient/Family Education: Yes  Pain Location/Orientation: Orientation: Bilateral, Location: Shoulder  Pain Score: 3                       Hospital Pain Intervention(s): Medication (See MAR)  Pain Patient/Family Education: Yes  Medication Management/Safety  Injectable: Lovenox  Safe Administration:  (not attempted)  Medication Patient/Family Education Complete: Yes    9/17/24 Pt admitted s/p fall with spinal compression fracture. Pt is alert and oriented but forgetful. Aspin collar on at all times. Continent of bowel and bladder. Assist x 1 with transfers and ambulation.     Case Management:     Discharge Planning  Living Arrangements: Lives w/ Spouse/significant other  Support Systems: Self, Spouse/significant other  Assistance Needed: NA  Type of Current Residence: Private residence  Current Home Care  Services: No  Patient admitted to [facility] on 9/11, after inpatient hospital stay for surgical repair of cervical spinal cord compression.Patient lives with spouse in 2SH with 3STE full flight of stairs to second floor. Patient has a suite on the first floor, with bedroom and full bath with walk in shower. Patient independent PTA , ambulating with walker or cane.  may not be able to physically assist patient,he is older than her.  No alive children. There are supportive neighbors.    Is the patient actively participating in therapies? yes  List any modifications to the treatment plan:     Barriers Interventions   Fall 6 weeks ago resulting in progressing neck pain and BLE weakness diagnoses with a cervical spine injury with surgical repair, C collar at all times, BLE weakness fluctuating by day, neck pain, incision posterior neck without drainage open to air, CHF, HTN, afib, chronic lymphedema, anxiety/ depression, skin/ wound care R forearm/ stage 1 sacrum/ callus  on toe,  Medical overiste, medication management, pt/ family education, neur sx consult, pt/ family education   Dysphagia all levels with silent aspiration, decreased cognition Pureed and honey liquids following VBSS, pt education, comp strategies   Decreased balance, safety, endurance, srtength/ ROM with spinal prec and c collar ADL training, gait/ transfers training, therex/ theract, pt/ family education             Is the patient making expected progress toward goals? yes  List any update or changes to goals:   Pt will be Mod I bed mobility and supervision transfers/ mobility.  Pt will be supervision comp, problem solving, memory and mod I expression.  Pt will be mod A and CGA toileting STGs and min A ADLs for LTGs.  Pt will increase activity tolerance to return to home with  and spend time with neighbors.    Medical Goals: Patient will be medically stable for discharge to [facility] upon completion of rehab  program    Weekly Team Goals:   Rehab Team Goals  ADL Team Goal: Patient will require assist with ADLs with least restrictive device upon completion of rehab program  Bowel/Bladder Team Goal: Patient will return to premorbid level for bladder/bowel management upon completion of rehab program  Cognitive Team Goal: Patient will return to premorbid level of cognitive activity upon completion of rehab program    Discussion: Pt participates in rehab program progressing slowly towards stated goals due to fluctuation in abilities depending on time of day and fatigue. Pt scored a 16/ 30 on the MOCA. Pt is mod A problem solving, min A comp and memory and supervision expression/ social. Pt is supervision bed mobility, CGA/ min A transfers with RW, min/ mod A amb with RW 71',  CGA to total assist for w/c mobility and min/ mod A of 2 for stairs. Pt is supervision grooming, min. Mod A bathing, UB dressing, mod A toileting and mod/ Max A LB ADLs. Recommend family training, DME including a w/c, RW, commode and shower chair, and family support.     Anticipated Discharge Date:  Home with  Thursday, Oct 3rd, 2024  [facility] Team Members Present:  The following team members are supervising care for this patient and were present during this Weekly Team Conference.    MD Dot Jackson, RN  Pan Hobbs, CANDYN, RN  Cass Leach, PT  Jerica Wallace, OTR/ANAHI Rees, CCC-SLP

## 2024-09-17 NOTE — ASSESSMENT & PLAN NOTE
Presented  to acute care on 9/4/24 with progressive neck pain and weakness.    She reports 4-6 weeks prior she sustained a fall off her bed, head hitting her nightstand.    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   MRI C spine showing displaced type 2 dens fracture with mild mass effect on cord, ALL and PLL injuries.    CTA head and neck negative for vascular injuries.    Patient seen by Neurosurgery and deemed an operative candidate.    Patient taken to the OR by Dr. Dewitt for a bilateral C1 and C3 lateral mass screw placement, bialteral C2 pedicle screw palcement, C1-C3 fusion.    Post operatively, placed in a Aspen C Collar at all times, except showering with Shonda collar.    Post op X-rays with stable alignment    Plan in ARC:  Begin ARC program with PT, OT, SLP  Monitor incision  Monitor pain  Monitor neuro exam  Follow-up with Neurosurgery in 2 weeks  POD 14 =tomorrow 9/18/24 1:00p virtual visit

## 2024-09-17 NOTE — PCC NURSING
9/17/24 Pt admitted s/p fall with spinal compression fracture. Pt is alert and oriented but forgetful. Aspin collar on at all times. Continent of bowel and bladder. Assist x 1 with transfers and ambulation.     9/24/24 Pt with spinal compression fracture. Aspen collar on at all times. Pt is alert and oriented x 4 forgetful at times. Ambulates contact guard with roller walker. Continent of bowel and bladder wears pull up at night. Dysphagia 1 Pureed diet with Honey thick liquids. Stage 1 on sacrum hydragaurd put on daily.     10/1/2024  AO x 4,forgetful, Occassional confusion. Heart irregular, Lungs diminished crackles bilat,  thick yellow sputum, weak cough (encourage suctioning)  Continent of Bowel,  Occasional inc urine wears a pull-up. Stage 1 sacrum- hydragaurd Heels pink, blanchable-  DOE  Right 2nd toe hammer toe with suspicious growth- monitor for changes. Right arm skin tear-   left DOE 9/23 , Surgical incision cervial neck- DOE, S/S, Weakness bilateral legs.   Aspen collar at all times , Cervical spinal precautions, IS, SCD, Aspiration precautions, Dysphagia 1 Pureed diet with Honey thick liquids, no straws, Supervise all meals, no straws, use two handle spouted cup, Crush meds in applesauce

## 2024-09-17 NOTE — CASE MANAGEMENT
CM received an email from parisa at [facility], approving additional days at acute rehab. NRD 9/24

## 2024-09-17 NOTE — PROGRESS NOTES
"Progress Note - Marsha Oliva 79 y.o. female MRN: [id number]    Unit/Bed#: [facility] 221-01 Encounter: [id number]        Subjective:   Patient seen and examined at bedside after reviewing the chart and discussing the case with the caring staff.      Patient examined at bedside.  Patient denies any acute symptoms except feels constipated.     Physical Exam   Vitals: Blood pressure 116/76, pulse 81, temperature (!) 96.8 °F (36 °C), temperature source Temporal, resp. rate 17, height 5' 3\" (1.6 m), weight 52.1 kg (114 lb 13.8 oz), SpO2 95%.,Body mass index is 20.35 kg/m².  Constitutional: Patient in no acute distress.  HEENT: PERR, EOMI, MMM.  Cardiovascular: Normal rate and regular rhythm.    Pulmonary/Chest: Effort normal and breath sounds normal.   Abdomen: Soft, + BS, NT.    Assessment/Plan:  Marsha Oliva is a(n) 79 y.o. female with cervical spinal cord compression.     Cardiac hx w/ HTN, HLD, chronic diastolic heart failure, chronic Afib.  Continue digoxin 125 mcg every other day, diltiazem 180 mg daily, atorvastatin 40 mg daily.  Weekly weights.  Torsemide 20 mg daily as needed.  Home ASA currently on hold.   Hypothyroidism.  Continue levothyroxine 125 mcg daily (decr from 137 mcg 9/6).  B12 deficiency.  Patient on B12 1,000 mcg weekly.   Folate deficiency.  Patient on folic acid 1,000 mcg daily.   Hypomagnesemia.  Patient on magnesium gluconate 250 mg twice daily.   Depression and anxiety.   Patient on Zoloft 100 mg daily.  Declines neuropsych at this time.   Multiple hyperkeratotic lesions.  Patient follows with Podiatry outpatient.  Wound care consult.   Sore throat.  Chloraseptic throat spray as needed.   Pain and bowel regimen.  As per physiatry.   Cervical spinal cord compression.  Vista collar at all times x 6 weeks.  Continue SQ Lovenox.  Patient receiving intensive PT OT ST as per physiatry.      Anticipated discharge date:  TBD  "

## 2024-09-17 NOTE — PROGRESS NOTES
09/17/24 0955   Pain Assessment   Pain Assessment Tool 0-10   Pain Score 4   Pain Location/Orientation Orientation: Bilateral;Location: Shoulder   Restrictions/Precautions   Precautions Aspiration;Bed/chair alarms;Cognitive;Fall Risk;Pain;Spinal precautions;Supervision on toilet/commode   Weight Bearing Restrictions No   ROM Restrictions   (spinal prec)   Braces or Orthoses C/S Collar   Oral Hygiene   Type of Assistance Needed Supervision   Oral Hygiene CARE Score 4   Grooming   Able To Initiate Tasks;Comb/Brush Hair;Wash/Dry Face;Brush/Clean Teeth;Wash/Dry Hands   Limitation Noted In Safety;Strength   Shower/Bathe Self   Type of Assistance Needed Physical assistance   Physical Assistance Level 26%-50%   Shower/Bathe Self CARE Score 3   Bathing   Assessed Bath Style Sponge Bath   Anticipated D/C Bath Style Shower;Sponge Bath   Able to Gather/Transport No   Able to Adjust Water Temperature No   Able to Wash/Rinse/Dry (body part) Left Arm;Right Arm;L Upper Leg;R Upper Leg;Chest;Abdomen   Limitations Noted in Balance;Endurance;Problem Solving;ROM;Safety;Strength  (spinal prec with c collar)   Positioning Seated;Standing   Findings  shower deferred due to short treatment time and shower cap used to wash hair   Tub/Shower Transfer   Reason Not Assessed Sponge Bath   Upper Body Dressing   Type of Assistance Needed Physical assistance   Physical Assistance Level 51%-75%   Comment mod A due to spinal prec   Upper Body Dressing CARE Score 2   Lower Body Dressing   Type of Assistance Needed Physical assistance   Physical Assistance Level 51%-75%   Comment assist to thread BLEs and pt able to assist with pulling up/ down   Lower Body Dressing CARE Score 2   Putting On/Taking Off Footwear   Type of Assistance Needed Physical assistance   Physical Assistance Level 76% or more   Putting On/Taking Off Footwear CARE Score 2   Dressing/Undressing Clothing   Remove UB Clothes Pullover Shirt   Don UB Clothes Pullover Shirt   Remove  LB Clothes Pants;Undergarment;Socks;Shoes   Don LB Clothes Pants;Undergarment;Socks;Shoes   Limitations Noted In Balance;Endurance;Problem Solving;Safety;Strength;ROM  (spinal prec with c collar)   Positioning Supported Sit;Standing   Findings pt may benefit from LB A/E use for LB dressing   Sit to Stand   Type of Assistance Needed Physical assistance   Physical Assistance Level 25% or less   Sit to Stand CARE Score 3   Bed-Chair Transfer   Type of Assistance Needed Physical assistance   Physical Assistance Level 25% or less   Chair/Bed-to-Chair Transfer CARE Score 3   Toileting Hygiene   Type of Assistance Needed Physical assistance   Physical Assistance Level 76% or more   Comment small formed stool   Toileting Hygiene CARE Score 2   Toileting   Able to Pull Clothing down no, up no.   Manage Hygiene Bladder  (assist with hygiene following BM)   Limitations Noted In Balance;Problem Solving;ROM;Safety;LE Strength  (spinal prec with c collar)   Adaptive Equipment Grab Bar   Toilet Transfer   Type of Assistance Needed Physical assistance   Physical Assistance Level 25% or less   Toilet Transfer CARE Score 3   Toilet Transfer   Surface Assessed Raised Toilet   Transfer Technique Stand Pivot   Limitations Noted In Balance;Endurance;Problem Solving;ROM;Safety;LE Strength  (spinal prec with c collar)   Adaptive Equipment Grab Bar   Cognition   Orientation Level Oriented X4   Assessment   Treatment Assessment Pt presents seated in w/c agreeable to OT session including ADLs, toileting and related transfers/ mobility. Pt tolerates session without complaints deferring a shower due to limited time in am session. Pt requires assistd ue to decreased balance, safety, endurance and strength/ ROM with spinal prec and c collar. Pt is progressing towards goals and will benefit from continued skilled OT services to increase independence with daily tasks.   Problem List Decreased strength;Decreased range of motion;Decreased  endurance;Impaired balance;Decreased safety awareness;Decreased cognition;Decreased skin integrity;Orthopedic restrictions;Pain  (spinal prec with c collar)   Plan   Treatment/Interventions ADL retraining;Functional transfer training;Therapeutic exercise;Endurance training;Cognitive reorientation;Patient/family training;Equipment eval/education;Compensatory technique education   Progress Progressing toward goals   OT Therapy Minutes   OT Time In 0955   OT Time Out 1035   OT Total Time (minutes) 40   OT Mode of treatment - Individual (minutes) 40   Therapy Time missed   Time missed? No

## 2024-09-17 NOTE — PCC SPEECH THERAPY
9/17  current diet: puree/HTL    Cognitive Linguisitic Assessments   MoCA MoCA completed this date 9/16/24 - scored 16/30 ( see note for details)     Visuospatial/executive 2/5  Naming 2/3  Attention 3/6  Language 2/3  Abstraction 1/2  Delayed recall 0/5  Orientation 6/6   Comprehension   Comprehension (FIM) 4 - Understands basic info/conversation 75-90% of time   Expression   Expression (FIM) 5 - Needs help/cues only RARELY (< 10% of the time)   Social Interaction   Social Interaction (FIM) 5 - Interacts appropriately with others 90% of time   Problem Solving   Problem solving (FIM) 3 - Solves basic problmes 50-74% of time   Memory   Memory (FIM) 4 - Recognizes/recalls/performs 75-89%     9/24     Comprehension   Comprehension (FIM) 4 - Understands basic info/conversation 75-90% of time   Expression   Verbal Complex   Intelligibility Sentence   QI: Expression 3. Exhibits some difficulty with expressing needs and ideas (e.g., some words or finishing thoughts) or speech is not clear   Expression (FIM) 5 - Needs help/cues only RARELY (< 10% of the time)   Social Interaction   Social Interaction (FIM) 5 - Interacts appropriately with others 90% of time   Problem Solving   Problem solving (FIM) 4 - Solves basic problems 75-89% of time   Memory   Memory (FIM) 4 - Recognizes/recalls/performs 75-89%     10/1 Pt continues with diet recommendations for pureed solids and honey thick liquids.     Comprehension   Comprehension (FIM) 4 - Understands basic info/conversation 75-90% of time   Expression   Expression (FIM) 5 - Needs help/cues only RARELY (< 10% of the time)   Social Interaction   Social Interaction (FIM) 5 - Interacts appropriately with others 90% of time   Problem Solving   Problem solving (FIM) 4 - Solves basic problems 75-89% of time   Memory   Memory (FIM) 4 - Recognizes/recalls/performs 75-89%

## 2024-09-17 NOTE — ASSESSMENT & PLAN NOTE
Wound Care team following in [facility]:  POA    1)Sacrum red linear tissue,slow to alayna, staged as stage one POA to sacrum.Mixed etiology of pressure and moisture.Hydraguard applied no foam dressing .  2)Bilateral buttocks, pink intact erythema. Tissue appears darker in photo in EMR than when assessed. Tissue is pink intact and all areas of both buttocks are blanchable. Hydraguard applied, no foam dressing was applied over hydraguard as this will trap moisture against skin.Area of erythema is decreasing in size form previous photo at [facility].  3)Abrasion to left cheek is open to air.   4)Right arm skin tear beefy red  5)Bilateral heels pink , blanchable. Heel foams on a preventative measure. Right heel outer aspect has area of light purple appears to be ischemic.Heels offloaded.  5)Dry soft intact brown callous on second toe of right foot. Pt states this is known to podiatry, Dr Mohr, with pt having visits to trim callous. Currently open to air.  6)ITD of breast folds, has nystatin powder  7)Left pretibial skin tear resolved. Open to air      Skin care plans:  1-Apply hydroguard cream to sacrum and bilateralbuttocks to hydrate dry skin and protective barrier Bid and Prn  2-Elevate heels to offload pressure.  3-Ehob cushion in chair when out of bed.  4-Moisturize skin daily with skin nourishing cream.  5-Turn/reposition q2h or when medically stable for pressure re-distribution on skin.   6-Monitor callous of Right foot second toe for changes , open to air.  7-Cleanse right arm skin tear with NSS. Apply Dermagran to wound bed only. Cover with DSD change every other day and prn soil or dislodgment   8-Right foot second toe callous open to air, has podiatry consulted.

## 2024-09-17 NOTE — NURSING NOTE
Patient is awake and alert. Pleasant with interactions. Cervical collar in place and properly aligned.  Offers complaint of mild discomfort in bilateral shoulders but did not require PRN pain medication for same. Safety maintained. Call bell in reach at bedside.

## 2024-09-17 NOTE — ASSESSMENT & PLAN NOTE
Located in neck  Continue Tylenol 975 mg TID  Oxycodone IR 5-10 mg Q 4 hours PRN for moderate-severe pain:   Topical ICE  -improved has not required oxy since 9/13

## 2024-09-17 NOTE — WOUND OSTOMY CARE
Progress Note - Wound   Marsha Oliva 79 y.o. female MRN: [id number]  Unit/Bed#: -01 Encounter: [id number]        Assessment:   Patient is seen for weekly assessment of the skin today . She is a 79 year old s/p bilateral C1-C3 lateral screw placement bilateral C2 pedicle screw placement arthrodesis C1- C3with autologous bone graft . She is in bed for the assessment of the skin . Min A of 2 for rolling in the bed .Aspen collar is on . Patient seen with the bedside nurse for the assessment of the skin . Patient reports she was a side sleeper and encouraged to lay on her side when in bed to offload .     Assessment Findings  Left face/cheek area - noted areas that patient had something removed as per the nurse . Area is dry and intact - leave open to air   Bilateral heels dry and intact - change to hydraguard   Right and left breast area scattered candidiasis rash that is intact and pink in color . No open areas noted. Nystatin is ordered   Right 2nd toe - intact brown stable area that in appearance is more growth like in appearance . Podiatry was consulted for the area . No drainage No fluctuance . Leave open to air .  Right arm - partial thickness skin tear that the dermagran was adhering to the wound bed . Will change to the normlgel ag and adaptic to the area   Sacral buttocks - small area of intact non blanchable pink area stage 1 will continue with the hydraguard bid and prn . All other areas blanchable     Discussed with the patient and staff about the offloading when in bed and weight shifts when in the chair . Patient with a good understanding of the information .    Skin Care orders:  1-Hydraguard to sacrum, buttocks and heels BID and PRN  2-EHOB cushion when out of bed in chair.  3-Moisturize skin daily with skin nourishing cream  4-Elevate heels to offload pressure.  5-Turn/reposition q2h for pressure re-distribution on skin  6. Monitor right 2nd toe growth area - leave open to air podiatry  consulted.  7. Right arm skin tear - cleanse with Normal saline then apply normlgel ag then top with adaptic then a ABD and keisha change every other day   8. Right and left breast folds - cleanse with soap and water pat dry dust lightly with nystatin powder bid and prn         Wound 09/09/24 Other (Comment) Face Left (Active)   Wound Image   09/17/24 1307   Wound Description Clean;Dry;Intact 09/17/24 1307   Yolette-wound Assessment Clean;Dry;Intact 09/17/24 1307   Wound Length (cm) 0 cm 09/17/24 1307   Wound Width (cm) 0 cm 09/17/24 1307   Wound Depth (cm) 0 cm 09/17/24 1307   Wound Surface Area (cm^2) 0 cm^2 09/17/24 1307   Wound Volume (cm^3) 0 cm^3 09/17/24 1307   Calculated Wound Volume (cm^3) 0 cm^3 09/17/24 1307   Drainage Amount None 09/17/24 1307   Treatments Other (Comment) 09/17/24 1307   Dressing Open to air 09/17/24 1307   Patient Tolerance Tolerated well 09/17/24 1307       Wound 09/11/24 Arm Distal;Posterior;Right;Lower (Active)   Wound Image   09/17/24 1313   Wound Description Clean;Beefy red 09/17/24 1313   Yolette-wound Assessment Clean;Dry;Intact;Fragile 09/17/24 1313   Wound Length (cm) 0.5 cm 09/17/24 1313   Wound Width (cm) 3 cm 09/17/24 1313   Wound Depth (cm) 0.1 cm 09/17/24 1313   Wound Surface Area (cm^2) 1.5 cm^2 09/17/24 1313   Wound Volume (cm^3) 0.15 cm^3 09/17/24 1313   Calculated Wound Volume (cm^3) 0.15 cm^3 09/17/24 1313   Change in Wound Size % -400 09/17/24 1313   Drainage Amount Small 09/17/24 1313   Drainage Description Serosanguineous;Bloody 09/17/24 1313   Non-staged Wound Description Partial thickness 09/17/24 1313   Treatments Cleansed;Site care;Irrigation with NSS 09/17/24 1313   Dressing Silver dressing;Non adherent 09/17/24 1313   Dressing Changed Changed 09/17/24 1313   Patient Tolerance Tolerated well 09/17/24 1313   Dressing Status Clean;Dry;Intact 09/17/24 0905       Wound 09/11/24 Pressure Injury Sacrum (Active)   Wound Image   09/17/24 1322   Wound Description  Clean;Dry;Fragile;Pink 09/17/24 1322   Pressure Injury Stage 1 09/17/24 1322   Yolette-wound Assessment Clean;Dry;Intact 09/17/24 1322   Wound Length (cm) 0.3 cm 09/17/24 1322   Wound Width (cm) 0.2 cm 09/17/24 1322   Wound Depth (cm) 0 cm 09/17/24 1322   Wound Surface Area (cm^2) 0.06 cm^2 09/17/24 1322   Wound Volume (cm^3) 0 cm^3 09/17/24 1322   Calculated Wound Volume (cm^3) 0 cm^3 09/17/24 1322   Change in Wound Size % 100 09/17/24 1322   Drainage Amount None 09/17/24 1322   Treatments Cleansed;Site care 09/17/24 1322   Dressing Moisture barrier 09/17/24 1322   Dressing Changed New 09/11/24 1700   Patient Tolerance Tolerated well 09/17/24 1322   Dressing Status Clean;Dry;Intact 09/11/24 1906       Wound 09/11/24 Sternum Left;Right (Active)   Wound Image   09/17/24 1308   Wound Description Dry;Intact;Fragile;Pink 09/17/24 1308   Yolette-wound Assessment Clean;Dry;Intact 09/17/24 1308   Wound Length (cm) 0 cm 09/17/24 1308   Wound Width (cm) 0 cm 09/17/24 1308   Wound Depth (cm) 0 cm 09/17/24 1308   Wound Surface Area (cm^2) 0 cm^2 09/17/24 1308   Wound Volume (cm^3) 0 cm^3 09/17/24 1308   Calculated Wound Volume (cm^3) 0 cm^3 09/17/24 1308   Change in Wound Size % 100 09/17/24 1308   Drainage Amount None 09/17/24 1308   Treatments Site care 09/17/24 1308   Dressing Open to air;Other (Comment) 09/17/24 1308   Patient Tolerance Tolerated well 09/17/24 1308       Wound 09/11/24 Toe D2, second Anterior;Right (Active)   Wound Image   09/17/24 1309   Wound Description Dry;Brown 09/17/24 1309   Yolette-wound Assessment Clean;Dry;Intact 09/17/24 1309   Wound Length (cm) 1 cm 09/17/24 1309   Wound Width (cm) 1 cm 09/17/24 1309   Wound Depth (cm) 0 cm 09/17/24 1309   Wound Surface Area (cm^2) 1 cm^2 09/17/24 1309   Wound Volume (cm^3) 0 cm^3 09/17/24 1309   Calculated Wound Volume (cm^3) 0 cm^3 09/17/24 1309   Drainage Amount None 09/17/24 1309   Treatments Site care 09/17/24 1309   Dressing Open to air 09/17/24 0905   Patient  Tolerance Tolerated well 09/17/24 7410       Wound care will follow weekly secure chat with questions     [place] RN BSN CWOCN

## 2024-09-17 NOTE — ASSESSMENT & PLAN NOTE
Chronic A. Fib  Rate/rhythm controlled on digoxin 125 mcg every other day, Cardizem  mg daily  No AC, but was taking Aspirin 81 mg daily (restart when able).  -will discuss with NSGY during her apt tomorrow  IM managing

## 2024-09-18 PROBLEM — E43 SEVERE PROTEIN-CALORIE MALNUTRITION (HCC): Status: ACTIVE | Noted: 2024-09-18

## 2024-09-18 PROBLEM — S12.111K: Status: ACTIVE | Noted: 2024-09-18

## 2024-09-18 PROCEDURE — 97530 THERAPEUTIC ACTIVITIES: CPT

## 2024-09-18 PROCEDURE — 92507 TX SP LANG VOICE COMM INDIV: CPT | Performed by: NURSE PRACTITIONER

## 2024-09-18 PROCEDURE — 97110 THERAPEUTIC EXERCISES: CPT

## 2024-09-18 PROCEDURE — 99232 SBSQ HOSP IP/OBS MODERATE 35: CPT | Performed by: PHYSICAL MEDICINE & REHABILITATION

## 2024-09-18 PROCEDURE — 97535 SELF CARE MNGMENT TRAINING: CPT

## 2024-09-18 PROCEDURE — 99024 POSTOP FOLLOW-UP VISIT: CPT | Performed by: STUDENT IN AN ORGANIZED HEALTH CARE EDUCATION/TRAINING PROGRAM

## 2024-09-18 PROCEDURE — 97116 GAIT TRAINING THERAPY: CPT

## 2024-09-18 RX ADMIN — LEVOTHYROXINE SODIUM 125 MCG: 25 TABLET ORAL at 05:32

## 2024-09-18 RX ADMIN — ENOXAPARIN SODIUM 40 MG: 40 INJECTION SUBCUTANEOUS at 09:15

## 2024-09-18 RX ADMIN — NYSTATIN: 100000 POWDER TOPICAL at 09:16

## 2024-09-18 RX ADMIN — DOCUSATE SODIUM 100 MG: 100 CAPSULE, LIQUID FILLED ORAL at 09:15

## 2024-09-18 RX ADMIN — CHLORHEXIDINE GLUCONATE 15 ML: 1.2 RINSE ORAL at 21:11

## 2024-09-18 RX ADMIN — FOLIC ACID 1000 MCG: 1 TABLET ORAL at 09:15

## 2024-09-18 RX ADMIN — CYANOCOBALAMIN TAB 500 MCG 1000 MCG: 500 TAB at 09:15

## 2024-09-18 RX ADMIN — BACITRACIN ZINC 1 SMALL APPLICATION: 500 OINTMENT TOPICAL at 09:15

## 2024-09-18 RX ADMIN — POLYETHYLENE GLYCOL 3350 17 G: 17 POWDER, FOR SOLUTION ORAL at 09:15

## 2024-09-18 RX ADMIN — ATORVASTATIN CALCIUM 20 MG: 20 TABLET, FILM COATED ORAL at 17:30

## 2024-09-18 RX ADMIN — ACETAMINOPHEN 975 MG: 325 TABLET ORAL at 21:11

## 2024-09-18 RX ADMIN — ACETAMINOPHEN 975 MG: 325 TABLET ORAL at 05:32

## 2024-09-18 RX ADMIN — CHLORHEXIDINE GLUCONATE 15 ML: 1.2 RINSE ORAL at 09:15

## 2024-09-18 RX ADMIN — SERTRALINE 100 MG: 100 TABLET, FILM COATED ORAL at 09:15

## 2024-09-18 RX ADMIN — DILTIAZEM HYDROCHLORIDE 180 MG: 180 CAPSULE, COATED, EXTENDED RELEASE ORAL at 09:15

## 2024-09-18 RX ADMIN — Medication 250 MG: at 09:15

## 2024-09-18 RX ADMIN — NYSTATIN: 100000 POWDER TOPICAL at 17:30

## 2024-09-18 RX ADMIN — Medication 250 MG: at 17:30

## 2024-09-18 RX ADMIN — SENNOSIDES 17.2 MG: 8.6 TABLET, FILM COATED ORAL at 21:11

## 2024-09-18 RX ADMIN — ACETAMINOPHEN 975 MG: 325 TABLET ORAL at 14:45

## 2024-09-18 RX ADMIN — DOCUSATE SODIUM 100 MG: 100 CAPSULE, LIQUID FILLED ORAL at 17:30

## 2024-09-18 NOTE — ASSESSMENT & PLAN NOTE
Wt Readings from Last 3 Encounters:   09/18/24 50.6 kg (111 lb 8.8 oz)   09/11/24 53.8 kg (118 lb 9.7 oz)   09/04/24 53.5 kg (118 lb)     Weight stable  Patient takes Demedex 20 mg daily PRN for weight gain  Monitor daily weights  IM managing

## 2024-09-18 NOTE — PROGRESS NOTES
09/18/24 1435   Pain Assessment   Pain Assessment Tool 0-10   Pain Score 4   Pain Location/Orientation Orientation: Bilateral;Location: Shoulder   Restrictions/Precautions   Precautions Aspiration;Bed/chair alarms;Cognitive;Fall Risk;Pain;Spinal precautions;Supervision on toilet/commode   Weight Bearing Restrictions No   ROM Restrictions   (spinal prec)   Braces or Orthoses C/S Collar   Grooming   Able To Initiate Tasks;Wash/Dry Hands   Limitation Noted In Safety;Strength   Findings supervision seated at w/c   Upper Body Dressing   Type of Assistance Needed Physical assistance   Physical Assistance Level 76% or more   Comment max A to remove and replace c collar to change pads   Upper Body Dressing CARE Score 2   Putting On/Taking Off Footwear   Type of Assistance Needed Physical assistance   Physical Assistance Level 26%-50%   Comment assist to kaden slippers and supervision to remove while seated EOB   Putting On/Taking Off Footwear CARE Score 3   Sit to Lying   Type of Assistance Needed Physical assistance   Physical Assistance Level 25% or less   Comment cues for spinal prec   Sit to Lying CARE Score 3   Lying to Sitting on Side of Bed   Type of Assistance Needed Supervision   Lying to Sitting on Side of Bed CARE Score 4   Sit to Stand   Type of Assistance Needed Supervision;Incidental touching   Sit to Stand CARE Score 4   Bed-Chair Transfer   Type of Assistance Needed Supervision;Incidental touching   Chair/Bed-to-Chair Transfer CARE Score 4   Toileting Hygiene   Type of Assistance Needed Physical assistance   Physical Assistance Level 26%-50%   Toileting Hygiene CARE Score 3   Toileting   Able to Pull Clothing down yes, up no.   Manage Hygiene Bladder;Bowel   Limitations Noted In Balance;Problem Solving;ROM;Safety;LE Strength;UE Strength  (spinal prec)   Adaptive Equipment Grab Bar   Toilet Transfer   Type of Assistance Needed Supervision;Incidental touching   Toilet Transfer CARE Score 4   Toilet Transfer    Surface Assessed Raised Toilet   Transfer Technique Stand Pivot   Limitations Noted In Balance;Endurance;Problem Solving;ROM;Safety;UE Strength;LE Strength  (spinal prec)   Adaptive Equipment Grab Bar   Exercise Tools   Hand Gripper gripper with pegs L hand following retrieval of pegs form the floor using the recaher int eh RUE   Other Exercise Tool 1 push pegs into and out of board making stacks of 3 using B hands while seated   Cognition   Arousal/Participation Alert;Responsive;Cooperative   Attention Attends with cues to redirect   Orientation Level Oriented X4   Memory Decreased short term memory;Decreased recall of recent events;Decreased recall of precautions   Following Commands Follows one step commands without difficulty   Assessment   Treatment Assessment Pt presents supine agreeable to c collar pad change and wound care by RN rolling side to side before getting OOB for BUE therex/ theract and toileting/ transfers with RW and grabbar. Pt educated in care of pads with OT washing and layign flat to dry. Pt tolerates session with increased rest breaks and reminders for spinal prec. Pt requires assist and supervision due to decreased balance, safety/ cognition, endurance and strength/ ROM with spinal prec. Pt will benefit from continued skilled OT services to increase independence with daily tasks.   Problem List Decreased strength;Decreased range of motion;Decreased endurance;Impaired balance;Decreased cognition;Decreased safety awareness;Decreased skin integrity;Orthopedic restrictions;Pain  (spinal prec)   Plan   Treatment/Interventions ADL retraining;Functional transfer training;Therapeutic exercise;Endurance training;Patient/family training;Cognitive reorientation;Equipment eval/education;Compensatory technique education   Progress Progressing toward goals   OT Therapy Minutes   OT Time In 1435   OT Time Out 1600   OT Total Time (minutes) 85   OT Mode of treatment - Individual (minutes) 85   Therapy  Time missed   Time missed? No

## 2024-09-18 NOTE — PROGRESS NOTES
"Progress Note - Marsha Oliva 79 y.o. female MRN: [id number]    Unit/Bed#: [facility] 221-01 Encounter: [id number]        Subjective:   Patient seen and examined at bedside after reviewing the chart and discussing the case with the caring staff.      Patient examined at bedside.  Patient denies any acute symptoms.    Physical Exam   Vitals: Blood pressure 102/60, pulse 63, temperature (!) 96.9 °F (36.1 °C), temperature source Temporal, resp. rate 16, height 5' 3\" (1.6 m), weight 50.6 kg (111 lb 8.8 oz), SpO2 97%.,Body mass index is 19.76 kg/m².  Constitutional: Patient in no acute distress.  HEENT: PERR, EOMI, MMM.  Cardiovascular: Normal rate and regular rhythm.    Pulmonary/Chest: Effort normal and breath sounds normal.   Abdomen: Soft, + BS, NT.    Assessment/Plan:  Marsha Oliva is a(n) 79 y.o. female with cervical spinal cord compression.     Cardiac hx w/ HTN, HLD, chronic diastolic heart failure, chronic Afib.  Continue digoxin 125 mcg every other day, diltiazem 180 mg daily, atorvastatin 40 mg daily.  Weekly weights.  Torsemide 20 mg daily as needed.  Home ASA currently on hold.   Hypothyroidism.  Continue levothyroxine 125 mcg daily (decr from 137 mcg 9/6).  B12 deficiency.  Patient on B12 1,000 mcg weekly.   Folate deficiency.  Patient on folic acid 1,000 mcg daily.   Hypomagnesemia.  Patient on magnesium gluconate 250 mg twice daily.   Depression and anxiety.   Patient on Zoloft 100 mg daily.  Declines neuropsych at this time.   Multiple hyperkeratotic lesions.  Patient follows with Podiatry outpatient.  Wound care consult.   Sore throat.  Chloraseptic throat spray as needed.   Pain and bowel regimen.  As per physiatry.   Cervical spinal cord compression.  Vista collar at all times x 6 weeks.  Continue SQ Lovenox.  Patient receiving intensive PT OT ST as per physiatry.      Anticipated discharge date:  Thursday 10/3/2024    The patient was discussed with Dr. Brown and he is in agreement with the above note.  "

## 2024-09-18 NOTE — CONSULTS
"Consultation - Neurosurgery   Name: Marsha Oliva 79 y.o. female I MRN: [id number]  Unit/Bed#: -01 I Date of Admission: 9/11/2024   Date of Service: 9/18/2024 I Hospital Day: 7   Inpatient consult to Neurosurgery  Consult performed by: Tiffanie Granados PA-C  Consult ordered by: Corine Park MD        Physician Requesting Evaluation: Justus Brown MD   Reason for Evaluation / Principal Problem: s/p cervical fusion     Assessment & Plan  Closed odontoid fracture with type II morphology, posterior displacement, and nonunion  s/p navigated posterior fusion C1-C3 (Dr. Simpson, 9/5/2024)   Pt initially presented to the [facility]\"" ER on 9/4 with complaints of SOB and neck pain   Reportedly has a fall from bed about 6 weeks prior to arrival and since then pt has had worsening neck pain and \"extremity dysfunction\"   Reported increasingly dropping items and increased gait disturbance   CT imaging revealed a type II dens fracture with posterior displacement with narrowing of the spinal canal   Pt ultimately underwent a C1-3 posterior fusion with Dr. Simpson on 9/5   She did well in the hospital post-operatively without any major complications and was ultimately discharged to rehab at [facility]   Pt presents to the  nsgy office today via virtual appt for her approximate 2 week post-op visit for evaluation and incision check   Today, pt offers no complaints. She admits to some mild neck soreness but states her pain has gotten better. No longer taking opioids. Making daily improvements in strength and balance w/ PT and OT in rehab.   Been compliant with c-collar   Denies any incisional concerns.      Imaging:   No updated imaging to review      Plan:   Continue to closely monitor neuro exam   Maintain normotensive BP goals, SBP < 160   Pt has been making great progress in rehab and states she is feeling very good. Her pain is well controlled and she feels her balance and walking has been improving daily. Overall " "strength much better.    Incision appears to be healing very well --> sutures can be removed per [facility] providers   Continue aspen cervical collar at all times   Pt encouraged to continue to work with PT/OT and complete her current rehab therapies   Ongoing pain control per primary team   DVT ppx: SCDs, SQ lovenox   Ongoing medical management per primary team      Neurosurgery will sign off at this time. Will plan to follow-up with the pt as scheduled for her 6 week POV with repeat upright xrays. Please reach out with any further questions or concerns.     Please contact the [employer] role for the Neurosurgery service with any questions/concerns.    History of Present Illness   HPI: Marsha Oliva is a 79 y.o. year old female who is seen via virtual appointment today for her approximate 2-week hospital follow-up appointment for incision check after a navigated posterior fusion from C1-C3 by Dr. Simpson on 9/5/2024.  Patient initially presented to the [facility] ER on 9/4 with complaints of shortness of breath and worsening/persistent neck pain.  Patient reportedly had a fall from bed about 6 weeks prior to her arrival in the ER and since then has had pain\" extremity dysfunction\".  She was reportedly increasingly dropping items and increased gait disturbance.  CT imaging on arrival revealed a type II dens fracture with posterior displacement and narrowing of the spinal canal.  Patient was ultimately taken to the OR and underwent a C1-3 posterior fusion with Dr. Simpson on 9/5.  She did well in the hospital postoperatively without any major complications and was ultimately discharged to rehab at [facility].    Today, the patient is being seen for her 2-week follow-up appointment.  She states she has been doing very well in rehab.  She admits to very mild neck pain that improves with Tylenol.  She states she is no longer needing opioid medications.  She denies any radicular pain down bilateral upper extremities, new weakness, " "numbness, bowel/bladder incontinence, urinary retention, saddle anesthesia.  Patient states she has been regularly working with physical therapy and feels that her balance and strength has significantly improved.  She states she has \"better control\" over her legs.  She does still use the walker when ambulating and will need a wheelchair for longer distances.  But she will that she can walk further each day.  Patient has been compliant with her cervical collar.  Patient overall is very pleased with her patient overall is very pleased with her progress.  She denies any incisional concerns or drainage.  [facility] nursing states that she has been doing very well and also feels that her incision has been healing quite well.    Review of Systems   Constitutional:  Negative for fatigue and fever.   Eyes:  Negative for photophobia and visual disturbance.   Respiratory:  Negative for shortness of breath.    Cardiovascular:  Negative for chest pain.   Genitourinary:  Negative for difficulty urinating.   Musculoskeletal:  Positive for gait problem, myalgias and neck pain. Negative for arthralgias, back pain, joint swelling and neck stiffness.   Skin:         Denies any incisional concerns   Neurological:  Negative for weakness, numbness and headaches.   Psychiatric/Behavioral:  Negative for agitation, behavioral problems and confusion.      I have reviewed the patient's PMH, PSH, Social History, Family History, Meds, and Allergies    Objective      Temp:  [96.9 °F (36.1 °C)-98 °F (36.7 °C)] 96.9 °F (36.1 °C)  HR:  [63-87] 63  Resp:  [16-17] 16  BP: ()/(58-71) 102/60  O2 Device: None (Room air)          I/O         09/16 0701 09/17 0700 09/17 0701  09/18 0700 09/18 0701  09/19 0700    P.O. 590  200    Total Intake(mL/kg) 590 (11.3)  200 (4)    Net +590  +200           Unmeasured Stool Occurrence 1 x 1 x           Lines/Drains/Airways       Active Status       None                  Physical Exam   (Limited given virtual " component of visit)   General appearance: alert, appears stated age, cooperative and no distress  Head: Normocephalic, without obvious abnormality, atraumatic  Eyes: tracking appropriately   Neck: aspen cervical collar in place   - posterior c-spine incision appears, clean, dry, intact and well healed   - no surrounding areas of erythema, edema, drainage, or evidence of dehiscence   Back: no kyphosis present, no tenderness to percussion or palpation  Lungs: non labored breathing, no resp distress on room air   Heart: regular heart rate  Neurologic:   Mental status: Alert, oriented x3, thought content appropriate  Motor: moving all extremities without focal weakness   - able to litft and hold charly UE and bilLE off the chair equally throughout  Coordination: no drift noted bilaterally    Lab Results: I have reviewed the following results: CBC/BMP: No new results in last 24 hours.   Recent Labs     09/16/24  0546   WBC 4.82   HGB 12.3   HCT 40.3      SODIUM 140   K 3.9      CO2 30   BUN 18   CREATININE 0.72   GLUC 89   AST 66*   ALT 36   ALB 3.5   TBILI 0.52   ALKPHOS 72     Imaging Review:   FL barium swallow video w speech    Result Date: 9/10/2024  Narrative: A video barium swallow study was performed by the Department of Speech Pathology. Please refer to the report for the official interpretation. The images are stored for archival purposes only. Study images were not formally reviewed by the Radiology Department.    XR cervical spine 2 or 3 views    Result Date: 9/7/2024  Narrative: XR SPINE CERVICAL 2 OR 3 VW INJURY INDICATION: s/p cervical surgery. COMPARISON: CT 9/4/2024 FINDINGS: There is bilateral pedicle screw and stabilizing safia fixation at the C1, C2, C3 levels .  No hardware complication.  Stable  alignment compared to intraoperative images. Surgical drain in the posterior cervical soft tissues noted. Redemonstration of known C2 dens fracture, showing improved alignment compared to  preoperative images. Intervertebral disc heights are preserved. Normal prevertebral soft tissues. Clear lung apices.     Impression: There is bilateral pedicle screw and stabilizing safia fixation at the C1, C2, C3 levels .  No hardware complication.  Stable  alignment compared to intraoperative images. Workstation performed: [id number]     XR spine cervical 2 or 3 vw injury    Result Date: 9/6/2024  Narrative: C-ARM - XR SPINE CERVICAL 2 OR 3 VW INJURY INDICATION: Closed odontoid fracture with type II morphology and posterior displacement, initial encounter. Procedure guidance. TECHNIQUE: Fluoroscopic guidance provided. COMPARISON: None FLUOROSCOPY TIME: 1 minute 24 seconds 3654 FLUOROSCOPIC and CT CINE  IMAGES FINDINGS: Fluoroscopy provided for procedure guidance. Osseous and soft tissue detail limited by technique.     Impression: Fluoroscopy provided for procedure guidance. Please refer to the separate procedure note for additional details. Workstation performed: [id number]     CTA head and neck with and without contrast    Result Date: 9/4/2024  Narrative: CTA NECK AND BRAIN WITH AND WITHOUT CONTRAST INDICATION: type 2 odontoid fx with posterior displacement of c1 COMPARISON:   9/4/24. TECHNIQUE:  Routine CT imaging of the Brain without contrast.Post contrast imaging was performed after administration of iodinated contrast through the neck and brain. Post contrast axial 0.625 mm images timed to opacify the arterial system.  3D rendering was performed on an independent workstation.   MIP reconstructions performed. Coronal and sagittal reconstructions were performed of the non contrast portion of the brain. Radiation dose length product (DLP) for this visit:  1505.88 mGy-cm .  This examination, like all CT scans performed in the [facility], was performed utilizing techniques to minimize radiation dose exposure, including the use of iterative reconstruction and automated exposure control. IV  Contrast:  85 mL of iohexol (OMNIPAQUE) IMAGE QUALITY:   Diagnostic FINDINGS: NONCONTRAST BRAIN PARENCHYMA:No intracranial mass, mass effect or midline shift. No CT signs of acute infarction.  No acute parenchymal hemorrhage. VENTRICLES AND EXTRA-AXIAL SPACES:Normal for the patient's age. VISUALIZED ORBITS: Normal. PARANASAL SINUSES: Normal. CTA NECK ARCH AND GREAT VESSELS: Visualized arch and great vessels are normal. VERTEBRAL ARTERIES: Patent extracranial segments. RIGHT CAROTID: No stenosis.    No dissection. LEFT CAROTID: No stenosis.    No dissection. NASCET criteria was used to determine the degree of internal carotid artery diameter stenosis. CTA BRAIN: INTERNAL CAROTID ARTERIES: No stenosis or occlusion. ANTERIOR CEREBRAL ARTERY CIRCULATION:  No stenosis or occlusion. MIDDLE CEREBRAL ARTERY CIRCULATION:  No stenosis or occlusion. DISTAL VERTEBRAL ARTERIES:  No stenosis or occlusion. BASILAR ARTERY:  No stenosis or occlusion. POSTERIOR CEREBRAL ARTERIES: No stenosis or occlusion. VENOUS STRUCTURES:  Normal. NON VASCULAR ANATOMY BONY STRUCTURES: Displaced type II dens fracture redemonstrated with posterior displacement of the dens and C1 vertebral body.. SOFT TISSUES OF THE NECK:  Normal. THORACIC INLET:  Unremarkable.     Impression: CT Brain:  No acute intracranial abnormality. CT Angiography:  Unremarkable CTA neck and brain. Workstation performed: [id number]     MRI cervical spine wo contrast    Result Date: 9/4/2024  Narrative: MRI CERVICAL SPINE WITHOUT CONTRAST INDICATION: Odontoid fracture with displacement. COMPARISON: CT from the same day TECHNIQUE:  Multiplanar, multisequence imaging of the cervical spine was performed. . IMAGE QUALITY: Motion limited. Repeat images were performed with series 9, 10, and 11 being diagnostic. Final cube series #13 is also of diagnostic quality. FINDINGS: ALIGNMENT: There is posterior displacement of the dens fracture with mass effect of the adjacent cervical  medullary junction. The cord is displaced posteriorly over the posterior arch of C2 with mild mass effect. There is no cord edema within the limitations of this motion degraded exam. There is also posterior displacement of the lateral masses of C1 relative to the lateral masses of C2 by approximately 1 bone width, 1.1 cm. The ALL appears discontinuous. The PLL is displaced from the posterior margin of the C2 vertebral body without definite discontinuity. No definite ligamentum flavum discontinuity seen. There is edema between the spinous process at C1 and C2 suggesting interspinous ligament injury. There is also edema along the facets at C2-C3 which may reflect facet capsular injury. There is no discontinuity of the transverse ligament. MARROW SIGNAL: There is edema surrounding the cervical medullary junction as well as within the lateral masses of C1 and C2. There is edema throughout the C2 and C1 vertebral bodies corresponding to the known fractures. The remainder of the cervical vertebral bodies demonstrate no gross edema, however motion degrades this evaluation. CERVICAL AND VISUALIZED THORACIC CORD: Grossly normal signal within the visualized cord. The evaluation for cord hemorrhage is limited given motion artifact on axial gradient echo sequences. No gross cord hemorrhage seen. PREVERTEBRAL AND PARASPINAL SOFT TISSUES: There is prevertebral edema at the craniocervical junction corresponding to the known acute fracture with displacement. VISUALIZED POSTERIOR FOSSA:  The visualized posterior fossa demonstrates no abnormal signal. CERVICAL DISC SPACES: C2-C3: No central or foraminal stenosis at this level. There is bilateral facet edema as discussed above. C3-C4: Small, broad disc osteophyte without high-grade stenosis. Uncovertebral osteophytes mildly efface the foramen. C4-C5: Small, broad disc osteophyte without high-grade stenosis. C5-C6: Small, broad disc osteophyte without high-grade stenosis. C6-C7: Small,  broad disc osteophyte without high-grade stenosis. C7-T1:  Normal. UPPER THORACIC DISC SPACES:  Normal. OTHER FINDINGS: The evaluation for vascular flow voids is limited by motion artifact.     Impression: 1. Displaced type II dens fracture with severe posterior displacement of the dens and C1 vertebral body relative to the C2 vertebral body. There is resultant mass effect on the cord which appears relatively mild given the degree of displacement. No gross  cord edema is seen, however the exam is motion degraded. 2. Discontinuity of the ALL and irregularity of the PLL. There is also likely interspinous ligament injury at C1-C2. 3. Facet edema at C2-C3 may reflect facet capsular injury. 4. Prominent edema in the C1 and C2 vertebral bodies compatible with known C2 fracture. There is also paravertebral edema. 5. Limited evaluation of the flow voids given motion artifact. Consider CT angiogram of the neck to evaluate the vertebral arteries given the significant distraction at C1-C2. The study was marked in EPIC for immediate notification. Workstation performed: [id number]      bedside procedure    Result Date: 9/4/2024  Narrative: [id number]    TRAUMA - CT spine cervical wo contrast    Result Date: 9/4/2024  Narrative: CT CERVICAL SPINE - WITHOUT CONTRAST INDICATION:   TRAUMA. COMPARISON: 8/11/2022. TECHNIQUE:  CT examination of the cervical spine was performed without intravenous contrast.  Contiguous axial images were obtained. Multiplanar 2D reformatted images were created from the source data. Radiation dose length product (DLP) for this visit:  353.36 mGy-cm .  This examination, like all CT scans performed in the [facility], was performed utilizing techniques to minimize radiation dose exposure, including the use of iterative  reconstruction and automated exposure control. IMAGE QUALITY:  Diagnostic. FINDINGS: ALIGNMENT: There is posterior displacement of the  odontoid process of up to 7 mm. In addition, there is up to 1.3 cm of posterior displacement (retrolisthesis) of C1 with respect to C2. VERTEBRAE: There is a type II odontoid fracture through the base of the dens with posterior displacement as above. DEGENERATIVE CHANGES: Moderate multilevel cervical degenerative changes are noted. There is a component of cord compression at the medullary cervical spinal cord due to the posteriorly displaced C1 and fractured odontoid process. Further evaluation with MRI is advised. PREVERTEBRAL AND PARASPINAL SOFT TISSUES: No prevertebral soft tissue edema. Unremarkable THORACIC INLET:  Normal.     Impression: Type II odontoid fracture with posterior displacement including prominent posterior displacement of C1 with respect to the C2 vertebral body causing a component of cord compression of the medullary cervical spinal cord. Further evaluation with MRI is advised. I personally discussed this study with NEENA JIM on 9/4/2024 11:12 AM. Workstation performed: [id number]     TRAUMA - CT head wo contrast    Result Date: 9/4/2024  Narrative: CT BRAIN - WITHOUT CONTRAST INDICATION:   TRAUMA. COMPARISON: 8/11/2022. TECHNIQUE:  CT examination of the brain was performed.  Multiplanar 2D reformatted images were created from the source data. Radiation dose length product (DLP) for this visit:  883.57 mGy-cm .  This examination, like all CT scans performed in the [facility], was performed utilizing techniques to minimize radiation dose exposure, including the use of iterative  reconstruction and automated exposure control. IMAGE QUALITY:  Diagnostic. FINDINGS: PARENCHYMA:  No intracranial mass, mass effect or midline shift. No CT signs of acute infarction.  No acute parenchymal hemorrhage. Mild to moderate chronic microvascular ischemic changes, not significantly changed from VENTRICLES AND EXTRA-AXIAL SPACES:  Normal for the patient's age. VISUALIZED ORBITS: Bilateral  lens replacements. PARANASAL SINUSES: Normal visualized paranasal sinuses. CALVARIUM AND EXTRACRANIAL SOFT TISSUES:  Normal.     Impression: No acute intracranial abnormality. Mild to moderate chronic microvascular ischemic changes. Workstation performed: [id number]     VTE Pharmacologic Prophylaxis: Sequential compression device (Venodyne)  and Enoxaparin (Lovenox)    Administrative Statements   I have spent a total time of 30 minutes in caring for this patient on the day of the visit/encounter.

## 2024-09-18 NOTE — MALNUTRITION/BMI
This medical record reflects one or more clinical indicators suggestive of malnutrition and/or morbid obesity.    Malnutrition Findings:   Adult Malnutrition type: Acute illness  Adult Degree of Malnutrition: Other severe protein calorie malnutrition  Malnutrition Characteristics: Inadequate energy, Weight loss           360 Statement: Malnutrition related to inadequate energy intake as evidenced by 7#(6%) weight loss from 9/11/# to 9/18/# and <50% energy intake >5 days.    Dysphagia 1, HTL with prosource pudding once daily and magic cup BID. Consider appetite stimulant.     BMI Findings:           Body mass index is 19.76 kg/m².     See Nutrition note dated 9/18/2024 for additional details.  Completed nutrition assessment is viewable in the nutrition documentation.

## 2024-09-18 NOTE — NUTRITION
09/18/24 1329   Biochemical Data,Medical Tests, and Procedures   Biochemical Data/Medical Tests/Procedures Lab values reviewed;Meds reviewed   Labs (Comment) 9/16 AST:66, pro:6.1   Meds (Comment) lipitor, tums, Vit B12, cardizem, colace, lovenox, folvite, levothyroxine, Magonate, zofran, senna, demadex   Speech Therapy Recommendations (Comment) Receiving speech therapy   Nutrition-Focused Physical Exam   Nutrition-Focused Physical Exam Findings RN skin assessment reviewed  (Wound right arm, stage 1 sacrum, wound sternum, wound toe right D2, wound cervical neck, wound left face per documentation)   Medical-Related Concerns PMH reviewed   Adequacy of Intake   Nutrition Modality PO   Feeding Route   PO With assistance   Current PO Intake   Current Diet Order Dysphagia 1, HTL   Nutrition Supplements Mighty Shake  (HT, BID)   Current Meal Intake 0-25%;25-50%   Intake Supplements 0-25%   Estimated calorie intake compared to estimated need Nutrient needs not met.   PES Statement   Problem Continue previous diagnosis   Recommendations/Interventions   Malnutrition/BMI Present Yes   Adult Malnutrition type Acute illness   Adult Degree of Malnutrition Other severe protein calorie malnutrition   Malnutrition Characteristics Inadequate energy;Weight loss   360 Statement Malnutrition related to inadequate energy intake as evidenced by 7#(6%) weight loss from 9/11/# to 9/18/# and <50% energy intake >5 days.   Summary Dysphagia 1, HTL. Mightyshake HT BID. Meal completions <50%. She states she dislikes the supplement. 9/18 111#, BMI=19.75; 7#(6%) weight loss from admission 9/11 118#. Medications reviewed. Dypshagia present. Upper denture. Trace BLE edema; previously nonpitting edema. Aspen collar in place. She states she dislikes the thickener. She also reports she dislikes the mightshake supplement. Discussed other supplement options. She is agreeable to prosource pudding and magic cup. She states she prefers the HT  beverages when they are cold versus room temperature. She also states she has been enjoying mashed potatoes. She did not provide any additional food preferences at this time. Discussed with ST. [facility] to adjust supplement. Consider appetite stimulant.   Interventions/Recommendations Continue current diet order;Supplement adjust;Initiate appetite stimulant   Intervention Comments discontinue mightyshake; add magic cup BID and prosource pudding once daily   Recommendations to Provider consider appetite stimulant   Education Assessment   Education Patient/caregiver not appropriate for education at this time;Education not indicated at this time   Patient Nutrition Goals   Goal Meet PO needs;Avoid weight loss   Goal Status Not met;Extended   Timeframe to complete goal by next f/u   Nutrition Complexity Risk   Nutrition complexity level Moderate risk   Nutrition review: 09/20/24  (intake? supplements?)   Follow up date 09/23/24

## 2024-09-18 NOTE — PROGRESS NOTES
Progress Note - PMR   Name: Marsha Oliva 79 y.o. female I MRN: [id number]  Unit/Bed#: [facility] 221-01 I Date of Admission: 9/11/2024   Date of Service: 9/18/2024 I Hospital Day: 7     Assessment & Plan  Spinal cord compression (HCC)  Presented  to acute care on 9/4/24 with progressive neck pain and weakness.    She reports 4-6 weeks prior she sustained a fall off her bed, head hitting her nightstand.    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   MRI C spine showing displaced type 2 dens fracture with mild mass effect on cord, ALL and PLL injuries.    CTA head and neck negative for vascular injuries.    Patient seen by Neurosurgery and deemed an operative candidate.    Patient taken to the OR by Dr. Dewitt for a bilateral C1 and C3 lateral mass screw placement, bialteral C2 pedicle screw palcement, C1-C3 fusion.    Post operatively, placed in a Aspen C Collar at all times, except showering with Shonda collar.    Post op X-rays with stable alignment    Plan in ARC:  Begin ARC program with PT, OT, SLP  Monitor incision  Monitor pain  Monitor neuro exam  Follow-up with Neurosurgery in 2 weeks  POD 14 =tomorrow 9/18/24 1:00p virtual visit   Oropharyngeal dysphagia  New issues  VFSS study completed on 9/10/24 and patient found to have mild oral and moderate phayngeal dysphagia.    Cleared for a pureed diet with honey thick liquids.    SLP to follow  Continue aspiration precautions   Acquired bilateral hammer toes  Keratosis of toe  May need shaving of this lesion  Consult placed to Podiatry  Atrial fibrillation, chronic (HCC)  Chronic A. Fib  Rate/rhythm controlled on digoxin 125 mcg every other day, Cardizem  mg daily  No AC, but was taking Aspirin 81 mg daily (restart when able).  -will discuss with NSGY during her apt tomorrow  IM managing  B12 deficiency  Continue supplementation   IM managing  Chronic diastolic CHF  (congestive heart failure) (HCC)  Wt Readings from Last 3 Encounters:   09/18/24 50.6 kg (111 lb 8.8 oz)   09/11/24 53.8 kg (118 lb 9.7 oz)   09/04/24 53.5 kg (118 lb)     Weight stable  Patient takes Demedex 20 mg daily PRN for weight gain  Monitor daily weights  IM managing    Depression with anxiety  Mood is stable  Continue Zoloft 100 mg daily  Essential hypertension  Continue Cardizem  mg Daily  Monitor BP and HR with rest and activity  IM managing  Hypothyroidism  Continue Synthroid 125 mcg daily  Mixed hyperlipidemia  Continue Atorvastain 20 mg daily  Folate deficiency  Continue supplementation   IM managing  Acute pain due to trauma  Located in neck  Continue Tylenol 975 mg TID  Oxycodone IR 5-10 mg Q 4 hours PRN for moderate-severe pain:   Topical ICE  -improved has not required oxy since 9/13  Skin abnormalities  Wound Care team following in ARC:  POA    1)Sacrum red linear tissue,slow to alayna, staged as stage one POA to sacrum.Mixed etiology of pressure and moisture.Hydraguard applied no foam dressing .  2)Bilateral buttocks, pink intact erythema. Tissue appears darker in photo in EMR than when assessed. Tissue is pink intact and all areas of both buttocks are blanchable. Hydraguard applied, no foam dressing was applied over hydraguard as this will trap moisture against skin.Area of erythema is decreasing in size form previous photo at [facility].  3)Abrasion to left cheek is open to air.   4)Right arm skin tear beefy red  5)Bilateral heels pink , blanchable. Heel foams on a preventative measure. Right heel outer aspect has area of light purple appears to be ischemic.Heels offloaded.  5)Dry soft intact brown callous on second toe of right foot. Pt states this is known to podiatry, Dr Mohr, with pt having visits to trim callous. Currently open to air.  6)ITD of breast folds, has nystatin powder  7)Left pretibial skin tear resolved. Open to air      Skin care plans:  1-Apply hydroguard cream to  sacrum and bilateralbuttocks to hydrate dry skin and protective barrier Bid and Prn  2-Elevate heels to offload pressure.  3-Ehob cushion in chair when out of bed.  4-Moisturize skin daily with skin nourishing cream.  5-Turn/reposition q2h or when medically stable for pressure re-distribution on skin.   6-Monitor callous of Right foot second toe for changes , open to air.  7-Cleanse right arm skin tear with NSS. Apply Dermagran to wound bed only. Cover with DSD change every other day and prn soil or dislodgment   8-Right foot second toe callous open to air, has podiatry consulted.  Lymphedema  Pumps brought in by  - ok to use PRN as tolerated  At risk for altered bowel elimination  Continent  -BM 9/16   -at home takes daily miralax; rescheduled PRN to daily  At risk for altered urinary elimination  Continent   At risk for deep venous thrombosis  Continue Lovenox 40 mg daily   Encounter for rehabilitation  Functional deficits:  cervical collar on at all times, cervical spinal precautions, impaired mobility, self care   Continue current rehabilitation plan of care to maximize function.  Estimated discharge: Thursday 10/3/24 with home care estimated      History of Present Illness   Rehab Diagnosis: Impairment of mobility, safety and Activities of Daily Living (ADLs) due to Spinal Cord Dysfunction:  Traumatic:  04.2211  Quadriplegia, Incomplete C1-4  Etiologic Diagnosis: Fall with displaced type 2 dens fracture with spinal cord injury and compression     HPI: Marsha Oliva is a 79 y.o. female with HTN, A Fib, CHF, HLD, Hypothyroidism who [facility] on on 9/4/24 with progressive neck pain and weakness.  She reports 4-6 weeks prior she sustained a fall off her bed, head hitting her nightstand.  She has been having increasing neck pain since.  CT C Spine showing Type 2 odontoid fracture with posterior displacement and cord compression of the medullary cervical spinal  cord. MRI C spine showing displaced type 2 dens fracture with mild mass effect on cord, ALL and PLL injuries.  CTA head and neck negative for vascular injuries.  Patient seen by Neurosurgery and deemed an operative candidate.  Patient taken to the OR by Dr. Dewitt for a bilateral C1 and C3 lateral mass screw placement, bialteral C2 pedicle screw palcement, C1-C3 fusion.  Post operatively, placed in a Aspen C Collar at all times, except showering with Shonda collar.  Post op X-rays with stable alignment  Medical course complicated by acute dysphagia.  VFSS study completed on 9/10/24 and patient found to have mild oral and moderate phayngeal dysphagia.  Cleared for a pureed diet with honey thick liquids.  Patient seen by wound care team for abrasions and stage I pressure injury to sacrum.  Patient started on Tylenol and PRN oxycodone for acute pain.    After medical stabilization, patient found to have acute functional deficits from his baseline, therefore, admitted to [facility] for acute inpatient rehabilitation.      Chief Complaint: f/u fracture, pain, and anxiety    Interval: Patient seen face to face.  Worried about her  - she relays he is in the hospital.  Denies increased neck pain.  Anxious.    Objective     Functional Update:  Physical Therapy Occupational Therapy Speech Therapy   Weight Bearing Status: Full Weight Bearing  Transfers: Incidental Touching  Bed Mobility: Supervision  Amulation Distance (ft): 71 feet  Ambulation: Incidental Touching  Assistive Device for Ambulation: Roller Walker  Wheelchair Mobility Distance: 20 ft  Wheelchair Mobility: Incidental Touching  Number of Stairs: 4  Assistive Device for Stairs: Bilateral Hand Rails  Stair Assistance: Moderate Assistance (min-mod x 2)  Ramp:  (TBA as able)  Discharge Recommendations: Home with:  DC Home with:: Family Support, First Floor Setup, Home Physical Therapy, Outpatient Physical Therapy   Eating: Supervision  Grooming:  Supervision, Minimal Assistance  Bathing: Minimal Assistance, Moderate Assistance  Bathing: Minimal Assistance, Moderate Assistance  Upper Body Dressing: Minimal Assistance, Moderate Assistance  Lower Body Dressing: Moderate Assistance, Maximum Assistance  Toileting: Moderate Assistance  Toilet Transfer: Minimal Assistance  Cognition: Exceptions to WNL  Cognition: Decreased Memory, Decreased Attention, Decreased Safety  Orientation: Person, Place, Time, Situation   Mode of Communication: Verbal  Cognition: Exceptions to WNL  Cognition: Decreased Memory, Decreased Executive Functions, Decreased Attention, Decreased Comprehension  Orientation: Person, Place, Time, Situation  Swallowing: Exceptions to WNL  Swallowing: Oral Dysphagia, Pharyngeal Dysphagia  Diet Recommendations: Level 1/Puree, Honey Thick  Discharge Recommendations: Home with:  DC Home with:: Family Support, Home Speech Therapy (close supervision 2' cognitive deficits)           Temp:  [96.9 °F (36.1 °C)-98 °F (36.7 °C)] 96.9 °F (36.1 °C)  HR:  [63-87] 63  Resp:  [16-17] 16  BP: ()/(58-71) 102/60  SpO2:  [96 %-97 %] 97 %  Physical Exam  Vitals and nursing note reviewed.   Constitutional:       General: She is not in acute distress.  HENT:      Head: Normocephalic and atraumatic.      Nose: Nose normal.      Mouth/Throat:      Mouth: Mucous membranes are moist.   Eyes:      Conjunctiva/sclera: Conjunctivae normal.   Neck:      Comments: C Collar in place  Cardiovascular:      Rate and Rhythm: Normal rate and regular rhythm.      Pulses: Normal pulses.   Pulmonary:      Effort: Pulmonary effort is normal.      Breath sounds: Normal breath sounds. No wheezing or rales.   Abdominal:      General: Bowel sounds are normal. There is no distension.      Palpations: Abdomen is soft.      Tenderness: There is no abdominal tenderness.   Musculoskeletal:         General: No swelling.      Cervical back: Neck supple.   Skin:     General: Skin is warm.    Neurological:      Mental Status: She is alert and oriented to person, place, and time.      Motor: Weakness present.   Psychiatric:         Mood and Affect: Mood normal.     Scheduled Meds:  Current Facility-Administered Medications   Medication Dose Route Frequency Provider Last Rate    acetaminophen  975 mg Oral Q8H CHRIS Sharmin Waggoner PA-C      atorvastatin  20 mg Oral Daily With Dinner Sharmin Waggoner PA-C      bacitracin  1 small application Topical BID Meggan L FATOUMATA Jones      bisacodyl  10 mg Rectal Daily PRN Sharmin Waggoner PA-C      bisacodyl  10 mg Rectal Once Corine Park MD      calcium carbonate  1,000 mg Oral Daily PRN Sharmin Waggoner PA-C      chlorhexidine  15 mL Mouth/Throat Q12H [facility] Sharmin Waggoner PA-C      cyanocobalamin  1,000 mcg Oral Weekly Sharmin Waggoner PA-C      digoxin  125 mcg Oral Every Other Day Sharmin Waggoner PA-C      diltiazem  180 mg Oral Daily Sharmin Waggoner PA-C      docusate sodium  100 mg Oral BID Corine Park MD      enoxaparin  40 mg Subcutaneous Q24H [facility] Corine Park MD      folic acid  1,000 mcg Oral Daily Sharmin Waggoner PA-C      levothyroxine  125 mcg Oral Early Morning Sharmin Waggoner PA-C      magnesium gluconate  250 mg Oral BID Sharmin Waggoner PA-C      nystatin   Topical BID Corine Park MD      ondansetron  4 mg Oral Q6H PRN Sharmin Waggoner PA-C      oxyCODONE  2.5 mg Oral Q4H PRN Sharmin Waggoner PA-C      Or    oxyCODONE  5 mg Oral Q4H PRN Sharmin Waggoner PA-C      phenol  1 spray Mouth/Throat Q2H PRN Sharmin Waggoner PA-C      polyethylene glycol  17 g Oral Daily Marlee Berkowitz MD      senna  2 tablet Oral HS Corine Park MD      sertraline  100 mg Oral Daily Sharmin Waggoner PA-C      torsemide  20 mg Oral Daily PRN Sharmin Waggoner PA-C           Lab Results: I have reviewed the following  results: none  Results from last 7 days   Lab Units 09/16/24  0546 09/11/24  1719   HEMOGLOBIN g/dL 12.3  --    HEMATOCRIT % 40.3  --    WBC Thousand/uL 4.82  --    PLATELETS Thousands/uL 187 196     Results from last 7 days   Lab Units 09/16/24  0546   BUN mg/dL 18   SODIUM mmol/L 140   POTASSIUM mmol/L 3.9   CHLORIDE mmol/L 104   CREATININE mg/dL 0.72   AST U/L 66*   ALT U/L 36              I have spent a total time of 35 minutes in caring for this patient on the day of the visit/encounter including Impressions, Documenting in the medical record, Reviewing / ordering tests, medicine, procedures  , and Communicating with other healthcare professionals .

## 2024-09-18 NOTE — PLAN OF CARE
Problem: PAIN - ADULT  Goal: Verbalizes/displays adequate comfort level or baseline comfort level  Description: Interventions:  - Encourage patient to monitor pain and request assistance  - Assess pain using appropriate pain scale  - Administer analgesics based on type and severity of pain and evaluate response  - Implement non-pharmacological measures as appropriate and evaluate response  - Consider cultural and social influences on pain and pain management  - Notify physician/advanced practitioner if interventions unsuccessful or patient reports new pain  Outcome: Progressing     Problem: INFECTION - ADULT  Goal: Absence or prevention of progression during hospitalization  Description: INTERVENTIONS:  - Assess and monitor for signs and symptoms of infection  - Monitor lab/diagnostic results  - Monitor all insertion sites, i.e. indwelling lines, tubes, and drains  - Monitor endotracheal if appropriate and nasal secretions for changes in amount and color  - [place] appropriate cooling/warming therapies per order  - Administer medications as ordered  - Instruct and encourage patient and family to use good hand hygiene technique  - Identify and instruct in appropriate isolation precautions for identified infection/condition  Outcome: Progressing  Goal: Absence of fever/infection during neutropenic period  Description: INTERVENTIONS:  - Monitor WBC    Outcome: Progressing

## 2024-09-18 NOTE — PROGRESS NOTES
09/18/24 0649   Pain Assessment   Pain Assessment Tool 0-10   Pain Score 2   Pain Location/Orientation Location: Head   Restrictions/Precautions   Precautions Aspiration;Bed/chair alarms;Cognitive;Fall Risk;Pain;Spinal precautions;Supervision on toilet/commode   ROM Restrictions   (spinal precautions)   Braces or Orthoses C/S Collar   Cognition   Overall Cognitive Status WFL   Arousal/Participation Alert;Responsive;Cooperative   Attention Attends with cues to redirect   Orientation Level Oriented X4   Memory Decreased recall of recent events;Decreased short term memory;Decreased recall of precautions   Following Commands Follows one step commands without difficulty   Subjective   Subjective Pt reports she is ready to get moving   Roll Left and Right   Type of Assistance Needed Verbal cues;Supervision   Physical Assistance Level No physical assistance   Comment cues for hand placement   Roll Left and Right CARE Score 4   Sit to Lying   Comment Pt OOB   Lying to Sitting on Side of Bed   Type of Assistance Needed Verbal cues;Supervision   Physical Assistance Level No physical assistance   Comment cues for hand placement   Lying to Sitting on Side of Bed CARE Score 4   Sit to Stand   Type of Assistance Needed Supervision;Verbal cues   Physical Assistance Level No physical assistance   Comment with RW, cues for hand placement   Sit to Stand CARE Score 4   Bed-Chair Transfer   Type of Assistance Needed Supervision;Verbal cues   Physical Assistance Level No physical assistance   Comment with RW, cues for hand placement   Chair/Bed-to-Chair Transfer CARE Score 4   Car Transfer   Reason if not Attempted Environmental limitations   Car Transfer CARE Score 10   Walk 10 Feet   Type of Assistance Needed Incidental touching   Physical Assistance Level No physical assistance   Comment CG with RW on level and unlevel surfaces. C-collar in place at all times.   Walk 10 Feet CARE Score 4   Walk 50 Feet with Two Turns   Type of  Assistance Needed Incidental touching   Physical Assistance Level No physical assistance   Comment CG with RW on level and unlevel surfaces. C-collar in place at all times.   Walk 50 Feet with Two Turns CARE Score 4   Walking 10 Feet on Uneven Surfaces   Type of Assistance Needed Incidental touching   Physical Assistance Level No physical assistance   Comment CG with RW on level and unlevel surfaces. C-collar in place at all times.   Walking 10 Feet on Uneven Surfaces CARE Score 4   Ambulation   Primary Mode of Locomotion Prior to Admission Walk   Distance Walked (feet) 63 ft  (55,44,46,63 ft)   Assist Device Roller Walker   Gait Pattern Antalgic;Inconsistant Vaishnavi;Slow Vaishnavi;Decreased foot clearance;Forward Flexion;Narrow FLORENTINO;Poor UE WB;Shuffle;Improper weight shift   Limitations Noted In Balance;Coordination;Device Management;Endurance;Midline Orientation;Posture;Safety;Speed;Strength   Provided Assistance with: Balance;Direction;Weight Shift   Walk Assist Level Contact Guard   Findings CG with RW on level and unlevel surfaces. C-collar in place at all times.   Does the patient walk? 2. Yes   Wheelchair mobility   Does the patient use a wheelchair? 1. Yes   Type of Wheelchair Used 1. Manual   Curb or Single Stair   Style negotiated Single stair   Type of Assistance Needed Incidental touching   Physical Assistance Level No physical assistance   Comment CG with charly HR, 2-6 in steps   1 Step (Curb) CARE Score 4   Stairs   Type Stairs   # of Steps 2   Weight Bearing Precautions WBAT;Fall Risk   Assist Devices Bilateral Rail   Findings CG with charly HR, 2-6 in steps   Toilet Transfer   Type of Assistance Needed Incidental touching;Verbal cues   Physical Assistance Level No physical assistance   Comment CG with RW to bedside commode   Toilet Transfer CARE Score 4   Toilet Transfer   Surface Assessed Bedside Commode   Therapeutic Interventions   Strengthening Supine ther ex   Flexibility Gastroc and hamstring  stretching   Balance Gait and transfer training   Other stair negotiation   Assessment   Treatment Assessment Pt agreeable to PT session this morning. Pain 2/10 described as a headache. Supervision for bed mobility and transfers with RW. Requires cues for hand placement for bed mobility and transfers. CG for ambulation with RW on level and unlevel surfaces for max distance of 63 ft. CG on stairs with charly HR use. CG with RW for toilet transfer, providing additional assistance with hygeine and clothing management. Ther ex for general LE strengthening. LE stretching for improved ROM with amobilation and transfers. Gait and transfer training for increase balance, safety, and independence with functional mobility. C-collar in place throughout entire sessiona nd all precautions followed. Pt returned to room in WC with alarms on and all needs within reach.   Problem List Decreased strength;Decreased range of motion;Impaired balance;Decreased endurance;Decreased cognition;Decreased safety awareness;Decreased skin integrity;Orthopedic restrictions;Pain   Barriers to Discharge Inaccessible home environment;Decreased caregiver support   PT Barriers   Physical Impairment Decreased strength;Decreased range of motion;Decreased endurance;Impaired balance;Decreased mobility;Decreased safety awareness;Pain;Orthopedic restrictions   Functional Limitation Car transfers;Ramp negotiation;Stair negotiation;Standing;Transfers;Walking;Wheelchair management   Plan   Treatment/Interventions Functional transfer training;LE strengthening/ROM;Elevations;Therapeutic exercise;Endurance training;Bed mobility;Gait training   Progress Progressing toward goals   PT Therapy Minutes   PT Time In 0649   PT Time Out 0821   PT Total Time (minutes) 92   PT Mode of treatment - Individual (minutes) 92   PT Mode of treatment - Concurrent (minutes) 0   PT Mode of treatment - Group (minutes) 0   PT Mode of treatment - Co-treat (minutes) 0   PT Mode of Treatment  - Total time(minutes) 92 minutes   PT Cumulative Minutes 455   Therapy Time missed   Time missed? No

## 2024-09-18 NOTE — ASSESSMENT & PLAN NOTE
"s/p navigated posterior fusion C1-C3 (Dr. Simpson, 9/5/2024)   Pt initially presented to the [facility] ER on 9/4 with complaints of SOB and neck pain   Reportedly has a fall from bed about 6 weeks prior to arrival and since then pt has had worsening neck pain and \"extremity dysfunction\"   Reported increasingly dropping items and increased gait disturbance   CT imaging revealed a type II dens fracture with posterior displacement with narrowing of the spinal canal   Pt ultimately underwent a C1-3 posterior fusion with Dr. Simpson on 9/5   She did well in the hospital post-operatively without any major complications and was ultimately discharged to rehab at [facility]   Pt presents to the  nsgy office today via virtual appt for her approximate 2 week post-op visit for evaluation and incision check   Today, pt offers no complaints. She admits to some mild neck soreness but states her pain has gotten better. No longer taking opioids. Making daily improvements in strength and balance w/ PT and OT in rehab.   Been compliant with c-collar   Denies any incisional concerns.      Imaging:   No updated imaging to review      Plan:   Continue to closely monitor neuro exam   Maintain normotensive BP goals, SBP < 160   Pt has been making great progress in rehab and states she is feeling very good. Her pain is well controlled and she feels her balance and walking has been improving daily. Overall strength much better.    Incision appears to be healing very well --> sutures can be removed per [facility] providers   Continue aspen cervical collar at all times   Pt encouraged to continue to work with PT/OT and complete her current rehab therapies   Ongoing pain control per primary team   DVT ppx: SCDs, SQ lovenox   Ongoing medical management per primary team      Neurosurgery will sign off at this time. Will plan to follow-up with the pt as scheduled for her 6 week POV with repeat upright xrays. Please reach out with any further questions or concerns. "

## 2024-09-18 NOTE — ASSESSMENT & PLAN NOTE
Wound Care team following in ARC:  POA    1)Sacrum red linear tissue,slow to alayna, staged as stage one POA to sacrum.Mixed etiology of pressure and moisture.Hydraguard applied no foam dressing .  2)Bilateral buttocks, pink intact erythema. Tissue appears darker in photo in EMR than when assessed. Tissue is pink intact and all areas of both buttocks are blanchable. Hydraguard applied, no foam dressing was applied over hydraguard as this will trap moisture against skin.Area of erythema is decreasing in size form previous photo at [facility].  3)Abrasion to left cheek is open to air.   4)Right arm skin tear beefy red  5)Bilateral heels pink , blanchable. Heel foams on a preventative measure. Right heel outer aspect has area of light purple appears to be ischemic.Heels offloaded.  5)Dry soft intact brown callous on second toe of right foot. Pt states this is known to podiatry, Dr Mohr, with pt having visits to trim callous. Currently open to air.  6)ITD of breast folds, has nystatin powder  7)Left pretibial skin tear resolved. Open to air      Skin care plans:  1-Apply hydroguard cream to sacrum and bilateralbuttocks to hydrate dry skin and protective barrier Bid and Prn  2-Elevate heels to offload pressure.  3-Ehob cushion in chair when out of bed.  4-Moisturize skin daily with skin nourishing cream.  5-Turn/reposition q2h or when medically stable for pressure re-distribution on skin.   6-Monitor callous of Right foot second toe for changes , open to air.  7-Cleanse right arm skin tear with NSS. Apply Dermagran to wound bed only. Cover with DSD change every other day and prn soil or dislodgment   8-Right foot second toe callous open to air, has podiatry consulted.

## 2024-09-18 NOTE — PROGRESS NOTES
09/18/24 1345   Pain Assessment   Pain Assessment Tool 0-10   Pain Score 4   Pain Location/Orientation Orientation: Bilateral;Location: Shoulder   Restrictions/Precautions   Precautions Aspiration;Bed/chair alarms;Cognitive;Fall Risk;Pain;Spinal precautions;Supervision on toilet/commode   Cognitive Linguisitic Assessments   Cognitive Linquistic Quick Test (CLQT) MoCA completed 9/16/24 - scored 16/30   Comprehension   Comprehension (FIM) 4 - Understands basic info/conversation 75-90% of time   Expression   Expression (FIM) 5 - Needs help/cues only RARELY (< 10% of the time)   Social Interaction   Social Interaction (FIM) 5 - Interacts appropriately with others 90% of time   Problem Solving   Problem solving (FIM) 4 - Solves basic problems 75-89% of time   Memory   Memory (FIM) 4 - Recognizes/recalls/performs 75-89%   Speech/Language/Cognition Assessmetn   Treatment Assessment SLP present for virtual visit with neurosurgery. Pt did recall 2 questions that she planned to ask after discussing with her  who was unable to be present for session. She was unable to recall her further questions or find them written down on her scrap pieces of paper on her table. After the virtual visit, patient recalled 0/3 questions asked from her session right prior despite cues. SLP provided patient with a notebook to organizer her notes that were scattered on several pieces of paper on her tray table. Pt resistant to this idea but completed together to compile her notes onto 1 sheet. She requested to continue to keep the original notes and sheets so these were all placed together. She completed a transfer from her wheelchair to the bed with verbal and tactile cues throughout to problem solve through   SLP Therapy Minutes   SLP Time In 1345   SLP Time Out 1430   SLP Total Time (minutes) 45   SLP Mode of treatment - Individual (minutes) 45   SLP Mode of treatment - Concurrent (minutes) 0   SLP Mode of treatment - Group (minutes)  0   SLP Mode of treatment - Co-treat (minutes) 0   SLP Mode of Treatment - Total time(minutes) 45 minutes   SLP Cumulative Minutes 210

## 2024-09-18 NOTE — ASSESSMENT & PLAN NOTE
Functional deficits:  cervical collar on at all times, cervical spinal precautions, impaired mobility, self care   Continue current rehabilitation plan of care to maximize function.  Estimated discharge: Thursday 10/3/24 with home care estimated

## 2024-09-19 PROCEDURE — 97535 SELF CARE MNGMENT TRAINING: CPT

## 2024-09-19 PROCEDURE — 92526 ORAL FUNCTION THERAPY: CPT

## 2024-09-19 PROCEDURE — 97537 COMMUNITY/WORK REINTEGRATION: CPT

## 2024-09-19 PROCEDURE — 97110 THERAPEUTIC EXERCISES: CPT

## 2024-09-19 PROCEDURE — 97530 THERAPEUTIC ACTIVITIES: CPT

## 2024-09-19 PROCEDURE — 97116 GAIT TRAINING THERAPY: CPT

## 2024-09-19 RX ADMIN — ACETAMINOPHEN 975 MG: 325 TABLET ORAL at 13:53

## 2024-09-19 RX ADMIN — NYSTATIN: 100000 POWDER TOPICAL at 17:17

## 2024-09-19 RX ADMIN — ASPIRIN 81 MG: 81 TABLET, COATED ORAL at 09:08

## 2024-09-19 RX ADMIN — ENOXAPARIN SODIUM 40 MG: 40 INJECTION SUBCUTANEOUS at 09:08

## 2024-09-19 RX ADMIN — DIGOXIN 125 MCG: 125 TABLET ORAL at 09:08

## 2024-09-19 RX ADMIN — POLYETHYLENE GLYCOL 3350 17 G: 17 POWDER, FOR SOLUTION ORAL at 09:02

## 2024-09-19 RX ADMIN — ACETAMINOPHEN 975 MG: 325 TABLET ORAL at 21:01

## 2024-09-19 RX ADMIN — DOCUSATE SODIUM 100 MG: 100 CAPSULE, LIQUID FILLED ORAL at 17:16

## 2024-09-19 RX ADMIN — DOCUSATE SODIUM 100 MG: 100 CAPSULE, LIQUID FILLED ORAL at 09:08

## 2024-09-19 RX ADMIN — SENNOSIDES 17.2 MG: 8.6 TABLET, FILM COATED ORAL at 21:01

## 2024-09-19 RX ADMIN — ATORVASTATIN CALCIUM 20 MG: 20 TABLET, FILM COATED ORAL at 17:16

## 2024-09-19 RX ADMIN — DILTIAZEM HYDROCHLORIDE 180 MG: 180 CAPSULE, COATED, EXTENDED RELEASE ORAL at 09:08

## 2024-09-19 RX ADMIN — ACETAMINOPHEN 975 MG: 325 TABLET ORAL at 05:18

## 2024-09-19 RX ADMIN — Medication 250 MG: at 09:02

## 2024-09-19 RX ADMIN — Medication 250 MG: at 17:16

## 2024-09-19 RX ADMIN — BISACODYL 10 MG: 10 SUPPOSITORY RECTAL at 13:59

## 2024-09-19 RX ADMIN — CHLORHEXIDINE GLUCONATE 15 ML: 1.2 RINSE ORAL at 21:04

## 2024-09-19 RX ADMIN — FOLIC ACID 1000 MCG: 1 TABLET ORAL at 09:08

## 2024-09-19 RX ADMIN — BACITRACIN ZINC 1 SMALL APPLICATION: 500 OINTMENT TOPICAL at 09:08

## 2024-09-19 RX ADMIN — BACITRACIN ZINC 1 SMALL APPLICATION: 500 OINTMENT TOPICAL at 17:17

## 2024-09-19 RX ADMIN — LEVOTHYROXINE SODIUM 125 MCG: 25 TABLET ORAL at 05:18

## 2024-09-19 RX ADMIN — SERTRALINE 100 MG: 100 TABLET, FILM COATED ORAL at 09:08

## 2024-09-19 RX ADMIN — NYSTATIN 1 APPLICATION: 100000 POWDER TOPICAL at 09:11

## 2024-09-19 NOTE — PROGRESS NOTES
09/19/24 0900   Pain Assessment   Pain Assessment Tool 0-10   Pain Score 2   Pain Location/Orientation Location: Head;Orientation: Bilateral;Location: Shoulder   Restrictions/Precautions   Precautions Aspiration;Bed/chair alarms;Cognitive;Fall Risk;Pain;Spinal precautions;Supervision on toilet/commode   Weight Bearing Restrictions No   ROM Restrictions   (spinal prec)   Braces or Orthoses C/S Collar   Eating   Type of Assistance Needed Supervision;Verbal cues   Comment cues for meal completion before moving onto shower   Eating CARE Score 4   Eating Assessment   Food To Mouth Yes   Positioning Upright;Out of Bed   Meal Assessed Breakfast   Opens Packages No   Findings honey liquids and pureed diet   Oral Hygiene   Type of Assistance Needed Supervision   Comment honey water for oral hygiene   Oral Hygiene CARE Score 4   Grooming   Able To Initiate Tasks;Comb/Brush Hair;Wash/Dry Face;Brush/Clean Teeth;Wash/Dry Hands   Limitation Noted In Safety;Strength   Findings supervision seated at the sink   Shower/Bathe Self   Type of Assistance Needed Physical assistance   Physical Assistance Level 26%-50%   Shower/Bathe Self CARE Score 3   Bathing   Assessed Bath Style Shower   Anticipated D/C Bath Style Shower;Sponge Bath   Able to Gather/Transport No   Able to Adjust Water Temperature No   Able to Wash/Rinse/Dry (body part) Left Arm;Right Arm;L Upper Leg;R Upper Leg;Chest;Abdomen;Perineal Area   Limitations Noted in Balance;Endurance;Problem Solving;ROM;Safety;Strength  (spinal prec with thuy collar in place.)   Positioning Seated;Standing   Adaptive Equipment Longhand Sponge;Longhand Reacher;Shower Bars;Tub Bench;Hand Held Shower   Findings  RN assisted with changing collars while seated and head supported following instructions recieved from neuro sx appointment yesterday, sutured removed yesterday with RN providing skin care after shower   Tub/Shower Transfer   Limitations Noted In Balance;Endurance;Problem  Solving;ROM;Safety;LE Strength;UE Strength   Adaptive Equipment Grab Bars;Transfer Bench   Assessed Shower   Findings min A   Upper Body Dressing   Type of Assistance Needed Physical assistance   Physical Assistance Level 51%-75%   Comment mod A for doffing and donning shirt; max A of 2 to change collars for showering   Upper Body Dressing CARE Score 2   Lower Body Dressing   Type of Assistance Needed Physical assistance   Physical Assistance Level 51%-75%   Comment mod A with reacher use initiated   Lower Body Dressing CARE Score 2   Putting On/Taking Off Footwear   Type of Assistance Needed Physical assistance   Physical Assistance Level 51%-75%   Comment mod A with reacher use initiated   Putting On/Taking Off Footwear CARE Score 2   Dressing/Undressing Clothing   Remove UB Clothes Pullover Shirt   Don UB Clothes Pullover Shirt   Remove LB Clothes Pants;Socks;Undergarment;Shoes   Don LB Clothes Pants;Undergarment;Socks;Shoes   Limitations Noted In Balance;Endurance;Problem Solving;Safety;Strength;ROM  (spinal prec)   Adaptive Equipment Reacher   Positioning Supported Sit;Standing   Sit to Lying   Type of Assistance Needed Physical assistance   Physical Assistance Level 25% or less   Sit to Lying CARE Score 3   Sit to Stand   Type of Assistance Needed Supervision;Incidental touching   Sit to Stand CARE Score 4   Bed-Chair Transfer   Type of Assistance Needed Supervision;Incidental touching   Chair/Bed-to-Chair Transfer CARE Score 4   Toileting Hygiene   Type of Assistance Needed Physical assistance   Physical Assistance Level 26%-50%   Toileting Hygiene CARE Score 3   Toileting   Able to Pull Clothing down yes, up no.   Manage Hygiene Bladder;Bowel   Limitations Noted In Balance;Problem Solving;ROM;Safety;LE Strength;UE Strength  (spinal prec)   Adaptive Equipment Grab Bar   Findings pt unable to produce BM while seated on toilet   Toilet Transfer   Type of Assistance Needed Supervision;Incidental touching    Toilet Transfer CARE Score 4   Toilet Transfer   Surface Assessed Raised Toilet   Transfer Technique Stand Pivot   Limitations Noted In Balance;Endurance;Problem Solving;ROM;Safety;LE Strength;UE Strength  (spinal prec)   Adaptive Equipment Grab Bar   Cognition   Arousal/Participation Alert;Responsive;Cooperative   Attention Attends with cues to redirect   Orientation Level Oriented X4   Memory Decreased short term memory;Decreased recall of recent events;Decreased recall of precautions   Following Commands Follows one step commands with increased time or repetition   Assessment   Treatment Assessment Pt presents seated in w/c agreebale to OT Session including ADLs, toileting, grooming and related transfers/ standing. Pt tolerates session with RN assistign with collar changes and wound care. Pt requires assist due to decreased balance, safety/ cognition, endurance and strength/ ROM with spinal prec an c collar. Pt is progressing towards goals and will benefit from continued skilled OT servcies to increase independence with daily tasks.   Problem List Decreased strength;Decreased range of motion;Decreased endurance;Impaired balance;Decreased cognition;Decreased safety awareness;Decreased skin integrity;Orthopedic restrictions;Pain  (spinal prec)   Plan   Treatment/Interventions ADL retraining;Functional transfer training;Therapeutic exercise;Endurance training;Patient/family training;Equipment eval/education;Compensatory technique education;Cognitive reorientation   Progress Progressing toward goals   OT Therapy Minutes   OT Time In 0900   OT Time Out 1040   OT Total Time (minutes) 100   OT Mode of treatment - Individual (minutes) 100   Therapy Time missed   Time missed? No

## 2024-09-19 NOTE — PROGRESS NOTES
09/19/24 1200   Pain Assessment   Pain Assessment Tool 0-10   Pain Score 2   Pain Location/Orientation Location: Head;Location: Shoulder   Patient's Stated Pain Goal No pain   Hospital Pain Intervention(s) Repositioned   Multiple Pain Sites No   Restrictions/Precautions   Precautions Aspiration;Bed/chair alarms;Cognitive;Fall Risk;Pain;Spinal precautions;Supervision on toilet/commode   Weight Bearing Restrictions No   Braces or Orthoses C/S Collar   Cognitive Linguisitic Assessments   Cognitive Linquistic Quick Test (CLQT) MOCA completed 9/16/24- 16/30   Comprehension   Comprehension (FIM) 4 - Understands basic info/conversation 75-90% of time   Expression   Verbal Complex   Intelligibility Sentence   QI: Expression 3. Exhibits some difficulty with expressing needs and ideas (e.g., some words or finishing thoughts) or speech is not clear   Expression (FIM) 5 - Needs help/cues only RARELY (< 10% of the time)   Social Interaction   Social Interaction (FIM) 5 - Interacts appropriately with others 90% of time   Problem Solving   Problem solving (FIM) 4 - Solves basic problems 75-89% of time   Memory   Memory (FIM) 4 - Recognizes/recalls/performs 75-89%   Memory Skills   Orientation Level Oriented X4   Swallow Information   Current Risks for Dysphagia & Aspiration General debilitation;Cervical spine injury/surgery;Significant ridgity;Head support required   Current Symptoms/Concerns Cough;Clear throat   Current Diet Dysphagia pureed;Honey thick liquids   Baseline Diet Regular;Thin liquids   Consistencies Assessed and Performance   Materials Admnistered Puree/Level 1;Honey thick liquid   Materials Adminstered Comment Pt assessed with lunch tray   Recommendations   Risk for Aspiration Present   Recommendations Continue swallow eval process;Dysphagia treatment   Diet Solid Recommendation Level 1 Dysphagia/pureed   Diet Liquid Recommendation Honey thick liquid   Recommended Form of Meds Crushed;With thick liquid   General  "Precautions Aspiration precautions   Compensatory Swallowing Strategies Alternate solids and liquids   Results Reviewed with PT/Family/Caregiver;RN;MD   Eating   Type of Assistance Needed Supervision;Verbal cues   Physical Assistance Level No physical assistance   Eating CARE Score 4   Swallow Assessment   Swallow Treatment Assessment Patient observed with lunch tray of puree items and honey thick liquids.  Patient able to sit up appropriately, some discomfort due to collar, safely readjusted on pillow/support of bed.  Patient required some tray set up but otherwise was able to self feed i'ly.  She reports that she is \"not really that hungry\" reporting that she had just had breakfast \"a little while ago.\"  Patient demonstrating appropriate oral reception, AP transport, but suspected spillage.  Swallow initiation is delayed and incomplete, pt with decreased ROM, decreased hyolaryngeal rise, and throat clear throughout.  Patient required min cues for strategy implementation at this time.  Pt encouraged to swallow additionally following PO trial, liquid wash, effortful swallow.  Suspect that overall ROM is limited 2' collar as neck is in \"neutral\" position but pt has a natural forward chin posture at baseline.  Minimal overt s/s throughout, no vocal quality changes noted.   Swallow Assessment Prognosis   Prognosis Fair   Prognosis Considerations Co-morbidities;Medical diagnosis;Medical prognosis;Potential;Severity of impairments   SLP Therapy Minutes   SLP Time In 1200   SLP Time Out 1230   SLP Total Time (minutes) 30   SLP Mode of treatment - Individual (minutes) 30   SLP Mode of treatment - Concurrent (minutes) 0   SLP Mode of treatment - Group (minutes) 0   SLP Mode of treatment - Co-treat (minutes) 0   SLP Mode of Treatment - Total time(minutes) 30 minutes   SLP Cumulative Minutes 240   Therapy Time missed   Time missed? No       "

## 2024-09-19 NOTE — CASE MANAGEMENT
9/18/24 CM met with patient, reviewed team meeting information, including planned d/c date of 10/3, HHC vs oupt TBD. Patient stated her  has been discharged to home.

## 2024-09-19 NOTE — PLAN OF CARE
Problem: Prexisting or High Potential for Compromised Skin Integrity  Goal: Skin integrity is maintained or improved  Description: INTERVENTIONS:  - Identify patients at risk for skin breakdown  - Assess and monitor skin integrity  - Assess and monitor nutrition and hydration status  - Monitor labs   - Assess for incontinence   - Turn and reposition patient  - Assist with mobility/ambulation  - Relieve pressure over bony prominences  - Avoid friction and shearing  - Provide appropriate hygiene as needed including keeping skin clean and dry  - Evaluate need for skin moisturizer/barrier cream  - Collaborate with interdisciplinary team   - Patient/family teaching  - Consider wound care consult   Outcome: Progressing     Problem: PAIN - ADULT  Goal: Verbalizes/displays adequate comfort level or baseline comfort level  Description: Interventions:  - Encourage patient to monitor pain and request assistance  - Assess pain using appropriate pain scale  - Administer analgesics based on type and severity of pain and evaluate response  - Implement non-pharmacological measures as appropriate and evaluate response  - Consider cultural and social influences on pain and pain management  - Notify physician/advanced practitioner if interventions unsuccessful or patient reports new pain  Outcome: Progressing     Problem: INFECTION - ADULT  Goal: Absence or prevention of progression during hospitalization  Description: INTERVENTIONS:  - Assess and monitor for signs and symptoms of infection  - Monitor lab/diagnostic results  - Monitor all insertion sites, i.e. indwelling lines, tubes, and drains  - Monitor endotracheal if appropriate and nasal secretions for changes in amount and color  - [place] appropriate cooling/warming therapies per order  - Administer medications as ordered  - Instruct and encourage patient and family to use good hand hygiene technique  - Identify and instruct in appropriate isolation precautions for  identified infection/condition  Outcome: Progressing     Problem: SAFETY ADULT  Goal: Patient will remain free of falls  Description: INTERVENTIONS:  - Educate patient/family on patient safety including physical limitations  - Instruct patient to call for assistance with activity   - Consult OT/PT to assist with strengthening/mobility   - Keep Call bell within reach  - Keep bed low and locked with side rails adjusted as appropriate  - Keep care items and personal belongings within reach  - Initiate and maintain comfort rounds  - Make Fall Risk Sign visible to staff  - Offer Toileting every 2 Hours, in advance of need  - Initiate/Maintain bed/chair alarm  - Apply yellow socks and bracelet for high fall risk patients  - Consider moving patient to room near nurses station  Outcome: Progressing     Problem: GASTROINTESTINAL - ADULT  Goal: Minimal or absence of nausea and/or vomiting  Description: INTERVENTIONS:  - Administer IV fluids if ordered to ensure adequate hydration  - Maintain NPO status until nausea and vomiting are resolved  - Nasogastric tube if ordered  - Administer ordered antiemetic medications as needed  - Provide nonpharmacologic comfort measures as appropriate  - Advance diet as tolerated, if ordered  - Consider nutrition services referral to assist patient with adequate nutrition and appropriate food choices  Outcome: Progressing

## 2024-09-19 NOTE — PROGRESS NOTES
"Progress Note - Marsha Oliva 79 y.o. female MRN: [id number]    Unit/Bed#: [facility] 221-01 Encounter: [id number]        Subjective:   Patient seen and examined at bedside after reviewing the chart and discussing the case with the caring staff.      Patient examined at bedside.  Patient denies any acute symptoms.  Patient had follow up with neurosurgery yesterday and was cleared to restart ASA and remove sutures.     Physical Exam   Vitals: Blood pressure 112/56, pulse 74, temperature 97.9 °F (36.6 °C), temperature source Temporal, resp. rate 16, height 5' 3\" (1.6 m), weight 51 kg (112 lb 7 oz), SpO2 98%.,Body mass index is 19.92 kg/m².  Constitutional: Patient in no acute distress.  HEENT: PERR, EOMI, MMM.  Cardiovascular: Normal rate and regular rhythm.    Pulmonary/Chest: Effort normal and breath sounds normal.   Abdomen: Soft, + BS, NT.    Assessment/Plan:  Marsha Oliva is a(n) 79 y.o. female with cervical spinal cord compression.     Cardiac hx w/ HTN, HLD, chronic diastolic heart failure, chronic Afib.  Continue digoxin 125 mcg every other day, diltiazem 180 mg daily, atorvastatin 40 mg daily.  Weekly weights.  Torsemide 20 mg daily as needed.  Restart ASA 81 mg daily 9/19.   Hypothyroidism.  Continue levothyroxine 125 mcg daily (decr from 137 mcg 9/6).  B12 deficiency.  Patient on B12 1,000 mcg weekly.   Folate deficiency.  Patient on folic acid 1,000 mcg daily.   Hypomagnesemia.  Patient on magnesium gluconate 250 mg twice daily.   Depression and anxiety.   Patient on Zoloft 100 mg daily.  Declines neuropsych at this time.   Multiple hyperkeratotic lesions.  Patient follows with Podiatry outpatient.  Wound care consult.   Sore throat.  Chloraseptic throat spray as needed.   Pain and bowel regimen.  As per physiatry.   Cervical spinal cord compression.  Vista collar at all times x 6 weeks.  Continue SQ Lovenox.  Patient receiving intensive PT OT ST as per physiatry.      Anticipated discharge date:  Thursday " 10/3/2024    The patient was discussed with Dr. Brown and he is in agreement with the above note.

## 2024-09-19 NOTE — PLAN OF CARE
Problem: PAIN - ADULT  Goal: Verbalizes/displays adequate comfort level or baseline comfort level  Description: Interventions:  - Encourage patient to monitor pain and request assistance  - Assess pain using appropriate pain scale  - Administer analgesics based on type and severity of pain and evaluate response  - Implement non-pharmacological measures as appropriate and evaluate response  - Consider cultural and social influences on pain and pain management  - Notify physician/advanced practitioner if interventions unsuccessful or patient reports new pain  Outcome: Progressing     Problem: INFECTION - ADULT  Goal: Absence or prevention of progression during hospitalization  Description: INTERVENTIONS:  - Assess and monitor for signs and symptoms of infection  - Monitor lab/diagnostic results  - Monitor all insertion sites, i.e. indwelling lines, tubes, and drains  - Monitor endotracheal if appropriate and nasal secretions for changes in amount and color  - [place] appropriate cooling/warming therapies per order  - Administer medications as ordered  - Instruct and encourage patient and family to use good hand hygiene technique  - Identify and instruct in appropriate isolation precautions for identified infection/condition  Outcome: Progressing  Goal: Absence of fever/infection during neutropenic period  Description: INTERVENTIONS:  - Monitor WBC    Outcome: Progressing     Problem: Nutrition/Hydration-ADULT  Goal: Nutrient/Hydration intake appropriate for improving, restoring or maintaining nutritional needs  Description: Monitor and assess patient's nutrition/hydration status for malnutrition. Collaborate with interdisciplinary team and initiate plan and interventions as ordered.  Monitor patient's weight and dietary intake as ordered or per policy. Utilize nutrition screening tool and intervene as necessary. Determine patient's food preferences and provide high-protein, high-caloric foods as appropriate.      INTERVENTIONS:  - Monitor oral intake, urinary output, labs, and treatment plans  - Assess nutrition and hydration status and recommend course of action  - Evaluate amount of meals eaten  - Assist patient with eating if necessary   - Allow adequate time for meals  - Recommend/ encourage appropriate diets, oral nutritional supplements, and vitamin/mineral supplements  - Order, calculate, and assess calorie counts as needed  - Recommend, monitor, and adjust tube feedings and TPN/PPN based on assessed needs  - Assess need for intravenous fluids  - Provide specific nutrition/hydration education as appropriate  - Include patient/family/caregiver in decisions related to nutrition  Outcome: Progressing

## 2024-09-20 PROCEDURE — 92526 ORAL FUNCTION THERAPY: CPT | Performed by: NURSE PRACTITIONER

## 2024-09-20 PROCEDURE — 97110 THERAPEUTIC EXERCISES: CPT

## 2024-09-20 PROCEDURE — 97116 GAIT TRAINING THERAPY: CPT

## 2024-09-20 PROCEDURE — 97530 THERAPEUTIC ACTIVITIES: CPT

## 2024-09-20 PROCEDURE — 97537 COMMUNITY/WORK REINTEGRATION: CPT

## 2024-09-20 PROCEDURE — 97535 SELF CARE MNGMENT TRAINING: CPT

## 2024-09-20 PROCEDURE — 99232 SBSQ HOSP IP/OBS MODERATE 35: CPT | Performed by: PHYSICAL MEDICINE & REHABILITATION

## 2024-09-20 RX ADMIN — FOLIC ACID 1000 MCG: 1 TABLET ORAL at 08:40

## 2024-09-20 RX ADMIN — CHLORHEXIDINE GLUCONATE 15 ML: 1.2 RINSE ORAL at 08:38

## 2024-09-20 RX ADMIN — SENNOSIDES 17.2 MG: 8.6 TABLET, FILM COATED ORAL at 21:11

## 2024-09-20 RX ADMIN — NYSTATIN 1 APPLICATION: 100000 POWDER TOPICAL at 17:39

## 2024-09-20 RX ADMIN — ATORVASTATIN CALCIUM 20 MG: 20 TABLET, FILM COATED ORAL at 17:32

## 2024-09-20 RX ADMIN — ACETAMINOPHEN 975 MG: 325 TABLET ORAL at 21:11

## 2024-09-20 RX ADMIN — ASPIRIN 81 MG: 81 TABLET, COATED ORAL at 08:40

## 2024-09-20 RX ADMIN — ACETAMINOPHEN 975 MG: 325 TABLET ORAL at 05:51

## 2024-09-20 RX ADMIN — DOCUSATE SODIUM 100 MG: 100 CAPSULE, LIQUID FILLED ORAL at 08:40

## 2024-09-20 RX ADMIN — POLYETHYLENE GLYCOL 3350 17 G: 17 POWDER, FOR SOLUTION ORAL at 08:36

## 2024-09-20 RX ADMIN — Medication 250 MG: at 17:32

## 2024-09-20 RX ADMIN — SERTRALINE 100 MG: 100 TABLET, FILM COATED ORAL at 08:40

## 2024-09-20 RX ADMIN — BACITRACIN ZINC 1 SMALL APPLICATION: 500 OINTMENT TOPICAL at 08:38

## 2024-09-20 RX ADMIN — CHLORHEXIDINE GLUCONATE 15 ML: 1.2 RINSE ORAL at 21:11

## 2024-09-20 RX ADMIN — LEVOTHYROXINE SODIUM 125 MCG: 25 TABLET ORAL at 05:51

## 2024-09-20 RX ADMIN — NYSTATIN 1 APPLICATION: 100000 POWDER TOPICAL at 08:43

## 2024-09-20 RX ADMIN — DOCUSATE SODIUM 100 MG: 100 CAPSULE, LIQUID FILLED ORAL at 17:32

## 2024-09-20 RX ADMIN — Medication 250 MG: at 08:39

## 2024-09-20 RX ADMIN — ENOXAPARIN SODIUM 40 MG: 40 INJECTION SUBCUTANEOUS at 08:38

## 2024-09-20 NOTE — ASSESSMENT & PLAN NOTE
Wt Readings from Last 3 Encounters:   09/20/24 50.8 kg (111 lb 15.9 oz)   09/11/24 53.8 kg (118 lb 9.7 oz)   09/04/24 53.5 kg (118 lb)     Weight stable  Patient takes Demedex 20 mg daily PRN for weight gain  Monitor daily weights  IM managing

## 2024-09-20 NOTE — NUTRITION
09/20/24 1358   Biochemical Data,Medical Tests, and Procedures   Biochemical Data/Medical Tests/Procedures Lab values reviewed;Meds reviewed   Labs (Comment) 9/16 AST:66, PRO:6.1A   Meds (Comment) ASA, lipitor, tums, Vit B12, cardizem, lanoxin, colace, lovenox, folvite, levothyroxine, Magonate, zofran, senna, demadex   Speech Therapy Recommendations (Comment) Receiving speech therapy   Nutrition-Focused Physical Exam   Nutrition-Focused Physical Exam Findings RN skin assessment reviewed  (Wound right arm, pressure injury sacrum, wound sternum, wound right toe D2, wound neck per documentation)   Medical-Related Concerns PMH reviewed   Adequacy of Intake   Nutrition Modality PO   Feeding Route   PO With assistance   Current PO Intake   Current Diet Order Dypshagia 1, HTL   Nutrition Supplements Other (Comment);Magic Cup  (prosource protein pudding once daily, magic cup BID)   Current Meal Intake 0-25%;25-50%   Intake Supplements 0-25%   Estimated calorie intake compared to estimated need Nutrient needs not met.   PES Statement   Problem Continue previous diagnosis   Recommendations/Interventions   Malnutrition/BMI Present Yes  (as previously noted)   Summary Dysphagia 1, HTL. Prosource pudding once daily, magic cup BID. Meal completions 0-50%. 9/20 111#, BMI=19.84; 7# weight loss from admission 9/11 118#. Upper denture present. Trace BLE edema noted. Skin integrity reviewed. LBM 9/19. C collar in place. Patient unable to recall if she has tried nutrition supplements that were recently adjusted. She states she feels she will like them if they are cold. Continue diet and supplements as prescribed at this time.   Interventions/Recommendations Continue current diet order;Supplement continue;Initiate appetite stimulant   Recommendations to Provider consider appetite stimulant   Education Assessment   Education Patient/caregiver not appropriate for education at this time   Patient Nutrition Goals   Goal Meet PO  needs;Avoid weight loss   Goal Status Not met;Extended   Timeframe to complete goal by next f/u   Nutrition Complexity Risk   Nutrition complexity level High risk   Nutrition review: 09/24/24   Follow up date 09/24/24

## 2024-09-20 NOTE — PLAN OF CARE
Problem: SAFETY ADULT  Goal: Maintain or return to baseline ADL function  Description: INTERVENTIONS:  -  Assess patient's ability to carry out ADLs; assess patient's baseline for ADL function and identify physical deficits which impact ability to perform ADLs (bathing, care of mouth/teeth, toileting, grooming, dressing, etc.)  - Assess/evaluate cause of self-care deficits   - Assess range of motion  - Assess patient's mobility; develop plan if impaired  - Assess patient's need for assistive devices and provide as appropriate  - Encourage maximum independence but intervene and supervise when necessary  - Involve family in performance of ADLs  - Assess for home care needs following discharge   - Consider OT consult to assist with ADL evaluation and planning for discharge  - Provide patient education as appropriate  Outcome: Progressing     Problem: Prexisting or High Potential for Compromised Skin Integrity  Goal: Skin integrity is maintained or improved  Description: INTERVENTIONS:  - Identify patients at risk for skin breakdown  - Assess and monitor skin integrity  - Assess and monitor nutrition and hydration status  - Monitor labs   - Assess for incontinence   - Turn and reposition patient  - Assist with mobility/ambulation  - Relieve pressure over bony prominences  - Avoid friction and shearing  - Provide appropriate hygiene as needed including keeping skin clean and dry  - Evaluate need for skin moisturizer/barrier cream  - Collaborate with interdisciplinary team   - Patient/family teaching  - Consider wound care consult   Outcome: Progressing

## 2024-09-20 NOTE — ASSESSMENT & PLAN NOTE
Located in neck  Continue Tylenol 975 mg TID  Oxycodone IR 2.5-5 mg Q 4 hours PRN for moderate-severe pain   Topical ICE  -improved has not required oxy since 9/13

## 2024-09-20 NOTE — PROGRESS NOTES
"Progress Note - PMR   Name: Marsha Oliva 79 y.o. female I MRN: [id number]  Unit/Bed#: [facility] 221-01 I Date of Admission: 9/11/2024   Date of Service: 9/20/2024 I Hospital Day: 9     Assessment & Plan  Spinal cord compression (HCC)  Presented  to acute care on 9/4/24 with progressive neck pain and weakness.    She reports 4-6 weeks prior she sustained a fall off her bed, head hitting her nightstand.    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   MRI C spine showing displaced type 2 dens fracture with mild mass effect on cord, ALL and PLL injuries.    CTA head and neck negative for vascular injuries.    Patient seen by Neurosurgery and deemed an operative candidate.    Patient taken to the OR by Dr. Dewitt for a bilateral C1 and C3 lateral mass screw placement, bialteral C2 pedicle screw palcement, C1-C3 fusion.    Post operatively, placed in a Aspen C Collar at all times, except showering with Shonda collar.    Post op X-rays with stable alignment    Plan in ARC:  Begin ARC program with PT, OT, SLP  Monitor incision  Monitor pain  Monitor neuro exam  Follow-up with Neurosurgery in 2 weeks.  2 week post op visit completed with NSGY virtually - \"Will plan to follow-up with the pt as scheduled for her 6 week POV with repeat upright xrays\"  Oropharyngeal dysphagia  New issues  VFSS study completed on 9/10/24 and patient found to have mild oral and moderate phayngeal dysphagia.    Cleared for a pureed diet with honey thick liquids.    SLP to follow  Continue aspiration precautions   Acquired bilateral hammer toes  Keratosis of toe  May need shaving of this lesion  Consult placed to Podiatry  Atrial fibrillation, chronic (HCC)  Chronic A. Fib  Rate/rhythm controlled on digoxin 125 mcg every other day, Cardizem  mg daily  No AC due to prior several GI bleeds.  On aspirin 81 mg daily.  IM managing  B12 deficiency  Continue " supplementation   IM managing  Chronic diastolic CHF (congestive heart failure) ([facility])  Wt Readings from Last 3 Encounters:   09/20/24 50.8 kg (111 lb 15.9 oz)   09/11/24 53.8 kg (118 lb 9.7 oz)   09/04/24 53.5 kg (118 lb)     Weight stable  Patient takes Demedex 20 mg daily PRN for weight gain  Monitor daily weights  IM managing  Depression with anxiety  Mood is stable  Continue Zoloft 100 mg daily  Essential hypertension  Continue Cardizem  mg Daily  Monitor BP and HR with rest and activity  IM managing  Hypothyroidism  Continue Synthroid 125 mcg daily  Mixed hyperlipidemia  Continue Atorvastain 20 mg daily  Folate deficiency  Continue supplementation   IM managing  Acute pain due to trauma  Located in neck  Continue Tylenol 975 mg TID  Oxycodone IR 2.5-5 mg Q 4 hours PRN for moderate-severe pain   Topical ICE  -improved has not required oxy since 9/13  Skin abnormalities  Wound Care team following in ARC:  POA several areas  Continue LWC:     Skin care plans:  1-Hydraguard to sacrum, buttocks and heels BID and PRN  2-EHOB cushion when out of bed in chair.  3-Moisturize skin daily with skin nourishing cream  4-Elevate heels to offload pressure.  5-Turn/reposition q2h for pressure re-distribution on skin  6. Monitor right 2nd toe growth area - leave open to air podiatry consulted.  7. Right arm skin tear - cleanse with Normal saline then apply normlgel ag then top with adaptic then a ABD and keisha change every other day   8. Right and left breast folds - cleanse with soap and water pat dry dust lightly with nystatin powder bid and prn  Lymphedema  Pumps brought in by  - ok to use PRN as tolerated  At risk for altered bowel elimination  Continent  -at home takes daily miralax; rescheduled PRN to daily  At risk for altered urinary elimination  Continent   At risk for deep venous thrombosis  Continue Lovenox 40 mg daily - should not need at discharge  Encounter for rehabilitation  Functional deficits:   cervical collar on at all times, cervical spinal precautions, impaired mobility, self care   Continue current rehabilitation plan of care to maximize function.  Estimated discharge: Thursday 10/3/24 with home care estimated    Severe protein-calorie malnutrition (HCC)  Nutrition following    Malnutrition Findings:   Adult Malnutrition type: Acute illness  Adult Degree of Malnutrition: Other severe protein calorie malnutrition  Malnutrition Characteristics: Inadequate energy, Weight loss  360 Statement: Malnutrition related to inadequate energy intake as evidenced by 7#(6%) weight loss from 9/11/# to 9/18/# and <50% energy intake >5 days.    BMI Findings:  Body mass index is 19.84 kg/m².     History of Present Illness   Rehab Diagnosis: Impairment of mobility, safety and Activities of Daily Living (ADLs) due to Spinal Cord Dysfunction:  Traumatic:  04.2211  Quadriplegia, Incomplete C1-4  Etiologic Diagnosis: Fall with displaced type 2 dens fracture with spinal cord injury and compression     HPI: Marsha Oliva is a 79 y.o. female with HTN, A Fib, CHF, HLD, Hypothyroidism who presented to the [facility] on on 9/4/24 with progressive neck pain and weakness.  She reports 4-6 weeks prior she sustained a fall off her bed, head hitting her nightstand.  She has been having increasing neck pain since.  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 MRI C spine showing displaced type 2 dens fracture with mild mass effect on cord, ALL and PLL injuries.  CTA head and neck negative for vascular injuries.  Patient seen by Neurosurgery and deemed an operative candidate.  Patient taken to the OR by Dr. Dewitt for a bilateral C1 and C3 lateral mass screw placement, bialteral C2 pedicle screw palcement, C1-C3 fusion.  Post operatively, placed in a Aspen C Collar at all times, except showering with Shonda  collar.  Post op X-rays with stable alignment  Medical course complicated by acute dysphagia.  VFSS study completed on 9/10/24 and patient found to have mild oral and moderate phayngeal dysphagia.  Cleared for a pureed diet with honey thick liquids.  Patient seen by wound care team for abrasions and stage I pressure injury to sacrum.  Patient started on Tylenol and PRN oxycodone for acute pain.    After medical stabilization, patient found to have acute functional deficits from his baseline, therefore, admitted to [facility] for acute inpatient rehabilitation.      Chief Complaint: f/u fracture    Interval: Patient seen face to face.  Having a great day. No acute complaints or pain.      Objective     Functional Update:  Physical Therapy Occupational Therapy Speech Therapy   Weight Bearing Status: Full Weight Bearing  Transfers: Incidental Touching  Bed Mobility: Supervision  Amulation Distance (ft): 71 feet  Ambulation: Incidental Touching  Assistive Device for Ambulation: Roller Walker  Wheelchair Mobility Distance: 20 ft  Wheelchair Mobility: Incidental Touching  Number of Stairs: 4  Assistive Device for Stairs: Bilateral Hand Rails  Stair Assistance: Moderate Assistance (min-mod x 2)  Ramp:  (TBA as able)  Discharge Recommendations: Home with:  DC Home with:: Family Support, First Floor Setup, Home Physical Therapy, Outpatient Physical Therapy   Eating: Supervision  Grooming: Supervision, Minimal Assistance  Bathing: Minimal Assistance, Moderate Assistance  Bathing: Minimal Assistance, Moderate Assistance  Upper Body Dressing: Minimal Assistance, Moderate Assistance  Lower Body Dressing: Moderate Assistance, Maximum Assistance  Toileting: Moderate Assistance  Toilet Transfer: Minimal Assistance  Cognition: Exceptions to WNL  Cognition: Decreased Memory, Decreased Attention, Decreased Safety  Orientation: Person, Place, Time, Situation   Mode of Communication: Verbal  Cognition: Exceptions to  WNL  Cognition: Decreased Memory, Decreased Executive Functions, Decreased Attention, Decreased Comprehension  Orientation: Person, Place, Time, Situation  Swallowing: Exceptions to WNL  Swallowing: Oral Dysphagia, Pharyngeal Dysphagia  Diet Recommendations: Level 1/Puree, Honey Thick  Discharge Recommendations: Home with:  DC Home with:: Family Support, Home Speech Therapy (close supervision 2' cognitive deficits)       Temp:  [97.1 °F (36.2 °C)-97.4 °F (36.3 °C)] 97.4 °F (36.3 °C)  HR:  [68-84] 84  Resp:  [16] 16  BP: ()/(53-68) 104/68  SpO2:  [95 %-98 %] 98 %  Physical Exam  Vitals and nursing note reviewed.   Constitutional:       General: She is not in acute distress.  HENT:      Head: Normocephalic and atraumatic.      Nose: Nose normal.      Mouth/Throat:      Mouth: Mucous membranes are moist.   Eyes:      Conjunctiva/sclera: Conjunctivae normal.   Neck:      Comments: C Collar in place  Cardiovascular:      Rate and Rhythm: Normal rate and regular rhythm.      Pulses: Normal pulses.   Pulmonary:      Effort: Pulmonary effort is normal.      Breath sounds: Normal breath sounds. No wheezing or rales.   Abdominal:      General: Bowel sounds are normal. There is no distension.      Palpations: Abdomen is soft.      Tenderness: There is no abdominal tenderness.   Musculoskeletal:         General: No swelling.      Cervical back: Neck supple.   Skin:     General: Skin is warm.      Comments: Incision clean and intact posterior neck    Neurological:      Mental Status: She is alert and oriented to person, place, and time.      Motor: Weakness (bilateral legs proximally mild) present.   Psychiatric:         Mood and Affect: Mood normal.     Scheduled Meds:  Current Facility-Administered Medications   Medication Dose Route Frequency Provider Last Rate    acetaminophen  975 mg Oral Q8H [facility] Sharmin Waggoner PA-C      aspirin  81 mg Oral Daily Corine Park MD      atorvastatin  20 mg Oral Daily With  Dinner Sharmin Waggoner PA-C      bisacodyl  10 mg Rectal Daily PRN Sharmin Waggoner PA-C      bisacodyl  10 mg Rectal Once Corine Park MD      calcium carbonate  1,000 mg Oral Daily PRN Sharmin Waggoner PA-C      chlorhexidine  15 mL Mouth/Throat Q12H [facility] Sharmin Waggoner PA-C      cyanocobalamin  1,000 mcg Oral Weekly Sharmin Waggoner PA-C      digoxin  125 mcg Oral Every Other Day Sharmin Waggoner PA-C      diltiazem  180 mg Oral Daily Sharmin Waggoner PA-C      docusate sodium  100 mg Oral BID Corine Park MD      enoxaparin  40 mg Subcutaneous Q24H CHRIS Corine Park MD      folic acid  1,000 mcg Oral Daily Sharmin Waggoner PA-C      levothyroxine  125 mcg Oral Early Morning Sharmin Waggoner PA-C      magnesium gluconate  250 mg Oral BID Sharmin Waggoner PA-C      nystatin   Topical BID Corine Park MD      ondansetron  4 mg Oral Q6H PRN Sharmin Waggoner PA-C      oxyCODONE  2.5 mg Oral Q4H PRN Sharmin Waggoner PA-C      Or    oxyCODONE  5 mg Oral Q4H PRN Sharmin Waggoner PA-C      phenol  1 spray Mouth/Throat Q2H PRN Sharmin Waggoner PA-C      polyethylene glycol  17 g Oral Daily Marlee Berkowitz MD      senna  2 tablet Oral HS Corine Park MD      sertraline  100 mg Oral Daily Sharmin Waggoner PA-C      torsemide  20 mg Oral Daily PRN Sharmin Waggoner PA-C           Lab Results: I have reviewed the following results: none  Results from last 7 days   Lab Units 09/16/24  0546   HEMOGLOBIN g/dL 12.3   HEMATOCRIT % 40.3   WBC Thousand/uL 4.82   PLATELETS Thousands/uL 187     Results from last 7 days   Lab Units 09/16/24  0546   BUN mg/dL 18   SODIUM mmol/L 140   POTASSIUM mmol/L 3.9   CHLORIDE mmol/L 104   CREATININE mg/dL 0.72   AST U/L 66*   ALT U/L 36              I have spent a total time of 35 minutes in caring for this patient on the day of the visit/encounter  including Impressions and Documenting in the medical record.

## 2024-09-20 NOTE — ASSESSMENT & PLAN NOTE
Mood is stable  Continue Zoloft 100 mg daily   Quality 226: Preventive Care And Screening: Tobacco Use: Screening And Cessation Intervention: Patient screened for tobacco use and is an ex/non-smoker Quality 111:Pneumonia Vaccination Status For Older Adults: Patient received any pneumococcal conjugate or polysaccharide vaccine on or after their 60th birthday and before the end of the measurement period Quality 130: Documentation Of Current Medications In The Medical Record: Current Medications Documented Quality 110: Preventive Care And Screening: Influenza Immunization: Influenza Immunization Administered during Influenza season Quality 431: Preventive Care And Screening: Unhealthy Alcohol Use - Screening: Patient not identified as an unhealthy alcohol user when screened for unhealthy alcohol use using a systematic screening method Detail Level: Simple

## 2024-09-20 NOTE — ASSESSMENT & PLAN NOTE
Wound Care team following in ARC:  POA several areas  Continue LWC:     Skin care plans:  1-Hydraguard to sacrum, buttocks and heels BID and PRN  2-EHOB cushion when out of bed in chair.  3-Moisturize skin daily with skin nourishing cream  4-Elevate heels to offload pressure.  5-Turn/reposition q2h for pressure re-distribution on skin  6. Monitor right 2nd toe growth area - leave open to air podiatry consulted.  7. Right arm skin tear - cleanse with Normal saline then apply normlgel ag then top with adaptic then a ABD and keisha change every other day   8. Right and left breast folds - cleanse with soap and water pat dry dust lightly with nystatin powder bid and prn

## 2024-09-20 NOTE — PROGRESS NOTES
"Progress Note - Marsha Oliva 79 y.o. female MRN: [id number]    [facility] Encounter: [id number]        Subjective:   Patient seen and examined at bedside after reviewing the chart and discussing the case with the caring staff.      Patient examined at bedside.  Patient denies any acute symptoms.     Physical Exam   Vitals: Blood pressure 104/68, pulse 84, temperature (!) 97.4 °F (36.3 °C), temperature source Temporal, resp. rate 16, height 5' 3\" (1.6 m), weight 50.8 kg (111 lb 15.9 oz), SpO2 98%.,Body mass index is 19.84 kg/m².  Constitutional: Patient in no acute distress.  HEENT: PERR, EOMI, MMM.  Cardiovascular: Normal rate and regular rhythm.    Pulmonary/Chest: Effort normal and breath sounds normal.   Abdomen: Soft, + BS, NT.    Assessment/Plan:  Marsha Oliva is a(n) 79 y.o. female with cervical spinal cord compression.     Cardiac hx w/ HTN, HLD, chronic diastolic heart failure, chronic Afib.  Continue digoxin 125 mcg every other day, diltiazem 180 mg daily, atorvastatin 40 mg daily.  Weekly weights.  Torsemide 20 mg daily as needed.  Pt cleared to restart ASA 81 mg daily by neurosurgery 9/19.   Hypothyroidism.  Continue levothyroxine 125 mcg daily (decr from 137 mcg 9/6).  B12 deficiency.  Patient on B12 1,000 mcg weekly.   Folate deficiency.  Patient on folic acid 1,000 mcg daily.   Hypomagnesemia.  Patient on magnesium gluconate 250 mg twice daily.   Depression and anxiety.   Patient on Zoloft 100 mg daily.  Declines neuropsych at this time.   Multiple hyperkeratotic lesions.  Patient follows with Podiatry outpatient.  Wound care consult.   Sore throat.  Chloraseptic throat spray as needed.   Malnutrition.  Dietician following.  Adult Malnutrition type: Acute illness.  Adult Degree of Malnutrition: Other severe protein calorie malnutrition. Malnutrition Characteristics: Inadequate energy, Weight loss.  Pain and bowel regimen.  As per physiatry.   Cervical spinal cord compression.  Vista " collar at all times x 6 weeks.  Continue SQ Lovenox.  Patient receiving intensive PT OT ST as per physiatry.      Anticipated discharge date:  Thursday 10/3/2024    The patient was discussed with Dr. Brown and he is in agreement with the above note.

## 2024-09-20 NOTE — NURSING NOTE
Pt resting in bed watching TV. Tolerating HTL, c-collar in place. Forgetful- requires cueing at times. Aspiration, cervical, and fall precautions maintained. Transfers CG with RW, ambulates to BR CG. Pt has very large BM earlier in shift-pt states she feels much relief. Hydraguard to sacrum-stage 1 present. Wedge used for turning, b/l le elevated on pillows. SCDs intact. Continuing to monitor and follow plan of care, bed alarm intact reinforced use of call bell.   [FreeTextEntry6] : Here today for weight check.  - breastfeeding every 2-3 hours. Latching on well about 15 mins each side.  Not spitting up. - Voiding and stooling well. - Good sleeping patterns. -  No parental concerns.

## 2024-09-20 NOTE — ASSESSMENT & PLAN NOTE
Nutrition following    Malnutrition Findings:   Adult Malnutrition type: Acute illness  Adult Degree of Malnutrition: Other severe protein calorie malnutrition  Malnutrition Characteristics: Inadequate energy, Weight loss  360 Statement: Malnutrition related to inadequate energy intake as evidenced by 7#(6%) weight loss from 9/11/# to 9/18/# and <50% energy intake >5 days.    BMI Findings:  Body mass index is 19.84 kg/m².

## 2024-09-20 NOTE — PROGRESS NOTES
"   09/20/24 0650   Pain Assessment   Pain Assessment Tool 0-10   Pain Score 3   Pain Location/Orientation Location: Neck  (\"nagging ache\")   Restrictions/Precautions   Precautions Aspiration;Bed/chair alarms;Cognitive;Fall Risk;Pain;Spinal precautions;Supervision on toilet/commode   Weight Bearing Restrictions No   ROM Restrictions   (spinal precautions)   Braces or Orthoses C/S Collar   Cognition   Overall Cognitive Status Impaired   Arousal/Participation Alert;Responsive;Cooperative   Attention Attends with cues to redirect   Orientation Level Oriented X4   Memory Decreased short term memory;Decreased recall of recent events;Decreased recall of precautions   Following Commands Follows one step commands with increased time or repetition   Subjective   Subjective Pt reports movement helps her feel better   Roll Left and Right   Type of Assistance Needed Independent   Physical Assistance Level No physical assistance   Comment with bed rails   Roll Left and Right CARE Score 6   Sit to Lying   Comment Pt remained OOB   Lying to Sitting on Side of Bed   Type of Assistance Needed Independent   Physical Assistance Level No physical assistance   Comment with bed rails   Lying to Sitting on Side of Bed CARE Score 6   Sit to Stand   Type of Assistance Needed Supervision;Verbal cues   Physical Assistance Level No physical assistance   Comment with RW, cues for hand placement   Sit to Stand CARE Score 4   Bed-Chair Transfer   Type of Assistance Needed Supervision;Verbal cues   Physical Assistance Level No physical assistance   Comment with RW, cues for hand placement   Chair/Bed-to-Chair Transfer CARE Score 4   Car Transfer   Reason if not Attempted Environmental limitations   Car Transfer CARE Score 10   Walk 10 Feet   Type of Assistance Needed Incidental touching   Physical Assistance Level No physical assistance   Comment CG with RW on level and unlevel surfaces   Walk 10 Feet CARE Score 4   Walk 50 Feet with Two Turns "   Type of Assistance Needed Incidental touching   Physical Assistance Level No physical assistance   Comment CG with RW on level and unlevel surfaces   Walk 50 Feet with Two Turns CARE Score 4   Walking 10 Feet on Uneven Surfaces   Type of Assistance Needed Incidental touching   Physical Assistance Level No physical assistance   Comment CG with RW on level and unlevel surfaces   Walking 10 Feet on Uneven Surfaces CARE Score 4   Ambulation   Primary Mode of Locomotion Prior to Admission Walk   Distance Walked (feet) 88 ft  (88,83,50 ft)   Assist Device Roller Walker   Gait Pattern Antalgic;Inconsistant Vaishnavi;Slow Vaishnavi;Decreased foot clearance;Forward Flexion;Narrow FLORENTINO;Poor UE WB;Shuffle;Improper weight shift   Limitations Noted In Balance;Coordination;Device Management;Endurance;Midline Orientation;Posture;Safety;Speed;Strength   Provided Assistance with: Balance;Limb Stabilization;Trunk Support;Weight Shift   Walk Assist Level Contact Guard   Findings CG with RW on level and unlevel surfaces   Does the patient walk? 2. Yes   Wheelchair mobility   Does the patient use a wheelchair? 1. Yes   Type of Wheelchair Used 1. Manual   Method Right upper extremity;Left upper extremity;Right lower extremity;Left lower extremity   Assistance Provided For Locking Brakes   Distance Level Surface (feet) 25 ft   Distance Wheeled 3% Grade 0 ft   Findings Supervision for turns and assistance for locking breaks   Curb or Single Stair   Style negotiated Single stair   Type of Assistance Needed Incidental touching   Physical Assistance Level No physical assistance   Comment CG with charly HR use, non-reciprocal pattern   1 Step (Curb) CARE Score 4   4 Steps   Type of Assistance Needed Incidental touching   Physical Assistance Level No physical assistance   Comment CG with charly HR use, non-reciprocal pattern   4 Steps CARE Score 4   12 Steps   Reason if not Attempted Safety concerns   12 Steps CARE Score 88   Stairs   Type Stairs;Ramp    # of Steps 7   Weight Bearing Precautions WBAT   Assist Devices Bilateral Rail   Findings CG with charly HR use, non-reciprocal pattern on stairs. CG on ramp with RW   Toilet Transfer   Type of Assistance Needed Incidental touching   Physical Assistance Level No physical assistance   Comment CG with RW and grab bars   Toilet Transfer CARE Score 4   Toilet Transfer   Surface Assessed Bedside Commode   Limitations Noted In Balance;Confidence;LE Strength;Safety;Sequencing   Therapeutic Interventions   Strengthening Supine ther ex   Flexibility Gastroc and hamstring stretching   Balance Gait and transfer training   Other WC mobility, stair and ramp negotiation   Assessment   Treatment Assessment Pt agreeable to PT session this morning. Pain 3/10 located in neck. Independent for bed mobility with use of bed rails. Supervision for transfers with RW and cues for sequence and technique. CG with RW for ambulation on level and unlevel surfaces for max distance of 88 ft. CG on stairs with charly HR assist with non-reciprocal pattern. CG on ramp with RW. Ther ex for general LE strengthening. Gait and transfer training for increased balance, safety, and independence for functional mobility. 5xSTS test performed with score of 29.43 sections, indicating an increased risk of falls. C collar remained in place throughout session and all precautions followed. Pt returned to room in  with alarms on and all needs within reach.   Problem List Decreased strength;Decreased range of motion;Decreased endurance;Impaired balance;Decreased cognition;Decreased safety awareness;Decreased skin integrity;Orthopedic restrictions;Pain   Barriers to Discharge Inaccessible home environment;Decreased caregiver support   PT Barriers   Physical Impairment Decreased strength;Decreased range of motion;Decreased endurance;Impaired balance;Decreased mobility;Decreased safety awareness;Pain;Orthopedic restrictions   Functional Limitation Car transfers;Ramp  negotiation;Stair negotiation;Standing;Transfers;Walking;Wheelchair management   Plan   Treatment/Interventions Functional transfer training;LE strengthening/ROM;Elevations;Therapeutic exercise;Endurance training;Bed mobility;Gait training   Progress Progressing toward goals   PT Therapy Minutes   PT Time In 0650   PT Time Out 0825   PT Total Time (minutes) 95   PT Mode of treatment - Individual (minutes) 95   PT Mode of treatment - Concurrent (minutes) 0   PT Mode of treatment - Group (minutes) 0   PT Mode of treatment - Co-treat (minutes) 0   PT Mode of Treatment - Total time(minutes) 95 minutes   PT Cumulative Minutes 645   Therapy Time missed   Time missed? No

## 2024-09-20 NOTE — ASSESSMENT & PLAN NOTE
Chronic A. Fib  Rate/rhythm controlled on digoxin 125 mcg every other day, Cardizem  mg daily  No AC due to prior several GI bleeds.  On aspirin 81 mg daily.  IM managing

## 2024-09-20 NOTE — PROGRESS NOTES
"   09/20/24 1100   Pain Assessment   Pain Assessment Tool 0-10   Pain Score No Pain   Restrictions/Precautions   Precautions Aspiration;Bed/chair alarms;Cognitive;Fall Risk;Pain;Spinal precautions;Supervision on toilet/commode   Cognitive Linguisitic Assessments   Cognitive Linquistic Quick Test (CLQT) MOCA completed 9/16/24- 16/30   Comprehension   Comprehension (FIM) 4 - Understands basic info/conversation 75-90% of time   Expression   Expression (FIM) 5 - Needs help/cues only RARELY (< 10% of the time)   Social Interaction   Social Interaction (FIM) 5 - Interacts appropriately with others 90% of time   Problem Solving   Problem solving (FIM) 4 - Solves basic problems 75-89% of time   Memory   Memory (FIM) 4 - Recognizes/recalls/performs 75-89%   Memory Skills   Orientation Level Oriented X4   Swallow Assessment   Swallow Treatment Assessment Assessed with diet advancement trials of 4oz dysphagia level 2 solids, 5 dissolvable solid (cheesepuff) and 6oz HTL via straw ( crumbled brennan cracker in chocolate pudding). Reviewed effortful swallow and alternate bites/sips via teachback with independent carryover through session. Pt reporting on most swallows \"that went down so well.\" Denies any pharyngeal residue over the past week especially utilizing efforful swallow and previously did have a sensation a pharyngeal residue following her surgery. Mild throat clear x2 across meal tray. Vocal quality remained  clear through session. Continue puree and honey thick liquids.     SLP Therapy Minutes   SLP Time In 1130   SLP Time Out 1200   SLP Total Time (minutes) 30   SLP Mode of treatment - Individual (minutes) 30   SLP Mode of treatment - Concurrent (minutes) 0   SLP Mode of treatment - Group (minutes) 0   SLP Mode of treatment - Co-treat (minutes) 0   SLP Mode of Treatment - Total time(minutes) 30 minutes   SLP Cumulative Minutes 270     "

## 2024-09-20 NOTE — PROGRESS NOTES
09/20/24 1415   Pain Assessment   Pain Assessment Tool 0-10   Pain Score 3   Pain Location/Orientation Location: Neck;Orientation: Bilateral;Location: Shoulder   Restrictions/Precautions   Precautions Aspiration;Bed/chair alarms;Cognitive;Fall Risk;Pain;Spinal precautions;Supervision on toilet/commode   Weight Bearing Restrictions No   ROM Restrictions   (spinal)   Braces or Orthoses C/S Collar   Grooming   Able To Initiate Tasks;Wash/Dry Hands   Findings s/u w/c level at the sink   Putting On/Taking Off Footwear   Type of Assistance Needed Supervision   Comment removing splippers before entering bed   Putting On/Taking Off Footwear CARE Score 4   Dressing/Undressing Clothing   Remove LB Clothes Shoes   Limitations Noted In Balance;Endurance;Problem Solving;Safety;Strength;ROM  (spinal prec)   Sit to Lying   Type of Assistance Needed Physical assistance   Physical Assistance Level 25% or less   Sit to Lying CARE Score 3   Lying to Sitting on Side of Bed   Type of Assistance Needed Supervision   Lying to Sitting on Side of Bed CARE Score 4   Sit to Stand   Type of Assistance Needed Supervision   Sit to Stand CARE Score 4   Bed-Chair Transfer   Type of Assistance Needed Supervision;Incidental touching   Chair/Bed-to-Chair Transfer CARE Score 4   Toileting Hygiene   Type of Assistance Needed Physical assistance   Physical Assistance Level 26%-50%   Toileting Hygiene CARE Score 3   Toileting   Able to Pull Clothing down yes, up no.   Manage Hygiene Bladder   Limitations Noted In Balance;Problem Solving;ROM;Safety;UE Strength;LE Strength  (spinal prec)   Toilet Transfer   Type of Assistance Needed Supervision;Incidental touching   Toilet Transfer CARE Score 4   Toilet Transfer   Surface Assessed Raised Toilet   Transfer Technique Standard   Limitations Noted In Balance;Problem Solving;Endurance;ROM;Safety;LE Strength  (spinal prec)   Adaptive Equipment Grab Bar;Walker   Exercise Tools   Hand Gripper gripper with pegs  B hands following retrieval of pegs from the floor using the reacher in the RUE while seated   Cognition   Orientation Level Oriented X4   Additional Activities   Additional Activities Other (Comment)   Additional Activities Comments fxl mobility to and from BR with RW nad CGA/ min A   Assessment   Treatment Assessment Pt presents supine reporting begin upset about requested room change, having difficulty understanding reasoning behind changing room and expressing desire to go home immediately. OT explained reasoning for room change and patient agreeable as long as phone available for open communication with  Tomas. Phone setup in new room and Tomas notified directly by this OT explaining reasoning and benefits of conitnued skilled OT, only in another locaiton within [facility]. Pt able to dial home without difficulty and needs within reach when returned to bed at end of session. Pt also participates in BUE therex/ theract, transfers/ mobility and toileting during session. Pt requires assist due to decreased balacne, safety, endurance, strength/ ROM and spinal precautions. Pt will beenfit from continued skilled OT servcies to increase independence with daily tasks.   Problem List Decreased strength;Decreased range of motion;Decreased endurance;Impaired balance;Decreased safety awareness;Decreased cognition;Decreased skin integrity;Orthopedic restrictions;Pain  (spinal prec)   Plan   Treatment/Interventions ADL retraining;Functional transfer training;Therapeutic exercise;Endurance training;Cognitive reorientation;Patient/family training;Equipment eval/education;Compensatory technique education   Progress Progressing toward goals   OT Therapy Minutes   OT Time In 1415   OT Time Out 1539   OT Total Time (minutes) 84   OT Mode of treatment - Individual (minutes) 84   Therapy Time missed   Time missed? No

## 2024-09-20 NOTE — ASSESSMENT & PLAN NOTE
"Presented  to acute care on 9/4/24 with progressive neck pain and weakness.    She reports 4-6 weeks prior she sustained a fall off her bed, head hitting her nightstand.    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   MRI C spine showing displaced type 2 dens fracture with mild mass effect on cord, ALL and PLL injuries.    CTA head and neck negative for vascular injuries.    Patient seen by Neurosurgery and deemed an operative candidate.    Patient taken to the OR by Dr. Dewitt for a bilateral C1 and C3 lateral mass screw placement, bialteral C2 pedicle screw palcement, C1-C3 fusion.    Post operatively, placed in a Aspen C Collar at all times, except showering with Shonda collar.    Post op X-rays with stable alignment    Plan in ARC:  Begin ARC program with PT, OT, SLP  Monitor incision  Monitor pain  Monitor neuro exam  Follow-up with Neurosurgery in 2 weeks.  2 week post op visit completed with NSGY virtually - \"Will plan to follow-up with the pt as scheduled for her 6 week POV with repeat upright xrays\"  "

## 2024-09-20 NOTE — TELEPHONE ENCOUNTER
9/20/24 - PT STILL IN [facility]  10/24/24 6 WK POV W/CSPINE XR W/EVELIA     [facility]

## 2024-09-21 PROCEDURE — 97116 GAIT TRAINING THERAPY: CPT

## 2024-09-21 PROCEDURE — 97530 THERAPEUTIC ACTIVITIES: CPT

## 2024-09-21 PROCEDURE — 97110 THERAPEUTIC EXERCISES: CPT

## 2024-09-21 PROCEDURE — 97537 COMMUNITY/WORK REINTEGRATION: CPT

## 2024-09-21 RX ORDER — GUAIFENESIN/DEXTROMETHORPHAN 100-10MG/5
10 SYRUP ORAL EVERY 4 HOURS PRN
Status: DISCONTINUED | OUTPATIENT
Start: 2024-09-21 | End: 2024-10-07 | Stop reason: HOSPADM

## 2024-09-21 RX ADMIN — DOCUSATE SODIUM 100 MG: 100 CAPSULE, LIQUID FILLED ORAL at 17:08

## 2024-09-21 RX ADMIN — POLYETHYLENE GLYCOL 3350 17 G: 17 POWDER, FOR SOLUTION ORAL at 08:52

## 2024-09-21 RX ADMIN — SENNOSIDES 17.2 MG: 8.6 TABLET, FILM COATED ORAL at 21:00

## 2024-09-21 RX ADMIN — CHLORHEXIDINE GLUCONATE 15 ML: 1.2 RINSE ORAL at 08:56

## 2024-09-21 RX ADMIN — CHLORHEXIDINE GLUCONATE 15 ML: 1.2 RINSE ORAL at 21:01

## 2024-09-21 RX ADMIN — Medication 250 MG: at 08:50

## 2024-09-21 RX ADMIN — SERTRALINE 100 MG: 100 TABLET, FILM COATED ORAL at 08:52

## 2024-09-21 RX ADMIN — ATORVASTATIN CALCIUM 20 MG: 20 TABLET, FILM COATED ORAL at 17:07

## 2024-09-21 RX ADMIN — DOCUSATE SODIUM 100 MG: 100 CAPSULE, LIQUID FILLED ORAL at 08:52

## 2024-09-21 RX ADMIN — DILTIAZEM HYDROCHLORIDE 180 MG: 180 CAPSULE, COATED, EXTENDED RELEASE ORAL at 08:52

## 2024-09-21 RX ADMIN — LEVOTHYROXINE SODIUM 125 MCG: 25 TABLET ORAL at 05:05

## 2024-09-21 RX ADMIN — NYSTATIN 1 APPLICATION: 100000 POWDER TOPICAL at 17:08

## 2024-09-21 RX ADMIN — ENOXAPARIN SODIUM 40 MG: 40 INJECTION SUBCUTANEOUS at 08:52

## 2024-09-21 RX ADMIN — ASPIRIN 81 MG: 81 TABLET, COATED ORAL at 08:52

## 2024-09-21 RX ADMIN — Medication 250 MG: at 17:07

## 2024-09-21 RX ADMIN — DIGOXIN 125 MCG: 125 TABLET ORAL at 08:51

## 2024-09-21 RX ADMIN — ACETAMINOPHEN 975 MG: 325 TABLET ORAL at 21:00

## 2024-09-21 RX ADMIN — GUAIFENESIN AND DEXTROMETHORPHAN 10 ML: 100; 10 SYRUP ORAL at 21:00

## 2024-09-21 RX ADMIN — ACETAMINOPHEN 975 MG: 325 TABLET ORAL at 05:05

## 2024-09-21 RX ADMIN — NYSTATIN 1 APPLICATION: 100000 POWDER TOPICAL at 08:59

## 2024-09-21 RX ADMIN — FOLIC ACID 1000 MCG: 1 TABLET ORAL at 08:51

## 2024-09-21 NOTE — NURSING NOTE
Pt alert and oriented x 4. Reports no pain or discomfort. Transfers contact guard with roller walker. Continent of bowel and bladder. OOB for all meals. Pt in wheel chair with call bell in reach. Plan of care ongoing.

## 2024-09-21 NOTE — PROGRESS NOTES
09/21/24 0644   Pain Assessment   Pain Assessment Tool 0-10   Pain Score 3   Pain Location/Orientation Location: Neck;Orientation: Bilateral;Location: Shoulder   Restrictions/Precautions   Precautions Aspiration;Bed/chair alarms;Cognitive;Fall Risk;Pain;Spinal precautions;Supervision on toilet/commode   ROM Restrictions   (c-spine precautions)   Braces or Orthoses C/S Collar   Cognition   Overall Cognitive Status Impaired   Arousal/Participation Alert;Responsive;Cooperative   Attention Attends with cues to redirect   Orientation Level Oriented X4   Memory Decreased short term memory;Decreased recall of recent events;Decreased recall of precautions   Following Commands Follows one step commands without difficulty   Roll Left and Right   Type of Assistance Needed Independent   Comment bed rails   Roll Left and Right CARE Score 6   Sit to Lying   Comment patient remained OOB for breakfast   Lying to Sitting on Side of Bed   Type of Assistance Needed Independent   Comment bed rails   Lying to Sitting on Side of Bed CARE Score 6   Sit to Stand   Type of Assistance Needed Supervision;Verbal cues   Comment close S/S with RW   Sit to Stand CARE Score 4   Bed-Chair Transfer   Type of Assistance Needed Supervision;Verbal cues   Comment close S/S with RW   Chair/Bed-to-Chair Transfer CARE Score 4   Walk 10 Feet   Type of Assistance Needed Supervision;Verbal cues   Comment close S with RW level and unlevel surfaces   Walk 10 Feet CARE Score 4   Walk 50 Feet with Two Turns   Type of Assistance Needed Supervision;Verbal cues   Comment close S with RW level and unlevel surfaces   Walk 50 Feet with Two Turns CARE Score 4   Walking 10 Feet on Uneven Surfaces   Type of Assistance Needed Supervision;Verbal cues   Comment close S with RW level and unlevel surfaces   Walking 10 Feet on Uneven Surfaces CARE Score 4   Ambulation   Primary Mode of Locomotion Prior to Admission Walk   Distance Walked (feet) 105 ft  (94' 105' 90' 15'(out  of bathroom))   Assist Device Roller Walker   Gait Pattern Antalgic;Inconsistant Vaishnavi;Decreased foot clearance;Forward Flexion;Narrow FLORENTINO   Limitations Noted In Balance;Endurance;Posture;Safety;Strength   Provided Assistance with: Balance   Walk Assist Level Close Supervision   Findings close S with RW level and unlevel surfaces   Does the patient walk? 2. Yes   Curb or Single Stair   Style negotiated Single stair   Type of Assistance Needed Incidental touching;Verbal cues   Comment cg with 2 hand rails   1 Step (Curb) CARE Score 4   4 Steps   Type of Assistance Needed Incidental touching;Verbal cues   Comment cg with 2 hand rails   4 Steps CARE Score 4   Stairs   Type Stairs;Ramp   # of Steps 7   Weight Bearing Precautions WBAT   Assist Devices Bilateral Rail;Roller Walker   Findings cg with 2 hand rails; cg ramp with RW   Therapeutic Interventions   Strengthening supine and seated ther ex   Flexibility gastroc and hamstring stretching   Balance gait and transfer training   Other ramp and stair negotiation   Assessment   Treatment Assessment Patient agreeable to therapy session.  Reports 3/10 pain in posterior neck and B/L shoulders.   Occasional cues for general safety.   Continues to make positive gains in therapy with functional mobility.  Completed ther ex for general LE strengthening; gait and transfer training focusing on sequence and technique for improved balance and safety with functional mobilty using walker.  Negotiated ramp with RW CG; steps with 2 handrails and cues for sequence.    Patient returned to room with alarms in place and all needs within reach.   PT Barriers   Physical Impairment Decreased strength;Decreased range of motion;Decreased endurance;Impaired balance;Decreased mobility;Decreased safety awareness;Orthopedic restrictions;Pain   Functional Limitation Walking;Transfers;Standing;Stair negotiation;Ramp negotiation;Car transfers   Plan   Treatment/Interventions Functional transfer  training;LE strengthening/ROM;Elevations;Therapeutic exercise;Bed mobility;Gait training   Progress Progressing toward goals   PT Therapy Minutes   PT Time In 0644   PT Time Out 0820   PT Total Time (minutes) 96   PT Mode of treatment - Individual (minutes) 96   PT Mode of treatment - Concurrent (minutes) 0   PT Mode of treatment - Group (minutes) 0   PT Mode of treatment - Co-treat (minutes) 0   PT Mode of Treatment - Total time(minutes) 96 minutes   PT Cumulative Minutes 741   Therapy Time missed   Time missed? No

## 2024-09-21 NOTE — NURSING NOTE
Pt declined with wash this evening, pt made aware has therapy scheduled for 0700 AM. C-Collar in place, incision CDI DOE. Ambulated CG with RW to BR. Cont b&b- pull-up for protection. Declined to removed dentures HS-scheduled mouthwash completed at bedside. SCDs in place. Turning wedge in place and turned to left side, healing stage 1 present on sacrum barrier cream applied.Tolerating HTL. Continuing to monitor and follow plan of care, bed alarm intact for safety.

## 2024-09-22 PROCEDURE — 97110 THERAPEUTIC EXERCISES: CPT

## 2024-09-22 PROCEDURE — 97530 THERAPEUTIC ACTIVITIES: CPT

## 2024-09-22 RX ORDER — FLUTICASONE PROPIONATE 50 MCG
2 SPRAY, SUSPENSION (ML) NASAL DAILY
Status: DISCONTINUED | OUTPATIENT
Start: 2024-09-22 | End: 2024-10-07 | Stop reason: HOSPADM

## 2024-09-22 RX ADMIN — NYSTATIN 1 APPLICATION: 100000 POWDER TOPICAL at 17:31

## 2024-09-22 RX ADMIN — DOCUSATE SODIUM 100 MG: 100 CAPSULE, LIQUID FILLED ORAL at 17:28

## 2024-09-22 RX ADMIN — ENOXAPARIN SODIUM 40 MG: 40 INJECTION SUBCUTANEOUS at 09:04

## 2024-09-22 RX ADMIN — FOLIC ACID 1000 MCG: 1 TABLET ORAL at 09:04

## 2024-09-22 RX ADMIN — GUAIFENESIN AND DEXTROMETHORPHAN 10 ML: 100; 10 SYRUP ORAL at 20:11

## 2024-09-22 RX ADMIN — DOCUSATE SODIUM 100 MG: 100 CAPSULE, LIQUID FILLED ORAL at 09:04

## 2024-09-22 RX ADMIN — CHLORHEXIDINE GLUCONATE 15 ML: 1.2 RINSE ORAL at 20:11

## 2024-09-22 RX ADMIN — LEVOTHYROXINE SODIUM 125 MCG: 25 TABLET ORAL at 05:20

## 2024-09-22 RX ADMIN — ATORVASTATIN CALCIUM 20 MG: 20 TABLET, FILM COATED ORAL at 16:07

## 2024-09-22 RX ADMIN — NYSTATIN 1 APPLICATION: 100000 POWDER TOPICAL at 09:15

## 2024-09-22 RX ADMIN — GLYCERIN 2 DROP: .002; .002; .01 SOLUTION/ DROPS OPHTHALMIC at 12:41

## 2024-09-22 RX ADMIN — Medication 250 MG: at 09:02

## 2024-09-22 RX ADMIN — GUAIFENESIN AND DEXTROMETHORPHAN 10 ML: 100; 10 SYRUP ORAL at 09:19

## 2024-09-22 RX ADMIN — ACETAMINOPHEN 975 MG: 325 TABLET ORAL at 21:14

## 2024-09-22 RX ADMIN — POLYETHYLENE GLYCOL 3350 17 G: 17 POWDER, FOR SOLUTION ORAL at 09:04

## 2024-09-22 RX ADMIN — FLUTICASONE PROPIONATE 2 SPRAY: 50 SPRAY, METERED NASAL at 12:40

## 2024-09-22 RX ADMIN — ASPIRIN 81 MG: 81 TABLET, COATED ORAL at 09:03

## 2024-09-22 RX ADMIN — ACETAMINOPHEN 975 MG: 325 TABLET ORAL at 05:19

## 2024-09-22 RX ADMIN — CHLORHEXIDINE GLUCONATE 15 ML: 1.2 RINSE ORAL at 09:04

## 2024-09-22 RX ADMIN — ACETAMINOPHEN 975 MG: 325 TABLET ORAL at 14:16

## 2024-09-22 RX ADMIN — Medication 250 MG: at 17:27

## 2024-09-22 RX ADMIN — SERTRALINE 100 MG: 100 TABLET, FILM COATED ORAL at 09:04

## 2024-09-22 NOTE — NURSING NOTE
Pt resting in bed, no s/s of pain or distress at this time. C-collar in place aligned and skin integrity intact. Supervision with RW to BR. Pull up in place for protection. Hydraguard application to sacrum b/l buttocks. Pt states prn robitussin worked for her cough and she was able to sleep some. Continuing to monitor and follow plan of care bed alarm intact for safety.

## 2024-09-22 NOTE — NURSING NOTE
Pt washed in BR this evening prior to bed, mod ax1. Barrier cream applied to sacrum and wound on right arm cleansed and new dressing applied. Changed into fresh pull up, socks, and pajamas for the evening. SCDs applied. C/O cough-SLIM on call contacted PRN robitussin order placed and admin as ordered. Productive moist cough thick yellow mucous, encouraged pt to use IS and deep breathing exercised. Reinforced education regarding aspiration precautions and sitting OOB for all meals. C-Collar in place-skin integrity intact. Continuing to monitor and follow plan of care bed alarm intact for safety.

## 2024-09-22 NOTE — PROGRESS NOTES
09/22/24 9955   Pain Assessment   Pain Assessment Tool 0-10   Pain Score 3   Pain Location/Orientation Location: Neck   Restrictions/Precautions   Precautions Aspiration;Bed/chair alarms;Cognitive;Fall Risk;Pain;Spinal precautions;Supervision on toilet/commode   Weight Bearing Restrictions No   ROM Restrictions   (spinal prec)   Braces or Orthoses C/S Collar   Eating Assessment   Findings pt ecnouraged to eat pudding before return to bed at end of session   Lying to Sitting on Side of Bed   Type of Assistance Needed Supervision   Lying to Sitting on Side of Bed CARE Score 4   Sit to Stand   Type of Assistance Needed Incidental touching   Sit to Stand CARE Score 4   Bed-Chair Transfer   Type of Assistance Needed Incidental touching   Chair/Bed-to-Chair Transfer CARE Score 4   Exercise Tools   Other Exercise Tool 1 card match reaching with BUE while sitting to match ind cards with those on board and then sort into suits; cues for sequncing steps with increased cues due to confusion regarding steps   Cognition   Orientation Level Oriented X4   Other Comments   Assessment Pt participates in 37 minutes concurrnet treatment focusing on BUE therex/ theract with simialr goals as another patient   Assessment   Treatment Assessment Pt presents supine agreeable to OT session including bed mobility, transfers and BUE therex/ theract. Pt tolerates session and is making gains towards goals. Pt requires assist due to decreased balance, safety, endurance, cogntiion and stregnth/ ROM with spinal prec and c collar. Pt will benefit from conitnued skilled OT servcies to increase independence with daily tasks.   Problem List Decreased strength;Decreased range of motion;Decreased endurance;Impaired balance;Decreased cognition;Decreased safety awareness;Decreased skin integrity;Orthopedic restrictions;Pain  (spinal prec)   Plan   Treatment/Interventions ADL retraining;Functional transfer training;Therapeutic exercise;Patient/family  training;Equipment eval/education;Compensatory technique education;Cognitive reorientation;Endurance training   Progress Progressing toward goals   OT Therapy Minutes   OT Time In 1335   OT Time Out 1430   OT Total Time (minutes) 55   OT Mode of treatment - Individual (minutes) 18   OT Mode of treatment - Concurrent (minutes) 37   Therapy Time missed   Time missed? No

## 2024-09-23 ENCOUNTER — APPOINTMENT (INPATIENT)
Dept: RADIOLOGY | Facility: HOSPITAL | Age: 79
DRG: 559 | End: 2024-09-23
Payer: COMMERCIAL

## 2024-09-23 LAB
ALBUMIN SERPL BCG-MCNC: 3.4 G/DL (ref 3.5–5)
ALP SERPL-CCNC: 68 U/L (ref 34–104)
ALT SERPL W P-5'-P-CCNC: 17 U/L (ref 7–52)
ANION GAP SERPL CALCULATED.3IONS-SCNC: 4 MMOL/L (ref 4–13)
AST SERPL W P-5'-P-CCNC: 24 U/L (ref 13–39)
BASOPHILS # BLD AUTO: 0.02 THOUSANDS/ΜL (ref 0–0.1)
BASOPHILS NFR BLD AUTO: 0 % (ref 0–1)
BILIRUB SERPL-MCNC: 0.67 MG/DL (ref 0.2–1)
BUN SERPL-MCNC: 16 MG/DL (ref 5–25)
CALCIUM ALBUM COR SERPL-MCNC: 9.5 MG/DL (ref 8.3–10.1)
CALCIUM SERPL-MCNC: 9 MG/DL (ref 8.4–10.2)
CHLORIDE SERPL-SCNC: 104 MMOL/L (ref 96–108)
CO2 SERPL-SCNC: 32 MMOL/L (ref 21–32)
CREAT SERPL-MCNC: 0.71 MG/DL (ref 0.6–1.3)
EOSINOPHIL # BLD AUTO: 0.06 THOUSAND/ΜL (ref 0–0.61)
EOSINOPHIL NFR BLD AUTO: 1 % (ref 0–6)
ERYTHROCYTE [DISTWIDTH] IN BLOOD BY AUTOMATED COUNT: 14.2 % (ref 11.6–15.1)
GFR SERPL CREATININE-BSD FRML MDRD: 81 ML/MIN/1.73SQ M
GLUCOSE SERPL-MCNC: 82 MG/DL (ref 65–140)
HCT VFR BLD AUTO: 38.8 % (ref 34.8–46.1)
HGB BLD-MCNC: 11.6 G/DL (ref 11.5–15.4)
IMM GRANULOCYTES # BLD AUTO: 0.03 THOUSAND/UL (ref 0–0.2)
IMM GRANULOCYTES NFR BLD AUTO: 0 % (ref 0–2)
LYMPHOCYTES # BLD AUTO: 0.6 THOUSANDS/ΜL (ref 0.6–4.47)
LYMPHOCYTES NFR BLD AUTO: 8 % (ref 14–44)
MCH RBC QN AUTO: 30.5 PG (ref 26.8–34.3)
MCHC RBC AUTO-ENTMCNC: 29.9 G/DL (ref 31.4–37.4)
MCV RBC AUTO: 102 FL (ref 82–98)
MONOCYTES # BLD AUTO: 0.44 THOUSAND/ΜL (ref 0.17–1.22)
MONOCYTES NFR BLD AUTO: 6 % (ref 4–12)
NEUTROPHILS # BLD AUTO: 6.55 THOUSANDS/ΜL (ref 1.85–7.62)
NEUTS SEG NFR BLD AUTO: 85 % (ref 43–75)
NRBC BLD AUTO-RTO: 0 /100 WBCS
PLATELET # BLD AUTO: 195 THOUSANDS/UL (ref 149–390)
PMV BLD AUTO: 9.3 FL (ref 8.9–12.7)
POTASSIUM SERPL-SCNC: 4 MMOL/L (ref 3.5–5.3)
PROT SERPL-MCNC: 6.2 G/DL (ref 6.4–8.4)
RBC # BLD AUTO: 3.8 MILLION/UL (ref 3.81–5.12)
SODIUM SERPL-SCNC: 140 MMOL/L (ref 135–147)
WBC # BLD AUTO: 7.7 THOUSAND/UL (ref 4.31–10.16)

## 2024-09-23 PROCEDURE — 97535 SELF CARE MNGMENT TRAINING: CPT

## 2024-09-23 PROCEDURE — 97110 THERAPEUTIC EXERCISES: CPT

## 2024-09-23 PROCEDURE — 92526 ORAL FUNCTION THERAPY: CPT

## 2024-09-23 PROCEDURE — 97116 GAIT TRAINING THERAPY: CPT

## 2024-09-23 PROCEDURE — 97530 THERAPEUTIC ACTIVITIES: CPT

## 2024-09-23 PROCEDURE — 97537 COMMUNITY/WORK REINTEGRATION: CPT

## 2024-09-23 PROCEDURE — 71046 X-RAY EXAM CHEST 2 VIEWS: CPT

## 2024-09-23 PROCEDURE — 92507 TX SP LANG VOICE COMM INDIV: CPT

## 2024-09-23 PROCEDURE — 99232 SBSQ HOSP IP/OBS MODERATE 35: CPT | Performed by: STUDENT IN AN ORGANIZED HEALTH CARE EDUCATION/TRAINING PROGRAM

## 2024-09-23 PROCEDURE — 85025 COMPLETE CBC W/AUTO DIFF WBC: CPT

## 2024-09-23 PROCEDURE — 80053 COMPREHEN METABOLIC PANEL: CPT

## 2024-09-23 RX ORDER — LACTULOSE 10 G/15ML
20 SOLUTION ORAL DAILY PRN
Status: DISCONTINUED | OUTPATIENT
Start: 2024-09-23 | End: 2024-10-07 | Stop reason: HOSPADM

## 2024-09-23 RX ADMIN — ASPIRIN 81 MG: 81 TABLET, COATED ORAL at 09:17

## 2024-09-23 RX ADMIN — LEVOTHYROXINE SODIUM 125 MCG: 25 TABLET ORAL at 05:26

## 2024-09-23 RX ADMIN — NYSTATIN: 100000 POWDER TOPICAL at 17:53

## 2024-09-23 RX ADMIN — ACETAMINOPHEN 975 MG: 325 TABLET ORAL at 05:26

## 2024-09-23 RX ADMIN — DIGOXIN 125 MCG: 125 TABLET ORAL at 09:18

## 2024-09-23 RX ADMIN — SENNOSIDES 17.2 MG: 8.6 TABLET, FILM COATED ORAL at 21:22

## 2024-09-23 RX ADMIN — FLUTICASONE PROPIONATE 2 SPRAY: 50 SPRAY, METERED NASAL at 09:17

## 2024-09-23 RX ADMIN — ENOXAPARIN SODIUM 40 MG: 40 INJECTION SUBCUTANEOUS at 09:17

## 2024-09-23 RX ADMIN — DOCUSATE SODIUM 100 MG: 100 CAPSULE, LIQUID FILLED ORAL at 17:51

## 2024-09-23 RX ADMIN — SERTRALINE 100 MG: 100 TABLET, FILM COATED ORAL at 09:18

## 2024-09-23 RX ADMIN — DOCUSATE SODIUM 100 MG: 100 CAPSULE, LIQUID FILLED ORAL at 09:18

## 2024-09-23 RX ADMIN — CHLORHEXIDINE GLUCONATE 15 ML: 1.2 RINSE ORAL at 09:17

## 2024-09-23 RX ADMIN — ATORVASTATIN CALCIUM 20 MG: 20 TABLET, FILM COATED ORAL at 17:51

## 2024-09-23 RX ADMIN — NYSTATIN: 100000 POWDER TOPICAL at 09:22

## 2024-09-23 RX ADMIN — ACETAMINOPHEN 975 MG: 325 TABLET ORAL at 13:34

## 2024-09-23 RX ADMIN — Medication 250 MG: at 17:51

## 2024-09-23 RX ADMIN — ACETAMINOPHEN 975 MG: 325 TABLET ORAL at 21:22

## 2024-09-23 RX ADMIN — FOLIC ACID 1000 MCG: 1 TABLET ORAL at 09:17

## 2024-09-23 RX ADMIN — Medication 250 MG: at 09:17

## 2024-09-23 NOTE — PROGRESS NOTES
"Progress Note - Marsha Oliva 79 y.o. female MRN: [id number]    Unit/Bed#: -01 Encounter: [id number]        Subjective:   Patient seen and examined at bedside after reviewing the chart and discussing the case with the caring staff.      Patient examined at bedside.  Patient has moist cough although patient's vitals are stable.  Patient does have history of aspiration.    Stat chest x-ray was ordered.    On review of patient's labs from 9/23/2024.  Patient's CMP and CBC were found to be within normal limits.    Physical Exam   Vitals: Blood pressure 100/60, pulse 96, temperature (!) 96.8 °F (36 °C), temperature source Temporal, resp. rate 16, height 5' 3\" (1.6 m), weight 51.6 kg (113 lb 12.1 oz), SpO2 93%.,Body mass index is 20.15 kg/m².  Constitutional: Patient in no acute distress.  HEENT: PERR, EOMI, MMM.  Cardiovascular: Normal rate and regular rhythm.    Pulmonary/Chest: Effort normal and breath sounds normal.   Abdomen: Soft, + BS, NT.    Assessment/Plan:  Marsha Oliva is a(n) 79 y.o. female with cervical spinal cord compression.     Cardiac hx w/ HTN, HLD, chronic diastolic heart failure, chronic Afib.  Continue digoxin 125 mcg every other day, diltiazem 180 mg daily, atorvastatin 40 mg daily.  Weekly weights.  Torsemide 20 mg daily as needed.  Pt cleared to restart ASA 81 mg daily by neurosurgery 9/19.   Hypothyroidism.  Continue levothyroxine 125 mcg daily (decr from 137 mcg 9/6).  B12 deficiency.  Patient on B12 1,000 mcg weekly.   Folate deficiency.  Patient on folic acid 1,000 mcg daily.   Hypomagnesemia.  Patient on magnesium gluconate 250 mg twice daily.   Depression and anxiety.   Patient on Zoloft 100 mg daily.  Declines neuropsych at this time.   Multiple hyperkeratotic lesions.  Patient follows with Podiatry outpatient.  Wound care consult.   Sore throat.  Chloraseptic throat spray as needed.   Malnutrition.  Dietician following.  Adult Malnutrition type: Acute illness.  Adult Degree of " Malnutrition: Other severe protein calorie malnutrition. Malnutrition Characteristics: Inadequate energy, Weight loss.  Productive cough.  Stat chest x-ray on 9/23/2024.  Official report is not available yet  Pain and bowel regimen.  As per physiatry.   Cervical spinal cord compression.  Vista collar at all times x 6 weeks.  Continue SQ Lovenox.  Patient receiving intensive PT OT ST as per physiatry.      Anticipated discharge date:  Thursday 10/3/2024

## 2024-09-23 NOTE — PROGRESS NOTES
"   09/23/24 1015   Pain Assessment   Pain Assessment Tool 0-10   Pain Score 5   Pain Location/Orientation Location: Neck  (\"nagging pain\")   Restrictions/Precautions   Precautions Aspiration;Bed/chair alarms;Cognitive;Fall Risk;Pain;Spinal precautions;Supervision on toilet/commode   Weight Bearing Restrictions No   ROM Restrictions   (Spinal precautions)   Braces or Orthoses C/S Collar   Cognition   Overall Cognitive Status Impaired   Arousal/Participation Alert;Responsive;Cooperative   Attention Attends with cues to redirect   Orientation Level Oriented X4   Memory Decreased short term memory;Decreased recall of recent events;Decreased recall of precautions   Following Commands Follows one step commands without difficulty   Subjective   Subjective \"I feel weak today\"   Roll Left and Right   Comment Pt OOB   Sit to Lying   Comment Pt OOB   Lying to Sitting on Side of Bed   Comment Pt OOB   Sit to Stand   Type of Assistance Needed Supervision   Physical Assistance Level No physical assistance   Comment with RW   Sit to Stand CARE Score 4   Bed-Chair Transfer   Type of Assistance Needed Supervision   Physical Assistance Level No physical assistance   Comment With RW   Chair/Bed-to-Chair Transfer CARE Score 4   Car Transfer   Reason if not Attempted Environmental limitations   Car Transfer CARE Score 10   Walk 10 Feet   Type of Assistance Needed Incidental touching   Physical Assistance Level No physical assistance   Comment Close supervision/CG with RW on level and unlevel surfaces   Walk 10 Feet CARE Score 4   Walk 50 Feet with Two Turns   Type of Assistance Needed Incidental touching   Physical Assistance Level No physical assistance   Comment Close supervision/CG with RW on level and unlevel surfaces   Walk 50 Feet with Two Turns CARE Score 4   Walk 150 Feet   Comment Limited by decreased endurance   Reason if not Attempted Safety concerns   Walk 150 Feet CARE Score 88   Walking 10 Feet on Uneven Surfaces   Type of " Assistance Needed Incidental touching   Physical Assistance Level No physical assistance   Comment Close supervision/CG with RW on level and unlevel surfaces   Walking 10 Feet on Uneven Surfaces CARE Score 4   Ambulation   Primary Mode of Locomotion Prior to Admission Walk   Distance Walked (feet) 122 ft  (56,75,80,102,122 ft)   Assist Device Roller Walker   Gait Pattern Antalgic;Inconsistant Vaishnavi;Slow Vaishnavi;Decreased foot clearance;Forward Flexion;Narrow FLORENTINO;Poor UE WB;Shuffle;Improper weight shift   Limitations Noted In Balance;Device Management;Endurance;Posture;Safety;Speed;Strength   Provided Assistance with: Balance;Direction   Walk Assist Level Close Supervision;Contact Guard   Findings Close supervision/CG with RW on level and unlevel surfaces   Does the patient walk? 2. Yes   Wheelchair mobility   Does the patient use a wheelchair? 1. Yes   Type of Wheelchair Used 1. Manual   Curb or Single Stair   Style negotiated Single stair   Type of Assistance Needed Incidental touching   Physical Assistance Level No physical assistance   Comment CG with charly HR and non-reciprocal pattern   1 Step (Curb) CARE Score 4   4 Steps   Type of Assistance Needed Incidental touching   Physical Assistance Level No physical assistance   Comment CG with charly HR and non-reciprocal pattern   4 Steps CARE Score 4   Stairs   Type Stairs;Ramp   # of Steps 7   Weight Bearing Precautions WBAT   Assist Devices Bilateral Rail;Roller Walker   Findings CG with charly HR and non-reciprocal pattern. CG with RW on ramp.   Toilet Transfer   Comment not needed during session   Therapeutic Interventions   Strengthening seated ther ex   Balance Gait and transfer training   Other stair and ramp negotiation   Assessment   Treatment Assessment Pt agreeable to PT session this morning. Pain 5/10 at base of neck throughout session. Pt reports feeling weak today and that her legs are a little stiff. Pt is CG with Rw for transfers. CG with RW for  ambulation on level and unlevel surfaces for max distance of 122 ft. Pt CG on stairs with charly HR use and non-reciprocal pattern. CG on ramp with RW. Ther ex for general LE strengthening. Gait and transfer training for increased balance, safety, and endurance for functional mobility. Pt appropriate for concurrent therapy to increase motivation and encouragement among patients with similar deficits while completing therapy session. Pt returned to room for lunch in  with alarms in place and all needs within reach.   Problem List Decreased strength;Decreased range of motion;Decreased endurance;Impaired balance;Decreased mobility;Decreased cognition;Impaired judgement;Decreased safety awareness;Pain;Orthopedic restrictions   Barriers to Discharge Inaccessible home environment;Decreased caregiver support   PT Barriers   Physical Impairment Decreased strength;Decreased range of motion;Decreased endurance;Impaired balance;Decreased mobility;Impaired judgement;Decreased safety awareness;Pain;Orthopedic restrictions   Functional Limitation Car transfers;Ramp negotiation;Stair negotiation;Standing;Transfers;Walking;Wheelchair management   Plan   Treatment/Interventions Functional transfer training;LE strengthening/ROM;Elevations;Therapeutic exercise;Endurance training;Bed mobility;Gait training   Progress Progressing toward goals   PT Therapy Minutes   PT Time In 1015   PT Time Out 1145   PT Total Time (minutes) 90   PT Mode of treatment - Individual (minutes) 45   PT Mode of treatment - Concurrent (minutes) 45   PT Mode of treatment - Group (minutes) 0   PT Mode of treatment - Co-treat (minutes) 0   PT Mode of Treatment - Total time(minutes) 90 minutes   PT Cumulative Minutes 831   Therapy Time missed   Time missed? No

## 2024-09-23 NOTE — ASSESSMENT & PLAN NOTE
Wt Readings from Last 3 Encounters:   09/23/24 51.6 kg (113 lb 12.1 oz)   09/11/24 53.8 kg (118 lb 9.7 oz)   09/04/24 53.5 kg (118 lb)     Weight stable  Patient takes Demedex 20 mg daily PRN for weight gain  Monitor daily weights  IM managing

## 2024-09-23 NOTE — PLAN OF CARE
Problem: PAIN - ADULT  Goal: Verbalizes/displays adequate comfort level or baseline comfort level  Description: Interventions:  - Encourage patient to monitor pain and request assistance  - Assess pain using appropriate pain scale  - Administer analgesics based on type and severity of pain and evaluate response  - Implement non-pharmacological measures as appropriate and evaluate response  - Consider cultural and social influences on pain and pain management  - Notify physician/advanced practitioner if interventions unsuccessful or patient reports new pain  Outcome: Progressing     Problem: INFECTION - ADULT  Goal: Absence or prevention of progression during hospitalization  Description: INTERVENTIONS:  - Assess and monitor for signs and symptoms of infection  - Monitor lab/diagnostic results  - Monitor all insertion sites, i.e. indwelling lines, tubes, and drains  - Monitor endotracheal if appropriate and nasal secretions for changes in amount and color  - [place] appropriate cooling/warming therapies per order  - Administer medications as ordered  - Instruct and encourage patient and family to use good hand hygiene technique  - Identify and instruct in appropriate isolation precautions for identified infection/condition  Outcome: Progressing     Problem: SAFETY ADULT  Goal: Maintain or return to baseline ADL function  Description: INTERVENTIONS:  -  Assess patient's ability to carry out ADLs; assess patient's baseline for ADL function and identify physical deficits which impact ability to perform ADLs (bathing, care of mouth/teeth, toileting, grooming, dressing, etc.)  - Assess/evaluate cause of self-care deficits   - Assess range of motion  - Assess patient's mobility; develop plan if impaired  - Assess patient's need for assistive devices and provide as appropriate  - Encourage maximum independence but intervene and supervise when necessary  - Involve family in performance of ADLs  - Assess for home care  needs following discharge   - Consider OT consult to assist with ADL evaluation and planning for discharge  - Provide patient education as appropriate  Outcome: Progressing     Problem: DISCHARGE PLANNING  Goal: Discharge to home or other facility with appropriate resources  Description: INTERVENTIONS:  - Identify barriers to discharge w/patient and caregiver  - Arrange for needed discharge resources and transportation as appropriate  - Identify discharge learning needs (meds, wound care, etc.)  - Arrange for interpretive services to assist at discharge as needed  - Refer to Case Management Department for coordinating discharge planning if the patient needs post-hospital services based on physician/advanced practitioner order or complex needs related to functional status, cognitive ability, or social support system  Outcome: Progressing

## 2024-09-23 NOTE — NURSING NOTE
Patient with forgetfulness and confusion during shift. Needs direction for tasks. Able to ambulate assist x 1 and transfer same. Patient sitting out of bed for all meals. Appetite remains poor. Patient does not initiate eating unless nurse prompts patient. Moist cough noted with productive sputum. Chest xray completed. Reports minimal pain in neck and medicated with routine tylenol. Cervical collar in place. Incision clean, dry, and intact. Educated on importance of repositioning self in chair. Will continue to monitor and follow plan of care.    Detail Level: Detailed Quality 226: Preventive Care And Screening: Tobacco Use: Screening And Cessation Intervention: Patient screened for tobacco use and is an ex/non-smoker Quality 130: Documentation Of Current Medications In The Medical Record: Current Medications Documented Quality 431: Preventive Care And Screening: Unhealthy Alcohol Use - Screening: Patient not identified as an unhealthy alcohol user when screened for unhealthy alcohol use using a systematic screening method

## 2024-09-23 NOTE — PROGRESS NOTES
09/23/24 0711   Pain Assessment   Pain Assessment Tool 0-10   Pain Score 3   Pain Location/Orientation Location: Neck   Restrictions/Precautions   Precautions Aspiration;Bed/chair alarms;Cognitive;Fall Risk;Pain;Spinal precautions;Supervision on toilet/commode   Weight Bearing Restrictions No   ROM Restrictions   (spinal prec)   Braces or Orthoses C/S Collar   Eating   Type of Assistance Needed Supervision;Verbal cues   Comment cues for increased intake   Eating CARE Score 4   Eating Assessment   Food To Mouth Yes   Positioning Upright;Out of Bed   Meal Assessed Breakfast   Opens Packages No   Oral Hygiene   Type of Assistance Needed Set-up / clean-up   Oral Hygiene CARE Score 5   Grooming   Able To Initiate Tasks;Comb/Brush Hair;Wash/Dry Face;Brush/Clean Teeth;Wash/Dry Hands   Limitation Noted In Safety;Strength   Findings s/u w/c level at the sink   Shower/Bathe Self   Type of Assistance Needed Supervision;Incidental touching   Shower/Bathe Self CARE Score 4   Bathing   Assessed Bath Style Sponge Bath   Anticipated D/C Bath Style Shower;Sponge Bath   Able to Gather/Transport No   Able to Adjust Water Temperature No   Able to Wash/Rinse/Dry (body part) Left Arm;Right Arm;L Upper Leg;R Upper Leg;Chest;Abdomen;Perineal Area;Buttocks;L Lower Leg/Foot;R Lower Leg/Foot   Limitations Noted in Balance;Endurance;Problem Solving;ROM;Safety;Strength  (spinal prec)   Positioning Seated;Standing   Adaptive Equipment Longhand Sponge;Longhand Reacher   Tub/Shower Transfer   Reason Not Assessed Sponge Bath   Upper Body Dressing   Type of Assistance Needed Physical assistance   Physical Assistance Level 25% or less   Upper Body Dressing CARE Score 3   Lower Body Dressing   Type of Assistance Needed Physical assistance   Physical Assistance Level 25% or less   Comment using reacher to thread B feet   Lower Body Dressing CARE Score 3   Putting On/Taking Off Footwear   Type of Assistance Needed Physical assistance   Physical  Assistance Level 25% or less   Comment usign dressing stick to doff socks and sock aide to kaden   Putting On/Taking Off Footwear CARE Score 3   Picking Up Object   Type of Assistance Needed Physical assistance   Physical Assistance Level 25% or less   Picking Up Object CARE Score 3   Dressing/Undressing Clothing   Remove UB Clothes Pullover Shirt   Don UB Clothes Pullover Shirt   Remove LB Clothes Pants;Undergarment;Socks;Shoes   Don LB Clothes Pants;Undergarment;Socks;Shoes   Limitations Noted In Balance;Endurance;Problem Solving;Safety;Strength;ROM  (spinal prec)   Adaptive Equipment Reacher;Dressing Stick;Sock Aide   Positioning Supported Sit;Standing   Lying to Sitting on Side of Bed   Type of Assistance Needed Supervision   Lying to Sitting on Side of Bed CARE Score 4   Sit to Stand   Type of Assistance Needed Supervision;Incidental touching   Sit to Stand CARE Score 4   Bed-Chair Transfer   Type of Assistance Needed Supervision;Incidental touching   Chair/Bed-to-Chair Transfer CARE Score 4   Toileting Hygiene   Type of Assistance Needed Incidental touching   Toileting Hygiene CARE Score 4   Toileting   Able to Pull Clothing down yes, up yes.   Manage Hygiene Bladder   Limitations Noted In Balance;Problem Solving;ROM;Safety;LE Strength  (spinal prec)   Adaptive Equipment Grab Bar   Toilet Transfer   Type of Assistance Needed Supervision;Incidental touching   Toilet Transfer CARE Score 4   Toilet Transfer   Surface Assessed Raised Toilet   Transfer Technique Standard   Limitations Noted In Balance;Endurance;Problem Solving;ROM;Safety;LE Strength;UE Strength  (spinal prec)   Adaptive Equipment Grab Bar;Walker   Cognition   Overall Cognitive Status Impaired   Arousal/Participation Alert;Responsive;Cooperative   Attention Attends with cues to redirect   Orientation Level Oriented X4   Memory Decreased short term memory;Decreased recall of recent events;Decreased recall of precautions   Following Commands Follows  one step commands without difficulty   Additional Activities   Additional Activities Other (Comment)   Additional Activities Comments fxl mobility to and from BR with RW and CGA   Assessment   Treatment Assessment Pt presents supine agreeable to OT session including ADLs, toielting, grooming and related transfers/ mobility prior to breakfast arriving. Pt tolerates session with A/E use introduced and rest breaks betwen tasks allowing pt to be more independent with tasks. Pt requires assist due to decreased balance, safety, endurance and strength/ ROM with spinal prec and c collar. Pt is progressing towards goals and will benefit from continued skilled OT services to increase independence with daily tasks.   Problem List Decreased strength;Decreased range of motion;Decreased endurance;Impaired balance;Decreased cognition;Decreased safety awareness;Decreased skin integrity;Orthopedic restrictions;Pain  (spinal prec)   Plan   Treatment/Interventions Functional transfer training;ADL retraining;Therapeutic exercise;Endurance training;Patient/family training;Equipment eval/education;Compensatory technique education   Progress Progressing toward goals   OT Therapy Minutes   OT Time In 0711   OT Time Out 0835   OT Total Time (minutes) 84   OT Mode of treatment - Individual (minutes) 84   Therapy Time missed   Time missed? No

## 2024-09-23 NOTE — NURSING NOTE
Pt ambulated to bathroom this am with CG and RW. Pt reports feeling weak after standing to wipe self. Assisted back to bed. Pt continues with weak moist productive cough of thick yellow sputum. Call bell in reach, bed alarm on.

## 2024-09-23 NOTE — PROGRESS NOTES
"   09/23/24 1340   Pain Assessment   Pain Assessment Tool 0-10   Pain Location/Orientation Location: Shoulder;Location: Neck   Patient's Stated Pain Goal No pain   Hospital Pain Intervention(s) Medication (See MAR)   Multiple Pain Sites No   Restrictions/Precautions   Precautions Aspiration;Bed/chair alarms;Cognitive;Fall Risk;Pain;Spinal precautions;Supervision on toilet/commode   Weight Bearing Restrictions No   Braces or Orthoses C/S Collar   Cognitive Linguisitic Assessments   Cognitive Linquistic Quick Test (CLQT) MOCA completed 9/16/24 -16/30   Comprehension   Comprehension (FIM) 4 - Understands basic info/conversation 75-90% of time   Expression   Verbal Complex   Intelligibility Sentence   QI: Expression 3. Exhibits some difficulty with expressing needs and ideas (e.g., some words or finishing thoughts) or speech is not clear   Expression (FIM) 5 - Needs help/cues only RARELY (< 10% of the time)   Social Interaction   Social Interaction (FIM) 5 - Interacts appropriately with others 90% of time   Problem Solving   Problem solving (FIM) 4 - Solves basic problems 75-89% of time   Memory   Memory (FIM) 4 - Recognizes/recalls/performs 75-89%   Memory Skills   Orientation Level Oriented X4   Speech/Language/Cognition Assessmetn   Treatment Assessment Patient and clinician working toward basic daily event recall.  Patient does accurately recall having had therapy but unable to provide examples of exercises completed.  Patient includes, \"I did need to take breaks when I was walking now that could have been today or another day.\"  Pt aware that her  is \"sick\" right now and in the hospital, reports \"we got the same room can you believe that?\"  Pt demonstrating phelgm production in conversation including needing to blow her nose.  Basic problem solving was poor today, pt unable to provide single solution without min-mod verbal cue, able to increase to two solutions with maximum cues.  She is able to speak about " "the ways that her  helps her around the house but quickly goes off track discussing grave plots in line with her brother who is passed.  This continued in a perseverative conversation recounting passed family members.  Very difficult to gently redirect.   Swallow Information   Current Risks for Dysphagia & Aspiration General debilitation;Cervical spine injury/surgery;Significant ridgity;Head support required   Current Symptoms/Concerns Cough;Clear throat   Current Diet Dysphagia pureed;Honey thick liquids   Baseline Diet Regular;Thin liquids   Consistencies Assessed and Performance   Materials Admnistered Puree/Level 1;Honey thick liquid   Materials Adminstered Comment Pt assessed with puree via tsp   Recommendations   Risk for Aspiration Present   Recommendations Continue swallow eval process   Diet Solid Recommendation Level 1 Dysphagia/pureed   Diet Liquid Recommendation Honey thick liquid   Recommended Form of Meds Crushed;With thick liquid   General Precautions Aspiration precautions   Compensatory Swallowing Strategies Alternate solids and liquids   Results Reviewed with PT/Family/Caregiver;MD;RN   Eating   Type of Assistance Needed Supervision;Verbal cues   Physical Assistance Level No physical assistance   Comment cues for increased PO intake, redirection back to task   Eating CARE Score 4   Swallow Assessment   Swallow Treatment Assessment Patient received sitting upright in bedside wheelchair with remaining tray items present.  Per other staff report patient demonstrating more \"manipulation\" of tray than actual PO intake.  Patient's overall PO consumption is very low.  Today patient with a puree medley she created of pears and magic cup.  Pt frequently stirring items but does not initiate a bite without mod cue by therapist.  Pt also disoriented to time reporting \"I had better not I'll ruin my lunch....my lunch will be coming soon.\"  It is presented 1352, pt had lunch just one hour prior.  She " "demonstrates overt congested, productive cough prior to PO trials.  Pt with wet, gargled speech throughout.  Cough appears seemingly \"out of nowhere.\"  Patient reports that her cough \"gets worse\" when she's done eating.  Patient reporting she \"swallows big\" for the first time and \"swallows little\" for her drink.  Productive sputum is clear to white to yellow.   Swallow Assessment Prognosis   Prognosis Fair   Prognosis Considerations Co-morbidities;Medical prognosis   SLP Therapy Minutes   SLP Time In 1340   SLP Time Out 1420   SLP Total Time (minutes) 40   SLP Mode of treatment - Individual (minutes) 40   SLP Mode of treatment - Concurrent (minutes) 0   SLP Mode of treatment - Group (minutes) 0   SLP Mode of treatment - Co-treat (minutes) 0   SLP Mode of Treatment - Total time(minutes) 40 minutes   SLP Cumulative Minutes 310   Therapy Time missed   Time missed? No       "

## 2024-09-23 NOTE — PROGRESS NOTES
"Progress Note - PMR   Name: Marsha Oliva 79 y.o. female I MRN: [id number]  Unit/Bed#: [facility] 219-01 I Date of Admission: 9/11/2024   Date of Service: 9/23/2024 I Hospital Day: 12     Assessment & Plan  Spinal cord compression (HCC)  Presented  to acute care on 9/4/24 with progressive neck pain and weakness.    She reports 4-6 weeks prior she sustained a fall off her bed, head hitting her nightstand.    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   MRI C spine showing displaced type 2 dens fracture with mild mass effect on cord, ALL and PLL injuries.    CTA head and neck negative for vascular injuries.    Patient seen by Neurosurgery and deemed an operative candidate.    Patient taken to the OR by Dr. Dewitt for a bilateral C1 and C3 lateral mass screw placement, bialteral C2 pedicle screw palcement, C1-C3 fusion.    Post operatively, placed in a Aspen C Collar at all times, except showering with Shonda collar.    Post op X-rays with stable alignment    Plan in ARC:  Begin ARC program with PT, OT, SLP  Monitor incision  Monitor pain  Monitor neuro exam  Follow-up with Neurosurgery in 2 weeks.  2 week post op visit completed with NSGY virtually - \"Will plan to follow-up with the pt as scheduled for her 6 week POV with repeat upright xrays\"  Oropharyngeal dysphagia  New issues  VFSS study completed on 9/10/24 and patient found to have mild oral and moderate phayngeal dysphagia.    Cleared for a pureed diet with honey thick liquids.    SLP to follow  Continue aspiration precautions   Acquired bilateral hammer toes  Keratosis of toe  May need shaving of this lesion  Consult placed to Podiatry  Atrial fibrillation, chronic (HCC)  Chronic A. Fib  Rate/rhythm controlled on digoxin 125 mcg every other day, Cardizem  mg daily  No AC due to prior several GI bleeds.  On aspirin 81 mg daily.  IM managing  B12 deficiency  Continue " supplementation   IM managing  Chronic diastolic CHF (congestive heart failure) ([facility])  Wt Readings from Last 3 Encounters:   09/23/24 51.6 kg (113 lb 12.1 oz)   09/11/24 53.8 kg (118 lb 9.7 oz)   09/04/24 53.5 kg (118 lb)     Weight stable  Patient takes Demedex 20 mg daily PRN for weight gain  Monitor daily weights  IM managing  Depression with anxiety  Mood is stable  Continue Zoloft 100 mg daily  Essential hypertension  Continue Cardizem  mg Daily  Monitor BP and HR with rest and activity  IM managing  Hypothyroidism  Continue Synthroid 125 mcg daily  Mixed hyperlipidemia  Continue Atorvastain 20 mg daily  Folate deficiency  Continue supplementation   IM managing  Acute pain due to trauma  Located in neck  Continue Tylenol 975 mg TID  Oxycodone IR 2.5-5 mg Q 4 hours PRN for moderate-severe pain   Topical ICE  -improved has not required oxy since 9/13  Skin abnormalities  Wound Care team following in ARC:  POA several areas  Continue LWC:     Skin care plans:  1-Hydraguard to sacrum, buttocks and heels BID and PRN  2-EHOB cushion when out of bed in chair.  3-Moisturize skin daily with skin nourishing cream  4-Elevate heels to offload pressure.  5-Turn/reposition q2h for pressure re-distribution on skin  6. Monitor right 2nd toe growth area - leave open to air podiatry consulted.  7. Right arm skin tear - cleanse with Normal saline then apply normlgel ag then top with adaptic then a ABD and keisha change every other day   8. Right and left breast folds - cleanse with soap and water pat dry dust lightly with nystatin powder bid and prn  Lymphedema  Pumps brought in by  - ok to use PRN as tolerated  At risk for altered bowel elimination  Continent  -at home takes daily miralax; rescheduled PRN to daily  At risk for altered urinary elimination  Continent   At risk for deep venous thrombosis  Continue Lovenox 40 mg daily - should not need at discharge  Encounter for rehabilitation  Functional deficits:   cervical collar on at all times, cervical spinal precautions, impaired mobility, self care   Continue current rehabilitation plan of care to maximize function.  Estimated discharge: Thursday 10/3/24 with home care estimated    Severe protein-calorie malnutrition (HCC)  Nutrition following    Malnutrition Findings:   Adult Malnutrition type: Acute illness  Adult Degree of Malnutrition: Other severe protein calorie malnutrition  Malnutrition Characteristics: Inadequate energy, Weight loss  360 Statement: Malnutrition related to inadequate energy intake as evidenced by 7#(6%) weight loss from 9/11/# to 9/18/# and <50% energy intake >5 days.    BMI Findings:  Body mass index is 20.15 kg/m².   Closed odontoid fracture with type II morphology, posterior displacement, and nonunion      History of Present Illness    Marsha Oliva is a 79 y.o. female with HTN, A Fib, CHF, HLD, Hypothyroidism who presented to the [facility] on on 9/4/24 with progressive neck pain and weakness.  She reports 4-6 weeks prior she sustained a fall off her bed, head hitting her nightstand.  She has been having increasing neck pain since.  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 MRI C spine showing displaced type 2 dens fracture with mild mass effect on cord, ALL and PLL injuries.  CTA head and neck negative for vascular injuries.  Patient seen by Neurosurgery and deemed an operative candidate.  Patient taken to the OR by Dr. Dewitt for a bilateral C1 and C3 lateral mass screw placement, bialteral C2 pedicle screw palcement, C1-C3 fusion.  Post operatively, placed in a Aspen C Collar at all times, except showering with Shonda collar.  Post op X-rays with stable alignment  Medical course complicated by acute dysphagia.  VFSS study completed on 9/10/24 and patient found to have mild oral and moderate phayngeal  dysphagia.  Cleared for a pureed diet with honey thick liquids.  Patient seen by wound care team for abrasions and stage I pressure injury to sacrum.  Patient started on Tylenol and PRN oxycodone for acute pain.    After medical stabilization, patient found to have acute functional deficits from his baseline, therefore, admitted to [facility] for acute inpatient rehabilitation.      Chief Complaint: f/u SCI and f/u ambulatory dysfunction    Interval: Patient seen and evaluated in room.  No acute issues overnight.  Evaluated by internal medicine today and felt that productive cough was slightly worse although patient notes that this is a chronic issue but has gotten worse in recent days.  Has a history of aspiration and a stat chest x-ray was ordered.  To medically she does have orders for dextromethorphan-guaifenesin every 4 hours and encouraged to use to help thin out secretions.  X-ray was completed this afternoon and no acute cardiopulmonary disease was noted.  Reviewed labs from today as well all within normal limits on the CBC and BMP.  She does have some neck pain but is under relative control with medications.    Objective     Functional Update:  Physical Therapy Occupational Therapy Speech Therapy   Weight Bearing Status: Full Weight Bearing  Transfers: Incidental Touching  Bed Mobility: Supervision  Amulation Distance (ft): 71 feet  Ambulation: Incidental Touching  Assistive Device for Ambulation: Roller Walker  Wheelchair Mobility Distance: 20 ft  Wheelchair Mobility: Incidental Touching  Number of Stairs: 4  Assistive Device for Stairs: Bilateral Hand Rails  Stair Assistance: Moderate Assistance (min-mod x 2)  Ramp:  (TBA as able)  Discharge Recommendations: Home with:  DC Home with:: Family Support, First Floor Setup, Home Physical Therapy, Outpatient Physical Therapy   Eating: Supervision  Grooming: Supervision  Bathing: Incidental Touching  Bathing: Incidental Touching  Upper Body Dressing:  Minimal Assistance  Lower Body Dressing: Minimal Assistance  Toileting: Incidental Touching  Tub/Shower Transfer: Minimal Assistance  Toilet Transfer: Incidental Touching, Supervision  Cognition: Exceptions to WNL  Cognition: Decreased Memory, Decreased Attention, Decreased Comprehension, Decreased Safety  Orientation: Person, Place, Time, Situation   Mode of Communication: Verbal  Cognition: Exceptions to WNL  Cognition: Decreased Memory, Decreased Executive Functions, Decreased Attention, Decreased Comprehension  Orientation: Person, Place, Time, Situation  Swallowing: Exceptions to WNL  Swallowing: Oral Dysphagia, Pharyngeal Dysphagia  Diet Recommendations: Level 1/Puree, Honey Thick  Discharge Recommendations: Home with:  DC Home with:: Family Support, Home Speech Therapy (close supervision 2' cognitive deficits)         Temp:  [96.8 °F (36 °C)-97.1 °F (36.2 °C)] 96.8 °F (36 °C)  HR:  [92-96] 96  Resp:  [16] 16  BP: (100-118)/(60-68) 100/60  SpO2:  [90 %-93 %] 93 %  Physical Exam  Vitals reviewed.   Constitutional:       General: She is not in acute distress.  HENT:      Head: Normocephalic and atraumatic.      Right Ear: External ear normal.      Left Ear: External ear normal.      Nose: Nose normal.      Mouth/Throat:      Mouth: Mucous membranes are moist.      Pharynx: Oropharynx is clear.   Eyes:      General: No scleral icterus.  Neck:      Comments: C-collar in place  Cardiovascular:      Rate and Rhythm: Normal rate.      Pulses: Normal pulses.   Pulmonary:      Effort: Pulmonary effort is normal. No respiratory distress.      Comments: Moist semiproductive cough  Abdominal:      General: Bowel sounds are normal. There is no distension.      Palpations: Abdomen is soft.      Tenderness: There is no abdominal tenderness.   Musculoskeletal:         General: No swelling.   Skin:     General: Skin is warm.      Comments: Neck incision clean   Neurological:      Mental Status: She is alert and oriented to  person, place, and time.      Motor: Weakness (Proximal leg weakness, mild) present.   Psychiatric:         Mood and Affect: Mood normal.             Scheduled Meds:  Current Facility-Administered Medications   Medication Dose Route Frequency Provider Last Rate    acetaminophen  975 mg Oral Q8H [facility] Sharmin Waggoner PA-C      Artificial Tears  2 drop Both Eyes Q3H PRN JAQUELINE Amos      aspirin  81 mg Oral Daily Corine Park MD      atorvastatin  20 mg Oral Daily With Dinner Sharmin Waggoner PA-C      bisacodyl  10 mg Rectal Daily PRN Sharmin Waggoner PA-C      bisacodyl  10 mg Rectal Once Corine Park MD      calcium carbonate  1,000 mg Oral Daily PRN Sharmin Waggoner PA-C      cyanocobalamin  1,000 mcg Oral Weekly Sharmin Waggoner PA-C      dextromethorphan-guaiFENesin  10 mL Oral Q4H PRN Tj Darling MD      digoxin  125 mcg Oral Every Other Day Sharmin Waggoner PA-C      diltiazem  180 mg Oral Daily Sharmin Waggoner PA-C      docusate sodium  100 mg Oral BID Corine Park MD      enoxaparin  40 mg Subcutaneous Q24H [facility] Corine Park MD      fluticasone  2 spray Each Nare Daily JAQUELINE Amos      folic acid  1,000 mcg Oral Daily Sharmin aWggoner PA-C      lactulose  20 g Oral Daily PRN Jermaine Greenberg DO      levothyroxine  125 mcg Oral Early Morning Sharmin Waggoner PA-C      magnesium gluconate  250 mg Oral BID Sharmin Waggoner PA-C      nystatin   Topical BID Corine Park MD      ondansetron  4 mg Oral Q6H PRN Sharmin Waggoner PA-C      oxyCODONE  2.5 mg Oral Q4H PRN Sharmin Waggoner PA-C      Or    oxyCODONE  5 mg Oral Q4H PRN Sharmin Waggoner PA-C      phenol  1 spray Mouth/Throat Q2H PRN Sharmin Waggoner PA-C      senna  2 tablet Oral HS Corine Park MD      sertraline  100 mg Oral Daily Sharmin Waggoner PA-C      torsemide  20 mg Oral Daily  GAY Waggoner PA-C           Lab Results: I have reviewed the following results:   Results from last 7 days   Lab Units 09/23/24  0410   HEMOGLOBIN g/dL 11.6   HEMATOCRIT % 38.8   WBC Thousand/uL 7.70   PLATELETS Thousands/uL 195     Results from last 7 days   Lab Units 09/23/24  0410   BUN mg/dL 16   SODIUM mmol/L 140   POTASSIUM mmol/L 4.0   CHLORIDE mmol/L 104   CREATININE mg/dL 0.71   AST U/L 24   ALT U/L 17                Jermaine Greenberg,   Physical Medicine and Rehabilitation  [facility]    I have spent a total time of 35 minutes in caring for this patient on the day of the visit/encounter including Counseling / Coordination of care, Documenting in the medical record, Reviewing / ordering tests, medicine, procedures  , Obtaining or reviewing history  , and Communicating with other healthcare professionals .

## 2024-09-23 NOTE — PROGRESS NOTES
09/23/24 1230   Pain Assessment   Pain Assessment Tool 0-10   Pain Score 3   Pain Location/Orientation Location: Neck   Restrictions/Precautions   Precautions Aspiration;Bed/chair alarms;Cognitive;Fall Risk;Pain;Spinal precautions;Supervision on toilet/commode   Weight Bearing Restrictions No   ROM Restrictions   (spinal prec with c collar)   Braces or Orthoses C/S Collar   Eating   Type of Assistance Needed Supervision;Verbal cues   Eating CARE Score 4   Eating Assessment   Food To Mouth Yes   Positioning Upright;Out of Bed   Meal Assessed Lunch   Opens Packages No   Findings pt requries encouragement to increase intake of bot solids and fluids; pt able to eat all of mashed potatoes and majority of magic cup, during meal pt able to excreet secretions often to clear throat although does not reinitiate intake due to decreased attention to task and task completion   Cognition   Overall Cognitive Status Impaired   Arousal/Participation Alert;Responsive;Cooperative   Attention Attends with cues to redirect   Orientation Level Oriented X4   Memory Decreased short term memory;Decreased recall of recent events;Decreased recall of precautions   Following Commands Follows one step commands without difficulty   Assessment   Treatment Assessment Pt presents seated in w/c with lunch tray setup and little intake noted. Pt requries encouragement for intake during conversation with importance of intake reviewed. RN/ ST notified of need for supervision and encouragement during meal to increase intake of solids and fluids. Pt's barrier same as listed during earlier sessiona nd will benefit from continued skilled OT services to increase independence with daily tasks.   Problem List Decreased strength;Decreased range of motion;Decreased endurance;Impaired balance;Decreased cognition;Decreased safety awareness;Pain  (spinal prec with c collar)   Plan   Treatment/Interventions ADL retraining;Functional transfer training;Therapeutic  exercise;Endurance training;Patient/family training;Equipment eval/education;Compensatory technique education   Progress Progressing toward goals   OT Therapy Minutes   OT Time In 1230   OT Time Out 1300   OT Total Time (minutes) 30   OT Mode of treatment - Individual (minutes) 30   Therapy Time missed   Time missed? No

## 2024-09-23 NOTE — ASSESSMENT & PLAN NOTE
Nutrition following    Malnutrition Findings:   Adult Malnutrition type: Acute illness  Adult Degree of Malnutrition: Other severe protein calorie malnutrition  Malnutrition Characteristics: Inadequate energy, Weight loss  360 Statement: Malnutrition related to inadequate energy intake as evidenced by 7#(6%) weight loss from 9/11/# to 9/18/# and <50% energy intake >5 days.    BMI Findings:  Body mass index is 20.15 kg/m².

## 2024-09-24 PROCEDURE — 97535 SELF CARE MNGMENT TRAINING: CPT

## 2024-09-24 PROCEDURE — 99233 SBSQ HOSP IP/OBS HIGH 50: CPT | Performed by: STUDENT IN AN ORGANIZED HEALTH CARE EDUCATION/TRAINING PROGRAM

## 2024-09-24 PROCEDURE — 97116 GAIT TRAINING THERAPY: CPT

## 2024-09-24 PROCEDURE — 97530 THERAPEUTIC ACTIVITIES: CPT

## 2024-09-24 PROCEDURE — 97537 COMMUNITY/WORK REINTEGRATION: CPT

## 2024-09-24 PROCEDURE — 92526 ORAL FUNCTION THERAPY: CPT | Performed by: NURSE PRACTITIONER

## 2024-09-24 PROCEDURE — 97110 THERAPEUTIC EXERCISES: CPT

## 2024-09-24 RX ADMIN — ACETAMINOPHEN 975 MG: 325 TABLET ORAL at 05:56

## 2024-09-24 RX ADMIN — ASPIRIN 81 MG: 81 TABLET, COATED ORAL at 09:26

## 2024-09-24 RX ADMIN — GUAIFENESIN AND DEXTROMETHORPHAN 10 ML: 100; 10 SYRUP ORAL at 10:17

## 2024-09-24 RX ADMIN — Medication 250 MG: at 09:26

## 2024-09-24 RX ADMIN — ACETAMINOPHEN 975 MG: 325 TABLET ORAL at 13:38

## 2024-09-24 RX ADMIN — SENNOSIDES 17.2 MG: 8.6 TABLET, FILM COATED ORAL at 21:02

## 2024-09-24 RX ADMIN — Medication 250 MG: at 17:39

## 2024-09-24 RX ADMIN — FOLIC ACID 1000 MCG: 1 TABLET ORAL at 09:26

## 2024-09-24 RX ADMIN — ENOXAPARIN SODIUM 40 MG: 40 INJECTION SUBCUTANEOUS at 09:25

## 2024-09-24 RX ADMIN — NYSTATIN: 100000 POWDER TOPICAL at 17:46

## 2024-09-24 RX ADMIN — DOCUSATE SODIUM 100 MG: 100 CAPSULE, LIQUID FILLED ORAL at 09:26

## 2024-09-24 RX ADMIN — FLUTICASONE PROPIONATE 2 SPRAY: 50 SPRAY, METERED NASAL at 09:25

## 2024-09-24 RX ADMIN — DOCUSATE SODIUM 100 MG: 100 CAPSULE, LIQUID FILLED ORAL at 17:39

## 2024-09-24 RX ADMIN — NYSTATIN: 100000 POWDER TOPICAL at 09:28

## 2024-09-24 RX ADMIN — ACETAMINOPHEN 975 MG: 325 TABLET ORAL at 21:02

## 2024-09-24 RX ADMIN — SERTRALINE 100 MG: 100 TABLET, FILM COATED ORAL at 09:26

## 2024-09-24 RX ADMIN — LEVOTHYROXINE SODIUM 125 MCG: 25 TABLET ORAL at 05:56

## 2024-09-24 RX ADMIN — ATORVASTATIN CALCIUM 20 MG: 20 TABLET, FILM COATED ORAL at 17:30

## 2024-09-24 RX ADMIN — DILTIAZEM HYDROCHLORIDE 180 MG: 180 CAPSULE, COATED, EXTENDED RELEASE ORAL at 09:26

## 2024-09-24 NOTE — PROGRESS NOTES
09/24/24 1315   Pain Assessment   Pain Assessment Tool 0-10   Pain Score 5   Pain Location/Orientation Location: Neck   Restrictions/Precautions   Precautions Aspiration;Bed/chair alarms;Cognitive;Fall Risk;Pain;Supervision on toilet/commode   Weight Bearing Restrictions No   ROM Restrictions   (spinal prec)   Braces or Orthoses C/S Collar   Eating   Type of Assistance Needed Supervision   Eating CARE Score 4   Eating Assessment   Food To Mouth Yes   Positioning Upright;Out of Bed   Meal Assessed Lunch   Findings supervision with newly issued spouted cup with reminders to tilt upward and sip. Pt attempted one bite of Magic Cup although more intake deferred due to cough and increases secretions noted   Grooming   Able To Initiate Tasks;Wash/Dry Hands   Limitation Noted In Safety;Strength   Findings supervision seated at the sink   Putting On/Taking Off Footwear   Type of Assistance Needed Supervision   Comment removing slippers before entering bed   Putting On/Taking Off Footwear CARE Score 4   Dressing/Undressing Clothing   Don LB Clothes Shoes   Limitations Noted In Balance;Endurance;Problem Solving;Safety;Sequencing;ROM;Strength  (spinal prec with c collar)   Sit to Lying   Type of Assistance Needed Physical assistance   Physical Assistance Level 25% or less   Comment cues for spinal prec with c collar   Sit to Lying CARE Score 3   Sit to Stand   Type of Assistance Needed Supervision   Sit to Stand CARE Score 4   Bed-Chair Transfer   Type of Assistance Needed Supervision   Chair/Bed-to-Chair Transfer CARE Score 4   Toileting Hygiene   Type of Assistance Needed Incidental touching   Toileting Hygiene CARE Score 4   Toileting   Able to Pull Clothing down yes, up yes.   Manage Hygiene Bladder   Limitations Noted In Balance;Problem Solving;ROM;Safety;LE Strength  (spinal prec with c collar)   Adaptive Equipment Grab Bar   Toilet Transfer   Type of Assistance Needed Incidental touching   Toilet Transfer CARE Score 4    Toilet Transfer   Surface Assessed Raised Toilet   Transfer Technique Stand Pivot   Limitations Noted In Balance;Endurance;Problem Solving;ROM;Safety;LE Strength   Adaptive Equipment Grab Bar   Exercise Tools   Other Exercise Tool 1 card match reaching with BUE while seated and then removing  into suit; increased time required due to decreased attention and STM   Other Exercise Tool 2 push pegs out of board using B hands to stack into sets of 3 while seated   Cognition   Overall Cognitive Status Impaired   Arousal/Participation Alert;Responsive;Cooperative   Attention Attends with cues to redirect   Orientation Level Oriented to person;Oriented to place   Memory Decreased short term memory;Decreased recall of recent events;Decreased recall of precautions   Following Commands Follows one step commands with increased time or repetition   Comments pt reports increased difficulty with memroy as fatigue increases   Other Comments   Assessment Pt participates in 55 minutes concurrent treatment focusing on BUE therex/ tehract with similar goals as another patient to increase UB strength/ activity tolerance   Assessment   Treatment Assessment Pt presents seated in w/c directly from ST sessiona nd agreeable to OT Session including fluid intake, BUE therex/ theract, toileting and bed mobility. Pt requries assist due to decreased balance, safety, endurance, strength/ ROM, cogntion and spinal prec with c collar. Pt is making gains towards although oral intake is challenged more often than initially. Pt will benefit from continued skilled OT services to increase independence with daily tasks.   Problem List Decreased strength;Decreased range of motion;Decreased endurance;Impaired balance;Decreased safety awareness;Decreased skin integrity;Orthopedic restrictions;Pain  (spinal prec with c collar)   Plan   Treatment/Interventions ADL retraining;Functional transfer training;Therapeutic exercise;Endurance  training;Patient/family training;Equipment eval/education;Compensatory technique education   Progress Progressing toward goals   OT Therapy Minutes   OT Time In 1315   OT Time Out 1425   OT Total Time (minutes) 70   OT Mode of treatment - Individual (minutes) 15   OT Mode of treatment - Concurrent (minutes) 55   Therapy Time missed   Time missed? No

## 2024-09-24 NOTE — PROGRESS NOTES
09/24/24 0650   Pain Assessment   Pain Assessment Tool 0-10   Pain Score 5  (Pain decreased from 5/10 to 3/10 when C-collar was tightened)   Pain Location/Orientation Orientation: Lower;Location: Neck   Restrictions/Precautions   Precautions Aspiration;Bed/chair alarms;Cognitive;Fall Risk;Pain;Spinal precautions;Supervision on toilet/commode   Weight Bearing Restrictions No   ROM Restrictions   (Spinal precautions)   Braces or Orthoses C/S Collar   Cognition   Overall Cognitive Status Impaired   Arousal/Participation Alert;Responsive;Cooperative   Attention Attends with cues to redirect   Orientation Level Oriented X4   Memory Decreased short term memory;Decreased recall of recent events;Decreased recall of precautions   Following Commands Follows one step commands without difficulty   Subjective   Subjective Pt reports not sleeping overnight   Roll Left and Right   Type of Assistance Needed Independent   Physical Assistance Level No physical assistance   Comment with bedrails   Roll Left and Right CARE Score 6   Sit to Lying   Comment Pt remained OOB   Lying to Sitting on Side of Bed   Type of Assistance Needed Independent   Physical Assistance Level No physical assistance   Comment with bedrails   Lying to Sitting on Side of Bed CARE Score 6   Sit to Stand   Type of Assistance Needed Supervision   Physical Assistance Level No physical assistance   Comment with RW   Sit to Stand CARE Score 4   Bed-Chair Transfer   Type of Assistance Needed Supervision   Physical Assistance Level No physical assistance   Comment with RW   Chair/Bed-to-Chair Transfer CARE Score 4   Car Transfer   Reason if not Attempted Environmental limitations   Car Transfer CARE Score 10   Walk 10 Feet   Type of Assistance Needed Incidental touching;Supervision   Physical Assistance Level No physical assistance   Comment Close supervision/CG with RW on level and unlevel surfaces. increased rest breaks needed this session.   Walk 10 Feet CARE  Score 4   Walk 50 Feet with Two Turns   Type of Assistance Needed Incidental touching;Supervision   Physical Assistance Level No physical assistance   Comment Close supervision/CG with RW on level and unlevel surfaces. increased rest breaks needed this session.   Walk 50 Feet with Two Turns CARE Score 4   Walk 150 Feet   Reason if not Attempted Safety concerns   Walk 150 Feet CARE Score 88   Walking 10 Feet on Uneven Surfaces   Type of Assistance Needed Incidental touching;Supervision   Physical Assistance Level No physical assistance   Comment Close supervision/CG with RW on level and unlevel surfaces. increased rest breaks needed this session.   Walking 10 Feet on Uneven Surfaces CARE Score 4   Ambulation   Primary Mode of Locomotion Prior to Admission Walk   Distance Walked (feet) 126 ft  (126, 19, 66, 24, and 45 ft)   Assist Device Roller Walker   Gait Pattern Antalgic;Inconsistant Vaishnavi;Slow Vaishnavi;Decreased foot clearance;Forward Flexion;Narrow FLORENTINO;Poor UE WB;Shuffle;Improper weight shift   Limitations Noted In Balance;Device Management;Endurance;Posture;Safety;Speed;Strength   Provided Assistance with: Balance;Direction   Walk Assist Level Close Supervision;Contact Guard   Findings Close supervision/CG with RW on level and unlevel surfaces. increased rest breaks needed this session.   Does the patient walk? 2. Yes   Wheelchair mobility   Does the patient use a wheelchair? 1. Yes   Type of Wheelchair Used 1. Manual   Method Right upper extremity;Left upper extremity;Right lower extremity;Left lower extremity   Assistance Provided For Locking Brakes   Distance Level Surface (feet) 25 ft   Distance Wheeled 3% Grade 0 ft   Findings Supervision for turning   Curb or Single Stair   Style negotiated Single stair   Type of Assistance Needed Incidental touching   Physical Assistance Level No physical assistance   Comment 4-4 in steps with CG assist and charly HR use. non-reciprocal pattern.   1 Step (Curb) CARE Score  4   4 Steps   Type of Assistance Needed Incidental touching   Physical Assistance Level No physical assistance   Comment 4-4 in steps with CG assist and charly HR use. non-reciprocal pattern.   4 Steps CARE Score 4   12 Steps   Reason if not Attempted Safety concerns   12 Steps CARE Score 88   Stairs   Type Stairs;Ramp   # of Steps 4  (4- 4 in steps)   Weight Bearing Precautions WBAT   Assist Devices Bilateral Rail   Findings 4-4 in steps with CG assist and charly HR use. non-reciprocal pattern on stairs. CG assist on ramp with RW, needed rest break after ascend.   Picking Up Object   Reason if not Attempted Safety concerns   Picking Up Object CARE Score 88   Toilet Transfer   Comment Not needed during session   Therapeutic Interventions   Strengthening supine ther ex   Flexibility gastroc and hamstring stretching   Balance gait and transfer training   Other stair and ramp negotiation   Assessment   Treatment Assessment Pt agreeable to PT session this morning. Pain initially 5/10 and then decreased to 3/10 after c-collar was tightened. Pt independent for bed mobility with use of bed rails. Supervision for transfers with RW. CG for ambulation with RW on level and unlevel surfaces for max distance of 126 ft. Pt required frequent rest breaks while ambulating this session due to fatigue. CG for stair negotiation with charly HR use and CG for ramp negotiation with RW. Ther ex for general LE strengthening. Gastroc and hamstring stretching for improved ROM with gait and transfers. Gait and transfer training for increased balance, safety, and independence for functional mobility. Pt returned to room in  for breakfast with alarms on and all needs within reach.   Problem List Decreased strength;Decreased range of motion;Decreased endurance;Impaired balance;Decreased mobility;Decreased cognition;Impaired judgement;Decreased safety awareness;Decreased skin integrity;Orthopedic restrictions;Pain  (spinal prec with c collar)   Barriers  to Discharge Inaccessible home environment;Decreased caregiver support   PT Barriers   Physical Impairment Decreased strength;Decreased range of motion;Decreased endurance;Impaired balance;Decreased mobility;Impaired judgement;Decreased safety awareness;Pain;Orthopedic restrictions   Functional Limitation Car transfers;Ramp negotiation;Stair negotiation;Standing;Transfers;Walking;Wheelchair management   Plan   Treatment/Interventions Functional transfer training;LE strengthening/ROM;Elevations;Therapeutic exercise;Endurance training;Bed mobility;Gait training   Progress Progressing toward goals   PT Therapy Minutes   PT Time In 0650   PT Time Out 0825   PT Total Time (minutes) 95   PT Mode of treatment - Individual (minutes) 95   PT Mode of treatment - Concurrent (minutes) 0   PT Mode of treatment - Group (minutes) 0   PT Mode of treatment - Co-treat (minutes) 0   PT Mode of Treatment - Total time(minutes) 95 minutes   PT Cumulative Minutes 926   Therapy Time missed   Time missed? No

## 2024-09-24 NOTE — NURSING NOTE
Patient with moist cough in AM. Unable to clear throat for nurse. Patient ate small amount for breakfast. Coughing constistenly after breakfast. Robitussin given thickened and patient compliant. Ambulates assist x 1 with walker. Continues with generalized weakness. Cervical collar in place. Incision clean.dry, and intact. For lunch and dinner patient sitting upright in wheelchair. Appetite poor for both and moist cough continues. Needs instruction to swallow hard. Patient frequently coughing up mucous. 02 sat remains greater then 90% on RA. Patient unaware of need for liquid to be thickened. Needs direction and cueing for tasks. Full supervision provided with meals. Forgetful of safety precautions. Remains continant of bowel and bladder. Will continue to monitor and follow plan of care.

## 2024-09-24 NOTE — PROGRESS NOTES
"Progress Note - PMR   Name: Marsha Oliva 79 y.o. female I MRN: [id number]  Unit/Bed#: [facility] 219-01 I Date of Admission: 9/11/2024   Date of Service: 9/24/2024 I Hospital Day: 13     Assessment & Plan  Spinal cord compression (HCC)  Presented  to acute care on 9/4/24 with progressive neck pain and weakness.    She reports 4-6 weeks prior she sustained a fall off her bed, head hitting her nightstand.    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   MRI C spine showing displaced type 2 dens fracture with mild mass effect on cord, ALL and PLL injuries.    CTA head and neck negative for vascular injuries.    Patient seen by Neurosurgery and deemed an operative candidate.    Patient taken to the OR by Dr. Dewitt for a bilateral C1 and C3 lateral mass screw placement, bialteral C2 pedicle screw palcement, C1-C3 fusion.    Post operatively, placed in a Aspen C Collar at all times, except showering with Shonda collar.    Post op X-rays with stable alignment    Plan in ARC:  Begin ARC program with PT, OT, SLP  Monitor incision  Monitor pain  Monitor neuro exam  Follow-up with Neurosurgery in 2 weeks.  2 week post op visit completed with NSGY virtually - \"Will plan to follow-up with the pt as scheduled for her 6 week POV with repeat upright xrays\"  Oropharyngeal dysphagia  New issues  VFSS study completed on 9/10/24 and patient found to have mild oral and moderate phayngeal dysphagia.    Cleared for a pureed diet with honey thick liquids.    SLP to follow  Continue aspiration precautions   Acquired bilateral hammer toes  Keratosis of toe  May need shaving of this lesion  Consult placed to Podiatry  Atrial fibrillation, chronic (HCC)  Chronic A. Fib  Rate/rhythm controlled on digoxin 125 mcg every other day, Cardizem  mg daily  No AC due to prior several GI bleeds.  On aspirin 81 mg daily.  IM managing  B12 deficiency  Continue " supplementation   IM managing  Chronic diastolic CHF (congestive heart failure) ([facility])  Wt Readings from Last 3 Encounters:   09/24/24 50.4 kg (111 lb 1.8 oz)   09/11/24 53.8 kg (118 lb 9.7 oz)   09/04/24 53.5 kg (118 lb)     Weight stable  Patient takes Demedex 20 mg daily PRN for weight gain  Monitor daily weights  IM managing  Depression with anxiety  Mood is stable  Continue Zoloft 100 mg daily  Essential hypertension  Continue Cardizem  mg Daily  Monitor BP and HR with rest and activity  IM managing  Hypothyroidism  Continue Synthroid 125 mcg daily  Mixed hyperlipidemia  Continue Atorvastain 20 mg daily  Folate deficiency  Continue supplementation   IM managing  Acute pain due to trauma  Located in neck  Continue Tylenol 975 mg TID  Oxycodone IR 2.5-5 mg Q 4 hours PRN for moderate-severe pain   Topical ICE  -improved has not required oxy since 9/13  Skin abnormalities  Wound Care team following in ARC:  POA several areas  Continue LWC:     Skin care plans:  1-Hydraguard to sacrum, buttocks and heels BID and PRN  2-EHOB cushion when out of bed in chair.  3-Moisturize skin daily with skin nourishing cream  4-Elevate heels to offload pressure.  5-Turn/reposition q2h for pressure re-distribution on skin  6. Monitor right 2nd toe growth area - leave open to air podiatry consulted.  7. Right arm skin tear - cleanse with Normal saline then apply normlgel ag then top with adaptic then a ABD and keisha change every other day   8. Right and left breast folds - cleanse with soap and water pat dry dust lightly with nystatin powder bid and prn  Lymphedema  Pumps brought in by  - ok to use PRN as tolerated  At risk for altered bowel elimination  Continent  -at home takes daily miralax; rescheduled PRN to daily  At risk for altered urinary elimination  Continent   At risk for deep venous thrombosis  Continue Lovenox 40 mg daily - should not need at discharge  Encounter for rehabilitation  Functional deficits:   cervical collar on at all times, cervical spinal precautions, impaired mobility, self care   Continue current rehabilitation plan of care to maximize function.  Estimated discharge: Thursday 10/3/24 with home care estimated    Severe protein-calorie malnutrition (HCC)  Nutrition following    Malnutrition Findings:   Adult Malnutrition type: Acute illness  Adult Degree of Malnutrition: Other severe protein calorie malnutrition  Malnutrition Characteristics: Inadequate energy, Weight loss  360 Statement: Malnutrition related to inadequate energy intake as evidenced by 7#(6%) weight loss from 9/11/# to 9/18/# and <50% energy intake >5 days.    BMI Findings:  Body mass index is 19.68 kg/m².   Closed odontoid fracture with type II morphology, posterior displacement, and nonunion      History of Present Illness   Marsha Oliva is a 79 y.o. female with HTN, A Fib, CHF, HLD, Hypothyroidism who presented to the [facility] on on 9/4/24 with progressive neck pain and weakness.  She reports 4-6 weeks prior she sustained a fall off her bed, head hitting her nightstand.  She has been having increasing neck pain since.  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 MRI C spine showing displaced type 2 dens fracture with mild mass effect on cord, ALL and PLL injuries.  CTA head and neck negative for vascular injuries.  Patient seen by Neurosurgery and deemed an operative candidate.  Patient taken to the OR by Dr. Dewitt for a bilateral C1 and C3 lateral mass screw placement, bialteral C2 pedicle screw palcement, C1-C3 fusion.  Post operatively, placed in a Aspen C Collar at all times, except showering with Shonda collar.  Post op X-rays with stable alignment  Medical course complicated by acute dysphagia.  VFSS study completed on 9/10/24 and patient found to have mild oral and moderate phayngeal  dysphagia.  Cleared for a pureed diet with honey thick liquids.  Patient seen by wound care team for abrasions and stage I pressure injury to sacrum.  Patient started on Tylenol and PRN oxycodone for acute pain.    After medical stabilization, patient found to have acute functional deficits from his baseline, therefore, admitted to [facility] for acute inpatient rehabilitation.      Chief Complaint: f/u ambulatory dysfunction weekly team conference    Interval: Patient seen and evaluated in room.  No acute events overnight.  She feels like her cough is better today and x-ray was read and no acute cardiopulmonary disease was noted.  Continue to work with thickening of the Mucinex as needed.  No fever, chills, nausea, vomiting, cough questions with diarrhea or constipation.  We discussed case during weekly team conference and would benefit from continued ongoing therapies based on her need to be more independent given her home situation.  We are trying to move towards a 10/11 date and would benefit from home nursing, PT, OT, speech, home health aide and social work.  MiraLAX was discontinued yesterday as cannot be thickened and is on a regimen of lactulose as needed for her bowel movements.  Mood today is very good.  Also seen by nutrition today and appreciate the recommendations and evaluation    Objective     Functional Update:  Physical Therapy Occupational Therapy Speech Therapy   Weight Bearing Status: Full Weight Bearing  Transfers: Supervision  Bed Mobility: Independent  Amulation Distance (ft): 122 feet  Ambulation: Incidental Touching  Assistive Device for Ambulation: Roller Walker  Wheelchair Mobility Distance: 25 ft  Wheelchair Mobility: Supervision  Number of Stairs: 7  Assistive Device for Stairs: Bilateral Hand Rails  Stair Assistance: Incidental Touching  Ramp: Incidental Touching  Assistive Device for Ramp: Roller Walker  Discharge Recommendations: Home with:  DC Home with:: 24 Hour Assisteance,  Family Support, First Floor Setup, Home Physical Therapy   Eating: Supervision  Grooming: Supervision  Bathing: Incidental Touching  Bathing: Incidental Touching  Upper Body Dressing: Minimal Assistance  Lower Body Dressing: Minimal Assistance  Toileting: Incidental Touching  Tub/Shower Transfer: Minimal Assistance  Toilet Transfer: Incidental Touching, Supervision  Cognition: Exceptions to WNL  Cognition: Decreased Memory, Decreased Attention, Decreased Comprehension, Decreased Safety  Orientation: Person, Place, Time, Situation   Mode of Communication: Verbal  Cognition: Exceptions to WNL  Cognition: Decreased Memory, Decreased Executive Functions, Decreased Attention, Decreased Comprehension  Orientation: Person, Place, Time, Situation  Swallowing: Exceptions to WNL  Swallowing: Oral Dysphagia, Pharyngeal Dysphagia  Diet Recommendations: Level 1/Puree, Honey Thick  Discharge Recommendations: Home with:  DC Home with:: Family Support, Home Speech Therapy (close supervision 2' cognition and dysphagia severity)         Temp:  [97.1 °F (36.2 °C)-98.6 °F (37 °C)] 98.6 °F (37 °C)  HR:  [] 106  Resp:  [16-17] 17  BP: (111-127)/(67-73) 127/73  SpO2:  [91 %-94 %] 91 %  Physical Exam  Vitals reviewed.   Constitutional:       General: She is not in acute distress.  HENT:      Head: Normocephalic and atraumatic.      Right Ear: External ear normal.      Left Ear: External ear normal.      Nose: Nose normal. No rhinorrhea.      Mouth/Throat:      Mouth: Mucous membranes are moist.      Pharynx: Oropharynx is clear.   Eyes:      General: No scleral icterus.  Neck:      Comments: Cervical collar in place  Cardiovascular:      Rate and Rhythm: Normal rate.      Pulses: Normal pulses.   Pulmonary:      Effort: Pulmonary effort is normal. No respiratory distress.      Comments: Moist semiproductive cough at times, less than during my evaluation yesterday  Abdominal:      General: There is no distension.      Palpations:  Abdomen is soft.   Musculoskeletal:         General: No swelling.   Skin:     General: Skin is warm and dry.      Comments: Neck incision clean dry and intact   Neurological:      Mental Status: She is alert and oriented to person, place, and time.      Motor: Weakness (Proximal leg weakness) present.   Psychiatric:         Mood and Affect: Mood normal.         Behavior: Behavior normal.           Scheduled Meds:  Current Facility-Administered Medications   Medication Dose Route Frequency Provider Last Rate    acetaminophen  975 mg Oral Q8H [facility] Sharmin Waggoner PA-C      Artificial Tears  2 drop Both Eyes Q3H PRN JAQUELINE Amos      aspirin  81 mg Oral Daily Corine Park MD      atorvastatin  20 mg Oral Daily With Dinner Sharmin Waggoner PA-C      bisacodyl  10 mg Rectal Daily PRN Sharmin Waggoner PA-C      bisacodyl  10 mg Rectal Once Corine Park MD      calcium carbonate  1,000 mg Oral Daily PRN Sharmin Waggoner PA-C      cyanocobalamin  1,000 mcg Oral Weekly Sharmin Waggoner PA-C      dextromethorphan-guaiFENesin  10 mL Oral Q4H PRN Tj Darling MD      digoxin  125 mcg Oral Every Other Day Sharmin Waggoner PA-C      diltiazem  180 mg Oral Daily Sharmin Waggoner PA-C      docusate sodium  100 mg Oral BID Corine Park MD      enoxaparin  40 mg Subcutaneous Q24H [facility] Corine Park MD      fluticasone  2 spray Each Nare Daily JAQUELINE Amos      folic acid  1,000 mcg Oral Daily Sharmin Waggoner PA-C      lactulose  20 g Oral Daily PRN Jermaine Greebnerg DO      levothyroxine  125 mcg Oral Early Morning Sharmin Waggoner PA-C      magnesium gluconate  250 mg Oral BID Sharmin Waggoner PA-C      nystatin   Topical BID Corine Park MD      ondansetron  4 mg Oral Q6H PRN Sharmin Waggoner PA-C      oxyCODONE  2.5 mg Oral Q4H PRN Sharmin Waggoner PA-C      Or    oxyCODONE  5 mg Oral Q4H  PRN Sharmin Waggoner PA-C      phenol  1 spray Mouth/Throat Q2H PRN Sharmin Waggoner PA-C      senna  2 tablet Oral HS Corine Park MD      sertraline  100 mg Oral Daily Sharmin Waggoner PA-C      torsemide  20 mg Oral Daily PRN Sharmin Waggoner PA-C           Lab Results: I have reviewed the following results:   Results from last 7 days   Lab Units 09/23/24  0410   HEMOGLOBIN g/dL 11.6   HEMATOCRIT % 38.8   WBC Thousand/uL 7.70   PLATELETS Thousands/uL 195     Results from last 7 days   Lab Units 09/23/24  0410   BUN mg/dL 16   SODIUM mmol/L 140   POTASSIUM mmol/L 4.0   CHLORIDE mmol/L 104   CREATININE mg/dL 0.71   AST U/L 24   ALT U/L 17              Jermaine Greenberg,   Physical Medicine and Rehabilitation  [facility]    I have spent a total time of 55 minutes in caring for this patient on the day of the visit/encounter including Counseling / Coordination of care, Documenting in the medical record, and Communicating with other healthcare professionals . This patient was discussed by the interdisciplinary team in weekly case conference today. The care of the patient was extensively discussed with all care providers and an appropriate rehabilitation plan was formulated unique for this patient. Barriers were identified preventing progression of therapy and appropriate interventions were discussed with each discipline. Please see the team note for input from all disciplines regarding barriers, intervention, and discharge planning.

## 2024-09-24 NOTE — PROGRESS NOTES
"Progress Note - Marsha Oliva 79 y.o. female MRN: [id number]    Unit/Bed#: [facility] 219-01 Encounter: [id number]        Subjective:   Patient seen and examined at bedside after reviewing the chart and discussing the case with the caring staff.      Patient examined at bedside.  Patient continues to have moist cough.  Denies shortness of breath or chest pain.     Reviewed labs with patient and .    Chest XR normal, VSS, labs WNL.     Physical Exam   Vitals: Blood pressure 127/73, pulse (!) 106, temperature 98.6 °F (37 °C), temperature source Temporal, resp. rate 17, height 5' 3\" (1.6 m), weight 50.4 kg (111 lb 1.8 oz), SpO2 91%.,Body mass index is 19.68 kg/m².  Constitutional: Patient in no acute distress.  HEENT: PERR, EOMI, MMM.  Cardiovascular: Normal rate and regular rhythm.    Pulmonary/Chest: Effort normal and breath sounds normal.   Abdomen: Soft, + BS, NT.    Assessment/Plan:  Marsha Oliva is a(n) 79 y.o. female with cervical spinal cord compression.     Cardiac hx w/ HTN, HLD, chronic diastolic heart failure, chronic Afib.  Continue digoxin 125 mcg every other day, diltiazem 180 mg daily, atorvastatin 40 mg daily.  Weekly weights.  Torsemide 20 mg daily as needed.  Pt cleared to restart ASA 81 mg daily by neurosurgery 9/19.   Hypothyroidism.  Continue levothyroxine 125 mcg daily (decr from 137 mcg 9/6).  B12 deficiency.  Patient on B12 1,000 mcg weekly.   Folate deficiency.  Patient on folic acid 1,000 mcg daily.   Hypomagnesemia.  Patient on magnesium gluconate 250 mg twice daily.   Depression and anxiety.   Patient on Zoloft 100 mg daily.  Declines neuropsych at this time.   Multiple hyperkeratotic lesions.  Patient follows with Podiatry outpatient.  Wound care consult.   Sore throat.  Chloraseptic throat spray as needed.   Malnutrition.  Dietician following.  Adult Malnutrition type: Acute illness.  Adult Degree of Malnutrition: Other severe protein calorie malnutrition. Malnutrition " Characteristics: Inadequate energy, Weight loss.  Productive cough.  Stat chest x-ray on 9/23/2024.  Official report is not available yet  Pain and bowel regimen.  As per physiatry.   Cervical spinal cord compression.  Vista collar at all times x 6 weeks.  Continue SQ Lovenox.  Patient receiving intensive PT OT ST as per physiatry.      Anticipated discharge date:  Thursday 10/3/2024    The patient was discussed with Dr. Brown and he is in agreement with the above note.

## 2024-09-24 NOTE — TEAM CONFERENCE
Acute RehabilitationTeam Conference Note  Date: 9/24/2024   Time: 10:50 AM       Patient Name:  Marsha Oliva       Medical Record Number: [id number]   YOB: 1945  Sex: Female          Room/Bed:  [facility] 219/[facility] 219-01  Payor Info:  [facility] / Plan: [facility] [employer]  REP / Product Type: Medicare PPO /      Admitting Diagnosis: Posterior displaced type ii dens fracture, initial encounter for closed fracture [S12.111A]   Admit Date/Time:  9/11/2024  3:31 PM  Admission Comments: No comment available     Primary Diagnosis:  Spinal cord compression (HCC)  Principal Problem: Spinal cord compression (HCC)    Patient Active Problem List    Diagnosis Date Noted    Severe protein-calorie malnutrition (HCC) 09/18/2024    Closed odontoid fracture with type II morphology, posterior displacement, and nonunion 09/18/2024    S/P cervical spinal fusion 09/16/2024    At risk for altered bowel elimination 09/14/2024    At risk for altered urinary elimination 09/14/2024    At risk for deep venous thrombosis 09/14/2024    Encounter for rehabilitation 09/14/2024    Lymphedema 09/13/2024    Oropharyngeal dysphagia 09/12/2024    Skin abnormalities 09/12/2024    Acute pain due to trauma 09/06/2024    At risk for delirium 09/06/2024    Heart failure with preserved ejection fraction (HCC) 09/06/2024    Closed odontoid fracture with type II morphology and posterior displacement (HCC) 09/04/2024    Spinal cord compression (HCC) 09/04/2024    Chronic respiratory failure with hypoxia ([facility]) 03/09/2023    Stage 3a chronic kidney disease (HCC) 08/12/2022    Bilateral hydronephrosis 08/12/2022    Proctitis 08/12/2022    Fall 08/12/2022    Hypokalemia 08/12/2022    ILD (interstitial lung disease) ([facility]) 07/18/2022    Oropharyngeal aspiration 07/18/2022    Elevated troponin 06/02/2022    EKG abnormalities 06/02/2022    Mild cognitive impairment of uncertain or unknown etiology 01/20/2022    Hormone replacement therapy  01/05/2022    Apraxia 12/28/2021    Constipation, unspecified 12/28/2021    Corn or callus 12/28/2021    Difficulty in walking, not elsewhere classified 12/28/2021    Generalized muscle weakness 12/28/2021    Lymphedema, not elsewhere classified 12/28/2021    Repeated falls 12/28/2021    Hypoxia 12/23/2021    Frequent falls 12/23/2021    Elevated d-dimer 12/23/2021    Venous insufficiency of both lower extremities 12/17/2021    Acquired bilateral hammer toes 07/27/2018    Lymphedema of both lower extremities 07/02/2018    Severe mitral regurgitation 05/31/2018    Anemia 05/13/2018    Cardiomegaly 05/13/2018    Wound of right leg 05/13/2018    Hypertensive heart disease with heart failure (HCC) 05/13/2018    Major depressive disorder, single episode, unspecified 05/13/2018    Personal history of nicotine dependence 05/13/2018    Pleural effusion, not elsewhere classified 05/13/2018    Solitary pulmonary nodule 05/13/2018    Atrial fibrillation, chronic ([facility]) 05/01/2018    B12 deficiency 01/13/2017    Chronic diastolic CHF (congestive heart failure) ([facility]) 01/13/2017    Folate deficiency 01/13/2017    Vitamin D deficiency 06/03/2016    Depression with anxiety 08/03/2015    Mixed hyperlipidemia 08/03/2015    Osteoarthritis of knee 10/23/2012    Impaired fasting glucose 05/31/2012    Essential hypertension 03/23/2010    Hypothyroidism 03/23/2010    Posttraumatic stress disorder 03/23/2010       Physical Therapy:    Weight Bearing Status: Full Weight Bearing  Transfers: Supervision  Bed Mobility: Independent  Amulation Distance (ft): 122 feet  Ambulation: Incidental Touching  Assistive Device for Ambulation: Roller Walker  Wheelchair Mobility Distance: 25 ft  Wheelchair Mobility: Supervision  Number of Stairs: 7  Assistive Device for Stairs: Bilateral Hand Rails  Stair Assistance: Incidental Touching  Ramp: Incidental Touching  Assistive Device for Ramp: Roller Walker  Discharge Recommendations: Home with:  DC Home with::  24 Hour Assisteance, Family Support, First Floor Setup, Home Physical Therapy    09/16/2024: Patient participating in therapy and making positive gains. Pt supervision for bed mobility, CG/min A with RW for transfers, CGA/min-mod A for ambulation with RW on level and unlevel surfaces for max of 71 ft., CG - total A for WC mobility with max cues, min-mod A x 2 on stairs with charly HR assist. Ramp not assessed. Pt limited by decreased strength, decreased ROM, decreased endurance, impaired balance, decreased mobility, and cervical spine precautions. Pt may benefit from continued ARC PT for increased function, safety, and independence in prep for safe d/c home with continued PT services as needed.     09/24/2024: Patient participating in therapy and making positive gains. Pt independent for bed mobility, supervision with RW for transfers, CG for ambulation with RW on level and unlevel surfaces for max distance of 122 ft, supervision for WC mobility, CG on stairs with charly HR assist, and CG on ramp with RW. Pt limited by decreased strength, decreased ROM, decreased endurance, impaired balance, decreased mobility, and cervical spine precautions. Pt may benefit from continued ARC PT for increased function, safety, and independence in prep for safe d/c home with continued PT services as needed.     Occupational Therapy:  Eating: Supervision  Grooming: Supervision  Bathing: Incidental Touching  Bathing: Incidental Touching  Upper Body Dressing: Minimal Assistance  Lower Body Dressing: Minimal Assistance  Toileting: Incidental Touching  Tub/Shower Transfer: Minimal Assistance  Toilet Transfer: Incidental Touching, Supervision  Cognition: Exceptions to WNL  Cognition: Decreased Memory, Decreased Attention, Decreased Comprehension, Decreased Safety  Orientation: Person, Place, Time, Situation  Discharge Recommendations: Home with:  DC Home with:: Family Support, First Floor Setup, Home Occupational Therapy       9/16/24: Pt  participates in ADLs, toileting, transfers/ standing and BUE therex/ theract. Pt is making gains towards goals with current LOF as listed above. Pt requires assist due to limited balance, safety, endurance and strength/ ROM with spinal prec and c collar. Pt will benefit from continued skilled OT services to increase independence with daily tasks.     9/23/24: Pt participates in ADLs, toileting, transfers/ standing and BUE therex/ theract. Pt is making gains towards goals with current LOF as listed above and use of A/E for LB ADLs. Pt requires assist due to limited balance, safety, endurance and strength/ ROM with spinal prec and c collar. Pt will benefit from continued skilled OT services to increase independence with daily tasks.     Speech Therapy:  Mode of Communication: Verbal  Cognition: Exceptions to WNL  Cognition: Decreased Memory, Decreased Executive Functions, Decreased Attention, Decreased Comprehension  Orientation: Person, Place, Time, Situation  Swallowing: Exceptions to WNL  Swallowing: Oral Dysphagia, Pharyngeal Dysphagia  Diet Recommendations: Level 1/Puree, Honey Thick  Discharge Recommendations: Home with:  DC Home with:: Family Support, Home Speech Therapy (close supervision 2' cognition and dysphagia severity)  9/17  current diet: puree/HTL    Cognitive Linguisitic Assessments   MoCA MoCA completed this date 9/16/24 - scored 16/30 ( see note for details)     Visuospatial/executive 2/5  Naming 2/3  Attention 3/6  Language 2/3  Abstraction 1/2  Delayed recall 0/5  Orientation 6/6   Comprehension   Comprehension (FIM) 4 - Understands basic info/conversation 75-90% of time   Expression   Expression (FIM) 5 - Needs help/cues only RARELY (< 10% of the time)   Social Interaction   Social Interaction (FIM) 5 - Interacts appropriately with others 90% of time   Problem Solving   Problem solving (FIM) 3 - Solves basic problmes 50-74% of time   Memory   Memory (FIM) 4 - Recognizes/recalls/performs 75-89%      9/24     Comprehension   Comprehension (FIM) 4 - Understands basic info/conversation 75-90% of time   Expression   Verbal Complex   Intelligibility Sentence   QI: Expression 3. Exhibits some difficulty with expressing needs and ideas (e.g., some words or finishing thoughts) or speech is not clear   Expression (FIM) 5 - Needs help/cues only RARELY (< 10% of the time)   Social Interaction   Social Interaction (FIM) 5 - Interacts appropriately with others 90% of time   Problem Solving   Problem solving (FIM) 4 - Solves basic problems 75-89% of time   Memory   Memory (FIM) 4 - Recognizes/recalls/performs 75-89%         Nursing Notes:  Appetite: Poor  Diet Type: Dysphagia I, Honey thick liquids                      Diet Patient/Family Education Complete: Yes    Type of Wound (LDA):  (incision back of neck)                    Type of Wound Patient/Family Education: Yes  Bladder: 4 - Minimal Assistance     Bladder Patient/Family Education: Yes  Bowel: 4 - Minimal Assistance     Bowel Patient/Family Education: Yes  Pain Location/Orientation: Location: Neck  Pain Score: 0                       Hospital Pain Intervention(s): Medication (See MAR)  Pain Patient/Family Education: Yes  Medication Management/Safety  Injectable: Lovenox  Safe Administration: Yes  Medication Patient/Family Education Complete: Yes    9/17/24 Pt admitted s/p fall with spinal compression fracture. Pt is alert and oriented but forgetful. Aspin collar on at all times. Continent of bowel and bladder. Assist x 1 with transfers and ambulation.     9/24/24 Pt with spinal compression fracture. Aspen collar on at all times. Pt is alert and oriented x 4 forgetful at times. Ambulates contact guard with roller walker. Continent of bowel and bladder wears pull up at night. Dysphagia 1 Pureed diet with Honey thick liquids. Stage 1 on sacrum hydragaurd put on daily.     Case Management:     Discharge Planning  Living Arrangements: Lives w/ Spouse/significant  other  Support Systems: Self, Spouse/significant other  Assistance Needed: NA  Type of Current Residence: Private residence  Current Home Care Services: No  Patient admitted to [facility] on 9/11, after inpatient hospital stay for surgical repair of cervical spinal cord compression.Patient lives with spouse in 2SH with 3STE full flight of stairs to second floor. Patient has a suite on the first floor, with bedroom and full bath with walk in shower. Patient independent PTA , ambulating with walker or cane.  may not be able to physically assist patient,he is older than her.  No alive children. There are supportive neighbors. D/c date 10/3 , therapies tbd    Is the patient actively participating in therapies? yes  List any modifications to the treatment plan: na    Barriers Interventions   Spinal precautions Cues, ADL, transfer, gait training, cervical collar   Spinal compression sp spinal fusion,  odontoid fx,  chronic atrial fib, CHF, HTN, hx depression, lymphedema Medical management and oversight, medication management, pumps for lymphedema   dysphagia ST strategies, puree with honey   Decreased end, balance Therapeutic exercise, therapeutic activity   Decreased cog, safety ST strategies, ADL/transfer/gait training     Is the patient making expected progress toward goals? yes  List any update or changes to goals: na    Medical Goals: Patient will be able to manage medical conditions and comorbid conditions with medications and follow up upon completion of rehab program    Weekly Team Goals:   Rehab Team Goals  ADL Team Goal: Patient will require assist with ADLs with least restrictive device upon completion of rehab program  Bowel/Bladder Team Goal: Patient will return to premorbid level for bladder/bowel management upon completion of rehab program  Cognitive Team Goal: Patient will return to premorbid level of cognitive activity upon completion of rehab program    Mod I bed mobility, S transfers and  mobility  Complete family training   S self care  S Comprehension, Mod I expression, S memory, and S problem solving   Tolerate level 2 and nectar diet without S/S aspiration    Health and wellness: to be able to return home and complete light homemaking tasks with spouse    Discussion: Plan for return home with spouse with C for PT, OT, ST, SW, nursing, and aides    Anticipated Discharge Date:  Oct 11, 2024  [facility] Team Members Present:  The following team members are supervising care for this patient and were present during this Weekly Team Conference.    Dr. Greenberg, DO  Dot Dupont, RN Maren Schoenberger, NILDA Leach, PT  Jerica Wallace, OTR/L and Irma Coffman, OTR/L  Claudia Rees, CCC-SLP

## 2024-09-24 NOTE — PLAN OF CARE
Problem: PAIN - ADULT  Goal: Verbalizes/displays adequate comfort level or baseline comfort level  Description: Interventions:  - Encourage patient to monitor pain and request assistance  - Assess pain using appropriate pain scale  - Administer analgesics based on type and severity of pain and evaluate response  - Implement non-pharmacological measures as appropriate and evaluate response  - Consider cultural and social influences on pain and pain management  - Notify physician/advanced practitioner if interventions unsuccessful or patient reports new pain  Outcome: Progressing     Problem: INFECTION - ADULT  Goal: Absence or prevention of progression during hospitalization  Description: INTERVENTIONS:  - Assess and monitor for signs and symptoms of infection  - Monitor lab/diagnostic results  - Monitor all insertion sites, i.e. indwelling lines, tubes, and drains  - Monitor endotracheal if appropriate and nasal secretions for changes in amount and color  - [place] appropriate cooling/warming therapies per order  - Administer medications as ordered  - Instruct and encourage patient and family to use good hand hygiene technique  - Identify and instruct in appropriate isolation precautions for identified infection/condition  Outcome: Progressing     Problem: SAFETY ADULT  Goal: Maintain or return to baseline ADL function  Description: INTERVENTIONS:  -  Assess patient's ability to carry out ADLs; assess patient's baseline for ADL function and identify physical deficits which impact ability to perform ADLs (bathing, care of mouth/teeth, toileting, grooming, dressing, etc.)  - Assess/evaluate cause of self-care deficits   - Assess range of motion  - Assess patient's mobility; develop plan if impaired  - Assess patient's need for assistive devices and provide as appropriate  - Encourage maximum independence but intervene and supervise when necessary  - Involve family in performance of ADLs  - Assess for home care  needs following discharge   - Consider OT consult to assist with ADL evaluation and planning for discharge  - Provide patient education as appropriate  Outcome: Progressing     Problem: CARDIOVASCULAR - ADULT  Goal: Maintains optimal cardiac output and hemodynamic stability  Description: INTERVENTIONS:  - Monitor I/O, vital signs and rhythm  - Monitor for S/S and trends of decreased cardiac output  - Administer and titrate ordered vasoactive medications to optimize hemodynamic stability  - Assess quality of pulses, skin color and temperature  - Assess for signs of decreased coronary artery perfusion  - Instruct patient to report change in severity of symptoms  Outcome: Progressing

## 2024-09-24 NOTE — ASSESSMENT & PLAN NOTE
Nutrition following    Malnutrition Findings:   Adult Malnutrition type: Acute illness  Adult Degree of Malnutrition: Other severe protein calorie malnutrition  Malnutrition Characteristics: Inadequate energy, Weight loss  360 Statement: Malnutrition related to inadequate energy intake as evidenced by 7#(6%) weight loss from 9/11/# to 9/18/# and <50% energy intake >5 days.    BMI Findings:  Body mass index is 19.68 kg/m².

## 2024-09-24 NOTE — WOUND OSTOMY CARE
Progress Note - Wound   Marsha Oliva 79 y.o. female MRN: [id number]  Unit/Bed#: -01 Encounter: [id number]        Assessment: Patient is seen for weekly wound care assessment of the skin . Patient is in the bed for the assessment . Min A for turning and continent of bowel and bladder . Patient with her cervical collar on and intact .      Assessment Findings  Right arm skin tear - dry and intact resolved   Sacral buttocks dry and intact all blanchable and pink continue with the hydraguard   Bilateral heels dry and intact   Bilateral breast skin folds intact and has nystatin powder   Right second toe intact stable brown growth like area . Patient reports it is a callus that the podiatrist took off before and will follow up with podiatrist for the area .       Skin Care orders:  1-Hydraguard to sacrum, buttocks and heels BID and PRN  2-EHOB cushion when out of bed in chair.  3-Moisturize skin daily with skin nourishing cream  4-Elevate heels to offload pressure.  5-Turn/reposition q2h for pressure re-distribution on skin  6. Monitor right 2nd toe growth area - leave open to air podiatry consulted.  7 . Right and left breast folds - cleanse with soap and water pat dry dust lightly with nystatin powder bid and prn           Wound 09/11/24 Arm Distal;Posterior;Right;Lower (Active)   Wound Image   09/24/24 1426   Wound Description Clean;Dry;Intact 09/24/24 1426   Yolette-wound Assessment Clean;Dry;Intact 09/24/24 1426   Wound Length (cm) 0 cm 09/24/24 1426   Wound Width (cm) 0 cm 09/24/24 1426   Wound Depth (cm) 0 cm 09/24/24 1426   Wound Surface Area (cm^2) 0 cm^2 09/24/24 1426   Wound Volume (cm^3) 0 cm^3 09/24/24 1426   Calculated Wound Volume (cm^3) 0 cm^3 09/24/24 1426   Change in Wound Size % 100 09/24/24 1426   Drainage Amount None 09/24/24 1426   Drainage Description Bloody 09/19/24 0910   Non-staged Wound Description Partial thickness 09/17/24 1313   Treatments Site care;Other (Comment) 09/24/24 1426    Dressing Open to air 09/24/24 1426   Dressing Changed Changed 09/21/24 2112   Patient Tolerance Tolerated well 09/24/24 1426   Dressing Status Clean;Dry;Intact 09/22/24 1953       Wound 09/11/24 Pressure Injury Sacrum (Active)   Wound Image   09/24/24 1430   Wound Description Clean;Dry;Intact 09/24/24 1430   Pressure Injury Stage  09/24/24 1430   Yolette-wound Assessment Clean;Dry;Intact 09/24/24 1430   Wound Length (cm) 0 cm 09/24/24 1430   Wound Width (cm) 0 cm 09/24/24 1430   Wound Depth (cm) 0 cm 09/24/24 1430   Wound Surface Area (cm^2) 0 cm^2 09/24/24 1430   Wound Volume (cm^3) 0 cm^3 09/24/24 1430   Calculated Wound Volume (cm^3) 0 cm^3 09/24/24 1430   Change in Wound Size % 100 09/24/24 1430   Drainage Amount None 09/24/24 1430   Treatments Site care 09/24/24 1430   Dressing Moisture barrier 09/24/24 1430   Dressing Changed New 09/11/24 1700   Patient Tolerance Tolerated well 09/24/24 1430   Dressing Status Clean;Dry;Intact 09/11/24 1906       Wound 09/11/24 Sternum Left;Right (Active)   Wound Image   09/17/24 1308   Wound Description Intact;Fragile;Pink 09/24/24 0926   Yolette-wound Assessment Clean;Dry;Intact 09/18/24 1930   Wound Length (cm) 0 cm 09/17/24 1308   Wound Width (cm) 0 cm 09/17/24 1308   Wound Depth (cm) 0 cm 09/17/24 1308   Wound Surface Area (cm^2) 0 cm^2 09/17/24 1308   Wound Volume (cm^3) 0 cm^3 09/17/24 1308   Calculated Wound Volume (cm^3) 0 cm^3 09/17/24 1308   Change in Wound Size % 100 09/17/24 1308   Drainage Amount None 09/17/24 1308   Treatments Cleansed;Site care 09/21/24 2013   Dressing Open to air 09/24/24 0926   Patient Tolerance Tolerated well 09/17/24 1308       Wound 09/11/24 Toe D2, second Anterior;Right (Active)   Wound Image   09/24/24 1427   Wound Description Clean;Dry;Intact;Brown 09/24/24 1427   Yolette-wound Assessment Clean;Dry;Intact 09/24/24 1427   Wound Length (cm) 0.8 cm 09/24/24 1427   Wound Width (cm) 0.8 cm 09/24/24 1427   Wound Depth (cm) 0 cm 09/24/24 1427   Wound  Surface Area (cm^2) 0.64 cm^2 09/24/24 1427   Wound Volume (cm^3) 0 cm^3 09/24/24 1427   Calculated Wound Volume (cm^3) 0 cm^3 09/24/24 1427   Drainage Amount None 09/24/24 1427   Treatments Site care 09/24/24 1427   Dressing Open to air;Dry dressing 09/24/24 1427   Patient Tolerance Tolerated well 09/24/24 1427     Wound care will sign off secure chat with questions or concerns     [place] RN BSN CWOCN

## 2024-09-24 NOTE — NUTRITION
Consider initiating appetite stimulant. Calorie count to start 9/25.      09/24/24 1522   Biochemical Data,Medical Tests, and Procedures   Biochemical Data/Medical Tests/Procedures Lab values reviewed;Meds reviewed   Labs (Comment) 9/23 pro:6.2, alb:3.4, RBC:3.80   Meds (Comment) ASA, lipitor, tums, Vit B12, lanoxin, Cardizem, colace, folvite, lovenox, zofran, levothyroxine, demadex   Speech Therapy Recommendations (Comment) Continues to receive speech therapy   Nutrition-Focused Physical Exam   Nutrition-Focused Physical Exam Findings RN skin assessment reviewed  (Wound right arm, pressure injury sacrum, wound sternum, wound right toe D2, wound cervical neck per documentation)   Nutrition-Focused Physical Exam Findings Dypshagia. Weight loss   Medical-Related Concerns PMH reviewed   Adequacy of Intake   Nutrition Modality PO   Feeding Route   PO With assistance   Current PO Intake   Current Diet Order Dysphagia 1, HTL   Nutrition Supplements Other (Comment);Magic Cup  (prosource pudding once daily; magic cup BID)   Current Meal Intake 0-25%;25-50%   Intake Supplements 0-25%;25-50%   Estimated calorie intake compared to estimated need Nutrient needs not met.   PES Statement   Problem Continue previous diagnosis   Recommendations/Interventions   Malnutrition/BMI Present Yes  (as previously noted)   Summary Dysphagia 1, HTL. Prosource pudding once daily, magic cup BID. Meal intakes vary, mostly <50%.9/24 111#, BMI=19.68; 5#(4%) weight loss from admission 9/11 116#. Oriented to person/place. Dysphagia continues. Upper denture present. Trace BLE edema noted. Skin integrity reviewed. Patient continues with poor oral intake. She states she has no appetite. Multiple attempts made to obtain food preferences including items that are not permitted on current diet. Patient unable to provide any food preferences and was hyperfixated on taking Miralax during discussion. Speech therapy recommending no straws and bilateral handle  cup. Consider appetite stimulant. Recommend initiate calorie count. Patient may require alternate means of nutrition.   Interventions/Recommendations Continue current diet order;Supplement continue;Calorie Count;Initiate appetite stimulant;Other (Specify)  (add bilateral handle cup and no straws)   Education Assessment   Education Patient/caregiver not appropriate for education at this time   Patient Nutrition Goals   Goal Meet PO needs;Avoid weight loss   Goal Status Not met;Extended   Timeframe to complete goal by next f/u   Nutrition Complexity Risk   Nutrition complexity level High risk   Nutrition review: 09/27/24   Follow up date 09/27/24

## 2024-09-24 NOTE — PROGRESS NOTES
"   09/24/24 1200   Pain Assessment   Pain Assessment Tool 0-10   Pain Score 5   Pain Location/Orientation Location: [facility]   Hospital Pain Intervention(s) Repositioned  (RN informed)   Restrictions/Precautions   Precautions Aspiration;Bed/chair alarms;Cognitive;Fall Risk;Pain;Supervision on toilet/commode   Cognitive Linguisitic Assessments   Cognitive Linquistic Quick Test (CLQT) MOCA completed 9/16/24 -16/30   Comprehension   Comprehension (FIM) 4 - Understands basic info/conversation 75-90% of time   Expression   Expression (FIM) 5 - Needs help/cues only RARELY (< 10% of the time)   Social Interaction   Social Interaction (FIM) 5 - Interacts appropriately with others 90% of time   Problem Solving   Problem solving (FIM) 4 - Solves basic problems 75-89% of time   Memory   Memory (FIM) 4 - Recognizes/recalls/performs 75-89%   Memory Skills   Orientation Level Oriented to person;Oriented to place;Oriented to time;Oriented to situation   Recommendations   Diet Solid Recommendation Level 1 Dysphagia/pureed   Diet Liquid Recommendation Honey thick liquid  (2 handled spouted lid cup. NO STRAWS)   Recommended Form of Meds Crushed;With puree  (liquid meds to be thickened to honey thick consistency)   General Precautions Aspiration precautions;Upright as possible for all oral intake;Remain upright for 45 mins after meals;Supervision with meals   Compensatory Swallowing Strategies Alternate solids and liquids  (slow rate of intake, single sips)   Eating   Type of Assistance Needed Supervision;Set-up / clean-up;Verbal cues   Eating CARE Score 4   Swallow Assessment   Swallow Treatment Assessment Pt received in bed with clear vocal quality with baseline wet cough x2 while getting out of bed. Pt reporting I get a cough like this \"3-4x a year\". Reports she will take \"tylenol, vicks, robetussin, my  will hit my back.\" Cough duration patient reporting \"only 45 minutes\" reporting \"it wouldn't last long at home.\" Case d/w RN " "and physician regarding productive cough. CXR completed yesterday \"lungs are clear\". This cough has not been observed by this SLP priot to this session. Pt did require min-mod verbal cue for set up of meal tray and initiation of PO. Pt stating items she does like (i.e. magic cup) but item on tray with only 1-2 bites taken patient reporting \"I am saving it\". Pt with comments of \"not feeling hungry\" through session but also dislike for pureed texture. Assessed with meal tray of pureed solids and honey thick liquids via straw. She did take larger gulps this date via straw without attempt to slow despite cueing. Increased upper airway congestion and cough with return of honey thick liquid into a tissue as well as color of her magic cup. Trialed nosey cup which was unsuccessful with problem solving to extract. Most success with decreasing rate of intake with liquids via 2 handled spouted lid cup. Case d/w RN, Dietician and physician.   SLP Therapy Minutes   SLP Time In 1200   SLP Time Out 1310   SLP Total Time (minutes) 70   SLP Mode of treatment - Individual (minutes) 70   SLP Mode of treatment - Concurrent (minutes) 0   SLP Mode of treatment - Group (minutes) 0   SLP Mode of treatment - Co-treat (minutes) 0   SLP Mode of Treatment - Total time(minutes) 70 minutes   SLP Cumulative Minutes 380       "

## 2024-09-24 NOTE — ASSESSMENT & PLAN NOTE
Wt Readings from Last 3 Encounters:   09/24/24 50.4 kg (111 lb 1.8 oz)   09/11/24 53.8 kg (118 lb 9.7 oz)   09/04/24 53.5 kg (118 lb)     Weight stable  Patient takes Demedex 20 mg daily PRN for weight gain  Monitor daily weights  IM managing

## 2024-09-25 PROCEDURE — 92526 ORAL FUNCTION THERAPY: CPT | Performed by: NURSE PRACTITIONER

## 2024-09-25 PROCEDURE — 97110 THERAPEUTIC EXERCISES: CPT

## 2024-09-25 PROCEDURE — 97535 SELF CARE MNGMENT TRAINING: CPT

## 2024-09-25 PROCEDURE — 97530 THERAPEUTIC ACTIVITIES: CPT

## 2024-09-25 PROCEDURE — 97542 WHEELCHAIR MNGMENT TRAINING: CPT

## 2024-09-25 PROCEDURE — 99232 SBSQ HOSP IP/OBS MODERATE 35: CPT | Performed by: STUDENT IN AN ORGANIZED HEALTH CARE EDUCATION/TRAINING PROGRAM

## 2024-09-25 PROCEDURE — 97116 GAIT TRAINING THERAPY: CPT

## 2024-09-25 RX ORDER — TORSEMIDE 20 MG/1
20 TABLET ORAL DAILY PRN
Status: DISCONTINUED | OUTPATIENT
Start: 2024-09-25 | End: 2024-10-07 | Stop reason: HOSPADM

## 2024-09-25 RX ADMIN — DOCUSATE SODIUM 100 MG: 100 CAPSULE, LIQUID FILLED ORAL at 09:26

## 2024-09-25 RX ADMIN — FLUTICASONE PROPIONATE 2 SPRAY: 50 SPRAY, METERED NASAL at 09:26

## 2024-09-25 RX ADMIN — Medication 250 MG: at 09:28

## 2024-09-25 RX ADMIN — ENOXAPARIN SODIUM 40 MG: 40 INJECTION SUBCUTANEOUS at 09:27

## 2024-09-25 RX ADMIN — SERTRALINE 100 MG: 100 TABLET, FILM COATED ORAL at 09:26

## 2024-09-25 RX ADMIN — SENNOSIDES 17.2 MG: 8.6 TABLET, FILM COATED ORAL at 21:28

## 2024-09-25 RX ADMIN — DILTIAZEM HYDROCHLORIDE 180 MG: 180 CAPSULE, COATED, EXTENDED RELEASE ORAL at 09:26

## 2024-09-25 RX ADMIN — NYSTATIN: 100000 POWDER TOPICAL at 17:44

## 2024-09-25 RX ADMIN — CYANOCOBALAMIN TAB 500 MCG 1000 MCG: 500 TAB at 09:27

## 2024-09-25 RX ADMIN — FOLIC ACID 1000 MCG: 1 TABLET ORAL at 09:28

## 2024-09-25 RX ADMIN — NYSTATIN: 100000 POWDER TOPICAL at 09:28

## 2024-09-25 RX ADMIN — DOCUSATE SODIUM 100 MG: 100 CAPSULE, LIQUID FILLED ORAL at 17:43

## 2024-09-25 RX ADMIN — ACETAMINOPHEN 975 MG: 325 TABLET ORAL at 06:16

## 2024-09-25 RX ADMIN — DIGOXIN 125 MCG: 125 TABLET ORAL at 09:27

## 2024-09-25 RX ADMIN — Medication 250 MG: at 17:43

## 2024-09-25 RX ADMIN — ACETAMINOPHEN 975 MG: 325 TABLET ORAL at 13:27

## 2024-09-25 RX ADMIN — ACETAMINOPHEN 975 MG: 325 TABLET ORAL at 21:27

## 2024-09-25 RX ADMIN — ATORVASTATIN CALCIUM 20 MG: 20 TABLET, FILM COATED ORAL at 17:43

## 2024-09-25 RX ADMIN — LACTULOSE 20 G: 20 SOLUTION ORAL at 13:27

## 2024-09-25 RX ADMIN — ASPIRIN 81 MG: 81 TABLET, COATED ORAL at 09:27

## 2024-09-25 RX ADMIN — LEVOTHYROXINE SODIUM 125 MCG: 25 TABLET ORAL at 06:15

## 2024-09-25 NOTE — ASSESSMENT & PLAN NOTE
Nutrition following    Malnutrition Findings:   Adult Malnutrition type: Acute illness  Adult Degree of Malnutrition: Other severe protein calorie malnutrition  Malnutrition Characteristics: Inadequate energy, Weight loss  360 Statement: Malnutrition related to inadequate energy intake as evidenced by 7#(6%) weight loss from 9/11/# to 9/18/# and <50% energy intake >5 days.    BMI Findings:  Body mass index is 20.31 kg/m².

## 2024-09-25 NOTE — PROGRESS NOTES
"   09/25/24 1200   Swallow Information   Current Diet Dysphagia pureed;Honey thick liquids   Baseline Diet Regular;Thin liquids  (SLP recommended dysphagia level 2 solids and thin liquids in 2022 s/p video ( modified) barium swallow study at that time. Choking episodes at home have been reported atleast 2-3x with patient/ report of saumya and CPR.)   Eating   Type of Assistance Needed Set-up / clean-up;Supervision;Verbal cues   Physical Assistance Level No physical assistance   Eating CARE Score 4   Swallow Assessment   Swallow Treatment Assessment Pt naming items she does like on her meal tray today \"I like the berry majic cup, mashed potatoes and gravy, orange juice\". She does report not feeling hungry, feeling too tired to eat but also her dislike for the current diet texture. Her  was present at bedside and I did review concerns with nutritional intake with calorie count in place as well as current diet level reasoning as previously educated as well at length.  verbalizing concerns with his ability to take care of Marsha at home due to his medical limitations. SW \"Dot\" informed of this. Pt with suction set up in room, assessed with lunch tray with minial intake again despite lengthy education. 62 minute session, minimal intake 9 calorie count updated for meal. Will likely require additional means of nutrition/hydration. Swallow strategies writtin as  a reminder on her white board in front of her but also on the aspiration precaution sign above her bed. Mild inconsistent cough mild wet with HTL via spouted cup and pureed solids x4. Trialed straw sips again but immediate wet cough in severity with cough and return of material into a napkin.   SLP Therapy Minutes   SLP Time In 1210   SLP Time Out 1312   SLP Total Time (minutes) 62   SLP Mode of treatment - Individual (minutes) 62   SLP Mode of treatment - Concurrent (minutes) 0   SLP Mode of treatment - Group (minutes) 0   SLP Mode of " treatment - Co-treat (minutes) 0   SLP Mode of Treatment - Total time(minutes) 62 minutes   SLP Cumulative Minutes 442

## 2024-09-25 NOTE — PROGRESS NOTES
"Progress Note - Marsha Oliva 79 y.o. female MRN: [id number]    Unit/Bed#: -01 Encounter: [id number]        Subjective:   Patient seen and examined at bedside after reviewing the chart and discussing the case with the caring staff.      Patient examined at bedside.  Patient denies any acute symptoms.     Patient with 3 lbs weight gain from yesterday.  No increased shortness of breath or leg swelling.  Will get weight tomorrow and consider dose of torsemide.     Physical Exam   Vitals: Blood pressure 112/66, pulse 91, temperature 97.5 °F (36.4 °C), temperature source Temporal, resp. rate 18, height 5' 3\" (1.6 m), weight 52 kg (114 lb 10.2 oz), SpO2 95%.,Body mass index is 20.31 kg/m².  Constitutional: Patient in no acute distress.  HEENT: PERR, EOMI, MMM.  Cardiovascular: Normal rate and regular rhythm.    Pulmonary/Chest: Effort normal and breath sounds normal.   Abdomen: Soft, + BS, NT.    Assessment/Plan:  Marsha Oliva is a(n) 79 y.o. female with cervical spinal cord compression.     Cardiac hx w/ HTN, HLD, chronic diastolic heart failure, chronic Afib.  Continue digoxin 125 mcg every other day, diltiazem 180 mg daily, atorvastatin 40 mg daily.  Weekly weights.  Torsemide 20 mg daily as needed.  Pt cleared to restart ASA 81 mg daily by neurosurgery 9/19.   Hypothyroidism.  Continue levothyroxine 125 mcg daily (decr from 137 mcg 9/6).  B12 deficiency.  Patient on B12 1,000 mcg weekly.   Folate deficiency.  Patient on folic acid 1,000 mcg daily.   Hypomagnesemia.  Patient on magnesium gluconate 250 mg twice daily.   Depression and anxiety.   Patient on Zoloft 100 mg daily.  Declines neuropsych at this time.   Multiple hyperkeratotic lesions.  Patient follows with Podiatry outpatient.  Wound care consult.   Sore throat.  Chloraseptic throat spray as needed.   Malnutrition.  Dietician following.  Adult Malnutrition type: Acute illness.  Adult Degree of Malnutrition: Other severe protein calorie " malnutrition. Malnutrition Characteristics: Inadequate energy, Weight loss.  Productive cough.  Stat chest x-ray on 9/23/2024.  Official report is not available yet  Pain and bowel regimen.  As per physiatry.   Cervical spinal cord compression.  Vista collar at all times x 6 weeks.  Continue SQ Lovenox.  Patient receiving intensive PT OT ST as per physiatry.      Anticipated discharge date:  Thursday 10/3/2024    The patient was discussed with Dr. Brown and he is in agreement with the above note.

## 2024-09-25 NOTE — CASE MANAGEMENT
CM met with patient and , reviewed team meeting information, including planned d/c date of 10/03  inquiring about discharge plans, as he is unsure he will be able to help her physically when she is discharged home. CM reviewed therapy notes in regards to her functioning at this time, and discussed 's potential ability to assist. He stated he has been have cardiac issues, currently has a heart monitor on, using portable pulse ox equipment to monitor his heartrate, which dropped to 39, and then up to 69. Due to these cardiac issues, he is concerned for getting help into the home  on discharge. CM explained HHC referral with RN/PT/OT/ST/HHA, [facility] will provide HHA for approx 1-2 hours per week. CM discussed home health aid agencies, gave him a list with questionnaire for him to investigate. CM initiated conversation of potentially moving [place], [facility] facility, offering services of Care patrol. Patient's  [facility] CM contacted erinn Posada and requested information regarding [place]/ [facility] facilities in the area,  preferring to contact her. CM received a brochure from [facility] in email, and left it at patient's bedside. CM will further discuss with patient and . CM will continue to follow.

## 2024-09-25 NOTE — PROGRESS NOTES
"   09/25/24 0900   Pain Assessment   Pain Assessment Tool 0-10   Pain Score 4   Pain Location/Orientation Location: Neck   Restrictions/Precautions   Precautions Aspiration;Bed/chair alarms;Cognitive;Fall Risk;Pain;Supervision on toilet/commode   Weight Bearing Restrictions No   ROM Restrictions   (spinal precautions)   Braces or Orthoses C/S Collar   Cognition   Overall Cognitive Status Impaired   Arousal/Participation Alert;Responsive;Cooperative   Attention Attends with cues to redirect   Orientation Level Oriented X4  (Simultaneous filing. User may not have seen previous data.)   Memory Decreased short term memory;Decreased recall of recent events;Decreased recall of precautions   Following Commands Follows one step commands with increased time or repetition   Subjective   Subjective \"i feel weak today again\"   Roll Left and Right   Comment Pt OOB   Sit to Lying   Comment Pt OOB   Lying to Sitting on Side of Bed   Comment Pt OOB   Sit to Stand   Type of Assistance Needed Supervision   Physical Assistance Level No physical assistance   Comment supervision with RW   Sit to Stand CARE Score 4   Bed-Chair Transfer   Type of Assistance Needed Supervision   Physical Assistance Level No physical assistance   Comment supervision with RW   Chair/Bed-to-Chair Transfer CARE Score 4   Car Transfer   Reason if not Attempted Environmental limitations   Car Transfer CARE Score 10   Walk 10 Feet   Type of Assistance Needed Incidental touching   Physical Assistance Level No physical assistance   Comment CG/close supervision with RW on level and unlevel surfaces. Increased fatigue noted today.   Walk 10 Feet CARE Score 4   Walk 50 Feet with Two Turns   Type of Assistance Needed Incidental touching   Physical Assistance Level No physical assistance   Comment CG/close supervision with RW on level and unlevel surfaces. Increased fatigue noted today.   Walk 50 Feet with Two Turns CARE Score 4   Walking 10 Feet on Uneven Surfaces "   Type of Assistance Needed Incidental touching   Physical Assistance Level No physical assistance   Comment CG/close supervision with RW on level and unlevel surfaces. Increased fatigue noted today.   Walking 10 Feet on Uneven Surfaces CARE Score 4   Ambulation   Primary Mode of Locomotion Prior to Admission Walk   Distance Walked (feet) 139 ft  (139, 83, 75 ft and short distances in PT gym)   Assist Device Roller Walker   Gait Pattern Antalgic;Inconsistant Vaishnavi;Slow Vaishnavi;Decreased foot clearance;Forward Flexion;Narrow FLORENTINO;Poor UE WB;Shuffle;Improper weight shift   Limitations Noted In Balance;Coordination;Device Management;Endurance;Midline Orientation;Posture;Safety;Speed;Strength   Provided Assistance with: Balance;Direction   Walk Assist Level Close Supervision;Contact Guard   Findings CG/close supervision with RW on level and unlevel surfaces. Increased fatigue noted today.   Does the patient walk? 2. Yes   Wheel 50 Feet with Two Turns   Type of Assistance Needed Incidental touching   Physical Assistance Level No physical assistance   Comment CG with assistance for obstacles and locking breaks   Wheel 50 Feet with Two Turns CARE Score 4   Wheelchair mobility   Does the patient use a wheelchair? 1. Yes   Type of Wheelchair Used 1. Manual   Method Right upper extremity;Left upper extremity   Assistance Provided For Locking Brakes;Obstacles   Distance Level Surface (feet) 109 ft   Distance Wheeled 3% Grade 12 ft   Findings CG with assistance for obstacles and locking breaks   Curb or Single Stair   Style negotiated Single stair   Type of Assistance Needed Incidental touching   Physical Assistance Level No physical assistance   Comment CG with charly HR use and non-reciprocal pattern   1 Step (Curb) CARE Score 4   4 Steps   Type of Assistance Needed Incidental touching   Physical Assistance Level No physical assistance   Comment CG with charly HR use and non-reciprocal pattern   4 Steps CARE Score 4   12 Steps    Reason if not Attempted Safety concerns   12 Steps CARE Score 88   Stairs   Type Ramp;Stairs   # of Steps 7   Weight Bearing Precautions WBAT   Assist Devices Bilateral Rail   Findings CG with charly HR use and non-reciprocal pattern. CG for ramp with RW, cues for technique   Toilet Transfer   Comment not needed during session   Therapeutic Interventions   Strengthening Seated TE   Balance Gait and transfer training   Other ramp and stair negotiation,  mobility   Assessment   Treatment Assessment Pt agreeable to PT session this morning. Pain 4/10 reported in neck. Pt presents with cough and increased fatigue throughout session. Supervision for transfers with RW. CG/close supervision for ambulation with RW on level and unlevel surfaces for max distance of 139 ft. CG for WC mobility with assistance for locking breaks and avoiding obstacles. CG on stairs with charly HR use and non-reciprocal pattern. CG on ramp with RW. Pt requires cues for technique and sequence for tasks. Ther ex for general LE strengthening. Gait and transfer training for increased balance, safety, and independence with functional mobility. Pt presented more confused today than prior sessions and requiring multiple explanations for tasks. Pt appropriate for concurrent therapy to promote motivation and encouragement between patients with similar deficits while completing therapy session. Pt returned to room in  with alarms on and all needs within reach.   Problem List Decreased strength;Decreased range of motion;Decreased endurance;Impaired balance;Decreased mobility;Decreased cognition;Impaired judgement;Decreased safety awareness;Orthopedic restrictions;Pain   Barriers to Discharge Inaccessible home environment;Decreased caregiver support   PT Barriers   Physical Impairment Decreased strength;Decreased range of motion;Decreased endurance;Impaired balance;Decreased mobility;Decreased safety awareness;Pain;Orthopedic restrictions   Functional  Limitation Car transfers;Ramp negotiation;Stair negotiation;Standing;Transfers;Walking;Wheelchair management   Plan   Treatment/Interventions Functional transfer training;LE strengthening/ROM;Elevations;Therapeutic exercise;Endurance training;Bed mobility;Gait training   Progress Progressing toward goals   PT Therapy Minutes   PT Time In 0900   PT Time Out 1030   PT Total Time (minutes) 90   PT Mode of treatment - Individual (minutes) 0   PT Mode of treatment - Concurrent (minutes) 90   PT Mode of treatment - Group (minutes) 0   PT Mode of treatment - Co-treat (minutes) 0   PT Mode of Treatment - Total time(minutes) 90 minutes   PT Cumulative Minutes 1016   Therapy Time missed   Time missed? No

## 2024-09-25 NOTE — PROGRESS NOTES
09/25/24 1100   Pain Assessment   Pain Assessment Tool 0-10   Pain Score 4   Pain Location/Orientation Location: Neck   Restrictions/Precautions   Precautions Aspiration;Bed/chair alarms;Cognitive;Fall Risk;Supervision on toilet/commode;Pain;Spinal precautions   Braces or Orthoses C/S Collar   Eating   Type of Assistance Needed Supervision;Verbal cues   Comment honey thick liquid provided during session   Eating CARE Score 4   Toileting Hygiene   Type of Assistance Needed Physical assistance;Verbal cues;Adaptive equipment   Physical Assistance Level 25% or less   Comment Pt attemtping  to walk away from toilet before pulling up underwear and pants, cued for safety   Toileting Hygiene CARE Score 3   Toileting   Able to Pull Clothing down yes, up yes.   Manage Hygiene Bladder;Bowel   Limitations Noted In Balance;ROM;Safety;LE Strength;UE Strength   Toilet Transfer   Type of Assistance Needed Incidental touching;Verbal cues;Adaptive equipment   Comment pt attempted to sit before close to toilet, cued for safe technique   Toilet Transfer CARE Score 4   Toilet Transfer   Surface Assessed Raised Toilet   Transfer Technique Standard   Limitations Noted In Balance;LE Strength;UE Strength;Safety;Sequencing;Problem Solving;ROM   Adaptive Equipment Grab Bar;Walker   Exercise Tools   Other Exercise Tool 1 cone reaching for AROM encouraged upright posture while reaching in all planes to place cones on table top, completed x 15 cones and 2 sets performed   Other Exercise Tool 2 1# DB, clasped hands; forward reach x20 then 10, shld flex/ext to 90* x 15 then x 20   Other Exercise Tool 3 AROM- alternate reaching shld flex/ext, forward reach 3x10 reps   Cognition   Overall Cognitive Status Impaired   Arousal/Participation Alert;Cooperative   Attention Attends with cues to redirect   Orientation Level Oriented X4   Memory Decreased short term memory;Decreased recall of recent events;Decreased recall of precautions   Following  Commands Follows one step commands with increased time or repetition   Activity Tolerance   Activity Tolerance Patient limited by fatigue   Assessment   Treatment Assessment OT session addressed the following interventions: safety with all aspects of toileting, TE for generalized strength and endurance for functional activity performance. Refer to respective sections of note for details of session. OT encouraged pt to intake honey thick fluids during session. Pt cued throughout session to redirect to task, occasionally forgetful which caused safety concerns when txfng. Pt using suction during session due to increased phlegm which she was stuggling to swallow. At one point she took a sip of honey thick juice and unable to swallow it and it came out all over her shirt. Pt also questioning difference between having no straws and then having suction in hand, at one point she was going to stick suction into drink cup until OT redirected her. Pt would benefot from continued OT to further progress her toward established OT goals.   Prognosis Good   Problem List Decreased strength;Decreased range of motion;Decreased endurance;Impaired balance;Decreased mobility;Decreased cognition;Impaired judgement;Decreased safety awareness;Orthopedic restrictions;Pain   Plan   Treatment/Interventions ADL retraining;Therapeutic exercise;Endurance training;Cognitive reorientation;Compensatory technique education;Spoke to nursing;OT   Progress Progressing toward goals   OT Therapy Minutes   OT Time In 1100   OT Time Out 1210   OT Total Time (minutes) 70   OT Mode of treatment - Individual (minutes) 70   OT Mode of treatment - Concurrent (minutes) 0   OT Mode of treatment - Group (minutes) 0   OT Mode of treatment - Co-treat (minutes) 0   OT Mode of Treatment - Total time(minutes) 70 minutes   OT Cumulative Minutes 70   Therapy Time missed   Time missed? No

## 2024-09-25 NOTE — ASSESSMENT & PLAN NOTE
Wt Readings from Last 3 Encounters:   09/25/24 52 kg (114 lb 10.2 oz)   09/11/24 53.8 kg (118 lb 9.7 oz)   09/04/24 53.5 kg (118 lb)     Weight stable  Patient takes Demedex 20 mg daily PRN for weight gain  Monitor daily weights  IM managing

## 2024-09-25 NOTE — PROGRESS NOTES
"Progress Note - PMR   Name: Marsha Oliva 79 y.o. female I MRN: [id number]  Unit/Bed#: [facility] 219-01 I Date of Admission: 9/11/2024   Date of Service: 9/25/2024 I Hospital Day: 14     Assessment & Plan  Spinal cord compression (HCC)  Presented  to acute care on 9/4/24 with progressive neck pain and weakness.    She reports 4-6 weeks prior she sustained a fall off her bed, head hitting her nightstand.    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   MRI C spine showing displaced type 2 dens fracture with mild mass effect on cord, ALL and PLL injuries.    CTA head and neck negative for vascular injuries.    Patient seen by Neurosurgery and deemed an operative candidate.    Patient taken to the OR by Dr. Dewitt for a bilateral C1 and C3 lateral mass screw placement, bialteral C2 pedicle screw palcement, C1-C3 fusion.    Post operatively, placed in a Aspen C Collar at all times, except showering with Shonda collar.    Post op X-rays with stable alignment    Plan in ARC:  Begin ARC program with PT, OT, SLP  Monitor incision  Monitor pain  Monitor neuro exam  Follow-up with Neurosurgery in 2 weeks.  2 week post op visit completed with NSGY virtually - \"Will plan to follow-up with the pt as scheduled for her 6 week POV with repeat upright xrays\"  Oropharyngeal dysphagia  New issues  VFSS study completed on 9/10/24 and patient found to have mild oral and moderate phayngeal dysphagia.    Cleared for a pureed diet with honey thick liquids.    SLP to follow  Continue aspiration precautions   Acquired bilateral hammer toes  Keratosis of toe  May need shaving of this lesion  Consult placed to Podiatry  Atrial fibrillation, chronic (HCC)  Chronic A. Fib  Rate/rhythm controlled on digoxin 125 mcg every other day, Cardizem  mg daily  No AC due to prior several GI bleeds.  On aspirin 81 mg daily.  IM managing  B12 deficiency  Continue " supplementation   IM managing  Chronic diastolic CHF (congestive heart failure) ([facility])  Wt Readings from Last 3 Encounters:   09/25/24 52 kg (114 lb 10.2 oz)   09/11/24 53.8 kg (118 lb 9.7 oz)   09/04/24 53.5 kg (118 lb)     Weight stable  Patient takes Demedex 20 mg daily PRN for weight gain  Monitor daily weights  IM managing  Depression with anxiety  Mood is stable  Continue Zoloft 100 mg daily  Essential hypertension  Continue Cardizem  mg Daily  Monitor BP and HR with rest and activity  IM managing  Hypothyroidism  Continue Synthroid 125 mcg daily  Mixed hyperlipidemia  Continue Atorvastain 20 mg daily  Folate deficiency  Continue supplementation   IM managing  Acute pain due to trauma  Located in neck  Continue Tylenol 975 mg TID  Oxycodone IR 2.5-5 mg Q 4 hours PRN for moderate-severe pain   Topical ICE  -improved has not required oxy since 9/13  Skin abnormalities  Wound Care team following in ARC:  POA several areas  Continue LWC:     Skin care plans:  1-Hydraguard to sacrum, buttocks and heels BID and PRN  2-EHOB cushion when out of bed in chair.  3-Moisturize skin daily with skin nourishing cream  4-Elevate heels to offload pressure.  5-Turn/reposition q2h for pressure re-distribution on skin  6. Monitor right 2nd toe growth area - leave open to air podiatry consulted.  7. Right arm skin tear - cleanse with Normal saline then apply normlgel ag then top with adaptic then a ABD and keisha change every other day   8. Right and left breast folds - cleanse with soap and water pat dry dust lightly with nystatin powder bid and prn  Lymphedema  Pumps brought in by  - ok to use PRN as tolerated  At risk for altered bowel elimination  Continent  -at home takes daily miralax; rescheduled PRN to daily  At risk for altered urinary elimination  Continent   At risk for deep venous thrombosis  Continue Lovenox 40 mg daily - should not need at discharge  Encounter for rehabilitation  Functional deficits:   cervical collar on at all times, cervical spinal precautions, impaired mobility, self care   Continue current rehabilitation plan of care to maximize function.  Estimated discharge: Thursday 10/3/24 with home care estimated    Severe protein-calorie malnutrition (HCC)  Nutrition following    Malnutrition Findings:   Adult Malnutrition type: Acute illness  Adult Degree of Malnutrition: Other severe protein calorie malnutrition  Malnutrition Characteristics: Inadequate energy, Weight loss  360 Statement: Malnutrition related to inadequate energy intake as evidenced by 7#(6%) weight loss from 9/11/# to 9/18/# and <50% energy intake >5 days.    BMI Findings:  Body mass index is 20.31 kg/m².   Closed odontoid fracture with type II morphology, posterior displacement, and nonunion      History of Present Illness   Marsha Oliva is a 79 y.o. female with HTN, A Fib, CHF, HLD, Hypothyroidism who presented to the [facility] on on 9/4/24 with progressive neck pain and weakness.  She reports 4-6 weeks prior she sustained a fall off her bed, head hitting her nightstand.  She has been having increasing neck pain since.  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 MRI C spine showing displaced type 2 dens fracture with mild mass effect on cord, ALL and PLL injuries.  CTA head and neck negative for vascular injuries.  Patient seen by Neurosurgery and deemed an operative candidate.  Patient taken to the OR by Dr. Dewitt for a bilateral C1 and C3 lateral mass screw placement, bialteral C2 pedicle screw palcement, C1-C3 fusion.  Post operatively, placed in a Aspen C Collar at all times, except showering with Shonda collar.  Post op X-rays with stable alignment  Medical course complicated by acute dysphagia.  VFSS study completed on 9/10/24 and patient found to have mild oral and moderate phayngeal  dysphagia.  Cleared for a pureed diet with honey thick liquids.  Patient seen by wound care team for abrasions and stage I pressure injury to sacrum.  Patient started on Tylenol and PRN oxycodone for acute pain.    After medical stabilization, patient found to have acute functional deficits from his baseline, therefore, admitted to [facility] for acute inpatient rehabilitation.      Chief Complaint: f/u SCI and f/u ambulatory dysfunction    Interval: Patient seen and evaluated in room with no acute issues overnight.  Participating in therapy still with an overtly moist cough at times however not as frequent as previously.  Patient's mood is good and denies any fever, chills, nausea, vomiting, cough or shortness of breath, diarrhea constipation.  She is also seen ambulating in the hallway with therapy with appropriate fit of the cervical collar.  No new labs for today and last bowel movement on 9/24.      Objective     Functional Update:  Physical Therapy Occupational Therapy Speech Therapy   Weight Bearing Status: Full Weight Bearing  Transfers: Supervision  Bed Mobility: Independent  Amulation Distance (ft): 122 feet  Ambulation: Incidental Touching  Assistive Device for Ambulation: Roller Walker  Wheelchair Mobility Distance: 25 ft  Wheelchair Mobility: Supervision  Number of Stairs: 7  Assistive Device for Stairs: Bilateral Hand Rails  Stair Assistance: Incidental Touching  Ramp: Incidental Touching  Assistive Device for Ramp: Roller Walker  Discharge Recommendations: Home with:  DC Home with:: 24 Hour Assisteance, Family Support, First Floor Setup, Home Physical Therapy   Eating: Supervision  Grooming: Supervision  Bathing: Incidental Touching  Bathing: Incidental Touching  Upper Body Dressing: Minimal Assistance  Lower Body Dressing: Minimal Assistance  Toileting: Incidental Touching  Tub/Shower Transfer: Minimal Assistance  Toilet Transfer: Incidental Touching, Supervision  Cognition: Exceptions to  WNL  Cognition: Decreased Memory, Decreased Attention, Decreased Comprehension, Decreased Safety  Orientation: Person, Place, Time, Situation   Mode of Communication: Verbal  Cognition: Exceptions to WNL  Cognition: Decreased Memory, Decreased Executive Functions, Decreased Attention, Decreased Comprehension  Orientation: Person, Place, Time, Situation  Swallowing: Exceptions to WNL  Swallowing: Oral Dysphagia, Pharyngeal Dysphagia  Diet Recommendations: Level 1/Puree, Honey Thick  Discharge Recommendations: Home with:  DC Home with:: Family Support, Home Speech Therapy (close supervision 2' cognition and dysphagia severity)         Temp:  [97.5 °F (36.4 °C)-98.1 °F (36.7 °C)] 97.5 °F (36.4 °C)  HR:  [90-99] 91  Resp:  [16-18] 18  BP: (112-126)/(66-68) 112/66  SpO2:  [90 %-95 %] 95 %  Physical Exam  Vitals reviewed.   Constitutional:       General: She is not in acute distress.  HENT:      Head: Normocephalic and atraumatic.      Right Ear: External ear normal.      Left Ear: External ear normal.      Nose: Nose normal. No rhinorrhea.      Mouth/Throat:      Mouth: Mucous membranes are moist.      Pharynx: Oropharynx is clear.   Eyes:      General: No scleral icterus.  Neck:      Comments: Cervical collar in place  Cardiovascular:      Rate and Rhythm: Normal rate.      Pulses: Normal pulses.   Pulmonary:      Effort: Pulmonary effort is normal. No respiratory distress.      Comments: Infrequent moist cough  Abdominal:      General: There is no distension.      Palpations: Abdomen is soft.   Musculoskeletal:         General: No swelling.   Skin:     General: Skin is warm and dry.      Comments: Neck incision CDI   Neurological:      Mental Status: She is alert and oriented to person, place, and time.      Motor: Weakness (Proximal leg weakness) present.   Psychiatric:         Mood and Affect: Mood normal.         Behavior: Behavior normal.             Scheduled Meds:  Current Facility-Administered Medications    Medication Dose Route Frequency Provider Last Rate    acetaminophen  975 mg Oral Q8H CHRIS Sharmin Waggoner PA-C      Artificial Tears  2 drop Both Eyes Q3H PRN JAQUELINE Amos      aspirin  81 mg Oral Daily Corine Park MD      atorvastatin  20 mg Oral Daily With Dinner Sharmin Waggoner PA-C      bisacodyl  10 mg Rectal Daily PRN Sharmin Waggoner PA-C      bisacodyl  10 mg Rectal Once Corine Park MD      calcium carbonate  1,000 mg Oral Daily PRN Sharmin Waggoner PA-C      cyanocobalamin  1,000 mcg Oral Weekly Sharmin Waggoner PA-C      dextromethorphan-guaiFENesin  10 mL Oral Q4H PRN Tj Darling MD      digoxin  125 mcg Oral Every Other Day Sharmin Waggoner PA-C      diltiazem  180 mg Oral Daily Sharmin Waggoner PA-C      docusate sodium  100 mg Oral BID Corine Park MD      enoxaparin  40 mg Subcutaneous Q24H CHRIS Corine Park MD      fluticasone  2 spray Each Nare Daily JAQUELINE Amos      folic acid  1,000 mcg Oral Daily Sharmin Waggoner PA-C      lactulose  20 g Oral Daily PRN Jermaine Greenberg DO      levothyroxine  125 mcg Oral Early Morning Sharmin Waggoner PA-C      magnesium gluconate  250 mg Oral BID Sharmin Waggoner PA-C      nystatin   Topical BID Corine Park MD      ondansetron  4 mg Oral Q6H PRN Sharmin Waggoner PA-C      oxyCODONE  2.5 mg Oral Q4H PRN Sharmin Waggoner PA-C      Or    oxyCODONE  5 mg Oral Q4H PRN Sharmin Waggoner PA-C      phenol  1 spray Mouth/Throat Q2H PRN Sharmin Waggoner PA-C      senna  2 tablet Oral HS Corine Park MD      sertraline  100 mg Oral Daily Sharmin Waggoner PA-C      torsemide  20 mg Oral Daily PRN Sharmin Waggoner PA-C           Lab Results: I have reviewed the following results:   Results from last 7 days   Lab Units 09/23/24  0410   HEMOGLOBIN g/dL 11.6   HEMATOCRIT % 38.8   WBC  Thousand/uL 7.70   PLATELETS Thousands/uL 195     Results from last 7 days   Lab Units 09/23/24  0410   BUN mg/dL 16   SODIUM mmol/L 140   POTASSIUM mmol/L 4.0   CHLORIDE mmol/L 104   CREATININE mg/dL 0.71   AST U/L 24   ALT U/L 17                Jermaine Greenberg,   Physical Medicine and Rehabilitation  [facility]    I have spent a total time of 35 minutes in caring for this patient on the day of the visit/encounter including Counseling / Coordination of care, Documenting in the medical record, and Communicating with other healthcare professionals .

## 2024-09-25 NOTE — PLAN OF CARE
Problem: GASTROINTESTINAL - ADULT  Goal: Maintains adequate nutritional intake  Description: INTERVENTIONS:  - Monitor percentage of each meal consumed  - Identify factors contributing to decreased intake, treat as appropriate  - Assist with meals as needed  - Monitor I&O, weight, and lab values if indicated  - Obtain nutrition services referral as needed  Outcome: Progressing     Problem: Nutrition/Hydration-ADULT  Goal: Nutrient/Hydration intake appropriate for improving, restoring or maintaining nutritional needs  Description: Monitor and assess patient's nutrition/hydration status for malnutrition. Collaborate with interdisciplinary team and initiate plan and interventions as ordered.  Monitor patient's weight and dietary intake as ordered or per policy. Utilize nutrition screening tool and intervene as necessary. Determine patient's food preferences and provide high-protein, high-caloric foods as appropriate.     INTERVENTIONS:  - Monitor oral intake, urinary output, labs, and treatment plans  - Assess nutrition and hydration status and recommend course of action  - Evaluate amount of meals eaten  - Assist patient with eating if necessary   - Allow adequate time for meals  - Recommend/ encourage appropriate diets, oral nutritional supplements, and vitamin/mineral supplements  - Order, calculate, and assess calorie counts as needed  - Recommend, monitor, and adjust tube feedings and TPN/PPN based on assessed needs  - Assess need for intravenous fluids  - Provide specific nutrition/hydration education as appropriate  - Include patient/family/caregiver in decisions related to nutrition  Outcome: Progressing

## 2024-09-26 ENCOUNTER — HOME HEALTH ADMISSION (OUTPATIENT)
Dept: HOME HEALTH SERVICES | Facility: HOME HEALTHCARE | Age: 79
End: 2024-09-26
Payer: COMMERCIAL

## 2024-09-26 PROCEDURE — 92507 TX SP LANG VOICE COMM INDIV: CPT

## 2024-09-26 PROCEDURE — 97535 SELF CARE MNGMENT TRAINING: CPT

## 2024-09-26 PROCEDURE — 97110 THERAPEUTIC EXERCISES: CPT

## 2024-09-26 PROCEDURE — 97112 NEUROMUSCULAR REEDUCATION: CPT

## 2024-09-26 PROCEDURE — 99232 SBSQ HOSP IP/OBS MODERATE 35: CPT | Performed by: STUDENT IN AN ORGANIZED HEALTH CARE EDUCATION/TRAINING PROGRAM

## 2024-09-26 PROCEDURE — 97530 THERAPEUTIC ACTIVITIES: CPT

## 2024-09-26 PROCEDURE — 97116 GAIT TRAINING THERAPY: CPT

## 2024-09-26 PROCEDURE — 92526 ORAL FUNCTION THERAPY: CPT

## 2024-09-26 RX ADMIN — ATORVASTATIN CALCIUM 20 MG: 20 TABLET, FILM COATED ORAL at 15:53

## 2024-09-26 RX ADMIN — ACETAMINOPHEN 975 MG: 325 TABLET ORAL at 13:29

## 2024-09-26 RX ADMIN — NYSTATIN 1 APPLICATION: 100000 POWDER TOPICAL at 08:28

## 2024-09-26 RX ADMIN — DOCUSATE SODIUM 100 MG: 100 CAPSULE, LIQUID FILLED ORAL at 08:27

## 2024-09-26 RX ADMIN — LEVOTHYROXINE SODIUM 125 MCG: 25 TABLET ORAL at 05:55

## 2024-09-26 RX ADMIN — ACETAMINOPHEN 975 MG: 325 TABLET ORAL at 21:13

## 2024-09-26 RX ADMIN — ACETAMINOPHEN 975 MG: 325 TABLET ORAL at 05:37

## 2024-09-26 RX ADMIN — FOLIC ACID 1000 MCG: 1 TABLET ORAL at 08:26

## 2024-09-26 RX ADMIN — DOCUSATE SODIUM 100 MG: 100 CAPSULE, LIQUID FILLED ORAL at 17:52

## 2024-09-26 RX ADMIN — Medication 250 MG: at 08:26

## 2024-09-26 RX ADMIN — ENOXAPARIN SODIUM 40 MG: 40 INJECTION SUBCUTANEOUS at 08:27

## 2024-09-26 RX ADMIN — DILTIAZEM HYDROCHLORIDE 180 MG: 180 CAPSULE, COATED, EXTENDED RELEASE ORAL at 08:26

## 2024-09-26 RX ADMIN — NYSTATIN 1 APPLICATION: 100000 POWDER TOPICAL at 17:54

## 2024-09-26 RX ADMIN — Medication 250 MG: at 17:51

## 2024-09-26 RX ADMIN — SERTRALINE 100 MG: 100 TABLET, FILM COATED ORAL at 08:26

## 2024-09-26 RX ADMIN — ASPIRIN 81 MG: 81 TABLET, COATED ORAL at 08:27

## 2024-09-26 NOTE — ASSESSMENT & PLAN NOTE
Wt Readings from Last 3 Encounters:   09/26/24 50 kg (110 lb 3.7 oz)   09/11/24 53.8 kg (118 lb 9.7 oz)   09/04/24 53.5 kg (118 lb)     Weight stable  Patient takes Demedex 20 mg daily PRN for weight gain  Monitor daily weights  IM managing

## 2024-09-26 NOTE — PROGRESS NOTES
09/26/24 1000   Pain Assessment   Pain Assessment Tool 0-10   Pain Score No Pain   Restrictions/Precautions   Precautions Aspiration;Bed/chair alarms;Cognitive;Fall Risk;Pain;Spinal precautions;Supervision on toilet/commode   Weight Bearing Restrictions No   ROM Restrictions   (c/s precautions)   Braces or Orthoses C/S Collar   Cognition   Overall Cognitive Status Impaired   Arousal/Participation Alert;Responsive;Cooperative   Attention Attends with cues to redirect   Orientation Level Oriented X4   Memory Decreased short term memory;Decreased recall of recent events;Decreased recall of precautions   Following Commands Follows one step commands with increased time or repetition   Subjective   Subjective Pt reports she wishes her cough would go away   Roll Left and Right   Comment Pt OOB   Sit to Lying   Comment Pt OOB   Lying to Sitting on Side of Bed   Comment Pt OOB   Sit to Stand   Type of Assistance Needed Supervision   Physical Assistance Level No physical assistance   Comment RW   Sit to Stand CARE Score 4   Bed-Chair Transfer   Type of Assistance Needed Supervision   Physical Assistance Level No physical assistance   Comment RW   Chair/Bed-to-Chair Transfer CARE Score 4   Car Transfer   Reason if not Attempted Environmental limitations   Car Transfer CARE Score 10   Walk 10 Feet   Type of Assistance Needed Supervision   Physical Assistance Level No physical assistance   Comment Cl S RW   Walk 10 Feet CARE Score 4   Walk 50 Feet with Two Turns   Type of Assistance Needed Supervision   Physical Assistance Level No physical assistance   Comment Cl S RW   Walk 50 Feet with Two Turns CARE Score 4   Walk 150 Feet   Comment fatigue limited   Reason if not Attempted Safety concerns   Walk 150 Feet CARE Score 88   Walking 10 Feet on Uneven Surfaces   Type of Assistance Needed Supervision   Physical Assistance Level No physical assistance   Comment Cl S RW green foam   Walking 10 Feet on Uneven Surfaces CARE Score 4  "  Ambulation   Primary Mode of Locomotion Prior to Admission Walk   Distance Walked (feet) 102 ft  (102', 74')   Assist Device Roller Walker   Gait Pattern Antalgic;Inconsistant Vaishnavi;Slow Vaishnavi;Decreased foot clearance;Forward Flexion;Narrow FLORENTINO;Improper weight shift   Limitations Noted In Balance;Endurance;Heel Strike;Posture;Speed;Strength   Provided Assistance with: Direction   Findings Cl S RW; remains limited by fatigue   Does the patient walk? 2. Yes   Curb or Single Stair   Style negotiated Single stair   Type of Assistance Needed Supervision   Physical Assistance Level No physical assistance   Comment Cl S up/down 3 6\" steps with B/L HR   1 Step (Curb) CARE Score 4   4 Steps   Comment limited by fatigue this session   12 Steps   Reason if not Attempted Safety concerns   12 Steps CARE Score 88   Toilet Transfer   Comment Pt did not require during session   Therapeutic Interventions   Balance Static standing balance with minimal UE support playing war, focusing on increasing standing tolerance and use of LE as opposed to UE for support; 4 trials between 30s and 1 min each   Other transfer training, gait training, stair training   Assessment   Treatment Assessment Pt agreeable to PT this AM, received sitting upright in w/c. Pt continuing to improve with independence towards functional mobility, requiring S for all transfers, ambulation, and stair negotiation this session. Challenged LE strength/endurance and static balance with card activity in standing. She still relies on UE support for balance, but improving with standing tolerance. She remains limited by fatigue. Will continue with current PT POC to improve deficits and promote return to PLOF.   Problem List Decreased strength;Decreased range of motion;Decreased endurance;Impaired balance;Decreased mobility;Decreased cognition;Impaired judgement;Decreased safety awareness;Orthopedic restrictions;Pain   PT Barriers   Physical Impairment Decreased " strength;Decreased range of motion;Decreased endurance;Impaired balance;Decreased mobility;Decreased safety awareness;Pain;Orthopedic restrictions   Functional Limitation Car transfers;Ramp negotiation;Stair negotiation;Standing;Transfers;Walking;Wheelchair management   Plan   Treatment/Interventions Functional transfer training;LE strengthening/ROM;Therapeutic exercise;Endurance training;Cognitive reorientation;Patient/family training;Equipment eval/education;Bed mobility;Gait training   Progress Progressing toward goals   PT Therapy Minutes   PT Time In 1000   PT Time Out 1100   PT Total Time (minutes) 60   PT Mode of treatment - Individual (minutes) 60   PT Mode of treatment - Concurrent (minutes) 0   PT Mode of treatment - Group (minutes) 0   PT Mode of treatment - Co-treat (minutes) 0   PT Mode of Treatment - Total time(minutes) 60 minutes   PT Cumulative Minutes 1076     Patient remains OOB in w/c, all needs in reach.  Alarm in place and activated.  Encouraged use of call bell, patient verbalizes understanding.

## 2024-09-26 NOTE — PROGRESS NOTES
"Progress Note - PMR   Name: Marsha Oliva 79 y.o. female I MRN: [id number]  Unit/Bed#: [facility] 219-01 I Date of Admission: 9/11/2024   Date of Service: 9/26/2024 I Hospital Day: 15     Assessment & Plan  Spinal cord compression (HCC)  Presented  to acute care on 9/4/24 with progressive neck pain and weakness.    She reports 4-6 weeks prior she sustained a fall off her bed, head hitting her nightstand.    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   MRI C spine showing displaced type 2 dens fracture with mild mass effect on cord, ALL and PLL injuries.    CTA head and neck negative for vascular injuries.    Patient seen by Neurosurgery and deemed an operative candidate.    Patient taken to the OR by Dr. Dewitt for a bilateral C1 and C3 lateral mass screw placement, bialteral C2 pedicle screw palcement, C1-C3 fusion.    Post operatively, placed in a Aspen C Collar at all times, except showering with Shonda collar.    Post op X-rays with stable alignment    Plan in ARC:  Begin ARC program with PT, OT, SLP  Monitor incision  Monitor pain  Monitor neuro exam  Follow-up with Neurosurgery in 2 weeks.  2 week post op visit completed with NSGY virtually - \"Will plan to follow-up with the pt as scheduled for her 6 week POV with repeat upright xrays\"  Oropharyngeal dysphagia  New issues  VFSS study completed on 9/10/24 and patient found to have mild oral and moderate phayngeal dysphagia.    Cleared for a pureed diet with honey thick liquids.  After discussion with staff may consider repeating swallow study after discussion with speech therapy as patient still having moist cough during meals.  Patient states at home she will take a bite or 2 at a time and eat her meals over very very long periods of time but with her direct supervision requirements at this time that is not possible.  SLP to follow  Continue aspiration precautions "   Acquired bilateral hammer toes  Keratosis of toe  May need shaving of this lesion  Consult placed to Podiatry  Atrial fibrillation, chronic (HCC)  Chronic A. Fib  Rate/rhythm controlled on digoxin 125 mcg every other day, Cardizem  mg daily  No AC due to prior several GI bleeds.  On aspirin 81 mg daily.  IM managing  B12 deficiency  Continue supplementation   IM managing  Chronic diastolic CHF (congestive heart failure) (HCC)  Wt Readings from Last 3 Encounters:   09/26/24 50 kg (110 lb 3.7 oz)   09/11/24 53.8 kg (118 lb 9.7 oz)   09/04/24 53.5 kg (118 lb)     Weight stable  Patient takes Demedex 20 mg daily PRN for weight gain  Monitor daily weights  IM managing  Depression with anxiety  Mood is stable  Continue Zoloft 100 mg daily  Essential hypertension  Continue Cardizem  mg Daily  Monitor BP and HR with rest and activity  IM managing  Hypothyroidism  Continue Synthroid 125 mcg daily  Mixed hyperlipidemia  Continue Atorvastain 20 mg daily  Folate deficiency  Continue supplementation   IM managing  Acute pain due to trauma  Located in neck  Continue Tylenol 975 mg TID  Oxycodone IR 2.5-5 mg Q 4 hours PRN for moderate-severe pain   Topical ICE  -improved has not required oxy since 9/13  Skin abnormalities  Wound Care team following in ARC:  POA several areas  Continue LWC:     Skin care plans:  1-Hydraguard to sacrum, buttocks and heels BID and PRN  2-EHOB cushion when out of bed in chair.  3-Moisturize skin daily with skin nourishing cream  4-Elevate heels to offload pressure.  5-Turn/reposition q2h for pressure re-distribution on skin  6. Monitor right 2nd toe growth area - leave open to air podiatry consulted.  7. Right arm skin tear - cleanse with Normal saline then apply normlgel ag then top with adaptic then a ABD and keisha change every other day   8. Right and left breast folds - cleanse with soap and water pat dry dust lightly with nystatin powder bid and prn  Lymphedema  Pumps brought in by   - ok to use PRN as tolerated  At risk for altered bowel elimination  Continent  -at home takes daily miralax; rescheduled PRN to daily  At risk for altered urinary elimination  Continent   At risk for deep venous thrombosis  Continue Lovenox 40 mg daily - should not need at discharge  Encounter for rehabilitation  Functional deficits:  cervical collar on at all times, cervical spinal precautions, impaired mobility, self care   Continue current rehabilitation plan of care to maximize function.  Estimated discharge: Thursday 10/3/24 with home care estimated    Severe protein-calorie malnutrition (HCC)  Nutrition following    Malnutrition Findings:   Adult Malnutrition type: Acute illness  Adult Degree of Malnutrition: Other severe protein calorie malnutrition  Malnutrition Characteristics: Inadequate energy, Weight loss  360 Statement: Malnutrition related to inadequate energy intake as evidenced by 7#(6%) weight loss from 9/11/# to 9/18/# and <50% energy intake >5 days.    BMI Findings:  Body mass index is 19.53 kg/m².   Closed odontoid fracture with type II morphology, posterior displacement, and nonunion      History of Present Illness   Marsha Oliva is a 79 y.o. female with HTN, A Fib, CHF, HLD, Hypothyroidism who presented to the [facility] on on 9/4/24 with progressive neck pain and weakness.  She reports 4-6 weeks prior she sustained a fall off her bed, head hitting her nightstand.  She has been having increasing neck pain since.  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 MRI C spine showing displaced type 2 dens fracture with mild mass effect on cord, ALL and PLL injuries.  CTA head and neck negative for vascular injuries.  Patient seen by Neurosurgery and deemed an operative candidate.  Patient taken to the OR by Dr. Dewitt for a bilateral C1 and C3 lateral mass screw  placement, bialteral C2 pedicle screw palcement, C1-C3 fusion.  Post operatively, placed in a Aspen C Collar at all times, except showering with Shonda collar.  Post op X-rays with stable alignment  Medical course complicated by acute dysphagia.  VFSS study completed on 9/10/24 and patient found to have mild oral and moderate phayngeal dysphagia.  Cleared for a pureed diet with honey thick liquids.  Patient seen by wound care team for abrasions and stage I pressure injury to sacrum.  Patient started on Tylenol and PRN oxycodone for acute pain.    After medical stabilization, patient found to have acute functional deficits from his baseline, therefore, admitted to [facility] for acute inpatient rehabilitation.      Chief Complaint: f/u SCI, f/u ambulatory dysfunction, and dysphagia/diet recommendations    Interval: Patient seen and evaluated in room.  Overall is doing fairly well functionally however I did have a conversation with staff that was providing supervision during her meals.  Currently on a direct supervision with 2 handled sippy cup and continued coughing throughout the meal.  Is a relatively moist cough however she is able to clear okay.  Will have discussion with speech therapy about potential for repeat swallow study if indicated.  We did have a chest x-ray 2 days prior that was negative for any acute cardiopulmonary disease.  Labs and vitals not suggestive of an infectious process however remains at risk.  No fevers, chills, nausea, vomiting, cough or shortness of breath, diarrhea or constipation.  Last bowel movement on 9/25    Objective     Functional Update:  Physical Therapy Occupational Therapy Speech Therapy   Weight Bearing Status: Full Weight Bearing  Transfers: Supervision  Bed Mobility: Independent  Amulation Distance (ft): 122 feet  Ambulation: Incidental Touching  Assistive Device for Ambulation: Roller Walker  Wheelchair Mobility Distance: 25 ft  Wheelchair Mobility:  Supervision  Number of Stairs: 7  Assistive Device for Stairs: Bilateral Hand Rails  Stair Assistance: Incidental Touching  Ramp: Incidental Touching  Assistive Device for Ramp: Roller Walker  Discharge Recommendations: Home with:  DC Home with:: 24 Hour Assisteance, Family Support, First Floor Setup, Home Physical Therapy   Eating: Supervision  Grooming: Supervision  Bathing: Incidental Touching  Bathing: Incidental Touching  Upper Body Dressing: Minimal Assistance  Lower Body Dressing: Minimal Assistance  Toileting: Incidental Touching  Tub/Shower Transfer: Minimal Assistance  Toilet Transfer: Incidental Touching, Supervision  Cognition: Exceptions to WNL  Cognition: Decreased Memory, Decreased Attention, Decreased Comprehension, Decreased Safety  Orientation: Person, Place, Time, Situation   Mode of Communication: Verbal  Cognition: Exceptions to WNL  Cognition: Decreased Memory, Decreased Executive Functions, Decreased Attention, Decreased Comprehension  Orientation: Person, Place, Time, Situation  Swallowing: Exceptions to WNL  Swallowing: Oral Dysphagia, Pharyngeal Dysphagia  Diet Recommendations: Level 1/Puree, Honey Thick  Discharge Recommendations: Home with:  DC Home with:: Family Support, Home Speech Therapy (close supervision 2' cognition and dysphagia severity)         Temp:  [97 °F (36.1 °C)-97.6 °F (36.4 °C)] 97.4 °F (36.3 °C)  HR:  [70-82] 70  Resp:  [18-20] 20  BP: ()/(60-62) 128/60  SpO2:  [91 %-93 %] 93 %  Physical Exam  Vitals reviewed.   Constitutional:       General: She is not in acute distress.  HENT:      Head: Normocephalic and atraumatic.      Right Ear: External ear normal.      Left Ear: External ear normal.      Nose: Nose normal. No rhinorrhea.      Mouth/Throat:      Mouth: Mucous membranes are moist.      Pharynx: Oropharynx is clear.   Eyes:      General: No scleral icterus.  Neck:      Comments: Cervical collar in place  Cardiovascular:      Rate and Rhythm: Normal rate.    Pulmonary:      Effort: Pulmonary effort is normal. No respiratory distress.      Comments: Moist cough at times during meals noted today  Abdominal:      General: There is no distension.      Palpations: Abdomen is soft.   Musculoskeletal:      Right lower leg: No edema.      Left lower leg: No edema.   Skin:     General: Skin is warm and dry.      Comments: Neck incision CDI   Neurological:      Mental Status: She is alert and oriented to person, place, and time.      Motor: Weakness (Proximal lower extremity weakness) present.   Psychiatric:         Mood and Affect: Mood normal.         Behavior: Behavior normal.             Scheduled Meds:  Current Facility-Administered Medications   Medication Dose Route Frequency Provider Last Rate    acetaminophen  975 mg Oral Q8H [facility] Sharmin Waggoner PA-C      Artificial Tears  2 drop Both Eyes Q3H PRN JAQUELINE Amos      aspirin  81 mg Oral Daily Corine Park MD      atorvastatin  20 mg Oral Daily With Dinner Sharmin Waggoner PA-C      bisacodyl  10 mg Rectal Daily PRN Sharmin Waggoner PA-C      bisacodyl  10 mg Rectal Once Corine Park MD      calcium carbonate  1,000 mg Oral Daily PRN Sharmin Waggoner PA-C      cyanocobalamin  1,000 mcg Oral Weekly Sharmin Waggoner PA-C      dextromethorphan-guaiFENesin  10 mL Oral Q4H PRN Tj Darling MD      digoxin  125 mcg Oral Every Other Day Sharmin Waggoner PA-C      diltiazem  180 mg Oral Daily Sharmin Waggoner PA-C      docusate sodium  100 mg Oral BID Corine Park MD      enoxaparin  40 mg Subcutaneous Q24H [facility] Corine Park MD      fluticasone  2 spray Each Nare Daily JAQUELINE Amos      folic acid  1,000 mcg Oral Daily Sharmin Waggoner PA-C      lactulose  20 g Oral Daily PRN Jermaine Greenberg DO      levothyroxine  125 mcg Oral Early Morning Sharmin Waggoner PA-C      magnesium gluconate  250 mg Oral BID Sharmin  Frank Waggoner PA-C      nystatin   Topical BID Corine Park MD      ondansetron  4 mg Oral Q6H PRN Sharmin Waggoner PA-C      oxyCODONE  2.5 mg Oral Q4H PRN Sharmin Waggoner PA-C      Or    oxyCODONE  5 mg Oral Q4H PRN Sharmin Waggoner PA-C      phenol  1 spray Mouth/Throat Q2H PRN Sharmin Waggoner PA-C      senna  2 tablet Oral HS Corine Park MD      sertraline  100 mg Oral Daily Sharmin Waggoner PA-C      torsemide  20 mg Oral Daily PRN Sharmin Waggoner PA-C           Lab Results: I have reviewed the following results:   Results from last 7 days   Lab Units 09/23/24  0410   HEMOGLOBIN g/dL 11.6   HEMATOCRIT % 38.8   WBC Thousand/uL 7.70   PLATELETS Thousands/uL 195     Results from last 7 days   Lab Units 09/23/24  0410   BUN mg/dL 16   SODIUM mmol/L 140   POTASSIUM mmol/L 4.0   CHLORIDE mmol/L 104   CREATININE mg/dL 0.71   AST U/L 24   ALT U/L 17                Jermaine Greenberg, DO  Physical Medicine and Rehabilitation  [facility]    I have spent a total time of 35 minutes in caring for this patient on the day of the visit/encounter including Counseling / Coordination of care, Documenting in the medical record, and Communicating with other healthcare professionals .

## 2024-09-26 NOTE — PROGRESS NOTES
"   09/26/24 5910   Pain Assessment   Pain Assessment Tool 0-10   Pain Score 3   Pain Location/Orientation Location: Neck   Restrictions/Precautions   Precautions Aspiration;Bed/chair alarms;Cognitive;Fall Risk;Pain;Spinal precautions;Supervision on toilet/commode   Weight Bearing Restrictions No   ROM Restrictions   (c/s precautions)   Braces or Orthoses C/S Collar   Cognition   Overall Cognitive Status Impaired   Arousal/Participation Alert;Responsive;Cooperative   Attention Attends with cues to redirect   Orientation Level Oriented X4   Memory Decreased short term memory;Decreased recall of recent events;Decreased recall of precautions   Following Commands Follows one step commands with increased time or repetition   Subjective   Subjective \"What did I do that was good today?\"   Roll Left and Right   Type of Assistance Needed Independent   Physical Assistance Level No physical assistance   Roll Left and Right CARE Score 6   Sit to Lying   Type of Assistance Needed Supervision   Physical Assistance Level No physical assistance   Sit to Lying CARE Score 4   Sit to Stand   Type of Assistance Needed Supervision   Physical Assistance Level No physical assistance   Comment RW   Sit to Stand CARE Score 4   Bed-Chair Transfer   Type of Assistance Needed Supervision   Physical Assistance Level No physical assistance   Comment RW   Chair/Bed-to-Chair Transfer CARE Score 4   Car Transfer   Reason if not Attempted Environmental limitations   Car Transfer CARE Score 10   Walk 150 Feet   Reason if not Attempted Safety concerns   Walk 150 Feet CARE Score 88   Ambulation   Findings Focused short PM session on supine LE TE   Does the patient walk? 2. Yes   Wheelchair mobility   Findings Focused short PM session on supine LE TE   Curb or Single Stair   Comment Focused short PM session on supine LE TE   Toilet Transfer   Comment Pt did not require during session   Therapeutic Interventions   Strengthening Supine LE TE   Other bed " mobility, transfer training   Assessment   Treatment Assessment Pt agreeable to PT this PM, received sitting upright in w/c. Pt is continuing to improve with safety and efficiency with transfers using RW, although still requires assist for scooting/bumping activities in bed with HOB flat. Completed supine LE TE to challenge LE strength/endurance with good tolerance. She remains limited by fatigue. Will continue with current PT POC to improve deficits and promote return to PLOF.   Problem List Decreased strength;Decreased range of motion;Decreased endurance;Impaired balance;Decreased cognition;Decreased safety awareness;Decreased skin integrity;Orthopedic restrictions;Pain   PT Barriers   Physical Impairment Decreased strength;Decreased range of motion;Decreased endurance;Impaired balance;Decreased mobility;Decreased safety awareness;Pain;Orthopedic restrictions   Functional Limitation Car transfers;Ramp negotiation;Stair negotiation;Standing;Transfers;Walking;Wheelchair management   Plan   Treatment/Interventions Functional transfer training;LE strengthening/ROM;Therapeutic exercise;Endurance training;Cognitive reorientation;Patient/family training;Equipment eval/education;Bed mobility;Gait training   Progress Progressing toward goals   PT Therapy Minutes   PT Time In 1330   PT Time Out 1400   PT Total Time (minutes) 30   PT Mode of treatment - Individual (minutes) 30   PT Mode of treatment - Concurrent (minutes) 0   PT Mode of treatment - Group (minutes) 0   PT Mode of treatment - Co-treat (minutes) 0   PT Mode of Treatment - Total time(minutes) 30 minutes   PT Cumulative Minutes 1106     Supine LE TE:  3x10, B/L LEs  GS  QS  APs  SAQ  Hip ADd - ne  Heel slides    Patient remains supine in bed, all needs in reach.  Alarm in place and activated.  Encouraged use of call bell, patient verbalizes understanding.

## 2024-09-26 NOTE — ASSESSMENT & PLAN NOTE
New issues  VFSS study completed on 9/10/24 and patient found to have mild oral and moderate phayngeal dysphagia.    Cleared for a pureed diet with honey thick liquids.  After discussion with staff may consider repeating swallow study after discussion with speech therapy as patient still having moist cough during meals.  Patient states at home she will take a bite or 2 at a time and eat her meals over very very long periods of time but with her direct supervision requirements at this time that is not possible.  SLP to follow  Continue aspiration precautions

## 2024-09-26 NOTE — PROGRESS NOTES
09/26/24 1235   Pain Assessment   Pain Assessment Tool 0-10   Pain Score 4   Pain Location/Orientation Location: Neck   Restrictions/Precautions   Precautions Aspiration;Bed/chair alarms;Cognitive;Fall Risk;Pain;Spinal precautions;Supervision on toilet/commode   Weight Bearing Restrictions No   ROM Restrictions   (spinal prec)   Braces or Orthoses C/S Collar   Eating   Type of Assistance Needed Supervision;Verbal cues   Eating CARE Score 4   Eating Assessment   Food To Mouth Yes   Positioning Upright;Out of Bed   Meal Assessed Lunch   Opens Packages No   Findings spouted cup on tray with reminders for tilting cup and then sipping avoiding neck discomfort; pt requires encouragement for solid and fluid intake with reminders for using a forceful swallow and alternating solid with liquids with increased time for meal completion required   Exercise Tools   Other Exercise Tool 3 1 hand of Solitare with sequnce and rules of game reviewed with patient to all for playing when seated OOB resting in room per patient request   Assessment   Treatment Assessment Pt presents seated in w/c eating lunch with supervision and enocuragement for increased intake for as much caloric intake as possible. Pt tolerates session with barriers same as listed during earlier session and will benefit from conitnued skilled OT servcies to increase independence with daily tasks.   Problem List Decreased strength;Decreased range of motion;Decreased endurance;Impaired balance;Decreased cognition;Decreased safety awareness;Decreased skin integrity;Orthopedic restrictions;Pain  (spinal prec with c collar)   Plan   Treatment/Interventions ADL retraining;Functional transfer training;Therapeutic exercise;Endurance training;Patient/family training;Cognitive reorientation;Equipment eval/education;Compensatory technique education   Progress Progressing toward goals   OT Therapy Minutes   OT Time In 1235   OT Time Out 1330   OT Total Time (minutes) 55   OT  Mode of treatment - Individual (minutes) 55   Therapy Time missed   Time missed? No

## 2024-09-26 NOTE — ASSESSMENT & PLAN NOTE
Nutrition following    Malnutrition Findings:   Adult Malnutrition type: Acute illness  Adult Degree of Malnutrition: Other severe protein calorie malnutrition  Malnutrition Characteristics: Inadequate energy, Weight loss  360 Statement: Malnutrition related to inadequate energy intake as evidenced by 7#(6%) weight loss from 9/11/# to 9/18/# and <50% energy intake >5 days.    BMI Findings:  Body mass index is 19.53 kg/m².

## 2024-09-26 NOTE — PROGRESS NOTES
09/26/24 1100   Pain Assessment   Pain Assessment Tool 0-10   Pain Score 4   Pain Location/Orientation Location: Neck   Restrictions/Precautions   Precautions Aspiration;Bed/chair alarms;Cognitive;Fall Risk;Pain;Spinal precautions;Supervision on toilet/commode   Weight Bearing Restrictions No   ROM Restrictions   (spinal prec)   Braces or Orthoses C/S Collar   Grooming   Able To Initiate Tasks;Wash/Dry Hands   Limitation Noted In Safety;Strength   Findings setup w/c level at the sink   Sit to Stand   Type of Assistance Needed Supervision   Sit to Stand CARE Score 4   Bed-Chair Transfer   Type of Assistance Needed Supervision;Incidental touching   Chair/Bed-to-Chair Transfer CARE Score 4   Toileting Hygiene   Type of Assistance Needed Supervision;Incidental touching   Toileting Hygiene CARE Score 4   Toileting   Able to Pull Clothing down yes, up yes.   Manage Hygiene Bladder   Limitations Noted In Balance;Problem Solving;ROM;Safety;LE Strength  (spinal prec with c collar)   Adaptive Equipment Grab Bar   Toilet Transfer   Type of Assistance Needed Supervision;Incidental touching   Toilet Transfer CARE Score 4   Toilet Transfer   Surface Assessed Raised Toilet   Transfer Technique Stand Pivot   Limitations Noted In Balance;Endurance;ROM;Problem Solving;Safety;LE Strength  (spinal prec with c collar)   Adaptive Equipment Grab Bar   Exercise Tools   Hand Gripper gripper with pegs B hands following retreival of pegs from the floor usign the reacher in the RUE   Other Exercise Tool 1 Sanderbox 2 sets 15 all directions with BUE and 2# wt   Other Exercise Tool 2 3 hands of card game Tong with min cues for sequencing game and  visiting to play as well   Cognition   Arousal/Participation Alert;Responsive;Cooperative   Attention Attends with cues to redirect   Orientation Level Oriented X4   Memory Decreased short term memory;Decreased recall of precautions   Following Commands Follows one step commands with  increased time or repetition   Assessment   Treatment Assessment Pt presents seated in w/c directly rom PT agreeable to OT session including BUE therex/ theract and toileting with related transfers/ mobility. Pt tolerates session wihtout complaints and is making gians towards goals. Pt requires assist due to decreased balance, safety, endurance and strength/ ROM with spinal prec and c collar. Pt will benefit from continued skilled OT services to increase independence with daily tasks.   Problem List Decreased strength;Decreased range of motion;Decreased endurance;Impaired balance;Decreased cognition;Decreased safety awareness;Decreased skin integrity;Orthopedic restrictions;Pain  (spinal prec with c collar)   Plan   Treatment/Interventions ADL retraining;Functional transfer training;Therapeutic exercise;Endurance training;Patient/family training;Cognitive reorientation;Equipment eval/education;Compensatory technique education   Progress Progressing toward goals   OT Therapy Minutes   OT Time In 1100   OT Time Out 1213   OT Total Time (minutes) 73   OT Mode of treatment - Individual (minutes) 73   Therapy Time missed   Time missed? No

## 2024-09-26 NOTE — CASE MANAGEMENT
CM sent home care referral to [facility] and [facility] for RN/PT/OT/ST/HHA/SW through [facility] via [employer], awaiting responses. Center well unavailble, CM sent additional referrals to [facility] through [employer] [employer].

## 2024-09-26 NOTE — NUTRITION
09/26/24 0928   Recommendations/Interventions   Summary Review of 9/25/24 calorie count. Patient is prescribed a Dysphagia 1, HTL diet with sippy cup, no straws. Prosource pudding once daily, magic cup BID. No new labs. Medications reviewed. Breakfast intake-317kcal, 15g pro, 108mL fluid; Lunch intake-164kcal, 3g pro, 90mL fluid; Dinner intake-548kcal, 11g pro, 120mL fluid. Total intake for the day 1029kcal, 29g pro, 318mL fluid. Patient is currently meeting 79% minimum kcal needs, 55% minimum protein needs and 24% minimum fluid needs. Nutrient needs not met at this time. Will continue to monitor.   Interventions/Recommendations Other (Specify)   Intervention Comments continue calorie count.   Nutrition Complexity Risk   Nutrition complexity level High risk   Nutrition review: 09/27/24   Follow up date 09/27/24

## 2024-09-26 NOTE — PROGRESS NOTES
"Progress Note - Marsha Oliva 79 y.o. female MRN: [id number]    Unit/Bed#: [facility] 219-01 Encounter: [id number]        Subjective:   Patient seen and examined at bedside after reviewing the chart and discussing the case with the caring staff.      Patient examined at bedside.  Patient continues to have occasional moist cough.  Denies any chest pain or shortness of breath.     Physical Exam   Vitals: Blood pressure 128/60, pulse 70, temperature (!) 97.4 °F (36.3 °C), temperature source Temporal, resp. rate 20, height 5' 3\" (1.6 m), weight 50 kg (110 lb 3.7 oz), SpO2 93%.,Body mass index is 19.53 kg/m².  Constitutional: Patient in no acute distress.  HEENT: PERR, EOMI, MMM.  Cardiovascular: Normal rate and regular rhythm.    Pulmonary/Chest: Effort normal and breath sounds normal.   Abdomen: Soft, + BS, NT.    Assessment/Plan:  Marsha Oliva is a(n) 79 y.o. female with cervical spinal cord compression.     Cardiac hx w/ HTN, HLD, chronic diastolic heart failure, chronic Afib.  Continue digoxin 125 mcg every other day, diltiazem 180 mg daily, atorvastatin 40 mg daily.  Daily weights.  Torsemide 20 mg daily as needed.  Pt cleared to restart ASA 81 mg daily by neurosurgery 9/19.    Hypothyroidism.  Continue levothyroxine 125 mcg daily (decr from 137 mcg 9/6).  B12 deficiency.  Patient on B12 1000 mcg weekly.   Folate deficiency.  Patient on folic acid 1,000 mcg daily.   Hypomagnesemia.  Patient on magnesium gluconate 250 mg twice daily.   Depression and anxiety.   Patient on Zoloft 100 mg daily.  Declines neuropsych at this time.   Multiple hyperkeratotic lesions.  Patient follows with Podiatry outpatient.  Wound care consult.   Sore throat.  Chloraseptic throat spray as needed.   Malnutrition.  Dietician following.  Adult Malnutrition type: Acute illness.  Adult Degree of Malnutrition: Other severe protein calorie malnutrition. Malnutrition Characteristics: Inadequate energy, Weight loss.  Productive cough.  CXR 9/23/24 " without acute cardiopulmonary disease.    Pain and bowel regimen.  As per physiatry.   Cervical spinal cord compression.  Vista collar at all times x 6 weeks.  Continue SQ Lovenox.  Patient receiving intensive PT OT ST as per physiatry.      Anticipated discharge date:  Thursday 10/3/2024    The patient was discussed with Dr. Brown and he is in agreement with the above note.

## 2024-09-26 NOTE — PROGRESS NOTES
"   09/26/24 0830   Pain Assessment   Pain Assessment Tool 0-10   Pain Score No Pain   Pain Location/Orientation Location: Neck   Patient's Stated Pain Goal No pain   Hospital Pain Intervention(s) Medication (See MAR)   Multiple Pain Sites No   Restrictions/Precautions   Precautions Aspiration;Bed/chair alarms;Cognitive;Fall Risk;Supervision on toilet/commode;Pain;Spinal precautions   Weight Bearing Restrictions No   Braces or Orthoses C/S Collar   Cognitive Linguisitic Assessments   Cognitive Linquistic Quick Test (CLQT) MOCA completed 9/16/24 - 16/30   Comprehension   Comprehension (FIM) 4 - Understands basic info/conversation 75-90% of time   Expression   Verbal Complex   Intelligibility Sentence   QI: Expression 3. Exhibits some difficulty with expressing needs and ideas (e.g., some words or finishing thoughts) or speech is not clear   Expression (FIM) 5 - Needs help/cues only RARELY (< 10% of the time)   Social Interaction   Social Interaction (FIM) 5 - Interacts appropriately with others 90% of time   Problem Solving   Problem solving (FIM) 4 - Solves basic problems 75-89% of time   Memory   Memory (FIM) 4 - Recognizes/recalls/performs 75-89%   Memory Skills   Orientation Level Oriented X4   Speech/Language/Cognition Assessmetn   Treatment Assessment Patient demonstrating good overall problem solving/awareness today including appropriate suctioning, using strong cough method, spitting sputum into napkin etc.  Patient demonstrating decreased attention to task.  Eating i'ly at times but required cues to come back to PO intake.  Patient reporting \"I really just am not hungry.\"  Pt is not oriented to time or other therapies.  Taking phone call patient participates well in conversation but confusion throughout.   Swallow Information   Current Risks for Dysphagia & Aspiration General debilitation   Current Symptoms/Concerns Cough;Clear throat   Current Diet Dysphagia pureed   Baseline Diet Regular;Thin liquids " "  Consistencies Assessed and Performance   Materials Admnistered Puree/Level 1;Honey thick liquid   Materials Adminstered Comment Pt assessed with breakfast tray   Recommendations   Risk for Aspiration Present   Recommendations Continue swallow eval process   Diet Solid Recommendation Level 1 Dysphagia/pureed   Diet Liquid Recommendation Honey thick liquid   Recommended Form of Meds Crushed;With puree   General Precautions Aspiration precautions   Compensatory Swallowing Strategies Alternate solids and liquids   Results Reviewed with PT/Family/Caregiver;MD;RN   Eating   Type of Assistance Needed Supervision;Verbal cues   Physical Assistance Level No physical assistance   Comment honey thick liquid in bilateral handled cup   Eating CARE Score 4   Swallow Assessment   Swallow Treatment Assessment Patient observed with breakfast tray of honey thick liquid and puree solids.  Patient with overt cough, wet vocal quality, throughout entire meal.  Pt with strong overt coughing, suction required, sputum produced with all trials of magic cup and HTL.  Suctioned and expectorated materials color consistency of most recently trialed PO item.  She reports that she gets \"scared\" when she's coughing that she won't be able to stop coughing.  Patient's swallow judged to be delayed in initiation, incomplete hyolaryngeal rise throughout.  Cued for hard swallow limited in ROM by collar.  Pt consuming <20% of Cymro toast and ~75% of 4oz OJ throughout our time together.  Magic cup completed ~40% and continued trials after session ended.  Great concern for aspiration of current materials given overall presentation.  Considerations for repeat VBS, concern for worsening aspiration.  Per NSG most recent chest xray and lung sounds appropriate at this time.   Swallow Assessment Prognosis   Prognosis Fair   Prognosis Considerations Co-morbidities;Medical diagnosis;Medical prognosis   SLP Therapy Minutes   SLP Time In 0830   SLP Time Out 0900 "   SLP Total Time (minutes) 30   SLP Mode of treatment - Individual (minutes) 30   SLP Mode of treatment - Concurrent (minutes) 0   SLP Mode of treatment - Group (minutes) 0   SLP Mode of treatment - Co-treat (minutes) 0   SLP Mode of Treatment - Total time(minutes) 30 minutes   SLP Cumulative Minutes 472   Therapy Time missed   Time missed? No

## 2024-09-26 NOTE — PLAN OF CARE
Problem: Prexisting or High Potential for Compromised Skin Integrity  Goal: Skin integrity is maintained or improved  Description: INTERVENTIONS:  - Identify patients at risk for skin breakdown  - Assess and monitor skin integrity  - Assess and monitor nutrition and hydration status  - Monitor labs   - Assess for incontinence   - Turn and reposition patient  - Assist with mobility/ambulation  - Relieve pressure over bony prominences  - Avoid friction and shearing  - Provide appropriate hygiene as needed including keeping skin clean and dry  - Evaluate need for skin moisturizer/barrier cream  - Collaborate with interdisciplinary team   - Patient/family teaching  - Consider wound care consult   Outcome: Progressing     Problem: PAIN - ADULT  Goal: Verbalizes/displays adequate comfort level or baseline comfort level  Description: Interventions:  - Encourage patient to monitor pain and request assistance  - Assess pain using appropriate pain scale  - Administer analgesics based on type and severity of pain and evaluate response  - Implement non-pharmacological measures as appropriate and evaluate response  - Consider cultural and social influences on pain and pain management  - Notify physician/advanced practitioner if interventions unsuccessful or patient reports new pain  Outcome: Progressing     Problem: INFECTION - ADULT  Goal: Absence or prevention of progression during hospitalization  Description: INTERVENTIONS:  - Assess and monitor for signs and symptoms of infection  - Monitor lab/diagnostic results  - Monitor all insertion sites, i.e. indwelling lines, tubes, and drains  - Monitor endotracheal if appropriate and nasal secretions for changes in amount and color  - [place] appropriate cooling/warming therapies per order  - Administer medications as ordered  - Instruct and encourage patient and family to use good hand hygiene technique  - Identify and instruct in appropriate isolation precautions for  identified infection/condition  Outcome: Progressing     Problem: SAFETY ADULT  Goal: Patient will remain free of falls  Description: INTERVENTIONS:  - Educate patient/family on patient safety including physical limitations  - Instruct patient to call for assistance with activity   - Consult OT/PT to assist with strengthening/mobility   - Keep Call bell within reach  - Keep bed low and locked with side rails adjusted as appropriate  - Keep care items and personal belongings within reach  - Initiate and maintain comfort rounds  - Make Fall Risk Sign visible to staff  - Offer Toileting every 2 Hours, in advance of need  - Initiate/Maintain bed/chair alarm  - Apply yellow socks and bracelet for high fall risk patients  - Consider moving patient to room near nurses station  Outcome: Progressing     Problem: CARDIOVASCULAR - ADULT  Goal: Maintains optimal cardiac output and hemodynamic stability  Description: INTERVENTIONS:  - Monitor I/O, vital signs and rhythm  - Monitor for S/S and trends of decreased cardiac output  - Administer and titrate ordered vasoactive medications to optimize hemodynamic stability  - Assess quality of pulses, skin color and temperature  - Assess for signs of decreased coronary artery perfusion  - Instruct patient to report change in severity of symptoms  Outcome: Progressing     Problem: RESPIRATORY - ADULT  Goal: Achieves optimal ventilation and oxygenation  Description: INTERVENTIONS:  - Assess for changes in respiratory status  - Assess for changes in mentation and behavior  - Position to facilitate oxygenation and minimize respiratory effort  - Oxygen administered by appropriate delivery if ordered  - Initiate smoking cessation education as indicated  - Encourage broncho-pulmonary hygiene including cough, deep breathe, Incentive Spirometry  - Assess the need for suctioning and aspirate as needed  - Assess and instruct to report SOB or any respiratory difficulty  - Respiratory Therapy  support as indicated  Outcome: Progressing     Problem: GASTROINTESTINAL - ADULT  Goal: Minimal or absence of nausea and/or vomiting  Description: INTERVENTIONS:  - Administer IV fluids if ordered to ensure adequate hydration  - Maintain NPO status until nausea and vomiting are resolved  - Nasogastric tube if ordered  - Administer ordered antiemetic medications as needed  - Provide nonpharmacologic comfort measures as appropriate  - Advance diet as tolerated, if ordered  - Consider nutrition services referral to assist patient with adequate nutrition and appropriate food choices  Outcome: Progressing     Problem: SKIN/TISSUE INTEGRITY - ADULT  Goal: Skin Integrity remains intact(Skin Breakdown Prevention)  Description: Assess:  -Perform Grady assessment every shift   -Clean and moisturize skin every day  -Inspect skin when repositioning, toileting, and assisting with ADLS  -Assess under medical devices such as aspen collar every shift  -Assess extremities for adequate circulation and sensation     Bed Management:  -Have minimal linens on bed & keep smooth, unwrinkled  -Change linens as needed when moist or perspiring  -Avoid sitting or lying in one position for more than 2 hours while in bed  -Keep HOB at 30 degrees     Toileting:  -Offer bedside commode  -Assess for incontinence every 45 hours  -Use incontinent care products after each incontinent episode such as foam cleanser    Activity:  -Mobilize patient 3 times a day  -Encourage activity and walks on unit  -Encourage or provide ROM exercises   -Turn and reposition patient every 2 Hours  -Use appropriate equipment to lift or move patient in bed  -Instruct/ Assist with weight shifting every hour when out of bed in chair  -Consider limitation of chair time 2 hour intervals    Skin Care:  -Avoid use of baby powder, tape, friction and shearing, hot water or constrictive clothing  -Relieve pressure over bony prominences using moisture barrier  -Do not massage red  bony areas    Next Steps:   -Consider consults to  interdisciplinary teams such as wound care RN  Outcome: Progressing     Problem: MUSCULOSKELETAL - ADULT  Goal: Maintain or return mobility to safest level of function  Description: INTERVENTIONS:  - Assess patient's ability to carry out ADLs; assess patient's baseline for ADL function and identify physical deficits which impact ability to perform ADLs (bathing, care of mouth/teeth, toileting, grooming, dressing, etc.)  - Assess/evaluate cause of self-care deficits   - Assess range of motion  - Assess patient's mobility  - Assess patient's need for assistive devices and provide as appropriate  - Encourage maximum independence but intervene and supervise when necessary  - Involve family in performance of ADLs  - Assess for home care needs following discharge   - Consider OT consult to assist with ADL evaluation and planning for discharge  - Provide patient education as appropriate  Outcome: Progressing     Problem: Nutrition/Hydration-ADULT  Goal: Nutrient/Hydration intake appropriate for improving, restoring or maintaining nutritional needs  Description: Monitor and assess patient's nutrition/hydration status for malnutrition. Collaborate with interdisciplinary team and initiate plan and interventions as ordered.  Monitor patient's weight and dietary intake as ordered or per policy. Utilize nutrition screening tool and intervene as necessary. Determine patient's food preferences and provide high-protein, high-caloric foods as appropriate.     INTERVENTIONS:  - Monitor oral intake, urinary output, labs, and treatment plans  - Assess nutrition and hydration status and recommend course of action  - Evaluate amount of meals eaten  - Assist patient with eating if necessary   - Allow adequate time for meals  - Recommend/ encourage appropriate diets, oral nutritional supplements, and vitamin/mineral supplements  - Order, calculate, and assess calorie counts as needed  -  Recommend, monitor, and adjust tube feedings and TPN/PPN based on assessed needs  - Assess need for intravenous fluids  - Provide specific nutrition/hydration education as appropriate  - Include patient/family/caregiver in decisions related to nutrition  Outcome: Progressing

## 2024-09-27 PROBLEM — Z78.9 IMPAIRED MOBILITY AND ACTIVITIES OF DAILY LIVING: Status: ACTIVE | Noted: 2024-09-27

## 2024-09-27 PROBLEM — Z74.09 IMPAIRED MOBILITY AND ACTIVITIES OF DAILY LIVING: Status: ACTIVE | Noted: 2024-09-27

## 2024-09-27 PROCEDURE — 97535 SELF CARE MNGMENT TRAINING: CPT

## 2024-09-27 PROCEDURE — 97530 THERAPEUTIC ACTIVITIES: CPT

## 2024-09-27 PROCEDURE — 97116 GAIT TRAINING THERAPY: CPT

## 2024-09-27 PROCEDURE — 99233 SBSQ HOSP IP/OBS HIGH 50: CPT | Performed by: STUDENT IN AN ORGANIZED HEALTH CARE EDUCATION/TRAINING PROGRAM

## 2024-09-27 PROCEDURE — 97110 THERAPEUTIC EXERCISES: CPT

## 2024-09-27 PROCEDURE — 92526 ORAL FUNCTION THERAPY: CPT | Performed by: NURSE PRACTITIONER

## 2024-09-27 PROCEDURE — 92507 TX SP LANG VOICE COMM INDIV: CPT | Performed by: NURSE PRACTITIONER

## 2024-09-27 RX ADMIN — NYSTATIN: 100000 POWDER TOPICAL at 09:16

## 2024-09-27 RX ADMIN — ASPIRIN 81 MG: 81 TABLET, COATED ORAL at 09:11

## 2024-09-27 RX ADMIN — ENOXAPARIN SODIUM 40 MG: 40 INJECTION SUBCUTANEOUS at 09:10

## 2024-09-27 RX ADMIN — LEVOTHYROXINE SODIUM 125 MCG: 25 TABLET ORAL at 05:22

## 2024-09-27 RX ADMIN — ACETAMINOPHEN 975 MG: 325 TABLET ORAL at 21:31

## 2024-09-27 RX ADMIN — FOLIC ACID 1000 MCG: 1 TABLET ORAL at 09:11

## 2024-09-27 RX ADMIN — Medication 250 MG: at 09:11

## 2024-09-27 RX ADMIN — DOCUSATE SODIUM 100 MG: 100 CAPSULE, LIQUID FILLED ORAL at 09:11

## 2024-09-27 RX ADMIN — FLUTICASONE PROPIONATE 2 SPRAY: 50 SPRAY, METERED NASAL at 09:10

## 2024-09-27 RX ADMIN — ACETAMINOPHEN 975 MG: 325 TABLET ORAL at 13:28

## 2024-09-27 RX ADMIN — SERTRALINE 100 MG: 100 TABLET, FILM COATED ORAL at 09:11

## 2024-09-27 RX ADMIN — Medication 250 MG: at 17:28

## 2024-09-27 RX ADMIN — ATORVASTATIN CALCIUM 20 MG: 20 TABLET, FILM COATED ORAL at 17:28

## 2024-09-27 RX ADMIN — DOCUSATE SODIUM 100 MG: 100 CAPSULE, LIQUID FILLED ORAL at 17:28

## 2024-09-27 RX ADMIN — DIGOXIN 125 MCG: 125 TABLET ORAL at 09:11

## 2024-09-27 RX ADMIN — ACETAMINOPHEN 975 MG: 325 TABLET ORAL at 05:22

## 2024-09-27 NOTE — PROGRESS NOTES
09/27/24 0900   Pain Assessment   Pain Assessment Tool 0-10   Pain Score 3   Pain Location/Orientation Location: Neck   Restrictions/Precautions   Precautions Aspiration;Bed/chair alarms;Cognitive;Fall Risk;Pain;Spinal precautions;Supervision on toilet/commode   Weight Bearing Restrictions No   ROM Restrictions   (spinal precautions)   Braces or Orthoses C/S Collar   Cognition   Overall Cognitive Status Impaired   Arousal/Participation Alert;Responsive;Cooperative   Attention Attends with cues to redirect   Orientation Level Oriented X4   Memory Decreased short term memory;Decreased recall of recent events;Decreased recall of precautions   Following Commands Follows one step commands with increased time or repetition   Subjective   Subjective Pt reports decreased appetitie   Roll Left and Right   Comment DNT   Sit to Lying   Type of Assistance Needed Supervision;Verbal cues   Physical Assistance Level No physical assistance   Comment Cues for technique   Sit to Lying CARE Score 4   Lying to Sitting on Side of Bed   Comment DNT   Sit to Stand   Type of Assistance Needed Supervision;Verbal cues   Physical Assistance Level No physical assistance   Comment With RW, cues for hand placement   Sit to Stand CARE Score 4   Bed-Chair Transfer   Type of Assistance Needed Supervision;Verbal cues   Physical Assistance Level No physical assistance   Comment With RW, cues for hand placement   Chair/Bed-to-Chair Transfer CARE Score 4   Car Transfer   Reason if not Attempted Environmental limitations   Car Transfer CARE Score 10   Walk 10 Feet   Type of Assistance Needed Supervision   Physical Assistance Level No physical assistance   Comment Close supervision with RW on level and unlevel surfaces.   Walk 10 Feet CARE Score 4   Walk 50 Feet with Two Turns   Type of Assistance Needed Supervision   Physical Assistance Level No physical assistance   Comment Close supervision with RW on level and unlevel surfaces.   Walk 50 Feet with  Two Turns CARE Score 4   Walk 150 Feet   Type of Assistance Needed Supervision   Physical Assistance Level No physical assistance   Comment Close supervision with RW on level and unlevel surfaces.   Walk 150 Feet CARE Score 4   Walking 10 Feet on Uneven Surfaces   Type of Assistance Needed Supervision   Physical Assistance Level No physical assistance   Comment Close supervision with RW on level and unlevel surfaces.   Walking 10 Feet on Uneven Surfaces CARE Score 4   Ambulation   Primary Mode of Locomotion Prior to Admission Walk   Distance Walked (feet) 165 ft  (165 and 80 ft)   Assist Device Roller Walker   Gait Pattern Antalgic;Inconsistant Vaishnavi;Slow Vaishnavi;Decreased foot clearance;Forward Flexion;Narrow FLORENTINO;Improper weight shift   Limitations Noted In Balance;Endurance;Posture;Safety;Speed;Strength   Provided Assistance with: Direction   Walk Assist Level Close Supervision   Findings Close supervision with RW on level and unlevel surfaces.   Does the patient walk? 2. Yes   Wheelchair mobility   Does the patient use a wheelchair? 1. Yes   Type of Wheelchair Used 1. Manual   Curb or Single Stair   Comment DNT   Stairs   Type Ramp   Weight Bearing Precautions WBAT   Assist Devices Roller Walker   Findings CG with RW for ramp negotiation   Toilet Transfer   Comment Not needed during session   Therapeutic Interventions   Strengthening Seated TE   Neuromuscular Re-Education Gait and transfer training   Other ramp negotiation   Assessment   Treatment Assessment Pt agreeable to PT session this morning. Pain 3/10 reported in neck throughout session. Close supervision for transfers with RW and cues for hand placement. Supervision for ambulation with RW on level and unlevel surfaces for max distance of 165 ft. CG for ramp negotiation with RW. Pt remains limited by fatigue. TE for general LE strengthening. gait and transfer trainiing for increased balance, safety, and independence with functional mobility. Pt returned  to room in bed with alarms on and all needs within reach.   Problem List Decreased strength;Decreased range of motion;Decreased endurance;Impaired balance;Decreased cognition;Decreased safety awareness;Decreased skin integrity;Orthopedic restrictions;Pain   Barriers to Discharge Inaccessible home environment;Decreased caregiver support   PT Barriers   Physical Impairment Decreased strength;Decreased range of motion;Decreased endurance;Impaired balance;Decreased mobility;Decreased safety awareness;Pain;Orthopedic restrictions   Functional Limitation Car transfers;Ramp negotiation;Stair negotiation;Standing;Transfers;Walking;Wheelchair management   Plan   Treatment/Interventions Functional transfer training;LE strengthening/ROM;Therapeutic exercise;Endurance training;Cognitive reorientation;Patient/family training;Equipment eval/education;Bed mobility;Gait training   Progress Progressing toward goals   PT Therapy Minutes   PT Time In 0900   PT Time Out 1004   PT Total Time (minutes) 64   PT Mode of treatment - Individual (minutes) 64   PT Mode of treatment - Concurrent (minutes) 0   PT Mode of treatment - Group (minutes) 0   PT Mode of treatment - Co-treat (minutes) 0   PT Mode of Treatment - Total time(minutes) 64 minutes   PT Cumulative Minutes 1170   Therapy Time missed   Time missed? No

## 2024-09-27 NOTE — ASSESSMENT & PLAN NOTE
Nutrition following    Malnutrition Findings:   Adult Malnutrition type: Acute illness  Adult Degree of Malnutrition: Other severe protein calorie malnutrition  Malnutrition Characteristics: Inadequate energy, Weight loss  360 Statement: Malnutrition related to inadequate energy intake as evidenced by 7#(6%) weight loss from 9/11/# to 9/18/# and <50% energy intake >5 days.    BMI Findings:  Body mass index is 19.6 kg/m².

## 2024-09-27 NOTE — ASSESSMENT & PLAN NOTE
New issues  VFSS study completed on 9/10/24 and patient found to have mild oral and moderate phayngeal dysphagia.    Cleared for a pureed diet with honey thick liquids.  After discussion with staff may consider repeating swallow study after discussion with speech therapy as patient still having moist cough during meals.  Patient states at home she will take a bite or 2 at a time and eat her meals over very very long periods of time but with her direct supervision requirements at this time that is not possible.  SLP to follow  Continue aspiration precautions   Recommendations at this time for a PEG tube placement.  This is due to her continued weight loss, limited p.o. intake.  At this time discussion with the patient and  continue and will follow-up with gastroenterology as an outpatient for consideration if she is agreeable.  Risks for aspiration pneumonia including the ultimate risk of death/hospitalization discussed with the patient today.

## 2024-09-27 NOTE — NURSING NOTE
Patient transfers assist x 1 with walker. Reports she feels good this shift. Sitting upright in wheelchair for all meals. Remains a supervised feed. Appetite poor for all meals. Eating on a small portion of one thing on tray. Fluids encouraged. Lungs remain clear and cough less moist sounding this shift. Suction at bedside for patient to use. Remains continant. Educated on importance of nutrition. Will continue to monitor and follow plan of care.

## 2024-09-27 NOTE — PROGRESS NOTES
"   09/27/24 1200   Pain Assessment   Pain Assessment Tool 0-10   Pain Score 4   Pain Location/Orientation Location: Neck;Location: Shoulder;Orientation: Bilateral   Restrictions/Precautions   Precautions Aspiration;Bed/chair alarms;Cognitive;Fall Risk;Pain;Supervision on toilet/commode   Braces or Orthoses C/S Collar   Cognitive Linguisitic Assessments   Cognitive Linquistic Quick Test (CLQT) MOCA completed 9/16/24 - 16/30   Comprehension   Comprehension (FIM) 4 - Understands basic info/conversation 75-90% of time   Expression   Expression (FIM) 5 - Needs help/cues only RARELY (< 10% of the time)   Social Interaction   Social Interaction (FIM) 5 - Interacts appropriately with others 90% of time   Problem Solving   Problem solving (FIM) 4 - Solves basic problems 75-89% of time   Memory   Memory (FIM) 4 - Recognizes/recalls/performs 75-89%   Speech/Language/Cognition Assessmetn   Treatment Assessment Pt forgetful of many topics discussed in session even with delay of less than 2:00. She continues to be forgetful of extensive education provided to her regarding her swallow function, aspiration precautions, PO intake concerns, swallow strategies ( continuous verbal cues needed and visual cues posted on the board in front of her).   Eating   Type of Assistance Needed Set-up / clean-up;Supervision;Verbal cues;Adaptive equipment   Physical Assistance Level No physical assistance   Eating CARE Score 4   Swallow Assessment   Swallow Treatment Assessment Pt assessed with rika cates of preferred foods that she chose but modified by SLP. SLP modified finely minced egg salad, finaly minced cottage cheese, finely diced tomato ( no skin) and finely mashed banana. Pt requiring atleast 5 breaks through her meal reporting \"oh that's a lot\" or sighing \"I need a break\", \"I am getting tired.\" Meal took 60 minutes. She consumed 75% of her egg salad scoop and 4 oz of honey thick liquids via single cup sips. Reporting \"I can't eat " "anymore I am stuffed\". Adequate nutritional and hydration needs have not been met with concerns of continued weight loss. Fatigue, dysphagia severity and decreased appetite are all factors contributing to this. Recommend additional means of nutrition and hydration to supplement PO intake. Overall decreased s.s of aspiration with this lunch meal compared to her meals of the last few days. No need for suction today, clear vocal quality through session, no upper airway congestion. She did cough x2 with the egg salad but her cough was dry and not wet/gurgly.   SLP Therapy Minutes   SLP Time In 1200   SLP Time Out 1300   SLP Total Time (minutes) 60   SLP Mode of treatment - Individual (minutes) 60   SLP Mode of treatment - Concurrent (minutes) 0   SLP Mode of treatment - Group (minutes) 0   SLP Mode of treatment - Co-treat (minutes) 0   SLP Mode of Treatment - Total time(minutes) 60 minutes   SLP Cumulative Minutes 532       "

## 2024-09-27 NOTE — PLAN OF CARE
Problem: PAIN - ADULT  Goal: Verbalizes/displays adequate comfort level or baseline comfort level  Description: Interventions:  - Encourage patient to monitor pain and request assistance  - Assess pain using appropriate pain scale  - Administer analgesics based on type and severity of pain and evaluate response  - Implement non-pharmacological measures as appropriate and evaluate response  - Consider cultural and social influences on pain and pain management  - Notify physician/advanced practitioner if interventions unsuccessful or patient reports new pain  Outcome: Progressing     Problem: INFECTION - ADULT  Goal: Absence or prevention of progression during hospitalization  Description: INTERVENTIONS:  - Assess and monitor for signs and symptoms of infection  - Monitor lab/diagnostic results  - Monitor all insertion sites, i.e. indwelling lines, tubes, and drains  - Monitor endotracheal if appropriate and nasal secretions for changes in amount and color  - [place] appropriate cooling/warming therapies per order  - Administer medications as ordered  - Instruct and encourage patient and family to use good hand hygiene technique  - Identify and instruct in appropriate isolation precautions for identified infection/condition  Outcome: Progressing     Problem: SAFETY ADULT  Goal: Maintain or return to baseline ADL function  Description: INTERVENTIONS:  -  Assess patient's ability to carry out ADLs; assess patient's baseline for ADL function and identify physical deficits which impact ability to perform ADLs (bathing, care of mouth/teeth, toileting, grooming, dressing, etc.)  - Assess/evaluate cause of self-care deficits   - Assess range of motion  - Assess patient's mobility; develop plan if impaired  - Assess patient's need for assistive devices and provide as appropriate  - Encourage maximum independence but intervene and supervise when necessary  - Involve family in performance of ADLs  - Assess for home care  needs following discharge   - Consider OT consult to assist with ADL evaluation and planning for discharge  - Provide patient education as appropriate  Outcome: Progressing

## 2024-09-27 NOTE — ASSESSMENT & PLAN NOTE
Wt Readings from Last 3 Encounters:   09/27/24 50.2 kg (110 lb 10.7 oz)   09/11/24 53.8 kg (118 lb 9.7 oz)   09/04/24 53.5 kg (118 lb)     Weight stable  Patient takes Demedex 20 mg daily PRN for weight gain  Monitor daily weights  IM managing

## 2024-09-27 NOTE — PLAN OF CARE
Problem: PAIN - ADULT  Goal: Verbalizes/displays adequate comfort level or baseline comfort level  Description: Interventions:  - Encourage patient to monitor pain and request assistance  - Assess pain using appropriate pain scale  - Administer analgesics based on type and severity of pain and evaluate response  - Implement non-pharmacological measures as appropriate and evaluate response  - Consider cultural and social influences on pain and pain management  - Notify physician/advanced practitioner if interventions unsuccessful or patient reports new pain  Outcome: Progressing     Problem: INFECTION - ADULT  Goal: Absence or prevention of progression during hospitalization  Description: INTERVENTIONS:  - Assess and monitor for signs and symptoms of infection  - Monitor lab/diagnostic results  - Monitor all insertion sites, i.e. indwelling lines, tubes, and drains  - Monitor endotracheal if appropriate and nasal secretions for changes in amount and color  - [place] appropriate cooling/warming therapies per order  - Administer medications as ordered  - Instruct and encourage patient and family to use good hand hygiene technique  - Identify and instruct in appropriate isolation precautions for identified infection/condition  Outcome: Progressing  Goal: Absence of fever/infection during neutropenic period  Description: INTERVENTIONS:  - Monitor WBC    Outcome: Progressing     Problem: NEUROSENSORY - ADULT  Goal: Achieves stable or improved neurological status  Description: INTERVENTIONS  - Monitor and report changes in neurological status  - Monitor vital signs such as temperature, blood pressure, glucose, and any other labs ordered   - Initiate measures to prevent increased intracranial pressure  - Monitor for seizure activity and implement precautions if appropriate      Outcome: Progressing  Goal: Remains free of injury related to seizures activity  Description: INTERVENTIONS  - Maintain airway, patient safety   and administer oxygen as ordered  - Monitor patient for seizure activity, document and report duration and description of seizure to physician/advanced practitioner  - If seizure occurs,  ensure patient safety during seizure  - Reorient patient post seizure  - Seizure pads on all 4 side rails  - Instruct patient/family to notify RN of any seizure activity including if an aura is experienced  - Instruct patient/family to call for assistance with activity based on nursing assessment  - Administer anti-seizure medications if ordered    Outcome: Progressing  Goal: Achieves maximal functionality and self care  Description: INTERVENTIONS  - Monitor swallowing and airway patency with patient fatigue and changes in neurological status  - Encourage and assist patient to increase activity and self care.   - Encourage visually impaired, hearing impaired and aphasic patients to use assistive/communication devices  Outcome: Progressing

## 2024-09-27 NOTE — NUTRITION
09/27/24 1137   Biochemical Data,Medical Tests, and Procedures   Biochemical Data/Medical Tests/Procedures Lab values reviewed;Meds reviewed   Labs (Comment) No new labs   Meds (Comment) ASA, lipitor, tums, Vit B12, cardizem, lovenox, folvite, levothyroxine, zofran, demadex   Speech Therapy Recommendations (Comment) Receiving speech therapy   Nutrition-Focused Physical Exam   Nutrition-Focused Physical Exam Findings RN skin assessment reviewed  (Pressure injury sacrum, wound sternum, woudn right toe D2, wound cervical neck per documentation)   Medical-Related Concerns PMH reviewed   Adequacy of Intake   Nutrition Modality PO   Feeding Route   PO With assistance   Current PO Intake   Current Diet Order Dysphagia 1, HTL   Nutrition Supplements Other (Comment);Magic Cup  (prosource pudding once daily, magic cup BID)   Current Meal Intake 0-25%;25-50%   Intake Supplements 0-25%;25-50%   Estimated calorie intake compared to estimated need Nutrient needs not met.   PES Statement   Problem Continue previous diagnosis   Recommendations/Interventions   Malnutrition/BMI Present Yes  (as previously  noted)   Summary Dysphagia 1, HTL. No straw, sippy cup. Prosource pudding once daily, magic cup BID. Meal completions vary 0-50% however are mostly <25%. 9/27 110#, BMI=19.60; 6# weight loss from admisison 9/11 116#. Medicaitons reviewed. Continues with dysphagia. Skin integrity reviewed. Calorie count from 9/26 reviewed. Breakfast:357kcal, 13g pro; Lunch: 235kcal, 9g pro; Dinner: 138kcal, 6g pro. Estimated total intake for 9/26 730kcal, 28g. Patient continues with inadequate intake, reports of no appetite and weight loss. Recommend initiate alternate means of nutrition. Discontinue calorie count as it is known nutrient needs are not being met.   Interventions/Recommendations Continue current diet order;Supplement discontinue;Initiate EN;Other (Specify)  (discontinue calorie count)   Education Assessment   Education  Patient/caregiver not appropriate for education at this time   Patient Nutrition Goals   Goal Meet PO needs;Avoid weight loss   Goal Status Not met;Extended   Timeframe to complete goal by next f/u   Nutrition Complexity Risk   Nutrition complexity level High risk   Nutrition review: 10/01/24   Follow up date 10/01/24

## 2024-09-27 NOTE — CASE MANAGEMENT
CM called and spoke with patient's  Tomas, inquired about his calling home health aid agencies. Tomas stated with his own heart issues he has been dealing with, he has been unable to make any calls. CM inquired again of anyone he might know who could be their , Tomas stated no. Tomas gave permission for CM to call [facility] to assist with potential transfer to [facility]. CM called [employer], left a message on voice mail, with brief description of patient and 's situation, ,left 's contact information, with request for them to call him .CM called [facility], spoke with Cyndie, inquired of Option program for HHA. Cyndie stated there is a year waiting list for Option program. She sent this CM through email list of HHA agencies in [facility]. CM called [employer] [facility], spoke with Robe, who reported patient does have benefit of coverage of HHA's with medical necessity,which she does have, with $0 copay. Robe instructed this CM to fax a script form physician and progress notes to start prior auth process. She stated to [facility] as planned d/c is for 10/3.Robe stated after they receive the fax, they will arrange the home health aids. CAMERON gave Robe Marin's phone number.  CM faxed script for home health aids, physician notes, PT/OT/ST notes to [phone]

## 2024-09-27 NOTE — PROGRESS NOTES
09/27/24 1306   Pain Assessment   Pain Assessment Tool 0-10   Pain Score 4   Pain Location/Orientation Location: Neck   Restrictions/Precautions   Precautions Aspiration;Bed/chair alarms;Cognitive;Fall Risk;Pain;Spinal precautions;Supervision on toilet/commode   Weight Bearing Restrictions No   ROM Restrictions   (spinal prec)   Braces or Orthoses C/S Collar   Exercise Tools   Other Exercise Tool 1 Sanderbox 1 set 20 all direction with BUE and 1# wt   Cognition   Orientation Level Oriented X4   Other Comments   Assessment Pt participates in 24 minutes concurrent treatment focusing on BUE therex/ theract with similar goals as another patient to increase strength/ activity tolerance   Assessment   Treatment Assessment Pt presents seated in w/c agreeable to brief OT session including BUE therex/ theract. pt toelrates session without complaints and is making gains towards goals. Barriers same as listed during earlier session and pt will beenfit from continued skilled OT services to increase independence with daily tasks.   Problem List Decreased strength;Decreased range of motion;Decreased endurance;Impaired balance;Decreased cognition;Decreased safety awareness;Decreased skin integrity;Orthopedic restrictions;Pain  (spinal prec)   Plan   Treatment/Interventions ADL retraining;Functional transfer training;Therapeutic exercise;Endurance training;Patient/family training;Equipment eval/education;Cognitive reorientation;Compensatory technique education   Progress Progressing toward goals   OT Therapy Minutes   OT Time In 1306   OT Time Out 1330   OT Total Time (minutes) 24   OT Mode of treatment - Concurrent (minutes) 24   Therapy Time missed   Time missed? No

## 2024-09-27 NOTE — PROGRESS NOTES
"Progress Note - Marsha Oliva 79 y.o. female MRN: [id number]    Unit/Bed#: [facility] 219-01 Encounter: [id number]        Subjective:   Patient seen and examined at bedside after reviewing the chart and discussing the case with the caring staff.      Patient examined at bedside.  Patient with mild shoulder pain today from therapy.  She continues to have occasional moist cough, difficulty eating.  Denies any chest pain or shortness of breath.      Physical Exam   Vitals: Blood pressure 108/60, pulse 71, temperature 98.2 °F (36.8 °C), temperature source Temporal, resp. rate 16, height 5' 3\" (1.6 m), weight 50.2 kg (110 lb 10.7 oz), SpO2 92%.,Body mass index is 19.6 kg/m².  Constitutional: Patient in no acute distress.  HEENT: PERR, EOMI, MMM.  Cardiovascular: Normal rate and regular rhythm.    Pulmonary/Chest: Effort normal and breath sounds normal.   Abdomen: Soft, + BS, NT.    Assessment/Plan:  Marsha Oliva is a(n) 79 y.o. female with cervical spinal cord compression.     Cardiac hx w/ HTN, HLD, chronic diastolic heart failure, chronic Afib.  Continue digoxin 125 mcg every other day, diltiazem 180 mg daily, atorvastatin 40 mg daily.  Daily weights.  Torsemide 20 mg daily as needed.  Pt cleared to restart ASA 81 mg daily by neurosurgery 9/19.    Hypothyroidism.  Continue levothyroxine 125 mcg daily (decr from 137 mcg 9/6).  B12 deficiency.  Patient on B12 1000 mcg weekly.   Folate deficiency.  Patient on folic acid 1,000 mcg daily.   Hypomagnesemia.  Patient on magnesium gluconate 250 mg twice daily.   Depression and anxiety.   Patient on Zoloft 100 mg daily.  Declines neuropsych at this time.   Multiple hyperkeratotic lesions.  Patient follows with Podiatry outpatient.  Wound care consult.   Sore throat.  Chloraseptic throat spray as needed.   Malnutrition.  Dietician following.  Adult Malnutrition type: Acute illness.  Adult Degree of Malnutrition: Other severe protein calorie malnutrition. Malnutrition " Characteristics: Inadequate energy, Weight loss.  Productive cough/dysphagia.  Patient high risk for aspiration pneumonia. CXR 9/23/24 without acute cardiopulmonary disease.    Pain and bowel regimen.  As per physiatry.   Cervical spinal cord compression.  Vista collar at all times x 6 weeks.  Continue SQ Lovenox.  Patient receiving intensive PT OT ST as per physiatry.      Anticipated discharge date:  Thursday 10/3/2024    The patient was discussed with Dr. Brown and he is in agreement with the above note.

## 2024-09-27 NOTE — ASSESSMENT & PLAN NOTE
Patient was evaluated by the rehabilitation team MD and an appropriate candidate for acute inpatient rehabilitation program at this time.  The patient will tolerate 3 hours/day 5 to 7 days/week of intensive physical, occupational and speech therapy in order to obtain goals for community discharge  Due to the patient's functional Compared to their baseline level of function in addition to their ongoing medical needs, the patient would benefit from daily supervision from a rehabilitation physician as well as rehabilitation nursing to implement and adjust the medical as well as functional plan of care in order to meet the patient's goals.    Medical necessity: The patient at this time would greatly benefit from having home health aides.  At this time her  has ongoing health conditions that would limit his ability to assist with her care.  She still needs assistance for many tasks most of which are basic mobility and ADL tasks.  She has difficulty with eating and requires level of supervision, assistance with transfers and mobility, medication management and management of the household.  Additionally will have ongoing medical needs as well as follow-ups with the specialists once she is discharged and may be considered for a PEG tube placement as an outpatient which would also require assistance.  From a medical and functional standpoint it is my professional opinion that the patient would greatly benefit from additional assistance with home health aides for the listed reasons above as well as and throughout this note.

## 2024-09-27 NOTE — PROGRESS NOTES
09/27/24 1343   Pain Assessment   Pain Assessment Tool 0-10   Pain Score 3   Pain Location/Orientation Location: Neck   Restrictions/Precautions   Precautions Aspiration;Bed/chair alarms;Cognitive;Fall Risk;Pain;Spinal precautions;Supervision on toilet/commode   Weight Bearing Restrictions No   ROM Restrictions   (spinal precautions)   Braces or Orthoses C/S Collar   Cognition   Overall Cognitive Status Impaired   Arousal/Participation Alert;Responsive;Cooperative   Attention Attends with cues to redirect   Orientation Level Oriented X4   Memory Decreased short term memory;Decreased recall of recent events;Decreased recall of precautions   Following Commands Follows one step commands with increased time or repetition   Subjective   Subjective Pt reports she is very tired today   Roll Left and Right   Comment DNT   Sit to Lying   Type of Assistance Needed Supervision;Verbal cues   Physical Assistance Level No physical assistance   Comment cues for technique   Sit to Lying CARE Score 4   Lying to Sitting on Side of Bed   Comment DNT   Sit to Stand   Type of Assistance Needed Supervision   Physical Assistance Level No physical assistance   Comment with RW   Sit to Stand CARE Score 4   Bed-Chair Transfer   Type of Assistance Needed Supervision   Physical Assistance Level No physical assistance   Comment with RW   Chair/Bed-to-Chair Transfer CARE Score 4   Car Transfer   Reason if not Attempted Environmental limitations   Car Transfer CARE Score 10   Walk 10 Feet   Type of Assistance Needed Supervision   Physical Assistance Level No physical assistance   Comment Close supervision with RW on level surfaces   Walk 10 Feet CARE Score 4   Ambulation   Primary Mode of Locomotion Prior to Admission Walk   Distance Walked (feet) 15 ft  (15x3 around therapy gym)   Assist Device Roller Walker   Gait Pattern Antalgic;Inconsistant Vaishnavi;Slow Vaishnavi;Decreased foot clearance;Forward Flexion;Narrow FLORENTINO;Improper weight shift    Limitations Noted In Balance;Endurance;Posture;Safety;Speed;Strength   Provided Assistance with: Direction   Walk Assist Level Close Supervision   Findings Close supervision with RW on level surfaces   Does the patient walk? 2. Yes   Wheelchair mobility   Does the patient use a wheelchair? 1. Yes   Curb or Single Stair   Style negotiated Single stair   Type of Assistance Needed Incidental touching   Physical Assistance Level No physical assistance   Comment CG with charly HR use and non-reciprocal pattern   1 Step (Curb) CARE Score 4   4 Steps   Type of Assistance Needed Incidental touching   Physical Assistance Level No physical assistance   Comment CG with charly HR use and non-reciprocal pattern   4 Steps CARE Score 4   Stairs   Type Stairs   # of Steps 4  (4 with rest then 3 more)   Weight Bearing Precautions WBAT   Assist Devices Bilateral Rail   Findings CG with charly HR use and non-reciprocal pattern   Toilet Transfer   Comment not needed during session   Therapeutic Interventions   Strengthening supine TE   Balance Gait and transfer training   Other stair negotiation   Equipment Use   NuStep 5 min lvl 1   Assessment   Treatment Assessment Pt agreeable to PT session this afternoon. Pain 3/10 reported in neck. Supervision for transfers with RW. Close supervision with RW on level surfaces for short distances around PT gym. CG for stair negotiation with charly HR use and non-reciprocal pattern. TE for general LE strengthening. Gait and transfer training for increased balance, safety, and independence with functional mobility. Pt returned to room in bed with alarms on and all needs within reach.   Problem List Decreased strength;Decreased range of motion;Decreased endurance;Impaired balance;Decreased cognition;Decreased safety awareness;Decreased skin integrity;Orthopedic restrictions;Pain   Barriers to Discharge Inaccessible home environment;Decreased caregiver support   PT Barriers   Physical Impairment Decreased  strength;Decreased range of motion;Decreased endurance;Impaired balance;Decreased mobility;Decreased safety awareness;Pain;Orthopedic restrictions   Functional Limitation Car transfers;Ramp negotiation;Stair negotiation;Standing;Transfers;Walking;Wheelchair management   Plan   Treatment/Interventions Functional transfer training;LE strengthening/ROM;Therapeutic exercise;Endurance training;Cognitive reorientation;Patient/family training;Equipment eval/education;Bed mobility;Gait training   Progress Progressing toward goals   PT Therapy Minutes   PT Time In 1343   PT Time Out 1415   PT Total Time (minutes) 32   PT Mode of treatment - Individual (minutes) 32   PT Mode of treatment - Concurrent (minutes) 0   PT Mode of treatment - Group (minutes) 0   PT Mode of treatment - Co-treat (minutes) 0   PT Mode of Treatment - Total time(minutes) 32 minutes   PT Cumulative Minutes 1202   Therapy Time missed   Time missed? No

## 2024-09-27 NOTE — PROGRESS NOTES
09/27/24 1030   Pain Assessment   Pain Assessment Tool 0-10   Pain Score 3   Pain Location/Orientation Location: Neck   Restrictions/Precautions   Precautions Aspiration;Bed/chair alarms;Cognitive;Fall Risk;Pain;Spinal precautions;Supervision on toilet/commode   Weight Bearing Restrictions No   ROM Restrictions   (spinal prec)   Braces or Orthoses C/S Collar   Oral Hygiene   Type of Assistance Needed Supervision   Comment w/c level at the sink   Oral Hygiene CARE Score 4   Grooming   Able To Initiate Tasks;Comb/Brush Hair;Wash/Dry Face;Brush/Clean Teeth;Wash/Dry Hands   Limitation Noted In Safety;Strength   Findings S/ min A seated at the sink   Shower/Bathe Self   Type of Assistance Needed Physical assistance   Physical Assistance Level 25% or less   Shower/Bathe Self CARE Score 3   Bathing   Assessed Bath Style Sponge Bath   Anticipated D/C Bath Style Shower;Sponge Bath   Able to Gather/Transport No   Able to Adjust Water Temperature No   Able to Wash/Rinse/Dry (body part) Left Arm;Right Arm;L Upper Leg;R Upper Leg;L Lower Leg/Foot;R Lower Leg/Foot;Chest;Abdomen;Perineal Area   Limitations Noted in Balance;Endurance;Problem Solving;ROM;Safety;Strength  (spinal prec with c collar)   Positioning Seated;Standing   Adaptive Equipment Longhand Sponge;Longhand Reacher   Tub/Shower Transfer   Reason Not Assessed Sponge Bath   Upper Body Dressing   Type of Assistance Needed Physical assistance   Physical Assistance Level 26%-50%   Comment assist due to c collar in place   Upper Body Dressing CARE Score 3   Lower Body Dressing   Type of Assistance Needed Physical assistance   Physical Assistance Level 25% or less   Comment min A to thread feet   Lower Body Dressing CARE Score 3   Putting On/Taking Off Footwear   Type of Assistance Needed Physical assistance   Physical Assistance Level 25% or less   Putting On/Taking Off Footwear CARE Score 3   Picking Up Object   Type of Assistance Needed Physical assistance   Physical  Assistance Level 26%-50%   Comment with reacher   Picking Up Object CARE Score 3   Dressing/Undressing Clothing   Remove UB Clothes Pullover Shirt   Don UB Clothes Pullover Shirt   Remove LB Clothes Pants;Undergarment;Socks;Shoes   Don LB Clothes Pants;Undergarment;Shoes;Socks   Limitations Noted In Balance;Endurance;Problem Solving;Safety;Strength;ROM  (spinal prec with c collar)   Adaptive Equipment Reacher;Dressing Stick;Sock Aide   Positioning Supported Sit;Standing   Lying to Sitting on Side of Bed   Type of Assistance Needed Supervision   Lying to Sitting on Side of Bed CARE Score 4   Sit to Stand   Type of Assistance Needed Supervision   Sit to Stand CARE Score 4   Bed-Chair Transfer   Type of Assistance Needed Supervision   Chair/Bed-to-Chair Transfer CARE Score 4   Toileting Hygiene   Type of Assistance Needed Supervision   Toileting Hygiene CARE Score 4   Toileting   Able to Pull Clothing down yes, up yes.   Manage Hygiene Bladder   Limitations Noted In Balance;Problem Solving;ROM;Safety;LE Strength;UE Strength  (spinal prec)   Adaptive Equipment Grab Bar   Toilet Transfer   Type of Assistance Needed Supervision   Toilet Transfer CARE Score 4   Toilet Transfer   Surface Assessed Raised Toilet   Transfer Technique Stand Pivot   Limitations Noted In Balance;Endurance;Problem Solving;ROM;Safety;LE Strength;UE Strength  (spinal prec)   Adaptive Equipment Grab Bar   Exercise Tools   Hand Gripper 5# digiflex 2 sets 15 B hands   Cognition   Orientation Level Oriented X4   Comments pt requries cues for sequencing and task completion due to decreased attention and distraction of conversation   Assessment   Treatment Assessment Pt presents supine resting agreeable to OT session including ADLs, toileting with related transfers/ mobility. Pt tolerates session without complaints demonstrating increased familiarity to LB A/E. Pt requries assist and supervision due to decreased balance, safety, endurance and strength/  ROM with spinal prec and c collar. Pt will beenfit from continued skilled OT servcis to increase independence with daily tasks.   Problem List Decreased strength;Decreased range of motion;Decreased endurance;Impaired balance;Decreased cognition;Decreased safety awareness;Decreased skin integrity;Orthopedic restrictions;Pain  (spinal prec)   Plan   Treatment/Interventions ADL retraining;Functional transfer training;Therapeutic exercise;Endurance training;Patient/family training;Cognitive reorientation;Equipment eval/education;Compensatory technique education   Progress Progressing toward goals   OT Therapy Minutes   OT Time In 1030   OT Time Out 1200   OT Total Time (minutes) 90   OT Mode of treatment - Individual (minutes) 90   Therapy Time missed   Time missed? No

## 2024-09-27 NOTE — PROGRESS NOTES
"Progress Note - PMR   Name: Marsha Oliva 79 y.o. female I MRN: [id number]  Unit/Bed#: [facility] 219-01 I Date of Admission: 9/11/2024   Date of Service: 9/27/2024 I Hospital Day: 16     Assessment & Plan  Spinal cord compression (HCC)  Presented  to acute care on 9/4/24 with progressive neck pain and weakness.    She reports 4-6 weeks prior she sustained a fall off her bed, head hitting her nightstand.    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   MRI C spine showing displaced type 2 dens fracture with mild mass effect on cord, ALL and PLL injuries.    CTA head and neck negative for vascular injuries.    Patient seen by Neurosurgery and deemed an operative candidate.    Patient taken to the OR by Dr. Dewitt for a bilateral C1 and C3 lateral mass screw placement, bialteral C2 pedicle screw palcement, C1-C3 fusion.    Post operatively, placed in a Aspen C Collar at all times, except showering with Shonda collar.    Post op X-rays with stable alignment    Plan in ARC:  Begin ARC program with PT, OT, SLP  Monitor incision  Monitor pain  Monitor neuro exam  Follow-up with Neurosurgery in 2 weeks.  2 week post op visit completed with NSGY virtually - \"Will plan to follow-up with the pt as scheduled for her 6 week POV with repeat upright xrays\"  Oropharyngeal dysphagia  New issues  VFSS study completed on 9/10/24 and patient found to have mild oral and moderate phayngeal dysphagia.    Cleared for a pureed diet with honey thick liquids.  After discussion with staff may consider repeating swallow study after discussion with speech therapy as patient still having moist cough during meals.  Patient states at home she will take a bite or 2 at a time and eat her meals over very very long periods of time but with her direct supervision requirements at this time that is not possible.  SLP to follow  Continue aspiration precautions "   Recommendations at this time for a PEG tube placement.  This is due to her continued weight loss, limited p.o. intake.  At this time discussion with the patient and  continue and will follow-up with gastroenterology as an outpatient for consideration if she is agreeable.  Risks for aspiration pneumonia including the ultimate risk of death/hospitalization discussed with the patient today.  Acquired bilateral hammer toes  Keratosis of toe  May need shaving of this lesion  Consult placed to Podiatry  Atrial fibrillation, chronic (HCC)  Chronic A. Fib  Rate/rhythm controlled on digoxin 125 mcg every other day, Cardizem  mg daily  No AC due to prior several GI bleeds.  On aspirin 81 mg daily.  IM managing  B12 deficiency  Continue supplementation   IM managing  Chronic diastolic CHF (congestive heart failure) ([facility])  Wt Readings from Last 3 Encounters:   09/27/24 50.2 kg (110 lb 10.7 oz)   09/11/24 53.8 kg (118 lb 9.7 oz)   09/04/24 53.5 kg (118 lb)     Weight stable  Patient takes Demedex 20 mg daily PRN for weight gain  Monitor daily weights  IM managing  Depression with anxiety  Mood is stable  Continue Zoloft 100 mg daily  Essential hypertension  Continue Cardizem  mg Daily  Monitor BP and HR with rest and activity  IM managing  Hypothyroidism  Continue Synthroid 125 mcg daily  Mixed hyperlipidemia  Continue Atorvastain 20 mg daily  Folate deficiency  Continue supplementation   IM managing  Acute pain due to trauma  Located in neck  Continue Tylenol 975 mg TID  Oxycodone IR 2.5-5 mg Q 4 hours PRN for moderate-severe pain   Topical ICE  -improved has not required oxy since 9/13  Skin abnormalities  Wound Care team following in ARC:  POA several areas  Continue LWC:     Skin care plans:  1-Hydraguard to sacrum, buttocks and heels BID and PRN  2-EHOB cushion when out of bed in chair.  3-Moisturize skin daily with skin nourishing cream  4-Elevate heels to offload pressure.  5-Turn/reposition q2h for  pressure re-distribution on skin  6. Monitor right 2nd toe growth area - leave open to air podiatry consulted.  7. Right arm skin tear - cleanse with Normal saline then apply normlgel ag then top with adaptic then a ABD and keisha change every other day   8. Right and left breast folds - cleanse with soap and water pat dry dust lightly with nystatin powder bid and prn  Lymphedema  Pumps brought in by  - ok to use PRN as tolerated  At risk for altered bowel elimination  Continent  -at home takes daily miralax; rescheduled PRN to daily  At risk for altered urinary elimination  Continent   At risk for deep venous thrombosis  Continue Lovenox 40 mg daily - should not need at discharge  Encounter for rehabilitation  Functional deficits:  cervical collar on at all times, cervical spinal precautions, impaired mobility, self care   Continue current rehabilitation plan of care to maximize function.  Estimated discharge: Thursday 10/3/24 with home care estimated    Severe protein-calorie malnutrition (HCC)  Nutrition following    Malnutrition Findings:   Adult Malnutrition type: Acute illness  Adult Degree of Malnutrition: Other severe protein calorie malnutrition  Malnutrition Characteristics: Inadequate energy, Weight loss  360 Statement: Malnutrition related to inadequate energy intake as evidenced by 7#(6%) weight loss from 9/11/# to 9/18/# and <50% energy intake >5 days.    BMI Findings:  Body mass index is 19.6 kg/m².   Closed odontoid fracture with type II morphology, posterior displacement, and nonunion    Impaired mobility and activities of daily living  Patient was evaluated by the rehabilitation team MD and an appropriate candidate for acute inpatient rehabilitation program at this time.  The patient will tolerate 3 hours/day 5 to 7 days/week of intensive physical, occupational and speech therapy in order to obtain goals for community discharge  Due to the patient's functional Compared to their  baseline level of function in addition to their ongoing medical needs, the patient would benefit from daily supervision from a rehabilitation physician as well as rehabilitation nursing to implement and adjust the medical as well as functional plan of care in order to meet the patient's goals.    Medical necessity: The patient at this time would greatly benefit from having home health aides.  At this time her  has ongoing health conditions that would limit his ability to assist with her care.  She still needs assistance for many tasks most of which are basic mobility and ADL tasks.  She has difficulty with eating and requires level of supervision, assistance with transfers and mobility, medication management and management of the household.  Additionally will have ongoing medical needs as well as follow-ups with the specialists once she is discharged and may be considered for a PEG tube placement as an outpatient which would also require assistance.  From a medical and functional standpoint it is my professional opinion that the patient would greatly benefit from additional assistance with home health aides for the listed reasons above as well as and throughout this note.    History of Present Illness   Marsha Oliva is a 79 y.o. female with HTN, A Fib, CHF, HLD, Hypothyroidism who presented to the [facility] on on 9/4/24 with progressive neck pain and weakness.  She reports 4-6 weeks prior she sustained a fall off her bed, head hitting her nightstand.  She has been having increasing neck pain since.  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 MRI C spine showing displaced type 2 dens fracture with mild mass effect on cord, ALL and PLL injuries.  CTA head and neck negative for vascular injuries.  Patient seen by Neurosurgery and deemed an operative candidate.  Patient taken to the OR by   Renate for a bilateral C1 and C3 lateral mass screw placement, bialteral C2 pedicle screw palcement, C1-C3 fusion.  Post operatively, placed in a Aspen C Collar at all times, except showering with Shonda collar.  Post op X-rays with stable alignment  Medical course complicated by acute dysphagia.  VFSS study completed on 9/10/24 and patient found to have mild oral and moderate phayngeal dysphagia.  Cleared for a pureed diet with honey thick liquids.  Patient seen by wound care team for abrasions and stage I pressure injury to sacrum.  Patient started on Tylenol and PRN oxycodone for acute pain.    After medical stabilization, patient found to have acute functional deficits from his baseline, therefore, admitted to [facility] for acute inpatient rehabilitation.     Chief Complaint: f/u ambulatory dysfunction and dysphagia    Interval: Patient seen and evaluated in room.  Overall she continues to have decreased p.o. intake with fluids and food.  This has been an ongoing issue even prior to her hospitalization but has been overt and worsening more recently especially with the use of the cervical collar.  Moist cough intermittently with eating but not at this time currently.  I do long conversation with her about the risks and benefits of the PEG tube for supplemental feeding in order to meet her caloric needs.  I did state that based on her swallowing and her risk of aspiration that the eventual need for hospitalization for aspiration pneumonia which could eventually lead to death was a possibility if this was not considered.  Ultimately the patient will consider a PEG tube but would like to discuss this further with her  and meet with a gastroenterologist as an outpatient as she is leaving early next week.  He is not having fever, chills, nausea vomiting cough pressures with, diarrhea or constipation at this time.  She is fully participating in therapy and has been making good progress.  Mood is  stable.  Last bowel movement on 9/26    Objective     Functional Update:  Physical Therapy Occupational Therapy Speech Therapy   Weight Bearing Status: Full Weight Bearing  Transfers: Supervision  Bed Mobility: Independent  Amulation Distance (ft): 122 feet  Ambulation: Incidental Touching  Assistive Device for Ambulation: Roller Walker  Wheelchair Mobility Distance: 25 ft  Wheelchair Mobility: Supervision  Number of Stairs: 7  Assistive Device for Stairs: Bilateral Hand Rails  Stair Assistance: Incidental Touching  Ramp: Incidental Touching  Assistive Device for Ramp: Roller Walker  Discharge Recommendations: Home with:  DC Home with:: 24 Hour Assisteance, Family Support, First Floor Setup, Home Physical Therapy   Eating: Supervision  Grooming: Supervision  Bathing: Incidental Touching  Bathing: Incidental Touching  Upper Body Dressing: Minimal Assistance  Lower Body Dressing: Minimal Assistance  Toileting: Incidental Touching  Tub/Shower Transfer: Minimal Assistance  Toilet Transfer: Incidental Touching, Supervision  Cognition: Exceptions to WNL  Cognition: Decreased Memory, Decreased Attention, Decreased Comprehension, Decreased Safety  Orientation: Person, Place, Time, Situation   Mode of Communication: Verbal  Cognition: Exceptions to WNL  Cognition: Decreased Memory, Decreased Executive Functions, Decreased Attention, Decreased Comprehension  Orientation: Person, Place, Time, Situation  Swallowing: Exceptions to WNL  Swallowing: Oral Dysphagia, Pharyngeal Dysphagia  Diet Recommendations: Level 1/Puree, Honey Thick  Discharge Recommendations: Home with:  DC Home with:: Family Support, Home Speech Therapy (close supervision 2' cognition and dysphagia severity)         Temp:  [97.9 °F (36.6 °C)-98.2 °F (36.8 °C)] 98.2 °F (36.8 °C)  HR:  [71] 71  Resp:  [16-18] 16  BP: (101-108)/(59-60) 108/60  SpO2:  [92 %-93 %] 92 %  Physical Exam  Vitals reviewed.   Constitutional:       General: She is not in acute  distress.  HENT:      Head: Normocephalic and atraumatic.      Right Ear: External ear normal.      Left Ear: External ear normal.      Nose: Nose normal. No rhinorrhea.      Mouth/Throat:      Mouth: Mucous membranes are moist.      Pharynx: Oropharynx is clear.   Eyes:      General: No scleral icterus.  Neck:      Comments: Cervical collar in place  Cardiovascular:      Rate and Rhythm: Normal rate.   Pulmonary:      Effort: Pulmonary effort is normal. No respiratory distress.      Comments: Intermittent moist coughing  Abdominal:      General: There is no distension.      Palpations: Abdomen is soft.   Musculoskeletal:      Right lower leg: No edema.      Left lower leg: No edema.   Skin:     General: Skin is warm and dry.      Comments: Neck incision CDI   Neurological:      Mental Status: She is alert and oriented to person, place, and time.      Motor: Weakness (Generalized weakness mostly in the proximal lower extremities) present.   Psychiatric:         Mood and Affect: Mood normal.         Behavior: Behavior normal.           Scheduled Meds:  Current Facility-Administered Medications   Medication Dose Route Frequency Provider Last Rate    acetaminophen  975 mg Oral Q8H [facility] Sharmin Waggoner PA-C      Artificial Tears  2 drop Both Eyes Q3H PRN JAQUELINE Amos      aspirin  81 mg Oral Daily Corine Park MD      atorvastatin  20 mg Oral Daily With Dinner Sharmin Waggoner PA-C      bisacodyl  10 mg Rectal Daily PRN Sharmin Waggoner PA-C      bisacodyl  10 mg Rectal Once Corine Park MD      calcium carbonate  1,000 mg Oral Daily PRN Sharmin Waggoner PA-C      cyanocobalamin  1,000 mcg Oral Weekly Sharmin Waggoner PA-C      dextromethorphan-guaiFENesin  10 mL Oral Q4H PRN Tj Darling MD      digoxin  125 mcg Oral Every Other Day Sharmin Waggoner PA-C      diltiazem  180 mg Oral Daily Sharmin Waggoner PA-C      docusate sodium  100 mg  Oral BID Corine Park MD      enoxaparin  40 mg Subcutaneous Q24H CHRIS Corine Park MD      fluticasone  2 spray Each Nare Daily JAQUELINE Amos      folic acid  1,000 mcg Oral Daily Sharmin Waggoner PA-C      lactulose  20 g Oral Daily PRN Jermaine Greenberg DO      levothyroxine  125 mcg Oral Early Morning Sharmin Waggoner PA-C      magnesium gluconate  250 mg Oral BID Sharmin Waggoner PA-C      nystatin   Topical BID Corine Park MD      ondansetron  4 mg Oral Q6H PRN Sharmin Waggoner PA-C      oxyCODONE  2.5 mg Oral Q4H PRN Sharmin Waggoner PA-C      Or    oxyCODONE  5 mg Oral Q4H PRN Sharmin Waggoner PA-C      phenol  1 spray Mouth/Throat Q2H PRN Sharmin Waggoner PA-C      senna  2 tablet Oral HS Corine Park MD      sertraline  100 mg Oral Daily Sharmin Waggoner PA-C      torsemide  20 mg Oral Daily PRN Sharmin Waggoner PA-C           Lab Results: I have reviewed the following results:   Results from last 7 days   Lab Units 09/23/24  0410   HEMOGLOBIN g/dL 11.6   HEMATOCRIT % 38.8   WBC Thousand/uL 7.70   PLATELETS Thousands/uL 195     Results from last 7 days   Lab Units 09/23/24  0410   BUN mg/dL 16   SODIUM mmol/L 140   POTASSIUM mmol/L 4.0   CHLORIDE mmol/L 104   CREATININE mg/dL 0.71   AST U/L 24   ALT U/L 17                I have spent a total time of 55 minutes in caring for this patient on the day of the visit/encounter including Risks and benefits of tx options, Instructions for management, Patient and family education, Risk factor reductions, Counseling / Coordination of care, Documenting in the medical record, and Communicating with other healthcare professionals .

## 2024-09-28 PROCEDURE — 97530 THERAPEUTIC ACTIVITIES: CPT

## 2024-09-28 PROCEDURE — 97110 THERAPEUTIC EXERCISES: CPT

## 2024-09-28 RX ADMIN — NYSTATIN: 100000 POWDER TOPICAL at 17:46

## 2024-09-28 RX ADMIN — SENNOSIDES 17.2 MG: 8.6 TABLET, FILM COATED ORAL at 22:13

## 2024-09-28 RX ADMIN — ATORVASTATIN CALCIUM 20 MG: 20 TABLET, FILM COATED ORAL at 17:46

## 2024-09-28 RX ADMIN — DILTIAZEM HYDROCHLORIDE 180 MG: 180 CAPSULE, COATED, EXTENDED RELEASE ORAL at 08:42

## 2024-09-28 RX ADMIN — NYSTATIN: 100000 POWDER TOPICAL at 08:48

## 2024-09-28 RX ADMIN — ENOXAPARIN SODIUM 40 MG: 40 INJECTION SUBCUTANEOUS at 08:42

## 2024-09-28 RX ADMIN — ASPIRIN 81 MG: 81 TABLET, COATED ORAL at 08:42

## 2024-09-28 RX ADMIN — FOLIC ACID 1000 MCG: 1 TABLET ORAL at 08:42

## 2024-09-28 RX ADMIN — Medication 250 MG: at 08:42

## 2024-09-28 RX ADMIN — ACETAMINOPHEN 975 MG: 325 TABLET ORAL at 05:08

## 2024-09-28 RX ADMIN — SERTRALINE 100 MG: 100 TABLET, FILM COATED ORAL at 08:42

## 2024-09-28 RX ADMIN — ACETAMINOPHEN 975 MG: 325 TABLET ORAL at 13:53

## 2024-09-28 RX ADMIN — Medication 250 MG: at 17:46

## 2024-09-28 RX ADMIN — FLUTICASONE PROPIONATE 2 SPRAY: 50 SPRAY, METERED NASAL at 08:48

## 2024-09-28 RX ADMIN — LEVOTHYROXINE SODIUM 125 MCG: 25 TABLET ORAL at 05:08

## 2024-09-28 RX ADMIN — ACETAMINOPHEN 975 MG: 325 TABLET ORAL at 22:13

## 2024-09-28 NOTE — PROGRESS NOTES
09/28/24 0930   Pain Assessment   Pain Assessment Tool 0-10   Pain Score 3   Pain Location/Orientation Orientation: Bilateral;Location: Shoulder;Location: Neck   Restrictions/Precautions   Precautions Aspiration;Bed/chair alarms;Cognitive;Fall Risk;Pain;Spinal precautions;Supervision on toilet/commode   Weight Bearing Restrictions No   ROM Restrictions   (spinal precautions)   Braces or Orthoses C/S Collar   Exercise Tools   Theraputty searched for and retrieved small objects in yellow theraputty   Other Exercise Tool 1 card match while seated using bilat UE to place cards in matching pockets, pt reported bilat UE fatigue by end of activity   Coordination   Fine Motor used clothespin with min resistence to remove foam block pieces from container and place on table, used RUE to remove and LUE to replace   Cognition   Overall Cognitive Status Impaired   Arousal/Participation Alert;Responsive;Cooperative   Attention Attends with cues to redirect   Orientation Level Oriented X4   Memory Decreased short term memory;Decreased recall of recent events;Decreased recall of precautions   Following Commands Follows one step commands with increased time or repetition   Assessment   Treatment Assessment Pt agreeable to OT session this AM. Received sitting upright in w/c. Pt completed UE ROM/strength exercises with good tolerance and rest breaks taken as needed. Pt reported mild pain/soreness in bilat shoulders as exercises progressed, which alleviated with rest breaks. Pt with slight cough/congested speech during session with +sputum during one cough; RN aware, suction maintained close to pt as needed. Pt an active participant in therapy and reports feeling stronger since beginning of ARU stay. Pt's barriers to d/c include decreased strength throughout but especially bilat UE, decreased ROM, decreased balance, impaired cognition including problem solving, attention, and memory, decreased activity tolerance, impaired  coordination bilat hands, decreased safety awareness, and spinal precautions with c-collar ; all affect independence in self care and fxl transfers. Pt would benefit from continued skilled OT services in order to address listed barriers and prepare for safe d/c.   Prognosis Good   Problem List Decreased strength;Decreased range of motion;Decreased endurance;Impaired balance;Decreased cognition;Decreased safety awareness;Decreased skin integrity;Orthopedic restrictions;Pain   Plan   Treatment/Interventions ADL retraining;Functional transfer training;LE strengthening/ROM;Therapeutic exercise;Endurance training;Cognitive reorientation;Patient/family training;Equipment eval/education;Compensatory technique education;OT   Progress Progressing toward goals   OT Therapy Minutes   OT Time In 0930   OT Time Out 1030   OT Total Time (minutes) 60   OT Mode of treatment - Individual (minutes) 0   OT Mode of treatment - Concurrent (minutes) 60

## 2024-09-28 NOTE — PLAN OF CARE
Problem: Prexisting or High Potential for Compromised Skin Integrity  Goal: Skin integrity is maintained or improved  Description: INTERVENTIONS:  - Identify patients at risk for skin breakdown  - Assess and monitor skin integrity  - Assess and monitor nutrition and hydration status  - Monitor labs   - Assess for incontinence   - Turn and reposition patient  - Assist with mobility/ambulation  - Relieve pressure over bony prominences  - Avoid friction and shearing  - Provide appropriate hygiene as needed including keeping skin clean and dry  - Evaluate need for skin moisturizer/barrier cream  - Collaborate with interdisciplinary team   - Patient/family teaching    Outcome: Progressing     Problem: GASTROINTESTINAL - ADULT  Goal: Maintains adequate nutritional intake  Description: INTERVENTIONS:  - Monitor percentage of each meal consumed  - Identify factors contributing to decreased intake, treat as appropriate  - Assist with meals as needed  - Monitor I&O, weight, and lab values if indicated  - Obtained nutrition services   Outcome: Not Progressing

## 2024-09-28 NOTE — PLAN OF CARE
Problem: PAIN - ADULT  Goal: Verbalizes/displays adequate comfort level or baseline comfort level  Description: Interventions:  - Encourage patient to monitor pain and request assistance  - Assess pain using appropriate pain scale  - Administer analgesics based on type and severity of pain and evaluate response  - Implement non-pharmacological measures as appropriate and evaluate response  - Consider cultural and social influences on pain and pain management  - Notify physician/advanced practitioner if interventions unsuccessful or patient reports new pain  Outcome: Progressing     Problem: INFECTION - ADULT  Goal: Absence or prevention of progression during hospitalization  Description: INTERVENTIONS:  - Assess and monitor for signs and symptoms of infection  - Monitor lab/diagnostic results  - Monitor all insertion sites, i.e. indwelling lines, tubes, and drains  - Monitor endotracheal if appropriate and nasal secretions for changes in amount and color  - [place] appropriate cooling/warming therapies per order  - Administer medications as ordered  - Instruct and encourage patient and family to use good hand hygiene technique  - Identify and instruct in appropriate isolation precautions for identified infection/condition  Outcome: Progressing  Goal: Absence of fever/infection during neutropenic period  Description: INTERVENTIONS:  - Monitor WBC    Outcome: Progressing     Problem: NEUROSENSORY - ADULT  Goal: Achieves stable or improved neurological status  Description: INTERVENTIONS  - Monitor and report changes in neurological status  - Monitor vital signs such as temperature, blood pressure, glucose, and any other labs ordered   - Initiate measures to prevent increased intracranial pressure  - Monitor for seizure activity and implement precautions if appropriate      Outcome: Progressing  Goal: Remains free of injury related to seizures activity  Description: INTERVENTIONS  - Maintain airway, patient safety   and administer oxygen as ordered  - Monitor patient for seizure activity, document and report duration and description of seizure to physician/advanced practitioner  - If seizure occurs,  ensure patient safety during seizure  - Reorient patient post seizure  - Seizure pads on all 4 side rails  - Instruct patient/family to notify RN of any seizure activity including if an aura is experienced  - Instruct patient/family to call for assistance with activity based on nursing assessment  - Administer anti-seizure medications if ordered    Outcome: Progressing  Goal: Achieves maximal functionality and self care  Description: INTERVENTIONS  - Monitor swallowing and airway patency with patient fatigue and changes in neurological status  - Encourage and assist patient to increase activity and self care.   - Encourage visually impaired, hearing impaired and aphasic patients to use assistive/communication devices  Outcome: Progressing

## 2024-09-29 RX ADMIN — Medication 250 MG: at 17:42

## 2024-09-29 RX ADMIN — FOLIC ACID 1000 MCG: 1 TABLET ORAL at 08:29

## 2024-09-29 RX ADMIN — ACETAMINOPHEN 975 MG: 325 TABLET ORAL at 21:11

## 2024-09-29 RX ADMIN — ENOXAPARIN SODIUM 40 MG: 40 INJECTION SUBCUTANEOUS at 08:28

## 2024-09-29 RX ADMIN — DIGOXIN 125 MCG: 125 TABLET ORAL at 08:28

## 2024-09-29 RX ADMIN — DILTIAZEM HYDROCHLORIDE 180 MG: 180 CAPSULE, COATED, EXTENDED RELEASE ORAL at 08:29

## 2024-09-29 RX ADMIN — DOCUSATE SODIUM 100 MG: 100 CAPSULE, LIQUID FILLED ORAL at 17:42

## 2024-09-29 RX ADMIN — ATORVASTATIN CALCIUM 20 MG: 20 TABLET, FILM COATED ORAL at 17:42

## 2024-09-29 RX ADMIN — FLUTICASONE PROPIONATE 2 SPRAY: 50 SPRAY, METERED NASAL at 08:28

## 2024-09-29 RX ADMIN — LEVOTHYROXINE SODIUM 125 MCG: 25 TABLET ORAL at 05:28

## 2024-09-29 RX ADMIN — DOCUSATE SODIUM 100 MG: 100 CAPSULE, LIQUID FILLED ORAL at 08:29

## 2024-09-29 RX ADMIN — Medication 250 MG: at 08:28

## 2024-09-29 RX ADMIN — ACETAMINOPHEN 975 MG: 325 TABLET ORAL at 13:37

## 2024-09-29 RX ADMIN — SERTRALINE 100 MG: 100 TABLET, FILM COATED ORAL at 08:29

## 2024-09-29 RX ADMIN — ASPIRIN 81 MG: 81 TABLET, COATED ORAL at 08:28

## 2024-09-29 RX ADMIN — ACETAMINOPHEN 975 MG: 325 TABLET ORAL at 05:28

## 2024-09-29 RX ADMIN — SENNOSIDES 17.2 MG: 8.6 TABLET, FILM COATED ORAL at 21:11

## 2024-09-29 NOTE — NURSING NOTE
Patient made nurse aware that twice her tongue felt numb and as quick as it happens she wiggles it and it feels normal again. She states this happens in the morning after sleeping. She said it happened Friday and yesterday. This nurse here Friday and patient did not say anything to me about it. Patient states she did not ring when it happened. Tongue feels fine now and she has had no episodes today. She said when it happened it scared her. She wanted to speak with physician today about same. No change in neuro status. No weakness noted. No change in her swallowing function from baseline. Patient was concerned so wanted to make physician aware. Dr. Jamil on call and made aware of same. Per him reaching out to neurosurgery they will monitor it for now and we are to notify them if it gets worse, continues, or new symptoms start. Patient sitting upright in wheelchair at this time. Will continue to monitor.

## 2024-09-29 NOTE — PLAN OF CARE
Problem: Prexisting or High Potential for Compromised Skin Integrity  Goal: Skin integrity is maintained or improved  Description: INTERVENTIONS:  - Identify patients at risk for skin breakdown  - Assess and monitor skin integrity  - Assess and monitor nutrition and hydration status  - Monitor labs   - Assess for incontinence   - Turn and reposition patient  - Assist with mobility/ambulation  - Relieve pressure over bony prominences  - Avoid friction and shearing  - Provide appropriate hygiene as needed including keeping skin clean and dry  - Evaluate need for skin moisturizer/barrier cream  - Collaborate with interdisciplinary team   - Patient/family teaching  - Consider wound care consult   Outcome: Progressing     Problem: PAIN - ADULT  Goal: Verbalizes/displays adequate comfort level or baseline comfort level  Description: Interventions:  - Encourage patient to monitor pain and request assistance  - Assess pain using appropriate pain scale  - Administer analgesics based on type and severity of pain and evaluate response  - Implement non-pharmacological measures as appropriate and evaluate response  - Consider cultural and social influences on pain and pain management  - Notify physician/advanced practitioner if interventions unsuccessful or patient reports new pain  Outcome: Progressing     Problem: INFECTION - ADULT  Goal: Absence or prevention of progression during hospitalization  Description: INTERVENTIONS:  - Assess and monitor for signs and symptoms of infection  - Monitor lab/diagnostic results  - Monitor all insertion sites, i.e. indwelling lines, tubes, and drains  - Monitor endotracheal if appropriate and nasal secretions for changes in amount and color  - [place] appropriate cooling/warming therapies per order  - Administer medications as ordered  - Instruct and encourage patient and family to use good hand hygiene technique  - Identify and instruct in appropriate isolation precautions for  identified infection/condition  Outcome: Progressing     Problem: DISCHARGE PLANNING  Goal: Discharge to home or other facility with appropriate resources  Description: INTERVENTIONS:  - Identify barriers to discharge w/patient and caregiver  - Arrange for needed discharge resources and transportation as appropriate  - Identify discharge learning needs (meds, wound care, etc.)  - Arrange for interpretive services to assist at discharge as needed  - Refer to Case Management Department for coordinating discharge planning if the patient needs post-hospital services based on physician/advanced practitioner order or complex needs related to functional status, cognitive ability, or social support system  Outcome: Progressing     Problem: GASTROINTESTINAL - ADULT  Goal: Minimal or absence of nausea and/or vomiting  Description: INTERVENTIONS:  - Administer IV fluids if ordered to ensure adequate hydration  - Maintain NPO status until nausea and vomiting are resolved  - Nasogastric tube if ordered  - Administer ordered antiemetic medications as needed  - Provide nonpharmacologic comfort measures as appropriate  - Advance diet as tolerated, if ordered  - Consider nutrition services referral to assist patient with adequate nutrition and appropriate food choices  Outcome: Progressing

## 2024-09-29 NOTE — QUICK NOTE
PMR On-call Quick Note  - Notified that patient had two episode of brief tongue numbness at night on Friday and Sat night.  She denied other new complaints.  Per nursing neuro exam stable without worsening strength, sensation, swallowing, pain, or resp distress and currently without tongue numbness  - Discussed with Nsx on-call-ELIDA Granados and plan to monitor for now for new or worsening episodes or worsening neurologic condition with plan to follow-up with them should it begin to persist or symptoms worsen.  - Will continue to monitor closely today and overnight with primary teams follow-up tomorrow.

## 2024-09-30 LAB
ALBUMIN SERPL BCG-MCNC: 3.1 G/DL (ref 3.5–5)
ALP SERPL-CCNC: 73 U/L (ref 34–104)
ALT SERPL W P-5'-P-CCNC: 12 U/L (ref 7–52)
ANION GAP SERPL CALCULATED.3IONS-SCNC: 6 MMOL/L (ref 4–13)
AST SERPL W P-5'-P-CCNC: 21 U/L (ref 13–39)
BASOPHILS # BLD AUTO: 0.01 THOUSANDS/ΜL (ref 0–0.1)
BASOPHILS NFR BLD AUTO: 0 % (ref 0–1)
BILIRUB SERPL-MCNC: 0.36 MG/DL (ref 0.2–1)
BUN SERPL-MCNC: 13 MG/DL (ref 5–25)
CALCIUM ALBUM COR SERPL-MCNC: 9.5 MG/DL (ref 8.3–10.1)
CALCIUM SERPL-MCNC: 8.8 MG/DL (ref 8.4–10.2)
CHLORIDE SERPL-SCNC: 107 MMOL/L (ref 96–108)
CO2 SERPL-SCNC: 29 MMOL/L (ref 21–32)
CREAT SERPL-MCNC: 0.6 MG/DL (ref 0.6–1.3)
EOSINOPHIL # BLD AUTO: 0.08 THOUSAND/ΜL (ref 0–0.61)
EOSINOPHIL NFR BLD AUTO: 2 % (ref 0–6)
ERYTHROCYTE [DISTWIDTH] IN BLOOD BY AUTOMATED COUNT: 13.6 % (ref 11.6–15.1)
GFR SERPL CREATININE-BSD FRML MDRD: 86 ML/MIN/1.73SQ M
GLUCOSE P FAST SERPL-MCNC: 79 MG/DL (ref 65–99)
GLUCOSE SERPL-MCNC: 79 MG/DL (ref 65–140)
HCT VFR BLD AUTO: 39 % (ref 34.8–46.1)
HGB BLD-MCNC: 12 G/DL (ref 11.5–15.4)
IMM GRANULOCYTES # BLD AUTO: 0.03 THOUSAND/UL (ref 0–0.2)
IMM GRANULOCYTES NFR BLD AUTO: 1 % (ref 0–2)
LYMPHOCYTES # BLD AUTO: 0.64 THOUSANDS/ΜL (ref 0.6–4.47)
LYMPHOCYTES NFR BLD AUTO: 16 % (ref 14–44)
MCH RBC QN AUTO: 31.1 PG (ref 26.8–34.3)
MCHC RBC AUTO-ENTMCNC: 30.8 G/DL (ref 31.4–37.4)
MCV RBC AUTO: 101 FL (ref 82–98)
MONOCYTES # BLD AUTO: 0.28 THOUSAND/ΜL (ref 0.17–1.22)
MONOCYTES NFR BLD AUTO: 7 % (ref 4–12)
NEUTROPHILS # BLD AUTO: 2.87 THOUSANDS/ΜL (ref 1.85–7.62)
NEUTS SEG NFR BLD AUTO: 74 % (ref 43–75)
NRBC BLD AUTO-RTO: 0 /100 WBCS
PLATELET # BLD AUTO: 211 THOUSANDS/UL (ref 149–390)
PMV BLD AUTO: 8.9 FL (ref 8.9–12.7)
POTASSIUM SERPL-SCNC: 3.9 MMOL/L (ref 3.5–5.3)
PROT SERPL-MCNC: 6 G/DL (ref 6.4–8.4)
RBC # BLD AUTO: 3.86 MILLION/UL (ref 3.81–5.12)
SODIUM SERPL-SCNC: 142 MMOL/L (ref 135–147)
WBC # BLD AUTO: 3.91 THOUSAND/UL (ref 4.31–10.16)

## 2024-09-30 PROCEDURE — 97542 WHEELCHAIR MNGMENT TRAINING: CPT

## 2024-09-30 PROCEDURE — 99232 SBSQ HOSP IP/OBS MODERATE 35: CPT | Performed by: STUDENT IN AN ORGANIZED HEALTH CARE EDUCATION/TRAINING PROGRAM

## 2024-09-30 PROCEDURE — 97530 THERAPEUTIC ACTIVITIES: CPT

## 2024-09-30 PROCEDURE — 97116 GAIT TRAINING THERAPY: CPT

## 2024-09-30 PROCEDURE — 97110 THERAPEUTIC EXERCISES: CPT

## 2024-09-30 PROCEDURE — 85025 COMPLETE CBC W/AUTO DIFF WBC: CPT

## 2024-09-30 PROCEDURE — 92526 ORAL FUNCTION THERAPY: CPT

## 2024-09-30 PROCEDURE — 80053 COMPREHEN METABOLIC PANEL: CPT

## 2024-09-30 PROCEDURE — 97535 SELF CARE MNGMENT TRAINING: CPT

## 2024-09-30 RX ADMIN — DOCUSATE SODIUM 100 MG: 100 CAPSULE, LIQUID FILLED ORAL at 17:38

## 2024-09-30 RX ADMIN — ACETAMINOPHEN 975 MG: 325 TABLET ORAL at 21:00

## 2024-09-30 RX ADMIN — DILTIAZEM HYDROCHLORIDE 180 MG: 180 CAPSULE, COATED, EXTENDED RELEASE ORAL at 09:15

## 2024-09-30 RX ADMIN — LEVOTHYROXINE SODIUM 125 MCG: 25 TABLET ORAL at 05:25

## 2024-09-30 RX ADMIN — ATORVASTATIN CALCIUM 20 MG: 20 TABLET, FILM COATED ORAL at 17:38

## 2024-09-30 RX ADMIN — Medication 250 MG: at 09:15

## 2024-09-30 RX ADMIN — FOLIC ACID 1000 MCG: 1 TABLET ORAL at 09:15

## 2024-09-30 RX ADMIN — SERTRALINE 100 MG: 100 TABLET, FILM COATED ORAL at 09:15

## 2024-09-30 RX ADMIN — ENOXAPARIN SODIUM 40 MG: 40 INJECTION SUBCUTANEOUS at 09:15

## 2024-09-30 RX ADMIN — Medication 250 MG: at 17:38

## 2024-09-30 RX ADMIN — ACETAMINOPHEN 975 MG: 325 TABLET ORAL at 05:25

## 2024-09-30 RX ADMIN — ASPIRIN 81 MG: 81 TABLET, COATED ORAL at 09:15

## 2024-09-30 RX ADMIN — ACETAMINOPHEN 975 MG: 325 TABLET ORAL at 14:34

## 2024-09-30 RX ADMIN — SENNOSIDES 17.2 MG: 8.6 TABLET, FILM COATED ORAL at 21:00

## 2024-09-30 RX ADMIN — FLUTICASONE PROPIONATE 2 SPRAY: 50 SPRAY, METERED NASAL at 09:15

## 2024-09-30 RX ADMIN — DOCUSATE SODIUM 100 MG: 100 CAPSULE, LIQUID FILLED ORAL at 09:15

## 2024-09-30 NOTE — ASSESSMENT & PLAN NOTE
Wt Readings from Last 3 Encounters:   09/30/24 51.2 kg (112 lb 14 oz)   09/11/24 53.8 kg (118 lb 9.7 oz)   09/04/24 53.5 kg (118 lb)     Weight stable  Patient takes Demedex 20 mg daily PRN for weight gain  Monitor daily weights  IM managing

## 2024-09-30 NOTE — ASSESSMENT & PLAN NOTE
Wound Care team following in [facility]:  POA several areas  Continue LWC:     Skin care plans:  1-Hydraguard to sacrum, buttocks and heels BID and PRN  2-EHOB cushion when out of bed in chair.  3-Moisturize skin daily with skin nourishing cream  4-Elevate heels to offload pressure.  5-Turn/reposition q2h for pressure re-distribution on skin  6. Monitor right 2nd toe growth area - leave open to air podiatry consulted.  7. Right arm skin tear - cleanse with Normal saline then apply normlgel ag then top with adaptic then a ABD and kiesha change every other day   8. Right and left breast folds - cleanse with soap and water pat dry dust lightly with nystatin powder bid and prn

## 2024-09-30 NOTE — NURSING NOTE
Pt informed nurse she had an episode of tongue numbness this morning during am care. Pt did not seem alarmed, she very calmly mentioned it happening before and that it happened when she woke up to go to the bathroom and was already gone. Pt stated it felt like her tongue had fallen asleep.     Beyond this incident the patient had no complaints. Pt continues to use suction overnight to help clear secretions with positive results. Fluids encouraged. Pt ambulated CG with RW to and from bathroom during night. No bouts of incontinence. Call bell and side table within reach. Will continue to monitor.

## 2024-09-30 NOTE — CASE MANAGEMENT
CM received email from Allie at insurance company, approving additional days, [facility] [place] [facility].  CM received a voice mail message from [employer] , stating the paperwork faxed to [employer] on Friday needs to be faxed to the HHA agencies, for them to obtain auth. CM called 4 H[facility] agencies, [facility], [facility] and [facility], was told by each, that they do not bill or accept insurance payment for HHA. CM called [facility] , spoke with Dot, who reported yes [employer] will pay for HHA's, however skilled nursing care needs to be in place. Dot also stated she does not see an auth for the home care with [facility].  CM called [facility], spoke with Luz and Barb regarding an auth obtained for home care. Luz stated that the nurse goes out to see the patient, then submits for auth, then the HHA auth could then be requested. CM explained situation with needing HHA set up before patient discharges home.  CM called [facility], spoke with Olena, inquired outcome of her conversation with patient's . Olena stated she spoke with Tomas, and he chose not to get information on [facility], would like homecare. [facility] called Tomas and he scheduled an appt for 10/4. CM made Olena aware d/c is planned for 10/3. CM called  Tomas, inquired if HHA agency could come to see him  sooner, this afternoon or tomorrow, Tomas agreeable to this. CM called Ailyn from [facility], relayed information that Tomas is okay with this afternoon or tomorrow to schedule visit ,. Ailyn notified [facility]. CM received a call from Lisa stating [facility] will come to hospital tomorrow  and meet with the patient and Tomas and discuss HHA coming to home. CM will follow up with [facility] regarding payment from [employer] for HHA.

## 2024-09-30 NOTE — PROGRESS NOTES
"Progress Note - PMR   Name: Marsha Oliva 79 y.o. female I MRN: [id number]  Unit/Bed#: [facility] 219-01 I Date of Admission: 9/11/2024   Date of Service: 9/30/2024 I Hospital Day: 19     Assessment & Plan  Spinal cord compression (HCC)  Presented  to acute care on 9/4/24 with progressive neck pain and weakness.    She reports 4-6 weeks prior she sustained a fall off her bed, head hitting her nightstand.    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   MRI C spine showing displaced type 2 dens fracture with mild mass effect on cord, ALL and PLL injuries.    CTA head and neck negative for vascular injuries.    Patient seen by Neurosurgery and deemed an operative candidate.    Patient taken to the OR by Dr. Dewitt for a bilateral C1 and C3 lateral mass screw placement, bialteral C2 pedicle screw palcement, C1-C3 fusion.    Post operatively, placed in a Aspen C Collar at all times, except showering with Shonda collar.    Post op X-rays with stable alignment    Plan in ARC:  Begin ARC program with PT, OT, SLP  Monitor incision  Monitor pain  Monitor neuro exam  Follow-up with Neurosurgery in 2 weeks.  2 week post op visit completed with NSGY virtually - \"Will plan to follow-up with the pt as scheduled for her 6 week POV with repeat upright xrays\"  Oropharyngeal dysphagia  New issues  VFSS study completed on 9/10/24 and patient found to have mild oral and moderate phayngeal dysphagia.    Cleared for a pureed diet with honey thick liquids.  After discussion with staff may consider repeating swallow study after discussion with speech therapy as patient still having moist cough during meals.  Patient states at home she will take a bite or 2 at a time and eat her meals over very very long periods of time but with her direct supervision requirements at this time that is not possible.  SLP to follow  Continue aspiration precautions "   Recommendations at this time for a PEG tube placement.  This is due to her continued weight loss, limited p.o. intake.  At this time discussion with the patient and  continue and will follow-up with gastroenterology as an outpatient for consideration if she is agreeable.  Risks for aspiration pneumonia including the ultimate risk of death/hospitalization discussed with the patient today.  Acquired bilateral hammer toes  Keratosis of toe  May need shaving of this lesion  Consult placed to Podiatry  Atrial fibrillation, chronic (HCC)  Chronic A. Fib  Rate/rhythm controlled on digoxin 125 mcg every other day, Cardizem  mg daily  No AC due to prior several GI bleeds.  On aspirin 81 mg daily.  IM managing  B12 deficiency  Continue supplementation   IM managing  Chronic diastolic CHF (congestive heart failure) ([facility])  Wt Readings from Last 3 Encounters:   09/30/24 51.2 kg (112 lb 14 oz)   09/11/24 53.8 kg (118 lb 9.7 oz)   09/04/24 53.5 kg (118 lb)     Weight stable  Patient takes Demedex 20 mg daily PRN for weight gain  Monitor daily weights  IM managing  Depression with anxiety  Mood is stable  Continue Zoloft 100 mg daily  Essential hypertension  Continue Cardizem  mg Daily  Monitor BP and HR with rest and activity  IM managing  Hypothyroidism  Continue Synthroid 125 mcg daily  Mixed hyperlipidemia  Continue Atorvastain 20 mg daily  Folate deficiency  Continue supplementation   IM managing  Acute pain due to trauma  Located in neck  Continue Tylenol 975 mg TID  Oxycodone IR 2.5-5 mg Q 4 hours PRN for moderate-severe pain   Topical ICE  -improved has not required oxy since 9/13  Skin abnormalities  Wound Care team following in ARC:  POA several areas  Continue LWC:     Skin care plans:  1-Hydraguard to sacrum, buttocks and heels BID and PRN  2-EHOB cushion when out of bed in chair.  3-Moisturize skin daily with skin nourishing cream  4-Elevate heels to offload pressure.  5-Turn/reposition q2h for  pressure re-distribution on skin  6. Monitor right 2nd toe growth area - leave open to air podiatry consulted.  7. Right arm skin tear - cleanse with Normal saline then apply normlgel ag then top with adaptic then a ABD and keisha change every other day   8. Right and left breast folds - cleanse with soap and water pat dry dust lightly with nystatin powder bid and prn  Lymphedema  Pumps brought in by  - ok to use PRN as tolerated  At risk for altered bowel elimination  Continent  -at home takes daily miralax; rescheduled PRN to daily  At risk for altered urinary elimination  Continent   At risk for deep venous thrombosis  Continue Lovenox 40 mg daily - should not need at discharge  Encounter for rehabilitation  Functional deficits:  cervical collar on at all times, cervical spinal precautions, impaired mobility, self care   Continue current rehabilitation plan of care to maximize function.  Estimated discharge: Thursday 10/3/24 with home care estimated    Severe protein-calorie malnutrition (HCC)  Nutrition following    Malnutrition Findings:   Adult Malnutrition type: Acute illness  Adult Degree of Malnutrition: Other severe protein calorie malnutrition  Malnutrition Characteristics: Inadequate energy, Weight loss  360 Statement: Malnutrition related to inadequate energy intake as evidenced by 7#(6%) weight loss from 9/11/# to 9/18/# and <50% energy intake >5 days.    BMI Findings:  Body mass index is 20 kg/m².   Closed odontoid fracture with type II morphology, posterior displacement, and nonunion    Impaired mobility and activities of daily living  Patient was evaluated by the rehabilitation team MD and an appropriate candidate for acute inpatient rehabilitation program at this time.  The patient will tolerate 3 hours/day 5 to 7 days/week of intensive physical, occupational and speech therapy in order to obtain goals for community discharge  Due to the patient's functional Compared to their  baseline level of function in addition to their ongoing medical needs, the patient would benefit from daily supervision from a rehabilitation physician as well as rehabilitation nursing to implement and adjust the medical as well as functional plan of care in order to meet the patient's goals.    Medical necessity: The patient at this time would greatly benefit from having home health aides.  At this time her  has ongoing health conditions that would limit his ability to assist with her care.  She still needs assistance for many tasks most of which are basic mobility and ADL tasks.  She has difficulty with eating and requires level of supervision, assistance with transfers and mobility, medication management and management of the household.  Additionally will have ongoing medical needs as well as follow-ups with the specialists once she is discharged and may be considered for a PEG tube placement as an outpatient which would also require assistance.  From a medical and functional standpoint it is my professional opinion that the patient would greatly benefit from additional assistance with home health aides for the listed reasons above as well as and throughout this note.    History of Present Illness   Marsha Oliva is a 79 y.o. female with HTN, A Fib, CHF, HLD, Hypothyroidism who presented to the [facility] on on 9/4/24 with progressive neck pain and weakness.  She reports 4-6 weeks prior she sustained a fall off her bed, head hitting her nightstand.  She has been having increasing neck pain since.  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 MRI C spine showing displaced type 2 dens fracture with mild mass effect on cord, ALL and PLL injuries.  CTA head and neck negative for vascular injuries.  Patient seen by Neurosurgery and deemed an operative candidate.  Patient taken to the OR by   Renate for a bilateral C1 and C3 lateral mass screw placement, bialteral C2 pedicle screw palcement, C1-C3 fusion.  Post operatively, placed in a Aspen C Collar at all times, except showering with Shonda collar.  Post op X-rays with stable alignment  Medical course complicated by acute dysphagia.  VFSS study completed on 9/10/24 and patient found to have mild oral and moderate phayngeal dysphagia.  Cleared for a pureed diet with honey thick liquids.  Patient seen by wound care team for abrasions and stage I pressure injury to sacrum.  Patient started on Tylenol and PRN oxycodone for acute pain.    After medical stabilization, patient found to have acute functional deficits from his baseline, therefore, admitted to [facility] for acute inpatient rehabilitation.     Chief Complaint: f/u SCI and f/u ambulatory dysfunction, follow-up weekend events, follow-up labs    Interval: Patient seen and evaluated in room.  No acute events over the weekend.  She states she is eating slightly better however it is not documented as such.  She does still want to wait and consider the PEG tube as an outpatient with her  and did not have enough time to discuss it fully at length with him over the weekend.  She says she is not coughing as much at this time and it is not noted on exam.  No fevers, chills, nausea, vomiting with cough or shortness of breath, diarrhea or constipation.    Objective     Functional Update:  Physical Therapy Occupational Therapy Speech Therapy   Weight Bearing Status: Full Weight Bearing  Transfers: Supervision  Bed Mobility: Independent  Amulation Distance (ft): 165 feet  Ambulation: Incidental Touching  Assistive Device for Ambulation: Roller Walker  Wheelchair Mobility Distance: 25 ft  Wheelchair Mobility: Supervision  Number of Stairs: 7  Assistive Device for Stairs: Bilateral Hand Rails  Stair Assistance: Incidental Touching  Ramp: Incidental Touching  Assistive Device for Ramp: Roller  Walker  Discharge Recommendations: Home with:  DC Home with:: 24 Hour Assisteance, Family Support, First Floor Setup, Home Physical Therapy   Eating: Supervision  Grooming: Supervision  Bathing: Incidental Touching  Bathing: Incidental Touching  Upper Body Dressing: Supervision  Lower Body Dressing: Incidental Touching, Minimal Assistance  Toileting: Incidental Touching, Supervision  Tub/Shower Transfer: Minimal Assistance  Toilet Transfer: Supervision, Incidental Touching  Cognition: Exceptions to WNL  Cognition: Decreased Memory, Decreased Attention, Decreased Safety, Decreased Comprehension  Orientation: Person, Place, Time, Situation   Mode of Communication: Verbal  Cognition: Exceptions to WNL  Cognition: Decreased Memory, Decreased Executive Functions, Decreased Attention, Decreased Comprehension  Orientation: Person, Place, Time, Situation  Swallowing: Exceptions to WNL  Swallowing: Oral Dysphagia, Pharyngeal Dysphagia  Diet Recommendations: Level 1/Puree, Honey Thick  Discharge Recommendations: Home with:  DC Home with:: Family Support, Home Speech Therapy (close supervision 2' cognition and dysphagia severity)         Temp:  [97.7 °F (36.5 °C)-98.3 °F (36.8 °C)] 98.3 °F (36.8 °C)  HR:  [77-85] 85  Resp:  [16-18] 16  BP: (100-128)/(62-67) 128/67  SpO2:  [90 %-94 %] 90 %  Physical Exam  Vitals reviewed.   Constitutional:       General: She is not in acute distress.  HENT:      Head: Normocephalic and atraumatic.      Right Ear: External ear normal.      Left Ear: External ear normal.      Nose: No rhinorrhea.      Mouth/Throat:      Mouth: Mucous membranes are moist.      Pharynx: Oropharynx is clear.   Eyes:      General: No scleral icterus.  Neck:      Comments: Cervical collar in place  Cardiovascular:      Rate and Rhythm: Normal rate.   Pulmonary:      Effort: Pulmonary effort is normal. No respiratory distress.      Comments: No coughing on today's exam  Abdominal:      General: There is no distension.       Palpations: Abdomen is soft.   Musculoskeletal:      Right lower leg: No edema.      Left lower leg: No edema.   Skin:     General: Skin is warm and dry.      Comments: Neck incision CDI   Neurological:      Mental Status: She is alert and oriented to person, place, and time.      Motor: Weakness (Generalized weakness in the lower extremities mostly proximal) present.   Psychiatric:         Mood and Affect: Mood normal.         Behavior: Behavior normal.           Scheduled Meds:  Current Facility-Administered Medications   Medication Dose Route Frequency Provider Last Rate    acetaminophen  975 mg Oral Q8H [facility] Sharmin Waggoner PA-C      Artificial Tears  2 drop Both Eyes Q3H PRN JAQUELINE Amos      aspirin  81 mg Oral Daily Corine Park MD      atorvastatin  20 mg Oral Daily With Dinner Sharmin Waggoner PA-C      bisacodyl  10 mg Rectal Daily PRN Sharmin Waggoner PA-C      bisacodyl  10 mg Rectal Once Corine Park MD      calcium carbonate  1,000 mg Oral Daily PRN Sharmin Waggoner PA-C      cyanocobalamin  1,000 mcg Oral Weekly Sharmin Waggoner PA-C      dextromethorphan-guaiFENesin  10 mL Oral Q4H PRN Tj Darling MD      digoxin  125 mcg Oral Every Other Day Sharmin Waggoner PA-C      diltiazem  180 mg Oral Daily Sharmin Waggoner PA-C      docusate sodium  100 mg Oral BID Corine Park MD      enoxaparin  40 mg Subcutaneous Q24H [facility] Corine Park MD      fluticasone  2 spray Each Nare Daily JAQUELINE Amos      folic acid  1,000 mcg Oral Daily Sharmin Waggoner PA-C      lactulose  20 g Oral Daily PRN Jermaine Greenberg DO      levothyroxine  125 mcg Oral Early Morning Sharmin Waggoner PA-C      magnesium gluconate  250 mg Oral BID Sharmin Waggoner PA-C      ondansetron  4 mg Oral Q6H PRN Sharmin Waggoner PA-C      oxyCODONE  2.5 mg Oral Q4H PRN Sharmin Waggoner PA-C      Or     oxyCODONE  5 mg Oral Q4H PRN Sharmin Waggoner PA-C      phenol  1 spray Mouth/Throat Q2H PRN Sharmin Waggoner PA-C      senna  2 tablet Oral HS Corine Park MD      sertraline  100 mg Oral Daily Sharmin Waggoner PA-C      torsemide  20 mg Oral Daily PRN Sharmin Waggoner PA-C           Lab Results: I have reviewed the following results:   Results from last 7 days   Lab Units 09/30/24  0530   HEMOGLOBIN g/dL 12.0   HEMATOCRIT % 39.0   WBC Thousand/uL 3.91*   PLATELETS Thousands/uL 211     Results from last 7 days   Lab Units 09/30/24  0530   BUN mg/dL 13   SODIUM mmol/L 142   POTASSIUM mmol/L 3.9   CHLORIDE mmol/L 107   CREATININE mg/dL 0.60   AST U/L 21   ALT U/L 12                Jermaine Greenberg DO  Physical Medicine and Rehabilitation  [facility]    I have spent a total time of 35 minutes in caring for this patient on the day of the visit/encounter including Counseling / Coordination of care, Documenting in the medical record, Reviewing / ordering tests, medicine, procedures  , and Communicating with other healthcare professionals .

## 2024-09-30 NOTE — PROGRESS NOTES
09/30/24 0902   Pain Assessment   Pain Assessment Tool 0-10   Pain Score 2   Pain Location/Orientation Location: Neck   Restrictions/Precautions   Precautions Aspiration;Bed/chair alarms;Cognitive;Fall Risk;Pain;Spinal precautions;Supervision on toilet/commode   Weight Bearing Restrictions No   ROM Restrictions   (spinal prec)   Braces or Orthoses C/S Collar   Eating   Type of Assistance Needed Supervision;Verbal cues   Eating CARE Score 4   Eating Assessment   Food To Mouth Yes   Meal Assessed Breakfast   Findings pt presents seated in w/c completing breakfast with CNA providing supervision   Oral Hygiene   Type of Assistance Needed Supervision;Verbal cues   Oral Hygiene CARE Score 4   Grooming   Able To Initiate Tasks;Comb/Brush Hair;Wash/Dry Face;Brush/Clean Teeth;Wash/Dry Hands   Limitation Noted In Safety;Strength   Findings supervision seated at the sink   Shower/Bathe Self   Type of Assistance Needed Incidental touching   Shower/Bathe Self CARE Score 4   Bathing   Assessed Bath Style Sponge Bath   Anticipated D/C Bath Style Sponge Bath;Shower   Able to Gather/Transport No   Able to Adjust Water Temperature No   Able to Wash/Rinse/Dry (body part) Left Arm;Right Arm;L Upper Leg;R Upper Leg;L Lower Leg/Foot;R Lower Leg/Foot;Chest;Abdomen;Perineal Area;Buttocks   Limitations Noted in Balance;Endurance;Problem Solving;ROM;Safety;Strength  (spinal prec with c collar)   Positioning Seated;Standing   Adaptive Equipment Longhand Sponge;Longhand Reacher   Tub/Shower Transfer   Reason Not Assessed Sponge Bath   Upper Body Dressing   Type of Assistance Needed Physical assistance   Physical Assistance Level 26%-50%   Upper Body Dressing CARE Score 3   Lower Body Dressing   Type of Assistance Needed Incidental touching   Lower Body Dressing CARE Score 4   Putting On/Taking Off Footwear   Type of Assistance Needed Physical assistance   Physical Assistance Level 25% or less   Putting On/Taking Off Footwear CARE Score 3    Picking Up Object   Type of Assistance Needed Incidental touching;Adaptive equipment   Picking Up Object CARE Score 4   Dressing/Undressing Clothing   Remove UB Clothes Pullover Shirt   Don UB Clothes Pullover Shirt   Remove LB Clothes Pants;Undergarment;Socks;Shoes   Don LB Clothes Pants;Undergarment;Socks;Shoes   Limitations Noted In Balance;Endurance;Problem Solving;Safety;Strength;ROM  (spinal prec with c collar)   Adaptive Equipment Reacher;Sock Aide;Dressing Stick   Positioning Supported Sit;Standing   Sit to Stand   Type of Assistance Needed Supervision   Sit to Stand CARE Score 4   Bed-Chair Transfer   Type of Assistance Needed Supervision   Chair/Bed-to-Chair Transfer CARE Score 4   Cognition   Orientation Level Oriented X4   Assessment   Treatment Assessment Pt presents seated in w/c finishing with breakfast and agreeable to OT session including ADLs with related transfers/ standing. Pt is able to demonstrate A/E use for LB ADLs with occ reminders and demonstrates improved attention tot ask even when distracted with conversation. Pt requries supervision and assist due to decreased balance, safety, endurance and strength/ ROM with spinal prec and c collar. Pt will benefit from continued skilled OT services to increase independence with daily tasks.   Problem List Decreased strength;Decreased range of motion;Decreased endurance;Impaired balance;Decreased safety awareness;Decreased cognition;Decreased skin integrity;Orthopedic restrictions;Pain  (spinal prec)   Plan   Treatment/Interventions ADL retraining;Functional transfer training;Therapeutic exercise;Endurance training;Patient/family training;Cognitive reorientation;Equipment eval/education;Compensatory technique education   Progress Progressing toward goals   OT Therapy Minutes   OT Time In 0902   OT Time Out 1033   OT Total Time (minutes) 91   OT Mode of treatment - Individual (minutes) 91   Therapy Time missed   Time missed? No

## 2024-09-30 NOTE — PROGRESS NOTES
09/30/24 0650   Pain Assessment   Pain Assessment Tool 0-10   Pain Score 2   Pain Location/Orientation Location: Neck   Restrictions/Precautions   Precautions Aspiration;Bed/chair alarms;Cognitive;Fall Risk;Pain;Spinal precautions;Supervision on toilet/commode   Weight Bearing Restrictions No   ROM Restrictions   (Spinal precautions)   Braces or Orthoses C/S Collar   Cognition   Overall Cognitive Status Impaired   Arousal/Participation Alert;Responsive;Cooperative   Attention Attends with cues to redirect   Orientation Level Oriented X4   Memory Decreased short term memory;Decreased recall of recent events;Decreased recall of precautions   Following Commands Follows one step commands with increased time or repetition   Subjective   Subjective Pt reports having a rough weekend   Roll Left and Right   Type of Assistance Needed Independent   Roll Left and Right CARE Score 6   Sit to Lying   Comment Pt OOB   Lying to Sitting on Side of Bed   Type of Assistance Needed Independent   Lying to Sitting on Side of Bed CARE Score 6   Sit to Stand   Type of Assistance Needed Supervision   Physical Assistance Level No physical assistance   Comment with RW   Sit to Stand CARE Score 4   Bed-Chair Transfer   Type of Assistance Needed Supervision   Physical Assistance Level No physical assistance   Comment with RW   Chair/Bed-to-Chair Transfer CARE Score 4   Car Transfer   Reason if not Attempted Environmental limitations   Car Transfer CARE Score 10   Walk 10 Feet   Type of Assistance Needed Supervision;Verbal cues   Comment Close supervision with RW on level and unlevel surfaces.   Walk 10 Feet CARE Score 4   Walk 50 Feet with Two Turns   Type of Assistance Needed Supervision;Verbal cues   Comment Close supervision with RW on level and unlevel surfaces.   Walk 50 Feet with Two Turns CARE Score 4   Walk 150 Feet   Type of Assistance Needed Supervision;Verbal cues   Comment Close supervision with RW on level and unlevel surfaces.    Walk 150 Feet CARE Score 4   Walking 10 Feet on Uneven Surfaces   Type of Assistance Needed Supervision;Verbal cues   Comment Close supervision with RW on level and unlevel surfaces.   Walking 10 Feet on Uneven Surfaces CARE Score 4   Ambulation   Primary Mode of Locomotion Prior to Admission Walk   Distance Walked (feet) 152 ft  (152, 91, 111 ft)   Assist Device Roller Walker   Gait Pattern Antalgic;Inconsistant Vaishnavi;Slow Vaishnavi;Decreased foot clearance;Forward Flexion;Narrow FLORENTINO;Poor UE WB;Shuffle;Improper weight shift   Limitations Noted In Balance;Endurance;Posture;Safety;Speed;Strength   Provided Assistance with: Balance;Direction   Walk Assist Level Close Supervision   Findings Close supervision with RW on level and unlevel surfaces.   Does the patient walk? 2. Yes   Wheel 50 Feet with Two Turns   Type of Assistance Needed Supervision   Physical Assistance Level No physical assistance   Comment Supervision with cues for direction   Wheel 50 Feet with Two Turns CARE Score 4   Wheelchair mobility   Does the patient use a wheelchair? 1. Yes   Type of Wheelchair Used 1. Manual   Method Right upper extremity;Left upper extremity;Right lower extremity;Left lower extremity   Assistance Provided For Locking Brakes   Distance Level Surface (feet) 140 ft   Distance Wheeled 3% Grade 0 ft   Findings Supervision with cues for direction   Curb or Single Stair   Style negotiated Single stair   Type of Assistance Needed Incidental touching   Physical Assistance Level No physical assistance   Comment CG with charly HR and non-reciprocal pattern with reciprocal pattern for last two steps.   1 Step (Curb) CARE Score 4   4 Steps   Type of Assistance Needed Incidental touching   Physical Assistance Level No physical assistance   Comment CG with charly HR and non-reciprocal pattern with reciprocal pattern for last two steps.   4 Steps CARE Score 4   12 Steps   Reason if not Attempted Safety concerns   12 Steps CARE Score 88    Stairs   Type Stairs   # of Steps 7   Weight Bearing Precautions WBAT   Assist Devices Bilateral Rail   Findings CG with charly HR and non-reciprocal pattern with reciprocal pattern for last two steps.   Picking Up Object   Reason if not Attempted Safety concerns   Picking Up Object CARE Score 88   Toilet Transfer   Comment Not needed during session   Therapeutic Interventions   Strengthening Supine ther ex   Flexibility Gastroc and hamstring stretching   Balance Gait and transfer training   Other stair negotiation and WC mobility   Assessment   Treatment Assessment Pt agreeable to PT session this morning. Pain 2/10 reported in neck. Independent for bed mobility. Supervision for transfers with RW. Close supervision for ambulation with RW on level and unlevel surfaces for max distance of 152 ft. Cg for stair negotiation with charly HR and non-reciprocal pattern with reciprocal pattern for last two steps. Ther ex for general LE strengthening. Gait and transfer training for increased balance, safety, and independence with functional mobility. Pt returned to romm in WC with alarms on and all needs within reach.   Problem List Decreased strength;Decreased endurance;Impaired balance;Decreased mobility;Orthopedic restrictions;Pain   Barriers to Discharge Inaccessible home environment;Decreased caregiver support   PT Barriers   Physical Impairment Decreased strength;Decreased range of motion;Decreased endurance;Impaired balance;Decreased mobility;Decreased safety awareness;Pain;Orthopedic restrictions   Functional Limitation Car transfers;Ramp negotiation;Stair negotiation;Standing;Transfers;Walking;Wheelchair management   Plan   Treatment/Interventions Functional transfer training;LE strengthening/ROM;Elevations;Therapeutic exercise;Endurance training;Bed mobility;Gait training   Progress Progressing toward goals   PT Therapy Minutes   PT Time In 0650   PT Time Out 0820   PT Total Time (minutes) 90   PT Mode of treatment -  Individual (minutes) 90   PT Mode of treatment - Concurrent (minutes) 0   PT Mode of treatment - Group (minutes) 0   PT Mode of treatment - Co-treat (minutes) 0   PT Mode of Treatment - Total time(minutes) 90 minutes   PT Cumulative Minutes 1292   Therapy Time missed   Time missed? No

## 2024-09-30 NOTE — PROGRESS NOTES
09/30/24 1230   Pain Assessment   Pain Assessment Tool 0-10   Pain Score 2   Pain Location/Orientation Location: Neck   Restrictions/Precautions   Precautions Aspiration;Bed/chair alarms;Cognitive;Fall Risk;Pain;Spinal precautions;Supervision on toilet/commode   Weight Bearing Restrictions No   ROM Restrictions   (spinal prec)   Braces or Orthoses C/S Collar   Eating   Type of Assistance Needed Supervision;Verbal cues   Eating CARE Score 4   Eating Assessment   Food To Mouth Yes   Meal Assessed Lunch   Findings pt presents eating lunch and conitnues into OT session with supervision provided and increased time to complete all solids and liquids on tray; pt demonstrates improved attention to meal for meal completion even whikle distracted by conversation   Exercise Tools   Hand Gripper 7# digiflex 3 sets 10 B hands   Cognition   Orientation Level Oriented X4   Assessment   Treatment Assessment Pt presents seated in w/c finishing with lunch and agreeable to brief session including BUE therex with digiflex. Pt toelrates session without complaints and barriers same as listed during earlier session. Pt will benefit from continued skilled OT services to icnrease independence with daily tasks.   Problem List Decreased strength;Decreased range of motion;Decreased endurance;Impaired balance;Decreased safety awareness;Decreased cognition;Decreased skin integrity;Orthopedic restrictions;Pain  (spinal prec with c collar)   Plan   Treatment/Interventions ADL retraining;Functional transfer training;Therapeutic exercise;Endurance training;Patient/family training;Equipment eval/education;Cognitive reorientation;Compensatory technique education   Progress Progressing toward goals   OT Therapy Minutes   OT Time In 1230   OT Time Out 1300   OT Total Time (minutes) 30   OT Mode of treatment - Individual (minutes) 30   Therapy Time missed   Time missed? No

## 2024-09-30 NOTE — NURSING NOTE
Pt OOB and supervised for all meals. Meds crushed in applesauce. No choking or coughing episodes this shift.Transfers CG RW to BR. Pt currently sitting up in WC watching TV, all needs met, call bell in reach.

## 2024-09-30 NOTE — PROGRESS NOTES
09/30/24 0840   Pain Assessment   Pain Assessment Tool 0-10   Pain Score 2   Pain Location/Orientation Location: Neck   Patient's Stated Pain Goal No pain   Hospital Pain Intervention(s) Medication (See MAR)   Multiple Pain Sites No   Restrictions/Precautions   Precautions Aspiration;Bed/chair alarms;Cognitive;Fall Risk;Pain;Spinal precautions;Supervision on toilet/commode   Weight Bearing Restrictions No   Braces or Orthoses C/S Collar   Cognitive Linguisitic Assessments   Cognitive Linquistic Quick Test (CLQT) MOCA completed 9/16/24, pt scored a 19/24   Comprehension   Comprehension (FIM) 4 - Understands basic info/conversation 75-90% of time   Expression   Verbal Complex   Intelligibility Sentence   QI: Expression 3. Exhibits some difficulty with expressing needs and ideas (e.g., some words or finishing thoughts) or speech is not clear   Expression (FIM) 5 - Needs help/cues only RARELY (< 10% of the time)   Social Interaction   Social Interaction (FIM) 5 - Interacts appropriately with others 90% of time   Problem Solving   Problem solving (FIM) 4 - Solves basic problems 75-89% of time   Memory   Memory (FIM) 4 - Recognizes/recalls/performs 75-89%   Memory Skills   Orientation Level Oriented X4   Swallow Information   Current Risks for Dysphagia & Aspiration General debilitation   Current Symptoms/Concerns Cough;Clear throat   Current Diet Dysphagia pureed;Honey thick liquids   Baseline Diet Regular;Thin liquids   Consistencies Assessed and Performance   Materials Admnistered Puree/Level 1;Honey thick liquid   Materials Adminstered Comment Pt assessed with breakfast tray   Recommendations   Risk for Aspiration Present   Recommendations Dysphagia treatment   Diet Solid Recommendation Level 1 Dysphagia/pureed   Diet Liquid Recommendation Honey thick liquid   Recommended Form of Meds Crushed;With puree   General Precautions Aspiration precautions   Compensatory Swallowing Strategies Alternate solids and liquids  "  Results Reviewed with PT/Family/Caregiver   Eating   Type of Assistance Needed Set-up / clean-up   Physical Assistance Level No physical assistance   Comment honey   Eating CARE Score 5   Swallow Assessment   Swallow Treatment Assessment Patient assessed with breakfast tray of puree [place] toast, pudding, applesauce, HTL via bilateral handled cup.  Patient reports that her overall appetite is \"better\" and she demonstrates appropriate external pacing.  Pt demonstrates less overall overt s/s today, no suction required, no sputum.  Pt's cough is mildly weak, non productive.  Pt external pacing appropriately, requires less overall encouragement for PO intake though overall intake remains low.  Considerations and recommendations for supplemental nutrition in the setting of decreased PO intake.  Pt with congested vocal quality x1, improves with cued throat clear.  Pt denies wheezing or SOB during meal.   Swallow Assessment Prognosis   Prognosis Fair   Prognosis Considerations Co-morbidities;Medical diagnosis;Medical prognosis   SLP Therapy Minutes   SLP Time In 0840   SLP Time Out 0900   SLP Total Time (minutes) 20   SLP Mode of treatment - Individual (minutes) 20   SLP Mode of treatment - Concurrent (minutes) 0   SLP Mode of treatment - Group (minutes) 0   SLP Mode of treatment - Co-treat (minutes) 0   SLP Mode of Treatment - Total time(minutes) 20 minutes   SLP Cumulative Minutes 552   Therapy Time missed   Time missed? No       "

## 2024-09-30 NOTE — PROGRESS NOTES
"Progress Note - Marsha Oliva 79 y.o. female MRN: [id number]    Unit/Bed#: -01 Encounter: [id number]        Subjective:   Patient seen and examined at bedside after reviewing the chart and discussing the case with the caring staff.      Patient examined at bedside.  Patient was noticed to have numbness involving the tongue over the weekend.  Patient is also reporting scabs on her upper lip.    On review of patient's labs from 9/30/2024.  Patient's CMP and CBC were found to be within normal limits.    Physical Exam   Vitals: Blood pressure 128/67, pulse 85, temperature 98.3 °F (36.8 °C), temperature source Temporal, resp. rate 16, height 5' 3\" (1.6 m), weight 51.2 kg (112 lb 14 oz), SpO2 90%.,Body mass index is 20 kg/m².  Constitutional: Patient in no acute distress.  HEENT: PERR, EOMI, MMM.  Cardiovascular: Normal rate and regular rhythm.    Pulmonary/Chest: Effort normal and breath sounds normal.   Abdomen: Soft, + BS, NT.    Assessment/Plan:  Marsha Oliva is a(n) 79 y.o. female with cervical spinal cord compression.     Cardiac hx w/ HTN, HLD, chronic diastolic heart failure, chronic Afib.  Continue digoxin 125 mcg every other day, diltiazem 180 mg daily, atorvastatin 40 mg daily.  Daily weights.  Torsemide 20 mg daily as needed.  Pt cleared to restart ASA 81 mg daily by neurosurgery 9/19.    Hypothyroidism.  Continue levothyroxine 125 mcg daily (decr from 137 mcg 9/6).  B12 deficiency.  Patient on B12 1000 mcg weekly.   Folate deficiency.  Patient on folic acid 1,000 mcg daily.   Hypomagnesemia.  Patient on magnesium gluconate 250 mg twice daily.   Depression and anxiety.   Patient on Zoloft 100 mg daily.  Declines neuropsych at this time.   Multiple hyperkeratotic lesions.  Patient follows with Podiatry outpatient.  Wound care consult.   Sore throat.  Chloraseptic throat spray as needed.   Malnutrition.  Dietician following.  Adult Malnutrition type: Acute illness.  Adult Degree of Malnutrition: Other " severe protein calorie malnutrition. Malnutrition Characteristics: Inadequate energy, Weight loss.  Productive cough/dysphagia.  Patient high risk for aspiration pneumonia. CXR 9/23/24 without acute cardiopulmonary disease.    Cold sores involving the upper lip.  Resolving with the scabs.  Patient may get lip balm 9/30/2024.  Pain and bowel regimen.  As per physiatry.   Cervical spinal cord compression.  Vista collar at all times x 6 weeks.  Continue SQ Lovenox.  Patient receiving intensive PT OT ST as per physiatry.      Anticipated discharge date:   Thursday, 10/3/2024

## 2024-09-30 NOTE — ASSESSMENT & PLAN NOTE
Nutrition following    Malnutrition Findings:   Adult Malnutrition type: Acute illness  Adult Degree of Malnutrition: Other severe protein calorie malnutrition  Malnutrition Characteristics: Inadequate energy, Weight loss  360 Statement: Malnutrition related to inadequate energy intake as evidenced by 7#(6%) weight loss from 9/11/# to 9/18/# and <50% energy intake >5 days.    BMI Findings:  Body mass index is 20 kg/m².

## 2024-10-01 PROCEDURE — 97530 THERAPEUTIC ACTIVITIES: CPT

## 2024-10-01 PROCEDURE — 92526 ORAL FUNCTION THERAPY: CPT | Performed by: NURSE PRACTITIONER

## 2024-10-01 PROCEDURE — 97116 GAIT TRAINING THERAPY: CPT

## 2024-10-01 PROCEDURE — 97110 THERAPEUTIC EXERCISES: CPT

## 2024-10-01 PROCEDURE — 99233 SBSQ HOSP IP/OBS HIGH 50: CPT | Performed by: STUDENT IN AN ORGANIZED HEALTH CARE EDUCATION/TRAINING PROGRAM

## 2024-10-01 PROCEDURE — 97537 COMMUNITY/WORK REINTEGRATION: CPT

## 2024-10-01 PROCEDURE — 97535 SELF CARE MNGMENT TRAINING: CPT

## 2024-10-01 PROCEDURE — 92507 TX SP LANG VOICE COMM INDIV: CPT | Performed by: NURSE PRACTITIONER

## 2024-10-01 RX ADMIN — Medication 2.5 MG: at 23:57

## 2024-10-01 RX ADMIN — DOCUSATE SODIUM 100 MG: 100 CAPSULE, LIQUID FILLED ORAL at 17:59

## 2024-10-01 RX ADMIN — LEVOTHYROXINE SODIUM 125 MCG: 25 TABLET ORAL at 05:00

## 2024-10-01 RX ADMIN — ACETAMINOPHEN 975 MG: 325 TABLET ORAL at 21:03

## 2024-10-01 RX ADMIN — FLUTICASONE PROPIONATE 2 SPRAY: 50 SPRAY, METERED NASAL at 09:19

## 2024-10-01 RX ADMIN — DILTIAZEM HYDROCHLORIDE 180 MG: 180 CAPSULE, COATED, EXTENDED RELEASE ORAL at 09:20

## 2024-10-01 RX ADMIN — ACETAMINOPHEN 975 MG: 325 TABLET ORAL at 05:00

## 2024-10-01 RX ADMIN — DOCUSATE SODIUM 100 MG: 100 CAPSULE, LIQUID FILLED ORAL at 09:19

## 2024-10-01 RX ADMIN — ENOXAPARIN SODIUM 40 MG: 40 INJECTION SUBCUTANEOUS at 09:19

## 2024-10-01 RX ADMIN — SERTRALINE 100 MG: 100 TABLET, FILM COATED ORAL at 09:19

## 2024-10-01 RX ADMIN — ASPIRIN 81 MG: 81 TABLET, COATED ORAL at 09:20

## 2024-10-01 RX ADMIN — ACETAMINOPHEN 975 MG: 325 TABLET ORAL at 14:32

## 2024-10-01 RX ADMIN — ATORVASTATIN CALCIUM 20 MG: 20 TABLET, FILM COATED ORAL at 17:59

## 2024-10-01 RX ADMIN — SENNOSIDES 17.2 MG: 8.6 TABLET, FILM COATED ORAL at 21:03

## 2024-10-01 RX ADMIN — FOLIC ACID 1000 MCG: 1 TABLET ORAL at 09:19

## 2024-10-01 RX ADMIN — DIGOXIN 125 MCG: 125 TABLET ORAL at 09:20

## 2024-10-01 RX ADMIN — Medication 250 MG: at 17:59

## 2024-10-01 RX ADMIN — Medication 250 MG: at 09:19

## 2024-10-01 NOTE — NURSING NOTE
Patient transfers and ambulates contact guard with walker. Reports minimal pain and medicated with tylenol. Continues on honey thick liquids. Patient sounds less moist and cough much improved. Sitting in wheelchair for all meals. Remains continant of bowel and bladder. Will continue to monitor and follow plan of care.

## 2024-10-01 NOTE — PLAN OF CARE
Problem: PAIN - ADULT  Goal: Verbalizes/displays adequate comfort level or baseline comfort level  Description: Interventions:  - Encourage patient to monitor pain and request assistance  - Assess pain using appropriate pain scale  - Administer analgesics based on type and severity of pain and evaluate response  - Implement non-pharmacological measures as appropriate and evaluate response  - Consider cultural and social influences on pain and pain management  - Notify physician/advanced practitioner if interventions unsuccessful or patient reports new pain  Outcome: Progressing     Problem: INFECTION - ADULT  Goal: Absence or prevention of progression during hospitalization  Description: INTERVENTIONS:  - Assess and monitor for signs and symptoms of infection  - Monitor lab/diagnostic results  - Monitor all insertion sites, i.e. indwelling lines, tubes, and drains  - Monitor endotracheal if appropriate and nasal secretions for changes in amount and color  - [place] appropriate cooling/warming therapies per order  - Administer medications as ordered  - Instruct and encourage patient and family to use good hand hygiene technique  - Identify and instruct in appropriate isolation precautions for identified infection/condition  Outcome: Progressing     Problem: SAFETY ADULT  Goal: Maintain or return to baseline ADL function  Description: INTERVENTIONS:  -  Assess patient's ability to carry out ADLs; assess patient's baseline for ADL function and identify physical deficits which impact ability to perform ADLs (bathing, care of mouth/teeth, toileting, grooming, dressing, etc.)  - Assess/evaluate cause of self-care deficits   - Assess range of motion  - Assess patient's mobility; develop plan if impaired  - Assess patient's need for assistive devices and provide as appropriate  - Encourage maximum independence but intervene and supervise when necessary  - Involve family in performance of ADLs  - Assess for home care  needs following discharge   - Consider OT consult to assist with ADL evaluation and planning for discharge  - Provide patient education as appropriate  Outcome: Progressing     Problem: Knowledge Deficit  Goal: Patient/family/caregiver demonstrates understanding of disease process, treatment plan, medications, and discharge instructions  Description: Complete learning assessment and assess knowledge base.  Interventions:  - Provide teaching at level of understanding  - Provide teaching via preferred learning methods  Outcome: Progressing     Problem: RESPIRATORY - ADULT  Goal: Achieves optimal ventilation and oxygenation  Description: INTERVENTIONS:  - Assess for changes in respiratory status  - Assess for changes in mentation and behavior  - Position to facilitate oxygenation and minimize respiratory effort  - Oxygen administered by appropriate delivery if ordered  - Initiate smoking cessation education as indicated  - Encourage broncho-pulmonary hygiene including cough, deep breathe, Incentive Spirometry  - Assess the need for suctioning and aspirate as needed  - Assess and instruct to report SOB or any respiratory difficulty  - Respiratory Therapy support as indicated  Outcome: Progressing

## 2024-10-01 NOTE — PLAN OF CARE
Problem: PAIN - ADULT  Goal: Verbalizes/displays adequate comfort level or baseline comfort level  Description: Interventions:  - Encourage patient to monitor pain and request assistance  - Assess pain using appropriate pain scale  - Administer analgesics based on type and severity of pain and evaluate response  - Implement non-pharmacological measures as appropriate and evaluate response  - Consider cultural and social influences on pain and pain management  - Notify physician/advanced practitioner if interventions unsuccessful or patient reports new pain  Outcome: Progressing     Problem: INFECTION - ADULT  Goal: Absence or prevention of progression during hospitalization  Description: INTERVENTIONS:  - Assess and monitor for signs and symptoms of infection  - Monitor lab/diagnostic results  - Monitor all insertion sites, i.e. indwelling lines, tubes, and drains  - Monitor endotracheal if appropriate and nasal secretions for changes in amount and color  - [place] appropriate cooling/warming therapies per order  - Administer medications as ordered  - Instruct and encourage patient and family to use good hand hygiene technique  - Identify and instruct in appropriate isolation precautions for identified infection/condition  Outcome: Progressing  Goal: Absence of fever/infection during neutropenic period  Description: INTERVENTIONS:  - Monitor WBC    Outcome: Progressing

## 2024-10-01 NOTE — PROGRESS NOTES
10/01/24 1200   Pain Assessment   Pain Assessment Tool 0-10   Pain Score 3   Pain Location/Orientation Location: Neck;Orientation: Right;Orientation: Left   Restrictions/Precautions   Precautions Aspiration;Bed/chair alarms;Cognitive;Fall Risk;Pain;Spinal precautions;Supervision on toilet/commode   Cognitive Linguisitic Assessments   Cognitive Linquistic Quick Test (CLQT) MoCA completed 9/16/24 scored 16/30   Comprehension   Comprehension (FIM) 5 - Needs help/cues, repetition only RARELY ( < 10% of the time)   Expression   Expression (FIM) 5 - Needs help/cues only RARELY (< 10% of the time)   Social Interaction   Social Interaction (FIM) 5 - Interacts appropriately with others 90% of time   Problem Solving   Problem solving (FIM) 5 - Solves basic problems 90% of time   Memory   Memory (FIM) 4 - Recognizes/recalls/performs 75-89%   Speech/Language/Cognition Assessmetn   Treatment Assessment Pt with repetitive questions as previously discussed at length regarding how long she will have her collar on for, as well as her dysphagia and current diet and how long she will be here for. All three of these topics were reviewed with her. She was informed that her team meeting with all staff was completed today. She was provided with her next neurosurgery appointment date she is scheduled for on 10/24/24 and she did write this down independently. It was also discussed that a repeat modified ( video) barium swallow study (VBS/MBS) is also recommended but this will be completed as an outpatient once c-collar is cleared to be removed and once she is demonstrate more clinical improvement of the swallow function in therapy sessions. Last VBS/MBS was completed 9/10/24.   Swallow Assessment   Swallow Treatment Assessment Assessed with pureed solid meal, honey thick liquids via 2 handled spouted cup and cottage cheese. She was able to recall 3/4 swallow strategies robby and increased to 4/4 min cue ( small bites, effortful swallow,  "slow rate robby but alternate bite/sip with min cue). She demonstrated swallow strategies 85% of the time independently during session. Occasional dry cough with pureed solids.Pt reporting often \"that went down well\". Decreased fatigue demonstrated and reported overall. Her intake was good overall in less amount of time during session.  Completing 100% of her meal in 40 minutes. Overall, she would still benefit from additional means of nutrition/hydration.   SLP Therapy Minutes   SLP Time In 1200   SLP Time Out 1240   SLP Total Time (minutes) 40   SLP Mode of treatment - Individual (minutes) 40   SLP Mode of treatment - Concurrent (minutes) 0   SLP Mode of treatment - Group (minutes) 0   SLP Mode of treatment - Co-treat (minutes) 0   SLP Mode of Treatment - Total time(minutes) 40 minutes   SLP Cumulative Minutes 592       "

## 2024-10-01 NOTE — PROGRESS NOTES
"   10/01/24 0648   Pain Assessment   Pain Assessment Tool 0-10   Pain Score 3   Pain Location/Orientation Location: Neck   Restrictions/Precautions   Precautions Aspiration;Bed/chair alarms;Cognitive;Fall Risk;Pain;Spinal precautions;Supervision on toilet/commode   Weight Bearing Restrictions No   ROM Restrictions   (spinal precautions)   Braces or Orthoses C/S Collar   Cognition   Overall Cognitive Status Impaired   Arousal/Participation Alert;Responsive;Cooperative   Attention Attends with cues to redirect   Orientation Level Oriented X4   Memory Decreased short term memory;Decreased recall of recent events;Decreased recall of precautions   Following Commands Follows one step commands with increased time or repetition   Subjective   Subjective \"my legs are stiff today\"   Roll Left and Right   Type of Assistance Needed Independent   Physical Assistance Level No physical assistance   Comment grab bars   Roll Left and Right CARE Score 6   Sit to Lying   Comment Pt remained OOB   Lying to Sitting on Side of Bed   Type of Assistance Needed Independent   Physical Assistance Level No physical assistance   Comment grab bars   Lying to Sitting on Side of Bed CARE Score 6   Sit to Stand   Type of Assistance Needed Supervision   Physical Assistance Level No physical assistance   Comment With RW   Sit to Stand CARE Score 4   Bed-Chair Transfer   Type of Assistance Needed Supervision   Physical Assistance Level No physical assistance   Comment With RW   Chair/Bed-to-Chair Transfer CARE Score 4   Car Transfer   Reason if not Attempted Environmental limitations   Car Transfer CARE Score 10   Walk 10 Feet   Type of Assistance Needed Supervision   Physical Assistance Level No physical assistance   Comment Close supervision with RW on level and unlevel surfaces   Walk 10 Feet CARE Score 4   Walk 50 Feet with Two Turns   Type of Assistance Needed Supervision   Physical Assistance Level No physical assistance   Comment Close " supervision with RW on level and unlevel surfaces   Walk 50 Feet with Two Turns CARE Score 4   Walk 150 Feet   Type of Assistance Needed Supervision   Physical Assistance Level No physical assistance   Comment Close supervision with RW on level and unlevel surfaces   Walk 150 Feet CARE Score 4   Walking 10 Feet on Uneven Surfaces   Type of Assistance Needed Supervision   Physical Assistance Level No physical assistance   Comment Close supervision with RW on level and unlevel surfaces   Walking 10 Feet on Uneven Surfaces CARE Score 4   Ambulation   Primary Mode of Locomotion Prior to Admission Walk   Distance Walked (feet) 175 ft  (175, 131, 125, 47 ft)   Assist Device Roller Walker   Gait Pattern Antalgic;Inconsistant Vaishnavi;Slow Vaishnavi;Decreased foot clearance;Forward Flexion;Narrow FLORENTINO;Poor UE WB;Shuffle;Improper weight shift   Limitations Noted In Balance;Coordination;Device Management;Endurance;Midline Orientation;Posture;Safety;Speed;Strength   Provided Assistance with: Balance;Direction   Walk Assist Level Close Supervision   Findings Close supervision with RW on level and unlevel surfaces   Does the patient walk? 2. Yes   Wheelchair mobility   Does the patient use a wheelchair? 1. Yes   Type of Wheelchair Used 1. Manual   Method Right upper extremity;Left upper extremity;Right lower extremity;Left lower extremity   Assistance Provided For Locking Brakes   Distance Level Surface (feet) 35 ft   Distance Wheeled 3% Grade 0 ft   Findings Supervision with cues for direction   Curb or Single Stair   Style negotiated Single stair   Type of Assistance Needed Incidental touching   Physical Assistance Level No physical assistance   Comment CG/supervision with charly HR use and non-reciprocal pattern.   1 Step (Curb) CARE Score 4   4 Steps   Type of Assistance Needed Incidental touching   Physical Assistance Level No physical assistance   Comment CG/supervision with charly HR use and non-reciprocal pattern.   4 Steps CARE  Score 4   12 Steps   Reason if not Attempted Safety concerns   12 Steps CARE Score 88   Stairs   Type Stairs;Ramp   # of Steps 7   Weight Bearing Precautions WBAT   Assist Devices Bilateral Rail   Findings CG/supervision with charly HR use and non-reciprocal pattern for stair negotiation. CG with RW for ramp.   Picking Up Object   Reason if not Attempted Safety concerns   Picking Up Object CARE Score 88   Toilet Transfer   Type of Assistance Needed Supervision   Physical Assistance Level No physical assistance   Comment grab bar and RW   Toilet Transfer CARE Score 4   Therapeutic Interventions   Strengthening Supine and seated TE   Flexibility Gastroc and hamstring stretching   Balance Gait and transfer training   Other stair and ramp negotiation   Assessment   Treatment Assessment Pt agreeable to PT session this morning. Pain 3/10 in neck. Pt independent for bed mobility with grab bars. Supervision for transfers with RW. Close supervision for ambulation with RW on level and unlevel surfaces for max distance of 175 ft. Pt is supervision for  mobility with cues for direction. CG/supervision with charly HR use and non-reciprocal pattern for stair negotiation. CG with RW for ramp negotiation. Ther ex for general LE strengthening. LE stretching for improved ROM with amulation and transfers. Gait and transfer training for increased balance, safety, and independence with functional mobility. Pt returned to room in  with alarms on and all needs within reach.   Problem List Decreased strength;Decreased range of motion;Decreased endurance;Impaired balance;Decreased safety awareness;Decreased cognition;Decreased skin integrity;Orthopedic restrictions;Pain   Barriers to Discharge Inaccessible home environment;Decreased caregiver support   PT Barriers   Physical Impairment Decreased strength;Decreased range of motion;Decreased endurance;Impaired balance;Decreased mobility;Decreased safety awareness;Pain;Orthopedic restrictions    Functional Limitation Car transfers;Ramp negotiation;Stair negotiation;Standing;Transfers;Walking;Wheelchair management   Plan   Treatment/Interventions Functional transfer training;LE strengthening/ROM;Elevations;Therapeutic exercise;Endurance training;Bed mobility;Gait training   Progress Progressing toward goals   PT Therapy Minutes   PT Time In 0648   PT Time Out 0820   PT Total Time (minutes) 92   PT Mode of treatment - Individual (minutes) 92   PT Mode of treatment - Concurrent (minutes) 0   PT Mode of treatment - Group (minutes) 0   PT Mode of treatment - Co-treat (minutes) 0   PT Mode of Treatment - Total time(minutes) 92 minutes   PT Cumulative Minutes 1384   Therapy Time missed   Time missed? No

## 2024-10-01 NOTE — PROGRESS NOTES
"Progress Note - Marsha Oliva 79 y.o. female MRN: [id number]    Unit/Bed#: -01 Encounter: [id number]        Subjective:   Patient seen and examined at bedside after reviewing the chart and discussing the case with the caring staff.      Patient examined at bedside.  Patient reports no acute symptoms.    Physical Exam   Vitals: Blood pressure 136/77, pulse 85, temperature (!) 96.9 °F (36.1 °C), temperature source Temporal, resp. rate 20, height 5' 3\" (1.6 m), weight 50.7 kg (111 lb 12.4 oz), SpO2 94%.,Body mass index is 19.8 kg/m².  Constitutional: Patient in no acute distress.  HEENT: PERR, EOMI, MMM.  Cardiovascular: Normal rate and regular rhythm.    Pulmonary/Chest: Effort normal and breath sounds normal.   Abdomen: Soft, + BS, NT.    Assessment/Plan:  Marsha Oliva is a(n) 79 y.o. female with cervical spinal cord compression.     Cardiac hx w/ HTN, HLD, chronic diastolic heart failure, chronic Afib.  Continue digoxin 125 mcg every other day, diltiazem 180 mg daily, atorvastatin 40 mg daily.  Daily weights.  Torsemide 20 mg daily as needed.  Pt cleared to restart ASA 81 mg daily by neurosurgery 9/19.    Hypothyroidism.  Continue levothyroxine 125 mcg daily (decr from 137 mcg 9/6).  B12 deficiency.  Patient on B12 1000 mcg weekly.   Folate deficiency.  Patient on folic acid 1,000 mcg daily.   Hypomagnesemia.  Patient on magnesium gluconate 250 mg twice daily.   Depression and anxiety.   Patient on Zoloft 100 mg daily.  Declines neuropsych at this time.   Multiple hyperkeratotic lesions.  Patient follows with Podiatry outpatient.  Wound care consult.   Sore throat.  Chloraseptic throat spray as needed.   Malnutrition.  Dietician following.  Adult Malnutrition type: Acute illness.  Adult Degree of Malnutrition: Other severe protein calorie malnutrition. Malnutrition Characteristics: Inadequate energy, Weight loss.  Productive cough/dysphagia.  Patient high risk for aspiration pneumonia. CXR 9/23/24 without " acute cardiopulmonary disease.    Cold sores involving the upper lip.  Resolving with the scabs.  Patient may get lip balm 9/30/2024.  Pain and bowel regimen.  As per physiatry.   Cervical spinal cord compression.  Vista collar at all times x 6 weeks.  Continue SQ Lovenox.  Patient receiving intensive PT OT ST as per physiatry.      Anticipated discharge date:   Thursday, 10/3/2024

## 2024-10-01 NOTE — ASSESSMENT & PLAN NOTE
Wt Readings from Last 3 Encounters:   10/01/24 50.7 kg (111 lb 12.4 oz)   09/11/24 53.8 kg (118 lb 9.7 oz)   09/04/24 53.5 kg (118 lb)     Weight stable  Patient takes Demedex 20 mg daily PRN for weight gain  Monitor daily weights  IM managing

## 2024-10-01 NOTE — PROGRESS NOTES
10/01/24 1130   Pain Assessment   Pain Assessment Tool 0-10   Pain Score No Pain   Restrictions/Precautions   Precautions Aspiration;Bed/chair alarms;Cognitive;Fall Risk;Pain;Spinal precautions;Supervision on toilet/commode   Weight Bearing Restrictions No   ROM Restrictions   (spinal prec with c collar)   Braces or Orthoses C/S Collar   Grooming   Able To Initiate Tasks;Wash/Dry Hands   Findings supervision standing at the sink   Putting On/Taking Off Footwear   Type of Assistance Needed Supervision   Comment slippers   Putting On/Taking Off Footwear CARE Score 4   Lying to Sitting on Side of Bed   Type of Assistance Needed Independent   Lying to Sitting on Side of Bed CARE Score 6   Sit to Stand   Type of Assistance Needed Supervision   Sit to Stand CARE Score 4   Bed-Chair Transfer   Type of Assistance Needed Supervision   Chair/Bed-to-Chair Transfer CARE Score 4   Toileting Hygiene   Type of Assistance Needed Supervision   Toileting Hygiene CARE Score 4   Toileting   Able to Pull Clothing down yes, up yes.   Manage Hygiene Bladder   Limitations Noted In Balance;Problem Solving;ROM;Safety;LE Strength  (spinal prec with  c collar)   Adaptive Equipment Grab Bar   Toilet Transfer   Type of Assistance Needed Supervision   Toilet Transfer CARE Score 4   Toilet Transfer   Surface Assessed Raised Toilet   Transfer Technique Standard   Limitations Noted In Balance;Endurance;Problem Solving;ROM;Safety;LE Strength  (spinal prec with c collar)   Adaptive Equipment Grab Bar;Walker   Cognition   Orientation Level Oriented X4   Additional Activities   Additional Activities Other (Comment)   Additional Activities Comments fxl mobility to and from the BR with RW and S/ CGA   Assessment   Treatment Assessment Pt presents supine agreeable to OT session including transfers/ mobility, toileting and grooming before lunch arrives. Pt tolerates session and is progressing towards goals. Pt requires assist due to decreased balance,  safety, endurance and strength/ ROm with spinal prec with c collar. Pt will beenfit from continued skilled OT services to increase independence with daily tasks.   Problem List Decreased strength;Decreased range of motion;Decreased endurance;Impaired balance;Decreased safety awareness;Decreased cognition;Decreased skin integrity;Orthopedic restrictions;Pain  (spinal prec with c collar)   Plan   Treatment/Interventions ADL retraining;Functional transfer training;Therapeutic exercise;Endurance training;Cognitive reorientation;Patient/family training;Equipment eval/education;Compensatory technique education   Progress Progressing toward goals   OT Therapy Minutes   OT Time In 1130   OT Time Out 1200   OT Total Time (minutes) 30   OT Mode of treatment - Individual (minutes) 30   Therapy Time missed   Time missed? No

## 2024-10-01 NOTE — NUTRITION
10/01/24 1326   Biochemical Data,Medical Tests, and Procedures   Biochemical Data/Medical Tests/Procedures Lab values reviewed;Meds reviewed   Labs (Comment) 9/30 pro:6.0, alb:3.1, WBC:3.91   Meds (Comment) ASA, tums, Vit B12, lanoxin, colace, cardizem, levothyroxine, folvite, zofran, senna, demadex   Speech Therapy Recommendations (Comment) Receiving speech therapy   Nutrition-Focused Physical Exam   Nutrition-Focused Physical Exam Findings RN skin assessment reviewed  (Wound sacrum, wound right toe D2, wound cervical neck per documentation.)   Medical-Related Concerns PMH reviewed   Adequacy of Intake   Nutrition Modality PO   Feeding Route   PO With assistance   Current PO Intake   Current Diet Order Dysphagia 1, HTL   Nutrition Supplements Other (Comment);Magic Cup  (prosource pudding once daily, magic cup BID)   Current Meal Intake 0-25%;25-50%;50-75%;%   Intake Supplements 0-25%;25-50%;50-75%;%   Estimated calorie intake compared to estimated need Nutrient needs not always met.   PES Statement   Problem Continue previous diagnosis   Recommendations/Interventions   Malnutrition/BMI Present Yes  (as previously noted)   Summary Dysphagia 1, HTL. No straw. Direct supervision with meals. Sippy cup with meals. Meal intakes vary, slight improvement. 10/1 111#, BMI=19.80; weight holding since 9/26  however she has had weight loss since admission. Medications reviewed. Dysphagia noted. Upper denture present. Trace BLE edema. Skin integrity reviewed. She states she feels she is eating a little better. She states she is considering a PEG tube as an outpatient.   Interventions/Recommendations Continue current diet order;Supplement continue   Education Assessment   Education Patient/caregiver not appropriate for education at this time   Patient Nutrition Goals   Goal Meet PO needs;Avoid weight loss   Goal Status Not met;Extended   Timeframe to complete goal by next f/u   Nutrition Complexity Risk   Nutrition  complexity level Moderate risk   Nutrition review: 10/07/24   Follow up date 10/07/24

## 2024-10-01 NOTE — ASSESSMENT & PLAN NOTE
Nutrition following    Malnutrition Findings:   Adult Malnutrition type: Acute illness  Adult Degree of Malnutrition: Other severe protein calorie malnutrition  Malnutrition Characteristics: Inadequate energy, Weight loss  360 Statement: Malnutrition related to inadequate energy intake as evidenced by 7#(6%) weight loss from 9/11/# to 9/18/# and <50% energy intake >5 days.    BMI Findings:  Body mass index is 19.8 kg/m².

## 2024-10-01 NOTE — TEAM CONFERENCE
Acute RehabilitationTeam Conference Note  Date: 10/1/2024   Time: 11:13 AM       Patient Name:  Marsha Oliva       Medical Record Number: [id number]   YOB: 1945  Sex: Female          Room/Bed:  [facility] 219/[facility] 219-01  Payor Info:  [facility] / Plan: [employer]  REP / Product Type: Medicare PPO /      Admitting Diagnosis: Posterior displaced type ii dens fracture, initial encounter for closed fracture [S12.111A]   Admit Date/Time:  9/11/2024  3:31 PM  Admission Comments: No comment available     Primary Diagnosis:  Spinal cord compression (HCC)  Principal Problem: Spinal cord compression (HCC)    Patient Active Problem List    Diagnosis Date Noted    Impaired mobility and activities of daily living 09/27/2024    Severe protein-calorie malnutrition (HCC) 09/18/2024    Closed odontoid fracture with type II morphology, posterior displacement, and nonunion 09/18/2024    S/P cervical spinal fusion 09/16/2024    At risk for altered bowel elimination 09/14/2024    At risk for altered urinary elimination 09/14/2024    At risk for deep venous thrombosis 09/14/2024    Encounter for rehabilitation 09/14/2024    Lymphedema 09/13/2024    Oropharyngeal dysphagia 09/12/2024    Skin abnormalities 09/12/2024    Acute pain due to trauma 09/06/2024    At risk for delirium 09/06/2024    Heart failure with preserved ejection fraction (HCC) 09/06/2024    Closed odontoid fracture with type II morphology and posterior displacement (HCC) 09/04/2024    Spinal cord compression (HCC) 09/04/2024    Chronic respiratory failure with hypoxia (HCC) 03/09/2023    Stage 3a chronic kidney disease (HCC) 08/12/2022    Bilateral hydronephrosis 08/12/2022    Proctitis 08/12/2022    Fall 08/12/2022    Hypokalemia 08/12/2022    ILD (interstitial lung disease) ([facility]) 07/18/2022    Oropharyngeal aspiration 07/18/2022    Elevated troponin 06/02/2022    EKG abnormalities 06/02/2022    Mild cognitive impairment of uncertain  or unknown etiology 01/20/2022    Hormone replacement therapy 01/05/2022    Apraxia 12/28/2021    Constipation, unspecified 12/28/2021    Corn or callus 12/28/2021    Difficulty in walking, not elsewhere classified 12/28/2021    Generalized muscle weakness 12/28/2021    Lymphedema, not elsewhere classified 12/28/2021    Repeated falls 12/28/2021    Hypoxia 12/23/2021    Frequent falls 12/23/2021    Elevated d-dimer 12/23/2021    Venous insufficiency of both lower extremities 12/17/2021    Acquired bilateral hammer toes 07/27/2018    Lymphedema of both lower extremities 07/02/2018    Severe mitral regurgitation 05/31/2018    Anemia 05/13/2018    Cardiomegaly 05/13/2018    Wound of right leg 05/13/2018    Hypertensive heart disease with heart failure (HCC) 05/13/2018    Major depressive disorder, single episode, unspecified 05/13/2018    Personal history of nicotine dependence 05/13/2018    Pleural effusion, not elsewhere classified 05/13/2018    Solitary pulmonary nodule 05/13/2018    Atrial fibrillation, chronic (HCC) 05/01/2018    B12 deficiency 01/13/2017    Chronic diastolic CHF (congestive heart failure) (HCC) 01/13/2017    Folate deficiency 01/13/2017    Vitamin D deficiency 06/03/2016    Depression with anxiety 08/03/2015    Mixed hyperlipidemia 08/03/2015    Osteoarthritis of knee 10/23/2012    Impaired fasting glucose 05/31/2012    Essential hypertension 03/23/2010    Hypothyroidism 03/23/2010    Posttraumatic stress disorder 03/23/2010       Physical Therapy:    Weight Bearing Status: Full Weight Bearing  Transfers: Supervision  Bed Mobility: Independent  Amulation Distance (ft): 165 feet  Ambulation: Incidental Touching  Assistive Device for Ambulation: Roller Walker  Wheelchair Mobility Distance: 25 ft  Wheelchair Mobility: Supervision  Number of Stairs: 7  Assistive Device for Stairs: Bilateral Hand Rails  Stair Assistance: Incidental Touching  Ramp: Incidental Touching  Assistive Device for Ramp: Roller  Walker  Discharge Recommendations: Home with: (vs alternate placement)  DC Home with:: 24 Hour Assisteance, Family Support, First Floor Setup, Home Physical Therapy    09/16/2024: Patient participating in therapy and making positive gains. Pt supervision for bed mobility, CG/min A with RW for transfers, CGA/min-mod A for ambulation with RW on level and unlevel surfaces for max of 71 ft., CG - total A for WC mobility with max cues, min-mod A x 2 on stairs with charly HR assist. Ramp not assessed. Pt limited by decreased strength, decreased ROM, decreased endurance, impaired balance, decreased mobility, and cervical spine precautions. Pt may benefit from continued ARC PT for increased function, safety, and independence in prep for safe d/c home with continued PT services as needed.     09/24/2024: Patient participating in therapy and making positive gains. Pt independent for bed mobility, supervision with RW for transfers, CG for ambulation with RW on level and unlevel surfaces for max distance of 122 ft, supervision for WC mobility, CG on stairs with charly HR assist, and CG on ramp with RW. Pt limited by decreased strength, decreased ROM, decreased endurance, impaired balance, decreased mobility, and cervical spine precautions. Pt may benefit from continued ARC PT for increased function, safety, and independence in prep for safe d/c home with continued PT services as needed.     10/01/2024: Patient participating in therapy and making positive gains. Pt independent for bed mobility, supervision with RW for transfers, CG for ambulation with RW on level and unlevel surfaces for max distance of 165 ft, supervision for WC mobility with cues for direction, CG on stairs with charly HR assist, CG on ramp with RW. Pt limited by decreased strength, decreased ROM, decreased endurance, impaired balance, decreased mobility, and cervical spine precautions. Pt may benefit from continued ARC PT for increased function, safety, and  independence in prep for safe d/c to home vs alternate placement with continued PT services as needed.       Occupational Therapy:  Eating: Supervision  Grooming: Supervision  Bathing: Incidental Touching  Bathing: Incidental Touching  Upper Body Dressing: Supervision  Lower Body Dressing: Incidental Touching, Minimal Assistance  Toileting: Incidental Touching, Supervision  Tub/Shower Transfer: Minimal Assistance  Toilet Transfer: Supervision, Incidental Touching  Cognition: Exceptions to WNL  Cognition: Decreased Memory, Decreased Attention, Decreased Safety, Decreased Comprehension  Orientation: Person, Place, Time, Situation  Discharge Recommendations: Home with:  DC Home with:: Family Support, First Floor Setup, Home Occupational Therapy       9/16/24: Pt participates in ADLs, toileting, transfers/ standing and BUE therex/ theract. Pt is making gains towards goals with current LOF as listed above. Pt requires assist due to limited balance, safety, endurance and strength/ ROM with spinal prec and c collar. Pt will benefit from continued skilled OT services to increase independence with daily tasks.     9/23/24: Pt participates in ADLs, toileting, transfers/ standing and BUE therex/ theract. Pt is making gains towards goals with current LOF as listed above and use of A/E for LB ADLs. Pt requires assist due to limited balance, safety, endurance and strength/ ROM with spinal prec and c collar. Pt will benefit from continued skilled OT services to increase independence with daily tasks.     9/30/24: Pt participates in ADLs, toileting, transfers/ standing and BUE therex/ theract. Pt is making gains towards goals with current LOF as listed above and use of A/E for LB ADLs with improving attention to task and task completion. Pt requires assist due to limited balance, safety, endurance and strength/ ROM with spinal prec and c collar. Pt will benefit from continued skilled OT services to increase independence with daily  tasks with FT recommended with  to prepare for discharge to home.     Speech Therapy:  Mode of Communication: Verbal  Cognition: Exceptions to WNL  Cognition: Decreased Memory, Decreased Executive Functions, Decreased Attention, Decreased Comprehension  Orientation: Person, Place, Time, Situation  Swallowing: Exceptions to WNL  Swallowing: Pharyngeal Dysphagia, Oral Dysphagia  Diet Recommendations: Level 1/Puree, Honey Thick  Discharge Recommendations: Home with:  DC Home with:: Family Support, Home Speech Therapy (close supervision 2' dysphagia severity and cognitive deficits)  9/17  current diet: puree/HTL    Cognitive Linguisitic Assessments   MoCA MoCA completed this date 9/16/24 - scored 16/30 ( see note for details)     Visuospatial/executive 2/5  Naming 2/3  Attention 3/6  Language 2/3  Abstraction 1/2  Delayed recall 0/5  Orientation 6/6   Comprehension   Comprehension (FIM) 4 - Understands basic info/conversation 75-90% of time   Expression   Expression (FIM) 5 - Needs help/cues only RARELY (< 10% of the time)   Social Interaction   Social Interaction (FIM) 5 - Interacts appropriately with others 90% of time   Problem Solving   Problem solving (FIM) 3 - Solves basic problmes 50-74% of time   Memory   Memory (FIM) 4 - Recognizes/recalls/performs 75-89%     9/24     Comprehension   Comprehension (FIM) 4 - Understands basic info/conversation 75-90% of time   Expression   Verbal Complex   Intelligibility Sentence   QI: Expression 3. Exhibits some difficulty with expressing needs and ideas (e.g., some words or finishing thoughts) or speech is not clear   Expression (FIM) 5 - Needs help/cues only RARELY (< 10% of the time)   Social Interaction   Social Interaction (FIM) 5 - Interacts appropriately with others 90% of time   Problem Solving   Problem solving (FIM) 4 - Solves basic problems 75-89% of time   Memory   Memory (FIM) 4 - Recognizes/recalls/performs 75-89%     10/1 Pt continues with diet  recommendations for pureed solids and honey thick liquids.     Comprehension   Comprehension (FIM) 4 - Understands basic info/conversation 75-90% of time   Expression   Expression (FIM) 5 - Needs help/cues only RARELY (< 10% of the time)   Social Interaction   Social Interaction (FIM) 5 - Interacts appropriately with others 90% of time   Problem Solving   Problem solving (FIM) 4 - Solves basic problems 75-89% of time   Memory   Memory (FIM) 4 - Recognizes/recalls/performs 75-89%       Nursing Notes:  Appetite: Poor  Diet Type: Honey thick liquids, Dysphagia I                      Diet Patient/Family Education Complete: Yes    Type of Wound (LDA): Wound (incison back of neck)                    Type of Wound Patient/Family Education: Yes  Bladder: 5 - Supervision     Bladder Patient/Family Education: Yes  Bowel: 5 - Supervision     Bowel Patient/Family Education: Yes  Pain Location/Orientation: Location: Neck  Pain Score: 0                       Hospital Pain Intervention(s): Declines  Pain Patient/Family Education: Yes  Medication Management/Safety  Injectable: Lovenox  Safe Administration: Yes  Medication Patient/Family Education Complete: Yes    9/17/24 Pt admitted s/p fall with spinal compression fracture. Pt is alert and oriented but forgetful. Aspin collar on at all times. Continent of bowel and bladder. Assist x 1 with transfers and ambulation.     9/24/24 Pt with spinal compression fracture. Aspen collar on at all times. Pt is alert and oriented x 4 forgetful at times. Ambulates contact guard with roller walker. Continent of bowel and bladder wears pull up at night. Dysphagia 1 Pureed diet with Honey thick liquids. Stage 1 on sacrum hydragaurd put on daily.     10/1/2024  AO x 4,forgetful, Occassional confusion. Heart irregular, Lungs diminished crackles bilat,  thick yellow sputum, weak cough (encourage suctioning)  Continent of Bowel,  Occasional inc urine wears a pull-up. Stage 1 sacrum- hydragaurd Heels  pink, blanchable-  DOE  Right 2nd toe hammer toe with suspicious growth- monitor for changes. Right arm skin tear-   left DOE 9/23 , Surgical incision cervial neck- DOE, S/S, Weakness bilateral legs.   Aspen collar at all times , Cervical spinal precautions, IS, SCD, Aspiration precautions, Dysphagia 1 Pureed diet with Honey thick liquids, no straws, Supervise all meals, no straws, use two handle spouted cup, Crush meds in applesauce    Case Management:     Discharge Planning  Living Arrangements: Lives w/ Spouse/significant other  Support Systems: Self, Spouse/significant other  Assistance Needed: NA  Type of Current Residence: Private residence  Current Home Care Services: No  Patient admitted to [facility] on 9/11, after inpatient hospital stay for surgical repair of cervical spinal cord compression.Patient lives with spouse in 2SH with 3STE full flight of stairs to second floor. Patient has a suite on the first floor, with bedroom and full bath with walk in shower. Patient independent PTA , ambulating with walker or cane.  may not be able to physically assist patient,he is older than her. D/c date 10/3,  unable to investigate HHA agencies. [employer] can pay for HHA, however only after VNA has been arranged, and visited by the nurse. [facility] has assisted to arranging [facility] to visit patient and  Tuesday 10/1, to discuss home health needs. [facility] reserved for RN/PT/OT/ST/HHA/SW. Clinical review update to be sent on 10/2, if patient needing to stay longer at [facility], in order to arrange HHA.    Is the patient actively participating in therapies? yes  List any modifications to the treatment plan: na    Barriers Interventions   Spinal cord compression, Type 2 odontoid fracture, sp cervical spinal fusion, atrial flutter, CHF, hypothyroidism, pain, depression  Neurosurgery follow-up, medical management and oversight, medication management   Dysphagia  ST strategies, peg tube discussion,  puree with honey thick liquids   Decreased strength and ROM Therapeutic exercise, therapeutic activity   Cerv spinal precautions Cues, ADL, transfer, gait training, cerv collar   Decreased cog ST strategies, ADL/transfer/gait training   Decreased balance, end  Therapeutic exercise, therapeutic activity     Is the patient making expected progress toward goals? yes  List any update or changes to goals: na    Medical Goals: Patient will be able to manage medical conditions and comorbid conditions with medications and follow up upon completion of rehab program    Weekly Team Goals:   Rehab Team Goals  ADL Team Goal: Patient will require assist with ADLs with least restrictive device upon completion of rehab program  Bowel/Bladder Team Goal: Patient will return to premorbid level for bladder/bowel management upon completion of rehab program  Cognitive Team Goal: Patient will return to premorbid level of cognitive activity upon completion of rehab program    S/Mod I Bed mobility, transfers, and mobility  S Self care  S Comprehension, Mod I expression, S memory, and S problem solving   Tolerate level 2 and nectar thick liquids without S/S aspiration  Complete family training     Health and wellness: to be able to return home and complete activities with spouse    Discussion: Plan for return home with spouse with [facility] for PT, OT, ST, SW, nursing aides, and nursing.  Follow-up with dietician if not available through [facility].  Plan for additional support with private duty aides.     Anticipated Discharge Date:  Oct 11, 2024  [facility] Team Members Present:  The following team members are supervising care for this patient and were present during this Weekly Team Conference.    Dr. Greenberg, DO  Dot Dupont, RN  Maren Schoenberger, NILDA and NILDA Puckett, PT  Jerica Wallace, OTR/L and Irma Coffman, OTR/L  Claudia Rees, CCC-SLP

## 2024-10-01 NOTE — CASE MANAGEMENT
CAMERON met with Sharlene from [facility] at patient's bedside during her assessment of patient. Sharlene going to see  Tomas at their home. CAMERON called patient's  Tomas, relayed information from [employer] regarding benefit of $0 copay for Home health aids 12 hours/week.CM made him aware he  may only have to pay for the first couple of weeks, until auth is obtained from [employer] . CAMERON requested Tomas to have Sharlene call this CM when she gets to his home. CM received a phone call from Sharlene, made her aware of patient's benefits from [employer] of $0 copay for home health aids . Sharlene stated she and Tomas decided on 10-2 on Monday/Wednesday and Friday. CAMERON made her aware Patient may only have to pay for a first couple of weeks, until the HHA auth is approved by [employer]. CAMERON will continue to follow.

## 2024-10-01 NOTE — PROGRESS NOTES
10/01/24 1440   Pain Assessment   Pain Assessment Tool 0-10   Pain Score 4   Pain Location/Orientation Orientation: Bilateral;Location: Shoulder   Restrictions/Precautions   Precautions Aspiration;Bed/chair alarms;Cognitive;Fall Risk;Pain;Spinal precautions;Supervision on toilet/commode   Weight Bearing Restrictions No   ROM Restrictions   (spinal prec with c collar)   Braces or Orthoses C/S Collar   Exercise Tools   Other Exercise Tool 1 Card match reaching with BUE to place ind cards with those on board and remove sorting into suits and then placing in order, pt requires increased time and cues for attention to task due to distraction of conversation   Other Exercise Tool 2 1# wt therex 2 sets 15 including elbow flexion/ extension, supination/ pronationa dn wrist flexion/ extension   Cognition   Orientation Level Oriented X4   Other Comments   Assessment Pt participates in 50 minutes concurrent treatment focusing on BUE therex/ theract with similar goals as another patient   Assessment   Treatment Assessment Pt presents seated in w/c agreeable to brief OT session including BUE therex/ theract. Pt tolerates session with barriers same as listed during earleir session. Pt will beenfit from continued skilled OT services to increase independence with daily tasks.   Problem List Decreased strength;Decreased range of motion;Decreased endurance;Impaired balance;Decreased cognition;Decreased safety awareness;Decreased skin integrity;Orthopedic restrictions;Pain  (spinal prec with c collar)   Plan   Treatment/Interventions ADL retraining;Functional transfer training;Therapeutic exercise;Endurance training;Cognitive reorientation;Patient/family training;Equipment eval/education;Compensatory technique education   Progress Progressing toward goals   OT Therapy Minutes   OT Time In 1440   OT Time Out 1545   OT Total Time (minutes) 65   OT Mode of treatment - Individual (minutes) 15   OT Mode of treatment - Concurrent  (minutes) 50   Therapy Time missed   Time missed? No

## 2024-10-01 NOTE — PROGRESS NOTES
"Progress Note - PMR   Name: Marsha Oliva 79 y.o. female I MRN: [id number]  Unit/Bed#: [facility] 219-01 I Date of Admission: 9/11/2024   Date of Service: 10/1/2024 I Hospital Day: 20     Assessment & Plan  Spinal cord compression (HCC)  Presented  to acute care on 9/4/24 with progressive neck pain and weakness.    She reports 4-6 weeks prior she sustained a fall off her bed, head hitting her nightstand.    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   MRI C spine showing displaced type 2 dens fracture with mild mass effect on cord, ALL and PLL injuries.    CTA head and neck negative for vascular injuries.    Patient seen by Neurosurgery and deemed an operative candidate.    Patient taken to the OR by Dr. Dewitt for a bilateral C1 and C3 lateral mass screw placement, bialteral C2 pedicle screw palcement, C1-C3 fusion.    Post operatively, placed in a Aspen C Collar at all times, except showering with Shonda collar.    Post op X-rays with stable alignment    Plan in ARC:  Begin ARC program with PT, OT, SLP  Monitor incision  Monitor pain  Monitor neuro exam  Follow-up with Neurosurgery in 2 weeks.  2 week post op visit completed with NSGY virtually - \"Will plan to follow-up with the pt as scheduled for her 6 week POV with repeat upright xrays\"  Oropharyngeal dysphagia  New issues  VFSS study completed on 9/10/24 and patient found to have mild oral and moderate phayngeal dysphagia.    Cleared for a pureed diet with honey thick liquids.  After discussion with staff may consider repeating swallow study after discussion with speech therapy as patient still having moist cough during meals.  Patient states at home she will take a bite or 2 at a time and eat her meals over very very long periods of time but with her direct supervision requirements at this time that is not possible.  SLP to follow  Continue aspiration precautions "   Recommendations at this time for a PEG tube placement.  This is due to her continued weight loss, limited p.o. intake.  At this time discussion with the patient and  continue and will follow-up with gastroenterology as an outpatient for consideration if she is agreeable.  Risks for aspiration pneumonia including the ultimate risk of death/hospitalization discussed with the patient today.  Acquired bilateral hammer toes  Keratosis of toe  May need shaving of this lesion  Consult placed to Podiatry  Atrial fibrillation, chronic (HCC)  Chronic A. Fib  Rate/rhythm controlled on digoxin 125 mcg every other day, Cardizem  mg daily  No AC due to prior several GI bleeds.  On aspirin 81 mg daily.  IM managing  B12 deficiency  Continue supplementation   IM managing  Chronic diastolic CHF (congestive heart failure) ([facility])  Wt Readings from Last 3 Encounters:   10/01/24 50.7 kg (111 lb 12.4 oz)   09/11/24 53.8 kg (118 lb 9.7 oz)   09/04/24 53.5 kg (118 lb)     Weight stable  Patient takes Demedex 20 mg daily PRN for weight gain  Monitor daily weights  IM managing  Depression with anxiety  Mood is stable  Continue Zoloft 100 mg daily  Essential hypertension  Continue Cardizem  mg Daily  Monitor BP and HR with rest and activity  IM managing  Hypothyroidism  Continue Synthroid 125 mcg daily  Mixed hyperlipidemia  Continue Atorvastain 20 mg daily  Folate deficiency  Continue supplementation   IM managing  Acute pain due to trauma  Located in neck  Continue Tylenol 975 mg TID  Oxycodone IR 2.5-5 mg Q 4 hours PRN for moderate-severe pain   Topical ICE  -improved has not required oxy since 9/13  Skin abnormalities  Wound Care team following in ARC:  POA several areas  Continue LWC:     Skin care plans:  1-Hydraguard to sacrum, buttocks and heels BID and PRN  2-EHOB cushion when out of bed in chair.  3-Moisturize skin daily with skin nourishing cream  4-Elevate heels to offload pressure.  5-Turn/reposition q2h for  pressure re-distribution on skin  6. Monitor right 2nd toe growth area - leave open to air podiatry consulted.  7. Right arm skin tear - cleanse with Normal saline then apply normlgel ag then top with adaptic then a ABD and keisha change every other day   8. Right and left breast folds - cleanse with soap and water pat dry dust lightly with nystatin powder bid and prn  Lymphedema  Pumps brought in by  - ok to use PRN as tolerated  At risk for altered bowel elimination  Continent  -at home takes daily miralax; rescheduled PRN to daily  At risk for altered urinary elimination  Continent   At risk for deep venous thrombosis  Continue Lovenox 40 mg daily - should not need at discharge  Encounter for rehabilitation  Functional deficits:  cervical collar on at all times, cervical spinal precautions, impaired mobility, self care   Continue current rehabilitation plan of care to maximize function.  Estimated discharge: Thursday 10/3/24 with home care estimated    Severe protein-calorie malnutrition (HCC)  Nutrition following    Malnutrition Findings:   Adult Malnutrition type: Acute illness  Adult Degree of Malnutrition: Other severe protein calorie malnutrition  Malnutrition Characteristics: Inadequate energy, Weight loss  360 Statement: Malnutrition related to inadequate energy intake as evidenced by 7#(6%) weight loss from 9/11/# to 9/18/# and <50% energy intake >5 days.    BMI Findings:  Body mass index is 19.8 kg/m².   Closed odontoid fracture with type II morphology, posterior displacement, and nonunion    Impaired mobility and activities of daily living  Patient was evaluated by the rehabilitation team MD and an appropriate candidate for acute inpatient rehabilitation program at this time.  The patient will tolerate 3 hours/day 5 to 7 days/week of intensive physical, occupational and speech therapy in order to obtain goals for community discharge  Due to the patient's functional Compared to their  baseline level of function in addition to their ongoing medical needs, the patient would benefit from daily supervision from a rehabilitation physician as well as rehabilitation nursing to implement and adjust the medical as well as functional plan of care in order to meet the patient's goals.    Medical necessity: The patient at this time would greatly benefit from having home health aides.  At this time her  has ongoing health conditions that would limit his ability to assist with her care.  She still needs assistance for many tasks most of which are basic mobility and ADL tasks.  She has difficulty with eating and requires level of supervision, assistance with transfers and mobility, medication management and management of the household.  Additionally will have ongoing medical needs as well as follow-ups with the specialists once she is discharged and may be considered for a PEG tube placement as an outpatient which would also require assistance.  From a medical and functional standpoint it is my professional opinion that the patient would greatly benefit from additional assistance with home health aides for the listed reasons above as well as and throughout this note.    History of Present Illness   Marsha Oliva is a 79 y.o. female with HTN, A Fib, CHF, HLD, Hypothyroidism who presented to the [facility] on on 9/4/24 with progressive neck pain and weakness.  She reports 4-6 weeks prior she sustained a fall off her bed, head hitting her nightstand.  She has been having increasing neck pain since.  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 MRI C spine showing displaced type 2 dens fracture with mild mass effect on cord, ALL and PLL injuries.  CTA head and neck negative for vascular injuries.  Patient seen by Neurosurgery and deemed an operative candidate.  Patient taken to the OR by   Renate for a bilateral C1 and C3 lateral mass screw placement, bialteral C2 pedicle screw palcement, C1-C3 fusion.  Post operatively, placed in a Aspen C Collar at all times, except showering with Shonda collar.  Post op X-rays with stable alignment  Medical course complicated by acute dysphagia.  VFSS study completed on 9/10/24 and patient found to have mild oral and moderate phayngeal dysphagia.  Cleared for a pureed diet with honey thick liquids.  Patient seen by wound care team for abrasions and stage I pressure injury to sacrum.  Patient started on Tylenol and PRN oxycodone for acute pain.    After medical stabilization, patient found to have acute functional deficits from his baseline, therefore, admitted to [facility] for acute inpatient rehabilitation.     Chief Complaint: f/u SCI and discharge planning, team conference    Interval: Patient seen and evaluated in her room.  We did discuss the case today during weekly team conference with nursing, case management and therapies.  Initial discharge plan for 10/11 however the patient is not too fond of this discharge date and would like to go home earlier.  Her  unfortunately has been ill and she needs to be closer to a full modified independent level and this also entails helping assist with his needs at times.  We do think that the patient would benefit from that length of stay however we will continue working towards a compromise in order to maximize functional potential for a safe community discharge.  She denies any fever, chills, nausea, vomiting, shortness of breath, diarrhea or constipation.  Still with intermittent cough but states that her appetite and her coughing have improved.    Objective     Functional Update:  Physical Therapy Occupational Therapy Speech Therapy   Weight Bearing Status: Full Weight Bearing  Transfers: Supervision  Bed Mobility: Independent  Amulation Distance (ft): 165 feet  Ambulation: Incidental  Touching  Assistive Device for Ambulation: Roller Walker  Wheelchair Mobility Distance: 25 ft  Wheelchair Mobility: Supervision  Number of Stairs: 7  Assistive Device for Stairs: Bilateral Hand Rails  Stair Assistance: Incidental Touching  Ramp: Incidental Touching  Assistive Device for Ramp: Roller Walker  Discharge Recommendations: Home with: (vs alternate placement)  DC Home with:: 24 Hour Assisteance, Family Support, First Floor Setup, Home Physical Therapy   Eating: Supervision  Grooming: Supervision  Bathing: Incidental Touching  Bathing: Incidental Touching  Upper Body Dressing: Supervision  Lower Body Dressing: Incidental Touching, Minimal Assistance  Toileting: Incidental Touching, Supervision  Tub/Shower Transfer: Minimal Assistance  Toilet Transfer: Supervision, Incidental Touching  Cognition: Exceptions to WNL  Cognition: Decreased Memory, Decreased Attention, Decreased Safety, Decreased Comprehension  Orientation: Person, Place, Time, Situation   Mode of Communication: Verbal  Cognition: Exceptions to WNL  Cognition: Decreased Memory, Decreased Executive Functions, Decreased Attention, Decreased Comprehension  Orientation: Person, Place, Time, Situation  Swallowing: Exceptions to WNL  Swallowing: Pharyngeal Dysphagia, Oral Dysphagia  Diet Recommendations: Level 1/Puree, Honey Thick  Discharge Recommendations: Home with:  DC Home with:: Family Support, Home Speech Therapy (close supervision 2' dysphagia severity and cognitive deficits)         Temp:  [96.9 °F (36.1 °C)-97.1 °F (36.2 °C)] 96.9 °F (36.1 °C)  HR:  [64-85] 85  Resp:  [16-20] 20  BP: (110-136)/(51-77) 136/77  SpO2:  [94 %-95 %] 94 %  Physical Exam  Vitals reviewed.   Constitutional:       General: She is not in acute distress.  HENT:      Head: Normocephalic and atraumatic.      Right Ear: External ear normal.      Left Ear: External ear normal.      Nose: No rhinorrhea.      Mouth/Throat:      Mouth: Mucous membranes are moist.      Pharynx:  Oropharynx is clear.   Eyes:      General: No scleral icterus.  Neck:      Comments: C-collar in place  Cardiovascular:      Rate and Rhythm: Normal rate.   Pulmonary:      Effort: Pulmonary effort is normal. No respiratory distress.      Comments: Minimal coughing  Abdominal:      General: There is no distension.      Palpations: Abdomen is soft.   Musculoskeletal:      Right lower leg: No edema.      Left lower leg: No edema.   Skin:     General: Skin is warm and dry.      Comments: Neck incision CDI   Neurological:      Mental Status: She is alert and oriented to person, place, and time.      Motor: Weakness (Generalized lower extremity weakness) present.   Psychiatric:         Mood and Affect: Mood normal.         Behavior: Behavior normal.           Scheduled Meds:  Current Facility-Administered Medications   Medication Dose Route Frequency Provider Last Rate    acetaminophen  975 mg Oral Q8H [facility] Sharmin Waggoner PA-C      Artificial Tears  2 drop Both Eyes Q3H PRN JAQUELINE Amos      aspirin  81 mg Oral Daily Corine Park MD      atorvastatin  20 mg Oral Daily With Dinner Sharmin Waggoner PA-C      bisacodyl  10 mg Rectal Daily PRN Sharmin Waggoner PA-C      bisacodyl  10 mg Rectal Once Corine Park MD      calcium carbonate  1,000 mg Oral Daily PRN Sharmin Waggoner PA-C      cyanocobalamin  1,000 mcg Oral Weekly Sharmin Waggoner PA-C      dextromethorphan-guaiFENesin  10 mL Oral Q4H PRN Tj Darling MD      digoxin  125 mcg Oral Every Other Day Sharmin Waggoner PA-C      diltiazem  180 mg Oral Daily Sharmin Waggoner PA-C      docusate sodium  100 mg Oral BID Corine Park MD      enoxaparin  40 mg Subcutaneous Q24H [facility] Corine Park MD      fluticasone  2 spray Each Nare Daily JAQUELINE Amos      folic acid  1,000 mcg Oral Daily Sharmin Waggoner PA-C      lactulose  20 g Oral Daily PRN Jermaine Greenberg DO       levothyroxine  125 mcg Oral Early Morning Sharmin Waggoner PA-C      magnesium gluconate  250 mg Oral BID Sharmin Waggoner PA-C      ondansetron  4 mg Oral Q6H PRN Sharmin Waggoner PA-C      oxyCODONE  2.5 mg Oral Q4H PRN Sharmin Waggoner PA-C      Or    oxyCODONE  5 mg Oral Q4H PRN Sharmin Waggoner PA-C      phenol  1 spray Mouth/Throat Q2H PRN Sharmin Waggoner PA-C      senna  2 tablet Oral HS Corine Park MD      sertraline  100 mg Oral Daily Sharmin Waggoner PA-C      torsemide  20 mg Oral Daily PRN Sharmin Waggoner PA-C           Lab Results: I have reviewed the following results:   Results from last 7 days   Lab Units 09/30/24  0530   HEMOGLOBIN g/dL 12.0   HEMATOCRIT % 39.0   WBC Thousand/uL 3.91*   PLATELETS Thousands/uL 211     Results from last 7 days   Lab Units 09/30/24  0530   BUN mg/dL 13   SODIUM mmol/L 142   POTASSIUM mmol/L 3.9   CHLORIDE mmol/L 107   CREATININE mg/dL 0.60   AST U/L 21   ALT U/L 12              Jermaine Greenberg, DO  Physical Medicine and Rehabilitation  [facility]    I have spent a total time of 55 minutes in caring for this patient on the day of the visit/encounter including Counseling / Coordination of care, Documenting in the medical record, and Communicating with other healthcare professionals . This patient was discussed by the interdisciplinary team in weekly case conference today. The care of the patient was extensively discussed with all care providers and an appropriate rehabilitation plan was formulated unique for this patient. Barriers were identified preventing progression of therapy and appropriate interventions were discussed with each discipline. Please see the team note for input from all disciplines regarding barriers, intervention, and discharge planning.

## 2024-10-02 PROCEDURE — 97110 THERAPEUTIC EXERCISES: CPT

## 2024-10-02 PROCEDURE — 97116 GAIT TRAINING THERAPY: CPT

## 2024-10-02 PROCEDURE — 97542 WHEELCHAIR MNGMENT TRAINING: CPT

## 2024-10-02 PROCEDURE — 97535 SELF CARE MNGMENT TRAINING: CPT

## 2024-10-02 PROCEDURE — 92526 ORAL FUNCTION THERAPY: CPT | Performed by: NURSE PRACTITIONER

## 2024-10-02 PROCEDURE — 97530 THERAPEUTIC ACTIVITIES: CPT

## 2024-10-02 PROCEDURE — 97537 COMMUNITY/WORK REINTEGRATION: CPT

## 2024-10-02 PROCEDURE — 99232 SBSQ HOSP IP/OBS MODERATE 35: CPT | Performed by: STUDENT IN AN ORGANIZED HEALTH CARE EDUCATION/TRAINING PROGRAM

## 2024-10-02 RX ADMIN — CYANOCOBALAMIN TAB 500 MCG 1000 MCG: 500 TAB at 09:45

## 2024-10-02 RX ADMIN — ACETAMINOPHEN 975 MG: 325 TABLET ORAL at 05:15

## 2024-10-02 RX ADMIN — DOCUSATE SODIUM 100 MG: 100 CAPSULE, LIQUID FILLED ORAL at 17:49

## 2024-10-02 RX ADMIN — ENOXAPARIN SODIUM 40 MG: 40 INJECTION SUBCUTANEOUS at 09:44

## 2024-10-02 RX ADMIN — LEVOTHYROXINE SODIUM 125 MCG: 25 TABLET ORAL at 05:15

## 2024-10-02 RX ADMIN — SERTRALINE 100 MG: 100 TABLET, FILM COATED ORAL at 09:45

## 2024-10-02 RX ADMIN — FLUTICASONE PROPIONATE 2 SPRAY: 50 SPRAY, METERED NASAL at 09:44

## 2024-10-02 RX ADMIN — ASPIRIN 81 MG: 81 TABLET, COATED ORAL at 09:45

## 2024-10-02 RX ADMIN — ATORVASTATIN CALCIUM 20 MG: 20 TABLET, FILM COATED ORAL at 17:49

## 2024-10-02 RX ADMIN — SENNOSIDES 17.2 MG: 8.6 TABLET, FILM COATED ORAL at 21:07

## 2024-10-02 RX ADMIN — Medication 250 MG: at 09:44

## 2024-10-02 RX ADMIN — DOCUSATE SODIUM 100 MG: 100 CAPSULE, LIQUID FILLED ORAL at 09:45

## 2024-10-02 RX ADMIN — Medication 250 MG: at 17:48

## 2024-10-02 RX ADMIN — FOLIC ACID 1000 MCG: 1 TABLET ORAL at 09:45

## 2024-10-02 RX ADMIN — ACETAMINOPHEN 975 MG: 325 TABLET ORAL at 21:07

## 2024-10-02 RX ADMIN — ACETAMINOPHEN 975 MG: 325 TABLET ORAL at 14:54

## 2024-10-02 NOTE — NURSING NOTE
Patient transfers contact guard. Cervical collar in place at all times. Incision healing to back of neck. Medicated with routine tylenol as ordered. Sitting upright for all meals.  Following techniques and minimal coughing noted. Remains continant of bowel and bladder. Educated on importance of repositioning. Will continue to monitor and follow plan of care.

## 2024-10-02 NOTE — PROGRESS NOTES
10/02/24 1249   Pain Assessment   Pain Assessment Tool 0-10   Pain Score 3   Pain Location/Orientation Location: Neck   Restrictions/Precautions   Precautions Aspiration;Bed/chair alarms;Cognitive;Fall Risk;Pain;Supervision on toilet/commode;Spinal precautions   Weight Bearing Restrictions No   ROM Restrictions   (spinal prec)   Braces or Orthoses C/S Collar   Grooming   Able To Initiate Tasks;Wash/Dry Hands   Findings supervision standing at the sink   Sit to Stand   Type of Assistance Needed Supervision   Sit to Stand CARE Score 4   Bed-Chair Transfer   Type of Assistance Needed Supervision   Chair/Bed-to-Chair Transfer CARE Score 4   Toileting Hygiene   Type of Assistance Needed Supervision   Toileting Hygiene CARE Score 4   Toileting   Able to Pull Clothing down yes, up yes.   Manage Hygiene Bladder;Bowel   Limitations Noted In Balance;ROM;Problem Solving;Safety;LE Strength  (spinal prec with c collar)   Adaptive Equipment Grab Bar   Toilet Transfer   Type of Assistance Needed Supervision   Toilet Transfer CARE Score 4   Toilet Transfer   Surface Assessed Standard Toilet   Transfer Technique Standard   Limitations Noted In Balance;Endurance;Problem Solving;ROM;Safety;LE Strength  (spinal prec and c collar)   Adaptive Equipment Grab Bar;Walker   Exercise Tools   Hand Gripper 5# digiflex 2 sets 15 B hands   Other Exercise Tool 1 1# wt therex 2 sets 15 BUE shoulder chest press, flexion/ extension, abd/ add; elbow flexion/ extension, supination/ pronation and wrist flexion/ extension ( pt deferred wt use Left shoulder chest press due to increased discomfort)   Other Exercise Tool 2 pt able to take socks apart form matches while seated to prepare task for another patient   Cognition   Orientation Level Oriented X4   Additional Activities   Additional Activities Other (Comment)   Additional Activities Comments fxl mobility to and from  with RW and supervision   Other Comments   Assessment Pt participates in 75  minutes concurrent treatment focusing on BUE therex/ theract with similar goals as another to increase UB strength/ ROM   Assessment   Treatment Assessment Pt presents seated in w/c agreeable to OT Session including toileting, transfers/ mobility and BUE therex/ theract. Pt toelrates session without complaints and is progressing towards goals. Pt requires assistd ue to decreased balance, safety, endurance and strength/ ROM with spinal prec and c collar. Pt will benefit from conitnued skilled OT services to increase independence with daily tasks.   Problem List Decreased strength;Decreased range of motion;Decreased endurance;Impaired balance;Decreased safety awareness;Decreased skin integrity;Orthopedic restrictions;Pain  (spinal prec with c collar)   Plan   Treatment/Interventions ADL retraining;Functional transfer training;Therapeutic exercise;Endurance training;Patient/family training;Equipment eval/education;Cognitive reorientation;Compensatory technique education   Progress Progressing toward goals   OT Therapy Minutes   OT Time In 1249   OT Time Out 1415   OT Total Time (minutes) 86   OT Mode of treatment - Individual (minutes) 11   OT Mode of treatment - Concurrent (minutes) 75   Therapy Time missed   Time missed? No

## 2024-10-02 NOTE — PROGRESS NOTES
"   10/02/24 0900   Pain Assessment   Pain Assessment Tool 0-10   Pain Score 5   Pain Location/Orientation Orientation: Lower;Location: Neck   Restrictions/Precautions   Precautions Aspiration;Bed/chair alarms;Cognitive;Fall Risk;Pain;Spinal precautions;Supervision on toilet/commode   Weight Bearing Restrictions No   ROM Restrictions   (spinal precautions)   Braces or Orthoses C/S Collar   Cognition   Overall Cognitive Status Impaired   Arousal/Participation Alert;Responsive;Cooperative   Attention Attends with cues to redirect   Orientation Level Oriented X4   Memory Decreased short term memory;Decreased recall of recent events;Decreased recall of precautions   Following Commands Follows one step commands with increased time or repetition   Subjective   Subjective \"I enjoy moving around\"   Roll Left and Right   Comment Pt OOB   Sit to Lying   Comment Pt OOB   Lying to Sitting on Side of Bed   Comment Pt OOB   Sit to Stand   Type of Assistance Needed Supervision   Physical Assistance Level No physical assistance   Comment with RW   Sit to Stand CARE Score 4   Bed-Chair Transfer   Type of Assistance Needed Supervision   Physical Assistance Level No physical assistance   Comment with RW   Chair/Bed-to-Chair Transfer CARE Score 4   Car Transfer   Reason if not Attempted Environmental limitations   Car Transfer CARE Score 10   Walk 10 Feet   Comment Close supervision with RW on level surfaces   Walk 50 Feet with Two Turns   Comment Close supervision with RW on level surfaces   Walk 150 Feet   Reason if not Attempted Safety concerns   Walk 150 Feet CARE Score 88   Ambulation   Primary Mode of Locomotion Prior to Admission Walk   Distance Walked (feet) 136 ft  (136,126,38 ft)   Assist Device Roller Walker   Gait Pattern Antalgic;Inconsistant Vaishnavi;Slow Vaishnavi;Decreased foot clearance;Forward Flexion;Narrow FLORENTINO;Poor UE WB;Shuffle;Improper weight shift   Limitations Noted In Balance;Coordination;Device " Management;Endurance;Midline Orientation;Posture;Safety;Speed;Strength   Provided Assistance with: Balance;Direction   Walk Assist Level Close Supervision   Findings Close supervision with RW on level surfaces   Does the patient walk? 2. Yes   Wheel 50 Feet with Two Turns   Type of Assistance Needed Supervision;Verbal cues   Physical Assistance Level No physical assistance   Comment Supervision with cues for direction   Wheel 50 Feet with Two Turns CARE Score 4   Wheelchair mobility   Does the patient use a wheelchair? 1. Yes   Type of Wheelchair Used 1. Manual   Method Right upper extremity;Left upper extremity;Right lower extremity;Left lower extremity   Assistance Provided For Locking Brakes;Obstacles   Distance Level Surface (feet) 50 ft   Distance Wheeled 3% Grade 0 ft   Findings Supervision with cues for direction   Curb or Single Stair   Style negotiated Single stair   Type of Assistance Needed Incidental touching   Physical Assistance Level No physical assistance   Comment CG with charly HR, combination of non-reciprocal and reciprocal pattern   1 Step (Curb) CARE Score 4   4 Steps   Type of Assistance Needed Incidental touching   Physical Assistance Level No physical assistance   Comment CG with charly HR, combination of non-reciprocal and reciprocal pattern   4 Steps CARE Score 4   12 Steps   Reason if not Attempted Safety concerns   12 Steps CARE Score 88   Stairs   Type Stairs;Ramp   # of Steps 7   Weight Bearing Precautions WBAT   Assist Devices Bilateral Rail   Findings CG with charly HR, combination of non-reciprocal and reciprocal pattern for stair negotiation. CG with RW for ramp negotiation   Picking Up Object   Reason if not Attempted Safety concerns   Picking Up Object CARE Score 88   Toilet Transfer   Comment not needed during session   Therapeutic Interventions   Strengthening seated TE   Balance Gait and transfer training   Other ramp and stair negotiation   Assessment   Treatment Assessment Pt  agreeable to PT session this morning. pain 5/10 in base of neck throughout session. Supervision with RW for transfers. Close supervision with RW for ambulation on level surfaces for max distance of 136 ft. CG for stair negotiation with charly HR use and combination of reciprocal/non-reciprocal pattern. CG with RW for ramp negotiation. Seated TE for general LE strengthening. Gait and transfer training for increased balance, safety, and independence for functional mobility.  Pt appropriate for concurrent therapy to increase motivation and encouragment between patients with similar deficits while completing therapy session. Pt returned to room in WC with alarms on and all needs within reach.   Problem List Decreased strength;Decreased range of motion;Decreased endurance;Impaired balance;Decreased cognition;Decreased safety awareness;Decreased skin integrity;Orthopedic restrictions;Pain   Barriers to Discharge Inaccessible home environment;Decreased caregiver support   PT Barriers   Physical Impairment Decreased strength;Decreased range of motion;Decreased endurance;Impaired balance;Decreased mobility;Decreased safety awareness;Pain;Orthopedic restrictions   Functional Limitation Car transfers;Ramp negotiation;Stair negotiation;Standing;Transfers;Walking;Wheelchair management   Plan   Treatment/Interventions Functional transfer training;LE strengthening/ROM;Elevations;Therapeutic exercise;Endurance training;Cognitive reorientation;Bed mobility;Gait training   Progress Progressing toward goals   PT Therapy Minutes   PT Time In 0900   PT Time Out 1030   PT Total Time (minutes) 90   PT Mode of treatment - Individual (minutes) 0   PT Mode of treatment - Concurrent (minutes) 90   PT Mode of treatment - Group (minutes) 0   PT Mode of treatment - Co-treat (minutes) 0   PT Mode of Treatment - Total time(minutes) 90 minutes   PT Cumulative Minutes 1474   Therapy Time missed   Time missed? No

## 2024-10-02 NOTE — PROGRESS NOTES
10/02/24 1205   Pain Assessment   Pain Assessment Tool 0-10   Pain Score 3   Pain Location/Orientation Location: Neck   Restrictions/Precautions   Precautions Aspiration;Bed/chair alarms;Cognitive;Fall Risk;Pain;Supervision on toilet/commode;Spinal precautions   Weight Bearing Restrictions No   ROM Restrictions   (spinal prec)   Braces or Orthoses C/S Collar   Putting On/Taking Off Footwear   Type of Assistance Needed Supervision   Putting On/Taking Off Footwear CARE Score 4   Lying to Sitting on Side of Bed   Type of Assistance Needed Supervision   Lying to Sitting on Side of Bed CARE Score 4   Sit to Stand   Type of Assistance Needed Supervision   Sit to Stand CARE Score 4   Bed-Chair Transfer   Type of Assistance Needed Supervision   Chair/Bed-to-Chair Transfer CARE Score 4   Cognition   Orientation Level Oriented X4   Assessment   Treatment Assessment Pt assisted OOB for lunch and ST treatment. Completion of OTs ession planned afternoon.

## 2024-10-02 NOTE — CASE MANAGEMENT
Updated clinical review update sent to [facility] REP via [employer] or [employer], awaiting determination. CM met with the patient, reviewed team meeting information, including d/c date 10/11. Patient unhappy with this date , would like it to be sooner. CM made her aware home health aids need to be scheduled prior to discharging home.  Patient called her  Tomas, who then called this CM. He stated home  health aides are set up to start on 10/11, but they can be started earlier if patient discharges sooner. CM made Tomas aware CM will speak with rehab team, and decide if new discharge date is possible. CM will continue to follow.

## 2024-10-02 NOTE — PROGRESS NOTES
"  Progress Note - PMR   Name: Marsha Oliva 79 y.o. female I MRN: [id number]  Unit/Bed#: [facility] 219-01 I Date of Admission: 9/11/2024   Date of Service: 10/2/2024 I Hospital Day: 21     Assessment & Plan  Spinal cord compression (HCC)  Presented  to acute care on 9/4/24 with progressive neck pain and weakness.    She reports 4-6 weeks prior she sustained a fall off her bed, head hitting her nightstand.    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   MRI C spine showing displaced type 2 dens fracture with mild mass effect on cord, ALL and PLL injuries.    CTA head and neck negative for vascular injuries.    Patient seen by Neurosurgery and deemed an operative candidate.    Patient taken to the OR by Dr. Dewitt for a bilateral C1 and C3 lateral mass screw placement, bialteral C2 pedicle screw palcement, C1-C3 fusion.    Post operatively, placed in a Aspen C Collar at all times, except showering with Shonda collar.    Post op X-rays with stable alignment    Plan in ARC:  Begin ARC program with PT, OT, SLP  Monitor incision  Monitor pain  Monitor neuro exam  Follow-up with Neurosurgery in 2 weeks.  2 week post op visit completed with NSGY virtually - \"Will plan to follow-up with the pt as scheduled for her 6 week POV with repeat upright xrays\"  Oropharyngeal dysphagia  New issues  VFSS study completed on 9/10/24 and patient found to have mild oral and moderate phayngeal dysphagia.    Cleared for a pureed diet with honey thick liquids.  After discussion with staff may consider repeating swallow study after discussion with speech therapy as patient still having moist cough during meals.  Patient states at home she will take a bite or 2 at a time and eat her meals over very very long periods of time but with her direct supervision requirements at this time that is not possible.  SLP to follow  Continue aspiration precautions "   Recommendations at this time for a PEG tube placement.  This is due to her continued weight loss, limited p.o. intake.  At this time discussion with the patient and  continue and will follow-up with gastroenterology as an outpatient for consideration if she is agreeable.  Risks for aspiration pneumonia including the ultimate risk of death/hospitalization discussed with the patient today.  Acquired bilateral hammer toes  Keratosis of toe  May need shaving of this lesion  Consult placed to Podiatry  Atrial fibrillation, chronic (HCC)  Chronic A. Fib  Rate/rhythm controlled on digoxin 125 mcg every other day, Cardizem  mg daily  No AC due to prior several GI bleeds.  On aspirin 81 mg daily.  IM managing  B12 deficiency  Continue supplementation   IM managing  Chronic diastolic CHF (congestive heart failure) ([facility])  Wt Readings from Last 3 Encounters:   10/02/24 50.8 kg (111 lb 15.9 oz)   09/11/24 53.8 kg (118 lb 9.7 oz)   09/04/24 53.5 kg (118 lb)     Weight stable  Patient takes Demedex 20 mg daily PRN for weight gain  Monitor daily weights  IM managing  Depression with anxiety  Mood is stable  Continue Zoloft 100 mg daily  Essential hypertension  Continue Cardizem  mg Daily  Monitor BP and HR with rest and activity  IM managing  Hypothyroidism  Continue Synthroid 125 mcg daily  Mixed hyperlipidemia  Continue Atorvastain 20 mg daily  Folate deficiency  Continue supplementation   IM managing  Acute pain due to trauma  Located in neck  Continue Tylenol 975 mg TID  Oxycodone IR 2.5-5 mg Q 4 hours PRN for moderate-severe pain   Topical ICE  -improved has not required oxy since 9/13  Skin abnormalities  Wound Care team following in ARC:  POA several areas  Continue LWC:     Skin care plans:  1-Hydraguard to sacrum, buttocks and heels BID and PRN  2-EHOB cushion when out of bed in chair.  3-Moisturize skin daily with skin nourishing cream  4-Elevate heels to offload pressure.  5-Turn/reposition q2h for  pressure re-distribution on skin  6. Monitor right 2nd toe growth area - leave open to air podiatry consulted.  7. Right arm skin tear - cleanse with Normal saline then apply normlgel ag then top with adaptic then a ABD and keisha change every other day   8. Right and left breast folds - cleanse with soap and water pat dry dust lightly with nystatin powder bid and prn  Lymphedema  Pumps brought in by  - ok to use PRN as tolerated  At risk for altered bowel elimination  Continent  -at home takes daily miralax; rescheduled PRN to daily  At risk for altered urinary elimination  Continent   At risk for deep venous thrombosis  Continue Lovenox 40 mg daily - should not need at discharge  Encounter for rehabilitation  Functional deficits:  cervical collar on at all times, cervical spinal precautions, impaired mobility, self care   Continue current rehabilitation plan of care to maximize function.  Estimated discharge: Thursday 10/3/24 with home care estimated    Severe protein-calorie malnutrition (HCC)  Nutrition following    Malnutrition Findings:   Adult Malnutrition type: Acute illness  Adult Degree of Malnutrition: Other severe protein calorie malnutrition  Malnutrition Characteristics: Inadequate energy, Weight loss  360 Statement: Malnutrition related to inadequate energy intake as evidenced by 7#(6%) weight loss from 9/11/# to 9/18/# and <50% energy intake >5 days.    BMI Findings:  Body mass index is 19.84 kg/m².   Closed odontoid fracture with type II morphology, posterior displacement, and nonunion    Impaired mobility and activities of daily living  Patient was evaluated by the rehabilitation team MD and an appropriate candidate for acute inpatient rehabilitation program at this time.  The patient will tolerate 3 hours/day 5 to 7 days/week of intensive physical, occupational and speech therapy in order to obtain goals for community discharge  Due to the patient's functional Compared to their  baseline level of function in addition to their ongoing medical needs, the patient would benefit from daily supervision from a rehabilitation physician as well as rehabilitation nursing to implement and adjust the medical as well as functional plan of care in order to meet the patient's goals.    Medical necessity: The patient at this time would greatly benefit from having home health aides.  At this time her  has ongoing health conditions that would limit his ability to assist with her care.  She still needs assistance for many tasks most of which are basic mobility and ADL tasks.  She has difficulty with eating and requires level of supervision, assistance with transfers and mobility, medication management and management of the household.  Additionally will have ongoing medical needs as well as follow-ups with the specialists once she is discharged and may be considered for a PEG tube placement as an outpatient which would also require assistance.  From a medical and functional standpoint it is my professional opinion that the patient would greatly benefit from additional assistance with home health aides for the listed reasons above as well as and throughout this note.    Subjective   Marsha Oliva is a 79 y.o. female with HTN, A Fib, CHF, HLD, Hypothyroidism who presented to the [facility] on on 9/4/24 with progressive neck pain and weakness.  She reports 4-6 weeks prior she sustained a fall off her bed, head hitting her nightstand.  She has been having increasing neck pain since.  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 MRI C spine showing displaced type 2 dens fracture with mild mass effect on cord, ALL and PLL injuries.  CTA head and neck negative for vascular injuries.  Patient seen by Neurosurgery and deemed an operative candidate.  Patient taken to the OR by Dr. Dewitt for a  bilateral C1 and C3 lateral mass screw placement, bialteral C2 pedicle screw palcement, C1-C3 fusion.  Post operatively, placed in a Aspen C Collar at all times, except showering with Shonda collar.  Post op X-rays with stable alignment  Medical course complicated by acute dysphagia.  VFSS study completed on 9/10/24 and patient found to have mild oral and moderate phayngeal dysphagia.  Cleared for a pureed diet with honey thick liquids.  Patient seen by wound care team for abrasions and stage I pressure injury to sacrum.  Patient started on Tylenol and PRN oxycodone for acute pain.    After medical stabilization, patient found to have acute functional deficits from his baseline, therefore, admitted to [facility] for acute inpatient rehabilitation.    Chief Complaint: f/u SCI and f/u ambulatory dysfunction, dysphagia    Interval: Patient seen and evaluated in room.  Mood is good although she is asking when she can go home as her discharge date from the board had been erased.  We have talked about discharging next week however I doubt that the patient will stay past this Friday.  We did talk about her progress and I did discuss this with speech therapy that her eating has improved over the last few days with regards to the quality and the quantity.  Will continue to recommend that she follows up with gastroenterology as an outpatient if a PEG tube is needed in the future however she is moving in the right direction towards a more meaningful amount of nutrition.  No fever, chills, nausea, vomiting, cough or shortness of breath, diarrhea or constipation.    Objective :  Temp:  [96.5 °F (35.8 °C)-97.4 °F (36.3 °C)] 96.5 °F (35.8 °C)  HR:  [75-87] 75  BP: (100-113)/(62-64) 100/62  Resp:  [16-18] 16  SpO2:  [93 %] 93 %  O2 Device: None (Room air)    Functional Update:  Physical Therapy Occupational Therapy Speech Therapy   Weight Bearing Status: Full Weight Bearing  Transfers: Supervision  Bed Mobility:  Independent  Amulation Distance (ft): 165 feet  Ambulation: Incidental Touching  Assistive Device for Ambulation: Roller Walker  Wheelchair Mobility Distance: 25 ft  Wheelchair Mobility: Supervision  Number of Stairs: 7  Assistive Device for Stairs: Bilateral Hand Rails  Stair Assistance: Incidental Touching  Ramp: Incidental Touching  Assistive Device for Ramp: Roller Walker  Discharge Recommendations: Home with: (vs alternate placement)  DC Home with:: 24 Hour Assisteance, Family Support, First Floor Setup, Home Physical Therapy   Eating: Supervision  Grooming: Supervision  Bathing: Incidental Touching  Bathing: Incidental Touching  Upper Body Dressing: Supervision  Lower Body Dressing: Incidental Touching, Minimal Assistance  Toileting: Incidental Touching, Supervision  Tub/Shower Transfer: Minimal Assistance  Toilet Transfer: Supervision, Incidental Touching  Cognition: Exceptions to WNL  Cognition: Decreased Memory, Decreased Attention, Decreased Safety, Decreased Comprehension  Orientation: Person, Place, Time, Situation   Mode of Communication: Verbal  Cognition: Exceptions to WNL  Cognition: Decreased Memory, Decreased Executive Functions, Decreased Attention, Decreased Comprehension  Orientation: Person, Place, Time, Situation  Swallowing: Exceptions to WNL  Swallowing: Pharyngeal Dysphagia, Oral Dysphagia  Diet Recommendations: Level 1/Puree, Honey Thick  Discharge Recommendations: Home with:  DC Home with:: Family Support, Home Speech Therapy (close supervision 2' dysphagia severity and cognitive deficits)         Physical Exam  Vitals and nursing note reviewed.   Constitutional:       General: She is not in acute distress.     Appearance: She is well-developed.   HENT:      Head: Normocephalic and atraumatic.   Eyes:      Conjunctiva/sclera: Conjunctivae normal.   Neck:      Comments: Cervical collar in place  Cardiovascular:      Rate and Rhythm: Normal rate and regular rhythm.      Heart sounds: No  murmur heard.  Pulmonary:      Effort: Pulmonary effort is normal. No respiratory distress.      Breath sounds: Normal breath sounds.      Comments: No cough on exam today  Abdominal:      Palpations: Abdomen is soft.      Tenderness: There is no abdominal tenderness.   Musculoskeletal:      Right lower leg: No edema.      Left lower leg: No edema.   Skin:     General: Skin is warm and dry.      Capillary Refill: Capillary refill takes less than 2 seconds.      Comments: Incision not examined today   Neurological:      Mental Status: She is alert.   Psychiatric:         Mood and Affect: Mood normal.         Behavior: Behavior normal.           Scheduled Meds:  Current Facility-Administered Medications   Medication Dose Route Frequency Provider Last Rate    acetaminophen  975 mg Oral Q8H [facility] Sharmin Waggoner PA-C      Artificial Tears  2 drop Both Eyes Q3H PRN JAQUELINE Amos      aspirin  81 mg Oral Daily Corine Park MD      atorvastatin  20 mg Oral Daily With Dinner Sharmin Waggoner PA-C      bisacodyl  10 mg Rectal Daily PRN Sharmin Waggoner PA-C      bisacodyl  10 mg Rectal Once Corine Park MD      calcium carbonate  1,000 mg Oral Daily PRN Sharmin Waggoner PA-C      cyanocobalamin  1,000 mcg Oral Weekly Sharmin Waggoner PA-C      dextromethorphan-guaiFENesin  10 mL Oral Q4H PRN Tj Darling MD      digoxin  125 mcg Oral Every Other Day Sharmin Waggoner PA-C      diltiazem  180 mg Oral Daily Sharmin Waggoner PA-C      docusate sodium  100 mg Oral BID Corine Park MD      enoxaparin  40 mg Subcutaneous Q24H [facility] Corine Park MD      fluticasone  2 spray Each Nare Daily JAQUELINE Amos      folic acid  1,000 mcg Oral Daily Sharmin Waggoner PA-C      lactulose  20 g Oral Daily PRN Jermaine Greenberg DO      levothyroxine  125 mcg Oral Early Morning Sharmin Waggoner PA-C      magnesium gluconate  250 mg Oral  BID Sharmin Waggoner PA-C      ondansetron  4 mg Oral Q6H PRN Sharmin Waggoner PA-C      oxyCODONE  2.5 mg Oral Q4H PRN Sharmin Waggoner PA-C      Or    oxyCODONE  5 mg Oral Q4H PRN Sharmin Waggoner PA-C      phenol  1 spray Mouth/Throat Q2H PRN Sharmin Waggoner PA-C      senna  2 tablet Oral HS Corine Park MD      sertraline  100 mg Oral Daily Sharmin Waggoner PA-C      torsemide  20 mg Oral Daily PRN Sharmin Waggoner PA-C           Lab Results: I have reviewed the following results:  Results from last 7 days   Lab Units 09/30/24  0530   HEMOGLOBIN g/dL 12.0   HEMATOCRIT % 39.0   WBC Thousand/uL 3.91*   PLATELETS Thousands/uL 211     Results from last 7 days   Lab Units 09/30/24  0530   BUN mg/dL 13   SODIUM mmol/L 142   POTASSIUM mmol/L 3.9   CHLORIDE mmol/L 107   CREATININE mg/dL 0.60   AST U/L 21   ALT U/L 12                Jermaine Greenberg,   Physical Medicine and Rehabilitation  [facility]    I have spent a total time of 35 minutes in caring for this patient on the day of the visit/encounter including Counseling / Coordination of care, Documenting in the medical record, and Communicating with other healthcare professionals .

## 2024-10-02 NOTE — PLAN OF CARE
Problem: PAIN - ADULT  Goal: Verbalizes/displays adequate comfort level or baseline comfort level  Description: Interventions:  - Encourage patient to monitor pain and request assistance  - Assess pain using appropriate pain scale  - Administer analgesics based on type and severity of pain and evaluate response  - Implement non-pharmacological measures as appropriate and evaluate response  - Consider cultural and social influences on pain and pain management  - Notify physician/advanced practitioner if interventions unsuccessful or patient reports new pain  Outcome: Progressing     Problem: INFECTION - ADULT  Goal: Absence or prevention of progression during hospitalization  Description: INTERVENTIONS:  - Assess and monitor for signs and symptoms of infection  - Monitor lab/diagnostic results  - Monitor all insertion sites, i.e. indwelling lines, tubes, and drains  - Monitor endotracheal if appropriate and nasal secretions for changes in amount and color  - [place] appropriate cooling/warming therapies per order  - Administer medications as ordered  - Instruct and encourage patient and family to use good hand hygiene technique  - Identify and instruct in appropriate isolation precautions for identified infection/condition  Outcome: Progressing  Goal: Absence of fever/infection during neutropenic period  Description: INTERVENTIONS:  - Monitor WBC    Outcome: Progressing

## 2024-10-02 NOTE — ASSESSMENT & PLAN NOTE
Wt Readings from Last 3 Encounters:   10/02/24 50.8 kg (111 lb 15.9 oz)   09/11/24 53.8 kg (118 lb 9.7 oz)   09/04/24 53.5 kg (118 lb)     Weight stable  Patient takes Demedex 20 mg daily PRN for weight gain  Monitor daily weights  IM managing

## 2024-10-02 NOTE — PLAN OF CARE
Problem: PAIN - ADULT  Goal: Verbalizes/displays adequate comfort level or baseline comfort level  Description: Interventions:  - Encourage patient to monitor pain and request assistance  - Assess pain using appropriate pain scale  - Administer analgesics based on type and severity of pain and evaluate response  - Implement non-pharmacological measures as appropriate and evaluate response  - Consider cultural and social influences on pain and pain management  - Notify physician/advanced practitioner if interventions unsuccessful or patient reports new pain  Outcome: Progressing     Problem: INFECTION - ADULT  Goal: Absence or prevention of progression during hospitalization  Description: INTERVENTIONS:  - Assess and monitor for signs and symptoms of infection  - Monitor lab/diagnostic results  - Monitor all insertion sites, i.e. indwelling lines, tubes, and drains  - Monitor endotracheal if appropriate and nasal secretions for changes in amount and color  - Davis appropriate cooling/warming therapies per order  - Administer medications as ordered  - Instruct and encourage patient and family to use good hand hygiene technique  - Identify and instruct in appropriate isolation precautions for identified infection/condition  Outcome: Progressing

## 2024-10-02 NOTE — PROGRESS NOTES
"Progress Note - Marsha Oliva 79 y.o. female MRN: [id number]    Unit/Bed#: -01 Encounter: [id number]        Subjective:   Patient seen and examined at bedside after reviewing the chart and discussing the case with the caring staff.      Patient examined at bedside.  Patient reports no acute symptoms.  Her cough is still present but improving.     Physical Exam   Vitals: Blood pressure 100/62, pulse 75, temperature (!) 96.5 °F (35.8 °C), temperature source Temporal, resp. rate 16, height 5' 3\" (1.6 m), weight 50.8 kg (111 lb 15.9 oz), SpO2 93%.,Body mass index is 19.84 kg/m².  Constitutional: Patient in no acute distress.  HEENT: PERR, EOMI, MMM.  Cardiovascular: Normal rate and regular rhythm.    Pulmonary/Chest: Effort normal and breath sounds normal.   Abdomen: Soft, + BS, NT.    Assessment/Plan:  Marsha Oliva is a(n) 79 y.o. female with cervical spinal cord compression.     Cardiac hx w/ HTN, HLD, chronic diastolic heart failure, chronic Afib.  Continue digoxin 125 mcg every other day, diltiazem 180 mg daily, atorvastatin 40 mg daily.  Daily weights.  Torsemide 20 mg daily as needed.  Pt cleared to restart ASA 81 mg daily by neurosurgery 9/19.    Hypothyroidism.  Continue levothyroxine 125 mcg daily (decr from 137 mcg 9/6).  B12 deficiency.  Patient on B12 1000 mcg weekly.   Folate deficiency.  Patient on folic acid 1,000 mcg daily.   Hypomagnesemia.  Patient on magnesium gluconate 250 mg twice daily.   Depression and anxiety.   Patient on Zoloft 100 mg daily.  Declines neuropsych at this time.   Multiple hyperkeratotic lesions.  Patient follows with Podiatry outpatient.  Wound care consult.   Sore throat.  Chloraseptic throat spray as needed.   Malnutrition.  Dietician following.  Adult Malnutrition type: Acute illness.  Adult Degree of Malnutrition: Other severe protein calorie malnutrition. Malnutrition Characteristics: Inadequate energy, Weight loss.  Productive cough/dysphagia.  Patient high risk " for aspiration pneumonia.  CXR 9/23/24 without acute cardiopulmonary disease.    Cold sores involving the upper lip.  Resolving.  Cont to monitor.  Pain and bowel regimen.  As per physiatry.   Cervical spinal cord compression.  Vista collar at all times x 6 weeks.  Continue SQ Lovenox.  Patient receiving intensive PT OT ST as per physiatry.      Anticipated discharge date:   10/11/24    The patient was discussed with Dr. Brown and he is in agreement with the above note.

## 2024-10-03 PROCEDURE — 97110 THERAPEUTIC EXERCISES: CPT

## 2024-10-03 PROCEDURE — 97530 THERAPEUTIC ACTIVITIES: CPT

## 2024-10-03 PROCEDURE — 97116 GAIT TRAINING THERAPY: CPT

## 2024-10-03 PROCEDURE — 11721 DEBRIDE NAIL 6 OR MORE: CPT | Performed by: PODIATRIST

## 2024-10-03 PROCEDURE — 11056 PARNG/CUTG B9 HYPRKR LES 2-4: CPT | Performed by: PODIATRIST

## 2024-10-03 PROCEDURE — 0HBRXZZ EXCISION OF TOE NAIL, EXTERNAL APPROACH: ICD-10-PCS | Performed by: PODIATRIST

## 2024-10-03 PROCEDURE — 99221 1ST HOSP IP/OBS SF/LOW 40: CPT | Performed by: PODIATRIST

## 2024-10-03 PROCEDURE — 97542 WHEELCHAIR MNGMENT TRAINING: CPT

## 2024-10-03 PROCEDURE — 99232 SBSQ HOSP IP/OBS MODERATE 35: CPT | Performed by: STUDENT IN AN ORGANIZED HEALTH CARE EDUCATION/TRAINING PROGRAM

## 2024-10-03 PROCEDURE — 92526 ORAL FUNCTION THERAPY: CPT

## 2024-10-03 PROCEDURE — 92507 TX SP LANG VOICE COMM INDIV: CPT

## 2024-10-03 RX ADMIN — SENNOSIDES 17.2 MG: 8.6 TABLET, FILM COATED ORAL at 21:23

## 2024-10-03 RX ADMIN — Medication 250 MG: at 17:17

## 2024-10-03 RX ADMIN — ACETAMINOPHEN 975 MG: 325 TABLET ORAL at 15:15

## 2024-10-03 RX ADMIN — FLUTICASONE PROPIONATE 2 SPRAY: 50 SPRAY, METERED NASAL at 08:54

## 2024-10-03 RX ADMIN — FOLIC ACID 1000 MCG: 1 TABLET ORAL at 08:55

## 2024-10-03 RX ADMIN — Medication 250 MG: at 08:55

## 2024-10-03 RX ADMIN — LEVOTHYROXINE SODIUM 125 MCG: 25 TABLET ORAL at 05:46

## 2024-10-03 RX ADMIN — DOCUSATE SODIUM 100 MG: 100 CAPSULE, LIQUID FILLED ORAL at 17:17

## 2024-10-03 RX ADMIN — DOCUSATE SODIUM 100 MG: 100 CAPSULE, LIQUID FILLED ORAL at 08:55

## 2024-10-03 RX ADMIN — DIGOXIN 125 MCG: 125 TABLET ORAL at 08:55

## 2024-10-03 RX ADMIN — ASPIRIN 81 MG: 81 TABLET, COATED ORAL at 08:55

## 2024-10-03 RX ADMIN — ATORVASTATIN CALCIUM 20 MG: 20 TABLET, FILM COATED ORAL at 17:17

## 2024-10-03 RX ADMIN — ENOXAPARIN SODIUM 40 MG: 40 INJECTION SUBCUTANEOUS at 08:55

## 2024-10-03 RX ADMIN — DILTIAZEM HYDROCHLORIDE 180 MG: 180 CAPSULE, COATED, EXTENDED RELEASE ORAL at 08:55

## 2024-10-03 RX ADMIN — ACETAMINOPHEN 975 MG: 325 TABLET ORAL at 05:46

## 2024-10-03 RX ADMIN — ACETAMINOPHEN 975 MG: 325 TABLET ORAL at 21:23

## 2024-10-03 RX ADMIN — SERTRALINE 100 MG: 100 TABLET, FILM COATED ORAL at 08:55

## 2024-10-03 NOTE — PROGRESS NOTES
10/02/24 1200   Pain Assessment   Pain Assessment Tool 0-10   Pain Score 3   Pain Location/Orientation Location: Neck;Location: Shoulder;Orientation: Bilateral   Restrictions/Precautions   Precautions Aspiration;Bed/chair alarms;Cognitive;Fall Risk;Pain;Supervision on toilet/commode;Spinal precautions   Cognitive Linguisitic Assessments   Cognitive Linquistic Quick Test (CLQT) MoCA completed 9/16/24 scored 16/30   Comprehension   Comprehension (FIM) 5 - Needs help/cues, repetition only RARELY ( < 10% of the time)   Expression   Expression (FIM) 5 - Needs help/cues only RARELY (< 10% of the time)   Social Interaction   Social Interaction (FIM) 5 - Interacts appropriately with others 90% of time   Problem Solving   Problem solving (FIM) 5 - Solves basic problems 90% of time   Memory   Memory (FIM) 4 - Recognizes/recalls/performs 75-89%   Speech/Language/Cognition Assessmetn   Treatment Assessment Recalled 3/4 swallow strategies increased to 4/4 with visual cue reminder. Utilizing strategies across meal tray 90% of the time. Case d/w RN, patient demonstrating strategies well across meal trays with continued better PO intake overall. No verbal cueing for problem solving during meal tray and has only required set up/clean up with extra time for meal completion.   Recommendations   Risk for Aspiration Present   Recommendations Dysphagia treatment;Consider Video/Barium Swallow Study  (initial VBS 9/24/24 ( recommend repeat VBS in ~6 weeks from initial VBS. Consider waiting until c-collar removed, next neurosurgery appointment 10/24/24).)      Recommend to consider additional means of nutrition/hydration.    Diet Solid Recommendation Level 1 Dysphagia/pureed   Diet Liquid Recommendation Honey thick liquid   Recommended Form of Meds Crushed;With puree   General Precautions Aspiration precautions;Upright as possible for all oral intake;Remain upright for 45 mins after meals   Compensatory Swallowing Strategies Alternate  solids and liquids;No straws  (slow rate, small bites and effortful swallow)   Results Reviewed with PT/Family/Caregiver;MD;RN   Eating   Type of Assistance Needed Set-up / clean-up   Physical Assistance Level No physical assistance   Eating CARE Score 5   Swallow Assessment   Swallow Treatment Assessment Assessed with meal tray of mashed potatoes and 4oz thin liquids via spouted 2 handled cup as well as straw sips. She only ordered mashed potatoes but did consume 100% of this item across 30 minute session. There was a occasional dry cough with puree and HTL spouted cup; however, with straw sips there was increased cough with return of material into a napkin as well as variable wet vocal quality. She no longer requires 1:1 supervision with meals   SLP Therapy Minutes   SLP Time In 1215   SLP Time Out 1245   SLP Total Time (minutes) 30   SLP Mode of treatment - Individual (minutes) 30   SLP Mode of treatment - Concurrent (minutes) 0   SLP Mode of treatment - Group (minutes) 0   SLP Mode of treatment - Co-treat (minutes) 0   SLP Mode of Treatment - Total time(minutes) 30 minutes   SLP Cumulative Minutes 622

## 2024-10-03 NOTE — PROGRESS NOTES
"Progress Note - Marsha Oliva 79 y.o. female MRN: [id number]    Unit/Bed#: -01 Encounter: [id number]        Subjective:   Patient seen and examined at bedside after reviewing the chart and discussing the case with the caring staff.      Patient examined at bedside.  Patient reports no acute symptoms.      Physical Exam   Vitals: Blood pressure 154/89, pulse (!) 110, temperature 97.8 °F (36.6 °C), temperature source Temporal, resp. rate 16, height 5' 3\" (1.6 m), weight 52 kg (114 lb 10.2 oz), SpO2 95%.,Body mass index is 20.31 kg/m².  Constitutional: Patient in no acute distress.  HEENT: PERR, EOMI, MMM.  Cardiovascular: Normal rate and regular rhythm.    Pulmonary/Chest: Effort normal and breath sounds normal.   Abdomen: Soft, + BS, NT.    Assessment/Plan:  Marsha Oliva is a(n) 79 y.o. female with cervical spinal cord compression.     Cardiac hx w/ HTN, HLD, chronic diastolic heart failure, chronic Afib.  Continue digoxin 125 mcg every other day, diltiazem 180 mg daily, atorvastatin 40 mg daily.  Daily weights.  Torsemide 20 mg daily as needed.  Pt cleared to restart ASA 81 mg daily by neurosurgery 9/19.    Hypothyroidism.  Continue levothyroxine 125 mcg daily (decr from 137 mcg 9/6).  B12 deficiency.  Patient on B12 1000 mcg weekly.   Folate deficiency.  Patient on folic acid 1,000 mcg daily.   Hypomagnesemia.  Patient on magnesium gluconate 250 mg twice daily.   Depression and anxiety.   Patient on Zoloft 100 mg daily.  Declines neuropsych at this time.   Multiple hyperkeratotic lesions.  Patient follows with Podiatry outpatient.  Wound care consult.  Podiatry saw patient 10/3.  Sore throat.  Chloraseptic throat spray as needed.   Malnutrition.  Dietician following.  Adult Malnutrition type: Acute illness.  Adult Degree of Malnutrition: Other severe protein calorie malnutrition. Malnutrition Characteristics: Inadequate energy, Weight loss.  Productive cough/dysphagia.  Patient high risk for aspiration " pneumonia.  CXR 9/23/24 without acute cardiopulmonary disease.    Cold sores involving the upper lip.  Resolving.  Cont to monitor.  Pain and bowel regimen.  As per physiatry.   Cervical spinal cord compression.  Vista collar at all times x 6 weeks.  Continue SQ Lovenox.  Patient receiving intensive PT OT ST as per physiatry.      Anticipated discharge date:   10/7/24    The patient was discussed with Dr. Brown and he is in agreement with the above note.

## 2024-10-03 NOTE — CONSULTS
[facility] Podiatry - Consultation    Patient Information:   Marsha Oliva 79 y.o. female MRN: [id number]  Unit/Bed#: -01 Encounter: [id number]  PCP: Rebecca Solis  Date of Admission:  9/11/2024  Date of Consultation: 10/03/24  Requesting Physician: Justus Brown MD      ASSESSMENT:    Marsha Oliva is a 79 y.o. female with:    Painful thick nails on both feet  Painful corns on both feet  Peripheral vascular disease    PLAN:    Debride mycotic nails and thin the nail plates x 6 with the use of a nail nipper manually and a Dremel bur was used to reduce the bulk of the nail plate and smoothed the distal edges.  Debride HD from the plantar aspect of the second PIPJ on the right foot.  Debride the hypertrophic tissue from the distal aspect of the third toe left foot and removed the central IPK also.  Patient is to continue padding the second toe on the left foot with gauze.  Rest of care per primary team.      Weight Bearing Status: FWB    SUBJECTIVE    History of Present Illness:    Marsha Oliva is a 79 y.o. female with past medical history of chronic A-fib, heart failure and lateral cord compression who presents with with chief complaint of painful thick nails on both feet and painful corns also on both feet.  She states she has been using gauze in between the toes to help cushion it.  She was seen today in her wheelchair with staff present.     Review of Systems:    Constitutional: Negative.    HENT: Negative.    Eyes: Negative.    Respiratory: Negative.    Cardiovascular: Negative.    Gastrointestinal: Negative.    Neurological: Intact  Psych: Negative.     Past Medical and Surgical History:     Past Medical History:   Diagnosis Date    Disease of thyroid gland     Hypotension     Supratherapeutic INR 6/2/2022       Past Surgical History:   Procedure Laterality Date    CERVICAL FUSION Bilateral 9/5/2024    Procedure: navigated posterior fusion C1-C3;  Surgeon: Eduardo Dewitt MD;   "Location: [facility];  Service: Neurosurgery    HYSTERECTOMY         Meds/Allergies:      Medications Prior to Admission:     acetaminophen (TYLENOL) 325 mg tablet    atorvastatin (LIPITOR) 20 mg tablet    atorvastatin (LIPITOR) 40 mg tablet    bisacodyl (DULCOLAX) 10 mg suppository    calcium carbonate (TUMS) 500 mg chewable tablet    chlorhexidine (HIBICLENS) 4 % external liquid    cyanocobalamin (VITAMIN B-12) 1000 MCG tablet    cyanocobalamin 1,000 mcg/mL    digoxin (LANOXIN) 0.125 mg tablet    diltiazem (CARDIZEM CD) 180 mg 24 hr capsule    docusate (COLACE) 50 mg/5 mL liquid    folic acid (FOLVITE) 1 mg tablet    levothyroxine 137 mcg tablet    Magnesium Gluconate 550 MG TABS    oxygen gas    potassium chloride (K-DUR,KLOR-CON) 20 mEq tablet    Sennosides (senna) 8.8 mg/5 mL oral syrup    sertraline (ZOLOFT) 100 mg tablet    torsemide (DEMADEX) 20 mg tablet    No Known Allergies    Social History:     Marital Status: /Civil Union    Substance Use History:   Social History     Substance and Sexual Activity   Alcohol Use Never    Comment: occassional.     Social History     Tobacco Use   Smoking Status Former    Types: Cigarettes   Smokeless Tobacco Never     Social History     Substance and Sexual Activity   Drug Use Never       Family History:    History reviewed. No pertinent family history.      OBJECTIVE:    Vitals:   Blood Pressure: 154/89 (10/03/24 0658)  Pulse: (!) 110 (10/03/24 0658)  Temperature: 97.8 °F (36.6 °C) (10/03/24 0658)  Temp Source: Temporal (10/03/24 0658)  Respirations: 16 (10/03/24 0658)  Height: 5' 3\" (160 cm) (09/11/24 1559)  Weight - Scale: 52 kg (114 lb 10.2 oz) (10/03/24 0554)  SpO2: 95 % (10/03/24 0658)    Physical Exam  Cardiovascular:      Pulses:           Dorsalis pedis pulses are 0 on the right side and 0 on the left side.        Posterior tibial pulses are 0 on the right side and 0 on the left side.   Musculoskeletal:         General: Deformity present.      Right foot: " Decreased range of motion. Deformity and bunion present.      Left foot: Decreased range of motion. Deformity present.   Feet:      Right foot:      Skin integrity: Callus and dry skin present.      Toenail Condition: Right toenails are abnormally thick and long. Fungal disease present.     Left foot:      Skin integrity: Callus and dry skin present.      Toenail Condition: Left toenails are abnormally thick and long. Fungal disease present.  Skin:     General: Skin is dry.      Capillary Refill: Capillary refill takes more than 3 seconds.      Coloration: Skin is pale.   Neurological:      Sensory: No sensory deficit.     :     General Appearance: Alert, cooperative, no distress.  HEENT: Head normocephalic, atraumatic, without obvious abnormality.  Heart: Normal rate and rhythm.  Lungs: Non-labored breathing. No respiratory distress.  Abdomen: Without distension.  Psychiatric: AAOx3  Lower Extremity:  Vascular:   DP/PT pedal pulses on the left are absent. DP/PT pedal pulses on the right are absent. CRT brisk at the digits. Pedal hair is absent. trace edema noted at bilateral lower extremities. Skin temperature is abnormal cool  bilaterally.    Musculoskeletal:  MMT is 4/5 in all muscle compartments bilaterally. Passive ROM at the 1st MPJ and ankle joint are Normal bilaterally with the leg extended. Active ROM at the lesser digits is diminished. No Pain on palpation of the MPJs and PIPJ's.  Hammertoe deformities are present on digits 2 through 5 bilaterally.  They are a fixed deformity with little to no motion at the PIPJ.  The right foot hallux a hallux valgus deformity under lapping the second digit.    Dermatological:  Skin is of normal appearance, cooler temperature distally, and decreased  turgor with no open lesions or ulcerations noted.  There was hypertrophic tissue present on the plantar aspect of the second PIPJ on the right foot secondary to constant pressure.  There is dried blood present in the area no  "signs of any infection and minimal to no pain with debridement.  Hypertrophic tissue was also present on the distal aspect of the third toe left foot with a central IPK and a small amount of dried blood present within the tissue.  No trophic changes are noted on underneath the hypertrophic tissue on the left foot.  The skin has a mottled appearance with either dependency or elevation.  Nails are brittle elongated hypertrophic white-yellow discoloration with subungual debris x 6.  There is an increased thickness and the nails are approximately 2 mm.    Neurological:  Gross sensation is intact. Protective sensation is intact. Patient Denies numbness and/or paresthesias.        Additional Data:     Lab Results: I have personally reviewed pertinent labs including:    Results from last 7 days   Lab Units 09/30/24  0530   WBC Thousand/uL 3.91*   HEMOGLOBIN g/dL 12.0   HEMATOCRIT % 39.0   PLATELETS Thousands/uL 211   SEGS PCT % 74   LYMPHO PCT % 16   MONO PCT % 7   EOS PCT % 2     Results from last 7 days   Lab Units 09/30/24  0530   POTASSIUM mmol/L 3.9   CHLORIDE mmol/L 107   CO2 mmol/L 29   BUN mg/dL 13   CREATININE mg/dL 0.60   CALCIUM mg/dL 8.8   ALK PHOS U/L 73   ALT U/L 12   AST U/L 21                   .  EKG, Pathology, and Other Studies: I have personally reviewed pertinent reports.        ** Please Note: Portions of the record may have been created with voice recognition software. Occasional wrong word or \"sound a like\" substitutions may have occurred due to the inherent limitations of voice recognition software. Read the chart carefully and recognize, using context, where substitutions have occurred. **   "

## 2024-10-03 NOTE — NURSING NOTE
Patient is awake and alert. Participated in therapy sessions and tolerated same well.  Offering no complaints of pain or discomfort. Safety maintained. Call bell in reach at bedside.

## 2024-10-03 NOTE — PROGRESS NOTES
10/03/24 1103   Pain Assessment   Pain Assessment Tool 0-10   Pain Score 4   Pain Location/Orientation Orientation: Bilateral;Location: Shoulder   Restrictions/Precautions   Precautions Aspiration;Bed/chair alarms;Fall Risk;Cognitive;Pain;Supervision on toilet/commode;Spinal precautions   Weight Bearing Restrictions No   ROM Restrictions   (spinal prec with c collar)   Braces or Orthoses C/S Collar   Oral Hygiene   Type of Assistance Needed Set-up / clean-up   Oral Hygiene CARE Score 5   Grooming   Able To Initiate Tasks;Comb/Brush Hair;Wash/Dry Face;Brush/Clean Teeth;Wash/Dry Hands   Limitation Noted In Coordination;Safety;Strength   Findings setup w/c level at the sink   Shower/Bathe Self   Comment pt declines bathing   Upper Body Dressing   Comment pt declines changing UB clothing   Lower Body Dressing   Type of Assistance Needed Supervision;Verbal cues   Comment cues for use of reacher for LB dressing while on toilet   Lower Body Dressing CARE Score 4   Putting On/Taking Off Footwear   Type of Assistance Needed Supervision   Putting On/Taking Off Footwear CARE Score 4   Dressing/Undressing Clothing   Remove LB Clothes Pants;Undergarment;Shoes   Don LB Clothes Pants;Undergarment;Shoes   Limitations Noted In Balance;Endurance;Problem Solving;Safety;Strength;ROM  (spinal prec with c collar)   Adaptive Equipment Reacher   Positioning Supported Sit;Standing   Sit to Stand   Type of Assistance Needed Supervision   Sit to Stand CARE Score 4   Bed-Chair Transfer   Type of Assistance Needed Supervision;Verbal cues   Comment cues for turning completely and reaching back for chair during pivot transfer   Chair/Bed-to-Chair Transfer CARE Score 4   Toileting Hygiene   Type of Assistance Needed Supervision   Toileting Hygiene CARE Score 4   Toileting   Able to Pull Clothing down yes, up yes.   Manage Hygiene Bladder   Limitations Noted In Balance;Problem Solving;ROM;Safety;LE Strength  (spinal prec with c collar)    Adaptive Equipment Grab Bar   Toilet Transfer   Type of Assistance Needed Supervision   Toilet Transfer CARE Score 4   Toilet Transfer   Surface Assessed Raised Toilet   Transfer Technique Standard   Limitations Noted In Balance;Problem Solving;ROM;Safety;LE Strength  (spinal prec with c collar)   Adaptive Equipment Grab Bar;Walker   Exercise Tools   Other Exercise Tool 1 3 hands of card game TONG reaching with BUE while seated   Cognition   Orientation Level Oriented X4   Additional Activities   Additional Activities Other (Comment)   Additional Activities Comments fxl mobility to and from BR with RW and supervision   Other Comments   Assessment Pt reports having all DME in place at home   Assessment   Treatment Assessment Pt presents seated in w/c agreeable to toielting, grooming, changing LB clothing and BUE therex/ theract during card agme along with related transfers/ mobility. Pt tolerates session with cues for safety during pivot transfers and use of LB A/E for LB dressing due to decreased balance, safety, endurance and strength/ ROM with spinal prec with c collar. Pt will benefit from continued skilled OT services to increase independence with daily tasks.   Problem List Decreased strength;Decreased range of motion;Impaired balance;Decreased endurance;Decreased cognition;Decreased safety awareness;Orthopedic restrictions;Pain  (spinal prec with c collar)   Plan   Treatment/Interventions ADL retraining;Functional transfer training;Therapeutic exercise;Endurance training;Patient/family training;Equipment eval/education;Compensatory technique education;Cognitive reorientation   Progress Progressing toward goals   OT Therapy Minutes   OT Time In 1103   OT Time Out 1200   OT Total Time (minutes) 57   OT Mode of treatment - Individual (minutes) 57   Therapy Time missed   Time missed? No

## 2024-10-03 NOTE — PROGRESS NOTES
10/03/24 1015   Pain Assessment   Pain Assessment Tool 0-10   Pain Score No Pain   Restrictions/Precautions   Precautions Aspiration;Bed/chair alarms;Fall Risk;Cognitive;Pain;Supervision on toilet/commode;Spinal precautions   Weight Bearing Restrictions No   ROM Restrictions   (spinal precautions)   Braces or Orthoses C/S Collar   Cognition   Overall Cognitive Status Impaired   Arousal/Participation Alert;Responsive;Cooperative   Attention Attends with cues to redirect   Orientation Level Oriented X4   Memory Decreased short term memory;Decreased recall of recent events;Decreased recall of precautions   Following Commands Follows one step commands with increased time or repetition   Subjective   Subjective Pt reports fatigue   Roll Left and Right   Comment Pt OOB   Sit to Lying   Comment Pt OOB   Lying to Sitting on Side of Bed   Comment Pt OBB   Sit to Stand   Comment Pt remained in WC to complete seated TE   Bed-Chair Transfer   Comment Pt remained in WC to complete seated TE   Car Transfer   Reason if not Attempted Environmental limitations   Car Transfer CARE Score 10   Walk 10 Feet   Comment Pt remained in WC to complete seated TE   Walk 50 Feet with Two Turns   Comment Pt remained in WC to complete seated TE   Walk 150 Feet   Comment Pt remained in WC to complete seated TE   Walking 10 Feet on Uneven Surfaces   Comment Pt remained in WC to complete seated TE   Ambulation   Findings Pt remained in WC to complete seated TE   Does the patient walk? 2. Yes   Wheelchair mobility   Does the patient use a wheelchair? 1. Yes   Type of Wheelchair Used 1. Manual   Curb or Single Stair   Comment Pt remained in WC to complete seated TE   4 Steps   Comment Pt remained in WC to complete seated TE   12 Steps   Comment Pt remained in WC to complete seated TE   Toilet Transfer   Comment not needed during session   Therapeutic Interventions   Strengthening Seated TE   Assessment   Treatment Assessment Pt agreeable to PT  session this morning. Brief session focused on TE for general LE strengthening. Pt reported fatigue at beginning of session and requested to return to bed, but agreed to stay in WC for seated exercises. Pt remained in WC following session with alarms on and all needs within reach.   Problem List Decreased strength;Decreased range of motion;Decreased endurance;Impaired balance;Decreased mobility;Decreased cognition;Impaired judgement;Decreased safety awareness;Orthopedic restrictions;Pain   Barriers to Discharge Inaccessible home environment;Decreased caregiver support   PT Barriers   Physical Impairment Decreased strength;Decreased range of motion;Decreased endurance;Impaired balance;Decreased mobility;Decreased safety awareness;Pain;Orthopedic restrictions   Functional Limitation Car transfers;Ramp negotiation;Stair negotiation;Standing;Transfers;Walking;Wheelchair management   Plan   Treatment/Interventions Functional transfer training;LE strengthening/ROM;Elevations;Therapeutic exercise;Endurance training;Bed mobility;Gait training   Progress Progressing toward goals   PT Therapy Minutes   PT Time In 1015   PT Time Out 1040   PT Total Time (minutes) 25   PT Mode of treatment - Individual (minutes) 25   PT Mode of treatment - Concurrent (minutes) 0   PT Mode of treatment - Group (minutes) 0   PT Mode of treatment - Co-treat (minutes) 0   PT Mode of Treatment - Total time(minutes) 25 minutes   PT Cumulative Minutes 1594   Therapy Time missed   Time missed? No

## 2024-10-03 NOTE — PROGRESS NOTES
"   10/03/24 0650   Pain Assessment   Pain Assessment Tool 0-10   Pain Score 2   Pain Location/Orientation Orientation: Lower;Location: Neck   Restrictions/Precautions   Precautions Aspiration;Bed/chair alarms;Cognitive;Fall Risk;Pain;Supervision on toilet/commode;Spinal precautions   Weight Bearing Restrictions No   ROM Restrictions   (spinal precautions)   Braces or Orthoses C/S Collar   Cognition   Overall Cognitive Status Impaired   Arousal/Participation Alert;Responsive;Cooperative   Attention Attends with cues to redirect   Orientation Level Oriented X4   Memory Decreased short term memory;Decreased recall of recent events;Decreased recall of precautions   Following Commands Follows one step commands with increased time or repetition   Subjective   Subjective \" I feel good today\"   Roll Left and Right   Type of Assistance Needed Independent   Physical Assistance Level No physical assistance   Roll Left and Right CARE Score 6   Sit to Lying   Comment Pt remained OOB   Lying to Sitting on Side of Bed   Type of Assistance Needed Independent   Physical Assistance Level No physical assistance   Lying to Sitting on Side of Bed CARE Score 6   Sit to Stand   Type of Assistance Needed Supervision   Physical Assistance Level No physical assistance   Comment with RW   Sit to Stand CARE Score 4   Bed-Chair Transfer   Type of Assistance Needed Supervision   Physical Assistance Level No physical assistance   Comment with RW   Chair/Bed-to-Chair Transfer CARE Score 4   Car Transfer   Reason if not Attempted Environmental limitations   Car Transfer CARE Score 10   Walk 10 Feet   Type of Assistance Needed Supervision   Physical Assistance Level No physical assistance   Comment Close supervision with RW on level and unlevel surfaces   Walk 10 Feet CARE Score 4   Walk 50 Feet with Two Turns   Type of Assistance Needed Supervision   Physical Assistance Level No physical assistance   Comment Close supervision with RW on level and " unlevel surfaces   Walk 50 Feet with Two Turns CARE Score 4   Walk 150 Feet   Reason if not Attempted Safety concerns   Walk 150 Feet CARE Score 88   Walking 10 Feet on Uneven Surfaces   Type of Assistance Needed Supervision   Physical Assistance Level No physical assistance   Comment Close supervision with RW on level and unlevel surfaces   Walking 10 Feet on Uneven Surfaces CARE Score 4   Ambulation   Primary Mode of Locomotion Prior to Admission Walk   Distance Walked (feet) 111 ft  (92, 67, 98, 111 ft)   Assist Device Roller Walker   Gait Pattern Inconsistant Vaishnavi;Slow Vaishnavi;Decreased foot clearance;Forward Flexion;Narrow FLORENTINO;Step to;Improper weight shift   Limitations Noted In Balance;Coordination;Device Management;Endurance;Posture;Safety;Speed;Strength   Provided Assistance with: Balance;Direction   Walk Assist Level Close Supervision   Findings Close supervision with RW on level and unlevel surfaces   Does the patient walk? 2. Yes   Wheel 50 Feet with Two Turns   Type of Assistance Needed Supervision;Verbal cues   Physical Assistance Level No physical assistance   Comment Supervision with cues for direction, UE fatigue noted   Wheel 50 Feet with Two Turns CARE Score 4   Wheel 150 Feet   Type of Assistance Needed Supervision;Verbal cues   Physical Assistance Level No physical assistance   Comment Supervision with cues for direction, UE fatigue noted   Wheel 150 Feet CARE Score 4   Wheelchair mobility   Does the patient use a wheelchair? 1. Yes   Type of Wheelchair Used 1. Manual   Method Right upper extremity;Left upper extremity;Right lower extremity;Left lower extremity   Assistance Provided For Obstacles;Locking Brakes   Distance Level Surface (feet) 220 ft   Distance Wheeled 3% Grade 12 ft   Findings Supervision with cues for direction, UE fatigue noted   Curb or Single Stair   Style negotiated Single stair   Type of Assistance Needed Incidental touching   Physical Assistance Level No physical  assistance   Comment CG with charly HR use. Reciprocal pattern for 3 steps up 4 down and then non-reciprocal pattern for 4 steps up and 3 down.   1 Step (Curb) CARE Score 4   4 Steps   Type of Assistance Needed Incidental touching   Physical Assistance Level No physical assistance   Comment CG with charly HR use. Reciprocal pattern for 3 steps up 4 down and then non-reciprocal pattern for 4 steps up and 3 down for stair negotiation. CG with RW for ramp negotiation.   4 Steps CARE Score 4   12 Steps   Reason if not Attempted Safety concerns   12 Steps CARE Score 88   Stairs   Type Stairs;Ramp   # of Steps 7   Weight Bearing Precautions WBAT   Assist Devices Bilateral Rail   Findings CG with charly HR use. Reciprocal pattern for 3 steps up 4 down and then non-reciprocal pattern for 4 steps up and 3 down. CG with RW on ramp.   Picking Up Object   Reason if not Attempted Safety concerns   Picking Up Object CARE Score 88   Toilet Transfer   Comment Not needed during session   Therapeutic Interventions   Strengthening supine TE   Flexibility gastroc and hamstring stretching   Balance Gait and transfer training   Other Stair and ramp negotiation,  mobility   Assessment   Treatment Assessment Pt agreeable to PT session this morning. Pain 2/10 reported in lower neck throughout session. Pt is independent for bed mobility. Supervision with RW for transfers. Close supervision with RW for ambulation on level and unlevel surfaces for max distance of 111 ft. Supervision for WC mobility with cues for direction. CG for stair negotiation with charly HR use and combination of reciprocal and non reciprocal pattern. CG with RW on ramp negotiation. Supine TE for general LE strengthening. Gastroc and hamstring stretching for improved ROM with functional mobility. Gait and transfer training for increased balance, safety, and independence with functional mobility. Pt returned to room in  with alarms on and all needs within reach.   Problem List  Decreased strength;Decreased range of motion;Decreased endurance;Impaired balance;Decreased cognition;Decreased safety awareness;Decreased skin integrity;Orthopedic restrictions;Pain   Barriers to Discharge Inaccessible home environment;Decreased caregiver support   PT Barriers   Physical Impairment Decreased strength;Decreased range of motion;Decreased endurance;Impaired balance;Decreased mobility;Decreased safety awareness;Pain;Orthopedic restrictions   Functional Limitation Car transfers;Ramp negotiation;Stair negotiation;Standing;Transfers;Walking;Wheelchair management   Plan   Treatment/Interventions Functional transfer training;LE strengthening/ROM;Elevations;Therapeutic exercise;Endurance training;Bed mobility;Gait training   Progress Progressing toward goals   PT Therapy Minutes   PT Time In 0650   PT Time Out 0825   PT Total Time (minutes) 95   PT Mode of treatment - Individual (minutes) 95   PT Mode of treatment - Concurrent (minutes) 0   PT Mode of treatment - Group (minutes) 0   PT Mode of treatment - Co-treat (minutes) 0   PT Mode of Treatment - Total time(minutes) 95 minutes   PT Cumulative Minutes 1569   Therapy Time missed   Time missed? No

## 2024-10-03 NOTE — PROGRESS NOTES
10/03/24 1320   Pain Assessment   Pain Assessment Tool 0-10   Pain Score No Pain   Restrictions/Precautions   Precautions Aspiration;Bed/chair alarms;Fall Risk;Cognitive;Pain;Supervision on toilet/commode;Spinal precautions   Braces or Orthoses C/S Collar   Exercise Tools   Hand Gripper 5# digi x 30 B hands   Other Exercise Tool 1 1# wt therex 2 sets 15 BUE shoulder chest press, flexion/ extension, abd/ add; elbow flexion/ extension, supination/ pronation and wrist flexion/ extension   Cognition   Overall Cognitive Status Impaired   Arousal/Participation Alert;Cooperative   Attention Attends with cues to redirect   Orientation Level Oriented X4   Memory Decreased short term memory;Decreased recall of recent events;Decreased recall of precautions   Following Commands Follows one step commands with increased time or repetition   Activity Tolerance   Activity Tolerance Patient limited by fatigue   Assessment   Treatment Assessment Pt brief session addressed TE for generalized strength and endurance as noted in respective sections of note. Pt faitgued upon entry to room and just getting comfortable in bed. OT had pt remain in supine for TE. Pt barriers include decreased strength and endurance, cognitive deficits, impaired balance, cervical precautions. Plan is to continue with skilled OT to further address established OT goals as per POC.   Prognosis Good   Problem List Decreased strength;Decreased range of motion;Decreased endurance;Impaired balance;Decreased mobility;Decreased cognition;Impaired judgement;Decreased safety awareness;Orthopedic restrictions;Pain   Plan   Treatment/Interventions Therapeutic exercise;Endurance training;Cognitive reorientation;Patient/family training;Spoke to nursing;OT   Progress Progressing toward goals   OT Therapy Minutes   OT Time In 1320   OT Time Out 1403   OT Total Time (minutes) 43   OT Mode of treatment - Individual (minutes) 43   OT Mode of treatment - Concurrent (minutes) 0    OT Mode of treatment - Group (minutes) 0   OT Mode of treatment - Co-treat (minutes) 0   OT Mode of Treatment - Total time(minutes) 43 minutes   OT Cumulative Minutes 113   Therapy Time missed   Time missed? No

## 2024-10-03 NOTE — PROGRESS NOTES
"   10/03/24 1230   Pain Assessment   Pain Assessment Tool 0-10   Pain Score 2   Pain Location/Orientation Orientation: Lower;Location: Neck   Patient's Stated Pain Goal No pain   Hospital Pain Intervention(s) Medication (See MAR)   Multiple Pain Sites No   Restrictions/Precautions   Precautions Aspiration;Bed/chair alarms;Cognitive;Fall Risk;Pain;Supervision on toilet/commode;Spinal precautions   Weight Bearing Restrictions No   Braces or Orthoses C/S Collar   Cognitive Linguisitic Assessments   Cognitive Linquistic Quick Test (CLQT) MOCA completed on 9/16/24- pt scored 16/30   Comprehension   Comprehension (FIM) 5 - Needs help/cues, repetition only RARELY ( < 10% of the time)   Expression   Verbal Complex   Intelligibility Sentence   QI: Expression 3. Exhibits some difficulty with expressing needs and ideas (e.g., some words or finishing thoughts) or speech is not clear   Expression (FIM) 5 - Needs help/cues only RARELY (< 10% of the time)   Social Interaction   Social Interaction (FIM) 5 - Interacts appropriately with others 90% of time   Problem Solving   Problem solving (FIM) 5 - Solves basic problems 90% of time   Memory   Memory (FIM) 4 - Recognizes/recalls/performs 75-89%   Memory Skills   Orientation Level Oriented X4   Speech/Language/Cognition Assessmetn   Treatment Assessment Patient recalled 3/4 swallow strategies at start of session.  Patient able to report approximate time frame of previous therapist today as well as provided 4 exercises per discipline reporting \"I know there's more.\"  She had completely accurate recall of lunch tray (still in front of her at my arrival but does not reference when asked.)  She remains AO x4.  Patient discussing current events on television, news, and recalled news stories from two days prior.  Patient also recalling previous conversations with therapist.  She completed problem solving activities with one solution i'ly @ 85% acc and a second solution with mod-max verbal " "cue @ ~20% acc.   Swallow Information   Current Risks for Dysphagia & Aspiration General debilitation   Current Symptoms/Concerns Cough;Clear throat   Current Diet Dysphagia pureed;Honey thick liquids   Baseline Diet Regular;Thin liquids   Consistencies Assessed and Performance   Materials Admnistered Puree/Level 1;Honey thick liquid   Materials Adminstered Comment Pt assessed with breakfast tray   Recommendations   Risk for Aspiration Present   Recommendations Dysphagia treatment   Diet Solid Recommendation Level 1 Dysphagia/pureed   Diet Liquid Recommendation Honey thick liquid   Recommended Form of Meds Crushed;With puree   General Precautions Aspiration precautions   Compensatory Swallowing Strategies Alternate solids and liquids   Results Reviewed with PT/Family/Caregiver;RN;MD   Eating   Type of Assistance Needed Set-up / clean-up   Physical Assistance Level No physical assistance   Comment honey   Eating CARE Score 5   Swallow Assessment   Swallow Treatment Assessment Assessed with lunch tray as patient was completing meal.  Patient observed with trials of puree including mashed potatoes with gravy, puree pears and small amounts of magic cup consumed.  Overall PO intake has improved, pt's overt cough has also improved overall, more dry, infrequent.  However patient does produce copious amounts of sputum that she expels into a tissue.  Noted to be thick, clear secretions.  Pt reports this is \"better\" than it has been.  She reports improvement in appetite but unsure \"why\" and patient includes, \"I'm still not eating like I used to.\"   Swallow Assessment Prognosis   Prognosis Fair   Prognosis Considerations Co-morbidities;Medical diagnosis;Medical prognosis   SLP Therapy Minutes   SLP Time In 1230   SLP Time Out 1300   SLP Total Time (minutes) 30   SLP Mode of treatment - Individual (minutes) 30   SLP Mode of treatment - Concurrent (minutes) 0   SLP Mode of treatment - Group (minutes) 0   SLP Mode of treatment - " Co-treat (minutes) 0   SLP Mode of Treatment - Total time(minutes) 30 minutes   SLP Cumulative Minutes 652   Therapy Time missed   Time missed? No

## 2024-10-04 PROCEDURE — 97110 THERAPEUTIC EXERCISES: CPT

## 2024-10-04 PROCEDURE — 97535 SELF CARE MNGMENT TRAINING: CPT

## 2024-10-04 PROCEDURE — 99232 SBSQ HOSP IP/OBS MODERATE 35: CPT | Performed by: STUDENT IN AN ORGANIZED HEALTH CARE EDUCATION/TRAINING PROGRAM

## 2024-10-04 PROCEDURE — 97530 THERAPEUTIC ACTIVITIES: CPT

## 2024-10-04 PROCEDURE — 92526 ORAL FUNCTION THERAPY: CPT | Performed by: NURSE PRACTITIONER

## 2024-10-04 PROCEDURE — 92507 TX SP LANG VOICE COMM INDIV: CPT | Performed by: NURSE PRACTITIONER

## 2024-10-04 PROCEDURE — 97542 WHEELCHAIR MNGMENT TRAINING: CPT

## 2024-10-04 PROCEDURE — 97116 GAIT TRAINING THERAPY: CPT

## 2024-10-04 RX ADMIN — DILTIAZEM HYDROCHLORIDE 180 MG: 180 CAPSULE, COATED, EXTENDED RELEASE ORAL at 09:30

## 2024-10-04 RX ADMIN — FLUTICASONE PROPIONATE 2 SPRAY: 50 SPRAY, METERED NASAL at 09:29

## 2024-10-04 RX ADMIN — ASPIRIN 81 MG: 81 TABLET, COATED ORAL at 09:30

## 2024-10-04 RX ADMIN — ATORVASTATIN CALCIUM 20 MG: 20 TABLET, FILM COATED ORAL at 16:35

## 2024-10-04 RX ADMIN — SENNOSIDES 17.2 MG: 8.6 TABLET, FILM COATED ORAL at 21:07

## 2024-10-04 RX ADMIN — Medication 250 MG: at 17:53

## 2024-10-04 RX ADMIN — LEVOTHYROXINE SODIUM 125 MCG: 25 TABLET ORAL at 05:27

## 2024-10-04 RX ADMIN — ENOXAPARIN SODIUM 40 MG: 40 INJECTION SUBCUTANEOUS at 09:29

## 2024-10-04 RX ADMIN — Medication 250 MG: at 09:36

## 2024-10-04 RX ADMIN — ACETAMINOPHEN 975 MG: 325 TABLET ORAL at 13:44

## 2024-10-04 RX ADMIN — ACETAMINOPHEN 975 MG: 325 TABLET ORAL at 21:07

## 2024-10-04 RX ADMIN — SERTRALINE 100 MG: 100 TABLET, FILM COATED ORAL at 09:30

## 2024-10-04 RX ADMIN — ACETAMINOPHEN 975 MG: 325 TABLET ORAL at 05:27

## 2024-10-04 RX ADMIN — FOLIC ACID 1000 MCG: 1 TABLET ORAL at 09:30

## 2024-10-04 NOTE — PROGRESS NOTES
"  Progress Note - PMR   Name: Marsha Oliva 79 y.o. female I MRN: [id number]  Unit/Bed#: [facility] 219-01 I Date of Admission: 9/11/2024   Date of Service: 10/3/2024 I Hospital Day: 22     Assessment & Plan  Spinal cord compression (HCC)  Presented  to acute care on 9/4/24 with progressive neck pain and weakness.    She reports 4-6 weeks prior she sustained a fall off her bed, head hitting her nightstand.    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   MRI C spine showing displaced type 2 dens fracture with mild mass effect on cord, ALL and PLL injuries.    CTA head and neck negative for vascular injuries.    Patient seen by Neurosurgery and deemed an operative candidate.    Patient taken to the OR by Dr. Dewitt for a bilateral C1 and C3 lateral mass screw placement, bialteral C2 pedicle screw palcement, C1-C3 fusion.    Post operatively, placed in a Aspen C Collar at all times, except showering with Shonda collar.    Post op X-rays with stable alignment    Plan in ARC:  Begin ARC program with PT, OT, SLP  Monitor incision  Monitor pain  Monitor neuro exam  Follow-up with Neurosurgery in 2 weeks.  2 week post op visit completed with NSGY virtually - \"Will plan to follow-up with the pt as scheduled for her 6 week POV with repeat upright xrays\"  Oropharyngeal dysphagia  New issues  VFSS study completed on 9/10/24 and patient found to have mild oral and moderate phayngeal dysphagia.    Cleared for a pureed diet with honey thick liquids.  After discussion with staff may consider repeating swallow study after discussion with speech therapy as patient still having moist cough during meals.  Patient states at home she will take a bite or 2 at a time and eat her meals over very very long periods of time but with her direct supervision requirements at this time that is not possible.  SLP to follow  Continue aspiration precautions "   Recommendations at this time for a PEG tube placement.  This is due to her continued weight loss, limited p.o. intake.  At this time discussion with the patient and  continue and will follow-up with gastroenterology as an outpatient for consideration if she is agreeable.  Risks for aspiration pneumonia including the ultimate risk of death/hospitalization discussed with the patient today. Has made some recent improvements and will continue to see if maintains upward trajectory. May still consider further outpatient eval for consideration pending.  Acquired bilateral hammer toes  Keratosis of toe  May need shaving of this lesion  Consult placed to Podiatry  Atrial fibrillation, chronic (HCC)  Chronic A. Fib  Rate/rhythm controlled on digoxin 125 mcg every other day, Cardizem  mg daily  No AC due to prior several GI bleeds.  On aspirin 81 mg daily.  IM managing  B12 deficiency  Continue supplementation   IM managing  Chronic diastolic CHF (congestive heart failure) (HCC)  Wt Readings from Last 3 Encounters:   10/03/24 52 kg (114 lb 10.2 oz)   09/11/24 53.8 kg (118 lb 9.7 oz)   09/04/24 53.5 kg (118 lb)     Weight stable  Patient takes Demedex 20 mg daily PRN for weight gain  Monitor daily weights  IM managing  Depression with anxiety  Mood is stable  Continue Zoloft 100 mg daily  Essential hypertension  Continue Cardizem  mg Daily  Monitor BP and HR with rest and activity  IM managing  Hypothyroidism  Continue Synthroid 125 mcg daily  Mixed hyperlipidemia  Continue Atorvastain 20 mg daily  Folate deficiency  Continue supplementation   IM managing  Acute pain due to trauma  Located in neck  Continue Tylenol 975 mg TID  Oxycodone IR 2.5-5 mg Q 4 hours PRN for moderate-severe pain   Topical ICE  -improved has not required oxy since 9/13  Skin abnormalities  Wound Care team following in ARC:  POA several areas  Continue LWC:     Skin care plans:  1-Hydraguard to sacrum, buttocks and heels BID and  PRN  2-EHOB cushion when out of bed in chair.  3-Moisturize skin daily with skin nourishing cream  4-Elevate heels to offload pressure.  5-Turn/reposition q2h for pressure re-distribution on skin  6. Monitor right 2nd toe growth area - leave open to air podiatry consulted.  7. Right arm skin tear - cleanse with Normal saline then apply normlgel ag then top with adaptic then a ABD and keisha change every other day   8. Right and left breast folds - cleanse with soap and water pat dry dust lightly with nystatin powder bid and prn  Lymphedema  Pumps brought in by  - ok to use PRN as tolerated  At risk for altered bowel elimination  Continent  -at home takes daily miralax; rescheduled PRN to daily  At risk for altered urinary elimination  Continent   At risk for deep venous thrombosis  Continue Lovenox 40 mg daily - should not need at discharge  Encounter for rehabilitation  Functional deficits:  cervical collar on at all times, cervical spinal precautions, impaired mobility, self care   Continue current rehabilitation plan of care to maximize function.  Estimated discharge: Thursday 10/3/24 with home care estimated    Severe protein-calorie malnutrition (HCC)  Nutrition following    Malnutrition Findings:   Adult Malnutrition type: Acute illness  Adult Degree of Malnutrition: Other severe protein calorie malnutrition  Malnutrition Characteristics: Inadequate energy, Weight loss  360 Statement: Malnutrition related to inadequate energy intake as evidenced by 7#(6%) weight loss from 9/11/# to 9/18/# and <50% energy intake >5 days.    BMI Findings:  Body mass index is 20.31 kg/m².   Closed odontoid fracture with type II morphology, posterior displacement, and nonunion    Impaired mobility and activities of daily living  Patient was evaluated by the rehabilitation team MD and an appropriate candidate for acute inpatient rehabilitation program at this time.  The patient will tolerate 3 hours/day 5 to 7  days/week of intensive physical, occupational and speech therapy in order to obtain goals for community discharge  Due to the patient's functional Compared to their baseline level of function in addition to their ongoing medical needs, the patient would benefit from daily supervision from a rehabilitation physician as well as rehabilitation nursing to implement and adjust the medical as well as functional plan of care in order to meet the patient's goals.    Medical necessity: The patient at this time would greatly benefit from having home health aides.  At this time her  has ongoing health conditions that would limit his ability to assist with her care.  She still needs assistance for many tasks most of which are basic mobility and ADL tasks.  She has difficulty with eating and requires level of supervision, assistance with transfers and mobility, medication management and management of the household.  Additionally will have ongoing medical needs as well as follow-ups with the specialists once she is discharged and may be considered for a PEG tube placement as an outpatient which would also require assistance.  From a medical and functional standpoint it is my professional opinion that the patient would greatly benefit from additional assistance with home health aides for the listed reasons above as well as and throughout this note.    Subjective   Marsha Oliva is a 79 y.o. female with HTN, A Fib, CHF, HLD, Hypothyroidism who presented to the [facility] on on 9/4/24 with progressive neck pain and weakness.  She reports 4-6 weeks prior she sustained a fall off her bed, head hitting her nightstand.  She has been having increasing neck pain since.  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 MRI C spine showing displaced type 2 dens fracture with mild mass effect on cord, ALL and PLL injuries.   CTA head and neck negative for vascular injuries.  Patient seen by Neurosurgery and deemed an operative candidate.  Patient taken to the OR by Dr. Dewitt for a bilateral C1 and C3 lateral mass screw placement, bialteral C2 pedicle screw palcement, C1-C3 fusion.  Post operatively, placed in a Aspen C Collar at all times, except showering with Shonda collar.  Post op X-rays with stable alignment  Medical course complicated by acute dysphagia.  VFSS study completed on 9/10/24 and patient found to have mild oral and moderate phayngeal dysphagia.  Cleared for a pureed diet with honey thick liquids.  Patient seen by wound care team for abrasions and stage I pressure injury to sacrum.  Patient started on Tylenol and PRN oxycodone for acute pain.    After medical stabilization, patient found to have acute functional deficits from his baseline, therefore, admitted to [facility] for acute inpatient rehabilitation.    Chief Complaint: f/u SCI, f/u ambulatory dysfunction, and Discharge planning    Interval: I met and evaluated the patient in her room. No acute issues overnight.From SLP, seems to have made some improvements from a PO intake and swallow perspective, less coughing. I also spoke with Marsha and her  about discharge and will plan for a Monday discharge as a compromise to our original date as Marsha is doing well and her  is feeling a bit better from his current situation. Discussed with CM and able to adjust [facility] date to accommodate. All questions answered. Patient denies fever, chills, nausea, emesis, cough, shortness of breath, diarrhea, or constipation. Sleep was fine, mood stable. Pain is controlled.      Objective :  Temp:  [97.1 °F (36.2 °C)-97.8 °F (36.6 °C)] 97.1 °F (36.2 °C)  HR:  [105-110] 105  BP: (109-154)/(56-89) 109/56  Resp:  [16-18] 18  SpO2:  [95 %] 95 %  O2 Device: None (Room air)    Functional Update:  Physical Therapy Occupational Therapy Speech Therapy   Weight Bearing  Status: Full Weight Bearing  Transfers: Supervision  Bed Mobility: Independent  Amulation Distance (ft): 165 feet  Ambulation: Incidental Touching  Assistive Device for Ambulation: Roller Walker  Wheelchair Mobility Distance: 25 ft  Wheelchair Mobility: Supervision  Number of Stairs: 7  Assistive Device for Stairs: Bilateral Hand Rails  Stair Assistance: Incidental Touching  Ramp: Incidental Touching  Assistive Device for Ramp: Roller Walker  Discharge Recommendations: Home with: (vs alternate placement)  DC Home with:: 24 Hour Assisteance, Family Support, First Floor Setup, Home Physical Therapy   Eating: Supervision  Grooming: Supervision  Bathing: Incidental Touching  Bathing: Incidental Touching  Upper Body Dressing: Supervision  Lower Body Dressing: Incidental Touching, Minimal Assistance  Toileting: Incidental Touching, Supervision  Tub/Shower Transfer: Minimal Assistance  Toilet Transfer: Supervision, Incidental Touching  Cognition: Exceptions to WNL  Cognition: Decreased Memory, Decreased Attention, Decreased Safety, Decreased Comprehension  Orientation: Person, Place, Time, Situation   Mode of Communication: Verbal  Cognition: Exceptions to WNL  Cognition: Decreased Memory, Decreased Executive Functions, Decreased Attention, Decreased Comprehension  Orientation: Person, Place, Time, Situation  Swallowing: Exceptions to WNL  Swallowing: Pharyngeal Dysphagia, Oral Dysphagia  Diet Recommendations: Level 1/Puree, Honey Thick  Discharge Recommendations: Home with:  DC Home with:: Family Support, Home Speech Therapy (close supervision 2' dysphagia severity and cognitive deficits)     Physical Exam  Vitals reviewed.   Constitutional:       General: She is not in acute distress.  HENT:      Head: Normocephalic and atraumatic.      Right Ear: External ear normal.      Left Ear: External ear normal.      Nose: No rhinorrhea.      Mouth/Throat:      Mouth: Mucous membranes are moist.      Pharynx: Oropharynx is clear.    Eyes:      General: No scleral icterus.  Neck:      Comments: Cervical collar  Cardiovascular:      Rate and Rhythm: Normal rate.   Pulmonary:      Effort: Pulmonary effort is normal. No respiratory distress.   Abdominal:      General: There is no distension.      Palpations: Abdomen is soft.   Musculoskeletal:      Right lower leg: No edema.      Left lower leg: No edema.   Skin:     General: Skin is warm and dry.      Comments: Neck incision CDI   Neurological:      Mental Status: She is alert and oriented to person, place, and time.      Motor: Weakness (gernalized lower limbv proximal weakness) present.   Psychiatric:         Mood and Affect: Mood normal.         Behavior: Behavior normal.         Scheduled Meds:  Current Facility-Administered Medications   Medication Dose Route Frequency Provider Last Rate    acetaminophen  975 mg Oral Q8H [facility] Sharmin Waggoner PA-C      Artificial Tears  2 drop Both Eyes Q3H PRN JAQUELINE Amos      aspirin  81 mg Oral Daily Corine Park MD      atorvastatin  20 mg Oral Daily With Dinner Sharmin Waggoner PA-C      bisacodyl  10 mg Rectal Daily PRN Sharmin Waggoner PA-C      bisacodyl  10 mg Rectal Once Corine Park MD      calcium carbonate  1,000 mg Oral Daily PRN Sharmin Waggoner PA-C      cyanocobalamin  1,000 mcg Oral Weekly Sharmin Waggoner PA-C      dextromethorphan-guaiFENesin  10 mL Oral Q4H PRN Tj Darling MD      digoxin  125 mcg Oral Every Other Day Sharmin Waggoner PA-C      diltiazem  180 mg Oral Daily Sharmin Waggoner PA-C      docusate sodium  100 mg Oral BID Corine Park MD      enoxaparin  40 mg Subcutaneous Q24H [facility] Corine Park MD      fluticasone  2 spray Each Nare Daily JAQUELINE Amos      folic acid  1,000 mcg Oral Daily Sharmin Waggoner PA-C      lactulose  20 g Oral Daily PRN Jermaine Greenberg DO      levothyroxine  125 mcg Oral Early Morning  Sharmin Waggoner PA-C      magnesium gluconate  250 mg Oral BID Sharmin Waggoner PA-C      ondansetron  4 mg Oral Q6H PRN Sharmin Waggoner PA-C      oxyCODONE  2.5 mg Oral Q4H PRN Sharmin Waggoner PA-C      Or    oxyCODONE  5 mg Oral Q4H PRN Sharmin Waggoner PA-C      phenol  1 spray Mouth/Throat Q2H PRN Sharmin Waggoner PA-C      senna  2 tablet Oral HS Corine Park MD      sertraline  100 mg Oral Daily Sharmin Waggoenr PA-C      torsemide  20 mg Oral Daily PRN Sharmin Waggoner PA-C           Lab Results: I have reviewed the following results:  Results from last 7 days   Lab Units 09/30/24  0530   HEMOGLOBIN g/dL 12.0   HEMATOCRIT % 39.0   WBC Thousand/uL 3.91*   PLATELETS Thousands/uL 211     Results from last 7 days   Lab Units 09/30/24  0530   BUN mg/dL 13   SODIUM mmol/L 142   POTASSIUM mmol/L 3.9   CHLORIDE mmol/L 107   CREATININE mg/dL 0.60   AST U/L 21   ALT U/L 12                Jermaine Greenberg,   Physical Medicine and Rehabilitation  [facility]    I have spent a total time of 35 minutes in caring for this patient on the day of the visit/encounter including Counseling / Coordination of care, Documenting in the medical record, and Communicating with other healthcare professionals .

## 2024-10-04 NOTE — ASSESSMENT & PLAN NOTE
Wt Readings from Last 3 Encounters:   10/04/24 50.4 kg (111 lb 1.8 oz)   09/11/24 53.8 kg (118 lb 9.7 oz)   09/04/24 53.5 kg (118 lb)     Weight stable  Patient takes Demedex 20 mg daily PRN for weight gain  Monitor daily weights  IM managing

## 2024-10-04 NOTE — PROGRESS NOTES
"  Progress Note - PMR   Name: Marsha Oliva 79 y.o. female I MRN: [id number]  Unit/Bed#: [facility] 219-01 I Date of Admission: 9/11/2024   Date of Service: 10/4/2024 I Hospital Day: 23     Assessment & Plan  Spinal cord compression (HCC)  Presented  to acute care on 9/4/24 with progressive neck pain and weakness.    She reports 4-6 weeks prior she sustained a fall off her bed, head hitting her nightstand.    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   MRI C spine showing displaced type 2 dens fracture with mild mass effect on cord, ALL and PLL injuries.    CTA head and neck negative for vascular injuries.    Patient seen by Neurosurgery and deemed an operative candidate.    Patient taken to the OR by Dr. Dewitt for a bilateral C1 and C3 lateral mass screw placement, bialteral C2 pedicle screw palcement, C1-C3 fusion.    Post operatively, placed in a Aspen C Collar at all times, except showering with Shonda collar.    Post op X-rays with stable alignment    Plan in ARC:  Begin ARC program with PT, OT, SLP  Monitor incision  Monitor pain  Monitor neuro exam  Follow-up with Neurosurgery in 2 weeks.  2 week post op visit completed with NSGY virtually - \"Will plan to follow-up with the pt as scheduled for her 6 week POV with repeat upright xrays\"  Oropharyngeal dysphagia  New issues  VFSS study completed on 9/10/24 and patient found to have mild oral and moderate phayngeal dysphagia.    Cleared for a pureed diet with honey thick liquids.  After discussion with staff may consider repeating swallow study after discussion with speech therapy as patient still having moist cough during meals.  Patient states at home she will take a bite or 2 at a time and eat her meals over very very long periods of time but with her direct supervision requirements at this time that is not possible.  SLP to follow  Continue aspiration precautions "   Recommendations at this time for a PEG tube placement.  This is due to her continued weight loss, limited p.o. intake.  At this time discussion with the patient and  continue and will follow-up with gastroenterology as an outpatient for consideration if she is agreeable.  Risks for aspiration pneumonia including the ultimate risk of death/hospitalization discussed with the patient today. Has made some recent improvements and will continue to see if maintains upward trajectory. May still consider further outpatient eval for consideration pending.  Acquired bilateral hammer toes  Keratosis of toe  May need shaving of this lesion  Consult placed to Podiatry  Atrial fibrillation, chronic (HCC)  Chronic A. Fib  Rate/rhythm controlled on digoxin 125 mcg every other day, Cardizem  mg daily  No AC due to prior several GI bleeds.  On aspirin 81 mg daily.  IM managing  B12 deficiency  Continue supplementation   IM managing  Chronic diastolic CHF (congestive heart failure) ([facility])  Wt Readings from Last 3 Encounters:   10/04/24 50.4 kg (111 lb 1.8 oz)   09/11/24 53.8 kg (118 lb 9.7 oz)   09/04/24 53.5 kg (118 lb)     Weight stable  Patient takes Demedex 20 mg daily PRN for weight gain  Monitor daily weights  IM managing  Depression with anxiety  Mood is stable  Continue Zoloft 100 mg daily  Essential hypertension  Continue Cardizem  mg Daily  Monitor BP and HR with rest and activity  IM managing  Hypothyroidism  Continue Synthroid 125 mcg daily  Mixed hyperlipidemia  Continue Atorvastain 20 mg daily  Folate deficiency  Continue supplementation   IM managing  Acute pain due to trauma  Located in neck  Continue Tylenol 975 mg TID  Oxycodone IR 2.5-5 mg Q 4 hours PRN for moderate-severe pain   Topical ICE  -improved has not required oxy since 9/13  Skin abnormalities  Wound Care team following in ARC:  POA several areas  Continue LWC:     Skin care plans:  1-Hydraguard to sacrum, buttocks and heels BID and  PRN  2-EHOB cushion when out of bed in chair.  3-Moisturize skin daily with skin nourishing cream  4-Elevate heels to offload pressure.  5-Turn/reposition q2h for pressure re-distribution on skin  6. Monitor right 2nd toe growth area - leave open to air podiatry consulted.  7. Right arm skin tear - cleanse with Normal saline then apply normlgel ag then top with adaptic then a ABD and keisha change every other day   8. Right and left breast folds - cleanse with soap and water pat dry dust lightly with nystatin powder bid and prn  Lymphedema  Pumps brought in by  - ok to use PRN as tolerated  At risk for altered bowel elimination  Continent  -at home takes daily miralax; rescheduled PRN to daily  At risk for altered urinary elimination  Continent   At risk for deep venous thrombosis  Continue Lovenox 40 mg daily - should not need at discharge  Encounter for rehabilitation  Functional deficits:  cervical collar on at all times, cervical spinal precautions, impaired mobility, self care   Continue current rehabilitation plan of care to maximize function.  Estimated discharge: Thursday 10/3/24 with home care estimated    Severe protein-calorie malnutrition (HCC)  Nutrition following    Malnutrition Findings:   Adult Malnutrition type: Acute illness  Adult Degree of Malnutrition: Other severe protein calorie malnutrition  Malnutrition Characteristics: Inadequate energy, Weight loss  360 Statement: Malnutrition related to inadequate energy intake as evidenced by 7#(6%) weight loss from 9/11/# to 9/18/# and <50% energy intake >5 days.    BMI Findings:  Body mass index is 19.68 kg/m².   Closed odontoid fracture with type II morphology, posterior displacement, and nonunion    Impaired mobility and activities of daily living  Patient was evaluated by the rehabilitation team MD and an appropriate candidate for acute inpatient rehabilitation program at this time.  The patient will tolerate 3 hours/day 5 to 7  days/week of intensive physical, occupational and speech therapy in order to obtain goals for community discharge  Due to the patient's functional Compared to their baseline level of function in addition to their ongoing medical needs, the patient would benefit from daily supervision from a rehabilitation physician as well as rehabilitation nursing to implement and adjust the medical as well as functional plan of care in order to meet the patient's goals.    Medical necessity: The patient at this time would greatly benefit from having home health aides.  At this time her  has ongoing health conditions that would limit his ability to assist with her care.  She still needs assistance for many tasks most of which are basic mobility and ADL tasks.  She has difficulty with eating and requires level of supervision, assistance with transfers and mobility, medication management and management of the household.  Additionally will have ongoing medical needs as well as follow-ups with the specialists once she is discharged and may be considered for a PEG tube placement as an outpatient which would also require assistance.  From a medical and functional standpoint it is my professional opinion that the patient would greatly benefit from additional assistance with home health aides for the listed reasons above as well as and throughout this note.    Subjective   Marsha Oliva is a 79 y.o. female with HTN, A Fib, CHF, HLD, Hypothyroidism who presented to the [facility] on on 9/4/24 with progressive neck pain and weakness.  She reports 4-6 weeks prior she sustained a fall off her bed, head hitting her nightstand.  She has been having increasing neck pain since.  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 MRI C spine showing displaced type 2 dens fracture with mild mass effect on cord, ALL and PLL injuries.   CTA head and neck negative for vascular injuries.  Patient seen by Neurosurgery and deemed an operative candidate.  Patient taken to the OR by Dr. Dewitt for a bilateral C1 and C3 lateral mass screw placement, bialteral C2 pedicle screw palcement, C1-C3 fusion.  Post operatively, placed in a Aspen C Collar at all times, except showering with Shonda collar.  Post op X-rays with stable alignment  Medical course complicated by acute dysphagia.  VFSS study completed on 9/10/24 and patient found to have mild oral and moderate phayngeal dysphagia.  Cleared for a pureed diet with honey thick liquids.  Patient seen by wound care team for abrasions and stage I pressure injury to sacrum.  Patient started on Tylenol and PRN oxycodone for acute pain.    After medical stabilization, patient found to have acute functional deficits from his baseline, therefore, admitted to [facility] for acute inpatient rehabilitation.    Chief Complaint: f/u ambulatory dysfunction and discharge planning    Interval: Met with patient today in room and also briefly spoke with  who was on the phone.  No acute issues overnight and participating with therapy.  Maintains a improving appetite and no issues at this time no additional coughing.  Denies any fever, chills, nausea, vomiting, cough, shortness of breath, diarrhea or constipation.  Plan for discharge on Monday.  All questions answered    Objective :  Temp:  [97 °F (36.1 °C)-97.1 °F (36.2 °C)] 97 °F (36.1 °C)  HR:  [] 91  BP: (109-139)/(56-63) 139/63  Resp:  [18] 18  SpO2:  [95 %-97 %] 97 %  O2 Device: None (Room air)    Functional Update:  Physical Therapy Occupational Therapy Speech Therapy   Weight Bearing Status: Full Weight Bearing  Transfers: Supervision  Bed Mobility: Independent  Amulation Distance (ft): 165 feet  Ambulation: Incidental Touching  Assistive Device for Ambulation: Roller Walker  Wheelchair Mobility Distance: 25 ft  Wheelchair Mobility:  Supervision  Number of Stairs: 7  Assistive Device for Stairs: Bilateral Hand Rails  Stair Assistance: Incidental Touching  Ramp: Incidental Touching  Assistive Device for Ramp: Roller Walker  Discharge Recommendations: Home with: (vs alternate placement)  DC Home with:: 24 Hour Assisteance, Family Support, First Floor Setup, Home Physical Therapy   Eating: Supervision  Grooming: Supervision  Bathing: Incidental Touching  Bathing: Incidental Touching  Upper Body Dressing: Supervision  Lower Body Dressing: Incidental Touching, Minimal Assistance  Toileting: Incidental Touching, Supervision  Tub/Shower Transfer: Minimal Assistance  Toilet Transfer: Supervision, Incidental Touching  Cognition: Exceptions to WNL  Cognition: Decreased Memory, Decreased Attention, Decreased Safety, Decreased Comprehension  Orientation: Person, Place, Time, Situation   Mode of Communication: Verbal  Cognition: Exceptions to WNL  Cognition: Decreased Memory, Decreased Executive Functions, Decreased Attention, Decreased Comprehension  Orientation: Person, Place, Time, Situation  Swallowing: Exceptions to WNL  Swallowing: Pharyngeal Dysphagia, Oral Dysphagia  Diet Recommendations: Level 1/Puree, Honey Thick  Discharge Recommendations: Home with:  DC Home with:: Family Support, Home Speech Therapy (close supervision 2' dysphagia severity and cognitive deficits)         Physical Exam  Vitals and nursing note reviewed.   Constitutional:       General: She is not in acute distress.     Appearance: She is well-developed.   HENT:      Head: Normocephalic and atraumatic.   Eyes:      Conjunctiva/sclera: Conjunctivae normal.   Neck:      Comments: Cervical collar in place  Cardiovascular:      Rate and Rhythm: Normal rate and regular rhythm.      Heart sounds: No murmur heard.  Pulmonary:      Effort: Pulmonary effort is normal. No respiratory distress.      Breath sounds: Normal breath sounds.   Abdominal:      Palpations: Abdomen is soft.       Tenderness: There is no abdominal tenderness.   Musculoskeletal:      Right lower leg: No edema.      Left lower leg: No edema.   Skin:     General: Skin is warm and dry.      Comments: Incision CDI   Neurological:      Mental Status: She is alert.   Psychiatric:         Mood and Affect: Mood normal.         Behavior: Behavior normal.           Scheduled Meds:  Current Facility-Administered Medications   Medication Dose Route Frequency Provider Last Rate    acetaminophen  975 mg Oral Q8H [facility] Sharmin Waggoner PA-C      Artificial Tears  2 drop Both Eyes Q3H PRN JAQUELINE Amos      aspirin  81 mg Oral Daily Corine Park MD      atorvastatin  20 mg Oral Daily With Dinner Sharmin Waggoner PA-C      bisacodyl  10 mg Rectal Daily PRN Sharmin Waggoner PA-C      bisacodyl  10 mg Rectal Once Corine Park MD      calcium carbonate  1,000 mg Oral Daily PRN Sharmin Waggoner PA-C      cyanocobalamin  1,000 mcg Oral Weekly Sharmin Waggoner PA-C      dextromethorphan-guaiFENesin  10 mL Oral Q4H PRN Tj Darling MD      digoxin  125 mcg Oral Every Other Day Sharmin Waggoner PA-C      diltiazem  180 mg Oral Daily Sharmin Waggoner PA-C      docusate sodium  100 mg Oral BID Corine Park MD      enoxaparin  40 mg Subcutaneous Q24H [facility] Corine Park MD      fluticasone  2 spray Each Nare Daily JAQUELINE Amos      folic acid  1,000 mcg Oral Daily Sharmin Waggoner PA-C      lactulose  20 g Oral Daily PRN Jermaine Greenberg DO      levothyroxine  125 mcg Oral Early Morning Sharmin Waggoner PA-C      magnesium gluconate  250 mg Oral BID Sharmin Waggoner PA-C      ondansetron  4 mg Oral Q6H PRN Sharmin Waggoner PA-C      oxyCODONE  2.5 mg Oral Q4H PRN Sharmin Waggoner PA-C      Or    oxyCODONE  5 mg Oral Q4H PRN Sharmin Waggoner PA-C      phenol  1 spray Mouth/Throat Q2H PRN Sharmin Waggoner PA-C       senna  2 tablet Oral HS Corine Joss Park MD      sertraline  100 mg Oral Daily Sharmin Waggoner PA-C      torsemide  20 mg Oral Daily PRN Sharmin Waggoner PA-C           Lab Results: I have reviewed the following results:  Results from last 7 days   Lab Units 09/30/24  0530   HEMOGLOBIN g/dL 12.0   HEMATOCRIT % 39.0   WBC Thousand/uL 3.91*   PLATELETS Thousands/uL 211     Results from last 7 days   Lab Units 09/30/24  0530   BUN mg/dL 13   SODIUM mmol/L 142   POTASSIUM mmol/L 3.9   CHLORIDE mmol/L 107   CREATININE mg/dL 0.60   AST U/L 21   ALT U/L 12                Jermaine Greenberg DO  Physical Medicine and Rehabilitation  [facility]    I have spent a total time of 35 minutes in caring for this patient on the day of the visit/encounter including Patient and family education, Counseling / Coordination of care, Documenting in the medical record, and Communicating with other healthcare professionals .

## 2024-10-04 NOTE — PROGRESS NOTES
"   10/04/24 1230   Pain Assessment   Pain Assessment Tool 0-10   Pain Score 4   Pain Location/Orientation Orientation: Lower;Location: Neck   Restrictions/Precautions   Precautions Aspiration;Bed/chair alarms;Fall Risk;Cognitive;Pain;Supervision on toilet/commode;Spinal precautions   Weight Bearing Restrictions No   ROM Restrictions   (spinal precautions)   Braces or Orthoses C/S Collar   Cognition   Overall Cognitive Status Impaired   Arousal/Participation Alert;Responsive;Cooperative   Attention Attends with cues to redirect   Orientation Level Oriented X4   Memory Decreased short term memory;Decreased recall of recent events;Decreased recall of precautions   Following Commands Follows one step commands with increased time or repetition   Subjective   Subjective \"I like exercises\"   Roll Left and Right   Type of Assistance Needed Independent   Physical Assistance Level No physical assistance   Roll Left and Right CARE Score 6   Sit to Lying   Type of Assistance Needed Independent   Physical Assistance Level No physical assistance   Sit to Lying CARE Score 6   Lying to Sitting on Side of Bed   Type of Assistance Needed Independent   Physical Assistance Level No physical assistance   Lying to Sitting on Side of Bed CARE Score 6   Sit to Stand   Type of Assistance Needed Supervision   Physical Assistance Level No physical assistance   Comment with RW   Sit to Stand CARE Score 4   Bed-Chair Transfer   Type of Assistance Needed Supervision   Physical Assistance Level No physical assistance   Comment with RW   Chair/Bed-to-Chair Transfer CARE Score 4   Car Transfer   Reason if not Attempted Environmental limitations   Car Transfer CARE Score 10   Walk 10 Feet   Type of Assistance Needed Supervision   Physical Assistance Level No physical assistance   Comment Close supervision with RW on level and unlevel surfaces   Walk 10 Feet CARE Score 4   Walk 50 Feet with Two Turns   Type of Assistance Needed Supervision   Physical " Assistance Level No physical assistance   Comment Close supervision with RW on level and unlevel surfaces   Walk 50 Feet with Two Turns CARE Score 4   Walk 150 Feet   Reason if not Attempted Safety concerns   Walk 150 Feet CARE Score 88   Walking 10 Feet on Uneven Surfaces   Type of Assistance Needed Supervision   Physical Assistance Level No physical assistance   Comment Close supervision with RW on level and unlevel surfaces   Walking 10 Feet on Uneven Surfaces CARE Score 4   Ambulation   Primary Mode of Locomotion Prior to Admission Walk   Distance Walked (feet) 120 ft  (120, 55, 93 ft)   Assist Device Roller Walker   Gait Pattern Inconsistant Vaishnavi;Slow Vaishnavi;Decreased foot clearance;Forward Flexion;Narrow FLORENTINO;Step to;Improper weight shift   Limitations Noted In Balance;Coordination;Device Management;Endurance;Midline Orientation;Posture;Safety;Speed;Strength   Provided Assistance with: Balance;Direction   Walk Assist Level Close Supervision   Findings Close supervision with RW on level and unlevel surfaces   Does the patient walk? 2. Yes   Wheel 50 Feet with Two Turns   Type of Assistance Needed Supervision   Physical Assistance Level No physical assistance   Comment Supervision with cues for direction   Wheel 50 Feet with Two Turns CARE Score 4   Wheelchair mobility   Does the patient use a wheelchair? 1. Yes   Type of Wheelchair Used 1. Manual   Method Right upper extremity;Left upper extremity;Right lower extremity;Left lower extremity   Assistance Provided For Locking Brakes;Obstacles   Distance Level Surface (feet) 110 ft   Distance Wheeled 3% Grade 12 ft   Findings Supervision with cues for direction   Curb or Single Stair   Comment DNT this session   Stairs   Type Ramp   Weight Bearing Precautions WBAT   Assist Devices Roller Walker   Findings CG with RW for ramp negotiation   Picking Up Object   Type of Assistance Needed Supervision   Physical Assistance Level No physical assistance   Comment with  RW   Picking Up Object CARE Score 4   Toilet Transfer   Comment not needed during session   Therapeutic Interventions   Strengthening seated TE   Balance Gait and transfer training   Other WC mobility, ramp negotiation   Assessment   Treatment Assessment Pt agreeable to PT session this afternoon. Pain 4/10 in base of neck throughout session. Pt is independent with bed mobility. Pt is supervision with RW for transfers. Close supervision with RW on level and unlevel surfaces for max distance of 120 ft. Pt supervision with cues for direction for WC mobility. CG with RW for ramp negotiation. Seated TE for general LE strengthening. Gait and transfer training for increased balance, safety, and endurance with functional mobility. Pt returned to room in bed with alarms on and all needs within reach.   Problem List Decreased strength;Decreased range of motion;Decreased endurance;Impaired balance;Decreased mobility;Decreased cognition;Impaired judgement;Decreased safety awareness;Orthopedic restrictions;Pain   Barriers to Discharge Inaccessible home environment;Decreased caregiver support   PT Barriers   Physical Impairment Decreased strength;Decreased range of motion;Decreased endurance;Impaired balance;Decreased mobility;Decreased safety awareness;Pain;Orthopedic restrictions   Functional Limitation Car transfers;Ramp negotiation;Stair negotiation;Standing;Transfers;Walking;Wheelchair management   Plan   Treatment/Interventions Functional transfer training;LE strengthening/ROM;Elevations;Therapeutic exercise;Endurance training;Bed mobility;Gait training   Progress Progressing toward goals   PT Therapy Minutes   PT Time In 1230   PT Time Out 1345   PT Total Time (minutes) 75   PT Mode of treatment - Individual (minutes) 75   PT Mode of treatment - Concurrent (minutes) 0   PT Mode of treatment - Group (minutes) 0   PT Mode of treatment - Co-treat (minutes) 0   PT Mode of Treatment - Total time(minutes) 75 minutes   PT  Cumulative Minutes 1729   Therapy Time missed   Time missed? No

## 2024-10-04 NOTE — CASE MANAGEMENT
CAMERON received phone call from [employer] (covering for Allie) from [employer], inquiring of planned d/c date. CM made her aware d/c date is planned for 10/7, [employer] approving coverage until then.Cm notified JAMEEL of d/c date change to 10/7 through [employer] via Epic. CM called [facility] spoke with Carmella, inquired of process for getting HHA auth for [facility], then sent to [facility]. Carmella stated they do not get an auth for HHA for another agency, and can only offer HHA for 1-2 hours per week. CM called [facility], [facility] and [facility] which have skilled and non skilled care, received answers of unable to covera ll patient's skilled needs or facilities [facility]. CAMERON called and spoke with  Tomas, made him aware of this, he decided to keep [facility] for skilled needs and [facility] for HHA needs, which will be starting on Monday, CAM[facility]ON [facility]. They will go out to patient's home at 1:00 on Monday 10/7. Tomas made aware of this.

## 2024-10-04 NOTE — PROGRESS NOTES
"Progress Note - Marsha Oliva 79 y.o. female MRN: [id number]    Unit/Bed#: -01 Encounter: [id number]        Subjective:   Patient seen and examined at bedside after reviewing the chart and discussing the case with the caring staff.      Patient examined at bedside.  Patient reports no acute symptoms.      Physical Exam   Vitals: Blood pressure 139/63, pulse 91, temperature (!) 97 °F (36.1 °C), temperature source Temporal, resp. rate 18, height 5' 3\" (1.6 m), weight 50.4 kg (111 lb 1.8 oz), SpO2 97%.,Body mass index is 19.68 kg/m².  Constitutional: Patient in no acute distress.  HEENT: PERR, EOMI, MMM.  Cardiovascular: Normal rate and regular rhythm.    Pulmonary/Chest: Effort normal and breath sounds normal.   Abdomen: Soft, + BS, NT.    Assessment/Plan:  Marsha Oliva is a(n) 79 y.o. female with cervical spinal cord compression.     Cardiac hx w/ HTN, HLD, chronic diastolic heart failure, chronic Afib.  Continue digoxin 125 mcg every other day, diltiazem 180 mg daily, atorvastatin 40 mg daily.  Daily weights.  Torsemide 20 mg daily as needed.  Pt cleared to restart ASA 81 mg daily by neurosurgery 9/19.    Hypothyroidism.  Continue levothyroxine 125 mcg daily (decr from 137 mcg 9/6).  B12 deficiency.  Patient on B12 1000 mcg weekly.   Folate deficiency.  Patient on folic acid 1,000 mcg daily.   Hypomagnesemia.  Patient on magnesium gluconate 250 mg twice daily.   Depression and anxiety.   Patient on Zoloft 100 mg daily.  Declines neuropsych at this time.   Multiple hyperkeratotic lesions.  Patient follows with Podiatry outpatient.  Wound care consult.  Podiatry saw patient 10/3.  Sore throat.  Chloraseptic throat spray as needed.   Malnutrition.  Dietician following.  Adult Malnutrition type: Acute illness.  Adult Degree of Malnutrition: Other severe protein calorie malnutrition. Malnutrition Characteristics: Inadequate energy, Weight loss.  Productive cough/dysphagia.  Patient high risk for aspiration " pneumonia.  CXR 9/23/24 without acute cardiopulmonary disease.    Cold sores involving the upper lip.  Resolving.  Cont to monitor.  Pain and bowel regimen.  As per physiatry.   Cervical spinal cord compression.  Vista collar at all times x 6 weeks.  Continue SQ Lovenox.  Patient receiving intensive PT OT ST as per physiatry.      Anticipated discharge date:  Monday 10/7/24    The patient was discussed with Dr. Brown and he is in agreement with the above note.

## 2024-10-04 NOTE — ASSESSMENT & PLAN NOTE
Wt Readings from Last 3 Encounters:   10/03/24 52 kg (114 lb 10.2 oz)   09/11/24 53.8 kg (118 lb 9.7 oz)   09/04/24 53.5 kg (118 lb)     Weight stable  Patient takes Demedex 20 mg daily PRN for weight gain  Monitor daily weights  IM managing

## 2024-10-04 NOTE — ASSESSMENT & PLAN NOTE
New issues  VFSS study completed on 9/10/24 and patient found to have mild oral and moderate phayngeal dysphagia.    Cleared for a pureed diet with honey thick liquids.  After discussion with staff may consider repeating swallow study after discussion with speech therapy as patient still having moist cough during meals.  Patient states at home she will take a bite or 2 at a time and eat her meals over very very long periods of time but with her direct supervision requirements at this time that is not possible.  SLP to follow  Continue aspiration precautions   Recommendations at this time for a PEG tube placement.  This is due to her continued weight loss, limited p.o. intake.  At this time discussion with the patient and  continue and will follow-up with gastroenterology as an outpatient for consideration if she is agreeable.  Risks for aspiration pneumonia including the ultimate risk of death/hospitalization discussed with the patient today. Has made some recent improvements and will continue to see if maintains upward trajectory. May still consider further outpatient eval for consideration pending.

## 2024-10-04 NOTE — PROGRESS NOTES
10/04/24 1030   Pain Assessment   Pain Assessment Tool 0-10   Pain Score No Pain   Pain Location/Orientation Orientation: Bilateral;Location: Shoulder   Restrictions/Precautions   Precautions Aspiration;Bed/chair alarms;Fall Risk;Cognitive;Pain;Supervision on toilet/commode;Spinal precautions   ROM Restrictions   (spinal prec with c collar)   Braces or Orthoses C/S Collar   Oral Hygiene   Type of Assistance Needed Set-up / clean-up   Oral Hygiene CARE Score 5   Grooming   Able To Initiate Tasks;Comb/Brush Hair;Wash/Dry Face;Brush/Clean Teeth;Wash/Dry Hands   Limitation Noted In Safety;Strength   Findings w/c level seated at the sink s/u   Shower/Bathe Self   Type of Assistance Needed Supervision   Shower/Bathe Self CARE Score 4   Bathing   Assessed Bath Style Shower   Anticipated D/C Bath Style Shower;Sponge Bath   Able to Gather/Transport No   Able to Adjust Water Temperature No   Able to Wash/Rinse/Dry (body part) Left Arm;Right Arm;L Upper Leg;R Upper Leg;L Lower Leg/Foot;R Lower Leg/Foot;Chest;Abdomen;Perineal Area;Buttocks   Limitations Noted in Balance;Endurance;Problem Solving;ROM;Safety;Strength  (spinal prec with c collar)   Positioning Seated;Standing   Adaptive Equipment Longhand Sponge;Longhand Reacher;Tub Bench;Shower Bars;Hand Held Shower   Findings  c collar in place during shower and removed follwoing to change pads and dry area with patient seated and head stabilized as per recommendation from most recent neuro sx appointment   Tub/Shower Transfer   Limitations Noted In Balance;Endurance;Problem Solving;ROM;Safety;LE Strength   Adaptive Equipment Grab Bars;Transfer Bench   Assessed Shower   Findings supervision stepping in and out   Upper Body Dressing   Type of Assistance Needed Supervision   Comment with verbal cues due to spinal prec with c collar   Upper Body Dressing CARE Score 4   Lower Body Dressing   Type of Assistance Needed Supervision   Lower Body Dressing CARE Score 4   Putting  On/Taking Off Footwear   Type of Assistance Needed Supervision   Putting On/Taking Off Footwear CARE Score 4   Picking Up Object   Type of Assistance Needed Supervision   Picking Up Object CARE Score 4   Dressing/Undressing Clothing   Remove UB Clothes Pullover Shirt   Don UB Clothes Pullover Shirt   Remove LB Clothes Pants;Undergarment;Socks;Shoes   Don LB Clothes Pants;Undergarment;Socks;Shoes   Limitations Noted In Balance;Endurance;Safety;Strength;ROM  (spinal prec with c collar)   Adaptive Equipment Reacher;Sock Aide;Dressing Stick   Positioning Supported Sit;Standing   Sit to Stand   Type of Assistance Needed Supervision   Sit to Stand CARE Score 4   Bed-Chair Transfer   Type of Assistance Needed Supervision   Chair/Bed-to-Chair Transfer CARE Score 4   Toileting Hygiene   Type of Assistance Needed Supervision   Toileting Hygiene CARE Score 4   Toileting   Able to Pull Clothing down yes, up yes.   Manage Hygiene Bladder;Bowel   Limitations Noted In Balance;ROM;Safety;LE Strength  (spinal prec with c collar)   Adaptive Equipment Grab Bar   Toilet Transfer   Type of Assistance Needed Supervision   Toilet Transfer CARE Score 4   Toilet Transfer   Surface Assessed Raised Toilet   Transfer Technique Standard   Limitations Noted In Balance;Endurance;Problem Solving;ROM;Safety;LE Strength  (spinal prec with c collar)   Adaptive Equipment Grab Bar;Walker   Coordination   Fine Motor knots out of tubing with B hands   Cognition   Orientation Level Oriented X4   Other Comments   Assessment Pt participates in 30 minutes concurrent treatment focusing on ADLs and theract with simialr goals as another patient to increase stregnth and activity tolerance   Assessment   Treatment Assessment Pt presents seated in w/c agreeable to OT session including toileting, ADLS, transfers/ mobility, and BUE therex. Pt tolerates session without complaints and is progressing towards goals. Pt requires assist due to decreased balance, safety,  endurance and strength/ ROM with spinal prec with c collar. [facility] independence with daily tasks.   Problem List Decreased strength;Decreased range of motion;Decreased endurance;Impaired balance;Decreased cognition;Decreased safety awareness;Decreased skin integrity;Orthopedic restrictions;Pain  (spinal prec with c collar)   Plan   Treatment/Interventions ADL retraining;Functional transfer training;Therapeutic exercise;Endurance training;Cognitive reorientation;Equipment eval/education;Patient/family training;Compensatory technique education   Progress Progressing toward goals   OT Therapy Minutes   OT Time In 1030   OT Time Out 1200   OT Total Time (minutes) 90   OT Mode of treatment - Individual (minutes) 60   OT Mode of treatment - Concurrent (minutes) 30   Therapy Time missed   Time missed? No

## 2024-10-04 NOTE — PROGRESS NOTES
"   10/04/24 0800   Pain Assessment   Pain Assessment Tool 0-10   Pain Score 1   Pain Location/Orientation Orientation: Lower;Location: Neck   Restrictions/Precautions   Precautions Aspiration;Bed/chair alarms;Fall Risk;Cognitive;Pain;Supervision on toilet/commode;Spinal precautions   Weight Bearing Restrictions No   ROM Restrictions   (spinal precautions)   Braces or Orthoses C/S Collar   Cognition   Overall Cognitive Status Impaired   Arousal/Participation Alert;Responsive;Cooperative   Attention Attends with cues to redirect   Orientation Level Oriented X4   Memory Decreased short term memory;Decreased recall of recent events;Decreased recall of precautions   Following Commands Follows one step commands with increased time or repetition   Subjective   Subjective \"my legs are stiff today\"   Roll Left and Right   Type of Assistance Needed Independent   Physical Assistance Level No physical assistance   Roll Left and Right CARE Score 6   Sit to Lying   Comment Pt remained OOB   Lying to Sitting on Side of Bed   Type of Assistance Needed Independent   Physical Assistance Level No physical assistance   Lying to Sitting on Side of Bed CARE Score 6   Sit to Stand   Type of Assistance Needed Supervision   Physical Assistance Level No physical assistance   Comment with RW   Sit to Stand CARE Score 4   Bed-Chair Transfer   Type of Assistance Needed Supervision   Physical Assistance Level No physical assistance   Comment with RW   Chair/Bed-to-Chair Transfer CARE Score 4   Car Transfer   Reason if not Attempted Environmental limitations   Car Transfer CARE Score 10   Walk 10 Feet   Type of Assistance Needed Supervision   Physical Assistance Level No physical assistance   Comment Close supervision with RW on level surfaces.   Walk 10 Feet CARE Score 4   Walk 50 Feet with Two Turns   Comment Focus of session on TE, TA and stair training   Walk 150 Feet   Comment Focus of session on TE, TA and stair training   Walking 10 Feet " on Uneven Surfaces   Comment Focus of session on TE, TA and stair training   Ambulation   Primary Mode of Locomotion Prior to Admission Walk   Distance Walked (feet) 15 ft  (15 x 2 in PT gym)   Assist Device Roller Walker   Gait Pattern Inconsistant Vaishnavi;Slow Vaishnavi;Decreased foot clearance;Forward Flexion;Narrow FLORENTINO;Step to;Improper weight shift   Limitations Noted In Balance;Coordination;Device Management;Endurance;Midline Orientation;Posture;Safety;Speed;Strength   Provided Assistance with: Balance;Direction   Walk Assist Level Close Supervision   Findings Close supervision with RW on level surfaces.   Does the patient walk? 2. Yes   Wheel 50 Feet with Two Turns   Type of Assistance Needed Supervision   Physical Assistance Level No physical assistance   Comment Supervision with cues for direction   Wheel 50 Feet with Two Turns CARE Score 4   Wheelchair mobility   Does the patient use a wheelchair? 1. Yes   Type of Wheelchair Used 1. Manual   Method Right upper extremity;Left upper extremity;Right lower extremity;Left lower extremity   Assistance Provided For Locking Brakes;Obstacles   Distance Level Surface (feet) 95 ft   Distance Wheeled 3% Grade 12 ft   Findings Supervision with cues for direction   Curb or Single Stair   Style negotiated Single stair   Type of Assistance Needed Incidental touching   Physical Assistance Level No physical assistance   Comment CG with charly HR and reciprocal pattern   1 Step (Curb) CARE Score 4   4 Steps   Type of Assistance Needed Incidental touching   Physical Assistance Level No physical assistance   Comment CG with charly HR and reciprocal pattern   4 Steps CARE Score 4   12 Steps   Reason if not Attempted Safety concerns   12 Steps CARE Score 88   Stairs   Type Stairs   # of Steps 7   Weight Bearing Precautions WBAT   Assist Devices Bilateral Rail   Findings CG with charly HR and reciprocal pattern   Picking Up Object   Reason if not Attempted Safety concerns   Picking Up Object  CARE Score 88   Toilet Transfer   Comment Not needed during session   Therapeutic Interventions   Strengthening Supine TE   Balance Gait and transfer training   Other Stair negotiation   Equipment Use   [email or web address] Level 1, 13 minutes   Assessment   Treatment Assessment Pt agreeable to PT session this morning. Pain 1/10 in lower neck throughout session. Pt is independent for bed mobility. Supervision for transfers with RW. Close supervision with RW for ambulation on level surfaces for max distance of 15 ft. Supervision for WC mobility with cues for direction. CG for stair negotiation with charly HR and reciprocal pattern. TE for general LE strengthening. Gait and transfer training for increased balance, safety, and independence with functional mobility. pt returned to room in WC with alarms on and all needs within reach.   Problem List Decreased strength;Decreased range of motion;Decreased endurance;Impaired balance;Decreased mobility;Decreased cognition;Impaired judgement;Decreased safety awareness;Orthopedic restrictions;Pain   Barriers to Discharge Inaccessible home environment;Decreased caregiver support   PT Barriers   Physical Impairment Decreased strength;Decreased range of motion;Decreased endurance;Impaired balance;Decreased mobility;Decreased safety awareness;Pain;Orthopedic restrictions   Functional Limitation Car transfers;Ramp negotiation;Stair negotiation;Standing;Transfers;Walking;Wheelchair management   Plan   Treatment/Interventions Functional transfer training;LE strengthening/ROM;Elevations;Therapeutic exercise;Endurance training;Bed mobility;Gait training   Progress Progressing toward goals   PT Therapy Minutes   PT Time In 0800   PT Time Out 0900   PT Total Time (minutes) 60   PT Mode of treatment - Individual (minutes) 60   PT Mode of treatment - Concurrent (minutes) 0   PT Mode of treatment - Group (minutes) 0   PT Mode of treatment - Co-treat (minutes) 0   PT Mode of Treatment - Total time(minutes)  60 minutes   PT Cumulative Minutes 1650   Therapy Time missed   Time missed? No

## 2024-10-05 PROCEDURE — 97535 SELF CARE MNGMENT TRAINING: CPT

## 2024-10-05 PROCEDURE — 92526 ORAL FUNCTION THERAPY: CPT | Performed by: NURSE PRACTITIONER

## 2024-10-05 PROCEDURE — 97530 THERAPEUTIC ACTIVITIES: CPT | Performed by: PHYSICAL THERAPIST

## 2024-10-05 PROCEDURE — 97542 WHEELCHAIR MNGMENT TRAINING: CPT | Performed by: PHYSICAL THERAPIST

## 2024-10-05 PROCEDURE — 97530 THERAPEUTIC ACTIVITIES: CPT

## 2024-10-05 PROCEDURE — 97116 GAIT TRAINING THERAPY: CPT | Performed by: PHYSICAL THERAPIST

## 2024-10-05 PROCEDURE — 97112 NEUROMUSCULAR REEDUCATION: CPT | Performed by: PHYSICAL THERAPIST

## 2024-10-05 PROCEDURE — 97110 THERAPEUTIC EXERCISES: CPT | Performed by: PHYSICAL THERAPIST

## 2024-10-05 RX ADMIN — ACETAMINOPHEN 975 MG: 325 TABLET ORAL at 13:40

## 2024-10-05 RX ADMIN — DIGOXIN 125 MCG: 125 TABLET ORAL at 08:28

## 2024-10-05 RX ADMIN — ENOXAPARIN SODIUM 40 MG: 40 INJECTION SUBCUTANEOUS at 08:29

## 2024-10-05 RX ADMIN — ASPIRIN 81 MG: 81 TABLET, COATED ORAL at 08:28

## 2024-10-05 RX ADMIN — ATORVASTATIN CALCIUM 20 MG: 20 TABLET, FILM COATED ORAL at 17:15

## 2024-10-05 RX ADMIN — DOCUSATE SODIUM 100 MG: 100 CAPSULE, LIQUID FILLED ORAL at 08:28

## 2024-10-05 RX ADMIN — Medication 250 MG: at 17:15

## 2024-10-05 RX ADMIN — FLUTICASONE PROPIONATE 2 SPRAY: 50 SPRAY, METERED NASAL at 08:30

## 2024-10-05 RX ADMIN — Medication 250 MG: at 08:28

## 2024-10-05 RX ADMIN — LEVOTHYROXINE SODIUM 125 MCG: 25 TABLET ORAL at 05:07

## 2024-10-05 RX ADMIN — ACETAMINOPHEN 975 MG: 325 TABLET ORAL at 05:07

## 2024-10-05 RX ADMIN — FOLIC ACID 1000 MCG: 1 TABLET ORAL at 08:31

## 2024-10-05 RX ADMIN — SENNOSIDES 17.2 MG: 8.6 TABLET, FILM COATED ORAL at 21:04

## 2024-10-05 RX ADMIN — ACETAMINOPHEN 975 MG: 325 TABLET ORAL at 21:04

## 2024-10-05 RX ADMIN — DILTIAZEM HYDROCHLORIDE 180 MG: 180 CAPSULE, COATED, EXTENDED RELEASE ORAL at 08:27

## 2024-10-05 RX ADMIN — SERTRALINE 100 MG: 100 TABLET, FILM COATED ORAL at 08:28

## 2024-10-05 NOTE — NURSING NOTE
"C collar maintained at all times.  Precautions maintained.  Lungs with fine crackles heard right base.  Both Dr. Brown and Dr. DePadua vs and made aware.  Pt states occ cough for \"thick yellowish white mucus\".  In no distress.  OT adls this a.m.  Rates neck and  bilateral shoulder pain 3 out of 1-10 pain scale.  Refuses need for prn pain med.  Continue same plan of care.  Safety maintained at all times.    "

## 2024-10-05 NOTE — PROGRESS NOTES
10/05/24 0707   Pain Assessment   Pain Assessment Tool 0-10   Pain Score No Pain   Restrictions/Precautions   Precautions Aspiration;Bed/chair alarms;Cognitive;Spinal precautions;Supervision on toilet/commode   Weight Bearing Restrictions No   Braces or Orthoses C/S Collar   Eating   Type of Assistance Needed Set-up / clean-up   Eating CARE Score 5   Oral Hygiene   Type of Assistance Needed Set-up / clean-up   Comment Seated at sink   Oral Hygiene CARE Score 5   Grooming   Able To Initiate Tasks;Comb/Brush Hair;Wash/Dry Face;Brush/Clean Teeth;Wash/Dry Hands   Limitation Noted In Safety;Strength   Findings Seated at sink   Shower/Bathe Self   Type of Assistance Needed Supervision   Shower/Bathe Self CARE Score 4   Bathing   Assessed Bath Style Sponge Bath   Anticipated D/C Bath Style Shower;Sponge Bath   Able to Wash/Rinse/Dry (body part) Left Arm;Right Arm;L Upper Leg;R Upper Leg;L Lower Leg/Foot;R Lower Leg/Foot;Chest;Abdomen;Perineal Area;Buttocks   Limitations Noted in Balance;Endurance;Problem Solving;ROM;Safety;Strength  (spinal precautions with c collar)   Positioning Seated;Standing   Adaptive Equipment Longhand Sponge;Longhand Reacher   Tub/Shower Transfer   Reason Not Assessed Sponge Bath   Upper Body Dressing   Type of Assistance Needed Incidental touching   Upper Body Dressing CARE Score 4   Lower Body Dressing   Type of Assistance Needed Incidental touching   Comment Pt trialed use of reacher to doff and don breif and pants with increaed time and cues for sequencing/use of AD; Pt doffed socks with dressing stick/reacher and donned socks with sock aide with increased time   Lower Body Dressing CARE Score 4   Putting On/Taking Off Footwear   Type of Assistance Needed Physical assistance   Physical Assistance Level 25% or less   Comment Slippers to adjust heel   Putting On/Taking Off Footwear CARE Score 3   Dressing/Undressing Clothing   Remove UB Clothes Pullover Shirt   Don UB Clothes Pullover Shirt    Remove LB Clothes Pants;Undergarment;Socks;Shoes   Don LB Clothes Pants;Undergarment;Socks;Shoes   Limitations Noted In Balance;Endurance;Safety;Strength;ROM  (spinal precautions with c collar)   Adaptive Equipment Reacher;Dressing Stick;Sock Aide   Positioning Supported Sit;Standing   Lying to Sitting on Side of Bed   Type of Assistance Needed Incidental touching   Lying to Sitting on Side of Bed CARE Score 4   Sit to Stand   Type of Assistance Needed Supervision   Comment RW   Sit to Stand CARE Score 4   Toileting Hygiene   Type of Assistance Needed Supervision   Toileting Hygiene CARE Score 4   Toileting   Able to Pull Clothing down yes, up yes.   Manage Hygiene Bladder;Bowel   Limitations Noted In Balance;ROM;Safety;LE Strength  (spinal precautions with c collar)   Toilet Transfer   Type of Assistance Needed Supervision   Toilet Transfer CARE Score 4   Toilet Transfer   Surface Assessed Raised Toilet   Transfer Technique Standard   Limitations Noted In Balance;Endurance;ROM;UE Strength;LE Strength   Adaptive Equipment Grab Bar;Walker   Positioning Concerns Safety   Cognition   Overall Cognitive Status Impaired   Arousal/Participation Alert;Responsive;Cooperative   Attention Attends with cues to redirect   Additional Activities   Additional Activities Other (Comment)   Additional Activities Comments Functional mobility completed with RW within Pts room and bathroom with CGA   Assessment   Treatment Assessment Pt received resting in bed and agreeable to participation with OT session. Pt completed functional transfers with overall Supervision. ADL routine completed and Pt trialed and reviewed use of AD to help support increased independence; Increased time with use of AD however overall effective with cues. Pt would benefit from continued participation with OT services to address barriers and help support increased strength, balance and activity tolerence for use with self care routines and functional transfers  and to help facilitate safe discharge.   Plan   Treatment/Interventions ADL retraining;Functional transfer training;Therapeutic exercise;Endurance training;Cognitive reorientation;Patient/family training;Equipment eval/education;Bed mobility;Compensatory technique education   Progress Progressing toward goals   OT Therapy Minutes   OT Time In 0707   OT Time Out 0821   OT Total Time (minutes) 74   OT Mode of treatment - Individual (minutes) 74

## 2024-10-05 NOTE — NURSING NOTE
Pt awake, ambulated to BR supervision with RW. Pt used BR multiple times overnight. Tolerating HTL. Pt if often forgetful and will ask the same questions. Bed alarm intact for safety.

## 2024-10-05 NOTE — PROGRESS NOTES
10/05/24 1323   Pain Assessment   Pain Assessment Tool 0-10   Pain Score 3   Pain Location/Orientation Location: Neck   Restrictions/Precautions   Precautions Aspiration;Bed/chair alarms;Cognitive;Fall Risk;Pain;Supervision on toilet/commode   Braces or Orthoses C/S Collar   Cognitive Linguisitic Assessments   Cognitive Linquistic Quick Test (CLQT) MOCA completed on 9/16/24- pt scored 16/30   Comprehension   Comprehension (FIM) 5 - Needs help/cues, repetition only RARELY ( < 10% of the time)   Expression   Expression (FIM) 6 - Expresses complex/abstract but requires:  more time   Social Interaction   Social Interaction (FIM) 6 - Interacts appropriately with others BUT requires extra  time   Problem Solving   Problem solving (FIM) 5 - Solves basic problems 90% of time   Memory   Memory (FIM) 5 - Needs cueing reminders <10%   Memory Skills   Orientation Level Oriented X4   Consistencies Assessed and Performance   Materials Adminstered Comment Assessed with lunch tray of 1 scoop of mashed potatoes and honey thick liquids via spouted 2 handled cup. Increased coughing with mashed potatoes and honey thick liquids today coughed up minimal amount of what appeared to be mucous mixed with mashed potatoes into a napkin x2. Occasional wet vocal quality x2, vocal quality cleared with independent cough.   Eating   Type of Assistance Needed Set-up / clean-up   Physical Assistance Level No physical assistance   Eating CARE Score 5   Swallow Assessment   Swallow Treatment Assessment family training completed together with patient in the room and  on the phone. Pt's  did purchase thickener and specific instructions and education discussed.   SLP Therapy Minutes   SLP Time In 1323   SLP Time Out 1405   SLP Total Time (minutes) 42   SLP Mode of treatment - Individual (minutes) 42   SLP Mode of treatment - Concurrent (minutes) 0   SLP Mode of treatment - Group (minutes) 0   SLP Mode of treatment - Co-treat (minutes) 0    SLP Mode of Treatment - Total time(minutes) 42 minutes   SLP Cumulative Minutes 724

## 2024-10-05 NOTE — PROGRESS NOTES
"Progress Note - Marsha Oliva 79 y.o. female MRN: [id number]    Unit/Bed#: -01 Encounter: [id number]        Subjective:   Patient seen and examined at bedside after reviewing the chart and discussing the case with the caring staff.      Patient examined at bedside.  Patient reports no acute symptoms.      Physical Exam   Vitals: Blood pressure 110/68, pulse 74, temperature 97.7 °F (36.5 °C), temperature source Temporal, resp. rate 17, height 5' 3\" (1.6 m), weight 50.6 kg (111 lb 8.8 oz), SpO2 94%.,Body mass index is 19.76 kg/m².  Constitutional: Patient in no acute distress.  HEENT: PERR, EOMI, MMM.  Cardiovascular: Normal rate and regular rhythm.    Pulmonary/Chest: Effort normal and breath sounds normal.   Abdomen: Soft, + BS, NT.    Assessment/Plan:  Marsha Oliva is a(n) 79 y.o. female with cervical spinal cord compression.     Cardiac hx w/ HTN, HLD, chronic diastolic heart failure, chronic Afib.  Continue digoxin 125 mcg every other day, diltiazem 180 mg daily, atorvastatin 40 mg daily.  Daily weights.  Torsemide 20 mg daily as needed.  Pt cleared to restart ASA 81 mg daily by neurosurgery 9/19.    Hypothyroidism.  Continue levothyroxine 125 mcg daily (decr from 137 mcg 9/6).  B12 deficiency.  Patient on B12 1000 mcg weekly.   Folate deficiency.  Patient on folic acid 1,000 mcg daily.   Hypomagnesemia.  Patient on magnesium gluconate 250 mg twice daily.   Depression and anxiety.   Patient on Zoloft 100 mg daily.  Declines neuropsych at this time.   Multiple hyperkeratotic lesions.  Patient follows with Podiatry outpatient.  Wound care consult.  Podiatry saw patient 10/3.  Sore throat.  Chloraseptic throat spray as needed.   Malnutrition.  Dietician following.  Adult Malnutrition type: Acute illness.  Adult Degree of Malnutrition: Other severe protein calorie malnutrition. Malnutrition Characteristics: Inadequate energy, Weight loss.  Productive cough/dysphagia.  Patient high risk for aspiration " pneumonia.  CXR 9/23/24 without acute cardiopulmonary disease.    Cold sores involving the upper lip.  Resolving.  Cont to monitor.  Pain and bowel regimen.  As per physiatry.   Cervical spinal cord compression.  Vista collar at all times x 6 weeks.  Continue SQ Lovenox.  Patient receiving intensive PT OT ST as per physiatry.      Anticipated discharge date:  Monday 10/7/24

## 2024-10-05 NOTE — PROGRESS NOTES
10/05/24 1015   Pain Assessment   Pain Assessment Tool 0-10   Pain Score No Pain   Restrictions/Precautions   Precautions Aspiration;Bed/chair alarms;Cognitive;Spinal precautions;Supervision on toilet/commode   Braces or Orthoses C/S Collar   Cognition   Overall Cognitive Status Impaired  (Decreased memory and safety recall)   Orientation Level Oriented X4   Subjective   Subjective I think I'm going home soon.   Roll Left and Right   Comment Pt OOB   Sit to Lying   Comment Pt OOB   Lying to Sitting on Side of Bed   Comment Pt OOB   Sit to Stand   Type of Assistance Needed Supervision   Physical Assistance Level No physical assistance   Comment RW, cues for hand placement with standing TE   Sit to Stand CARE Score 4   Bed-Chair Transfer   Type of Assistance Needed Supervision   Physical Assistance Level No physical assistance   Comment RW   Chair/Bed-to-Chair Transfer CARE Score 4   Car Transfer   Comment en   Walk 10 Feet   Type of Assistance Needed Supervision   Physical Assistance Level No physical assistance   Comment RW   Walk 10 Feet CARE Score 4   Walk 50 Feet with Two Turns   Type of Assistance Needed Supervision   Physical Assistance Level No physical assistance   Comment RW   Walk 50 Feet with Two Turns CARE Score 4   Walk 150 Feet   Type of Assistance Needed Supervision   Physical Assistance Level No physical assistance   Comment RW   Walk 150 Feet CARE Score 4   Walking 10 Feet on Uneven Surfaces   Type of Assistance Needed Supervision   Physical Assistance Level No physical assistance   Comment VCs for RW over edge of mat   Walking 10 Feet on Uneven Surfaces CARE Score 4   Ambulation   Primary Mode of Locomotion Prior to Admission Walk   Distance Walked (feet) 168 ft  (168 ft, 25 ft)   Assist Device Roller Walker   Does the patient walk? 2. Yes   Wheel 50 Feet with Two Turns   Type of Assistance Needed Independent   Physical Assistance Level No physical assistance   Wheel 50 Feet with Two Turns CARE  Score 6   Wheel 150 Feet   Type of Assistance Needed Supervision   Physical Assistance Level No physical assistance   Wheel 150 Feet CARE Score 4   Wheelchair mobility   Does the patient use a wheelchair? 1. Yes   Type of Wheelchair Used 1. Manual   Method Right upper extremity;Left upper extremity;Right lower extremity;Left lower extremity   Curb or Single Stair   Style negotiated Single stair   Type of Assistance Needed Supervision   Physical Assistance Level No physical assistance   Comment B/L HRs   1 Step (Curb) CARE Score 4   4 Steps   Type of Assistance Needed Supervision   Physical Assistance Level No physical assistance   Comment B/L HRs, up 10; down 11   4 Steps CARE Score 4   12 Steps   Comment Pt reports feeling fatigued, unable to perform   Reason if not Attempted Safety concerns   12 Steps CARE Score 88   Stairs   Type Ramp   Weight Bearing Precautions WBAT   Assist Devices Roller Walker   Findings Cl S, RW   Toilet Transfer   Comment Not needed during session   Therapeutic Interventions   Strengthening Seated LE TE: 1.5# B/L 3x10 each for march, LAQ, HR, TR.  Hip ABd with green t-band; Hip ADd isometric.  Standing LE TE: AROM 1x10 B/L with UE support: HR, TR, Hip ABd, mini squats, hip extn standing at sink in OT kitchen   Assessment   Treatment Assessment Pt presents seated in wheelchair, motivated to improve and return home.  Pt reports feeling much stronger, but does know she will need to continue with daily exercise at home to maintain gains.  Pt continues to ambulate with flexed knees position.  In need of ongoing interventions to maximize return to PLOF.  Cont per POC.   Problem List Decreased strength;Decreased range of motion;Decreased endurance;Impaired balance;Decreased mobility;Decreased cognition;Impaired judgement;Decreased safety awareness;Orthopedic restrictions;Pain   PT Barriers   Physical Impairment Decreased strength;Decreased range of motion;Decreased endurance;Impaired  balance;Decreased mobility;Decreased safety awareness;Pain;Orthopedic restrictions   Functional Limitation Car transfers;Ramp negotiation;Stair negotiation;Standing;Transfers;Walking;Wheelchair management   Plan   Treatment/Interventions Functional transfer training;LE strengthening/ROM;Elevations;Therapeutic exercise;Endurance training;Cognitive reorientation;Patient/family training;Equipment eval/education;Bed mobility;Gait training   Progress Progressing toward goals   PT Therapy Minutes   PT Time In 1015   PT Time Out 1140   PT Total Time (minutes) 85   PT Mode of treatment - Individual (minutes) 40   PT Mode of treatment - Concurrent (minutes) 45   PT Mode of treatment - Group (minutes) 0   PT Mode of treatment - Co-treat (minutes) 0   PT Mode of Treatment - Total time(minutes) 85 minutes   PT Cumulative Minutes 1814     Patient remains OOB in chair, all needs in reach.  Alarm in place and activated.  Encouraged use of call bell, patient verbalizes understanding.

## 2024-10-06 PROCEDURE — 97537 COMMUNITY/WORK REINTEGRATION: CPT

## 2024-10-06 PROCEDURE — 97110 THERAPEUTIC EXERCISES: CPT

## 2024-10-06 PROCEDURE — 97530 THERAPEUTIC ACTIVITIES: CPT

## 2024-10-06 PROCEDURE — 97535 SELF CARE MNGMENT TRAINING: CPT

## 2024-10-06 PROCEDURE — 97116 GAIT TRAINING THERAPY: CPT

## 2024-10-06 RX ORDER — FLUTICASONE PROPIONATE 50 MCG
2 SPRAY, SUSPENSION (ML) NASAL DAILY
Qty: 16 G | Refills: 0 | Status: SHIPPED | OUTPATIENT
Start: 2024-10-07

## 2024-10-06 RX ORDER — ATORVASTATIN CALCIUM 20 MG/1
20 TABLET, FILM COATED ORAL DAILY
Qty: 30 TABLET | Refills: 0 | Status: SHIPPED | OUTPATIENT
Start: 2024-10-06

## 2024-10-06 RX ORDER — SERTRALINE HYDROCHLORIDE 100 MG/1
100 TABLET, FILM COATED ORAL DAILY
Qty: 30 TABLET | Refills: 0 | Status: SHIPPED | OUTPATIENT
Start: 2024-10-06

## 2024-10-06 RX ORDER — LEVOTHYROXINE SODIUM 125 UG/1
125 TABLET ORAL
Qty: 30 TABLET | Refills: 0 | Status: SHIPPED | OUTPATIENT
Start: 2024-10-07

## 2024-10-06 RX ORDER — DILTIAZEM HYDROCHLORIDE 180 MG/1
180 CAPSULE, COATED, EXTENDED RELEASE ORAL DAILY
Qty: 30 CAPSULE | Refills: 0 | Status: SHIPPED | OUTPATIENT
Start: 2024-10-06

## 2024-10-06 RX ORDER — MAGNESIUM GLUCONATE 30 MG(550)
30 TABLET ORAL 2 TIMES DAILY
Qty: 240 TABLET | Refills: 0 | Status: SHIPPED | OUTPATIENT
Start: 2024-10-06

## 2024-10-06 RX ORDER — DIGOXIN 125 MCG
125 TABLET ORAL EVERY OTHER DAY
Qty: 30 TABLET | Refills: 0 | Status: SHIPPED | OUTPATIENT
Start: 2024-10-06

## 2024-10-06 RX ORDER — TORSEMIDE 20 MG/1
20 TABLET ORAL DAILY PRN
Qty: 30 TABLET | Refills: 0 | Status: SHIPPED | OUTPATIENT
Start: 2024-10-06

## 2024-10-06 RX ORDER — FOLIC ACID 1 MG/1
1000 TABLET ORAL DAILY
Qty: 30 TABLET | Refills: 0 | Status: SHIPPED | OUTPATIENT
Start: 2024-10-06

## 2024-10-06 RX ORDER — GUAIFENESIN/DEXTROMETHORPHAN 100-10MG/5
10 SYRUP ORAL EVERY 4 HOURS PRN
Qty: 118 ML | Refills: 0 | Status: SHIPPED | OUTPATIENT
Start: 2024-10-06

## 2024-10-06 RX ORDER — CALCIUM CARBONATE 500 MG/1
1000 TABLET, CHEWABLE ORAL DAILY PRN
Qty: 60 TABLET | Refills: 0 | Status: SHIPPED | OUTPATIENT
Start: 2024-10-06

## 2024-10-06 RX ADMIN — Medication 250 MG: at 17:14

## 2024-10-06 RX ADMIN — DOCUSATE SODIUM 100 MG: 100 CAPSULE, LIQUID FILLED ORAL at 17:14

## 2024-10-06 RX ADMIN — ENOXAPARIN SODIUM 40 MG: 40 INJECTION SUBCUTANEOUS at 08:52

## 2024-10-06 RX ADMIN — Medication 250 MG: at 08:52

## 2024-10-06 RX ADMIN — ACETAMINOPHEN 975 MG: 325 TABLET ORAL at 14:10

## 2024-10-06 RX ADMIN — LEVOTHYROXINE SODIUM 125 MCG: 25 TABLET ORAL at 05:27

## 2024-10-06 RX ADMIN — ACETAMINOPHEN 975 MG: 325 TABLET ORAL at 05:27

## 2024-10-06 RX ADMIN — SERTRALINE 100 MG: 100 TABLET, FILM COATED ORAL at 08:52

## 2024-10-06 RX ADMIN — ACETAMINOPHEN 975 MG: 325 TABLET ORAL at 21:02

## 2024-10-06 RX ADMIN — FOLIC ACID 1000 MCG: 1 TABLET ORAL at 08:52

## 2024-10-06 RX ADMIN — FLUTICASONE PROPIONATE 2 SPRAY: 50 SPRAY, METERED NASAL at 08:52

## 2024-10-06 RX ADMIN — ASPIRIN 81 MG: 81 TABLET, COATED ORAL at 08:52

## 2024-10-06 RX ADMIN — ATORVASTATIN CALCIUM 20 MG: 20 TABLET, FILM COATED ORAL at 17:14

## 2024-10-06 RX ADMIN — DILTIAZEM HYDROCHLORIDE 180 MG: 180 CAPSULE, COATED, EXTENDED RELEASE ORAL at 08:52

## 2024-10-06 RX ADMIN — DOCUSATE SODIUM 100 MG: 100 CAPSULE, LIQUID FILLED ORAL at 08:52

## 2024-10-06 RX ADMIN — SENNOSIDES 17.2 MG: 8.6 TABLET, FILM COATED ORAL at 21:02

## 2024-10-06 NOTE — PROGRESS NOTES
10/06/24 0644   Pain Assessment   Pain Assessment Tool 0-10   Pain Score 3   Pain Location/Orientation Location: Neck;Orientation: Bilateral;Location: Shoulder   Restrictions/Precautions   Precautions Aspiration;Bed/chair alarms;Cognitive;Spinal precautions;Supervision on toilet/commode   ROM Restrictions   (c-spine precautions)   Braces or Orthoses C/S Collar   Cognition   Overall Cognitive Status Impaired   Arousal/Participation Alert;Responsive;Cooperative   Attention Attends with cues to redirect   Orientation Level Oriented X4   Memory Decreased short term memory;Decreased recall of recent events;Decreased recall of precautions   Following Commands Follows one step commands with increased time or repetition   Comments easily distracted; frequent cues to attend to task   Roll Left and Right   Type of Assistance Needed Independent   Roll Left and Right CARE Score 6   Sit to Lying   Type of Assistance Needed Independent   Comment bed rail   Sit to Lying CARE Score 6   Lying to Sitting on Side of Bed   Type of Assistance Needed Independent   Comment bed rail   Lying to Sitting on Side of Bed CARE Score 6   Sit to Stand   Type of Assistance Needed Supervision;Verbal cues   Comment RW   Sit to Stand CARE Score 4   Bed-Chair Transfer   Type of Assistance Needed Supervision;Verbal cues   Comment RW   Chair/Bed-to-Chair Transfer CARE Score 4   Walk 10 Feet   Type of Assistance Needed Supervision;Verbal cues   Comment S level and unlevel surfaces with RW   Walk 10 Feet CARE Score 4   Walk 50 Feet with Two Turns   Type of Assistance Needed Supervision;Verbal cues   Comment S level and unlevel surfaces with RW   Walk 50 Feet with Two Turns CARE Score 4   Walk 150 Feet   Type of Assistance Needed Supervision;Verbal cues   Comment S level and unlevel surfaces with RW   Walk 150 Feet CARE Score 4   Walking 10 Feet on Uneven Surfaces   Type of Assistance Needed Supervision;Verbal cues   Comment S level and unlevel surfaces  with RW   Walking 10 Feet on Uneven Surfaces CARE Score 4   Ambulation   Primary Mode of Locomotion Prior to Admission Walk   Distance Walked (feet) 153 ft  (15' x 2 (in and out of bathroom) 134' 121' 90' 153')   Assist Device Roller Walker   Gait Pattern Inconsistant Vaishnavi;Slow Vaishnavi;Decreased foot clearance;Forward Flexion;Narrow FLORENTINO;Step to;Improper weight shift   Limitations Noted In Balance;Coordination;Endurance;Safety;Strength   Provided Assistance with: Balance   Walk Assist Level Supervision   Findings S level and unlevel surfaces with RW   Does the patient walk? 2. Yes   Curb or Single Stair   Style negotiated Single stair   Type of Assistance Needed Supervision;Verbal cues   Comment S 7 steps with 2 handrails   1 Step (Curb) CARE Score 4   4 Steps   Type of Assistance Needed Supervision;Verbal cues   Comment S 7 steps with 2 handrails   4 Steps CARE Score 4   12 Steps   Comment patient has 4 steps to enter/exit at home then first floor set up   Reason if not Attempted Activity not applicable   12 Steps CARE Score 9   Stairs   Type Stairs;Ramp   # of Steps 7   Weight Bearing Precautions WBAT;Fall Risk  (c-spine precautions)   Assist Devices Bilateral Rail;Roller Walker   Findings S 7 steps with 2 handrails; close S ramp with walker   Toilet Transfer   Type of Assistance Needed Supervision   Comment RW/grab bar   Toilet Transfer CARE Score 4   Therapeutic Interventions   Strengthening supine ther ex   Balance gait and transfer training   Other ramp and stair negotiation   Assessment   Treatment Assessment Patient agreeable to therapy session.  Pain in neck and B/L shoulders 3/10.   Cues for general safety.   Excited for d/c tomorrow.   Completed ther ex for general LE strengthening; gait and transfer training focusing on sequence and technique for improved balance and safety with functional mobility using walker.   Negotiated ramp close S with walker; S 7 steps with B/L handrails.  Patient returned to  room with alarms on and all needs within reach.   PT Barriers   Physical Impairment Decreased strength;Decreased range of motion;Decreased endurance;Impaired balance;Decreased mobility;Decreased safety awareness;Pain;Orthopedic restrictions   Functional Limitation Car transfers;Ramp negotiation;Stair negotiation;Standing;Transfers;Walking;Wheelchair management   Plan   Treatment/Interventions Functional transfer training;LE strengthening/ROM;Elevations;Therapeutic exercise;Bed mobility;Gait training   Progress Progressing toward goals   PT Therapy Minutes   PT Time In 0644   PT Time Out 0814   PT Total Time (minutes) 90   PT Mode of treatment - Individual (minutes) 90   PT Mode of treatment - Concurrent (minutes) 0   PT Mode of treatment - Group (minutes) 0   PT Mode of treatment - Co-treat (minutes) 0   PT Mode of Treatment - Total time(minutes) 90 minutes   PT Cumulative Minutes 1904   Therapy Time missed   Time missed? No

## 2024-10-06 NOTE — PROGRESS NOTES
10/06/24 1118   Pain Assessment   Pain Assessment Tool 0-10   Pain Score 3   Pain Location/Orientation Location: Neck   Hospital Pain Intervention(s) Repositioned;Rest   Restrictions/Precautions   Precautions Aspiration;Bed/chair alarms;Cognitive;Fall Risk;Spinal precautions;Supervision on toilet/commode   Braces or Orthoses C/S Collar   Eating   Type of Assistance Needed Set-up / clean-up;Verbal cues;Adaptive equipment   Comment 2 handled spouted cup;reinforced strategies   Eating CARE Score 4   Eating Assessment   Swallow Precautions Yes   Food To Mouth Yes   Positioning Out of Bed;No Straws;Upright   Safety Needs Increase Time;Cues   Meal Assessed Lunch   Intake Mode PO   Putting On/Taking Off Footwear   Type of Assistance Needed Set-up / clean-up   Physical Assistance Level No physical assistance   Putting On/Taking Off Footwear CARE Score 5   Sit to Lying   Type of Assistance Needed Independent   Physical Assistance Level No physical assistance   Sit to Lying CARE Score 6   Lying to Sitting on Side of Bed   Type of Assistance Needed Independent   Physical Assistance Level No physical assistance   Lying to Sitting on Side of Bed CARE Score 6   Sit to Stand   Type of Assistance Needed Supervision;Verbal cues   Physical Assistance Level No physical assistance   Sit to Stand CARE Score 4   Exercise Tools   Hand Gripper 5# dig x 30 B hands   Other Exercise Tool 1 1# wt therex 21 reps  BUE shoulder chest press, flexion/ extension, abd/ add; elbow flexion/ extension, supination/ pronation and wrist flexion/ extension   Cognition   Overall Cognitive Status Impaired   Arousal/Participation Alert;Cooperative   Attention Attends with cues to redirect   Orientation Level Oriented X4   Memory Decreased short term memory;Decreased recall of recent events;Decreased recall of precautions   Following Commands Follows one step commands with increased time or repetition   Activity Tolerance   Activity Tolerance Patient limited  by fatigue   Assessment   Treatment Assessment Upon entry to room pt resting in bed and easily aroused. OT noted pt had very large plastic bag on chair with items from her room inside it. Pt reporting she has been packing up things to prepare for home d/c tomorrow when in w/c. . Bag very disorganized. Pt agreed to sit EOB and OT assisted her to reorganize and sort through items and place into a smaller canvas bag. Pt then provided with all clothes from the closet and she was instr to determine what outfit she wanted for tomorrow and she then placed other items into bag. Pt questioned schedule for d/c. OT wrote it out and hung it on white board for pt to reference. Pt then in supine at her request to complete B UE TE for strength and endurance. Pt tolerated fairly. Once lunch meal arrived pt encouraged to sit EOB to manage meal. Strategies reinforced for hard swallow and alternating bites/sips. C collar donned for entire session. Pt continues with barriers of spinal precautions, impaired cognition, decreased safety and balance and generalized weakness. Plan is for d/c to home tomorrow.   Prognosis Good   Problem List Decreased strength;Impaired balance;Decreased mobility;Decreased endurance;Decreased cognition;Impaired judgement;Decreased safety awareness;Pain;Orthopedic restrictions   Plan   Treatment/Interventions ADL retraining;Functional transfer training;Therapeutic exercise;Endurance training;Cognitive reorientation;Patient/family training;Equipment eval/education;Bed mobility;Compensatory technique education;Spoke to nursing;OT   Progress Progressing toward goals   OT Therapy Minutes   OT Time In 1118   OT Time Out 1248   OT Total Time (minutes) 90   OT Mode of treatment - Individual (minutes) 90   OT Mode of treatment - Concurrent (minutes) 0   OT Mode of treatment - Group (minutes) 0   OT Mode of treatment - Co-treat (minutes) 0   OT Mode of Treatment - Total time(minutes) 90 minutes   OT Cumulative Minutes  203   Therapy Time missed   Time missed? No

## 2024-10-06 NOTE — NURSING NOTE
Pt awake, ambulated to BR supervision with RW. C-collar in place and aligned. Cont b&b overnight pull up in place for protection. Tolerating HTL. Productive cough remains same. SCDs overnight. Continuing to montior and follow plan of care bed alarm intact for safety.

## 2024-10-06 NOTE — PROGRESS NOTES
"Progress Note - Marsha Oliva 79 y.o. female MRN: [id number]    Unit/Bed#: -01 Encounter: [id number]        Subjective:   Patient seen and examined at bedside after reviewing the chart and discussing the case with the caring staff.      Patient examined at bedside.  Patient reports no acute symptoms.      Physical Exam   Vitals: Blood pressure 121/73, pulse 100, temperature 98 °F (36.7 °C), temperature source Temporal, resp. rate 18, height 5' 3\" (1.6 m), weight 52.3 kg (115 lb 4.8 oz), SpO2 96%.,Body mass index is 20.42 kg/m².  Constitutional: Patient in no acute distress.  HEENT: PERR, EOMI, MMM.  Cardiovascular: Normal rate and regular rhythm.    Pulmonary/Chest: Effort normal and breath sounds normal.   Abdomen: Soft, + BS, NT.    Assessment/Plan:  Marsha Oliva is a(n) 79 y.o. female with cervical spinal cord compression.     Cardiac hx w/ HTN, HLD, chronic diastolic heart failure, chronic Afib.  Continue digoxin 125 mcg every other day, diltiazem 180 mg daily, atorvastatin 40 mg daily.  Daily weights.  Torsemide 20 mg daily as needed.  Pt cleared to restart ASA 81 mg daily by neurosurgery 9/19.    Hypothyroidism.  Continue levothyroxine 125 mcg daily (decr from 137 mcg 9/6).  B12 deficiency.  Patient on B12 1000 mcg weekly.   Folate deficiency.  Patient on folic acid 1,000 mcg daily.   Hypomagnesemia.  Patient on magnesium gluconate 250 mg twice daily.   Depression and anxiety.   Patient on Zoloft 100 mg daily.  Declines neuropsych at this time.   Multiple hyperkeratotic lesions.  Patient follows with Podiatry outpatient.  Wound care consult.  Podiatry saw patient 10/3.  Sore throat.  Chloraseptic throat spray as needed.   Malnutrition.  Dietician following.  Adult Malnutrition type: Acute illness.  Adult Degree of Malnutrition: Other severe protein calorie malnutrition. Malnutrition Characteristics: Inadequate energy, Weight loss.  Productive cough/dysphagia.  Patient high risk for aspiration " pneumonia.  CXR 9/23/24 without acute cardiopulmonary disease.    Cold sores involving the upper lip.  Resolving.  Cont to monitor.  Pain and bowel regimen.  As per physiatry.   Cervical spinal cord compression.  Vista collar at all times x 6 weeks.  Continue SQ Lovenox.  Patient receiving intensive PT OT ST as per physiatry.      Anticipated discharge date:  Monday 10/7/24

## 2024-10-06 NOTE — NURSING NOTE
Patient is awake and alert. Cervical collar in place and in proper alignment. Offers complaints of mild discomfort 2/10 in neck/shoulders adequately managed with routine tylenol per order. Did no require PRN pain medications for same.  Participated in therapy sessions and tolerated same well.  Safety maintained. Call bell in reach at bedside.

## 2024-10-06 NOTE — DISCHARGE SUMMARY
Discharge Summary   Marsha Oliva 79 y.o. female MRN: [id number]  Unit/Bed#: [facility] 219-01 Encounter: [id number]    Admission Date: 9/11/2024     Discharge Date:  10/7/2024    Etiologic/Rehabilitation Diagnosis: Impairment of mobility, safety and Activities of Daily Living (ADLs) due to traumatic cervical spinal cord compression    HPI:  Marsha Oliva is a(n) 79 y.o. year old female who is admitted to [facility].  Patient originally presented to [facility] ED 9/4 with chief complaint of weakness and neck pain s/p fall 4-6 weeks prior. CT C-spine showed Type II odontoid fracture with posterior displacement including prominent posterior displacement of C1 with respect to the C2 vertebral body causing a component of cord compression of the medullary cervical spinal cord.  CT head negative.  Patient transferred to [facility] for neurosurgery consult.  MRI confirmed need for OR.  S/p 9/5 B/L C1 and C3 lateral mass screw placement, B/L C2 pedicle screw placement, arthrodesis C1-3 with autogolous bone graft and Chay putty.  Postop x-ray showed good hardware placement and alignment. Hemovac was discontinued 9/9.  Vista collar at all times x 6 weeks.  Patient evaluated by SLP for dysphagia.  Patient on dysphagia 1 pureed diet with honey thick liquids.  No other complications.  PT OT consulted and recommended patient for acute inpatient rehab.  During the course of her stay at inpatient acute rehab unit patient received treatment for the following medical conditions.     Cardiac hx w/ HTN, HLD, chronic diastolic heart failure, chronic Afib.  Continue digoxin 125 mcg every other day, diltiazem 180 mg daily, atorvastatin 40 mg daily.  Daily weights.  Torsemide 20 mg daily as needed.  Pt cleared to restart ASA 81 mg daily by neurosurgery 9/19.    Hypothyroidism.  Continue levothyroxine 125 mcg daily (decr from 137 mcg 9/6).  B12 deficiency.  Patient on B12 1000 mcg weekly.   Folate deficiency.  Patient on folic  acid 1,000 mcg daily.   Hypomagnesemia.  Patient on magnesium gluconate 250 mg twice daily.   Depression and anxiety.   Patient on Zoloft 100 mg daily.  Declines neuropsych at this time.   Multiple hyperkeratotic lesions.  Patient follows with Podiatry outpatient.  Wound care consult.  Podiatry saw patient 10/3.  Sore throat.  Chloraseptic throat spray as needed.   Malnutrition.  Dietician following.  Adult Malnutrition type: Acute illness.  Adult Degree of Malnutrition: Other severe protein calorie malnutrition. Malnutrition Characteristics: Inadequate energy, Weight loss.  Productive cough/dysphagia.  Patient high risk for aspiration pneumonia.  CXR 9/23/24 without acute cardiopulmonary disease.    Cold sores involving the upper lip.  Resolving.  Cont to monitor.  Pain and bowel regimen.  As per physiatry.   Cervical spinal cord compression.  Vista collar at all times x 6 weeks.  Continue SQ Lovenox.  Patient receiving intensive PT OT ST as per physiatry.     No new Assessment & Plan notes have been filed under this hospital service since the last note was generated.  Service: Geriatric Medicine    Physiatry Additions/Comorbidities:    Spinal cord compression (HCC)  Presented  to acute care on 9/4/24 with progressive neck pain and weakness.    She reports 4-6 weeks prior she sustained a fall off her bed, head hitting her nightstand.    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   MRI C spine showing displaced type 2 dens fracture with mild mass effect on cord, ALL and PLL injuries.    CTA head and neck negative for vascular injuries.    Patient seen by Neurosurgery and deemed an operative candidate.    Patient taken to the OR by Dr. Dewitt for a bilateral C1 and C3 lateral mass screw placement, bialteral C2 pedicle screw palcement, C1-C3 fusion.    Post operatively, placed in a Aspen C Collar at all times, except  "showering with Shonda collar.    Post op X-rays with stable alignment     Plan in ARC:  Begin ARC program with PT, OT, SLP  Monitor incision  Monitor pain  Monitor neuro exam  Follow-up with Neurosurgery in 2 weeks.  2 week post op visit completed with DOLORES virtually - \"Will plan to follow-up with the pt as scheduled for her 6 week POV with repeat upright xrays\"    Oropharyngeal dysphagia  New issues  VFSS study completed on 9/10/24 and patient found to have mild oral and moderate phayngeal dysphagia.    Cleared for a pureed diet with honey thick liquids.  After discussion with staff may consider repeating swallow study after discussion with speech therapy as patient still having moist cough during meals.  Patient states at home she will take a bite or 2 at a time and eat her meals over very very long periods of time but with her direct supervision requirements at this time that is not possible.  SLP to follow  Continue aspiration precautions   Recommendations at this time for a PEG tube placement.  This is due to her continued weight loss, limited p.o. intake.  At this time discussion with the patient and  continue and will follow-up with gastroenterology as an outpatient for consideration if she is agreeable.  Risks for aspiration pneumonia including the ultimate risk of death/hospitalization discussed with the patient today. Has made some recent improvements and will continue to see if maintains upward trajectory. May still consider further outpatient eval for consideration pending.    Acute Rehabilitation Center Course: Patient participated in a comprehensive interdisciplinary inpatient rehabilitation program which included involvment of Rehab MD, therapies (PT, OT, and/or SLP), RN, CM    Significant Findings, Care, Treatment and Services Provided: Acute comprehensive interdisciplinary inpatient rehabilitation including PT, OT, SLP, RN, CM.    Functional Status Upon Admission to [facility]:  Physical Therapy: " Mod-Max A for bed mobility, transfers, ambulation 12 feet with RW  Occupational Therapy: Mod A with grooming, Mod A with UB ADLs, Mod-Max A for LB ADLs, Max A Toileting  Speech Therapy: mild oral and moderate pharyngeal dysphagia - currently on Dysphagia 1 diet with HTL    Functional Status Upon Discharge from ARC:   Physical Therapy Occupational Therapy Speech Therapy   Weight Bearing Status: Full Weight Bearing  Transfers: Supervision  Bed Mobility: Independent  Amulation Distance (ft): 165 feet  Ambulation: Incidental Touching  Assistive Device for Ambulation: Roller Walker  Wheelchair Mobility Distance: 25 ft  Wheelchair Mobility: Supervision  Number of Stairs: 7  Assistive Device for Stairs: Bilateral Hand Rails  Stair Assistance: Incidental Touching  Ramp: Incidental Touching  Assistive Device for Ramp: Roller Walker  Discharge Recommendations: Home with: (vs alternate placement)  DC Home with:: 24 Hour Assisteance, Family Support, First Floor Setup, Home Physical Therapy   Eating: Supervision  Grooming: Supervision  Bathing: Incidental Touching  Bathing: Incidental Touching  Upper Body Dressing: Supervision  Lower Body Dressing: Incidental Touching, Minimal Assistance  Toileting: Incidental Touching, Supervision  Tub/Shower Transfer: Minimal Assistance  Toilet Transfer: Supervision, Incidental Touching  Cognition: Exceptions to WNL  Cognition: Decreased Memory, Decreased Attention, Decreased Safety, Decreased Comprehension  Orientation: Person, Place, Time, Situation   Mode of Communication: Verbal  Cognition: Exceptions to WNL  Cognition: Decreased Memory, Decreased Executive Functions, Decreased Attention, Decreased Comprehension  Orientation: Person, Place, Time, Situation  Swallowing: Exceptions to WNL  Swallowing: Pharyngeal Dysphagia, Oral Dysphagia  Diet Recommendations: Level 1/Puree, Honey Thick  Discharge Recommendations: Home with:  DC Home with:: Family Support, Home Speech Therapy (close  supervision 2' dysphagia severity and cognitive deficits)         Discharge Diagnosis: Impairment of mobility, safety and Activities of Daily Living (ADLs) due to Spinal Cord Dysfunction:  Traumatic:  04.2211  Quadriplegia, Incomplete C1-4    Discharge Medications:   See after visit summary for reconciled discharge medications provided to patient and family.      Condition at Discharge: fair     Discharge instructions/Information to patient and family:   See after visit summary for information provided to patient and family.      Provisions for Follow-Up Care:  See after visit summary for information related to follow-up care and any pertinent home health orders.      Future Appointments   Date Time Provider Department Center   10/24/2024  9:30 AM Eduardo Dewitt MD [facility]       Disposition: Home    Planned Readmission: No    Discharge Statement   I spent 45 minutes discharging the patient. This time was spent on the day of discharge. I had direct contact with the patient on the day of discharge. Greater than 50% of the total time was spent examining patient, answering all patient questions, arranging and discussing plan of care with patient as well as directly providing post-discharge instructions. Additional time then spent on discharge activities.    Discharge Medications:  See after visit summary for reconciled discharge medications provided to patient and family.      Facility Administered Medications Prior to Discharge:    Current Facility-Administered Medications   Medication Dose Route Frequency Provider Last Rate    acetaminophen  975 mg Oral Q8H CHRIS Sharmin Waggoner PA-C      Artificial Tears  2 drop Both Eyes Q3H PRN JAQUELINE Amos      aspirin  81 mg Oral Daily Corine Park MD      atorvastatin  20 mg Oral Daily With Dinner Sharmin Waggoner PA-C      bisacodyl  10 mg Rectal Daily PRN Sharmin Waggoner PA-C      bisacodyl  10 mg Rectal Once Corine  Joss Park MD      calcium carbonate  1,000 mg Oral Daily PRN Sharmin Waggoner PA-C      cyanocobalamin  1,000 mcg Oral Weekly Sharmin Waggoner PA-C      dextromethorphan-guaiFENesin  10 mL Oral Q4H PRN Tj Darling MD      digoxin  125 mcg Oral Every Other Day Sharmin Waggoner PA-C      diltiazem  180 mg Oral Daily Sharmin Waggoner PA-C      docusate sodium  100 mg Oral BID Corine Park MD      enoxaparin  40 mg Subcutaneous Q24H [facility] Corine Park MD      fluticasone  2 spray Each Nare Daily JAQUELINE Amos      folic acid  1,000 mcg Oral Daily Sharmin Waggoner PA-C      lactulose  20 g Oral Daily PRN Jermaine Greenberg DO      levothyroxine  125 mcg Oral Early Morning Sharmin Waggoner PA-C      magnesium gluconate  250 mg Oral BID Sharmin Waggoner PA-C      ondansetron  4 mg Oral Q6H PRN Sharmin Waggoner PA-C      oxyCODONE  2.5 mg Oral Q4H PRN Sharmin Waggoner PA-C      Or    oxyCODONE  5 mg Oral Q4H PRN Sharmin Waggoner PA-C      phenol  1 spray Mouth/Throat Q2H PRN Sharmin Waggoner PA-C      senna  2 tablet Oral HS Corine Park MD      sertraline  100 mg Oral Daily Sharmin Waggoner PA-C      torsemide  20 mg Oral Daily PRN Sharmin Waggoner PA-C

## 2024-10-07 VITALS
DIASTOLIC BLOOD PRESSURE: 70 MMHG | WEIGHT: 113.1 LBS | OXYGEN SATURATION: 94 % | TEMPERATURE: 97.1 F | HEIGHT: 63 IN | RESPIRATION RATE: 16 BRPM | HEART RATE: 58 BPM | BODY MASS INDEX: 20.04 KG/M2 | SYSTOLIC BLOOD PRESSURE: 119 MMHG

## 2024-10-07 LAB
ALBUMIN SERPL BCG-MCNC: 3.4 G/DL (ref 3.5–5)
ALP SERPL-CCNC: 76 U/L (ref 34–104)
ALT SERPL W P-5'-P-CCNC: 9 U/L (ref 7–52)
ANION GAP SERPL CALCULATED.3IONS-SCNC: 7 MMOL/L (ref 4–13)
AST SERPL W P-5'-P-CCNC: 22 U/L (ref 13–39)
BASOPHILS # BLD AUTO: 0.02 THOUSANDS/ΜL (ref 0–0.1)
BASOPHILS NFR BLD AUTO: 0 % (ref 0–1)
BILIRUB SERPL-MCNC: 0.47 MG/DL (ref 0.2–1)
BUN SERPL-MCNC: 11 MG/DL (ref 5–25)
CALCIUM ALBUM COR SERPL-MCNC: 9.4 MG/DL (ref 8.3–10.1)
CALCIUM SERPL-MCNC: 8.9 MG/DL (ref 8.4–10.2)
CHLORIDE SERPL-SCNC: 104 MMOL/L (ref 96–108)
CO2 SERPL-SCNC: 30 MMOL/L (ref 21–32)
CREAT SERPL-MCNC: 0.66 MG/DL (ref 0.6–1.3)
EOSINOPHIL # BLD AUTO: 0.08 THOUSAND/ΜL (ref 0–0.61)
EOSINOPHIL NFR BLD AUTO: 1 % (ref 0–6)
ERYTHROCYTE [DISTWIDTH] IN BLOOD BY AUTOMATED COUNT: 14.4 % (ref 11.6–15.1)
GFR SERPL CREATININE-BSD FRML MDRD: 84 ML/MIN/1.73SQ M
GLUCOSE P FAST SERPL-MCNC: 82 MG/DL (ref 65–99)
GLUCOSE SERPL-MCNC: 82 MG/DL (ref 65–140)
HCT VFR BLD AUTO: 41.6 % (ref 34.8–46.1)
HGB BLD-MCNC: 12.4 G/DL (ref 11.5–15.4)
IMM GRANULOCYTES # BLD AUTO: 0.03 THOUSAND/UL (ref 0–0.2)
IMM GRANULOCYTES NFR BLD AUTO: 0 % (ref 0–2)
LYMPHOCYTES # BLD AUTO: 1.34 THOUSANDS/ΜL (ref 0.6–4.47)
LYMPHOCYTES NFR BLD AUTO: 15 % (ref 14–44)
MCH RBC QN AUTO: 30.6 PG (ref 26.8–34.3)
MCHC RBC AUTO-ENTMCNC: 29.8 G/DL (ref 31.4–37.4)
MCV RBC AUTO: 103 FL (ref 82–98)
MONOCYTES # BLD AUTO: 0.54 THOUSAND/ΜL (ref 0.17–1.22)
MONOCYTES NFR BLD AUTO: 6 % (ref 4–12)
NEUTROPHILS # BLD AUTO: 7 THOUSANDS/ΜL (ref 1.85–7.62)
NEUTS SEG NFR BLD AUTO: 78 % (ref 43–75)
NRBC BLD AUTO-RTO: 0 /100 WBCS
PLATELET # BLD AUTO: 312 THOUSANDS/UL (ref 149–390)
PMV BLD AUTO: 8.9 FL (ref 8.9–12.7)
POTASSIUM SERPL-SCNC: 4.1 MMOL/L (ref 3.5–5.3)
PROT SERPL-MCNC: 6.5 G/DL (ref 6.4–8.4)
RBC # BLD AUTO: 4.05 MILLION/UL (ref 3.81–5.12)
SODIUM SERPL-SCNC: 141 MMOL/L (ref 135–147)
WBC # BLD AUTO: 9.01 THOUSAND/UL (ref 4.31–10.16)

## 2024-10-07 PROCEDURE — 97110 THERAPEUTIC EXERCISES: CPT | Performed by: PHYSICAL THERAPIST

## 2024-10-07 PROCEDURE — 97530 THERAPEUTIC ACTIVITIES: CPT

## 2024-10-07 PROCEDURE — 99232 SBSQ HOSP IP/OBS MODERATE 35: CPT | Performed by: STUDENT IN AN ORGANIZED HEALTH CARE EDUCATION/TRAINING PROGRAM

## 2024-10-07 PROCEDURE — 85025 COMPLETE CBC W/AUTO DIFF WBC: CPT

## 2024-10-07 PROCEDURE — 97116 GAIT TRAINING THERAPY: CPT | Performed by: PHYSICAL THERAPIST

## 2024-10-07 PROCEDURE — 92526 ORAL FUNCTION THERAPY: CPT | Performed by: NURSE PRACTITIONER

## 2024-10-07 PROCEDURE — 97535 SELF CARE MNGMENT TRAINING: CPT

## 2024-10-07 PROCEDURE — 97530 THERAPEUTIC ACTIVITIES: CPT | Performed by: PHYSICAL THERAPIST

## 2024-10-07 PROCEDURE — 80053 COMPREHEN METABOLIC PANEL: CPT

## 2024-10-07 RX ORDER — SENNOSIDES 8.6 MG
17.2 TABLET ORAL
Qty: 60 TABLET | Refills: 0 | Status: SHIPPED | OUTPATIENT
Start: 2024-10-07

## 2024-10-07 RX ORDER — ACETAMINOPHEN 325 MG/1
650 TABLET ORAL EVERY 6 HOURS PRN
Start: 2024-10-07

## 2024-10-07 RX ADMIN — DOCUSATE SODIUM 100 MG: 100 CAPSULE, LIQUID FILLED ORAL at 09:05

## 2024-10-07 RX ADMIN — DIGOXIN 125 MCG: 125 TABLET ORAL at 09:08

## 2024-10-07 RX ADMIN — ACETAMINOPHEN 975 MG: 325 TABLET ORAL at 05:00

## 2024-10-07 RX ADMIN — Medication 250 MG: at 09:07

## 2024-10-07 RX ADMIN — ASPIRIN 81 MG: 81 TABLET, COATED ORAL at 09:08

## 2024-10-07 RX ADMIN — LEVOTHYROXINE SODIUM 125 MCG: 25 TABLET ORAL at 05:00

## 2024-10-07 RX ADMIN — FOLIC ACID 1000 MCG: 1 TABLET ORAL at 09:07

## 2024-10-07 RX ADMIN — ENOXAPARIN SODIUM 40 MG: 40 INJECTION SUBCUTANEOUS at 09:05

## 2024-10-07 RX ADMIN — SERTRALINE 100 MG: 100 TABLET, FILM COATED ORAL at 09:06

## 2024-10-07 RX ADMIN — DILTIAZEM HYDROCHLORIDE 180 MG: 180 CAPSULE, COATED, EXTENDED RELEASE ORAL at 09:06

## 2024-10-07 NOTE — CASE MANAGEMENT
CAMERON received a phone call from Ailyn at [facility], with a concern regarding HHA agency on hold.. CAMERON called [employer], spoke with Irma, made her aware patient's  will be paying out of pocket for HHA arranged for M/W/F from 10-2. CAMERON made IRENE grier aware [facility]  referral was made and accepted for RN/PT/OT/ST/HHA/SW. Irma stated she will call the patient's  to confirm meeting him at 1:00 today at patient's house.CAMERON called Ailyn at [facility], made her aware of conversation with Irma at [facility].

## 2024-10-07 NOTE — PROGRESS NOTES
"  Progress Note - PMR   Name: Marsha Oliva 79 y.o. female I MRN: [id number]  Unit/Bed#: [facility] 219-01 I Date of Admission: 9/11/2024   Date of Service: 10/7/2024 I Hospital Day: 26     Assessment & Plan  Spinal cord compression (HCC)  Presented  to acute care on 9/4/24 with progressive neck pain and weakness.    She reports 4-6 weeks prior she sustained a fall off her bed, head hitting her nightstand.    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   MRI C spine showing displaced type 2 dens fracture with mild mass effect on cord, ALL and PLL injuries.    CTA head and neck negative for vascular injuries.    Patient seen by Neurosurgery and deemed an operative candidate.    Patient taken to the OR by Dr. Dewitt for a bilateral C1 and C3 lateral mass screw placement, bialteral C2 pedicle screw palcement, C1-C3 fusion.    Post operatively, placed in a Aspen C Collar at all times, except showering with Shonda collar.    Post op X-rays with stable alignment    Plan in ARC:  Begin ARC program with PT, OT, SLP  Monitor incision  Monitor pain  Monitor neuro exam  Follow-up with Neurosurgery in 2 weeks.  2 week post op visit completed with NSGY virtually - \"Will plan to follow-up with the pt as scheduled for her 6 week POV with repeat upright xrays\"  Oropharyngeal dysphagia  New issues  VFSS study completed on 9/10/24 and patient found to have mild oral and moderate phayngeal dysphagia.    Cleared for a pureed diet with honey thick liquids.  After discussion with staff may consider repeating swallow study after discussion with speech therapy as patient still having moist cough during meals.  Patient states at home she will take a bite or 2 at a time and eat her meals over very very long periods of time but with her direct supervision requirements at this time that is not possible.  SLP to follow  Continue aspiration precautions "   Recommendations at this time for a PEG tube placement.  This is due to her continued weight loss, limited p.o. intake.  At this time discussion with the patient and  continue and will follow-up with gastroenterology as an outpatient for consideration if she is agreeable.  Risks for aspiration pneumonia including the ultimate risk of death/hospitalization discussed with the patient today. Has made some recent improvements and will continue to see if maintains upward trajectory. May still consider further outpatient eval for consideration pending.  Acquired bilateral hammer toes  Keratosis of toe  May need shaving of this lesion  Consult placed to Podiatry  Atrial fibrillation, chronic (HCC)  Chronic A. Fib  Rate/rhythm controlled on digoxin 125 mcg every other day, Cardizem  mg daily  No AC due to prior several GI bleeds.  On aspirin 81 mg daily.  IM managing  B12 deficiency  Continue supplementation   IM managing  Chronic diastolic CHF (congestive heart failure) ([facility])  Wt Readings from Last 3 Encounters:   10/07/24 51.3 kg (113 lb 1.5 oz)   09/11/24 53.8 kg (118 lb 9.7 oz)   09/04/24 53.5 kg (118 lb)     Weight stable  Patient takes Demedex 20 mg daily PRN for weight gain  Monitor daily weights  IM managing  Depression with anxiety  Mood is stable  Continue Zoloft 100 mg daily  Essential hypertension  Continue Cardizem  mg Daily  Monitor BP and HR with rest and activity  IM managing  Hypothyroidism  Continue Synthroid 125 mcg daily  Mixed hyperlipidemia  Continue Atorvastain 20 mg daily  Folate deficiency  Continue supplementation   IM managing  Acute pain due to trauma  Located in neck  Continue Tylenol 975 mg TID  Oxycodone IR 2.5-5 mg Q 4 hours PRN for moderate-severe pain   Topical ICE  -improved has not required oxy since 9/13  Skin abnormalities  Wound Care team following in ARC:  POA several areas  Continue LWC:     Skin care plans:  1-Hydraguard to sacrum, buttocks and heels BID and  PRN  2-EHOB cushion when out of bed in chair.  3-Moisturize skin daily with skin nourishing cream  4-Elevate heels to offload pressure.  5-Turn/reposition q2h for pressure re-distribution on skin  6. Monitor right 2nd toe growth area - leave open to air podiatry consulted.  7. Right arm skin tear - cleanse with Normal saline then apply normlgel ag then top with adaptic then a ABD and keisha change every other day   8. Right and left breast folds - cleanse with soap and water pat dry dust lightly with nystatin powder bid and prn  Lymphedema  Pumps brought in by  - ok to use PRN as tolerated  At risk for altered bowel elimination  Continent  -at home takes daily miralax; rescheduled PRN to daily  At risk for altered urinary elimination  Continent   At risk for deep venous thrombosis  Continue Lovenox 40 mg daily - should not need at discharge  Encounter for rehabilitation  Functional deficits:  cervical collar on at all times, cervical spinal precautions, impaired mobility, self care   Continue current rehabilitation plan of care to maximize function.  Estimated discharge: Thursday 10/3/24 with home care estimated    Severe protein-calorie malnutrition (HCC)  Nutrition following    Malnutrition Findings:   Adult Malnutrition type: Acute illness  Adult Degree of Malnutrition: Other severe protein calorie malnutrition  Malnutrition Characteristics: Inadequate energy, Weight loss  360 Statement: Malnutrition related to inadequate energy intake as evidenced by 7#(6%) weight loss from 9/11/# to 9/18/# and <50% energy intake >5 days.    BMI Findings:  Body mass index is 20.03 kg/m².   Closed odontoid fracture with type II morphology, posterior displacement, and nonunion    Impaired mobility and activities of daily living  Patient was evaluated by the rehabilitation team MD and an appropriate candidate for acute inpatient rehabilitation program at this time.  The patient will tolerate 3 hours/day 5 to 7  days/week of intensive physical, occupational and speech therapy in order to obtain goals for community discharge  Due to the patient's functional Compared to their baseline level of function in addition to their ongoing medical needs, the patient would benefit from daily supervision from a rehabilitation physician as well as rehabilitation nursing to implement and adjust the medical as well as functional plan of care in order to meet the patient's goals.    Medical necessity: The patient at this time would greatly benefit from having home health aides.  At this time her  has ongoing health conditions that would limit his ability to assist with her care.  She still needs assistance for many tasks most of which are basic mobility and ADL tasks.  She has difficulty with eating and requires level of supervision, assistance with transfers and mobility, medication management and management of the household.  Additionally will have ongoing medical needs as well as follow-ups with the specialists once she is discharged and may be considered for a PEG tube placement as an outpatient which would also require assistance.  From a medical and functional standpoint it is my professional opinion that the patient would greatly benefit from additional assistance with home health aides for the listed reasons above as well as and throughout this note.    Subjective   Marsha Oliva is a 79 y.o. female with HTN, A Fib, CHF, HLD, Hypothyroidism who presented to the [facility] on on 9/4/24 with progressive neck pain and weakness.  She reports 4-6 weeks prior she sustained a fall off her bed, head hitting her nightstand.  She has been having increasing neck pain since.  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 MRI C spine showing displaced type 2 dens fracture with mild mass effect on cord, ALL and PLL injuries.   CTA head and neck negative for vascular injuries.  Patient seen by Neurosurgery and deemed an operative candidate.  Patient taken to the OR by Dr. Dewitt for a bilateral C1 and C3 lateral mass screw placement, bialteral C2 pedicle screw palcement, C1-C3 fusion.  Post operatively, placed in a Aspen C Collar at all times, except showering with Shonda collar.  Post op X-rays with stable alignment  Medical course complicated by acute dysphagia.  VFSS study completed on 9/10/24 and patient found to have mild oral and moderate phayngeal dysphagia.  Cleared for a pureed diet with honey thick liquids.  Patient seen by wound care team for abrasions and stage I pressure injury to sacrum.  Patient started on Tylenol and PRN oxycodone for acute pain.    After medical stabilization, patient found to have acute functional deficits from his baseline, therefore, admitted to [facility] for acute inpatient rehabilitation.    Chief Complaint: f/u SCI, f/u ambulatory dysfunction, and discharge activities    Interval: Patient seen and evaluated in room.  Patient states that she is doing well and her mood is good as she is leaving today.  We spent some time talking today about follow-ups, medications as well as the need to follow the recommended diet including the risk for aspiration.  She has made some improvements in the last week that have been promising with speech therapy with regards to reduction of her aspiration risk on the current diet.  We do recommend she continues to work with speech therapy and have an eventual VBS completed especially when the collar is removed to see if she still remains at such a high risk.  Additionally we talked about follow-up with gastroenterology only if this does not continue to improve and/or she continues to lose weight for the evaluation for a PEG tube for supplemental nutrition.  We have had conversations during her stay making this recommendation however things have improved and it  is worth giving some time prior to what would be a big decision for the patient.  Otherwise she denies any fever, chills, nausea, vomiting, cough or shortness of breath at this time.    Objective :  Temp:  [96.6 °F (35.9 °C)-97.1 °F (36.2 °C)] 97.1 °F (36.2 °C)  HR:  [58-72] 58  BP: ()/(55-70) 119/70  Resp:  [16-18] 16  SpO2:  [94 %] 94 %  O2 Device: None (Room air)    Functional Update:  Physical Therapy Occupational Therapy Speech Therapy   Weight Bearing Status: Full Weight Bearing  Transfers: Supervision  Bed Mobility: Independent  Amulation Distance (ft): 165 feet  Ambulation: Incidental Touching  Assistive Device for Ambulation: Roller Walker  Wheelchair Mobility Distance: 25 ft  Wheelchair Mobility: Supervision  Number of Stairs: 7  Assistive Device for Stairs: Bilateral Hand Rails  Stair Assistance: Incidental Touching  Ramp: Incidental Touching  Assistive Device for Ramp: Roller Walker  Discharge Recommendations: Home with: (vs alternate placement)  DC Home with:: 24 Hour Assisteance, Family Support, First Floor Setup, Home Physical Therapy   Eating: Supervision  Grooming: Supervision  Bathing: Incidental Touching  Bathing: Incidental Touching  Upper Body Dressing: Supervision  Lower Body Dressing: Incidental Touching, Minimal Assistance  Toileting: Incidental Touching, Supervision  Tub/Shower Transfer: Minimal Assistance  Toilet Transfer: Supervision, Incidental Touching  Cognition: Exceptions to WNL  Cognition: Decreased Memory, Decreased Attention, Decreased Safety, Decreased Comprehension  Orientation: Person, Place, Time, Situation   Mode of Communication: Verbal  Cognition: Exceptions to WNL  Cognition: Decreased Memory, Decreased Executive Functions, Decreased Attention, Decreased Comprehension  Orientation: Person, Place, Time, Situation  Swallowing: Exceptions to WNL  Swallowing: Pharyngeal Dysphagia, Oral Dysphagia  Diet Recommendations: Level 1/Puree, Honey Thick  Discharge Recommendations:  Home with:  DC Home with:: Family Support, Home Speech Therapy (close supervision 2' dysphagia severity and cognitive deficits)       Physical Exam  Vitals reviewed.   Constitutional:       General: She is not in acute distress.  HENT:      Head: Normocephalic and atraumatic.      Right Ear: External ear normal.      Left Ear: External ear normal.      Nose: Nose normal. No rhinorrhea.      Mouth/Throat:      Mouth: Mucous membranes are moist.      Pharynx: Oropharynx is clear.   Eyes:      General: No scleral icterus.  Neck:      Comments: Cervical collar  Cardiovascular:      Rate and Rhythm: Normal rate.      Pulses: Normal pulses.   Pulmonary:      Effort: Pulmonary effort is normal. No respiratory distress.   Abdominal:      General: There is no distension.      Palpations: Abdomen is soft.   Musculoskeletal:      Right lower leg: No edema.      Left lower leg: No edema.   Skin:     General: Skin is warm and dry.   Neurological:      Mental Status: She is alert and oriented to person, place, and time.   Psychiatric:         Mood and Affect: Mood normal.         Behavior: Behavior normal.         Scheduled Meds:  Current Facility-Administered Medications   Medication Dose Route Frequency Provider Last Rate    acetaminophen  975 mg Oral Q8H CHRIS Sharmin Waggoner PA-C      Artificial Tears  2 drop Both Eyes Q3H PRN JAQUELINE Amos      aspirin  81 mg Oral Daily Corine Park MD      atorvastatin  20 mg Oral Daily With Dinner Sharmin Waggoner PA-C      bisacodyl  10 mg Rectal Daily PRN Sharmin Waggoner PA-C      bisacodyl  10 mg Rectal Once Corine Park MD      calcium carbonate  1,000 mg Oral Daily PRN Sharmin Waggoner PA-C      cyanocobalamin  1,000 mcg Oral Weekly Sharmin Waggoner PA-C      dextromethorphan-guaiFENesin  10 mL Oral Q4H PRN Tj Darling MD      digoxin  125 mcg Oral Every Other Day Sharmin Waggoner PA-C      diltiazem  180 mg  Oral Daily Sharmin Waggoner PA-C      docusate sodium  100 mg Oral BID Corine Park MD      enoxaparin  40 mg Subcutaneous Q24H CHRIS Corine Park MD      fluticasone  2 spray Each Nare Daily JAQUELINE Amos      folic acid  1,000 mcg Oral Daily Sharmin Waggoner PA-C      lactulose  20 g Oral Daily PRN Jermaine Greenberg DO      levothyroxine  125 mcg Oral Early Morning Sharmin Waggoner PA-C      magnesium gluconate  250 mg Oral BID Sharmin Waggoner PA-C      ondansetron  4 mg Oral Q6H PRN Sharmin Waggoner PA-C      oxyCODONE  2.5 mg Oral Q4H PRN Sharmin Waggoner PA-C      Or    oxyCODONE  5 mg Oral Q4H PRN Sharmin Waggoner PA-C      phenol  1 spray Mouth/Throat Q2H PRN Sharmin Waggoner PA-C      senna  2 tablet Oral HS Corine Park MD      sertraline  100 mg Oral Daily Sharmin Waggoner PA-C      torsemide  20 mg Oral Daily PRN Sharmin Waggoner PA-C           Lab Results: I have reviewed the following results:  Results from last 7 days   Lab Units 10/07/24  0450   HEMOGLOBIN g/dL 12.4   HEMATOCRIT % 41.6   WBC Thousand/uL 9.01   PLATELETS Thousands/uL 312     Results from last 7 days   Lab Units 10/07/24  0450   BUN mg/dL 11   SODIUM mmol/L 141   POTASSIUM mmol/L 4.1   CHLORIDE mmol/L 104   CREATININE mg/dL 0.66   AST U/L 22   ALT U/L 9                Jermaine Greenberg DO  Physical Medicine and Rehabilitation  [facility]    I have spent a total time of 35 minutes in caring for this patient on the day of the visit/encounter including Counseling / Coordination of care, Documenting in the medical record, Reviewing / ordering tests, medicine, procedures  , Obtaining or reviewing history  , and Communicating with other healthcare professionals .

## 2024-10-07 NOTE — PROGRESS NOTES
10/07/24 0900   Pain Assessment   Pain Assessment Tool 0-10   Pain Score 3   Pain Location/Orientation Location: Head;Orientation: Bilateral;Location: Shoulder   Restrictions/Precautions   Precautions Aspiration;Bed/chair alarms;Cognitive;Fall Risk;Spinal precautions;Supervision on toilet/commode   ROM Restrictions   (spinal prec)   Braces or Orthoses C/S Collar   Eating   Type of Assistance Needed Set-up / clean-up   Eating CARE Score 5   Oral Hygiene   Type of Assistance Needed Set-up / clean-up   Oral Hygiene CARE Score 5   Grooming   Able To Initiate Tasks;Comb/Brush Hair;Wash/Dry Face;Brush/Clean Teeth;Wash/Dry Hands   Limitation Noted In Problem Solving;Safety;Strength   Findings s/u w/c level at the sink   Shower/Bathe Self   Type of Assistance Needed Supervision;Verbal cues   Shower/Bathe Self CARE Score 4   Bathing   Assessed Bath Style Sponge Bath   Anticipated D/C Bath Style Shower;Sponge Bath   Able to Gather/Transport No   Able to Adjust Water Temperature No   Able to Wash/Rinse/Dry (body part) Left Arm;Right Arm;L Upper Leg;R Upper Leg;L Lower Leg/Foot;Chest;R Lower Leg/Foot;Abdomen;Buttocks;Perineal Area   Limitations Noted in Balance;Endurance;Problem Solving;ROM;Safety;Strength  (spinal prec with c collar)   Positioning Seated;Standing   Adaptive Equipment Longhand Sponge;Longhand Reacher   Tub/Shower Transfer   Reason Not Assessed Sponge Bath   Upper Body Dressing   Type of Assistance Needed Supervision   Upper Body Dressing CARE Score 4   Lower Body Dressing   Type of Assistance Needed Supervision   Comment cues to use recaher more frequently   Lower Body Dressing CARE Score 4   Putting On/Taking Off Footwear   Type of Assistance Needed Supervision   Putting On/Taking Off Footwear CARE Score 4   Picking Up Object   Type of Assistance Needed Supervision   Picking Up Object CARE Score 4   Dressing/Undressing Clothing   Remove UB Clothes Pullover Shirt   Don UB Clothes Pullover Shirt   Remove LB  Clothes Pants;Undergarment;Socks;Shoes   Don LB Clothes Pants;Undergarment;Socks;Shoes   Limitations Noted In Balance;Endurance;Problem Solving;Safety;Strength;ROM  (spinal prec with c collar)   Adaptive Equipment Reacher   Positioning Supported Sit;Standing   Sit to Stand   Type of Assistance Needed Supervision   Sit to Stand CARE Score 4   Bed-Chair Transfer   Type of Assistance Needed Supervision   Chair/Bed-to-Chair Transfer CARE Score 4   Toileting Hygiene   Type of Assistance Needed Supervision   Toileting Hygiene CARE Score 4   Toileting   Able to Pull Clothing down yes, up yes.   Manage Hygiene Bladder   Limitations Noted In Balance;Problem Solving;Safety;ROM;LE Strength  (spinal prec with c collar)   Adaptive Equipment Grab Bar   Toilet Transfer   Type of Assistance Needed Supervision   Toilet Transfer CARE Score 4   Toilet Transfer   Surface Assessed Raised Toilet   Transfer Technique Standard   Limitations Noted In Balance;Endurance;Problem Solving;ROM;Safety;LE Strength  (spinal prec with c collar)   Adaptive Equipment Grab Bar;Walker   Kitchen Mobility   Kitchen-Mobility Level Walker   Kitchen Activity Retrieve items;Transport items   Kitchen Mobility Comments supervision with education regargind use of tray versus walker bag provided   Light Housekeeping   Light Housekeeping Level Wheelchair   Light Housekeeping Level of Assistance Distant supervision   Light Housekeeping applying pillowcases while seated   Health Management   Health Management Level of Assistance Close supervision   Health Management pt able to follow directions on pill bottles simulating organization of pill box with occ reminders for correct use   Cognition   Orientation Level Oriented X4   Additional Activities   Additional Activities Other (Comment)   Additional Activities Comments fxl mobility in room and kitchen short distances using RW with S   Assessment   Treatment Assessment Pt present seated in w/c agreeable to OT Session  including toileting, transfers/ mobility, ADLs, medication management and light homemaking/kitchen mobility. Pt tolerates session wihtout complaints and has no concerns to express. Pt requires supervision / assist due to decreased balance, safety, endurance and strength/ ROM with spinal prec and c collar. Pt is scheduled for discharge later this day and will beenfit form use of A/E and DME, family supoport and HHOT/ HHPT during transition to home.   Problem List Decreased strength;Decreased range of motion;Decreased endurance;Impaired balance;Decreased safety awareness;Decreased skin integrity;Orthopedic restrictions;Pain  (spinal prec with c collar)   Plan   Treatment/Interventions ADL retraining;Functional transfer training;Therapeutic exercise;Endurance training;Patient/family training;Equipment eval/education;Compensatory technique education   Progress Progressing toward goals   Discharge Recommendation   Discharge Summary Pt participates in toileting, transfers/ mobility, ADLs, medication management and light homemaking/kitchen mobility. Pt has made great progress with goals of supervision met and discahrge to home with  planned later today. Pt requires supervision / assist due to decreased balance, safety, endurance and strength/ ROM with spinal prec and c collar. Pt will benefit from use of A/E and DME, family supoport and HHOT/ HHPT during transition to home.   OT Therapy Minutes   OT Time In 0900   OT Time Out 1030   OT Total Time (minutes) 90   OT Mode of treatment - Individual (minutes) 90   Therapy Time missed   Time missed? No

## 2024-10-07 NOTE — ASSESSMENT & PLAN NOTE
Nutrition following    Malnutrition Findings:   Adult Malnutrition type: Acute illness  Adult Degree of Malnutrition: Other severe protein calorie malnutrition  Malnutrition Characteristics: Inadequate energy, Weight loss  360 Statement: Malnutrition related to inadequate energy intake as evidenced by 7#(6%) weight loss from 9/11/# to 9/18/# and <50% energy intake >5 days.    BMI Findings:  Body mass index is 20.03 kg/m².

## 2024-10-07 NOTE — ASSESSMENT & PLAN NOTE
Patient was evaluated by the rehabilitation team MD and an appropriate candidate for acute inpatient rehabilitation program at this time.  The patient will tolerate 3 hours/day 5 to 7 days/week of intensive physical, occupational and speech therapy in order to obtain goals for community discharge  Due to the patient's functional Compared to their baseline level of function in addition to their ongoing medical needs, the patient would benefit from daily supervision from a rehabilitation physician as well as rehabilitation nursing to implement and adjust the medical as well as functional plan of care in order to meet the patient's goals.    Medical necessity: The patient at this time would greatly benefit from having home health aides.  At this time her  has ongoing health conditions that would limit his ability to assist with her care.  She still needs assistance for many tasks most of which are basic mobility and ADL tasks.  She has difficulty with eating and requires level of supervision, assistance with transfers and mobility, medication management and management of the household.  Additionally will have ongoing medical needs as well as follow-ups with the specialists once she is discharged and may be considered for a PEG tube placement as an outpatient which would also require assistance.  From a medical and functional standpoint it is my professional opinion that the patient would greatly benefit from additional assistance with home health aides for the listed reasons above as well as and throughout this note.   [FreeTextEntry1] : \par Interim hx\par \par Patient's daughter and wife states that they do not want adjustments made to patient's DA regimen (neupro and requip) They are fearful that patient's Restless symptoms will re-emerge and in the past has taken weeks to resolve. During these periods, patient paces for hours to relieve what he describes as electrical sensations in his left leg. He also has foot cramps and tremulous movements as well. His past neurologist utilized a combination of neupro and requip to alleviate these episodes. \par \par Furthermore, patient has been hallucinating daily -- sees children and animals\par Experiences paranoid ideations at times and call 911 2 weeks ago. 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 \par His RLS-like attacks now are sporadic---wife describes it as occurring in the middle of the night with patient shaking his left leg and having to walk for blocks. Patient observed to festinate, be more stooped and having posturing of lef leg during as well during these periods. \par \par He has a leaky Aortic valve per his daughter that is not fixable at this time due to condition. Family wants to maintain a reasonable QOL for patient at home. 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 Has RBD occ \par no constipation\par has double vision- has difficulty reading \par + stutter\par \par \par PD meds:\par c/l ER  1 tab [id number]\par azilect 1mg qd\par neupro 8mg qd\par ropinerole 0.5mg 4 times per day\par GBP 400mg TID\par aricept 10mg bid

## 2024-10-07 NOTE — PHYSICAL THERAPY NOTE
PT DISCHARGE SUMMARY    Patient has met max benefit from inpatient [facility] PT.  Patient independent for bed mobility; supervision with RW for Transfers; supervision with RW for Gait on level and unlevel surfaces up to 224 ft. Supervision for stair negotiation with charly HR use. Patient continues to require cues for sequence and technique for WC mobility. Pt remains limited by decreased strength, decreased endurance, impaired balance, decreased mobility, and orthopedic restrictions. Pt was advised against ambulating without an AD due to prior reports of furniture walking due to safety risks. PT provided HEP handout and encouraged patient to complete exercises daily to maintain strength. Pt will benefit from continued PT services post discharge.

## 2024-10-07 NOTE — NURSING NOTE
Pt discharged via family vehicle. Deemed safest means of transportation by interdisciplinary team. All belonging sent home. All questions answered at time of discharge. Medication sent to preferred pharmacy.

## 2024-10-07 NOTE — DISCHARGE INSTR - AVS FIRST PAGE
DISCHARGE INSTRUCTIONS: [facility] ACUTE REHABILITATION [place]    Bring these instructions with you to your Outpatient Physician appointments so they can order and follow-up any additional lab work or imaging recommended at time of discharge.    Resume follow-up with all your prior providers that you have established care prior to this hospitalization.  Discuss with primary care physician (PCP) if you have additional questions.     It  is you or your caregivers responsibility to obtain follow-up MEDICATION REFILLS  As indicated through your Primary Care Physician (PCP) and other outpatient specialty provider(s) after discharge.  Please follow-up with your PCP as soon as possible after discharge to set-up follow-up management and when appropriate refills.      You remain a fall and injury risk which could be severe.  - Your risk of fall has decreased however since admission to acute rehab.  Caregiver training has been completed with our staff.  - Appropriate supervision +/- assistance as instructed during your rehab course is recommended to decrease risk of fall and injury.    - Continue skilled therapy as discussed after discharge to further decrease this risk    If you (or your health care proxy) have any questions or concerns regarding your acute rehabilitation stay including issues with medications, rehabilitation, and follow-up plan, please call:          [facility] Acute Rehabilitation Unit at [facility] at [phone]    Should you develop fevers, chills, new weakness, changes in sensation, difficulty speaking, facial weakness, confusion, shortness of breath, chest pain, or other concerning symptoms please call 911 and/or obtain transportation to nearest ER immediately.    Should you develop worsening pain, swelling, or drainage notify your surgeon right away or obtain transportation to nearest ER for evaluation.    Should you develop feelings of significant hopelessness,  "severe depression, severe anxiety, or suicide you should call 911 or obtain transportation to nearest ER.    24-7 Nationwide suicide and crisis lifeline call \"591\"  National Suicide Prevention Lifeline is 1-514.835.1322 and is available 24 hrs/7 days a week.   [facility] Crisis [phone] is available 24 hrs/7 days for mental health  [facility] Crisis [phone] is available 24 hrs/7 days for mental health   [facility] Crisis [phone]    PHYSICIANS to see:  Please see your doctors listed in the follow up providers section of your discharge paperwork, and take the discharge paperwork with you to your appointments.    Home health has been ordered for you:  Your home health agency should reach out to you or your family soon to arrange follow-up.    (If [facility] home health is your provider their phone number is [phone])    If you are unable to get in touch with home health you may contact your  at [phone]. [place]    SKIN CARE INSTRUCTIONS to follow:  Monitor incision(s) for increased redness, swelling, pain/tenderness, discharge/pus and promptly notify your surgeon should these develop.  - Should you develop significant pain, swelling, or drainage obtain transportation to nearest emergency room for immediate evaluation if unable to reach your surgeon promptly   - Should you develop uncontrolled pain, fever, chills, sweats, changes in strength, sensation, or color of this area obtain transportation to nearest emergency room for immediate evaluation.      Monitor skin for increased redness or breadown and promptly notify your physician should these develop    If instructed while in [facility] - be sure you stand +/- walk every 1-2 hours and if advised use appropriate supervision/assistance to optimally offload your buttock/sacral region.  While seated or lying in bed shift positions and from side to side often.  Can use barrier type cream such as " Hydragaurd 2 times per day and as needed.    Turn patient (yourself) fully every 2 hours while in bed.      Dietary INSTRUCTIONS to follow:  Please continue to follow your diet which is a puréed and nectar thick liquid diet.  Information was given to you by the speech therapy team.  We did discuss during her stay to consider follow-up with gastroenterology for the placement of a PEG tube for supplemental nutrition if you continue to lose weight and have difficulty maintaining an adequate diet.  You have had some improvements during the last week of your stay which are promising and would likely benefit from a future swallow study after the removal of the collar.  Please obtain this referral to gastroenterology from your primary care physician if you decide to go this direction.  For now I would continue until we are able to further evaluate after the collar is removed.    Due to the following you are at increased risk of skin breakdown/wounds/worsening wounds:  - Impaired mobility  - Impaired nutrition/intake/low albumin  - Medical co-morbidities    Buttocks/Sacrum  Turn as full as possible off sacrum/buttocks every 2 hours  Use Cushion while in chair or wheelchair  Weight shift every 10-15 minutes while in chair  Keep skin clean and dry as possible.  Remove wet or soiled clothing/linens promptly  Use barrier cream or similar 2 times per day as needed  Monitor skin for increased breakdown which you are at risk of and notify nursing, PCP, or other physician providers should this occur right away    Heels/bony edges of feet  Float heels off edge of pillow for added pressure relief.  Monitor heels and bony protuberances and notify physician or nursing for any increased redness, bogginess, or breakdown    Ensure appropriate wound care to optimize chances for healing and decrease risks of complications.    Your wound(s) remains at risk for worsening and infection.    - Should you develop significant pain, swelling, or  drainage obtain transportation to nearest emergency room for immediate evaluation if unable to reach your surgeon promptly   - Should you develop uncontrolled pain, fever, chills, sweats, changes in strength, sensation, or color of this area obtain transportation to nearest emergency room for immediate evaluation.        WEIGHTBEARING/ACTIVITY PRECAUTIONS to follow:  Weightbearing as tolerated.    Please wear your cervical collar at all times until cleared by spine surgery.    Driving restrictions:  You are recommended against driving until cleared by an outpatient physician.      Work restrictions:  You should NOT return to work (if working) until cleared by an outpatient physician.    You should not operate heavy machinery (if applicable) until cleared by an outpatient physician.      Alcohol restrictions:  You are recommended to not drink alcohol at this time unless cleared by an outpatient physician.  Drinking alcohol in your current functional condition can increase your risk of injury which could be severe.  Drinking alcohol given your current health problems can lead to increased medical complications which could be severe.    Combining alcohol with your current medications can increase your risk of injury which could be severe.      Smoking restrictions:  You are recommended to not smoke nicotine.  Smoking increases your risk of heart attack, stroke, emphysema/COPD, and lung cancer.      ------------------------------------------------------------------------------------------------------------  ------------------------------------------------------------------------------------------------------------    MEDICATIONS:  Please see a full list of your medications outlined in the After Visit Summary that is attached to these Discharge Instructions.  Please note changes may have been made to your medications please refer to your discharge paperwork for your current medications and take this list with you to all  your doctors appointments for your doctors to review.  Please do not resume a home medication unless the medication reconciliation sheet indicates to do so, please do not assume that a medication that you were given a prescription for is the same as a medication you have at home based on both medications having the same name as dosages and frequency may have changed.      Unless specifically noted in your medication list provided to you in your discharge paper work do not resume prior vitamins, minerals, or supplements you may have been taking prior to your hospitalization unless instructed by an outpatient physician in the future.  Discuss with your primary care at next visit if applicable.      NSAID Warning:  Please avoid NSAID (including but not limited to advil, aleve, motrin, naproxen, ibuprofen, mobic, meloxicam, diclofenac etc) medications as NSAID medications may increase your risk of bleeding (which can be life-threatening), stroke, worsening kidney disease, heart attack, developing/worsening GI ulcers, worsening heart failure, worsening liver (cirrhosis) disease, potentially delay bone healing.      ------------------------------------------------------------------------------------------------------------  ------------------------------------------------------------------------------------------------------------    Bowel management (constipation risk):  - Ideally you would have 1 well formed BM every 1-2 days.  Adjust bowel regimen based on that goal or as close to that as possible  - Start with taking miralax 1 time per day and senna twice daily if you become constipated   - You may also add Colace 100mg twice daily as a stool softener and Senna/Senakot 1-2 tabs daily  - If still constipated you can increase miralax to twice per day and if needed take either oral or by rectum dulcolax (but not at the same time)  - If unable to go for more than 4 days notify your physician for additional recommendations     - If you develop significant abdominal pain, nausea/vomiting, or other concerns seek medical attention right away.        Please note a summary of your hospital stay with relevant information for your doctors will try to be sent to them.  Please confirm with your doctors at your follow up visits that they have received this summary and have them contact [facility] Medical Records if they have not received them along with any other medical records they may require.     [facility] Phone Number:  [phone]

## 2024-10-07 NOTE — PROGRESS NOTES
"   10/07/24 0645   Pain Assessment   Pain Assessment Tool 0-10   Pain Score 2   Pain Location/Orientation Location: Head  (\"nagging pain\")   Restrictions/Precautions   Precautions Aspiration;Bed/chair alarms;Cognitive;Fall Risk;Spinal precautions;Supervision on toilet/commode   Weight Bearing Restrictions No   ROM Restrictions   (spinal precautions)   Braces or Orthoses C/S Collar   Cognition   Overall Cognitive Status Impaired   Arousal/Participation Alert;Responsive;Cooperative   Attention Attends with cues to redirect   Orientation Level Oriented X4   Memory Decreased short term memory;Decreased recall of recent events;Decreased recall of precautions   Following Commands Follows one step commands with increased time or repetition   Subjective   Subjective \"I am ready to go home\"   Roll Left and Right   Type of Assistance Needed Independent   Physical Assistance Level No physical assistance   Comment with bed rail   Roll Left and Right CARE Score 6   Sit to Lying   Type of Assistance Needed Independent   Physical Assistance Level No physical assistance   Comment Pt OOB   Sit to Lying CARE Score 6   Lying to Sitting on Side of Bed   Type of Assistance Needed Independent   Physical Assistance Level No physical assistance   Comment with bed rail   Lying to Sitting on Side of Bed CARE Score 6   Sit to Stand   Type of Assistance Needed Supervision   Physical Assistance Level No physical assistance   Comment with RW   Sit to Stand CARE Score 4   Bed-Chair Transfer   Type of Assistance Needed Supervision   Physical Assistance Level No physical assistance   Comment With RW   Chair/Bed-to-Chair Transfer CARE Score 4   Car Transfer   Reason if not Attempted Environmental limitations   Car Transfer CARE Score 10   Walk 10 Feet   Type of Assistance Needed Supervision   Physical Assistance Level No physical assistance   Comment Supervision with RW on level and unlevel surfaces.   Walk 10 Feet CARE Score 4   Walk 50 Feet with " Two Turns   Type of Assistance Needed Supervision   Physical Assistance Level No physical assistance   Comment Supervision with RW on level and unlevel surfaces.   Walk 50 Feet with Two Turns CARE Score 4   Walk 150 Feet   Type of Assistance Needed Supervision   Physical Assistance Level No physical assistance   Comment Supervision with RW on level and unlevel surfaces.   Walk 150 Feet CARE Score 4   Walking 10 Feet on Uneven Surfaces   Type of Assistance Needed Supervision   Physical Assistance Level No physical assistance   Comment Supervision with RW on level and unlevel surfaces.   Walking 10 Feet on Uneven Surfaces CARE Score 4   Ambulation   Primary Mode of Locomotion Prior to Admission Walk   Distance Walked (feet) 224 ft  (224 and 144 ft)   Assist Device Roller Walker   Gait Pattern Inconsistant Vaishnavi;Slow Vaishnavi;Decreased foot clearance;Forward Flexion;Narrow FLORENTINO;Step to;Improper weight shift   Limitations Noted In Balance;Coordination;Device Management;Endurance;Safety;Speed;Strength   Provided Assistance with: Balance;Direction   Walk Assist Level Supervision   Findings Supervision with RW on level and unlevel surfaces.   Does the patient walk? 2. Yes   Wheel 50 Feet with Two Turns   Type of Assistance Needed Supervision   Physical Assistance Level No physical assistance   Comment Supervision for direction   Wheel 50 Feet with Two Turns CARE Score 4   Wheelchair mobility   Does the patient use a wheelchair? 1. Yes   Type of Wheelchair Used 1. Manual   Method Right upper extremity;Left upper extremity;Right lower extremity;Left lower extremity   Assistance Provided For Locking Brakes   Distance Level Surface (feet) 95 ft   Findings Supervision for direction   Curb or Single Stair   Style negotiated Single stair   Type of Assistance Needed Supervision   Physical Assistance Level No physical assistance   Comment Supervision with charly HR and reciprocal pattern   1 Step (Curb) CARE Score 4   4 Steps   Type  "of Assistance Needed Supervision   Physical Assistance Level No physical assistance   Comment Supervision with charly HR and reciprocal pattern   4 Steps CARE Score 4   12 Steps   Reason if not Attempted Safety concerns   12 Steps CARE Score 88   Stairs   Type Stairs   # of Steps 7   Weight Bearing Precautions WBAT   Assist Devices Bilateral Rail   Findings Supervision with charly HR and reciprocal pattern   Toilet Transfer   Comment not needed during session   Therapeutic Interventions   Strengthening Supine and standing TE   Flexibility Gastroc stretching   Balance Gait and transfer training   Other WC mobility, stair training   Assessment   Treatment Assessment Pt agreeable to PT session this morning. Pain 2/10 described as \"nagging headache pain\" Pt independent for bed mobility. Supervision for transfers with RW. Supervision for ambulation with RW on level and unlevel surfaces for max distance of 224 ft. Supervision for stair negotiation with charly HR use and reciprocal pattern. Supine and standing TE for general LE strengthening. Gastroc stretching for icnreased flexibility with functional mobility. Gait and transfer training for increased balance, safety, and independence with functional mobility. 5xSTS test repeated with score of 25.42 sec, still indicating fall risk but improved from first score of 29.47 sec. Pt returned to room in  with alarms on and all needs within reach.   Problem List Decreased strength;Impaired balance;Decreased mobility;Decreased endurance;Decreased cognition;Impaired judgement;Decreased safety awareness;Pain;Orthopedic restrictions   Barriers to Discharge Inaccessible home environment;Decreased caregiver support   PT Barriers   Physical Impairment Decreased strength;Decreased range of motion;Decreased endurance;Impaired balance;Decreased mobility;Decreased safety awareness;Pain;Orthopedic restrictions   Functional Limitation Car transfers;Ramp negotiation;Stair " negotiation;Standing;Transfers;Walking;Wheelchair management   Plan   Treatment/Interventions Functional transfer training;Elevations;LE strengthening/ROM;Therapeutic exercise;Endurance training;Cognitive reorientation;Bed mobility;Gait training   Progress Progressing toward goals   PT Therapy Minutes   PT Time In 0645   PT Time Out 0820   PT Total Time (minutes) 95   PT Mode of treatment - Individual (minutes) 95   PT Mode of treatment - Concurrent (minutes) 0   PT Mode of treatment - Group (minutes) 0   PT Mode of treatment - Co-treat (minutes) 0   PT Mode of Treatment - Total time(minutes) 95 minutes   PT Cumulative Minutes 1999   Therapy Time missed   Time missed? No

## 2024-10-07 NOTE — ASSESSMENT & PLAN NOTE
Wt Readings from Last 3 Encounters:   10/07/24 51.3 kg (113 lb 1.5 oz)   09/11/24 53.8 kg (118 lb 9.7 oz)   09/04/24 53.5 kg (118 lb)     Weight stable  Patient takes Demedex 20 mg daily PRN for weight gain  Monitor daily weights  IM managing

## 2024-10-07 NOTE — PROGRESS NOTES
"[facility] SLP DISCHARGE NOTE    10/07/24 1030   Pain Assessment   Pain Assessment Tool 0-10   Pain Score 3   Pain Location/Orientation Location: Neck   Restrictions/Precautions   Precautions Aspiration;Bed/chair alarms;Cognitive;Fall Risk;Pain;Spinal precautions;Supervision on toilet/commode   Cognitive Linguisitic Assessments   MoCA MoCA completed on 9/16/24- pt scored 16/30  Visuospatial/executive 2/5  Naming 2/3  Attention 3/6  Language 2/3  Abstraction 1/2  Delayed recall 0/5  Orientation 6/6   Comprehension   Comprehension (FIM) 5 - Needs help/cues, repetition only RARELY ( < 10% of the time)   Expression   Expression (FIM) 6 - Has only MILD difficulty with complex/abstract info   Social Interaction   Social Interaction (FIM) 6 - Interacts appropriately with others BUT requires extra  time   Problem Solving   Problem solving (FIM) 5 - Solves basic problems 90% of time   Memory   Memory (FIM) 5 - Needs cueing reminders <10%   Memory Skills   Orientation Level Oriented X4   Speech/Language/Cognition Assessmetn   Treatment Assessment Pt pending discharge home with her  \"Tomas\" this date. She will be transitioning to homecare services with recommendations for continued speech therapy services.   Swallow Information   Current Diet Dysphagia pureed;Honey thick liquids   Baseline Diet Regular;Thin liquids  (recommendations for dysphagia level 2 solids and thin liquids from MBSS (VFSS) completed in 2022. Pt has not been compliant with that diet following procedure with atleast 2-3 choking episodes reported up to until this time frame.)   Recommendations   Risk for Aspiration Present   Recommendations Consider additional means of nutrition/hydration ( this has been discussed with patient and  and physician has placed an order for outpatient gastroenterology);Dysphagia treatment;Consider Video/Barium Swallow Study   Diet Solid Recommendation Level 1 Dysphagia/pureed   Diet Liquid Recommendation Honey " "thick liquid  (2 handled spouted cup- NO STRAWS)   Recommended Form of Meds Crushed;With puree   General Precautions Aspiration precautions;Minimize distractions;Upright as possible for all oral intake;Remain upright for 45 mins after meals  (close supervision with meals. Post written swallow precautions/strategies near by for patient)   Compensatory Swallowing Strategies Alternate solids and liquids;External pacing   Results Reviewed with RN;PT/Family/Caregiver;MD   Eating   Type of Assistance Needed Set-up / clean-up   Physical Assistance Level No physical assistance   Eating CARE Score 5   Swallow Assessment   Swallow Treatment Assessment Pt and  both present for family training this date. Reviewed verbally as well as provided with all written handouts and recommendations.      Pt has demonstrated some clinical improvement in swallow function especially with decreased fatigue during meals and overall some increased PO intake in a more timely manner.    Recommend repeat to completed once she is cleared by neurosurgery for the c-collar to be removed. Next neurosurgery appointment is scheduled on 10/24/24. Script for modified( video) barium swallow study is placed in epic system.       Modified ( video) barium swallow study was completed during hospitalization on 9/10/24 ( see results below).  \"Assessment Summary:    Pt presents with mild oral and moderate pharyngeal dysphagia characterized by reduced bolus control and transfer with premature spillage, delayed swallow initiation, reduced hyolaryngeal rise, reduced pharyngeal stripping wave and base of tongue retraction. Reduced muscle function resulted in significant pharyngeal/pyriform retention, leading to aspiration after swallow and during repeat swallows. Epiglottic inversion was absent and airway closure was incomplete. Aspiration occurred with thin and nectar thick liquid, with inconsistent cough response, often silent. Suspect dysphagia is exacerbated " "by C-spine collar and reduced ROM of neck. Note: Images are available for review in PACS as desired.     Recommendations:   Recommended Diet: puree/level 1 diet and honey thick liquids   Recommended Form of Medications:  as tolerated    Aspiration precautions and compensatory swallowing strategies: upright posture, only feed when fully alert, slow rate of feeding, and small bites/sips  SLP Dysphagia therapy recommended: yes\"   SLP Therapy Minutes   SLP Time In 1030   SLP Time Out 1130   SLP Total Time (minutes) 60   SLP Mode of treatment - Individual (minutes) 60   SLP Mode of treatment - Concurrent (minutes) 0   SLP Mode of treatment - Group (minutes) 0   SLP Mode of treatment - Co-treat (minutes) 0   SLP Mode of Treatment - Total time(minutes) 60 minutes   SLP Cumulative Minutes 784       "

## 2024-10-08 ENCOUNTER — HOME CARE VISIT (OUTPATIENT)
Dept: HOME HEALTH SERVICES | Facility: HOME HEALTHCARE | Age: 79
End: 2024-10-08
Payer: COMMERCIAL

## 2024-10-08 VITALS
DIASTOLIC BLOOD PRESSURE: 54 MMHG | SYSTOLIC BLOOD PRESSURE: 94 MMHG | TEMPERATURE: 96.9 F | HEART RATE: 92 BPM | RESPIRATION RATE: 16 BRPM | OXYGEN SATURATION: 92 %

## 2024-10-08 PROCEDURE — 400013 VN SOC

## 2024-10-08 PROCEDURE — G0299 HHS/HOSPICE OF RN EA 15 MIN: HCPCS

## 2024-10-08 NOTE — PROGRESS NOTES
10/08/24 1328   Hello, [Guardian’s Name / Patient’s Name], [facility] and our clinical care team wanted to check on you / your child after your recent visit to the hospital. It will only take 3-5 minutes. Is this a good time?   Discharge Call Type/ Specific Diagnosis: ARC General   ARC Discharge Follow- Up   ARC Follow- Up Time Frame 5 Day follow up   ARC 5 Day General Follow- up Questions   Patient location at time of call Home   Were you/ your loved one's discharge instructions clear and understandable Yes   Have you Filled you/ your loved one's new prescriptions Yes   Do you have questions about your medications? No   Are you/ your loved ones having any unusual symptoms or problems? No   Follow up appointments   Follow up appointment with PCP Future appointment scheduled   Is there anything preventing you from keeping this appointment? No   Healthy Lifestyle   Are you participating in ongoing therapy? Yes   Call Complete   Discharge phone call complete? Complete

## 2024-10-09 ENCOUNTER — HOME CARE VISIT (OUTPATIENT)
Dept: HOME HEALTH SERVICES | Facility: HOME HEALTHCARE | Age: 79
End: 2024-10-09
Payer: COMMERCIAL

## 2024-10-09 VITALS — DIASTOLIC BLOOD PRESSURE: 60 MMHG | SYSTOLIC BLOOD PRESSURE: 112 MMHG | HEART RATE: 102 BPM | OXYGEN SATURATION: 97 %

## 2024-10-09 PROCEDURE — G0151 HHCP-SERV OF PT,EA 15 MIN: HCPCS

## 2024-10-09 NOTE — CASE COMMUNICATION
[facility] PT eval completed. Patient with ddcline from baseline with need for use of RW for transfers and gait as premorbidly, was not using any AD in home.  Slight proximal LE weakness present.  Will plan 1 x 1 week then 2 x 2 weeks of [facility] PT to establish HEP, improved strength and mobility.

## 2024-10-11 ENCOUNTER — TELEPHONE (OUTPATIENT)
Age: 79
End: 2024-10-11

## 2024-10-11 ENCOUNTER — HOME CARE VISIT (OUTPATIENT)
Dept: HOME HEALTH SERVICES | Facility: HOME HEALTHCARE | Age: 79
End: 2024-10-11
Payer: COMMERCIAL

## 2024-10-11 VITALS
RESPIRATION RATE: 20 BRPM | TEMPERATURE: 98.3 F | HEART RATE: 92 BPM | SYSTOLIC BLOOD PRESSURE: 108 MMHG | OXYGEN SATURATION: 88 % | DIASTOLIC BLOOD PRESSURE: 62 MMHG

## 2024-10-11 VITALS — HEART RATE: 100 BPM | OXYGEN SATURATION: 93 % | DIASTOLIC BLOOD PRESSURE: 62 MMHG | SYSTOLIC BLOOD PRESSURE: 108 MMHG

## 2024-10-11 PROCEDURE — G0152 HHCP-SERV OF OT,EA 15 MIN: HCPCS | Performed by: OCCUPATIONAL THERAPIST

## 2024-10-11 PROCEDURE — G0299 HHS/HOSPICE OF RN EA 15 MIN: HCPCS

## 2024-10-11 NOTE — TELEPHONE ENCOUNTER
Pt spouse called to confirm 10/24/24 appt address as [place], address provided.  Pt will get xrays at [facility] prior, provided hours for pt.

## 2024-10-11 NOTE — CASE COMMUNICATION
OT to continue 1 wk1 then 2 x per wk x 3 wks for therapeutic exercises, ADL training, review of safety with transfers/mobility and recommendations for DME to maximize safety with performance of self care/ daily activities.

## 2024-10-11 NOTE — CASE COMMUNICATION
Patient's caregiver notified home health care speech therapist patient was having an issue with the padding on her cervical collar as it had come loose  Patient /caregiver were requesting OT returned to patient's home to assist with repositioning padding on cervical collar.  OT returned to patients home to adjust cervical collar padding and assistant caregiver with review of donning and doffing of cervical collar and adjustment of paddi ng in the future as necessary.

## 2024-10-14 ENCOUNTER — HOME CARE VISIT (OUTPATIENT)
Dept: HOME HEALTH SERVICES | Facility: HOME HEALTHCARE | Age: 79
End: 2024-10-14
Payer: COMMERCIAL

## 2024-10-14 VITALS — HEART RATE: 93 BPM | OXYGEN SATURATION: 93 % | DIASTOLIC BLOOD PRESSURE: 82 MMHG | SYSTOLIC BLOOD PRESSURE: 123 MMHG

## 2024-10-14 VITALS — OXYGEN SATURATION: 93 % | SYSTOLIC BLOOD PRESSURE: 126 MMHG | DIASTOLIC BLOOD PRESSURE: 64 MMHG | HEART RATE: 90 BPM

## 2024-10-14 PROCEDURE — G0299 HHS/HOSPICE OF RN EA 15 MIN: HCPCS

## 2024-10-14 PROCEDURE — G0153 HHCP-SVS OF S/L PATH,EA 15MN: HCPCS

## 2024-10-14 PROCEDURE — G0151 HHCP-SERV OF PT,EA 15 MIN: HCPCS

## 2024-10-15 ENCOUNTER — HOME CARE VISIT (OUTPATIENT)
Dept: HOME HEALTH SERVICES | Facility: HOME HEALTHCARE | Age: 79
End: 2024-10-15
Payer: COMMERCIAL

## 2024-10-15 VITALS — DIASTOLIC BLOOD PRESSURE: 70 MMHG | SYSTOLIC BLOOD PRESSURE: 110 MMHG | OXYGEN SATURATION: 86 % | HEART RATE: 96 BPM

## 2024-10-15 PROCEDURE — G0152 HHCP-SERV OF OT,EA 15 MIN: HCPCS | Performed by: OCCUPATIONAL THERAPIST

## 2024-10-15 PROCEDURE — G0155 HHCP-SVS OF CSW,EA 15 MIN: HCPCS

## 2024-10-15 NOTE — CASE COMMUNICATION
OT contacted [facility] nurse  Yin Strong following today's visit to review patient having mildly elevated heart rate with regular heartbeat. Patient is otherwise asymptomatic.  [facility] nurse is aware of patient's history od a fib and elevated heart rate and will monitor heart rate at next visits.

## 2024-10-16 ENCOUNTER — HOME CARE VISIT (OUTPATIENT)
Dept: HOME HEALTH SERVICES | Facility: HOME HEALTHCARE | Age: 79
End: 2024-10-16
Payer: COMMERCIAL

## 2024-10-16 VITALS
TEMPERATURE: 97.3 F | RESPIRATION RATE: 20 BRPM | DIASTOLIC BLOOD PRESSURE: 64 MMHG | OXYGEN SATURATION: 90 % | SYSTOLIC BLOOD PRESSURE: 108 MMHG | HEART RATE: 100 BPM

## 2024-10-16 VITALS — OXYGEN SATURATION: 96 % | DIASTOLIC BLOOD PRESSURE: 72 MMHG | HEART RATE: 97 BPM | SYSTOLIC BLOOD PRESSURE: 118 MMHG

## 2024-10-16 PROCEDURE — G0151 HHCP-SERV OF PT,EA 15 MIN: HCPCS

## 2024-10-17 ENCOUNTER — HOME CARE VISIT (OUTPATIENT)
Dept: HOME HEALTH SERVICES | Facility: HOME HEALTHCARE | Age: 79
End: 2024-10-17
Payer: COMMERCIAL

## 2024-10-17 ENCOUNTER — TELEPHONE (OUTPATIENT)
Age: 79
End: 2024-10-17

## 2024-10-17 VITALS — HEART RATE: 90 BPM | OXYGEN SATURATION: 98 % | DIASTOLIC BLOOD PRESSURE: 80 MMHG | SYSTOLIC BLOOD PRESSURE: 120 MMHG

## 2024-10-17 VITALS — SYSTOLIC BLOOD PRESSURE: 120 MMHG | DIASTOLIC BLOOD PRESSURE: 60 MMHG | HEART RATE: 90 BPM | OXYGEN SATURATION: 93 %

## 2024-10-17 DIAGNOSIS — Z98.1 S/P CERVICAL SPINAL FUSION: ICD-10-CM

## 2024-10-17 DIAGNOSIS — S12.111K: Primary | ICD-10-CM

## 2024-10-17 PROCEDURE — G0152 HHCP-SERV OF OT,EA 15 MIN: HCPCS | Performed by: OCCUPATIONAL THERAPIST

## 2024-10-17 PROCEDURE — G0153 HHCP-SVS OF S/L PATH,EA 15MN: HCPCS

## 2024-10-17 NOTE — CASE COMMUNICATION
OT reviewed previously discussing ordering a second cervical collar with BUCK at Dr. Greenberg's office and BUCK indicated he will contact patient directly for second cervical collar if Dr Greenberg is an agreement with recommendation.

## 2024-10-17 NOTE — TELEPHONE ENCOUNTER
Caleb from OT called on the triage line re: needing a second Aspen collar for the pt. Per Caleb, the pt is to use the shower collar for support when taking the Aspen collar off/on to adjust the padding, etc. Caleb states that the shower collar is inappropriate for the pt as it is too big. He is asking Dr. Greenberg if the pt can possibly have a second Aspen collar to use when taking the original Aspen collar off/on to adjust the padding or whatever.    He states that the pt does not plan on showering, just bed bathing for a period of time, so he is requesting that she have another Aspen collar for stability for when she needs to adjust or remove the other one.    Please advise. Thank you.

## 2024-10-17 NOTE — CASE COMMUNICATION
OT contacted Dr Greenberg's office and spoke with DJ to request provision of a second small/ medium size Aspen collar as patient's current shower collar is too large and ill fitting for patient.  OT reviewed that OT is attempting to train patient to independently manage padding of her cervical collar and change her cervical collar as her  is unable to assist patient due to having health issues of his own.  BUCK indicated he will forwa rd request to Dr Greenberg and contact patient directly for instructions to obtain the cervical cervical collar if Dr Greenberg is in agreement with OT request.

## 2024-10-21 ENCOUNTER — HOSPITAL ENCOUNTER (OUTPATIENT)
Dept: RADIOLOGY | Facility: HOSPITAL | Age: 79
Discharge: HOME | End: 2024-10-21
Payer: COMMERCIAL

## 2024-10-21 DIAGNOSIS — S12.111A CLOSED ODONTOID FRACTURE WITH TYPE II MORPHOLOGY AND POSTERIOR DISPLACEMENT, INITIAL ENCOUNTER (HCC): ICD-10-CM

## 2024-10-21 PROCEDURE — 72040 X-RAY EXAM NECK SPINE 2-3 VW: CPT

## 2024-10-21 NOTE — TELEPHONE ENCOUNTER
Outbound call placed to [facility] and spoke with Bhumi.    RN informed Bhumi that Dr. Greenberg placed a script for a Cervical Collar for pt and that the script will be faxed to the facility at  [phone]. Bhumi confirmed the fax.

## 2024-10-22 ENCOUNTER — HOME CARE VISIT (OUTPATIENT)
Dept: HOME HEALTH SERVICES | Facility: HOME HEALTHCARE | Age: 79
End: 2024-10-22
Payer: COMMERCIAL

## 2024-10-22 VITALS — DIASTOLIC BLOOD PRESSURE: 60 MMHG | HEART RATE: 93 BPM | OXYGEN SATURATION: 94 % | SYSTOLIC BLOOD PRESSURE: 110 MMHG

## 2024-10-22 VITALS — DIASTOLIC BLOOD PRESSURE: 80 MMHG | OXYGEN SATURATION: 97 % | HEART RATE: 102 BPM | SYSTOLIC BLOOD PRESSURE: 114 MMHG

## 2024-10-22 PROCEDURE — G0151 HHCP-SERV OF PT,EA 15 MIN: HCPCS

## 2024-10-22 PROCEDURE — G0152 HHCP-SERV OF OT,EA 15 MIN: HCPCS | Performed by: OCCUPATIONAL THERAPIST

## 2024-10-22 NOTE — CASE COMMUNICATION
OT reviewed Dr. Greenberg's previously provided orders from for a second cervical collar as patient's shower collar is too large for patient.  OT explained that OT is unable to locate a DME provider who has Aspen collars in stock.  Patient has an upcoming visit with Dr. Dewitt on 10/24/24.  OT encourages patient to request a second cervical collar from Dr. Dewitt has patient would benefit from a second cervical collar to allow fo r improved independence with collar/ padding changes.   Patient and caregiver note they will request a second cervical collar from Dr. Dewitt 's office during her visit if Dr. Dewitt feels patient will continue to require wearing the cervical collar.

## 2024-10-23 ENCOUNTER — HOME CARE VISIT (OUTPATIENT)
Dept: HOME HEALTH SERVICES | Facility: HOME HEALTHCARE | Age: 79
End: 2024-10-23
Payer: COMMERCIAL

## 2024-10-23 VITALS — DIASTOLIC BLOOD PRESSURE: 60 MMHG | HEART RATE: 75 BPM | OXYGEN SATURATION: 90 % | SYSTOLIC BLOOD PRESSURE: 90 MMHG

## 2024-10-23 VITALS
OXYGEN SATURATION: 91 % | HEART RATE: 80 BPM | SYSTOLIC BLOOD PRESSURE: 98 MMHG | TEMPERATURE: 97.3 F | DIASTOLIC BLOOD PRESSURE: 60 MMHG | RESPIRATION RATE: 20 BRPM

## 2024-10-23 PROCEDURE — G0299 HHS/HOSPICE OF RN EA 15 MIN: HCPCS

## 2024-10-23 PROCEDURE — G0153 HHCP-SVS OF S/L PATH,EA 15MN: HCPCS

## 2024-10-24 ENCOUNTER — OFFICE VISIT (OUTPATIENT)
Dept: NEUROSURGERY | Facility: CLINIC | Age: 79
End: 2024-10-24

## 2024-10-24 ENCOUNTER — HOME CARE VISIT (OUTPATIENT)
Dept: HOME HEALTH SERVICES | Facility: HOME HEALTHCARE | Age: 79
End: 2024-10-24
Payer: COMMERCIAL

## 2024-10-24 VITALS
DIASTOLIC BLOOD PRESSURE: 60 MMHG | SYSTOLIC BLOOD PRESSURE: 96 MMHG | RESPIRATION RATE: 16 BRPM | HEART RATE: 91 BPM | HEIGHT: 63 IN | WEIGHT: 113 LBS | OXYGEN SATURATION: 95 % | BODY MASS INDEX: 20.02 KG/M2 | TEMPERATURE: 98.2 F

## 2024-10-24 DIAGNOSIS — Z98.1 S/P CERVICAL SPINAL FUSION: Primary | ICD-10-CM

## 2024-10-24 DIAGNOSIS — Z09 FOLLOW-UP EXAMINATION, FOLLOWING OTHER SURGERY: ICD-10-CM

## 2024-10-24 PROCEDURE — G0152 HHCP-SERV OF OT,EA 15 MIN: HCPCS | Performed by: OCCUPATIONAL THERAPIST

## 2024-10-24 PROCEDURE — 99024 POSTOP FOLLOW-UP VISIT: CPT | Performed by: STUDENT IN AN ORGANIZED HEALTH CARE EDUCATION/TRAINING PROGRAM

## 2024-10-24 NOTE — PROGRESS NOTES
Office Note - Neurosurgery   Marsha Oliva 79 y.o. female MRN: [id number]      Assessment and Plan:    This is a 79-year-old female status post C1-3 posterior cervical instrumentation and fusion presenting for her 6-week postsurgical follow-up visit.  The patient is doing very well.  She has been compliant with her cervical collar.  She does not have any complaints.  Most of her preoperative pain symptoms have resolved.  She is overall very happy with her surgical results.  I will bring her back in 3 months for follow-up visit with x-rays of her cervical spine.  I cleared her cervical collar.    History, physical examination and diagnostic tests were reviewed and questions answered. Diagnosis, care plan and treatment options were discussed. The patient and spouse/SO understand instructions and will follow up as directed.    Follow-up: 3 months with x-rays cervical spine    I spent approximately 15 minutes with the patient. This time was dedicated to acquiring the patient's history, performing physical examination, reviewing pertinent imaging and discussing the treatment plan.    Diagnoses and all orders for this visit:    S/P cervical spinal fusion  -     XR spine cervical 2 or 3 vw injury; Future    Follow-up examination, following other surgery  -     XR spine cervical 2 or 3 vw injury; Future        Subjective/Objective     Chief Complaint    Marsha Oliva is a 79 y.o. female presenting for her 6-week postsurgical follow-up visit.    HPI    The patient states she is doing very well.  In the perioperative stages, the patient had significant neck pain.  The pain is slowly improved since surgery.  She no longer has any significant degree of neck pain.  She gets occasional soreness in her neck however this is not a big issue for her.      SANDOR PATTEN personally reviewed and updated.    Review of Systems   Musculoskeletal:  Positive for neck pain (ache).        6 wk pov, csp xr 10/21/24    S/p 9/5/24 C1-3 PCDF with  the possibility of going occiput to C4.     Neurological:  Positive for weakness (bi/arms/hands, improving) and headaches (intermitent). Negative for numbness.       Family History    No family history on file.    Social History    Social History     Socioeconomic History    Marital status: /Civil Union     Spouse name: Not on file    Number of children: Not on file    Years of education: Not on file    Highest education level: Not on file   Occupational History    Not on file   Tobacco Use    Smoking status: Former     Types: Cigarettes    Smokeless tobacco: Never   Vaping Use    Vaping status: Never Used   Substance and Sexual Activity    Alcohol use: Never     Comment: occassional.    Drug use: Never    Sexual activity: Never   Other Topics Concern    Not on file   Social History Narrative    Not on file     Social Determinants of Health     Financial Resource Strain: Not on file   Food Insecurity: No Food Insecurity (10/7/2024)    Nursing - Inadequate Food Risk Classification     Worried About Running Out of Food in the Last Year: Never true     Ran Out of Food in the Last Year: Never true     Ran Out of Food in the Last Year: Not on file   Transportation Needs: Unmet Transportation Needs (10/8/2024)    OASIS : Transportation     Lack of Transportation (Medical): Yes     Lack of Transportation (Non-Medical): No     Patient Unable or Declines to Respond: No   Physical Activity: Not on file   Stress: Not on file   Social Connections: Unknown (6/18/2024)    Received from [facility]    Social Connections     How often do you feel lonely or isolated from those around you? (Adult - for ages 18 years and over): Not on file   Intimate Partner Violence: Not on file   Housing Stability: Low Risk  (10/7/2024)    Housing Stability Vital Sign     Unable to Pay for Housing in the Last Year: No     Number of Times Moved in the Last Year: 0     Homeless in the Last Year: No       Past Medical History    Past Medical  History:   Diagnosis Date    Disease of thyroid gland     Hypotension     Supratherapeutic INR 6/2/2022       Surgical History    Past Surgical History:   Procedure Laterality Date    CERVICAL FUSION Bilateral 9/5/2024    Procedure: navigated posterior fusion C1-C3;  Surgeon: Eduardo Dewitt MD;  Location: [facility];  Service: Neurosurgery    HYSTERECTOMY         Medications      Current Outpatient Medications:     acetaminophen (TYLENOL) 325 mg tablet, Take 2 tablets (650 mg total) by mouth every 6 (six) hours as needed for mild pain, moderate pain, severe pain, headaches or fever, Disp: , Rfl:     aspirin (ECOTRIN LOW STRENGTH) 81 mg EC tablet, Take 1 tablet (81 mg total) by mouth daily, Disp: 30 tablet, Rfl: 0    atorvastatin (LIPITOR) 20 mg tablet, Take 1 tablet (20 mg total) by mouth daily, Disp: 30 tablet, Rfl: 0    calcium carbonate (TUMS) 500 mg chewable tablet, Chew 2 tablets (1,000 mg total) daily as needed for indigestion or heartburn, Disp: 60 tablet, Rfl: 0    cyanocobalamin (VITAMIN B-12) 1000 MCG tablet, Take 1 tablet (1,000 mcg total) by mouth once a week, Disp: 30 tablet, Rfl: 0    dextromethorphan-guaiFENesin (ROBITUSSIN DM)  mg/5 mL syrup, Take 10 mL by mouth every 4 (four) hours as needed for cough, Disp: 118 mL, Rfl: 0    digoxin (LANOXIN) 0.125 mg tablet, Take 1 tablet (125 mcg total) by mouth every other day, Disp: 30 tablet, Rfl: 0    diltiazem (CARDIZEM CD) 180 mg 24 hr capsule, Take 1 capsule (180 mg total) by mouth daily, Disp: 30 capsule, Rfl: 0    docusate sodium (COLACE) 100 mg capsule, Take 100 mg by mouth 2 (two) times a day., Disp: , Rfl:     fluticasone (FLONASE) 50 mcg/act nasal spray, 2 sprays into each nostril daily, Disp: 16 g, Rfl: 0    folic acid (FOLVITE) 1 mg tablet, Take 1 tablet (1,000 mcg total) by mouth daily, Disp: 30 tablet, Rfl: 0    levothyroxine 125 mcg tablet, Take 1 tablet (125 mcg total) by mouth daily in the early morning, Disp: 30 tablet, Rfl: 0     "Magnesium Gluconate 550 MG TABS, Take 0.0545 tablets (30 mg total) by mouth 2 (two) times a day Pt taking 250mg daily, Disp: 240 tablet, Rfl: 0    oxygen gas, Inhale 2 L/min daily as needed (SOB, low saturation)., Disp: , Rfl:     phenol (CHLORASEPTIC) 1.4 % mucosal liquid, Apply 1 spray to the mouth or throat every 2 (two) hours as needed (Mouth sores), Disp: 118 mL, Rfl: 0    Propylene Glycol (SYSTANE BALANCE OP), Administer 1 drop to both eyes 2 (two) times a day., Disp: , Rfl:     senna (SENOKOT) 8.6 mg, Take 2 tablets (17.2 mg total) by mouth daily at bedtime, Disp: 60 tablet, Rfl: 0    sertraline (ZOLOFT) 100 mg tablet, Take 1 tablet (100 mg total) by mouth daily, Disp: 30 tablet, Rfl: 0    torsemide (DEMADEX) 20 mg tablet, Take 1 tablet (20 mg total) by mouth daily as needed (weight gain / leg swelling), Disp: 30 tablet, Rfl: 0    Allergies    No Known Allergies    The following portions of the patient's history were reviewed and updated as appropriate: allergies, current medications, past family history, past medical history, past social history, past surgical history, and problem list.    Investigations    I personally reviewed the XRAY results with the patient:      Physical Exam    Vitals:  Blood pressure 96/60, pulse 91, temperature 98.2 °F (36.8 °C), temperature source Temporal, resp. rate 16, height 5' 3\" (1.6 m), weight 51.3 kg (113 lb), SpO2 95%.,Body mass index is 20.02 kg/m².    General:  Normal, well developed, not in distress/pain     Skin:   No issues, no rashes noted     Musculoskeletal:   5/5 strength throughout all muscle groups  No tenderness to palpation of the spine       Neurologic Exam:  Awake and alert  Oriented x3  Speech clear and fluent  [id number]   Sensation to light touch and pin prick intact throughout  No more's  No clonus  2+ patellar reflexes     Gait:   In wheelchair  "

## 2024-10-25 ENCOUNTER — HOME CARE VISIT (OUTPATIENT)
Dept: HOME HEALTH SERVICES | Facility: HOME HEALTHCARE | Age: 79
End: 2024-10-25
Payer: COMMERCIAL

## 2024-10-25 VITALS — DIASTOLIC BLOOD PRESSURE: 62 MMHG | OXYGEN SATURATION: 92 % | SYSTOLIC BLOOD PRESSURE: 96 MMHG | HEART RATE: 77 BPM

## 2024-10-25 PROCEDURE — G0151 HHCP-SERV OF PT,EA 15 MIN: HCPCS

## 2024-10-25 PROCEDURE — G0153 HHCP-SVS OF S/L PATH,EA 15MN: HCPCS

## 2024-10-25 NOTE — CASE COMMUNICATION
Patient dischargd from [facility] PT with goals met. She is indep in home with RW. Cervical collar discontinued and patient is indep in HEP.  [facility] OT continues.

## 2024-10-29 ENCOUNTER — HOME CARE VISIT (OUTPATIENT)
Dept: HOME HEALTH SERVICES | Facility: HOME HEALTHCARE | Age: 79
End: 2024-10-29
Payer: COMMERCIAL

## 2024-10-29 VITALS — OXYGEN SATURATION: 96 % | SYSTOLIC BLOOD PRESSURE: 110 MMHG | DIASTOLIC BLOOD PRESSURE: 62 MMHG | HEART RATE: 69 BPM

## 2024-10-29 PROCEDURE — G0152 HHCP-SERV OF OT,EA 15 MIN: HCPCS | Performed by: OCCUPATIONAL THERAPIST

## 2024-10-31 ENCOUNTER — HOME CARE VISIT (OUTPATIENT)
Dept: HOME HEALTH SERVICES | Facility: HOME HEALTHCARE | Age: 79
End: 2024-10-31
Payer: COMMERCIAL

## 2024-10-31 VITALS — HEART RATE: 80 BPM | OXYGEN SATURATION: 98 % | DIASTOLIC BLOOD PRESSURE: 60 MMHG | SYSTOLIC BLOOD PRESSURE: 118 MMHG

## 2024-10-31 PROCEDURE — G0152 HHCP-SERV OF OT,EA 15 MIN: HCPCS | Performed by: OCCUPATIONAL THERAPIST

## 2024-10-31 NOTE — CASE COMMUNICATION
OT performed discharge from [facility] OT services and all home care following today's visit per plan of care with goals met patient denies interest in transitioning to outpatient therapy at this time as she is comfortable with current upper extremity home exercise program.  Patient notes she will request orders for outpatient therapy from her PCP if she feels it is necessary in the future.    No further home health care services are planned a t this time.

## 2024-12-22 ENCOUNTER — APPOINTMENT (EMERGENCY)
Dept: RADIOLOGY | Facility: HOSPITAL | Age: 79
End: 2024-12-22
Payer: COMMERCIAL

## 2024-12-22 ENCOUNTER — APPOINTMENT (EMERGENCY)
Dept: CT IMAGING | Facility: HOSPITAL | Age: 79
End: 2024-12-22
Payer: COMMERCIAL

## 2024-12-22 ENCOUNTER — HOSPITAL ENCOUNTER (EMERGENCY)
Facility: HOSPITAL | Age: 79
End: 2024-12-22
Attending: EMERGENCY MEDICINE
Payer: COMMERCIAL

## 2024-12-22 ENCOUNTER — HOSPITAL ENCOUNTER (INPATIENT)
Facility: HOSPITAL | Age: 79
LOS: 12 days | Discharge: NURSING, REHAB OR LONG-TERM CARE | DRG: 542 | End: 2025-01-03
Attending: SURGERY | Admitting: SURGERY
Payer: COMMERCIAL

## 2024-12-22 VITALS
BODY MASS INDEX: 19.84 KG/M2 | HEIGHT: 63 IN | OXYGEN SATURATION: 92 % | WEIGHT: 112 LBS | RESPIRATION RATE: 26 BRPM | DIASTOLIC BLOOD PRESSURE: 65 MMHG | HEART RATE: 120 BPM | SYSTOLIC BLOOD PRESSURE: 114 MMHG | TEMPERATURE: 97.7 F

## 2024-12-22 DIAGNOSIS — E53.8 FOLATE DEFICIENCY: ICD-10-CM

## 2024-12-22 DIAGNOSIS — S22.019A CLOSED FRACTURE OF FIRST THORACIC VERTEBRA, UNSPECIFIED FRACTURE MORPHOLOGY, INITIAL ENCOUNTER (HCC): ICD-10-CM

## 2024-12-22 DIAGNOSIS — R29.6 REPEATED FALLS: ICD-10-CM

## 2024-12-22 DIAGNOSIS — I11.0 HYPERTENSIVE HEART DISEASE WITH HEART FAILURE (HCC): ICD-10-CM

## 2024-12-22 DIAGNOSIS — Z74.09 IMPAIRED MOBILITY AND ACTIVITIES OF DAILY LIVING: ICD-10-CM

## 2024-12-22 DIAGNOSIS — R44.3 HALLUCINATIONS: ICD-10-CM

## 2024-12-22 DIAGNOSIS — T17.908S ASPIRATION INTO AIRWAY, SEQUELA: ICD-10-CM

## 2024-12-22 DIAGNOSIS — S22.010A CLOSED WEDGE COMPRESSION FRACTURE OF T1 VERTEBRA, INITIAL ENCOUNTER (HCC): Primary | ICD-10-CM

## 2024-12-22 DIAGNOSIS — I48.91 ATRIAL FIBRILLATION WITH RAPID VENTRICULAR RESPONSE (HCC): ICD-10-CM

## 2024-12-22 DIAGNOSIS — R09.02 HYPOXIA: ICD-10-CM

## 2024-12-22 DIAGNOSIS — Z78.9 IMPAIRED MOBILITY AND ACTIVITIES OF DAILY LIVING: ICD-10-CM

## 2024-12-22 DIAGNOSIS — S32.10XA CLOSED FRACTURE OF SACRUM, UNSPECIFIED PORTION OF SACRUM, INITIAL ENCOUNTER (HCC): ICD-10-CM

## 2024-12-22 DIAGNOSIS — S30.0XXA CONTUSION OF SACRUM, INITIAL ENCOUNTER: ICD-10-CM

## 2024-12-22 DIAGNOSIS — K13.79 SORE MOUTH: ICD-10-CM

## 2024-12-22 DIAGNOSIS — R13.10 DYSPHAGIA, UNSPECIFIED TYPE: ICD-10-CM

## 2024-12-22 DIAGNOSIS — E53.8 B12 DEFICIENCY: ICD-10-CM

## 2024-12-22 DIAGNOSIS — F41.8 DEPRESSION WITH ANXIETY: ICD-10-CM

## 2024-12-22 DIAGNOSIS — R29.6 FREQUENT FALLS: ICD-10-CM

## 2024-12-22 DIAGNOSIS — E43 SEVERE PROTEIN-CALORIE MALNUTRITION (HCC): ICD-10-CM
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
## 2024-12-22 DIAGNOSIS — E03.9 HYPOTHYROIDISM, UNSPECIFIED TYPE: ICD-10-CM

## 2024-12-22 DIAGNOSIS — I34.0 NONRHEUMATIC MITRAL VALVE REGURGITATION: ICD-10-CM

## 2024-12-22 DIAGNOSIS — S32.10XA SACRAL FRACTURE, CLOSED (HCC): ICD-10-CM

## 2024-12-22 DIAGNOSIS — I10 ESSENTIAL HYPERTENSION: ICD-10-CM

## 2024-12-22 DIAGNOSIS — I48.20 ATRIAL FIBRILLATION, CHRONIC (HCC): ICD-10-CM

## 2024-12-22 DIAGNOSIS — R13.19 OTHER DYSPHAGIA: ICD-10-CM

## 2024-12-22 DIAGNOSIS — S22.010A COMPRESSION FRACTURE OF T1 VERTEBRA, INITIAL ENCOUNTER (HCC): ICD-10-CM

## 2024-12-22 LAB
25(OH)D3 SERPL-MCNC: 49.7 NG/ML (ref 30–100)
2HR DELTA HS TROPONIN: -1 NG/L
ABO GROUP BLD: NORMAL
ABO GROUP BLD: NORMAL
ALBUMIN SERPL BCG-MCNC: 3 G/DL (ref 3.5–5)
ALBUMIN SERPL BCG-MCNC: 3.7 G/DL (ref 3.5–5)
ALP SERPL-CCNC: 104 U/L (ref 34–104)
ALP SERPL-CCNC: 90 U/L (ref 34–104)
ALT SERPL W P-5'-P-CCNC: 10 U/L (ref 7–52)
ALT SERPL W P-5'-P-CCNC: 14 U/L (ref 7–52)
ANION GAP SERPL CALCULATED.3IONS-SCNC: 8 MMOL/L (ref 4–13)
ANION GAP SERPL CALCULATED.3IONS-SCNC: 9 MMOL/L (ref 4–13)
APTT PPP: 41 SECONDS (ref 23–34)
AST SERPL W P-5'-P-CCNC: 23 U/L (ref 13–39)
AST SERPL W P-5'-P-CCNC: 29 U/L (ref 13–39)
ATRIAL RATE: 82 BPM
BASOPHILS # BLD AUTO: 0.03 THOUSANDS/ÂΜL (ref 0–0.1)
BASOPHILS NFR BLD AUTO: 0 % (ref 0–1)
BILIRUB SERPL-MCNC: 0.54 MG/DL (ref 0.2–1)
BILIRUB SERPL-MCNC: 0.7 MG/DL (ref 0.2–1)
BLD GP AB SCN SERPL QL: NEGATIVE
BLD GP AB SCN SERPL QL: NEGATIVE
BNP SERPL-MCNC: 305 PG/ML (ref 0–100)
BUN SERPL-MCNC: 12 MG/DL (ref 5–25)
BUN SERPL-MCNC: 14 MG/DL (ref 5–25)
CALCIUM ALBUM COR SERPL-MCNC: 9.1 MG/DL (ref 8.3–10.1)
CALCIUM SERPL-MCNC: 8.3 MG/DL (ref 8.4–10.2)
CALCIUM SERPL-MCNC: 9.2 MG/DL (ref 8.4–10.2)
CARDIAC TROPONIN I PNL SERPL HS: 5 NG/L (ref ?–50)
CARDIAC TROPONIN I PNL SERPL HS: 6 NG/L (ref ?–50)
CHLORIDE SERPL-SCNC: 101 MMOL/L (ref 96–108)
CHLORIDE SERPL-SCNC: 103 MMOL/L (ref 96–108)
CO2 SERPL-SCNC: 25 MMOL/L (ref 21–32)
CO2 SERPL-SCNC: 26 MMOL/L (ref 21–32)
CREAT SERPL-MCNC: 1.03 MG/DL (ref 0.6–1.3)
CREAT SERPL-MCNC: 1.16 MG/DL (ref 0.6–1.3)
DIGOXIN SERPL-MCNC: 0.6 NG/ML (ref 0.8–2)
EOSINOPHIL # BLD AUTO: 0.15 THOUSAND/ÂΜL (ref 0–0.61)
EOSINOPHIL NFR BLD AUTO: 2 % (ref 0–6)
ERYTHROCYTE [DISTWIDTH] IN BLOOD BY AUTOMATED COUNT: 15.5 % (ref 11.6–15.1)
ERYTHROCYTE [SEDIMENTATION RATE] IN BLOOD: 45 MM/HOUR (ref 0–29)
GFR SERPL CREATININE-BSD FRML MDRD: 44 ML/MIN/1.73SQ M
GFR SERPL CREATININE-BSD FRML MDRD: 51 ML/MIN/1.73SQ M
GLUCOSE SERPL-MCNC: 69 MG/DL (ref 65–140)
GLUCOSE SERPL-MCNC: 88 MG/DL (ref 65–140)
GLUCOSE SERPL-MCNC: 91 MG/DL (ref 65–140)
HCT VFR BLD AUTO: 32.4 % (ref 34.8–46.1)
HCT VFR BLD AUTO: 33.4 % (ref 34.8–46.1)
HCT VFR BLD AUTO: 34.8 % (ref 34.8–46.1)
HCT VFR BLD AUTO: 37.8 % (ref 34.8–46.1)
HGB BLD-MCNC: 10 G/DL (ref 11.5–15.4)
HGB BLD-MCNC: 10.1 G/DL (ref 11.5–15.4)
HGB BLD-MCNC: 10.5 G/DL (ref 11.5–15.4)
HGB BLD-MCNC: 11.5 G/DL (ref 11.5–15.4)
IMM GRANULOCYTES # BLD AUTO: 0.04 THOUSAND/UL (ref 0–0.2)
IMM GRANULOCYTES NFR BLD AUTO: 1 % (ref 0–2)
INR PPP: 1.05 (ref 0.85–1.19)
LYMPHOCYTES # BLD AUTO: 0.47 THOUSANDS/ÂΜL (ref 0.6–4.47)
LYMPHOCYTES NFR BLD AUTO: 6 % (ref 14–44)
MCH RBC QN AUTO: 28.2 PG (ref 26.8–34.3)
MCHC RBC AUTO-ENTMCNC: 30.4 G/DL (ref 31.4–37.4)
MCV RBC AUTO: 93 FL (ref 82–98)
MONOCYTES # BLD AUTO: 0.48 THOUSAND/ÂΜL (ref 0.17–1.22)
MONOCYTES NFR BLD AUTO: 6 % (ref 4–12)
NEUTROPHILS # BLD AUTO: 6.43 THOUSANDS/ÂΜL (ref 1.85–7.62)
NEUTS SEG NFR BLD AUTO: 85 % (ref 43–75)
NRBC BLD AUTO-RTO: 0 /100 WBCS
PLATELET # BLD AUTO: 229 THOUSANDS/UL (ref 149–390)
PMV BLD AUTO: 8 FL (ref 8.9–12.7)
POTASSIUM SERPL-SCNC: 4.5 MMOL/L (ref 3.5–5.3)
POTASSIUM SERPL-SCNC: 4.5 MMOL/L (ref 3.5–5.3)
PROT SERPL-MCNC: 5.5 G/DL (ref 6.4–8.4)
PROT SERPL-MCNC: 7.1 G/DL (ref 6.4–8.4)
PROTHROMBIN TIME: 14.2 SECONDS (ref 12.3–15)
PTH-INTACT SERPL-MCNC: 41.1 PG/ML (ref 12–88)
QRS AXIS: 77 DEGREES
QRSD INTERVAL: 80 MS
QT INTERVAL: 326 MS
QTC INTERVAL: 462 MS
RBC # BLD AUTO: 4.08 MILLION/UL (ref 3.81–5.12)
RH BLD: POSITIVE
RH BLD: POSITIVE
SODIUM SERPL-SCNC: 136 MMOL/L (ref 135–147)
SODIUM SERPL-SCNC: 136 MMOL/L (ref 135–147)
SPECIMEN EXPIRATION DATE: NORMAL
SPECIMEN EXPIRATION DATE: NORMAL
T WAVE AXIS: -87 DEGREES
TSH SERPL DL<=0.05 MIU/L-ACNC: 0.85 UIU/ML (ref 0.45–4.5)
TSH SERPL DL<=0.05 MIU/L-ACNC: 1.28 UIU/ML (ref 0.45–4.5)
VENTRICULAR RATE: 121 BPM
WBC # BLD AUTO: 7.6 THOUSAND/UL (ref 4.31–10.16)

## 2024-12-22 PROCEDURE — 80162 ASSAY OF DIGOXIN TOTAL: CPT | Performed by: EMERGENCY MEDICINE

## 2024-12-22 PROCEDURE — 80053 COMPREHEN METABOLIC PANEL: CPT | Performed by: EMERGENCY MEDICINE

## 2024-12-22 PROCEDURE — 99222 1ST HOSP IP/OBS MODERATE 55: CPT | Performed by: NURSE PRACTITIONER

## 2024-12-22 PROCEDURE — 84443 ASSAY THYROID STIM HORMONE: CPT | Performed by: SURGERY

## 2024-12-22 PROCEDURE — 93010 ELECTROCARDIOGRAM REPORT: CPT | Performed by: INTERNAL MEDICINE

## 2024-12-22 PROCEDURE — 86901 BLOOD TYPING SEROLOGIC RH(D): CPT

## 2024-12-22 PROCEDURE — 85610 PROTHROMBIN TIME: CPT | Performed by: EMERGENCY MEDICINE

## 2024-12-22 PROCEDURE — 84484 ASSAY OF TROPONIN QUANT: CPT | Performed by: EMERGENCY MEDICINE

## 2024-12-22 PROCEDURE — 70486 CT MAXILLOFACIAL W/O DYE: CPT

## 2024-12-22 PROCEDURE — 83880 ASSAY OF NATRIURETIC PEPTIDE: CPT | Performed by: EMERGENCY MEDICINE

## 2024-12-22 PROCEDURE — 70450 CT HEAD/BRAIN W/O DYE: CPT

## 2024-12-22 PROCEDURE — 72170 X-RAY EXAM OF PELVIS: CPT

## 2024-12-22 PROCEDURE — 86900 BLOOD TYPING SEROLOGIC ABO: CPT

## 2024-12-22 PROCEDURE — 36415 COLL VENOUS BLD VENIPUNCTURE: CPT | Performed by: EMERGENCY MEDICINE

## 2024-12-22 PROCEDURE — 99223 1ST HOSP IP/OBS HIGH 75: CPT | Performed by: INTERNAL MEDICINE

## 2024-12-22 PROCEDURE — 96365 THER/PROPH/DIAG IV INF INIT: CPT

## 2024-12-22 PROCEDURE — 82306 VITAMIN D 25 HYDROXY: CPT | Performed by: SURGERY

## 2024-12-22 PROCEDURE — 85014 HEMATOCRIT: CPT

## 2024-12-22 PROCEDURE — 93005 ELECTROCARDIOGRAM TRACING: CPT

## 2024-12-22 PROCEDURE — 82948 REAGENT STRIP/BLOOD GLUCOSE: CPT

## 2024-12-22 PROCEDURE — 96366 THER/PROPH/DIAG IV INF ADDON: CPT

## 2024-12-22 PROCEDURE — 84443 ASSAY THYROID STIM HORMONE: CPT | Performed by: EMERGENCY MEDICINE

## 2024-12-22 PROCEDURE — 99223 1ST HOSP IP/OBS HIGH 75: CPT | Performed by: SURGERY

## 2024-12-22 PROCEDURE — 85730 THROMBOPLASTIN TIME PARTIAL: CPT | Performed by: EMERGENCY MEDICINE

## 2024-12-22 PROCEDURE — 99285 EMERGENCY DEPT VISIT HI MDM: CPT | Performed by: EMERGENCY MEDICINE

## 2024-12-22 PROCEDURE — 73564 X-RAY EXAM KNEE 4 OR MORE: CPT

## 2024-12-22 PROCEDURE — 86900 BLOOD TYPING SEROLOGIC ABO: CPT | Performed by: EMERGENCY MEDICINE

## 2024-12-22 PROCEDURE — 99222 1ST HOSP IP/OBS MODERATE 55: CPT | Performed by: ORTHOPAEDIC SURGERY

## 2024-12-22 PROCEDURE — 96376 TX/PRO/DX INJ SAME DRUG ADON: CPT

## 2024-12-22 PROCEDURE — 99285 EMERGENCY DEPT VISIT HI MDM: CPT

## 2024-12-22 PROCEDURE — 83970 ASSAY OF PARATHORMONE: CPT | Performed by: SURGERY

## 2024-12-22 PROCEDURE — 71045 X-RAY EXAM CHEST 1 VIEW: CPT

## 2024-12-22 PROCEDURE — 86901 BLOOD TYPING SEROLOGIC RH(D): CPT | Performed by: EMERGENCY MEDICINE

## 2024-12-22 PROCEDURE — 85025 COMPLETE CBC W/AUTO DIFF WBC: CPT | Performed by: EMERGENCY MEDICINE

## 2024-12-22 PROCEDURE — 86850 RBC ANTIBODY SCREEN: CPT

## 2024-12-22 PROCEDURE — 85652 RBC SED RATE AUTOMATED: CPT

## 2024-12-22 PROCEDURE — 74177 CT ABD & PELVIS W/CONTRAST: CPT

## 2024-12-22 PROCEDURE — 86850 RBC ANTIBODY SCREEN: CPT | Performed by: EMERGENCY MEDICINE

## 2024-12-22 PROCEDURE — 71260 CT THORAX DX C+: CPT

## 2024-12-22 PROCEDURE — 72125 CT NECK SPINE W/O DYE: CPT

## 2024-12-22 PROCEDURE — 85018 HEMOGLOBIN: CPT

## 2024-12-22 PROCEDURE — 80053 COMPREHEN METABOLIC PANEL: CPT

## 2024-12-22 RX ORDER — SODIUM CHLORIDE, SODIUM LACTATE, POTASSIUM CHLORIDE, CALCIUM CHLORIDE 600; 310; 30; 20 MG/100ML; MG/100ML; MG/100ML; MG/100ML
75 INJECTION, SOLUTION INTRAVENOUS CONTINUOUS
Status: DISCONTINUED | OUTPATIENT
Start: 2024-12-22 | End: 2024-12-23

## 2024-12-22 RX ORDER — DILTIAZEM HYDROCHLORIDE 180 MG/1
180 CAPSULE, COATED, EXTENDED RELEASE ORAL DAILY
Status: DISCONTINUED | OUTPATIENT
Start: 2024-12-22 | End: 2025-01-02

## 2024-12-22 RX ORDER — QUETIAPINE FUMARATE 25 MG/1
25 TABLET, FILM COATED ORAL
Status: DISCONTINUED | OUTPATIENT
Start: 2024-12-22 | End: 2024-12-22

## 2024-12-22 RX ORDER — SODIUM CHLORIDE, SODIUM GLUCONATE, SODIUM ACETATE, POTASSIUM CHLORIDE, MAGNESIUM CHLORIDE, SODIUM PHOSPHATE, DIBASIC, AND POTASSIUM PHOSPHATE .53; .5; .37; .037; .03; .012; .00082 G/100ML; G/100ML; G/100ML; G/100ML; G/100ML; G/100ML; G/100ML
75 INJECTION, SOLUTION INTRAVENOUS CONTINUOUS
Status: DISCONTINUED | OUTPATIENT
Start: 2024-12-22 | End: 2024-12-22

## 2024-12-22 RX ORDER — POLYETHYLENE GLYCOL 3350 17 G/17G
17 POWDER, FOR SOLUTION ORAL DAILY
Status: DISCONTINUED | OUTPATIENT
Start: 2024-12-22 | End: 2025-01-01

## 2024-12-22 RX ORDER — OLANZAPINE 5 MG/1
2.5 TABLET, ORALLY DISINTEGRATING ORAL ONCE
Status: COMPLETED | OUTPATIENT
Start: 2024-12-22 | End: 2024-12-22

## 2024-12-22 RX ORDER — DOCUSATE SODIUM 100 MG/1
100 CAPSULE, LIQUID FILLED ORAL 2 TIMES DAILY
Status: DISCONTINUED | OUTPATIENT
Start: 2024-12-22 | End: 2025-01-01

## 2024-12-22 RX ORDER — ATORVASTATIN CALCIUM 20 MG/1
20 TABLET, FILM COATED ORAL DAILY
Status: DISCONTINUED | OUTPATIENT
Start: 2024-12-22 | End: 2025-01-01

## 2024-12-22 RX ORDER — OLANZAPINE 5 MG/1
5 TABLET, ORALLY DISINTEGRATING ORAL ONCE
Status: COMPLETED | OUTPATIENT
Start: 2024-12-22 | End: 2024-12-22

## 2024-12-22 RX ORDER — ONDANSETRON 2 MG/ML
4 INJECTION INTRAMUSCULAR; INTRAVENOUS EVERY 6 HOURS PRN
Status: DISCONTINUED | OUTPATIENT
Start: 2024-12-22 | End: 2025-01-03 | Stop reason: HOSPADM

## 2024-12-22 RX ORDER — LEVOTHYROXINE SODIUM 125 UG/1
125 TABLET ORAL
Status: DISCONTINUED | OUTPATIENT
Start: 2024-12-22 | End: 2025-01-01

## 2024-12-22 RX ORDER — ACETAMINOPHEN 325 MG/1
650 TABLET ORAL EVERY 6 HOURS PRN
Status: DISCONTINUED | OUTPATIENT
Start: 2024-12-22 | End: 2024-12-23

## 2024-12-22 RX ORDER — SERTRALINE HYDROCHLORIDE 100 MG/1
100 TABLET, FILM COATED ORAL DAILY
Status: DISCONTINUED | OUTPATIENT
Start: 2024-12-22 | End: 2025-01-01

## 2024-12-22 RX ORDER — DIGOXIN 125 MCG
125 TABLET ORAL EVERY OTHER DAY
Status: DISCONTINUED | OUTPATIENT
Start: 2024-12-22 | End: 2024-12-29

## 2024-12-22 RX ORDER — DILTIAZEM HYDROCHLORIDE 5 MG/ML
15 INJECTION INTRAVENOUS ONCE
Status: COMPLETED | OUTPATIENT
Start: 2024-12-22 | End: 2024-12-22

## 2024-12-22 RX ADMIN — Medication 2.5 MG/HR: at 06:21

## 2024-12-22 RX ADMIN — SODIUM CHLORIDE, SODIUM LACTATE, POTASSIUM CHLORIDE, AND CALCIUM CHLORIDE 75 ML/HR: .6; .31; .03; .02 INJECTION, SOLUTION INTRAVENOUS at 18:53

## 2024-12-22 RX ADMIN — DILTIAZEM HYDROCHLORIDE 5 MG/HR: 5 INJECTION, SOLUTION INTRAVENOUS at 11:30

## 2024-12-22 RX ADMIN — SODIUM CHLORIDE, SODIUM GLUCONATE, SODIUM ACETATE, POTASSIUM CHLORIDE, MAGNESIUM CHLORIDE, SODIUM PHOSPHATE, DIBASIC, AND POTASSIUM PHOSPHATE 75 ML/HR: .53; .5; .37; .037; .03; .012; .00082 INJECTION, SOLUTION INTRAVENOUS at 17:59

## 2024-12-22 RX ADMIN — DIGOXIN 125 MCG: 125 TABLET ORAL at 13:37

## 2024-12-22 RX ADMIN — IOHEXOL 100 ML: 350 INJECTION, SOLUTION INTRAVENOUS at 05:39

## 2024-12-22 RX ADMIN — SODIUM CHLORIDE, SODIUM GLUCONATE, SODIUM ACETATE, POTASSIUM CHLORIDE, MAGNESIUM CHLORIDE, SODIUM PHOSPHATE, DIBASIC, AND POTASSIUM PHOSPHATE 75 ML/HR: .53; .5; .37; .037; .03; .012; .00082 INJECTION, SOLUTION INTRAVENOUS at 10:43

## 2024-12-22 RX ADMIN — OLANZAPINE 5 MG: 5 TABLET, ORALLY DISINTEGRATING ORAL at 18:54

## 2024-12-22 RX ADMIN — SERTRALINE 100 MG: 100 TABLET, FILM COATED ORAL at 13:39

## 2024-12-22 RX ADMIN — SODIUM CHLORIDE, SODIUM LACTATE, POTASSIUM CHLORIDE, AND CALCIUM CHLORIDE 500 ML: .6; .31; .03; .02 INJECTION, SOLUTION INTRAVENOUS at 19:45

## 2024-12-22 RX ADMIN — LEVOTHYROXINE SODIUM 125 MCG: 0.12 TABLET ORAL at 13:39

## 2024-12-22 RX ADMIN — ATORVASTATIN CALCIUM 20 MG: 20 TABLET, FILM COATED ORAL at 13:37

## 2024-12-22 RX ADMIN — OLANZAPINE 2.5 MG: 5 TABLET, ORALLY DISINTEGRATING ORAL at 13:37

## 2024-12-22 RX ADMIN — ACETAMINOPHEN 650 MG: 325 TABLET, FILM COATED ORAL at 19:45

## 2024-12-22 RX ADMIN — DILTIAZEM HYDROCHLORIDE 15 MG: 5 INJECTION, SOLUTION INTRAVENOUS at 06:00
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
## 2024-12-22 NOTE — ED NOTES
0430: Pt arrived to ER via ambulance after falling. Pt has had multiple falls recently. Pt has generalized bruising and multiple skin tears/abrasions on body. A-fib on monitor, hr 120-130's.     0500: Pt attempted to void on commode; refused to use bedpan. Pt was unable to void. Bladder scanned for 198. Pt assisted x2 back into bed. O2 sat dropped down to low 80's; placed on 4L NC. Pt claims to wear 3L NC for comfort at home when she needs it, but not all the time.     0535: Pt taken to CT without issue & brought back to room.    0600: 15 mg iv cardizem given. HR went from 120's to low 100.     0625: Cardizem gtt started at 2.5.      Ruchi Gillis RN  12/22/24 [id number]
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
## 2024-12-22 NOTE — ASSESSMENT & PLAN NOTE
79-year-old female presenting due to multiple falls.  Transferred from [facility] due to closed T1 fracture, sacral fracture with small hematoma and some active extravasation.  GCS 15. A&Ox3.  Hemodynamically stable on 2 L NC.  No pain at this time.  No motor or sensory defects.  On presentation found to be in A-fib with RVR, started on Cardizem drip.    Plan  Admit as stepdown level 2  NPO  IV fluids  Every 4 hours H&H, low threshold for IR consult if hemoglobin downtrends or drops drastically  As needed pain and nausea control  Ortho consult  Neurosurgery consult  Encourage I-S

## 2024-12-22 NOTE — CONSULTS
Consultation - Orthopedics   Name: Marsha Oliva 79 y.o. female I MRN: [id number]  Unit/Bed#: ED 21 I Date of Admission: 12/22/2024   Date of Service: 12/22/2024 I Hospital Day: 0   Inpatient consult to Orthopedic Surgery  Consult performed by: [place] Maya MD  Consult ordered by: Mesha Snow MD        Physician Requesting Evaluation: Manjinder Farooq MD   Reason for Evaluation / Principal Problem: sacral fracture       Assessment & Plan  Repeated falls    Atrial fibrillation, chronic ([facility])    Chronic diastolic CHF (congestive heart failure) ([facility])  Wt Readings from Last 3 Encounters:   12/22/24 50.8 kg (112 lb)   10/24/24 51.3 kg (113 lb)   10/07/24 51.3 kg (113 lb 1.5 oz)             Depression with anxiety    Hypothyroidism    Mixed hyperlipidemia    Severe mitral regurgitation    ILD (interstitial lung disease) ([facility])    Apraxia    S/P cervical spinal fusion    Closed T1 fracture ([facility])    Sacral fracture ([facility])  79 y.o.female with an isolated sacral fracture at the level of S4 without associated specific neurologic deficits.  She has a known history of osteoarthritis of the left knee, and imaging does not demonstrate any new fracture, dislocation, or other acute osseous abnormality.    Weightbearing as tolerated bilateral lower extremities  PT/OT  Pain control  DVT ppx: May continue home ASA 81 mg when cleared by primary team.  Medical management per primary team  Metabolic osteopenic/osteoporotic prophylactic workup ordered  Dispo planning per primary  May follow up in ortho trauma clinic in 4 weeks.    Please contact the SecureChat role for the Orthopedics service with any questions/concerns.    History of Present Illness   HPI: Marsha Oliva is a 79 y.o. female walker assisted ambulator who we are consulted due to an isolated sacrum fracture at the S4 level.  On interview patient reports she has had a few falls over the last week due to ongoing leg weakness.  She denies any substantial pain over her  buttock/sacral area on exam. Additional information was obtained from .  He reports that she has had multiple falls in the last month which have been increasing in frequency culminating in 3 falls in the last 24 hours prompting him to call emergency services.  He attributes her falls to a combination of increasing leg weakness over the last month, and the patient's associated increasing mood disturbance which causes her to become combative with him when he suggests using her walker.  In addition to this the patient has had ongoing hallucinations of either people or small animals such as groundhog's in the yard outside their house and new sleep talking associated with episodes of arms reaching and legs jerking.  Patient reports she has been having intermittent episodes of spasms most notably affecting her legs.  She also has urinary incontinence which he attributes to behavioral disturbance as she will vocalize that she knows she has to urinate, but will wait for over an hour sometimes and will end up not making it to the bathroom before she wets herself. The patient has also been having difficulty with handwriting, buttoning her shirt, pulling up pants which has been going on for about a year.    Review of Systems  I have reviewed the patient's available PMH, PSH, Social History, Family History, Meds, and Allergies  Historical Information   Past Medical History:   Diagnosis Date    Disease of thyroid gland     Hypotension     Supratherapeutic INR 6/2/2022     Past Surgical History:   Procedure Laterality Date    CERVICAL FUSION Bilateral 9/5/2024    Procedure: navigated posterior fusion C1-C3;  Surgeon: Eduardo Dewitt MD;  Location: [facility];  Service: Neurosurgery    HYSTERECTOMY       Social History     Tobacco Use    Smoking status: Former     Types: Cigarettes    Smokeless tobacco: Never   Vaping Use    Vaping status: Never Used   Substance and Sexual Activity    Alcohol use: Never     Comment:  occassional.    Drug use: Never    Sexual activity: Never     E-Cigarette/Vaping    E-Cigarette Use Never User      E-Cigarette/Vaping Substances    Nicotine No     THC No     CBD No     Flavoring No     Other No     Unknown No        Social History     Tobacco Use    Smoking status: Former     Types: Cigarettes    Smokeless tobacco: Never   Vaping Use    Vaping status: Never Used   Substance and Sexual Activity    Alcohol use: Never     Comment: occassional.    Drug use: Never    Sexual activity: Never       Current Facility-Administered Medications:     acetaminophen (TYLENOL) tablet 650 mg, Q6H PRN    atorvastatin (LIPITOR) tablet 20 mg, Daily    digoxin (LANOXIN) tablet 125 mcg, Every Other Day    diltiazem (CARDIZEM CD) 24 hr capsule 180 mg, Daily    diltiazem (CARDIZEM) 125 mg in sodium chloride 0.9 % 125 mL infusion, Titrated, Last Rate: 5 mg/hr (12/22/24 1130)    docusate sodium (COLACE) capsule 100 mg, BID    levothyroxine tablet 125 mcg, Early Morning    multi-electrolyte (PLASMALYTE-A/ISOLYTE-S PH 7.4) IV solution, Continuous, Last Rate: 75 mL/hr (12/22/24 1043)    ondansetron (ZOFRAN) injection 4 mg, Q6H PRN    phenol (CHLORASEPTIC) 1.4 % mucosal liquid 1 spray, Q2H PRN    polyethylene glycol (MIRALAX) packet 17 g, Daily    sertraline (ZOLOFT) tablet 100 mg, Daily  Prior to Admission Medications   Prescriptions Last Dose Informant Patient Reported? Taking?   Magnesium Gluconate 550 MG TABS   No No   Sig: Take 0.0545 tablets (30 mg total) by mouth 2 (two) times a day Pt taking 250mg daily   Propylene Glycol (SYSTANE BALANCE OP)   Yes No   Sig: Administer 1 drop to both eyes 2 (two) times a day.   acetaminophen (TYLENOL) 325 mg tablet   No No   Sig: Take 2 tablets (650 mg total) by mouth every 6 (six) hours as needed for mild pain, moderate pain, severe pain, headaches or fever   aspirin (ECOTRIN LOW STRENGTH) 81 mg EC tablet   No No   Sig: Take 1 tablet (81 mg total) by mouth daily   atorvastatin  (LIPITOR) 20 mg tablet   No No   Sig: Take 1 tablet (20 mg total) by mouth daily   calcium carbonate (TUMS) 500 mg chewable tablet   No No   Sig: Chew 2 tablets (1,000 mg total) daily as needed for indigestion or heartburn   cyanocobalamin (VITAMIN B-12) 1000 MCG tablet   No No   Sig: Take 1 tablet (1,000 mcg total) by mouth once a week   dextromethorphan-guaiFENesin (ROBITUSSIN DM)  mg/5 mL syrup   No No   Sig: Take 10 mL by mouth every 4 (four) hours as needed for cough   digoxin (LANOXIN) 0.125 mg tablet   No No   Sig: Take 1 tablet (125 mcg total) by mouth every other day   diltiazem (CARDIZEM CD) 180 mg 24 hr capsule   No No   Sig: Take 1 capsule (180 mg total) by mouth daily   docusate sodium (COLACE) 100 mg capsule   Yes No   Sig: Take 100 mg by mouth 2 (two) times a day.   fluticasone (FLONASE) 50 mcg/act nasal spray   No No   Si sprays into each nostril daily   folic acid (FOLVITE) 1 mg tablet   No No   Sig: Take 1 tablet (1,000 mcg total) by mouth daily   levothyroxine 125 mcg tablet   No No   Sig: Take 1 tablet (125 mcg total) by mouth daily in the early morning   oxygen gas   Yes No   Sig: Inhale 2 L/min daily as needed (SOB, low saturation).   phenol (CHLORASEPTIC) 1.4 % mucosal liquid   No No   Sig: Apply 1 spray to the mouth or throat every 2 (two) hours as needed (Mouth sores)   senna (SENOKOT) 8.6 mg   No No   Sig: Take 2 tablets (17.2 mg total) by mouth daily at bedtime   sertraline (ZOLOFT) 100 mg tablet   No No   Sig: Take 1 tablet (100 mg total) by mouth daily   torsemide (DEMADEX) 20 mg tablet   No No   Sig: Take 1 tablet (20 mg total) by mouth daily as needed (weight gain / leg swelling)      Facility-Administered Medications: None     Patient has no known allergies.    Objective      Temp:  [97.7 °F (36.5 °C)-98.1 °F (36.7 °C)] 98.1 °F (36.7 °C)  HR:  [104-134] 120  BP: (108-126)/(59-75) 108/59  Resp:  [20-38] 26  SpO2:  [81 %-98 %] 97 %  O2 Device: Nasal cannula  Nasal Cannula O2  Flow Rate (L/min):  [4 L/min] 4 L/min  O2 Device: Nasal cannula          No intake/output data recorded.    Physical Exam   Ortho Exam   Musculoskeletal: bilateral upper extremities  On examination of the bilateral upper extremities there is no obvious deformity, however there is a old wound with overlying scab and some surrounding color change to the dorsal aspect of the left hand.  She also has notable muscle wasting of the left hand which is apparent on comparison against the right upper extremity.  Sensation intact in C5-T1 distributions to the bilateral upper extremities.  Patient has hypersensitivity in the left C6 distribution which elicits tenderness which radiates to her fingertips of her index and long finger.  Motor examination of the left upper extremity: 5/5 shoulder abduction, 5/5 elbow flexion, 5/5 elbow extension, 4/5 finger flexion, 4/5 hand intrinsics.  Motor examination of the right upper extremity: 5/5 shoulder abduction, elbow flexion, elbow extension, finger flexion, hand intrinsics.  Extremities are warm and well-perfused with capillary refill less than 2 seconds.  Russ reflex elicited unilaterally in the left upper extremity.      Musculoskeletal: Bilateral lower extremities  On physical examination of the bilateral lower extremities there are no open wounds there is an old wound that has been scabbed over over the right knee.  There is obvious hallux valgus of the bilateral feet..  There is no tenderness to palpation over the sacrum or bilateral lower extremities.  Sensation intact L3-S1, and perianal.  Motor examination of the left lower extremity: 5/5 hip flexion, 5/5 knee extension, 5/5 dorsiflexion, 4/5 toe extension, 5/5 plantarflexion.  Motor examination of the right lower extremity: 5/5 hip flexion, knee extension, dorsiflexion, toe extension, plantarflexion.  Rectal tone intact  Extremities are warm and well-perfused with capillary refill less than 2 seconds.    No other areas of  tenderness on tertiary exam.        Imaging:  Imaging demonstrates an isolated sacral fracture at the S4 level with flexion of the distal fragment.  No other evidence of fracture, dislocation, or other acute osseous abnormality on imaging.    X-rays of the left knee without evidence of fracture, dislocation, or other acute osseous abnormality.  There is joint space narrowing, osteophyte formation, and subchondral sclerosis which are findings consistent with osteoarthritis of the knee.        Lab Results: I have reviewed the following results:   Recent Labs     12/22/24  0440 12/22/24  0947   WBC 7.60  --    HGB 11.5 10.0*   HCT 37.8 32.4*     --    BUN 14  --    CREATININE 1.16  --    PTT 41*  --    INR 1.05  --      Blood Culture:   Lab Results   Component Value Date    BLOODCX No Growth After 5 Days. 12/23/2023    BLOODCX No Growth After 5 Days. 12/23/2023     Wound Culture:   Lab Results   Component Value Date    WOUNDCULT (A) 12/24/2023     4+ Growth of Methicillin Resistant Staphylococcus aureus     after

## 2024-12-22 NOTE — ASSESSMENT & PLAN NOTE
Wt Readings from Last 3 Encounters:   12/22/24 50.8 kg (112 lb)   10/24/24 51.3 kg (113 lb)   10/07/24 51.3 kg (113 lb 1.5 oz)

## 2024-12-22 NOTE — ED NOTES
EVS found 1 gold ring with radha like stones in patient's room after patient's departed.       Jailene Fitzpatrick RN  12/22/24 2631

## 2024-12-22 NOTE — ED PROVIDER NOTES
Time reflects when diagnosis was documented in both MDM as applicable and the Disposition within this note       Time User Action Codes Description Comment    12/22/2024  5:10 AM Murali Campbell [W19.XXXA] Fall, initial encounter     12/22/2024  5:10 AM Murali Campbell [I48.91] Atrial fibrillation with rapid ventricular response (HCC)     12/22/2024  5:22 AM Murali Campbell [R09.02] Hypoxia     12/22/2024  6:44 AM Murali Campbell [S22.010A] Compression fracture of T1 vertebra, initial encounter ([facility])     12/22/2024  6:48 AM Murali Campbell [S32.10XA] Sacral fracture, closed ([facility])     12/22/2024  6:49 AM Murali Campbell [S30.0XXA] Contusion of sacrum, initial encounter           ED Disposition       ED Disposition   Transfer to Another Facility-In Network    Condition   --    Date/Time   Sun Dec 22, 2024  6:40 AM    Comment   Marsha Oliva will be transferred to [facility].               Assessment & Plan       Medical Decision Making  Amount and/or Complexity of Data Reviewed  Labs: ordered.  Radiology: ordered and independent interpretation performed.    Risk  Prescription drug management.  Decision regarding hospitalization.      Elderly female, multiple mechanical falls recently, please see ED course for specifics of arrived with A-fib on the monitor in the 120s hemodynamically stable in terms of blood pressure O2 saturation was in the upper 80s, patient does normally wear 3 L of nasal cannula oxygen, placed on 4 L of nasal cannula oxygen, trauma evaluation was initiated because it was unclear whether the patient may have hit her head, CT of the head cervical spine and facial bones unremarkable, prior history of a odontoid type II fracture surgically stabilized in September 2024 at [facility] is stable on subsequent CT imaging.    CT imaging showed the following: Acute fracture with angulation of the distal sacral bone through the S4 vertebral body involving the sacral  "bilaterally with a small amount of emerge in the presacral space with superimposed small puncta I hypertensive foci concerning for active extravasation, patient is on aspirin, case was discussed with Dr. Manjinder Farooq who advised transfer as a prior to transfer.    C SPINE CLEARED: - Patient is able to range neck to greater than 45 degrees to LEFT and RIGHT. Patient is able to touch chin to chest and hyper-extend without eliciting pain. Patient has no midline tenderness.  5/5. No numbness/tingling in the arms. 2+ radials. No carotid bruit. Palpable carotid pulses that are equal.      Portions of the record may have been created with voice recognition software. Occasional wrong word or \"sound a like\" substitutions may have occurred due to the inherent limitations of voice recognition software. Read the chart carefully and recognize, using context, where substitutions have occurred.   ED Course as of 12/22/24 0726   Sun Dec 22, 2024   0432 Patient seen, evaluated, examined, trauma evaluation initiated, fall on aspirin, multiple falls within the last calendar 2 weeks, has a bruise on the right orbit, appears to be in a rapid A-fib heart rate in the 120s.  Patient is found to be around 91%.    Brief focused differential diagnosis in this patient is as follows: Mechanical falls leading to bruising, despite the fact the patient is RAMIREZ x 3 GCS 15, c-collar replaced on the patient because of bruising above the clavicle placed the patient on high risk and patient just recently had surgical repair of a type II odontoid fracture in September of this year versus electrolyte abnormalities versus UTI versus cardiac versus increased HR: A fib RVR causing frequent falls.   0442 eFAST negative.   0450 Reviewed imaging at the bedside, chest x-ray shows no occult pneumothoraces no obvious fractures or pulmonary contusions, pelvic x-ray shows significant osteoarthritic changes noted throughout the pelvic joints, otherwise no occult " fractures or dislocations noted.   0638 Received call from reading room:  T1 impression fracture appears to be new, patient has a right-sided angulated sacral fracture appears to have active extravasation of contrast: 1.  Acute displaced and angulated distal sacral fracture through S4 vertebral body involving the sacral ala bilaterally.  2.  Small volume of hemorrhage in the presacral space with few superimposed punctate hyperdense foci concerning for extravasated contrast     0652 PACS called, case d/w Dr. Manjinder Farooq, will accepted ED to ED transfer priority 1.       Medications   diltiazem (CARDIZEM) 125 mg in sodium chloride 0.9 % 125 mL infusion (5 mg/hr Intravenous Rate/Dose Change 12/22/24 0655)   iohexol (OMNIPAQUE) 350 MG/ML injection (SINGLE-DOSE) 100 mL (100 mL Intravenous Given 12/22/24 0539)   diltiazem (CARDIZEM) injection 15 mg (15 mg Intravenous Given 12/22/24 0600)       ED Risk Strat Scores   HEART Risk Score      Flowsheet Row Most Recent Value   Heart Score Risk Calculator    History 0 Filed at: 12/22/2024 0518   ECG 1 Filed at: 12/22/2024 0518   Age 2 Filed at: 12/22/2024 0518   Risk Factors 1 Filed at: 12/22/2024 0518   Troponin 0 Filed at: 12/22/2024 0518   HEART Score 4 Filed at: 12/22/2024 0518          HEART Risk Score      Flowsheet Row Most Recent Value   Heart Score Risk Calculator    History 0 Filed at: 12/22/2024 0518   ECG 1 Filed at: 12/22/2024 0518   Age 2 Filed at: 12/22/2024 0518   Risk Factors 1 Filed at: 12/22/2024 0518   Troponin 0 Filed at: 12/22/2024 0518   HEART Score 4 Filed at: 12/22/2024 0518                                                History of Present Illness       Chief Complaint   Patient presents with    Fall     Multiple. Left knee injury but significant scattered bruising over body in various stages of healing from other falls       Past Medical History:   Diagnosis Date    Disease of thyroid gland     Hypotension     Supratherapeutic INR 6/2/2022      Past  Surgical History:   Procedure Laterality Date    CERVICAL FUSION Bilateral 9/5/2024    Procedure: navigated posterior fusion C1-C3;  Surgeon: Eduardo Dewitt MD;  Location: [facility];  Service: Neurosurgery    HYSTERECTOMY        History reviewed. No pertinent family history.   Social History     Tobacco Use    Smoking status: Former     Types: Cigarettes    Smokeless tobacco: Never   Vaping Use    Vaping status: Never Used   Substance Use Topics    Alcohol use: Never     Comment: occassional.    Drug use: Never      E-Cigarette/Vaping    E-Cigarette Use Never User       E-Cigarette/Vaping Substances    Nicotine No     THC No     CBD No     Flavoring No     Other No     Unknown No       I have reviewed and agree with the history as documented.     HPI    This is a very pleasant, nontoxic-appearing, 79-year-old female presents the emergency department, status post type II odontoid fracture with posterior displacement including prominent posterior displacement of C1 with respect to C2 vertebral body causing compression of the medullary cervical cord, underwent emergent stabilization by neurosurgery on 5 September, history of hypertension, high cholesterol, chronic diastolic heart failure, chronic atrial fibrillation, presents with multiple falls over the last few weeks, arrived via EMS as a reliable competent historian with no language barrier concerns.  EMS was activated by the patient's  insisted she go to the hospital because the frequent falls and the recent surgery dictated above.    Patient denies any prodrome prior to falling, denies any chest pain, shortness of breath, dizziness, weakness, diaphoresis but does report that she keeps getting her walker caught on the door which is why she is falling.  Review of Systems   Constitutional: Negative.  Negative for chills, diaphoresis, fatigue and fever.   HENT: Negative.     Eyes: Negative.    Respiratory: Negative.  Negative for chest tightness and  shortness of breath.    Cardiovascular: Negative.  Negative for chest pain, palpitations and leg swelling.   Gastrointestinal: Negative.    Endocrine: Negative.    Genitourinary: Negative.    Musculoskeletal:  Positive for joint swelling.        R KNEE SWELLING   Skin: Negative.    Allergic/Immunologic: Negative.    Neurological: Negative.  Negative for dizziness.   Hematological: Negative.    Psychiatric/Behavioral: Negative.             Objective       ED Triage Vitals [12/22/24 0428]   Temperature Pulse Blood Pressure Respirations SpO2 Patient Position - Orthostatic VS   97.7 °F (36.5 °C) (!) 125 122/70 20 93 % Lying      Temp Source Heart Rate Source BP Location FiO2 (%) Pain Score    Temporal Monitor Left arm -- 2      Vitals      Date and Time Temp Pulse SpO2 Resp BP Pain Score FACES Pain Rating User   12/22/24 0655 -- 116 -- -- 113/67 -- --    12/22/24 0645 -- 115 94 % 24 123/73 No Pain --    12/22/24 0621 -- 112 95 % 38 112/61 -- --    12/22/24 0615 -- 109 94 % 30 112/61 -- --    12/22/24 0600 -- 124 96 % 32 126/64 -- --    12/22/24 0545 -- 126 95 % 30 123/63 -- --    12/22/24 0530 -- 122 97 % -- 121/64 -- --    12/22/24 0515 -- 126 98 % -- 126/69 -- --    12/22/24 0509 -- 127 81 % -- 114/67 No Pain --    12/22/24 0500 -- 134 88 % -- -- -- --    12/22/24 0437 -- 121 95 % 20 120/75 No Pain -- TAB   12/22/24 0430 -- -- -- -- -- No Pain --    12/22/24 0428 97.7 °F (36.5 °C) 125 93 % 20 122/70 2 -- JCS            Physical Exam  Vitals and nursing note reviewed.   Constitutional:       General: She is not in acute distress.     Appearance: Normal appearance. She is normal weight. She is not ill-appearing, toxic-appearing or diaphoretic.   HENT:      Head: Normocephalic and atraumatic.        Comments: AIRWAY: Patient maintaining airway maintaining secretions.  No stridor.  No brawniness under the tongue.  Uvula midline without edema.  Phonation normal, trachea midline, no trismus, no  tenderness along the sternocleidomastoid muscle group, patient is appropriately masked.  No tenderness along the platysmas muscle group, no injuries noted in the zone 1, 2, 3 of the neck.     No altered sensation over supraorbital, infraorbital, submental, or inferior alveolar nerve distributions. EOM intact, no evidence of ocular palsy testing, no need for forced duction testing to occur, no pain upon palpation of the medial / lateal buttress w/in oral cavity bilaterally, nasal exam reveals no septal hematomas bilaterally.  No [place] instability. Examination of the patient from a vertical posterior perspective does not reveal any zygoma asymmetry or evidence of enophthalmos.      Right Ear: External ear normal.      Left Ear: External ear normal.      Nose: Nose normal.      Mouth/Throat:      Mouth: Mucous membranes are moist.      Pharynx: Oropharynx is clear.   Eyes:      Extraocular Movements: Extraocular movements intact.      Conjunctiva/sclera: Conjunctivae normal.      Pupils: Pupils are equal, round, and reactive to light.   Neck:      Comments: C collar applied, injury above the clavicle, bruise over the right side of the face right orbit area  Cardiovascular:      Rate and Rhythm: Tachycardia present. Rhythm irregular.      Pulses: Normal pulses.      Heart sounds: Normal heart sounds.      Comments: CARDIOVASCULAR / CIRCULATORY: Peripheral pulses noted to be intact and equal in upper and lower extremities.    HR: 120's.  Pulmonary:      Effort: Pulmonary effort is normal.      Breath sounds: Normal breath sounds.      Comments: BREATHING:  No flail segments noted on exam, equal breath sounds in the posterior and anterior lung fields, no tenderness upon palpation of the anterior and posterior ribcage's / thoracic chest wall.  No bruising, no contusions, no outward signs of trauma or swelling noted.    Abdominal:      General: Abdomen is flat. Bowel sounds are normal.      Palpations: Abdomen is soft.    Musculoskeletal:         General: Swelling and tenderness present.      Cervical back: Normal range of motion.      Comments: PELVIS STABLE: R sided sacral contusion.    DEFORMITY / DEFICIT: GCS: 15, RAMIREZ x 3.    R Knee swelling w/ skin tear, L knee contusion noted with lateral aspect swollen, both lower extremities neurovascularly intact.   Skin:     General: Skin is warm.      Capillary Refill: Capillary refill takes less than 2 seconds.      Coloration: Skin is not jaundiced or pale.      Findings: Bruising present. No erythema, lesion or rash.   Neurological:      General: No focal deficit present.      Mental Status: She is alert and oriented to person, place, and time. Mental status is at baseline.      Cranial Nerves: Cranial nerves 2-12 are intact.      Sensory: Sensation is intact.      Motor: Motor function is intact.      Comments: EXPOSURE:  FULL BODY EXPOSURE.  Multiple bruises on patient's extremities in various stages of healing.   Psychiatric:         Mood and Affect: Mood normal.         Behavior: Behavior normal.         Thought Content: Thought content normal.         Judgment: Judgment normal.         Results Reviewed       Procedure Component Value Units Date/Time    HS Troponin I 2hr [[id number]]  (Normal) Collected: 12/22/24 0653    Lab Status: Final result Specimen: Blood from Arm, Left Updated: 12/22/24 0723     hs TnI 2hr 5 ng/L      Delta 2hr hsTnI -1 ng/L     HS Troponin I 4hr [[id number]]     Lab Status: No result Specimen: Blood     TSH, 3rd generation with Free T4 reflex [[id number]]     Lab Status: No result Specimen: Blood     Comprehensive metabolic panel [[id number]] Collected: 12/22/24 0440    Lab Status: Final result Specimen: Blood from Arm, Left Updated: 12/22/24 0517     Sodium 136 mmol/L      Potassium 4.5 mmol/L      Chloride 101 mmol/L      CO2 26 mmol/L      ANION GAP 9 mmol/L      BUN 14 mg/dL      Creatinine 1.16 mg/dL      Glucose 91 mg/dL      Calcium 9.2 mg/dL      AST  29 U/L      ALT 14 U/L      Alkaline Phosphatase 104 U/L      Total Protein 7.1 g/dL      Albumin 3.7 g/dL      Total Bilirubin 0.70 mg/dL      eGFR 44 ml/min/1.73sq m     Narrative:      National Kid[employer] Disease Foundation guidelines for Chronic Kidney Disease (CKD):     Stage 1 with normal or high GFR (GFR > 90 mL/min/1.73 square meters)    Stage 2 Mild CKD (GFR = 60-89 mL/min/1.73 square meters)    Stage 3A Moderate CKD (GFR = 45-59 mL/min/1.73 square meters)    Stage 3B Moderate CKD (GFR = 30-44 mL/min/1.73 square meters)    Stage 4 Severe CKD (GFR = 15-29 mL/min/1.73 square meters)    Stage 5 End Stage CKD (GFR <15 mL/min/1.73 square meters)  Note: GFR calculation is accurate only with a steady state creatinine    HS Troponin 0hr (reflex protocol) [[id number]]  (Normal) Collected: 12/22/24 0440    Lab Status: Final result Specimen: Blood from Arm, Left Updated: 12/22/24 0512     hs TnI 0hr 6 ng/L     B-Type Natriuretic Peptide(BNP) [[id number]]  (Abnormal) Collected: 12/22/24 0440    Lab Status: Final result Specimen: Blood from Arm, Left Updated: 12/22/24 0510      pg/mL     APTT [[id number]]  (Abnormal) Collected: 12/22/24 0440    Lab Status: Final result Specimen: Blood from Arm, Left Updated: 12/22/24 0503     PTT 41 seconds     Protime-INR [[id number]]  (Normal) Collected: 12/22/24 0440    Lab Status: Final result Specimen: Blood from Arm, Left Updated: 12/22/24 0503     Protime 14.2 seconds      INR 1.05    Narrative:      INR Therapeutic Range    Indication                                             INR Range      Atrial Fibrillation                                               2.0-3.0  Hypercoagulable State                                    2.0.2.3  Left Ventricular Asist Device                            2.0-3.0  Mechanical Heart Valve                                  -    Aortic(with afib, MI, embolism, HF, LA enlargement,    and/or coagulopathy)                                     2.0-3.0  (2.5-3.5)     Mitral                                                             2.5-3.5  Prosthetic/Bioprosthetic Heart Valve               2.0-3.0  Venous thromboembolism (VTE: VT, PE        2.0-3.0    Digoxin level [[id number]]     Lab Status: No result Specimen: Blood     CBC and differential [[id number]]  (Abnormal) Collected: 12/22/24 0440    Lab Status: Final result Specimen: Blood from Arm, Left Updated: 12/22/24 0449     WBC 7.60 Thousand/uL      RBC 4.08 Million/uL      Hemoglobin 11.5 g/dL      Hematocrit 37.8 %      MCV 93 fL      MCH 28.2 pg      MCHC 30.4 g/dL      RDW 15.5 %      MPV 8.0 fL      Platelets 229 Thousands/uL      nRBC 0 /100 WBCs      Segmented % 85 %      Immature Grans % 1 %      Lymphocytes % 6 %      Monocytes % 6 %      Eosinophils Relative 2 %      Basophils Relative 0 %      Absolute Neutrophils 6.43 Thousands/µL      Absolute Immature Grans 0.04 Thousand/uL      Absolute Lymphocytes 0.47 Thousands/µL      Absolute Monocytes 0.48 Thousand/µL      Eosinophils Absolute 0.15 Thousand/µL      Basophils Absolute 0.03 Thousands/µL             XR knee 4+ views left injury   ED Interpretation by Godfrey Campbell III,  (12/22 0615)   Severe bone-on-bone osteoarthritic changes noted throughout the knee, no acute fractures noted      TRAUMA - CT chest abdomen pelvis w contrast   Final Interpretation by Lissette Caal MD (12/22 0657)      1.  Acute displaced and angulated distal sacral fracture through S4 vertebral body involving the sacral ala bilaterally.   2.  Small volume of hemorrhage in the presacral space with few superimposed punctate hyperdense foci concerning for extravasated contrast   3.  Interval progression of interstitial lung disease.   4.  Scattered tree-in-bud opacities in the lungs bilaterally compatible with infection or inflammation.      I personally discussed this study with GODFREY CAMPBELL III on 12/22/2024 6:40 AM.         Workstation performed:  [id number]         CT recon only thoracolumbar (No Charge)   Final Interpretation by Lissette Caal MD (12/22 0717)      Acute displaced and angulated sacral fracture through S4 as described. There is a small amount of hemorrhage in the presacral space with few tiny hyperdense foci suspicious for extravasated contrast.      Age-indeterminate mild anterior wedging of T1 vertebral body, new since the prior study.         I personally discussed this study with GODFREY CAMPBELL III on 12/22/2024 6:40 AM.         Workstation performed: [id number]         TRAUMA - CT facial bones wo contrast   Final Interpretation by Lissette Caal MD (12/22 0644)      No evidence of acute traumatic injury to the facial bones.         I personally discussed this study with GODFREY CAMPBELL III on 12/22/2024 6:40 AM.                     Workstation performed: [id number]         TRAUMA - CT head wo contrast   Final Interpretation by Lissette Caal MD (12/22 0643)      No acute intracranial abnormality.      I personally discussed this study with GODFREY CAMPBELL III on 12/22/2024 6:40 AM.                  Workstation performed: [id number]         TRAUMA - CT spine cervical wo contrast   Final Interpretation by Lissette Caal MD (12/22 0644)      Postsurgical changes of posterior fusion from C1 through C3 stabilizing type II dens fracture with improved alignment. No evidence of acute fracture in the cervical spine.      Age-indeterminate mild anterior wedging of T1 vertebral body, new since the prior study.         I personally discussed this study with GODFREY CAMPBELL III on 12/22/2024 6:40 AM.                  Workstation performed: [id number]         XR Trauma chest portable   ED Interpretation by Godfrey Campbell III, DO (12/22 0456)   Stat portable trauma x-ray shows positive cardiomegaly, lungs appear to be hyper aerated, no occult pneumothoraces or infiltrates or contusions noted.      Final  Interpretation by Yun Lomas MD (12/22 6414)      No acute cardiopulmonary disease.      No acute displaced fractures..      Right greater than left bibasilar fibrosis, better shown on CT.      Workstation performed: [id number]         XR Trauma pelvis ap only 1 or 2 vw   ED Interpretation by Murali Campbell III, DO (12/22 5577)   Stat portable trauma x-ray of the pelvis shows significant osteoarthritic changes noted throughout the pelvic joints, no occult obvious fractures or dislocations noted.          POC FAST US    Date/Time: 12/22/2024 4:42 AM    Performed by: Murali Campbell III, DO  Authorized by: Murali Campbell III, DO    Patient location:  ED  Procedure details:     Exam Type:  Diagnostic    Indications: blunt abdominal trauma and blunt chest trauma      Assess for:  Hemothorax, intra-abdominal fluid, pericardial effusion and pneumothorax    Technique: extended FAST      Views obtained:  Heart - Pericardial sac, Suprapubic - Pouch of Bong, RUQ - Reyna's Pouch and LUQ - Splenorenal space    Image quality: diagnostic      Image availability:  Images available in PACS and still images obtained  FAST Findings:     RUQ (Hepatorenal) free fluid: absent      LUQ (Splenorenal) free fluid: absent      Suprapubic free fluid: absent      Cardiac wall motion: identified      Pericardial effusion: absent    extended FAST (Pulmonary) findings:     Left lung sliding: Present      Right lung sliding: Present    Interpretation:     Impressions: negative    ECG 12 Lead Documentation Only    Date/Time: 12/22/2024 4:29 AM    Performed by: Murali Campbell III, DO  Authorized by: Murali Campbell III, DO    Indications / Diagnosis:  Elevated heart rate  ECG reviewed by me, the ED Provider: yes    Patient location:  ED  Comments:      I personally reviewed this EKG that was performed with the patient December 22, 2024, EKG was completed at 4:29 AM and interpreted by me the same  time, atrial fibrillation with RVR, patient does have some flipped T waves in V3, V4, V5, V6 and inferiorly in leads II, III and aVF, unchanged from prior tracings as of September 4, 2024.    No diffuse elevations to indicate pericarditis.  No coved ST elevations greater than 2mm with negative T waves in V1-3 to indicate concern for brugada.  No biphasic T waves in V2, V3 to indicate Wellens (critical stenosis of LAD).   No elevation in aVR or deviation when compared to V1 (can be associated with ST depression in I,II, V4-6 when left main occlusion is present).       ED Medication and Procedure Management   Prior to Admission Medications   Prescriptions Last Dose Informant Patient Reported? Taking?   Magnesium Gluconate 550 MG TABS   No No   Sig: Take 0.0545 tablets (30 mg total) by mouth 2 (two) times a day Pt taking 250mg daily   Propylene Glycol (SYSTANE BALANCE OP)   Yes No   Sig: Administer 1 drop to both eyes 2 (two) times a day.   acetaminophen (TYLENOL) 325 mg tablet   No No   Sig: Take 2 tablets (650 mg total) by mouth every 6 (six) hours as needed for mild pain, moderate pain, severe pain, headaches or fever   aspirin (ECOTRIN LOW STRENGTH) 81 mg EC tablet   No No   Sig: Take 1 tablet (81 mg total) by mouth daily   atorvastatin (LIPITOR) 20 mg tablet   No No   Sig: Take 1 tablet (20 mg total) by mouth daily   calcium carbonate (TUMS) 500 mg chewable tablet   No No   Sig: Chew 2 tablets (1,000 mg total) daily as needed for indigestion or heartburn   cyanocobalamin (VITAMIN B-12) 1000 MCG tablet   No No   Sig: Take 1 tablet (1,000 mcg total) by mouth once a week   dextromethorphan-guaiFENesin (ROBITUSSIN DM)  mg/5 mL syrup   No No   Sig: Take 10 mL by mouth every 4 (four) hours as needed for cough   digoxin (LANOXIN) 0.125 mg tablet   No No   Sig: Take 1 tablet (125 mcg total) by mouth every other day   diltiazem (CARDIZEM CD) 180 mg 24 hr capsule   No No   Sig: Take 1 capsule (180 mg total) by mouth  daily   docusate sodium (COLACE) 100 mg capsule   Yes No   Sig: Take 100 mg by mouth 2 (two) times a day.   fluticasone (FLONASE) 50 mcg/act nasal spray   No No   Si sprays into each nostril daily   folic acid (FOLVITE) 1 mg tablet   No No   Sig: Take 1 tablet (1,000 mcg total) by mouth daily   levothyroxine 125 mcg tablet   No No   Sig: Take 1 tablet (125 mcg total) by mouth daily in the early morning   oxygen gas   Yes No   Sig: Inhale 2 L/min daily as needed (SOB, low saturation).   phenol (CHLORASEPTIC) 1.4 % mucosal liquid   No No   Sig: Apply 1 spray to the mouth or throat every 2 (two) hours as needed (Mouth sores)   senna (SENOKOT) 8.6 mg   No No   Sig: Take 2 tablets (17.2 mg total) by mouth daily at bedtime   sertraline (ZOLOFT) 100 mg tablet   No No   Sig: Take 1 tablet (100 mg total) by mouth daily   torsemide (DEMADEX) 20 mg tablet   No No   Sig: Take 1 tablet (20 mg total) by mouth daily as needed (weight gain / leg swelling)      Facility-Administered Medications: None     Patient's Medications   Discharge Prescriptions    No medications on file     No discharge procedures on file.  ED SEPSIS DOCUMENTATION   Time reflects when diagnosis was documented in both MDM as applicable and the Disposition within this note       Time User Action Codes Description Comment    2024  5:10 AM Murali Campbell [W19.XXXA] Fall, initial encounter     2024  5:10 AM Murali Campbell [I48.91] Atrial fibrillation with rapid ventricular response (HCC)     2024  5:22 AM Murali Campbell [R09.02] Hypoxia     2024  6:44 AM Murali Campbell [S22.010A] Compression fracture of T1 vertebra, initial encounter (HCC)     2024  6:48 AM Murali Campbell [S32.10XA] Sacral fracture, closed (HCC)     2024  6:49 AM Murali Campbell [S30.0XXA] Contusion of sacrum, initial encounter                  Murali Campbell III, DO  24 0719

## 2024-12-22 NOTE — ASSESSMENT & PLAN NOTE
Previously on home oxygen,  states she has not needed it in a year.  -Titrate oxygen as needed, wean as able

## 2024-12-22 NOTE — ASSESSMENT & PLAN NOTE
79 y.o.female with an isolated sacral fracture at the level of S4 without associated specific neurologic deficits.  She has a known history of osteoarthritis of the left knee, and imaging does not demonstrate any new fracture, dislocation, or other acute osseous abnormality.    Weightbearing as tolerated bilateral lower extremities  PT/OT  Pain control  DVT ppx: May continue home ASA 81 mg when cleared by primary team.  Medical management per primary team  Metabolic osteopenic/osteoporotic prophylactic workup ordered  Dispo planning per primary  May follow up in ortho trauma clinic in 4 weeks.

## 2024-12-22 NOTE — ASSESSMENT & PLAN NOTE
S/p C1-C3 posterior fusion 9/5/24 with Dr. Simpson after sustaining fall with type II dens fracture with posterior displacement

## 2024-12-22 NOTE — ED NOTES
Pt becoming combative and still having hallucinations.  Pt trying to rip everything off. Trauma made aware     Rosy Prado RN  12/22/24 [id number]

## 2024-12-22 NOTE — ASSESSMENT & PLAN NOTE
Found to be in A-fib with RVR on presentation at [facility], started on Cardizem drip  -Continue Cardizem drip

## 2024-12-22 NOTE — ASSESSMENT & PLAN NOTE
Age-indeterminate T1 fracture  S/p multiple falls.    Imaging:  CT thoracolumbar spine, 12/22/24: Age-indeterminate mild anterior wedging of T1 vertebral body, new since the prior study.    Plan:  Continue to monitor neuro exam closely.  Will defer any bracing as patient is completely non-tender with no other correlating symptoms.  Mobilize with PT/OT.  DVT ppx: SCDs.    Neurosurgery will sign off. Follow up as scheduled for 3 month POV. Call with questions.

## 2024-12-22 NOTE — Clinical Note
Case was discussed with rene and the patient's admission status was agreed to be Admission Status: observation status to the service of Dr. rene Dixon

## 2024-12-22 NOTE — H&P
H&P - Trauma   Name: Marsha Oliva 79 y.o. female I MRN: [id number]  Unit/Bed#: ED 21 I Date of Admission: 12/22/2024   Date of Service: 12/22/2024 I Hospital Day: 0   Assessment & Plan  Repeated falls  79-year-old female presenting due to multiple falls.  Transferred from [facility] due to closed T1 fracture, sacral fracture with small hematoma and some active extravasation.  GCS 15. A&Ox3.  Hemodynamically stable on 2 L NC.  No pain at this time.  No motor or sensory defects.  On presentation found to be in A-fib with RVR, started on Cardizem drip.    Plan  Admit as stepdown level 2  NPO  IV fluids  Every 4 hours H&H, low threshold for IR consult if hemoglobin downtrends or drops drastically  As needed pain and nausea control  Ortho consult  Neurosurgery consult  Encourage I-S  Closed T1 fracture (HCC)  Appreciate neurosurgery input  Atrial fibrillation, chronic ([facility])  Found to be in A-fib with RVR on presentation at [facility], started on Cardizem drip  -Continue Cardizem drip  S/P cervical spinal fusion  Operation was completed on 9/5/2024  Sacral fracture ([facility])  Appreciate orthopedic surgery input  Chronic diastolic CHF (congestive heart failure) ([facility])  Wt Readings from Last 3 Encounters:   12/22/24 50.8 kg (112 lb)   10/24/24 51.3 kg (113 lb)   10/07/24 51.3 kg (113 lb 1.5 oz)             Depression with anxiety  Continue Zoloft  Hypothyroidism  Continue levothyroxine  Mixed hyperlipidemia  Continue atorvastatin  Severe mitral regurgitation    ILD (interstitial lung disease) ([facility])  Previously on home oxygen,  states she has not needed it in a year.  -Titrate oxygen as needed, wean as able  Apraxia  Chronic    Trauma Alert: Other transfer from [facility]     Model of Arrival: Ambulance    Trauma Team: Attending Dr. Miguel and Residents Dr. Snow  Consultants:     Orthopedics: routine consult; Epic consult order placed and consultant notified (via phone/text @ time 10:20am);      Neurosurgery: routine  "consult; Epic consult order placed and consultant notified (via phone/text @ time 10:19am);     History of Present Illness   Chief Complaint: \" I am having no pain\"  Mechanism:Fall     Marsha Oliva is a 79 y.o. female who presents due to multiple falls.  Transferred from [facility] due to closed T1 fracture and sacral fracture with small hematoma and some active extravasation of contrast.  Patient reports that she loses her balance and falls often.  States she was getting up to get some water when she fell today, unsure if she hit her head, denies LOC.  Denies any pain at this time.  Denies headache, dizziness, changes in vision, chest pain, SOB, abdominal pain, changes in bowel or urinary function.  GCS 15.  Alert and oriented x 3.  Patient has some word finding difficulties but appears to be her baseline.  PMH of A-fib, recent cervical spinal fusion, hypothyroidism, depression, chronic diastolic CHF, interstitial lung disease, apraxia.  Will require close monitoring to determine if intervention is necessary for the active extravasation noted on CT.    Review of Systems   Constitutional:  Negative for appetite change, chills and fever.   HENT:  Negative for facial swelling.    Eyes:  Negative for visual disturbance.   Respiratory:  Negative for shortness of breath.    Cardiovascular:  Negative for chest pain.   Gastrointestinal:  Negative for abdominal pain, constipation, diarrhea, nausea and vomiting.   Genitourinary:  Negative for difficulty urinating.   Musculoskeletal:  Positive for joint swelling. Negative for back pain and neck pain.   Neurological:  Negative for dizziness, weakness, light-headedness, numbness and headaches.     I have reviewed the patient's PMH, PSH, Social History, Family History, Meds, and Allergies  Historical Information   Past Medical History:   Diagnosis Date    Disease of thyroid gland     Hypotension     Supratherapeutic INR 6/2/2022     Past Surgical History:   Procedure " Laterality Date    CERVICAL FUSION Bilateral 9/5/2024    Procedure: navigated posterior fusion C1-C3;  Surgeon: Eduardo Dewitt MD;  Location: [facility];  Service: Neurosurgery    HYSTERECTOMY       Social History     Tobacco Use    Smoking status: Former     Types: Cigarettes    Smokeless tobacco: Never   Vaping Use    Vaping status: Never Used   Substance and Sexual Activity    Alcohol use: Never     Comment: occassional.    Drug use: Never    Sexual activity: Never     E-Cigarette/Vaping    E-Cigarette Use Never User      E-Cigarette/Vaping Substances    Nicotine No     THC No     CBD No     Flavoring No     Other No     Unknown No      Family history non-contributory  Immunization History   Administered Date(s) Administered    COVID-19 PFIZER VACCINE 0.3 ML IM 03/09/2021, 03/30/2021, 11/17/2021    COVID-19 Pfizer Vac BIVALENT Titus-sucrose 12 Yr+ IM 03/09/2023    INFLUENZA 10/02/2009, 10/06/2010, 10/21/2011, 10/20/2012, 10/16/2013, 09/23/2014, 11/16/2015, 09/30/2016, 10/19/2017, 09/19/2018, 10/02/2019, 10/16/2020, 10/27/2021, 09/13/2022, 12/24/2023    Influenza, Seasonal Vaccine, Quadrivalent, Adjuvanted, .5e 10/16/2020, 10/27/2021, 09/13/2022    Influenza, high dose seasonal 0.7 mL 12/24/2023    Pneumococcal Conjugate 13-Valent 08/03/2015    Pneumococcal Polysaccharide PPV23 10/23/2012    Tdap 05/12/2014    Tuberculin Skin Test 12/28/2021, 01/06/2022    Tuberculin Skin Test-PPD Intradermal 12/28/2021, 01/06/2022    Zoster 11/18/2014    Zoster Vaccine Recombinant 09/19/2018     Last Tetanus: 5/12/2014    1. Before the illness or injury that brought you to the Emergency, did you need someone to help you on a regular basis? 1=Yes   2. Since the illness or injury that brought you to the Emergency, have you needed more help than usual to take care of yourself? 1=Yes   3. Have you been hospitalized for one or more nights during the past 6 months (excluding a stay in the Emergency Department)? 1=Yes   4. In  general, do you see well? 1=No   5. In general, do you have serious problems with your memory? 1=Yes   6. Do you take more than three different medications everyday? 1=Yes   TOTAL   6     Did you order a geriatric consult if the score was 2 or greater?: yes       Objective :  Temp:  [97.7 °F (36.5 °C)-98.1 °F (36.7 °C)] 98.1 °F (36.7 °C)  HR:  [104-134] 120  BP: (108-126)/(59-75) 108/59  Resp:  [20-38] 26  SpO2:  [81 %-98 %] 97 %  O2 Device: Nasal cannula  Nasal Cannula O2 Flow Rate (L/min):  [4 L/min] 4 L/min    Initial Vitals:   Temperature: 98.1 °F (36.7 °C) (12/22/24 0928)  Pulse: 104 (12/22/24 0928)  Respirations: (!) 26 (12/22/24 0928)  Blood Pressure: 114/60 (12/22/24 0928)    Primary Survey:   Airway:        Status: patent;        Pre-hospital Interventions: none        Hospital Interventions: none  Breathing:        Pre-hospital Interventions: none       Effort: normal       Right breath sounds: normal       Left breath sounds: normal  Circulation:        Rhythm: irregular       Rate: tachycardia   Right Pulses Left Pulses    R radial: 2+    R pedal: 2+     L radial: 2+    L pedal: 2+       Disability:        GCS: Eye: 4; Verbal: 5 Motor: 6 Total: 15       Right Pupil: round;  reactive         Left Pupil:  round;  reactive      R Motor Strength L Motor Strength    R : 5/5  R dorsiflex: 5/5  R plantarflex: 5/5 L : 5/5  L dorsiflex: 5/5  L plantarflex: 5/5        Sensory:  No sensory deficit  Exposure:       Completed: Yes      Secondary Survey:  Physical Exam  Constitutional:       Appearance: Normal appearance.   HENT:      Head: Normocephalic.      Nose: Nose normal.      Mouth/Throat:      Mouth: Mucous membranes are moist.      Pharynx: Oropharynx is clear.   Eyes:      Extraocular Movements: Extraocular movements intact.      Pupils: Pupils are equal, round, and reactive to light.   Cardiovascular:      Rate and Rhythm: Tachycardia present. Rhythm irregular.      Pulses: Normal pulses.       Heart sounds: Normal heart sounds.   Pulmonary:      Effort: Pulmonary effort is normal.      Breath sounds: Normal breath sounds.   Abdominal:      General: There is no distension.      Palpations: Abdomen is soft.      Tenderness: There is no abdominal tenderness.   Musculoskeletal:         General: Swelling (Left knee) and tenderness (Left knee) present. Normal range of motion.      Cervical back: No tenderness.   Skin:     General: Skin is warm and dry.      Comments: Abrasion to right knee.  A few scattered bruises on legs and arms of varying ages.   Neurological:      General: No focal deficit present.      Mental Status: She is alert and oriented to person, place, and time.   Psychiatric:         Mood and Affect: Mood normal.         Behavior: Behavior normal.           Lab Results: I have reviewed the following results:  Recent Labs     12/22/24  0440 12/22/24  0653 12/22/24  0947   WBC 7.60  --   --    HGB 11.5  --  10.0*   HCT 37.8  --  32.4*     --   --    SODIUM 136  --   --    K 4.5  --   --      --   --    CO2 26  --   --    BUN 14  --   --    CREATININE 1.16  --   --    GLUC 91  --   --    AST 29  --   --    ALT 14  --   --    ALB 3.7  --   --    TBILI 0.70  --   --    ALKPHOS 104  --   --    PTT 41*  --   --    INR 1.05  --   --    HSTNI0 6  --   --    HSTNI2  --  5  --    *  --   --        Imaging Results: I have personally reviewed pertinent images saved in PACS. CT scan findings (and other pertinent positive findings on images) were discussed with radiology. My interpretation of the images/reports are as follows:  Chest Xray(s): positive for acute findings: Right greater than left bibasilar fibrosis, no acute traumatic findings   FAST exam(s): N/A   CT Scan(s): positive for acute findings: Postsurgical changes of posterior fusion from C1-C3 stabilizing type II dens fracture with improved alignment, age-indeterminate mild anterior wedge of T1 vertebral body new since prior,  interval progression of interstitial lung disease, scattered tree-in-bud opacities in the lungs bilaterally compatible with infection or inflammation.  CT head and face with no acute traumatic injuries    Additional Xray(s): XR left knee without traumatic injury.  XR pelvis unremarkable     Other Studies: Other Study Results Review: EKG was reviewed.     Mesha Snow MD  General Surgery Resident

## 2024-12-22 NOTE — CONSULTS
Consultation - Neurosurgery   Name: Marsha Oliva 79 y.o. female I MRN: [id number]  Unit/Bed#: ED 21 I Date of Admission: 12/22/2024   Date of Service: 12/22/2024 I Hospital Day: 0   Inpatient consult to Neurosurgery  Consult performed by: JAQUELINE Mcwilliams  Consult ordered by: Mesha Snow MD        Physician Requesting Evaluation: Manjinder Farooq MD   Reason for Evaluation / Principal Problem:     Assessment & Plan  S/P cervical spinal fusion  S/p C1-C3 posterior fusion 9/5/24 with Dr. Simpson after sustaining fall with type II dens fracture with posterior displacement  Closed T1 fracture (HCC)  Age-indeterminate T1 fracture  S/p multiple falls.    Imaging:  CT thoracolumbar spine, 12/22/24: Age-indeterminate mild anterior wedging of T1 vertebral body, new since the prior study.    Plan:  Continue to monitor neuro exam closely.  Will defer any bracing as patient is completely non-tender with no other correlating symptoms.  Mobilize with PT/OT.  DVT ppx: SCDs.    Neurosurgery will sign off. Follow up as scheduled for 3 month POV. Call with questions.     Sacral fracture (HCC)  Acute displaced and angulated sacral fracture through S4.  Ortho following.  I have discussed the above management plan in detail with the primary service.   Neurosurgery service signing off.  Please contact the SecureChat role for the Neurosurgery service with any questions/concerns.    History of Present Illness   HPI: Marsha Oliva is a 79 y.o. year old female with past medical history of multiple falls, atrial fibrillation, COPD, on home oxygen, who presented to [facility] emergency department after sustaining a fall.  She was noted on imaging with sacral fracture and T1 age-indeterminate fracture.  She is well-known to our service as she is recently status post C1-3 posterior fusion in September with Dr. Simpson for a displaced type II odontoid fracture that she sustained also after a fall.    Currently, she denies any  neck pain. She complains of bilateral wrist pain related to OA that is chronic. She denies any numbness or weakness.     Review of Systems  I have reviewed the patient's PMH, PSH, Social History, Family History, Meds, and Allergies    Objective :  Temp:  [97.7 °F (36.5 °C)-98.1 °F (36.7 °C)] 98.1 °F (36.7 °C)  HR:  [104-134] 104  BP: (112-126)/(60-75) 114/60  Resp:  [20-38] 26  SpO2:  [81 %-98 %] 97 %  O2 Device: Nasal cannula  Nasal Cannula O2 Flow Rate (L/min):  [4 L/min] 4 L/min    Physical Exam  Vitals and nursing note reviewed.   Constitutional:       General: She is not in acute distress.     Appearance: She is well-developed.   HENT:      Head: Normocephalic and atraumatic.   Eyes:      General: Lids are normal.      Extraocular Movements: Extraocular movements intact.      Conjunctiva/sclera: Conjunctivae normal.      Pupils: Pupils are equal, round, and reactive to light.   Cardiovascular:      Rate and Rhythm: Normal rate and regular rhythm.      Heart sounds: No murmur heard.  Pulmonary:      Effort: Pulmonary effort is normal. No respiratory distress.      Breath sounds: Normal breath sounds.   Abdominal:      Palpations: Abdomen is soft.      Tenderness: There is no abdominal tenderness.   Musculoskeletal:         General: No swelling.      Cervical back: Neck supple. No tenderness.   Skin:     General: Skin is warm and dry.      Capillary Refill: Capillary refill takes less than 2 seconds.      Comments: Posterior cervical incision healed.   Neurological:      Mental Status: She is alert.      Motor: Motor strength is normal.  Psychiatric:         Mood and Affect: Mood normal.      Neurological Exam  Mental Status  Alert.    Cranial Nerves  CN II: Visual acuity is normal. Visual fields full to confrontation.  CN III, IV, VI: Extraocular movements intact bilaterally. Normal lids and orbits bilaterally. Pupils equal round and reactive to light bilaterally.  CN V: Facial sensation is normal.  CN VII: Full  and symmetric facial movement.  CN VIII: Hearing is normal.  CN IX, X: Palate elevates symmetrically. Normal gag reflex.  CN XI: Shoulder shrug strength is normal.  CN XII: Tongue midline without atrophy or fasciculations.    Motor   Strength is 5/5 throughout all four extremities.        Lab Results: I have reviewed the following results:  Recent Labs     12/22/24  0440 12/22/24  0653 12/22/24  0947   WBC 7.60  --   --    HGB 11.5  --  10.0*   HCT 37.8  --  32.4*     --   --    SODIUM 136  --   --    K 4.5  --   --      --   --    CO2 26  --   --    BUN 14  --   --    CREATININE 1.16  --   --    GLUC 91  --   --    AST 29  --   --    ALT 14  --   --    ALB 3.7  --   --    TBILI 0.70  --   --    ALKPHOS 104  --   --    PTT 41*  --   --    INR 1.05  --   --    HSTNI0 6  --   --    HSTNI2  --  5  --    *  --   --        Imaging Results Review: I personally reviewed the following image studies in PACS and associated radiology reports: CT abdomen/pelvis. My interpretation of the radiology images/reports is: as above.  Other Study Results Review: No additional pertinent studies reviewed.    VTE Pharmacologic Prophylaxis: VTE covered by:    None    and Sequential compression device (Venodyne)

## 2024-12-22 NOTE — CONSULTS
Consultation - Cardiology Team 2  Marsha Oliva 79 y.o. female MRN: [id number]  Unit/Bed#: [facility] 632-01 Encounter: [id number]      Inpatient consult to Cardiology  Consult performed by: Manjinder Vieira MD  Consult ordered by: Mesha Snow MD        PCP: Rebecca Solis   Outpatient Cardiologist: Dr. Toussaint at [facility]    History of Present Illness   Physician Requesting Consult: Manjinder Farooq MD  Reason for Consult / Principal Problem: Afib with RVR    Assessment and Plan      Assessment:  # Chronic Afib  # HFpEF  # Severe Mitral regurg  Severe, was to be arranged for evalulation for kaleigh clip and watchman  # Hx of GI bleed  # Repeated falls  # Closed T1 fracture  # Sacral Fracture      Pertinent PTA medications:  Asprin 81mg qd  Torsemide 20mg PRN  Atorvastatin 20mg qd  Digoxin 125ug every other day  Diltiazem 180mg 24hr capsule qd  Levothyroxine 125ug qd  Sertraline 100mg qd    Plan:  # Chronic Afib  # Hx of GI bleed  [id number] of 3-4 (age, sex, CHF)  Home regimen of diltiazem  mg daily, digoxin 125 mcg every other day  She is no longer on anticoagulation secondary to history of severe GI bleed and was to undergo workup for Watchman procedure which per chart review seems to have never been completed  Heart rates on telemetry are improving, currently 110s -120s, rates likely due to increased sympathetic response in the setting of multiple fractures, may be a component of dehydration as downtime and PO intake is uncertain, potential infection with an elevated temperature reading  We will give her 500 cc bolus over an hour and assess rate response  Continue diltiazem drip for now  Okay for rate goal less than 120 in this acute setting  If rates climb to sustained 130s-140s and unable to titrate diltiazem, will consider metoprolol tartrate IV vs digoxin 125ug IV once    # HFpEF  # Severe Mitral regurg  TTE 6/2/2022 showed LVEF of 65%, biatrial enlargement, severe mitral regurg  Mitral regurg  reported as severe, was to be arranged for evalulation for kaleigh clip and watchman per last cardiology note 7/2022 but does not appear that this was performed  Will defer repeat echo for now  Will need outpatient cardiology follow up    # Hypothyroidism  12/22/2024 TSH 0.853  Continue levothyroxine 125 mcg daily    # Repeated falls  # Closed T1 fracture  # Sacral Fracture with small volume hemorrhage in the presacral space  Per chart review, falls have been mechanical in nature  Patient unable to provide history due to confusion  Patient on telemetry  Continue fracture care per primary    Case discussed and reviewed with overnight attending.     Thank you for involving us in the care of your patient.    Subjective     HPI:     Marsha Oliva is a 79-year-old female with a history of chronic A-fib, HFpEF65%, severe mitral regurg, hx of multiple falls,     She initially presented to [facility] as a trauma alert after falling while trying to get some water.  Imaging showed a closed T1 fracture as well as a sacral fracture.  She was noted to be in A-fib, rates in the 120s to 130s on presentation.  She was started on a Cardizem drip but titration to rates less than 110 over precluded by hypotension.     Cardiology was consulted for assistance with A-fib treatment.    Most recent vitals notable for a one-time temperature of 100.9 F, heart rate in the 120s to 130s.  A-fib on telemetry, BP 100s to 110s over 60s 70s.  She is requiring 6 L nasal cannula to maintain oxygen saturations in the low 90s.    Troponin 6/5,     Labs notable for a downtrending hemoglobin 10.0 from 11.5 on admission, no leukocytosis.  CMP unremarkable aside from a creatinine of 1.03, above baseline of 0.66.    EKGs reviewed dating back to June 2022, all with A-fib    TTE 6/2/2022 for heart failure:  LVEF 65%, no wall motion abnormalities, patient was in A-fib so unable to assess diastolic function  RV systolic function mild to  moderately reduced  Biatrial dilation  Aortic valve with no significant evidence of regurg.  There is aortic sclerosis  Mitral valve with severe regurg, no evidence of stenosis  Tricuspid valve with moderate regurg, no evidence of stenosis    CT head this admission:  no acute intracranial abnormality    CT chest abdomen pelvis this admission showed:  distal sacral fracture through S4 vertebral body with small volume hemorrhage in the presacral space  Interval progression of patient's ILD  Scattered tree-in-bud opacities in the lungs bilaterally compatible with infection or inflammation  enlargement of the central pulmonary artery tree which can be seen  No pleural effusions      Review of Systems   Unable to perform ROS: Mental status change     Patient is confused, but denies any chest pain, palpitations, or difficulty breathing on her current oxygen.  She does report back pain      Objective     Physical Exam  Constitutional:       General: She is not in acute distress.     Appearance: She is ill-appearing. She is not toxic-appearing.   HENT:      Head: Normocephalic and atraumatic.      Right Ear: External ear normal.      Left Ear: External ear normal.      Mouth/Throat:      Mouth: Mucous membranes are dry.   Eyes:      Extraocular Movements: Extraocular movements intact.      Pupils: Pupils are equal, round, and reactive to light.   Cardiovascular:      Rate and Rhythm: Tachycardia present. Rhythm irregular.      Pulses: Normal pulses.      Heart sounds: Normal heart sounds. No murmur heard.     No friction rub. No gallop.   Pulmonary:      Effort: Pulmonary effort is normal. No respiratory distress.      Breath sounds: Normal breath sounds. No wheezing or rales.      Comments: 6L NC  Abdominal:      General: Abdomen is flat. Bowel sounds are normal. There is no distension.      Palpations: Abdomen is soft.      Tenderness: There is no abdominal tenderness. There is no guarding.   Musculoskeletal:          General: No swelling or deformity.      Right lower leg: No edema.      Left lower leg: No edema.   Skin:     General: Skin is warm.      Coloration: Skin is not jaundiced.      Findings: No bruising, lesion or rash.   Neurological:      General: No focal deficit present.      Mental Status: She is alert. She is disoriented.         Vitals:  Temp:  [97.7 °F (36.5 °C)-98.1 °F (36.7 °C)] 97.7 °F (36.5 °C)  HR:  [] 126  BP: ()/(53-91) 108/78  Resp:  [20-38] 36  SpO2:  [81 %-98 %] 91 %  O2 Device: Nasal cannula  Nasal Cannula O2 Flow Rate (L/min):  [4 L/min-6 L/min] 6 L/min  Orthostatic Blood Pressures      Flowsheet Row Most Recent Value   Blood Pressure 108/78 filed at 12/22/2024 1845   Patient Position - Orthostatic VS Sitting filed at 12/22/2024 1547            Intake and Outputs:  I/O       None            Labs & Results:          Results from last 7 days   Lab Units 12/22/24  1605 12/22/24  0440   POTASSIUM mmol/L 4.5 4.5   CO2 mmol/L 25 26   CHLORIDE mmol/L 103 101   BUN mg/dL 12 14   CREATININE mg/dL 1.03 1.16   EGFR ml/min/1.73sq m 51 44     Results from last 7 days   Lab Units 12/22/24  1605 12/22/24  0440   AST U/L 23 29   ALT U/L 10 14   ALK PHOS U/L 90 104   TOTAL PROTEIN g/dL 5.5* 7.1   ALBUMIN g/dL 3.0* 3.7   TOTAL BILIRUBIN mg/dL 0.54 0.70     Results from last 7 days   Lab Units 12/22/24  0440   PROTIME seconds 14.2   INR  1.05     Results from last 7 days   Lab Units 12/22/24  1605 12/22/24  1350 12/22/24  0947 12/22/24  0440   HEMOGLOBIN g/dL 10.1* 10.5* 10.0* 11.5   HEMATOCRIT % 33.4* 34.8 32.4* 37.8   PLATELETS Thousands/uL  --   --   --  229           Cardiac Imaging/Monitoring     Telemetry:   Personally reviewed by Manjinder Vieira MD:   A-fib rates 110s to 120s    Previous Cath/PCI:  No results found for this or any previous visit.      Prior Echo:   Results for orders placed during the hospital encounter of 06/02/22    Echo complete w/ contrast if indicated    Interpretation  Summary    Left Ventricle: Left ventricular cavity size is normal. There is mild to moderate concentric hypertrophy. Wall motion is normal.    IVS: There is  flattening of the interventricular septum consistent with right ventricle overload.    Right Ventricle: Right ventricular cavity size is dilated. Systolic function is mild-moderately reduced.    Left Atrium: The atrium is dilated.    Right Atrium: The atrium is dilated.    Aortic Valve: There is aortic sclerosis.    Mitral Valve: There is severe regurgitation.    Tricuspid Valve: There is moderate regurgitation.    Pulmonic Valve: There is mild regurgitation.      Prior Stress Test:  No results found for this or any previous visit.       Holter:   None    Recent Device Check:  No results found for any visits on 12/22/24.     EKG:  A-fib with RVR  Encounter Date: 12/22/24   ECG 12 lead   Result Value    Ventricular Rate 121    Atrial Rate 82    MI Interval     QRSD Interval 80    QT Interval 326    QTC Interval 462    P Axis     QRS Axis 77    T Wave Axis -87    Narrative    Atrial fibrillation with rapid ventricular response  Nonspecific ST-t wave changes  Low voltage QRS  When compared with ECG of 04-Sep-2024 09:43,  No significant change was found  Confirmed by Nahun Gomez ([id number]) on 12/22/2024 7:35:31 AM       Manjinder Vieira MD  Cardiovascular Disease Fellow, FY-1  [facility]

## 2024-12-23 ENCOUNTER — APPOINTMENT (INPATIENT)
Dept: RADIOLOGY | Facility: HOSPITAL | Age: 79
DRG: 542 | End: 2024-12-23
Attending: STUDENT IN AN ORGANIZED HEALTH CARE EDUCATION/TRAINING PROGRAM
Payer: COMMERCIAL

## 2024-12-23 PROBLEM — M81.0 OSTEOPOROSIS: Status: ACTIVE | Noted: 2024-12-23

## 2024-12-23 LAB
ANION GAP SERPL CALCULATED.3IONS-SCNC: 11 MMOL/L (ref 4–13)
BACTERIA UR QL AUTO: ABNORMAL /HPF
BASE EX.OXY STD BLDV CALC-SCNC: 90.3 % (ref 60–80)
BASE EXCESS BLDV CALC-SCNC: -5.4 MMOL/L
BASOPHILS # BLD AUTO: 0.02 THOUSANDS/ÂΜL (ref 0–0.1)
BASOPHILS NFR BLD AUTO: 0 % (ref 0–1)
BILIRUB UR QL STRIP: NEGATIVE
BUN SERPL-MCNC: 14 MG/DL (ref 5–25)
CALCIUM SERPL-MCNC: 8.6 MG/DL (ref 8.4–10.2)
CHLORIDE SERPL-SCNC: 106 MMOL/L (ref 96–108)
CLARITY UR: ABNORMAL
CO2 SERPL-SCNC: 22 MMOL/L (ref 21–32)
COLOR UR: YELLOW
CREAT SERPL-MCNC: 1.09 MG/DL (ref 0.6–1.3)
EOSINOPHIL # BLD AUTO: 0 THOUSAND/ÂΜL (ref 0–0.61)
EOSINOPHIL NFR BLD AUTO: 0 % (ref 0–6)
ERYTHROCYTE [DISTWIDTH] IN BLOOD BY AUTOMATED COUNT: 15.7 % (ref 11.6–15.1)
FLUAV RNA RESP QL NAA+PROBE: NEGATIVE
FLUBV RNA RESP QL NAA+PROBE: NEGATIVE
GFR SERPL CREATININE-BSD FRML MDRD: 48 ML/MIN/1.73SQ M
GLUCOSE SERPL-MCNC: 64 MG/DL (ref 65–140)
GLUCOSE UR STRIP-MCNC: NEGATIVE MG/DL
HCO3 BLDV-SCNC: 18.2 MMOL/L (ref 24–30)
HCT VFR BLD AUTO: 32.7 % (ref 34.8–46.1)
HGB BLD-MCNC: 9.7 G/DL (ref 11.5–15.4)
HGB UR QL STRIP.AUTO: ABNORMAL
IMM GRANULOCYTES # BLD AUTO: 0.1 THOUSAND/UL (ref 0–0.2)
IMM GRANULOCYTES NFR BLD AUTO: 1 % (ref 0–2)
KETONES UR STRIP-MCNC: ABNORMAL MG/DL
LACTATE SERPL-SCNC: 1.7 MMOL/L (ref 0.5–2)
LEUKOCYTE ESTERASE UR QL STRIP: NEGATIVE
LYMPHOCYTES # BLD AUTO: 0.65 THOUSANDS/ÂΜL (ref 0.6–4.47)
LYMPHOCYTES NFR BLD AUTO: 5 % (ref 14–44)
MAGNESIUM SERPL-MCNC: 2.2 MG/DL (ref 1.9–2.7)
MCH RBC QN AUTO: 27.7 PG (ref 26.8–34.3)
MCHC RBC AUTO-ENTMCNC: 29.7 G/DL (ref 31.4–37.4)
MCV RBC AUTO: 93 FL (ref 82–98)
MONOCYTES # BLD AUTO: 0.87 THOUSAND/ÂΜL (ref 0.17–1.22)
MONOCYTES NFR BLD AUTO: 6 % (ref 4–12)
NEUTROPHILS # BLD AUTO: 12.47 THOUSANDS/ÂΜL (ref 1.85–7.62)
NEUTS SEG NFR BLD AUTO: 88 % (ref 43–75)
NITRITE UR QL STRIP: NEGATIVE
NON-SQ EPI CELLS URNS QL MICRO: ABNORMAL /HPF
NRBC BLD AUTO-RTO: 0 /100 WBCS
O2 CT BLDV-SCNC: 15.1 ML/DL
PCO2 BLDV: 29.7 MM HG (ref 42–50)
PH BLDV: 7.41 [PH] (ref 7.3–7.4)
PH UR STRIP.AUTO: 6 [PH]
PHOSPHATE SERPL-MCNC: 3.9 MG/DL (ref 2.3–4.1)
PLATELET # BLD AUTO: 225 THOUSANDS/UL (ref 149–390)
PMV BLD AUTO: 8.3 FL (ref 8.9–12.7)
PO2 BLDV: 83.7 MM HG (ref 35–45)
POTASSIUM SERPL-SCNC: 5.1 MMOL/L (ref 3.5–5.3)
PROT UR STRIP-MCNC: ABNORMAL MG/DL
RBC # BLD AUTO: 3.5 MILLION/UL (ref 3.81–5.12)
RBC #/AREA URNS AUTO: ABNORMAL /HPF
RSV RNA RESP QL NAA+PROBE: NEGATIVE
SARS-COV-2 RNA RESP QL NAA+PROBE: NEGATIVE
SODIUM SERPL-SCNC: 139 MMOL/L (ref 135–147)
SP GR UR STRIP.AUTO: 1.03 (ref 1–1.03)
UROBILINOGEN UR STRIP-ACNC: <2 MG/DL
WBC # BLD AUTO: 14.11 THOUSAND/UL (ref 4.31–10.16)
WBC #/AREA URNS AUTO: ABNORMAL /HPF

## 2024-12-23 PROCEDURE — 99222 1ST HOSP IP/OBS MODERATE 55: CPT | Performed by: INTERNAL MEDICINE

## 2024-12-23 PROCEDURE — 71045 X-RAY EXAM CHEST 1 VIEW: CPT

## 2024-12-23 PROCEDURE — 82805 BLOOD GASES W/O2 SATURATION: CPT | Performed by: STUDENT IN AN ORGANIZED HEALTH CARE EDUCATION/TRAINING PROGRAM

## 2024-12-23 PROCEDURE — 81001 URINALYSIS AUTO W/SCOPE: CPT | Performed by: STUDENT IN AN ORGANIZED HEALTH CARE EDUCATION/TRAINING PROGRAM

## 2024-12-23 PROCEDURE — 84100 ASSAY OF PHOSPHORUS: CPT

## 2024-12-23 PROCEDURE — 85025 COMPLETE CBC W/AUTO DIFF WBC: CPT

## 2024-12-23 PROCEDURE — 80048 BASIC METABOLIC PNL TOTAL CA: CPT

## 2024-12-23 PROCEDURE — 83605 ASSAY OF LACTIC ACID: CPT | Performed by: STUDENT IN AN ORGANIZED HEALTH CARE EDUCATION/TRAINING PROGRAM

## 2024-12-23 PROCEDURE — NC001 PR NO CHARGE: Performed by: ORTHOPAEDIC SURGERY

## 2024-12-23 PROCEDURE — 99233 SBSQ HOSP IP/OBS HIGH 50: CPT | Performed by: STUDENT IN AN ORGANIZED HEALTH CARE EDUCATION/TRAINING PROGRAM

## 2024-12-23 PROCEDURE — 94760 N-INVAS EAR/PLS OXIMETRY 1: CPT

## 2024-12-23 PROCEDURE — 99223 1ST HOSP IP/OBS HIGH 75: CPT | Performed by: INTERNAL MEDICINE

## 2024-12-23 PROCEDURE — 83735 ASSAY OF MAGNESIUM: CPT

## 2024-12-23 PROCEDURE — 87081 CULTURE SCREEN ONLY: CPT | Performed by: SURGERY

## 2024-12-23 PROCEDURE — 0241U HB NFCT DS VIR RESP RNA 4 TRGT: CPT | Performed by: PHYSICIAN ASSISTANT

## 2024-12-23 RX ORDER — ENOXAPARIN SODIUM 100 MG/ML
30 INJECTION SUBCUTANEOUS EVERY 12 HOURS SCHEDULED
Status: DISCONTINUED | OUTPATIENT
Start: 2024-12-23 | End: 2025-01-03 | Stop reason: HOSPADM

## 2024-12-23 RX ORDER — SODIUM CHLORIDE, SODIUM GLUCONATE, SODIUM ACETATE, POTASSIUM CHLORIDE, MAGNESIUM CHLORIDE, SODIUM PHOSPHATE, DIBASIC, AND POTASSIUM PHOSPHATE .53; .5; .37; .037; .03; .012; .00082 G/100ML; G/100ML; G/100ML; G/100ML; G/100ML; G/100ML; G/100ML
500 INJECTION, SOLUTION INTRAVENOUS ONCE
Status: DISCONTINUED | OUTPATIENT
Start: 2024-12-23 | End: 2024-12-24

## 2024-12-23 RX ORDER — QUETIAPINE FUMARATE 25 MG/1
25 TABLET, FILM COATED ORAL
Status: DISCONTINUED | OUTPATIENT
Start: 2024-12-23 | End: 2025-01-02

## 2024-12-23 RX ORDER — SODIUM CHLORIDE, SODIUM GLUCONATE, SODIUM ACETATE, POTASSIUM CHLORIDE, MAGNESIUM CHLORIDE, SODIUM PHOSPHATE, DIBASIC, AND POTASSIUM PHOSPHATE .53; .5; .37; .037; .03; .012; .00082 G/100ML; G/100ML; G/100ML; G/100ML; G/100ML; G/100ML; G/100ML
500 INJECTION, SOLUTION INTRAVENOUS ONCE
Status: COMPLETED | OUTPATIENT
Start: 2024-12-23 | End: 2024-12-23

## 2024-12-23 RX ORDER — OLANZAPINE 10 MG/2ML
2.5 INJECTION, POWDER, FOR SOLUTION INTRAMUSCULAR ONCE
Status: DISCONTINUED | OUTPATIENT
Start: 2024-12-23 | End: 2024-12-23

## 2024-12-23 RX ORDER — WATER 10 ML/10ML
INJECTION INTRAMUSCULAR; INTRAVENOUS; SUBCUTANEOUS
Status: COMPLETED
Start: 2024-12-23 | End: 2024-12-23

## 2024-12-23 RX ORDER — ACETAMINOPHEN 10 MG/ML
1000 INJECTION, SOLUTION INTRAVENOUS EVERY 6 HOURS PRN
Status: DISCONTINUED | OUTPATIENT
Start: 2024-12-23 | End: 2024-12-26

## 2024-12-23 RX ORDER — DIGOXIN 0.25 MG/ML
125 INJECTION INTRAMUSCULAR; INTRAVENOUS ONCE
Status: COMPLETED | OUTPATIENT
Start: 2024-12-23 | End: 2024-12-23

## 2024-12-23 RX ORDER — SODIUM CHLORIDE, SODIUM GLUCONATE, SODIUM ACETATE, POTASSIUM CHLORIDE, MAGNESIUM CHLORIDE, SODIUM PHOSPHATE, DIBASIC, AND POTASSIUM PHOSPHATE .53; .5; .37; .037; .03; .012; .00082 G/100ML; G/100ML; G/100ML; G/100ML; G/100ML; G/100ML; G/100ML
50 INJECTION, SOLUTION INTRAVENOUS CONTINUOUS
Status: DISCONTINUED | OUTPATIENT
Start: 2024-12-23 | End: 2024-12-24

## 2024-12-23 RX ORDER — OLANZAPINE 10 MG/2ML
5 INJECTION, POWDER, FOR SOLUTION INTRAMUSCULAR ONCE
Status: COMPLETED | OUTPATIENT
Start: 2024-12-23 | End: 2024-12-23

## 2024-12-23 RX ADMIN — OLANZAPINE 5 MG: 10 INJECTION, POWDER, FOR SOLUTION INTRAMUSCULAR at 10:15

## 2024-12-23 RX ADMIN — ENOXAPARIN SODIUM 30 MG: 30 INJECTION SUBCUTANEOUS at 13:13

## 2024-12-23 RX ADMIN — SODIUM CHLORIDE, SODIUM GLUCONATE, SODIUM ACETATE, POTASSIUM CHLORIDE, MAGNESIUM CHLORIDE, SODIUM PHOSPHATE, DIBASIC, AND POTASSIUM PHOSPHATE 500 ML: .53; .5; .37; .037; .03; .012; .00082 INJECTION, SOLUTION INTRAVENOUS at 12:01

## 2024-12-23 RX ADMIN — LEVOTHYROXINE SODIUM 125 MCG: 0.12 TABLET ORAL at 06:07

## 2024-12-23 RX ADMIN — WATER 10 ML: 1 INJECTION INTRAMUSCULAR; INTRAVENOUS; SUBCUTANEOUS at 10:15

## 2024-12-23 RX ADMIN — ACETAMINOPHEN 1000 MG: 10 INJECTION INTRAVENOUS at 10:38

## 2024-12-23 RX ADMIN — SODIUM CHLORIDE, SODIUM GLUCONATE, SODIUM ACETATE, POTASSIUM CHLORIDE, MAGNESIUM CHLORIDE, SODIUM PHOSPHATE, DIBASIC, AND POTASSIUM PHOSPHATE 50 ML/HR: .53; .5; .37; .037; .03; .012; .00082 INJECTION, SOLUTION INTRAVENOUS at 10:45

## 2024-12-23 RX ADMIN — SODIUM CHLORIDE, SODIUM LACTATE, POTASSIUM CHLORIDE, AND CALCIUM CHLORIDE 75 ML/HR: .6; .31; .03; .02 INJECTION, SOLUTION INTRAVENOUS at 02:03

## 2024-12-23 RX ADMIN — DILTIAZEM HYDROCHLORIDE 7.5 MG/HR: 5 INJECTION, SOLUTION INTRAVENOUS at 17:35

## 2024-12-23 RX ADMIN — DIGOXIN 125 MCG: 0.25 INJECTION INTRAMUSCULAR; INTRAVENOUS at 01:51

## 2024-12-23 NOTE — DISCHARGE INSTR - AVS FIRST PAGE
Sacral fracture Discharge Instructions - Orthopedics  Marsha Oliva 79 y.o. female MRN: [id number]  Unit/Bed#: [facility] 632-01    Weight Bearing Status:                                           Weight bearing as tolerated bilateral lower extremities    DVT prophylaxis:  Aspirin 81 mg twice per day until 28 days after injury (1/19/2025)    Pain:  Continue analgesics as directed    Dressing Instructions:   Please keep clean, dry and intact until follow up     Appt Instructions:   If you do not have your appointment, please call the clinic at [phone]  Otherwise follow up as scheduled.    Contact the office sooner if you experience any increased numbness/tingling in the extremities.          Age-indeterminate T1 fracture Discharge instructions:  Bracing deferred as patient is completely non-tender with no other correlating symptoms.  9/5/24 C1-C3 fusion with Dr. Simpson     Follow up as scheduled for 3 month post op visit with Neurosurgery    PEG Discharge instructions:  Clean site daily  Follow up with General Surgery clinic on an as needed basis, call with any questions  Continue Tube Feeds:  Jevity 1.2 [place]  Cyclic, 60 mL/hr over 22 hours  Flush 125 mL water every 6 hours

## 2024-12-23 NOTE — DISCHARGE INSTR - OTHER ORDERS
Skin Care Plan:  1-B/L Arm/Wrist Wounds: Cleanse with NSS and pat dry. Apply dermagran to wound beds, cover with ABDs, and secure with keisha wraps. Change every other day or PRN.   2-Turn/reposition q2h or when medically stable for pressure re-distribution on skin.  3-Elevate heels to offload pressure.  4-Moisturize skin daily with skin nourishing cream  5-Ehob cushion in chair when out of bed.  6-Preventative Hydraguard to bilateral sacro-buttocks BID and PRN.   7-P-500 Low Air Loss Mattress ordered for patient   8-Cleanse B/L Heels with soap and water. Pat dry. Apply Silicone Border Foam (Mepilex) to areas. Brad with P for Prevention and change every 3 days or PRN soilage/displacement. Peel back and inspect Q-shift.  9-Right Upper Back Wound: Cleanse with NSS and pat dry. Apply a silicone bordered foam dressing to the area. Brad with T for Treatment and change every other day or PRN.

## 2024-12-23 NOTE — PLAN OF CARE
Problem: Nutrition/Hydration-ADULT  Goal: Nutrient/Hydration intake appropriate for improving, restoring or maintaining nutritional needs  Description: Monitor and assess patient's nutrition/hydration status for malnutrition. Collaborate with interdisciplinary team and initiate plan and interventions as ordered.  Monitor patient's weight and dietary intake as ordered or per policy. Utilize nutrition screening tool and intervene as necessary. Determine patient's food preferences and provide high-protein, high-caloric foods as appropriate.     INTERVENTIONS:  - Monitor oral intake, urinary output, labs, and treatment plans  - Assess nutrition and hydration status and recommend course of action  - Evaluate amount of meals eaten  - Assist patient with eating if necessary   - Allow adequate time for meals  - Recommend/ encourage appropriate diets, oral nutritional supplements, and vitamin/mineral supplements  - Order, calculate, and assess calorie counts as needed  - Recommend, monitor, and adjust tube feedings and TPN/PPN based on assessed needs  - Assess need for intravenous fluids  - Provide specific nutrition/hydration education as appropriate  - Include patient/family/caregiver in decisions related to nutrition  Outcome: Progressing     Problem: SAFETY,RESTRAINT: NV/NON-SELF DESTRUCTIVE BEHAVIOR  Goal: Remains free of harm/injury (restraint for non violent/non self-detsructive behavior)  Description: INTERVENTIONS:  - Instruct patient/family regarding restraint use   - Assess and monitor physiologic and psychological status   - Provide interventions and comfort measures to meet assessed patient needs   - Identify and implement measures to help patient regain control  - Assess readiness for release of restraint   Outcome: Progressing  Goal: Returns to optimal restraint-free functioning  Description: INTERVENTIONS:  - Assess the patient's behavior and symptoms that indicate continued need for restraint  - Identify  and implement measures to help patient regain control  - Assess readiness for release of restraint   Outcome: Progressing     Problem: CONFUSION/THOUGHT DISTURBANCE  Goal: Thought disturbances (confusion, delirium, depression, dementia or psychosis) are managed to maintain or return to baseline mental status and functional level  Description: INTERVENTIONS:  - Assess for possible contributors to  thought disturbance, including but not limited to medications, infection, impaired vision or hearing, underlying metabolic abnormalities, dehydration, respiratory compromise,  psychiatric diagnoses and notify attending PHYSICAN/AP  - Monitor and intervene to maintain adequate nutrition, hydration, elimination, sleep and activity  - Decrease environmental stimuli, including noise as appropriate.  - Provide frequent contacts to provide refocusing, direction and reassurance as needed. Approach patient calmly with eye contact and at their level.  - [place] high risk fall precautions, aspiration precautions and other safety measures, as indicated  - If delirium suspected, notify physician/AP of change in condition and request immediate in-person evaluation  - Pursue consults as appropriate including Geriatric (campus dependent), OT for cognitive evaluation/activity planning, psychiatric, pastoral care, etc.  Outcome: Progressing     Problem: BEHAVIOR  Goal: Pt/Family maintain appropriate behavior and adhere to behavioral management agreement, if implemented  Description: INTERVENTIONS:  - Assess the family dynamic   - Encourage verbalization of thoughts and concerns in a socially appropriate manner  - Assess patient/family's coping skills and non-compliant behavior (including use of illegal substances).  - Utilize positive, consistent limit setting strategies supporting safety of patient, staff and others  - Initiate consult with Case Management, Spiritual Care or other ancillary services as appropriate  - If a  patient's/visitor's behavior jeopardizes the safety of the patient, staff, or others, refer to organization procedure.   - Notify Security of behavior or suspected illegal substances which indicate the need for search of the patient and/or belongings  - Encourage participation in the decision making process about a behavioral management agreement; implement if patient meets criteria  Outcome: Progressing     Problem: Prexisting or High Potential for Compromised Skin Integrity  Goal: Skin integrity is maintained or improved  Description: INTERVENTIONS:  - Identify patients at risk for skin breakdown  - Assess and monitor skin integrity  - Assess and monitor nutrition and hydration status  - Monitor labs   - Assess for incontinence   - Turn and reposition patient  - Assist with mobility/ambulation  - Relieve pressure over bony prominences  - Avoid friction and shearing  - Provide appropriate hygiene as needed including keeping skin clean and dry  - Evaluate need for skin moisturizer/barrier cream  - Collaborate with interdisciplinary team   - Patient/family teaching  - Consider wound care consult   Outcome: Progressing

## 2024-12-23 NOTE — UTILIZATION REVIEW
Initial Clinical Review    Admission: Date/Time/Statement:   Admission Orders (From admission, onward)       Ordered        12/22/24 1011  Inpatient Admission  Once                          Orders Placed This Encounter   Procedures    Inpatient Admission     Standing Status:   Standing     Number of Occurrences:   1     Level of Care:   Level 2 Stepdown / HOT [14]     Estimated length of stay:   More than 2 Midnights     Certification:   I certify that inpatient services are medically necessary for this patient for a duration of greater than two midnights. See H&P and MD Progress Notes for additional information about the patient's course of treatment.     ED Arrival Information       Expected   12/22/2024     Arrival   12/22/2024 09:18    Acuity   Emergent              Means of arrival   Ambulance    Escorted by   [facility]    Service   Trauma    Admission type   Emergency              Arrival complaint   Trauma transfer             Chief Complaint   Patient presents with    Fall     Pt had multiple falls over the last 2 weeks last one being yesterday when getting OOB.       Initial Presentation: 79 y.o. female presents to ed from home via ems on 12-22 for evaluation and treatment of frequent falls over the past two weeks.  PMHX: cervical spinal fusion, sacral fracture,  interstitial lung disease 3L O2nc, Afib, CHF, apraxia.      Clinical assessment significant for tachycardia 112- 134, tachypnea 24-30, hypoxia 81% ra. Pain 2/10, Afib, a few scattered bruises on legs and arms of varying ages. .  Imaging shows new T1 fracture, acute displaced angulated distal sacral fracture.  Small volume hemorrhage in presacral space. Admit to step down for closed T1 fracture, Afib,  Plan includes iv cardizem gtt and iv multi electrolyte 75/hr, neurosurgery consult, ortho consult , trend HH, IR consult if hb drops.       Consult neuro surgery: defer bracing as T 1 fracture is non tender.   Consult orthopedics :   continue non operative management of sacral fracture.  Known osteoarthritis of L knee.  WBAT, PT/OT and analgesia.  Consult cardiology:  Normally on diltiazem and digoxin. Not on anticoagulation given history of significant bleed. Acutely encephalopathic and in restraints today.  She is in Afib.  Not been taking oral medications.  On cardizem gtt.  Ok to give digoxin iv if necessary.  Increase cardizem to 5 mg.        Date: 12-23-24    Day 2: inpatient med surg  Swelling left knee and tenderness left knee.  Patient appears preoccupied and is moving limbs spontaneously in bed. Responds with incomprehensible speech in response to questions. AMS, multifactorial, infectious work up in progress.  R knee hematoma.  Obtain lactate, UA, VBG, blood cxs NGTD. Begin lovenox bid, cardizem gtt.       Date: 12-24-24   Day 3: Has surpassed a 2nd midnight with active treatments and services. Closed T1 fracture, sacral fracture with small hematoma and fall and Afib.  Hold cardizem gtt for hypotension  88/10.  Received iv fluid bolus 500 ml x1.   Continue telemetry.  On exam more interactive today. Per cardiology:  start po rate control medications for Afib.  Plan for echo when mental status improves.          ED Treatment-Medication Administration from 12/22/2024 0701 to 12/22/2024 1714         Date/Time Order Dose Route Action     12/22/2024 1043 multi-electrolyte (PLASMALYTE-A/ISOLYTE-S PH 7.4) IV solution 75 mL/hr Intravenous New Bag     12/22/2024 1337 atorvastatin (LIPITOR) tablet 20 mg 20 mg Oral Given     12/22/2024 1337 digoxin (LANOXIN) tablet 125 mcg 125 mcg Oral Given     12/22/2024 1339 levothyroxine tablet 125 mcg 125 mcg Oral Given     12/22/2024 1339 sertraline (ZOLOFT) tablet 100 mg 100 mg Oral Given     12/22/2024 1110 diltiazem (CARDIZEM) 125 mg in sodium chloride 0.9 % 125 mL infusion 5 mg/hr Intravenous Handoff     12/22/2024 1130 diltiazem (CARDIZEM) 125 mg in sodium chloride 0.9 % 125 mL infusion 5 mg/hr  Intravenous New Bag     12/22/2024 1337 OLANZapine (ZyPREXA ZYDIS) dispersible tablet 2.5 mg 2.5 mg Oral Given         Scheduled Medications:    atorvastatin, 20 mg, Oral, Daily  digoxin, 125 mcg, Oral, Every Other Day  diltiazem, 180 mg, Oral, Daily  docusate sodium, 100 mg, Oral, BID  enoxaparin, 30 mg, Subcutaneous, Q12H CHRIS  levothyroxine, 125 mcg, Oral, Early Morning  multi-electrolyte, 500 mL, Intravenous, Once  polyethylene glycol, 17 g, Oral, Daily  QUEtiapine, 25 mg, Oral, HS  sertraline, 100 mg, Oral, Daily      Continuous IV Infusions:  diltiazem, 1-15 mg/hr, Intravenous, Titrated  multi-electrolyte, 50 mL/hr, Intravenous, Continuous      PRN Meds:  acetaminophen, 1,000 mg, Intravenous, Q6H PRN  ondansetron, 4 mg, Intravenous, Q6H PRN  phenol, 1 spray, Mouth/Throat, Q2H PRN      ED Triage Vitals [12/22/24 0928]   Temperature Pulse Respirations Blood Pressure SpO2 Pain Score   98.1 °F (36.7 °C) 104 (!) 26 114/60 97 % 3     Weight (last 2 days)       None            Vital Signs (last 3 days)       Date/Time Temp Pulse Resp BP MAP  SpO2 Nasal Cannula O2 Flow Rate (L/min) O2 Device [place] Coma Scale Score Pain    12/23/24 14:24:31 100.1 °F (37.8 °C) 146 18 105/70 82 92 % -- -- -- --    12/23/24 13:37:46 100.2 °F (37.9 °C) 138 20 130/96 107 95 % -- -- -- --    12/23/24 1322 -- 128 20 80/46 57 99 % -- -- -- --    12/23/24 1311 -- -- -- -- -- -- 6 L/min Mid flow nasal cannula -- --    12/23/24 12:05:20 98.1 °F (36.7 °C) 122 -- -- -- 95 % -- -- -- --    12/23/24 1200 -- -- -- -- -- -- -- -- 14 --    12/23/24 11:26:45 -- 113 -- 135/108 117 93 % -- -- -- --    12/23/24 10:06:56 -- 119 -- 123/61 82 98 % -- -- -- --    12/23/24 09:15:52 100.5 °F (38.1 °C) 127 16 101/56 71 91 % -- -- -- --    12/23/24 08:04:58 -- 132 -- 116/63 81 95 % -- -- -- --    12/23/24 0801 -- -- -- -- -- 95 % 6 L/min Mid flow nasal cannula -- --    12/23/24 0800 -- -- -- -- -- 95 % 10 L/min Mid flow nasal cannula 14 --    12/23/24 07:31:26  97.7 °F (36.5 °C) 124 -- 114/66 82 95 % -- -- -- --    12/23/24 0515 -- 124 -- 125/68 87 -- -- -- -- --    12/23/24 0415 -- -- -- -- -- -- 12 L/min Mid flow nasal cannula 14 No Pain    12/23/24 04:08:11 98.1 °F (36.7 °C) 107 18 102/61 75 95 % -- -- -- --    12/23/24 0315 -- 118 -- 101/62 75 -- -- -- -- --    12/23/24 0157 -- -- -- -- -- 92 % 12 L/min Mid flow nasal cannula -- --    12/23/24 0100 -- 127 -- 112/84 93 -- -- -- -- --    12/23/24 0015 -- -- -- -- -- -- -- -- 14 --    12/22/24 2345 -- -- -- 102/71 81 -- -- -- -- --    12/22/24 2330 -- -- -- -- -- -- 10 L/min Mid flow nasal cannula -- --    12/22/24 2300 99.1 °F (37.3 °C) 121 -- 139/113 122 -- -- -- -- --    12/22/24 22:57:57 99.1 °F (37.3 °C) 141 -- 139/113 122 81 % -- -- -- --    12/22/24 22:25:28 98.5 °F (36.9 °C) 126 18 95/59 71 87 % -- -- -- --    12/22/24 2215 -- 115 -- 95/55 68 -- -- -- -- --    12/22/24 2115 -- 120 -- 115/70 85 -- -- -- -- --    12/22/24 2035 -- 134 -- 108/68 81 93 % -- -- -- --    12/22/24 2034 -- -- -- -- -- -- -- -- -- No Pain    12/22/24 2000 -- -- -- -- -- -- 6 L/min Nasal cannula 14 --    12/22/24 19:55:09 -- 122 -- 118/61 80 91 % -- -- -- --    12/1945 -- -- -- -- -- -- -- -- -- Med Not Given for Pain - for MAR use only    12/22/24 1915 100.9 °F (38.3 °C) 125 30 105/79 -- 89 % -- -- -- --    12/22/24 1900 -- 135 -- 103/73 -- 88 % -- -- -- --    12/22/24 1845 -- 126 -- 108/78 -- 91 % -- -- -- --    12/22/24 1830 -- 143 -- 114/75 -- 88 % 6 L/min -- -- --    12/22/24 1814 -- 132 -- 114/91 99 89 % -- -- -- --    12/22/24 1808 -- 132 -- 83/53 -- 86 % 4 L/min -- -- --    12/22/24 1745 -- 56 -- 107/70 82 86 % -- -- 14 No Pain    12/22/24 1741 97.7 °F (36.5 °C) 132 36 107/70 82 91 % 4 L/min Nasal cannula -- --    12/22/24 1545 -- 128 30 136/77 96 92 % 4 L/min Nasal cannula -- 3    12/22/24 1415 -- 110 28 116/59 83 90 % 4 L/min Nasal cannula -- 3    12/22/24 1345 -- 106 26 116/60 83 93 % 4 L/min Nasal cannula -- 3    12/22/24  1343 -- -- -- -- -- -- -- -- 14 3    12/22/24 1337 -- 125 -- 109/69 -- -- -- -- -- --    12/22/24 1330 -- 112 28 109/69 84 93 % 4 L/min Nasal cannula -- 3    12/22/24 1315 -- 114 24 114/71 88 92 % 4 L/min Nasal cannula -- 3    12/22/24 1245 -- 116 24 103/73 83 94 % 4 L/min Nasal cannula -- 3    12/22/24 1130 -- 104 26 111/57 75 94 % 4 L/min Nasal cannula -- 3    12/22/24 10:30:14 -- 120 26 108/59 -- 97 % -- Nasal cannula 15 3    12/22/24 09:28:59 98.1 °F (36.7 °C) 104 26 114/60 -- 97 % -- Nasal cannula 15 3       Pertinent Labs/Diagnostic Test Results:     Radiology:  XR chest portable    (Results Pending)     Cardiology:  No orders to display     GI:  No orders to display       Results from last 7 days   Lab Units 12/23/24  1156   SARS-COV-2  Negative     Results from last 7 days   Lab Units 12/23/24  1114 12/22/24  1605 12/22/24  1350 12/22/24  0947 12/22/24  0440   WBC Thousand/uL 14.11*  --   --   --  7.60   HEMOGLOBIN g/dL 9.7* 10.1* 10.5* 10.0* 11.5   HEMATOCRIT % 32.7* 33.4* 34.8 32.4* 37.8   PLATELETS Thousands/uL 225  --   --   --  229   TOTAL NEUT ABS Thousands/µL 12.47*  --   --   --  6.43         Results from last 7 days   Lab Units 12/23/24  0441 12/22/24  1605 12/22/24  0440   SODIUM mmol/L 139 136 136   POTASSIUM mmol/L 5.1 4.5 4.5   CHLORIDE mmol/L 106 103 101   CO2 mmol/L 22 25 26   ANION GAP mmol/L 11 8 9   BUN mg/dL 14 12 14   CREATININE mg/dL 1.09 1.03 1.16   EGFR ml/min/1.73sq m 48 51 44   CALCIUM mg/dL 8.6 8.3* 9.2   MAGNESIUM mg/dL 2.2  --   --    PHOSPHORUS mg/dL 3.9  --   --      Results from last 7 days   Lab Units 12/22/24  1605 12/22/24  0440   AST U/L 23 29   ALT U/L 10 14   ALK PHOS U/L 90 104   TOTAL PROTEIN g/dL 5.5* 7.1   ALBUMIN g/dL 3.0* 3.7   TOTAL BILIRUBIN mg/dL 0.54 0.70     Results from last 7 days   Lab Units 12/22/24  2313   POC GLUCOSE mg/dl 88     Results from last 7 days   Lab Units 12/23/24  0441 12/22/24  1605 12/22/24  0440   GLUCOSE RANDOM mg/dL 64* 69 91        Results from last 7 days   Lab Units 12/23/24  1116   PH ZEINAB  7.406*   PCO2 ZEINAB mm Hg 29.7*   PO2 ZEINAB mm Hg 83.7*   HCO3 ZEINAB mmol/L 18.2*   BASE EXC ZEINAB mmol/L -5.4   O2 CONTENT ZEINAB ml/dL 15.1   O2 HGB, VENOUS % 90.3*             Results from last 7 days   Lab Units 12/22/24  0653 12/22/24  0440   HS TNI 0HR ng/L  --  6   HS TNI 2HR ng/L 5  --    HSTNI D2 ng/L -1  --          Results from last 7 days   Lab Units 12/22/24  0440   PROTIME seconds 14.2   INR  1.05   PTT seconds 41*     Results from last 7 days   Lab Units 12/22/24  1605 12/22/24  0440   TSH 3RD GENERATON uIU/mL 0.853 1.283         Results from last 7 days   Lab Units 12/23/24  1114   LACTIC ACID mmol/L 1.7         Results from last 7 days   Lab Units 12/22/24  0440   DIGOXIN LVL ng/mL 0.6*     Results from last 7 days   Lab Units 12/22/24  0440   BNP pg/mL 305*     Results from last 7 days   Lab Units 12/22/24  1350   SED RATE mm/hour 45*     Results from last 7 days   Lab Units 12/23/24  1207   CLARITY UA  Turbid   COLOR UA  Yellow   SPEC GRAV UA  1.027   PH UA  6.0   GLUCOSE UA mg/dl Negative   KETONES UA mg/dl 10 (1+)*   BLOOD UA  Large*   PROTEIN UA mg/dl 30 (1+)*   NITRITE UA  Negative   BILIRUBIN UA  Negative   UROBILINOGEN UA (BE) mg/dl <2.0   LEUKOCYTES UA  Negative   WBC UA /hpf 4-10*   RBC UA /hpf Innumerable*   BACTERIA UA /hpf None Seen   EPITHELIAL CELLS WET PREP /hpf Occasional     Results from last 7 days   Lab Units 12/23/24  1156   INFLUENZA A PCR  Negative   INFLUENZA B PCR  Negative   RSV PCR  Negative         Past Medical History:   Diagnosis Date    Disease of thyroid gland     Hypotension     Supratherapeutic INR 6/2/2022     Present on Admission:   Atrial fibrillation, chronic (HCC)   Chronic diastolic CHF (congestive heart failure) ([facility])   Depression with anxiety   Hypothyroidism   Mixed hyperlipidemia   Severe mitral regurgitation   ILD (interstitial lung disease) ([facility])   Apraxia   Repeated falls   Ambulatory  dysfunction   Vitamin D deficiency      Admitting Diagnosis:     Hallucinations [R44.3]  Multiple fractures [T07.XXXA]  Impaired mobility and activities of daily living [Z74.09, Z78.9]  Closed fracture of first thoracic vertebra, unspecified fracture morphology, initial encounter ([facility]) [S22.019A]  Closed fracture of sacrum, unspecified portion of sacrum, initial encounter ([facility]) [S32.10XA]  Closed wedge compression fracture of T1 vertebra, initial encounter ([facility]) [S22.010A]    Age/Sex: 79 y.o. female    Network Utilization Review Department  ATTENTION: Please call with any questions or concerns to [phone] and carefully listen to the prompts so that you are directed to the right person. All voicemails are confidential.   For Discharge needs, contact Care Management DC Support Team at [phone] opt. [phone]  Send all requests for admission clinical reviews, approved or denied determinations and any other requests to dedicated fax number below belonging to the campus where the patient is receiving treatment. List of dedicated fax numbers for the Facilities:  FACILITY NAME UR FAX NUMBER   ADMISSION DENIALS (Administrative/Medical Necessity) [phone]   DISCHARGE SUPPORT TEAM (NETWORK) [phone]   PARENT CHILD HEALTH (Maternity/NICU/Pediatrics) [phone]   [facility] [phone]   [facility] [phone]   formerly [facility] [phone]   [facility] [phone]   [facility] [phone]   [facility] [phone]   [facility] [phone]   [facility] [phone]   [facility] [phone]   [facility] [phone]   [facility] [phone]   [facility] [phone]

## 2024-12-23 NOTE — CONSULTS
Consultation - Geriatric Medicine   Marsha Oliva 79 y.o. female MRN: [id number]  Unit/Bed#: [facility] 632-01 Encounter: [id number]      Assessment & Plan     Acute metabolic encephalopathy   -evidenced by sudden onset confusion and fluctuating mental status worse than baseline requiring treatment with Zyprexa and soft wrist restraints in patient who was reportedly fully oriented on initial presentation   -multifactorial including age, fall with traumatic injuries, acute pain, multiple setting changes (transferred between hospitals), acute pain and uncontrolled tachycardia superimposed on elevated baseline risk due to underlying cognitive impairment   -CTH no acute intracranial abn  -afebrile with no leukocytosis to suggest infectious etiology   -continue acute pain control  -encourage normal circadian rhythm  -optimize hemodynamics   -reorient frequently as appropriate   -consider low dose zyprexa 2.5mg Q8H PRN during day and scheduling Seroquel 25mg HS for restlessness/behaviors/hallucinations unable to be managed non-pharmacologically (monitor electrolytes and QTc closely with use)  -wean physical restraints as possible   -reorient often as appropriate and redirect unwanted behaviors as first line     Ambulatory dysfunction with fall  -reportedly mechanical fall earlier on day of admission   -(unknown) head strike (unknown) loss of consciousness  -injuries as outlined below  -Requires use of walker for ambulation at baseline  -hx recurrent falls at least one additional in past year   -remains high risk future falls due to age, hx of fall, impulsivity with poor safety awareness, deconditioning/debility and unfamiliar environment   -encourage good body mechanics and assist with all transfers  -keep personal items and call bell close to prevent reaching  -maintain environment free of fall hazards  -encourage appropriate footwear and adequate lighting at all times when out of bed  -consider home fall risk assessment and  personal fall alert system if returning home on d/c  -PT and OT pending     T1 vertebral fracture  -s/p fall as outlined above   -noted on admission imaging  -neurovascular checks per protocol  -acute multimodal pain control  -Nsx on consult - recommend deferring bracing due to asymptomatic, close o/p f/u    Sacral fracture  -s/p fall as outlined above  -noted on admission imaging with possible presacral hemorrhage  -continue acute multimodal pain control  -trending H/H  -Neurovascular checks per protocol  -Ortho on consult - WBAT and acute pain control  -PT/OT eval pending     Acute pain due to trauma  -recommend pain control per Geriatric pain protocol:  Tylenol 975mg Q8H scheduled  Roxicodone 2.5mg Q4H PRN moderate pain  Roxicodone 5mg Q4H PRN severe pain  Dilaudid 0.2mg Q2H PRN  -consider adjuncts such as lidocaine patch topically to appropriate areas   -encourage addition of non-pharmacologic pain treatment including ice and frequent repositioning  -recommend bowel regimen to prevent treat constipation due to increased risk with acute pain and opiate pain medications    Afib with RVR  -presented with afib and RVR with rates persistently in 120s-140s  -maintained on diltiazem and digoxin as o/p  -not on systemic AC as o/p due to hx of severe GI bleed per records   -rates remain uncontrolled requiring cardizem gtt, Cardiology on consult and managing   -continue optimization of hemodynamics     Cognitive impairment   -acutely encephalopathic at time of consultation  -at baseline oriented 2-4 with periods of confusion and very poor short term memory   -reportedly mostly independent with ADLs heavy assist of  with iADLs   -MoCA 16/30 (9/2024) suggestive of at least mild to moderate dementia at baseline   -CTH obtained on admission imaging personally viewed, reveals moderate to severe diffuse chronic microangiopathic changes  -Maintained on levothyroxine chronically outpatient for hypothyroidism, last B12  WNL  -at risk age and cardiovascular related progression of underlying cognitive impairment, continue secondary risk modifications  -Encourage patient remain physically, socially, cognitively active and engaged to maintain cognitive acuity  -Encourage use of sensory assist devices such as corrective lenses at all appropriate times to reduce risk of uncorrected sensory impairment from contributing to isolation, confusion, encephalopathy and more precipitous cognitive decline  -Underlying dementia significantly increases risk of developing delirium during hospitalization likely contributing to current episode, continue strict precautions and supportive cares  -consider comprehensive geriatric assessment as o/p for ongoing management     Depression with anxiety  -maintained on Zoloft chronically as o/p  -Given underlying dementia uncertain ability to precipitate with outpatient counseling/support group but will continue to benefit from ongoing psychosocial support of family and friends  -Continue home medication regimen and close outpatient follow-up with PCP for ongoing digitation and medication management  -encourage calm quiet env to reduce risk overstimulation     Hypothyroidism  -TSH WNL at 1.33 on admission  -Continue home levothyroxine regimen and close outpatient follow-up with PCP for ongoing titration and medication management    Impaired Vision  -recommend use of corrective lenses at all appropriate times  -encourage adequate lighting and encourage use of assistance with ambulation  -keep personal belongings close to person to avoid reaching  -encourage appropriate footwear at all times  -Consider large font for printed materials provided to patient    Impaired mastication  -Requires use of dentures-encourage use at all appropriate times  -ensure meal consistency appropriate for abilities  -continue aspiration precautions    Deconditioning/debility/frailty  -Clinical frailty scale stage V/VI, moderately  frail, progressive  -Multifactor including age, ambulatory dysfunction with history recurrent falls, depression with anxiety, cognitive impairment and multitude of additional chronic medical comorbidities now with fall and acute traumatic injuries superimposed in elderly individual with limited physiologic and metabolic reserves  -Continue optimization chronic conditions and address acute metabolic derangements as arise   -Encourage well-balanced nutritional intake  -Ensure underlying anxiety/mood/depression symptoms are well-controlled as may impact patient response to therapies as well overall sense of wellbeing and quality of life  -Continue to ensure that treatments and interventions align with patient's wishes and goals of care  -Continue psychosocial supports of patient and caregivers    Delirium precautions  -Patient is high risk of delirium due to age, fall, traumatic injuries, acute pain, hospitalization, underlying cognitive impairment   -Initiate delirium precautions  -maintain normal sleep/wake cycle  -minimize overnight interruptions, group overnight vitals/labs/nursing checks as possible  -dim lights, close blinds and turn off tv to minimize stimulation and encourage sleep environment in evenings  -ensure that pain is well controlled  -monitor for fecal and urinary retention which may precipitate delirium  -encourage early mobilization and ambulation with assist as cleared to safely do so  -provide frequent reorientation and redirection as indicated and appropriate   -encourage family and friends at the bedside to help calm patient if anxious  -minimize use of medications which may precipitate or worsen delirium such as tramadol, benzodiazepine, anticholinergics, and benadryl as possible   -encourage hydration and nutrition   -redirect unwanted behaviors as first line    Home medication review     Diltiazem 180 Mg daily  Aspirin 81 Mg daily (reportedly active per o/p records, pt cannot confirm due to  acute encephalopathy)  Lipitor 20 Mg daily  Digoxin 0.125 Mg every other day  Levothyroxine 125 mcg daily  Senna 17.2 Mg daily at bedtime   Zoloft 100 Mg daily    Care coordination:  rounded with Millie (RN)    History of Present Illness   Physician Requesting Consult: Manjinder Farooq MD  Reason for Consult / Principal Problem: Fall  Hx and PE limited by: N/A  Additional history obtained from: Chart review and patient evaluation    HPI: Marsha Oliva is a 79 y.o. year old female with depression with anxiety, hypothyroidism, interstitial lung disease, hyperlipidemia, chronic diastolic CHF, atrial fibrillation, apraxia, CKD 3, severe protein calorie nutrition, cognitive impairment, lymphedema, B12 efficiency, hypertension and ambulatory dysfunction with recurrent falls who is admitted to the Trauma Service as transfer from the [facility] where she presented following reportedly mechanical fall found to have T1 and sacral fractures on admission imaging, she was transferred for higher level of care and admission to Trauma, she is being seen in consultation by Geriatrics for high risk developing delirium during hospitalization. Marsha is seen and examined at bedside where she is lying resting, she is restless and confused at time of evaluation unable to provide history and thus it is obtained from extensive chart review and discussion with care teams. She is admitted following reportedly mechanical fall earlier on day of admission found to have above noted injuries as well as uncontrolled tachycardia. She was reportedly alert and oriented with some word finding difficulties on admission becoming more restless and confused over course of admission requiring repeat doses of zyprexa now in soft wrist restraints. She remains confused and unable to follow commands at time of evaluation.     Prior to admission Marsha was reportedly residing home with her  who is her primary caregiver. She has history of cognitive  impairment with episodes of confusion at baseline. She is reportedly mostly independent with ADLs and requires assist of iADLs. She requires use of walker and has history of falls at least one additional in past year. She requires use of glassed and dentures, does not use hearing aids.     Inpatient consult to Gerontology  Consult performed by: Esthela Olguin DO  Consult ordered by: Mesha Snow MD        Review of Systems   Unable to perform ROS: Mental status change     Historical Information   Past Medical History:   Diagnosis Date    Disease of thyroid gland     Hypotension     Supratherapeutic INR 6/2/2022     Past Surgical History:   Procedure Laterality Date    CERVICAL FUSION Bilateral 9/5/2024    Procedure: navigated posterior fusion C1-C3;  Surgeon: Eduardo Dewitt MD;  Location: [facility];  Service: Neurosurgery    HYSTERECTOMY       Social History   Social History     Substance and Sexual Activity   Alcohol Use Never    Comment: occassional.     Social History     Substance and Sexual Activity   Drug Use Never     Social History     Tobacco Use   Smoking Status Former    Types: Cigarettes   Smokeless Tobacco Never     Family History:   Family History   Problem Relation Age of Onset    Hyperlipidemia Father      Meds/Allergies   all current active meds have been reviewed    No Known Allergies    Objective     Intake/Output Summary (Last 24 hours) at 12/23/2024 0802  Last data filed at 12/23/2024 0537  Gross per 24 hour   Intake 1284.83 ml   Output 1150 ml   Net 134.83 ml     Invasive Devices       Peripheral Intravenous Line  Duration             Peripheral IV 12/22/24 Proximal;Right;Ventral (anterior) Forearm 1 day    Peripheral IV 12/22/24 Distal;Left;Ventral (anterior) Forearm <1 day    Peripheral IV 12/23/24 Left;Proximal;Ventral (anterior) Forearm <1 day                  Physical Exam  Vitals and nursing note reviewed.   Constitutional:       General: She is not in acute distress.      Comments: Thin frail elderly appearing female    HENT:      Head: Normocephalic.      Nose: Nose normal.      Comments: O2 via NC     Mouth/Throat:      Mouth: Mucous membranes are dry.      Comments: Appears to have upper denture in place (limited visualization due to patient participation)  Eyes:      General: No scleral icterus.        Right eye: No discharge.         Left eye: No discharge.      Conjunctiva/sclera: Conjunctivae normal.   Neck:      Comments: Trachea midline  Cardiovascular:      Rate and Rhythm: Tachycardia present.   Pulmonary:      Comments: Shallow respirations, no wheeze noted  Abdominal:      General: There is no distension.      Palpations: Abdomen is soft.   Musculoskeletal:      Cervical back: Neck supple.      Right lower leg: No edema.      Left lower leg: No edema.      Comments: Reduced overall muscle mass    ACE wrap LUE and distal RLE   Skin:     General: Skin is warm and dry.      Comments: Thin and friable with ecchymosis and chronic venous stasis changes distal BLE   Neurological:      Mental Status: She is alert.      Comments: Awake, restless and confused, inattentive, does not answer ques or follow directions    Psychiatric:      Comments: Hypervigilant        Lab Results:     I have personally reviewed pertinent lab results including the followin/22/24-CBC, CMP, BNP, troponin, H/H, Vit D, sed rate, PT/INR, PTT, digoxin level, TSH  24- BMP    I have personally reviewed the following imaging study reports in PACS:    24- CXR, XR pelvis, CT C-spine, CTH, CT facial bones, CT thoracolumbar, CT chest abd pelvis, XR L knee    Therapies:   PT: pending   OT: pending     VTE Prophylaxis: Sequential compression device (Venodyne)     Code Status: Level 1 - Full Code  Advance Directive and Living Will:      Power of :    POLST:      Family and Social Support:   Living Arrangements: Lives w/ Spouse/significant other  Support Systems: Spouse/significant  other  Assistance Needed: nikole  Type of Current Residence: Private residence  Current Home Care Services: Other (Comment) (NIKOLE)    Goals of Care: recovery from acute injuries

## 2024-12-23 NOTE — PROGRESS NOTES
Progress Note - Trauma   Name: Marsha Oliva 79 y.o. female I MRN: [id number]  Unit[facility] 632-[facility] I Date of Admission: 12/22/2024   Date of Service: 12/23/2024 I Hospital Day: 1  Assessment & Plan  Repeated falls  79-year-old female presenting due to multiple falls.  Transferred from [facility] due to closed T1 fracture, sacral fracture with small hematoma and some active extravasation.  GCS 15. A&Ox3.  Hemodynamically stable on 2 L NC.  No pain at this time.  No motor or sensory defects.  On presentation found to be in A-fib with RVR, started on Cardizem drip.    Plan  Admit as stepdown level 2  NPO  IV fluids  Every 4 hours H&H, low threshold for IR consult if hemoglobin downtrends or drops drastically  As needed pain and nausea control  Ortho consult  Neurosurgery consult  Encourage I-S  Closed T1 fracture (HCC)  Appreciate neurosurgery input  Atrial fibrillation, chronic (HCC)  Found to be in A-fib with RVR on presentation at [facility], started on Cardizem drip  -Continue Cardizem drip  S/P cervical spinal fusion  Operation was completed on 9/5/2024  Sacral fracture ([facility])  Appreciate orthopedic surgery input  Chronic diastolic CHF (congestive heart failure) ([facility])  Wt Readings from Last 3 Encounters:   12/22/24 50.8 kg (112 lb)   10/24/24 51.3 kg (113 lb)   10/07/24 51.3 kg (113 lb 1.5 oz)             Depression with anxiety  Continue Zoloft  Hypothyroidism  Continue levothyroxine  Mixed hyperlipidemia  Continue atorvastatin  Severe mitral regurgitation    ILD (interstitial lung disease) ([facility])  Previously on home oxygen,  states she has not needed it in a year.  -Titrate oxygen as needed, wean as able  Apraxia  Chronic    VTE Prophylaxis: Currently holding in the setting of pelvic hematoma, will likely restart soon    Disposition: Continue level care Please contact the SecureChat role for the Trauma service with any questions/concerns.    TRAUMA TERTIARY SURVEY  Summary of Diagnosed Injuries: Right  knee abrasion, sacral fracture, closed T1 fracture    Transfer from: [facility]    Mechanism of Injury:Fall     Chief Complaint: Delirious and slightly agitated    24 Hour Events : Patient has had consistent delirium since yesterday afternoon.  She is alert and oriented however speaking about things that are not there and to people that are not around.  Per patient's  she gets occasionally confused during the day and at night.  This morning the nurse noted her to have a large bruise/blood blister on her right lateral leg just below the knee that was not present on initial exam yesterday.  Likely secondary to patient shifting off the bed and hitting the side of her leg on the bed rail.  Will dress appropriately to protect the area.  Subjective : Patient had a single fever of 100.9 F overnight.  Remained tachycardic in the 120s on Cardizem drip, cardiology is following and has been checking in.  Patient reports no pain. NPO. Voiding spontaneously. Denies fever, chills, nausea, vomiting.     Objective :  Temp:  [97.7 °F (36.5 °C)-100.9 °F (38.3 °C)] 98.1 °F (36.7 °C)  HR:  [] 124  BP: ()/() 125/68  Resp:  [18-36] 18  SpO2:  [81 %-97 %] 95 %  O2 Device: Mid flow nasal cannula  Nasal Cannula O2 Flow Rate (L/min):  [4 L/min-12 L/min] 12 L/min    I/O         12/21 0701  12/22 0700 12/22 0701  12/23 0700    P.O.  0    I.V.  194.5    IV Piggyback  500    Total Intake  694.5    Urine  1150    Total Output  1150    Net  -455.5                  Physical Exam   General: NAD  HENT: NCAT MMM  Neck: supple, no JVD  CV: nl rate  Lungs: nl wob. No resp distress  ABD: Soft, nontender, nondistended  Extrem: Scattered bruises of varying ages.  Right knee abrasion.  Neuro: AAOx3      1. Before the illness or injury that brought you to the Emergency, did you need someone to help you on a regular basis? 1=Yes   2. Since the illness or injury that brought you to the Emergency, have you needed more help than usual to  take care of yourself? 1=Yes   3. Have you been hospitalized for one or more nights during the past 6 months (excluding a stay in the Emergency Department)? 1=Yes   4. In general, do you see well? 0=Yes   5. In general, do you have serious problems with your memory? 1=Yes   6. Do you take more than three different medications everyday? 1=Yes   TOTAL   5     Did you order a geriatric consult if the score was 2 or greater?: yes         Lab Results: I have reviewed the following results:  Recent Labs     12/22/24  0440 12/22/24  0653 12/22/24  0947 12/22/24  1605 12/23/24 0441   WBC 7.60  --   --   --   --    HGB 11.5  --    < > 10.1*  --    HCT 37.8  --    < > 33.4*  --      --   --   --   --    SODIUM 136  --   --  136 139   K 4.5  --   --  4.5 5.1     --   --  103 106   CO2 26  --   --  25 22   BUN 14  --   --  12 14   CREATININE 1.16  --   --  1.03 1.09   GLUC 91  --   --  69 64*   MG  --   --   --   --  2.2   PHOS  --   --   --   --  3.9   AST 29  --   --  23  --    ALT 14  --   --  10  --    ALB 3.7  --   --  3.0*  --    TBILI 0.70  --   --  0.54  --    ALKPHOS 104  --   --  90  --    PTT 41*  --   --   --   --    INR 1.05  --   --   --   --    HSTNI0 6  --   --   --   --    HSTNI2  --  5  --   --   --    *  --   --   --   --     < > = values in this interval not displayed.       Mesha Snow MD  General Surgery Resident

## 2024-12-23 NOTE — PLAN OF CARE
Problem: BEHAVIOR  Goal: Pt/Family maintain appropriate behavior and adhere to behavioral management agreement, if implemented  Description: INTERVENTIONS:  - Assess the family dynamic   - Encourage verbalization of thoughts and concerns in a socially appropriate manner  - Assess patient/family's coping skills and non-compliant behavior (including use of illegal substances).  - Utilize positive, consistent limit setting strategies supporting safety of patient, staff and others  - Initiate consult with Case Management, Spiritual Care or other ancillary services as appropriate  - If a patient's/visitor's behavior jeopardizes the safety of the patient, staff, or others, refer to organization procedure.   - Notify Security of behavior or suspected illegal substances which indicate the need for search of the patient and/or belongings  - Encourage participation in the decision making process about a behavioral management agreement; implement if patient meets criteria  Outcome: Progressing     Problem: CONFUSION/THOUGHT DISTURBANCE  Goal: Thought disturbances (confusion, delirium, depression, dementia or psychosis) are managed to maintain or return to baseline mental status and functional level  Description: INTERVENTIONS:  - Assess for possible contributors to  thought disturbance, including but not limited to medications, infection, impaired vision or hearing, underlying metabolic abnormalities, dehydration, respiratory compromise,  psychiatric diagnoses and notify attending PHYSICAN/AP  - Monitor and intervene to maintain adequate nutrition, hydration, elimination, sleep and activity  - Decrease environmental stimuli, including noise as appropriate.  - Provide frequent contacts to provide refocusing, direction and reassurance as needed. Approach patient calmly with eye contact and at their level.  - [place] high risk fall precautions, aspiration precautions and other safety measures, as indicated  - If delirium  suspected, notify physician/AP of change in condition and request immediate in-person evaluation  - Pursue consults as appropriate including Geriatric (campus dependent), OT for cognitive evaluation/activity planning, psychiatric, pastoral care, etc.  Outcome: Progressing     Problem: SAFETY,RESTRAINT: NV/NON-SELF DESTRUCTIVE BEHAVIOR  Goal: Remains free of harm/injury (restraint for non violent/non self-detsructive behavior)  Description: INTERVENTIONS:  - Instruct patient/family regarding restraint use   - Assess and monitor physiologic and psychological status   - Provide interventions and comfort measures to meet assessed patient needs   - Identify and implement measures to help patient regain control  - Assess readiness for release of restraint   Outcome: Progressing     Problem: Nutrition/Hydration-ADULT  Goal: Nutrient/Hydration intake appropriate for improving, restoring or maintaining nutritional needs  Description: Monitor and assess patient's nutrition/hydration status for malnutrition. Collaborate with interdisciplinary team and initiate plan and interventions as ordered.  Monitor patient's weight and dietary intake as ordered or per policy. Utilize nutrition screening tool and intervene as necessary. Determine patient's food preferences and provide high-protein, high-caloric foods as appropriate.     INTERVENTIONS:  - Monitor oral intake, urinary output, labs, and treatment plans  - Assess nutrition and hydration status and recommend course of action  - Evaluate amount of meals eaten  - Assist patient with eating if necessary   - Allow adequate time for meals  - Recommend/ encourage appropriate diets, oral nutritional supplements, and vitamin/mineral supplements  - Order, calculate, and assess calorie counts as needed  - Recommend, monitor, and adjust tube feedings and TPN/PPN based on assessed needs  - Assess need for intravenous fluids  - Provide specific nutrition/hydration education as  appropriate  - Include patient/family/caregiver in decisions related to nutrition  Outcome: Progressing

## 2024-12-23 NOTE — PLAN OF CARE
Problem: PAIN - ADULT  Goal: Verbalizes/displays adequate comfort level or baseline comfort level  Description: Interventions:  - Encourage patient to monitor pain and request assistance  - Assess pain using appropriate pain scale  - Administer analgesics based on type and severity of pain and evaluate response  - Implement non-pharmacological measures as appropriate and evaluate response  - Consider cultural and social influences on pain and pain management  - Notify physician/advanced practitioner if interventions unsuccessful or patient reports new pain  Outcome: Progressing     Problem: INFECTION - ADULT  Goal: Absence or prevention of progression during hospitalization  Description: INTERVENTIONS:  - Assess and monitor for signs and symptoms of infection  - Monitor lab/diagnostic results  - Monitor all insertion sites, i.e. indwelling lines, tubes, and drains  - Monitor endotracheal if appropriate and nasal secretions for changes in amount and color  - [place] appropriate cooling/warming therapies per order  - Administer medications as ordered  - Instruct and encourage patient and family to use good hand hygiene technique  - Identify and instruct in appropriate isolation precautions for identified infection/condition  Outcome: Progressing     Problem: SAFETY ADULT  Goal: Patient will remain free of falls  Description: INTERVENTIONS:  - Educate patient/family on patient safety including physical limitations  - Instruct patient to call for assistance with activity   - Consult OT/PT to assist with strengthening/mobility   - Keep Call bell within reach  - Keep bed low and locked with side rails adjusted as appropriate  - Keep care items and personal belongings within reach  - Initiate and maintain comfort rounds  - Make Fall Risk Sign visible to staff  - Offer Toileting every 2 Hours, in advance of need  - Initiate/Maintain bed alarm  - Obtain necessary fall risk management equipment: bed alarm  - Apply yellow  socks and bracelet for high fall risk patients  - Consider moving patient to room near nurses station  Outcome: Progressing  Goal: Maintain or return to baseline ADL function  Description: INTERVENTIONS:  -  Assess patient's ability to carry out ADLs; assess patient's baseline for ADL function and identify physical deficits which impact ability to perform ADLs (bathing, care of mouth/teeth, toileting, grooming, dressing, etc.)  - Assess/evaluate cause of self-care deficits   - Assess range of motion  - Assess patient's mobility; develop plan if impaired  - Assess patient's need for assistive devices and provide as appropriate  - Encourage maximum independence but intervene and supervise when necessary  - Involve family in performance of ADLs  - Assess for home care needs following discharge   - Consider OT consult to assist with ADL evaluation and planning for discharge  - Provide patient education as appropriate  Outcome: Progressing  Goal: Maintains/Returns to pre admission functional level  Description: INTERVENTIONS:  - Perform AM-PAC 6 Click Basic Mobility/ Daily Activity assessment daily.  - Set and communicate daily mobility goal to care team and patient/family/caregiver.   - Collaborate with rehabilitation services on mobility goals if consulted  - Perform Range of Motion 3 times a day.  - Reposition patient every 2 hours.  - Dangle patient 3 times a day  - Stand patient 3 times a day  - Ambulate patient 3 times a day  - Out of bed to chair 3 times a day   - Out of bed for meals 3 times a day  - Out of bed for toileting  - Record patient progress and toleration of activity level   Outcome: Progressing     Problem: DISCHARGE PLANNING  Goal: Discharge to home or other facility with appropriate resources  Description: INTERVENTIONS:  - Identify barriers to discharge w/patient and caregiver  - Arrange for needed discharge resources and transportation as appropriate  - Identify discharge learning needs (meds,  wound care, etc.)  - Arrange for interpretive services to assist at discharge as needed  - Refer to Case Management Department for coordinating discharge planning if the patient needs post-hospital services based on physician/advanced practitioner order or complex needs related to functional status, cognitive ability, or social support system  Outcome: Progressing     Problem: Knowledge Deficit  Goal: Patient/family/caregiver demonstrates understanding of disease process, treatment plan, medications, and discharge instructions  Description: Complete learning assessment and assess knowledge base.  Interventions:  - Provide teaching at level of understanding  - Provide teaching via preferred learning methods  Outcome: Progressing     Problem: NEUROSENSORY - ADULT  Goal: Achieves stable or improved neurological status  Description: INTERVENTIONS  - Monitor and report changes in neurological status  - Monitor vital signs such as temperature, blood pressure, glucose, and any other labs ordered   - Initiate measures to prevent increased intracranial pressure  - Monitor for seizure activity and implement precautions if appropriate      Outcome: Progressing  Goal: Remains free of injury related to seizures activity  Description: INTERVENTIONS  - Maintain airway, patient safety  and administer oxygen as ordered  - Monitor patient for seizure activity, document and report duration and description of seizure to physician/advanced practitioner  - If seizure occurs,  ensure patient safety during seizure  - Reorient patient post seizure  - Seizure pads on all 4 side rails  - Instruct patient/family to notify RN of any seizure activity including if an aura is experienced  - Instruct patient/family to call for assistance with activity based on nursing assessment  - Administer anti-seizure medications if ordered    Outcome: Progressing  Goal: Achieves maximal functionality and self care  Description: INTERVENTIONS  - Monitor  swallowing and airway patency with patient fatigue and changes in neurological status  - Encourage and assist patient to increase activity and self care.   - Encourage visually impaired, hearing impaired and aphasic patients to use assistive/communication devices  Outcome: Progressing     Problem: CARDIOVASCULAR - ADULT  Goal: Maintains optimal cardiac output and hemodynamic stability  Description: INTERVENTIONS:  - Monitor I/O, vital signs and rhythm  - Monitor for S/S and trends of decreased cardiac output  - Administer and titrate ordered vasoactive medications to optimize hemodynamic stability  - Assess quality of pulses, skin color and temperature  - Assess for signs of decreased coronary artery perfusion  - Instruct patient to report change in severity of symptoms  Outcome: Not Progressing  Goal: Absence of cardiac dysrhythmias or at baseline rhythm  Description: INTERVENTIONS:  - Continuous cardiac monitoring, vital signs, obtain 12 lead EKG if ordered  - Administer antiarrhythmic and heart rate control medications as ordered  - Monitor electrolytes and administer replacement therapy as ordered  Outcome: Not Progressing     Problem: RESPIRATORY - ADULT  Goal: Achieves optimal ventilation and oxygenation  Description: INTERVENTIONS:  - Assess for changes in respiratory status  - Assess for changes in mentation and behavior  - Position to facilitate oxygenation and minimize respiratory effort  - Oxygen administered by appropriate delivery if ordered  - Initiate smoking cessation education as indicated  - Encourage broncho-pulmonary hygiene including cough, deep breathe, Incentive Spirometry  - Assess the need for suctioning and aspirate as needed  - Assess and instruct to report SOB or any respiratory difficulty  - Respiratory Therapy support as indicated  Outcome: Not Progressing     Problem: HEMATOLOGIC - ADULT  Goal: Maintains hematologic stability  Description: INTERVENTIONS  - Assess for signs and symptoms  of bleeding or hemorrhage  - Monitor labs  - Administer supportive blood products/factors as ordered and appropriate  Outcome: Progressing     Problem: MUSCULOSKELETAL - ADULT  Goal: Maintain or return mobility to safest level of function  Description: INTERVENTIONS:  - Assess patient's ability to carry out ADLs; assess patient's baseline for ADL function and identify physical deficits which impact ability to perform ADLs (bathing, care of mouth/teeth, toileting, grooming, dressing, etc.)  - Assess/evaluate cause of self-care deficits   - Assess range of motion  - Assess patient's mobility  - Assess patient's need for assistive devices and provide as appropriate  - Encourage maximum independence but intervene and supervise when necessary  - Involve family in performance of ADLs  - Assess for home care needs following discharge   - Consider OT consult to assist with ADL evaluation and planning for discharge  - Provide patient education as appropriate  Outcome: Progressing  Goal: Maintain proper alignment of affected body part  Description: INTERVENTIONS:  - Support, maintain and protect limb and body alignment  - Provide patient/ family with appropriate education  Outcome: Progressing

## 2024-12-23 NOTE — PROGRESS NOTES
Mesha Snow MD of Trauma instructed RN to restart Cardizem infusion at 2.5mg/hr and to not triturate the infusion at this time as the patient's blood pressure was not tolerating the infusion at a higher rate. Per Mesha Snow MD, it is okay to continue to infuse the Cardizem at the current rate of 2.5mg/hr as long as the patient's SBP is 100 mmHg or higher.

## 2024-12-23 NOTE — PLAN OF CARE
Problem: PAIN - ADULT  Goal: Verbalizes/displays adequate comfort level or baseline comfort level  Description: Interventions:  - Encourage patient to monitor pain and request assistance  - Assess pain using appropriate pain scale  - Administer analgesics based on type and severity of pain and evaluate response  - Implement non-pharmacological measures as appropriate and evaluate response  - Consider cultural and social influences on pain and pain management  - Notify physician/advanced practitioner if interventions unsuccessful or patient reports new pain  Outcome: Progressing     Problem: INFECTION - ADULT  Goal: Absence or prevention of progression during hospitalization  Description: INTERVENTIONS:  - Assess and monitor for signs and symptoms of infection  - Monitor lab/diagnostic results  - Monitor all insertion sites, i.e. indwelling lines, tubes, and drains  - Monitor endotracheal if appropriate and nasal secretions for changes in amount and color  - [place] appropriate cooling/warming therapies per order  - Administer medications as ordered  - Instruct and encourage patient and family to use good hand hygiene technique  - Identify and instruct in appropriate isolation precautions for identified infection/condition  Outcome: Progressing     Problem: SAFETY ADULT  Goal: Patient will remain free of falls  Description: INTERVENTIONS:  - Educate patient/family on patient safety including physical limitations  - Instruct patient to call for assistance with activity   - Consult OT/PT to assist with strengthening/mobility   - Keep Call bell within reach  - Keep bed low and locked with side rails adjusted as appropriate  - Keep care items and personal belongings within reach  - Initiate and maintain comfort rounds  - Make Fall Risk Sign visible to staff  - Offer Toileting every 2 Hours, in advance of need  - Initiate/Maintain bed alarm  - Obtain necessary fall risk management equipment: bed alarm  - Apply yellow  socks and bracelet for high fall risk patients  - Consider moving patient to room near nurses station  Outcome: Progressing  Goal: Maintain or return to baseline ADL function  Description: INTERVENTIONS:  -  Assess patient's ability to carry out ADLs; assess patient's baseline for ADL function and identify physical deficits which impact ability to perform ADLs (bathing, care of mouth/teeth, toileting, grooming, dressing, etc.)  - Assess/evaluate cause of self-care deficits   - Assess range of motion  - Assess patient's mobility; develop plan if impaired  - Assess patient's need for assistive devices and provide as appropriate  - Encourage maximum independence but intervene and supervise when necessary  - Involve family in performance of ADLs  - Assess for home care needs following discharge   - Consider OT consult to assist with ADL evaluation and planning for discharge  - Provide patient education as appropriate  Outcome: Progressing  Goal: Maintains/Returns to pre admission functional level  Description: INTERVENTIONS:  - Perform AM-PAC 6 Click Basic Mobility/ Daily Activity assessment daily.  - Set and communicate daily mobility goal to care team and patient/family/caregiver.   - Collaborate with rehabilitation services on mobility goals if consulted  - Perform Range of Motion 3 times a day.  - Reposition patient every 2 hours.  - Dangle patient 3 times a day  - Stand patient 3 times a day  - Ambulate patient 3 times a day  - Out of bed to chair 3 times a day   - Out of bed for meals 3 times a day  - Out of bed for toileting  - Record patient progress and toleration of activity level   Outcome: Progressing     Problem: DISCHARGE PLANNING  Goal: Discharge to home or other facility with appropriate resources  Description: INTERVENTIONS:  - Identify barriers to discharge w/patient and caregiver  - Arrange for needed discharge resources and transportation as appropriate  - Identify discharge learning needs (meds,  wound care, etc.)  - Arrange for interpretive services to assist at discharge as needed  - Refer to Case Management Department for coordinating discharge planning if the patient needs post-hospital services based on physician/advanced practitioner order or complex needs related to functional status, cognitive ability, or social support system  Outcome: Progressing     Problem: Knowledge Deficit  Goal: Patient/family/caregiver demonstrates understanding of disease process, treatment plan, medications, and discharge instructions  Description: Complete learning assessment and assess knowledge base.  Interventions:  - Provide teaching at level of understanding  - Provide teaching via preferred learning methods  Outcome: Progressing     Problem: NEUROSENSORY - ADULT  Goal: Achieves stable or improved neurological status  Description: INTERVENTIONS  - Monitor and report changes in neurological status  - Monitor vital signs such as temperature, blood pressure, glucose, and any other labs ordered   - Initiate measures to prevent increased intracranial pressure  - Monitor for seizure activity and implement precautions if appropriate      Outcome: Progressing  Goal: Remains free of injury related to seizures activity  Description: INTERVENTIONS  - Maintain airway, patient safety  and administer oxygen as ordered  - Monitor patient for seizure activity, document and report duration and description of seizure to physician/advanced practitioner  - If seizure occurs,  ensure patient safety during seizure  - Reorient patient post seizure  - Seizure pads on all 4 side rails  - Instruct patient/family to notify RN of any seizure activity including if an aura is experienced  - Instruct patient/family to call for assistance with activity based on nursing assessment  - Administer anti-seizure medications if ordered    Outcome: Progressing  Goal: Achieves maximal functionality and self care  Description: INTERVENTIONS  - Monitor  swallowing and airway patency with patient fatigue and changes in neurological status  - Encourage and assist patient to increase activity and self care.   - Encourage visually impaired, hearing impaired and aphasic patients to use assistive/communication devices  Outcome: Progressing     Problem: CARDIOVASCULAR - ADULT  Goal: Maintains optimal cardiac output and hemodynamic stability  Description: INTERVENTIONS:  - Monitor I/O, vital signs and rhythm  - Monitor for S/S and trends of decreased cardiac output  - Administer and titrate ordered vasoactive medications to optimize hemodynamic stability  - Assess quality of pulses, skin color and temperature  - Assess for signs of decreased coronary artery perfusion  - Instruct patient to report change in severity of symptoms  Outcome: Progressing  Goal: Absence of cardiac dysrhythmias or at baseline rhythm  Description: INTERVENTIONS:  - Continuous cardiac monitoring, vital signs, obtain 12 lead EKG if ordered  - Administer antiarrhythmic and heart rate control medications as ordered  - Monitor electrolytes and administer replacement therapy as ordered  Outcome: Progressing     Problem: RESPIRATORY - ADULT  Goal: Achieves optimal ventilation and oxygenation  Description: INTERVENTIONS:  - Assess for changes in respiratory status  - Assess for changes in mentation and behavior  - Position to facilitate oxygenation and minimize respiratory effort  - Oxygen administered by appropriate delivery if ordered  - Initiate smoking cessation education as indicated  - Encourage broncho-pulmonary hygiene including cough, deep breathe, Incentive Spirometry  - Assess the need for suctioning and aspirate as needed  - Assess and instruct to report SOB or any respiratory difficulty  - Respiratory Therapy support as indicated  Outcome: Progressing     Problem: HEMATOLOGIC - ADULT  Goal: Maintains hematologic stability  Description: INTERVENTIONS  - Assess for signs and symptoms of bleeding  or hemorrhage  - Monitor labs  - Administer supportive blood products/factors as ordered and appropriate  Outcome: Progressing     Problem: MUSCULOSKELETAL - ADULT  Goal: Maintain or return mobility to safest level of function  Description: INTERVENTIONS:  - Assess patient's ability to carry out ADLs; assess patient's baseline for ADL function and identify physical deficits which impact ability to perform ADLs (bathing, care of mouth/teeth, toileting, grooming, dressing, etc.)  - Assess/evaluate cause of self-care deficits   - Assess range of motion  - Assess patient's mobility  - Assess patient's need for assistive devices and provide as appropriate  - Encourage maximum independence but intervene and supervise when necessary  - Involve family in performance of ADLs  - Assess for home care needs following discharge   - Consider OT consult to assist with ADL evaluation and planning for discharge  - Provide patient education as appropriate  Outcome: Progressing  Goal: Maintain proper alignment of affected body part  Description: INTERVENTIONS:  - Support, maintain and protect limb and body alignment  - Provide patient/ family with appropriate education  Outcome: Progressing     Problem: Nutrition/Hydration-ADULT  Goal: Nutrient/Hydration intake appropriate for improving, restoring or maintaining nutritional needs  Description: Monitor and assess patient's nutrition/hydration status for malnutrition. Collaborate with interdisciplinary team and initiate plan and interventions as ordered.  Monitor patient's weight and dietary intake as ordered or per policy. Utilize nutrition screening tool and intervene as necessary. Determine patient's food preferences and provide high-protein, high-caloric foods as appropriate.     INTERVENTIONS:  - Monitor oral intake, urinary output, labs, and treatment plans  - Assess nutrition and hydration status and recommend course of action  - Evaluate amount of meals eaten  - Assist patient  with eating if necessary   - Allow adequate time for meals  - Recommend/ encourage appropriate diets, oral nutritional supplements, and vitamin/mineral supplements  - Order, calculate, and assess calorie counts as needed  - Recommend, monitor, and adjust tube feedings and TPN/PPN based on assessed needs  - Assess need for intravenous fluids  - Provide specific nutrition/hydration education as appropriate  - Include patient/family/caregiver in decisions related to nutrition  Outcome: Progressing     Problem: SAFETY,RESTRAINT: NV/NON-SELF DESTRUCTIVE BEHAVIOR  Goal: Remains free of harm/injury (restraint for non violent/non self-detsructive behavior)  Description: INTERVENTIONS:  - Instruct patient/family regarding restraint use   - Assess and monitor physiologic and psychological status   - Provide interventions and comfort measures to meet assessed patient needs   - Identify and implement measures to help patient regain control  - Assess readiness for release of restraint   Outcome: Progressing  Goal: Returns to optimal restraint-free functioning  Description: INTERVENTIONS:  - Assess the patient's behavior and symptoms that indicate continued need for restraint  - Identify and implement measures to help patient regain control  - Assess readiness for release of restraint   Outcome: Progressing     Problem: CONFUSION/THOUGHT DISTURBANCE  Goal: Thought disturbances (confusion, delirium, depression, dementia or psychosis) are managed to maintain or return to baseline mental status and functional level  Description: INTERVENTIONS:  - Assess for possible contributors to  thought disturbance, including but not limited to medications, infection, impaired vision or hearing, underlying metabolic abnormalities, dehydration, respiratory compromise,  psychiatric diagnoses and notify attending PHYSICAN/AP  - Monitor and intervene to maintain adequate nutrition, hydration, elimination, sleep and activity  - Decrease environmental  stimuli, including noise as appropriate.  - Provide frequent contacts to provide refocusing, direction and reassurance as needed. Approach patient calmly with eye contact and at their level.  - [place] high risk fall precautions, aspiration precautions and other safety measures, as indicated  - If delirium suspected, notify physician/AP of change in condition and request immediate in-person evaluation  - Pursue consults as appropriate including Geriatric (campus dependent), OT for cognitive evaluation/activity planning, psychiatric, pastoral care, etc.  Outcome: Progressing     Problem: BEHAVIOR  Goal: Pt/Family maintain appropriate behavior and adhere to behavioral management agreement, if implemented  Description: INTERVENTIONS:  - Assess the family dynamic   - Encourage verbalization of thoughts and concerns in a socially appropriate manner  - Assess patient/family's coping skills and non-compliant behavior (including use of illegal substances).  - Utilize positive, consistent limit setting strategies supporting safety of patient, staff and others  - Initiate consult with Case Management, Spiritual Care or other ancillary services as appropriate  - If a patient's/visitor's behavior jeopardizes the safety of the patient, staff, or others, refer to organization procedure.   - Notify Security of behavior or suspected illegal substances which indicate the need for search of the patient and/or belongings  - Encourage participation in the decision making process about a behavioral management agreement; implement if patient meets criteria  Outcome: Progressing     Problem: Prexisting or High Potential for Compromised Skin Integrity  Goal: Skin integrity is maintained or improved  Description: INTERVENTIONS:  - Identify patients at risk for skin breakdown  - Assess and monitor skin integrity  - Assess and monitor nutrition and hydration status  - Monitor labs   - Assess for incontinence   - Turn and reposition  patient  - Assist with mobility/ambulation  - Relieve pressure over bony prominences  - Avoid friction and shearing  - Provide appropriate hygiene as needed including keeping skin clean and dry  - Evaluate need for skin moisturizer/barrier cream  - Collaborate with interdisciplinary team   - Patient/family teaching  - Consider wound care consult   Outcome: Progressing

## 2024-12-23 NOTE — SPEECH THERAPY NOTE
Speech Language/Pathology    Consult received.  Chart reviewed.  Attempted to arouse pt/increase awareness this date.  She is receiving care from Nursing.  The pt is restless, confused, and not following commands.  She opened her eyes/eyelids with tactile cues only but did not maintain opening.  Assisted Nurse by removing upper denture.  Pt with limited awareness.  Additional testing planned for today per report.  Recommend continue NPO.

## 2024-12-23 NOTE — QUICK NOTE
Unable to complete patient's admission database at this time due to patient's confusion and aggression.

## 2024-12-23 NOTE — ASSESSMENT & PLAN NOTE
Documented history of vitamin D deficiency  Unclear if currently supplementing vitamin D, however, most recent vitamin D 25 level appropriate at 49.7  If supplementing, continue with vitamin D supplementation

## 2024-12-23 NOTE — ASSESSMENT & PLAN NOTE
Severe osteoporosis based on clinical findings of osteoporotic (fragility) fracture  - acute sacral fracture s/p fall  Labs reviewed-normal values for corrected calcium of 9.4, with appropriate PTH at 41.1, vitamin D 25 at 49.7, phosphorus 3.9, magnesium 2.2, and TSH 1.283.    Calcium supplementation 1200 mg daily  Vitamin D supplementation at least 1000 units daily  Outpatient clinic follow-up for discussion of osteoporosis treatment options -if renal function improves, could be a candidate for IV Reclast  Outpatient follow-up DXA scan

## 2024-12-23 NOTE — CONSULTS
Consultation - Endocrinology   Name: Marsha Oliva 79 y.o. female I MRN: [id number]  Unit/Bed#: [facility] 632-01 I Date of Admission: 12/22/2024   Date of Service: 12/23/2024 I Hospital Day: 1   Inpatient consult to Endocrinology  Consult performed by: Spencer Guillen MD  Consult ordered by: [place] Maya MD        Physician Requesting Evaluation: Manjinder Farooq MD   Reason for Evaluation / Principal Problem: Own the bone    Assessment & Plan  Osteoporosis  Severe osteoporosis based on clinical findings of osteoporotic (fragility) fracture  - acute sacral fracture s/p fall  Labs reviewed-normal values for corrected calcium of 9.4, with appropriate PTH at 41.1, vitamin D 25 at 49.7, phosphorus 3.9, magnesium 2.2, and TSH 1.283.    Calcium supplementation 1200 mg daily  Vitamin D supplementation at least 1000 units daily  Outpatient clinic follow-up for discussion of osteoporosis treatment options -if renal function improves, could be a candidate for IV Reclast  Outpatient follow-up DXA scan  Sacral fracture (HCC)  Management per primary  Closed T1 fracture (HCC)  Noted on imaging - of indeterminate age  Management per primary team  Vitamin D deficiency  Documented history of vitamin D deficiency  Unclear if currently supplementing vitamin D, however, most recent vitamin D 25 level appropriate at 49.7  If supplementing, continue with vitamin D supplementation  Hypothyroidism  Biochemically euthyroid  Continue home regimen of levothyroxine 125 mcg  Ambulatory dysfunction  PT/OT eval and treat  Fall precautions  Balance training  Endocrinology service will follow.    History of Present Illness   Marsha Oliva is a 79 y.o. female history of hypothyroidism, vitamin D deficiency, CKD stage III, ambulatory dysfunction, and cognitive impairment who presents to hospital after a fall.  Evaluation noted an acute sacral fracture at S4.  Endocrinology is consulted as per own the bone program.    Patient confused and  "unable to provide history.  No family available at bedside.  History obtained per chart review.  With a prior history of osteoporosis.  Last DXA scan from 2021 showed normal bone density.    Review of Systems  Patient's prior history (PMH, PSH, SH, FH, etc) not obtainable due to Altered mental status    Objective :  Temp:  [97.7 °F (36.5 °C)-100.9 °F (38.3 °C)] 100.1 °F (37.8 °C)  HR:  [] 146  BP: ()/() 105/70  Resp:  [16-36] 18  SpO2:  [81 %-99 %] 92 %  O2 Device: Mid flow nasal cannula  Nasal Cannula O2 Flow Rate (L/min):  [4 L/min-12 L/min] 6 L/min    Physical Exam    Lab Results: I have reviewed the following results:CBC/BMP:   .     12/23/24  0441 12/23/24  1114   WBC  --  14.11*   HGB  --  9.7*   HCT  --  32.7*   PLT  --  225   SODIUM 139  --    K 5.1  --      --    CO2 22  --    BUN 14  --    CREATININE 1.09  --    GLUC 64*  --    MG 2.2  --    PHOS 3.9  --     , Vitamins B1, B6, B12, A, and D:   No results found for: \"B1\", \"THYROGLB\", \"DEEAJWJS14\", \"MKXA04GDKKOR\"  , TSH:   Results from last 7 days   Lab Units 12/22/24  1605   TSH 3RD GENERATON uIU/mL 0.853     Lab Results   Component Value Date    PTH 41.1 12/22/2024         Imaging Results Review: I reviewed radiology reports from this admission including: CT   and xray(s).  DXA scan from 2021.  Other Study Results Review: No additional pertinent studies reviewed.      Spencer Guillen MD  Endocrinology fellow, PGY-4    "

## 2024-12-23 NOTE — PROGRESS NOTES
Progress Note - Orthopedics   Name: Marsha Oliva 79 y.o. female I MRN: [id number]  [facility] I Date of Admission: 12/22/2024   Date of Service: 12/23/2024 I Hospital Day: 1    Assessment & Plan  Repeated falls    Atrial fibrillation, chronic (HCC)    Chronic diastolic CHF (congestive heart failure) ([facility])  Wt Readings from Last 3 Encounters:   12/22/24 50.8 kg (112 lb)   10/24/24 51.3 kg (113 lb)   10/07/24 51.3 kg (113 lb 1.5 oz)             Depression with anxiety    Hypothyroidism    Mixed hyperlipidemia    Severe mitral regurgitation    ILD (interstitial lung disease) ([facility])    Apraxia    S/P cervical spinal fusion    Closed T1 fracture ([facility])    Sacral fracture ([facility])  79 y.o.female with an isolated sacral fracture at the level of S4 without associated specific neurologic deficits.  She has a known history of osteoarthritis of the left knee, and imaging does not demonstrate any new fracture, dislocation, or other acute osseous abnormality.    Weightbearing as tolerated bilateral lower extremities  PT/OT  Pain control  DVT ppx: May continue home ASA 81 mg when cleared by primary team.  Medical management per primary team  Metabolic osteopenic/osteoporotic prophylactic workup ordered  Dispo planning per primary  May follow up in ortho trauma clinic in 4 weeks.  Recommend neurology consult for possible neurologic etiology of recurrent falls.      Please contact the SecureChat role for the Orthopedics service with any questions/concerns.    History of Present Illness   79 y.o. female Patient appears preoccupied and is moving limbs spontaneously in bed. Responds with incomprehensible speech in response to questions.    Objective      Temp:  [97.7 °F (36.5 °C)-100.9 °F (38.3 °C)] 98.1 °F (36.7 °C)  HR:  [] 124  BP: ()/() 125/68  Resp:  [18-38] 18  SpO2:  [81 %-97 %] 95 %  O2 Device: Mid flow nasal cannula  Nasal Cannula O2 Flow Rate (L/min):  [4 L/min-12 L/min] 12 L/min  O2 Device: Mid  flow nasal cannula          I/O last 24 hours:  In: 1284.8 [I.V.:784.8; IV Piggyback:500]  Out: 1150 [Urine:1150]    Physical Exam   Musculoskeletal: Bilateral Lower Extremity  Physical exam limited by patient mental status today.  Sensory exam limited due to patient mental status  Patient observed spontaneously flexing/extending knees and plantarflexing/dorsiflexing ankle bilaterally.  Digits warm well perfused with brisk cap refill        Lab Results: I have reviewed the following results:  Recent Labs     12/22/24  0440 12/22/24  0947 12/22/24  1350 12/22/24  1605 12/23/24  0441   WBC 7.60  --   --   --   --    HGB 11.5 10.0* 10.5* 10.1*  --    HCT 37.8 32.4* 34.8 33.4*  --      --   --   --   --    BUN 14  --   --  12 14   CREATININE 1.16  --   --  1.03 1.09   PTT 41*  --   --   --   --    INR 1.05  --   --   --   --    ESR  --   --  45*  --   --      Blood Culture:    Lab Results   Component Value Date    BLOODCX No Growth After 5 Days. 12/23/2023    BLOODCX No Growth After 5 Days. 12/23/2023     Wound Culture:   Lab Results   Component Value Date    WOUNDCULT (A) 12/24/2023     4+ Growth of Methicillin Resistant Staphylococcus aureus

## 2024-12-23 NOTE — WOUND OSTOMY CARE
Consult Note - Wound   Marsha Oliva 79 y.o. female MRN: [id number]  Unit/Bed#: [facility] 632-01 Encounter: [id number]        History and Present Illness:  Patient is a 80 yo female that was admitted to [facility] for treatment of repeated falls. Patient has a PMH of hypotension. Patient is a max assist for turning and repositioning. Posey waist restraint and b/l wrist restraints are in place.  Patient is incontinent of bowel and bladder. On assessment, patient is seen lying on regular mattress. P-500 low air loss mattress ordered for patient    Wound Care was consulted for multiples wounds     Assessment Findings:   B/L heels are dry intact and alayna with no skin loss or wounds present. Recommend preventative foam dressings and proper offloading/ repositioning.      B/L sacro-buttocks is dry, intact, pink in color and blanches. No skin loss or wounds present. Recommend preventative hydragaurd to area due to incontinence.     POA Right Upper Back Evolving DTPI: scattered, irregular in shape areas of partial thickness skin loss measured together. Wound bed with light purple non-blanchable tissue. Carol Ann-wounds are fragile. Scant serosanguineous drainage noted. Recommend a foam dressing to the area for treatment. Deep Tissue Pressure Injury wounds have the potential to evolve into unstageable, stage III or stage IV wounds.    Left Posterior Wrist Skin Tear/Abrasion: irregular in shape area of partial thickness skin loss. Wound bed is red in color with a fragile carol ann-wound. Scant sanguinous drainage noted. Recommend dermagran and DSD.   Right Posterior Forearm Skin Tear/Abrasions: scattered areas of partial thickness skin loss measured together. Wound bed is pink in color. Carol Ann-wound is fragile. Scant sanguinous drainage noted. Recommend dermagran and DSD to the area.   Left Abdomen/Pannus Fold MASD/ITD: linear in  shape area of partial thickness skin loss located along skin fold. Wound bed is red in color and bleeding.  Yolette-wound is fragile. Small sanguinous drainage noted. Recommend calcium alginate rope to the area.   Left Posterior Arm Skin Tear/Abrasion: irregular in shape area of partial thickness skin loss. Skin flap is fragile and covers 90% of wound bed. Wound bed is red in color and bleeding. Yolette-wound is fragile.scant sanguinous drainage noted. Recommend dermagran and DSD.     No induration, fluctuance, odor, warmth/temperature differences, redness, or purulence noted to the above noted wounds and skin areas assessed. New dressings applied per orders listed below. Patient tolerated well- no s/s of non-verbal pain or discomfort observed during the encounter. Bedside nurse aware of plan of care. See flow sheets for more detailed assessment findings.      Orders listed below and wound care will continue to follow, call or Secure Chat Message with questions.     Skin Care Plan:  1-B/L Arm/Wrist Wounds: Cleanse with NSS and pat dry. Apply dermagran to wound beds, cover with ABDs, and secure with keisha wraps. Change every other day or PRN.   2-Turn/reposition q2h or when medically stable for pressure re-distribution on skin.  3-Elevate heels to offload pressure.  4-Moisturize skin daily with skin nourishing cream  5-Ehob cushion in chair when out of bed.  6-Preventative Hydraguard to bilateral sacro-buttocks BID and PRN.   7-P-500 Low Air Loss Mattress ordered for patient   8-Cleanse B/L Heels with soap and water. Pat dry. Apply Silicone Border Foam (Mepilex) to areas. Brad with P for Prevention and change every 3 days or PRN soilage/displacement. Peel back and inspect Q-shift.  9-Right Upper Back Wound: Cleanse with NSS and pat dry. Apply a silicone bordered foam dressing to the area. Brad with T for Treatment and change every other day or PRN.  10- Apply calcium alginate rope to left abdomen/pannus skin fold daily or PRN.         Wounds:  Wound 12/22/24 Arm Left;Posterior (Active)   Wound Image   12/23/24 5014   Wound  Description Beefy red;Bleeding 12/23/24 1600   Yolette-wound Assessment Fragile 12/23/24 1600   Wound Length (cm) 0.3 cm 12/23/24 1354   Wound Width (cm) 5 cm 12/23/24 1354   Wound Depth (cm) 0.1 cm 12/23/24 1354   Wound Surface Area (cm^2) 1.5 cm^2 12/23/24 1354   Wound Volume (cm^3) 0.15 cm^3 12/23/24 1354   Calculated Wound Volume (cm^3) 0.15 cm^3 12/23/24 1354   Drainage Amount Scant 12/23/24 1600   Drainage Description Sanguineous 12/23/24 1600   Non-staged Wound Description Partial thickness 12/23/24 1600   Treatments Cleansed;Irrigation with NSS;Site care 12/23/24 1600   Dressing Dermagran gauze;ABD;Dry dressing 12/23/24 1600   Dressing Changed Changed 12/23/24 1600   Patient Tolerance Tolerated well 12/23/24 1600   Dressing Status Dry;Intact;Clean 12/23/24 1600       Wound 12/22/24 Buttocks (Active)   Wound Image   12/23/24 1359   Wound Description Intact 12/23/24 1359   Yolette-wound Assessment Intact 12/23/24 1359   Wound Length (cm) 0 cm 12/23/24 1359   Wound Width (cm) 0 cm 12/23/24 1359   Wound Depth (cm) 0 cm 12/23/24 1359   Wound Surface Area (cm^2) 0 cm^2 12/23/24 1359   Wound Volume (cm^3) 0 cm^3 12/23/24 1359   Calculated Wound Volume (cm^3) 0 cm^3 12/23/24 1359   Drainage Amount None 12/23/24 1359   Non-staged Wound Description Not applicable 12/23/24 1359   Treatments Cleansed;Site care 12/23/24 1359   Dressing Moisture barrier 12/23/24 1359   Dressing Changed New 12/23/24 1359   Patient Tolerance Tolerated well 12/23/24 1359   Dressing Status Intact 12/23/24 1359       Wound 12/22/24 Pressure Injury Back Superior;Right (Active)   Wound Image   12/23/24 1400   Wound Description Non-blanchable erythema;Light purple 12/23/24 1400   Pressure Injury Stage DTPI 12/23/24 1400   Yolette-wound Assessment Fragile 12/23/24 1400   Wound Length (cm) 7 cm 12/23/24 1400   Wound Width (cm) 5 cm 12/23/24 1400   Wound Depth (cm) 0.1 cm 12/23/24 1400   Wound Surface Area (cm^2) 35 cm^2 12/23/24 1400   Wound Volume (cm^3)  3.5 cm^3 12/23/24 1400   Calculated Wound Volume (cm^3) 3.5 cm^3 12/23/24 1400   Drainage Amount Scant 12/23/24 1400   Drainage Description Serosanguineous 12/23/24 1400   Non-staged Wound Description Partial thickness 12/23/24 1400   Treatments Cleansed;Site care;Irrigation with NSS 12/23/24 1400   Dressing Foam, Silicon (eg. Allevyn, etc) 12/23/24 1400   Dressing Changed New 12/23/24 1400   Patient Tolerance Tolerated well 12/23/24 1400   Dressing Status Clean;Dry;Intact 12/23/24 1400       Wound 12/22/24 Wrist Left;Posterior (Active)   Wound Image   12/23/24 1353   Wound Description Beefy red 12/23/24 1400   Yolette-wound Assessment Fragile 12/23/24 1400   Wound Length (cm) 1.2 cm 12/23/24 1353   Wound Width (cm) 0.8 cm 12/23/24 1353   Wound Depth (cm) 0.1 cm 12/23/24 1353   Wound Surface Area (cm^2) 0.96 cm^2 12/23/24 1353   Wound Volume (cm^3) 0.096 cm^3 12/23/24 1353   Calculated Wound Volume (cm^3) 0.1 cm^3 12/23/24 1353   Drainage Amount Scant 12/23/24 1400   Drainage Description Sanguineous 12/23/24 1400   Non-staged Wound Description Partial thickness 12/23/24 1400   Treatments Cleansed;Irrigation with NSS;Site care 12/23/24 1400   Dressing Dermagran gauze;ABD;Dry dressing 12/23/24 1400   Dressing Changed New 12/23/24 1400   Patient Tolerance Tolerated well 12/23/24 1400   Dressing Status Clean;Intact;Dry 12/23/24 1400       Wound 12/22/24 Arm Posterior;Right (Active)   Wound Image   12/23/24 1357   Wound Description [place] 12/23/24 1600   Yolette-wound Assessment Fragile 12/23/24 1600   Wound Length (cm) 10 cm 12/23/24 1600   Wound Width (cm) 1 cm 12/23/24 1600   Wound Depth (cm) 0.1 cm 12/23/24 1600   Wound Surface Area (cm^2) 10 cm^2 12/23/24 1600   Wound Volume (cm^3) 1 cm^3 12/23/24 1600   Calculated Wound Volume (cm^3) 1 cm^3 12/23/24 1600   Drainage Amount Scant 12/23/24 1600   Drainage Description Sanguineous 12/23/24 1600   Non-staged Wound Description Partial thickness 12/23/24 1600   Treatments  Cleansed;Irrigation with NSS;Site care 12/23/24 1600   Dressing Dermagran gauze;ABD;Dry dressing 12/23/24 1600   Dressing Changed Changed 12/23/24 1600   Patient Tolerance Tolerated well 12/23/24 1600   Dressing Status Dry;Intact;Clean 12/23/24 1600       Wound 12/22/24 Thigh Anterior;Left;Proximal (Active)   Wound Image   12/23/24 1350   Wound Description Beefy red;Bleeding 12/23/24 1600   Yolette-wound Assessment Fragile 12/23/24 1600   Wound Length (cm) 0.3 cm 12/23/24 1350   Wound Width (cm) 4.5 cm 12/23/24 1350   Wound Depth (cm) 0.1 cm 12/23/24 1350   Wound Surface Area (cm^2) 1.35 cm^2 12/23/24 1350   Wound Volume (cm^3) 0.135 cm^3 12/23/24 1350   Calculated Wound Volume (cm^3) 0.14 cm^3 12/23/24 1350   Drainage Amount Small 12/23/24 1600   Drainage Description Sanguineous 12/23/24 1600   Non-staged Wound Description Partial thickness 12/23/24 1600   Treatments Cleansed;Site care;Irrigation with NSS 12/23/24 1600   Dressing Calcium Alginate 12/23/24 1600   Dressing Changed New 12/23/24 1600   Patient Tolerance Tolerated well 12/23/24 1600   Dressing Status Clean;Dry;Intact 12/23/24 1600               Constance Martinez RN, BSN, CWOCN

## 2024-12-23 NOTE — ASSESSMENT & PLAN NOTE
79 y.o.female with an isolated sacral fracture at the level of S4 without associated specific neurologic deficits.  She has a known history of osteoarthritis of the left knee, and imaging does not demonstrate any new fracture, dislocation, or other acute osseous abnormality.    Weightbearing as tolerated bilateral lower extremities  PT/OT  Pain control  DVT ppx: May continue home ASA 81 mg when cleared by primary team.  Medical management per primary team  Metabolic osteopenic/osteoporotic prophylactic workup ordered  Dispo planning per primary  May follow up in ortho trauma clinic in 4 weeks.  Recommend neurology consult for possible neurologic etiology of recurrent falls.

## 2024-12-23 NOTE — ASSESSMENT & PLAN NOTE
Found to be in A-fib with RVR on presentation at [facility], started on Cardizem drip  -Continue Cardizem drip

## 2024-12-23 NOTE — ASSESSMENT & PLAN NOTE
79-year-old female presenting due to multiple falls.  Transferred from [facility] due to closed T1 fracture, sacral fracture with small hematoma and some active extravasation.  GCS 15. A&Ox3.  Hemodynamically stable on 2 L NC.  No pain at this time.  No motor or sensory defects.  On presentation found to be in A-fib with RVR, started on Cardizem drip.    Plan  Admit as stepdown level 2  NPO  IV fluids  Every 4 hours H&H, low threshold for IR consult if hemoglobin downtrends or drops drastically  As needed pain and nausea control  Ortho consult  Neurosurgery consult  Encourage I-S

## 2024-12-24 LAB
ANION GAP SERPL CALCULATED.3IONS-SCNC: 11 MMOL/L (ref 4–13)
BUN SERPL-MCNC: 19 MG/DL (ref 5–25)
CALCIUM SERPL-MCNC: 8.5 MG/DL (ref 8.4–10.2)
CHLORIDE SERPL-SCNC: 108 MMOL/L (ref 96–108)
CO2 SERPL-SCNC: 24 MMOL/L (ref 21–32)
CREAT SERPL-MCNC: 0.96 MG/DL (ref 0.6–1.3)
ERYTHROCYTE [DISTWIDTH] IN BLOOD BY AUTOMATED COUNT: 15.7 % (ref 11.6–15.1)
GFR SERPL CREATININE-BSD FRML MDRD: 56 ML/MIN/1.73SQ M
GLUCOSE SERPL-MCNC: 112 MG/DL (ref 65–140)
GLUCOSE SERPL-MCNC: 51 MG/DL (ref 65–140)
GLUCOSE SERPL-MCNC: 61 MG/DL (ref 65–140)
GLUCOSE SERPL-MCNC: 64 MG/DL (ref 65–140)
GLUCOSE SERPL-MCNC: 67 MG/DL (ref 65–140)
GLUCOSE SERPL-MCNC: 77 MG/DL (ref 65–140)
GLUCOSE SERPL-MCNC: 95 MG/DL (ref 65–140)
HCT VFR BLD AUTO: 30.3 % (ref 34.8–46.1)
HGB BLD-MCNC: 8.9 G/DL (ref 11.5–15.4)
MCH RBC QN AUTO: 27.7 PG (ref 26.8–34.3)
MCHC RBC AUTO-ENTMCNC: 29.4 G/DL (ref 31.4–37.4)
MCV RBC AUTO: 94 FL (ref 82–98)
MRSA NOSE QL CULT: NORMAL
PLATELET # BLD AUTO: 173 THOUSANDS/UL (ref 149–390)
PMV BLD AUTO: 8.5 FL (ref 8.9–12.7)
POTASSIUM SERPL-SCNC: 4.4 MMOL/L (ref 3.5–5.3)
RBC # BLD AUTO: 3.21 MILLION/UL (ref 3.81–5.12)
SODIUM SERPL-SCNC: 143 MMOL/L (ref 135–147)
WBC # BLD AUTO: 9.37 THOUSAND/UL (ref 4.31–10.16)

## 2024-12-24 PROCEDURE — 85027 COMPLETE CBC AUTOMATED: CPT | Performed by: STUDENT IN AN ORGANIZED HEALTH CARE EDUCATION/TRAINING PROGRAM

## 2024-12-24 PROCEDURE — 99232 SBSQ HOSP IP/OBS MODERATE 35: CPT | Performed by: SURGERY

## 2024-12-24 PROCEDURE — 97167 OT EVAL HIGH COMPLEX 60 MIN: CPT

## 2024-12-24 PROCEDURE — 92610 EVALUATE SWALLOWING FUNCTION: CPT

## 2024-12-24 PROCEDURE — 97163 PT EVAL HIGH COMPLEX 45 MIN: CPT

## 2024-12-24 PROCEDURE — 82948 REAGENT STRIP/BLOOD GLUCOSE: CPT

## 2024-12-24 PROCEDURE — 99232 SBSQ HOSP IP/OBS MODERATE 35: CPT | Performed by: INTERNAL MEDICINE

## 2024-12-24 PROCEDURE — 80048 BASIC METABOLIC PNL TOTAL CA: CPT | Performed by: STUDENT IN AN ORGANIZED HEALTH CARE EDUCATION/TRAINING PROGRAM

## 2024-12-24 RX ORDER — DEXTROSE, SODIUM CHLORIDE, SODIUM LACTATE, POTASSIUM CHLORIDE, AND CALCIUM CHLORIDE 5; .6; .31; .03; .02 G/100ML; G/100ML; G/100ML; G/100ML; G/100ML
50 INJECTION, SOLUTION INTRAVENOUS CONTINUOUS
Status: DISCONTINUED | OUTPATIENT
Start: 2024-12-24 | End: 2024-12-25

## 2024-12-24 RX ORDER — DEXTROSE MONOHYDRATE 25 G/50ML
25 INJECTION, SOLUTION INTRAVENOUS ONCE AS NEEDED
Status: COMPLETED | OUTPATIENT
Start: 2024-12-24 | End: 2024-12-24

## 2024-12-24 RX ORDER — SODIUM CHLORIDE, SODIUM GLUCONATE, SODIUM ACETATE, POTASSIUM CHLORIDE, MAGNESIUM CHLORIDE, SODIUM PHOSPHATE, DIBASIC, AND POTASSIUM PHOSPHATE .53; .5; .37; .037; .03; .012; .00082 G/100ML; G/100ML; G/100ML; G/100ML; G/100ML; G/100ML; G/100ML
500 INJECTION, SOLUTION INTRAVENOUS ONCE
Status: COMPLETED | OUTPATIENT
Start: 2024-12-24 | End: 2024-12-24

## 2024-12-24 RX ADMIN — QUETIAPINE FUMARATE 25 MG: 25 TABLET ORAL at 21:15

## 2024-12-24 RX ADMIN — SODIUM CHLORIDE, SODIUM GLUCONATE, SODIUM ACETATE, POTASSIUM CHLORIDE, MAGNESIUM CHLORIDE, SODIUM PHOSPHATE, DIBASIC, AND POTASSIUM PHOSPHATE 50 ML/HR: .53; .5; .37; .037; .03; .012; .00082 INJECTION, SOLUTION INTRAVENOUS at 00:00

## 2024-12-24 RX ADMIN — DIGOXIN 125 MCG: 125 TABLET ORAL at 13:49

## 2024-12-24 RX ADMIN — DEXTROSE, SODIUM CHLORIDE, SODIUM LACTATE, POTASSIUM CHLORIDE, AND CALCIUM CHLORIDE 50 ML/HR: 5; .6; .31; .03; .02 INJECTION, SOLUTION INTRAVENOUS at 08:20

## 2024-12-24 RX ADMIN — DOCUSATE SODIUM 100 MG: 100 CAPSULE, LIQUID FILLED ORAL at 13:49

## 2024-12-24 RX ADMIN — ENOXAPARIN SODIUM 30 MG: 30 INJECTION SUBCUTANEOUS at 21:15

## 2024-12-24 RX ADMIN — SERTRALINE 100 MG: 100 TABLET, FILM COATED ORAL at 13:49

## 2024-12-24 RX ADMIN — DEXTROSE MONOHYDRATE 25 ML: 25 INJECTION, SOLUTION INTRAVENOUS at 08:57

## 2024-12-24 RX ADMIN — ATORVASTATIN CALCIUM 20 MG: 20 TABLET, FILM COATED ORAL at 13:49

## 2024-12-24 RX ADMIN — ENOXAPARIN SODIUM 30 MG: 30 INJECTION SUBCUTANEOUS at 08:28

## 2024-12-24 RX ADMIN — SODIUM CHLORIDE, SODIUM GLUCONATE, SODIUM ACETATE, POTASSIUM CHLORIDE, MAGNESIUM CHLORIDE, SODIUM PHOSPHATE, DIBASIC, AND POTASSIUM PHOSPHATE 500 ML: .53; .5; .37; .037; .03; .012; .00082 INJECTION, SOLUTION INTRAVENOUS at 03:01

## 2024-12-24 RX ADMIN — ENOXAPARIN SODIUM 30 MG: 30 INJECTION SUBCUTANEOUS at 01:16

## 2024-12-24 NOTE — PLAN OF CARE
Problem: PHYSICAL THERAPY ADULT  Goal: Performs mobility at highest level of function for planned discharge setting.  See evaluation for individualized goals.  Description: Treatment/Interventions: Functional transfer training, LE strengthening/ROM, Therapeutic exercise, Endurance training, Patient/family training, Equipment eval/education, Bed mobility, Spoke to nursing, Continued evaluation, OT          See flowsheet documentation for full assessment, interventions and recommendations.  Note: Prognosis: Good  Problem List: Decreased strength, Decreased range of motion, Decreased endurance, Impaired balance, Decreased mobility, Decreased cognition, Impaired judgement, Decreased safety awareness  Assessment: Pt is 79 y.o. female seen for PT evaluation s/p admit to [facility] on 12/22/2024. Two pt identifiers were used to confirm. Pt presented s/p fall.  Pt was admitted with a primary dx of: closed T1fx, sacral fx, and other active problems including A fib, hx of cervical fusion on 9/5/24, chronic diastolic CHF, depression with anxiety, hypothyroidism, mixed HLD, severe mitral regurgitation, interstitial lug disease, apraxia, vitamin D deficiency, ambulatory dysfunction, osteoporosis. Neurosx recommending no brace. Ortho recommending WBAT to b/l LE.  PT now consulted for assessment of mobility and d/c needs. Pt with OOB to chair orders.  Pts current co morbidities affecting treatment include:  has a past medical history of Disease of thyroid gland, Hypotension, and Supratherapeutic INR. . Pts current clinical presentation is Unstable/ Unpredictable (high complexity) due to Ongoing medical management for primary dx, Decreased activity tolerance compared to baseline, Fall risk, Increased assistance needed from caregiver at current time, Cog status, Spinal precautions at current time, Continuous pulse oximetry monitoring   .  Upon evaluation, pt currently is requiring Mod Ax2 for bed mobility; Max Ax2 for  transfers.  Pt presents at PT eval functioning below baseline and currently w/ overall mobility deficits 2* to: BLE weakness, decreased ROM, impaired balance, decreased endurance, decreased activity tolerance compared to baseline, decreased safety awareness, impaired judgement, fall risk, decreased cognition, spinal precautions. Pt currently at a fall risk 2* to impairments listed above.  Based on the aforementioned PT evaluation, pt will continue to benefit from skilled Acute PT interventions to address stated impairments; to maximize functional mobility; for ongoing pt/ family training; and DME needs. At conclusion of PT session pt returned BTB and bed alarm engaged with phone and call bell within reach and posey belt and b/l wrist restraints attached. PT is currently recommending Level II resource intensity. PT will continue to follow during hospital stay.        Rehab Resource Intensity Level, PT: II (Moderate Resource Intensity)    See flowsheet documentation for full assessment.

## 2024-12-24 NOTE — SPEECH THERAPY NOTE
"Speech-Language Pathology Bedside Swallow Evaluation      Patient Name: Marsha Oliva    Today's Date: 12/24/2024     Problem List  Principal Problem:    Repeated falls  Active Problems:    Atrial fibrillation, chronic (HCC)    Chronic diastolic CHF (congestive heart failure) (HCC)    Hypothyroidism    Mixed hyperlipidemia    Severe mitral regurgitation    Vitamin D deficiency    ILD (interstitial lung disease) (HCC)    Apraxia    Ambulatory dysfunction    S/P cervical spinal fusion    Closed T1 fracture (HCC)    Sacral fracture (HCC)    Osteoporosis      Past Medical History  Past Medical History:   Diagnosis Date    Disease of thyroid gland     Hypotension     Supratherapeutic INR 6/2/2022       Past Surgical History  Past Surgical History:   Procedure Laterality Date    CERVICAL FUSION Bilateral 9/5/2024    Procedure: navigated posterior fusion C1-C3;  Surgeon: Eduardo Dewitt MD;  [facility]: [facility];  Service: Neurosurgery    HYSTERECTOMY         Summary   Pt presented with s/s suggestive of mild oral and suspected mild pharyngeal dysphagia. The patient is assessed with puree solids with honey thick, nectar thick and thin liquids. Oral closure for tsp is reduced, with some \"scraping\" of bolus into oral cavity. No oral residue observed. The patient has cough response with straw sips of nectar thick and thin liquids. She tolerated honey thick liquids via tsp better. Question if lethargy may be causing coughing with liquids. Patient does participate in conversation, but keeps eyes closed for session.      Risk/s for Aspiration: mod     Recommended Diet: puree/level 1 diet and honey thick liquids   Recommended Form of Meds: crushed with puree   Aspiration precautions and swallowing strategies: upright posture and liquids by teaspoon only  Other Recommendations: Continue frequent oral care; consider VBS pending progress    Current Medical Status  Chief Complaint: \" I am having no pain\"  Mechanism:Fall  "   Marsha Oliva is a 79 y.o. female who presents due to multiple falls.  Transferred from [facility] due to closed T1 fracture and sacral fracture with small hematoma and some active extravasation of contrast.  Patient reports that she loses her balance and falls often.  States she was getting up to get some water when she fell today, unsure if she hit her head, denies LOC.  Denies any pain at this time.  Denies headache, dizziness, changes in vision, chest pain, SOB, abdominal pain, changes in bowel or urinary function.  GCS 15.  Alert and oriented x 3.  Patient has some word finding difficulties but appears to be her baseline.  PMH of A-fib, recent cervical spinal fusion, hypothyroidism, depression, chronic diastolic CHF, interstitial lung disease, apraxia.  Will require close monitoring to determine if intervention is necessary for the active extravasation noted on CT.    Current Precautions:  Fall  Aspiration    Allergies:  No known food allergies  Past medical history:  Please see H&P for details    Special Studies:  Chest xray 12/23/24:   Development of bilateral groundglass opacities, which may reflect pulmonary edema or developing pneumonia.     Social/Education/Vocational Hx:  Pt lives with family    Swallow Information   Current Risks for Dysphagia & Aspiration: decreased alertness  Current Symptoms/Concerns: coughing with po  Current Diet: NPO   Baseline Diet: regular diet and thin liquids    Baseline Assessment   Behavior/Cognition: waxing and waning arousal level  Speech/Language Status: able to participate in basic conversation and able to follow commands inconsistently  Patient Positioning: upright in bed  Pain Status/Interventions/Response to Interventions: No report of or nonverbal indications of pain.     Swallow Mechanism Exam  Facial: symmetrical  Labial: WFL  Lingual:  did not assess  Velum: symmetrical  Mandible: adequate ROM  Dentition:  mostly edentulous. Has dentures, but not in for  "evaluation  Vocal quality:clear/adequate   Respiratory Status: on 4L O2      Consistencies Assessed and Performance   Consistencies Administered: thin liquids, nectar thick, honey thick, and puree  Materials administered included applesauce with honey thick, nectar thick and thin liquids    Oral Stage: mild  Retrieval via tsp is reduced, with occasional \"scraping\" of bolus into oral cavity. Despite this, bolus formation and transfer were functional with no significant oral residue noted.  No overt s/s reduced oral control.    Pharyngeal Stage: mild  Swallow Mechanics:  Swallowing initiation appeared prompt.  Laryngeal rise was palpated and judged to be within functional limits.  Coughing observed with nectar thick and thin liquids via straw.     Esophageal Concerns: none reported    Summary and Recommendations (see above)    Results Reviewed with: patient, RN, and MD     Treatment Recommended: dysphagia therapy      Frequency of treatment: 3-5x week, as able    Patient Stated Goal: none stated     Dysphagia LTG  -Patient will demonstrate safe and effective oral intake (without overt s/s significant oral/pharyngeal dysphagia including s/s penetration or aspiration) for the highest appropriate diet level.     Short Term Goals:  -Pt will tolerate Dysphagia 1/pureed diet and honey thick liquid with no significant s/s oral or pharyngeal dysphagia across 1-3 diagnostic session/s    -Patient will tolerate trials of upgraded food and/or liquid texture with no significant s/s of oral or pharyngeal dysphagia including aspiration across 1-3 diagnostic sessions     -Patient will comply with a Video/Modified Barium Swallow study for more complete assessment of swallowing anatomy/physiology/aspiration risk and to assess efficacy of treatment techniques so as to best guide treatment plan    Speech Therapy Prognosis   Prognosis: fair    Prognosis Considerations: medical status and cognitive status        "

## 2024-12-24 NOTE — PLAN OF CARE
Problem: PAIN - ADULT  Goal: Verbalizes/displays adequate comfort level or baseline comfort level  Description: Interventions:  - Encourage patient to monitor pain and request assistance  - Assess pain using appropriate pain scale  - Administer analgesics based on type and severity of pain and evaluate response  - Implement non-pharmacological measures as appropriate and evaluate response  - Consider cultural and social influences on pain and pain management  - Notify physician/advanced practitioner if interventions unsuccessful or patient reports new pain  Outcome: Progressing     Problem: INFECTION - ADULT  Goal: Absence or prevention of progression during hospitalization  Description: INTERVENTIONS:  - Assess and monitor for signs and symptoms of infection  - Monitor lab/diagnostic results  - Monitor all insertion sites, i.e. indwelling lines, tubes, and drains  - Monitor endotracheal if appropriate and nasal secretions for changes in amount and color  - [place] appropriate cooling/warming therapies per order  - Administer medications as ordered  - Instruct and encourage patient and family to use good hand hygiene technique  - Identify and instruct in appropriate isolation precautions for identified infection/condition  Outcome: Progressing     Problem: Nutrition/Hydration-ADULT  Goal: Nutrient/Hydration intake appropriate for improving, restoring or maintaining nutritional needs  Description: Monitor and assess patient's nutrition/hydration status for malnutrition. Collaborate with interdisciplinary team and initiate plan and interventions as ordered.  Monitor patient's weight and dietary intake as ordered or per policy. Utilize nutrition screening tool and intervene as necessary. Determine patient's food preferences and provide high-protein, high-caloric foods as appropriate.     INTERVENTIONS:  - Monitor oral intake, urinary output, labs, and treatment plans  - Assess nutrition and hydration status and  recommend course of action  - Evaluate amount of meals eaten  - Assist patient with eating if necessary   - Allow adequate time for meals  - Recommend/ encourage appropriate diets, oral nutritional supplements, and vitamin/mineral supplements  - Order, calculate, and assess calorie counts as needed  - Recommend, monitor, and adjust tube feedings and TPN/PPN based on assessed needs  - Assess need for intravenous fluids  - Provide specific nutrition/hydration education as appropriate  - Include patient/family/caregiver in decisions related to nutrition  Outcome: Progressing     Problem: MUSCULOSKELETAL - ADULT  Goal: Maintain or return mobility to safest level of function  Description: INTERVENTIONS:  - Assess patient's ability to carry out ADLs; assess patient's baseline for ADL function and identify physical deficits which impact ability to perform ADLs (bathing, care of mouth/teeth, toileting, grooming, dressing, etc.)  - Assess/evaluate cause of self-care deficits   - Assess range of motion  - Assess patient's mobility  - Assess patient's need for assistive devices and provide as appropriate  - Encourage maximum independence but intervene and supervise when necessary  - Involve family in performance of ADLs  - Assess for home care needs following discharge   - Consider OT consult to assist with ADL evaluation and planning for discharge  - Provide patient education as appropriate  Outcome: Progressing     Problem: CARDIOVASCULAR - ADULT  Goal: Maintains optimal cardiac output and hemodynamic stability  Description: INTERVENTIONS:  - Monitor I/O, vital signs and rhythm  - Monitor for S/S and trends of decreased cardiac output  - Administer and titrate ordered vasoactive medications to optimize hemodynamic stability  - Assess quality of pulses, skin color and temperature  - Assess for signs of decreased coronary artery perfusion  - Instruct patient to report change in severity of symptoms  Outcome: Progressing

## 2024-12-24 NOTE — UTILIZATION REVIEW
NOTIFICATION OF INPATIENT ADMISSION   AUTHORIZATION REQUEST   SERVICING FACILITY:   [facility]  Address: [address] [place] [address]  Tax ID: [id number]  NPI: [id number] ATTENDING PROVIDER:  Attending Name and NPI#: Manjinder Farooq Md [[id number]]  Address: [address] [place] [address]  Phone: [phone]   ADMISSION INFORMATION:  Place of Service: Inpatient [facility]  Place of Service Code: 21  Inpatient Admission Date/Time: 12/22/24 10:11 AM  Discharge Date/Time: No discharge date for patient encounter.  Admitting Diagnosis Code/Description:  Hallucinations [R44.3]  Multiple fractures [T07.XXXA]  Impaired mobility and activities of daily living [Z74.09, Z78.9]  Closed fracture of first thoracic vertebra, unspecified fracture morphology, initial encounter ([facility]) [S22.019A]  Closed fracture of sacrum, unspecified portion of sacrum, initial encounter ([facility]) [S32.10XA]  Closed wedge compression fracture of T1 vertebra, initial encounter ([facility]) [S22.010A]     UTILIZATION REVIEW CONTACT:  Gibran Sanchez Utilization   Network Utilization Review Department  Phone: [phone]  Fax: [phone]  Email: [email or web address]  Contact for approvals/pending authorizations, clinical reviews, and discharge.     PHYSICIAN ADVISORY SERVICES:  Medical Necessity Denial & Xtmo-[email or web address]-[id number] Review  Phone: [phone]  Fax: [phone]  Email: [email or web address]     DISCHARGE SUPPORT TEAM:  For Patients Discharge Needs & Updates  Phone: [phone] opt. 2 Fax: [phone]  Email: [email or web address]

## 2024-12-24 NOTE — OCCUPATIONAL THERAPY NOTE
Occupational Therapy Evaluation     Patient Name: Marsha Oliva  Today's Date: 12/24/2024  Problem List  Principal Problem:    Repeated falls  Active Problems:    Atrial fibrillation, chronic (HCC)    Chronic diastolic CHF (congestive heart failure) (HCC)    Hypothyroidism    Mixed hyperlipidemia    Severe mitral regurgitation    Vitamin D deficiency    ILD (interstitial lung disease) (HCC)    Apraxia    Ambulatory dysfunction    S/P cervical spinal fusion    Closed T1 fracture (HCC)    Sacral fracture (HCC)    Osteoporosis    Past Medical History  Past Medical History:   Diagnosis Date    Disease of thyroid gland     Hypotension     Supratherapeutic INR 6/2/2022     Past Surgical History  Past Surgical History:   Procedure Laterality Date    CERVICAL FUSION Bilateral 9/5/2024    Procedure: navigated posterior fusion C1-C3;  Surgeon: Eduardo Dewitt MD;  [facility];  Service: Neurosurgery    HYSTERECTOMY           12/24/24 0929   OT Last Visit   OT Visit Date 12/24/24   Note Type   Note type Evaluation   Pain Assessment   Pain Assessment Tool FLACC   Patient's Stated Pain Goal No pain   Hospital Pain Intervention(s) Repositioned;Ambulation/increased activity;Emotional support   Pain Rating: FLACC (Rest) - Face 0   Pain Rating: FLACC (Rest) - Legs 0   Pain Rating: FLACC (Rest) - Activity 0   Pain Rating: FLACC (Rest) - Cry 0   Pain Rating: FLACC (Rest) - Consolability 0   Score: FLACC (Rest) 0   Pain Rating: FLACC (Activity) - Face 0   Pain Rating: FLACC (Activity) - Legs 0   Pain Rating: FLACC (Activity) - Activity 0   Pain Rating: FLACC (Activity) - Cry 0   Pain Rating: FLACC (Activity) - Consolability 0   Score: FLACC (Activity) 0   Restrictions/Precautions   Weight Bearing Precautions Per Order Yes   RLE Weight Bearing Per Order WBAT   LLE Weight Bearing Per Order WBAT   Braces or Orthoses   (NO BRACE REQUIRED PER NEUROSX)   Other Precautions Cognitive;Chair Alarm;Bed Alarm;Multiple  "lines;Fall Risk;Pain;Restraints;WBS  (POSEY BELT + B/L WRIST RESTRIANTS.)   Home Living   Type of Home House   Home Layout Two level;Able to live on main level with bedroom/bathroom;Stairs to enter with rails  (3 CRISSY)   Bathroom Shower/Tub Walk-in shower   Bathroom Equipment Shower chair   Home Equipment Walker;Cane;Wheelchair-manual   Additional Comments PT IS A POOR HISTORIAN- INFORMATION OBTAINED FROM PT'S CHART. PT FROM HOME WITH SPOUSE- USES RW VS SPC AT BASELINE   Prior Function   Level of [place] Needs assistance with IADLS;Needs assistance with ADLs   Lives With Spouse   Receives Help From Family   IADLs Family/Friend/Other provides transportation;Family/Friend/Other provides meals;Family/Friend/Other provides medication management   Falls in the last 6 months (S)  5 to 10   Vocational Retired   Lifestyle   Autonomy PT HAS ASSIST WITH ADLS/IADLS AT BASELINE. UNKNOWN LEVEL   Reciprocal Relationships LIVES WITH SPOUSE- UNKNOWN LEVEL OF S/A AVAILABLE .   Service to Others RETIRED   Intrinsic Gratification ENJOYS CROSSWORD PUZZLES   Subjective   Subjective \"HI\"- ONLY VERBAL RESPONSE PT PROVIDED T/O SESSION   ADL   Eating Assistance 2  Maximal Assistance   Grooming Assistance 2  Maximal Assistance   UB Bathing Assistance 2  Maximal Assistance   LB Bathing Assistance 1  Total Assistance   UB Dressing Assistance 2  Maximal Assistance   LB Dressing Assistance 1  Total Assistance   Toileting Assistance  1  Total Assistance   Functional Assistance 1  Total Assistance   Bed Mobility   Supine to Sit 3  Moderate assistance   Additional items Assist x 2;Increased time required;Verbal cues;LE management   Sit to Supine 3  Moderate assistance   Additional items Assist x 2;Increased time required;Verbal cues;LE management   Additional Comments PT ABLE TO MAINTAIN SITTING BALANCE WITH MIN A FOR SHORT PERIODS OF TIME. NOT APPROPRIATE FOR ADDITIONAL OOB ACTIVITY AT THIS TIME 2' OVERALL COG/AROUSAL. RETURNED TO SUPINE WITH " RESTRAINTS ON AND BED ALARM ON   Transfers   Sit to Stand 2  Maximal assistance   Additional items Assist x 2;Increased time required;Verbal cues   Stand to Sit 2  Maximal assistance   Additional items Assist x 2;Increased time required;Verbal cues   Balance   Static Sitting Poor +   Dynamic Sitting Poor   Static Standing Poor -   Activity Tolerance   Activity Tolerance Patient limited by fatigue;Other (Comment)  (COG)   Medical Staff Made Aware PT SEEN FOR CO-EVAL WITH SKILLED PHYSICAL THERAPIST 2' POLY-TRAUAMTIC INJURIES, NEW PRECAUTIONS/LIMITATIONS, AND LIMITED ACTIVITY TOLERANCE WHICH IMPACT PERFORMANCE AND ARE A REGRESSION FROM PT'S BASELINE.   Nurse Made Aware APPROPRIATE TO SEE PER RN.   Cognition   Overall Cognitive Status Impaired   Arousal/Participation Lethargic;Persistent stimuli required   Attention Difficulty attending to directions   Orientation Level Disoriented X4   Memory Decreased recall of precautions;Decreased recall of recent events;Decreased recall of biographical information;Decreased short term memory;Decreased long term memory   Following Commands Follows one step commands inconsistently   Comments PT POORLY RESPONSIVE T/O SESSION. ABLE TO OPEN EYES FOR BRIEF PERIOD OF TIME HOWEVER MOSTLY CLOSED T/O SESSION. PRIMARILY NONVERBAL T/O SESSION. LIMITED INSIGHT/JUDGEMENT/SAFETY AWARENESS. PER CHART, PERIODS OF HALLUCINATIONS AND COMBATIVE BEHAVIORS. CHART REPORTS PT INITIALLY FULLY ORIENTED UPON ARRIVAL. ALARM ON FOR SAFETY.   Assessment   Limitation Decreased ADL status;Decreased Safe judgement during ADL;Decreased cognition;Decreased endurance;Decreased self-care trans;Decreased high-level ADLs;Mood limitation   Prognosis Guarded   Assessment 80 YO Female SEEN FOR INITIAL OCCUPATIONAL THERAPY EVALUATION FOLLOWING TXF FROM [facility]->B S/P MULT FALLS RESULTING IN ACUTE METABOLIC ENCEPHALOPATHY, AGE INDETERMINATE T1 FX AND SACRAL FX. PER NEUROSX, DEFER BRACE AT THIS TIME. PER ORTHO, PT IS WBAT ON  BLE. PROBLEMS LIST/PMH INCLUDES RECENT C1-3 POSTERIOR FUSION ON 9/5/24, Disease of thyroid gland, Hypotension, and Supratherapeutic INR. PER GERIATRICS, PT PREVIOUS COMPLETED MoCA ON 9/24 SCORING 16/30 INDICATING MILD TO MODERATE DEMENTIA LEVEL. PT CURRENTLY IN POSEY BELT RESTRAINTS + B/L WRIST RESTRAINTS.  PT IS A POOR HISTORIAN. INFORMATION OBTAINED FROM PT'S CHART-PT IS FROM HOME WITH SPOUSE WHO ASSISTS WITH ADLS/IADLS AT BASELINE- UNKNOWN LEVEL PROVIDED. PT CURRENTLY REQUIRES OVERALL MAX-TOTAL A WITH ADLS, MOD A X2 WITH BED MOBILITY AND MAX A X2 WITH TRANSFERS. PT IS LIMITED 2' PAIN, FATIGUE, IMPAIRED BALANCE, FALL RISK , LIMITED SAFETY AWARENESS/INSIGHT/JUDGEMENT, DISORIENTATION, ORTHOPEDIC RESTRICTIONS, OVERALL WEAKNESS/DECONDITIONING , INACCESSIBLE HOME ENVIRONMENT, and OVERALL LIMITED ACTIVITY TOLERANCE. PT EDUCATED ON CONTINUE PARTICIPATION IN SELF-CARE/MOBILITY WITH STAFF WHILE IN THE HOSPITAL .The patient's raw score on the AM-PAC Daily Activity Inpatient Short Form is 9. A raw score of less than 19 suggests the patient may benefit from discharge to post-acute rehabilitation services. Please refer to the recommendation of the Occupational Therapist for safe discharge planning.  FROM AN OCCUPATIONAL THERAPY PERSPECTIVE, RECOMMEND LEVEL II RESOURCES UPON D/C. WILL CONT TO FOLLOW TO ADDRESS THE BELOW DESCRIBED GOALS.   Goals   Patient Goals NON EXPRESSED 2' COG   LTG Time Frame 10-14   Long Term Goal #1 SEE BELOW   Plan   Treatment Interventions ADL retraining;Functional transfer training;Endurance training;Cognitive reorientation;Patient/family training;Equipment evaluation/education;Compensatory technique education;Energy conservation;Activityengagement   Goal Expiration Date 01/07/25   OT Frequency 2-3x/wk   Discharge Recommendation   Rehab Resource Intensity Level, OT II (Moderate Resource Intensity)   AM-PAC Daily Activity Inpatient   Lower Body Dressing 1   Bathing 1   Toileting 1   Upper Body Dressing  2   Grooming 2   Eating 2   Daily Activity Raw Score 9   Turning Head Towards Sound 2   Follow Simple Instructions 2   Low Function Daily Activity Raw Score 13   Low Function Daily Activity Standardized Score  23.16   AM-PAC Applied Cognition Inpatient   Following a Speech/Presentation 1   Understanding Ordinary Conversation 2   Taking Medications 1   Remembering Where Things Are Placed or Put Away 1   Remembering List of 4-5 Errands 1   Taking Care of Complicated Tasks 1   Applied Cognition Raw Score 7   Applied Cognition Standardized Score 15.17       OCCUPATIONAL THERAPY GOALS TO BE MET WITHIN 14 DAYS:    -Pt will increase bed mobility to MIN A to participate in functional activities  -Pt will tolerate sitting EOB for ~20 minutes at S level in order to increase participation in self-care tasks.  -Pt will improve functional mobility and transfers to MIN A on/off all surfaces including toileting.  -Pt will increase independence in all ADLS to MIN A in order to lessen caregiver burden.  -Pt will improve activity tolerance to G for 20 min txment sessions   -Pt will complete additional cognitive assessment with F attention to task in order to assist with safe d/c plan.   -Pt will follow 75% simple 1-step commands and be A&O x3 consistently with environmental cues to increase participation in functional activities.   -Caregiver will be 100% attentive during caregiver education/training in order to assist with pt safety upon d/c.    Documentation completed by ANDREA Adrian, OTR/L  MOCA Certified ID# [id number]

## 2024-12-24 NOTE — PLAN OF CARE
Problem: OCCUPATIONAL THERAPY ADULT  Goal: Performs self-care activities at highest level of function for planned discharge setting.  See evaluation for individualized goals.  Description: Treatment Interventions: ADL retraining, Functional transfer training, Endurance training, Cognitive reorientation, Patient/family training, Equipment evaluation/education, Compensatory technique education, Energy conservation, Activityengagement          See flowsheet documentation for full assessment, interventions and recommendations.   Note: Limitation: Decreased ADL status, Decreased Safe judgement during ADL, Decreased cognition, Decreased endurance, Decreased self-care trans, Decreased high-level ADLs, Mood limitation  Prognosis: Guarded  Assessment: 80 YO Female SEEN FOR INITIAL OCCUPATIONAL THERAPY EVALUATION FOLLOWING TXF FROM [facility]->B S/P MULT FALLS RESULTING IN ACUTE METABOLIC ENCEPHALOPATHY, AGE INDETERMINATE T1 FX AND SACRAL FX. PER NEUROSX, DEFER BRACE AT THIS TIME. PER ORTHO, PT IS WBAT ON BLE. PROBLEMS LIST/PMH INCLUDES RECENT C1-3 POSTERIOR FUSION ON 9/5/24, Disease of thyroid gland, Hypotension, and Supratherapeutic INR. PER GERIATRICS, PT PREVIOUS COMPLETED MoCA ON 9/24 SCORING 16/30 INDICATING MILD TO MODERATE DEMENTIA LEVEL. PT CURRENTLY IN POSEY BELT RESTRAINTS + B/L WRIST RESTRAINTS.  PT IS A POOR HISTORIAN. INFORMATION OBTAINED FROM PT'S CHART-PT IS FROM HOME WITH SPOUSE WHO ASSISTS WITH ADLS/IADLS AT BASELINE- UNKNOWN LEVEL PROVIDED. PT CURRENTLY REQUIRES OVERALL MAX-TOTAL A WITH ADLS, MOD A X2 WITH BED MOBILITY AND MAX A X2 WITH TRANSFERS. PT IS LIMITED 2' PAIN, FATIGUE, IMPAIRED BALANCE, FALL RISK , LIMITED SAFETY AWARENESS/INSIGHT/JUDGEMENT, DISORIENTATION, ORTHOPEDIC RESTRICTIONS, OVERALL WEAKNESS/DECONDITIONING , INACCESSIBLE HOME ENVIRONMENT, and OVERALL LIMITED ACTIVITY TOLERANCE. PT EDUCATED ON CONTINUE PARTICIPATION IN SELF-CARE/MOBILITY WITH STAFF WHILE IN THE HOSPITAL .The patient's raw  score on the AM-PAC Daily Activity Inpatient Short Form is 9. A raw score of less than 19 suggests the patient may benefit from discharge to post-acute rehabilitation services. Please refer to the recommendation of the Occupational Therapist for safe discharge planning.  FROM AN OCCUPATIONAL THERAPY PERSPECTIVE, RECOMMEND LEVEL II RESOURCES UPON D/C. WILL CONT TO FOLLOW TO ADDRESS THE BELOW DESCRIBED GOALS.     Rehab Resource Intensity Level, OT: II (Moderate Resource Intensity)

## 2024-12-24 NOTE — ASSESSMENT & PLAN NOTE
Found to be in A-fib with RVR on presentation at [facility], started on Cardizem drip  - Hold cardizem gtt for hypotension  - Continue tele

## 2024-12-24 NOTE — PLAN OF CARE
Problem: PAIN - ADULT  Goal: Verbalizes/displays adequate comfort level or baseline comfort level  Description: Interventions:  - Encourage patient to monitor pain and request assistance  - Assess pain using appropriate pain scale  - Administer analgesics based on type and severity of pain and evaluate response  - Implement non-pharmacological measures as appropriate and evaluate response  - Consider cultural and social influences on pain and pain management  - Notify physician/advanced practitioner if interventions unsuccessful or patient reports new pain  Outcome: Progressing     Problem: INFECTION - ADULT  Goal: Absence or prevention of progression during hospitalization  Description: INTERVENTIONS:  - Assess and monitor for signs and symptoms of infection  - Monitor lab/diagnostic results  - Monitor all insertion sites, i.e. indwelling lines, tubes, and drains  - Monitor endotracheal if appropriate and nasal secretions for changes in amount and color  - [place] appropriate cooling/warming therapies per order  - Administer medications as ordered  - Instruct and encourage patient and family to use good hand hygiene technique  - Identify and instruct in appropriate isolation precautions for identified infection/condition  Outcome: Progressing     Problem: SAFETY ADULT  Goal: Patient will remain free of falls  Description: INTERVENTIONS:  - Educate patient/family on patient safety including physical limitations  - Instruct patient to call for assistance with activity   - Consult OT/PT to assist with strengthening/mobility   - Keep Call bell within reach  - Keep bed low and locked with side rails adjusted as appropriate  - Keep care items and personal belongings within reach  - Initiate and maintain comfort rounds  - Make Fall Risk Sign visible to staff  - Offer Toileting every 2 Hours, in advance of need  - Initiate/Maintain bed alarm  - Obtain necessary fall risk management equipment: bed alarm  - Apply yellow  socks and bracelet for high fall risk patients  - Consider moving patient to room near nurses station  Outcome: Progressing  Goal: Maintain or return to baseline ADL function  Description: INTERVENTIONS:  -  Assess patient's ability to carry out ADLs; assess patient's baseline for ADL function and identify physical deficits which impact ability to perform ADLs (bathing, care of mouth/teeth, toileting, grooming, dressing, etc.)  - Assess/evaluate cause of self-care deficits   - Assess range of motion  - Assess patient's mobility; develop plan if impaired  - Assess patient's need for assistive devices and provide as appropriate  - Encourage maximum independence but intervene and supervise when necessary  - Involve family in performance of ADLs  - Assess for home care needs following discharge   - Consider OT consult to assist with ADL evaluation and planning for discharge  - Provide patient education as appropriate  Outcome: Progressing  Goal: Maintains/Returns to pre admission functional level  Description: INTERVENTIONS:  - Perform AM-PAC 6 Click Basic Mobility/ Daily Activity assessment daily.  - Set and communicate daily mobility goal to care team and patient/family/caregiver.   - Collaborate with rehabilitation services on mobility goals if consulted  - Perform Range of Motion 3 times a day.  - Reposition patient every 2 hours.  - Dangle patient 3 times a day  - Stand patient 3 times a day  - Ambulate patient 3 times a day  - Out of bed to chair 3 times a day   - Out of bed for meals 3 times a day  - Out of bed for toileting  - Record patient progress and toleration of activity level   Outcome: Progressing     Problem: DISCHARGE PLANNING  Goal: Discharge to home or other facility with appropriate resources  Description: INTERVENTIONS:  - Identify barriers to discharge w/patient and caregiver  - Arrange for needed discharge resources and transportation as appropriate  - Identify discharge learning needs (meds,  wound care, etc.)  - Arrange for interpretive services to assist at discharge as needed  - Refer to Case Management Department for coordinating discharge planning if the patient needs post-hospital services based on physician/advanced practitioner order or complex needs related to functional status, cognitive ability, or social support system  Outcome: Progressing     Problem: Knowledge Deficit  Goal: Patient/family/caregiver demonstrates understanding of disease process, treatment plan, medications, and discharge instructions  Description: Complete learning assessment and assess knowledge base.  Interventions:  - Provide teaching at level of understanding  - Provide teaching via preferred learning methods  Outcome: Progressing     Problem: NEUROSENSORY - ADULT  Goal: Achieves stable or improved neurological status  Description: INTERVENTIONS  - Monitor and report changes in neurological status  - Monitor vital signs such as temperature, blood pressure, glucose, and any other labs ordered   - Initiate measures to prevent increased intracranial pressure  - Monitor for seizure activity and implement precautions if appropriate      Outcome: Progressing  Goal: Remains free of injury related to seizures activity  Description: INTERVENTIONS  - Maintain airway, patient safety  and administer oxygen as ordered  - Monitor patient for seizure activity, document and report duration and description of seizure to physician/advanced practitioner  - If seizure occurs,  ensure patient safety during seizure  - Reorient patient post seizure  - Seizure pads on all 4 side rails  - Instruct patient/family to notify RN of any seizure activity including if an aura is experienced  - Instruct patient/family to call for assistance with activity based on nursing assessment  - Administer anti-seizure medications if ordered    Outcome: Progressing  Goal: Achieves maximal functionality and self care  Description: INTERVENTIONS  - Monitor  swallowing and airway patency with patient fatigue and changes in neurological status  - Encourage and assist patient to increase activity and self care.   - Encourage visually impaired, hearing impaired and aphasic patients to use assistive/communication devices  Outcome: Progressing     Problem: CARDIOVASCULAR - ADULT  Goal: Maintains optimal cardiac output and hemodynamic stability  Description: INTERVENTIONS:  - Monitor I/O, vital signs and rhythm  - Monitor for S/S and trends of decreased cardiac output  - Administer and titrate ordered vasoactive medications to optimize hemodynamic stability  - Assess quality of pulses, skin color and temperature  - Assess for signs of decreased coronary artery perfusion  - Instruct patient to report change in severity of symptoms  Outcome: Progressing  Goal: Absence of cardiac dysrhythmias or at baseline rhythm  Description: INTERVENTIONS:  - Continuous cardiac monitoring, vital signs, obtain 12 lead EKG if ordered  - Administer antiarrhythmic and heart rate control medications as ordered  - Monitor electrolytes and administer replacement therapy as ordered  Outcome: Progressing     Problem: RESPIRATORY - ADULT  Goal: Achieves optimal ventilation and oxygenation  Description: INTERVENTIONS:  - Assess for changes in respiratory status  - Assess for changes in mentation and behavior  - Position to facilitate oxygenation and minimize respiratory effort  - Oxygen administered by appropriate delivery if ordered  - Initiate smoking cessation education as indicated  - Encourage broncho-pulmonary hygiene including cough, deep breathe, Incentive Spirometry  - Assess the need for suctioning and aspirate as needed  - Assess and instruct to report SOB or any respiratory difficulty  - Respiratory Therapy support as indicated  Outcome: Progressing     Problem: HEMATOLOGIC - ADULT  Goal: Maintains hematologic stability  Description: INTERVENTIONS  - Assess for signs and symptoms of bleeding  or hemorrhage  - Monitor labs  - Administer supportive blood products/factors as ordered and appropriate  Outcome: Progressing     Problem: MUSCULOSKELETAL - ADULT  Goal: Maintain or return mobility to safest level of function  Description: INTERVENTIONS:  - Assess patient's ability to carry out ADLs; assess patient's baseline for ADL function and identify physical deficits which impact ability to perform ADLs (bathing, care of mouth/teeth, toileting, grooming, dressing, etc.)  - Assess/evaluate cause of self-care deficits   - Assess range of motion  - Assess patient's mobility  - Assess patient's need for assistive devices and provide as appropriate  - Encourage maximum independence but intervene and supervise when necessary  - Involve family in performance of ADLs  - Assess for home care needs following discharge   - Consider OT consult to assist with ADL evaluation and planning for discharge  - Provide patient education as appropriate  Outcome: Progressing  Goal: Maintain proper alignment of affected body part  Description: INTERVENTIONS:  - Support, maintain and protect limb and body alignment  - Provide patient/ family with appropriate education  Outcome: Progressing     Problem: Nutrition/Hydration-ADULT  Goal: Nutrient/Hydration intake appropriate for improving, restoring or maintaining nutritional needs  Description: Monitor and assess patient's nutrition/hydration status for malnutrition. Collaborate with interdisciplinary team and initiate plan and interventions as ordered.  Monitor patient's weight and dietary intake as ordered or per policy. Utilize nutrition screening tool and intervene as necessary. Determine patient's food preferences and provide high-protein, high-caloric foods as appropriate.     INTERVENTIONS:  - Monitor oral intake, urinary output, labs, and treatment plans  - Assess nutrition and hydration status and recommend course of action  - Evaluate amount of meals eaten  - Assist patient  with eating if necessary   - Allow adequate time for meals  - Recommend/ encourage appropriate diets, oral nutritional supplements, and vitamin/mineral supplements  - Order, calculate, and assess calorie counts as needed  - Recommend, monitor, and adjust tube feedings and TPN/PPN based on assessed needs  - Assess need for intravenous fluids  - Provide specific nutrition/hydration education as appropriate  - Include patient/family/caregiver in decisions related to nutrition  Outcome: Progressing     Problem: SAFETY,RESTRAINT: NV/NON-SELF DESTRUCTIVE BEHAVIOR  Goal: Remains free of harm/injury (restraint for non violent/non self-detsructive behavior)  Description: INTERVENTIONS:  - Instruct patient/family regarding restraint use   - Assess and monitor physiologic and psychological status   - Provide interventions and comfort measures to meet assessed patient needs   - Identify and implement measures to help patient regain control  - Assess readiness for release of restraint   Outcome: Progressing  Goal: Returns to optimal restraint-free functioning  Description: INTERVENTIONS:  - Assess the patient's behavior and symptoms that indicate continued need for restraint  - Identify and implement measures to help patient regain control  - Assess readiness for release of restraint   Outcome: Progressing     Problem: CONFUSION/THOUGHT DISTURBANCE  Goal: Thought disturbances (confusion, delirium, depression, dementia or psychosis) are managed to maintain or return to baseline mental status and functional level  Description: INTERVENTIONS:  - Assess for possible contributors to  thought disturbance, including but not limited to medications, infection, impaired vision or hearing, underlying metabolic abnormalities, dehydration, respiratory compromise,  psychiatric diagnoses and notify attending PHYSICAN/AP  - Monitor and intervene to maintain adequate nutrition, hydration, elimination, sleep and activity  - Decrease environmental  stimuli, including noise as appropriate.  - Provide frequent contacts to provide refocusing, direction and reassurance as needed. Approach patient calmly with eye contact and at their level.  - [place] high risk fall precautions, aspiration precautions and other safety measures, as indicated  - If delirium suspected, notify physician/AP of change in condition and request immediate in-person evaluation  - Pursue consults as appropriate including Geriatric (campus dependent), OT for cognitive evaluation/activity planning, psychiatric, pastoral care, etc.  Outcome: Progressing     Problem: BEHAVIOR  Goal: Pt/Family maintain appropriate behavior and adhere to behavioral management agreement, if implemented  Description: INTERVENTIONS:  - Assess the family dynamic   - Encourage verbalization of thoughts and concerns in a socially appropriate manner  - Assess patient/family's coping skills and non-compliant behavior (including use of illegal substances).  - Utilize positive, consistent limit setting strategies supporting safety of patient, staff and others  - Initiate consult with Case Management, Spiritual Care or other ancillary services as appropriate  - If a patient's/visitor's behavior jeopardizes the safety of the patient, staff, or others, refer to organization procedure.   - Notify Security of behavior or suspected illegal substances which indicate the need for search of the patient and/or belongings  - Encourage participation in the decision making process about a behavioral management agreement; implement if patient meets criteria  Outcome: Progressing     Problem: Prexisting or High Potential for Compromised Skin Integrity  Goal: Skin integrity is maintained or improved  Description: INTERVENTIONS:  - Identify patients at risk for skin breakdown  - Assess and monitor skin integrity  - Assess and monitor nutrition and hydration status  - Monitor labs   - Assess for incontinence   - Turn and reposition  patient  - Assist with mobility/ambulation  - Relieve pressure over bony prominences  - Avoid friction and shearing  - Provide appropriate hygiene as needed including keeping skin clean and dry  - Evaluate need for skin moisturizer/barrier cream  - Collaborate with interdisciplinary team   - Patient/family teaching  - Consider wound care consult   Outcome: Progressing

## 2024-12-24 NOTE — PROGRESS NOTES
"Progress Note - Trauma   Name: Marsha Oliva 79 y.o. female I MRN: [id number]  Unit/Bed#: [facility] 632-01 I Date of Admission: 12/22/2024   Date of Service: 12/24/2024 I Hospital Day: 2    Assessment & Plan  Repeated falls  Closed T1 fracture, sacral fracture with small hematoma and some active extravasation.     Admit as stepdown level 2  NPO  IV fluids  Every 4 hours H&H, low threshold for IR consult if hemoglobin downtrends or drops drastically  As needed pain and nausea control  Ortho consult  Neurosurgery consult  Encourage I-S  Closed T1 fracture ([facility])  Appreciate neurosurgery input  Atrial fibrillation, chronic ([facility])  Found to be in A-fib with RVR on presentation at [facility], started on Cardizem drip  - Hold cardizem gtt for hypotension  - Continue tele  S/P cervical spinal fusion  Operation was completed on 9/5/2024  Sacral fracture ([facility])  Appreciate orthopedic surgery input  Chronic diastolic CHF (congestive heart failure) ([facility])  Wt Readings from Last 3 Encounters:   12/22/24 50.8 kg (112 lb)   10/24/24 51.3 kg (113 lb)   10/07/24 51.3 kg (113 lb 1.5 oz)     Depression with anxiety  Continue Zoloft  Hypothyroidism  Continue levothyroxine  Mixed hyperlipidemia  Continue atorvastatin  Severe mitral regurgitation    ILD (interstitial lung disease) ([facility])  Previously on home oxygen,  states she has not needed it in a year.  -Titrate oxygen as needed, wean as able  Apraxia  Chronic  Vitamin D deficiency    Ambulatory dysfunction    Osteoporosis      Bowel Regimen: Colace, Miralax  VTE Prophylaxis:Enoxaparin (Lovenox)     Disposition: Continue level of care    24 Hour Events : Hypotensive overnight, Cardizem held, given 500cc bolus    Subjective : Patient responds to verbal and physical stimuli. When asked where she is, patient responds \"hell\" and says that she is \"good\" when asked about sleep. Responds \"you\" (the provider) when asked if anything is bothering her. Limited cooperation with exam, but more " interactive than overnight report.    Objective :  Temp:  [97 °F (36.1 °C)-100.5 °F (38.1 °C)] 97.6 °F (36.4 °C)  HR:  [] 97  BP: ()/() 98/59  Resp:  [16-20] 18  SpO2:  [91 %-99 %] 99 %  O2 Device: Nasal cannula  Nasal Cannula O2 Flow Rate (L/min):  [4 L/min-10 L/min] 4 L/min    I/O         12/22 0701 12/23 0700 12/23 0701 12/24 0700 12/24 0701 12/25 0700    P.O. 0 0     I.V. 784.8 1393.7     IV Piggyback 500      Total Intake 1284.8 1393.7     Urine 1150 300     Total Output 1150 300     Net +134.8 +1093.7            Unmeasured Stool Occurrence  1 x             Physical Exam  GENERAL: VS reviewed. NAD, awake, alert.  NEURO: Limited 2/2 patient cooperation, no obvious focal deficits.  HEAD: NCAT  EENT: EOMI, MMM.  CV: No pallor. Pulses intact.  LUNGS: No tachypnea or accessory muscle use.  GI: Soft, NT/ND  MSK: Moving all extremities.  SKIN: Warm, dry.      Lab Results: I have reviewed the following results:  Recent Labs     12/22/24  0440 12/22/24  0653 12/22/24  0947 12/22/24  1605 12/23/24  0441 12/23/24  1114 12/24/24  0442   WBC 7.60  --   --   --   --  14.11* 9.37   HGB 11.5  --    < > 10.1*  --  9.7* 8.9*   HCT 37.8  --    < > 33.4*  --  32.7* 30.3*     --   --   --   --  225 173   SODIUM 136  --   --  136 139  --  143   K 4.5  --   --  4.5 5.1  --  4.4     --   --  103 106  --  108   CO2 26  --   --  25 22  --  24   BUN 14  --   --  12 14  --  19   CREATININE 1.16  --   --  1.03 1.09  --  0.96   GLUC 91  --   --  69 64*  --  61*   MG  --   --   --   --  2.2  --   --    PHOS  --   --   --   --  3.9  --   --    AST 29  --   --  23  --   --   --    ALT 14  --   --  10  --   --   --    ALB 3.7  --   --  3.0*  --   --   --    TBILI 0.70  --   --  0.54  --   --   --    ALKPHOS 104  --   --  90  --   --   --    PTT 41*  --   --   --   --   --   --    INR 1.05  --   --   --   --   --   --    HSTNI0 6  --   --   --   --   --   --    HSTNI2  --  5  --   --   --   --   --    *   --   --   --   --   --   --    LACTICACID  --   --   --   --   --  1.7  --     < > = values in this interval not displayed.       Imaging Results Review: No pertinent imaging studies reviewed.  Other Study Results Review: No additional pertinent studies reviewed.

## 2024-12-24 NOTE — PHYSICAL THERAPY NOTE
PHYSICAL THERAPY EVALUATION  NAME:  Marsha Oliva  DATE: 12/24/24    AGE:   79 y.o.  Mrn:   [id number]  ADMIT DX:  Hallucinations [R44.3]  Multiple fractures [T07.XXXA]  Impaired mobility and activities of daily living [Z74.09, Z78.9]  Closed fracture of first thoracic vertebra, unspecified fracture morphology, initial encounter ([facility]) [S22.019A]  Closed fracture of sacrum, unspecified portion of sacrum, initial encounter ([facility]) [S32.10XA]  Closed wedge compression fracture of T1 vertebra, initial encounter ([facility]) [S22.010A]    Past Medical History:   Diagnosis Date    Disease of thyroid gland     Hypotension     Supratherapeutic INR 6/2/2022       Past Surgical History:   Procedure Laterality Date    CERVICAL FUSION Bilateral 9/5/2024    Procedure: navigated posterior fusion C1-C3;  Surgeon: Eduardo Dewitt MD;  [facility]: [facility];  Service: Neurosurgery    HYSTERECTOMY         Length Of Stay: 2    PHYSICAL THERAPY EVALUATION:        12/24/24 0928   Note Type   Note type Evaluation   Pain Assessment   Pain Assessment Tool FLACC   Pain Rating: FLACC (Rest) - Face 0   Pain Rating: FLACC (Rest) - Legs 0   Pain Rating: FLACC (Rest) - Activity 0   Pain Rating: FLACC (Rest) - Cry 0   Pain Rating: FLACC (Rest) - Consolability 0   Score: FLACC (Rest) 0   Pain Rating: FLACC (Activity) - Face 0   Pain Rating: FLACC (Activity) - Legs 0   Pain Rating: FLACC (Activity) - Activity 0   Pain Rating: FLACC (Activity) - Cry 0   Pain Rating: FLACC (Activity) - Consolability 0   Score: FLACC (Activity) 0   Restrictions/Precautions   Weight Bearing Precautions Per Order Yes   RLE Weight Bearing Per Order WBAT   LLE Weight Bearing Per Order WBAT   Braces or Orthoses   (no brace required per neurosx)   Other Precautions Cognitive;Chair Alarm;Bed Alarm;Multiple lines;Fall Risk;Spinal precautions;Restraints  (posey belt, b/l wrist restraints)   Home Living   Type of Home House   Home Layout Two level;Able to live on main level with  bedroom/bathroom;Stairs to enter with rails  (3 CRISSY)   Home Equipment Walker;Cane   Additional Comments Pt poor historian. Per CM, Pt resides with spouse who provides assist to pt at baseline   Prior Function   Level of [place] Independent with functional mobility   Lives With Spouse   Receives Help From Family   Falls in the last 6 months 5 to 10   Comments Per CM, Pt utilizes RW vs SPC for ambulation PTA   General   Family/Caregiver Present No   Cognition   Overall Cognitive Status Impaired   Arousal/Participation Arousable   Orientation Level Disoriented X4   Following Commands Follows one step commands inconsistently   RLE Assessment   RLE Assessment X   Strength RLE   RLE Overall Strength 3-/5  (functionally assessed)   LLE Assessment   LLE Assessment X   Strength LLE   LLE Overall Strength 3-/5  (functionally assessed)   Bed Mobility   Supine to Sit 3  Moderate assistance   Additional items Assist x 2;Increased time required;Verbal cues   Sit to Supine 3  Moderate assistance   Additional items Assist x 2;Increased time required;Verbal cues   Additional Comments Pt able to maintain sitting EOB with min A x 1   Transfers   Sit to Stand 2  Maximal assistance   Additional items Assist x 2;Increased time required;Verbal cues   Stand to Sit 2  Maximal assistance   Additional items Assist x 2;Increased time required;Verbal cues   Ambulation/Elevation   Gait pattern Not appropriate   Balance   Static Sitting Poor +   Dynamic Sitting Poor   Static Standing Poor -   Endurance Deficit   Endurance Deficit Yes   Endurance Deficit Description fatigue ,cog status   Activity Tolerance   Activity Tolerance Patient limited by fatigue   Medical Staff Made Aware Jun, OT; OT present for co evaluation due to pts current medical presentation   Nurse Made Aware Pt appropriate to be seen and mobilize per nsg   Assessment   Prognosis Good   Problem List Decreased strength;Decreased range of motion;Decreased endurance;Impaired  balance;Decreased mobility;Decreased cognition;Impaired judgement;Decreased safety awareness   Assessment Pt is 79 y.o. female seen for PT evaluation s/p admit to [facility] on 12/22/2024. Two pt identifiers were used to confirm. Pt presented s/p fall.  Pt was admitted with a primary dx of: closed T1fx, sacral fx, and other active problems including A fib, hx of cervical fusion on 9/5/24, chronic diastolic CHF, depression with anxiety, hypothyroidism, mixed HLD, severe mitral regurgitation, interstitial lug disease, apraxia, vitamin D deficiency, ambulatory dysfunction, osteoporosis. Neurosx recommending no brace. Ortho recommending WBAT to b/l LE.  PT now consulted for assessment of mobility and d/c needs. Pt with OOB to chair orders.  Pts current co morbidities affecting treatment include:  has a past medical history of Disease of thyroid gland, Hypotension, and Supratherapeutic INR. . Pts current clinical presentation is Unstable/ Unpredictable (high complexity) due to Ongoing medical management for primary dx, Decreased activity tolerance compared to baseline, Fall risk, Increased assistance needed from caregiver at current time, Cog status, Spinal precautions at current time, Continuous pulse oximetry monitoring   .  Upon evaluation, pt currently is requiring Mod Ax2 for bed mobility; Max Ax2 for transfers.  Pt presents at PT eval functioning below baseline and currently w/ overall mobility deficits 2* to: BLE weakness, decreased ROM, impaired balance, decreased endurance, decreased activity tolerance compared to baseline, decreased safety awareness, impaired judgement, fall risk, decreased cognition, spinal precautions. Pt currently at a fall risk 2* to impairments listed above.  Based on the aforementioned PT evaluation, pt will continue to benefit from skilled Acute PT interventions to address stated impairments; to maximize functional mobility; for ongoing pt/ family training; and DME needs. At  conclusion of PT session pt returned BTB and bed alarm engaged with phone and call bell within reach and posey belt and b/l wrist restraints attached. PT is currently recommending Level II resource intensity. PT will continue to follow during hospital stay.   Goals   Patient Goals none stated 2* pts cog status   STG Expiration Date 01/07/25   Short Term Goal #1 In 14 days pt will complete: 1) Bed mobility skills with min Ax1 to increase safety and independence as well as decrease caregiver burden. 2) Functional transfers with min Ax1 to promote increased independence, safety, and QOL.  3) Improve balance grades by 1/2 grade to increase safety with all mobility and decrease fall risk.  4) Improve BLE strength by 1/2 grade to help increase overall functional mobility and decrease fall risk.  5) ambulation to be assessed when appropriate. PT to see at that time.   Plan   Treatment/Interventions Functional transfer training;LE strengthening/ROM;Therapeutic exercise;Endurance training;Patient/family training;Equipment eval/education;Bed mobility;Spoke to nursing;Continued evaluation;OT   PT Frequency 2-3x/wk   Discharge Recommendation   Rehab Resource Intensity Level, PT II (Moderate Resource Intensity)   AM-PAC Basic Mobility Inpatient   Turning in Flat Bed Without Bedrails 2   Lying on Back to Sitting on Edge of Flat Bed Without Bedrails 1   Moving Bed to Chair 1   Standing Up From Chair Using Arms 1   Walk in Room 1   Climb 3-5 Stairs With Railing 1   Basic Mobility Inpatient Raw Score 7   Turning Head Towards Sound 4   Follow Simple Instructions 2   Low Function Basic Mobility Raw Score  13   Low Function Basic Mobility Standardized Score  20.14   [facility] Highest Level Of Mobility   -HL Goal 2: Bed activities/Dependent transfer   -HL Achieved 3: Sit at edge of bed   Modified Ana Scale   Modified [place] Scale 4   Portions of the documentation may have been created using voice recognition  software.Occasional wrong word or sound alike substitutions may have occurred due to the inherent limitations of the voice recognition software. Read the chart carefully and recognize, using context, where substitutions have occurred.    Abebe Mann, PT, DPT

## 2024-12-24 NOTE — CASE MANAGEMENT
Case Management Assessment & Discharge Planning Note    Patient name Marsha Oliva  Location [facility] 632/[facility] 632-01 MRN [id number]  : 1945 Date 2024       Current Admission Date: 2024  Current Admission Diagnosis:Repeated falls   Patient Active Problem List    Diagnosis Date Noted Date Diagnosed    Osteoporosis 2024     Closed T1 fracture (HCC) 2024     Sacral fracture ([facility]) 2024     Impaired mobility and activities of daily living 2024     Severe protein-calorie malnutrition (HCC) 2024     Closed odontoid fracture with type II morphology, posterior displacement, and nonunion 2024     S/P cervical spinal fusion 2024     At risk for altered bowel elimination 2024     At risk for altered urinary elimination 2024     At risk for deep venous thrombosis 2024     Encounter for rehabilitation 2024     Lymphedema 2024     Oropharyngeal dysphagia 2024     Skin abnormalities 2024     Acute pain due to trauma 2024     At risk for delirium 2024     Heart failure with preserved ejection fraction (HCC) 2024     Closed odontoid fracture with type II morphology and posterior displacement ([facility]) 2024     Spinal cord compression ([facility]) 2024     Chronic respiratory failure with hypoxia ([facility]) 2023     Stage 3a chronic kidney disease ([facility]) 2022     Bilateral hydronephrosis 2022     Proctitis 2022     Fall 2022     Hypokalemia 2022     ILD (interstitial lung disease) ([facility]) 2022     Oropharyngeal aspiration 2022     Elevated troponin 2022     EKG abnormalities 2022     Mild cognitive impairment of uncertain or unknown etiology 2022     Hormone replacement therapy 2022     Apraxia 2021     Constipation, unspecified 2021     Corn or callus 2021     Ambulatory dysfunction 2021     Generalized muscle weakness  12/28/2021     Lymphedema, not elsewhere classified 12/28/2021     Repeated falls 12/28/2021     Hypoxia 12/23/2021     Frequent falls 12/23/2021     Elevated d-dimer 12/23/2021     Venous insufficiency of both lower extremities 12/17/2021     Acquired bilateral hammer toes 07/27/2018     Lymphedema of both lower extremities 07/02/2018     Severe mitral regurgitation 05/31/2018     Anemia 05/13/2018     Cardiomegaly 05/13/2018     Wound of right leg 05/13/2018     Hypertensive heart disease with heart failure (HCC) 05/13/2018     Major depressive disorder, single episode, unspecified 05/13/2018     Personal history of nicotine dependence 05/13/2018     Pleural effusion, not elsewhere classified 05/13/2018     Solitary pulmonary nodule 05/13/2018     Atrial fibrillation, chronic (HCC) 05/01/2018     B12 deficiency 01/13/2017     Chronic diastolic CHF (congestive heart failure) (HCC) 01/13/2017     Folate deficiency 01/13/2017     Vitamin D deficiency 06/03/2016     Depression with anxiety 08/03/2015     Mixed hyperlipidemia 08/03/2015     Osteoarthritis of knee 10/23/2012     Impaired fasting glucose 05/31/2012     Essential hypertension 03/23/2010     Hypothyroidism 03/23/2010     Posttraumatic stress disorder 03/23/2010       LOS (days): 2  Geometric Mean LOS (GMLOS) (days):   Days to GMLOS:     OBJECTIVE:    Risk of Unplanned Readmission Score: 32.61         Current admission status: Inpatient       Preferred Pharmacy:   [facility] - [place], [place] - [address]  [address] [address]  Phone: [phone] Fax: [phone]    [id number] [place] [facility] [address] [address]  Phone: [phone] Fax: [phone]    Primary Care Provider: Rebecca Solis    Primary Insurance: [employer] [facility]  Secondary Insurance:     ASSESSMENT:  Active Health Care Proxies    There are no active Health Care Proxies on  file.       Advance Directives  Primary Contact: Tomas Oliva (Spouse)   [phone] (H)   [phone]    Readmission Root Cause  30 Day Readmission: No    Patient Information  Admitted from:: Home  Mental Status: Confused, Alert  During Assessment patient was accompanied by: Not accompanied during assessment  Assessment information provided by:: Spouse  Primary Caregiver: Self  Support Systems: Spouse/significant other, Self  County of Residence: [place]  [place] [facility] do you live in?: [place]  Home entry access options. Select all that apply.: Stairs  Number of steps to enter home.: 3  Do the steps have railings?: Yes  Type of Current Residence: 2 story home  Upon entering residence, is there a bedroom on the main floor (no further steps)?: No  A bedroom is located on the following floor levels of residence (select all that apply):: 2nd Floor  Upon entering residence, is there a bathroom on the main floor (no further steps)?: Yes  Number of steps to 2nd floor from main floor: One Flight  Living Arrangements: Lives w/ Spouse/significant other  Is patient a ?: No    Activities of Daily Living Prior to Admission  Functional Status: Assistance  Completes ADLs independently?: No  Level of ADL dependence: Assistance  Ambulates independently?: Yes  Does patient use assisted devices?: Yes  Assisted Devices (DME) used: Walker, Straight Cane, Wheelchair  Does patient currently own DME?: Yes  What DME does the patient currently own?: Straight Cane, Walker, Wheelchair  Does patient have a history of Outpatient Therapy (PT/OT)?: Yes  Does the patient have a history of Short-Term Rehab?: Yes  Does patient have a history of HHC?: Yes  Does patient currently have HHC?: No  Patient Information Continued  Income Source: Pension/FDC  Does patient have prescription coverage?: Yes  Does patient receive dialysis treatments?: No  Does patient have a history of substance abuse?: No    DISCHARGE DETAILS:    Discharge  planning discussed with:: Tomas Oliva (Spouse) [phone] (L) [phone]  Freedom of Choice: Yes     CM contacted family/caregiver?: Yes  Were Treatment Team discharge recommendations reviewed with patient/caregiver?: Yes  Did patient/caregiver verbalize understanding of patient care needs?: N/A- going to facility  Were patient/caregiver advised of the risks associated with not following Treatment Team discharge recommendations?: Yes    Contacts  Patient Contacts: Tomas Oliva (Spouse) [phone] (J) [phone]  Relationship to Patient:: Family  Contact Method: Phone  Phone Number: [phone] (h) [phone]  Reason/Outcome: Discharge Planning, Emergency Contact, Continuity of Care    Requested Home Health Care         Is the patient interested in HHC at discharge?: No    DME Referral Provided  Referral made for DME?: No    Other Referral/Resources/Interventions Provided:  Interventions: Short Term Rehab    Treatment Team Recommendation: Short Term Rehab  Discharge Destination Plan:: Short Term Rehab       CM spoke to pt's spouse    Pt lives with her spouse in a 2 story home which has 3STE and 12 steps inside. Pt has a 1st floor bathroom (walk-in shower with grab bars and shower chair).   Pt doesn't drive. Pt is retired. Pt requires assistance for all ADL/iADLs. Pt ambulates with either a walker or SPC. Pt also has access to a wheelchair. Pt's had 5 falls. Pt enjoys crossword puzzles. Pt uses [facility] in [facility].  Pt's been to [facility] in the past, as well as [facility].    Pt was evaluated by OT/PT and recommended for IP rehab  Pt's spouse would like a referral to [facility], [facility], [facility], and  [facility].   CM placed and will follow up    CM reviewed d/c planning process including the following: identifying help at home, patient preference for d/c planning needs, Discharge Lounge, [facility] Meds to Bed program, availability of treatment team to discuss questions or  concerns patient and/or family may have regarding understanding medications and recognizing signs and symptoms once discharged.  CM also encouraged patient to follow up with all recommended appointments after discharge. Patient advised of importance for patient and family to participate in managing patient’s medical well being.

## 2024-12-24 NOTE — PROGRESS NOTES
Cardiology Progress Note.   Unit/Bed#: [facility] 632-01 Encounter: [id number]        Marsha Oliva 79 y.o. female [id number]  Hospital Stay Days: 2    Assessment and Plan      Current Problem List   Principal Problem:    Repeated falls  Active Problems:    Atrial fibrillation, chronic (HCC)    Chronic diastolic CHF (congestive heart failure) ([facility])    Hypothyroidism    Mixed hyperlipidemia    Severe mitral regurgitation    Vitamin D deficiency    ILD (interstitial lung disease) ([facility])    Apraxia    Ambulatory dysfunction    S/P cervical spinal fusion    Closed T1 fracture (HCC)    Sacral fracture ([facility])    Osteoporosis    Assessment/Plan:    This is 79-year-old female presenting with repeated falls and was found to have thoracic and sacral fractures.  Cardiology has been consulted for A-fib with RVR      # Falls on presentation leading to thoracic and sacral fractures  # Acute AMS/encephalopathy  # A-fib with RVR on presentation  # History of chronic A-fib not on anticoagulation due to history of major bleeding  # HLD  # Severe MR  # ILD  # HFpEF EF 60 to 65% on torsemide        Plan:    Patient remains in A-fib with good rate control.   Cardizem drip on hold given good rate control now.  Can consider restarting if has RVR episodes.  Patient will likely have tachycardia during the day given ongoing noncardiac issues.  Will plan to start p.o. rate control medications once can take orals.  Currently being evaluated by speech daily.  Not on anticoagulation for her chronic A-fib due to history of major bleeding in the past.  Plan to get echocardiogram once can follow command.  Rest of the care per primary team.          Subjective     Patient was seen this morning.  She was sleeping.  Telemetry with still A-fib without RVR over the past 12 hours.  Objective     Vitals: Temp (24hrs), Av.1 °F (36.7 °C), Min:97 °F (36.1 °C), Max:100.2 °F (37.9 °C)  Current: Temperature: (!) 97 °F (36.1 °C)  Patient Vitals for the  past 24 hrs:   BP Temp Temp src Pulse Resp SpO2   12/24/24 0926 92/54 -- -- 95 -- --   12/24/24 0826 92/58 -- -- 79 -- --   12/24/24 0730 101/59 (!) 97 °F (36.1 °C) -- 89 -- --   12/24/24 0729 101/59 (!) 97 °F (36.1 °C) -- 89 17 100 %   12/24/24 0725 100/60 -- -- 97 -- 100 %   12/24/24 0500 98/59 -- -- 97 -- 99 %   12/24/24 0250 95/55 -- -- 101 -- 92 %   12/24/24 0242 (!) 88/50 -- -- -- -- --   12/24/24 0204 94/70 97.6 °F (36.4 °C) -- 76 -- 96 %   12/24/24 0120 97/51 -- -- 91 -- 96 %   12/24/24 0110 93/51 -- -- 88 -- 96 %   12/24/24 0050 94/51 -- -- 87 -- 98 %   12/24/24 0035 92/51 -- -- 92 -- 96 %   12/24/24 0018 95/50 -- -- -- -- --   12/24/24 0005 91/50 -- -- 81 -- 92 %   12/23/24 2350 (!) 97/49 -- -- 91 -- 96 %   12/23/24 2341 (!) 80/50 -- -- 90 -- --   12/23/24 2320 (!) 88/56 -- -- 102 -- 96 %   12/23/24 2246 96/61 97.6 °F (36.4 °C) -- 83 -- 94 %   12/23/24 2144 105/64 -- -- 97 -- 92 %   12/23/24 2024 123/91 (!) 97 °F (36.1 °C) Axillary 92 -- 97 %   12/23/24 1741 145/93 98.1 °F (36.7 °C) Axillary 92 18 92 %   12/23/24 1424 105/70 100.1 °F (37.8 °C) Axillary (!) 146 18 92 %   12/23/24 1337 130/96 100.2 °F (37.9 °C) Axillary (!) 138 20 95 %   12/23/24 1322 (!) 80/46 -- -- (!) 128 20 99 %   12/23/24 1205 -- 98.1 °F (36.7 °C) -- (!) 122 -- 95 %   12/23/24 1126 (!) 135/108 -- -- (!) 113 -- 93 %   12/23/24 1006 123/61 -- -- (!) 119 -- 98 %    There is no height or weight on file to calculate BMI.  Physical Exam:  Physical Exam  Vitals reviewed.   Constitutional:       General: She is not in acute distress.     Appearance: She is well-developed. She is not diaphoretic.      Comments: Cachectic   Cardiovascular:      Rate and Rhythm: Normal rate. Rhythm irregular.      Heart sounds: Normal heart sounds. No murmur heard.     No friction rub.   Pulmonary:      Effort: Pulmonary effort is normal. No respiratory distress.      Breath sounds: Normal breath sounds. No stridor.         Invasive Devices       Peripheral  "Intravenous Line  Duration             Peripheral IV 12/22/24 Proximal;Right;Ventral (anterior) Forearm 2 days    Peripheral IV 12/22/24 Distal;Left;Ventral (anterior) Forearm 1 day    Peripheral IV 12/23/24 Left;Proximal;Ventral (anterior) Forearm 1 day                        Labs:   Results from last 7 days   Lab Units 12/24/24  0442 12/23/24  1114 12/22/24  1605 12/22/24  1350 12/22/24  0947 12/22/24  0440   WBC Thousand/uL 9.37 14.11*  --   --   --  7.60   HEMOGLOBIN g/dL 8.9* 9.7* 10.1* 10.5* 10.0* 11.5   HEMATOCRIT % 30.3* 32.7* 33.4* 34.8 32.4* 37.8   PLATELETS Thousands/uL 173 225  --   --   --  229   SEGS PCT %  --  88*  --   --   --  85*   MONO PCT %  --  6  --   --   --  6   EOS PCT %  --  0  --   --   --  2      Results from last 7 days   Lab Units 12/24/24  0442 12/23/24  0441 12/22/24  1605 12/22/24  1350 12/22/24  0440   SODIUM mmol/L 143 139 136  --  136   POTASSIUM mmol/L 4.4 5.1 4.5  --  4.5   CHLORIDE mmol/L 108 106 103  --  101   CO2 mmol/L 24 22 25  --  26   BUN mg/dL 19 14 12  --  14   CREATININE mg/dL 0.96 1.09 1.03  --  1.16   CALCIUM mg/dL 8.5 8.6 8.3*  --  9.2   ALK PHOS U/L  --   --  90  --  104   ALT U/L  --   --  10  --  14   AST U/L  --   --  23  --  29   SED RATE mm/hour  --   --   --  45*  --    MAGNESIUM mg/dL  --  2.2  --   --   --    PHOSPHORUS mg/dL  --  3.9  --   --   --    INR   --   --   --   --  1.05   PTT seconds  --   --   --   --  41*   EGFR ml/min/1.73sq m 56 48 51  --  44     Results from last 7 days   Lab Units 12/22/24  0440   INR  1.05   PTT seconds 41*     Results from last 7 days   Lab Units 12/23/24  1114   LACTIC ACID mmol/L 1.7         No results found for: \"PHART\", \"GUO0SSH\", \"PO2ART\", \"MTU2YDE\", \"C4FCWFHA\", \"BEART\", \"SOURCE\"  No components found for: \"HIV1X2\"  No results found for: \"HAV\", \"HEPAIGM\", \"HEPBIGM\", \"HEPBCAB\", \"HBEAG\", \"HEPCAB\"  No results found for: \"SPEP\", \"UPEP\"   Lab Results   Component Value Date    HGBA1C 5.3 09/04/2024    HGBA1C 5.1 09/12/2022 " "   HGBA1C 5.4 06/03/2022     No results found for: \"CHOL\"   Lab Results   Component Value Date    HDL 76 02/06/2024      Lab Results   Component Value Date    LDLCALC 70 02/06/2024      Lab Results   Component Value Date    TRIG 115 02/06/2024     No components found for: \"PROCAL\"  Results from last 7 days   Lab Units 12/22/24  0440   BNP pg/mL 305*     Micro:      Urinalysis:  Lab Results   Component Value Date    SALICYLATE <5 08/12/2022      Results from last 7 days   Lab Units 12/23/24  1207   COLOR UA  Yellow   CLARITY UA  Turbid   SPEC GRAV UA  1.027   PH UA  6.0   LEUKOCYTES UA  Negative   NITRITE UA  Negative   GLUCOSE UA mg/dl Negative   KETONES UA mg/dl 10 (1+)*   BILIRUBIN UA  Negative   BLOOD UA  Large*      Results from last 7 days   Lab Units 12/23/24  1207   RBC UA /hpf Innumerable*   WBC UA /hpf 4-10*   EPITHELIAL CELLS WET PREP /hpf Occasional   BACTERIA UA /hpf None Seen      Intake and Outputs:  I/O         12/22 0701  12/23 0700 12/23 0701  12/24 0700 12/24 0701  12/25 0700    P.O. 0 0     I.V. 784.8 1393.7 320    IV Piggyback 500      Total Intake 1284.8 1393.7 320    Urine 1150 300     Total Output 1150 300     Net +134.8 +1093.7 +320           Unmeasured Stool Occurrence  1 x           Nutrition:  Diet NPO; Sips with meds  Radiology Results:   XR chest portable   Final Result by Braxton Monteiro MD (12/23 1650)      Development of bilateral groundglass opacities, which may reflect pulmonary edema or developing pneumonia.            Workstation performed: [id number]           Scheduled Medications:  atorvastatin, 20 mg, Daily  digoxin, 125 mcg, Every Other Day  diltiazem, 180 mg, Daily  docusate sodium, 100 mg, BID  enoxaparin, 30 mg, Q12H CHRIS  levothyroxine, 125 mcg, Early Morning  multi-electrolyte, 500 mL, Once  polyethylene glycol, 17 g, Daily  QUEtiapine, 25 mg, HS  sertraline, 100 mg, Daily      PRN MEDS:  acetaminophen, 1,000 mg, Q6H PRN  ondansetron, 4 mg, Q6H PRN  phenol, 1 spray, Q2H " PRN      Last 24 Hour Meds: :   Medication Administration - last 24 hours from 12/23/2024 0939 to 12/24/2024 0939         Date/Time Order Dose Route Action Action by     12/24/2024 0827 EST docusate sodium (COLACE) capsule 100 mg 0 mg Oral Hold Frank Adkins RN     12/23/2024 1729 EST docusate sodium (COLACE) capsule 100 mg 100 mg Oral Not Given Ella Rojas RN     12/24/2024 0827 EST polyethylene glycol (MIRALAX) packet 17 g 0 g Oral Hold Frank Adkins RN     12/24/2024 0827 EST atorvastatin (LIPITOR) tablet 20 mg 0 mg Oral Hold Frank Adkins RN     12/24/2024 0827 EST digoxin (LANOXIN) tablet 125 mcg 0 mcg Oral Hold Frank Adkins RN     12/24/2024 0511 EST levothyroxine tablet 125 mcg 125 mcg Oral Not Given Meg Arauz RN     12/24/2024 0827 EST sertraline (ZOLOFT) tablet 100 mg 0 mg Oral Hold Frank Adkins RN     12/23/2024 2300 EST diltiazem (CARDIZEM) 125 mg in sodium chloride 0.9 % 125 mL infusion 0 mg/hr Intravenous Stopped Meg Arauz RN     12/23/2024 2254 EST diltiazem (CARDIZEM) 125 mg in sodium chloride 0.9 % 125 mL infusion 2.5 mg/hr Intravenous Rate/Dose Change Meg Arauz RN     12/23/2024 2020 EST diltiazem (CARDIZEM) 125 mg in sodium chloride 0.9 % 125 mL infusion 5 mg/hr Intravenous Rate/Dose Change Meg Arauz RN     12/23/2024 1735 EST diltiazem (CARDIZEM) 125 mg in sodium chloride 0.9 % 125 mL infusion 7.5 mg/hr Intravenous New Bag Ella Rojas RN     12/23/2024 1254 EST diltiazem (CARDIZEM) 125 mg in sodium chloride 0.9 % 125 mL infusion 7.5 mg/hr Intravenous Rate/Dose Change Ella Rojas RN     12/23/2024 1015 EST diltiazem (CARDIZEM) 125 mg in sodium chloride 0.9 % 125 mL infusion 5 mg/hr Intravenous Rate/Dose Change Ella Rojas RN     12/24/2024 0827 EST diltiazem (CARDIZEM CD) 24 hr capsule 180 mg 0 mg Oral Hold Frank Adkins RN     12/23/2024 1110 EST lactated ringers infusion 0 mL/hr Intravenous Stopped Ella Rojas RN     12/23/2024 1015 EST  OLANZapine (ZyPREXA) IM injection 5 mg 5 mg Intramuscular Given Ella Rojas, RN     12/23/2024 1038 EST acetaminophen (Ofirmev) injection 1,000 mg 1,000 mg Intravenous New Bag Ella Rojas, RN     12/23/2024 1015 EST sterile water injection **ADS Override Pull** 10 mL  Given Ella Rojas, RN     12/24/2024 0725 EST multi-electrolyte (PLASMALYTE-A/ISOLYTE-S PH 7.4) IV solution 0 mL/hr Intravenous Stopped Meg Arauz, RN     12/24/2024 0000 EST multi-electrolyte (PLASMALYTE-A/ISOLYTE-S PH 7.4) IV solution 50 mL/hr Intravenous New Bag Meg Arauz, RN     12/23/2024 1045 EST multi-electrolyte (PLASMALYTE-A/ISOLYTE-S PH 7.4) IV solution 50 mL/hr Intravenous New Bag Ella Rojas, RN     12/23/2024 1201 EST multi-electrolyte (ISOLYTE-S PH 7.4) bolus 500 mL 500 mL Intravenous New Bag Ella Rojas, RN     12/24/2024 0828 EST enoxaparin (LOVENOX) subcutaneous injection 30 mg 30 mg Subcutaneous Given Frank Adkins, RN     12/24/2024 0116 EST enoxaparin (LOVENOX) subcutaneous injection 30 mg 30 mg Subcutaneous Given Meg Arauz, RN     12/23/2024 1313 EST enoxaparin (LOVENOX) subcutaneous injection 30 mg 30 mg Subcutaneous Given Ella Rojas, RN     12/23/2024 1354 EST multi-electrolyte (ISOLYTE-S PH 7.4) bolus 500 mL 500 mL Intravenous Not Given Ella Rojas, RN     12/23/2024 2133 EST QUEtiapine (SEROquel) tablet 25 mg 25 mg Oral Not Given Meg Arauz, RN     12/24/2024 0401 EST multi-electrolyte (ISOLYTE-S PH 7.4) bolus 500 mL 0 mL Intravenous Stopped Meg Arauz, RN     12/24/2024 0301 EST multi-electrolyte (ISOLYTE-S PH 7.4) bolus 500 mL 500 mL Intravenous New Bag Meg Arauz, RN     12/24/2024 0820 EST dextrose 5 % in lactated Ringer's infusion 50 mL/hr Intravenous New Bag Frank Adkins RN     12/24/2024 0857 EST dextrose 50 % IV solution 25 mL 25 mL Intravenous Given Frank Adkins RN            PLEASE NOTE:  This encounter was completed utilizing the [employer]ency  Direct Speech Voice Recognition Software. Grammatical errors, random word insertions, pronoun errors and incomplete sentences are occasional consequences of the system due to software limitations, ambient noise and hardware issues.These may be missed by proof reading prior to affixing electronic signature. Any questions or concerns about the content, text or information contained within the body of this dictation should be directly addressed to the physician for clarification. Please do not hesitate to call me directly if you have any any questions or concerns.

## 2024-12-24 NOTE — ASSESSMENT & PLAN NOTE
Closed T1 fracture, sacral fracture with small hematoma and some active extravasation.     Admit as stepdown level 2  NPO  IV fluids  Every 4 hours H&H, low threshold for IR consult if hemoglobin downtrends or drops drastically  As needed pain and nausea control  Ortho consult  Neurosurgery consult  Encourage I-S

## 2024-12-25 LAB
ANION GAP SERPL CALCULATED.3IONS-SCNC: 6 MMOL/L (ref 4–13)
BASOPHILS # BLD AUTO: 0.02 THOUSANDS/ÂΜL (ref 0–0.1)
BASOPHILS NFR BLD AUTO: 0 % (ref 0–1)
BUN SERPL-MCNC: 14 MG/DL (ref 5–25)
CALCIUM SERPL-MCNC: 8.5 MG/DL (ref 8.4–10.2)
CHLORIDE SERPL-SCNC: 109 MMOL/L (ref 96–108)
CO2 SERPL-SCNC: 29 MMOL/L (ref 21–32)
CREAT SERPL-MCNC: 0.67 MG/DL (ref 0.6–1.3)
EOSINOPHIL # BLD AUTO: 0.14 THOUSAND/ÂΜL (ref 0–0.61)
EOSINOPHIL NFR BLD AUTO: 3 % (ref 0–6)
ERYTHROCYTE [DISTWIDTH] IN BLOOD BY AUTOMATED COUNT: 15.8 % (ref 11.6–15.1)
GFR SERPL CREATININE-BSD FRML MDRD: 83 ML/MIN/1.73SQ M
GLUCOSE SERPL-MCNC: 100 MG/DL (ref 65–140)
GLUCOSE SERPL-MCNC: 100 MG/DL (ref 65–140)
GLUCOSE SERPL-MCNC: 76 MG/DL (ref 65–140)
GLUCOSE SERPL-MCNC: 90 MG/DL (ref 65–140)
GLUCOSE SERPL-MCNC: 94 MG/DL (ref 65–140)
HCT VFR BLD AUTO: 33.1 % (ref 34.8–46.1)
HGB BLD-MCNC: 9.6 G/DL (ref 11.5–15.4)
IMM GRANULOCYTES # BLD AUTO: 0.02 THOUSAND/UL (ref 0–0.2)
IMM GRANULOCYTES NFR BLD AUTO: 0 % (ref 0–2)
LYMPHOCYTES # BLD AUTO: 0.56 THOUSANDS/ÂΜL (ref 0.6–4.47)
LYMPHOCYTES NFR BLD AUTO: 10 % (ref 14–44)
MCH RBC QN AUTO: 27.4 PG (ref 26.8–34.3)
MCHC RBC AUTO-ENTMCNC: 29 G/DL (ref 31.4–37.4)
MCV RBC AUTO: 95 FL (ref 82–98)
MONOCYTES # BLD AUTO: 0.32 THOUSAND/ÂΜL (ref 0.17–1.22)
MONOCYTES NFR BLD AUTO: 6 % (ref 4–12)
NEUTROPHILS # BLD AUTO: 4.59 THOUSANDS/ÂΜL (ref 1.85–7.62)
NEUTS SEG NFR BLD AUTO: 81 % (ref 43–75)
NRBC BLD AUTO-RTO: 0 /100 WBCS
PLATELET # BLD AUTO: 157 THOUSANDS/UL (ref 149–390)
PMV BLD AUTO: 8.1 FL (ref 8.9–12.7)
POTASSIUM SERPL-SCNC: 4 MMOL/L (ref 3.5–5.3)
RBC # BLD AUTO: 3.5 MILLION/UL (ref 3.81–5.12)
SODIUM SERPL-SCNC: 144 MMOL/L (ref 135–147)
WBC # BLD AUTO: 5.65 THOUSAND/UL (ref 4.31–10.16)

## 2024-12-25 PROCEDURE — 99232 SBSQ HOSP IP/OBS MODERATE 35: CPT | Performed by: INTERNAL MEDICINE

## 2024-12-25 PROCEDURE — 80048 BASIC METABOLIC PNL TOTAL CA: CPT

## 2024-12-25 PROCEDURE — 99232 SBSQ HOSP IP/OBS MODERATE 35: CPT | Performed by: SURGERY

## 2024-12-25 PROCEDURE — 85025 COMPLETE CBC W/AUTO DIFF WBC: CPT

## 2024-12-25 PROCEDURE — 82948 REAGENT STRIP/BLOOD GLUCOSE: CPT

## 2024-12-25 RX ADMIN — QUETIAPINE FUMARATE 25 MG: 25 TABLET ORAL at 21:00

## 2024-12-25 RX ADMIN — ATORVASTATIN CALCIUM 20 MG: 20 TABLET, FILM COATED ORAL at 09:39

## 2024-12-25 RX ADMIN — SERTRALINE 100 MG: 100 TABLET, FILM COATED ORAL at 09:40

## 2024-12-25 RX ADMIN — LEVOTHYROXINE SODIUM 125 MCG: 0.12 TABLET ORAL at 05:16

## 2024-12-25 RX ADMIN — ENOXAPARIN SODIUM 30 MG: 30 INJECTION SUBCUTANEOUS at 20:55

## 2024-12-25 RX ADMIN — ENOXAPARIN SODIUM 30 MG: 30 INJECTION SUBCUTANEOUS at 09:39

## 2024-12-25 RX ADMIN — DEXTROSE, SODIUM CHLORIDE, SODIUM LACTATE, POTASSIUM CHLORIDE, AND CALCIUM CHLORIDE 50 ML/HR: 5; .6; .31; .03; .02 INJECTION, SOLUTION INTRAVENOUS at 05:25

## 2024-12-25 NOTE — PROGRESS NOTES
Progress Note - Trauma   Name: Marsha Oliva 79 y.o. female I MRN: [id number]  Unit/Bed#: [facility] 632-01 I Date of Admission: 12/22/2024   Date of Service: 12/25/2024 I Hospital Day: 3    Assessment & Plan  Repeated falls  Closed T1 fracture, sacral fracture with small hematoma and some active extravasation.     Downgraded to MedSurg as hemoglobins have been stable  As needed pain and nausea control  Ortho consult-weightbearing as tolerated, nonoperative  Neurosurgery consult-nonoperative  Encourage I-S  Discharge planning per case management.  Closed T1 fracture (HCC)  Appreciate neurosurgery input  Atrial fibrillation, chronic ([facility])  Found to be in A-fib with RVR on presentation at [facility], started on Cardizem drip  - Hold cardizem gtt for hypotension  - Continue tele  - Patient ordered home Cardizem, however currently being held as she is unable to swallow large pills.  Contacted pharmacy regarding IV dosing.  As there is a shortage, they do not recommend bolus dosing and only recommend constant infusions.  Will continue to have speech and swallow evaluate for appropriateness of p.o. medications.  S/P cervical spinal fusion  Operation was completed on 9/5/2024  Sacral fracture ([facility])  Appreciate orthopedic surgery input  Chronic diastolic CHF (congestive heart failure) ([facility])  Wt Readings from Last 3 Encounters:   12/22/24 50.8 kg (112 lb)   10/24/24 51.3 kg (113 lb)   10/07/24 51.3 kg (113 lb 1.5 oz)     Hypothyroidism  Continue levothyroxine  Mixed hyperlipidemia  Continue atorvastatin  Severe mitral regurgitation    ILD (interstitial lung disease) ([facility])  Previously on home oxygen,  states she has not needed it in a year.  -Titrate oxygen as needed, wean as able  Apraxia  Chronic  Vitamin D deficiency    Ambulatory dysfunction    Osteoporosis      Bowel Regimen: Colace, MiraLAX  VTE Prophylaxis:Enoxaparin (Lovenox)     Disposition: Discharge planning to [facility]   Please contact the SecureChat role for the  Trauma service with any questions/concerns.    24 Hour Events : No acute overnight events  Subjective : Patient alert, oriented x 4 this morning.  No complaints of pain or otherwise symptoms.  She remains on 3 L of oxygen.  She denies any shortness of breath.  She is tolerating good p.o. with assistance.  Family updated at bedside.    Objective :  Temp:  [97 °F (36.1 °C)-97.8 °F (36.6 °C)] 97.7 °F (36.5 °C)  HR:  [] 92  BP: ()/(50-64) 102/59  Resp:  [18-20] 19  SpO2:  [94 %-100 %] 100 %  O2 Device: Nasal cannula  Nasal Cannula O2 Flow Rate (L/min):  [3 L/min] 3 L/min    I/O         12/23 0701  12/24 0700 12/24 0701  12/25 0700 12/25 0701  12/26 0700    P.O. 0 118 118    I.V. 1393.7 1374.2     IV Piggyback       Total Intake 1393.7 1492.2 118    Urine 300 960 250    Stool  0 0    Total Output 300 960 250    Net +1093.7 +532.2 -132           Unmeasured Stool Occurrence 1 x 2 x 1 x          Lines/Drains/Airways       Active Status       Name Placement date Placement time Site Days    Urethral Catheter Non-latex 18 Fr. 12/24/24  1240  Non-latex  1                  Physical Exam  Vitals and nursing note reviewed.   Constitutional:       General: She is not in acute distress.     Appearance: She is not ill-appearing or toxic-appearing.   HENT:      Head: Normocephalic and atraumatic.      Nose: No congestion.   Eyes:      Extraocular Movements: Extraocular movements intact.   Cardiovascular:      Rate and Rhythm: Normal rate.      Pulses: Normal pulses.   Pulmonary:      Effort: Pulmonary effort is normal. No respiratory distress.      Breath sounds: Normal breath sounds. No wheezing or rhonchi.   Musculoskeletal:         General: No swelling or tenderness. Normal range of motion.      Right lower leg: No edema.      Left lower leg: No edema.   Skin:     General: Skin is warm and dry.      Capillary Refill: Capillary refill takes less than 2 seconds.   Neurological:      General: No focal deficit present.       Mental Status: She is alert and oriented to person, place, and time.      Cranial Nerves: No cranial nerve deficit.      Sensory: No sensory deficit.      Motor: No weakness.   Psychiatric:         Mood and Affect: Mood normal.               Lab Results: I have reviewed the following results:  Recent Labs     12/23/24  0441 12/23/24  1114 12/24/24  0442 12/25/24  1051   WBC  --  14.11*   < > 5.65   HGB  --  9.7*   < > 9.6*   HCT  --  32.7*   < > 33.1*   PLT  --  225   < > 157   SODIUM 139  --    < > 144   K 5.1  --    < > 4.0     --    < > 109*   CO2 22  --    < > 29   BUN 14  --    < > 14   CREATININE 1.09  --    < > 0.67   GLUC 64*  --    < > 90   MG 2.2  --   --   --    PHOS 3.9  --   --   --    LACTICACID  --  1.7  --   --     < > = values in this interval not displayed.       No new imaging studies today  Other Study Results Review: No additional pertinent studies reviewed.

## 2024-12-25 NOTE — ASSESSMENT & PLAN NOTE
Wt Readings from Last 3 Encounters:   12/22/24 50.8 kg (112 lb)   10/24/24 51.3 kg (113 lb)   10/07/24 51.3 kg (113 lb 1.5 oz)     Has not required diuretic, which is dosed as an outpatient just as needed.

## 2024-12-25 NOTE — PROGRESS NOTES
Progress Note - Cardiology   Name: Marsha Oliva 79 y.o. female I MRN: [id number]  Unit/Bed#: [facility] 632-01 I Date of Admission: 12/22/2024   Date of Service: 12/25/2024 I Hospital Day: 3     Assessment & Plan  Repeated falls  As such, not on anticoagulation as an outpatient.  Atrial fibrillation, chronic (HCC)  Presented with rapid ventricular response. She was encephalopathic and delirious initially. Rates were very fast in that setting. She was on a Cardizem infusion as not able to take pills. Hypotension has limited the use of the Cardizem infusion.    At this point, she is back on her oral digoxin which is every other day. Rates on telemetry are controlled. Blood pressure continues to limit the use of the Cardizem, but her rates are actually controlled even without it.  As noted above, not on anticoagulation given falls.  Chronic diastolic CHF (congestive heart failure) ([facility])  Wt Readings from Last 3 Encounters:   12/22/24 50.8 kg (112 lb)   10/24/24 51.3 kg (113 lb)   10/07/24 51.3 kg (113 lb 1.5 oz)     Has not required diuretic, which is dosed as an outpatient just as needed.    Mixed hyperlipidemia  On atorvastatin  Severe mitral regurgitation  Medical management. Blood pressure is on the lower side. Not volume overloaded. Conservative management.        Subjective     Patient more alert for me this morning. Able to answer questions. She is calm. Discussed with the nurse. Slowly removing restraints. Heart rates in atrial fibrillation remained controlled. She is able to take pills she took her digoxin yesterday. Blood pressure remains too low for diltiazem at this point.    Objective :  Temp:  [97 °F (36.1 °C)-98 °F (36.7 °C)] 97 °F (36.1 °C)  HR:  [] 101  BP: ()/(50-64) 113/64  Resp:  [17-20] 20  SpO2:  [92 %-100 %] 100 %  O2 Device: Nasal cannula  Nasal Cannula O2 Flow Rate (L/min):  [3 L/min] 3 L/min  Orthostatic Blood Pressures      Flowsheet Row Most Recent Value   Blood Pressure 113/64 filed  at 12/25/2024 0732   Patient Position - Orthostatic VS Lying filed at 12/24/2024 0242          First Weight:  There were no vitals filed for this visit.  Physical Exam  Constitutional:       General: She is not in acute distress.     Appearance: She is well-developed.   Eyes:      General: No scleral icterus.     Conjunctiva/sclera: Conjunctivae normal.   Neck:      Vascular: No JVD.   Cardiovascular:      Rate and Rhythm: Normal rate. Rhythm irregular.      Heart sounds: No murmur heard.     No friction rub. No gallop.   Pulmonary:      Effort: Pulmonary effort is normal.      Breath sounds: Normal breath sounds. No wheezing or rales.   Chest:      Chest wall: No tenderness.   Abdominal:      General: Bowel sounds are normal. There is no distension.      Palpations: Abdomen is soft.      Tenderness: There is no abdominal tenderness.   Musculoskeletal:         General: Normal range of motion.      Cervical back: Normal range of motion and neck supple.   Skin:     General: Skin is warm and dry.      Findings: No erythema or rash.   Neurological:      Mental Status: She is alert. Mental status is at baseline.   Psychiatric:         Behavior: Behavior normal.           Lab Results: I have reviewed the following results:CBC/BMP: No new results in last 24 hours.   Results from last 7 days   Lab Units 12/24/24  0442 12/23/24  1114 12/22/24  1605 12/22/24  0947 12/22/24  0440   WBC Thousand/uL 9.37 14.11*  --   --  7.60   HEMOGLOBIN g/dL 8.9* 9.7* 10.1*   < > 11.5   HEMATOCRIT % 30.3* 32.7* 33.4*   < > 37.8   PLATELETS Thousands/uL 173 225  --   --  229    < > = values in this interval not displayed.     Results from last 7 days   Lab Units 12/24/24  0442 12/23/24  0441 12/22/24  1605   POTASSIUM mmol/L 4.4 5.1 4.5   CHLORIDE mmol/L 108 106 103   CO2 mmol/L 24 22 25   BUN mg/dL 19 14 12   CREATININE mg/dL 0.96 1.09 1.03   CALCIUM mg/dL 8.5 8.6 8.3*     Results from last 7 days   Lab Units 12/22/24  0440   INR  1.05   PTT  seconds 41*     Lab Results   Component Value Date    HGBA1C 5.3 09/04/2024     Lab Results   Component Value Date    CKTOTAL 88 12/22/2021

## 2024-12-25 NOTE — PLAN OF CARE
Problem: PAIN - ADULT  Goal: Verbalizes/displays adequate comfort level or baseline comfort level  Description: Interventions:  - Encourage patient to monitor pain and request assistance  - Assess pain using appropriate pain scale  - Administer analgesics based on type and severity of pain and evaluate response  - Implement non-pharmacological measures as appropriate and evaluate response  - Consider cultural and social influences on pain and pain management  - Notify physician/advanced practitioner if interventions unsuccessful or patient reports new pain  Outcome: Progressing     Problem: INFECTION - ADULT  Goal: Absence or prevention of progression during hospitalization  Description: INTERVENTIONS:  - Assess and monitor for signs and symptoms of infection  - Monitor lab/diagnostic results  - Monitor all insertion sites, i.e. indwelling lines, tubes, and drains  - Monitor endotracheal if appropriate and nasal secretions for changes in amount and color  - [place] appropriate cooling/warming therapies per order  - Administer medications as ordered  - Instruct and encourage patient and family to use good hand hygiene technique  - Identify and instruct in appropriate isolation precautions for identified infection/condition  Outcome: Progressing     Problem: SAFETY ADULT  Goal: Patient will remain free of falls  Description: INTERVENTIONS:  - Educate patient/family on patient safety including physical limitations  - Instruct patient to call for assistance with activity   - Consult OT/PT to assist with strengthening/mobility   - Keep Call bell within reach  - Keep bed low and locked with side rails adjusted as appropriate  - Keep care items and personal belongings within reach  - Initiate and maintain comfort rounds  - Make Fall Risk Sign visible to staff  - Offer Toileting every 2 Hours, in advance of need  - Initiate/Maintain bed alarm  - Obtain necessary fall risk management equipment: bed alarm  - Apply yellow  socks and bracelet for high fall risk patients  - Consider moving patient to room near nurses station  Outcome: Progressing  Goal: Maintain or return to baseline ADL function  Description: INTERVENTIONS:  -  Assess patient's ability to carry out ADLs; assess patient's baseline for ADL function and identify physical deficits which impact ability to perform ADLs (bathing, care of mouth/teeth, toileting, grooming, dressing, etc.)  - Assess/evaluate cause of self-care deficits   - Assess range of motion  - Assess patient's mobility; develop plan if impaired  - Assess patient's need for assistive devices and provide as appropriate  - Encourage maximum independence but intervene and supervise when necessary  - Involve family in performance of ADLs  - Assess for home care needs following discharge   - Consider OT consult to assist with ADL evaluation and planning for discharge  - Provide patient education as appropriate  Outcome: Progressing  Goal: Maintains/Returns to pre admission functional level  Description: INTERVENTIONS:  - Perform AM-PAC 6 Click Basic Mobility/ Daily Activity assessment daily.  - Set and communicate daily mobility goal to care team and patient/family/caregiver.   - Collaborate with rehabilitation services on mobility goals if consulted  - Perform Range of Motion 3 times a day.  - Reposition patient every 2 hours.  - Dangle patient 3 times a day  - Stand patient 3 times a day  - Ambulate patient 3 times a day  - Out of bed to chair 3 times a day   - Out of bed for meals 3 times a day  - Out of bed for toileting  - Record patient progress and toleration of activity level   Outcome: Progressing     Problem: DISCHARGE PLANNING  Goal: Discharge to home or other facility with appropriate resources  Description: INTERVENTIONS:  - Identify barriers to discharge w/patient and caregiver  - Arrange for needed discharge resources and transportation as appropriate  - Identify discharge learning needs (meds,  wound care, etc.)  - Arrange for interpretive services to assist at discharge as needed  - Refer to Case Management Department for coordinating discharge planning if the patient needs post-hospital services based on physician/advanced practitioner order or complex needs related to functional status, cognitive ability, or social support system  Outcome: Progressing     Problem: Knowledge Deficit  Goal: Patient/family/caregiver demonstrates understanding of disease process, treatment plan, medications, and discharge instructions  Description: Complete learning assessment and assess knowledge base.  Interventions:  - Provide teaching at level of understanding  - Provide teaching via preferred learning methods  Outcome: Progressing     Problem: NEUROSENSORY - ADULT  Goal: Achieves stable or improved neurological status  Description: INTERVENTIONS  - Monitor and report changes in neurological status  - Monitor vital signs such as temperature, blood pressure, glucose, and any other labs ordered   - Initiate measures to prevent increased intracranial pressure  - Monitor for seizure activity and implement precautions if appropriate      Outcome: Progressing  Goal: Remains free of injury related to seizures activity  Description: INTERVENTIONS  - Maintain airway, patient safety  and administer oxygen as ordered  - Monitor patient for seizure activity, document and report duration and description of seizure to physician/advanced practitioner  - If seizure occurs,  ensure patient safety during seizure  - Reorient patient post seizure  - Seizure pads on all 4 side rails  - Instruct patient/family to notify RN of any seizure activity including if an aura is experienced  - Instruct patient/family to call for assistance with activity based on nursing assessment  - Administer anti-seizure medications if ordered    Outcome: Progressing  Goal: Achieves maximal functionality and self care  Description: INTERVENTIONS  - Monitor  swallowing and airway patency with patient fatigue and changes in neurological status  - Encourage and assist patient to increase activity and self care.   - Encourage visually impaired, hearing impaired and aphasic patients to use assistive/communication devices  Outcome: Progressing     Problem: CARDIOVASCULAR - ADULT  Goal: Maintains optimal cardiac output and hemodynamic stability  Description: INTERVENTIONS:  - Monitor I/O, vital signs and rhythm  - Monitor for S/S and trends of decreased cardiac output  - Administer and titrate ordered vasoactive medications to optimize hemodynamic stability  - Assess quality of pulses, skin color and temperature  - Assess for signs of decreased coronary artery perfusion  - Instruct patient to report change in severity of symptoms  Outcome: Progressing  Goal: Absence of cardiac dysrhythmias or at baseline rhythm  Description: INTERVENTIONS:  - Continuous cardiac monitoring, vital signs, obtain 12 lead EKG if ordered  - Administer antiarrhythmic and heart rate control medications as ordered  - Monitor electrolytes and administer replacement therapy as ordered  Outcome: Progressing     Problem: RESPIRATORY - ADULT  Goal: Achieves optimal ventilation and oxygenation  Description: INTERVENTIONS:  - Assess for changes in respiratory status  - Assess for changes in mentation and behavior  - Position to facilitate oxygenation and minimize respiratory effort  - Oxygen administered by appropriate delivery if ordered  - Initiate smoking cessation education as indicated  - Encourage broncho-pulmonary hygiene including cough, deep breathe, Incentive Spirometry  - Assess the need for suctioning and aspirate as needed  - Assess and instruct to report SOB or any respiratory difficulty  - Respiratory Therapy support as indicated  Outcome: Progressing     Problem: HEMATOLOGIC - ADULT  Goal: Maintains hematologic stability  Description: INTERVENTIONS  - Assess for signs and symptoms of bleeding  or hemorrhage  - Monitor labs  - Administer supportive blood products/factors as ordered and appropriate  Outcome: Progressing     Problem: MUSCULOSKELETAL - ADULT  Goal: Maintain or return mobility to safest level of function  Description: INTERVENTIONS:  - Assess patient's ability to carry out ADLs; assess patient's baseline for ADL function and identify physical deficits which impact ability to perform ADLs (bathing, care of mouth/teeth, toileting, grooming, dressing, etc.)  - Assess/evaluate cause of self-care deficits   - Assess range of motion  - Assess patient's mobility  - Assess patient's need for assistive devices and provide as appropriate  - Encourage maximum independence but intervene and supervise when necessary  - Involve family in performance of ADLs  - Assess for home care needs following discharge   - Consider OT consult to assist with ADL evaluation and planning for discharge  - Provide patient education as appropriate  Outcome: Progressing  Goal: Maintain proper alignment of affected body part  Description: INTERVENTIONS:  - Support, maintain and protect limb and body alignment  - Provide patient/ family with appropriate education  Outcome: Progressing     Problem: Nutrition/Hydration-ADULT  Goal: Nutrient/Hydration intake appropriate for improving, restoring or maintaining nutritional needs  Description: Monitor and assess patient's nutrition/hydration status for malnutrition. Collaborate with interdisciplinary team and initiate plan and interventions as ordered.  Monitor patient's weight and dietary intake as ordered or per policy. Utilize nutrition screening tool and intervene as necessary. Determine patient's food preferences and provide high-protein, high-caloric foods as appropriate.     INTERVENTIONS:  - Monitor oral intake, urinary output, labs, and treatment plans  - Assess nutrition and hydration status and recommend course of action  - Evaluate amount of meals eaten  - Assist patient  with eating if necessary   - Allow adequate time for meals  - Recommend/ encourage appropriate diets, oral nutritional supplements, and vitamin/mineral supplements  - Order, calculate, and assess calorie counts as needed  - Recommend, monitor, and adjust tube feedings and TPN/PPN based on assessed needs  - Assess need for intravenous fluids  - Provide specific nutrition/hydration education as appropriate  - Include patient/family/caregiver in decisions related to nutrition  Outcome: Progressing     Problem: SAFETY,RESTRAINT: NV/NON-SELF DESTRUCTIVE BEHAVIOR  Goal: Remains free of harm/injury (restraint for non violent/non self-detsructive behavior)  Description: INTERVENTIONS:  - Instruct patient/family regarding restraint use   - Assess and monitor physiologic and psychological status   - Provide interventions and comfort measures to meet assessed patient needs   - Identify and implement measures to help patient regain control  - Assess readiness for release of restraint   Outcome: Progressing  Goal: Returns to optimal restraint-free functioning  Description: INTERVENTIONS:  - Assess the patient's behavior and symptoms that indicate continued need for restraint  - Identify and implement measures to help patient regain control  - Assess readiness for release of restraint   Outcome: Progressing     Problem: CONFUSION/THOUGHT DISTURBANCE  Goal: Thought disturbances (confusion, delirium, depression, dementia or psychosis) are managed to maintain or return to baseline mental status and functional level  Description: INTERVENTIONS:  - Assess for possible contributors to  thought disturbance, including but not limited to medications, infection, impaired vision or hearing, underlying metabolic abnormalities, dehydration, respiratory compromise,  psychiatric diagnoses and notify attending PHYSICAN/AP  - Monitor and intervene to maintain adequate nutrition, hydration, elimination, sleep and activity  - Decrease environmental  stimuli, including noise as appropriate.  - Provide frequent contacts to provide refocusing, direction and reassurance as needed. Approach patient calmly with eye contact and at their level.  - [place] high risk fall precautions, aspiration precautions and other safety measures, as indicated  - If delirium suspected, notify physician/AP of change in condition and request immediate in-person evaluation  - Pursue consults as appropriate including Geriatric (campus dependent), OT for cognitive evaluation/activity planning, psychiatric, pastoral care, etc.  Outcome: Progressing     Problem: BEHAVIOR  Goal: Pt/Family maintain appropriate behavior and adhere to behavioral management agreement, if implemented  Description: INTERVENTIONS:  - Assess the family dynamic   - Encourage verbalization of thoughts and concerns in a socially appropriate manner  - Assess patient/family's coping skills and non-compliant behavior (including use of illegal substances).  - Utilize positive, consistent limit setting strategies supporting safety of patient, staff and others  - Initiate consult with Case Management, Spiritual Care or other ancillary services as appropriate  - If a patient's/visitor's behavior jeopardizes the safety of the patient, staff, or others, refer to organization procedure.   - Notify Security of behavior or suspected illegal substances which indicate the need for search of the patient and/or belongings  - Encourage participation in the decision making process about a behavioral management agreement; implement if patient meets criteria  Outcome: Progressing     Problem: Prexisting or High Potential for Compromised Skin Integrity  Goal: Skin integrity is maintained or improved  Description: INTERVENTIONS:  - Identify patients at risk for skin breakdown  - Assess and monitor skin integrity  - Assess and monitor nutrition and hydration status  - Monitor labs   - Assess for incontinence   - Turn and reposition  patient  - Assist with mobility/ambulation  - Relieve pressure over bony prominences  - Avoid friction and shearing  - Provide appropriate hygiene as needed including keeping skin clean and dry  - Evaluate need for skin moisturizer/barrier cream  - Collaborate with interdisciplinary team   - Patient/family teaching  - Consider wound care consult   Outcome: Progressing

## 2024-12-25 NOTE — ASSESSMENT & PLAN NOTE
Medical management. Blood pressure is on the lower side. Not volume overloaded. Conservative management.

## 2024-12-25 NOTE — ASSESSMENT & PLAN NOTE
Found to be in A-fib with RVR on presentation at [facility], started on Cardizem drip  - Hold cardizem gtt for hypotension  - Continue tele  - Patient ordered home Cardizem, however currently being held as she is unable to swallow large pills.  Contacted pharmacy regarding IV dosing.  As there is a shortage, they do not recommend bolus dosing and only recommend constant infusions.  Will continue to have speech and swallow evaluate for appropriateness of p.o. medications.

## 2024-12-25 NOTE — ASSESSMENT & PLAN NOTE
Presented with rapid ventricular response. She was encephalopathic and delirious initially. Rates were very fast in that setting. She was on a Cardizem infusion as not able to take pills. Hypotension has limited the use of the Cardizem infusion.    At this point, she is back on her oral digoxin which is every other day. Rates on telemetry are controlled. Blood pressure continues to limit the use of the Cardizem, but her rates are actually controlled even without it.  As noted above, not on anticoagulation given falls.

## 2024-12-25 NOTE — ASSESSMENT & PLAN NOTE
Closed T1 fracture, sacral fracture with small hematoma and some active extravasation.     Downgraded to MedSurg as hemoglobins have been stable  As needed pain and nausea control  Ortho consult-weightbearing as tolerated, nonoperative  Neurosurgery consult-nonoperative  Encourage I-S  Discharge planning per case management.

## 2024-12-26 PROBLEM — R33.8 ACUTE URINARY RETENTION: Status: ACTIVE | Noted: 2024-12-26

## 2024-12-26 LAB
GLUCOSE SERPL-MCNC: 106 MG/DL (ref 65–140)
GLUCOSE SERPL-MCNC: 119 MG/DL (ref 65–140)
GLUCOSE SERPL-MCNC: 74 MG/DL (ref 65–140)
GLUCOSE SERPL-MCNC: 83 MG/DL (ref 65–140)
GLUCOSE SERPL-MCNC: 97 MG/DL (ref 65–140)

## 2024-12-26 PROCEDURE — 92526 ORAL FUNCTION THERAPY: CPT

## 2024-12-26 PROCEDURE — 99233 SBSQ HOSP IP/OBS HIGH 50: CPT | Performed by: INTERNAL MEDICINE

## 2024-12-26 PROCEDURE — 99232 SBSQ HOSP IP/OBS MODERATE 35: CPT | Performed by: SURGERY

## 2024-12-26 PROCEDURE — 82948 REAGENT STRIP/BLOOD GLUCOSE: CPT

## 2024-12-26 PROCEDURE — 99232 SBSQ HOSP IP/OBS MODERATE 35: CPT | Performed by: INTERNAL MEDICINE

## 2024-12-26 RX ORDER — ACETAMINOPHEN 325 MG/1
975 TABLET ORAL EVERY 8 HOURS SCHEDULED
Status: DISCONTINUED | OUTPATIENT
Start: 2024-12-26 | End: 2024-12-29

## 2024-12-26 RX ORDER — TAMSULOSIN HYDROCHLORIDE 0.4 MG/1
0.4 CAPSULE ORAL
Status: DISCONTINUED | OUTPATIENT
Start: 2024-12-26 | End: 2025-01-02

## 2024-12-26 RX ADMIN — DILTIAZEM HYDROCHLORIDE 180 MG: 180 CAPSULE, COATED, EXTENDED RELEASE ORAL at 10:22

## 2024-12-26 RX ADMIN — LEVOTHYROXINE SODIUM 125 MCG: 0.12 TABLET ORAL at 06:37

## 2024-12-26 RX ADMIN — ENOXAPARIN SODIUM 30 MG: 30 INJECTION SUBCUTANEOUS at 10:23

## 2024-12-26 RX ADMIN — SERTRALINE 100 MG: 100 TABLET, FILM COATED ORAL at 10:23

## 2024-12-26 RX ADMIN — ENOXAPARIN SODIUM 30 MG: 30 INJECTION SUBCUTANEOUS at 21:20

## 2024-12-26 RX ADMIN — ATORVASTATIN CALCIUM 20 MG: 20 TABLET, FILM COATED ORAL at 10:23

## 2024-12-26 RX ADMIN — TAMSULOSIN HYDROCHLORIDE 0.4 MG: 0.4 CAPSULE ORAL at 17:48

## 2024-12-26 RX ADMIN — DIGOXIN 125 MCG: 125 TABLET ORAL at 10:23

## 2024-12-26 RX ADMIN — QUETIAPINE FUMARATE 25 MG: 25 TABLET ORAL at 21:20

## 2024-12-26 RX ADMIN — ACETAMINOPHEN 975 MG: 325 TABLET, FILM COATED ORAL at 21:20

## 2024-12-26 NOTE — ASSESSMENT & PLAN NOTE
- Found to be in A-fib with RVR on presentation at [facility], started on Cardizem drip  - Appreciate cardiology consult and recommendations  - Currently rate controlled on Digoxin and Cardizem PO  - not anticoagulated in the outpatient setting due to PMH of severe Gi bleeds  - Follow up with PCP

## 2024-12-26 NOTE — PROGRESS NOTES
"Progress Note - Trauma   Name: Marsha Oliva 79 y.o. female I MRN: [id number]  Unit/Bed#: [facility] 632-01 I Date of Admission: 12/22/2024   Date of Service: 12/26/2024 I Hospital Day: 4    Assessment & Plan  Repeated falls  Closed T1 fracture, sacral fracture with small hematoma and some active extravasation.     Downgraded to MedSurg as hemoglobins have been stable  As needed pain and nausea control  Ortho consult-weightbearing as tolerated, nonoperative  Neurosurgery consult-nonoperative  Encourage I-S  Discharge planning per case management.  Closed T1 fracture (HCC)  - Age indeterminate T1 fx  - Appreciate NSG consult and recommendations  - Bracing deferred    Atrial fibrillation, chronic ([facility])  - Found to be in A-fib with RVR on presentation at [facility], started on Cardizem drip  - Appreciate cardiology consult and recommendations  - Currently rate controlled on Digoxin and Cardizem PO  - not anticoagulated in the outpatient setting due to PMH of severe Gi bleeds  - Follow up with PCP  S/P cervical spinal fusion  Appreciate NSG consult and recommendations  \"S/p C1-C3 posterior fusion 9/5/24 with Dr. Simpson after sustaining fall with type II dens fracture with posterior displacement \"  Follow up in 3 months with NSG for post op visit  Sacral fracture ([facility])  Appreciate orthopedic surgery input  Non op  WBAT  Pain control  DVT ppx  PT and OT  Chronic diastolic CHF (congestive heart failure) ([facility])  Wt Readings from Last 3 Encounters:   12/22/24 50.8 kg (112 lb)   10/24/24 51.3 kg (113 lb)   10/07/24 51.3 kg (113 lb 1.5 oz)     - No evidence of acute exacerbation  - Continue home meds  Hypothyroidism  Continue levothyroxine  Mixed hyperlipidemia  Continue atorvastatin  Severe mitral regurgitation    ILD (interstitial lung disease) ([facility])  Previously on home oxygen,  states she has not needed it in a year.  -Titrate oxygen as needed, wean as able  Apraxia  Chronic  Vitamin D deficiency    Ambulatory " "dysfunction    Osteoporosis    Dysphagia  - Appreciate speech eval and recommendations  - Plan for VBS    Bowel Regimen: miralax  VTE Prophylaxis:VTE covered by:  enoxaparin, Subcutaneous, 30 mg at 12/26/24 1023         Disposition: PT and OT recommend discharge to rehab. Placement pending Please contact the SecureChat role,\"[facility] Trauma AP\", with any questions/concerns.    24 Hour Events : pending VBS  Subjective : Pt is comfortable in bed and denies any pain. She is pending VBS     Objective :  Temp:  [97 °F (36.1 °C)-97.6 °F (36.4 °C)] 97 °F (36.1 °C)  HR:  [] 90  BP: (104-129)/(60-84) 120/60  Resp:  [20-22] 22  SpO2:  [92 %-100 %] 98 %  O2 Device: Nasal cannula  Nasal Cannula O2 Flow Rate (L/min):  [1 L/min-2 L/min] 2 L/min    I/O         12/24 0701  12/25 0700 12/25 0701  12/26 0700 12/26 0701  12/27 0700    P.O. 118 368     I.V. 1374.2      Total Intake 1492.2 368     Urine 960 475     Stool 0 0     Total Output 960 475     Net +532.2 -107            Unmeasured Stool Occurrence 2 x 1 x 1 x          Lines/Drains/Airways       Active Status       Name Placement date Placement time Site Days    Urethral Catheter Non-latex 18 Fr. 12/24/24  1240  Non-latex  2                  Physical Exam   GENERAL APPEARANCE: Patient in no acute distress.  HEENT: NCAT; PERRL, EOMs intact; Mucous membranes moist  CV: Irregularly irregular rate and rhythm; no murmur/gallops/rubs appreciated.  CHEST / LUNGS: Clear to auscultation; no wheezes/rales/rhonci.  ABD: NABS; soft; non-distended; non-tender.  EXT: +2 pulses bilaterally upper & lower extremities; no edema.  NEURO: GCS 15; no focal neurologic deficits; neurovascularly intact.  SKIN: Warm, dry and well perfused; no rash; no jaundice.           Lab Results: I have reviewed the following results:  Recent Labs     12/25/24  1051   WBC 5.65   HGB 9.6*   HCT 33.1*      SODIUM 144   K 4.0   *   CO2 29   BUN 14   CREATININE 0.67   GLUC 90       Imaging Results Review: " No pertinent imaging studies reviewed.  Other Study Results Review: No additional pertinent studies reviewed.

## 2024-12-26 NOTE — ASSESSMENT & PLAN NOTE
Wt Readings from Last 3 Encounters:   12/22/24 50.8 kg (112 lb)   10/24/24 51.3 kg (113 lb)   10/07/24 51.3 kg (113 lb 1.5 oz)     - No evidence of acute exacerbation  - Continue home meds

## 2024-12-26 NOTE — ASSESSMENT & PLAN NOTE
"Appreciate NSG consult and recommendations  \"S/p C1-C3 posterior fusion 9/5/24 with Dr. Simpson after sustaining fall with type II dens fracture with posterior displacement \"  Follow up in 3 months with NSG for post op visit  "

## 2024-12-26 NOTE — CASE MANAGEMENT
Case Management Discharge Planning Note    Patient name Marsha Oliva  Location [facility] 632/[facility] 632-01 MRN [id number]  : 1945 Date 2024       Current Admission Date: 2024  Current Admission Diagnosis:Repeated falls   Patient Active Problem List    Diagnosis Date Noted Date Diagnosed    Osteoporosis 2024     Closed T1 fracture (HCC) 2024     Sacral fracture ([facility]) 2024     Impaired mobility and activities of daily living 2024     Severe protein-calorie malnutrition (HCC) 2024     Closed odontoid fracture with type II morphology, posterior displacement, and nonunion 2024     S/P cervical spinal fusion 2024     At risk for altered bowel elimination 2024     At risk for altered urinary elimination 2024     At risk for deep venous thrombosis 2024     Encounter for rehabilitation 2024     Lymphedema 2024     Oropharyngeal dysphagia 2024     Skin abnormalities 2024     Acute pain due to trauma 2024     At risk for delirium 2024     Heart failure with preserved ejection fraction (HCC) 2024     Closed odontoid fracture with type II morphology and posterior displacement ([facility]) 2024     Spinal cord compression ([facility]) 2024     Chronic respiratory failure with hypoxia ([facility]) 2023     Stage 3a chronic kidney disease ([facility]) 2022     Bilateral hydronephrosis 2022     Proctitis 2022     Fall 2022     Hypokalemia 2022     ILD (interstitial lung disease) ([facility]) 2022     Oropharyngeal aspiration 2022     Elevated troponin 2022     EKG abnormalities 2022     Mild cognitive impairment of uncertain or unknown etiology 2022     Hormone replacement therapy 2022     Apraxia 2021     Constipation, unspecified 2021     Corn or callus 2021     Ambulatory dysfunction 2021     Generalized muscle weakness 2021      Lymphedema, not elsewhere classified 12/28/2021     Repeated falls 12/28/2021     Hypoxia 12/23/2021     Frequent falls 12/23/2021     Elevated d-dimer 12/23/2021     Venous insufficiency of both lower extremities 12/17/2021     Acquired bilateral hammer toes 07/27/2018     Lymphedema of both lower extremities 07/02/2018     Severe mitral regurgitation 05/31/2018     Anemia 05/13/2018     Cardiomegaly 05/13/2018     Wound of right leg 05/13/2018     Hypertensive heart disease with heart failure (HCC) 05/13/2018     Major depressive disorder, single episode, unspecified 05/13/2018     Personal history of nicotine dependence 05/13/2018     Pleural effusion, not elsewhere classified 05/13/2018     Solitary pulmonary nodule 05/13/2018     Atrial fibrillation, chronic (HCC) 05/01/2018     B12 deficiency 01/13/2017     Chronic diastolic CHF (congestive heart failure) ([facility]) 01/13/2017     Folate deficiency 01/13/2017     Vitamin D deficiency 06/03/2016     Depression with anxiety 08/03/2015     Mixed hyperlipidemia 08/03/2015     Osteoarthritis of knee 10/23/2012     Impaired fasting glucose 05/31/2012     Essential hypertension 03/23/2010     Hypothyroidism 03/23/2010     Posttraumatic stress disorder 03/23/2010       LOS (days): 4  Geometric Mean LOS (GMLOS) (days): 4.6  Days to GMLOS:0.4     OBJECTIVE:  Risk of Unplanned Readmission Score: 29.7         Current admission status: Inpatient   Preferred Pharmacy:   [facility] - [place], [place] - [address]  [address] [address]  Phone: [phone] Fax: [phone]    [id number] - [place], [place] - [address] [address]  Phone: [phone] Fax: [phone]    Primary Care Provider: Rebecca Solis    Primary Insurance: [facility] [facility]  Secondary Insurance:     DISCHARGE DETAILS:    Other Referral/Resources/Interventions Provided:  Interventions: Short Term  Rehab  Referral Comments: Per [facility] communication, referrals to [facility] and back up SNF referrals remain under clinical review at this time. CM team will continue to follow and remain available.    Treatment Team Recommendation: Short Term Rehab  Discharge Destination Plan:: Short Term Rehab

## 2024-12-26 NOTE — PROGRESS NOTES
Progress Note - Geriatric Medicine   Marsha Oliva 79 y.o. female MRN: [id number]  Unit/Bed#: [facility] 632-01 Encounter: [id number]      Assessment/Plan:    Acute metabolic encephalopathy   -evidenced by sudden onset confusion and fluctuating mental status worse than baseline requiring treatment with Zyprexa and soft wrist restraints in patient who was reportedly fully oriented on initial presentation   -markedly improved and appears to be much closer to baseline   -continue acute pain control, ongoing optimization of hemodynamics and reorient frequently as appropriate  -encourage normal circadian rhythm  -no longer requiring physical restraints  -reorient often as appropriate and redirect unwanted behaviors as first line      Ambulatory dysfunction with fall  -reportedly mechanical fall earlier on day of admission   -admitted with below noted injuries   -utilizes walker for ambulation at baseline  -hx recurrent falls at least one additional in past year   -remains high risk future falls due to age, hx of fall, impulsivity with poor safety awareness, deconditioning/debility and unfamiliar environment   -encourage good body mechanics and assist with all transfers  -keep personal items and call bell close to prevent reaching  -maintain environment free of fall hazards  -encourage appropriate footwear and adequate lighting at all times when out of bed  -consider home fall risk assessment and personal fall alert system if returning home on d/c  -PT and OT following       T1 vertebral fracture  -s/p fall as outlined above   -noted on admission imaging  -neurovascular checks per protocol  -acute multimodal pain control  -Nsx on consult - recommend deferring bracing due to asymptomatic, close o/p f/u     Sacral fracture  -s/p fall as outlined above  -noted on admission imaging with possible presacral hemorrhage  -continue acute multimodal pain control  -trending H/H  -Neurovascular checks per protocol  -Ortho on consult -  WBAT and acute pain control  -PT/OT following      Acute pain due to trauma  -recommend pain control per Geriatric pain protocol:  Tylenol 975mg Q8H scheduled  Roxicodone 2.5mg Q4H PRN moderate pain  Roxicodone 5mg Q4H PRN severe pain  Dilaudid 0.2mg Q2H PRN  -consider adjuncts such as lidocaine patch topically to appropriate areas   -encourage addition of non-pharmacologic pain treatment including ice and frequent repositioning  -recommend bowel regimen to prevent treat constipation due to increased risk with acute pain and opiate pain medications     Afib with RVR  -presented with afib and RVR with rates persistently in 120s-140s now markedly improved  -maintained on diltiazem and digoxin as o/p  -not on systemic AC as o/p due to hx of severe GI bleed per records   -continue optimization of hemodynamics   -Cardiology on consult     Dysphagia  -speech therapy following, currently on puree with honey thick  -barium swallow pending  -continue strict aspiration precautions      Cognitive impairment   -acutely encephalopathy markedly improved, appears to be near or at baseline   -at baseline oriented 2-4 with periods of confusion and very poor short term memory   -reportedly mostly independent with ADLs heavy assist of  with iADLs   -MoCA 16/30 (9/2024) suggestive of at least mild to moderate dementia at baseline   -CTH obtained on admission imaging personally viewed, reveals moderate to severe diffuse chronic microangiopathic changes  -Maintained on levothyroxine chronically outpatient for hypothyroidism, last B12 WNL  -at risk age and cardiovascular related progression of underlying cognitive impairment, continue secondary risk modifications  -Encourage patient remain physically, socially, cognitively active and engaged to maintain cognitive acuity  -Encourage use of sensory assist devices such as corrective lenses at all appropriate times to reduce risk of uncorrected sensory impairment from contributing to  isolation, confusion, encephalopathy and more precipitous cognitive decline  -Underlying dementia significantly increases risk of developing delirium during hospitalization likely contributing to current episode, continue strict precautions and supportive cares  -consider comprehensive geriatric assessment as o/p for ongoing management      Depression with anxiety  -maintained on Zoloft chronically as o/p, continue home regimen   -Given underlying dementia uncertain ability to precipitate with outpatient counseling/support group but will continue to benefit from ongoing psychosocial support of family and friends  -Continue home medication regimen and close outpatient follow-up with PCP for ongoing digitation and medication management  -encourage calm quiet env to reduce risk overstimulation      Hypothyroidism  -TSH WNL at 1.33 on admission  -Continue home levothyroxine regimen and close outpatient follow-up with PCP for ongoing titration and medication management     Impaired Vision  -recommend use of corrective lenses at all appropriate times  -encourage adequate lighting and encourage use of assistance with ambulation  -keep personal belongings close to person to avoid reaching  -Consider large font for printed materials provided to patient     Impaired mastication  -Requires use of dentures-encourage use at all appropriate times  -ensure meal consistency appropriate for abilities  -continue aspiration precautions    Frailty syndrome in geriatric patient   -Clinical frailty scale stage V/VI, moderately frail, progressive  -Multifactor including age, ambulatory dysfunction with history recurrent falls, depression with anxiety, cognitive impairment and multitude of additional chronic medical comorbidities now with fall and acute traumatic injuries superimposed in elderly individual with limited physiologic and metabolic reserves  -Continue optimization chronic conditions and address acute metabolic derangements as  arise   -Encourage well-balanced nutritional intake  -Ensure underlying anxiety/mood/depression symptoms are well-controlled as may impact patient response to therapies as well overall sense of wellbeing and quality of life  -Continue to ensure that treatments and interventions align with patient's wishes and goals of care  -Continue psychosocial supports of patient and caregivers     High risk developing delirium   -due to age, fall, traumatic injuries, acute pain, hospitalization, underlying cognitive impairment   -continue delirium precautions  -maintain normal sleep/wake cycle  -minimize overnight interruptions, group overnight vitals/labs/nursing checks as possible  -dim lights, close blinds and turn off tv to minimize stimulation and encourage sleep environment in evenings  -ensure that pain is well controlled  -monitor for fecal and urinary retention which may precipitate delirium  -encourage early mobilization and ambulation with assist as cleared to safely do so  -provide frequent reorientation and redirection as indicated and appropriate   -encourage family and friends at the bedside to help calm patient if anxious  -minimize use of medications which may precipitate or worsen delirium such as tramadol, benzodiazepine, anticholinergics, and benadryl as possible   -encourage hydration and nutrition   -redirect unwanted behaviors as first line    Care coordination: rounded with Dot (RN)    Subjective:     Marsha is seen and examined at bedside where she is sitting resting with her  and neighbor at her side while she is eating lunch, she is awake and alert, reports no pain or acute complaints other than wanting to go home.     Objective:     Vitals: Blood pressure 120/60, pulse 90, temperature (!) 97 °F (36.1 °C), resp. rate 22, SpO2 98%.,There is no height or weight on file to calculate BMI.      Intake/Output Summary (Last 24 hours) at 12/26/2024 1205  Last data filed at 12/26/2024 0554  Gross per 24  hour   Intake 250 ml   Output 425 ml   Net -175 ml     Current Medications: Reviewed    Physical Exam:   Physical Exam  Vitals and nursing note reviewed.   Constitutional:       General: She is not in acute distress.     Appearance: Normal appearance. She is not toxic-appearing.   HENT:      Head: Normocephalic.      Nose: Nose normal.      Mouth/Throat:      Mouth: Mucous membranes are moist.   Eyes:      General: No scleral icterus.        Right eye: No discharge.         Left eye: No discharge.      Conjunctiva/sclera: Conjunctivae normal.   Neck:      Comments: Trachea midline, phonation normal  Cardiovascular:      Rate and Rhythm: Normal rate and regular rhythm.   Pulmonary:      Effort: Pulmonary effort is normal. No respiratory distress.      Comments: Coughs with oral intake   Abdominal:      General: Bowel sounds are normal. There is no distension.      Palpations: Abdomen is soft.      Tenderness: There is no abdominal tenderness.   Musculoskeletal:      Cervical back: Neck supple.      Comments: Reduced overall muscle mass    Skin:     General: Skin is warm and dry.      Comments: Thin and friable    Neurological:      Mental Status: She is alert.      Comments: Awake and alert, oriented, answers questions appropriately but forgetful    Psychiatric:         Mood and Affect: Mood normal.         Behavior: Behavior normal.      Comments: Pleasant and cooperative         Invasive Devices       Peripheral Intravenous Line  Duration             Peripheral IV 12/22/24 Proximal;Right;Ventral (anterior) Forearm 4 days    Peripheral IV 12/22/24 Distal;Left;Ventral (anterior) Forearm 3 days    Peripheral IV 12/23/24 Left;Proximal;Ventral (anterior) Forearm 3 days              Drain  Duration             Urethral Catheter Non-latex 18 Fr. 1 day                  Lab Results:     I have personally reviewed pertinent lab results including the following:    Results from last 7 days   Lab Units 12/25/24  1059  12/24/24  0442 12/23/24  1114 12/22/24  0947 12/22/24  0440   WBC Thousand/uL 5.65 9.37 14.11*  --  7.60   HEMOGLOBIN g/dL 9.6* 8.9* 9.7*   < > 11.5   HEMATOCRIT % 33.1* 30.3* 32.7*   < > 37.8   PLATELETS Thousands/uL 157 173 225  --  229   SEGS PCT % 81*  --  88*  --  85*   MONO PCT % 6  --  6  --  6   EOS PCT % 3  --  0  --  2    < > = values in this interval not displayed.     Results from last 7 days   Lab Units 12/25/24  1051 12/24/24  0442 12/23/24  0441 12/22/24  1605 12/22/24  0440   POTASSIUM mmol/L 4.0 4.4 5.1 4.5 4.5   CHLORIDE mmol/L 109* 108 106 103 101   CO2 mmol/L 29 24 22 25 26   BUN mg/dL 14 19 14 12 14   CREATININE mg/dL 0.67 0.96 1.09 1.03 1.16   CALCIUM mg/dL 8.5 8.5 8.6 8.3* 9.2   ALK PHOS U/L  --   --   --  90 104   ALT U/L  --   --   --  10 14   AST U/L  --   --   --  23 29     I have personally reviewed the following imaging study reports in PACS:    12/23/24- CXR

## 2024-12-26 NOTE — ASSESSMENT & PLAN NOTE
- Failed urinary retention protocol 12/24 and required tapia catheter placement  - Recommend flomax and outpatient urology follow up for voiding trial

## 2024-12-26 NOTE — SPEECH THERAPY NOTE
Speech Language/Pathology    Speech/Language Pathology Progress Note    Patient Name: Marsha Oliva  Today's Date: 12/26/2024     Problem List  Principal Problem:    Repeated falls  Active Problems:    Atrial fibrillation, chronic (HCC)    Chronic diastolic CHF (congestive heart failure) (HCC)    Hypothyroidism    Mixed hyperlipidemia    Severe mitral regurgitation    Vitamin D deficiency    ILD (interstitial lung disease) (HCC)    Apraxia    Ambulatory dysfunction    S/P cervical spinal fusion    Closed T1 fracture (HCC)    Sacral fracture (HCC)    Osteoporosis       Past Medical History  Past Medical History:   Diagnosis Date    Disease of thyroid gland     Hypotension     Supratherapeutic INR 6/2/2022        Past Surgical History  Past Surgical History:   Procedure Laterality Date    CERVICAL FUSION Bilateral 9/5/2024    Procedure: navigated posterior fusion C1-C3;  Surgeon: Eduardo Dewitt MD;  Location: [facility];  Service: Neurosurgery    HYSTERECTOMY           Subjective:  Patient awake and alert today.     Objective:  Patient is seen for dysphagia therapy. Upon SLP entrance, patient is finishing am tray of puree solids and honey thick liquids. She tolerates puree waffle well. Patient is agreeable to upgraded trials. She is assessed with nectar thick and thin liquids, with regular pretzel. Mastication time is adequate, with a minimal amount of oral residue. The patient presents with cough response to all trials, including intermittently with honey thick liquids. She is given medications whole with nectar thick liquids. Again, she continues with intermittent cough. O2 ranges from 89-93% on 1.5L O2. Upon further chart review, patient has history of aspiration and has been on a puree diet with honey thick liquids in the past. Question compliance with diet at home. Would like to repeat VBS to instrumentally re-assess for aspiration prior to discharge home.     Assessment:  Patient presents with cough  response to all trials today.    Plan/Recommendations:  Continue puree diet with honey thick liquids. VBS when able. Continue ST.

## 2024-12-26 NOTE — PLAN OF CARE
Problem: PAIN - ADULT  Goal: Verbalizes/displays adequate comfort level or baseline comfort level  Description: Interventions:  - Encourage patient to monitor pain and request assistance  - Assess pain using appropriate pain scale  - Administer analgesics based on type and severity of pain and evaluate response  - Implement non-pharmacological measures as appropriate and evaluate response  - Consider cultural and social influences on pain and pain management  - Notify physician/advanced practitioner if interventions unsuccessful or patient reports new pain  Outcome: Progressing     Problem: INFECTION - ADULT  Goal: Absence or prevention of progression during hospitalization  Description: INTERVENTIONS:  - Assess and monitor for signs and symptoms of infection  - Monitor lab/diagnostic results  - Monitor all insertion sites, i.e. indwelling lines, tubes, and drains  - Monitor endotracheal if appropriate and nasal secretions for changes in amount and color  - [place] appropriate cooling/warming therapies per order  - Administer medications as ordered  - Instruct and encourage patient and family to use good hand hygiene technique  - Identify and instruct in appropriate isolation precautions for identified infection/condition  Outcome: Progressing     Problem: SAFETY ADULT  Goal: Patient will remain free of falls  Description: INTERVENTIONS:  - Educate patient/family on patient safety including physical limitations  - Instruct patient to call for assistance with activity   - Consult OT/PT to assist with strengthening/mobility   - Keep Call bell within reach  - Keep bed low and locked with side rails adjusted as appropriate  - Keep care items and personal belongings within reach  - Initiate and maintain comfort rounds  - Make Fall Risk Sign visible to staff  - Offer Toileting every 2 Hours, in advance of need  - Initiate/Maintain bed alarm  - Obtain necessary fall risk management equipment: bed alarm  - Apply yellow  socks and bracelet for high fall risk patients  - Consider moving patient to room near nurses station  Outcome: Progressing     Problem: DISCHARGE PLANNING  Goal: Discharge to home or other facility with appropriate resources  Description: INTERVENTIONS:  - Identify barriers to discharge w/patient and caregiver  - Arrange for needed discharge resources and transportation as appropriate  - Identify discharge learning needs (meds, wound care, etc.)  - Arrange for interpretive services to assist at discharge as needed  - Refer to Case Management Department for coordinating discharge planning if the patient needs post-hospital services based on physician/advanced practitioner order or complex needs related to functional status, cognitive ability, or social support system  Outcome: Progressing     Problem: Knowledge Deficit  Goal: Patient/family/caregiver demonstrates understanding of disease process, treatment plan, medications, and discharge instructions  Description: Complete learning assessment and assess knowledge base.  Interventions:  - Provide teaching at level of understanding  - Provide teaching via preferred learning methods  Outcome: Progressing     Problem: NEUROSENSORY - ADULT  Goal: Achieves stable or improved neurological status  Description: INTERVENTIONS  - Monitor and report changes in neurological status  - Monitor vital signs such as temperature, blood pressure, glucose, and any other labs ordered   - Initiate measures to prevent increased intracranial pressure  - Monitor for seizure activity and implement precautions if appropriate      Outcome: Progressing  Goal: Remains free of injury related to seizures activity  Description: INTERVENTIONS  - Maintain airway, patient safety  and administer oxygen as ordered  - Monitor patient for seizure activity, document and report duration and description of seizure to physician/advanced practitioner  - If seizure occurs,  ensure patient safety during  seizure  - Reorient patient post seizure  - Seizure pads on all 4 side rails  - Instruct patient/family to notify RN of any seizure activity including if an aura is experienced  - Instruct patient/family to call for assistance with activity based on nursing assessment  - Administer anti-seizure medications if ordered    Outcome: Progressing  Goal: Achieves maximal functionality and self care  Description: INTERVENTIONS  - Monitor swallowing and airway patency with patient fatigue and changes in neurological status  - Encourage and assist patient to increase activity and self care.   - Encourage visually impaired, hearing impaired and aphasic patients to use assistive/communication devices  Outcome: Progressing     Problem: CARDIOVASCULAR - ADULT  Goal: Maintains optimal cardiac output and hemodynamic stability  Description: INTERVENTIONS:  - Monitor I/O, vital signs and rhythm  - Monitor for S/S and trends of decreased cardiac output  - Administer and titrate ordered vasoactive medications to optimize hemodynamic stability  - Assess quality of pulses, skin color and temperature  - Assess for signs of decreased coronary artery perfusion  - Instruct patient to report change in severity of symptoms  Outcome: Progressing  Goal: Absence of cardiac dysrhythmias or at baseline rhythm  Description: INTERVENTIONS:  - Continuous cardiac monitoring, vital signs, obtain 12 lead EKG if ordered  - Administer antiarrhythmic and heart rate control medications as ordered  - Monitor electrolytes and administer replacement therapy as ordered  Outcome: Progressing     Problem: HEMATOLOGIC - ADULT  Goal: Maintains hematologic stability  Description: INTERVENTIONS  - Assess for signs and symptoms of bleeding or hemorrhage  - Monitor labs  - Administer supportive blood products/factors as ordered and appropriate  Outcome: Progressing     Problem: MUSCULOSKELETAL - ADULT  Goal: Maintain or return mobility to safest level of  function  Description: INTERVENTIONS:  - Assess patient's ability to carry out ADLs; assess patient's baseline for ADL function and identify physical deficits which impact ability to perform ADLs (bathing, care of mouth/teeth, toileting, grooming, dressing, etc.)  - Assess/evaluate cause of self-care deficits   - Assess range of motion  - Assess patient's mobility  - Assess patient's need for assistive devices and provide as appropriate  - Encourage maximum independence but intervene and supervise when necessary  - Involve family in performance of ADLs  - Assess for home care needs following discharge   - Consider OT consult to assist with ADL evaluation and planning for discharge  - Provide patient education as appropriate  Outcome: Progressing  Goal: Maintain proper alignment of affected body part  Description: INTERVENTIONS:  - Support, maintain and protect limb and body alignment  - Provide patient/ family with appropriate education  Outcome: Progressing     Problem: RESPIRATORY - ADULT  Goal: Achieves optimal ventilation and oxygenation  Description: INTERVENTIONS:  - Assess for changes in respiratory status  - Assess for changes in mentation and behavior  - Position to facilitate oxygenation and minimize respiratory effort  - Oxygen administered by appropriate delivery if ordered  - Initiate smoking cessation education as indicated  - Encourage broncho-pulmonary hygiene including cough, deep breathe, Incentive Spirometry  - Assess the need for suctioning and aspirate as needed  - Assess and instruct to report SOB or any respiratory difficulty  - Respiratory Therapy support as indicated  Outcome: Progressing     Problem: Nutrition/Hydration-ADULT  Goal: Nutrient/Hydration intake appropriate for improving, restoring or maintaining nutritional needs  Description: Monitor and assess patient's nutrition/hydration status for malnutrition. Collaborate with interdisciplinary team and initiate plan and interventions as  ordered.  Monitor patient's weight and dietary intake as ordered or per policy. Utilize nutrition screening tool and intervene as necessary. Determine patient's food preferences and provide high-protein, high-caloric foods as appropriate.     INTERVENTIONS:  - Monitor oral intake, urinary output, labs, and treatment plans  - Assess nutrition and hydration status and recommend course of action  - Evaluate amount of meals eaten  - Assist patient with eating if necessary   - Allow adequate time for meals  - Recommend/ encourage appropriate diets, oral nutritional supplements, and vitamin/mineral supplements  - Order, calculate, and assess calorie counts as needed  - Recommend, monitor, and adjust tube feedings and TPN/PPN based on assessed needs  - Assess need for intravenous fluids  - Provide specific nutrition/hydration education as appropriate  - Include patient/family/caregiver in decisions related to nutrition  Outcome: Progressing     Problem: SAFETY,RESTRAINT: NV/NON-SELF DESTRUCTIVE BEHAVIOR  Goal: Remains free of harm/injury (restraint for non violent/non self-detsructive behavior)  Description: INTERVENTIONS:  - Instruct patient/family regarding restraint use   - Assess and monitor physiologic and psychological status   - Provide interventions and comfort measures to meet assessed patient needs   - Identify and implement measures to help patient regain control  - Assess readiness for release of restraint   Outcome: Progressing  Goal: Returns to optimal restraint-free functioning  Description: INTERVENTIONS:  - Assess the patient's behavior and symptoms that indicate continued need for restraint  - Identify and implement measures to help patient regain control  - Assess readiness for release of restraint   Outcome: Progressing     Problem: BEHAVIOR  Goal: Pt/Family maintain appropriate behavior and adhere to behavioral management agreement, if implemented  Description: INTERVENTIONS:  - Assess the family  dynamic   - Encourage verbalization of thoughts and concerns in a socially appropriate manner  - Assess patient/family's coping skills and non-compliant behavior (including use of illegal substances).  - Utilize positive, consistent limit setting strategies supporting safety of patient, staff and others  - Initiate consult with Case Management, Spiritual Care or other ancillary services as appropriate  - If a patient's/visitor's behavior jeopardizes the safety of the patient, staff, or others, refer to organization procedure.   - Notify Security of behavior or suspected illegal substances which indicate the need for search of the patient and/or belongings  - Encourage participation in the decision making process about a behavioral management agreement; implement if patient meets criteria  Outcome: Progressing     Problem: Prexisting or High Potential for Compromised Skin Integrity  Goal: Skin integrity is maintained or improved  Description: INTERVENTIONS:  - Identify patients at risk for skin breakdown  - Assess and monitor skin integrity  - Assess and monitor nutrition and hydration status  - Monitor labs   - Assess for incontinence   - Turn and reposition patient  - Assist with mobility/ambulation  - Relieve pressure over bony prominences  - Avoid friction and shearing  - Provide appropriate hygiene as needed including keeping skin clean and dry  - Evaluate need for skin moisturizer/barrier cream  - Collaborate with interdisciplinary team   - Patient/family teaching  - Consider wound care consult   Outcome: Progressing

## 2024-12-26 NOTE — ARC ADMISSION
Reviewed patient's case with [facility] physician - patient is likely acute rehab appropriate. However, there is concerns regarding patient's safety/assistance at time of discharge from rehab, and [facility]  to follow-up with patient/family as able. CM has been updated. Will continue to follow at this time.

## 2024-12-26 NOTE — PROGRESS NOTES
Progress Note - Cardiology   Name: Marsha Oliva 79 y.o. female I MRN: [id number]  Unit/Bed#: [facility] 632-01 I Date of Admission: 12/22/2024   Date of Service: 12/26/2024 I Hospital Day: 4     Assessment & Plan  Repeated falls  As such, not on anticoagulation as an outpatient.  Atrial fibrillation, chronic (HCC)  Presented with rapid ventricular response. She was encephalopathic and delirious initially. Rates were very fast in that setting. She was on a Cardizem infusion as not able to take pills. Hypotension has limited the use of the Cardizem infusion.    At this point, she is back on her oral digoxin which is every other day. Rates on telemetry are controlled. BP is more stable. Resume cardizem PO.  As noted above, not on anticoagulation given falls.  Chronic diastolic CHF (congestive heart failure) (HCC)  Wt Readings from Last 3 Encounters:   12/22/24 50.8 kg (112 lb)   10/24/24 51.3 kg (113 lb)   10/07/24 51.3 kg (113 lb 1.5 oz)     Has not required diuretic, which is dosed as an outpatient just as needed.    Mixed hyperlipidemia  On atorvastatin  Severe mitral regurgitation  Medical management. Blood pressure is on the lower side. Not volume overloaded. Conservative management.    CV status stable. Cardiology will sign off, please call with questions or concerns.    Subjective     Patient is awake and alert.  is at the bedside. Heart rates in A-fib are well-controlled. Blood pressure also seems to be in a more stable range now.    Objective :  Temp:  [97.1 °F (36.2 °C)-97.7 °F (36.5 °C)] 97.4 °F (36.3 °C)  HR:  [] 113  BP: (102-129)/(59-84) 118/76  Resp:  [19-20] 20  SpO2:  [92 %-100 %] 97 %  O2 Device: Nasal cannula  Nasal Cannula O2 Flow Rate (L/min):  [1 L/min-2 L/min] 2 L/min  Orthostatic Blood Pressures      Flowsheet Row Most Recent Value   Blood Pressure 118/76 filed at 12/26/2024 1022   Patient Position - Orthostatic VS Lying filed at 12/24/2024 0242          First Weight:  There were no  vitals filed for this visit.  Physical Exam  Constitutional:       General: She is not in acute distress.     Appearance: She is well-developed.   Eyes:      General: No scleral icterus.     Conjunctiva/sclera: Conjunctivae normal.   Neck:      Vascular: No JVD.   Cardiovascular:      Rate and Rhythm: Normal rate. Rhythm irregular.      Heart sounds: No murmur heard.     No friction rub. No gallop.   Pulmonary:      Effort: Pulmonary effort is normal.      Breath sounds: Normal breath sounds. No wheezing or rales.   Chest:      Chest wall: No tenderness.   Abdominal:      General: Bowel sounds are normal. There is no distension.      Palpations: Abdomen is soft.      Tenderness: There is no abdominal tenderness.   Musculoskeletal:         General: Normal range of motion.      Cervical back: Normal range of motion and neck supple.   Skin:     General: Skin is warm and dry.      Findings: No erythema or rash.   Neurological:      Mental Status: She is alert. Mental status is at baseline.   Psychiatric:         Behavior: Behavior normal.           Lab Results: I have reviewed the following results:CBC/BMP: No new results in last 24 hours.   Results from last 7 days   Lab Units 12/25/24  1051 12/24/24  0442 12/23/24  1114   WBC Thousand/uL 5.65 9.37 14.11*   HEMOGLOBIN g/dL 9.6* 8.9* 9.7*   HEMATOCRIT % 33.1* 30.3* 32.7*   PLATELETS Thousands/uL 157 173 225     Results from last 7 days   Lab Units 12/25/24  1051 12/24/24  0442 12/23/24  0441   POTASSIUM mmol/L 4.0 4.4 5.1   CHLORIDE mmol/L 109* 108 106   CO2 mmol/L 29 24 22   BUN mg/dL 14 19 14   CREATININE mg/dL 0.67 0.96 1.09   CALCIUM mg/dL 8.5 8.5 8.6     Results from last 7 days   Lab Units 12/22/24  0440   INR  1.05   PTT seconds 41*     Lab Results   Component Value Date    HGBA1C 5.3 09/04/2024     Lab Results   Component Value Date    CKTOTAL 88 12/22/2021

## 2024-12-26 NOTE — ASSESSMENT & PLAN NOTE
Presented with rapid ventricular response. She was encephalopathic and delirious initially. Rates were very fast in that setting. She was on a Cardizem infusion as not able to take pills. Hypotension has limited the use of the Cardizem infusion.    At this point, she is back on her oral digoxin which is every other day. Rates on telemetry are controlled. BP is more stable. Resume cardizem PO.  As noted above, not on anticoagulation given falls.

## 2024-12-27 ENCOUNTER — APPOINTMENT (INPATIENT)
Dept: RADIOLOGY | Facility: HOSPITAL | Age: 79
DRG: 542 | End: 2024-12-27
Payer: COMMERCIAL

## 2024-12-27 LAB
ANION GAP SERPL CALCULATED.3IONS-SCNC: 4 MMOL/L (ref 4–13)
BASOPHILS # BLD AUTO: 0.03 THOUSANDS/ÂΜL (ref 0–0.1)
BASOPHILS NFR BLD AUTO: 1 % (ref 0–1)
BUN SERPL-MCNC: 13 MG/DL (ref 5–25)
CALCIUM SERPL-MCNC: 8.2 MG/DL (ref 8.4–10.2)
CHLORIDE SERPL-SCNC: 108 MMOL/L (ref 96–108)
CO2 SERPL-SCNC: 30 MMOL/L (ref 21–32)
CREAT SERPL-MCNC: 0.55 MG/DL (ref 0.6–1.3)
EOSINOPHIL # BLD AUTO: 0.15 THOUSAND/ÂΜL (ref 0–0.61)
EOSINOPHIL NFR BLD AUTO: 5 % (ref 0–6)
ERYTHROCYTE [DISTWIDTH] IN BLOOD BY AUTOMATED COUNT: 15.4 % (ref 11.6–15.1)
GFR SERPL CREATININE-BSD FRML MDRD: 89 ML/MIN/1.73SQ M
GLUCOSE SERPL-MCNC: 105 MG/DL (ref 65–140)
GLUCOSE SERPL-MCNC: 149 MG/DL (ref 65–140)
GLUCOSE SERPL-MCNC: 72 MG/DL (ref 65–140)
GLUCOSE SERPL-MCNC: 81 MG/DL (ref 65–140)
GLUCOSE SERPL-MCNC: 82 MG/DL (ref 65–140)
HCT VFR BLD AUTO: 30.6 % (ref 34.8–46.1)
HGB BLD-MCNC: 8.7 G/DL (ref 11.5–15.4)
IMM GRANULOCYTES # BLD AUTO: 0.02 THOUSAND/UL (ref 0–0.2)
IMM GRANULOCYTES NFR BLD AUTO: 1 % (ref 0–2)
LYMPHOCYTES # BLD AUTO: 0.71 THOUSANDS/ÂΜL (ref 0.6–4.47)
LYMPHOCYTES NFR BLD AUTO: 22 % (ref 14–44)
MCH RBC QN AUTO: 27.7 PG (ref 26.8–34.3)
MCHC RBC AUTO-ENTMCNC: 28.4 G/DL (ref 31.4–37.4)
MCV RBC AUTO: 98 FL (ref 82–98)
MONOCYTES # BLD AUTO: 0.22 THOUSAND/ÂΜL (ref 0.17–1.22)
MONOCYTES NFR BLD AUTO: 7 % (ref 4–12)
NEUTROPHILS # BLD AUTO: 2.12 THOUSANDS/ÂΜL (ref 1.85–7.62)
NEUTS SEG NFR BLD AUTO: 64 % (ref 43–75)
NRBC BLD AUTO-RTO: 0 /100 WBCS
PLATELET # BLD AUTO: 139 THOUSANDS/UL (ref 149–390)
PMV BLD AUTO: 8.5 FL (ref 8.9–12.7)
POTASSIUM SERPL-SCNC: 3.9 MMOL/L (ref 3.5–5.3)
RBC # BLD AUTO: 3.14 MILLION/UL (ref 3.81–5.12)
SODIUM SERPL-SCNC: 142 MMOL/L (ref 135–147)
WBC # BLD AUTO: 3.25 THOUSAND/UL (ref 4.31–10.16)

## 2024-12-27 PROCEDURE — 74230 X-RAY XM SWLNG FUNCJ C+: CPT

## 2024-12-27 PROCEDURE — 92611 MOTION FLUOROSCOPY/SWALLOW: CPT

## 2024-12-27 PROCEDURE — 97530 THERAPEUTIC ACTIVITIES: CPT

## 2024-12-27 PROCEDURE — 85025 COMPLETE CBC W/AUTO DIFF WBC: CPT | Performed by: SURGERY

## 2024-12-27 PROCEDURE — 82948 REAGENT STRIP/BLOOD GLUCOSE: CPT

## 2024-12-27 PROCEDURE — 99232 SBSQ HOSP IP/OBS MODERATE 35: CPT | Performed by: SURGERY

## 2024-12-27 PROCEDURE — 80048 BASIC METABOLIC PNL TOTAL CA: CPT | Performed by: SURGERY

## 2024-12-27 PROCEDURE — 97112 NEUROMUSCULAR REEDUCATION: CPT

## 2024-12-27 PROCEDURE — 97535 SELF CARE MNGMENT TRAINING: CPT

## 2024-12-27 RX ADMIN — ACETAMINOPHEN 975 MG: 325 TABLET, FILM COATED ORAL at 06:53

## 2024-12-27 RX ADMIN — DILTIAZEM HYDROCHLORIDE 180 MG: 180 CAPSULE, COATED, EXTENDED RELEASE ORAL at 09:34

## 2024-12-27 RX ADMIN — ACETAMINOPHEN 975 MG: 325 TABLET, FILM COATED ORAL at 21:16

## 2024-12-27 RX ADMIN — ATORVASTATIN CALCIUM 20 MG: 20 TABLET, FILM COATED ORAL at 09:33

## 2024-12-27 RX ADMIN — LEVOTHYROXINE SODIUM 125 MCG: 0.12 TABLET ORAL at 06:53

## 2024-12-27 RX ADMIN — ENOXAPARIN SODIUM 30 MG: 30 INJECTION SUBCUTANEOUS at 09:33

## 2024-12-27 RX ADMIN — QUETIAPINE FUMARATE 25 MG: 25 TABLET ORAL at 21:16

## 2024-12-27 RX ADMIN — SERTRALINE 100 MG: 100 TABLET, FILM COATED ORAL at 09:33

## 2024-12-27 RX ADMIN — TAMSULOSIN HYDROCHLORIDE 0.4 MG: 0.4 CAPSULE ORAL at 17:25

## 2024-12-27 RX ADMIN — ENOXAPARIN SODIUM 30 MG: 30 INJECTION SUBCUTANEOUS at 21:16

## 2024-12-27 NOTE — ASSESSMENT & PLAN NOTE
- Found to be in A-fib with RVR on presentation at [facility], started on Cardizem drip  - Appreciate Cardiology consult and recommendations   Cardiology signed off on 12/26/2024.  - Currently rate controlled on Digoxin and Cardizem PO  - not anticoagulated in the outpatient setting due to PMH of severe Gi bleeds  - Follow up with PCP

## 2024-12-27 NOTE — PLAN OF CARE
Problem: PHYSICAL THERAPY ADULT  Goal: Performs mobility at highest level of function for planned discharge setting.  See evaluation for individualized goals.  Description: Treatment/Interventions: Functional transfer training, LE strengthening/ROM, Therapeutic exercise, Endurance training, Patient/family training, Equipment eval/education, Bed mobility, Spoke to nursing, Continued evaluation, OT          See flowsheet documentation for full assessment, interventions and recommendations.  Outcome: Progressing  Note: Prognosis: Good  Problem List: Decreased strength, Decreased range of motion, Decreased endurance, Impaired balance, Decreased mobility, Decreased cognition, Impaired judgement, Decreased safety awareness  Assessment: Pt demonstrated improved funcitonal mobiilty today depsite generalized fatigue demonstrated throughout most of session.  She was able to attend to instructions to participate in tasks & progressed to taking short, shuffling steps to recliner without LOB.  Pt appeared more alert once sitting upright in recliner, and is likely appropriate for formal gait trial, but unfortunately no supervising PT was free to participate in this session to progress goals at this time.  Will be PT to see next session to formally assess goals based on pt's progress & improved medical status in recent days.  Otherwise, PT POC & d/c recommendations remain appropriate at this time.        Rehab Resource Intensity Level, PT: II (Moderate Resource Intensity)    See flowsheet documentation for full assessment.

## 2024-12-27 NOTE — PHYSICAL THERAPY NOTE
Physical Therapy Progress Note     12/27/24 1016   PT Last Visit   PT Visit Date 12/27/24   Note Type   Note Type Treatment   Pain Assessment   Pain Score No Pain   Restrictions/Precautions   Other Precautions Cognitive;Chair Alarm;Bed Alarm;Fall Risk   Subjective   Subjective pt encountered supine in bed, resting & although easy to rouse, she attempted to keep her eyes closed for most of session.  Pt appeared fatigued in general, but denies complaints when prompted.   Bed Mobility   Rolling R 2  Maximal assistance   Rolling L 2  Maximal assistance   Supine to Sit 2  Maximal assistance   Additional items Assist x 2   Transfers   Sit to Stand 3  Moderate assistance   Additional items Assist x 2   Stand to Sit 3  Moderate assistance   Additional items Assist x 2   Stand pivot 3  Moderate assistance   Additional items Assist x 2   Balance   Static Sitting Poor +   Dynamic Sitting Poor   Static Standing Poor   Dynamic Standing Poor   Activity Tolerance   Activity Tolerance Patient tolerated treatment well;Patient limited by fatigue   Nurse Made Aware NILDA Mclaughlin   Assessment   Prognosis Good   Problem List Decreased strength;Decreased range of motion;Decreased endurance;Impaired balance;Decreased mobility;Decreased cognition;Impaired judgement;Decreased safety awareness   Assessment Pt demonstrated improved funcitonal mobiilty today depsite generalized fatigue demonstrated throughout most of session.  She was able to attend to instructions to participate in tasks & progressed to taking short, shuffling steps to recliner without LOB.  Pt appeared more alert once sitting upright in recliner, and is likely appropriate for formal gait trial, but unfortunately no supervising PT was free to participate in this session to progress goals at this time.  Will be PT to see next session to formally assess goals based on pt's progress & improved medical status in recent days.  Otherwise, PT POC & d/c recommendations remain  appropriate at this time.   Goals   Patient Goals none expressed today   STG Expiration Date 01/07/25   PT Treatment Day 1   Plan   Treatment/Interventions Functional transfer training;LE strengthening/ROM;Therapeutic exercise;Endurance training;Patient/family training;Cognitive reorientation;Equipment eval/education;Bed mobility   Progress Progressing toward goals   PT Frequency 2-3x/wk   Discharge Recommendation   Rehab Resource Intensity Level, PT II (Moderate Resource Intensity)   Equipment Recommended Walker   Walker Package Recommended Wheeled walker   AM-PAC Basic Mobility Inpatient   Turning in Flat Bed Without Bedrails 2   Lying on Back to Sitting on Edge of Flat Bed Without Bedrails 1   Moving Bed to Chair 1   Standing Up From Chair Using Arms 1   Walk in Room 1   Climb 3-5 Stairs With Railing 1   Basic Mobility Inpatient Raw Score 7   Turning Head Towards Sound 4   Follow Simple Instructions 3   Low Function Basic Mobility Raw Score  14   Low Function Basic Mobility Standardized Score  22.01   [facility] Highest Level Of Mobility   JH-HLM Goal 2: Bed activities/Dependent transfer   JH-HLM Achieved 4: Move to chair/commode       Kevin Jennings PTA    [facility] Basic Mobility Raw Score less than 17 suggests pt would benefit from post acute rehab.  Please also refer to the recommendation of the Physical Therapist for safe discharge planning.

## 2024-12-27 NOTE — PLAN OF CARE
Problem: PAIN - ADULT  Goal: Verbalizes/displays adequate comfort level or baseline comfort level  Description: Interventions:  - Encourage patient to monitor pain and request assistance  - Assess pain using appropriate pain scale  - Administer analgesics based on type and severity of pain and evaluate response  - Implement non-pharmacological measures as appropriate and evaluate response  - Consider cultural and social influences on pain and pain management  - Notify physician/advanced practitioner if interventions unsuccessful or patient reports new pain  Outcome: Progressing     Problem: INFECTION - ADULT  Goal: Absence or prevention of progression during hospitalization  Description: INTERVENTIONS:  - Assess and monitor for signs and symptoms of infection  - Monitor lab/diagnostic results  - Monitor all insertion sites, i.e. indwelling lines, tubes, and drains  - Monitor endotracheal if appropriate and nasal secretions for changes in amount and color  - [place] appropriate cooling/warming therapies per order  - Administer medications as ordered  - Instruct and encourage patient and family to use good hand hygiene technique  - Identify and instruct in appropriate isolation precautions for identified infection/condition  Outcome: Progressing

## 2024-12-27 NOTE — CASE MANAGEMENT
Case Management Discharge Planning Note    Patient name Marsha Oliva  [facility]01 MRN [id number]  : 1945 Date 2024       Current Admission Date: 2024  Current Admission Diagnosis:Repeated falls   Patient Active Problem List    Diagnosis Date Noted Date Diagnosed    Acute urinary retention 2024     Osteoporosis 2024     Closed T1 fracture ([facility]) 2024     Sacral fracture ([facility]) 2024     Impaired mobility and activities of daily living 2024     Severe protein-calorie malnutrition (HCC) 2024     Closed odontoid fracture with type II morphology, posterior displacement, and nonunion 2024     S/P cervical spinal fusion 2024     At risk for altered bowel elimination 2024     At risk for altered urinary elimination 2024     At risk for deep venous thrombosis 2024     Encounter for rehabilitation 2024     Lymphedema 2024     Dysphagia 2024     Skin abnormalities 2024     Acute pain due to trauma 2024     At risk for delirium 2024     Heart failure with preserved ejection fraction (HCC) 2024     Closed odontoid fracture with type II morphology and posterior displacement ([facility]) 2024     Spinal cord compression ([facility]) 2024     Chronic respiratory failure with hypoxia ([facility]) 2023     Stage 3a chronic kidney disease ([facility]) 2022     Bilateral hydronephrosis 2022     Proctitis 2022     Fall 2022     Hypokalemia 2022     ILD (interstitial lung disease) ([facility]) 2022     Oropharyngeal aspiration 2022     Elevated troponin 2022     EKG abnormalities 2022     Mild cognitive impairment of uncertain or unknown etiology 2022     Hormone replacement therapy 2022     Apraxia 2021     Constipation, unspecified 2021     Corn or callus 2021     Ambulatory dysfunction 2021     Generalized muscle  weakness 12/28/2021     Lymphedema, not elsewhere classified 12/28/2021     Repeated falls 12/28/2021     Hypoxia 12/23/2021     Frequent falls 12/23/2021     Elevated d-dimer 12/23/2021     Venous insufficiency of both lower extremities 12/17/2021     Acquired bilateral hammer toes 07/27/2018     Lymphedema of both lower extremities 07/02/2018     Severe mitral regurgitation 05/31/2018     Anemia 05/13/2018     Cardiomegaly 05/13/2018     Wound of right leg 05/13/2018     Hypertensive heart disease with heart failure (HCC) 05/13/2018     Major depressive disorder, single episode, unspecified 05/13/2018     Personal history of nicotine dependence 05/13/2018     Pleural effusion, not elsewhere classified 05/13/2018     Solitary pulmonary nodule 05/13/2018     Atrial fibrillation, chronic (HCC) 05/01/2018     B12 deficiency 01/13/2017     Chronic diastolic CHF (congestive heart failure) (HCC) 01/13/2017     Folate deficiency 01/13/2017     Vitamin D deficiency 06/03/2016     Depression with anxiety 08/03/2015     Mixed hyperlipidemia 08/03/2015     Osteoarthritis of knee 10/23/2012     Impaired fasting glucose 05/31/2012     Essential hypertension 03/23/2010     Hypothyroidism 03/23/2010     Posttraumatic stress disorder 03/23/2010       LOS (days): 5  Geometric Mean LOS (GMLOS) (days): 4.6  Days to GMLOS:-0.5     OBJECTIVE:  Risk of Unplanned Readmission Score: 33.95         Current admission status: Inpatient   Preferred Pharmacy:   [facility] - [place], [place] - [address]  [address] [facility] [address]  Phone: [phone] Fax: [phone]    [id number] [address]  [facility] [address] [facility] [address]  Phone: [phone] Fax: [phone]    Primary Care Provider: Rebecca Solis    Primary Insurance: [employer] [facility]  Secondary Insurance:     DISCHARGE DETAILS:    [facility] won't accept  Pt has SNF referrals out but currently  the only location reviewing is [facility].

## 2024-12-27 NOTE — PROGRESS NOTES
"Progress Note - Trauma   Name: Marsha Oliva 79 y.o. female I MRN: [id number]  Unit/Bed#: [facility]-01 I Date of Admission: 12/22/2024   Date of Service: 12/27/2024 I Hospital Day: 5    Assessment & Plan  Repeated falls  Closed T1 fracture, sacral fracture with small hematoma and some active extravasation.     Downgraded to MedSurg as hemoglobins have been stable  As needed pain and nausea control  Ortho consult-weightbearing as tolerated, nonoperative  Neurosurgery consult-nonoperative  Encourage I-S  Discharge planning per case management.  Closed T1 fracture (HCC)  - Age indeterminate T1 fx  - Appreciate NSG consult and recommendations  - Bracing deferred    Atrial fibrillation, chronic ([facility])  - Found to be in A-fib with RVR on presentation at [facility], started on Cardizem drip  - Appreciate Cardiology consult and recommendations   Cardiology signed off on 12/26/2024.  - Currently rate controlled on Digoxin and Cardizem PO  - not anticoagulated in the outpatient setting due to PMH of severe Gi bleeds  - Follow up with PCP  S/P cervical spinal fusion  Appreciate NSG consult and recommendations  \"S/p C1-C3 posterior fusion 9/5/24 with Dr. Simpson after sustaining fall with type II dens fracture with posterior displacement \"  Follow up in 3 months with NSG for post op visit  Sacral fracture ([facility])  Appreciate orthopedic surgery input  Non op  WBAT  Pain control  DVT ppx  PT and OT  Chronic diastolic CHF (congestive heart failure) ([facility])  Wt Readings from Last 3 Encounters:   12/22/24 50.8 kg (112 lb)   10/24/24 51.3 kg (113 lb)   10/07/24 51.3 kg (113 lb 1.5 oz)     - No evidence of acute exacerbation  - Continue home meds  Hypothyroidism  Continue levothyroxine  Mixed hyperlipidemia  Continue atorvastatin  Severe mitral regurgitation    ILD (interstitial lung disease) ([facility])  Previously on home oxygen,  states she has not needed it in a year.  -Titrate oxygen as needed, wean as able  Apraxia  Chronic  Vitamin D " deficiency    Ambulatory dysfunction    Osteoporosis    Dysphagia  - Appreciate speech eval and recommendations  - Plan for VBS on 12/27/2024.  Maintain aspiration precautions.  Acute urinary retention  - Failed urinary retention protocol 12/24 and required tapia catheter placement  - Recommend flomax and outpatient urology follow up for voiding trial    Bowel Regimen: On Colace and MiraLAX.  VTE Prophylaxis:VTE covered by:  enoxaparin, Subcutaneous, 30 mg at 12/27/24 0933        Disposition: Continue current level of care.  Continue therapy evaluation shims indicated.  Case management following for disposition planning.  Please contact the SecureChat role for the Trauma service with any questions/concerns.    24 Hour Events : No events overnight.  Subjective : Patient is overall doing okay today.  She notes that she is not currently having any pain.  She is tolerating some oral intake, and denies any nausea or vomiting.    Objective :  Temp:  [96.7 °F (35.9 °C)-98.6 °F (37 °C)] 98.3 °F (36.8 °C)  HR:  [57-94] 66  BP: ()/(45-59) 100/54  Resp:  [17-18] 17  SpO2:  [95 %-100 %] 100 %  O2 Device: Nasal cannula  Nasal Cannula O2 Flow Rate (L/min):  [2 L/min] 2 L/min    I/O         12/25 0701 12/26 0700 12/26 0701 12/27 0700 12/27 0701  12/28 0700    P.O. 368 520     I.V.       Total Intake 368 520     Urine 475 500     Stool 0 0     Total Output 475 500     Net -107 +20            Unmeasured Stool Occurrence 1 x 1 x 1 x          Lines/Drains/Airways       Active Status       Name Placement date Placement time Site Days    Urethral Catheter Non-latex 18 Fr. 12/24/24  1240  Non-latex  3                  Physical Exam     GENERAL APPEARANCE: Patient in no acute distress.  HEENT: NCAT; EOMs intact; Mucous membranes moist  CV: Irregularly irregular rhythm with normal rate this morning; no murmur/gallops/rubs appreciated.  CHEST / LUNGS: Clear to auscultation; no wheezes/rales/rhonci.  ABD: NABS; soft; non-distended;  non-tender.  : Voiding spontaneously.  EXT: +2 pulses bilaterally upper & lower extremities; no edema.  NEURO: GCS 15; no focal neurologic deficits; neurovascularly intact.  SKIN: Warm, dry and well perfused; no rash; no jaundice.         Lab Results: I have reviewed the following results:  Recent Labs     12/27/24  0652   WBC 3.25*   HGB 8.7*   HCT 30.6*   *   SODIUM 142   K 3.9      CO2 30   BUN 13   CREATININE 0.55*   GLUC 82       Imaging Results Review: No pertinent imaging studies reviewed.  Other Study Results Review: No additional pertinent studies reviewed.

## 2024-12-27 NOTE — [facility] ADMISSION
Reviewed updates with [facility] physician - patient is recommended for SNF/subacute rehab vs placement for longer, slower paced therapy needs and safety concerns at time of discharge. CM has been updated.

## 2024-12-27 NOTE — PROCEDURES
Modified (Video) Barium Swallow Study    Summary:  Images are on PACS for review.   The patient presents with a mild oral and severe pharyngeal dysphagia. Transfer time is prolonged with all, with trace lingual residue. Swallow initiation is delayed to the pyriforms. Epiglottic inversion and pharyngeal peristalsis is absent. The patient presents with moderate pharyngeal retention. Aspiration occurs with all liquids consistencies. Some events occur during the swallow, but most occur posteriorly after the swallow. The patient has weak cough response. Patient also has occasional expectoration of mucous and barium after aspiration event. No aspiration observed with pudding, but the patient has more pharyngeal retention. Brief scan of the esophagus is unremarkable. *results are compared to previous VBS from 9/10/24. Swallow function is more impaired today and patient has increased risk of aspiration across all liquid consistencies.     Recommendations:  Diet: recommend NPO. *Education provided to patient and . Showed images of VBS and discussed with  via phone. Patient has h/o aspiration, but was tolerating a regular diet and thin liquids at home. Both seem aware of aspiration risk, but would like patient to continue with a PO diet. At this time, would recommend a puree diet with thin liquids. Based on research re: negative outcomes of aspirating thickened liquids, consider resuming thin liquids with use of safe swallow strategies and frequent/thorough oral care to minimize risks.   Liquids: TBD  Meds: TBD  Frequent oral care  Upright position  F/u ST tx: yes  Therapy Prognosis: guarded   Prognosis considerations: medical status, h/o aspiration and dysphagia     Aspiration Precautions  Reflux Precautions  Consider consult with: none at this time   Results reviewed with: pt, nursing, physician  Repeat MBS as necessary  If a dedicated assessment of the esophagus is desired, consider esophagram/barium swallow  or EGD.    Goals:  Pt will tolerate least restrictive diet w/out s/s aspiration or oral/pharyngeal difficulties.    Patient's goal: none stated     Special Studies:  VBS 9/10/24:   Assessment Summary:    Pt presents with mild oral and moderate pharyngeal dysphagia characterized by reduced bolus control and transfer with premature spillage, delayed swallow initiation, reduced hyolaryngeal rise, reduced pharyngeal stripping wave and base of tongue retraction. Reduced muscle function resulted in significant pharyngeal/pyriform retention, leading to aspiration after swallow and during repeat swallows. Epiglottic inversion was absent and airway closure was incomplete. Aspiration occurred with thin and nectar thick liquid, with inconsistent cough response, often silent. Suspect dysphagia is exacerbated by C-spine collar and reduced ROM of neck. Note: Images are available for review in PACS as desired.   Recommendations:   Recommended Diet: puree/level 1 diet and honey thick liquids   Recommended Form of Medications:  as tolerated    Aspiration precautions and compensatory swallowing strategies: upright posture, only feed when fully alert, slow rate of feeding, and small bites/sips  SLP Dysphagia therapy recommended: yes   Results Reviewed with: patient and MD     Chest xray 12/23/24:   Development of bilateral groundglass opacities, which may reflect pulmonary edema or developing pneumonia.     Does the pt have pain? None stated     Swallow Information   Current Risks for Dysphagia & Aspiration: known history of dysphagia and known history of aspiration  Current Symptoms/Concerns: coughing with po  Current Diet: puree/level 1 diet and honey thick liquids   Baseline Diet: regular diet and thin liquids (patient was at home on regular diet and thin liquids)    Consistencies Administered:  Pt was viewed sitting upright in the lateral position. Due to concerns for patient safety / patient refusal, trials provided deviated from  the [facility] Protocol. Trials administered included honey thick liquids via tsp, nectar thick liquids via tsp, thin liquids via tsp and pudding.     Oral Impairment:  Lip Closure: wfl  Tongue Control During Bolus Hold: wfl  Bolus Preparation/Mastication: n/a  Bolus Transport/Lingual Motion: prolonged transfer  Oral Residue: residue on tongue   Initiation of the Pharyngeal Swallow: in the pyriforms     Pharyngeal Impairment:  Soft Palate Elevation: wfl  Laryngeal Elevation: wfl  Anterior Hyoid Excursion: wfl  Epiglottic Movement: absent  Laryngeal Vestibular Closure: poor closure; aspiration with all liquids  Pharyngeal Stripping Wave: absent  Pharyngeal Contraction: AP view not completed   PES Opening: wfl  Tongue Base Retraction: reduced   Pharyngeal Residue: moderate, more with pudding     Screening of Esophageal Impairment   Esophageal Clearance:     Penetration/Aspiration:  Thin: 7  Nectar: 7  Honey: 7  Puree: 1  Solid: n/a  Response to Aspiration: weak cough with expectoration of mucous and barium at times  Strategies/Efficacy: chin tuck reduced aspiration, but patient did not have carryover     8-Point Penetration-Aspiration Scale   1 Material does not enter the airway   2 Material enters the airway, remains above the vocal folds, and is ejected  from the  airway    3 Material enters the airway, remains above the vocal folds, and is not ejected from the airway   4 Material enters the airway, contacts the vocal folds, and is ejected from the airway   5 Material enters the airway, contacts the vocal folds, and is not ejected from the airway    6 Material enters the airway, passes below the vocal folds and is ejected into the larynx or out of the airway    7 Material enters the airway, passes below the vocal folds, and is not ejected from the trachea despite effort    8 Material enters the airway, passes below the vocal folds, and no effort is made to eject

## 2024-12-27 NOTE — PLAN OF CARE
Problem: OCCUPATIONAL THERAPY ADULT  Goal: Performs self-care activities at highest level of function for planned discharge setting.  See evaluation for individualized goals.  Description: Treatment Interventions: ADL retraining, Functional transfer training, Endurance training, Cognitive reorientation, Patient/family training, Equipment evaluation/education, Compensatory technique education, Energy conservation, Activityengagement          See flowsheet documentation for full assessment, interventions and recommendations.   Outcome: Progressing  Note: Limitation: Decreased ADL status, Decreased Safe judgement during ADL, Decreased cognition, Decreased endurance, Decreased self-care trans, Decreased high-level ADLs, Mood limitation  Prognosis: Guarded  Assessment: pt participated in am ot session and was seen focusing on adl tasks, sitting tolerence eob and sit to stand / spt bed to chair. pt was lethargic lying in bed but woke and engaged in conversation  when sitting eob. pt was max asst for bed mobility ax2 to roll and mod asst x 2 for sit to stand and stand pivot s rw. pt was cooperative but mildly  lethargic     Rehab Resource Intensity Level, OT: II (Moderate Resource Intensity)        April A Storm

## 2024-12-27 NOTE — ASSESSMENT & PLAN NOTE
- Appreciate speech eval and recommendations  - Plan for VBS on 12/27/2024.  Maintain aspiration precautions.

## 2024-12-27 NOTE — OCCUPATIONAL THERAPY NOTE
Occupational Therapy Treatment Note:      12/27/24 1015   OT Last Visit   OT Visit Date 12/27/24   Note Type   Note Type Treatment   Pain Assessment   Pain Assessment Tool FLACC   Pain Rating: FLACC (Rest) - Face 0   Pain Rating: FLACC (Rest) - Legs 0   Pain Rating: FLACC (Rest) - Activity 0   Pain Rating: FLACC (Rest) - Cry 0   Pain Rating: FLACC (Rest) - Consolability 0   Score: FLACC (Rest) 0   Pain Rating: FLACC (Activity) - Face 0   Pain Rating: FLACC (Activity) - Legs 1   Pain Rating: FLACC (Activity) - Activity 1   Pain Rating: FLACC (Activity) - Cry 0   Pain Rating: FLACC (Activity) - Consolability 0   Score: FLACC (Activity) 2   Restrictions/Precautions   RLE Weight Bearing Per Order WBAT   LLE Weight Bearing Per Order WBAT   Other Precautions Cognitive;Chair Alarm;Bed Alarm;Fall Risk   ADL   Where Assessed Edge of bed   Grooming Assistance 3  Moderate Assistance   Grooming Comments asst to untangle hair with comb   UB Dressing Assistance 3  Moderate Assistance   LB Dressing Assistance 2  Maximal Assistance   Toileting Assistance  2  Maximal Assistance   Functional Standing Tolerance   Time poor balance during adls   Bed Mobility   Rolling R 2  Maximal assistance   Additional items Assist x 2   Rolling L 2  Maximal assistance   Additional items Assist x 2   Supine to Sit 2  Maximal assistance   Sit to Supine 2  Maximal assistance   Transfers   Sit to Stand 3  Moderate assistance   Additional items Assist x 2   Stand to Sit 3  Moderate assistance   Stand pivot 3  Moderate assistance   Cognition   Overall Cognitive Status Impaired   Arousal/Participation Alert;Lethargic   Attention Attends with cues to redirect   Orientation Level Disoriented to place  (pt was oriented to month with time)   Memory Decreased recall of precautions   Activity Tolerance   Activity Tolerance Patient tolerated treatment well   Assessment   Assessment pt participated in am ot session and was seen focusing on adl tasks, sitting  tolerence eob and sit to stand / spt bed to chair. pt was lethargic lying in bed but woke and engaged in conversation  when sitting eob. pt was max asst for bed mobility ax2 to roll and mod asst x 2 for sit to stand and stand pivot s rw. pt was cooperative but mildly  lethargic   Plan   Treatment Interventions ADL retraining   Goal Expiration Date 01/07/25   OT Treatment Day 1   OT Frequency 2-3x/wk   Discharge Recommendation   Rehab Resource Intensity Level, OT II (Moderate Resource Intensity)   AM-PAC Daily Activity Inpatient   Lower Body Dressing 2   Bathing 2   Toileting 2   Upper Body Dressing 2   Grooming 2   Eating 3   Daily Activity Raw Score 13   Daily Activity Standardized Score (Calc for Raw Score >=11) 32.03   AM-PAC Applied Cognition Inpatient   Following a Speech/Presentation 1   Understanding Ordinary Conversation 2   Taking Medications 2   Remembering Where Things Are Placed or Put Away 2   Remembering List of 4-5 Errands 2   Taking Care of Complicated Tasks 1   Applied Cognition Raw Score 10   Applied Cognition Standardized Score 24.98   April A Storm

## 2024-12-28 ENCOUNTER — APPOINTMENT (INPATIENT)
Dept: RADIOLOGY | Facility: HOSPITAL | Age: 79
DRG: 542 | End: 2024-12-28
Payer: COMMERCIAL

## 2024-12-28 LAB
GLUCOSE SERPL-MCNC: 80 MG/DL (ref 65–140)
GLUCOSE SERPL-MCNC: 94 MG/DL (ref 65–140)
GLUCOSE SERPL-MCNC: 98 MG/DL (ref 65–140)
GLUCOSE SERPL-MCNC: 99 MG/DL (ref 65–140)

## 2024-12-28 PROCEDURE — 71045 X-RAY EXAM CHEST 1 VIEW: CPT

## 2024-12-28 PROCEDURE — 99233 SBSQ HOSP IP/OBS HIGH 50: CPT | Performed by: STUDENT IN AN ORGANIZED HEALTH CARE EDUCATION/TRAINING PROGRAM

## 2024-12-28 PROCEDURE — 82948 REAGENT STRIP/BLOOD GLUCOSE: CPT

## 2024-12-28 PROCEDURE — 94760 N-INVAS EAR/PLS OXIMETRY 1: CPT

## 2024-12-28 RX ADMIN — LEVOTHYROXINE SODIUM 125 MCG: 0.12 TABLET ORAL at 08:58

## 2024-12-28 RX ADMIN — ATORVASTATIN CALCIUM 20 MG: 20 TABLET, FILM COATED ORAL at 08:58

## 2024-12-28 RX ADMIN — ENOXAPARIN SODIUM 30 MG: 30 INJECTION SUBCUTANEOUS at 21:36

## 2024-12-28 RX ADMIN — SERTRALINE 100 MG: 100 TABLET, FILM COATED ORAL at 08:58

## 2024-12-28 RX ADMIN — DILTIAZEM HYDROCHLORIDE 180 MG: 180 CAPSULE, COATED, EXTENDED RELEASE ORAL at 08:58

## 2024-12-28 RX ADMIN — TAMSULOSIN HYDROCHLORIDE 0.4 MG: 0.4 CAPSULE ORAL at 17:29

## 2024-12-28 RX ADMIN — ACETAMINOPHEN 975 MG: 325 TABLET, FILM COATED ORAL at 21:36

## 2024-12-28 RX ADMIN — DOCUSATE SODIUM 100 MG: 100 CAPSULE, LIQUID FILLED ORAL at 17:29

## 2024-12-28 RX ADMIN — ENOXAPARIN SODIUM 30 MG: 30 INJECTION SUBCUTANEOUS at 08:58

## 2024-12-28 RX ADMIN — DIGOXIN 125 MCG: 125 TABLET ORAL at 08:59

## 2024-12-28 RX ADMIN — QUETIAPINE FUMARATE 25 MG: 25 TABLET ORAL at 21:36

## 2024-12-28 RX ADMIN — ACETAMINOPHEN 975 MG: 325 TABLET, FILM COATED ORAL at 14:53

## 2024-12-28 NOTE — PLAN OF CARE
Problem: SAFETY,RESTRAINT: NV/NON-SELF DESTRUCTIVE BEHAVIOR  Goal: Remains free of harm/injury (restraint for non violent/non self-detsructive behavior)  Description: INTERVENTIONS:  - Instruct patient/family regarding restraint use   - Assess and monitor physiologic and psychological status   - Provide interventions and comfort measures to meet assessed patient needs   - Identify and implement measures to help patient regain control  - Assess readiness for release of restraint   12/28/2024 0743 by Mary Beth Zarate RN  Outcome: Completed  12/28/2024 0742 by Mary Beth Zarate RN  Outcome: Progressing  Goal: Returns to optimal restraint-free functioning  Description: INTERVENTIONS:  - Assess the patient's behavior and symptoms that indicate continued need for restraint  - Identify and implement measures to help patient regain control  - Assess readiness for release of restraint   12/28/2024 0743 by Mary Beth Zarate RN  Outcome: Completed  12/28/2024 0742 by Mary Beth Zarate RN  Outcome: Progressing

## 2024-12-28 NOTE — PROGRESS NOTES
"Progress Note - Trauma   Name: Marsha Oliva 79 y.o. female I MRN: [id number]  Unit/Bed#: [facility] 632-01 I Date of Admission: 12/22/2024   Date of Service: 12/28/2024 I Hospital Day: 6  Assessment & Plan  Repeated falls  Closed T1 fracture, sacral fracture with small hematoma and some active extravasation.     Downgraded to MedSurg as hemoglobins have been stable  As needed pain and nausea control  Ortho consult-weightbearing as tolerated, nonoperative  Neurosurgery consult-nonoperative  Encourage I-S  Discharge planning per case management.  Closed T1 fracture (HCC)  - Age indeterminate T1 fx  - Appreciate NSG consult and recommendations  - Bracing deferred    Atrial fibrillation, chronic ([facility])  - Found to be in A-fib with RVR on presentation at [facility], started on Cardizem drip  - Appreciate Cardiology consult and recommendations   Cardiology signed off on 12/26/2024.  - Currently rate controlled on Digoxin and Cardizem PO  - not anticoagulated in the outpatient setting due to PMH of severe Gi bleeds  - Follow up with PCP  S/P cervical spinal fusion  Appreciate NSG consult and recommendations  \"S/p C1-C3 posterior fusion 9/5/24 with Dr. Simpson after sustaining fall with type II dens fracture with posterior displacement \"  Follow up in 3 months with NSG for post op visit  Sacral fracture ([facility])  Appreciate orthopedic surgery input  Non op  WBAT  Pain control  DVT ppx  PT and OT  Chronic diastolic CHF (congestive heart failure) ([facility])  Wt Readings from Last 3 Encounters:   12/22/24 50.8 kg (112 lb)   10/24/24 51.3 kg (113 lb)   10/07/24 51.3 kg (113 lb 1.5 oz)     - No evidence of acute exacerbation  - Continue home meds  Hypothyroidism  Continue levothyroxine  Mixed hyperlipidemia  Continue atorvastatin  Severe mitral regurgitation    ILD (interstitial lung disease) ([facility])  Previously on home oxygen,  states she has not needed it in a year.  -Titrate oxygen as needed, wean as able  Apraxia  Chronic  Vitamin D " deficiency    Ambulatory dysfunction  Chronic, pending rehab placement  Osteoporosis  Chronic  Dysphagia  - Appreciate speech eval and recommendations  - Plan for VBS on 12/27/2024.  Maintain aspiration precautions.  Acute urinary retention  - Failed urinary retention protocol 12/24 and required tapia catheter placement  - Recommend flomax and outpatient urology follow up for voiding trial    Bowel Regimen: MiraLAX, Colace  VTE Prophylaxis:VTE covered by:  enoxaparin, Subcutaneous, 30 mg at 12/27/24 2116        Disposition: Continue level of care, pending placement Please contact the SecureChat role for the Trauma service with any questions/concerns.    24 Hour Events : None  Subjective :  AVSS on room air. Patient reports pain is controlled. Tolerating diet. Voiding spontaneously and having bowel function. Denies fever, chills, nausea, vomiting.       Objective   Temp:  [96.5 °F (35.8 °C)-98.4 °F (36.9 °C)] 97.4 °F (36.3 °C)  HR:  [64-91] 67  BP: ()/(53-71) 119/64  Resp:  [16-22] 22  SpO2:  [93 %-100 %] 100 %  O2 Device: Nasal cannula  Nasal Cannula O2 Flow Rate (L/min):  [2 L/min] 2 L/min    I/O         12/26 0701  12/27 0700 12/27 0701  12/28 0700 12/28 0701  12/29 0700    P.O. 520 0     Total Intake 520 0     Urine 500 550     Stool 0 0     Total Output 500 550     Net +20 -550            Unmeasured Stool Occurrence 1 x 1 x           Lines/Drains/Airways       Active Status       Name Placement date Placement time Site Days    Urethral Catheter Non-latex 18 Fr. 12/24/24  1240  Non-latex  3                  Physical Exam   General: NAD  HENT: NCAT MMM  Neck: supple, no JVD  CV: nl rate  Lungs: nl wob. No resp distress  ABD: Soft, nontender, nondistended  Extrem: No CCE  Neuro: AAOx3       Lab Results: I have reviewed the following results:  Recent Labs     12/27/24  0652   WBC 3.25*   HGB 8.7*   HCT 30.6*   *   SODIUM 142   K 3.9      CO2 30   BUN 13   CREATININE 0.55*   GLUC 82       Kelty  MD Edy  General Surgery Resident

## 2024-12-28 NOTE — ASSESSMENT & PLAN NOTE
- Found to be in A-fib with RVR on presentation at [facility], started on Cardizem drip  - Appreciate Cardiology consult and recommendations   Cardiology signed off on 12/26/2024.  - Currently rate controlled on Digoxin and Cardizem PO  - not anticoagulated in the outpatient setting due to PMH of severe Gi bleeds  - Follow up with PCP

## 2024-12-28 NOTE — PLAN OF CARE
Problem: PAIN - ADULT  Goal: Verbalizes/displays adequate comfort level or baseline comfort level  Description: Interventions:  - Encourage patient to monitor pain and request assistance  - Assess pain using appropriate pain scale  - Administer analgesics based on type and severity of pain and evaluate response  - Implement non-pharmacological measures as appropriate and evaluate response  - Consider cultural and social influences on pain and pain management  - Notify physician/advanced practitioner if interventions unsuccessful or patient reports new pain  Outcome: Progressing     Problem: INFECTION - ADULT  Goal: Absence or prevention of progression during hospitalization  Description: INTERVENTIONS:  - Assess and monitor for signs and symptoms of infection  - Monitor lab/diagnostic results  - Monitor all insertion sites, i.e. indwelling lines, tubes, and drains  - Monitor endotracheal if appropriate and nasal secretions for changes in amount and color  - [place] appropriate cooling/warming therapies per order  - Administer medications as ordered  - Instruct and encourage patient and family to use good hand hygiene technique  - Identify and instruct in appropriate isolation precautions for identified infection/condition  Outcome: Progressing     Problem: SAFETY ADULT  Goal: Patient will remain free of falls  Description: INTERVENTIONS:  - Educate patient/family on patient safety including physical limitations  - Instruct patient to call for assistance with activity   - Consult OT/PT to assist with strengthening/mobility   - Keep Call bell within reach  - Keep bed low and locked with side rails adjusted as appropriate  - Keep care items and personal belongings within reach  - Initiate and maintain comfort rounds  - Make Fall Risk Sign visible to staff  - Offer Toileting every 2 Hours, in advance of need  - Initiate/Maintain bed alarm  - Obtain necessary fall risk management equipment: bed alarm  - Apply yellow  socks and bracelet for high fall risk patients  - Consider moving patient to room near nurses station  Outcome: Progressing  Goal: Maintain or return to baseline ADL function  Description: INTERVENTIONS:  -  Assess patient's ability to carry out ADLs; assess patient's baseline for ADL function and identify physical deficits which impact ability to perform ADLs (bathing, care of mouth/teeth, toileting, grooming, dressing, etc.)  - Assess/evaluate cause of self-care deficits   - Assess range of motion  - Assess patient's mobility; develop plan if impaired  - Assess patient's need for assistive devices and provide as appropriate  - Encourage maximum independence but intervene and supervise when necessary  - Involve family in performance of ADLs  - Assess for home care needs following discharge   - Consider OT consult to assist with ADL evaluation and planning for discharge  - Provide patient education as appropriate  Outcome: Progressing  Goal: Maintains/Returns to pre admission functional level  Description: INTERVENTIONS:  - Perform AM-PAC 6 Click Basic Mobility/ Daily Activity assessment daily.  - Set and communicate daily mobility goal to care team and patient/family/caregiver.   - Collaborate with rehabilitation services on mobility goals if consulted  - Perform Range of Motion 3 times a day.  - Reposition patient every 2 hours.  - Dangle patient 3 times a day  - Stand patient 3 times a day  - Ambulate patient 3 times a day  - Out of bed to chair 3 times a day   - Out of bed for meals 3 times a day  - Out of bed for toileting  - Record patient progress and toleration of activity level   Outcome: Progressing     Problem: DISCHARGE PLANNING  Goal: Discharge to home or other facility with appropriate resources  Description: INTERVENTIONS:  - Identify barriers to discharge w/patient and caregiver  - Arrange for needed discharge resources and transportation as appropriate  - Identify discharge learning needs (meds,  wound care, etc.)  - Arrange for interpretive services to assist at discharge as needed  - Refer to Case Management Department for coordinating discharge planning if the patient needs post-hospital services based on physician/advanced practitioner order or complex needs related to functional status, cognitive ability, or social support system  Outcome: Progressing     Problem: Knowledge Deficit  Goal: Patient/family/caregiver demonstrates understanding of disease process, treatment plan, medications, and discharge instructions  Description: Complete learning assessment and assess knowledge base.  Interventions:  - Provide teaching at level of understanding  - Provide teaching via preferred learning methods  Outcome: Progressing     Problem: NEUROSENSORY - ADULT  Goal: Achieves stable or improved neurological status  Description: INTERVENTIONS  - Monitor and report changes in neurological status  - Monitor vital signs such as temperature, blood pressure, glucose, and any other labs ordered   - Initiate measures to prevent increased intracranial pressure  - Monitor for seizure activity and implement precautions if appropriate      Outcome: Progressing  Goal: Remains free of injury related to seizures activity  Description: INTERVENTIONS  - Maintain airway, patient safety  and administer oxygen as ordered  - Monitor patient for seizure activity, document and report duration and description of seizure to physician/advanced practitioner  - If seizure occurs,  ensure patient safety during seizure  - Reorient patient post seizure  - Seizure pads on all 4 side rails  - Instruct patient/family to notify RN of any seizure activity including if an aura is experienced  - Instruct patient/family to call for assistance with activity based on nursing assessment  - Administer anti-seizure medications if ordered    Outcome: Progressing  Goal: Achieves maximal functionality and self care  Description: INTERVENTIONS  - Monitor  swallowing and airway patency with patient fatigue and changes in neurological status  - Encourage and assist patient to increase activity and self care.   - Encourage visually impaired, hearing impaired and aphasic patients to use assistive/communication devices  Outcome: Progressing     Problem: CARDIOVASCULAR - ADULT  Goal: Maintains optimal cardiac output and hemodynamic stability  Description: INTERVENTIONS:  - Monitor I/O, vital signs and rhythm  - Monitor for S/S and trends of decreased cardiac output  - Administer and titrate ordered vasoactive medications to optimize hemodynamic stability  - Assess quality of pulses, skin color and temperature  - Assess for signs of decreased coronary artery perfusion  - Instruct patient to report change in severity of symptoms  Outcome: Progressing  Goal: Absence of cardiac dysrhythmias or at baseline rhythm  Description: INTERVENTIONS:  - Continuous cardiac monitoring, vital signs, obtain 12 lead EKG if ordered  - Administer antiarrhythmic and heart rate control medications as ordered  - Monitor electrolytes and administer replacement therapy as ordered  Outcome: Progressing     Problem: RESPIRATORY - ADULT  Goal: Achieves optimal ventilation and oxygenation  Description: INTERVENTIONS:  - Assess for changes in respiratory status  - Assess for changes in mentation and behavior  - Position to facilitate oxygenation and minimize respiratory effort  - Oxygen administered by appropriate delivery if ordered  - Initiate smoking cessation education as indicated  - Encourage broncho-pulmonary hygiene including cough, deep breathe, Incentive Spirometry  - Assess the need for suctioning and aspirate as needed  - Assess and instruct to report SOB or any respiratory difficulty  - Respiratory Therapy support as indicated  Outcome: Progressing     Problem: HEMATOLOGIC - ADULT  Goal: Maintains hematologic stability  Description: INTERVENTIONS  - Assess for signs and symptoms of bleeding  or hemorrhage  - Monitor labs  - Administer supportive blood products/factors as ordered and appropriate  Outcome: Progressing     Problem: MUSCULOSKELETAL - ADULT  Goal: Maintain or return mobility to safest level of function  Description: INTERVENTIONS:  - Assess patient's ability to carry out ADLs; assess patient's baseline for ADL function and identify physical deficits which impact ability to perform ADLs (bathing, care of mouth/teeth, toileting, grooming, dressing, etc.)  - Assess/evaluate cause of self-care deficits   - Assess range of motion  - Assess patient's mobility  - Assess patient's need for assistive devices and provide as appropriate  - Encourage maximum independence but intervene and supervise when necessary  - Involve family in performance of ADLs  - Assess for home care needs following discharge   - Consider OT consult to assist with ADL evaluation and planning for discharge  - Provide patient education as appropriate  Outcome: Progressing  Goal: Maintain proper alignment of affected body part  Description: INTERVENTIONS:  - Support, maintain and protect limb and body alignment  - Provide patient/ family with appropriate education  Outcome: Progressing     Problem: Nutrition/Hydration-ADULT  Goal: Nutrient/Hydration intake appropriate for improving, restoring or maintaining nutritional needs  Description: Monitor and assess patient's nutrition/hydration status for malnutrition. Collaborate with interdisciplinary team and initiate plan and interventions as ordered.  Monitor patient's weight and dietary intake as ordered or per policy. Utilize nutrition screening tool and intervene as necessary. Determine patient's food preferences and provide high-protein, high-caloric foods as appropriate.     INTERVENTIONS:  - Monitor oral intake, urinary output, labs, and treatment plans  - Assess nutrition and hydration status and recommend course of action  - Evaluate amount of meals eaten  - Assist patient  with eating if necessary   - Allow adequate time for meals  - Recommend/ encourage appropriate diets, oral nutritional supplements, and vitamin/mineral supplements  - Order, calculate, and assess calorie counts as needed  - Recommend, monitor, and adjust tube feedings and TPN/PPN based on assessed needs  - Assess need for intravenous fluids  - Provide specific nutrition/hydration education as appropriate  - Include patient/family/caregiver in decisions related to nutrition  Outcome: Progressing     Problem: CONFUSION/THOUGHT DISTURBANCE  Goal: Thought disturbances (confusion, delirium, depression, dementia or psychosis) are managed to maintain or return to baseline mental status and functional level  Description: INTERVENTIONS:  - Assess for possible contributors to  thought disturbance, including but not limited to medications, infection, impaired vision or hearing, underlying metabolic abnormalities, dehydration, respiratory compromise,  psychiatric diagnoses and notify attending PHYSICAN/AP  - Monitor and intervene to maintain adequate nutrition, hydration, elimination, sleep and activity  - Decrease environmental stimuli, including noise as appropriate.  - Provide frequent contacts to provide refocusing, direction and reassurance as needed. Approach patient calmly with eye contact and at their level.  - [place] high risk fall precautions, aspiration precautions and other safety measures, as indicated  - If delirium suspected, notify physician/AP of change in condition and request immediate in-person evaluation  - Pursue consults as appropriate including Geriatric (campus dependent), OT for cognitive evaluation/activity planning, psychiatric, pastoral care, etc.  Outcome: Progressing     Problem: BEHAVIOR  Goal: Pt/Family maintain appropriate behavior and adhere to behavioral management agreement, if implemented  Description: INTERVENTIONS:  - Assess the family dynamic   - Encourage verbalization of thoughts  and concerns in a socially appropriate manner  - Assess patient/family's coping skills and non-compliant behavior (including use of illegal substances).  - Utilize positive, consistent limit setting strategies supporting safety of patient, staff and others  - Initiate consult with Case Management, Spiritual Care or other ancillary services as appropriate  - If a patient's/visitor's behavior jeopardizes the safety of the patient, staff, or others, refer to organization procedure.   - Notify Security of behavior or suspected illegal substances which indicate the need for search of the patient and/or belongings  - Encourage participation in the decision making process about a behavioral management agreement; implement if patient meets criteria  Outcome: Progressing     Problem: Prexisting or High Potential for Compromised Skin Integrity  Goal: Skin integrity is maintained or improved  Description: INTERVENTIONS:  - Identify patients at risk for skin breakdown  - Assess and monitor skin integrity  - Assess and monitor nutrition and hydration status  - Monitor labs   - Assess for incontinence   - Turn and reposition patient  - Assist with mobility/ambulation  - Relieve pressure over bony prominences  - Avoid friction and shearing  - Provide appropriate hygiene as needed including keeping skin clean and dry  - Evaluate need for skin moisturizer/barrier cream  - Collaborate with interdisciplinary team   - Patient/family teaching  - Consider wound care consult   Outcome: Progressing

## 2024-12-29 PROBLEM — T17.908A ASPIRATION INTO AIRWAY: Status: ACTIVE | Noted: 2024-12-29

## 2024-12-29 LAB
ANION GAP SERPL CALCULATED.3IONS-SCNC: 4 MMOL/L (ref 4–13)
BASOPHILS # BLD AUTO: 0.01 THOUSANDS/ΜL (ref 0–0.1)
BASOPHILS NFR BLD AUTO: 0 % (ref 0–1)
BUN SERPL-MCNC: 17 MG/DL (ref 5–25)
CALCIUM SERPL-MCNC: 8.5 MG/DL (ref 8.4–10.2)
CHLORIDE SERPL-SCNC: 105 MMOL/L (ref 96–108)
CO2 SERPL-SCNC: 35 MMOL/L (ref 21–32)
CREAT SERPL-MCNC: 0.63 MG/DL (ref 0.6–1.3)
EOSINOPHIL # BLD AUTO: 0.02 THOUSAND/ΜL (ref 0–0.61)
EOSINOPHIL NFR BLD AUTO: 0 % (ref 0–6)
ERYTHROCYTE [DISTWIDTH] IN BLOOD BY AUTOMATED COUNT: 15.1 % (ref 11.6–15.1)
GFR SERPL CREATININE-BSD FRML MDRD: 85 ML/MIN/1.73SQ M
GLUCOSE SERPL-MCNC: 103 MG/DL (ref 65–140)
GLUCOSE SERPL-MCNC: 103 MG/DL (ref 65–140)
GLUCOSE SERPL-MCNC: 104 MG/DL (ref 65–140)
GLUCOSE SERPL-MCNC: 97 MG/DL (ref 65–140)
HCT VFR BLD AUTO: 27.9 % (ref 34.8–46.1)
HGB BLD-MCNC: 8.2 G/DL (ref 11.5–15.4)
IMM GRANULOCYTES # BLD AUTO: 0.05 THOUSAND/UL (ref 0–0.2)
IMM GRANULOCYTES NFR BLD AUTO: 1 % (ref 0–2)
LYMPHOCYTES # BLD AUTO: 0.47 THOUSANDS/ΜL (ref 0.6–4.47)
LYMPHOCYTES NFR BLD AUTO: 6 % (ref 14–44)
MCH RBC QN AUTO: 27.9 PG (ref 26.8–34.3)
MCHC RBC AUTO-ENTMCNC: 29.4 G/DL (ref 31.4–37.4)
MCV RBC AUTO: 95 FL (ref 82–98)
MONOCYTES # BLD AUTO: 0.41 THOUSAND/ΜL (ref 0.17–1.22)
MONOCYTES NFR BLD AUTO: 5 % (ref 4–12)
NEUTROPHILS # BLD AUTO: 7.22 THOUSANDS/ΜL (ref 1.85–7.62)
NEUTS SEG NFR BLD AUTO: 88 % (ref 43–75)
NRBC BLD AUTO-RTO: 0 /100 WBCS
PLATELET # BLD AUTO: 133 THOUSANDS/UL (ref 149–390)
PMV BLD AUTO: 8.9 FL (ref 8.9–12.7)
POTASSIUM SERPL-SCNC: 3.8 MMOL/L (ref 3.5–5.3)
RBC # BLD AUTO: 2.94 MILLION/UL (ref 3.81–5.12)
SODIUM SERPL-SCNC: 144 MMOL/L (ref 135–147)
WBC # BLD AUTO: 8.18 THOUSAND/UL (ref 4.31–10.16)

## 2024-12-29 PROCEDURE — 80048 BASIC METABOLIC PNL TOTAL CA: CPT

## 2024-12-29 PROCEDURE — 94760 N-INVAS EAR/PLS OXIMETRY 1: CPT

## 2024-12-29 PROCEDURE — 85025 COMPLETE CBC W/AUTO DIFF WBC: CPT

## 2024-12-29 PROCEDURE — 99233 SBSQ HOSP IP/OBS HIGH 50: CPT | Performed by: SURGERY

## 2024-12-29 PROCEDURE — 82948 REAGENT STRIP/BLOOD GLUCOSE: CPT

## 2024-12-29 RX ORDER — ACETAMINOPHEN 10 MG/ML
1000 INJECTION, SOLUTION INTRAVENOUS EVERY 8 HOURS
Status: DISCONTINUED | OUTPATIENT
Start: 2024-12-29 | End: 2025-01-02

## 2024-12-29 RX ORDER — DIGOXIN 0.25 MG/ML
100 INJECTION INTRAMUSCULAR; INTRAVENOUS EVERY OTHER DAY
Status: DISCONTINUED | OUTPATIENT
Start: 2024-12-30 | End: 2025-01-01

## 2024-12-29 RX ORDER — DEXTROSE MONOHYDRATE AND SODIUM CHLORIDE 5; .9 G/100ML; G/100ML
50 INJECTION, SOLUTION INTRAVENOUS CONTINUOUS
Status: DISCONTINUED | OUTPATIENT
Start: 2024-12-29 | End: 2025-01-02

## 2024-12-29 RX ORDER — SODIUM CHLORIDE, SODIUM GLUCONATE, SODIUM ACETATE, POTASSIUM CHLORIDE, MAGNESIUM CHLORIDE, SODIUM PHOSPHATE, DIBASIC, AND POTASSIUM PHOSPHATE .53; .5; .37; .037; .03; .012; .00082 G/100ML; G/100ML; G/100ML; G/100ML; G/100ML; G/100ML; G/100ML
500 INJECTION, SOLUTION INTRAVENOUS ONCE
Status: COMPLETED | OUTPATIENT
Start: 2024-12-29 | End: 2024-12-29

## 2024-12-29 RX ADMIN — SERTRALINE 100 MG: 100 TABLET, FILM COATED ORAL at 09:40

## 2024-12-29 RX ADMIN — SODIUM CHLORIDE, SODIUM GLUCONATE, SODIUM ACETATE, POTASSIUM CHLORIDE, MAGNESIUM CHLORIDE, SODIUM PHOSPHATE, DIBASIC, AND POTASSIUM PHOSPHATE 500 ML: .53; .5; .37; .037; .03; .012; .00082 INJECTION, SOLUTION INTRAVENOUS at 06:29

## 2024-12-29 RX ADMIN — ACETAMINOPHEN 1000 MG: 1000 INJECTION, SOLUTION INTRAVENOUS at 21:13

## 2024-12-29 RX ADMIN — ENOXAPARIN SODIUM 30 MG: 30 INJECTION SUBCUTANEOUS at 09:41

## 2024-12-29 RX ADMIN — ATORVASTATIN CALCIUM 20 MG: 20 TABLET, FILM COATED ORAL at 09:40

## 2024-12-29 RX ADMIN — DEXTROSE AND SODIUM CHLORIDE 50 ML/HR: 5; .9 INJECTION, SOLUTION INTRAVENOUS at 11:20

## 2024-12-29 RX ADMIN — ENOXAPARIN SODIUM 30 MG: 30 INJECTION SUBCUTANEOUS at 21:13

## 2024-12-29 RX ADMIN — DEXTROSE AND SODIUM CHLORIDE 500 ML: 5; .9 INJECTION, SOLUTION INTRAVENOUS at 22:55

## 2024-12-29 NOTE — PLAN OF CARE
Problem: PAIN - ADULT  Goal: Verbalizes/displays adequate comfort level or baseline comfort level  Description: Interventions:  - Encourage patient to monitor pain and request assistance  - Assess pain using appropriate pain scale  - Administer analgesics based on type and severity of pain and evaluate response  - Implement non-pharmacological measures as appropriate and evaluate response  - Consider cultural and social influences on pain and pain management  - Notify physician/advanced practitioner if interventions unsuccessful or patient reports new pain  Outcome: Progressing     Problem: INFECTION - ADULT  Goal: Absence or prevention of progression during hospitalization  Description: INTERVENTIONS:  - Assess and monitor for signs and symptoms of infection  - Monitor lab/diagnostic results  - Monitor all insertion sites, i.e. indwelling lines, tubes, and drains  - Monitor endotracheal if appropriate and nasal secretions for changes in amount and color  - [place] appropriate cooling/warming therapies per order  - Administer medications as ordered  - Instruct and encourage patient and family to use good hand hygiene technique  - Identify and instruct in appropriate isolation precautions for identified infection/condition  Outcome: Progressing     Problem: SAFETY ADULT  Goal: Patient will remain free of falls  Description: INTERVENTIONS:  - Educate patient/family on patient safety including physical limitations  - Instruct patient to call for assistance with activity   - Consult OT/PT to assist with strengthening/mobility   - Keep Call bell within reach  - Keep bed low and locked with side rails adjusted as appropriate  - Keep care items and personal belongings within reach  - Initiate and maintain comfort rounds  - Make Fall Risk Sign visible to staff  - Offer Toileting every 2 Hours, in advance of need  - Initiate/Maintain bed alarm  - Obtain necessary fall risk management equipment: bed alarm  - Apply yellow  socks and bracelet for high fall risk patients  - Consider moving patient to room near nurses station  Outcome: Progressing  Goal: Maintain or return to baseline ADL function  Description: INTERVENTIONS:  -  Assess patient's ability to carry out ADLs; assess patient's baseline for ADL function and identify physical deficits which impact ability to perform ADLs (bathing, care of mouth/teeth, toileting, grooming, dressing, etc.)  - Assess/evaluate cause of self-care deficits   - Assess range of motion  - Assess patient's mobility; develop plan if impaired  - Assess patient's need for assistive devices and provide as appropriate  - Encourage maximum independence but intervene and supervise when necessary  - Involve family in performance of ADLs  - Assess for home care needs following discharge   - Consider OT consult to assist with ADL evaluation and planning for discharge  - Provide patient education as appropriate  Outcome: Progressing  Goal: Maintains/Returns to pre admission functional level  Description: INTERVENTIONS:  - Perform AM-PAC 6 Click Basic Mobility/ Daily Activity assessment daily.  - Set and communicate daily mobility goal to care team and patient/family/caregiver.   - Collaborate with rehabilitation services on mobility goals if consulted  - Perform Range of Motion 3 times a day.  - Reposition patient every 2 hours.  - Dangle patient 3 times a day  - Stand patient 3 times a day  - Ambulate patient 3 times a day  - Out of bed to chair 3 times a day   - Out of bed for meals 3 times a day  - Out of bed for toileting  - Record patient progress and toleration of activity level   Outcome: Progressing     Problem: DISCHARGE PLANNING  Goal: Discharge to home or other facility with appropriate resources  Description: INTERVENTIONS:  - Identify barriers to discharge w/patient and caregiver  - Arrange for needed discharge resources and transportation as appropriate  - Identify discharge learning needs (meds,  wound care, etc.)  - Arrange for interpretive services to assist at discharge as needed  - Refer to Case Management Department for coordinating discharge planning if the patient needs post-hospital services based on physician/advanced practitioner order or complex needs related to functional status, cognitive ability, or social support system  Outcome: Progressing     Problem: Knowledge Deficit  Goal: Patient/family/caregiver demonstrates understanding of disease process, treatment plan, medications, and discharge instructions  Description: Complete learning assessment and assess knowledge base.  Interventions:  - Provide teaching at level of understanding  - Provide teaching via preferred learning methods  Outcome: Progressing

## 2024-12-29 NOTE — RESPIRATORY THERAPY NOTE
12/28/24 2226   Respiratory Assessment   Assessment Type Assess only   General Appearance Awake   Respiratory Pattern Normal;Symmetrical;Spontaneous   Chest Assessment Chest expansion symmetrical   Bilateral Breath Sounds Diminished;Crackles   Resp Comments Called by RN to assess pt after having o2 requirement increase from 2L to 10L mfnc. Pt in NAD, no increased WOB. Pt spo2 95% on 10L. Auscultated crackles bilaterally in lower lobes. Provider has been contacted by RN to assess pt.   O2 Device MFNC   Oxygen Therapy/Pulse Ox   O2 Device Mid flow nasal cannula   O2 Therapy Oxygen humidified   Nasal Cannula O2 Flow Rate (L/min) 10 L/min   Calculated FIO2 (%) - Nasal Cannula 60   SpO2 96 %   SpO2 Activity At Rest   $ Pulse Oximetry Spot Check Charge Completed

## 2024-12-29 NOTE — ASSESSMENT & PLAN NOTE
- Found to be in A-fib with RVR on presentation at [facility], started on Cardizem drip  - Appreciate Cardiology consult and recommendations   Cardiology signed off on 12/26/2024.  - Currently rate controlled on Digoxin and Cardizem PO  - not anticoagulated in the outpatient setting due to PMH of severe Gi bleeds  - Follow up with PCP

## 2024-12-29 NOTE — ASSESSMENT & PLAN NOTE
- Patient with increasing oxygen requirement overnight up to 10L  - Resting on 7L this AM.   - CXR shows concern for aspiration which patient also has a history of  - Patient recently failed swallow study and was recommended NPO  - Family refused this and patient was kept on purees and thin liquids  - NPO  - Planning for PEG tube placement  - Monitor off antibiotics  - Titrate O2 as needed

## 2024-12-29 NOTE — PROGRESS NOTES
"Progress Note - Trauma   Name: Marsha Oliva 79 y.o. female I MRN: [id number]  Unit/Bed#: [facility] 632-01 I Date of Admission: 12/22/2024   Date of Service: 12/29/2024 I Hospital Day: 7    Assessment & Plan  Repeated falls  Closed T1 fracture, sacral fracture with small hematoma and some active extravasation.     Downgraded to MedSurg as hemoglobins have been stable  As needed pain and nausea control  Ortho consult-weightbearing as tolerated, nonoperative  Neurosurgery consult-nonoperative  Encourage I-S  Discharge planning per case management.  Closed T1 fracture (HCC)  - Age indeterminate T1 fx  - Appreciate NSG consult and recommendations  - Bracing deferred    Atrial fibrillation, chronic ([facility])  - Found to be in A-fib with RVR on presentation at [facility], started on Cardizem drip  - Appreciate Cardiology consult and recommendations   Cardiology signed off on 12/26/2024.  - Currently rate controlled on Digoxin and Cardizem PO  - not anticoagulated in the outpatient setting due to PMH of severe Gi bleeds  - Follow up with PCP  S/P cervical spinal fusion  Appreciate NSG consult and recommendations  \"S/p C1-C3 posterior fusion 9/5/24 with Dr. Simpson after sustaining fall with type II dens fracture with posterior displacement \"  Follow up in 3 months with NSG for post op visit  Sacral fracture ([facility])  Appreciate orthopedic surgery input  Non op  WBAT  Pain control  DVT ppx  PT and OT  Chronic diastolic CHF (congestive heart failure) ([facility])  Wt Readings from Last 3 Encounters:   12/22/24 50.8 kg (112 lb)   10/24/24 51.3 kg (113 lb)   10/07/24 51.3 kg (113 lb 1.5 oz)     - No evidence of acute exacerbation  - Continue home meds  Hypothyroidism  Continue levothyroxine  Mixed hyperlipidemia  Continue atorvastatin  Severe mitral regurgitation    ILD (interstitial lung disease) ([facility])  Previously on home oxygen,  states she has not needed it in a year.  -Titrate oxygen as needed, wean as able  Apraxia  Chronic  Vitamin D " deficiency    Ambulatory dysfunction  Chronic, pending rehab placement  Osteoporosis  Chronic  Dysphagia  - Appreciate speech eval and recommendations  - Plan for VBS on 12/27/2024.  Maintain aspiration precautions.  Acute urinary retention  - Failed urinary retention protocol 12/24 and required tapia catheter placement  - Recommend flomax and outpatient urology follow up for voiding trial  Aspiration into airway  - Patient with increasing oxygen requirement overnight up to 10L  - Resting on 7L this AM.   - CXR shows concern for aspiration which patient also has a history of  - Patient recently failed swallow study and was recommended NPO  - Family refused this and patient was kept on purees and thin liquids  - NPO  - Planning for PEG tube placement  - Monitor off antibiotics  - Titrate O2 as needed    Bowel Regimen: colace, miralax  VTE Prophylaxis:Enoxaparin (Lovenox)     Disposition: Remain hospitalized for PEG tube placement followed by SNF placement   Please contact the SecureChat role for the Trauma service with any questions/concerns.    24 Hour Events : Overnight, patient with increased O2 requirement up to 10 L.  This morning resting comfortably on 7 L and most recently this afternoon on 4 L.  Chest x-ray overnight revealing for aspiration.  Subjective : Patient with 2 family members at bedside and participated in a group discussion regarding patient's goals and next steps.  Discussed recent speech/swallow evaluation which patient was recommended for n.p.o.  status but family and patient declined wishing to continue feeding.  There was a mutual agreement for a dysphagia 1 diet which patient was on when she aspirated.  After increasing O2 requirement and aspiration detected on x-ray, patient made n.p.o. and restarted on fluids.  Discussed with family next step is for PEG tube vs comfort care with pleasure feeds. Extensive discussion with family and patient ultimately resulting in decision for PEG tube  placement. All questions answered.  No otherwise concerns or complaints this AM.    Objective :  Temp:  [97.5 °F (36.4 °C)-98.7 °F (37.1 °C)] 97.6 °F (36.4 °C)  HR:  [64-95] 92  BP: ()/(37-82) 90/48  Resp:  [16-20] 16  SpO2:  [84 %-98 %] 98 %  O2 Device: Mid flow nasal cannula  Nasal Cannula O2 Flow Rate (L/min):  [2 L/min-10 L/min] 7 L/min    I/O         12/27 0701 12/28 0700 12/28 0701  12/29 0700 12/29 0701 12/30 0700    P.O. 0 220     I.V.   500    Total Intake 0 220 500    Urine 550 600     Stool 0      Total Output 550 600     Net -550 -380 +500           Unmeasured Stool Occurrence 1 x  1 x          Lines/Drains/Airways       Active Status       Name Placement date Placement time Site Days    Urethral Catheter Non-latex 18 Fr. 12/24/24  1240  Non-latex  4                  Physical Exam  Vitals and nursing note reviewed.   Constitutional:       General: She is not in acute distress.     Appearance: She is not ill-appearing or toxic-appearing.   HENT:      Head: Normocephalic and atraumatic.      Nose: No congestion.   Eyes:      Extraocular Movements: Extraocular movements intact.   Cardiovascular:      Rate and Rhythm: Normal rate.      Pulses: Normal pulses.   Pulmonary:      Effort: Pulmonary effort is normal. No respiratory distress.      Breath sounds: Rhonchi present.   Musculoskeletal:         General: No swelling or tenderness. Normal range of motion.      Right lower leg: No edema.      Left lower leg: No edema.   Skin:     General: Skin is warm and dry.      Capillary Refill: Capillary refill takes less than 2 seconds.   Neurological:      General: No focal deficit present.      Mental Status: She is alert.               Lab Results: I have reviewed the following results:  Recent Labs     12/29/24  0626   WBC 8.18   HGB 8.2*   HCT 27.9*   *   SODIUM 144   K 3.8      CO2 35*   BUN 17   CREATININE 0.63   GLUC 103       No new imaging studies today  Other Study Results Review: No  additional pertinent studies reviewed.

## 2024-12-29 NOTE — OCCUPATIONAL THERAPY NOTE
Occupational Therapy Cancel Note:    Occupational therapy attempted however pt cancelled per nurse request due to pt medical status at this time.  OT will attempt again next treatment.     MATT Travis

## 2024-12-29 NOTE — PHYSICAL THERAPY NOTE
Physical Therapy Cancellation Note         12/29/24 1145   PT Last Visit   PT Visit Date 12/29/24   Note Type   Note Type Cancelled Session   Cancel Reasons Medical status  (in O2 needs overnight + hypotensive while in bed this am- to receive fluids. S/W NILDA Clinton who advises holding off w/ PT this date 2* same. PT will continue to follow on caseload.)

## 2024-12-29 NOTE — PLAN OF CARE
Problem: PAIN - ADULT  Goal: Verbalizes/displays adequate comfort level or baseline comfort level  Description: Interventions:  - Encourage patient to monitor pain and request assistance  - Assess pain using appropriate pain scale  - Administer analgesics based on type and severity of pain and evaluate response  - Implement non-pharmacological measures as appropriate and evaluate response  - Consider cultural and social influences on pain and pain management  - Notify physician/advanced practitioner if interventions unsuccessful or patient reports new pain  Outcome: Progressing     Problem: INFECTION - ADULT  Goal: Absence or prevention of progression during hospitalization  Description: INTERVENTIONS:  - Assess and monitor for signs and symptoms of infection  - Monitor lab/diagnostic results  - Monitor all insertion sites, i.e. indwelling lines, tubes, and drains  - Monitor endotracheal if appropriate and nasal secretions for changes in amount and color  - [place] appropriate cooling/warming therapies per order  - Administer medications as ordered  - Instruct and encourage patient and family to use good hand hygiene technique  - Identify and instruct in appropriate isolation precautions for identified infection/condition  Outcome: Progressing     Problem: SAFETY ADULT  Goal: Patient will remain free of falls  Description: INTERVENTIONS:  - Educate patient/family on patient safety including physical limitations  - Instruct patient to call for assistance with activity   - Consult OT/PT to assist with strengthening/mobility   - Keep Call bell within reach  - Keep bed low and locked with side rails adjusted as appropriate  - Keep care items and personal belongings within reach  - Initiate and maintain comfort rounds  - Make Fall Risk Sign visible to staff  - Offer Toileting every 2 Hours, in advance of need  - Initiate/Maintain bed alarm  - Obtain necessary fall risk management equipment: bed alarm  - Apply yellow  socks and bracelet for high fall risk patients  - Consider moving patient to room near nurses station  Outcome: Progressing  Goal: Maintain or return to baseline ADL function  Description: INTERVENTIONS:  -  Assess patient's ability to carry out ADLs; assess patient's baseline for ADL function and identify physical deficits which impact ability to perform ADLs (bathing, care of mouth/teeth, toileting, grooming, dressing, etc.)  - Assess/evaluate cause of self-care deficits   - Assess range of motion  - Assess patient's mobility; develop plan if impaired  - Assess patient's need for assistive devices and provide as appropriate  - Encourage maximum independence but intervene and supervise when necessary  - Involve family in performance of ADLs  - Assess for home care needs following discharge   - Consider OT consult to assist with ADL evaluation and planning for discharge  - Provide patient education as appropriate  Outcome: Progressing  Goal: Maintains/Returns to pre admission functional level  Description: INTERVENTIONS:  - Perform AM-PAC 6 Click Basic Mobility/ Daily Activity assessment daily.  - Set and communicate daily mobility goal to care team and patient/family/caregiver.   - Collaborate with rehabilitation services on mobility goals if consulted  - Perform Range of Motion 3 times a day.  - Reposition patient every 2 hours.  - Dangle patient 3 times a day  - Stand patient 3 times a day  - Ambulate patient 3 times a day  - Out of bed to chair 3 times a day   - Out of bed for meals 3 times a day  - Out of bed for toileting  - Record patient progress and toleration of activity level   Outcome: Progressing     Problem: DISCHARGE PLANNING  Goal: Discharge to home or other facility with appropriate resources  Description: INTERVENTIONS:  - Identify barriers to discharge w/patient and caregiver  - Arrange for needed discharge resources and transportation as appropriate  - Identify discharge learning needs (meds,  wound care, etc.)  - Arrange for interpretive services to assist at discharge as needed  - Refer to Case Management Department for coordinating discharge planning if the patient needs post-hospital services based on physician/advanced practitioner order or complex needs related to functional status, cognitive ability, or social support system  Outcome: Progressing     Problem: Knowledge Deficit  Goal: Patient/family/caregiver demonstrates understanding of disease process, treatment plan, medications, and discharge instructions  Description: Complete learning assessment and assess knowledge base.  Interventions:  - Provide teaching at level of understanding  - Provide teaching via preferred learning methods  Outcome: Progressing     Problem: NEUROSENSORY - ADULT  Goal: Achieves stable or improved neurological status  Description: INTERVENTIONS  - Monitor and report changes in neurological status  - Monitor vital signs such as temperature, blood pressure, glucose, and any other labs ordered   - Initiate measures to prevent increased intracranial pressure  - Monitor for seizure activity and implement precautions if appropriate      Outcome: Progressing  Goal: Remains free of injury related to seizures activity  Description: INTERVENTIONS  - Maintain airway, patient safety  and administer oxygen as ordered  - Monitor patient for seizure activity, document and report duration and description of seizure to physician/advanced practitioner  - If seizure occurs,  ensure patient safety during seizure  - Reorient patient post seizure  - Seizure pads on all 4 side rails  - Instruct patient/family to notify RN of any seizure activity including if an aura is experienced  - Instruct patient/family to call for assistance with activity based on nursing assessment  - Administer anti-seizure medications if ordered    Outcome: Progressing  Goal: Achieves maximal functionality and self care  Description: INTERVENTIONS  - Monitor  swallowing and airway patency with patient fatigue and changes in neurological status  - Encourage and assist patient to increase activity and self care.   - Encourage visually impaired, hearing impaired and aphasic patients to use assistive/communication devices  Outcome: Progressing     Problem: CARDIOVASCULAR - ADULT  Goal: Maintains optimal cardiac output and hemodynamic stability  Description: INTERVENTIONS:  - Monitor I/O, vital signs and rhythm  - Monitor for S/S and trends of decreased cardiac output  - Administer and titrate ordered vasoactive medications to optimize hemodynamic stability  - Assess quality of pulses, skin color and temperature  - Assess for signs of decreased coronary artery perfusion  - Instruct patient to report change in severity of symptoms  Outcome: Progressing  Goal: Absence of cardiac dysrhythmias or at baseline rhythm  Description: INTERVENTIONS:  - Continuous cardiac monitoring, vital signs, obtain 12 lead EKG if ordered  - Administer antiarrhythmic and heart rate control medications as ordered  - Monitor electrolytes and administer replacement therapy as ordered  Outcome: Progressing     Problem: RESPIRATORY - ADULT  Goal: Achieves optimal ventilation and oxygenation  Description: INTERVENTIONS:  - Assess for changes in respiratory status  - Assess for changes in mentation and behavior  - Position to facilitate oxygenation and minimize respiratory effort  - Oxygen administered by appropriate delivery if ordered  - Initiate smoking cessation education as indicated  - Encourage broncho-pulmonary hygiene including cough, deep breathe, Incentive Spirometry  - Assess the need for suctioning and aspirate as needed  - Assess and instruct to report SOB or any respiratory difficulty  - Respiratory Therapy support as indicated  Outcome: Progressing     Problem: HEMATOLOGIC - ADULT  Goal: Maintains hematologic stability  Description: INTERVENTIONS  - Assess for signs and symptoms of bleeding  or hemorrhage  - Monitor labs  - Administer supportive blood products/factors as ordered and appropriate  Outcome: Progressing     Problem: MUSCULOSKELETAL - ADULT  Goal: Maintain or return mobility to safest level of function  Description: INTERVENTIONS:  - Assess patient's ability to carry out ADLs; assess patient's baseline for ADL function and identify physical deficits which impact ability to perform ADLs (bathing, care of mouth/teeth, toileting, grooming, dressing, etc.)  - Assess/evaluate cause of self-care deficits   - Assess range of motion  - Assess patient's mobility  - Assess patient's need for assistive devices and provide as appropriate  - Encourage maximum independence but intervene and supervise when necessary  - Involve family in performance of ADLs  - Assess for home care needs following discharge   - Consider OT consult to assist with ADL evaluation and planning for discharge  - Provide patient education as appropriate  Outcome: Progressing  Goal: Maintain proper alignment of affected body part  Description: INTERVENTIONS:  - Support, maintain and protect limb and body alignment  - Provide patient/ family with appropriate education  Outcome: Progressing     Problem: Nutrition/Hydration-ADULT  Goal: Nutrient/Hydration intake appropriate for improving, restoring or maintaining nutritional needs  Description: Monitor and assess patient's nutrition/hydration status for malnutrition. Collaborate with interdisciplinary team and initiate plan and interventions as ordered.  Monitor patient's weight and dietary intake as ordered or per policy. Utilize nutrition screening tool and intervene as necessary. Determine patient's food preferences and provide high-protein, high-caloric foods as appropriate.     INTERVENTIONS:  - Monitor oral intake, urinary output, labs, and treatment plans  - Assess nutrition and hydration status and recommend course of action  - Evaluate amount of meals eaten  - Assist patient  with eating if necessary   - Allow adequate time for meals  - Recommend/ encourage appropriate diets, oral nutritional supplements, and vitamin/mineral supplements  - Order, calculate, and assess calorie counts as needed  - Recommend, monitor, and adjust tube feedings and TPN/PPN based on assessed needs  - Assess need for intravenous fluids  - Provide specific nutrition/hydration education as appropriate  - Include patient/family/caregiver in decisions related to nutrition  Outcome: Progressing     Problem: BEHAVIOR  Goal: Pt/Family maintain appropriate behavior and adhere to behavioral management agreement, if implemented  Description: INTERVENTIONS:  - Assess the family dynamic   - Encourage verbalization of thoughts and concerns in a socially appropriate manner  - Assess patient/family's coping skills and non-compliant behavior (including use of illegal substances).  - Utilize positive, consistent limit setting strategies supporting safety of patient, staff and others  - Initiate consult with Case Management, Spiritual Care or other ancillary services as appropriate  - If a patient's/visitor's behavior jeopardizes the safety of the patient, staff, or others, refer to organization procedure.   - Notify Security of behavior or suspected illegal substances which indicate the need for search of the patient and/or belongings  - Encourage participation in the decision making process about a behavioral management agreement; implement if patient meets criteria  Outcome: Progressing     Problem: Prexisting or High Potential for Compromised Skin Integrity  Goal: Skin integrity is maintained or improved  Description: INTERVENTIONS:  - Identify patients at risk for skin breakdown  - Assess and monitor skin integrity  - Assess and monitor nutrition and hydration status  - Monitor labs   - Assess for incontinence   - Turn and reposition patient  - Assist with mobility/ambulation  - Relieve pressure over bony prominences  -  Avoid friction and shearing  - Provide appropriate hygiene as needed including keeping skin clean and dry  - Evaluate need for skin moisturizer/barrier cream  - Collaborate with interdisciplinary team   - Patient/family teaching  - Consider wound care consult   Outcome: Progressing

## 2024-12-30 LAB
ALBUMIN SERPL BCG-MCNC: 2.3 G/DL (ref 3.5–5)
ALP SERPL-CCNC: 65 U/L (ref 34–104)
ALT SERPL W P-5'-P-CCNC: 11 U/L (ref 7–52)
ANION GAP SERPL CALCULATED.3IONS-SCNC: 3 MMOL/L (ref 4–13)
AST SERPL W P-5'-P-CCNC: 19 U/L (ref 13–39)
BASOPHILS # BLD AUTO: 0.01 THOUSANDS/ΜL (ref 0–0.1)
BASOPHILS NFR BLD AUTO: 0 % (ref 0–1)
BILIRUB SERPL-MCNC: 0.44 MG/DL (ref 0.2–1)
BUN SERPL-MCNC: 13 MG/DL (ref 5–25)
CALCIUM ALBUM COR SERPL-MCNC: 9.5 MG/DL (ref 8.3–10.1)
CALCIUM SERPL-MCNC: 8.1 MG/DL (ref 8.4–10.2)
CHLORIDE SERPL-SCNC: 107 MMOL/L (ref 96–108)
CO2 SERPL-SCNC: 35 MMOL/L (ref 21–32)
CREAT SERPL-MCNC: 0.47 MG/DL (ref 0.6–1.3)
EOSINOPHIL # BLD AUTO: 0.1 THOUSAND/ΜL (ref 0–0.61)
EOSINOPHIL NFR BLD AUTO: 2 % (ref 0–6)
ERYTHROCYTE [DISTWIDTH] IN BLOOD BY AUTOMATED COUNT: 15.1 % (ref 11.6–15.1)
GFR SERPL CREATININE-BSD FRML MDRD: 94 ML/MIN/1.73SQ M
GLUCOSE SERPL-MCNC: 117 MG/DL (ref 65–140)
GLUCOSE SERPL-MCNC: 123 MG/DL (ref 65–140)
GLUCOSE SERPL-MCNC: 81 MG/DL (ref 65–140)
GLUCOSE SERPL-MCNC: 83 MG/DL (ref 65–140)
GLUCOSE SERPL-MCNC: 93 MG/DL (ref 65–140)
HCT VFR BLD AUTO: 29 % (ref 34.8–46.1)
HGB BLD-MCNC: 8.3 G/DL (ref 11.5–15.4)
IMM GRANULOCYTES # BLD AUTO: 0.03 THOUSAND/UL (ref 0–0.2)
IMM GRANULOCYTES NFR BLD AUTO: 1 % (ref 0–2)
LACTATE SERPL-SCNC: 0.9 MMOL/L (ref 0.5–2)
LYMPHOCYTES # BLD AUTO: 0.54 THOUSANDS/ΜL (ref 0.6–4.47)
LYMPHOCYTES NFR BLD AUTO: 12 % (ref 14–44)
MCH RBC QN AUTO: 27.7 PG (ref 26.8–34.3)
MCHC RBC AUTO-ENTMCNC: 28.6 G/DL (ref 31.4–37.4)
MCV RBC AUTO: 97 FL (ref 82–98)
MONOCYTES # BLD AUTO: 0.31 THOUSAND/ΜL (ref 0.17–1.22)
MONOCYTES NFR BLD AUTO: 7 % (ref 4–12)
NEUTROPHILS # BLD AUTO: 3.59 THOUSANDS/ΜL (ref 1.85–7.62)
NEUTS SEG NFR BLD AUTO: 78 % (ref 43–75)
NRBC BLD AUTO-RTO: 0 /100 WBCS
PLATELET # BLD AUTO: 113 THOUSANDS/UL (ref 149–390)
PMV BLD AUTO: 9.4 FL (ref 8.9–12.7)
POTASSIUM SERPL-SCNC: 3.5 MMOL/L (ref 3.5–5.3)
PROT SERPL-MCNC: 4.8 G/DL (ref 6.4–8.4)
RBC # BLD AUTO: 3 MILLION/UL (ref 3.81–5.12)
SODIUM SERPL-SCNC: 145 MMOL/L (ref 135–147)
WBC # BLD AUTO: 4.58 THOUSAND/UL (ref 4.31–10.16)

## 2024-12-30 PROCEDURE — 97110 THERAPEUTIC EXERCISES: CPT

## 2024-12-30 PROCEDURE — 99232 SBSQ HOSP IP/OBS MODERATE 35: CPT | Performed by: SURGERY

## 2024-12-30 PROCEDURE — 82948 REAGENT STRIP/BLOOD GLUCOSE: CPT

## 2024-12-30 PROCEDURE — 99232 SBSQ HOSP IP/OBS MODERATE 35: CPT | Performed by: INTERNAL MEDICINE

## 2024-12-30 PROCEDURE — 97116 GAIT TRAINING THERAPY: CPT

## 2024-12-30 PROCEDURE — 97535 SELF CARE MNGMENT TRAINING: CPT

## 2024-12-30 PROCEDURE — 85025 COMPLETE CBC W/AUTO DIFF WBC: CPT

## 2024-12-30 PROCEDURE — 83605 ASSAY OF LACTIC ACID: CPT

## 2024-12-30 PROCEDURE — 80053 COMPREHEN METABOLIC PANEL: CPT

## 2024-12-30 RX ORDER — BISACODYL 10 MG
10 SUPPOSITORY, RECTAL RECTAL DAILY PRN
Status: DISCONTINUED | OUTPATIENT
Start: 2024-12-30 | End: 2025-01-03 | Stop reason: HOSPADM

## 2024-12-30 RX ORDER — ALBUMIN (HUMAN) 12.5 G/50ML
25 SOLUTION INTRAVENOUS ONCE
Status: COMPLETED | OUTPATIENT
Start: 2024-12-30 | End: 2024-12-30

## 2024-12-30 RX ADMIN — DEXTROSE AND SODIUM CHLORIDE 50 ML/HR: 5; .9 INJECTION, SOLUTION INTRAVENOUS at 02:00

## 2024-12-30 RX ADMIN — ENOXAPARIN SODIUM 30 MG: 30 INJECTION SUBCUTANEOUS at 21:09

## 2024-12-30 RX ADMIN — ACETAMINOPHEN 1000 MG: 1000 INJECTION, SOLUTION INTRAVENOUS at 21:09

## 2024-12-30 RX ADMIN — ACETAMINOPHEN 1000 MG: 1000 INJECTION, SOLUTION INTRAVENOUS at 05:48

## 2024-12-30 RX ADMIN — ACETAMINOPHEN 1000 MG: 1000 INJECTION, SOLUTION INTRAVENOUS at 13:12

## 2024-12-30 RX ADMIN — ALBUMIN (HUMAN) 25 G: 0.25 INJECTION, SOLUTION INTRAVENOUS at 01:00

## 2024-12-30 RX ADMIN — DIGOXIN 100 MCG: 250 INJECTION, SOLUTION INTRAMUSCULAR; INTRAVENOUS; PARENTERAL at 10:16

## 2024-12-30 RX ADMIN — ENOXAPARIN SODIUM 30 MG: 30 INJECTION SUBCUTANEOUS at 10:15

## 2024-12-30 NOTE — PLAN OF CARE
Problem: PHYSICAL THERAPY ADULT  Goal: Performs mobility at highest level of function for planned discharge setting.  See evaluation for individualized goals.  Description: Treatment/Interventions: Functional transfer training, LE strengthening/ROM, Therapeutic exercise, Endurance training, Patient/family training, Equipment eval/education, Bed mobility,gait training, Spoke to nursing, Continued evaluation, OT          See flowsheet documentation for full assessment, interventions and recommendations.  Note: Prognosis: Good  Problem List: Decreased strength, Decreased range of motion, Impaired balance, Decreased endurance, Decreased mobility, Decreased cognition, Decreased safety awareness, Pain  Assessment: Patient resting in bed at time of PT treatment session.  Patient willing and motivated to participate with PT.  Patient was able to perform all bed mobility with mod a x 1 which is improved compared to previous session.  Patient was able to perform all transfers with mod a x 1 which is also improved compared to previous session, but cueing still needed for proper hand placement during all transfers.  Patient was able to tolerate ambulation this session with mod a x 1 in the use of rolling walker.  Ambulation distances remain limited due to fatigue and weakness.  During ambulation patient was noted to have forward flexed posture, decrease step length bilaterally, decreased foot clearance bilaterally.  Additionally, patient was able to tolerate perform all lower extremity TherEX seated out of bed in chair without any increase in complaints.  At conclusion of PT treatment session patient was assisted back into chair with all needs within reach.  D/C recommendation when medically cleared is level II resource intensity        Rehab Resource Intensity Level, PT: II (Moderate Resource Intensity)    See flowsheet documentation for full assessment.

## 2024-12-30 NOTE — ASSESSMENT & PLAN NOTE
- Found to be in A-fib with RVR on presentation at [facility], started on Cardizem drip  - Appreciate Cardiology consult and recommendations   Cardiology signed off on 12/26/2024.  - Currently rate controlled on Digoxin and Cardizem PO. Has not been getting PO medications due to dysphagia.  - not anticoagulated in the outpatient setting due to PMH of severe Gi bleeds  - Follow up with PCP

## 2024-12-30 NOTE — PLAN OF CARE
Problem: PAIN - ADULT  Goal: Verbalizes/displays adequate comfort level or baseline comfort level  Description: Interventions:  - Encourage patient to monitor pain and request assistance  - Assess pain using appropriate pain scale  - Administer analgesics based on type and severity of pain and evaluate response  - Implement non-pharmacological measures as appropriate and evaluate response  - Consider cultural and social influences on pain and pain management  - Notify physician/advanced practitioner if interventions unsuccessful or patient reports new pain  Outcome: Progressing     Problem: INFECTION - ADULT  Goal: Absence or prevention of progression during hospitalization  Description: INTERVENTIONS:  - Assess and monitor for signs and symptoms of infection  - Monitor lab/diagnostic results  - Monitor all insertion sites, i.e. indwelling lines, tubes, and drains  - Monitor endotracheal if appropriate and nasal secretions for changes in amount and color  - Commerce appropriate cooling/warming therapies per order  - Administer medications as ordered  - Instruct and encourage patient and family to use good hand hygiene technique  - Identify and instruct in appropriate isolation precautions for identified infection/condition  Outcome: Progressing     Problem: SAFETY ADULT  Goal: Patient will remain free of falls  Description: INTERVENTIONS:  - Educate patient/family on patient safety including physical limitations  - Instruct patient to call for assistance with activity   - Consult OT/PT to assist with strengthening/mobility   - Keep Call bell within reach  - Keep bed low and locked with side rails adjusted as appropriate  - Keep care items and personal belongings within reach  - Initiate and maintain comfort rounds  - Make Fall Risk Sign visible to staff  - Offer Toileting every 2 Hours, in advance of need  - Initiate/Maintain bed alarm  - Obtain necessary fall risk management equipment: bed alarm  - Apply yellow  socks and bracelet for high fall risk patients  - Consider moving patient to room near nurses station  Outcome: Progressing  Goal: Maintain or return to baseline ADL function  Description: INTERVENTIONS:  -  Assess patient's ability to carry out ADLs; assess patient's baseline for ADL function and identify physical deficits which impact ability to perform ADLs (bathing, care of mouth/teeth, toileting, grooming, dressing, etc.)  - Assess/evaluate cause of self-care deficits   - Assess range of motion  - Assess patient's mobility; develop plan if impaired  - Assess patient's need for assistive devices and provide as appropriate  - Encourage maximum independence but intervene and supervise when necessary  - Involve family in performance of ADLs  - Assess for home care needs following discharge   - Consider OT consult to assist with ADL evaluation and planning for discharge  - Provide patient education as appropriate  Outcome: Progressing  Goal: Maintains/Returns to pre admission functional level  Description: INTERVENTIONS:  - Perform AM-PAC 6 Click Basic Mobility/ Daily Activity assessment daily.  - Set and communicate daily mobility goal to care team and patient/family/caregiver.   - Collaborate with rehabilitation services on mobility goals if consulted  - Perform Range of Motion 3 times a day.  - Reposition patient every 2 hours.  - Dangle patient 3 times a day  - Stand patient 3 times a day  - Ambulate patient 3 times a day  - Out of bed to chair 3 times a day   - Out of bed for meals 3 times a day  - Out of bed for toileting  - Record patient progress and toleration of activity level   Outcome: Progressing     Problem: DISCHARGE PLANNING  Goal: Discharge to home or other facility with appropriate resources  Description: INTERVENTIONS:  - Identify barriers to discharge w/patient and caregiver  - Arrange for needed discharge resources and transportation as appropriate  - Identify discharge learning needs (meds,  wound care, etc.)  - Arrange for interpretive services to assist at discharge as needed  - Refer to Case Management Department for coordinating discharge planning if the patient needs post-hospital services based on physician/advanced practitioner order or complex needs related to functional status, cognitive ability, or social support system  Outcome: Progressing     Problem: Knowledge Deficit  Goal: Patient/family/caregiver demonstrates understanding of disease process, treatment plan, medications, and discharge instructions  Description: Complete learning assessment and assess knowledge base.  Interventions:  - Provide teaching at level of understanding  - Provide teaching via preferred learning methods  Outcome: Progressing     Problem: NEUROSENSORY - ADULT  Goal: Achieves stable or improved neurological status  Description: INTERVENTIONS  - Monitor and report changes in neurological status  - Monitor vital signs such as temperature, blood pressure, glucose, and any other labs ordered   - Initiate measures to prevent increased intracranial pressure  - Monitor for seizure activity and implement precautions if appropriate      Outcome: Progressing  Goal: Remains free of injury related to seizures activity  Description: INTERVENTIONS  - Maintain airway, patient safety  and administer oxygen as ordered  - Monitor patient for seizure activity, document and report duration and description of seizure to physician/advanced practitioner  - If seizure occurs,  ensure patient safety during seizure  - Reorient patient post seizure  - Seizure pads on all 4 side rails  - Instruct patient/family to notify RN of any seizure activity including if an aura is experienced  - Instruct patient/family to call for assistance with activity based on nursing assessment  - Administer anti-seizure medications if ordered    Outcome: Progressing  Goal: Achieves maximal functionality and self care  Description: INTERVENTIONS  - Monitor  swallowing and airway patency with patient fatigue and changes in neurological status  - Encourage and assist patient to increase activity and self care.   - Encourage visually impaired, hearing impaired and aphasic patients to use assistive/communication devices  Outcome: Progressing

## 2024-12-30 NOTE — WOUND OSTOMY CARE
Progress Note - Wound   Marsha Oliva 79 y.o. female MRN: [id number]  Unit/Bed#: [facility] 632-01 Encounter: [id number]        Assessment:   Patient is seen for wound care follow-up. Seen lying on p-500 low air loss mattress. Mod/max assist for turning ands repositioning. Incontinent of bowel and bladder. Recommend continued use of ATR turning and repositioning system.     Findings:  B/L heels are dry intact and alayna with no skin loss or wounds present. Recommend preventative foam dressings and proper offloading/ repositioning.       B/L sacro-buttocks is dry, intact, pink in color and blanches. No skin loss or wounds present. Recommend preventative hydragaurd to area due to incontinence.     POA Right Upper Back Evolving DTPI: appears to be reabsorbing, measures smaller on assessment. Irregular in shape area of partial thickness skin loss with pink non-blanchable tissue. Yolette-wound is fragile. Scant serosanguineous drainage noted. Recommend continuing with a foam dressing to the area.   Left Posterior Wrist Skin Tear/Abrasion: resolved. Area is now intact, dry, well adhered scab. Recommend leaving area DOE.   Right Posterior Forearm Skin Tear/Abrasions:  irregular in shape area of partial thickness skin loss. Wound bed is red in color and bleeding. Yolette-wound is fragile. Small sanguinous drainage noted. Recommend dermagran and DSD to the area.   Left Abdomen/Pannus Fold MASD/ITD: resolved. Area now with intact, pink epithelial tissue. No skin loss or drainage noted. Leave DOE.   Left Posterior Arm Skin Tear/Abrasion: irregular in shape area of partial thickness skin loss with a red in color bleeding wound bed. Yolette-wound is fragile. Small sanguinous drainage noted. Recommend dermagran and DSD to the area.           No induration, fluctuance, odor, warmth/temperature differences, redness, or purulence noted to the above noted wounds and skin areas assessed. New dressings applied per orders listed below. Patient  tolerated well- no s/s of non-verbal pain or discomfort observed during the encounter. Bedside nurse aware of plan of care. See flow sheets for more detailed assessment findings.      Orders listed below and wound care will continue to follow, call or Secure Chat Message with questions.     Skin Care Plan:  1-B/L Arm/Wrist Wounds: Cleanse with NSS and pat dry. Apply dermagran to wound beds, cover with ABDs, and secure with keisha wraps. Change every other day or PRN.   2-Turn/reposition q2h or when medically stable for pressure re-distribution on skin.  3-Elevate heels to offload pressure.  4-Moisturize skin daily with skin nourishing cream  5-Ehob cushion in chair when out of bed.  6-Preventative Hydraguard to bilateral sacro-buttocks BID and PRN.   7-P-500 Low Air Loss Mattress ordered for patient   8-Cleanse B/L Heels with soap and water. Pat dry. Apply Silicone Border Foam (Mepilex) to areas. Brad with P for Prevention and change every 3 days or PRN soilage/displacement. Peel back and inspect Q-shift.  9-Right Upper Back Wound: Cleanse with NSS and pat dry. Apply a silicone bordered foam dressing to the area. Brad with T for Treatment and change every other day or PRN.        WOUNDS:  Wound 12/22/24 Arm Left;Posterior (Active)   Wound Image   12/30/24 0948   Wound Description Beefy red;Bleeding 12/30/24 1500   Yolette-wound Assessment Fragile 12/30/24 1500   Wound Length (cm) 0.3 cm 12/30/24 0948   Wound Width (cm) 5 cm 12/30/24 0948   Wound Depth (cm) 0.1 cm 12/30/24 0948   Wound Surface Area (cm^2) 1.5 cm^2 12/30/24 0948   Wound Volume (cm^3) 0.15 cm^3 12/30/24 0948   Calculated Wound Volume (cm^3) 0.15 cm^3 12/30/24 0948   Change in Wound Size % 0 12/30/24 0948   Wound Site Closure JAMMIE 12/30/24 1500   Drainage Amount Small 12/30/24 1500   Drainage Description Sanguineous 12/30/24 1500   Non-staged Wound Description Partial thickness 12/30/24 0948   Treatments Cleansed 12/30/24 0948   Dressing Dermagran  gauze;ABD;Dry dressing 12/30/24 1500   Wound packed? No 12/30/24 0948   Packing- # removed 0 12/30/24 0948   Packing- # inserted 0 12/30/24 0948   Dressing Changed New 12/30/24 0948   Patient Tolerance Tolerated well 12/30/24 0948   Dressing Status Clean;Dry;Intact 12/30/24 1201       Wound 12/22/24 Pressure Injury Back Superior;Right (Active)   Wound Image   12/30/24 0953   Wound Description Pink;Non-blanchable erythema 12/30/24 1201   Pressure Injury Stage DTPI 12/30/24 1201   Yolette-wound Assessment Fragile 12/30/24 0953   Wound Length (cm) 0.4 cm 12/30/24 0953   Wound Width (cm) 0.4 cm 12/30/24 0953   Wound Depth (cm) 0.1 cm 12/30/24 0953   Wound Surface Area (cm^2) 0.16 cm^2 12/30/24 0953   Wound Volume (cm^3) 0.016 cm^3 12/30/24 0953   Calculated Wound Volume (cm^3) 0.02 cm^3 12/30/24 0953   Change in Wound Size % 99.43 12/30/24 0953   Wound Site Closure JAMMIE 12/30/24 0953   Drainage Amount Scant 12/30/24 0953   Drainage Description Serosanguineous 12/30/24 0953   Non-staged Wound Description Partial thickness 12/30/24 0953   Treatments Cleansed 12/30/24 0953   Dressing Foam, Silicon (eg. Allevyn, etc) 12/30/24 1201   Wound packed? No 12/30/24 0953   Packing- # removed 0 12/30/24 0953   Packing- # inserted 0 12/30/24 0953   Dressing Changed New 12/30/24 0953   Patient Tolerance Tolerated well 12/30/24 1201   Dressing Status Clean;Dry;Intact 12/30/24 1201       Wound 12/22/24 Arm Posterior;Right (Active)   Wound Image   12/30/24 0949   Wound Description Beefy red;Bleeding 12/30/24 1500   Yolette-wound Assessment Fragile 12/30/24 1500   Wound Length (cm) 0.2 cm 12/30/24 0949   Wound Width (cm) 3.5 cm 12/30/24 0949   Wound Depth (cm) 0.1 cm 12/30/24 0949   Wound Surface Area (cm^2) 0.7 cm^2 12/30/24 0949   Wound Volume (cm^3) 0.07 cm^3 12/30/24 0949   Calculated Wound Volume (cm^3) 0.07 cm^3 12/30/24 0949   Change in Wound Size % 93 12/30/24 0949   Wound Site Closure JAMMIE 12/30/24 1500   Drainage Amount Small 12/30/24  1500   Drainage Description Sanguineous 12/30/24 1500   Non-staged Wound Description Partial thickness 12/30/24 1500   Treatments Cleansed;Irrigation with NSS;Site care 12/30/24 1500   Dressing Dermagran gauze;ABD;Dry dressing 12/30/24 1500   Wound packed? No 12/30/24 1500   Packing- # removed 0 12/30/24 1500   Packing- # inserted 0 12/30/24 1500   Dressing Changed New 12/30/24 1500   Patient Tolerance Tolerated well 12/30/24 1500   Dressing Status Clean;Dry;Intact 12/30/24 1500       Wound 12/22/24 Thigh Anterior;Left;Proximal (Active)   Wound Image   12/30/24 0950   Wound Description Intact;Pink;Epithelialization 12/30/24 1500   Yolette-wound Assessment Intact 12/30/24 1500   Wound Length (cm) 0 cm 12/30/24 0950   Wound Width (cm) 0 cm 12/30/24 0950   Wound Depth (cm) 0 cm 12/30/24 0950   Wound Surface Area (cm^2) 0 cm^2 12/30/24 0950   Wound Volume (cm^3) 0 cm^3 12/30/24 0950   Calculated Wound Volume (cm^3) 0 cm^3 12/30/24 0950   Change in Wound Size % 100 12/30/24 0950   Wound Site Closure JAMMIE 12/30/24 1500   Drainage Amount None 12/30/24 1500   Drainage Description Other (Comment) 12/30/24 1500   Non-staged Wound Description Not applicable 12/30/24 1500   Treatments Cleansed;Site care 12/30/24 1500   Dressing Open to air 12/30/24 1500   Wound packed? No 12/30/24 1500   Packing- # removed 0 12/30/24 1500   Packing- # inserted 0 12/30/24 1500   Dressing Changed Changed 12/30/24 1500   Patient Tolerance Tolerated well 12/30/24 1500   Dressing Status Clean;Dry;Intact 12/30/24 1500                Constance Martinez RN, BSN, CWOCN

## 2024-12-30 NOTE — QUICK NOTE
Patient status update:  Dr. Welch and I had an extensive conversation with patient and  regarding patient's current indication for external means of feeding.   and patient expressed that they have previously discussed this and they would not like extensive life-prolonging means, at this point they are undecided on whether they would like to proceed with Dobbhoff or G-tube placement.  They understand that she is currently unable to tolerate oral feeding without aspiration and the risks associated with aspiration and malnutrition.  They asked for more time to discuss options between themselves.  Trauma team to follow-up regarding possible decision.

## 2024-12-30 NOTE — CASE MANAGEMENT
Case Management Discharge Planning Note    Patient name Marsha Oliva  Location [facility] 632/[facility] 632-01 MRN [id number]  : 1945 Date 2024       Current Admission Date: 2024  Current Admission Diagnosis:Repeated falls   Patient Active Problem List    Diagnosis Date Noted Date Diagnosed    Aspiration into airway 2024     Acute urinary retention 2024     Osteoporosis 2024     Closed T1 fracture (HCC) 2024     Sacral fracture ([facility]) 2024     Impaired mobility and activities of daily living 2024     Severe protein-calorie malnutrition (HCC) 2024     Closed odontoid fracture with type II morphology, posterior displacement, and nonunion 2024     S/P cervical spinal fusion 2024     At risk for altered bowel elimination 2024     At risk for altered urinary elimination 2024     At risk for deep venous thrombosis 2024     Encounter for rehabilitation 2024     Lymphedema 2024     Dysphagia 2024     Skin abnormalities 2024     Acute pain due to trauma 2024     At risk for delirium 2024     Heart failure with preserved ejection fraction (HCC) 2024     Closed odontoid fracture with type II morphology and posterior displacement ([facility]) 2024     Spinal cord compression ([facility]) 2024     Chronic respiratory failure with hypoxia ([facility]) 2023     Stage 3a chronic kidney disease ([facility]) 2022     Bilateral hydronephrosis 2022     Proctitis 2022     Fall 2022     Hypokalemia 2022     ILD (interstitial lung disease) ([facility]) 2022     Oropharyngeal aspiration 2022     Elevated troponin 2022     EKG abnormalities 2022     Mild cognitive impairment of uncertain or unknown etiology 2022     Hormone replacement therapy 2022     Apraxia 2021     Constipation, unspecified 2021     Corn or callus 2021     Ambulatory dysfunction  12/28/2021     Generalized muscle weakness 12/28/2021     Lymphedema, not elsewhere classified 12/28/2021     Repeated falls 12/28/2021     Hypoxia 12/23/2021     Frequent falls 12/23/2021     Elevated d-dimer 12/23/2021     Venous insufficiency of both lower extremities 12/17/2021     Acquired bilateral hammer toes 07/27/2018     Lymphedema of both lower extremities 07/02/2018     Severe mitral regurgitation 05/31/2018     Anemia 05/13/2018     Cardiomegaly 05/13/2018     Wound of right leg 05/13/2018     Hypertensive heart disease with heart failure (HCC) 05/13/2018     Major depressive disorder, single episode, unspecified 05/13/2018     Personal history of nicotine dependence 05/13/2018     Pleural effusion, not elsewhere classified 05/13/2018     Solitary pulmonary nodule 05/13/2018     Atrial fibrillation, chronic (HCC) 05/01/2018     B12 deficiency 01/13/2017     Chronic diastolic CHF (congestive heart failure) ([facility]) 01/13/2017     Folate deficiency 01/13/2017     Vitamin D deficiency 06/03/2016     Depression with anxiety 08/03/2015     Mixed hyperlipidemia 08/03/2015     Osteoarthritis of knee 10/23/2012     Impaired fasting glucose 05/31/2012     Essential hypertension 03/23/2010     Hypothyroidism 03/23/2010     Posttraumatic stress disorder 03/23/2010       LOS (days): 8  Geometric Mean LOS (GMLOS) (days): 4.6  Days to GMLOS:-3.6     OBJECTIVE:  Risk of Unplanned Readmission Score: 36.29         Current admission status: Inpatient   Preferred Pharmacy:   [facility] - [place] - [address]  [address] [facility] [address]  Phone: [phone] Fax: [phone]    [id number] - [place] - [address]  [facility] [address]  [facility] [address]  Phone: [phone] Fax: [phone]    Primary Care Provider: Rebecca Solis    Primary Insurance: [employer] [facility]  Secondary Insurance:     DISCHARGE DETAILS:    [facility] CM contacted pt's spouse for his preference and left voicemail.

## 2024-12-30 NOTE — PROGRESS NOTES
"Progress Note - Trauma   Name: Marsha Oliva 79 y.o. female I MRN: [id number]  Unit/Bed#: [facility] 632-01 I Date of Admission: 12/22/2024   Date of Service: 12/30/2024 I Hospital Day: 8    Assessment & Plan  Repeated falls  Closed T1 fracture, sacral fracture with small hematoma and some active extravasation.     Downgraded to MedSurg as hemoglobins have been stable  As needed pain and nausea control  Ortho consult-weightbearing as tolerated, nonoperative  Neurosurgery consult-nonoperative  Encourage I-S  Discharge planning per case management.  Closed T1 fracture (HCC)  - Age indeterminate T1 fx  - Appreciate NSG consult and recommendations  - Bracing deferred    Atrial fibrillation, chronic ([facility])  - Found to be in A-fib with RVR on presentation at [facility], started on Cardizem drip  - Appreciate Cardiology consult and recommendations   Cardiology signed off on 12/26/2024.  - Currently rate controlled on Digoxin and Cardizem PO. Has not been getting PO medications due to dysphagia.  - not anticoagulated in the outpatient setting due to PMH of severe Gi bleeds  - Follow up with PCP  S/P cervical spinal fusion  Appreciate NSG consult and recommendations  \"S/p C1-C3 posterior fusion 9/5/24 with Dr. Simpson after sustaining fall with type II dens fracture with posterior displacement \"  Follow up in 3 months with NSG for post op visit  Sacral fracture ([facility])  Appreciate orthopedic surgery input  Non op  WBAT  Pain control  DVT ppx  PT and OT  Chronic diastolic CHF (congestive heart failure) ([facility])  Wt Readings from Last 3 Encounters:   12/22/24 50.8 kg (112 lb)   10/24/24 51.3 kg (113 lb)   10/07/24 51.3 kg (113 lb 1.5 oz)     - No evidence of acute exacerbation  - Continue home meds  Hypothyroidism  Continue levothyroxine  Mixed hyperlipidemia  Continue atorvastatin  Severe mitral regurgitation    ILD (interstitial lung disease) ([facility])  Previously on home oxygen,  states she has not needed it in a year.  -Titrate " "oxygen as needed, wean as able  Apraxia  Chronic  Vitamin D deficiency    Ambulatory dysfunction  Chronic, pending rehab placement  Osteoporosis  Chronic  Dysphagia  See \"aspiration\"  Acute urinary retention  - Failed urinary retention protocol 12/24 and required tapia catheter placement  - Recommend flomax and outpatient urology follow up for voiding trial  Aspiration into airway  - Patient with increasing oxygen requirement overnight up to 10L  - Resting on 7L this AM.   - CXR shows concern for aspiration which patient also has a history of  - Patient recently failed swallow study and was recommended NPO  - Family refused this and patient was kept on purees and thin liquids  - NPO  - Planning for PEG tube placement  - Monitor off antibiotics  - Titrate O2 as needed    Bowel Regimen: Prn dulcolax suppository in the setting of NPO  VTE Prophylaxis:Enoxaparin (Lovenox)     Disposition: Patient to remain admitted to the trauma service  Please contact the SecureChat role for the Trauma service with any questions/concerns.    24 Hour Events : Please see note from yesterday regarding discussion with family and patient surrounding aspiration event and n.p.o. versus pleasure feed recommendations.  Additionally, patient did have episodes of hypotension overnight for which she was given 500 cc bolus, albumin.  Subjective : Patient reports feeling well this morning.  She has no complaints or pain.  She has no shortness of breath.  She did have an increased oxygen requirement overnight, but is currently on 4 L.    Objective :  Temp:  [97.3 °F (36.3 °C)-98.9 °F (37.2 °C)] 97.3 °F (36.3 °C)  HR:  [60-92] 76  BP: ()/(42-54) 92/47  Resp:  [16-17] 16  SpO2:  [93 %-100 %] 99 %  O2 Device: Nasal cannula  Nasal Cannula O2 Flow Rate (L/min):  [3 L/min-7 L/min] 4 L/min    I/O         12/28 0701  12/29 0700 12/29 0701 12/30 0700    P.O. 220 0    I.V.  1232.5    IV Piggyback  600    Total Intake 220 1832.5    Urine 600 475    Stool "  0    Total Output 600 475    Net -380 +1357.5          Unmeasured Stool Occurrence  1 x          Lines/Drains/Airways       Active Status       Name Placement date Placement time Site Days    Urethral Catheter Non-latex 18 Fr. 12/24/24  1240  Non-latex  5                  Physical Exam   Vitals and nursing note reviewed.   Constitutional:       General: She is not in acute distress.     Appearance: She is not ill-appearing or toxic-appearing.   HENT:      Head: Normocephalic and atraumatic.      Nose: No congestion.   Eyes:      Extraocular Movements: Extraocular movements intact.   Cardiovascular:      Rate and Rhythm: Normal rate.      Pulses: Normal pulses.   Pulmonary:      Effort: Pulmonary effort is normal. No respiratory distress.      Breath sounds: Rhonchi present.   Musculoskeletal:         General: No swelling or tenderness. Normal range of motion.      Right lower leg: No edema.      Left lower leg: No edema.   Skin:     General: Skin is warm and dry.      Capillary Refill: Capillary refill takes less than 2 seconds.   Neurological:      General: No focal deficit present.      Mental Status: She is alert.          Lab Results: I have reviewed the following results:  Recent Labs     12/30/24  0045   WBC 4.58   HGB 8.3*   HCT 29.0*   *   SODIUM 145   K 3.5      CO2 35*   BUN 13   CREATININE 0.47*   GLUC 123   AST 19   ALT 11   ALB 2.3*   TBILI 0.44   ALKPHOS 65   LACTICACID 0.9       No new imaging studies  Other Study Results Review: No additional pertinent studies reviewed.

## 2024-12-30 NOTE — PHYSICAL THERAPY NOTE
PHYSICAL THERAPY EVALUATION  NAME:  Marsha Oliva  DATE: 12/30/24    AGE:   79 y.o.  Mrn:   [id number]  ADMIT DX:  Hallucinations [R44.3]  Multiple fractures [T07.XXXA]  Impaired mobility and activities of daily living [Z74.09, Z78.9]  Closed fracture of first thoracic vertebra, unspecified fracture morphology, initial encounter ([facility]) [S22.019A]  Closed fracture of sacrum, unspecified portion of sacrum, initial encounter ([facility]) [S32.10XA]  Closed wedge compression fracture of T1 vertebra, initial encounter ([facility]) [S22.010A]    Past Medical History:   Diagnosis Date    Disease of thyroid gland     Hypotension     Supratherapeutic INR 6/2/2022       Past Surgical History:   Procedure Laterality Date    CERVICAL FUSION Bilateral 9/5/2024    Procedure: navigated posterior fusion C1-C3;  Surgeon: Eduardo Deiwtt MD;  Location: [facility];  Service: Neurosurgery    HYSTERECTOMY         Length Of Stay: 8    PHYSICAL THERAPY EVALUATION:     Actual pt care time [id number]   12/30/24 1045   Note Type   Note Type Treatment   Pain Assessment   Pain Assessment Tool 0-10   Pain Score No Pain   Restrictions/Precautions   Weight Bearing Precautions Per Order Yes   RLE Weight Bearing Per Order WBAT   LLE Weight Bearing Per Order WBAT   Braces or Orthoses   (no brace required per neurosx)   Other Precautions Chair Alarm;Bed Alarm;Cognitive;Multiple lines;Fall Risk;Pain;Spinal precautions   General   Chart Reviewed Yes   Response to Previous Treatment Patient with no complaints from previous session.   Family/Caregiver Present No   Cognition   Arousal/Participation Alert   Attention Attends with cues to redirect   Orientation Level Oriented to person;Oriented to place;Oriented to situation   Memory Decreased recall of precautions   Following Commands Follows one step commands with increased time or repetition   Subjective   Subjective Pt willing to participate in PT treatment session   Bed Mobility   Supine to Sit 3  Moderate  assistance   Additional items Assist x 1;Increased time required;Verbal cues   Transfers   Sit to Stand 3  Moderate assistance   Additional items Assist x 1;Increased time required;Verbal cues   Stand to Sit 3  Moderate assistance   Additional items Assist x 1;Increased time required;Verbal cues   Ambulation/Elevation   Gait pattern Excessively slow;Short stride;Foward flexed;Inconsistent anthony   Gait Assistance 3  Moderate assist   Additional items Assist x 1   Assistive Device Rolling walker   Distance 8ft   Balance   Static Sitting Poor +   Static Standing Poor   Ambulatory Poor   Endurance Deficit   Endurance Deficit Yes   Endurance Deficit Description fatigue, weakness   Activity Tolerance   Activity Tolerance Patient limited by fatigue   Medical Staff Made Aware Bereket OT; OT present for co treatment session due to pts current medical presentation   Nurse Made Aware Pt appropriate to be seen and mobilize per nsg   Exercises   Knee AROM Long Arc Quad Sitting;15 reps;AAROM;Bilateral   Marching Sitting;15 reps;AROM;Bilateral   Assessment   Prognosis Good   Problem List Decreased strength;Decreased range of motion;Impaired balance;Decreased endurance;Decreased mobility;Decreased cognition;Decreased safety awareness;Pain   Assessment Patient resting in bed at time of PT treatment session.  Patient willing and motivated to participate with PT.  Patient was able to perform all bed mobility with mod a x 1 which is improved compared to previous session.  Patient was able to perform all transfers with mod a x 1 which is also improved compared to previous session, but cueing still needed for proper hand placement during all transfers.  Patient was able to tolerate ambulation this session with mod a x 1 in the use of rolling walker.  Ambulation distances remain limited due to fatigue and weakness.  During ambulation patient was noted to have forward flexed posture, decrease step length bilaterally, decreased foot  "clearance bilaterally.  Additionally, patient was able to tolerate perform all lower extremity TherEX seated out of bed in chair without any increase in complaints.  At conclusion of PT treatment session patient was assisted back into chair with all needs within reach.  D/C recommendation when medically cleared is level II resource intensity   Goals   Patient Goals \" to get OOB\"   [facility] Expiration Date 01/07/24   Short Term Goal #1 Updated PT goals: pt will complete: 1) Bed mobility skills with min Ax1 to increase safety and independence as well as decrease caregiver burden. 2) Functional transfers with min Ax1 to promote increased independence, safety, and QOL. 3) Ambulate 100' using least restrictive AD with min Ax1 without LOB and stable vitals so that pt can negotiate previous living environment safely and promote independence with functional mobility and return to PLOF. 4) Improve balance grades by 1/2 grade to increase safety with all mobility and decrease fall risk.  5) Improve BLE strength by 1/2 grade to help increase overall functional mobility and decrease fall risk.   PT Treatment Day 2   Plan   Treatment/Interventions Functional transfer training;LE strengthening/ROM;Therapeutic exercise;Endurance training;Patient/family training;Equipment eval/education;Bed mobility;Gait training;Spoke to nursing;OT   Progress Progressing toward goals   PT Frequency 3-5x/wk   Discharge Recommendation   Rehab Resource Intensity Level, PT II (Moderate Resource Intensity)   Equipment Recommended Walker   Walker Package Recommended Wheeled walker   AM-PAC Basic Mobility Inpatient   Turning in Flat Bed Without Bedrails 2   Lying on Back to Sitting on Edge of Flat Bed Without Bedrails 2   Moving Bed to Chair 2   Standing Up From Chair Using Arms 2   Walk in Room 2   Climb 3-5 Stairs With Railing 1   Basic Mobility Inpatient Raw Score 11   Basic Mobility Standardized Score 30.25   [facility] Highest Level Of Mobility   -[facility] " Goal 4: Move to chair/commode   -M Achieved 5: Stand (1 or more minutes)   Portions of the documentation may have been created using voice recognition software.Occasional wrong word or sound alike substitutions may have occurred due to the inherent limitations of the voice recognition software. Read the chart carefully and recognize, using context, where substitutions have occurred.    Abebe Mann, PT, DPT

## 2024-12-30 NOTE — OCCUPATIONAL THERAPY NOTE
Occupational Therapy Progress Note     Patient Name: Marsha Oliva  Today's Date: 12/30/2024  Problem List  Principal Problem:    Repeated falls  Active Problems:    Atrial fibrillation, chronic (HCC)    Chronic diastolic CHF (congestive heart failure) (HCC)    Hypothyroidism    Mixed hyperlipidemia    Severe mitral regurgitation    Vitamin D deficiency    ILD (interstitial lung disease) ([facility])    Apraxia    Ambulatory dysfunction    Dysphagia    S/P cervical spinal fusion    Closed T1 fracture (HCC)    Sacral fracture ([facility])    Osteoporosis    Acute urinary retention    Aspiration into airway          12/30/24 1055   OT Last Visit   OT Visit Date 12/30/24   Note Type   Note Type Treatment   Pain Assessment   Pain Assessment Tool 0-10   Pain Score No Pain   Restrictions/Precautions   Weight Bearing Precautions Per Order Yes   RUE Weight Bearing Per Order WBAT   LUE Weight Bearing Per Order WBAT   RLE Weight Bearing Per Order WBAT   LLE Weight Bearing Per Order WBAT   Other Precautions Chair Alarm;Bed Alarm;Fall Risk   Lifestyle   Autonomy Assist with adls at baseline - ambulates with RW- shares homemaking with spouse   Reciprocal Relationships supportive family   Service to Others retired   Intrinsic Gratification sedentary - enjoys cross stitch   ADL   Eating Assistance 5  Supervision/Setup   Grooming Assistance 4  Minimal Assistance   UB Bathing Assistance 3  Moderate Assistance   LB Bathing Assistance 2  Maximal Assistance   UB Dressing Assistance 3  Moderate Assistance   LB Dressing Assistance 2  Maximal Assistance   Toileting Assistance  2  Maximal Assistance   Bed Mobility   Supine to Sit 3  Moderate assistance   Additional items Assist x 2   Transfers   Sit to Stand 3  Moderate assistance   Stand to Sit 3  Moderate assistance   Functional Mobility   Functional Mobility 3  Moderate assistance   Additional Comments x2   Additional items Rolling walker   Subjective   Subjective offers no c/o   Cognition    Arousal/Participation Arousable;Cooperative   Attention Attends with cues to redirect   Orientation Level Oriented to person;Oriented to place;Oriented to time;Oriented to situation   Memory Decreased short term memory;Decreased recall of recent events;Decreased recall of precautions   Following Commands Follows one step commands with increased time or repetition   Comments more awake, alert and participatory in session   Activity Tolerance   Activity Tolerance Patient limited by fatigue;Treatment limited secondary to medical complications (Comment)   Assessment   Assessment Pt seen for OT session focusing on self care, functional mob/transfers, dynamic balance/safety and overall tolerance to activity - noted to be more awake, alert and engaging during session - functionally requires mod to max a for adls and  mod a x 2 for mobility/transfers, poor overall dynamic balance and tolerance to activity - continue to recommend Level II resources post d/c - OT to continue to follow to address goals stated on eval   Plan   Treatment Interventions ADL retraining;Functional transfer training;Endurance training;Cognitive reorientation;Patient/family training;Equipment evaluation/education;Compensatory technique education;Activityengagement;Energy conservation   Goal Expiration Date 01/07/24   OT Treatment Day 2   OT Frequency 2-3x/wk   Discharge Recommendation   Rehab Resource Intensity Level, OT II (Moderate Resource Intensity)   AM-PAC Daily Activity Inpatient   Lower Body Dressing 2   Bathing 2   Toileting 2   Upper Body Dressing 2   Grooming 3   Eating 3   Daily Activity Raw Score 14   Daily Activity Standardized Score (Calc for Raw Score >=11) 33.39   AM-PAC Applied Cognition Inpatient   Following a Speech/Presentation 2   Understanding Ordinary Conversation 3   Taking Medications 2   Remembering Where Things Are Placed or Put Away 2   Remembering List of 4-5 Errands 2   Taking Care of Complicated Tasks 2   Applied  Cognition Raw Score 13   Applied Cognition Standardized Score 30.46   End of Consult   Education Provided Yes   Patient Position at End of Consult Bedside chair;Bed/Chair alarm activated;All needs within reach   Nurse Communication Nurse aware of consult       The patient's raw score on the AM-PAC Daily Activity Inpatient Short Form is 14. A raw score of less than 19 suggests the patient may benefit from discharge to post-acute rehabilitation services. Please refer to the recommendation of the Occupational Therapist for safe discharge planning.    Documentation Completed By:    BETZAIDA Amin/L  [employer] Certified - [id number]

## 2024-12-30 NOTE — PROGRESS NOTES
Progress Note - Geriatric Medicine   Marsha Oliva 79 y.o. female MRN: [id number]  Unit/Bed#: [facility] 632-01 Encounter: [id number]      Assessment/Plan:    Acute metabolic encephalopathy   -evidenced by sudden onset confusion and fluctuating mental status worse than baseline requiring treatment with Zyprexa and soft wrist restraints in patient who was reportedly fully oriented on initial presentation   -markedly improved and appears near or back to baseline   -remains high risk recurrence, continue strict delirium precautions  -continue acute pain control, ongoing optimization of hemodynamics and reorient frequently as appropriate  -encourage normal circadian rhythm  -no longer requiring physical restraints, avoid physical restraints as possible as can worsen confusion   -reorient often as appropriate and redirect unwanted behaviors as first line   -appreciate family at bedside for familiarity and reassurance     Dysphagia  -speech therapy following, currently NPO due to concerns for recurrent aspiration   -aspiration reported on modified VBS 12/27  -family in discussions with primary team regarding potential G tube, pt wishes to discuss with  again prior to them making final decision  -continue supportive cares and strict aspiration precautions      Ambulatory dysfunction with fall  -reportedly mechanical fall earlier on day of admission   -admitted with below noted injuries   -utilizes walker for ambulation at baseline  -hx recurrent falls at least one additional in past year   -remains high risk future falls due to age, hx of fall, impulsivity with poor safety awareness, deconditioning/debility and unfamiliar environment   -encourage good body mechanics and assist with all transfers  -keep personal items and call bell close to prevent reaching  -maintain environment free of fall hazards  -encourage appropriate footwear and adequate lighting at all times when out of bed  -consider home fall risk assessment  and personal fall alert system if returning home on d/c  -PT and OT following       T1 vertebral fracture  -s/p fall as outlined above   -noted on admission imaging  -neurovascular checks per protocol  -acute multimodal pain control  -Nsx on consult - recommend deferring bracing due to asymptomatic, close o/p f/u     Sacral fracture  -s/p fall as outlined above  -noted on admission imaging with possible presacral hemorrhage  -continue acute multimodal pain control  -trending H/H  -Neurovascular checks per protocol  -Ortho on consult - WBAT and acute pain control  -PT/OT following      Acute pain due to trauma  -pt currently reporting no pain, if does c/o of pain recommend pain control per Geriatric pain protocol:  Tylenol 975mg Q8H scheduled  Roxicodone 2.5mg Q4H PRN moderate pain  Roxicodone 5mg Q4H PRN severe pain  -consider adjuncts such as lidocaine patch topically to appropriate areas   -encourage addition of non-pharmacologic pain treatment including ice and frequent repositioning  -recommend bowel regimen to prevent treat constipation due to increased risk with acute pain and opiate pain medications     Afib with RVR  -presented with afib and RVR with rates persistently in 120s-140s now markedly improved  -maintained on diltiazem and digoxin as o/p  -not on systemic AC as o/p due to hx of severe GI bleed per records   -continue optimization of hemodynamics   -Cardiology on consult and following, rates now markedly improved     Cognitive impairment   -acute encephalopathy markedly improved, appears to be near or at baseline but remains high risk recurrence   -at baseline oriented 2-4 with periods of confusion and very poor short term memory   -reportedly mostly independent with ADLs heavy assist of  with iADLs   -MoCA 16/30 (9/2024) suggestive of at least mild to moderate dementia at baseline   -CTH obtained on admission imaging personally viewed, reveals moderate to severe diffuse chronic  microangiopathic changes  -Maintained on levothyroxine chronically outpatient for hypothyroidism, last B12 WNL  -at risk age and cardiovascular related progression of underlying cognitive impairment, continue secondary risk modifications  -Encourage patient remain physically, socially, cognitively active and engaged to maintain cognitive acuity  -Encourage use of sensory assist devices such as corrective lenses at all appropriate times to reduce risk of uncorrected sensory impairment from contributing to isolation, confusion, encephalopathy and more precipitous cognitive decline  -Underlying dementia significantly increases risk of developing delirium during hospitalization likely contributing to current episode, continue strict precautions and supportive cares  -consider comprehensive geriatric assessment as o/p for ongoing management      Depression with anxiety  -maintained on Zoloft chronically as o/p, continue home regimen   -Given underlying dementia uncertain ability to precipitate with outpatient counseling/support group but will continue to benefit from ongoing psychosocial support of family and friends  -Continue home medication regimen and close outpatient follow-up with PCP for ongoing digitation and medication management  -encourage calm quiet env to reduce risk overstimulation      Hypothyroidism  -TSH WNL at 1.33 on admission  -Continue home levothyroxine regimen and close outpatient follow-up with PCP for ongoing titration and medication management     Impaired Vision  -recommend use of corrective lenses at all appropriate times  -encourage adequate lighting and encourage use of assistance with ambulation  -keep personal belongings close to person to avoid reaching  -Consider large font for printed materials provided to patient     Impaired mastication  -Requires use of dentures-encourage use at all appropriate times  -ensure meal consistency appropriate for abilities  -continue aspiration  precautions     Frailty syndrome in geriatric patient   -Clinical frailty scale stage V/VI, moderately frail, progressive  -Multifactor including age, ambulatory dysfunction with history recurrent falls, depression with anxiety, cognitive impairment and multitude of additional chronic medical comorbidities now with fall and acute traumatic injuries superimposed in elderly individual with limited physiologic and metabolic reserves  -Continue optimization chronic conditions and address acute metabolic derangements as arise   -Encourage well-balanced nutritional intake  -Ensure underlying anxiety/mood/depression symptoms are well-controlled as may impact patient response to therapies as well overall sense of wellbeing and quality of life  -Continue to ensure that treatments and interventions align with patient's wishes and goals of care  -Continue psychosocial supports of patient and caregivers     High risk developing delirium   -due to age, fall, traumatic injuries, acute pain, hospitalization, underlying cognitive impairment   -continue delirium precautions  -maintain normal sleep/wake cycle  -minimize overnight interruptions, group overnight vitals/labs/nursing checks as possible  -dim lights, close blinds and turn off tv to minimize stimulation and encourage sleep environment in evenings  -ensure that pain is well controlled  -monitor for fecal and urinary retention which may precipitate delirium  -encourage early mobilization and ambulation with assist as cleared to safely do so  -provide frequent reorientation and redirection as indicated and appropriate   -encourage family and friends at the bedside to help calm patient if anxious  -minimize use of medications which may precipitate or worsen delirium such as tramadol, benzodiazepine, anticholinergics, and benadryl as possible   -redirect unwanted behaviors as first line    Subjective:     Marsha is seen and examined at bedside where she is sitting resting  "comfortably doing Sudoku puzzles, she reports that she slept well, he breathing is improved and she denies pain. She notes that she is uncertain about what to do in regard to potential peg tube placement noting that she and he  discussed initially and \"decided on thing and now we decided another\" so she would like to discuss with him again prior to making final decision. She offers no other acute complaints.    Review of Systems   Constitutional: Negative.  Negative for chills and fever.   HENT: Negative.     Eyes: Negative.    Respiratory:  Positive for cough. Negative for shortness of breath.    Cardiovascular: Negative.  Negative for chest pain and palpitations.   Gastrointestinal: Negative.  Negative for abdominal pain.   Genitourinary: Negative.    Musculoskeletal:  Positive for gait problem.   Skin: Negative.    Neurological:  Negative for dizziness, light-headedness and numbness.   Hematological: Negative.    Psychiatric/Behavioral: Negative.  Negative for sleep disturbance.    All other systems reviewed and are negative.    Objective:     Vitals: Blood pressure 102/57, pulse 77, temperature (!) 97.3 °F (36.3 °C), temperature source Oral, resp. rate 16, SpO2 100%.,There is no height or weight on file to calculate BMI.      Intake/Output Summary (Last 24 hours) at 12/30/2024 1325  Last data filed at 12/30/2024 0603  Gross per 24 hour   Intake 1432.5 ml   Output 475 ml   Net 957.5 ml     Current Medications: Reviewed    Physical Exam:   Physical Exam  Vitals and nursing note reviewed.   Constitutional:       General: She is not in acute distress.     Comments: Frail elderly female    HENT:      Head: Normocephalic.      Nose: Nose normal.      Comments: O2 via NC     Mouth/Throat:      Mouth: Mucous membranes are dry.   Eyes:      General: No scleral icterus.        Right eye: No discharge.         Left eye: No discharge.      Conjunctiva/sclera: Conjunctivae normal.   Neck:      Comments: Trachea " midline, phonation normal  Cardiovascular:      Rate and Rhythm: Normal rate.   Pulmonary:      Effort: No respiratory distress.      Comments: Coarse shallow breath sounds   Abdominal:      General: There is no distension.      Palpations: Abdomen is soft.      Tenderness: There is no abdominal tenderness.   Musculoskeletal:      Cervical back: Neck supple.      Comments: Severe diffuse subcutaneous fat and muscle wasting    Skin:     General: Skin is warm and dry.      Comments: Thin and friable    Neurological:      Mental Status: She is alert.      Comments: Awake and alert, answers questions appropriately   Psychiatric:      Comments: Polite and cooperative         Invasive Devices       Peripheral Intravenous Line  Duration             Peripheral IV 12/27/24 Right Forearm 3 days              Drain  Duration             Urethral Catheter Non-latex 18 Fr. 6 days                  Lab Results:     I have personally reviewed pertinent lab results including the following:    Results from last 7 days   Lab Units 12/30/24  0045 12/29/24  0626 12/27/24  0652   WBC Thousand/uL 4.58 8.18 3.25*   HEMOGLOBIN g/dL 8.3* 8.2* 8.7*   HEMATOCRIT % 29.0* 27.9* 30.6*   PLATELETS Thousands/uL 113* 133* 139*   SEGS PCT % 78* 88* 64   MONO PCT % 7 5 7   EOS PCT % 2 0 5     Results from last 7 days   Lab Units 12/30/24  0045 12/29/24  0626 12/27/24  0652   POTASSIUM mmol/L 3.5 3.8 3.9   CHLORIDE mmol/L 107 105 108   CO2 mmol/L 35* 35* 30   BUN mg/dL 13 17 13   CREATININE mg/dL 0.47* 0.63 0.55*   CALCIUM mg/dL 8.1* 8.5 8.2*   ALK PHOS U/L 65  --   --    ALT U/L 11  --   --    AST U/L 19  --   --      I have personally reviewed the following imaging study reports in PACS:    12/28/24 -CXR

## 2024-12-30 NOTE — PLAN OF CARE
Problem: OCCUPATIONAL THERAPY ADULT  Goal: Performs self-care activities at highest level of function for planned discharge setting.  See evaluation for individualized goals.  Description: Treatment Interventions: ADL retraining, Functional transfer training, Endurance training, Cognitive reorientation, Patient/family training, Equipment evaluation/education, Compensatory technique education, Energy conservation, Activityengagement          See flowsheet documentation for full assessment, interventions and recommendations.   Note: Limitation: Decreased ADL status, Decreased Safe judgement during ADL, Decreased cognition, Decreased endurance, Decreased self-care trans, Decreased high-level ADLs, Mood limitation  Prognosis: Guarded  Assessment: Pt seen for OT session focusing on self care, functional mob/transfers, dynamic balance/safety and overall tolerance to activity - noted to be more awake, alert and engaging during session - functionally requires mod to max a for adls and  mod a x 2 for mobility/transfers, poor overall dynamic balance and tolerance to activity - continue to recommend Level II resources post d/c - OT to continue to follow to address goals stated on eval     Rehab Resource Intensity Level, OT: II (Moderate Resource Intensity)

## 2024-12-30 NOTE — SPEECH THERAPY NOTE
Patient made NPO due to high aspiration risk. Patient aspirated all liquids on recent VBS. At that time, patient and spouse opted to remain on a PO diet and was placed on a puree diet with thin liquids. Since that time, the patient was not tolerating PO well and had higher O2 requirements. Trauma team spoke with patient and spouse today re: possible PEG tube placement. They are still undecided at this time. Patient continues to be NPO. Swallow function is more impaired when compared to previous VBS from September. SLP s/w trauma team and will f/u tomorrow as able.

## 2024-12-30 NOTE — PLAN OF CARE
Problem: PAIN - ADULT  Goal: Verbalizes/displays adequate comfort level or baseline comfort level  Description: Interventions:  - Encourage patient to monitor pain and request assistance  - Assess pain using appropriate pain scale  - Administer analgesics based on type and severity of pain and evaluate response  - Implement non-pharmacological measures as appropriate and evaluate response  - Consider cultural and social influences on pain and pain management  - Notify physician/advanced practitioner if interventions unsuccessful or patient reports new pain  Outcome: Progressing     Problem: INFECTION - ADULT  Goal: Absence or prevention of progression during hospitalization  Description: INTERVENTIONS:  - Assess and monitor for signs and symptoms of infection  - Monitor lab/diagnostic results  - Monitor all insertion sites, i.e. indwelling lines, tubes, and drains  - Monitor endotracheal if appropriate and nasal secretions for changes in amount and color  - [place] appropriate cooling/warming therapies per order  - Administer medications as ordered  - Instruct and encourage patient and family to use good hand hygiene technique  - Identify and instruct in appropriate isolation precautions for identified infection/condition  Outcome: Progressing     Problem: SAFETY ADULT  Goal: Patient will remain free of falls  Description: INTERVENTIONS:  - Educate patient/family on patient safety including physical limitations  - Instruct patient to call for assistance with activity   - Consult OT/PT to assist with strengthening/mobility   - Keep Call bell within reach  - Keep bed low and locked with side rails adjusted as appropriate  - Keep care items and personal belongings within reach  - Initiate and maintain comfort rounds  - Make Fall Risk Sign visible to staff  - Offer Toileting every 2 Hours, in advance of need  - Initiate/Maintain bed alarm  - Obtain necessary fall risk management equipment: bed alarm  - Apply yellow  socks and bracelet for high fall risk patients  - Consider moving patient to room near nurses station  Outcome: Progressing  Goal: Maintain or return to baseline ADL function  Description: INTERVENTIONS:  -  Assess patient's ability to carry out ADLs; assess patient's baseline for ADL function and identify physical deficits which impact ability to perform ADLs (bathing, care of mouth/teeth, toileting, grooming, dressing, etc.)  - Assess/evaluate cause of self-care deficits   - Assess range of motion  - Assess patient's mobility; develop plan if impaired  - Assess patient's need for assistive devices and provide as appropriate  - Encourage maximum independence but intervene and supervise when necessary  - Involve family in performance of ADLs  - Assess for home care needs following discharge   - Consider OT consult to assist with ADL evaluation and planning for discharge  - Provide patient education as appropriate  Outcome: Progressing  Goal: Maintains/Returns to pre admission functional level  Description: INTERVENTIONS:  - Perform AM-PAC 6 Click Basic Mobility/ Daily Activity assessment daily.  - Set and communicate daily mobility goal to care team and patient/family/caregiver.   - Collaborate with rehabilitation services on mobility goals if consulted  - Perform Range of Motion 3 times a day.  - Reposition patient every 2 hours.  - Dangle patient 3 times a day  - Stand patient 3 times a day  - Ambulate patient 3 times a day  - Out of bed to chair 3 times a day   - Out of bed for meals 3 times a day  - Out of bed for toileting  - Record patient progress and toleration of activity level   Outcome: Progressing     Problem: DISCHARGE PLANNING  Goal: Discharge to home or other facility with appropriate resources  Description: INTERVENTIONS:  - Identify barriers to discharge w/patient and caregiver  - Arrange for needed discharge resources and transportation as appropriate  - Identify discharge learning needs (meds,  wound care, etc.)  - Arrange for interpretive services to assist at discharge as needed  - Refer to Case Management Department for coordinating discharge planning if the patient needs post-hospital services based on physician/advanced practitioner order or complex needs related to functional status, cognitive ability, or social support system  Outcome: Progressing     Problem: Knowledge Deficit  Goal: Patient/family/caregiver demonstrates understanding of disease process, treatment plan, medications, and discharge instructions  Description: Complete learning assessment and assess knowledge base.  Interventions:  - Provide teaching at level of understanding  - Provide teaching via preferred learning methods  Outcome: Progressing     Problem: NEUROSENSORY - ADULT  Goal: Achieves stable or improved neurological status  Description: INTERVENTIONS  - Monitor and report changes in neurological status  - Monitor vital signs such as temperature, blood pressure, glucose, and any other labs ordered   - Initiate measures to prevent increased intracranial pressure  - Monitor for seizure activity and implement precautions if appropriate      Outcome: Progressing  Goal: Remains free of injury related to seizures activity  Description: INTERVENTIONS  - Maintain airway, patient safety  and administer oxygen as ordered  - Monitor patient for seizure activity, document and report duration and description of seizure to physician/advanced practitioner  - If seizure occurs,  ensure patient safety during seizure  - Reorient patient post seizure  - Seizure pads on all 4 side rails  - Instruct patient/family to notify RN of any seizure activity including if an aura is experienced  - Instruct patient/family to call for assistance with activity based on nursing assessment  - Administer anti-seizure medications if ordered    Outcome: Progressing  Goal: Achieves maximal functionality and self care  Description: INTERVENTIONS  - Monitor  swallowing and airway patency with patient fatigue and changes in neurological status  - Encourage and assist patient to increase activity and self care.   - Encourage visually impaired, hearing impaired and aphasic patients to use assistive/communication devices  Outcome: Progressing     Problem: CARDIOVASCULAR - ADULT  Goal: Maintains optimal cardiac output and hemodynamic stability  Description: INTERVENTIONS:  - Monitor I/O, vital signs and rhythm  - Monitor for S/S and trends of decreased cardiac output  - Administer and titrate ordered vasoactive medications to optimize hemodynamic stability  - Assess quality of pulses, skin color and temperature  - Assess for signs of decreased coronary artery perfusion  - Instruct patient to report change in severity of symptoms  Outcome: Progressing  Goal: Absence of cardiac dysrhythmias or at baseline rhythm  Description: INTERVENTIONS:  - Continuous cardiac monitoring, vital signs, obtain 12 lead EKG if ordered  - Administer antiarrhythmic and heart rate control medications as ordered  - Monitor electrolytes and administer replacement therapy as ordered  Outcome: Progressing     Problem: RESPIRATORY - ADULT  Goal: Achieves optimal ventilation and oxygenation  Description: INTERVENTIONS:  - Assess for changes in respiratory status  - Assess for changes in mentation and behavior  - Position to facilitate oxygenation and minimize respiratory effort  - Oxygen administered by appropriate delivery if ordered  - Initiate smoking cessation education as indicated  - Encourage broncho-pulmonary hygiene including cough, deep breathe, Incentive Spirometry  - Assess the need for suctioning and aspirate as needed  - Assess and instruct to report SOB or any respiratory difficulty  - Respiratory Therapy support as indicated  Outcome: Progressing     Problem: HEMATOLOGIC - ADULT  Goal: Maintains hematologic stability  Description: INTERVENTIONS  - Assess for signs and symptoms of bleeding  or hemorrhage  - Monitor labs  - Administer supportive blood products/factors as ordered and appropriate  Outcome: Progressing     Problem: MUSCULOSKELETAL - ADULT  Goal: Maintain or return mobility to safest level of function  Description: INTERVENTIONS:  - Assess patient's ability to carry out ADLs; assess patient's baseline for ADL function and identify physical deficits which impact ability to perform ADLs (bathing, care of mouth/teeth, toileting, grooming, dressing, etc.)  - Assess/evaluate cause of self-care deficits   - Assess range of motion  - Assess patient's mobility  - Assess patient's need for assistive devices and provide as appropriate  - Encourage maximum independence but intervene and supervise when necessary  - Involve family in performance of ADLs  - Assess for home care needs following discharge   - Consider OT consult to assist with ADL evaluation and planning for discharge  - Provide patient education as appropriate  Outcome: Progressing  Goal: Maintain proper alignment of affected body part  Description: INTERVENTIONS:  - Support, maintain and protect limb and body alignment  - Provide patient/ family with appropriate education  Outcome: Progressing     Problem: Nutrition/Hydration-ADULT  Goal: Nutrient/Hydration intake appropriate for improving, restoring or maintaining nutritional needs  Description: Monitor and assess patient's nutrition/hydration status for malnutrition. Collaborate with interdisciplinary team and initiate plan and interventions as ordered.  Monitor patient's weight and dietary intake as ordered or per policy. Utilize nutrition screening tool and intervene as necessary. Determine patient's food preferences and provide high-protein, high-caloric foods as appropriate.     INTERVENTIONS:  - Monitor oral intake, urinary output, labs, and treatment plans  - Assess nutrition and hydration status and recommend course of action  - Evaluate amount of meals eaten  - Assist patient  with eating if necessary   - Allow adequate time for meals  - Recommend/ encourage appropriate diets, oral nutritional supplements, and vitamin/mineral supplements  - Order, calculate, and assess calorie counts as needed  - Recommend, monitor, and adjust tube feedings and TPN/PPN based on assessed needs  - Assess need for intravenous fluids  - Provide specific nutrition/hydration education as appropriate  - Include patient/family/caregiver in decisions related to nutrition  Outcome: Progressing     Problem: CONFUSION/THOUGHT DISTURBANCE  Goal: Thought disturbances (confusion, delirium, depression, dementia or psychosis) are managed to maintain or return to baseline mental status and functional level  Description: INTERVENTIONS:  - Assess for possible contributors to  thought disturbance, including but not limited to medications, infection, impaired vision or hearing, underlying metabolic abnormalities, dehydration, respiratory compromise,  psychiatric diagnoses and notify attending PHYSICAN/AP  - Monitor and intervene to maintain adequate nutrition, hydration, elimination, sleep and activity  - Decrease environmental stimuli, including noise as appropriate.  - Provide frequent contacts to provide refocusing, direction and reassurance as needed. Approach patient calmly with eye contact and at their level.  - [place] high risk fall precautions, aspiration precautions and other safety measures, as indicated  - If delirium suspected, notify physician/AP of change in condition and request immediate in-person evaluation  - Pursue consults as appropriate including Geriatric (campus dependent), OT for cognitive evaluation/activity planning, psychiatric, pastoral care, etc.  Outcome: Progressing     Problem: BEHAVIOR  Goal: Pt/Family maintain appropriate behavior and adhere to behavioral management agreement, if implemented  Description: INTERVENTIONS:  - Assess the family dynamic   - Encourage verbalization of thoughts  and concerns in a socially appropriate manner  - Assess patient/family's coping skills and non-compliant behavior (including use of illegal substances).  - Utilize positive, consistent limit setting strategies supporting safety of patient, staff and others  - Initiate consult with Case Management, Spiritual Care or other ancillary services as appropriate  - If a patient's/visitor's behavior jeopardizes the safety of the patient, staff, or others, refer to organization procedure.   - Notify Security of behavior or suspected illegal substances which indicate the need for search of the patient and/or belongings  - Encourage participation in the decision making process about a behavioral management agreement; implement if patient meets criteria  Outcome: Progressing     Problem: Prexisting or High Potential for Compromised Skin Integrity  Goal: Skin integrity is maintained or improved  Description: INTERVENTIONS:  - Identify patients at risk for skin breakdown  - Assess and monitor skin integrity  - Assess and monitor nutrition and hydration status  - Monitor labs   - Assess for incontinence   - Turn and reposition patient  - Assist with mobility/ambulation  - Relieve pressure over bony prominences  - Avoid friction and shearing  - Provide appropriate hygiene as needed including keeping skin clean and dry  - Evaluate need for skin moisturizer/barrier cream  - Collaborate with interdisciplinary team   - Patient/family teaching  - Consider wound care consult   Outcome: Progressing

## 2024-12-31 ENCOUNTER — APPOINTMENT (INPATIENT)
Dept: NON INVASIVE DIAGNOSTICS | Facility: HOSPITAL | Age: 79
DRG: 542 | End: 2024-12-31
Payer: COMMERCIAL

## 2024-12-31 ENCOUNTER — TELEPHONE (OUTPATIENT)
Age: 79
End: 2024-12-31

## 2024-12-31 PROBLEM — T14.8XXA HEMATOMA: Status: ACTIVE | Noted: 2024-12-31

## 2024-12-31 LAB
ANION GAP SERPL CALCULATED.3IONS-SCNC: 4 MMOL/L (ref 4–13)
AORTIC ROOT: 2.8 CM
APICAL FOUR CHAMBER EJECTION FRACTION: 57 %
ASCENDING AORTA: 3 CM
BASOPHILS # BLD AUTO: 0.02 THOUSANDS/ΜL (ref 0–0.1)
BASOPHILS NFR BLD AUTO: 1 % (ref 0–1)
BSA FOR ECHO PROCEDURE: 1.51 M2
BUN SERPL-MCNC: 9 MG/DL (ref 5–25)
CALCIUM SERPL-MCNC: 8.4 MG/DL (ref 8.4–10.2)
CHLORIDE SERPL-SCNC: 106 MMOL/L (ref 96–108)
CO2 SERPL-SCNC: 34 MMOL/L (ref 21–32)
CREAT SERPL-MCNC: 0.49 MG/DL (ref 0.6–1.3)
DOP CALC LVOT AREA: 3.14 CM2
DOP CALC LVOT DIAMETER: 2 CM
EOSINOPHIL # BLD AUTO: 0.17 THOUSAND/ΜL (ref 0–0.61)
EOSINOPHIL NFR BLD AUTO: 4 % (ref 0–6)
ERYTHROCYTE [DISTWIDTH] IN BLOOD BY AUTOMATED COUNT: 14.9 % (ref 11.6–15.1)
FRACTIONAL SHORTENING: 41 (ref 28–44)
GFR SERPL CREATININE-BSD FRML MDRD: 92 ML/MIN/1.73SQ M
GLUCOSE SERPL-MCNC: 77 MG/DL (ref 65–140)
GLUCOSE SERPL-MCNC: 81 MG/DL (ref 65–140)
GLUCOSE SERPL-MCNC: 81 MG/DL (ref 65–140)
GLUCOSE SERPL-MCNC: 85 MG/DL (ref 65–140)
GLUCOSE SERPL-MCNC: 89 MG/DL (ref 65–140)
GLUCOSE SERPL-MCNC: 91 MG/DL (ref 65–140)
HCT VFR BLD AUTO: 33.2 % (ref 34.8–46.1)
HGB BLD-MCNC: 9.4 G/DL (ref 11.5–15.4)
IMM GRANULOCYTES # BLD AUTO: 0.02 THOUSAND/UL (ref 0–0.2)
IMM GRANULOCYTES NFR BLD AUTO: 1 % (ref 0–2)
INTERVENTRICULAR SEPTUM IN DIASTOLE (PARASTERNAL SHORT AXIS VIEW): 1 CM
INTERVENTRICULAR SEPTUM: 1 CM (ref 0.6–1.1)
LAAS-AP2: 26.2 CM2
LAAS-AP4: 19.2 CM2
LEFT ATRIUM SIZE: 5 CM
LEFT ATRIUM VOLUME (MOD BIPLANE): 65 ML
LEFT INTERNAL DIMENSION IN SYSTOLE: 3 CM (ref 2.1–4)
LEFT VENTRICULAR INTERNAL DIMENSION IN DIASTOLE: 5.1 CM (ref 3.5–6)
LEFT VENTRICULAR POSTERIOR WALL IN END DIASTOLE: 1 CM
LEFT VENTRICULAR STROKE VOLUME: 89 ML
LVSV (TEICH): 89 ML
LYMPHOCYTES # BLD AUTO: 0.41 THOUSANDS/ΜL (ref 0.6–4.47)
LYMPHOCYTES NFR BLD AUTO: 10 % (ref 14–44)
MCH RBC QN AUTO: 27.7 PG (ref 26.8–34.3)
MCHC RBC AUTO-ENTMCNC: 28.3 G/DL (ref 31.4–37.4)
MCV RBC AUTO: 98 FL (ref 82–98)
MONOCYTES # BLD AUTO: 0.18 THOUSAND/ΜL (ref 0.17–1.22)
MONOCYTES NFR BLD AUTO: 5 % (ref 4–12)
NEUTROPHILS # BLD AUTO: 3.24 THOUSANDS/ΜL (ref 1.85–7.62)
NEUTS SEG NFR BLD AUTO: 79 % (ref 43–75)
NRBC BLD AUTO-RTO: 0 /100 WBCS
PLATELET # BLD AUTO: 168 THOUSANDS/UL (ref 149–390)
PMV BLD AUTO: 8.8 FL (ref 8.9–12.7)
POTASSIUM SERPL-SCNC: 3.7 MMOL/L (ref 3.5–5.3)
RA PRESSURE ESTIMATED: 8 MMHG
RBC # BLD AUTO: 3.39 MILLION/UL (ref 3.81–5.12)
RIGHT ATRIUM AREA SYSTOLE A4C: 22.8 CM2
RIGHT VENTRICLE ID DIMENSION: 2.8 CM
RV PSP: 51 MMHG
SINOTUBULAR JUNCTION: 2.7 CM
SL CV ECHO PERICARDIAL EFFUSION SIZE: 0.7 CM
SL CV LEFT ATRIUM LENGTH A2C: 7 CM
SL CV LV EF: 60
SL CV PED ECHO LEFT VENTRICLE DIASTOLIC VOLUME (MOD BIPLANE) 2D: 123 ML
SL CV PED ECHO LEFT VENTRICLE SYSTOLIC VOLUME (MOD BIPLANE) 2D: 34 ML
SL CV SINUS OF VALSALVA 2D: 3.1 CM
SODIUM SERPL-SCNC: 144 MMOL/L (ref 135–147)
STJ: 2.7 CM
TR MAX PG: 43 MMHG
TR PEAK VELOCITY: 3.3 M/S
TRICUSPID ANNULAR PLANE SYSTOLIC EXCURSION: 2.8 CM
TRICUSPID VALVE PEAK REGURGITATION VELOCITY: 5.5 M/S
WBC # BLD AUTO: 4.04 THOUSAND/UL (ref 4.31–10.16)

## 2024-12-31 PROCEDURE — 99222 1ST HOSP IP/OBS MODERATE 55: CPT | Performed by: SURGERY

## 2024-12-31 PROCEDURE — 99232 SBSQ HOSP IP/OBS MODERATE 35: CPT | Performed by: INTERNAL MEDICINE

## 2024-12-31 PROCEDURE — NC001 PR NO CHARGE: Performed by: SURGERY

## 2024-12-31 PROCEDURE — 93306 TTE W/DOPPLER COMPLETE: CPT

## 2024-12-31 PROCEDURE — 93306 TTE W/DOPPLER COMPLETE: CPT | Performed by: INTERNAL MEDICINE

## 2024-12-31 PROCEDURE — 80048 BASIC METABOLIC PNL TOTAL CA: CPT | Performed by: NURSE PRACTITIONER

## 2024-12-31 PROCEDURE — 85025 COMPLETE CBC W/AUTO DIFF WBC: CPT | Performed by: NURSE PRACTITIONER

## 2024-12-31 PROCEDURE — 99232 SBSQ HOSP IP/OBS MODERATE 35: CPT | Performed by: SURGERY

## 2024-12-31 PROCEDURE — 82948 REAGENT STRIP/BLOOD GLUCOSE: CPT

## 2024-12-31 RX ADMIN — ACETAMINOPHEN 1000 MG: 1000 INJECTION, SOLUTION INTRAVENOUS at 21:12

## 2024-12-31 RX ADMIN — DEXTROSE AND SODIUM CHLORIDE 50 ML/HR: 5; .9 INJECTION, SOLUTION INTRAVENOUS at 21:09

## 2024-12-31 RX ADMIN — DEXTROSE AND SODIUM CHLORIDE 50 ML/HR: 5; .9 INJECTION, SOLUTION INTRAVENOUS at 03:03

## 2024-12-31 RX ADMIN — ACETAMINOPHEN 1000 MG: 1000 INJECTION, SOLUTION INTRAVENOUS at 05:53

## 2024-12-31 RX ADMIN — ENOXAPARIN SODIUM 30 MG: 30 INJECTION SUBCUTANEOUS at 21:12

## 2024-12-31 RX ADMIN — ACETAMINOPHEN 1000 MG: 1000 INJECTION, SOLUTION INTRAVENOUS at 12:48

## 2024-12-31 RX ADMIN — ENOXAPARIN SODIUM 30 MG: 30 INJECTION SUBCUTANEOUS at 10:14

## 2024-12-31 NOTE — ASSESSMENT & PLAN NOTE
POA--CT imaging showed: Age-indeterminate wedging of T1   Neurosurgery consulted note appreciated  Deferred bracing given patient was nontender  Follow-up as scheduled with neurosurgery

## 2024-12-31 NOTE — ACP (ADVANCE CARE PLANNING)
Trauma Surgery    Goals of care discussion initiated with the patient and her  (via speaker-phone) to continue the conversation begun yesterday. Patient with progressive dysphagia (progressed from September) and is now aspirating on all liquids on recent VBS. I discussed with the patient and  that given the high aspiration risk, she is a strict NPO, which will likely be permanent in the setting of progressive dysphagia.     Yesterday, they were uncertain if they wanted to proceed with PEG tube placement if she would require TF for the rest of her life / if she could not eat at all. They stated that if they were in a condition that would require artificial means to prolong life, that they would not want that. I discussed with them that enteral nutrition could be considered artificial life-prolonging mechanisms. They wanted to think about their options more before making a final decision.    Today, we re-discussed what a PEG tube would mean and reiterated that she would be strict NPO and reliant on TF, likely for the rest of her life. We discussed the risks of G tube placement, including bleeding, infection, injury to nearby structures (colon, small bowel, etc) and that sometimes G-tubes cannot be endoscopically placed and require laparoscopic or open tube placement. We also discussed the anesthesia risks of the procedure including MI, stroke, death. We also discussed alternative options to G-tube placement, including shifting the focus to comfort, and allowing her to eat what she wanted. However, that would mean continued aspiration, likely aspiration pneumonitis / pneumonia, which could lead to death.     Pt and  would like to proceed with G-tube placement at this time. They also reiterated full-code status including mechanical ventilation and chest compressions. Will consult red surgery for G-tube placement. Continue strict NPO c aspiration precautions.     I have spent an additional time of 35  minutes in caring for this patient on the day of the visit/encounter including Prognosis, Patient and family education, and Counseling / Coordination of care.

## 2024-12-31 NOTE — ASSESSMENT & PLAN NOTE
Underwent C1-C3 posterior fusion on 9/5/2024 following a fall resulting in a type II dens fracture   Follow-up with neurosurgery as scheduled

## 2024-12-31 NOTE — ASSESSMENT & PLAN NOTE
Secondary to fall  CT imaging showed: Acute displaced and angulated distal sacral fracture through S4 vertebral body involving the sacral ala bilaterally.   Orthopedics consulted note appreciated  Weightbearing as tolerated bilateral lower extremities  Multimodal pain regimen as needed  DVT prophylaxis for at least 28 days following injury  PT/OT   Follow-up with orthopedics as an outpatient.

## 2024-12-31 NOTE — ASSESSMENT & PLAN NOTE
Previously on home oxygen,  states she has not needed it in a year.  Supplemental oxygen as needed to maintain oxygen saturation greater than 93%.

## 2024-12-31 NOTE — ASSESSMENT & PLAN NOTE
Patient has a history of dysphagia/aspiration.  She was evaluated by speech with VBS and was noted to have significant aspiration.  Patient has been contemplating external feeding  NPO--IVF initiated  Plan for family meeting later today

## 2024-12-31 NOTE — ASSESSMENT & PLAN NOTE
Found to be in A-fib with RVR on presentation at [facility], started on Cardizem drip  Appreciate Cardiology consult and recommendations  Patient has been rate controlled with digoxin/Cardizem PO  Oral Cardizem has been on hold due to dysphagia.  The new with digoxin IV  Not anticoagulated in the outpatient setting due to PMH of severe Gi bleeds and falls  Follow-up with PCP/cardiology as an outpatient

## 2024-12-31 NOTE — TELEPHONE ENCOUNTER
Patient's  states he missed a call from this number and thought it might be about a feeding tube for the patient. There is no note in the chart indicating that. Please call him back after noon today at [phone]. Thank you.

## 2024-12-31 NOTE — PLAN OF CARE
Problem: PAIN - ADULT  Goal: Verbalizes/displays adequate comfort level or baseline comfort level  Description: Interventions:  - Encourage patient to monitor pain and request assistance  - Assess pain using appropriate pain scale  - Administer analgesics based on type and severity of pain and evaluate response  - Implement non-pharmacological measures as appropriate and evaluate response  - Consider cultural and social influences on pain and pain management  - Notify physician/advanced practitioner if interventions unsuccessful or patient reports new pain  Outcome: Progressing     Problem: INFECTION - ADULT  Goal: Absence or prevention of progression during hospitalization  Description: INTERVENTIONS:  - Assess and monitor for signs and symptoms of infection  - Monitor lab/diagnostic results  - Monitor all insertion sites, i.e. indwelling lines, tubes, and drains  - Monitor endotracheal if appropriate and nasal secretions for changes in amount and color  - [place] appropriate cooling/warming therapies per order  - Administer medications as ordered  - Instruct and encourage patient and family to use good hand hygiene technique  - Identify and instruct in appropriate isolation precautions for identified infection/condition  Outcome: Progressing     Problem: SAFETY ADULT  Goal: Patient will remain free of falls  Description: INTERVENTIONS:  - Educate patient/family on patient safety including physical limitations  - Instruct patient to call for assistance with activity   - Consult OT/PT to assist with strengthening/mobility   - Keep Call bell within reach  - Keep bed low and locked with side rails adjusted as appropriate  - Keep care items and personal belongings within reach  - Initiate and maintain comfort rounds  - Make Fall Risk Sign visible to staff  - Offer Toileting every 2 Hours, in advance of need  - Initiate/Maintain bed alarm  - Obtain necessary fall risk management equipment: bed alarm  - Apply yellow  socks and bracelet for high fall risk patients  - Consider moving patient to room near nurses station  Outcome: Progressing  Goal: Maintain or return to baseline ADL function  Description: INTERVENTIONS:  -  Assess patient's ability to carry out ADLs; assess patient's baseline for ADL function and identify physical deficits which impact ability to perform ADLs (bathing, care of mouth/teeth, toileting, grooming, dressing, etc.)  - Assess/evaluate cause of self-care deficits   - Assess range of motion  - Assess patient's mobility; develop plan if impaired  - Assess patient's need for assistive devices and provide as appropriate  - Encourage maximum independence but intervene and supervise when necessary  - Involve family in performance of ADLs  - Assess for home care needs following discharge   - Consider OT consult to assist with ADL evaluation and planning for discharge  - Provide patient education as appropriate  Outcome: Progressing  Goal: Maintains/Returns to pre admission functional level  Description: INTERVENTIONS:  - Perform AM-PAC 6 Click Basic Mobility/ Daily Activity assessment daily.  - Set and communicate daily mobility goal to care team and patient/family/caregiver.   - Collaborate with rehabilitation services on mobility goals if consulted  - Perform Range of Motion 3 times a day.  - Reposition patient every 2 hours.  - Dangle patient 3 times a day  - Stand patient 3 times a day  - Ambulate patient 3 times a day  - Out of bed to chair 3 times a day   - Out of bed for meals 3 times a day  - Out of bed for toileting  - Record patient progress and toleration of activity level   Outcome: Progressing     Problem: DISCHARGE PLANNING  Goal: Discharge to home or other facility with appropriate resources  Description: INTERVENTIONS:  - Identify barriers to discharge w/patient and caregiver  - Arrange for needed discharge resources and transportation as appropriate  - Identify discharge learning needs (meds,  wound care, etc.)  - Arrange for interpretive services to assist at discharge as needed  - Refer to Case Management Department for coordinating discharge planning if the patient needs post-hospital services based on physician/advanced practitioner order or complex needs related to functional status, cognitive ability, or social support system  Outcome: Progressing     Problem: Knowledge Deficit  Goal: Patient/family/caregiver demonstrates understanding of disease process, treatment plan, medications, and discharge instructions  Description: Complete learning assessment and assess knowledge base.  Interventions:  - Provide teaching at level of understanding  - Provide teaching via preferred learning methods  Outcome: Progressing     Problem: NEUROSENSORY - ADULT  Goal: Achieves stable or improved neurological status  Description: INTERVENTIONS  - Monitor and report changes in neurological status  - Monitor vital signs such as temperature, blood pressure, glucose, and any other labs ordered   - Initiate measures to prevent increased intracranial pressure  - Monitor for seizure activity and implement precautions if appropriate      Outcome: Progressing  Goal: Remains free of injury related to seizures activity  Description: INTERVENTIONS  - Maintain airway, patient safety  and administer oxygen as ordered  - Monitor patient for seizure activity, document and report duration and description of seizure to physician/advanced practitioner  - If seizure occurs,  ensure patient safety during seizure  - Reorient patient post seizure  - Seizure pads on all 4 side rails  - Instruct patient/family to notify RN of any seizure activity including if an aura is experienced  - Instruct patient/family to call for assistance with activity based on nursing assessment  - Administer anti-seizure medications if ordered    Outcome: Progressing  Goal: Achieves maximal functionality and self care  Description: INTERVENTIONS  - Monitor  swallowing and airway patency with patient fatigue and changes in neurological status  - Encourage and assist patient to increase activity and self care.   - Encourage visually impaired, hearing impaired and aphasic patients to use assistive/communication devices  Outcome: Progressing     Problem: CARDIOVASCULAR - ADULT  Goal: Maintains optimal cardiac output and hemodynamic stability  Description: INTERVENTIONS:  - Monitor I/O, vital signs and rhythm  - Monitor for S/S and trends of decreased cardiac output  - Administer and titrate ordered vasoactive medications to optimize hemodynamic stability  - Assess quality of pulses, skin color and temperature  - Assess for signs of decreased coronary artery perfusion  - Instruct patient to report change in severity of symptoms  Outcome: Progressing  Goal: Absence of cardiac dysrhythmias or at baseline rhythm  Description: INTERVENTIONS:  - Continuous cardiac monitoring, vital signs, obtain 12 lead EKG if ordered  - Administer antiarrhythmic and heart rate control medications as ordered  - Monitor electrolytes and administer replacement therapy as ordered  Outcome: Progressing     Problem: RESPIRATORY - ADULT  Goal: Achieves optimal ventilation and oxygenation  Description: INTERVENTIONS:  - Assess for changes in respiratory status  - Assess for changes in mentation and behavior  - Position to facilitate oxygenation and minimize respiratory effort  - Oxygen administered by appropriate delivery if ordered  - Initiate smoking cessation education as indicated  - Encourage broncho-pulmonary hygiene including cough, deep breathe, Incentive Spirometry  - Assess the need for suctioning and aspirate as needed  - Assess and instruct to report SOB or any respiratory difficulty  - Respiratory Therapy support as indicated  Outcome: Progressing     Problem: HEMATOLOGIC - ADULT  Goal: Maintains hematologic stability  Description: INTERVENTIONS  - Assess for signs and symptoms of bleeding  or hemorrhage  - Monitor labs  - Administer supportive blood products/factors as ordered and appropriate  Outcome: Progressing     Problem: MUSCULOSKELETAL - ADULT  Goal: Maintain or return mobility to safest level of function  Description: INTERVENTIONS:  - Assess patient's ability to carry out ADLs; assess patient's baseline for ADL function and identify physical deficits which impact ability to perform ADLs (bathing, care of mouth/teeth, toileting, grooming, dressing, etc.)  - Assess/evaluate cause of self-care deficits   - Assess range of motion  - Assess patient's mobility  - Assess patient's need for assistive devices and provide as appropriate  - Encourage maximum independence but intervene and supervise when necessary  - Involve family in performance of ADLs  - Assess for home care needs following discharge   - Consider OT consult to assist with ADL evaluation and planning for discharge  - Provide patient education as appropriate  Outcome: Progressing  Goal: Maintain proper alignment of affected body part  Description: INTERVENTIONS:  - Support, maintain and protect limb and body alignment  - Provide patient/ family with appropriate education  Outcome: Progressing     Problem: Nutrition/Hydration-ADULT  Goal: Nutrient/Hydration intake appropriate for improving, restoring or maintaining nutritional needs  Description: Monitor and assess patient's nutrition/hydration status for malnutrition. Collaborate with interdisciplinary team and initiate plan and interventions as ordered.  Monitor patient's weight and dietary intake as ordered or per policy. Utilize nutrition screening tool and intervene as necessary. Determine patient's food preferences and provide high-protein, high-caloric foods as appropriate.     INTERVENTIONS:  - Monitor oral intake, urinary output, labs, and treatment plans  - Assess nutrition and hydration status and recommend course of action  - Evaluate amount of meals eaten  - Assist patient  with eating if necessary   - Allow adequate time for meals  - Recommend/ encourage appropriate diets, oral nutritional supplements, and vitamin/mineral supplements  - Order, calculate, and assess calorie counts as needed  - Recommend, monitor, and adjust tube feedings and TPN/PPN based on assessed needs  - Assess need for intravenous fluids  - Provide specific nutrition/hydration education as appropriate  - Include patient/family/caregiver in decisions related to nutrition  Outcome: Progressing     Problem: CONFUSION/THOUGHT DISTURBANCE  Goal: Thought disturbances (confusion, delirium, depression, dementia or psychosis) are managed to maintain or return to baseline mental status and functional level  Description: INTERVENTIONS:  - Assess for possible contributors to  thought disturbance, including but not limited to medications, infection, impaired vision or hearing, underlying metabolic abnormalities, dehydration, respiratory compromise,  psychiatric diagnoses and notify attending PHYSICAN/AP  - Monitor and intervene to maintain adequate nutrition, hydration, elimination, sleep and activity  - Decrease environmental stimuli, including noise as appropriate.  - Provide frequent contacts to provide refocusing, direction and reassurance as needed. Approach patient calmly with eye contact and at their level.  - [facility] high risk fall precautions, aspiration precautions and other safety measures, as indicated  - If delirium suspected, notify physician/AP of change in condition and request immediate in-person evaluation  - Pursue consults as appropriate including Geriatric (campus dependent), OT for cognitive evaluation/activity planning, psychiatric, pastoral care, etc.  Outcome: Progressing     Problem: BEHAVIOR  Goal: Pt/Family maintain appropriate behavior and adhere to behavioral management agreement, if implemented  Description: INTERVENTIONS:  - Assess the family dynamic   - Encourage verbalization of thoughts  and concerns in a socially appropriate manner  - Assess patient/family's coping skills and non-compliant behavior (including use of illegal substances).  - Utilize positive, consistent limit setting strategies supporting safety of patient, staff and others  - Initiate consult with Case Management, Spiritual Care or other ancillary services as appropriate  - If a patient's/visitor's behavior jeopardizes the safety of the patient, staff, or others, refer to organization procedure.   - Notify Security of behavior or suspected illegal substances which indicate the need for search of the patient and/or belongings  - Encourage participation in the decision making process about a behavioral management agreement; implement if patient meets criteria  Outcome: Progressing     Problem: Prexisting or High Potential for Compromised Skin Integrity  Goal: Skin integrity is maintained or improved  Description: INTERVENTIONS:  - Identify patients at risk for skin breakdown  - Assess and monitor skin integrity  - Assess and monitor nutrition and hydration status  - Monitor labs   - Assess for incontinence   - Turn and reposition patient  - Assist with mobility/ambulation  - Relieve pressure over bony prominences  - Avoid friction and shearing  - Provide appropriate hygiene as needed including keeping skin clean and dry  - Evaluate need for skin moisturizer/barrier cream  - Collaborate with interdisciplinary team   - Patient/family teaching  - Consider wound care consult   Outcome: Progressing

## 2024-12-31 NOTE — PROGRESS NOTES
Progress Note - Trauma   Name: Marsha Oliva 79 y.o. female I MRN: [id number]  [facility] Date of Admission: 12/22/2024   Date of Service: 12/31/2024 I Hospital Day: 9    Assessment & Plan  Repeated falls  Recurrent falls  Injuries listed below  PT/OT  Sacral fracture (HCC)  Secondary to fall  CT imaging showed: Acute displaced and angulated distal sacral fracture through S4 vertebral body involving the sacral ala bilaterally.   Orthopedics consulted note appreciated  Weightbearing as tolerated bilateral lower extremities  Multimodal pain regimen as needed  DVT prophylaxis for at least 28 days following injury  PT/OT   Follow-up with orthopedics as an outpatient.  Sacral Hematoma  CT imaging showed: Small volume of hemorrhage in the presacral space with few superimposed punctate hyperdense foci concerning for extravasated contrast   Hemoglobin stable at 9.4  Hemodynamically stable  Continue on  Closed T1 fracture (HCC)  POA--CT imaging showed: Age-indeterminate wedging of T1   Neurosurgery consulted note appreciated  Deferred bracing given patient was nontender  Follow-up as scheduled with neurosurgery  S/P cervical spinal fusion  Underwent C1-C3 posterior fusion on 9/5/2024 following a fall resulting in a type II dens fracture   Follow-up with neurosurgery as scheduled  Atrial fibrillation, chronic ([facility])  Found to be in A-fib with RVR on presentation at [facility], started on Cardizem drip  Appreciate Cardiology consult and recommendations  Patient has been rate controlled with digoxin/Cardizem PO  Oral Cardizem has been on hold due to dysphagia.  The new with digoxin IV  Not anticoagulated in the outpatient setting due to PMH of severe Gi bleeds and falls  Follow-up with PCP/cardiology as an outpatient   Chronic diastolic CHF (congestive heart failure) ([facility])  Wt Readings from Last 3 Encounters:   12/31/24 50.8 kg (111 lb 15.9 oz)   12/22/24 50.8 kg (112 lb)   10/24/24 51.3 kg (113 lb)      Echocardiogram pending  Continue home digoxin  Continue to closely monitor I&O  Dysphagia  Patient has a history of dysphagia/aspiration.  She was evaluated by speech with VBS and was noted to have significant aspiration.  Patient has been contemplating external feeding  NPO--IVF initiated  Plan for family meeting later today  Severe mitral regurgitation  Repeat echocardiogram pending  Continue to closely monitor fluid status  Hypothyroidism  Levothyroxine currently on hold due to dysphagia  Mixed hyperlipidemia  Atorvastatin currently on hold due to dysphagia  ILD (interstitial lung disease) (HCC)  Previously on home oxygen,  states she has not needed it in a year.  Supplemental oxygen as needed to maintain oxygen saturation greater than 93%.  Apraxia  Chronic  Ambulatory dysfunction  Chronic, pending rehab placement  Acute urinary retention  - Failed urinary retention protocol 12/24 and required tapia catheter placement  - Recommend flomax and outpatient urology follow up for voiding trial    Bowel Regimen: Currently NPO  VTE Prophylaxis:VTE covered by:  enoxaparin, Subcutaneous, 30 mg at 12/31/24 1014        Disposition: Pending family conversation regarding external feeding methods    24 Hour Events : No events reported overnight.  Subjective : Patient denies any new pain or discomfort.  She states that she has been thinking about the NG tube versus PEG tube but has not made a decision.  She states that she would like to have a family conversation with her  and medical team later today.    Objective :  Temp:  [97.4 °F (36.3 °C)-97.8 °F (36.6 °C)] 97.4 °F (36.3 °C)  HR:  [66-73] 73  BP: (102-126)/(54-77) 126/77  Resp:  [15-16] 16  SpO2:  [99 %-100 %] 100 %  O2 Device: Nasal cannula  Nasal Cannula O2 Flow Rate (L/min):  [4 L/min] 4 L/min    I/O         12/29 0701  12/30 0700 12/30 0701  12/31 0700 12/31 0701 01/01 0700    P.O. 0      I.V. (mL/kg) 1232.5 1252.5     IV Piggyback 700 100     Total  Intake(mL/kg) 1932.5 1352.5     Urine (mL/kg/hr) 475 750     Stool 0      Total Output 475 750     Net +1457.5 +602.5            Unmeasured Stool Occurrence 1 x 1 x           Lines/Drains/Airways       Active Status       Name Placement date Placement time Site Days    Urethral Catheter Non-latex 18 Fr. 12/24/24  1240  Non-latex  7                  Physical Exam:   GENERAL APPEARANCE: No acute distress, malnourished  NEURO: GCS 15  HEENT: Normocephalic, atraumatic.  Neck supple.  CV: Irregular rate and rhythm.  +2 radial and dorsalis pedis pulse, bilaterally.  LUNGS: Audible rhonchi/rales on bilateral bases.  Chest wall is nontender.  GI: Abdomen is soft nontender.  : Pelvis is stable.  MSK: Moving all extremities.  No deformities.  SKIN: Warm, dry.           Lab Results: I have reviewed the following results:  Recent Labs     12/30/24  0045 12/31/24  1037   WBC 4.58 4.04*   HGB 8.3* 9.4*   HCT 29.0* 33.2*   * 168   SODIUM 145 144   K 3.5 3.7    106   CO2 35* 34*   BUN 13 9   CREATININE 0.47* 0.49*   GLUC 123 91   AST 19  --    ALT 11  --    ALB 2.3*  --    TBILI 0.44  --    ALKPHOS 65  --    LACTICACID 0.9  --        Imaging Results Review: No pertinent imaging studies reviewed.  Other Study Results Review: No additional pertinent studies reviewed.

## 2024-12-31 NOTE — SPEECH THERAPY NOTE
Patient continues to be NPO with plan for possible PEG placement. Will f/u peripherally to continue ST as warranted.

## 2024-12-31 NOTE — CASE MANAGEMENT
Case Management Discharge Planning Note    Patient name Marsha Oliva  Location [facility] 632/[facility] 632-01 MRN [id number]  : 1945 Date 2024       Current Admission Date: 2024  Current Admission Diagnosis:Repeated falls   Patient Active Problem List    Diagnosis Date Noted Date Diagnosed    Aspiration into airway 2024     Acute urinary retention 2024     Osteoporosis 2024     Closed T1 fracture (HCC) 2024     Sacral fracture ([facility]) 2024     Impaired mobility and activities of daily living 2024     Severe protein-calorie malnutrition (HCC) 2024     Closed odontoid fracture with type II morphology, posterior displacement, and nonunion 2024     S/P cervical spinal fusion 2024     At risk for altered bowel elimination 2024     At risk for altered urinary elimination 2024     At risk for deep venous thrombosis 2024     Encounter for rehabilitation 2024     Lymphedema 2024     Dysphagia 2024     Skin abnormalities 2024     Acute pain due to trauma 2024     At risk for delirium 2024     Heart failure with preserved ejection fraction (HCC) 2024     Closed odontoid fracture with type II morphology and posterior displacement ([facility]) 2024     Spinal cord compression ([facility]) 2024     Chronic respiratory failure with hypoxia ([facility]) 2023     Stage 3a chronic kidney disease ([facility]) 2022     Bilateral hydronephrosis 2022     Proctitis 2022     Fall 2022     Hypokalemia 2022     ILD (interstitial lung disease) ([facility]) 2022     Oropharyngeal aspiration 2022     Elevated troponin 2022     EKG abnormalities 2022     Mild cognitive impairment of uncertain or unknown etiology 2022     Hormone replacement therapy 2022     Apraxia 2021     Constipation, unspecified 2021     Corn or callus 2021     Ambulatory dysfunction  12/28/2021     Generalized muscle weakness 12/28/2021     Lymphedema, not elsewhere classified 12/28/2021     Repeated falls 12/28/2021     Hypoxia 12/23/2021     Frequent falls 12/23/2021     Elevated d-dimer 12/23/2021     Venous insufficiency of both lower extremities 12/17/2021     Acquired bilateral hammer toes 07/27/2018     Lymphedema of both lower extremities 07/02/2018     Severe mitral regurgitation 05/31/2018     Anemia 05/13/2018     Cardiomegaly 05/13/2018     Wound of right leg 05/13/2018     Hypertensive heart disease with heart failure (HCC) 05/13/2018     Major depressive disorder, single episode, unspecified 05/13/2018     Personal history of nicotine dependence 05/13/2018     Pleural effusion, not elsewhere classified 05/13/2018     Solitary pulmonary nodule 05/13/2018     Atrial fibrillation, chronic (HCC) 05/01/2018     B12 deficiency 01/13/2017     Chronic diastolic CHF (congestive heart failure) (HCC) 01/13/2017     Folate deficiency 01/13/2017     Vitamin D deficiency 06/03/2016     Depression with anxiety 08/03/2015     Mixed hyperlipidemia 08/03/2015     Osteoarthritis of knee 10/23/2012     Impaired fasting glucose 05/31/2012     Essential hypertension 03/23/2010     Hypothyroidism 03/23/2010     Posttraumatic stress disorder 03/23/2010       LOS (days): 9  Geometric Mean LOS (GMLOS) (days): 4.6  Days to GMLOS:-4.3     OBJECTIVE:  Risk of Unplanned Readmission Score: 36.05         Current admission status: Inpatient   Preferred Pharmacy:   [facility] - [place], [place] - [address]  [address] [facility] [address]  Phone: [phone] Fax: [phone]    [id number] - [place] - [address]  [facility] [address]  [facility] [address]  Phone: [phone] Fax: [phone]    Primary Care Provider: Rebecca Solis    Primary Insurance: [employer] [facility]  Secondary Insurance:     DISCHARGE DETAILS:    Cm spoke to pt's  . He is aware of the accepting [facility] of [facility]  He's requesting CM expand the SNF search, in the meantime to see if there are other accepting locations. He's aware this locations will be further from his home

## 2024-12-31 NOTE — ASSESSMENT & PLAN NOTE
Wt Readings from Last 3 Encounters:   12/31/24 50.8 kg (111 lb 15.9 oz)   12/22/24 50.8 kg (112 lb)   10/24/24 51.3 kg (113 lb)     Echocardiogram pending  Continue home digoxin  Continue to closely monitor I&O

## 2024-12-31 NOTE — ASSESSMENT & PLAN NOTE
CT imaging showed: Small volume of hemorrhage in the presacral space with few superimposed punctate hyperdense foci concerning for extravasated contrast   Hemoglobin stable at 9.4  Hemodynamically stable  Continue on

## 2024-12-31 NOTE — PROGRESS NOTES
Progress Note - Geriatric Medicine   Marsha Oliva 79 y.o. female MRN: [id number]  Unit/Bed#: [facility] 632-01 Encounter: [id number]      Assessment/Plan:    Acute metabolic encephalopathy   -evidenced by sudden onset confusion and fluctuating mental status worse than baseline requiring treatment with Zyprexa and soft wrist restraints in patient who was reportedly fully oriented on initial presentation   -markedly improved and appears near or back to baseline  -remains high risk recurrence, continue strict delirium precautions  -continue acute pain control, ongoing optimization of hemodynamics and reorient frequently as appropriate  -encourage normal circadian rhythm, wakefulness and engagement during daytime hours   -no longer requiring physical restraints, avoid physical restraints as possible as can worsen confusion and agitation   -reorient often as appropriate and redirect unwanted behaviors as first line   -appreciate family at bedside for familiarity and reassurance      Dysphagia  -speech therapy following, currently NPO due to concerns for recurrent aspiration   -aspiration reported on modified VBS 12/27  -family in discussions with primary team regarding risks/benefits of possible G tube placement given age and co-morbidities   -continue supportive cares and continue strict aspiration precautions      Ambulatory dysfunction with fall  -reportedly mechanical fall earlier on day of admission   -admitted with below noted injuries   -utilizes walker for ambulation at baseline  -hx recurrent falls at least one additional in past year   -remains high risk future falls due to age, hx of fall, impulsivity with poor safety awareness, deconditioning/debility and unfamiliar environment   -encourage good body mechanics and assist with all transfers  -keep personal items and call bell close to prevent reaching  -maintain environment free of fall hazards  -encourage appropriate footwear and adequate lighting at all times  when out of bed  -consider home fall risk assessment and personal fall alert system if returning home on d/c  -PT and OT following       T1 vertebral fracture  -s/p fall as outlined above   -noted on admission imaging  -neurovascular checks per protocol  -acute multimodal pain control if pain reported   -Nsx on consult - recommend deferring bracing due to asymptomatic, close o/p f/u     Sacral fracture  -s/p fall as outlined above  -noted on admission imaging with possible presacral hemorrhage  -continue acute multimodal pain control  -trending H/H  -Neurovascular checks per protocol  -Ortho on consult - WBAT and acute pain control  -PT/OT following      Acute pain due to trauma  -pt currently reporting no pain, if does c/o of pain recommend pain control per Geriatric pain protocol:  Tylenol 975mg Q8H scheduled  Roxicodone 2.5mg Q4H PRN moderate pain  Roxicodone 5mg Q4H PRN severe pain  -consider adjuncts such as lidocaine patch topically to appropriate areas   -encourage addition of non-pharmacologic pain treatment including ice and frequent repositioning  -recommend bowel regimen to prevent treat constipation due to increased risk with acute pain and opiate pain medications     Afib with RVR  -presented with afib and RVR with rates persistently in 120s-140s now markedly improved  -maintained on diltiazem and digoxin as o/p  -not on systemic AC as o/p due to hx of severe GI bleed per records   -continue optimization of hemodynamics   -Cardiology on consult and following, rates now markedly improved     Cognitive impairment   -acute encephalopathy markedly improved, appears to be near or at baseline but remains high risk recurrence, cont strict precautions   -at baseline oriented 2-4 with periods of confusion and very poor short term memory   -reportedly mostly independent with ADLs heavy assist of  with iADLs   -MoCA 16/30 (9/2024) suggestive of at least mild to moderate dementia at baseline, risk of  worsening with any additional insults/stressors   -CTH obtained on admission imaging personally viewed, reveals moderate to severe diffuse chronic microangiopathic changes  -Maintained on levothyroxine chronically outpatient for hypothyroidism, last B12 WNL  -at risk age and cardiovascular related progression of underlying cognitive impairment, continue secondary risk modifications  -Encourage patient remain physically, socially, cognitively active and engaged to maintain cognitive acuity  -Encourage use of sensory assist devices such as corrective lenses at all appropriate times to reduce risk of uncorrected sensory impairment from contributing to isolation, confusion, encephalopathy and more precipitous cognitive decline  -Underlying dementia significantly increases risk of developing delirium during hospitalization likely contributing to current episode, continue strict precautions and supportive cares  -consider comprehensive geriatric assessment as o/p for ongoing management      Depression with anxiety  -maintained on Zoloft chronically as o/p, continue home regimen   -Given underlying dementia uncertain ability to precipitate with outpatient counseling/support group but will continue to benefit from ongoing psychosocial support of family and friends  -Continue home medication regimen and close outpatient follow-up with PCP for ongoing dose titration and medication management  -encourage calm quiet env to reduce risk overstimulation      Hypothyroidism  -TSH WNL at 1.33 on admission  -Continue home levothyroxine regimen and close outpatient follow-up with PCP for ongoing titration and medication management     Impaired Vision  -recommend use of corrective lenses at all appropriate times  -encourage adequate lighting and encourage use of assistance with ambulation  -keep personal belongings close to person to avoid reaching  -Consider large font for printed materials provided to patient     Impaired  mastication  -Requires use of dentures-encourage use at all appropriate times  -ensure meal consistency appropriate for abilities  -continue aspiration precautions     Frailty syndrome in geriatric patient   -Clinical frailty scale stage V/VI, moderately frail, progressive  -Multifactor including age, ambulatory dysfunction with history recurrent falls, depression with anxiety, cognitive impairment and multitude of additional chronic medical comorbidities now with fall and acute traumatic injuries superimposed in elderly individual with limited physiologic and metabolic reserves  -Continue optimization chronic conditions and address acute metabolic derangements as arise   -Encourage well-balanced nutritional intake  -Ensure underlying anxiety/mood/depression symptoms are well-controlled as may impact patient response to therapies as well overall sense of wellbeing and quality of life  -Continue to ensure that treatments and interventions align with patient's wishes and goals of care  -Continue psychosocial supports of patient and caregivers     Care coordination: rounded with Mary Beth (RN)    Subjective:     Marsha is seen and examined at bedside where she is sitting resting comfortably, she is pleasantly confused inquiring when she is having the other doctor come back to put the tube into her heart (?referring to peg tube). She notes mild headache otherwise states she slept well and is feeling well overall.    Review of Systems   Constitutional: Negative.  Negative for chills and fever.   HENT:          Dry mouth    Eyes: Negative.    Respiratory: Negative.  Negative for shortness of breath.    Cardiovascular: Negative.  Negative for chest pain.   Gastrointestinal: Negative.  Negative for abdominal pain.   Genitourinary: Negative.    Musculoskeletal: Negative.    Skin: Negative.    Neurological:  Positive for headaches (mild). Negative for dizziness and light-headedness.   Hematological: Negative.   "  Psychiatric/Behavioral: Negative.     All other systems reviewed and are negative.    Objective:     Vitals: Blood pressure 126/77, pulse 73, temperature (!) 97.4 °F (36.3 °C), resp. rate 16, height 5' 3\" (1.6 m), weight 50.8 kg (111 lb 15.9 oz), SpO2 100%.,Body mass index is 19.84 kg/m².      Intake/Output Summary (Last 24 hours) at 12/31/2024 1215  Last data filed at 12/31/2024 0556  Gross per 24 hour   Intake 1352.5 ml   Output 750 ml   Net 602.5 ml     Current Medications: Reviewed    Physical Exam:   Physical Exam  Vitals and nursing note reviewed.   Constitutional:       General: She is not in acute distress.     Appearance: Normal appearance.      Comments: Elderly female appears older than stated age    HENT:      Head: Normocephalic.      Nose: Nose normal.      Mouth/Throat:      Mouth: Mucous membranes are dry.   Eyes:      General: No scleral icterus.        Right eye: No discharge.         Left eye: No discharge.      Conjunctiva/sclera: Conjunctivae normal.   Neck:      Comments: Trachea midline, phonation normal  Cardiovascular:      Rate and Rhythm: Normal rate.   Pulmonary:      Effort: Pulmonary effort is normal. No respiratory distress.      Comments: Coarse breath sounds   Abdominal:      General: There is no distension.      Palpations: Abdomen is soft.      Tenderness: There is no abdominal tenderness.   Musculoskeletal:      Cervical back: Neck supple.      Comments: Reduced overall muscle mass    Skin:     General: Skin is warm and dry.   Neurological:      Mental Status: She is alert.      Comments: Awake and alert, pleasantly confused to some details of situation   Psychiatric:      Comments: Calm and cooperative         Invasive Devices       Peripheral Intravenous Line  Duration             Peripheral IV 12/31/24 Right Antecubital <1 day              Drain  Duration             Urethral Catheter Non-latex 18 Fr. 6 days                  Lab Results:     I have personally reviewed " pertinent lab results including the following:    Results from last 7 days   Lab Units 12/31/24  1037 12/30/24  0045 12/29/24  0626   WBC Thousand/uL 4.04* 4.58 8.18   HEMOGLOBIN g/dL 9.4* 8.3* 8.2*   HEMATOCRIT % 33.2* 29.0* 27.9*   PLATELETS Thousands/uL 168 113* 133*   SEGS PCT % 79* 78* 88*   MONO PCT % 5 7 5   EOS PCT % 4 2 0     Results from last 7 days   Lab Units 12/31/24  1037 12/30/24  0045 12/29/24  0626   POTASSIUM mmol/L 3.7 3.5 3.8   CHLORIDE mmol/L 106 107 105   CO2 mmol/L 34* 35* 35*   BUN mg/dL 9 13 17   CREATININE mg/dL 0.49* 0.47* 0.63   CALCIUM mg/dL 8.4 8.1* 8.5   ALK PHOS U/L  --  65  --    ALT U/L  --  11  --    AST U/L  --  19  --      I have personally reviewed the following imaging study reports in PACS:    No new imaging overnight

## 2025-01-01 ENCOUNTER — ANESTHESIA EVENT (INPATIENT)
Dept: PERIOP | Facility: HOSPITAL | Age: 80
DRG: 542 | End: 2025-01-01
Payer: COMMERCIAL

## 2025-01-01 ENCOUNTER — ANESTHESIA (INPATIENT)
Dept: PERIOP | Facility: HOSPITAL | Age: 80
DRG: 542 | End: 2025-01-01
Payer: COMMERCIAL

## 2025-01-01 LAB
ANION GAP SERPL CALCULATED.3IONS-SCNC: 6 MMOL/L (ref 4–13)
BASOPHILS # BLD AUTO: 0.03 THOUSANDS/ΜL (ref 0–0.1)
BASOPHILS NFR BLD AUTO: 1 % (ref 0–1)
BUN SERPL-MCNC: 8 MG/DL (ref 5–25)
CALCIUM SERPL-MCNC: 7.9 MG/DL (ref 8.4–10.2)
CHLORIDE SERPL-SCNC: 108 MMOL/L (ref 96–108)
CO2 SERPL-SCNC: 28 MMOL/L (ref 21–32)
CREAT SERPL-MCNC: 0.46 MG/DL (ref 0.6–1.3)
EOSINOPHIL # BLD AUTO: 0.14 THOUSAND/ΜL (ref 0–0.61)
EOSINOPHIL NFR BLD AUTO: 4 % (ref 0–6)
ERYTHROCYTE [DISTWIDTH] IN BLOOD BY AUTOMATED COUNT: 14.8 % (ref 11.6–15.1)
GFR SERPL CREATININE-BSD FRML MDRD: 94 ML/MIN/1.73SQ M
GLUCOSE SERPL-MCNC: 73 MG/DL (ref 65–140)
GLUCOSE SERPL-MCNC: 77 MG/DL (ref 65–140)
GLUCOSE SERPL-MCNC: 82 MG/DL (ref 65–140)
GLUCOSE SERPL-MCNC: 95 MG/DL (ref 65–140)
HCT VFR BLD AUTO: 34.9 % (ref 34.8–46.1)
HGB BLD-MCNC: 10.1 G/DL (ref 11.5–15.4)
IMM GRANULOCYTES # BLD AUTO: 0.02 THOUSAND/UL (ref 0–0.2)
IMM GRANULOCYTES NFR BLD AUTO: 1 % (ref 0–2)
LYMPHOCYTES # BLD AUTO: 0.5 THOUSANDS/ΜL (ref 0.6–4.47)
LYMPHOCYTES NFR BLD AUTO: 16 % (ref 14–44)
MCH RBC QN AUTO: 28.1 PG (ref 26.8–34.3)
MCHC RBC AUTO-ENTMCNC: 28.9 G/DL (ref 31.4–37.4)
MCV RBC AUTO: 97 FL (ref 82–98)
MONOCYTES # BLD AUTO: 0.21 THOUSAND/ΜL (ref 0.17–1.22)
MONOCYTES NFR BLD AUTO: 7 % (ref 4–12)
NEUTROPHILS # BLD AUTO: 2.32 THOUSANDS/ΜL (ref 1.85–7.62)
NEUTS SEG NFR BLD AUTO: 71 % (ref 43–75)
NRBC BLD AUTO-RTO: 0 /100 WBCS
PLATELET # BLD AUTO: 182 THOUSANDS/UL (ref 149–390)
PMV BLD AUTO: 9 FL (ref 8.9–12.7)
POTASSIUM SERPL-SCNC: 3.9 MMOL/L (ref 3.5–5.3)
RBC # BLD AUTO: 3.59 MILLION/UL (ref 3.81–5.12)
SODIUM SERPL-SCNC: 142 MMOL/L (ref 135–147)
WBC # BLD AUTO: 3.22 THOUSAND/UL (ref 4.31–10.16)

## 2025-01-01 PROCEDURE — 0DH63UZ INSERTION OF FEEDING DEVICE INTO STOMACH, PERCUTANEOUS APPROACH: ICD-10-PCS | Performed by: SURGERY

## 2025-01-01 PROCEDURE — 3E0G76Z INTRODUCTION OF NUTRITIONAL SUBSTANCE INTO UPPER GI, VIA NATURAL OR ARTIFICIAL OPENING: ICD-10-PCS | Performed by: SURGERY

## 2025-01-01 PROCEDURE — 80048 BASIC METABOLIC PNL TOTAL CA: CPT

## 2025-01-01 PROCEDURE — 85025 COMPLETE CBC W/AUTO DIFF WBC: CPT

## 2025-01-01 PROCEDURE — NC001 PR NO CHARGE: Performed by: SURGERY

## 2025-01-01 PROCEDURE — 82948 REAGENT STRIP/BLOOD GLUCOSE: CPT

## 2025-01-01 PROCEDURE — 99233 SBSQ HOSP IP/OBS HIGH 50: CPT | Performed by: SURGERY

## 2025-01-01 PROCEDURE — 43246 EGD PLACE GASTROSTOMY TUBE: CPT | Performed by: SURGERY

## 2025-01-01 DEVICE — PERCUTANEOUS ENDOSCOPIC GASTROSTOMY KIT ENFIT
Type: IMPLANTABLE DEVICE | Site: STOMACH | Status: FUNCTIONAL
Brand: ENDOVIVE SAFETY PEG KIT

## 2025-01-01 RX ORDER — SODIUM CHLORIDE, SODIUM LACTATE, POTASSIUM CHLORIDE, CALCIUM CHLORIDE 600; 310; 30; 20 MG/100ML; MG/100ML; MG/100ML; MG/100ML
INJECTION, SOLUTION INTRAVENOUS CONTINUOUS PRN
Status: DISCONTINUED | OUTPATIENT
Start: 2025-01-01 | End: 2025-01-01

## 2025-01-01 RX ORDER — DIGOXIN 0.25 MG/ML
100 INJECTION INTRAMUSCULAR; INTRAVENOUS EVERY OTHER DAY
Status: DISCONTINUED | OUTPATIENT
Start: 2025-01-01 | End: 2025-01-03

## 2025-01-01 RX ORDER — PROPOFOL 10 MG/ML
INJECTION, EMULSION INTRAVENOUS CONTINUOUS PRN
Status: DISCONTINUED | OUTPATIENT
Start: 2025-01-01 | End: 2025-01-01

## 2025-01-01 RX ORDER — LIDOCAINE HYDROCHLORIDE 10 MG/ML
INJECTION, SOLUTION EPIDURAL; INFILTRATION; INTRACAUDAL; PERINEURAL AS NEEDED
Status: DISCONTINUED | OUTPATIENT
Start: 2025-01-01 | End: 2025-01-01 | Stop reason: HOSPADM

## 2025-01-01 RX ORDER — PROPOFOL 10 MG/ML
INJECTION, EMULSION INTRAVENOUS AS NEEDED
Status: DISCONTINUED | OUTPATIENT
Start: 2025-01-01 | End: 2025-01-01

## 2025-01-01 RX ORDER — ONDANSETRON 2 MG/ML
INJECTION INTRAMUSCULAR; INTRAVENOUS AS NEEDED
Status: DISCONTINUED | OUTPATIENT
Start: 2025-01-01 | End: 2025-01-01

## 2025-01-01 RX ORDER — FENTANYL CITRATE 50 UG/ML
INJECTION, SOLUTION INTRAMUSCULAR; INTRAVENOUS AS NEEDED
Status: DISCONTINUED | OUTPATIENT
Start: 2025-01-01 | End: 2025-01-01

## 2025-01-01 RX ORDER — SERTRALINE HYDROCHLORIDE 20 MG/ML
100 SOLUTION ORAL DAILY
Status: DISCONTINUED | OUTPATIENT
Start: 2025-01-02 | End: 2025-01-03 | Stop reason: HOSPADM

## 2025-01-01 RX ORDER — ROCURONIUM BROMIDE 10 MG/ML
INJECTION, SOLUTION INTRAVENOUS AS NEEDED
Status: DISCONTINUED | OUTPATIENT
Start: 2025-01-01 | End: 2025-01-01

## 2025-01-01 RX ORDER — SODIUM CHLORIDE, SODIUM LACTATE, POTASSIUM CHLORIDE, CALCIUM CHLORIDE 600; 310; 30; 20 MG/100ML; MG/100ML; MG/100ML; MG/100ML
20 INJECTION, SOLUTION INTRAVENOUS CONTINUOUS
Status: DISCONTINUED | OUTPATIENT
Start: 2025-01-01 | End: 2025-01-02

## 2025-01-01 RX ORDER — LIDOCAINE HYDROCHLORIDE 10 MG/ML
INJECTION, SOLUTION EPIDURAL; INFILTRATION; INTRACAUDAL; PERINEURAL AS NEEDED
Status: DISCONTINUED | OUTPATIENT
Start: 2025-01-01 | End: 2025-01-01

## 2025-01-01 RX ORDER — SERTRALINE HYDROCHLORIDE 20 MG/ML
100 SOLUTION ORAL DAILY
Status: DISCONTINUED | OUTPATIENT
Start: 2025-01-01 | End: 2025-01-01

## 2025-01-01 RX ORDER — METOCLOPRAMIDE HYDROCHLORIDE 5 MG/ML
10 INJECTION INTRAMUSCULAR; INTRAVENOUS ONCE AS NEEDED
Status: DISCONTINUED | OUTPATIENT
Start: 2025-01-01 | End: 2025-01-02

## 2025-01-01 RX ADMIN — SUGAMMADEX 200 MG: 100 INJECTION, SOLUTION INTRAVENOUS at 09:35

## 2025-01-01 RX ADMIN — NOREPINEPHRINE BITARTRATE 4 MCG/MIN: 1 INJECTION, SOLUTION, CONCENTRATE INTRAVENOUS at 09:17

## 2025-01-01 RX ADMIN — FENTANYL CITRATE 25 MCG: 50 INJECTION INTRAMUSCULAR; INTRAVENOUS at 09:07

## 2025-01-01 RX ADMIN — ENOXAPARIN SODIUM 30 MG: 30 INJECTION SUBCUTANEOUS at 20:49

## 2025-01-01 RX ADMIN — ACETAMINOPHEN 1000 MG: 1000 INJECTION, SOLUTION INTRAVENOUS at 20:49

## 2025-01-01 RX ADMIN — ACETAMINOPHEN 1000 MG: 1000 INJECTION, SOLUTION INTRAVENOUS at 12:40

## 2025-01-01 RX ADMIN — ONDANSETRON 4 MG: 2 INJECTION INTRAMUSCULAR; INTRAVENOUS at 09:30

## 2025-01-01 RX ADMIN — ROCURONIUM BROMIDE 20 MG: 10 INJECTION, SOLUTION INTRAVENOUS at 09:16

## 2025-01-01 RX ADMIN — LIDOCAINE HYDROCHLORIDE 50 MG: 10 INJECTION, SOLUTION EPIDURAL; INFILTRATION; INTRACAUDAL; PERINEURAL at 09:07

## 2025-01-01 RX ADMIN — DIGOXIN 100 MCG: 250 INJECTION, SOLUTION INTRAMUSCULAR; INTRAVENOUS; PARENTERAL at 14:00

## 2025-01-01 RX ADMIN — DEXTROSE AND SODIUM CHLORIDE 50 ML/HR: 5; .9 INJECTION, SOLUTION INTRAVENOUS at 18:55

## 2025-01-01 RX ADMIN — ACETAMINOPHEN 1000 MG: 1000 INJECTION, SOLUTION INTRAVENOUS at 04:34

## 2025-01-01 RX ADMIN — PROPOFOL 30 MG: 10 INJECTION, EMULSION INTRAVENOUS at 09:07

## 2025-01-01 RX ADMIN — SODIUM CHLORIDE, SODIUM LACTATE, POTASSIUM CHLORIDE, AND CALCIUM CHLORIDE: .6; .31; .03; .02 INJECTION, SOLUTION INTRAVENOUS at 08:57

## 2025-01-01 RX ADMIN — PROPOFOL 60 MCG/KG/MIN: 10 INJECTION, EMULSION INTRAVENOUS at 09:10

## 2025-01-01 NOTE — ASSESSMENT & PLAN NOTE
CT imaging showed: Small volume of hemorrhage in the presacral space with few superimposed punctate hyperdense foci concerning for extravasated contrast   Hemoglobin stable at 9.4  Hemodynamically stable

## 2025-01-01 NOTE — ANESTHESIA PREPROCEDURE EVALUATION
Procedure:  INSERTION PEG TUBE; possible laparoscopic approach, possible open (Abdomen)    78 yo F hx of repeat falls and closed T1 fx and sacral fx as well as C1-C3 posterior fusion on 9/5 for prior cervical fractures being managed by the trauma surgery service. General surgery engaged for progressive dysphagia and concerns for nutritional deficiency, with patient presenting to OR for PEG placement.    Echo 12/2024:    Left Ventricle: Left ventricular cavity size is normal. Wall thickness is normal. The left ventricular ejection fraction is 55-60% by visual estimation. Systolic function is normal. Wall motion is normal.    Left Atrium: The atrium is severely dilated (>48 mL/m2).    Right Atrium: The atrium is dilated.    Aortic Valve: There is mild regurgitation with an eccentrically directed jet. There is aortic valve sclerosis.    Mitral Valve: There is severe regurgitation with an eccentrically directed jet.    Tricuspid Valve: There is mild to moderate regurgitation. The estimated right ventricular systolic pressure is 51.00 mmHg.    Pericardium: There is a small pericardial effusion posterior to the heart. The largest diameter measures 0.7 cm. There is no echocardiographic evidence of tamponade. The evidence against tamponade includes: no right ventricular diastolic collapse, no right ventricular compression, no right atrial inversion.    EKG 12/2024:  atrial fibrillation with RVR, patient does have some flipped T   waves in V3, V4, V5, V6 and inferiorly in leads II, III and aVF, unchanged   from prior tracings as of September 4, 2024.     Relevant Problems   CARDIO   (+) Atrial fibrillation, chronic (HCC)   (+) Essential hypertension   (+) Mixed hyperlipidemia   (+) Severe mitral regurgitation   (+) Venous insufficiency of both lower extremities      ENDO   (+) Hypothyroidism      GI/HEPATIC   (+) Dysphagia      /RENAL   (+) Bilateral hydronephrosis   (+) Stage 3a chronic kidney disease (HCC)       HEMATOLOGY   (+) Anemia      MUSCULOSKELETAL   (+) Osteoarthritis of knee      NEURO/PSYCH   (+) Depression with anxiety   (+) Major depressive disorder, single episode, unspecified   (+) Posttraumatic stress disorder   (+) Spinal cord compression (HCC)      PULMONARY   (+) Chronic respiratory failure with hypoxia (HCC)   (+) Pleural effusion, not elsewhere classified     Digoxin last given yesterday  Diltiazem last given 12/28    METS <4    NPO for 3.5 days.     Physical Exam    Airway    Mallampati score: II         Dental    upper dentures    Cardiovascular  Rhythm: irregular, Rate: abnormal    Pulmonary      Other Findings  post-pubertal.      Anesthesia Plan  ASA Score- 4     Anesthesia Type- IV sedation with anesthesia with ASA Monitors.         Additional Monitors:     Airway Plan:     Comment: Sedation as first plan  GETA backup or if laparoscopy to be performed .       Plan Factors-    Chart reviewed. EKG reviewed.  Existing labs reviewed. Patient summary reviewed.    Patient is not a current smoker.              Induction- intravenous.    Postoperative Plan- Plan for postoperative opioid use. Planned trial extubation    Perioperative Resuscitation Plan - Level 1 - Full Code.       Informed Consent- Anesthetic plan and risks discussed with patient.  I personally reviewed this patient with the CRNA. Discussed and agreed on the Anesthesia Plan with the CRNA..

## 2025-01-01 NOTE — ASSESSMENT & PLAN NOTE
Patient has a history of dysphagia/aspiration.  She was evaluated by speech with VBS and was noted to have significant aspiration.  Patient has been contemplating external feeding  NPO--IVF initiated  Family meeting 12/31: Request PEG tube  Plan for PEG tube placement today

## 2025-01-01 NOTE — QUICK NOTE
Assessment & Plan  Dysphagia  78 yo female now s/p 1/1 PEG tube placement    AVSS on 4L of NC    Plan  PEG tube currently capped  Can start meds via the PEG tube as an elixir at 2 PM  Plan to start trickle feeds tomorrow via the PEG  Nutrition for tube feeds recs  Rest of care per trauma team      Post Op Check:    79 y.o.   The patient denied having any nausea, vomiting, fevers, chills, chest pain, shortness of breath. States she has minimal pain at the PEG tube site. Adequate urine output in the tapia. No bowel movements and no flatus.    Vitals:    01/01/25 1147   BP: 127/80   Pulse: 104   Resp: 12   Temp: 98.1 °F (36.7 °C)   SpO2: 96%       General: NAD  HENT: NCAT MMM  Neck: supple, no JVD  CV: nl rate  Lungs: nl wob. No resp distress  ABD: Soft, appropriately tender, nondistended. PEG tube 2 cm at the skin.  Extrem: No CCE  Neuro: AAOx3       Rishi Smith MD  General Surgery Resident

## 2025-01-01 NOTE — ANESTHESIA POSTPROCEDURE EVALUATION
Post-Op Assessment Note    CV Status:  Stable  Pain Score: 0    Pain management: adequate       Mental Status:  Somnolent   Hydration Status:  Stable   PONV Controlled:  None   Airway Patency:  Patent  Two or more mitigation strategies used for obstructive sleep apnea   Post Op Vitals Reviewed: Yes    No anethesia notable event occurred.    Staff: CRNA           Last Filed PACU Vitals:  Vitals Value Taken Time   Temp 98    Pulse 100 01/01/25 0950   /61 01/01/25 0950   Resp 29 01/01/25 0950   SpO2 92 % 01/01/25 0950   Vitals shown include unfiled device data.    Modified Rogerio:  No data recorded

## 2025-01-01 NOTE — ASSESSMENT & PLAN NOTE
79-year-old female presented with repeat falls and closed T1 fracture and sacral fractur as well as C1-C3 posterior fusion on 9/5 for prior cervical fractures being managed by the trauma surgery service.  General surgery engaged for progressive dysphagia and concerns for nutritional deficiency requesting PEG tube placement    Plan  - Will tentatively plan for PEG tube today 1/1  - Patient consented with risks and benefits explained  - N.p.o. for procedure tomorrow  - Follow-up a.m. labs  - Rest of management and care per trauma service

## 2025-01-01 NOTE — ASSESSMENT & PLAN NOTE
79-year-old female presented with repeat falls and closed T1 fracture and sacral fractur as well as C1-C3 posterior fusion on 9/5 for prior cervical fractures being managed by the trauma surgery service.  General surgery engaged for progressive dysphagia and concerns for nutritional deficiency requesting PEG tube placement    Plan  - Will tentatively plan for PEG tube tomorrow 1/1  - Patient to be consented with risks and benefits explained  - N.p.o. tonight at midnight for procedure tomorrow  - Will obtain preoperative labs  - Rest of management and care per trauma service

## 2025-01-01 NOTE — ANESTHESIA POSTPROCEDURE EVALUATION
Post-Op Assessment Note    CV Status:  Stable    Pain management: adequate       Mental Status:  Awake and sleepy   Hydration Status:  Euvolemic   PONV Controlled:  Controlled   Airway Patency:  Patent     Post Op Vitals Reviewed: Yes    No anethesia notable event occurred.    Staff: Anesthesiologist, CRNA           Last Filed PACU Vitals:  Vitals Value Taken Time   Temp 98 °F (36.7 °C) 01/01/25 0949   Pulse 101 01/01/25 1012   /57 01/01/25 1000   Resp 25 01/01/25 1012   SpO2 100 % 01/01/25 1012   Vitals shown include unfiled device data.    Modified Rogerio:  Activity: 2 (1/1/2025  9:49 AM)  Respiration: 2 (1/1/2025  9:49 AM)  Circulation: 2 (1/1/2025  9:49 AM)  Consciousness: 1 (1/1/2025  9:49 AM)  Oxygen Saturation: 1 (1/1/2025  9:49 AM)  Modified Rogerio Score: 8 (1/1/2025  9:49 AM)

## 2025-01-01 NOTE — ASSESSMENT & PLAN NOTE
78 yo female now s/p 1/1 PEG tube placement    AVSS on 4L of NC    Plan  PEG tube currently capped  Can start meds via the PEG tube as an elixir at 2 PM  Plan to start trickle feeds tomorrow via the PEG  Nutrition for tube feeds recs  Rest of care per trauma team

## 2025-01-01 NOTE — PROGRESS NOTES
Progress Note - Trauma   Name: Marsha Oliva 79 y.o. female I MRN: [id number]  [facility] I Date of Admission: 12/22/2024   Date of Service: 1/1/2025 I Hospital Day: 10  Assessment & Plan  Repeated falls  Recurrent falls  Injuries listed below  PT/OT  Sacral fracture (HCC)  Secondary to fall  CT imaging showed: Acute displaced and angulated distal sacral fracture through S4 vertebral body involving the sacral ala bilaterally.   Orthopedics consulted note appreciated  Weightbearing as tolerated bilateral lower extremities  Multimodal pain regimen as needed  DVT prophylaxis for at least 28 days following injury  PT/OT   Follow-up with orthopedics as an outpatient.  Sacral Hematoma  CT imaging showed: Small volume of hemorrhage in the presacral space with few superimposed punctate hyperdense foci concerning for extravasated contrast   Hemoglobin stable at 9.4  Hemodynamically stable  Closed T1 fracture (HCC)  POA--CT imaging showed: Age-indeterminate wedging of T1   Neurosurgery consulted note appreciated  Deferred bracing given patient was nontender  Follow-up as scheduled with neurosurgery  S/P cervical spinal fusion  Underwent C1-C3 posterior fusion on 9/5/2024 following a fall resulting in a type II dens fracture   Follow-up with neurosurgery as scheduled  Atrial fibrillation, chronic ([facility])  Found to be in A-fib with RVR on presentation at [facility], started on Cardizem drip  Appreciate Cardiology consult and recommendations  Patient has been rate controlled with digoxin/Cardizem PO  Oral Cardizem has been on hold due to dysphagia.  The new with digoxin IV  Not anticoagulated in the outpatient setting due to PMH of severe Gi bleeds and falls  Follow-up with PCP/cardiology as an outpatient   Chronic diastolic CHF (congestive heart failure) ([facility])  Wt Readings from Last 3 Encounters:   12/31/24 50.8 kg (111 lb 15.9 oz)   12/22/24 50.8 kg (112 lb)   10/24/24 51.3 kg (113 lb)     Echocardiogram  pending  Continue home digoxin  Continue to closely monitor I&O  Dysphagia  Patient has a history of dysphagia/aspiration.  She was evaluated by speech with VBS and was noted to have significant aspiration.  Patient has been contemplating external feeding  NPO--IVF initiated  Family meeting 12/31: Request PEG tube  Plan for PEG tube placement today  Severe mitral regurgitation  Repeat echocardiogram pending  Continue to closely monitor fluid status  Hypothyroidism  Levothyroxine currently on hold due to dysphagia  Mixed hyperlipidemia  Atorvastatin currently on hold due to dysphagia  ILD (interstitial lung disease) (HCC)  Previously on home oxygen,  states she has not needed it in a year.  Supplemental oxygen as needed to maintain oxygen saturation greater than 93%.  Apraxia  Chronic  Ambulatory dysfunction  Chronic, pending rehab placement  Acute urinary retention  - Failed urinary retention protocol 12/24 and required tapia catheter placement  - Recommend flomax and outpatient urology follow up for voiding trial  Vitamin D deficiency    Osteoporosis  Chronic  Aspiration into airway  - Patient with increasing oxygen requirement overnight up to 10L  - Resting on 7L this AM.   - CXR shows concern for aspiration which patient also has a history of  - Patient recently failed swallow study and was recommended NPO  - Family refused this and patient was kept on purees and thin liquids  - NPO  - Planning for PEG tube placement  - Monitor off antibiotics  - Titrate O2 as needed    Bowel Regimen: Colace, MiraLAX  VTE Prophylaxis:VTE covered by:  [Transfer Hold] enoxaparin, Subcutaneous, 30 mg at 12/31/24 2112        Disposition: Continue current level of care   Please contact the SecureChat role for the Trauma service with any questions/concerns.    24 Hour Events : None  Subjective :  AVSS on room air. Patient reports pain is controlled. Tolerating diet. Voiding spontaneously and having bowel function. Denies fever,  chills, nausea, vomiting.     Objective :  Temp:  [96.6 °F (35.9 °C)-97.4 °F (36.3 °C)] 97.3 °F (36.3 °C)  HR:  [65-87] 65  BP: (108-126)/(73-77) 108/73  Resp:  [16] 16  SpO2:  [99 %-100 %] 99 %  O2 Device: Nasal cannula  Nasal Cannula O2 Flow Rate (L/min):  [4 L/min] 4 L/min    I/O         12/30 0701 12/31 0700 12/31 0701  01/01 0700    I.V. (mL/kg) 1252.5     IV Piggyback 100     Total Intake(mL/kg) 1352.5     Urine (mL/kg/hr) 750 250 (0.2)    Total Output 750 250    Net +602.5 -250          Unmeasured Stool Occurrence 1 x           Lines/Drains/Airways       Active Status       Name Placement date Placement time Site Days    Urethral Catheter Non-latex 18 Fr. 12/24/24  1240  Non-latex  7                  Physical Exam   General: NAD  HENT: NCAT MMM  Neck: supple, no JVD  CV: nl rate  Lungs: nl wob. No resp distress  ABD: Soft, nontender, nondistended  Extrem: No CCE  Neuro: AAOx3    Lab Results: I have reviewed the following results:  Recent Labs     12/30/24  0045 12/31/24  1037   WBC 4.58 4.04*   HGB 8.3* 9.4*   HCT 29.0* 33.2*   * 168   SODIUM 145 144   K 3.5 3.7    106   CO2 35* 34*   BUN 13 9   CREATININE 0.47* 0.49*   GLUC 123 91   AST 19  --    ALT 11  --    ALB 2.3*  --    TBILI 0.44  --    ALKPHOS 65  --    LACTICACID 0.9  --      Mesha Snow MD  General Surgery Resident

## 2025-01-01 NOTE — QUICK NOTE
Patient requested that the surgery team speaks to her  once more before the procedure. I gave her  a call on the phone.  made aware that we are proceeding with PEG tube placement, possible laparoscopic G tube placement, possible open procedure.  will be at the hospital at 12:00 to see her. All additional questions and concerns are appropriately addressed.

## 2025-01-01 NOTE — ASSESSMENT & PLAN NOTE
Found to be in A-fib with RVR on presentation at [facility], started on Cardizem drip  Appreciate Cardiology consult and recommendations  Patient has been rate controlled with digoxin/Cardizem PO  Oral Cardizem has been on hold due to dysphagia.  The new with digoxin IV  Not anticoagulated in the outpatient setting due to PMH of severe Gi bleeds and falls  Follow-up with PCP/cardiology as an outpatient

## 2025-01-01 NOTE — PLAN OF CARE
Problem: PAIN - ADULT  Goal: Verbalizes/displays adequate comfort level or baseline comfort level  Description: Interventions:  - Encourage patient to monitor pain and request assistance  - Assess pain using appropriate pain scale  - Administer analgesics based on type and severity of pain and evaluate response  - Implement non-pharmacological measures as appropriate and evaluate response  - Consider cultural and social influences on pain and pain management  - Notify physician/advanced practitioner if interventions unsuccessful or patient reports new pain  1/1/2025 1427 by Cyndy Gore  Outcome: Progressing  1/1/2025 1427 by Cyndy Gore  Outcome: Progressing     Problem: INFECTION - ADULT  Goal: Absence or prevention of progression during hospitalization  Description: INTERVENTIONS:  - Assess and monitor for signs and symptoms of infection  - Monitor lab/diagnostic results  - Monitor all insertion sites, i.e. indwelling lines, tubes, and drains  - Monitor endotracheal if appropriate and nasal secretions for changes in amount and color  - [place] appropriate cooling/warming therapies per order  - Administer medications as ordered  - Instruct and encourage patient and family to use good hand hygiene technique  - Identify and instruct in appropriate isolation precautions for identified infection/condition  1/1/2025 1427 by Cyndy Gore  Outcome: Progressing  1/1/2025 1427 by Cyndy Gore  Outcome: Progressing     Problem: SAFETY ADULT  Goal: Patient will remain free of falls  Description: INTERVENTIONS:  - Educate patient/family on patient safety including physical limitations  - Instruct patient to call for assistance with activity   - Consult OT/PT to assist with strengthening/mobility   - Keep Call bell within reach  - Keep bed low and locked with side rails adjusted as appropriate  - Keep care items and personal belongings within reach  - Initiate and maintain comfort rounds  - Make Fall Risk  Sign visible to staff  - Offer Toileting every 2 Hours, in advance of need  - Initiate/Maintain bed alarm  - Obtain necessary fall risk management equipment: bed alarm  - Apply yellow socks and bracelet for high fall risk patients  - Consider moving patient to room near nurses station  1/1/2025 1427 by Cyndy Gore  Outcome: Progressing  1/1/2025 1427 by Cyndy Gore  Outcome: Progressing  Goal: Maintain or return to baseline ADL function  Description: INTERVENTIONS:  -  Assess patient's ability to carry out ADLs; assess patient's baseline for ADL function and identify physical deficits which impact ability to perform ADLs (bathing, care of mouth/teeth, toileting, grooming, dressing, etc.)  - Assess/evaluate cause of self-care deficits   - Assess range of motion  - Assess patient's mobility; develop plan if impaired  - Assess patient's need for assistive devices and provide as appropriate  - Encourage maximum independence but intervene and supervise when necessary  - Involve family in performance of ADLs  - Assess for home care needs following discharge   - Consider OT consult to assist with ADL evaluation and planning for discharge  - Provide patient education as appropriate  1/1/2025 1427 by Cyndy Gore  Outcome: Progressing  1/1/2025 1427 by Cyndy Gore  Outcome: Progressing  Goal: Maintains/Returns to pre admission functional level  Description: INTERVENTIONS:  - Perform AM-PAC 6 Click Basic Mobility/ Daily Activity assessment daily.  - Set and communicate daily mobility goal to care team and patient/family/caregiver.   - Collaborate with rehabilitation services on mobility goals if consulted  - Perform Range of Motion 3 times a day.  - Reposition patient every 2 hours.  - Dangle patient 3 times a day  - Stand patient 3 times a day  - Ambulate patient 3 times a day  - Out of bed to chair 3 times a day   - Out of bed for meals 3 times a day  - Out of bed for toileting  - Record patient progress  and toleration of activity level   1/1/2025 1427 by Cyndy Gore  Outcome: Progressing  1/1/2025 1427 by Cyndy Gore  Outcome: Progressing     Problem: DISCHARGE PLANNING  Goal: Discharge to home or other facility with appropriate resources  Description: INTERVENTIONS:  - Identify barriers to discharge w/patient and caregiver  - Arrange for needed discharge resources and transportation as appropriate  - Identify discharge learning needs (meds, wound care, etc.)  - Arrange for interpretive services to assist at discharge as needed  - Refer to Case Management Department for coordinating discharge planning if the patient needs post-hospital services based on physician/advanced practitioner order or complex needs related to functional status, cognitive ability, or social support system  1/1/2025 1427 by Cyndy Gore  Outcome: Progressing  1/1/2025 1427 by Cyndy Gore  Outcome: Progressing     Problem: Knowledge Deficit  Goal: Patient/family/caregiver demonstrates understanding of disease process, treatment plan, medications, and discharge instructions  Description: Complete learning assessment and assess knowledge base.  Interventions:  - Provide teaching at level of understanding  - Provide teaching via preferred learning methods  1/1/2025 1427 by Cyndy Gore  Outcome: Progressing  1/1/2025 1427 by Cyndy Gore  Outcome: Progressing     Problem: NEUROSENSORY - ADULT  Goal: Achieves stable or improved neurological status  Description: INTERVENTIONS  - Monitor and report changes in neurological status  - Monitor vital signs such as temperature, blood pressure, glucose, and any other labs ordered   - Initiate measures to prevent increased intracranial pressure  - Monitor for seizure activity and implement precautions if appropriate      1/1/2025 1427 by Cyndy Gore  Outcome: Progressing  1/1/2025 1427 by Cyndy Gore  Outcome: Progressing  Goal: Remains free of injury related to  seizures activity  Description: INTERVENTIONS  - Maintain airway, patient safety  and administer oxygen as ordered  - Monitor patient for seizure activity, document and report duration and description of seizure to physician/advanced practitioner  - If seizure occurs,  ensure patient safety during seizure  - Reorient patient post seizure  - Seizure pads on all 4 side rails  - Instruct patient/family to notify RN of any seizure activity including if an aura is experienced  - Instruct patient/family to call for assistance with activity based on nursing assessment  - Administer anti-seizure medications if ordered    1/1/2025 1427 by Cyndy Gore  Outcome: Progressing  1/1/2025 1427 by Cyndy Gore  Outcome: Progressing  Goal: Achieves maximal functionality and self care  Description: INTERVENTIONS  - Monitor swallowing and airway patency with patient fatigue and changes in neurological status  - Encourage and assist patient to increase activity and self care.   - Encourage visually impaired, hearing impaired and aphasic patients to use assistive/communication devices  1/1/2025 1427 by Cyndy Gore  Outcome: Progressing  1/1/2025 1427 by Cyndy Gore  Outcome: Progressing     Problem: CARDIOVASCULAR - ADULT  Goal: Maintains optimal cardiac output and hemodynamic stability  Description: INTERVENTIONS:  - Monitor I/O, vital signs and rhythm  - Monitor for S/S and trends of decreased cardiac output  - Administer and titrate ordered vasoactive medications to optimize hemodynamic stability  - Assess quality of pulses, skin color and temperature  - Assess for signs of decreased coronary artery perfusion  - Instruct patient to report change in severity of symptoms  1/1/2025 1427 by Cyndy Gore  Outcome: Progressing  1/1/2025 1427 by Cyndy Gore  Outcome: Progressing  Goal: Absence of cardiac dysrhythmias or at baseline rhythm  Description: INTERVENTIONS:  - Continuous cardiac monitoring, vital signs,  obtain 12 lead EKG if ordered  - Administer antiarrhythmic and heart rate control medications as ordered  - Monitor electrolytes and administer replacement therapy as ordered  1/1/2025 1427 by Cyndy Gore  Outcome: Progressing  1/1/2025 1427 by Cyndy Gore  Outcome: Progressing     Problem: HEMATOLOGIC - ADULT  Goal: Maintains hematologic stability  Description: INTERVENTIONS  - Assess for signs and symptoms of bleeding or hemorrhage  - Monitor labs  - Administer supportive blood products/factors as ordered and appropriate  1/1/2025 1427 by Cyndy Gore  Outcome: Progressing  1/1/2025 1427 by Cyndy Gore  Outcome: Progressing     Problem: MUSCULOSKELETAL - ADULT  Goal: Maintain or return mobility to safest level of function  Description: INTERVENTIONS:  - Assess patient's ability to carry out ADLs; assess patient's baseline for ADL function and identify physical deficits which impact ability to perform ADLs (bathing, care of mouth/teeth, toileting, grooming, dressing, etc.)  - Assess/evaluate cause of self-care deficits   - Assess range of motion  - Assess patient's mobility  - Assess patient's need for assistive devices and provide as appropriate  - Encourage maximum independence but intervene and supervise when necessary  - Involve family in performance of ADLs  - Assess for home care needs following discharge   - Consider OT consult to assist with ADL evaluation and planning for discharge  - Provide patient education as appropriate  1/1/2025 1427 by Cyndy Gore  Outcome: Progressing  1/1/2025 1427 by Cyndy Gore  Outcome: Progressing  Goal: Maintain proper alignment of affected body part  Description: INTERVENTIONS:  - Support, maintain and protect limb and body alignment  - Provide patient/ family with appropriate education  1/1/2025 1427 by Cyndy Gore  Outcome: Progressing  1/1/2025 1427 by Cyndy Gore  Outcome: Progressing     Problem: Nutrition/Hydration-ADULT  Goal:  Nutrient/Hydration intake appropriate for improving, restoring or maintaining nutritional needs  Description: Monitor and assess patient's nutrition/hydration status for malnutrition. Collaborate with interdisciplinary team and initiate plan and interventions as ordered.  Monitor patient's weight and dietary intake as ordered or per policy. Utilize nutrition screening tool and intervene as necessary. Determine patient's food preferences and provide high-protein, high-caloric foods as appropriate.     INTERVENTIONS:  - Monitor oral intake, urinary output, labs, and treatment plans  - Assess nutrition and hydration status and recommend course of action  - Evaluate amount of meals eaten  - Assist patient with eating if necessary   - Allow adequate time for meals  - Recommend/ encourage appropriate diets, oral nutritional supplements, and vitamin/mineral supplements  - Order, calculate, and assess calorie counts as needed  - Recommend, monitor, and adjust tube feedings and TPN/PPN based on assessed needs  - Assess need for intravenous fluids  - Provide specific nutrition/hydration education as appropriate  - Include patient/family/caregiver in decisions related to nutrition  1/1/2025 1427 by Cyndy Gore  Outcome: Progressing  1/1/2025 1427 by Cyndy Gore  Outcome: Progressing     Problem: CONFUSION/THOUGHT DISTURBANCE  Goal: Thought disturbances (confusion, delirium, depression, dementia or psychosis) are managed to maintain or return to baseline mental status and functional level  Description: INTERVENTIONS:  - Assess for possible contributors to  thought disturbance, including but not limited to medications, infection, impaired vision or hearing, underlying metabolic abnormalities, dehydration, respiratory compromise,  psychiatric diagnoses and notify attending PHYSICAN/AP  - Monitor and intervene to maintain adequate nutrition, hydration, elimination, sleep and activity  - Decrease environmental stimuli,  including noise as appropriate.  - Provide frequent contacts to provide refocusing, direction and reassurance as needed. Approach patient calmly with eye contact and at their level.  - [place] high risk fall precautions, aspiration precautions and other safety measures, as indicated  - If delirium suspected, notify physician/AP of change in condition and request immediate in-person evaluation  - Pursue consults as appropriate including Geriatric (campus dependent), OT for cognitive evaluation/activity planning, psychiatric, pastoral care, etc.  Outcome: Progressing     Problem: BEHAVIOR  Goal: Pt/Family maintain appropriate behavior and adhere to behavioral management agreement, if implemented  Description: INTERVENTIONS:  - Assess the family dynamic   - Encourage verbalization of thoughts and concerns in a socially appropriate manner  - Assess patient/family's coping skills and non-compliant behavior (including use of illegal substances).  - Utilize positive, consistent limit setting strategies supporting safety of patient, staff and others  - Initiate consult with Case Management, Spiritual Care or other ancillary services as appropriate  - If a patient's/visitor's behavior jeopardizes the safety of the patient, staff, or others, refer to organization procedure.   - Notify Security of behavior or suspected illegal substances which indicate the need for search of the patient and/or belongings  - Encourage participation in the decision making process about a behavioral management agreement; implement if patient meets criteria  Outcome: Progressing     Problem: Prexisting or High Potential for Compromised Skin Integrity  Goal: Skin integrity is maintained or improved  Description: INTERVENTIONS:  - Identify patients at risk for skin breakdown  - Assess and monitor skin integrity  - Assess and monitor nutrition and hydration status  - Monitor labs   - Assess for incontinence   - Turn and reposition patient  - Assist  with mobility/ambulation  - Relieve pressure over bony prominences  - Avoid friction and shearing  - Provide appropriate hygiene as needed including keeping skin clean and dry  - Evaluate need for skin moisturizer/barrier cream  - Collaborate with interdisciplinary team   - Patient/family teaching  - Consider wound care consult   Outcome: Progressing

## 2025-01-01 NOTE — CONSULTS
Consultation - Surgery-General   Name: Marsha Oliva 79 y.o. female I MRN: [id number]  Unit/Bed#: [facility] 632-01 I Date of Admission: 12/22/2024   Date of Service: 12/31/2024 I Hospital Day: 9   Inpatient consult to Acute Care Surgery  Consult performed by: Dion Garay DO  Consult ordered by: Martha Welch MD        Physician Requesting Evaluation: Manjinder Farooq MD   Reason for Evaluation / Principal Problem: PEG tube placement    Assessment & Plan  Dysphagia  79-year-old female presented with repeat falls and closed T1 fracture and sacral fractur as well as C1-C3 posterior fusion on 9/5 for prior cervical fractures being managed by the trauma surgery service.  General surgery engaged for progressive dysphagia and concerns for nutritional deficiency requesting PEG tube placement    Plan  - Will tentatively plan for PEG tube tomorrow 1/1  - Patient to be consented with risks and benefits explained  - N.p.o. tonight at midnight for procedure tomorrow  - Will obtain preoperative labs  - Rest of management and care per trauma service          History of Present Illness   Marsha Oliva is a 79 y.o. female who presents due to multiple falls.  Transferred from [facility] due to closed T1 fracture and sacral fracture with small hematoma and some active extravasation of contrast.  Patient reports that she loses her balance and falls often.  States she was getting up to get some water when she fell today, unsure if she hit her head, denies LOC.  Denies any pain at this time.  Denies headache, dizziness, changes in vision, chest pain, SOB, abdominal pain, changes in bowel or urinary function.  GCS 15.  Alert and oriented x 3.  Patient has some word finding difficulties but appears to be her baseline.  PMH of A-fib, recent cervical spinal fusion, hypothyroidism, depression, chronic diastolic CHF, interstitial lung disease, apraxia. General surgery engaged for PEG tube consultation.      Objective :  Temp:  [97.3  °F (36.3 °C)-97.8 °F (36.6 °C)] 97.3 °F (36.3 °C)  HR:  [66-87] 87  BP: (111-126)/(61-77) 125/77  Resp:  [16] 16  SpO2:  [99 %-100 %] 100 %  O2 Device: Nasal cannula  Nasal Cannula O2 Flow Rate (L/min):  [4 L/min] 4 L/min    Lines/Drains/Airways       Active Status       Name Placement date Placement time Site Days    Urethral Catheter Non-latex 18 Fr. 12/24/24  1240  Non-latex  7                  Physical Exam  HENT:      Head: Normocephalic and atraumatic.   Eyes:      Pupils: Pupils are equal, round, and reactive to light.   Cardiovascular:      Rate and Rhythm: Normal rate.   Abdominal:      General: There is no distension.      Palpations: Abdomen is soft.      Tenderness: There is no abdominal tenderness.   Skin:     Capillary Refill: Capillary refill takes less than 2 seconds.   Neurological:      Mental Status: She is alert.   Psychiatric:         Mood and Affect: Mood normal.         Behavior: Behavior normal.         Lab Results: I have reviewed the following results:  Recent Labs     12/30/24  0045 12/31/24  1037   WBC 4.58 4.04*   HGB 8.3* 9.4*   HCT 29.0* 33.2*   * 168   SODIUM 145 144   K 3.5 3.7    106   CO2 35* 34*   BUN 13 9   CREATININE 0.47* 0.49*   GLUC 123 91   AST 19  --    ALT 11  --    ALB 2.3*  --    TBILI 0.44  --    ALKPHOS 65  --    LACTICACID 0.9  --        Dion Garay, DO  Surgery PGY-3

## 2025-01-01 NOTE — OP NOTE
OPERATIVE REPORT  PATIENT NAME: Marsha Oliva    :  1945  MRN: [id number]  Pt Location: [facility] OR ROOM 04    SURGERY DATE: 2025    Surgeons and Role:     * Pramod Cordoba DO - Primary     * John Chavez MD - Assisting     * Rishi Smith MD - Assisting    Preop Diagnosis:  Dysphagia, unspecified type [R13.10]    Post-Op Diagnosis Codes:     * Dysphagia, unspecified type [R13.10]    Procedure(s):  INSERTION PEG TUBE    Specimen(s):  * No specimens in log *    Estimated Blood Loss:   Minimal    Drains:  Gastrostomy/Enterostomy Percutaneous Endoscopic Gastrostomy (PEG) 20 Fr. LUQ (Active)   Number of days: 0       Urethral Catheter Non-latex 18 Fr. (Active)   Amt returned on insertion(mL) 225 mL 24 1240   Reasons to continue Urinary Catheter  Acute urinary retention/obstruction failing urinary retention protocol 24 0900   Goal for Removal Voiding trial when ambulation improves 24 1001   Site Assessment Clean;Skin intact 24 0900   [place] Care Done 24 2100   Collection Container Standard drainage bag 24 0900   Securement Method Securing device (Describe) 24 0900   Urethral Irrigation Intake (mL) 250 mL 24 2100   Output (mL) 800 mL 25 1023   Number of days: 8       [REMOVED] Closed/Suction Drain Left;Posterior Neck Accordion 10 Fr. (Removed)   Number of days: 4       [REMOVED] Urethral Catheter Latex 16 Fr. (Removed)   Number of days: 508       [REMOVED] Urethral Catheter Latex 16 Fr. (Removed)   Number of days: 1       [REMOVED] External Urinary Catheter (Removed)   Number of days: 4       Anesthesia Type:   Choice    Operative Indications:  Dysphagia, unspecified type [R13.10]      Operative Findings:  Successfully placed 20 [place] PEG tube.  2 cm at the skin.  Safe to begin using PEG tube within the next 24 hours.      Complications:   None    Procedure and Technique:  Before the procedure, the patient was consented for a percutaneous  gastrostomy tube placement, possible laparoscopic gastrostomy tube placement, possible open gastrostomy tube placement.  The details of the procedure along with the risk, benefits, and alternatives were discussed with the patient.  The patient gave both her written and verbal consent.    We proceeded to the operating room with the patient in supine position and endotracheal intubation was performed along with general anesthesia.  A timeout was performed and all parties were in agreement.      We proceeded with endoscopic intubation and after the stomach was successfully intubated, the access site was successfully transilluminated with one-to-one motion.  After introducer needle was placed, guidewire was placed and was pulled through to the mouth.  The PEG tube was then attached and using the pull method was pulled through into the stomach along with endoscopic visualization of the PEG tube being pulled into the stomach.  Bumper was able to be freely rotated along the stomach wall and there were no signs of active bleeding.  The rest of the PEG tube was attached to the external portion with the PEG tube being 2 cm at skin.  The stomach was desufflated when removing the endoscope and the patient was awoken from anesthesia.    The patient was then transported to PACU in a hemodynamically stable condition.    Dr. Cordoba was present for the entire procedure.    Patient Disposition:  PACU              SIGNATURE: Rishi Smith MD  DATE: January 1, 2025  TIME: 10:40 AM

## 2025-01-02 LAB
ANION GAP SERPL CALCULATED.3IONS-SCNC: 5 MMOL/L (ref 4–13)
BASOPHILS # BLD AUTO: 0.01 THOUSANDS/ΜL (ref 0–0.1)
BASOPHILS NFR BLD AUTO: 0 % (ref 0–1)
BUN SERPL-MCNC: 8 MG/DL (ref 5–25)
CALCIUM SERPL-MCNC: 8 MG/DL (ref 8.4–10.2)
CHLORIDE SERPL-SCNC: 106 MMOL/L (ref 96–108)
CO2 SERPL-SCNC: 34 MMOL/L (ref 21–32)
CREAT SERPL-MCNC: 0.52 MG/DL (ref 0.6–1.3)
EOSINOPHIL # BLD AUTO: 0.07 THOUSAND/ΜL (ref 0–0.61)
EOSINOPHIL NFR BLD AUTO: 2 % (ref 0–6)
ERYTHROCYTE [DISTWIDTH] IN BLOOD BY AUTOMATED COUNT: 14.9 % (ref 11.6–15.1)
GFR SERPL CREATININE-BSD FRML MDRD: 91 ML/MIN/1.73SQ M
GLUCOSE SERPL-MCNC: 108 MG/DL (ref 65–140)
GLUCOSE SERPL-MCNC: 86 MG/DL (ref 65–140)
GLUCOSE SERPL-MCNC: 87 MG/DL (ref 65–140)
GLUCOSE SERPL-MCNC: 89 MG/DL (ref 65–140)
GLUCOSE SERPL-MCNC: 97 MG/DL (ref 65–140)
HCT VFR BLD AUTO: 32 % (ref 34.8–46.1)
HGB BLD-MCNC: 9 G/DL (ref 11.5–15.4)
IMM GRANULOCYTES # BLD AUTO: 0.02 THOUSAND/UL (ref 0–0.2)
IMM GRANULOCYTES NFR BLD AUTO: 1 % (ref 0–2)
LYMPHOCYTES # BLD AUTO: 0.52 THOUSANDS/ΜL (ref 0.6–4.47)
LYMPHOCYTES NFR BLD AUTO: 13 % (ref 14–44)
MCH RBC QN AUTO: 27.4 PG (ref 26.8–34.3)
MCHC RBC AUTO-ENTMCNC: 28.1 G/DL (ref 31.4–37.4)
MCV RBC AUTO: 98 FL (ref 82–98)
MONOCYTES # BLD AUTO: 0.28 THOUSAND/ΜL (ref 0.17–1.22)
MONOCYTES NFR BLD AUTO: 7 % (ref 4–12)
NEUTROPHILS # BLD AUTO: 3.23 THOUSANDS/ΜL (ref 1.85–7.62)
NEUTS SEG NFR BLD AUTO: 77 % (ref 43–75)
NRBC BLD AUTO-RTO: 0 /100 WBCS
PLATELET # BLD AUTO: 178 THOUSANDS/UL (ref 149–390)
PMV BLD AUTO: 8.8 FL (ref 8.9–12.7)
POTASSIUM SERPL-SCNC: 3.6 MMOL/L (ref 3.5–5.3)
RBC # BLD AUTO: 3.28 MILLION/UL (ref 3.81–5.12)
SODIUM SERPL-SCNC: 145 MMOL/L (ref 135–147)
WBC # BLD AUTO: 4.13 THOUSAND/UL (ref 4.31–10.16)

## 2025-01-02 PROCEDURE — 99232 SBSQ HOSP IP/OBS MODERATE 35: CPT | Performed by: SURGERY

## 2025-01-02 PROCEDURE — 85025 COMPLETE CBC W/AUTO DIFF WBC: CPT

## 2025-01-02 PROCEDURE — 97112 NEUROMUSCULAR REEDUCATION: CPT

## 2025-01-02 PROCEDURE — 97535 SELF CARE MNGMENT TRAINING: CPT

## 2025-01-02 PROCEDURE — 99233 SBSQ HOSP IP/OBS HIGH 50: CPT | Performed by: INTERNAL MEDICINE

## 2025-01-02 PROCEDURE — 97530 THERAPEUTIC ACTIVITIES: CPT

## 2025-01-02 PROCEDURE — 80048 BASIC METABOLIC PNL TOTAL CA: CPT

## 2025-01-02 PROCEDURE — 82948 REAGENT STRIP/BLOOD GLUCOSE: CPT

## 2025-01-02 RX ORDER — ACETAMINOPHEN 160 MG/5ML
650 SUSPENSION ORAL EVERY 4 HOURS PRN
Status: DISCONTINUED | OUTPATIENT
Start: 2025-01-02 | End: 2025-01-03 | Stop reason: HOSPADM

## 2025-01-02 RX ADMIN — ENOXAPARIN SODIUM 30 MG: 30 INJECTION SUBCUTANEOUS at 20:27

## 2025-01-02 RX ADMIN — SERTRALINE HYDROCHLORIDE 100 MG: 20 SOLUTION ORAL at 09:28

## 2025-01-02 RX ADMIN — ENOXAPARIN SODIUM 30 MG: 30 INJECTION SUBCUTANEOUS at 09:28

## 2025-01-02 RX ADMIN — ACETAMINOPHEN 1000 MG: 1000 INJECTION, SOLUTION INTRAVENOUS at 13:42

## 2025-01-02 RX ADMIN — ACETAMINOPHEN 1000 MG: 1000 INJECTION, SOLUTION INTRAVENOUS at 06:35

## 2025-01-02 RX ADMIN — LEVOTHYROXINE SODIUM 125 MCG: 100 TABLET ORAL at 06:35

## 2025-01-02 NOTE — ASSESSMENT & PLAN NOTE
Patient has a history of dysphagia/aspiration.  She was evaluated by speech with VBS and was noted to have significant aspiration.  Patient has been contemplating external feeding  NPO--IVF initiated  Family meeting 12/31: Request PEG tube  PEG tube placement 1/1

## 2025-01-02 NOTE — PROGRESS NOTES
RD consulted re: TF recommendations    Recommend:  change TF formula to Jevity 1.2; starting with 10ml/hr; advance as tolerated by 10ml q 4-6 hours until goal of 60ml/hr; infuse over 22 hours with special instructions to hold TF one hour before and 1 hour after levothyroxine dosing;     Goal TF volume 1320ml; goal will provide 1584kcal; 73g protein; 1065ml free water from TF formula    at goal and when no longer rx for IVF; flush PEG tube with 125ml water q 6 hours;     follow electrolytes, TF tolerance, serial weights to determine need for any TF adjustments

## 2025-01-02 NOTE — PHYSICAL THERAPY NOTE
Physical Therapy Progress Note     01/02/25 1521   PT Last Visit   PT Visit Date 01/02/25   Note Type   Note Type Treatment   Pain Assessment   Pain Score No Pain   Restrictions/Precautions   RLE Weight Bearing Per Order WBAT   LLE Weight Bearing Per Order WBAT   Other Precautions Cognitive;Chair Alarm;Bed Alarm;Fall Risk;Multiple lines   Subjective   Subjective Pt encountered supine in bed, resting & easily roused.  Pt reports no complaints with activity, but is anxious in standing with expressed fear of falling.  Responds well to encouragement.   Bed Mobility   Supine to Sit 3  Moderate assistance   Additional items Assist x 1;HOB elevated;Bedrails;Increased time required  (+ standby)   Transfers   Sit to Stand 3  Moderate assistance   Additional items Assist x 2   Stand to Sit 3  Moderate assistance   Additional items Assist x 2   Stand pivot 3  Moderate assistance   Additional items Assist x 2   Balance   Static Sitting Poor +   Static Standing Poor   Dynamic Standing Poor   Activity Tolerance   Activity Tolerance Patient tolerated treatment well;Patient limited by pain   Nurse Made Aware NILDA Clinton   Assessment   Prognosis Good   Problem List Decreased strength;Decreased range of motion;Impaired balance;Decreased endurance;Decreased mobility;Decreased cognition;Decreased safety awareness;Pain   Assessment Pt continues to require Ax1-2 for all tassk at this time, but consistantly participates in & initiates all tasks with instructions & encouragement provided by staff.  She maintains balance EOB without incident, but is retropulsive in standing from EOB without AD.  Demonstrates similar with use of RW, but is able to weight shift over RW FLORENTINO with instructions for the same along with BLE knee extension, evntually requiring min-mod A compared to mod-max A for posterior balance.  Gait training deferred today due to strength & blaance deficits that necessitate Ax2 for ambulation today.  Pt would need additional assist  for chair follow and line management which was not present at time of session.  Plan to continue practicing OOB mboiilty tasks & resume gait training as appropriate pending LOS & pt's abilities in upcoming days.   Goals   Patient Goals to get stronger   STG Expiration Date 01/07/25   PT Treatment Day 3   Plan   Treatment/Interventions Functional transfer training;LE strengthening/ROM;Therapeutic exercise;Endurance training;Patient/family training;Equipment eval/education;Bed mobility;Gait training   Progress Progressing toward goals   PT Frequency 3-5x/wk   Discharge Recommendation   Rehab Resource Intensity Level, PT II (Moderate Resource Intensity)   AM-PAC Basic Mobility Inpatient   Turning in Flat Bed Without Bedrails 2   Lying on Back to Sitting on Edge of Flat Bed Without Bedrails 2   Moving Bed to Chair 1   Standing Up From Chair Using Arms 1   Walk in Room 1   Climb 3-5 Stairs With Railing 1   Basic Mobility Inpatient Raw Score 8   Turning Head Towards Sound 4   Follow Simple Instructions 3   Low Function Basic Mobility Raw Score  15   Low Function Basic Mobility Standardized Score  23.9   [facility] Highest Level Of Mobility   -HLM Goal 3: Sit at edge of bed   -HLM Achieved 5: Stand (1 or more minutes)       Kevin Jennings PTA    An [facility] Basic Mobility Raw Score less than 17 suggests pt would benefit from post acute rehab.  Please also refer to the recommendation of the Physical Therapist for safe discharge planning.

## 2025-01-02 NOTE — CASE MANAGEMENT
Case Management Discharge Planning Note    Patient name Marsha Oliav  Location [facility]-01 MRN [id number]  : 1945 Date 2025       Current Admission Date: 2024  Current Admission Diagnosis:Repeated falls   Patient Active Problem List    Diagnosis Date Noted Date Diagnosed    Sacral Hematoma 2024     Aspiration into airway 2024     Acute urinary retention 2024     Osteoporosis 2024     Closed T1 fracture (HCC) 2024     Sacral fracture (HCC) 2024     Impaired mobility and activities of daily living 2024     Severe protein-calorie malnutrition (HCC) 2024     Closed odontoid fracture with type II morphology, posterior displacement, and nonunion 2024     S/P cervical spinal fusion 2024     At risk for altered bowel elimination 2024     At risk for altered urinary elimination 2024     At risk for deep venous thrombosis 2024     Encounter for rehabilitation 2024     Lymphedema 2024     Dysphagia 2024     Skin abnormalities 2024     Acute pain due to trauma 2024     At risk for delirium 2024     Heart failure with preserved ejection fraction (HCC) 2024     Closed odontoid fracture with type II morphology and posterior displacement ([facility]) 2024     Spinal cord compression ([facility]) 2024     Chronic respiratory failure with hypoxia ([facility]) 2023     Stage 3a chronic kidney disease ([facility]) 2022     Bilateral hydronephrosis 2022     Proctitis 2022     Fall 2022     Hypokalemia 2022     ILD (interstitial lung disease) ([facility]) 2022     Oropharyngeal aspiration 2022     Elevated troponin 2022     EKG abnormalities 2022     Mild cognitive impairment of uncertain or unknown etiology 2022     Hormone replacement therapy 2022     Apraxia 2021     Constipation, unspecified 2021     Corn or callus  12/28/2021     Ambulatory dysfunction 12/28/2021     Generalized muscle weakness 12/28/2021     Lymphedema, not elsewhere classified 12/28/2021     Repeated falls 12/28/2021     Hypoxia 12/23/2021     Frequent falls 12/23/2021     Elevated d-dimer 12/23/2021     Venous insufficiency of both lower extremities 12/17/2021     Acquired bilateral hammer toes 07/27/2018     Lymphedema of both lower extremities 07/02/2018     Severe mitral regurgitation 05/31/2018     Anemia 05/13/2018     Cardiomegaly 05/13/2018     Wound of right leg 05/13/2018     Hypertensive heart disease with heart failure (HCC) 05/13/2018     Major depressive disorder, single episode, unspecified 05/13/2018     Personal history of nicotine dependence 05/13/2018     Pleural effusion, not elsewhere classified 05/13/2018     Solitary pulmonary nodule 05/13/2018     Atrial fibrillation, chronic (HCC) 05/01/2018     B12 deficiency 01/13/2017     Chronic diastolic CHF (congestive heart failure) ([facility]) 01/13/2017     Folate deficiency 01/13/2017     Vitamin D deficiency 06/03/2016     Depression with anxiety 08/03/2015     Mixed hyperlipidemia 08/03/2015     Osteoarthritis of knee 10/23/2012     Impaired fasting glucose 05/31/2012     Essential hypertension 03/23/2010     Hypothyroidism 03/23/2010     Posttraumatic stress disorder 03/23/2010       LOS (days): 11  Geometric Mean LOS (GMLOS) (days): 4.6  Days to GMLOS:-6.5     OBJECTIVE:  Risk of Unplanned Readmission Score: 36.57         Current admission status: Inpatient   Preferred Pharmacy:   [facility] - [place] - [address]  [address] [facility] [address]  Phone: [phone] Fax: [phone]    [id number] - [place] - [address]  [facility] [place] [address]  Phone: [phone] Fax: [phone]    Primary Care Provider: Rebecca Solis    Primary Insurance: [employer] [facility]  Secondary Insurance:      DISCHARGE DETAILS:    CM spoke to pt's  and informed him that [facility] can accept.   Pt's spouse appears amenable to [facility] and would like CM to submit for insurance approval

## 2025-01-02 NOTE — PLAN OF CARE
Problem: PAIN - ADULT  Goal: Verbalizes/displays adequate comfort level or baseline comfort level  Description: Interventions:  - Encourage patient to monitor pain and request assistance  - Assess pain using appropriate pain scale  - Administer analgesics based on type and severity of pain and evaluate response  - Implement non-pharmacological measures as appropriate and evaluate response  - Consider cultural and social influences on pain and pain management  - Notify physician/advanced practitioner if interventions unsuccessful or patient reports new pain  Outcome: Progressing     Problem: INFECTION - ADULT  Goal: Absence or prevention of progression during hospitalization  Description: INTERVENTIONS:  - Assess and monitor for signs and symptoms of infection  - Monitor lab/diagnostic results  - Monitor all insertion sites, i.e. indwelling lines, tubes, and drains  - Monitor endotracheal if appropriate and nasal secretions for changes in amount and color  - [place] appropriate cooling/warming therapies per order  - Administer medications as ordered  - Instruct and encourage patient and family to use good hand hygiene technique  - Identify and instruct in appropriate isolation precautions for identified infection/condition  Outcome: Progressing     Problem: SAFETY ADULT  Goal: Patient will remain free of falls  Description: INTERVENTIONS:  - Educate patient/family on patient safety including physical limitations  - Instruct patient to call for assistance with activity   - Consult OT/PT to assist with strengthening/mobility   - Keep Call bell within reach  - Keep bed low and locked with side rails adjusted as appropriate  - Keep care items and personal belongings within reach  - Initiate and maintain comfort rounds  - Make Fall Risk Sign visible to staff  - Offer Toileting every 2 Hours, in advance of need  - Initiate/Maintain bed alarm  - Obtain necessary fall risk management equipment: bed alarm  - Apply yellow  socks and bracelet for high fall risk patients  - Consider moving patient to room near nurses station  Outcome: Progressing  Goal: Maintain or return to baseline ADL function  Description: INTERVENTIONS:  -  Assess patient's ability to carry out ADLs; assess patient's baseline for ADL function and identify physical deficits which impact ability to perform ADLs (bathing, care of mouth/teeth, toileting, grooming, dressing, etc.)  - Assess/evaluate cause of self-care deficits   - Assess range of motion  - Assess patient's mobility; develop plan if impaired  - Assess patient's need for assistive devices and provide as appropriate  - Encourage maximum independence but intervene and supervise when necessary  - Involve family in performance of ADLs  - Assess for home care needs following discharge   - Consider OT consult to assist with ADL evaluation and planning for discharge  - Provide patient education as appropriate  Outcome: Progressing  Goal: Maintains/Returns to pre admission functional level  Description: INTERVENTIONS:  - Perform AM-PAC 6 Click Basic Mobility/ Daily Activity assessment daily.  - Set and communicate daily mobility goal to care team and patient/family/caregiver.   - Collaborate with rehabilitation services on mobility goals if consulted  - Perform Range of Motion 3 times a day.  - Reposition patient every 2 hours.  - Dangle patient 3 times a day  - Stand patient 3 times a day  - Ambulate patient 3 times a day  - Out of bed to chair 3 times a day   - Out of bed for meals 3 times a day  - Out of bed for toileting  - Record patient progress and toleration of activity level   Outcome: Progressing     Problem: DISCHARGE PLANNING  Goal: Discharge to home or other facility with appropriate resources  Description: INTERVENTIONS:  - Identify barriers to discharge w/patient and caregiver  - Arrange for needed discharge resources and transportation as appropriate  - Identify discharge learning needs (meds,  wound care, etc.)  - Arrange for interpretive services to assist at discharge as needed  - Refer to Case Management Department for coordinating discharge planning if the patient needs post-hospital services based on physician/advanced practitioner order or complex needs related to functional status, cognitive ability, or social support system  Outcome: Progressing     Problem: Knowledge Deficit  Goal: Patient/family/caregiver demonstrates understanding of disease process, treatment plan, medications, and discharge instructions  Description: Complete learning assessment and assess knowledge base.  Interventions:  - Provide teaching at level of understanding  - Provide teaching via preferred learning methods  Outcome: Progressing     Problem: NEUROSENSORY - ADULT  Goal: Achieves stable or improved neurological status  Description: INTERVENTIONS  - Monitor and report changes in neurological status  - Monitor vital signs such as temperature, blood pressure, glucose, and any other labs ordered   - Initiate measures to prevent increased intracranial pressure  - Monitor for seizure activity and implement precautions if appropriate      Outcome: Progressing  Goal: Remains free of injury related to seizures activity  Description: INTERVENTIONS  - Maintain airway, patient safety  and administer oxygen as ordered  - Monitor patient for seizure activity, document and report duration and description of seizure to physician/advanced practitioner  - If seizure occurs,  ensure patient safety during seizure  - Reorient patient post seizure  - Seizure pads on all 4 side rails  - Instruct patient/family to notify RN of any seizure activity including if an aura is experienced  - Instruct patient/family to call for assistance with activity based on nursing assessment  - Administer anti-seizure medications if ordered    Outcome: Progressing  Goal: Achieves maximal functionality and self care  Description: INTERVENTIONS  - Monitor  swallowing and airway patency with patient fatigue and changes in neurological status  - Encourage and assist patient to increase activity and self care.   - Encourage visually impaired, hearing impaired and aphasic patients to use assistive/communication devices  Outcome: Progressing     Problem: CARDIOVASCULAR - ADULT  Goal: Maintains optimal cardiac output and hemodynamic stability  Description: INTERVENTIONS:  - Monitor I/O, vital signs and rhythm  - Monitor for S/S and trends of decreased cardiac output  - Administer and titrate ordered vasoactive medications to optimize hemodynamic stability  - Assess quality of pulses, skin color and temperature  - Assess for signs of decreased coronary artery perfusion  - Instruct patient to report change in severity of symptoms  Outcome: Progressing  Goal: Absence of cardiac dysrhythmias or at baseline rhythm  Description: INTERVENTIONS:  - Continuous cardiac monitoring, vital signs, obtain 12 lead EKG if ordered  - Administer antiarrhythmic and heart rate control medications as ordered  - Monitor electrolytes and administer replacement therapy as ordered  Outcome: Progressing     Problem: RESPIRATORY - ADULT  Goal: Achieves optimal ventilation and oxygenation  Description: INTERVENTIONS:  - Assess for changes in respiratory status  - Assess for changes in mentation and behavior  - Position to facilitate oxygenation and minimize respiratory effort  - Oxygen administered by appropriate delivery if ordered  - Initiate smoking cessation education as indicated  - Encourage broncho-pulmonary hygiene including cough, deep breathe, Incentive Spirometry  - Assess the need for suctioning and aspirate as needed  - Assess and instruct to report SOB or any respiratory difficulty  - Respiratory Therapy support as indicated  Outcome: Progressing     Problem: HEMATOLOGIC - ADULT  Goal: Maintains hematologic stability  Description: INTERVENTIONS  - Assess for signs and symptoms of bleeding  or hemorrhage  - Monitor labs  - Administer supportive blood products/factors as ordered and appropriate  Outcome: Progressing     Problem: MUSCULOSKELETAL - ADULT  Goal: Maintain or return mobility to safest level of function  Description: INTERVENTIONS:  - Assess patient's ability to carry out ADLs; assess patient's baseline for ADL function and identify physical deficits which impact ability to perform ADLs (bathing, care of mouth/teeth, toileting, grooming, dressing, etc.)  - Assess/evaluate cause of self-care deficits   - Assess range of motion  - Assess patient's mobility  - Assess patient's need for assistive devices and provide as appropriate  - Encourage maximum independence but intervene and supervise when necessary  - Involve family in performance of ADLs  - Assess for home care needs following discharge   - Consider OT consult to assist with ADL evaluation and planning for discharge  - Provide patient education as appropriate  Outcome: Progressing  Goal: Maintain proper alignment of affected body part  Description: INTERVENTIONS:  - Support, maintain and protect limb and body alignment  - Provide patient/ family with appropriate education  Outcome: Progressing     Problem: Nutrition/Hydration-ADULT  Goal: Nutrient/Hydration intake appropriate for improving, restoring or maintaining nutritional needs  Description: Monitor and assess patient's nutrition/hydration status for malnutrition. Collaborate with interdisciplinary team and initiate plan and interventions as ordered.  Monitor patient's weight and dietary intake as ordered or per policy. Utilize nutrition screening tool and intervene as necessary. Determine patient's food preferences and provide high-protein, high-caloric foods as appropriate.     INTERVENTIONS:  - Monitor oral intake, urinary output, labs, and treatment plans  - Assess nutrition and hydration status and recommend course of action  - Evaluate amount of meals eaten  - Assist patient  with eating if necessary   - Allow adequate time for meals  - Recommend/ encourage appropriate diets, oral nutritional supplements, and vitamin/mineral supplements  - Order, calculate, and assess calorie counts as needed  - Recommend, monitor, and adjust tube feedings and TPN/PPN based on assessed needs  - Assess need for intravenous fluids  - Provide specific nutrition/hydration education as appropriate  - Include patient/family/caregiver in decisions related to nutrition  Outcome: Progressing     Problem: CONFUSION/THOUGHT DISTURBANCE  Goal: Thought disturbances (confusion, delirium, depression, dementia or psychosis) are managed to maintain or return to baseline mental status and functional level  Description: INTERVENTIONS:  - Assess for possible contributors to  thought disturbance, including but not limited to medications, infection, impaired vision or hearing, underlying metabolic abnormalities, dehydration, respiratory compromise,  psychiatric diagnoses and notify attending PHYSICAN/AP  - Monitor and intervene to maintain adequate nutrition, hydration, elimination, sleep and activity  - Decrease environmental stimuli, including noise as appropriate.  - Provide frequent contacts to provide refocusing, direction and reassurance as needed. Approach patient calmly with eye contact and at their level.  - [place] high risk fall precautions, aspiration precautions and other safety measures, as indicated  - If delirium suspected, notify physician/AP of change in condition and request immediate in-person evaluation  - Pursue consults as appropriate including Geriatric (campus dependent), OT for cognitive evaluation/activity planning, psychiatric, pastoral care, etc.  Outcome: Progressing     Problem: BEHAVIOR  Goal: Pt/Family maintain appropriate behavior and adhere to behavioral management agreement, if implemented  Description: INTERVENTIONS:  - Assess the family dynamic   - Encourage verbalization of thoughts  and concerns in a socially appropriate manner  - Assess patient/family's coping skills and non-compliant behavior (including use of illegal substances).  - Utilize positive, consistent limit setting strategies supporting safety of patient, staff and others  - Initiate consult with Case Management, Spiritual Care or other ancillary services as appropriate  - If a patient's/visitor's behavior jeopardizes the safety of the patient, staff, or others, refer to organization procedure.   - Notify Security of behavior or suspected illegal substances which indicate the need for search of the patient and/or belongings  - Encourage participation in the decision making process about a behavioral management agreement; implement if patient meets criteria  Outcome: Progressing     Problem: Prexisting or High Potential for Compromised Skin Integrity  Goal: Skin integrity is maintained or improved  Description: INTERVENTIONS:  - Identify patients at risk for skin breakdown  - Assess and monitor skin integrity  - Assess and monitor nutrition and hydration status  - Monitor labs   - Assess for incontinence   - Turn and reposition patient  - Assist with mobility/ambulation  - Relieve pressure over bony prominences  - Avoid friction and shearing  - Provide appropriate hygiene as needed including keeping skin clean and dry  - Evaluate need for skin moisturizer/barrier cream  - Collaborate with interdisciplinary team   - Patient/family teaching  - Consider wound care consult   Outcome: Progressing

## 2025-01-02 NOTE — OCCUPATIONAL THERAPY NOTE
01/02/25 1520   OT Last Visit   OT Visit Date 01/02/25   Note Type   Note Type Treatment   Pain Assessment   Pain Assessment Tool 0-10   Pain Score No Pain   Restrictions/Precautions   RLE Weight Bearing Per Order WBAT   LLE Weight Bearing Per Order WBAT   ADL   Where Assessed Edge of bed   Grooming Assistance 4  Minimal Assistance   UB Dressing Assistance 3  Moderate Assistance   LB Dressing Assistance 2  Maximal Assistance   Toileting Assistance  1  Total Assistance   Functional Standing Tolerance   Time poor + balance during spt   Bed Mobility   Supine to Sit 3  Moderate assistance   Additional items   (ax1)   Transfers   Sit to Stand 3  Moderate assistance   Additional items Assist x 2   Stand to Sit 3  Moderate assistance   Additional items Assist x 2   Cognition   Overall Cognitive Status Impaired   Arousal/Participation Alert   Attention Within functional limits   Memory Decreased recall of precautions   Following Commands Follows one step commands without difficulty   Activity Tolerance   Activity Tolerance Patient tolerated treatment well   Assessment   Assessment pt participated in pm ot session and was seen focusing on adls, bed mobility, grooming, activity tolerence and spt / taking steps to bedside chair. pt did best with standing with rw compared to no walker, she requires mod vc's for proper technique. she now has a peg tube and abd binder (feed tube not running this session) pt requires asst x 2 for safe spt. pt was able manage grooming with min asst for tangled hair and min asst ub. pt slightly slow to motorplan. will follow focusing on goals from ie   Plan   Treatment Interventions ADL retraining;Functional transfer training;Endurance training;Patient/family training;Equipment evaluation/education;Activityengagement   Goal Expiration Date 01/07/25   OT Treatment Day 3   OT Frequency 2-3x/wk   Discharge Recommendation   Rehab Resource Intensity Level, OT II (Moderate Resource Intensity)    AM-PAC Daily Activity Inpatient   Lower Body Dressing 2   Bathing 2   Toileting 2   Upper Body Dressing 3   Grooming 3   Eating 3   Daily Activity Raw Score 15   Daily Activity Standardized Score (Calc for Raw Score >=11) 34.69   AM-PAC Applied Cognition Inpatient   Following a Speech/Presentation 3   Understanding Ordinary Conversation 4   Taking Medications 2   Remembering Where Things Are Placed or Put Away 3   Remembering List of 4-5 Errands 2   Taking Care of Complicated Tasks 2   Applied Cognition Raw Score 16   Applied Cognition Standardized Score 35.03     April A Storm

## 2025-01-02 NOTE — PROGRESS NOTES
Progress Note - Trauma   Name: Marsha Oliva 79 y.o. female I MRN: [id number]  Unit/Bed#: [facility] 632-01 I Date of Admission: 12/22/2024   Date of Service: 1/2/2025 I Hospital Day: 11    Assessment & Plan  Repeated falls  Recurrent falls  Injuries listed below  PT/OT  Sacral fracture (HCC)  Secondary to fall  CT imaging showed: Acute displaced and angulated distal sacral fracture through S4 vertebral body involving the sacral ala bilaterally.   Orthopedics consulted note appreciated  Weightbearing as tolerated bilateral lower extremities  Multimodal pain regimen as needed  DVT prophylaxis for at least 28 days following injury  PT/OT   Follow-up with orthopedics as an outpatient.  Sacral Hematoma  CT imaging showed: Small volume of hemorrhage in the presacral space with few superimposed punctate hyperdense foci concerning for extravasated contrast   Hemoglobin stable at 9.0 on 1/2  Hemodynamically stable  Closed T1 fracture (HCC)  POA--CT imaging showed: Age-indeterminate wedging of T1   Neurosurgery consulted note appreciated  Deferred bracing given patient was nontender  Follow-up as scheduled with neurosurgery  Atrial fibrillation, chronic ([facility])  Found to be in A-fib with RVR on presentation at [facility], started on Cardizem drip  Appreciate Cardiology consult and recommendations  Patient has been rate controlled with digoxin/Cardizem PO  Oral Cardizem has been on hold due to dysphagia. Continue with digoxin IV  Not anticoagulated in the outpatient setting due to PMH of severe Gi bleeds and falls  Follow-up with PCP/cardiology as an outpatient   Chronic diastolic CHF (congestive heart failure) ([facility])  Wt Readings from Last 3 Encounters:   12/31/24 50.8 kg (111 lb 15.9 oz)   12/22/24 50.8 kg (112 lb)   10/24/24 51.3 kg (113 lb)     Echocardiogram pending  Continue home digoxin  Continue to closely monitor I&O  Aspiration into airway  - Patient with increasing oxygen requirement overnight up to 10L  - Resting on 7L  this AM.   - CXR shows concern for aspiration which patient also has a history of  - Patient recently failed swallow study and was recommended NPO  - Family refused this and patient was kept on purees and thin liquids  - NPO  - Planning for PEG tube placement  - Monitor off antibiotics  - Titrate O2 as needed  Dysphagia  Patient has a history of dysphagia/aspiration.  She was evaluated by speech with VBS and was noted to have significant aspiration.  Patient has been contemplating external feeding  NPO--IVF initiated  Family meeting 12/31: Request PEG tube  PEG tube placement 1/1  S/P cervical spinal fusion  Underwent C1-C3 posterior fusion on 9/5/2024 following a fall resulting in a type II dens fracture   Follow-up with neurosurgery as scheduled  Severe mitral regurgitation  Repeat echocardiogram pending  Continue to closely monitor fluid status  Hypothyroidism  Levothyroxine currently on hold due to dysphagia  Mixed hyperlipidemia  Atorvastatin currently on hold due to dysphagia  ILD (interstitial lung disease) (HCC)  Previously on home oxygen,  states she has not needed it in a year.  Supplemental oxygen as needed to maintain oxygen saturation greater than 93%.  Apraxia  Chronic  Ambulatory dysfunction  Chronic, pending rehab placement  Acute urinary retention  - Failed urinary retention protocol 12/24 and required tapia catheter placement  - Recommend flomax and outpatient urology follow up for voiding trial  Osteoporosis  Chronic    Bowel Regimen: Dulcolax   VTE Prophylaxis:VTE covered by:  enoxaparin, Subcutaneous, 30 mg at 01/02/25 0928         Disposition: Pending authorization to [facility]; continue current level of care     24 Hour Events : PEG tube placement  Subjective : Marsha endorses no new symptoms today. She tolerated her procedure well and denies any fevers, chills, SOB, chest pain, abdominal pain, N/V/D. Her PEG tube site is wrapped in pelvic binder. She states her pain is well  under control.     Objective :  Temp:  [97.5 °F (36.4 °C)-98.7 °F (37.1 °C)] 98.5 °F (36.9 °C)  HR:  [80-89] 83  BP: (110-116)/(56-74) 116/74  Resp:  [14-17] 17  SpO2:  [97 %-99 %] 99 %  O2 Device: Nasal cannula  Nasal Cannula O2 Flow Rate (L/min):  [4 L/min] 4 L/min    I/O         12/31 0701 01/01 0700 01/01 0701  01/02 0700 01/02 0701 01/03 0700    I.V. (mL/kg)  1155 (22.7) 200 (3.9)    NG/GT  100     IV Piggyback  200     Total Intake(mL/kg)  1455 (28.6) 200 (3.9)    Urine (mL/kg/hr) 250 (0.2) 1350 (1.1)     Total Output 250 1350     Net -250 +105 +200                 Lines/Drains/Airways       Active Status       Name Placement date Placement time Site Days    Gastrostomy/Enterostomy Percutaneous Endoscopic Gastrostomy (PEG) 20 Fr. LUQ 01/01/25  0929  LUQ  1    Urethral Catheter Non-latex 18 Fr. 12/24/24  1240  Non-latex  9                  Physical Exam  Vitals and nursing note reviewed.   Constitutional:       General: She is not in acute distress.  HENT:      Head: Normocephalic and atraumatic.      Right Ear: External ear normal.      Left Ear: External ear normal.      Nose: Nose normal. No congestion or rhinorrhea.      Mouth/Throat:      Mouth: Mucous membranes are dry.      Pharynx: Oropharynx is clear. No oropharyngeal exudate or posterior oropharyngeal erythema.   Eyes:      General:         Right eye: No discharge.         Left eye: No discharge.      Extraocular Movements: Extraocular movements intact.      Conjunctiva/sclera: Conjunctivae normal.      Pupils: Pupils are equal, round, and reactive to light.   Cardiovascular:      Rate and Rhythm: Normal rate and regular rhythm.      Pulses: Normal pulses.      Heart sounds: Normal heart sounds. No murmur heard.  Pulmonary:      Effort: Pulmonary effort is normal. No respiratory distress.      Breath sounds: Normal breath sounds. No wheezing, rhonchi or rales.   Abdominal:      General: Bowel sounds are normal.      Palpations: Abdomen is soft.       Tenderness: There is no abdominal tenderness. There is no right CVA tenderness, left CVA tenderness, guarding or rebound.      Comments: PEG tube placed in LUQ, wrapped in pelvic binder. No signs of bleeding or drainage   Musculoskeletal:         General: Normal range of motion.      Cervical back: Normal range of motion and neck supple. No tenderness.      Right lower leg: No edema.      Left lower leg: No edema.   Skin:     General: Skin is warm and dry.      Capillary Refill: Capillary refill takes less than 2 seconds.   Neurological:      Mental Status: She is alert and oriented to person, place, and time. Mental status is at baseline.      Sensory: No sensory deficit.      Motor: No weakness.   Psychiatric:         Mood and Affect: Mood normal.         Behavior: Behavior normal.               Lab Results: I have reviewed the following results:  Recent Labs     01/02/25  0643   WBC 4.13*   HGB 9.0*   HCT 32.0*      SODIUM 145   K 3.6      CO2 34*   BUN 8   CREATININE 0.52*   GLUC 97       Imaging Results Review: No pertinent imaging studies reviewed.  Other Study Results Review: No additional pertinent studies reviewed.

## 2025-01-02 NOTE — PROGRESS NOTES
Progress Note - Surgery-General   Name: Marsha Oliva 79 y.o. female I MRN: [id number]  Unit/Bed#: [facility] 632-01 I Date of Admission: 12/22/2024   Date of Service: 1/2/2025 I Hospital Day: 11    Assessment & Plan  Dysphagia  78 yo female now s/p 1/1 PEG tube placement    AVSS on 3 L nasal cannula  Urine output 1000    A.m. labs pending    Plan  Recommend initiation of trickle tube feeds at 10 cc an hour  Nutrition for tube feeds recs  Rest of care per trauma team          Subjective   No acute events overnight.  Patient endorses minimal abdominal pain.  Pain mainly around her PEG tube site.  Denies having nausea, vomiting, fevers, chills, chest pain, shortness of breath.  Currently NPO.  Voiding without difficulty.  No bowel movements and no flatus at this point time.    Objective :  Temp:  [97 °F (36.1 °C)-99.4 °F (37.4 °C)] 98.4 °F (36.9 °C)  HR:  [] 89  BP: (102-127)/(56-80) 110/59  Resp:  [12-26] 17  SpO2:  [95 %-100 %] 99 %  O2 Device: Nasal cannula  Nasal Cannula O2 Flow Rate (L/min):  [4 L/min] 4 L/min    I/O         12/31 0701  01/01 0700 01/01 0701  01/02 0700 01/02 0701  01/03 0700    I.V. (mL/kg)  1155 (22.7) 200 (3.9)    NG/GT  100     IV Piggyback  200     Total Intake(mL/kg)  1455 (28.6) 200 (3.9)    Urine (mL/kg/hr) 250 (0.2) 1350 (1.1)     Total Output 250 1350     Net -250 +105 +200                 Lines/Drains/Airways       Active Status       Name Placement date Placement time Site Days    Gastrostomy/Enterostomy Percutaneous Endoscopic Gastrostomy (PEG) 20 Fr. LUQ 01/01/25  0929  LUQ  less than 1    Urethral Catheter Non-latex 18 Fr. 12/24/24  1240  Non-latex  8                  Physical Exam    Physical Exam:  General: No acute distress, alert and oriented  Neuro: alert, oriented x3  HENT: PERRL, EOMI  CV: Well perfused, regular rate and rhythm  Lungs: Normal work of breathing, no increased respiratory effort  Abdomen: Soft, tender to palpation around the PEG tube site, nondistended.  PEG  is 2 cm at the skin.  MSK/Extremities: No edema, clubbing or cyanosis  Skin: Warm, dry      Lab Results: I have reviewed the following results:  Recent Labs     01/02/25  0643   WBC 4.13*   HGB 9.0*   HCT 32.0*      SODIUM 145   K 3.6      CO2 34*   BUN 8   CREATININE 0.52*   GLUC 97         VTE Pharmacologic Prophylaxis: Enoxaparin (Lovenox)  VTE Mechanical Prophylaxis: sequential compression device

## 2025-01-02 NOTE — ASSESSMENT & PLAN NOTE
Found to be in A-fib with RVR on presentation at [facility], started on Cardizem drip  Appreciate Cardiology consult and recommendations  Patient has been rate controlled with digoxin/Cardizem PO  Oral Cardizem has been on hold due to dysphagia. Continue with digoxin IV  Not anticoagulated in the outpatient setting due to PMH of severe Gi bleeds and falls  Follow-up with PCP/cardiology as an outpatient

## 2025-01-02 NOTE — ASSESSMENT & PLAN NOTE
CT imaging showed: Small volume of hemorrhage in the presacral space with few superimposed punctate hyperdense foci concerning for extravasated contrast   Hemoglobin stable at 9.0 on 1/2  Hemodynamically stable

## 2025-01-02 NOTE — ASSESSMENT & PLAN NOTE
78 yo female now s/p 1/1 PEG tube placement    AVSS on 3 L nasal cannula  Urine output 1000    A.m. labs pending    Plan  Recommend initiation of trickle tube feeds at 10 cc an hour  Nutrition for tube feeds recs  Rest of care per trauma team

## 2025-01-02 NOTE — PLAN OF CARE
Problem: PHYSICAL THERAPY ADULT  Goal: Performs mobility at highest level of function for planned discharge setting.  See evaluation for individualized goals.  Description: Treatment/Interventions: Functional transfer training, LE strengthening/ROM, Therapeutic exercise, Endurance training, Patient/family training, Equipment eval/education, Bed mobility,gait training, Spoke to nursing, Continued evaluation, OT          See flowsheet documentation for full assessment, interventions and recommendations.  Outcome: Progressing  Note: Prognosis: Good  Problem List: Decreased strength, Decreased range of motion, Impaired balance, Decreased endurance, Decreased mobility, Decreased cognition, Decreased safety awareness, Pain  Assessment: Pt continues to require Ax1-2 for all tassk at this time, but consistantly participates in & initiates all tasks with instructions & encouragement provided by staff.  She maintains balance EOB without incident, but is retropulsive in standing from EOB without AD.  Demonstrates similar with use of RW, but is able to weight shift over RW FLORENTINO with instructions for the same along with BLE knee extension, evntually requiring min-mod A compared to mod-max A for posterior balance.  Gait training deferred today due to strength & blaance deficits that necessitate Ax2 for ambulation today.  Pt would need additional assist for chair follow and line management which was not present at time of session.  Plan to continue practicing OOB mboiilty tasks & resume gait training as appropriate pending LOS & pt's abilities in upcoming days.        Rehab Resource Intensity Level, PT: II (Moderate Resource Intensity)    See flowsheet documentation for full assessment.

## 2025-01-02 NOTE — PROGRESS NOTES
Progress Note - Geriatric Medicine   Marsha Oliva 79 y.o. female MRN: [id number]  Unit[facility]Bed#: [facility] 632-01 Encounter: [id number]      Assessment/Plan:    Acute metabolic encephalopathy   -evidenced by sudden onset confusion and fluctuating mental status worse than baseline requiring treatment with Zyprexa and soft wrist restraints in patient who was reportedly fully oriented on initial presentation   -markedly improved and appears to be back to baseline  -remains high risk of recurrence, continue strict delirium precautions  -continue acute pain control, ongoing optimization of hemodynamics and reorient frequently as appropriate  -appreciate  at bedside for familiarity and reassurance   -encourage normal circadian rhythm, wakefulness and engagement during daytime hours   -no longer requiring physical restraints, avoid physical restraints as possible as can worsen confusion and agitation   -reorient often as appropriate and redirect unwanted behaviors as first line      Dysphagia  -speech therapy following, currently NPO due to concerns for recurrent aspiration   -aspiration reported on modified VBS 12/27  -now s/p peg tube placement by general surgery on 1/1/25  -anticipate initiation of trickle tube feeds starting today pending nutrition evaluation and recs  -continue supportive cares and continue strict aspiration precautions   -continue peg site cares and hygiene      Ambulatory dysfunction with fall  -reportedly mechanical fall earlier on day of admission   -admitted with below noted injuries   -utilizes walker for ambulation at baseline  -hx recurrent falls at least one additional in past year increases risk future falls  -continue strict fall precautions   -maintain environment free of fall hazards and assist with all tx  -encourage appropriate footwear and adequate lighting at all times when out of bed  -consider home fall risk assessment and personal fall alert system if returning home on d/c from  rehab   -PT and OT following       T1 vertebral fracture  -s/p fall as outlined above   -noted on admission imaging  -neurovascular checks per protocol  -acute multimodal pain control if pain reported   -Nsx on consult - recommend deferring bracing due to asymptomatic, close o/p f/u     Sacral fracture  -s/p fall as outlined above  -noted on admission imaging with possible presacral hemorrhage  -continue acute multimodal pain control  -trending H/H  -Neurovascular checks per protocol  -Ortho on consult - WBAT and acute pain control  -PT/OT following      Acute pain due to trauma  -pt currently reporting no pain, if does c/o of pain recommend pain control per Geriatric pain protocol:  Tylenol 975mg Q8H scheduled  Roxicodone 2.5mg Q4H PRN moderate pain  Roxicodone 5mg Q4H PRN severe pain  -consider adjuncts such as lidocaine patch topically to appropriate areas   -encourage addition of non-pharmacologic pain treatment including ice and frequent repositioning  -recommend bowel regimen to prevent treat constipation due to increased risk with acute pain and opiate pain medications     Afib with RVR  -presented with afib and RVR with rates persistently in 120s-140s now markedly improved  -maintained on diltiazem and digoxin as o/p  -not on systemic AC as o/p due to hx of severe GI bleed per records   -continue optimization of hemodynamics   -Cardiology on consult and following, rates now markedly improved     Cognitive impairment   -acute encephalopathy markedly improved, appears to be back to baseline but remains high risk recurrence, cont strict precautions   -at baseline oriented 2-4 with periods of confusion and very poor short term memory per  at bedside   -reportedly mostly independent with ADLs heavy assist of  with iADLs   -MoCA 16/30 (9/2024) suggestive of at least mild to moderate dementia at baseline, risk of worsening with any additional insults/stressors   -CTH obtained on admission imaging  personally viewed, reveals moderate to severe diffuse chronic microangiopathic changes  -Maintained on levothyroxine chronically outpatient for hypothyroidism, last B12 WNL  -at risk age and cardiovascular related progression of underlying cognitive impairment, continue secondary risk modifications  -Encourage patient remain physically, socially, cognitively active and engaged to maintain cognitive acuity  -Encourage use of sensory assist devices such as corrective lenses at all appropriate times to reduce risk of uncorrected sensory impairment from contributing to isolation, confusion, encephalopathy and more precipitous cognitive decline  -Underlying dementia significantly increases risk of developing delirium during hospitalization likely contributing to current episode, continue strict precautions and supportive cares  -consider comprehensive geriatric assessment as o/p for ongoing management      Depression with anxiety  -maintained on Zoloft chronically as o/p, continue home regimen   -Given underlying dementia uncertain ability to precipitate with outpatient counseling/support group but will continue to benefit from ongoing psychosocial support of family and friends  -Continue home medication regimen and close outpatient follow-up with PCP for ongoing dose titration and medication management  -encourage calm quiet env to reduce risk overstimulation      Hypothyroidism  -TSH WNL at 1.33 on admission  -Continue home levothyroxine regimen and close outpatient follow-up with PCP for ongoing titration and medication management     Impaired Vision  -recommend use of corrective lenses at all appropriate times  -encourage adequate lighting and encourage use of assistance with ambulation  -keep personal belongings close to person to avoid reaching  -Consider large font for printed materials provided to patient     Impaired mastication  -Requires use of dentures-encourage use at all appropriate times  -ensure meal  consistency appropriate for abilities  -continue aspiration precautions     Frailty syndrome in geriatric patient   -Clinical frailty scale stage V/VI, moderately frail, progressive  -Multifactor including age, ambulatory dysfunction with history recurrent falls, depression with anxiety, cognitive impairment and multitude of additional chronic medical comorbidities now with fall and acute traumatic injuries superimposed in elderly individual with limited physiologic and metabolic reserves  -Continue optimization chronic conditions and address acute metabolic derangements as arise   -Encourage well-balanced nutritional intake  -Ensure underlying anxiety/mood/depression symptoms are well-controlled as may impact patient response to therapies as well overall sense of wellbeing and quality of life  -Continue to ensure that treatments and interventions align with patient's wishes and goals of care  -Continue psychosocial supports of patient and caregivers     Subjective:     Marsha is seen and examined at bedside where she is lying resting with her  at her side. She underwent peg tube placement on 1/1/24 and has been doing well since. Pain is well controlled and she offers no acute complaints. She is awaiting nutrition evaluation to initiate tube feeds and offers no acute complaints.     Review of Systems   Constitutional: Negative.  Negative for chills and fever.   HENT: Negative.     Eyes: Negative.    Respiratory: Negative.  Negative for shortness of breath.    Cardiovascular: Negative.    Gastrointestinal:  Positive for abdominal pain (mild around peg site).   Genitourinary: Negative.    Musculoskeletal: Negative.    Skin: Negative.    Neurological: Negative.    Hematological: Negative.    Psychiatric/Behavioral: Negative.  Negative for sleep disturbance.    All other systems reviewed and are negative.    Objective:     Vitals: Blood pressure 116/74, pulse 83, temperature 98.5 °F (36.9 °C), resp. rate 17,  "height 5' 3\" (1.6 m), weight 50.8 kg (111 lb 15.9 oz), SpO2 99%.,Body mass index is 19.84 kg/m².      Intake/Output Summary (Last 24 hours) at 1/2/2025 1241  Last data filed at 1/2/2025 0701  Gross per 24 hour   Intake 1355 ml   Output 550 ml   Net 805 ml     Current Medications: Reviewed    Physical Exam:   Physical Exam  Vitals and nursing note reviewed.   Constitutional:       General: She is not in acute distress.     Appearance: Normal appearance. She is not toxic-appearing.   HENT:      Head: Normocephalic and atraumatic.      Nose: Nose normal.      Mouth/Throat:      Mouth: Mucous membranes are dry.   Eyes:      General: No scleral icterus.        Right eye: No discharge.         Left eye: No discharge.      Conjunctiva/sclera: Conjunctivae normal.   Neck:      Comments: Trachea midline, phonation normal  Cardiovascular:      Rate and Rhythm: Normal rate and regular rhythm.      Pulses: Normal pulses.   Pulmonary:      Effort: Pulmonary effort is normal. No respiratory distress.      Breath sounds: Wheezing present.      Comments: Currently on 4LNC    Poor inspiratory effort, no significant movement on ISS  Abdominal:      General: There is no distension.      Palpations: Abdomen is soft.      Tenderness: There is abdominal tenderness (appropriately tender).   Musculoskeletal:      Cervical back: Neck supple.      Comments: Reduced overall muscle mass    Skin:     General: Skin is warm and dry.   Neurological:      Mental Status: She is alert.      Comments: Awake and alert, answers questions appropriately   Psychiatric:         Mood and Affect: Mood normal.         Behavior: Behavior normal.        Invasive Devices       Peripheral Intravenous Line  Duration             Peripheral IV 12/31/24 Right Antecubital 2 days              Drain  Duration             Urethral Catheter Non-latex 18 Fr. 9 days    Gastrostomy/Enterostomy Percutaneous Endoscopic Gastrostomy (PEG) 20 Fr. LUQ 1 day                  Lab " Results:     I have personally reviewed pertinent lab results including the following:    Results from last 7 days   Lab Units 01/02/25  0643 01/01/25  0749 12/31/24  1037   WBC Thousand/uL 4.13* 3.22* 4.04*   HEMOGLOBIN g/dL 9.0* 10.1* 9.4*   HEMATOCRIT % 32.0* 34.9 33.2*   PLATELETS Thousands/uL 178 182 168   SEGS PCT % 77* 71 79*   MONO PCT % 7 7 5   EOS PCT % 2 4 4     Results from last 7 days   Lab Units 01/02/25  0643 01/01/25  0749 12/31/24  1037 12/30/24  0045   POTASSIUM mmol/L 3.6 3.9 3.7 3.5   CHLORIDE mmol/L 106 108 106 107   CO2 mmol/L 34* 28 34* 35*   BUN mg/dL 8 8 9 13   CREATININE mg/dL 0.52* 0.46* 0.49* 0.47*   CALCIUM mg/dL 8.0* 7.9* 8.4 8.1*   ALK PHOS U/L  --   --   --  65   ALT U/L  --   --   --  11   AST U/L  --   --   --  19     I have personally reviewed the following imaging study reports in PACS:    No new imaging overnight

## 2025-01-02 NOTE — PLAN OF CARE
Problem: OCCUPATIONAL THERAPY ADULT  Goal: Performs self-care activities at highest level of function for planned discharge setting.  See evaluation for individualized goals.  Description: Treatment Interventions: ADL retraining, Functional transfer training, Endurance training, Cognitive reorientation, Patient/family training, Equipment evaluation/education, Compensatory technique education, Energy conservation, Activityengagement          See flowsheet documentation for full assessment, interventions and recommendations.   Outcome: Progressing  Note: Limitation: Decreased ADL status, Decreased Safe judgement during ADL, Decreased cognition, Decreased endurance, Decreased self-care trans, Decreased high-level ADLs, Mood limitation  Prognosis: Guarded  Assessment: pt participated in pm ot session and was seen focusing on adls, bed mobility, grooming, activity tolerence and spt / taking steps to bedside chair. pt did best with standing with rw compared to no walker, she requires mod vc's for proper technique. she now has a peg tube and abd binder (feed tube not running this session) pt requires asst x 2 for safe spt. pt was able manage grooming with min asst for tangled hair and min asst ub. pt slightly slow to motorplan. will follow focusing on goals from ie     Rehab Resource Intensity Level, OT: II (Moderate Resource Intensity)     April A Storm

## 2025-01-03 VITALS
WEIGHT: 111.99 LBS | DIASTOLIC BLOOD PRESSURE: 69 MMHG | BODY MASS INDEX: 19.84 KG/M2 | HEART RATE: 74 BPM | TEMPERATURE: 97.1 F | HEIGHT: 63 IN | OXYGEN SATURATION: 100 % | SYSTOLIC BLOOD PRESSURE: 124 MMHG | RESPIRATION RATE: 17 BRPM

## 2025-01-03 PROBLEM — D62 ACUTE POST-HEMORRHAGIC ANEMIA: Status: ACTIVE | Noted: 2025-01-03

## 2025-01-03 LAB — GLUCOSE SERPL-MCNC: 91 MG/DL (ref 65–140)

## 2025-01-03 PROCEDURE — 82948 REAGENT STRIP/BLOOD GLUCOSE: CPT

## 2025-01-03 PROCEDURE — NC001 PR NO CHARGE: Performed by: SURGERY

## 2025-01-03 PROCEDURE — 99238 HOSP IP/OBS DSCHRG MGMT 30/<: CPT | Performed by: SURGERY

## 2025-01-03 PROCEDURE — 99232 SBSQ HOSP IP/OBS MODERATE 35: CPT | Performed by: SURGERY

## 2025-01-03 RX ORDER — ASPIRIN 81 MG/1
81 TABLET ORAL 2 TIMES DAILY
Start: 2025-01-03 | End: 2025-01-20

## 2025-01-03 RX ORDER — POLYETHYLENE GLYCOL 3350 17 G/17G
17 POWDER, FOR SOLUTION ORAL DAILY
Start: 2025-01-03

## 2025-01-03 RX ORDER — METOPROLOL TARTRATE 25 MG/1
25 TABLET, FILM COATED ORAL EVERY 12 HOURS SCHEDULED
Start: 2025-01-03

## 2025-01-03 RX ORDER — SERTRALINE HYDROCHLORIDE 20 MG/ML
100 SOLUTION ORAL DAILY
Start: 2025-01-04

## 2025-01-03 RX ORDER — ACETAMINOPHEN 160 MG/5ML
650 SUSPENSION ORAL EVERY 4 HOURS PRN
Start: 2025-01-03

## 2025-01-03 RX ORDER — TORSEMIDE 20 MG/1
20 TABLET ORAL DAILY PRN
Start: 2025-01-03

## 2025-01-03 RX ORDER — FOLIC ACID 1 MG/1
1000 TABLET ORAL DAILY
Start: 2025-01-03

## 2025-01-03 RX ORDER — ASPIRIN 81 MG/1
81 TABLET, CHEWABLE ORAL 2 TIMES DAILY
Start: 2025-01-03 | End: 2025-01-03

## 2025-01-03 RX ORDER — POLYETHYLENE GLYCOL 3350 17 G/17G
17 POWDER, FOR SOLUTION ORAL DAILY
Status: DISCONTINUED | OUTPATIENT
Start: 2025-01-03 | End: 2025-01-03 | Stop reason: HOSPADM

## 2025-01-03 RX ORDER — LEVOTHYROXINE SODIUM 125 UG/1
125 TABLET ORAL
Start: 2025-01-03

## 2025-01-03 RX ORDER — ATORVASTATIN CALCIUM 20 MG/1
20 TABLET, FILM COATED ORAL DAILY
Start: 2025-01-03

## 2025-01-03 RX ORDER — ASPIRIN 81 MG/1
81 TABLET ORAL DAILY
Start: 2025-01-21 | End: 2025-01-03

## 2025-01-03 RX ORDER — DIGOXIN 125 MCG
125 TABLET ORAL EVERY OTHER DAY
Status: DISCONTINUED | OUTPATIENT
Start: 2025-01-05 | End: 2025-01-03 | Stop reason: HOSPADM

## 2025-01-03 RX ORDER — ATORVASTATIN CALCIUM 20 MG/1
20 TABLET, FILM COATED ORAL DAILY
Status: DISCONTINUED | OUTPATIENT
Start: 2025-01-03 | End: 2025-01-03 | Stop reason: HOSPADM

## 2025-01-03 RX ORDER — DIGOXIN 125 MCG
125 TABLET ORAL EVERY OTHER DAY
Start: 2025-01-05

## 2025-01-03 RX ADMIN — SERTRALINE HYDROCHLORIDE 100 MG: 20 SOLUTION ORAL at 09:35

## 2025-01-03 RX ADMIN — DIGOXIN 100 MCG: 250 INJECTION, SOLUTION INTRAMUSCULAR; INTRAVENOUS; PARENTERAL at 09:53

## 2025-01-03 RX ADMIN — ENOXAPARIN SODIUM 30 MG: 30 INJECTION SUBCUTANEOUS at 09:43

## 2025-01-03 RX ADMIN — LEVOTHYROXINE SODIUM 125 MCG: 100 TABLET ORAL at 06:34

## 2025-01-03 NOTE — CASE MANAGEMENT
[facility] has received APPROVED authorization.  Insurance:   Almita Rich obtained via Insurance Rep: Peter   Authorization received for: SNF  Facility:  [facility]   Authorization #:[id number]   Start of Care: 1/3  Next Review Date: 1/5  Continued Stay Care Coordinator: N/A  Submit next review to: [facility]      Care Manager notified:Will Holger     Please reach out to  for updates on any clinical information.

## 2025-01-03 NOTE — ASSESSMENT & PLAN NOTE
Found to be in A-fib with RVR on presentation at [facility], started on Cardizem drip  Appreciate Cardiology consult and recommendations  Patient has been rate controlled with digoxin/Cardizem PO  Oral Cardizem has been on hold due to dysphagia. Continue with digoxin IV  Not anticoagulated in the outpatient setting due to PMH of severe Gi bleeds and falls  Follow-up with PCP/cardiology as an outpatient

## 2025-01-03 NOTE — CASE MANAGEMENT
[facility] received request for authorization from Care Manager.  Authorization request submitted for: SNF  Facility Name: [facility]  NPI: [id number]   Facility MD: Dr. Stoner   NPI: [id number]  Authorization initiated by contacting insurance:  [employer]   Via: [employer]   Clinicals submitted via Portal attachment   Pending Reference #: [id number]     Care Manager notified: Will Holger     Updates to authorization status will be noted in chart. Please reach out to CM for updates on any clinical information.

## 2025-01-03 NOTE — PROGRESS NOTES
Progress Note - Surgery-General   Name: Marsha Oliva 79 y.o. female I MRN: [id number]  Unit/Bed#: [facility] 632-01 I Date of Admission: 12/22/2024   Date of Service: 1/3/2025 I Hospital Day: 12    Assessment & Plan  Dysphagia  78 yo female now s/p 1/1 PEG tube placement    AVSS on 4L NC     cc    A.m. labs pending    Plan  Okay to continue tube feeds at goal  Please reach out to general surgery service if there are any questions or concerns  Rest of care per trauma team          Subjective   No acute events overnight.  Patient denies any abdominal pain at this time.  Denies any N/V.  Passing flatus and having bowel movements.    Objective :  Temp:  [97.1 °F (36.2 °C)-98.5 °F (36.9 °C)] 97.1 °F (36.2 °C)  HR:  [73-89] 73  BP: (106-117)/(59-74) 106/62  Resp:  [17] 17  SpO2:  [99 %-100 %] 100 %  O2 Device: Nasal cannula  Nasal Cannula O2 Flow Rate (L/min):  [4 L/min] 4 L/min    I/O         12/31 0701  01/01 0700 01/01 0701  01/02 0700 01/02 0701  01/03 0700    I.V. (mL/kg)  1155 (22.7) 200 (3.9)    NG/GT  100     IV Piggyback  200     Total Intake(mL/kg)  1455 (28.6) 200 (3.9)    Urine (mL/kg/hr) 250 (0.2) 1350 (1.1)     Total Output 250 1350     Net -250 +105 +200                 Lines/Drains/Airways       Active Status       Name Placement date Placement time Site Days    Gastrostomy/Enterostomy Percutaneous Endoscopic Gastrostomy (PEG) 20 Fr. LUQ 01/01/25  0929  LUQ  1    Urethral Catheter Non-latex 18 Fr. 12/24/24  1240  Non-latex  9                  Physical Exam    Physical Exam:  General: No acute distress, alert and oriented  Neuro: alert, oriented x3  HENT: PERRL, EOMI  CV: Well perfused, regular rate and rhythm  Lungs: Normal work of breathing, no increased respiratory effort  Abdomen: Soft, tender to palpation around the PEG tube site, nondistended.  PEG is 2 cm at the skin.  MSK/Extremities: No edema, clubbing or cyanosis  Skin: Warm, dry      Lab Results: I have reviewed the following results:  Recent  Labs     01/02/25  0643   WBC 4.13*   HGB 9.0*   HCT 32.0*      SODIUM 145   K 3.6      CO2 34*   BUN 8   CREATININE 0.52*   GLUC 97         VTE Pharmacologic Prophylaxis: Enoxaparin (Lovenox)  VTE Mechanical Prophylaxis: sequential compression device

## 2025-01-03 NOTE — PROGRESS NOTES
Patient:  MELE BOWDEN    MRN:  [id number]    Swathiin Request ID:  [id number]    Level of care reserved:  Skilled Nursing Facility    [facility] [place] [address] [phone]    Clinical needs requested:    Geography searched:  22 miles around [phone]    Start of Service:    Request sent:  2:43pm EST on 12/24/2024 by Nahun Wren    Partner reserved:  9:39am EST on 1/3/2025 by Nahun Wren    Choice list shared:

## 2025-01-03 NOTE — PLAN OF CARE
Problem: SAFETY ADULT  Goal: Maintain or return to baseline ADL function  Description: INTERVENTIONS:  -  Assess patient's ability to carry out ADLs; assess patient's baseline for ADL function and identify physical deficits which impact ability to perform ADLs (bathing, care of mouth/teeth, toileting, grooming, dressing, etc.)  - Assess/evaluate cause of self-care deficits   - Assess range of motion  - Assess patient's mobility; develop plan if impaired  - Assess patient's need for assistive devices and provide as appropriate  - Encourage maximum independence but intervene and supervise when necessary  - Involve family in performance of ADLs  - Assess for home care needs following discharge   - Consider OT consult to assist with ADL evaluation and planning for discharge  - Provide patient education as appropriate  Outcome: Progressing     Problem: SAFETY ADULT  Goal: Maintains/Returns to pre admission functional level  Description: INTERVENTIONS:  - Perform AM-PAC 6 Click Basic Mobility/ Daily Activity assessment daily.  - Set and communicate daily mobility goal to care team and patient/family/caregiver.   - Collaborate with rehabilitation services on mobility goals if consulted  - Perform Range of Motion 3 times a day.  - Reposition patient every 2 hours.  - Dangle patient 3 times a day  - Stand patient 3 times a day  - Ambulate patient 3 times a day  - Out of bed to chair 3 times a day   - Out of bed for meals 3 times a day  - Out of bed for toileting  - Record patient progress and toleration of activity level   Outcome: Progressing     Problem: Knowledge Deficit  Goal: Patient/family/caregiver demonstrates understanding of disease process, treatment plan, medications, and discharge instructions  Description: Complete learning assessment and assess knowledge base.  Interventions:  - Provide teaching at level of understanding  - Provide teaching via preferred learning methods  Outcome: Progressing     Problem:  NEUROSENSORY - ADULT  Goal: Achieves maximal functionality and self care  Description: INTERVENTIONS  - Monitor swallowing and airway patency with patient fatigue and changes in neurological status  - Encourage and assist patient to increase activity and self care.   - Encourage visually impaired, hearing impaired and aphasic patients to use assistive/communication devices  Outcome: Progressing

## 2025-01-03 NOTE — PROGRESS NOTES
Progress Note - Trauma   Name: Marsha Oliva 79 y.o. female I MRN: [id number]  [facility] I Date of Admission: 12/22/2024   Date of Service: 1/3/2025 I Hospital Day: 12    Assessment & Plan  Repeated falls  Recurrent falls  Injuries listed below  PT/OT  Plan for SNF placement  Sacral fracture (HCC)  Secondary to fall  CT imaging showed: Acute displaced and angulated distal sacral fracture through S4 vertebral body involving the sacral ala bilaterally.   Orthopedics consulted note appreciated  Weightbearing as tolerated bilateral lower extremities  Multimodal pain regimen as needed  DVT prophylaxis for at least 28 days following injury  PT/OT   Follow-up with orthopedics as an outpatient.  Sacral Hematoma  CT imaging showed: Small volume of hemorrhage in the presacral space with few superimposed punctate hyperdense foci concerning for extravasated contrast   Hemoglobin stable  Hemodynamically stable  Closed T1 fracture (HCC)  POA--CT imaging showed: Age-indeterminate wedging of T1   Neurosurgery consulted note appreciated  Deferred bracing given patient was nontender  Follow-up as scheduled with neurosurgery  Atrial fibrillation, chronic ([facility])  Found to be in A-fib with RVR on presentation at [facility], started on Cardizem drip  Appreciate Cardiology consult and recommendations  Patient has been rate controlled with digoxin/Cardizem PO  Oral Cardizem has been on hold due to dysphagia. Continue with digoxin IV  Not anticoagulated in the outpatient setting due to PMH of severe Gi bleeds and falls  Follow-up with PCP/cardiology as an outpatient   Chronic diastolic CHF (congestive heart failure) ([facility])  Wt Readings from Last 3 Encounters:   12/31/24 50.8 kg (111 lb 15.9 oz)   12/22/24 50.8 kg (112 lb)   10/24/24 51.3 kg (113 lb)     Echocardiogram pending  Continue home digoxin  Continue to closely monitor I&O  Aspiration into airway  - CXR shows concern for aspiration which patient also has a history of  -  Resting on 4L this AM and overnight   - s/p PEG tube placement 1/1  - Monitor off antibiotics  - Titrate O2 as needed  Dysphagia  Patient has a history of dysphagia/aspiration.  She was evaluated by speech with VBS and was noted to have significant aspiration.  Patient has been contemplating external feeding  NPO--IVF initiated  Family meeting 12/31: Request PEG tube  PEG tube placement 1/1  S/P cervical spinal fusion  Underwent C1-C3 posterior fusion on 9/5/2024 following a fall resulting in a type II dens fracture   Follow-up with neurosurgery as scheduled  Severe mitral regurgitation  Repeat echocardiogram pending  Continue to closely monitor fluid status  Hypothyroidism  Levothyroxine home rx  Mixed hyperlipidemia  Atorvastatin currently on hold due to dysphagia  ILD (interstitial lung disease) (HCC)  Previously on home oxygen,  states she has not needed it in a year.  Supplemental oxygen as needed to maintain oxygen saturation greater than 93%.  Apraxia  Chronic  Ambulatory dysfunction  Chronic, pending rehab placement  Acute urinary retention  - Failed urinary retention protocol 12/24 and required tapia catheter placement  - Recommend flomax and outpatient urology follow up for voiding trial  Osteoporosis  Chronic    Bowel Regimen: Dulcolax as needed, miralax daily  VTE Prophylaxis:Enoxaparin (Lovenox)     Disposition: SNF planning   Please contact the SecureChat role for the Trauma service with any questions/concerns.    24 Hour Events : No acute events  Subjective : Patient with no complaints this morning.  No bowel movement since 12/31.  Denies any nausea, abdominal pain, or otherwise complaints this morning.    Objective :  Temp:  [97.1 °F (36.2 °C)-98.5 °F (36.9 °C)] 97.1 °F (36.2 °C)  HR:  [73-83] 74  BP: (106-124)/(62-74) 124/69  Resp:  [17] 17  SpO2:  [99 %-100 %] 100 %  O2 Device: Nasal cannula  Nasal Cannula O2 Flow Rate (L/min):  [4 L/min] 4 L/min    I/O         01/01 0701 01/02 0700 01/02  0701  01/03 0700 01/03 0701  01/04 0700    P.O.  0 0    I.V. (mL/kg) 1155 (22.7) 200 (3.9)     NG/      IV Piggyback 200 100     Total Intake(mL/kg) 1455 (28.6) 300 (5.9) 0 (0)    Urine (mL/kg/hr) 1350 (1.1) 775 (0.6)     Total Output 1350 775     Net +105 -475 0                 Lines/Drains/Airways       Active Status       Name Placement date Placement time Site Days    Gastrostomy/Enterostomy Percutaneous Endoscopic Gastrostomy (PEG) 20 Fr. LUQ 01/01/25  0929  LUQ  2    Urethral Catheter Non-latex 18 Fr. 12/24/24  1240  Non-latex  9                  Physical Exam  Vitals and nursing note reviewed.   Constitutional:       General: She is not in acute distress.     Appearance: Normal appearance. She is not ill-appearing or toxic-appearing.   HENT:      Head: Normocephalic and atraumatic.      Nose: Nose normal. No congestion.      Mouth/Throat:      Mouth: Mucous membranes are moist.   Eyes:      Extraocular Movements: Extraocular movements intact.   Cardiovascular:      Rate and Rhythm: Normal rate.      Pulses: Normal pulses.   Pulmonary:      Effort: Pulmonary effort is normal. No respiratory distress.   Abdominal:      General: Abdomen is flat.      Palpations: Abdomen is soft.      Comments: G-tube in place, surrounding skin clean, dry, intact.  No tenderness.  Tube feeds at goal.   Musculoskeletal:         General: Normal range of motion.   Skin:     General: Skin is warm and dry.   Neurological:      General: No focal deficit present.      Mental Status: She is alert.               Lab Results: I have reviewed the following results:  Recent Labs     01/02/25  0643   WBC 4.13*   HGB 9.0*   HCT 32.0*      SODIUM 145   K 3.6      CO2 34*   BUN 8   CREATININE 0.52*   GLUC 97       No new imaging  Other Study Results Review: No additional pertinent studies reviewed.

## 2025-01-03 NOTE — CASE MANAGEMENT
Case Management Discharge Planning Note    Patient name Marsha Oliva  Location [facility] 632/[facility] 632-01 MRN [id number]  : 1945 Date 1/3/2025       Current Admission Date: 2024  Current Admission Diagnosis:Repeated falls   Patient Active Problem List    Diagnosis Date Noted Date Diagnosed    Sacral Hematoma 2024     Aspiration into airway 2024     Acute urinary retention 2024     Osteoporosis 2024     Closed T1 fracture (HCC) 2024     Sacral fracture (HCC) 2024     Impaired mobility and activities of daily living 2024     Severe protein-calorie malnutrition (HCC) 2024     Closed odontoid fracture with type II morphology, posterior displacement, and nonunion 2024     S/P cervical spinal fusion 2024     At risk for altered bowel elimination 2024     At risk for altered urinary elimination 2024     At risk for deep venous thrombosis 2024     Encounter for rehabilitation 2024     Lymphedema 2024     Dysphagia 2024     Skin abnormalities 2024     Acute pain due to trauma 2024     At risk for delirium 2024     Heart failure with preserved ejection fraction (HCC) 2024     Closed odontoid fracture with type II morphology and posterior displacement ([facility]) 2024     Spinal cord compression ([facility]) 2024     Chronic respiratory failure with hypoxia ([facility]) 2023     Stage 3a chronic kidney disease ([facility]) 2022     Bilateral hydronephrosis 2022     Proctitis 2022     Fall 2022     Hypokalemia 2022     ILD (interstitial lung disease) ([facility]) 2022     Oropharyngeal aspiration 2022     Elevated troponin 2022     EKG abnormalities 2022     Mild cognitive impairment of uncertain or unknown etiology 2022     Hormone replacement therapy 2022     Apraxia 2021     Constipation, unspecified 2021     Corn or callus  12/28/2021     Ambulatory dysfunction 12/28/2021     Generalized muscle weakness 12/28/2021     Lymphedema, not elsewhere classified 12/28/2021     Repeated falls 12/28/2021     Hypoxia 12/23/2021     Frequent falls 12/23/2021     Elevated d-dimer 12/23/2021     Venous insufficiency of both lower extremities 12/17/2021     Acquired bilateral hammer toes 07/27/2018     Lymphedema of both lower extremities 07/02/2018     Severe mitral regurgitation 05/31/2018     Anemia 05/13/2018     Cardiomegaly 05/13/2018     Wound of right leg 05/13/2018     Hypertensive heart disease with heart failure (HCC) 05/13/2018     Major depressive disorder, single episode, unspecified 05/13/2018     Personal history of nicotine dependence 05/13/2018     Pleural effusion, not elsewhere classified 05/13/2018     Solitary pulmonary nodule 05/13/2018     Atrial fibrillation, chronic (HCC) 05/01/2018     B12 deficiency 01/13/2017     Chronic diastolic CHF (congestive heart failure) ([facility]) 01/13/2017     Folate deficiency 01/13/2017     Vitamin D deficiency 06/03/2016     Depression with anxiety 08/03/2015     Mixed hyperlipidemia 08/03/2015     Osteoarthritis of knee 10/23/2012     Impaired fasting glucose 05/31/2012     Essential hypertension 03/23/2010     Hypothyroidism 03/23/2010     Posttraumatic stress disorder 03/23/2010       LOS (days): 12  Geometric Mean LOS (GMLOS) (days): 4.6  Days to GMLOS:-7.4     OBJECTIVE:  Risk of Unplanned Readmission Score: 31.45         Current admission status: Inpatient   Preferred Pharmacy:   [facility] - [place], [place] - [address]  [address] [facility] [address]  Phone: [phone] Fax: [phone]    [id number] - [place] - [address]  [facility] [place] [address]  Phone: [phone] Fax: [phone]    Primary Care Provider: Rebecca Solis    Primary Insurance: [facility]  Secondary Insurance:      DISCHARGE DETAILS:                                                                                                               Facility Insurance Auth Number: [id number]

## 2025-01-03 NOTE — DISCHARGE SUMMARY
"Discharge Summary - Trauma   Name: Marsha Oliva 79 y.o. female I MRN: [id number]  Unit/Bed#: [facility] 632-01 I Date of Admission: 12/22/2024   Date of Service: 1/3/2025 I Hospital Day: 12    Admission Date: 12/22/2024 0918  Discharge Date: 01/03/25  Admitting Diagnosis: Hallucinations [R44.3]  Multiple fractures [T07.XXXA]  Impaired mobility and activities of daily living [Z74.09, Z78.9]  Closed fracture of first thoracic vertebra, unspecified fracture morphology, initial encounter ([facility]) [S22.019A]  Closed fracture of sacrum, unspecified portion of sacrum, initial encounter ([facility]) [S32.10XA]  Closed wedge compression fracture of T1 vertebra, initial encounter ([facility]) [S22.010A]  Discharge Diagnosis:   Medical Problems       Resolved Problems  Date Reviewed: 10/6/2024   None         HPI: Per initial H&P by Dr Snow \"Marsha Oliva is a 79 y.o. female who presents due to multiple falls.  Transferred from [facility] due to closed T1 fracture and sacral fracture with small hematoma and some active extravasation of contrast.  Patient reports that she loses her balance and falls often.  States she was getting up to get some water when she fell today, unsure if she hit her head, denies LOC.  Denies any pain at this time.  Denies headache, dizziness, changes in vision, chest pain, SOB, abdominal pain, changes in bowel or urinary function.  GCS 15.  Alert and oriented x 3.  Patient has some word finding difficulties but appears to be her baseline.  PMH of A-fib, recent cervical spinal fusion, hypothyroidism, depression, chronic diastolic CHF, interstitial lung disease, apraxia.  Will require close monitoring to determine if intervention is necessary for the active extravasation noted on CT.\"    Procedures Performed: No orders of the defined types were placed in this encounter.      Summary of Hospital Course:  Patient's traumatic injuries stable throughout the course of hospitalization, no hemoglobin drop or need for IR " intervention on sacral hematoma. Her hospital course was prolonged and complicated by afib RVR, aspiration event and need for PEG tube placement which occurred on 1/1/2025. On day of discharge, patient tolerating tube feeds at goal. She requires maximal assistance and is appropriate for SNF placement for that reason. Patient required medication change from diltiazem to lopressor due to NPO status.    Significant Findings, Care, Treatment and Services Provided: T1 fracture, sacral fracture with small hematoma. Dysphagia requiring NPO. Afib RVR.    Complications: Afib RVR, aspiration/dysphagia    Condition at Discharge: good       Discharge instructions/Information to patient and family:   See After Visit Summary (AVS) for information provided to patient and family.      Provisions for Follow-Up Care:  See after visit summary for information related to follow-up care and any pertinent home health orders.      PCP: Rebecca Solis    Disposition: Skilled nursing facility at [facility]    Planned Readmission: No     Discharge Medications:  See after visit summary for reconciled discharge medications provided to patient and family.      Discharge Statement:  I have spent a total time of 40 minutes in caring for this patient on the day of the visit/encounter. >30 minutes of time was spent on: Patient and family education, Impressions, Documenting in the medical record, and Reviewing / ordering tests, medicine, procedures  .

## 2025-01-03 NOTE — ASSESSMENT & PLAN NOTE
CT imaging showed: Small volume of hemorrhage in the presacral space with few superimposed punctate hyperdense foci concerning for extravasated contrast   Hemoglobin stable  Hemodynamically stable

## 2025-01-03 NOTE — ASSESSMENT & PLAN NOTE
78 yo female now s/p 1/1 PEG tube placement    AVSS on 4L NC     cc    A.m. labs pending    Plan  Okay to continue tube feeds at goal  Please reach out to general surgery service if there are any questions or concerns  Rest of care per trauma team

## 2025-01-03 NOTE — ASSESSMENT & PLAN NOTE
- CXR shows concern for aspiration which patient also has a history of  - Resting on 4L this AM and overnight   - s/p PEG tube placement 1/1  - Monitor off antibiotics  - Titrate O2 as needed

## 2025-01-03 NOTE — CASE MANAGEMENT
Case Management Discharge Planning Note    Patient name Marsha Oliva  Location [facility]01 MRN [id number]  : 1945 Date 1/3/2025       Current Admission Date: 2024  Current Admission Diagnosis:Repeated falls   Patient Active Problem List    Diagnosis Date Noted Date Diagnosed    Sacral Hematoma 2024     Aspiration into airway 2024     Acute urinary retention 2024     Osteoporosis 2024     Closed T1 fracture (HCC) 2024     Sacral fracture (HCC) 2024     Impaired mobility and activities of daily living 2024     Severe protein-calorie malnutrition (HCC) 2024     Closed odontoid fracture with type II morphology, posterior displacement, and nonunion 2024     S/P cervical spinal fusion 2024     At risk for altered bowel elimination 2024     At risk for altered urinary elimination 2024     At risk for deep venous thrombosis 2024     Encounter for rehabilitation 2024     Lymphedema 2024     Dysphagia 2024     Skin abnormalities 2024     Acute pain due to trauma 2024     At risk for delirium 2024     Heart failure with preserved ejection fraction (HCC) 2024     Closed odontoid fracture with type II morphology and posterior displacement ([facility]) 2024     Spinal cord compression ([facility]) 2024     Chronic respiratory failure with hypoxia ([facility]) 2023     Stage 3a chronic kidney disease ([facility]) 2022     Bilateral hydronephrosis 2022     Proctitis 2022     Fall 2022     Hypokalemia 2022     ILD (interstitial lung disease) ([facility]) 2022     Oropharyngeal aspiration 2022     Elevated troponin 2022     EKG abnormalities 2022     Mild cognitive impairment of uncertain or unknown etiology 2022     Hormone replacement therapy 2022     Apraxia 2021     Constipation, unspecified 2021     Corn or callus  12/28/2021     Ambulatory dysfunction 12/28/2021     Generalized muscle weakness 12/28/2021     Lymphedema, not elsewhere classified 12/28/2021     Repeated falls 12/28/2021     Hypoxia 12/23/2021     Frequent falls 12/23/2021     Elevated d-dimer 12/23/2021     Venous insufficiency of both lower extremities 12/17/2021     Acquired bilateral hammer toes 07/27/2018     Lymphedema of both lower extremities 07/02/2018     Severe mitral regurgitation 05/31/2018     Anemia 05/13/2018     Cardiomegaly 05/13/2018     Wound of right leg 05/13/2018     Hypertensive heart disease with heart failure (HCC) 05/13/2018     Major depressive disorder, single episode, unspecified 05/13/2018     Personal history of nicotine dependence 05/13/2018     Pleural effusion, not elsewhere classified 05/13/2018     Solitary pulmonary nodule 05/13/2018     Atrial fibrillation, chronic (HCC) 05/01/2018     B12 deficiency 01/13/2017     Chronic diastolic CHF (congestive heart failure) ([facility]) 01/13/2017     Folate deficiency 01/13/2017     Vitamin D deficiency 06/03/2016     Depression with anxiety 08/03/2015     Mixed hyperlipidemia 08/03/2015     Osteoarthritis of knee 10/23/2012     Impaired fasting glucose 05/31/2012     Essential hypertension 03/23/2010     Hypothyroidism 03/23/2010     Posttraumatic stress disorder 03/23/2010       LOS (days): 12  Geometric Mean LOS (GMLOS) (days): 4.6  Days to GMLOS:-7.4     OBJECTIVE:  Risk of Unplanned Readmission Score: 31.45         Current admission status: Inpatient   Preferred Pharmacy:   [facility] - [place], [place] - [address]  [address] [facility] [address]  Phone: [phone] Fax: [phone]    [id number] - [place] - [address]  [facility] [place] [address]  Phone: [phone] Fax: [phone]    Primary Care Provider: Rebecca Solis    Primary Insurance: [email or web address] [facility]  Secondary Insurance:      DISCHARGE DETAILS:         IMM Given (Date):: 01/03/25  IMM Given to:: Family  Family notified:: spouse       Pt will d/c to [facility] today, at 1330

## 2025-01-03 NOTE — PLAN OF CARE
Problem: PAIN - ADULT  Goal: Verbalizes/displays adequate comfort level or baseline comfort level  Description: Interventions:  - Encourage patient to monitor pain and request assistance  - Assess pain using appropriate pain scale  - Administer analgesics based on type and severity of pain and evaluate response  - Implement non-pharmacological measures as appropriate and evaluate response  - Consider cultural and social influences on pain and pain management  - Notify physician/advanced practitioner if interventions unsuccessful or patient reports new pain  Outcome: Progressing     Problem: INFECTION - ADULT  Goal: Absence or prevention of progression during hospitalization  Description: INTERVENTIONS:  - Assess and monitor for signs and symptoms of infection  - Monitor lab/diagnostic results  - Monitor all insertion sites, i.e. indwelling lines, tubes, and drains  - Monitor endotracheal if appropriate and nasal secretions for changes in amount and color  - [place] appropriate cooling/warming therapies per order  - Administer medications as ordered  - Instruct and encourage patient and family to use good hand hygiene technique  - Identify and instruct in appropriate isolation precautions for identified infection/condition  Outcome: Progressing     Problem: SAFETY ADULT  Goal: Patient will remain free of falls  Description: INTERVENTIONS:  - Educate patient/family on patient safety including physical limitations  - Instruct patient to call for assistance with activity   - Consult OT/PT to assist with strengthening/mobility   - Keep Call bell within reach  - Keep bed low and locked with side rails adjusted as appropriate  - Keep care items and personal belongings within reach  - Initiate and maintain comfort rounds  - Make Fall Risk Sign visible to staff  - Offer Toileting every 2 Hours, in advance of need  - Initiate/Maintain bed alarm  - Obtain necessary fall risk management equipment: bed alarm  - Apply yellow  socks and bracelet for high fall risk patients  - Consider moving patient to room near nurses station  Outcome: Progressing  Goal: Maintain or return to baseline ADL function  Description: INTERVENTIONS:  -  Assess patient's ability to carry out ADLs; assess patient's baseline for ADL function and identify physical deficits which impact ability to perform ADLs (bathing, care of mouth/teeth, toileting, grooming, dressing, etc.)  - Assess/evaluate cause of self-care deficits   - Assess range of motion  - Assess patient's mobility; develop plan if impaired  - Assess patient's need for assistive devices and provide as appropriate  - Encourage maximum independence but intervene and supervise when necessary  - Involve family in performance of ADLs  - Assess for home care needs following discharge   - Consider OT consult to assist with ADL evaluation and planning for discharge  - Provide patient education as appropriate  Outcome: Progressing  Goal: Maintains/Returns to pre admission functional level  Description: INTERVENTIONS:  - Perform AM-PAC 6 Click Basic Mobility/ Daily Activity assessment daily.  - Set and communicate daily mobility goal to care team and patient/family/caregiver.   - Collaborate with rehabilitation services on mobility goals if consulted  - Perform Range of Motion 3 times a day.  - Reposition patient every 2 hours.  - Dangle patient 3 times a day  - Stand patient 3 times a day  - Ambulate patient 3 times a day  - Out of bed to chair 3 times a day   - Out of bed for meals 3 times a day  - Out of bed for toileting  - Record patient progress and toleration of activity level   Outcome: Progressing     Problem: DISCHARGE PLANNING  Goal: Discharge to home or other facility with appropriate resources  Description: INTERVENTIONS:  - Identify barriers to discharge w/patient and caregiver  - Arrange for needed discharge resources and transportation as appropriate  - Identify discharge learning needs (meds,  wound care, etc.)  - Arrange for interpretive services to assist at discharge as needed  - Refer to Case Management Department for coordinating discharge planning if the patient needs post-hospital services based on physician/advanced practitioner order or complex needs related to functional status, cognitive ability, or social support system  Outcome: Progressing     Problem: Knowledge Deficit  Goal: Patient/family/caregiver demonstrates understanding of disease process, treatment plan, medications, and discharge instructions  Description: Complete learning assessment and assess knowledge base.  Interventions:  - Provide teaching at level of understanding  - Provide teaching via preferred learning methods  Outcome: Progressing     Problem: NEUROSENSORY - ADULT  Goal: Achieves stable or improved neurological status  Description: INTERVENTIONS  - Monitor and report changes in neurological status  - Monitor vital signs such as temperature, blood pressure, glucose, and any other labs ordered   - Initiate measures to prevent increased intracranial pressure  - Monitor for seizure activity and implement precautions if appropriate      Outcome: Progressing  Goal: Remains free of injury related to seizures activity  Description: INTERVENTIONS  - Maintain airway, patient safety  and administer oxygen as ordered  - Monitor patient for seizure activity, document and report duration and description of seizure to physician/advanced practitioner  - If seizure occurs,  ensure patient safety during seizure  - Reorient patient post seizure  - Seizure pads on all 4 side rails  - Instruct patient/family to notify RN of any seizure activity including if an aura is experienced  - Instruct patient/family to call for assistance with activity based on nursing assessment  - Administer anti-seizure medications if ordered    Outcome: Progressing  Goal: Achieves maximal functionality and self care  Description: INTERVENTIONS  - Monitor  swallowing and airway patency with patient fatigue and changes in neurological status  - Encourage and assist patient to increase activity and self care.   - Encourage visually impaired, hearing impaired and aphasic patients to use assistive/communication devices  Outcome: Progressing     Problem: CARDIOVASCULAR - ADULT  Goal: Maintains optimal cardiac output and hemodynamic stability  Description: INTERVENTIONS:  - Monitor I/O, vital signs and rhythm  - Monitor for S/S and trends of decreased cardiac output  - Administer and titrate ordered vasoactive medications to optimize hemodynamic stability  - Assess quality of pulses, skin color and temperature  - Assess for signs of decreased coronary artery perfusion  - Instruct patient to report change in severity of symptoms  Outcome: Progressing  Goal: Absence of cardiac dysrhythmias or at baseline rhythm  Description: INTERVENTIONS:  - Continuous cardiac monitoring, vital signs, obtain 12 lead EKG if ordered  - Administer antiarrhythmic and heart rate control medications as ordered  - Monitor electrolytes and administer replacement therapy as ordered  Outcome: Progressing     Problem: RESPIRATORY - ADULT  Goal: Achieves optimal ventilation and oxygenation  Description: INTERVENTIONS:  - Assess for changes in respiratory status  - Assess for changes in mentation and behavior  - Position to facilitate oxygenation and minimize respiratory effort  - Oxygen administered by appropriate delivery if ordered  - Initiate smoking cessation education as indicated  - Encourage broncho-pulmonary hygiene including cough, deep breathe, Incentive Spirometry  - Assess the need for suctioning and aspirate as needed  - Assess and instruct to report SOB or any respiratory difficulty  - Respiratory Therapy support as indicated  Outcome: Progressing     Problem: HEMATOLOGIC - ADULT  Goal: Maintains hematologic stability  Description: INTERVENTIONS  - Assess for signs and symptoms of bleeding  or hemorrhage  - Monitor labs  - Administer supportive blood products/factors as ordered and appropriate  Outcome: Progressing     Problem: MUSCULOSKELETAL - ADULT  Goal: Maintain or return mobility to safest level of function  Description: INTERVENTIONS:  - Assess patient's ability to carry out ADLs; assess patient's baseline for ADL function and identify physical deficits which impact ability to perform ADLs (bathing, care of mouth/teeth, toileting, grooming, dressing, etc.)  - Assess/evaluate cause of self-care deficits   - Assess range of motion  - Assess patient's mobility  - Assess patient's need for assistive devices and provide as appropriate  - Encourage maximum independence but intervene and supervise when necessary  - Involve family in performance of ADLs  - Assess for home care needs following discharge   - Consider OT consult to assist with ADL evaluation and planning for discharge  - Provide patient education as appropriate  Outcome: Progressing  Goal: Maintain proper alignment of affected body part  Description: INTERVENTIONS:  - Support, maintain and protect limb and body alignment  - Provide patient/ family with appropriate education  Outcome: Progressing     Problem: Nutrition/Hydration-ADULT  Goal: Nutrient/Hydration intake appropriate for improving, restoring or maintaining nutritional needs  Description: Monitor and assess patient's nutrition/hydration status for malnutrition. Collaborate with interdisciplinary team and initiate plan and interventions as ordered.  Monitor patient's weight and dietary intake as ordered or per policy. Utilize nutrition screening tool and intervene as necessary. Determine patient's food preferences and provide high-protein, high-caloric foods as appropriate.     INTERVENTIONS:  - Monitor oral intake, urinary output, labs, and treatment plans  - Assess nutrition and hydration status and recommend course of action  - Evaluate amount of meals eaten  - Assist patient  with eating if necessary   - Allow adequate time for meals  - Recommend/ encourage appropriate diets, oral nutritional supplements, and vitamin/mineral supplements  - Order, calculate, and assess calorie counts as needed  - Recommend, monitor, and adjust tube feedings and TPN/PPN based on assessed needs  - Assess need for intravenous fluids  - Provide specific nutrition/hydration education as appropriate  - Include patient/family/caregiver in decisions related to nutrition  Outcome: Progressing     Problem: CONFUSION/THOUGHT DISTURBANCE  Goal: Thought disturbances (confusion, delirium, depression, dementia or psychosis) are managed to maintain or return to baseline mental status and functional level  Description: INTERVENTIONS:  - Assess for possible contributors to  thought disturbance, including but not limited to medications, infection, impaired vision or hearing, underlying metabolic abnormalities, dehydration, respiratory compromise,  psychiatric diagnoses and notify attending PHYSICAN/AP  - Monitor and intervene to maintain adequate nutrition, hydration, elimination, sleep and activity  - Decrease environmental stimuli, including noise as appropriate.  - Provide frequent contacts to provide refocusing, direction and reassurance as needed. Approach patient calmly with eye contact and at their level.  - [place] high risk fall precautions, aspiration precautions and other safety measures, as indicated  - If delirium suspected, notify physician/AP of change in condition and request immediate in-person evaluation  - Pursue consults as appropriate including Geriatric (campus dependent), OT for cognitive evaluation/activity planning, psychiatric, pastoral care, etc.  Outcome: Progressing     Problem: BEHAVIOR  Goal: Pt/Family maintain appropriate behavior and adhere to behavioral management agreement, if implemented  Description: INTERVENTIONS:  - Assess the family dynamic   - Encourage verbalization of thoughts  and concerns in a socially appropriate manner  - Assess patient/family's coping skills and non-compliant behavior (including use of illegal substances).  - Utilize positive, consistent limit setting strategies supporting safety of patient, staff and others  - Initiate consult with Case Management, Spiritual Care or other ancillary services as appropriate  - If a patient's/visitor's behavior jeopardizes the safety of the patient, staff, or others, refer to organization procedure.   - Notify Security of behavior or suspected illegal substances which indicate the need for search of the patient and/or belongings  - Encourage participation in the decision making process about a behavioral management agreement; implement if patient meets criteria  Outcome: Progressing     Problem: Prexisting or High Potential for Compromised Skin Integrity  Goal: Skin integrity is maintained or improved  Description: INTERVENTIONS:  - Identify patients at risk for skin breakdown  - Assess and monitor skin integrity  - Assess and monitor nutrition and hydration status  - Monitor labs   - Assess for incontinence   - Turn and reposition patient  - Assist with mobility/ambulation  - Relieve pressure over bony prominences  - Avoid friction and shearing  - Provide appropriate hygiene as needed including keeping skin clean and dry  - Evaluate need for skin moisturizer/barrier cream  - Collaborate with interdisciplinary team   - Patient/family teaching  - Consider wound care consult   Outcome: Progressing

## 2025-01-06 NOTE — UTILIZATION REVIEW
NOTIFICATION OF ADMISSION DISCHARGE   This is a Notification of Discharge from [facility]. Please be advised that this patient has been discharge from our facility. Below you will find the admission and discharge date and time including the patient’s disposition.   UTILIZATION REVIEW CONTACT:  Gibran Sanchez  Utilization   Network Utilization Review Department  Phone: [phone] x carefully listen to the prompts. All voicemails are confidential.  Email: [email or web address]     ADMISSION INFORMATION  PRESENTATION DATE: 12/22/2024  9:18 AM  OBERVATION ADMISSION DATE: N/A  INPATIENT ADMISSION DATE: 12/22/24 10:11 AM   DISCHARGE DATE: 1/3/2025  1:57 PM   DISPOSITION:Non [facility] SNF/TCU/SNU    Network Utilization Review Department  ATTENTION: Please call with any questions or concerns to [phone] and carefully listen to the prompts so that you are directed to the right person. All voicemails are confidential.   For Discharge needs, contact Care Management DC Support Team at [phone] opt. 2  Send all requests for admission clinical reviews, approved or denied determinations and any other requests to dedicated fax number below belonging to the campus where the patient is receiving treatment. List of dedicated fax numbers for the Facilities:  FACILITY NAME UR FAX NUMBER   ADMISSION DENIALS (Administrative/Medical Necessity) [phone]   DISCHARGE SUPPORT TEAM ([facility]) [phone]   PARENT CHILD HEALTH (Maternity/NICU/Pediatrics) [phone]   [facility] [phone]   [facility] [phone]   [facility] [phone]   [facility] [phone]   [facility] [phone]   [facility] [phone]   [facility] [phone]   [facility]  [phone]   [facility] [phone]   [facility] [phone]   [facility] [phone]   [facility] [phone]

## 2025-02-14 ENCOUNTER — LAB REQUISITION (OUTPATIENT)
Dept: LAB | Facility: HOSPITAL | Age: 80
End: 2025-02-14
Payer: COMMERCIAL

## 2025-02-14 DIAGNOSIS — R79.89 OTHER SPECIFIED ABNORMAL FINDINGS OF BLOOD CHEMISTRY: ICD-10-CM

## 2025-02-14 LAB
ANION GAP SERPL CALCULATED.3IONS-SCNC: 4 MMOL/L (ref 4–13)
BASOPHILS # BLD AUTO: 0.01 THOUSANDS/ÂΜL (ref 0–0.1)
BASOPHILS NFR BLD AUTO: 0 % (ref 0–1)
BUN SERPL-MCNC: 24 MG/DL (ref 5–25)
CALCIUM SERPL-MCNC: 9.5 MG/DL (ref 8.4–10.2)
CHLORIDE SERPL-SCNC: 107 MMOL/L (ref 96–108)
CO2 SERPL-SCNC: 31 MMOL/L (ref 21–32)
CREAT SERPL-MCNC: 0.6 MG/DL (ref 0.6–1.3)
EOSINOPHIL # BLD AUTO: 0.07 THOUSAND/ÂΜL (ref 0–0.61)
EOSINOPHIL NFR BLD AUTO: 2 % (ref 0–6)
ERYTHROCYTE [DISTWIDTH] IN BLOOD BY AUTOMATED COUNT: 17.5 % (ref 11.6–15.1)
GFR SERPL CREATININE-BSD FRML MDRD: 86 ML/MIN/1.73SQ M
GLUCOSE SERPL-MCNC: 85 MG/DL (ref 65–140)
HCT VFR BLD AUTO: 37.3 % (ref 34.8–46.1)
HGB BLD-MCNC: 11 G/DL (ref 11.5–15.4)
IMM GRANULOCYTES # BLD AUTO: 0.01 THOUSAND/UL (ref 0–0.2)
IMM GRANULOCYTES NFR BLD AUTO: 0 % (ref 0–2)
LYMPHOCYTES # BLD AUTO: 0.73 THOUSANDS/ÂΜL (ref 0.6–4.47)
LYMPHOCYTES NFR BLD AUTO: 24 % (ref 14–44)
MCH RBC QN AUTO: 29.5 PG (ref 26.8–34.3)
MCHC RBC AUTO-ENTMCNC: 29.5 G/DL (ref 31.4–37.4)
MCV RBC AUTO: 100 FL (ref 82–98)
MONOCYTES # BLD AUTO: 0.33 THOUSAND/ÂΜL (ref 0.17–1.22)
MONOCYTES NFR BLD AUTO: 11 % (ref 4–12)
NEUTROPHILS # BLD AUTO: 1.96 THOUSANDS/ÂΜL (ref 1.85–7.62)
NEUTS SEG NFR BLD AUTO: 63 % (ref 43–75)
NRBC BLD AUTO-RTO: 0 /100 WBCS
PLATELET # BLD AUTO: 173 THOUSANDS/UL (ref 149–390)
PMV BLD AUTO: 10.3 FL (ref 8.9–12.7)
POTASSIUM SERPL-SCNC: 4.5 MMOL/L (ref 3.5–5.3)
RBC # BLD AUTO: 3.73 MILLION/UL (ref 3.81–5.12)
SODIUM SERPL-SCNC: 142 MMOL/L (ref 135–147)
WBC # BLD AUTO: 3.11 THOUSAND/UL (ref 4.31–10.16)

## 2025-02-14 PROCEDURE — 85025 COMPLETE CBC W/AUTO DIFF WBC: CPT | Performed by: INTERNAL MEDICINE

## 2025-02-14 PROCEDURE — 80048 BASIC METABOLIC PNL TOTAL CA: CPT | Performed by: INTERNAL MEDICINE

## 2025-02-20 ENCOUNTER — LAB REQUISITION (OUTPATIENT)
Dept: LAB | Facility: HOSPITAL | Age: 80
End: 2025-02-20
Payer: COMMERCIAL

## 2025-02-20 DIAGNOSIS — R79.89 OTHER SPECIFIED ABNORMAL FINDINGS OF BLOOD CHEMISTRY: ICD-10-CM

## 2025-02-20 LAB
ANION GAP SERPL CALCULATED.3IONS-SCNC: 5 MMOL/L (ref 4–13)
BASOPHILS # BLD AUTO: 0.01 THOUSANDS/ΜL (ref 0–0.1)
BASOPHILS NFR BLD AUTO: 0 % (ref 0–1)
BUN SERPL-MCNC: 26 MG/DL (ref 5–25)
CALCIUM SERPL-MCNC: 9.4 MG/DL (ref 8.4–10.2)
CHLORIDE SERPL-SCNC: 105 MMOL/L (ref 96–108)
CO2 SERPL-SCNC: 30 MMOL/L (ref 21–32)
CREAT SERPL-MCNC: 0.59 MG/DL (ref 0.6–1.3)
EOSINOPHIL # BLD AUTO: 0.1 THOUSAND/ΜL (ref 0–0.61)
EOSINOPHIL NFR BLD AUTO: 3 % (ref 0–6)
ERYTHROCYTE [DISTWIDTH] IN BLOOD BY AUTOMATED COUNT: 16.9 % (ref 11.6–15.1)
GFR SERPL CREATININE-BSD FRML MDRD: 86 ML/MIN/1.73SQ M
GLUCOSE SERPL-MCNC: 86 MG/DL (ref 65–140)
HCT VFR BLD AUTO: 38.4 % (ref 34.8–46.1)
HGB BLD-MCNC: 11.5 G/DL (ref 11.5–15.4)
IMM GRANULOCYTES # BLD AUTO: 0 THOUSAND/UL (ref 0–0.2)
IMM GRANULOCYTES NFR BLD AUTO: 0 % (ref 0–2)
LYMPHOCYTES # BLD AUTO: 0.97 THOUSANDS/ΜL (ref 0.6–4.47)
LYMPHOCYTES NFR BLD AUTO: 26 % (ref 14–44)
MCH RBC QN AUTO: 29.7 PG (ref 26.8–34.3)
MCHC RBC AUTO-ENTMCNC: 29.9 G/DL (ref 31.4–37.4)
MCV RBC AUTO: 99 FL (ref 82–98)
MONOCYTES # BLD AUTO: 0.4 THOUSAND/ΜL (ref 0.17–1.22)
MONOCYTES NFR BLD AUTO: 11 % (ref 4–12)
NEUTROPHILS # BLD AUTO: 2.33 THOUSANDS/ΜL (ref 1.85–7.62)
NEUTS SEG NFR BLD AUTO: 60 % (ref 43–75)
NRBC BLD AUTO-RTO: 0 /100 WBCS
PLATELET # BLD AUTO: 168 THOUSANDS/UL (ref 149–390)
PMV BLD AUTO: 10.2 FL (ref 8.9–12.7)
POTASSIUM SERPL-SCNC: 4.5 MMOL/L (ref 3.5–5.3)
RBC # BLD AUTO: 3.87 MILLION/UL (ref 3.81–5.12)
SODIUM SERPL-SCNC: 140 MMOL/L (ref 135–147)
WBC # BLD AUTO: 3.81 THOUSAND/UL (ref 4.31–10.16)

## 2025-02-20 PROCEDURE — 85025 COMPLETE CBC W/AUTO DIFF WBC: CPT | Performed by: INTERNAL MEDICINE

## 2025-02-20 PROCEDURE — 80048 BASIC METABOLIC PNL TOTAL CA: CPT | Performed by: INTERNAL MEDICINE

## 2025-02-21 ENCOUNTER — LAB REQUISITION (OUTPATIENT)
Dept: LAB | Facility: HOSPITAL | Age: 80
End: 2025-02-21
Payer: COMMERCIAL

## 2025-02-21 DIAGNOSIS — I48.20 CHRONIC ATRIAL FIBRILLATION, UNSPECIFIED (HCC): ICD-10-CM

## 2025-02-21 LAB — DIGOXIN SERPL-MCNC: 0.7 NG/ML (ref 0.8–2)

## 2025-02-21 PROCEDURE — 80162 ASSAY OF DIGOXIN TOTAL: CPT | Performed by: NURSE PRACTITIONER

## 2025-03-03 ENCOUNTER — LAB REQUISITION (OUTPATIENT)
Dept: LAB | Facility: HOSPITAL | Age: 80
End: 2025-03-03
Payer: COMMERCIAL

## 2025-03-03 DIAGNOSIS — I50.32 CHRONIC DIASTOLIC (CONGESTIVE) HEART FAILURE (HCC): ICD-10-CM

## 2025-03-03 LAB
ALBUMIN SERPL BCG-MCNC: 3.9 G/DL (ref 3.5–5)
ALP SERPL-CCNC: 64 U/L (ref 34–104)
ALT SERPL W P-5'-P-CCNC: 10 U/L (ref 7–52)
ANION GAP SERPL CALCULATED.3IONS-SCNC: 6 MMOL/L (ref 4–13)
AST SERPL W P-5'-P-CCNC: 20 U/L (ref 13–39)
BILIRUB SERPL-MCNC: 0.47 MG/DL (ref 0.2–1)
BUN SERPL-MCNC: 23 MG/DL (ref 5–25)
CALCIUM SERPL-MCNC: 9.1 MG/DL (ref 8.4–10.2)
CHLORIDE SERPL-SCNC: 106 MMOL/L (ref 96–108)
CO2 SERPL-SCNC: 31 MMOL/L (ref 21–32)
CREAT SERPL-MCNC: 0.72 MG/DL (ref 0.6–1.3)
GFR SERPL CREATININE-BSD FRML MDRD: 79 ML/MIN/1.73SQ M
GLUCOSE SERPL-MCNC: 81 MG/DL (ref 65–140)
POTASSIUM SERPL-SCNC: 4.2 MMOL/L (ref 3.5–5.3)
PROT SERPL-MCNC: 6.4 G/DL (ref 6.4–8.4)
SODIUM SERPL-SCNC: 143 MMOL/L (ref 135–147)

## 2025-03-03 PROCEDURE — 80053 COMPREHEN METABOLIC PANEL: CPT | Performed by: NURSE PRACTITIONER

## 2025-03-06 ENCOUNTER — TELEPHONE (OUTPATIENT)
Dept: NEUROSURGERY | Facility: CLINIC | Age: 80
End: 2025-03-06

## 2025-03-06 NOTE — TELEPHONE ENCOUNTER
03/06/2025 CALLED PT SPOKE TO  HE STATED WIFE IS IN NURSING HOME AND PROVIDED THEIR NUMBER. CALLED NURSING HOME AND CONFIRMED PT WAS THERE ADVISED SHE NEEDED NEW XRAYS AND F/U APPT REFERRAL WAS FAXED OVER AND NEW APPT IS SET FOR 04/28/2025 AT 2:30PM SPOKE TO ASHLY AND CONFIRMED XRAYS AND F/U APPT    **NO SHOW**  Marsha Oliva MRN: [id number]    Date: 3/6/2025 Status: Blake   Time: 10:45 AM Length: 30   Visit Type: OFFICE VISIT SHORT PG [[id number]] Copay: $35.00   Provider: Eduardo Dewitt MD Department:  NEUROSURG [facility] Area:  Department Phone: [phone]   Referral Number:   Referral Status:         Auto Confirm Status: Confirmed   Notes: NEEDS CSP XR- VM ---3M F/U WITH XRAYS   Made On:  Confirmed:  Change Notes:  EOD List: 10/24/2024 10:21 AM  2/20/2025 3:37 PM  3/5/2025 12:44 PM  3/6/2025 11:54 AM By:  By:  By:  By: BILLIE MASTERS [[id number]] (ES)  INTERCONNECT, SELFSERVICE DONOTINACTIVATE [[id number]] (Web Service)  DAVIDE FARLEY [[id number]] (ES)  KIM CHAN [[id number]] (ES)   EOD Info: OFFICE VISIT SHORT PG with  Eduardo Dewitt MD in [facility] EOD Status: No Show

## 2025-03-10 NOTE — TELEPHONE ENCOUNTER
Spoke with Madalyn at facility and advised per the last office note Dr Simpson did clear patient from the collar. She requested that note be faxed to them at [phone]. This RN faxed note.     She was appreciative

## 2025-03-10 NOTE — TELEPHONE ENCOUNTER
3/10/25 Madalyn from facility  called with question regarding C collar, warm transfer to Marjorie MUNGUIA.

## 2025-03-11 ENCOUNTER — NURSING HOME VISIT (OUTPATIENT)
Dept: PSYCHIATRY | Facility: CLINIC | Age: 80
End: 2025-03-11
Payer: COMMERCIAL

## 2025-03-11 DIAGNOSIS — F01.511 VASCULAR DEMENTIA WITH AGITATION, UNSPECIFIED DEMENTIA SEVERITY (HCC): ICD-10-CM

## 2025-03-11 DIAGNOSIS — F33.9 RECURRENT MAJOR DEPRESSIVE DISORDER, REMISSION STATUS UNSPECIFIED (HCC): Primary | ICD-10-CM

## 2025-03-11 DIAGNOSIS — R13.10 PROBLEMS WITH SWALLOWING AND MASTICATION: Primary | ICD-10-CM

## 2025-03-11 PROBLEM — J18.9 PNEUMONIA OF BOTH LOWER LOBES: Status: ACTIVE | Noted: 2025-01-05

## 2025-03-11 PROBLEM — Z48.815 ENCOUNTER FOR SURGICAL AFTERCARE FOLLOWING SURGERY ON THE DIGESTIVE SYSTEM: Status: ACTIVE | Noted: 2025-01-03

## 2025-03-11 PROBLEM — Z98.1 ARTHRODESIS STATUS: Status: ACTIVE | Noted: 2025-01-03

## 2025-03-11 PROBLEM — R31.0 GROSS HEMATURIA: Status: ACTIVE | Noted: 2025-01-05

## 2025-03-11 PROCEDURE — 99310 SBSQ NF CARE HIGH MDM 45: CPT

## 2025-03-11 RX ORDER — TRAZODONE HYDROCHLORIDE 50 MG/1
25 TABLET ORAL
Start: 2025-03-11

## 2025-03-11 RX ORDER — DIVALPROEX SODIUM 125 MG/1
125 CAPSULE, COATED PELLETS ORAL EVERY 12 HOURS SCHEDULED
Start: 2025-03-11

## 2025-03-11 NOTE — PSYCH
PSYCHIATRIC EVALUATION     [facility] - PSYCHIATRIC ASSOCIATES  POS: 31: SNF/Short Term Rehab    Name and Date of Birth:  Marsha Oliva 80 y.o. 1945 MRN: [id number]    Date of Visit: March 11, 2025    Reason for visit (CC): Seen for Psychiatric Consult  at Nursing Facility/Assisted Living    No Known Allergies  HPI     Marsha is a 80 y.o. female with a psychiatric history of Major Depressive Disorder and dementia who presents for psychiatric evaluation to establish care and due to depressive symptoms. Symptoms first started slowly several years ago and followed a fluctuating course over the last several years. Stressors preceding visit included health issues with many documental falls.     Assessment:    Marsha reports feeling irritable in her current situation with being in the rehab facility, expresses her desire to return home as soon as possible. Carries a diagnosis of vascular dementia and depression, recently stabilized on Depakote 125 mg twice daily sprinkles, Zoloft 100 mg daily, trazodone 25 mg bedtime. Oriented to person and place, she is in no acute stress. Was having periods of recent agitation, has improved since Depakote initiation and appears to be tolerating without side effects. States her depression is moderate due to health concerns but controllable at this time.  Sleep and appetite are improving.  States her  comes to visit every day and he takes good care of her. She is looking forward to eventual return home. No acute agitation or aggression observed today. No evidence of SI or HI.     Plan:   All medications and routine orders were reviewed and updated as needed.    Continue Zoloft 100 mg daily for depression, trazodone 25 mg at bedtime for sleep, Depakote 125 mg twice daily for periods of agitation  Consult as needed    Aware of 24 hour and weekend coverage for urgent situations accessed by calling [facility] main practice  number  Plan discussed with: Nurse and Family member    Current Medications  Medications reviewed and updated in facility chart.     Diagnoses and all orders for this visit:    Recurrent major depressive disorder, remission status unspecified ([facility])  -     traZODone (DESYREL) 50 mg tablet; Take 0.5 tablets (25 mg total) by mouth daily at bedtime    Vascular dementia with agitation, unspecified dementia severity ([facility])  -     divalproex sodium (DEPAKOTE SPRINKLE) 125 MG capsule; Take 1 capsule (125 mg total) by mouth every 12 (twelve) hours         Psychiatric Review Of Systems:    Sleep changes: normal sleep  Appetite changes: adequate appetite  Weight changes: no weight change  Energy/anergy: low energy  Interest/pleasure/anhedonia: no  Somatic symptoms: no  Anxiety/panic: no  Leslie: no  Guilty/hopeless: no  Self injurious behavior/risky behavior: Patient denies current risk or intent to self harm  Suicidal ideation: Denies suicidal ideation  Homicidal ideation: Patient denies homicidal ideations  Auditory hallucinations: no  Visual hallucinations: no  Other hallucinations: no  Delusional thinking: no  Eating disorder history: no  Obsessive/compulsive symptoms: no          OBJECTIVE:    Vital signs in last 24 hours: Vital signs and nursing notes reviewed in facility chart      Mental Status Evaluation:      Appearance Adequate hygiene and grooming   Behavior guarded   Mood depressed and irritable   Speech Soft   Affect constricted   Thought Processes Poverty of thoughts   Thought Content Does not verbalize delusional material   Associations concrete associations   Perceptual Disturbances Denies hallucinations and does not appear to be responding to internal stimuli   Risk Potential Suicidal/Homicidal Ideation - No evidence of suicidal or homicidal ideation and patient does not verbalize suicidal or homicidal ideation  Risk of Violence - No evidence of risk for violence found on assessment  Risk of Self Mutilation -  No evidence of risk for self mutilation found on assessment   Orientation oriented to person, place, and situation   Memory recent and remote memory: unable to assess due to lack of cooperation   Consciousness alert and awake   Attention/Concentration attention span and concentration appear shorter than expected for age   Intellect impaired intelligence   Insight partial   Judgement partial   Muscle Strength and Gait decreased muscle tone, decreased muscle strength   Motor Activity no abnormal movements   Language no difficulty naming common objects   Fund of Knowledge impaired knowledge of current events               Laboratory Results: Recent Labs (last 2 months):   Lab Requisition on 03/03/2025   Component Date Value   • Sodium 03/03/2025 143    • Potassium 03/03/2025 4.2    • Chloride 03/03/2025 106    • CO2 03/03/2025 31    • ANION GAP 03/03/2025 6    • BUN 03/03/2025 23    • Creatinine 03/03/2025 0.72    • Glucose 03/03/2025 81    • Calcium 03/03/2025 9.1    • AST 03/03/2025 20    • ALT 03/03/2025 10    • Alkaline Phosphatase 03/03/2025 64    • Total Protein 03/03/2025 6.4    • Albumin 03/03/2025 3.9    • Total Bilirubin 03/03/2025 0.47    • eGFR 03/03/2025 79    Lab Requisition on 02/21/2025   Component Date Value   • Digoxin Lvl 02/21/2025 0.7 (L)    Lab Requisition on 02/20/2025   Component Date Value   • WBC 02/20/2025 3.81 (L)    • RBC 02/20/2025 3.87    • Hemoglobin 02/20/2025 11.5    • Hematocrit 02/20/2025 38.4    • MCV 02/20/2025 99 (H)    • MCH 02/20/2025 29.7    • MCHC 02/20/2025 29.9 (L)    • RDW 02/20/2025 16.9 (H)    • MPV 02/20/2025 10.2    • Platelets 02/20/2025 168    • nRBC 02/20/2025 0    • Segmented % 02/20/2025 60    • Immature Grans % 02/20/2025 0    • Lymphocytes % 02/20/2025 26    • Monocytes % 02/20/2025 11    • Eosinophils Relative 02/20/2025 3    • Basophils Relative 02/20/2025 0    • Absolute Neutrophils 02/20/2025 2.33    • Absolute Immature Grans 02/20/2025 0.00    • Absolute  Lymphocytes 02/20/2025 0.97    • Absolute Monocytes 02/20/2025 0.40    • Eosinophils Absolute 02/20/2025 0.10    • Basophils Absolute 02/20/2025 0.01    • Sodium 02/20/2025 140    • Potassium 02/20/2025 4.5    • Chloride 02/20/2025 105    • CO2 02/20/2025 30    • ANION GAP 02/20/2025 5    • BUN 02/20/2025 26 (H)    • Creatinine 02/20/2025 0.59 (L)    • Glucose 02/20/2025 86    • Calcium 02/20/2025 9.4    • eGFR 02/20/2025 86    Lab Requisition on 02/14/2025   Component Date Value   • WBC 02/14/2025 3.11 (L)    • RBC 02/14/2025 3.73 (L)    • Hemoglobin 02/14/2025 11.0 (L)    • Hematocrit 02/14/2025 37.3    • MCV 02/14/2025 100 (H)    • MCH 02/14/2025 29.5    • MCHC 02/14/2025 29.5 (L)    • RDW 02/14/2025 17.5 (H)    • MPV 02/14/2025 10.3    • Platelets 02/14/2025 173    • nRBC 02/14/2025 0    • Segmented % 02/14/2025 63    • Immature Grans % 02/14/2025 0    • Lymphocytes % 02/14/2025 24    • Monocytes % 02/14/2025 11    • Eosinophils Relative 02/14/2025 2    • Basophils Relative 02/14/2025 0    • Absolute Neutrophils 02/14/2025 1.96    • Absolute Immature Grans 02/14/2025 0.01    • Absolute Lymphocytes 02/14/2025 0.73    • Absolute Monocytes 02/14/2025 0.33    • Eosinophils Absolute 02/14/2025 0.07    • Basophils Absolute 02/14/2025 0.01    • Sodium 02/14/2025 142    • Potassium 02/14/2025 4.5    • Chloride 02/14/2025 107    • CO2 02/14/2025 31    • ANION GAP 02/14/2025 4    • BUN 02/14/2025 24    • Creatinine 02/14/2025 0.60    • Glucose 02/14/2025 85    • Calcium 02/14/2025 9.5    • eGFR 02/14/2025 86      I have personally reviewed all pertinent laboratory/tests results.    Historical Information     History Review:    The following portions of the patient's history were reviewed and updated as appropriate: allergies, current medications, past family history, past medical history, past social history, past surgical history, and problem list.    Past Psychiatric History:     Past Inpatient Psychiatric Treatment:    No history of past inpatient psychiatric admissions  Past Outpatient Psychiatric Treatment:    Not in outpatient treatment recently  Past Suicide Attempts: No evidence of past suicide attempts  Past Violent Behavior: No evidence of past violent behavior  Past Psychiatric Medication Trials: patient does not remember full medication history    Traumatic History:     Abuse: no history of physical or sexual abuse  Other Traumatic Events: none     Family Psychiatric History:     Family History   Problem Relation Age of Onset   • Hyperlipidemia Father      Substance Use History:    Social History     Substance and Sexual Activity   Alcohol Use Never    Comment: occassional.     Social History     Substance and Sexual Activity   Drug Use Never     Social History:    Social History     Socioeconomic History   • Marital status: /Civil Union     Spouse name: Not on file   • Number of children: Not on file   • Years of education: Not on file   • Highest education level: Not on file   Occupational History   • Not on file   Tobacco Use   • Smoking status: Former     Types: Cigarettes   • Smokeless tobacco: Never   Vaping Use   • Vaping status: Never Used   Substance and Sexual Activity   • Alcohol use: Never     Comment: occassional.   • Drug use: Never   • Sexual activity: Never   Other Topics Concern   • Not on file   Social History Narrative   • Not on file     Social Drivers of Health     Financial Resource Strain: Not on file   Food Insecurity: Patient Unable To Answer (12/22/2024)    Nursing - Inadequate Food Risk Classification    • Worried About Running Out of Food in the Last Year: Never true    • Ran Out of Food in the Last Year: Never true    • Ran Out of Food in the Last Year: Patient unable to answer   Transportation Needs: Patient Unable To Answer (12/22/2024)    Nursing - Transportation Risk Classification    • Lack of Transportation: Not on file    • Lack of Transportation: Patient unable to answer    Recent Concern: Transportation Needs - Unmet Transportation Needs (10/8/2024)    OASIS : Transportation    • Lack of Transportation (Medical): Yes    • Lack of Transportation (Non-Medical): No    • Patient Unable or Declines to Respond: No   Physical Activity: Not on file   Stress: Not on file   Social Connections: Unknown (2024)    Received from [facility]    Social Connections    • How often do you feel lonely or isolated from those around you? (Adult - for ages 18 years and over): Not on file   Intimate Partner Violence: Patient Unable To Answer (2024)    Nursing IPS    • Feels Physically and Emotionally Safe: Not on file    • Physically Hurt by Someone: Not on file    • Humiliated or Emotionally Abused by Someone: Not on file    • Physically Hurt by Someone: Patient unable to answer    • Hurt or Threatened by Someone: Patient unable to answer   Housing Stability: Patient Unable To Answer (2024)    Nursing: Inadequate Housing Risk Classification    • Has Housing: Not on file    • Worried About Losing Housing: Not on file    • Unable to Get Utilities: Not on file    • Unable to Pay for Housing in the Last Year: Patient unable to answer    • Has Housin     Past Medical History:    Past Medical History:   Diagnosis Date   • Disease of thyroid gland    • Hypotension    • Supratherapeutic INR 2022        Past Surgical History:   Procedure Laterality Date   • CERVICAL FUSION Bilateral 2024    Procedure: navigated posterior fusion C1-C3;  Surgeon: Eduardo Dewitt MD;  [facility];  Service: Neurosurgery   • GASTROSTOMY TUBE PLACEMENT N/A 2025    Procedure: INSERTION PEG TUBE;  Surgeon: Pramod Cordoba DO;  [facility];  Service: General   • HYSTERECTOMY           Medications Risks/Benefits:      Risks, Benefits And Possible Side Effects Of Medications:    Discussed risks and benefits of treatment with patient including risk of suicidality, serotonin  syndrome, increased QTc interval and SIADH related to treatment with antidepressants; Risk of induction of manic symptoms in certain patient populations and risk of liver impairment related to treatment with Depakote     Controlled Medication Discussion:     Not applicable        JAQUELINE Choudhury 03/11/25

## 2025-03-14 ENCOUNTER — HOSPITAL ENCOUNTER (OUTPATIENT)
Dept: RADIOLOGY | Facility: HOSPITAL | Age: 80
End: 2025-03-14
Payer: COMMERCIAL

## 2025-03-14 DIAGNOSIS — R13.10 PROBLEMS WITH SWALLOWING AND MASTICATION: ICD-10-CM

## 2025-03-14 PROCEDURE — 74230 X-RAY XM SWLNG FUNCJ C+: CPT

## 2025-03-14 PROCEDURE — 92611 MOTION FLUOROSCOPY/SWALLOW: CPT

## 2025-03-14 NOTE — PROCEDURES
Video Swallow Study      Patient Name: Marsha Oliva  Today's Date: 3/14/2025        Past Medical History  Past Medical History:   Diagnosis Date    Disease of thyroid gland     Hypotension     Supratherapeutic INR 6/2/2022        Past Surgical History  Past Surgical History:   Procedure Laterality Date    CERVICAL FUSION Bilateral 9/5/2024    Procedure: navigated posterior fusion C1-C3;  Surgeon: Eduardo Dewitt MD;  [facility];  Service: Neurosurgery    GASTROSTOMY TUBE PLACEMENT N/A 1/1/2025    Procedure: INSERTION PEG TUBE;  Surgeon: Pramod Cordoba DO;  [facility];  Service: General    HYSTERECTOMY         Summary:  Images are on PACS for review.     Oral Phase :  Pt  presented with mild oral dysphagia. Mastication was mildly prolonged however eventual functional breakdown. Bolus control and formation were WNL. Transfer appeared min weak. Trace/mild posterior base of tongue residue.     Pharyngeal Phase :  Pt presented with mild pharyngeal dysphagia. Swallow initiation was prompt. Spill with thin liquids to valleculae and pyriforms. Hyoid elevation and laryngeal elevation were mildly reduced.Pharyngeal stripping wave was diminished.  Epiglottic inversion was incomplete. Trace vallecular retention with thin/ntl liquids and mild with solid trials.  Trace pyriform residue with trials.  Secondary swallow reduced residue. CP prominence C4-C5 visible, however did not impede bolus movement.   Trace aspiration with successive sips of thin after swallow. Pt did have cough response to aspiration however ineffective in clearing aspirated residue. Verbal cues provided for pt to keep head at midline and utilize small sips.     Per Esophageal screen   Slow motility observed with solids and liquids within middle to distal esophagus. Min retropulsion observed with liquids.     Recommendations:  Diet: PEG, Dysphagia 2 mech soft adjust TF if oral intake  significant   Liquids: thin   Meds: if small whole with puree, crush if large  Strategies:Slow rate, SMALL sips, alternate liquids with solids, swallow prior to additional po, double swallow, and keep chin at midline  Upright position  F/u ST tx: yes continue monitor tolerance   Therapy Prognosis: fair   Prognosis considerations:slow rate, alternate liquids with solids, swallow prior to additional po   Full/Intermittent Supervision  Aspiration Precautions  Reflux Precautions  Consider consult with: continue f/u with Rehab  Results reviewed with: pt, nursing,   If a dedicated assessment of the esophagus is desired, consider esophagram/barium swallow or EGD.      H&P/Admit info, pertinent provider notes/procedures/studies/PMH:(copied and placed in this report)  Pt is 80 year old female, who was referred by Singh Landis MD. Pt is working with OP speech therapy. Reported increased difficulties with chewing and swallowing. She stated sometimes will cough during meals. Also requesting tylenol as frequent neck pain.     Previous VBS:  Modified (Video) Barium Swallow Study 12/27/2024  Summary:  Images are on PACS for review.   The patient presents with a mild oral and severe pharyngeal dysphagia. Transfer time is prolonged with all, with trace lingual residue. Swallow initiation is delayed to the pyriforms. Epiglottic inversion and pharyngeal peristalsis is absent. The patient presents with moderate pharyngeal retention. Aspiration occurs with all liquids consistencies. Some events occur during the swallow, but most occur posteriorly after the swallow. The patient has weak cough response. Patient also has occasional expectoration of mucous and barium after aspiration event. No aspiration observed with pudding, but the patient has more pharyngeal retention. Brief scan of the esophagus is unremarkable. *results are compared to previous VBS from 9/10/24. Swallow function is more impaired today and patient has increased  risk of aspiration across all liquid consistencies.   Recommendations:  Diet: recommend NPO. *Education provided to patient and . Showed images of VBS and discussed with  via phone. Patient has h/o aspiration, but was tolerating a regular diet and thin liquids at home. Both seem aware of aspiration risk, but would like patient to continue with a PO diet. At this time, would recommend a puree diet with thin liquids. Based on research re: negative outcomes of aspirating thickened liquids, consider resuming thin liquids with use of safe swallow strategies and frequent/thorough oral care to minimize risks.   Liquids: TBD  Meds: TBD  Frequent oral care  Upright position  F/u ST tx: yes  Therapy Prognosis: guarded   Prognosis considerations: medical status, h/o aspiration and dysphagia      Precautions : none    Food allergies:  No known    Current diet:  PEG, Dysphagia 1 pureed and thin liquids    Premorbid diet:   PEG, Dysphagia 1 pureed and thin liquids    Dentition  Natural, minimal    Oral Mech  WNL   O2 requirements:  Room air    Vocal Quality/Speech WNL   Cognitive status:  Alert      Consistencies administered: Barium laden applesauce, nutri grain bar , nectar thick, thin liquids,  half 13mm barium pill. Liquids were administered by cup and straw.     Pt was seated laterally at 90 degrees.   Pt unable to viewed in AP position due to transfer status.

## 2025-04-10 PROBLEM — J18.9 PNEUMONIA OF BOTH LOWER LOBES: Status: RESOLVED | Noted: 2025-01-05 | Resolved: 2025-04-10

## 2025-04-17 ENCOUNTER — LAB REQUISITION (OUTPATIENT)
Dept: LAB | Facility: HOSPITAL | Age: 80
End: 2025-04-17
Payer: COMMERCIAL

## 2025-04-17 DIAGNOSIS — R05.1 ACUTE COUGH: ICD-10-CM

## 2025-04-17 PROCEDURE — 0241U HB NFCT DS VIR RESP RNA 4 TRGT: CPT | Performed by: INTERNAL MEDICINE

## 2025-04-18 ENCOUNTER — LAB REQUISITION (OUTPATIENT)
Dept: LAB | Facility: HOSPITAL | Age: 80
End: 2025-04-18
Payer: COMMERCIAL

## 2025-04-18 ENCOUNTER — HOSPITAL ENCOUNTER (OUTPATIENT)
Dept: RADIOLOGY | Facility: HOSPITAL | Age: 80
End: 2025-04-18
Attending: INTERNAL MEDICINE
Payer: COMMERCIAL

## 2025-04-18 DIAGNOSIS — R05.1 ACUTE COUGH: ICD-10-CM

## 2025-04-18 LAB
ALBUMIN SERPL BCG-MCNC: 4 G/DL (ref 3.5–5)
ALP SERPL-CCNC: 70 U/L (ref 34–104)
ALT SERPL W P-5'-P-CCNC: 10 U/L (ref 7–52)
ANION GAP SERPL CALCULATED.3IONS-SCNC: 7 MMOL/L (ref 4–13)
AST SERPL W P-5'-P-CCNC: 22 U/L (ref 13–39)
BASOPHILS # BLD AUTO: 0.01 THOUSANDS/ÂΜL (ref 0–0.1)
BASOPHILS NFR BLD AUTO: 0 % (ref 0–1)
BILIRUB SERPL-MCNC: 0.53 MG/DL (ref 0.2–1)
BUN SERPL-MCNC: 28 MG/DL (ref 5–25)
CALCIUM SERPL-MCNC: 9.4 MG/DL (ref 8.4–10.2)
CHLORIDE SERPL-SCNC: 104 MMOL/L (ref 96–108)
CO2 SERPL-SCNC: 29 MMOL/L (ref 21–32)
CREAT SERPL-MCNC: 0.77 MG/DL (ref 0.6–1.3)
EOSINOPHIL # BLD AUTO: 0.09 THOUSAND/ÂΜL (ref 0–0.61)
EOSINOPHIL NFR BLD AUTO: 1 % (ref 0–6)
ERYTHROCYTE [DISTWIDTH] IN BLOOD BY AUTOMATED COUNT: 12.9 % (ref 11.6–15.1)
GFR SERPL CREATININE-BSD FRML MDRD: 73 ML/MIN/1.73SQ M
GLUCOSE SERPL-MCNC: 104 MG/DL (ref 65–140)
HCT VFR BLD AUTO: 40 % (ref 34.8–46.1)
HGB BLD-MCNC: 12.1 G/DL (ref 11.5–15.4)
IMM GRANULOCYTES # BLD AUTO: 0.03 THOUSAND/UL (ref 0–0.2)
IMM GRANULOCYTES NFR BLD AUTO: 0 % (ref 0–2)
LYMPHOCYTES # BLD AUTO: 0.53 THOUSANDS/ÂΜL (ref 0.6–4.47)
LYMPHOCYTES NFR BLD AUTO: 7 % (ref 14–44)
MCH RBC QN AUTO: 29.1 PG (ref 26.8–34.3)
MCHC RBC AUTO-ENTMCNC: 30.3 G/DL (ref 31.4–37.4)
MCV RBC AUTO: 96 FL (ref 82–98)
MONOCYTES # BLD AUTO: 0.64 THOUSAND/ÂΜL (ref 0.17–1.22)
MONOCYTES NFR BLD AUTO: 8 % (ref 4–12)
NEUTROPHILS # BLD AUTO: 6.53 THOUSANDS/ÂΜL (ref 1.85–7.62)
NEUTS SEG NFR BLD AUTO: 84 % (ref 43–75)
NRBC BLD AUTO-RTO: 0 /100 WBCS
PLATELET # BLD AUTO: 137 THOUSANDS/UL (ref 149–390)
PMV BLD AUTO: 9.7 FL (ref 8.9–12.7)
POTASSIUM SERPL-SCNC: 4.3 MMOL/L (ref 3.5–5.3)
PROT SERPL-MCNC: 7 G/DL (ref 6.4–8.4)
RBC # BLD AUTO: 4.16 MILLION/UL (ref 3.81–5.12)
SODIUM SERPL-SCNC: 140 MMOL/L (ref 135–147)
WBC # BLD AUTO: 7.83 THOUSAND/UL (ref 4.31–10.16)

## 2025-04-18 PROCEDURE — 71046 X-RAY EXAM CHEST 2 VIEWS: CPT

## 2025-04-18 PROCEDURE — 85025 COMPLETE CBC W/AUTO DIFF WBC: CPT | Performed by: NURSE PRACTITIONER

## 2025-04-18 PROCEDURE — 80053 COMPREHEN METABOLIC PANEL: CPT | Performed by: NURSE PRACTITIONER

## 2025-04-28 ENCOUNTER — OFFICE VISIT (OUTPATIENT)
Dept: NEUROSURGERY | Facility: CLINIC | Age: 80
End: 2025-04-28
Payer: COMMERCIAL

## 2025-04-28 VITALS
HEIGHT: 63 IN | TEMPERATURE: 97.6 F | DIASTOLIC BLOOD PRESSURE: 64 MMHG | HEART RATE: 74 BPM | OXYGEN SATURATION: 94 % | BODY MASS INDEX: 19.67 KG/M2 | SYSTOLIC BLOOD PRESSURE: 100 MMHG | WEIGHT: 111 LBS

## 2025-04-28 DIAGNOSIS — Z09 FOLLOW-UP EXAMINATION, FOLLOWING OTHER SURGERY: ICD-10-CM

## 2025-04-28 DIAGNOSIS — Z98.1 S/P CERVICAL SPINAL FUSION: Primary | ICD-10-CM

## 2025-04-28 PROCEDURE — 99213 OFFICE O/P EST LOW 20 MIN: CPT | Performed by: STUDENT IN AN ORGANIZED HEALTH CARE EDUCATION/TRAINING PROGRAM

## 2025-04-28 NOTE — PROGRESS NOTES
Name: Marsha Oliva      : 1945      MRN: [id number]  Encounter Provider: Eduardo Dewitt MD  Encounter Date: 2025   Encounter department: [facility] [place]  :  Assessment & Plan  Follow-up examination, following other surgery    Orders:    XR spine cervical 2 or 3 vw injury; Future    S/P cervical spinal fusion    Orders:    XR spine cervical 2 or 3 vw injury; Future    This is a 80-year-old female status post C1-3 posterior cervical instrumentation and fusion presenting for her 6-month postsurgical follow-up visit. The patient is doing very well. Most of her preoperative pain symptoms have resolved.  I do not have any imaging for review so I gave the patient a prescription for x-rays.  I believe the patient can benefit significantly from PT.  I am urging the nursing facility to increase the patient's physical therapy routine.  I will bring her back in 6 months for follow-up visit with x-rays of her cervical spine.     History of Present Illness     Marsha Oliva is a 80 y.o. female who presents for her 6-month postop visit.    HPI   The patient states she is doing very well.  She reports neck pain in the paraspinal muscles bilaterally.  She denies any upper extremity pain or radicular symptoms.  Per her , the patient is suffering from dementia and also was having progressive falls at home.  She had to go to the ER multiple times.  He stated that he also has medical issues and so he is not able to care for the patient.  As such the patient is currently in a assisted nursing/rehab facility.  The patient stated given her fall risk that she is constantly kept in her chair or bed.  Due to that she has not had a lot of physical therapy.  She states she continues to get weaker and weaker.  Per her , she can barely walk a few feet with a walker.     Review of Systems   Musculoskeletal:  Positive for neck pain (pain radiating into shoulders blades) and neck  stiffness.     I have personally reviewed the MA's review of systems and made changes as necessary.    Past Medical History   Past Medical History:   Diagnosis Date    Disease of thyroid gland     Hypotension     Supratherapeutic INR 6/2/2022     Past Surgical History:   Procedure Laterality Date    CERVICAL FUSION Bilateral 9/5/2024    Procedure: navigated posterior fusion C1-C3;  Surgeon: Eduardo Dewitt MD;  [facility];  Service: Neurosurgery    GASTROSTOMY TUBE PLACEMENT N/A 1/1/2025    Procedure: INSERTION PEG TUBE;  Surgeon: Pramod Cordoba DO;  [facility];  Service: General    HYSTERECTOMY       Family History   Problem Relation Age of Onset    Hyperlipidemia Father      she reports that she has quit smoking. Her smoking use included cigarettes. She has never used smokeless tobacco. She reports that she does not drink alcohol and does not use drugs.  Current Outpatient Medications   Medication Instructions    acetaminophen (TYLENOL) 650 mg, Per G Tube, Every 4 hours PRN    aspirin (ECOTRIN LOW STRENGTH) 81 mg, Oral, 2 times daily, Per G tube    atorvastatin (LIPITOR) 20 mg, Per G Tube, Daily    cyanocobalamin (VITAMIN B-12) 1,000 mcg, Per G Tube, Weekly    digoxin (LANOXIN) 125 mcg, Per G Tube, Every other day    divalproex sodium (DEPAKOTE SPRINKLE) 125 mg, Oral, Every 12 hours scheduled    fluticasone (FLONASE) 50 mcg/act nasal spray 2 sprays, Nasal, Daily    folic acid (FOLVITE) 1,000 mcg, Per G Tube, Daily    levothyroxine 125 mcg, Per G Tube, Daily (early morning)    metoprolol tartrate (LOPRESSOR) 25 mg, Per G Tube, Every 12 hours scheduled    oxygen 2 L/min, Inhalation, Daily PRN    phenol (CHLORASEPTIC) 1.4 % mucosal liquid 1 spray, Mouth/Throat, Every 2 hour PRN    polyethylene glycol (MIRALAX) 17 g, Per G Tube, Daily    Propylene Glycol (SYSTANE BALANCE OP) 1 drop, Both Eyes, 2 times daily    sertraline (ZOLOFT) 100 mg, Oral, Daily    torsemide (DEMADEX) 20 mg, Per  "G Tube, Daily PRN    traZODone (DESYREL) 25 mg, Oral, Daily at bedtime   No Known Allergies   Objective   /64 (BP Location: Left arm, Patient Position: Sitting)   Pulse 74   Temp 97.6 °F (36.4 °C) (Temporal)   Ht 5' 3\" (1.6 m)   Wt 50.3 kg (111 lb)   SpO2 94%   BMI 19.66 kg/m²     Physical Exam  Neurological Exam  General:  Normal, well developed, not in distress/pain     Skin:   No issues, no rashes noted     Musculoskeletal:   5/5 strength throughout all muscle groups  No tenderness to palpation of the spine       Neurologic Exam:  Awake and alert  Oriented x3  Speech clear and fluent  [id number]   Sensation to light touch and pin prick intact throughout  No more's  No clonus  2+ patellar reflexes     Gait:   In wheelchair        Administrative Statements   I have spent a total time of 20 minutes in caring for this patient on the day of the visit/encounter including Diagnostic results, Prognosis, Instructions for management, Patient and family education, Impressions, Counseling / Coordination of care, Documenting in the medical record, Reviewing/placing orders in the medical record (including tests, medications, and/or procedures), Obtaining or reviewing history  , and Communicating with other healthcare professionals .      "

## 2025-04-30 ENCOUNTER — HOSPITAL ENCOUNTER (OUTPATIENT)
Dept: RADIOLOGY | Facility: HOSPITAL | Age: 80
Discharge: HOME | End: 2025-04-30
Attending: INTERNAL MEDICINE
Payer: COMMERCIAL

## 2025-04-30 DIAGNOSIS — M43.22 FUSION OF SPINE OF CERVICAL REGION: ICD-10-CM

## 2025-04-30 PROCEDURE — 72050 X-RAY EXAM NECK SPINE 4/5VWS: CPT

## 2025-06-05 ENCOUNTER — TELEPHONE (OUTPATIENT)
Age: 80
End: 2025-06-05

## 2025-06-05 NOTE — TELEPHONE ENCOUNTER
[facility] called in regards to patient last office visit. I advised patient was last seen on 9/19/2022. [facility] will  contact patient to have patient schedule an appointment with . Thank You

## 2025-07-16 ENCOUNTER — LAB REQUISITION (OUTPATIENT)
Dept: LAB | Facility: HOSPITAL | Age: 80
End: 2025-07-16
Payer: COMMERCIAL

## 2025-07-16 DIAGNOSIS — E03.9 HYPOTHYROIDISM, UNSPECIFIED: ICD-10-CM

## 2025-07-16 LAB — TSH SERPL DL<=0.05 MIU/L-ACNC: 0.48 UIU/ML (ref 0.45–4.5)

## 2025-07-16 PROCEDURE — 84443 ASSAY THYROID STIM HORMONE: CPT | Performed by: INTERNAL MEDICINE

## 2025-08-06 ENCOUNTER — TELEPHONE (OUTPATIENT)
Dept: NEUROSURGERY | Facility: CLINIC | Age: 80
End: 2025-08-06

## 2025-08-22 LAB

## (undated) DEVICE — MINOR PROCEDURE DRAPE: Brand: CONVERTORS

## (undated) DEVICE — GLOVE INDICATOR PI UNDERGLOVE SZ 7.5 BLUE

## (undated) DEVICE — GLOVE SRG BIOGEL 7

## (undated) DEVICE — MARKER REFLECTIVE RADIOPAQUE SPHERE

## (undated) DEVICE — TOOL MR8-14MH30 MR8 14CM MATCH 3MM: Brand: MIDAS REX MR8

## (undated) DEVICE — INTENDED FOR TISSUE SEPARATION, AND OTHER PROCEDURES THAT REQUIRE A SHARP SURGICAL BLADE TO PUNCTURE OR CUT.: Brand: BARD-PARKER SAFETY BLADES SIZE 10, STERILE

## (undated) DEVICE — SKIN MARKER DUAL TIP WITH RULER CAP, FLEXIBLE RULER AND LABELS: Brand: DEVON

## (undated) DEVICE — DRILL BIT NAVG3606010 NAVIGATED: Brand: NAVIGATED INFINITY™ OCCIPITOCERVICAL UPPER THORACIC SYSTEM

## (undated) DEVICE — LIGHT HANDLE COVER SLEEVE DISP BLUE STELLAR

## (undated) DEVICE — ELECTRODE BLADE MOD E-Z CLEAN 4IN -0014AM

## (undated) DEVICE — TELFA ADHESIVE ISLAND DRESSING: Brand: TELFA

## (undated) DEVICE — PENCIL ELECTROSURG E-Z CLEAN -0035H

## (undated) DEVICE — DRAPE SHEET THREE QUARTER

## (undated) DEVICE — TUBING SUCTION 5MM X 12 FT

## (undated) DEVICE — NEEDLE 25G X 1 1/2

## (undated) DEVICE — GAUZE SPONGES,16 PLY: Brand: CURITY

## (undated) DEVICE — ANTIBACTERIAL VIOLET BRAIDED (POLYGLACTIN 910), SYNTHETIC ABSORBABLE SUTURE: Brand: COATED VICRYL

## (undated) DEVICE — TRAY FOLEY 16FR URIMETER SURESTEP

## (undated) DEVICE — SPONGE SCRUB 4 PCT CHLORHEXIDINE

## (undated) DEVICE — MONITORING SPINAL IMPULSE CASE FEE

## (undated) DEVICE — CHLORAPREP HI-LITE 26ML ORANGE

## (undated) DEVICE — DRESSING MEPILEX AG BORDER POST-OP 4 X 6 IN

## (undated) DEVICE — BIPOLAR SEALER 23-113-1 AQM 2.3: Brand: AQUAMANTYS™

## (undated) DEVICE — POV-IOD LIQUID POUCH 0.75OZ

## (undated) DEVICE — SUPPLY FEE STD

## (undated) DEVICE — BETHLEHEM UNIVERSAL SPINE, KIT: Brand: CARDINAL HEALTH

## (undated) DEVICE — HEMOSTATIC MATRIX SURGIFLO 8ML W/THROMBIN

## (undated) DEVICE — MAYFIELD® DISPOSABLE ADULT SKULL PIN (PLASTIC BASE): Brand: MAYFIELD®

## (undated) DEVICE — SYRINGE 10ML LL

## (undated) DEVICE — TOOL MR8-15MH22 MR8 15CM MATCH 2.2MM: Brand: MIDAS REX MR8

## (undated) DEVICE — POV-IOD SOLUTION 4OZ BT

## (undated) DEVICE — PAD GROUNDING DUAL ADULT

## (undated) DEVICE — GLOVE SRG BIOGEL 7.5

## (undated) DEVICE — SWABSTCK, BENZOIN TINCTURE, 1/PK, STRL: Brand: APLICARE

## (undated) DEVICE — SPECIMEN CONTAINER STERILE PEEL PACK

## (undated) DEVICE — AIR AND WATER TUBING/CAP SET FOR OLYMPUS® SCOPES: Brand: ERBE

## (undated) DEVICE — PROXIMATE SKIN STAPLERS (35 WIDE) CONTAINS 35 STAINLESS STEEL STAPLES (FIXED HEAD): Brand: PROXIMATE

## (undated) DEVICE — SUT STRATAFIX SPIRAL MONOCRYL PLUS 4-0 PS-2 45CM SXMP1B118

## (undated) DEVICE — DRAPE SURGIKIT SADDLE BAG

## (undated) DEVICE — Device: Brand: DEFENDO AIR/WATER/SUCTION AND BIOPSY VALVE

## (undated) DEVICE — SUT PROLENE 2-0 FSLX 18 IN 8689H

## (undated) DEVICE — PREMIUM DRY TRAY LF: Brand: MEDLINE INDUSTRIES, INC.

## (undated) DEVICE — TRAVELKIT CONTAINS FIRST STEP KIT (200ML EP-4 KIT) AND SOILED SCOPE BAG - 1 KIT: Brand: TRAVELKIT CONTAINS FIRST STEP KIT AND SOILED SCOPE BAG

## (undated) DEVICE — SUT STRATAFIX SPIRAL PDS PLUS 1 CTX 18 IN SXPP1A400

## (undated) DEVICE — JACKSON TABLE FOAM POSITIONING KIT: Brand: CARDINAL HEALTH

## (undated) DEVICE — NEURO PATTIES 1/2 X 3